# Patient Record
Sex: MALE | Race: WHITE | NOT HISPANIC OR LATINO | Employment: OTHER | URBAN - METROPOLITAN AREA
[De-identification: names, ages, dates, MRNs, and addresses within clinical notes are randomized per-mention and may not be internally consistent; named-entity substitution may affect disease eponyms.]

---

## 2017-02-06 ENCOUNTER — APPOINTMENT (OUTPATIENT)
Dept: LAB | Facility: HOSPITAL | Age: 82
End: 2017-02-06
Attending: INTERNAL MEDICINE
Payer: MEDICARE

## 2017-02-06 DIAGNOSIS — C90.00 MULTIPLE MYELOMA NOT HAVING ACHIEVED REMISSION (HCC): ICD-10-CM

## 2017-02-06 LAB
ALBUMIN SERPL BCP-MCNC: 2.9 G/DL (ref 3.5–5)
ALP SERPL-CCNC: 96 U/L (ref 46–116)
ALT SERPL W P-5'-P-CCNC: 19 U/L (ref 12–78)
ANION GAP SERPL CALCULATED.3IONS-SCNC: 6 MMOL/L (ref 4–13)
AST SERPL W P-5'-P-CCNC: 13 U/L (ref 5–45)
BASOPHILS # BLD AUTO: 0.06 THOUSANDS/ΜL (ref 0–0.1)
BASOPHILS NFR BLD AUTO: 2 % (ref 0–1)
BILIRUB SERPL-MCNC: 0.79 MG/DL (ref 0.2–1)
BUN SERPL-MCNC: 19 MG/DL (ref 5–25)
CALCIUM SERPL-MCNC: 9.1 MG/DL (ref 8.3–10.1)
CHLORIDE SERPL-SCNC: 107 MMOL/L (ref 100–108)
CO2 SERPL-SCNC: 27 MMOL/L (ref 21–32)
CREAT SERPL-MCNC: 1.31 MG/DL (ref 0.6–1.3)
EOSINOPHIL # BLD AUTO: 0.3 THOUSAND/ΜL (ref 0–0.61)
EOSINOPHIL NFR BLD AUTO: 7 % (ref 0–6)
ERYTHROCYTE [DISTWIDTH] IN BLOOD BY AUTOMATED COUNT: 14.8 % (ref 11.6–15.1)
GFR SERPL CREATININE-BSD FRML MDRD: 52.6 ML/MIN/1.73SQ M
GLUCOSE SERPL-MCNC: 104 MG/DL (ref 65–140)
HCT VFR BLD AUTO: 40.4 % (ref 36.5–49.3)
HGB BLD-MCNC: 13.5 G/DL (ref 12–17)
LYMPHOCYTES # BLD AUTO: 1.47 THOUSANDS/ΜL (ref 0.6–4.47)
LYMPHOCYTES NFR BLD AUTO: 36 % (ref 14–44)
MCH RBC QN AUTO: 30.1 PG (ref 26.8–34.3)
MCHC RBC AUTO-ENTMCNC: 33.4 G/DL (ref 31.4–37.4)
MCV RBC AUTO: 90 FL (ref 82–98)
MONOCYTES # BLD AUTO: 0.45 THOUSAND/ΜL (ref 0.17–1.22)
MONOCYTES NFR BLD AUTO: 11 % (ref 4–12)
NEUTROPHILS # BLD AUTO: 1.77 THOUSANDS/ΜL (ref 1.85–7.62)
NEUTS SEG NFR BLD AUTO: 44 % (ref 43–75)
NRBC BLD AUTO-RTO: 0 /100 WBCS
PLATELET # BLD AUTO: 157 THOUSANDS/UL (ref 149–390)
PMV BLD AUTO: 8.9 FL (ref 8.9–12.7)
POTASSIUM SERPL-SCNC: 4.1 MMOL/L (ref 3.5–5.3)
PROT SERPL-MCNC: 6.5 G/DL (ref 6.4–8.2)
RBC # BLD AUTO: 4.49 MILLION/UL (ref 3.88–5.62)
SODIUM SERPL-SCNC: 140 MMOL/L (ref 136–145)
WBC # BLD AUTO: 4.05 THOUSAND/UL (ref 4.31–10.16)

## 2017-02-06 PROCEDURE — 80053 COMPREHEN METABOLIC PANEL: CPT

## 2017-02-06 PROCEDURE — 83883 ASSAY NEPHELOMETRY NOT SPEC: CPT

## 2017-02-06 PROCEDURE — 36415 COLL VENOUS BLD VENIPUNCTURE: CPT

## 2017-02-06 PROCEDURE — 85025 COMPLETE CBC W/AUTO DIFF WBC: CPT

## 2017-02-07 LAB
KAPPA LC FREE SER-MCNC: 33.14 MG/L (ref 3.3–19.4)
KAPPA LC FREE/LAMBDA FREE SER: 0.61 {RATIO} (ref 0.26–1.65)
LAMBDA LC FREE SERPL-MCNC: 54.35 MG/L (ref 5.71–26.3)

## 2017-02-10 ENCOUNTER — ALLSCRIPTS OFFICE VISIT (OUTPATIENT)
Dept: OTHER | Facility: OTHER | Age: 82
End: 2017-02-10

## 2017-02-10 DIAGNOSIS — J43.9 PULMONARY EMPHYSEMA (HCC): ICD-10-CM

## 2017-02-10 DIAGNOSIS — C90.00 MULTIPLE MYELOMA NOT HAVING ACHIEVED REMISSION (HCC): ICD-10-CM

## 2017-03-07 ENCOUNTER — APPOINTMENT (OUTPATIENT)
Dept: LAB | Facility: HOSPITAL | Age: 82
End: 2017-03-07
Attending: INTERNAL MEDICINE
Payer: MEDICARE

## 2017-03-07 DIAGNOSIS — C90.00 MULTIPLE MYELOMA NOT HAVING ACHIEVED REMISSION (HCC): ICD-10-CM

## 2017-03-07 LAB
ALBUMIN SERPL BCP-MCNC: 3.1 G/DL (ref 3.5–5)
ALP SERPL-CCNC: 87 U/L (ref 46–116)
ALT SERPL W P-5'-P-CCNC: 19 U/L (ref 12–78)
ANION GAP SERPL CALCULATED.3IONS-SCNC: 5 MMOL/L (ref 4–13)
AST SERPL W P-5'-P-CCNC: 13 U/L (ref 5–45)
BASOPHILS # BLD AUTO: 0.07 THOUSANDS/ΜL (ref 0–0.1)
BASOPHILS NFR BLD AUTO: 1 % (ref 0–1)
BILIRUB SERPL-MCNC: 1.79 MG/DL (ref 0.2–1)
BUN SERPL-MCNC: 16 MG/DL (ref 5–25)
CALCIUM SERPL-MCNC: 8.7 MG/DL (ref 8.3–10.1)
CHLORIDE SERPL-SCNC: 107 MMOL/L (ref 100–108)
CO2 SERPL-SCNC: 30 MMOL/L (ref 21–32)
CREAT SERPL-MCNC: 1.21 MG/DL (ref 0.6–1.3)
EOSINOPHIL # BLD AUTO: 0.23 THOUSAND/ΜL (ref 0–0.61)
EOSINOPHIL NFR BLD AUTO: 4 % (ref 0–6)
ERYTHROCYTE [DISTWIDTH] IN BLOOD BY AUTOMATED COUNT: 15.1 % (ref 11.6–15.1)
GFR SERPL CREATININE-BSD FRML MDRD: 57.7 ML/MIN/1.73SQ M
GLUCOSE SERPL-MCNC: 89 MG/DL (ref 65–140)
HCT VFR BLD AUTO: 42.8 % (ref 36.5–49.3)
HGB BLD-MCNC: 14.3 G/DL (ref 12–17)
LYMPHOCYTES # BLD AUTO: 1.5 THOUSANDS/ΜL (ref 0.6–4.47)
LYMPHOCYTES NFR BLD AUTO: 27 % (ref 14–44)
MCH RBC QN AUTO: 30.2 PG (ref 26.8–34.3)
MCHC RBC AUTO-ENTMCNC: 33.4 G/DL (ref 31.4–37.4)
MCV RBC AUTO: 91 FL (ref 82–98)
MONOCYTES # BLD AUTO: 0.66 THOUSAND/ΜL (ref 0.17–1.22)
MONOCYTES NFR BLD AUTO: 12 % (ref 4–12)
NEUTROPHILS # BLD AUTO: 3.08 THOUSANDS/ΜL (ref 1.85–7.62)
NEUTS SEG NFR BLD AUTO: 56 % (ref 43–75)
NRBC BLD AUTO-RTO: 0 /100 WBCS
PLATELET # BLD AUTO: 167 THOUSANDS/UL (ref 149–390)
PMV BLD AUTO: 9.6 FL (ref 8.9–12.7)
POTASSIUM SERPL-SCNC: 3.9 MMOL/L (ref 3.5–5.3)
PROT SERPL-MCNC: 6.6 G/DL (ref 6.4–8.2)
RBC # BLD AUTO: 4.73 MILLION/UL (ref 3.88–5.62)
SODIUM SERPL-SCNC: 142 MMOL/L (ref 136–145)
WBC # BLD AUTO: 5.54 THOUSAND/UL (ref 4.31–10.16)

## 2017-03-07 PROCEDURE — 85025 COMPLETE CBC W/AUTO DIFF WBC: CPT

## 2017-03-07 PROCEDURE — 80053 COMPREHEN METABOLIC PANEL: CPT

## 2017-03-07 PROCEDURE — 83883 ASSAY NEPHELOMETRY NOT SPEC: CPT

## 2017-03-07 PROCEDURE — 36415 COLL VENOUS BLD VENIPUNCTURE: CPT

## 2017-03-08 LAB
KAPPA LC FREE SER-MCNC: 31.33 MG/L (ref 3.3–19.4)
KAPPA LC FREE/LAMBDA FREE SER: 0.5 {RATIO} (ref 0.26–1.65)
LAMBDA LC FREE SERPL-MCNC: 63.24 MG/L (ref 5.71–26.3)

## 2017-03-09 ENCOUNTER — ALLSCRIPTS OFFICE VISIT (OUTPATIENT)
Dept: OTHER | Facility: OTHER | Age: 82
End: 2017-03-09

## 2017-03-27 ENCOUNTER — HOSPITAL ENCOUNTER (OUTPATIENT)
Dept: RADIOLOGY | Facility: HOSPITAL | Age: 82
Discharge: HOME | DRG: 189 | End: 2017-03-27
Attending: INTERNAL MEDICINE
Payer: MEDICARE

## 2017-03-27 ENCOUNTER — APPOINTMENT (OUTPATIENT)
Dept: LAB | Facility: HOSPITAL | Age: 82
DRG: 189 | End: 2017-03-27
Attending: INTERNAL MEDICINE
Payer: MEDICARE

## 2017-03-27 DIAGNOSIS — J43.9 PULMONARY EMPHYSEMA (HCC): ICD-10-CM

## 2017-03-27 LAB
ALBUMIN SERPL BCP-MCNC: 3.2 G/DL (ref 3.5–5)
ALP SERPL-CCNC: 98 U/L (ref 46–116)
ALT SERPL W P-5'-P-CCNC: 29 U/L (ref 12–78)
ANION GAP SERPL CALCULATED.3IONS-SCNC: 6 MMOL/L (ref 4–13)
AST SERPL W P-5'-P-CCNC: 21 U/L (ref 5–45)
BASOPHILS # BLD AUTO: 0.08 THOUSANDS/ΜL (ref 0–0.1)
BASOPHILS NFR BLD AUTO: 2 % (ref 0–1)
BILIRUB SERPL-MCNC: 1.25 MG/DL (ref 0.2–1)
BUN SERPL-MCNC: 18 MG/DL (ref 5–25)
CALCIUM SERPL-MCNC: 8.6 MG/DL (ref 8.3–10.1)
CHLORIDE SERPL-SCNC: 104 MMOL/L (ref 100–108)
CO2 SERPL-SCNC: 29 MMOL/L (ref 21–32)
CREAT SERPL-MCNC: 1.21 MG/DL (ref 0.6–1.3)
EOSINOPHIL # BLD AUTO: 0.28 THOUSAND/ΜL (ref 0–0.61)
EOSINOPHIL NFR BLD AUTO: 5 % (ref 0–6)
ERYTHROCYTE [DISTWIDTH] IN BLOOD BY AUTOMATED COUNT: 14.9 % (ref 11.6–15.1)
GFR SERPL CREATININE-BSD FRML MDRD: 57.7 ML/MIN/1.73SQ M
GLUCOSE SERPL-MCNC: 88 MG/DL (ref 65–140)
HCT VFR BLD AUTO: 42.8 % (ref 36.5–49.3)
HGB BLD-MCNC: 14.4 G/DL (ref 12–17)
LYMPHOCYTES # BLD AUTO: 1.36 THOUSANDS/ΜL (ref 0.6–4.47)
LYMPHOCYTES NFR BLD AUTO: 26 % (ref 14–44)
MCH RBC QN AUTO: 30 PG (ref 26.8–34.3)
MCHC RBC AUTO-ENTMCNC: 33.6 G/DL (ref 31.4–37.4)
MCV RBC AUTO: 89 FL (ref 82–98)
MONOCYTES # BLD AUTO: 0.7 THOUSAND/ΜL (ref 0.17–1.22)
MONOCYTES NFR BLD AUTO: 13 % (ref 4–12)
NEUTROPHILS # BLD AUTO: 2.81 THOUSANDS/ΜL (ref 1.85–7.62)
NEUTS SEG NFR BLD AUTO: 54 % (ref 43–75)
NRBC BLD AUTO-RTO: 0 /100 WBCS
PLATELET # BLD AUTO: 161 THOUSANDS/UL (ref 149–390)
PMV BLD AUTO: 9.2 FL (ref 8.9–12.7)
POTASSIUM SERPL-SCNC: 3.8 MMOL/L (ref 3.5–5.3)
PROT SERPL-MCNC: 6.7 G/DL (ref 6.4–8.2)
RBC # BLD AUTO: 4.8 MILLION/UL (ref 3.88–5.62)
SODIUM SERPL-SCNC: 139 MMOL/L (ref 136–145)
WBC # BLD AUTO: 5.23 THOUSAND/UL (ref 4.31–10.16)

## 2017-03-27 PROCEDURE — 83883 ASSAY NEPHELOMETRY NOT SPEC: CPT

## 2017-03-27 PROCEDURE — 80053 COMPREHEN METABOLIC PANEL: CPT

## 2017-03-27 PROCEDURE — 36415 COLL VENOUS BLD VENIPUNCTURE: CPT

## 2017-03-27 PROCEDURE — 77075 RADEX OSSEOUS SURVEY COMPL: CPT

## 2017-03-27 PROCEDURE — 85025 COMPLETE CBC W/AUTO DIFF WBC: CPT

## 2017-03-28 LAB
KAPPA LC FREE SER-MCNC: 35.59 MG/L (ref 3.3–19.4)
KAPPA LC FREE/LAMBDA FREE SER: 0.52 {RATIO} (ref 0.26–1.65)
LAMBDA LC FREE SERPL-MCNC: 67.99 MG/L (ref 5.71–26.3)

## 2017-03-29 ENCOUNTER — HOSPITAL ENCOUNTER (INPATIENT)
Facility: HOSPITAL | Age: 82
LOS: 4 days | Discharge: HOME | DRG: 189 | End: 2017-04-02
Attending: EMERGENCY MEDICINE | Admitting: INTERNAL MEDICINE
Payer: MEDICARE

## 2017-03-29 ENCOUNTER — APPOINTMENT (EMERGENCY)
Dept: CT IMAGING | Facility: HOSPITAL | Age: 82
DRG: 189 | End: 2017-03-29
Payer: MEDICARE

## 2017-03-29 ENCOUNTER — APPOINTMENT (EMERGENCY)
Dept: RADIOLOGY | Facility: HOSPITAL | Age: 82
DRG: 189 | End: 2017-03-29
Payer: MEDICARE

## 2017-03-29 DIAGNOSIS — J44.1 COPD EXACERBATION (HCC): Primary | ICD-10-CM

## 2017-03-29 DIAGNOSIS — R06.02 SHORTNESS OF BREATH: ICD-10-CM

## 2017-03-29 DIAGNOSIS — R06.03 RESPIRATORY DISTRESS: ICD-10-CM

## 2017-03-29 LAB
ALBUMIN SERPL BCP-MCNC: 3.2 G/DL (ref 3.5–5)
ALP SERPL-CCNC: 109 U/L (ref 46–116)
ALT SERPL W P-5'-P-CCNC: 24 U/L (ref 12–78)
ANION GAP SERPL CALCULATED.3IONS-SCNC: 11 MMOL/L (ref 4–13)
AST SERPL W P-5'-P-CCNC: 27 U/L (ref 5–45)
BASOPHILS # BLD AUTO: 0.02 THOUSANDS/ΜL (ref 0–0.1)
BASOPHILS NFR BLD AUTO: 0 % (ref 0–1)
BILIRUB SERPL-MCNC: 1.52 MG/DL (ref 0.2–1)
BUN SERPL-MCNC: 17 MG/DL (ref 5–25)
CALCIUM SERPL-MCNC: 8.5 MG/DL (ref 8.3–10.1)
CHLORIDE SERPL-SCNC: 104 MMOL/L (ref 100–108)
CO2 SERPL-SCNC: 24 MMOL/L (ref 21–32)
CREAT SERPL-MCNC: 1.31 MG/DL (ref 0.6–1.3)
EOSINOPHIL # BLD AUTO: 0.16 THOUSAND/ΜL (ref 0–0.61)
EOSINOPHIL NFR BLD AUTO: 3 % (ref 0–6)
ERYTHROCYTE [DISTWIDTH] IN BLOOD BY AUTOMATED COUNT: 15 % (ref 11.6–15.1)
GFR SERPL CREATININE-BSD FRML MDRD: 52.6 ML/MIN/1.73SQ M
GLUCOSE SERPL-MCNC: 110 MG/DL (ref 65–140)
HCT VFR BLD AUTO: 43.4 % (ref 36.5–49.3)
HGB BLD-MCNC: 14.5 G/DL (ref 12–17)
LACTATE SERPL-SCNC: 2.1 MMOL/L (ref 0.5–2)
LYMPHOCYTES # BLD AUTO: 1.3 THOUSANDS/ΜL (ref 0.6–4.47)
LYMPHOCYTES NFR BLD AUTO: 27 % (ref 14–44)
MCH RBC QN AUTO: 29.8 PG (ref 26.8–34.3)
MCHC RBC AUTO-ENTMCNC: 33.4 G/DL (ref 31.4–37.4)
MCV RBC AUTO: 89 FL (ref 82–98)
MONOCYTES # BLD AUTO: 0.43 THOUSAND/ΜL (ref 0.17–1.22)
MONOCYTES NFR BLD AUTO: 9 % (ref 4–12)
NEUTROPHILS # BLD AUTO: 2.9 THOUSANDS/ΜL (ref 1.85–7.62)
NEUTS SEG NFR BLD AUTO: 61 % (ref 43–75)
NRBC BLD AUTO-RTO: 0 /100 WBCS
NT-PROBNP SERPL-MCNC: 197 PG/ML
PLATELET # BLD AUTO: 151 THOUSANDS/UL (ref 149–390)
PMV BLD AUTO: 9.4 FL (ref 8.9–12.7)
POTASSIUM SERPL-SCNC: 4 MMOL/L (ref 3.5–5.3)
PROT SERPL-MCNC: 6.8 G/DL (ref 6.4–8.2)
RBC # BLD AUTO: 4.86 MILLION/UL (ref 3.88–5.62)
SODIUM SERPL-SCNC: 139 MMOL/L (ref 136–145)
SPECIMEN SOURCE: NORMAL
TROPONIN I BLD-MCNC: 0.01 NG/ML (ref 0–0.08)
WBC # BLD AUTO: 4.81 THOUSAND/UL (ref 4.31–10.16)

## 2017-03-29 PROCEDURE — 71010 HB CHEST X-RAY 1 VIEW FRONTAL (PORTABLE): CPT

## 2017-03-29 PROCEDURE — 87798 DETECT AGENT NOS DNA AMP: CPT | Performed by: PHYSICIAN ASSISTANT

## 2017-03-29 PROCEDURE — 85025 COMPLETE CBC W/AUTO DIFF WBC: CPT | Performed by: PHYSICIAN ASSISTANT

## 2017-03-29 PROCEDURE — 71275 CT ANGIOGRAPHY CHEST: CPT

## 2017-03-29 PROCEDURE — 96361 HYDRATE IV INFUSION ADD-ON: CPT

## 2017-03-29 PROCEDURE — 94760 N-INVAS EAR/PLS OXIMETRY 1: CPT

## 2017-03-29 PROCEDURE — 94640 AIRWAY INHALATION TREATMENT: CPT

## 2017-03-29 PROCEDURE — 36415 COLL VENOUS BLD VENIPUNCTURE: CPT | Performed by: PHYSICIAN ASSISTANT

## 2017-03-29 PROCEDURE — 83605 ASSAY OF LACTIC ACID: CPT | Performed by: PHYSICIAN ASSISTANT

## 2017-03-29 PROCEDURE — 93005 ELECTROCARDIOGRAM TRACING: CPT | Performed by: EMERGENCY MEDICINE

## 2017-03-29 PROCEDURE — 84484 ASSAY OF TROPONIN QUANT: CPT

## 2017-03-29 PROCEDURE — 93005 ELECTROCARDIOGRAM TRACING: CPT | Performed by: PHYSICIAN ASSISTANT

## 2017-03-29 PROCEDURE — 87040 BLOOD CULTURE FOR BACTERIA: CPT | Performed by: PHYSICIAN ASSISTANT

## 2017-03-29 PROCEDURE — 99285 EMERGENCY DEPT VISIT HI MDM: CPT

## 2017-03-29 PROCEDURE — 83880 ASSAY OF NATRIURETIC PEPTIDE: CPT | Performed by: PHYSICIAN ASSISTANT

## 2017-03-29 PROCEDURE — 94660 CPAP INITIATION&MGMT: CPT

## 2017-03-29 PROCEDURE — 96374 THER/PROPH/DIAG INJ IV PUSH: CPT

## 2017-03-29 PROCEDURE — 94644 CONT INHLJ TX 1ST HOUR: CPT

## 2017-03-29 PROCEDURE — 80053 COMPREHEN METABOLIC PANEL: CPT | Performed by: PHYSICIAN ASSISTANT

## 2017-03-29 RX ORDER — FINASTERIDE 5 MG/1
5 TABLET, FILM COATED ORAL
Status: DISCONTINUED | OUTPATIENT
Start: 2017-03-29 | End: 2017-04-02 | Stop reason: HOSPADM

## 2017-03-29 RX ORDER — FINASTERIDE 5 MG/1
5 TABLET, FILM COATED ORAL DAILY
Status: DISCONTINUED | OUTPATIENT
Start: 2017-03-30 | End: 2017-03-30

## 2017-03-29 RX ORDER — ALBUTEROL SULFATE 2.5 MG/3ML
5 SOLUTION RESPIRATORY (INHALATION) ONCE
Status: DISCONTINUED | OUTPATIENT
Start: 2017-03-29 | End: 2017-03-29

## 2017-03-29 RX ORDER — ALBUTEROL SULFATE 2.5 MG/3ML
5 SOLUTION RESPIRATORY (INHALATION) ONCE
Status: COMPLETED | OUTPATIENT
Start: 2017-03-29 | End: 2017-03-29

## 2017-03-29 RX ORDER — METHYLPREDNISOLONE SODIUM SUCCINATE 40 MG/ML
40 INJECTION, POWDER, LYOPHILIZED, FOR SOLUTION INTRAMUSCULAR; INTRAVENOUS EVERY 8 HOURS SCHEDULED
Status: DISCONTINUED | OUTPATIENT
Start: 2017-03-29 | End: 2017-04-01

## 2017-03-29 RX ORDER — BENZONATATE 100 MG/1
100 CAPSULE ORAL 3 TIMES DAILY PRN
COMMUNITY
End: 2017-10-27 | Stop reason: ALTCHOICE

## 2017-03-29 RX ORDER — LEVALBUTEROL 1.25 MG/.5ML
1.25 SOLUTION, CONCENTRATE RESPIRATORY (INHALATION) EVERY 6 HOURS
Status: DISCONTINUED | OUTPATIENT
Start: 2017-03-29 | End: 2017-03-30

## 2017-03-29 RX ORDER — ALBUTEROL SULFATE 2.5 MG/3ML
2.5 SOLUTION RESPIRATORY (INHALATION) EVERY 4 HOURS PRN
Status: DISCONTINUED | OUTPATIENT
Start: 2017-03-29 | End: 2017-03-31

## 2017-03-29 RX ORDER — CHOLESTYRAMINE LIGHT 4 G/5.7G
4 POWDER, FOR SUSPENSION ORAL DAILY
Status: ON HOLD | COMMUNITY
End: 2017-07-10

## 2017-03-29 RX ORDER — BENZONATATE 100 MG/1
100 CAPSULE ORAL 3 TIMES DAILY PRN
Status: DISCONTINUED | OUTPATIENT
Start: 2017-03-29 | End: 2017-03-30

## 2017-03-29 RX ORDER — ASPIRIN 81 MG/1
81 TABLET, CHEWABLE ORAL EVERY OTHER DAY
Status: DISCONTINUED | OUTPATIENT
Start: 2017-03-30 | End: 2017-04-02 | Stop reason: HOSPADM

## 2017-03-29 RX ORDER — LANOLIN ALCOHOL/MO/W.PET/CERES
1000 CREAM (GRAM) TOPICAL DAILY
COMMUNITY
End: 2017-10-27 | Stop reason: ALTCHOICE

## 2017-03-29 RX ORDER — METHYLPREDNISOLONE SODIUM SUCCINATE 125 MG/2ML
125 INJECTION, POWDER, LYOPHILIZED, FOR SOLUTION INTRAMUSCULAR; INTRAVENOUS ONCE
Status: COMPLETED | OUTPATIENT
Start: 2017-03-29 | End: 2017-03-29

## 2017-03-29 RX ORDER — LEVALBUTEROL 1.25 MG/.5ML
1.25 SOLUTION, CONCENTRATE RESPIRATORY (INHALATION) EVERY 4 HOURS PRN
Status: DISCONTINUED | OUTPATIENT
Start: 2017-03-29 | End: 2017-03-31

## 2017-03-29 RX ORDER — LENALIDOMIDE 5 MG/1
5 CAPSULE ORAL DAILY
Status: DISCONTINUED | OUTPATIENT
Start: 2017-03-30 | End: 2017-03-30

## 2017-03-29 RX ADMIN — SODIUM CHLORIDE 1000 ML: 0.9 INJECTION, SOLUTION INTRAVENOUS at 19:09

## 2017-03-29 RX ADMIN — ALBUTEROL SULFATE 10 MG: 2.5 SOLUTION RESPIRATORY (INHALATION) at 20:59

## 2017-03-29 RX ADMIN — ALBUTEROL SULFATE 5 MG: 2.5 SOLUTION RESPIRATORY (INHALATION) at 19:11

## 2017-03-29 RX ADMIN — FINASTERIDE 5 MG: 5 TABLET, FILM COATED ORAL at 23:37

## 2017-03-29 RX ADMIN — METHYLPREDNISOLONE SODIUM SUCCINATE 125 MG: 125 INJECTION, POWDER, FOR SOLUTION INTRAMUSCULAR; INTRAVENOUS at 19:11

## 2017-03-29 RX ADMIN — IPRATROPIUM BROMIDE 1 MG: 0.5 SOLUTION RESPIRATORY (INHALATION) at 21:00

## 2017-03-29 RX ADMIN — IPRATROPIUM BROMIDE 0.5 MG: 0.5 SOLUTION RESPIRATORY (INHALATION) at 19:12

## 2017-03-29 RX ADMIN — ALBUTEROL SULFATE 5 MG: 2.5 SOLUTION RESPIRATORY (INHALATION) at 17:20

## 2017-03-29 RX ADMIN — IODIXANOL 85 ML: 320 INJECTION, SOLUTION INTRAVASCULAR at 20:47

## 2017-03-29 RX ADMIN — IPRATROPIUM BROMIDE 0.5 MG: 0.5 SOLUTION RESPIRATORY (INHALATION) at 17:20

## 2017-03-30 LAB
ANION GAP SERPL CALCULATED.3IONS-SCNC: 12 MMOL/L (ref 4–13)
ANISOCYTOSIS BLD QL SMEAR: PRESENT
ATRIAL RATE: 108 BPM
ATRIAL RATE: 129 BPM
BASOPHILS # BLD MANUAL: 0 THOUSAND/UL (ref 0–0.1)
BASOPHILS NFR MAR MANUAL: 0 % (ref 0–1)
BUN SERPL-MCNC: 16 MG/DL (ref 5–25)
CALCIUM SERPL-MCNC: 8.2 MG/DL (ref 8.3–10.1)
CHLORIDE SERPL-SCNC: 105 MMOL/L (ref 100–108)
CO2 SERPL-SCNC: 22 MMOL/L (ref 21–32)
CREAT SERPL-MCNC: 1.53 MG/DL (ref 0.6–1.3)
EOSINOPHIL # BLD MANUAL: 0 THOUSAND/UL (ref 0–0.4)
EOSINOPHIL NFR BLD MANUAL: 0 % (ref 0–6)
ERYTHROCYTE [DISTWIDTH] IN BLOOD BY AUTOMATED COUNT: 15 % (ref 11.6–15.1)
FLUAV AG SPEC QL: NORMAL
FLUBV AG SPEC QL: NORMAL
GFR SERPL CREATININE-BSD FRML MDRD: 44 ML/MIN/1.73SQ M
GLUCOSE SERPL-MCNC: 167 MG/DL (ref 65–140)
HCT VFR BLD AUTO: 38.5 % (ref 36.5–49.3)
HGB BLD-MCNC: 12.9 G/DL (ref 12–17)
LYMPHOCYTES # BLD AUTO: 0.32 THOUSAND/UL (ref 0.6–4.47)
LYMPHOCYTES # BLD AUTO: 10 % (ref 14–44)
MCH RBC QN AUTO: 29.7 PG (ref 26.8–34.3)
MCHC RBC AUTO-ENTMCNC: 33.5 G/DL (ref 31.4–37.4)
MCV RBC AUTO: 89 FL (ref 82–98)
MONOCYTES # BLD AUTO: 0 THOUSAND/UL (ref 0–1.22)
MONOCYTES NFR BLD: 0 % (ref 4–12)
NEUTROPHILS # BLD MANUAL: 2.85 THOUSAND/UL (ref 1.85–7.62)
NEUTS BAND NFR BLD MANUAL: 1 % (ref 0–8)
NEUTS SEG NFR BLD AUTO: 89 % (ref 43–75)
NRBC BLD AUTO-RTO: 0 /100 WBCS
P AXIS: 78 DEGREES
PLATELET # BLD AUTO: 133 THOUSANDS/UL (ref 149–390)
PLATELET BLD QL SMEAR: ABNORMAL
PMV BLD AUTO: 8.7 FL (ref 8.9–12.7)
POTASSIUM SERPL-SCNC: 3.9 MMOL/L (ref 3.5–5.3)
PR INTERVAL: 192 MS
QRS AXIS: 38 DEGREES
QRS AXIS: 86 DEGREES
QRSD INTERVAL: 120 MS
QRSD INTERVAL: 122 MS
QT INTERVAL: 322 MS
QT INTERVAL: 378 MS
QTC INTERVAL: 458 MS
QTC INTERVAL: 506 MS
RBC # BLD AUTO: 4.35 MILLION/UL (ref 3.88–5.62)
RSV B RNA SPEC QL NAA+PROBE: NORMAL
SODIUM SERPL-SCNC: 139 MMOL/L (ref 136–145)
T WAVE AXIS: 38 DEGREES
T WAVE AXIS: 64 DEGREES
TOTAL CELLS COUNTED SPEC: 100
VENTRICULAR RATE: 108 BPM
VENTRICULAR RATE: 122 BPM
WBC # BLD AUTO: 3.17 THOUSAND/UL (ref 4.31–10.16)

## 2017-03-30 PROCEDURE — 85007 BL SMEAR W/DIFF WBC COUNT: CPT | Performed by: NURSE PRACTITIONER

## 2017-03-30 PROCEDURE — 85027 COMPLETE CBC AUTOMATED: CPT | Performed by: NURSE PRACTITIONER

## 2017-03-30 PROCEDURE — 94640 AIRWAY INHALATION TREATMENT: CPT

## 2017-03-30 PROCEDURE — 80048 BASIC METABOLIC PNL TOTAL CA: CPT | Performed by: NURSE PRACTITIONER

## 2017-03-30 RX ORDER — HEPARIN SODIUM 5000 [USP'U]/ML
5000 INJECTION, SOLUTION INTRAVENOUS; SUBCUTANEOUS EVERY 8 HOURS SCHEDULED
Status: DISCONTINUED | OUTPATIENT
Start: 2017-03-30 | End: 2017-04-02 | Stop reason: HOSPADM

## 2017-03-30 RX ORDER — DIPHENHYDRAMINE HYDROCHLORIDE 50 MG/ML
12.5 INJECTION INTRAMUSCULAR; INTRAVENOUS ONCE
Status: COMPLETED | OUTPATIENT
Start: 2017-03-30 | End: 2017-03-30

## 2017-03-30 RX ORDER — HYDROCODONE POLISTIREX AND CHLORPHENIRAMINE POLISTIREX 10; 8 MG/5ML; MG/5ML
5 SUSPENSION, EXTENDED RELEASE ORAL
Status: DISCONTINUED | OUTPATIENT
Start: 2017-03-30 | End: 2017-04-02 | Stop reason: HOSPADM

## 2017-03-30 RX ORDER — LEVALBUTEROL 1.25 MG/.5ML
1.25 SOLUTION, CONCENTRATE RESPIRATORY (INHALATION) EVERY 6 HOURS
Status: DISCONTINUED | OUTPATIENT
Start: 2017-03-30 | End: 2017-04-02 | Stop reason: HOSPADM

## 2017-03-30 RX ORDER — DIPHENHYDRAMINE HYDROCHLORIDE 50 MG/ML
INJECTION INTRAMUSCULAR; INTRAVENOUS
Status: COMPLETED
Start: 2017-03-30 | End: 2017-03-30

## 2017-03-30 RX ADMIN — IPRATROPIUM BROMIDE 0.5 MG: 0.5 SOLUTION RESPIRATORY (INHALATION) at 20:09

## 2017-03-30 RX ADMIN — HYDROCODONE POLISTIREX AND CHLORPHENIRAMINE POLISTIREX 5 ML: 10; 8 SUSPENSION, EXTENDED RELEASE ORAL at 12:07

## 2017-03-30 RX ADMIN — LEVALBUTEROL 1.25 MG: 1.25 SOLUTION, CONCENTRATE RESPIRATORY (INHALATION) at 08:25

## 2017-03-30 RX ADMIN — HEPARIN SODIUM 5000 UNITS: 5000 INJECTION, SOLUTION INTRAVENOUS; SUBCUTANEOUS at 13:44

## 2017-03-30 RX ADMIN — DIPHENHYDRAMINE HYDROCHLORIDE 12.5 MG: 50 INJECTION, SOLUTION INTRAMUSCULAR; INTRAVENOUS at 12:45

## 2017-03-30 RX ADMIN — AZITHROMYCIN MONOHYDRATE 500 MG: 500 INJECTION, POWDER, LYOPHILIZED, FOR SOLUTION INTRAVENOUS at 12:07

## 2017-03-30 RX ADMIN — LEVALBUTEROL 1.25 MG: 1.25 SOLUTION, CONCENTRATE RESPIRATORY (INHALATION) at 00:41

## 2017-03-30 RX ADMIN — IPRATROPIUM BROMIDE 0.5 MG: 0.5 SOLUTION RESPIRATORY (INHALATION) at 00:41

## 2017-03-30 RX ADMIN — ASPIRIN 81 MG: 81 TABLET, CHEWABLE ORAL at 08:41

## 2017-03-30 RX ADMIN — IPRATROPIUM BROMIDE 0.5 MG: 0.5 SOLUTION RESPIRATORY (INHALATION) at 08:25

## 2017-03-30 RX ADMIN — IPRATROPIUM BROMIDE 0.5 MG: 0.5 SOLUTION RESPIRATORY (INHALATION) at 14:11

## 2017-03-30 RX ADMIN — METHYLPREDNISOLONE SODIUM SUCCINATE 40 MG: 40 INJECTION, POWDER, FOR SOLUTION INTRAMUSCULAR; INTRAVENOUS at 12:00

## 2017-03-30 RX ADMIN — DOXYCYCLINE 100 MG: 100 INJECTION, POWDER, LYOPHILIZED, FOR SOLUTION INTRAVENOUS at 15:18

## 2017-03-30 RX ADMIN — DIPHENHYDRAMINE HYDROCHLORIDE 12.5 MG: 50 INJECTION INTRAMUSCULAR; INTRAVENOUS at 12:45

## 2017-03-30 RX ADMIN — LEVALBUTEROL 1.25 MG: 1.25 SOLUTION, CONCENTRATE RESPIRATORY (INHALATION) at 20:09

## 2017-03-30 RX ADMIN — LEVALBUTEROL 1.25 MG: 1.25 SOLUTION, CONCENTRATE RESPIRATORY (INHALATION) at 14:11

## 2017-03-30 RX ADMIN — SODIUM CHLORIDE 1000 ML: 0.9 INJECTION, SOLUTION INTRAVENOUS at 06:39

## 2017-03-30 RX ADMIN — METHYLPREDNISOLONE SODIUM SUCCINATE 40 MG: 40 INJECTION, POWDER, FOR SOLUTION INTRAMUSCULAR; INTRAVENOUS at 17:58

## 2017-03-30 RX ADMIN — METHYLPREDNISOLONE SODIUM SUCCINATE 40 MG: 40 INJECTION, POWDER, FOR SOLUTION INTRAMUSCULAR; INTRAVENOUS at 02:48

## 2017-03-30 RX ADMIN — FINASTERIDE 5 MG: 5 TABLET, FILM COATED ORAL at 21:36

## 2017-03-30 RX ADMIN — MENTHOL 5.4 MG: 5.4 LOZENGE ORAL at 22:39

## 2017-03-31 LAB
ANION GAP SERPL CALCULATED.3IONS-SCNC: 6 MMOL/L (ref 4–13)
BUN SERPL-MCNC: 20 MG/DL (ref 5–25)
CALCIUM SERPL-MCNC: 8 MG/DL (ref 8.3–10.1)
CHLORIDE SERPL-SCNC: 106 MMOL/L (ref 100–108)
CO2 SERPL-SCNC: 25 MMOL/L (ref 21–32)
CREAT SERPL-MCNC: 1.08 MG/DL (ref 0.6–1.3)
GFR SERPL CREATININE-BSD FRML MDRD: >60 ML/MIN/1.73SQ M
GLUCOSE SERPL-MCNC: 142 MG/DL (ref 65–140)
POTASSIUM SERPL-SCNC: 5.1 MMOL/L (ref 3.5–5.3)
POTASSIUM SERPL-SCNC: 5.8 MMOL/L (ref 3.5–5.3)
S PYO AG THROAT QL: NEGATIVE
SODIUM SERPL-SCNC: 137 MMOL/L (ref 136–145)

## 2017-03-31 PROCEDURE — 87430 STREP A AG IA: CPT | Performed by: NURSE PRACTITIONER

## 2017-03-31 PROCEDURE — 87070 CULTURE OTHR SPECIMN AEROBIC: CPT | Performed by: NURSE PRACTITIONER

## 2017-03-31 PROCEDURE — 84132 ASSAY OF SERUM POTASSIUM: CPT | Performed by: NURSE PRACTITIONER

## 2017-03-31 PROCEDURE — 94640 AIRWAY INHALATION TREATMENT: CPT

## 2017-03-31 PROCEDURE — 94760 N-INVAS EAR/PLS OXIMETRY 1: CPT

## 2017-03-31 PROCEDURE — 80048 BASIC METABOLIC PNL TOTAL CA: CPT | Performed by: NURSE PRACTITIONER

## 2017-03-31 RX ADMIN — HYDROCODONE POLISTIREX AND CHLORPHENIRAMINE POLISTIREX 5 ML: 10; 8 SUSPENSION, EXTENDED RELEASE ORAL at 02:22

## 2017-03-31 RX ADMIN — METHYLPREDNISOLONE SODIUM SUCCINATE 40 MG: 40 INJECTION, POWDER, FOR SOLUTION INTRAMUSCULAR; INTRAVENOUS at 02:07

## 2017-03-31 RX ADMIN — METHYLPREDNISOLONE SODIUM SUCCINATE 40 MG: 40 INJECTION, POWDER, FOR SOLUTION INTRAMUSCULAR; INTRAVENOUS at 16:52

## 2017-03-31 RX ADMIN — IPRATROPIUM BROMIDE 0.5 MG: 0.5 SOLUTION RESPIRATORY (INHALATION) at 19:16

## 2017-03-31 RX ADMIN — MENTHOL 5.4 MG: 5.4 LOZENGE ORAL at 23:37

## 2017-03-31 RX ADMIN — HYDROCODONE POLISTIREX AND CHLORPHENIRAMINE POLISTIREX 5 ML: 10; 8 SUSPENSION, EXTENDED RELEASE ORAL at 23:37

## 2017-03-31 RX ADMIN — DOXYCYCLINE 100 MG: 100 INJECTION, POWDER, LYOPHILIZED, FOR SOLUTION INTRAVENOUS at 02:13

## 2017-03-31 RX ADMIN — METHYLPREDNISOLONE SODIUM SUCCINATE 40 MG: 40 INJECTION, POWDER, FOR SOLUTION INTRAMUSCULAR; INTRAVENOUS at 10:59

## 2017-03-31 RX ADMIN — MENTHOL 5.4 MG: 5.4 LOZENGE ORAL at 13:22

## 2017-03-31 RX ADMIN — IPRATROPIUM BROMIDE 0.5 MG: 0.5 SOLUTION RESPIRATORY (INHALATION) at 07:54

## 2017-03-31 RX ADMIN — MENTHOL 5.4 MG: 5.4 LOZENGE ORAL at 02:09

## 2017-03-31 RX ADMIN — LEVALBUTEROL 1.25 MG: 1.25 SOLUTION, CONCENTRATE RESPIRATORY (INHALATION) at 19:16

## 2017-03-31 RX ADMIN — MENTHOL 5.4 MG: 5.4 LOZENGE ORAL at 20:38

## 2017-03-31 RX ADMIN — LEVALBUTEROL 1.25 MG: 1.25 SOLUTION, CONCENTRATE RESPIRATORY (INHALATION) at 07:54

## 2017-03-31 RX ADMIN — HEPARIN SODIUM 5000 UNITS: 5000 INJECTION, SOLUTION INTRAVENOUS; SUBCUTANEOUS at 05:17

## 2017-03-31 RX ADMIN — DOXYCYCLINE 100 MG: 100 INJECTION, POWDER, LYOPHILIZED, FOR SOLUTION INTRAVENOUS at 13:32

## 2017-03-31 RX ADMIN — FINASTERIDE 5 MG: 5 TABLET, FILM COATED ORAL at 21:55

## 2017-03-31 RX ADMIN — HEPARIN SODIUM 5000 UNITS: 5000 INJECTION, SOLUTION INTRAVENOUS; SUBCUTANEOUS at 13:24

## 2017-03-31 RX ADMIN — HEPARIN SODIUM 5000 UNITS: 5000 INJECTION, SOLUTION INTRAVENOUS; SUBCUTANEOUS at 21:55

## 2017-04-01 PROBLEM — N17.9 AKI (ACUTE KIDNEY INJURY) (HCC): Status: ACTIVE | Noted: 2017-04-01

## 2017-04-01 LAB
ANION GAP SERPL CALCULATED.3IONS-SCNC: 3 MMOL/L (ref 4–13)
BUN SERPL-MCNC: 24 MG/DL (ref 5–25)
CALCIUM SERPL-MCNC: 7.9 MG/DL (ref 8.3–10.1)
CHLORIDE SERPL-SCNC: 107 MMOL/L (ref 100–108)
CO2 SERPL-SCNC: 29 MMOL/L (ref 21–32)
CREAT SERPL-MCNC: 1.03 MG/DL (ref 0.6–1.3)
GFR SERPL CREATININE-BSD FRML MDRD: >60 ML/MIN/1.73SQ M
GLUCOSE SERPL-MCNC: 130 MG/DL (ref 65–140)
POTASSIUM SERPL-SCNC: 4.5 MMOL/L (ref 3.5–5.3)
SODIUM SERPL-SCNC: 139 MMOL/L (ref 136–145)

## 2017-04-01 PROCEDURE — 94760 N-INVAS EAR/PLS OXIMETRY 1: CPT

## 2017-04-01 PROCEDURE — 94640 AIRWAY INHALATION TREATMENT: CPT

## 2017-04-01 PROCEDURE — 94668 MNPJ CHEST WALL SBSQ: CPT

## 2017-04-01 PROCEDURE — 80048 BASIC METABOLIC PNL TOTAL CA: CPT | Performed by: NURSE PRACTITIONER

## 2017-04-01 RX ORDER — GUAIFENESIN 600 MG
600 TABLET, EXTENDED RELEASE 12 HR ORAL EVERY 12 HOURS SCHEDULED
Status: DISCONTINUED | OUTPATIENT
Start: 2017-04-01 | End: 2017-04-02 | Stop reason: HOSPADM

## 2017-04-01 RX ORDER — METHYLPREDNISOLONE SODIUM SUCCINATE 40 MG/ML
40 INJECTION, POWDER, LYOPHILIZED, FOR SOLUTION INTRAMUSCULAR; INTRAVENOUS EVERY 12 HOURS SCHEDULED
Status: DISCONTINUED | OUTPATIENT
Start: 2017-04-01 | End: 2017-04-02 | Stop reason: HOSPADM

## 2017-04-01 RX ADMIN — IPRATROPIUM BROMIDE 0.5 MG: 0.5 SOLUTION RESPIRATORY (INHALATION) at 20:43

## 2017-04-01 RX ADMIN — LEVALBUTEROL 1.25 MG: 1.25 SOLUTION, CONCENTRATE RESPIRATORY (INHALATION) at 07:32

## 2017-04-01 RX ADMIN — FINASTERIDE 5 MG: 5 TABLET, FILM COATED ORAL at 21:48

## 2017-04-01 RX ADMIN — IPRATROPIUM BROMIDE 0.5 MG: 0.5 SOLUTION RESPIRATORY (INHALATION) at 13:15

## 2017-04-01 RX ADMIN — LEVALBUTEROL 1.25 MG: 1.25 SOLUTION, CONCENTRATE RESPIRATORY (INHALATION) at 13:15

## 2017-04-01 RX ADMIN — METHYLPREDNISOLONE SODIUM SUCCINATE 40 MG: 40 INJECTION, POWDER, FOR SOLUTION INTRAMUSCULAR; INTRAVENOUS at 21:48

## 2017-04-01 RX ADMIN — GUAIFENESIN 600 MG: 600 TABLET, EXTENDED RELEASE ORAL at 23:40

## 2017-04-01 RX ADMIN — LEVALBUTEROL 1.25 MG: 1.25 SOLUTION, CONCENTRATE RESPIRATORY (INHALATION) at 20:43

## 2017-04-01 RX ADMIN — DOXYCYCLINE 100 MG: 100 INJECTION, POWDER, LYOPHILIZED, FOR SOLUTION INTRAVENOUS at 02:05

## 2017-04-01 RX ADMIN — IPRATROPIUM BROMIDE 0.5 MG: 0.5 SOLUTION RESPIRATORY (INHALATION) at 07:32

## 2017-04-01 RX ADMIN — GUAIFENESIN 600 MG: 600 TABLET, EXTENDED RELEASE ORAL at 12:16

## 2017-04-01 RX ADMIN — METHYLPREDNISOLONE SODIUM SUCCINATE 40 MG: 40 INJECTION, POWDER, FOR SOLUTION INTRAMUSCULAR; INTRAVENOUS at 11:01

## 2017-04-01 RX ADMIN — METHYLPREDNISOLONE SODIUM SUCCINATE 40 MG: 40 INJECTION, POWDER, FOR SOLUTION INTRAMUSCULAR; INTRAVENOUS at 03:59

## 2017-04-01 RX ADMIN — ASPIRIN 81 MG: 81 TABLET, CHEWABLE ORAL at 08:50

## 2017-04-01 RX ADMIN — DOXYCYCLINE 100 MG: 100 INJECTION, POWDER, LYOPHILIZED, FOR SOLUTION INTRAVENOUS at 14:23

## 2017-04-02 VITALS
WEIGHT: 181.44 LBS | TEMPERATURE: 98.4 F | BODY MASS INDEX: 27.5 KG/M2 | HEART RATE: 79 BPM | OXYGEN SATURATION: 97 % | RESPIRATION RATE: 20 BRPM | SYSTOLIC BLOOD PRESSURE: 109 MMHG | DIASTOLIC BLOOD PRESSURE: 65 MMHG | HEIGHT: 68 IN

## 2017-04-02 LAB — BACTERIA THROAT CULT: NORMAL

## 2017-04-02 PROCEDURE — 94668 MNPJ CHEST WALL SBSQ: CPT

## 2017-04-02 PROCEDURE — 94640 AIRWAY INHALATION TREATMENT: CPT

## 2017-04-02 PROCEDURE — 94760 N-INVAS EAR/PLS OXIMETRY 1: CPT

## 2017-04-02 RX ORDER — PREDNISONE 10 MG/1
TABLET ORAL
Qty: 18 TABLET | Refills: 0 | Status: ON HOLD | OUTPATIENT
Start: 2017-04-02 | End: 2017-07-21 | Stop reason: ALTCHOICE

## 2017-04-02 RX ORDER — HYDROCODONE POLISTIREX AND CHLORPHENIRAMINE POLISTIREX 10; 8 MG/5ML; MG/5ML
5 SUSPENSION, EXTENDED RELEASE ORAL EVERY 12 HOURS PRN
Qty: 120 ML | Refills: 0 | Status: SHIPPED | OUTPATIENT
Start: 2017-04-02 | End: 2017-04-12

## 2017-04-02 RX ORDER — DOXYCYCLINE HYCLATE 100 MG/1
100 CAPSULE ORAL EVERY 12 HOURS SCHEDULED
Status: DISCONTINUED | OUTPATIENT
Start: 2017-04-02 | End: 2017-04-02 | Stop reason: HOSPADM

## 2017-04-02 RX ORDER — DOXYCYCLINE HYCLATE 100 MG/1
100 CAPSULE ORAL EVERY 12 HOURS SCHEDULED
Qty: 14 CAPSULE | Refills: 0 | Status: SHIPPED | OUTPATIENT
Start: 2017-04-02 | End: 2017-04-09

## 2017-04-02 RX ORDER — GUAIFENESIN 600 MG
1200 TABLET, EXTENDED RELEASE 12 HR ORAL EVERY 12 HOURS SCHEDULED
Qty: 120 TABLET | Refills: 0 | Status: SHIPPED | OUTPATIENT
Start: 2017-04-02 | End: 2017-05-02

## 2017-04-02 RX ORDER — HYDROCODONE POLISTIREX AND CHLORPHENIRAMINE POLISTIREX 10; 8 MG/5ML; MG/5ML
5 SUSPENSION, EXTENDED RELEASE ORAL ONCE
Status: DISCONTINUED | OUTPATIENT
Start: 2017-04-02 | End: 2017-04-02 | Stop reason: HOSPADM

## 2017-04-02 RX ORDER — ALBUTEROL SULFATE 90 UG/1
2 AEROSOL, METERED RESPIRATORY (INHALATION) EVERY 6 HOURS PRN
Qty: 1 INHALER | Refills: 0 | Status: SHIPPED | OUTPATIENT
Start: 2017-04-02 | End: 2017-05-02

## 2017-04-02 RX ADMIN — IPRATROPIUM BROMIDE 0.5 MG: 0.5 SOLUTION RESPIRATORY (INHALATION) at 02:37

## 2017-04-02 RX ADMIN — DOXYCYCLINE 100 MG: 100 INJECTION, POWDER, LYOPHILIZED, FOR SOLUTION INTRAVENOUS at 01:57

## 2017-04-02 RX ADMIN — DOXYCYCLINE 100 MG: 100 CAPSULE ORAL at 14:27

## 2017-04-02 RX ADMIN — LEVALBUTEROL 1.25 MG: 1.25 SOLUTION, CONCENTRATE RESPIRATORY (INHALATION) at 13:16

## 2017-04-02 RX ADMIN — IPRATROPIUM BROMIDE 0.5 MG: 0.5 SOLUTION RESPIRATORY (INHALATION) at 07:13

## 2017-04-02 RX ADMIN — GUAIFENESIN 600 MG: 600 TABLET, EXTENDED RELEASE ORAL at 08:04

## 2017-04-02 RX ADMIN — METHYLPREDNISOLONE SODIUM SUCCINATE 40 MG: 40 INJECTION, POWDER, FOR SOLUTION INTRAMUSCULAR; INTRAVENOUS at 08:05

## 2017-04-02 RX ADMIN — IPRATROPIUM BROMIDE 0.5 MG: 0.5 SOLUTION RESPIRATORY (INHALATION) at 13:16

## 2017-04-02 RX ADMIN — LEVALBUTEROL 1.25 MG: 1.25 SOLUTION, CONCENTRATE RESPIRATORY (INHALATION) at 02:37

## 2017-04-02 RX ADMIN — LEVALBUTEROL 1.25 MG: 1.25 SOLUTION, CONCENTRATE RESPIRATORY (INHALATION) at 07:13

## 2017-04-03 ENCOUNTER — GENERIC CONVERSION - ENCOUNTER (OUTPATIENT)
Dept: OTHER | Facility: OTHER | Age: 82
End: 2017-04-03

## 2017-04-03 LAB
BACTERIA BLD CULT: NORMAL
BACTERIA BLD CULT: NORMAL

## 2017-04-06 ENCOUNTER — ALLSCRIPTS OFFICE VISIT (OUTPATIENT)
Dept: OTHER | Facility: OTHER | Age: 82
End: 2017-04-06

## 2017-04-06 DIAGNOSIS — C90.00 MULTIPLE MYELOMA NOT HAVING ACHIEVED REMISSION (HCC): ICD-10-CM

## 2017-04-20 ENCOUNTER — ALLSCRIPTS OFFICE VISIT (OUTPATIENT)
Dept: OTHER | Facility: OTHER | Age: 82
End: 2017-04-20

## 2017-04-20 ENCOUNTER — TRANSCRIBE ORDERS (OUTPATIENT)
Dept: ADMINISTRATIVE | Facility: HOSPITAL | Age: 82
End: 2017-04-20

## 2017-04-20 DIAGNOSIS — R93.89 ABNORMAL RADIOLOGICAL FINDINGS IN SKIN AND SUBCUTANEOUS TISSUE: Primary | ICD-10-CM

## 2017-04-28 ENCOUNTER — APPOINTMENT (OUTPATIENT)
Dept: LAB | Facility: MEDICAL CENTER | Age: 82
End: 2017-04-28
Payer: MEDICARE

## 2017-04-28 DIAGNOSIS — C90.00 MULTIPLE MYELOMA NOT HAVING ACHIEVED REMISSION (HCC): ICD-10-CM

## 2017-04-28 LAB
ALBUMIN SERPL BCP-MCNC: 3 G/DL (ref 3.5–5)
ALP SERPL-CCNC: 111 U/L (ref 46–116)
ALT SERPL W P-5'-P-CCNC: 30 U/L (ref 12–78)
ANION GAP SERPL CALCULATED.3IONS-SCNC: 7 MMOL/L (ref 4–13)
AST SERPL W P-5'-P-CCNC: 10 U/L (ref 5–45)
BASOPHILS # BLD AUTO: 0.01 THOUSANDS/ΜL (ref 0–0.1)
BASOPHILS NFR BLD AUTO: 0 % (ref 0–1)
BILIRUB SERPL-MCNC: 0.9 MG/DL (ref 0.2–1)
BUN SERPL-MCNC: 12 MG/DL (ref 5–25)
CALCIUM SERPL-MCNC: 8.9 MG/DL (ref 8.3–10.1)
CHLORIDE SERPL-SCNC: 107 MMOL/L (ref 100–108)
CO2 SERPL-SCNC: 26 MMOL/L (ref 21–32)
CREAT SERPL-MCNC: 1.23 MG/DL (ref 0.6–1.3)
EOSINOPHIL # BLD AUTO: 0.32 THOUSAND/ΜL (ref 0–0.61)
EOSINOPHIL NFR BLD AUTO: 6 % (ref 0–6)
ERYTHROCYTE [DISTWIDTH] IN BLOOD BY AUTOMATED COUNT: 15.3 % (ref 11.6–15.1)
GFR SERPL CREATININE-BSD FRML MDRD: 56.6 ML/MIN/1.73SQ M
GLUCOSE P FAST SERPL-MCNC: 93 MG/DL (ref 65–99)
HCT VFR BLD AUTO: 40 % (ref 36.5–49.3)
HGB BLD-MCNC: 13.4 G/DL (ref 12–17)
LYMPHOCYTES # BLD AUTO: 1.3 THOUSANDS/ΜL (ref 0.6–4.47)
LYMPHOCYTES NFR BLD AUTO: 26 % (ref 14–44)
MCH RBC QN AUTO: 29.8 PG (ref 26.8–34.3)
MCHC RBC AUTO-ENTMCNC: 33.5 G/DL (ref 31.4–37.4)
MCV RBC AUTO: 89 FL (ref 82–98)
MONOCYTES # BLD AUTO: 0.68 THOUSAND/ΜL (ref 0.17–1.22)
MONOCYTES NFR BLD AUTO: 14 % (ref 4–12)
NEUTROPHILS # BLD AUTO: 2.67 THOUSANDS/ΜL (ref 1.85–7.62)
NEUTS SEG NFR BLD AUTO: 54 % (ref 43–75)
NRBC BLD AUTO-RTO: 0 /100 WBCS
PLATELET # BLD AUTO: 202 THOUSANDS/UL (ref 149–390)
PMV BLD AUTO: 9.2 FL (ref 8.9–12.7)
POTASSIUM SERPL-SCNC: 4.1 MMOL/L (ref 3.5–5.3)
PROT SERPL-MCNC: 6.5 G/DL (ref 6.4–8.2)
RBC # BLD AUTO: 4.49 MILLION/UL (ref 3.88–5.62)
SODIUM SERPL-SCNC: 140 MMOL/L (ref 136–145)
WBC # BLD AUTO: 5.02 THOUSAND/UL (ref 4.31–10.16)

## 2017-04-28 PROCEDURE — 83883 ASSAY NEPHELOMETRY NOT SPEC: CPT

## 2017-04-28 PROCEDURE — 36415 COLL VENOUS BLD VENIPUNCTURE: CPT

## 2017-04-28 PROCEDURE — 80053 COMPREHEN METABOLIC PANEL: CPT

## 2017-04-28 PROCEDURE — 85025 COMPLETE CBC W/AUTO DIFF WBC: CPT

## 2017-04-29 LAB
KAPPA LC FREE SER-MCNC: 24.39 MG/L (ref 3.3–19.4)
KAPPA LC FREE/LAMBDA FREE SER: 0.33 {RATIO} (ref 0.26–1.65)
LAMBDA LC FREE SERPL-MCNC: 74.88 MG/L (ref 5.71–26.3)

## 2017-05-04 ENCOUNTER — ALLSCRIPTS OFFICE VISIT (OUTPATIENT)
Dept: OTHER | Facility: OTHER | Age: 82
End: 2017-05-04

## 2017-05-10 ENCOUNTER — LAB REQUISITION (OUTPATIENT)
Dept: LAB | Facility: HOSPITAL | Age: 82
End: 2017-05-10
Payer: MEDICARE

## 2017-05-10 ENCOUNTER — ALLSCRIPTS OFFICE VISIT (OUTPATIENT)
Dept: OTHER | Facility: OTHER | Age: 82
End: 2017-05-10

## 2017-05-10 DIAGNOSIS — G47.33 OBSTRUCTIVE SLEEP APNEA: ICD-10-CM

## 2017-05-10 DIAGNOSIS — C90.00 MULTIPLE MYELOMA NOT HAVING ACHIEVED REMISSION (HCC): ICD-10-CM

## 2017-05-10 DIAGNOSIS — A08.0 ROTAVIRAL ENTERITIS: ICD-10-CM

## 2017-05-10 DIAGNOSIS — C90.02 MULTIPLE MYELOMA IN RELAPSE (HCC): ICD-10-CM

## 2017-05-10 PROCEDURE — 88305 TISSUE EXAM BY PATHOLOGIST: CPT | Performed by: INTERNAL MEDICINE

## 2017-05-10 PROCEDURE — 88373 M/PHMTRC ALYS ISHQUANT/SEMIQ: CPT | Performed by: INTERNAL MEDICINE

## 2017-05-10 PROCEDURE — 88367 INSITU HYBRIDIZATION AUTO: CPT | Performed by: INTERNAL MEDICINE

## 2017-05-10 PROCEDURE — 88185 FLOWCYTOMETRY/TC ADD-ON: CPT | Performed by: INTERNAL MEDICINE

## 2017-05-10 PROCEDURE — 88342 IMHCHEM/IMCYTCHM 1ST ANTB: CPT | Performed by: INTERNAL MEDICINE

## 2017-05-10 PROCEDURE — 88341 IMHCHEM/IMCYTCHM EA ADD ANTB: CPT | Performed by: INTERNAL MEDICINE

## 2017-05-10 PROCEDURE — 88311 DECALCIFY TISSUE: CPT | Performed by: INTERNAL MEDICINE

## 2017-05-10 PROCEDURE — 88184 FLOWCYTOMETRY/ TC 1 MARKER: CPT | Performed by: INTERNAL MEDICINE

## 2017-05-10 PROCEDURE — 88374 M/PHMTRC ALYS ISHQUANT/SEMIQ: CPT | Performed by: INTERNAL MEDICINE

## 2017-05-10 PROCEDURE — 88313 SPECIAL STAINS GROUP 2: CPT | Performed by: INTERNAL MEDICINE

## 2017-05-10 PROCEDURE — 85097 BONE MARROW INTERPRETATION: CPT | Performed by: INTERNAL MEDICINE

## 2017-05-10 PROCEDURE — 88365 INSITU HYBRIDIZATION (FISH): CPT | Performed by: INTERNAL MEDICINE

## 2017-05-12 LAB — SCAN RESULT: NORMAL

## 2017-05-16 LAB — MISCELLANEOUS LAB TEST RESULT: NORMAL

## 2017-05-19 ENCOUNTER — GENERIC CONVERSION - ENCOUNTER (OUTPATIENT)
Dept: OTHER | Facility: OTHER | Age: 82
End: 2017-05-19

## 2017-05-19 ENCOUNTER — HOSPITAL ENCOUNTER (OUTPATIENT)
Dept: INFUSION CENTER | Facility: CLINIC | Age: 82
Discharge: HOME | End: 2017-05-19
Payer: MEDICARE

## 2017-05-19 ENCOUNTER — APPOINTMENT (OUTPATIENT)
Dept: LAB | Facility: HOSPITAL | Age: 82
End: 2017-05-19
Attending: INTERNAL MEDICINE
Payer: MEDICARE

## 2017-05-19 VITALS
HEART RATE: 101 BPM | DIASTOLIC BLOOD PRESSURE: 66 MMHG | RESPIRATION RATE: 18 BRPM | SYSTOLIC BLOOD PRESSURE: 113 MMHG | TEMPERATURE: 97.5 F

## 2017-05-19 DIAGNOSIS — C90.00 MULTIPLE MYELOMA NOT HAVING ACHIEVED REMISSION (HCC): ICD-10-CM

## 2017-05-19 LAB
ANION GAP SERPL CALCULATED.3IONS-SCNC: 5 MMOL/L (ref 4–13)
BASOPHILS # BLD AUTO: 0.05 THOUSANDS/ΜL (ref 0–0.1)
BASOPHILS NFR BLD AUTO: 1 % (ref 0–1)
BUN SERPL-MCNC: 16 MG/DL (ref 5–25)
CALCIUM SERPL-MCNC: 9 MG/DL (ref 8.3–10.1)
CHLORIDE SERPL-SCNC: 108 MMOL/L (ref 100–108)
CO2 SERPL-SCNC: 26 MMOL/L (ref 21–32)
CREAT SERPL-MCNC: 1.26 MG/DL (ref 0.6–1.3)
EOSINOPHIL # BLD AUTO: 0.07 THOUSAND/ΜL (ref 0–0.61)
EOSINOPHIL NFR BLD AUTO: 1 % (ref 0–6)
ERYTHROCYTE [DISTWIDTH] IN BLOOD BY AUTOMATED COUNT: 15.1 % (ref 11.6–15.1)
GFR SERPL CREATININE-BSD FRML MDRD: 55.1 ML/MIN/1.73SQ M
GLUCOSE P FAST SERPL-MCNC: 112 MG/DL (ref 65–99)
HCT VFR BLD AUTO: 41.2 % (ref 36.5–49.3)
HGB BLD-MCNC: 14 G/DL (ref 12–17)
LYMPHOCYTES # BLD AUTO: 1.04 THOUSANDS/ΜL (ref 0.6–4.47)
LYMPHOCYTES NFR BLD AUTO: 18 % (ref 14–44)
MCH RBC QN AUTO: 30.4 PG (ref 26.8–34.3)
MCHC RBC AUTO-ENTMCNC: 34 G/DL (ref 31.4–37.4)
MCV RBC AUTO: 89 FL (ref 82–98)
MONOCYTES # BLD AUTO: 0.29 THOUSAND/ΜL (ref 0.17–1.22)
MONOCYTES NFR BLD AUTO: 5 % (ref 4–12)
NEUTROPHILS # BLD AUTO: 4.43 THOUSANDS/ΜL (ref 1.85–7.62)
NEUTS SEG NFR BLD AUTO: 75 % (ref 43–75)
NRBC BLD AUTO-RTO: 0 /100 WBCS
PLATELET # BLD AUTO: 209 THOUSANDS/UL (ref 149–390)
PMV BLD AUTO: 9.1 FL (ref 8.9–12.7)
POTASSIUM SERPL-SCNC: 4 MMOL/L (ref 3.5–5.3)
RBC # BLD AUTO: 4.61 MILLION/UL (ref 3.88–5.62)
SODIUM SERPL-SCNC: 139 MMOL/L (ref 136–145)
WBC # BLD AUTO: 5.88 THOUSAND/UL (ref 4.31–10.16)

## 2017-05-19 PROCEDURE — 85025 COMPLETE CBC W/AUTO DIFF WBC: CPT

## 2017-05-19 PROCEDURE — 36415 COLL VENOUS BLD VENIPUNCTURE: CPT

## 2017-05-19 PROCEDURE — 96360 HYDRATION IV INFUSION INIT: CPT

## 2017-05-19 PROCEDURE — 80048 BASIC METABOLIC PNL TOTAL CA: CPT

## 2017-05-19 RX ADMIN — SODIUM CHLORIDE 500 ML: 0.9 INJECTION, SOLUTION INTRAVENOUS at 15:07

## 2017-05-19 NOTE — PLAN OF CARE
Problem: Potential for Falls  Goal: Patient will remain free of falls  INTERVENTIONS:  - Assess patient frequently for physical needs  - Identify cognitive and physical deficits and behaviors that affect risk of falls    - [place] fall precautions as indicated by assessment   - Educate patient/family on patient safety including physical limitations  - Instruct patient to call for assistance with activity based on assessment  - Modify environment to reduce risk of injury  - Consider OT/PT consult to assist with strengthening/mobility   Outcome: Progressing

## 2017-05-26 ENCOUNTER — APPOINTMENT (OUTPATIENT)
Dept: LAB | Facility: HOSPITAL | Age: 82
End: 2017-05-26
Attending: INTERNAL MEDICINE
Payer: MEDICARE

## 2017-05-26 DIAGNOSIS — G47.33 OBSTRUCTIVE SLEEP APNEA: ICD-10-CM

## 2017-05-26 DIAGNOSIS — C90.00 MULTIPLE MYELOMA NOT HAVING ACHIEVED REMISSION (HCC): ICD-10-CM

## 2017-05-26 LAB
BASOPHILS # BLD AUTO: 0.01 THOUSANDS/ΜL (ref 0–0.1)
BASOPHILS NFR BLD AUTO: 0 % (ref 0–1)
EOSINOPHIL # BLD AUTO: 0.04 THOUSAND/ΜL (ref 0–0.61)
EOSINOPHIL NFR BLD AUTO: 1 % (ref 0–6)
ERYTHROCYTE [DISTWIDTH] IN BLOOD BY AUTOMATED COUNT: 14.9 % (ref 11.6–15.1)
HCT VFR BLD AUTO: 40.9 % (ref 36.5–49.3)
HGB BLD-MCNC: 14.1 G/DL (ref 12–17)
LYMPHOCYTES # BLD AUTO: 0.98 THOUSANDS/ΜL (ref 0.6–4.47)
LYMPHOCYTES NFR BLD AUTO: 13 % (ref 14–44)
MCH RBC QN AUTO: 30.2 PG (ref 26.8–34.3)
MCHC RBC AUTO-ENTMCNC: 34.5 G/DL (ref 31.4–37.4)
MCV RBC AUTO: 88 FL (ref 82–98)
MONOCYTES # BLD AUTO: 0.12 THOUSAND/ΜL (ref 0.17–1.22)
MONOCYTES NFR BLD AUTO: 2 % (ref 4–12)
NEUTROPHILS # BLD AUTO: 6.28 THOUSANDS/ΜL (ref 1.85–7.62)
NEUTS SEG NFR BLD AUTO: 84 % (ref 43–75)
NRBC BLD AUTO-RTO: 0 /100 WBCS
PLATELET # BLD AUTO: 185 THOUSANDS/UL (ref 149–390)
PMV BLD AUTO: 8.7 FL (ref 8.9–12.7)
RBC # BLD AUTO: 4.67 MILLION/UL (ref 3.88–5.62)
WBC # BLD AUTO: 7.43 THOUSAND/UL (ref 4.31–10.16)

## 2017-05-26 PROCEDURE — 85025 COMPLETE CBC W/AUTO DIFF WBC: CPT

## 2017-05-26 PROCEDURE — 83883 ASSAY NEPHELOMETRY NOT SPEC: CPT

## 2017-05-26 PROCEDURE — 36415 COLL VENOUS BLD VENIPUNCTURE: CPT

## 2017-05-31 ENCOUNTER — ALLSCRIPTS OFFICE VISIT (OUTPATIENT)
Dept: OTHER | Facility: OTHER | Age: 82
End: 2017-05-31

## 2017-06-02 ENCOUNTER — ALLSCRIPTS OFFICE VISIT (OUTPATIENT)
Dept: OTHER | Facility: OTHER | Age: 82
End: 2017-06-02

## 2017-06-13 ENCOUNTER — GENERIC CONVERSION - ENCOUNTER (OUTPATIENT)
Dept: OTHER | Facility: OTHER | Age: 82
End: 2017-06-13

## 2017-06-30 ENCOUNTER — HOSPITAL ENCOUNTER (OUTPATIENT)
Dept: CT IMAGING | Facility: HOSPITAL | Age: 82
Discharge: HOME | End: 2017-06-30
Attending: INTERNAL MEDICINE
Payer: MEDICARE

## 2017-06-30 ENCOUNTER — APPOINTMENT (OUTPATIENT)
Dept: LAB | Facility: HOSPITAL | Age: 82
End: 2017-06-30
Attending: INTERNAL MEDICINE
Payer: MEDICARE

## 2017-06-30 DIAGNOSIS — C90.00 MULTIPLE MYELOMA NOT HAVING ACHIEVED REMISSION (HCC): ICD-10-CM

## 2017-06-30 DIAGNOSIS — K62.9 DISEASE OF ANUS AND RECTUM, UNSPECIFIED: ICD-10-CM

## 2017-06-30 DIAGNOSIS — A08.0 ROTAVIRAL ENTERITIS: ICD-10-CM

## 2017-06-30 DIAGNOSIS — R93.89 ABNORMAL FINDINGS ON DIAGNOSTIC IMAGING OF OTHER SPECIFIED BODY STRUCTURES: ICD-10-CM

## 2017-06-30 LAB
ALBUMIN SERPL BCP-MCNC: 3 G/DL (ref 3.5–5)
ALP SERPL-CCNC: 96 U/L (ref 46–116)
ALT SERPL W P-5'-P-CCNC: 25 U/L (ref 12–78)
ANION GAP SERPL CALCULATED.3IONS-SCNC: 6 MMOL/L (ref 4–13)
AST SERPL W P-5'-P-CCNC: 11 U/L (ref 5–45)
BASOPHILS # BLD AUTO: 0.02 THOUSANDS/ΜL (ref 0–0.1)
BASOPHILS NFR BLD AUTO: 0 % (ref 0–1)
BILIRUB SERPL-MCNC: 0.88 MG/DL (ref 0.2–1)
BUN SERPL-MCNC: 16 MG/DL (ref 5–25)
CALCIUM SERPL-MCNC: 8.9 MG/DL (ref 8.3–10.1)
CHLORIDE SERPL-SCNC: 104 MMOL/L (ref 100–108)
CO2 SERPL-SCNC: 27 MMOL/L (ref 21–32)
CREAT SERPL-MCNC: 1.16 MG/DL (ref 0.6–1.3)
EOSINOPHIL # BLD AUTO: 0.07 THOUSAND/ΜL (ref 0–0.61)
EOSINOPHIL NFR BLD AUTO: 1 % (ref 0–6)
ERYTHROCYTE [DISTWIDTH] IN BLOOD BY AUTOMATED COUNT: 15 % (ref 11.6–15.1)
GFR SERPL CREATININE-BSD FRML MDRD: >60 ML/MIN/1.73SQ M
GLUCOSE P FAST SERPL-MCNC: 114 MG/DL (ref 65–99)
HCT VFR BLD AUTO: 39.4 % (ref 36.5–49.3)
HGB BLD-MCNC: 13.3 G/DL (ref 12–17)
LYMPHOCYTES # BLD AUTO: 1.08 THOUSANDS/ΜL (ref 0.6–4.47)
LYMPHOCYTES NFR BLD AUTO: 13 % (ref 14–44)
MCH RBC QN AUTO: 30 PG (ref 26.8–34.3)
MCHC RBC AUTO-ENTMCNC: 33.8 G/DL (ref 31.4–37.4)
MCV RBC AUTO: 89 FL (ref 82–98)
MONOCYTES # BLD AUTO: 0.14 THOUSAND/ΜL (ref 0.17–1.22)
MONOCYTES NFR BLD AUTO: 2 % (ref 4–12)
NEUTROPHILS # BLD AUTO: 7.22 THOUSANDS/ΜL (ref 1.85–7.62)
NEUTS SEG NFR BLD AUTO: 84 % (ref 43–75)
NRBC BLD AUTO-RTO: 0 /100 WBCS
PLATELET # BLD AUTO: 172 THOUSANDS/UL (ref 149–390)
PMV BLD AUTO: 8.7 FL (ref 8.9–12.7)
POTASSIUM SERPL-SCNC: 4.2 MMOL/L (ref 3.5–5.3)
PROT SERPL-MCNC: 6.4 G/DL (ref 6.4–8.2)
RBC # BLD AUTO: 4.43 MILLION/UL (ref 3.88–5.62)
SODIUM SERPL-SCNC: 137 MMOL/L (ref 136–145)
WBC # BLD AUTO: 8.53 THOUSAND/UL (ref 4.31–10.16)

## 2017-06-30 PROCEDURE — 36415 COLL VENOUS BLD VENIPUNCTURE: CPT

## 2017-06-30 PROCEDURE — 83883 ASSAY NEPHELOMETRY NOT SPEC: CPT

## 2017-06-30 PROCEDURE — 80053 COMPREHEN METABOLIC PANEL: CPT

## 2017-06-30 PROCEDURE — 85025 COMPLETE CBC W/AUTO DIFF WBC: CPT

## 2017-06-30 PROCEDURE — 71250 CT THORAX DX C-: CPT

## 2017-07-01 LAB
KAPPA LC FREE SER-MCNC: 10.5 MG/L (ref 3.3–19.4)
KAPPA LC FREE/LAMBDA FREE SER: 0.08 {RATIO} (ref 0.26–1.65)
LAMBDA LC FREE SERPL-MCNC: 128.7 MG/L (ref 5.7–26.3)

## 2017-07-03 LAB
KAPPA LC FREE SER-MCNC: 14.34 MG/L (ref 3.3–19.4)
KAPPA LC FREE/LAMBDA FREE SER: 0.15 {RATIO} (ref 0.26–1.65)
LAMBDA LC FREE SERPL-MCNC: 98.44 MG/L (ref 5.71–26.3)

## 2017-07-05 ENCOUNTER — ALLSCRIPTS OFFICE VISIT (OUTPATIENT)
Dept: OTHER | Facility: OTHER | Age: 82
End: 2017-07-05

## 2017-07-06 ENCOUNTER — ALLSCRIPTS OFFICE VISIT (OUTPATIENT)
Dept: OTHER | Facility: OTHER | Age: 82
End: 2017-07-06

## 2017-07-07 ENCOUNTER — GENERIC CONVERSION - ENCOUNTER (OUTPATIENT)
Dept: OTHER | Facility: OTHER | Age: 82
End: 2017-07-07

## 2017-07-09 ENCOUNTER — ANESTHESIA EVENT (OUTPATIENT)
Dept: GASTROENTEROLOGY | Facility: HOSPITAL | Age: 82
End: 2017-07-09
Payer: MEDICARE

## 2017-07-10 ENCOUNTER — GENERIC CONVERSION - ENCOUNTER (OUTPATIENT)
Dept: OTHER | Facility: OTHER | Age: 82
End: 2017-07-10

## 2017-07-10 ENCOUNTER — HOSPITAL ENCOUNTER (OUTPATIENT)
Facility: HOSPITAL | Age: 82
Setting detail: OUTPATIENT SURGERY
Discharge: HOME | End: 2017-07-10
Attending: INTERNAL MEDICINE | Admitting: INTERNAL MEDICINE
Payer: MEDICARE

## 2017-07-10 ENCOUNTER — ANESTHESIA (OUTPATIENT)
Dept: GASTROENTEROLOGY | Facility: HOSPITAL | Age: 82
End: 2017-07-10
Payer: MEDICARE

## 2017-07-10 VITALS
SYSTOLIC BLOOD PRESSURE: 118 MMHG | HEIGHT: 68 IN | DIASTOLIC BLOOD PRESSURE: 63 MMHG | WEIGHT: 175 LBS | TEMPERATURE: 97.7 F | HEART RATE: 81 BPM | RESPIRATION RATE: 16 BRPM | BODY MASS INDEX: 26.52 KG/M2 | OXYGEN SATURATION: 96 %

## 2017-07-10 DIAGNOSIS — K52.9 CHRONIC DIARRHEA: ICD-10-CM

## 2017-07-10 PROCEDURE — 88341 IMHCHEM/IMCYTCHM EA ADD ANTB: CPT | Performed by: INTERNAL MEDICINE

## 2017-07-10 PROCEDURE — 88342 IMHCHEM/IMCYTCHM 1ST ANTB: CPT | Performed by: INTERNAL MEDICINE

## 2017-07-10 PROCEDURE — 88305 TISSUE EXAM BY PATHOLOGIST: CPT | Performed by: INTERNAL MEDICINE

## 2017-07-10 RX ORDER — ALBUTEROL SULFATE 90 UG/1
2 AEROSOL, METERED RESPIRATORY (INHALATION) EVERY 4 HOURS PRN
COMMUNITY
End: 2018-04-24 | Stop reason: SDUPTHER

## 2017-07-10 RX ORDER — PROPOFOL 10 MG/ML
INJECTION, EMULSION INTRAVENOUS AS NEEDED
Status: DISCONTINUED | OUTPATIENT
Start: 2017-07-10 | End: 2017-07-10 | Stop reason: SURG

## 2017-07-10 RX ORDER — SODIUM CHLORIDE 9 MG/ML
125 INJECTION, SOLUTION INTRAVENOUS CONTINUOUS
Status: DISCONTINUED | OUTPATIENT
Start: 2017-07-10 | End: 2017-07-10 | Stop reason: HOSPADM

## 2017-07-10 RX ADMIN — PROPOFOL 50 MG: 10 INJECTION, EMULSION INTRAVENOUS at 15:04

## 2017-07-10 RX ADMIN — PROPOFOL 50 MG: 10 INJECTION, EMULSION INTRAVENOUS at 14:56

## 2017-07-10 RX ADMIN — PROPOFOL 50 MG: 10 INJECTION, EMULSION INTRAVENOUS at 15:18

## 2017-07-10 RX ADMIN — PROPOFOL 50 MG: 10 INJECTION, EMULSION INTRAVENOUS at 14:59

## 2017-07-10 RX ADMIN — PROPOFOL 50 MG: 10 INJECTION, EMULSION INTRAVENOUS at 15:25

## 2017-07-10 RX ADMIN — PROPOFOL 50 MG: 10 INJECTION, EMULSION INTRAVENOUS at 15:14

## 2017-07-10 RX ADMIN — PROPOFOL 50 MG: 10 INJECTION, EMULSION INTRAVENOUS at 15:09

## 2017-07-10 RX ADMIN — PROPOFOL 50 MG: 10 INJECTION, EMULSION INTRAVENOUS at 15:22

## 2017-07-10 RX ADMIN — SODIUM CHLORIDE 125 ML/HR: 0.9 INJECTION, SOLUTION INTRAVENOUS at 14:51

## 2017-07-12 ENCOUNTER — APPOINTMENT (OUTPATIENT)
Dept: LAB | Facility: HOSPITAL | Age: 82
End: 2017-07-12
Attending: INTERNAL MEDICINE
Payer: MEDICARE

## 2017-07-12 DIAGNOSIS — K62.9 DISEASE OF ANUS AND RECTUM, UNSPECIFIED: ICD-10-CM

## 2017-07-12 LAB — CEA SERPL-MCNC: 2 NG/ML (ref 0–3)

## 2017-07-12 PROCEDURE — 82378 CARCINOEMBRYONIC ANTIGEN: CPT

## 2017-07-12 PROCEDURE — 36415 COLL VENOUS BLD VENIPUNCTURE: CPT

## 2017-07-14 ENCOUNTER — GENERIC CONVERSION - ENCOUNTER (OUTPATIENT)
Dept: OTHER | Facility: OTHER | Age: 82
End: 2017-07-14

## 2017-07-19 ENCOUNTER — GENERIC CONVERSION - ENCOUNTER (OUTPATIENT)
Dept: OTHER | Facility: OTHER | Age: 82
End: 2017-07-19

## 2017-07-20 RX ORDER — SODIUM CHLORIDE 9 MG/ML
50 INJECTION, SOLUTION INTRAVENOUS CONTINUOUS
Status: CANCELLED | OUTPATIENT
Start: 2017-07-20

## 2017-07-21 ENCOUNTER — HOSPITAL ENCOUNTER (OUTPATIENT)
Facility: HOSPITAL | Age: 82
Setting detail: OUTPATIENT SURGERY
Discharge: HOME | End: 2017-07-21
Attending: COLON & RECTAL SURGERY | Admitting: COLON & RECTAL SURGERY
Payer: MEDICARE

## 2017-07-21 VITALS
WEIGHT: 177 LBS | DIASTOLIC BLOOD PRESSURE: 65 MMHG | HEIGHT: 68 IN | SYSTOLIC BLOOD PRESSURE: 110 MMHG | BODY MASS INDEX: 26.83 KG/M2 | HEART RATE: 86 BPM | TEMPERATURE: 97.5 F | RESPIRATION RATE: 16 BRPM | OXYGEN SATURATION: 95 %

## 2017-07-21 PROCEDURE — 76872 US TRANSRECTAL: CPT | Performed by: COLON & RECTAL SURGERY

## 2017-07-21 RX ORDER — DEXAMETHASONE 1 MG
1 TABLET ORAL WEEKLY
Status: ON HOLD | COMMUNITY
End: 2017-08-25

## 2017-07-27 ENCOUNTER — OFFICE VISIT (OUTPATIENT)
Dept: URGENT CARE | Facility: MEDICAL CENTER | Age: 82
End: 2017-07-27
Payer: MEDICARE

## 2017-07-27 PROCEDURE — G0463 HOSPITAL OUTPT CLINIC VISIT: HCPCS

## 2017-07-27 PROCEDURE — 99213 OFFICE O/P EST LOW 20 MIN: CPT

## 2017-08-11 ENCOUNTER — APPOINTMENT (OUTPATIENT)
Dept: LAB | Facility: HOSPITAL | Age: 82
End: 2017-08-11
Attending: INTERNAL MEDICINE
Payer: MEDICARE

## 2017-08-11 ENCOUNTER — GENERIC CONVERSION - ENCOUNTER (OUTPATIENT)
Dept: OTHER | Facility: OTHER | Age: 82
End: 2017-08-11

## 2017-08-11 DIAGNOSIS — C90.00 MULTIPLE MYELOMA NOT HAVING ACHIEVED REMISSION (HCC): ICD-10-CM

## 2017-08-11 LAB
ALBUMIN SERPL BCP-MCNC: 2.8 G/DL (ref 3.5–5)
ALP SERPL-CCNC: 67 U/L (ref 46–116)
ALT SERPL W P-5'-P-CCNC: 21 U/L (ref 12–78)
ANION GAP SERPL CALCULATED.3IONS-SCNC: 4 MMOL/L (ref 4–13)
AST SERPL W P-5'-P-CCNC: 12 U/L (ref 5–45)
BASOPHILS # BLD AUTO: 0.02 THOUSANDS/ΜL (ref 0–0.1)
BASOPHILS NFR BLD AUTO: 0 % (ref 0–1)
BILIRUB SERPL-MCNC: 0.96 MG/DL (ref 0.2–1)
BUN SERPL-MCNC: 13 MG/DL (ref 5–25)
CALCIUM SERPL-MCNC: 8.8 MG/DL (ref 8.3–10.1)
CHLORIDE SERPL-SCNC: 106 MMOL/L (ref 100–108)
CO2 SERPL-SCNC: 30 MMOL/L (ref 21–32)
CREAT SERPL-MCNC: 1.29 MG/DL (ref 0.6–1.3)
EOSINOPHIL # BLD AUTO: 0.11 THOUSAND/ΜL (ref 0–0.61)
EOSINOPHIL NFR BLD AUTO: 2 % (ref 0–6)
ERYTHROCYTE [DISTWIDTH] IN BLOOD BY AUTOMATED COUNT: 14.7 % (ref 11.6–15.1)
GFR SERPL CREATININE-BSD FRML MDRD: 52 ML/MIN/1.73SQ M
GLUCOSE P FAST SERPL-MCNC: 101 MG/DL (ref 65–99)
HCT VFR BLD AUTO: 39.7 % (ref 36.5–49.3)
HGB BLD-MCNC: 13.4 G/DL (ref 12–17)
LYMPHOCYTES # BLD AUTO: 1.18 THOUSANDS/ΜL (ref 0.6–4.47)
LYMPHOCYTES NFR BLD AUTO: 24 % (ref 14–44)
MCH RBC QN AUTO: 31.1 PG (ref 26.8–34.3)
MCHC RBC AUTO-ENTMCNC: 33.8 G/DL (ref 31.4–37.4)
MCV RBC AUTO: 92 FL (ref 82–98)
MONOCYTES # BLD AUTO: 0.57 THOUSAND/ΜL (ref 0.17–1.22)
MONOCYTES NFR BLD AUTO: 12 % (ref 4–12)
NEUTROPHILS # BLD AUTO: 3.06 THOUSANDS/ΜL (ref 1.85–7.62)
NEUTS SEG NFR BLD AUTO: 62 % (ref 43–75)
NRBC BLD AUTO-RTO: 0 /100 WBCS
PLATELET # BLD AUTO: 129 THOUSANDS/UL (ref 149–390)
PMV BLD AUTO: 8.7 FL (ref 8.9–12.7)
POTASSIUM SERPL-SCNC: 3.9 MMOL/L (ref 3.5–5.3)
PROT SERPL-MCNC: 6.2 G/DL (ref 6.4–8.2)
RBC # BLD AUTO: 4.31 MILLION/UL (ref 3.88–5.62)
SODIUM SERPL-SCNC: 140 MMOL/L (ref 136–145)
WBC # BLD AUTO: 4.94 THOUSAND/UL (ref 4.31–10.16)

## 2017-08-11 PROCEDURE — 36415 COLL VENOUS BLD VENIPUNCTURE: CPT

## 2017-08-11 PROCEDURE — 83883 ASSAY NEPHELOMETRY NOT SPEC: CPT

## 2017-08-11 PROCEDURE — 85025 COMPLETE CBC W/AUTO DIFF WBC: CPT

## 2017-08-11 PROCEDURE — 80053 COMPREHEN METABOLIC PANEL: CPT

## 2017-08-15 ENCOUNTER — ALLSCRIPTS OFFICE VISIT (OUTPATIENT)
Dept: OTHER | Facility: OTHER | Age: 82
End: 2017-08-15

## 2017-08-15 LAB
KAPPA LC FREE SER-MCNC: 9.9 MG/L (ref 3.3–19.4)
KAPPA LC FREE/LAMBDA FREE SER: 0.05 {RATIO} (ref 0.26–1.65)
LAMBDA LC FREE SERPL-MCNC: 214.4 MG/L (ref 5.7–26.3)

## 2017-08-16 ENCOUNTER — ALLSCRIPTS OFFICE VISIT (OUTPATIENT)
Dept: OTHER | Facility: OTHER | Age: 82
End: 2017-08-16

## 2017-08-16 DIAGNOSIS — J44.1 CHRONIC OBSTRUCTIVE PULMONARY DISEASE WITH ACUTE EXACERBATION (HCC): ICD-10-CM

## 2017-08-16 DIAGNOSIS — J13 PNEUMONIA DUE TO STREPTOCOCCUS PNEUMONIAE (HCC): ICD-10-CM

## 2017-08-16 DIAGNOSIS — C20 MALIGNANT NEOPLASM OF RECTUM (HCC): ICD-10-CM

## 2017-08-16 DIAGNOSIS — Z01.811 ENCOUNTER FOR PREPROCEDURAL RESPIRATORY EXAMINATION: ICD-10-CM

## 2017-08-21 ENCOUNTER — HOSPITAL ENCOUNTER (INPATIENT)
Facility: HOSPITAL | Age: 82
LOS: 3 days | Discharge: HOME HEALTH | DRG: 871 | End: 2017-08-25
Attending: EMERGENCY MEDICINE | Admitting: INTERNAL MEDICINE
Payer: MEDICARE

## 2017-08-21 ENCOUNTER — APPOINTMENT (EMERGENCY)
Dept: RADIOLOGY | Facility: HOSPITAL | Age: 82
DRG: 871 | End: 2017-08-21
Payer: MEDICARE

## 2017-08-21 DIAGNOSIS — I63.9 OCCIPITAL STROKE (HCC): ICD-10-CM

## 2017-08-21 DIAGNOSIS — C90.00 MULTIPLE MYELOMA, REMISSION STATUS UNSPECIFIED (HCC): ICD-10-CM

## 2017-08-21 DIAGNOSIS — J18.9 PNEUMONIA OF RIGHT LOWER LOBE DUE TO INFECTIOUS ORGANISM: ICD-10-CM

## 2017-08-21 DIAGNOSIS — C34.90 ADENOCARCINOMA, LUNG, UNSPECIFIED LATERALITY (HCC): Chronic | ICD-10-CM

## 2017-08-21 DIAGNOSIS — N17.9 AKI (ACUTE KIDNEY INJURY) (HCC): Primary | ICD-10-CM

## 2017-08-21 DIAGNOSIS — R06.00 DYSPNEA ON EXERTION: ICD-10-CM

## 2017-08-21 DIAGNOSIS — R50.9 FEVER: ICD-10-CM

## 2017-08-21 LAB
ALBUMIN SERPL BCP-MCNC: 3.3 G/DL (ref 3.5–5)
ALP SERPL-CCNC: 74 U/L (ref 46–116)
ALT SERPL W P-5'-P-CCNC: 25 U/L (ref 12–78)
ANION GAP SERPL CALCULATED.3IONS-SCNC: 8 MMOL/L (ref 4–13)
AST SERPL W P-5'-P-CCNC: 15 U/L (ref 5–45)
BASOPHILS # BLD AUTO: 0.02 THOUSANDS/ΜL (ref 0–0.1)
BASOPHILS NFR BLD AUTO: 0 % (ref 0–1)
BILIRUB SERPL-MCNC: 0.88 MG/DL (ref 0.2–1)
BUN SERPL-MCNC: 20 MG/DL (ref 5–25)
CALCIUM SERPL-MCNC: 9.1 MG/DL (ref 8.3–10.1)
CHLORIDE SERPL-SCNC: 105 MMOL/L (ref 100–108)
CO2 SERPL-SCNC: 28 MMOL/L (ref 21–32)
CREAT SERPL-MCNC: 1.42 MG/DL (ref 0.6–1.3)
EOSINOPHIL # BLD AUTO: 0.03 THOUSAND/ΜL (ref 0–0.61)
EOSINOPHIL NFR BLD AUTO: 0 % (ref 0–6)
ERYTHROCYTE [DISTWIDTH] IN BLOOD BY AUTOMATED COUNT: 14.7 % (ref 11.6–15.1)
GFR SERPL CREATININE-BSD FRML MDRD: 46 ML/MIN/1.73SQ M
GLUCOSE SERPL-MCNC: 101 MG/DL (ref 65–140)
HCT VFR BLD AUTO: 41.1 % (ref 36.5–49.3)
HGB BLD-MCNC: 14.3 G/DL (ref 12–17)
LACTATE SERPL-SCNC: 1.5 MMOL/L (ref 0.5–2)
LYMPHOCYTES # BLD AUTO: 0.67 THOUSANDS/ΜL (ref 0.6–4.47)
LYMPHOCYTES NFR BLD AUTO: 9 % (ref 14–44)
MCH RBC QN AUTO: 31.6 PG (ref 26.8–34.3)
MCHC RBC AUTO-ENTMCNC: 34.8 G/DL (ref 31.4–37.4)
MCV RBC AUTO: 91 FL (ref 82–98)
MONOCYTES # BLD AUTO: 0.66 THOUSAND/ΜL (ref 0.17–1.22)
MONOCYTES NFR BLD AUTO: 9 % (ref 4–12)
NEUTROPHILS # BLD AUTO: 5.84 THOUSANDS/ΜL (ref 1.85–7.62)
NEUTS SEG NFR BLD AUTO: 82 % (ref 43–75)
NRBC BLD AUTO-RTO: 0 /100 WBCS
PLATELET # BLD AUTO: 163 THOUSANDS/UL (ref 149–390)
PMV BLD AUTO: 8.9 FL (ref 8.9–12.7)
POTASSIUM SERPL-SCNC: 4 MMOL/L (ref 3.5–5.3)
PROT SERPL-MCNC: 6.8 G/DL (ref 6.4–8.2)
RBC # BLD AUTO: 4.53 MILLION/UL (ref 3.88–5.62)
SODIUM SERPL-SCNC: 141 MMOL/L (ref 136–145)
SPECIMEN SOURCE: NORMAL
TROPONIN I BLD-MCNC: 0 NG/ML (ref 0–0.08)
WBC # BLD AUTO: 7.22 THOUSAND/UL (ref 4.31–10.16)

## 2017-08-21 PROCEDURE — 80053 COMPREHEN METABOLIC PANEL: CPT | Performed by: EMERGENCY MEDICINE

## 2017-08-21 PROCEDURE — 85025 COMPLETE CBC W/AUTO DIFF WBC: CPT | Performed by: EMERGENCY MEDICINE

## 2017-08-21 PROCEDURE — 71010 HB CHEST X-RAY 1 VIEW FRONTAL (PORTABLE): CPT

## 2017-08-21 PROCEDURE — 93005 ELECTROCARDIOGRAM TRACING: CPT | Performed by: EMERGENCY MEDICINE

## 2017-08-21 PROCEDURE — 96360 HYDRATION IV INFUSION INIT: CPT

## 2017-08-21 PROCEDURE — 96361 HYDRATE IV INFUSION ADD-ON: CPT

## 2017-08-21 PROCEDURE — 87040 BLOOD CULTURE FOR BACTERIA: CPT | Performed by: EMERGENCY MEDICINE

## 2017-08-21 PROCEDURE — 36415 COLL VENOUS BLD VENIPUNCTURE: CPT

## 2017-08-21 PROCEDURE — 84484 ASSAY OF TROPONIN QUANT: CPT

## 2017-08-21 PROCEDURE — 83605 ASSAY OF LACTIC ACID: CPT | Performed by: EMERGENCY MEDICINE

## 2017-08-21 RX ORDER — ACETAMINOPHEN 325 MG/1
650 TABLET ORAL ONCE
Status: COMPLETED | OUTPATIENT
Start: 2017-08-21 | End: 2017-08-21

## 2017-08-21 RX ADMIN — SODIUM CHLORIDE 1000 ML: 0.9 INJECTION, SOLUTION INTRAVENOUS at 22:41

## 2017-08-21 RX ADMIN — ACETAMINOPHEN 650 MG: 325 TABLET, FILM COATED ORAL at 23:30

## 2017-08-22 ENCOUNTER — APPOINTMENT (EMERGENCY)
Dept: CT IMAGING | Facility: HOSPITAL | Age: 82
DRG: 871 | End: 2017-08-22
Payer: MEDICARE

## 2017-08-22 PROBLEM — J44.9 COPD (CHRONIC OBSTRUCTIVE PULMONARY DISEASE) (HCC): Chronic | Status: ACTIVE | Noted: 2017-08-22

## 2017-08-22 PROBLEM — R50.9 FEVER: Status: ACTIVE | Noted: 2017-08-22

## 2017-08-22 PROBLEM — J15.4 STREPTOCOCCAL PNEUMONIA (HCC): Status: ACTIVE | Noted: 2017-08-22

## 2017-08-22 LAB
ANION GAP SERPL CALCULATED.3IONS-SCNC: 7 MMOL/L (ref 4–13)
ATRIAL RATE: 131 BPM
BACTERIA UR QL AUTO: ABNORMAL /HPF
BASOPHILS # BLD AUTO: 0.01 THOUSANDS/ΜL (ref 0–0.1)
BASOPHILS NFR BLD AUTO: 0 % (ref 0–1)
BILIRUB UR QL STRIP: NEGATIVE
BUN SERPL-MCNC: 16 MG/DL (ref 5–25)
CALCIUM SERPL-MCNC: 8.3 MG/DL (ref 8.3–10.1)
CHLORIDE SERPL-SCNC: 106 MMOL/L (ref 100–108)
CLARITY UR: CLEAR
CO2 SERPL-SCNC: 26 MMOL/L (ref 21–32)
COLOR UR: YELLOW
CREAT SERPL-MCNC: 1.22 MG/DL (ref 0.6–1.3)
EOSINOPHIL # BLD AUTO: 0.01 THOUSAND/ΜL (ref 0–0.61)
EOSINOPHIL NFR BLD AUTO: 0 % (ref 0–6)
ERYTHROCYTE [DISTWIDTH] IN BLOOD BY AUTOMATED COUNT: 14.5 % (ref 11.6–15.1)
GFR SERPL CREATININE-BSD FRML MDRD: 55 ML/MIN/1.73SQ M
GLUCOSE SERPL-MCNC: 92 MG/DL (ref 65–140)
GLUCOSE UR STRIP-MCNC: NEGATIVE MG/DL
HCT VFR BLD AUTO: 34 % (ref 36.5–49.3)
HGB BLD-MCNC: 11.5 G/DL (ref 12–17)
HGB UR QL STRIP.AUTO: ABNORMAL
KETONES UR STRIP-MCNC: NEGATIVE MG/DL
L PNEUMO1 AG UR QL IA.RAPID: NEGATIVE
LEUKOCYTE ESTERASE UR QL STRIP: NEGATIVE
LYMPHOCYTES # BLD AUTO: 0.79 THOUSANDS/ΜL (ref 0.6–4.47)
LYMPHOCYTES NFR BLD AUTO: 12 % (ref 14–44)
MCH RBC QN AUTO: 30.9 PG (ref 26.8–34.3)
MCHC RBC AUTO-ENTMCNC: 33.8 G/DL (ref 31.4–37.4)
MCV RBC AUTO: 91 FL (ref 82–98)
MONOCYTES # BLD AUTO: 0.58 THOUSAND/ΜL (ref 0.17–1.22)
MONOCYTES NFR BLD AUTO: 9 % (ref 4–12)
NEUTROPHILS # BLD AUTO: 5.18 THOUSANDS/ΜL (ref 1.85–7.62)
NEUTS SEG NFR BLD AUTO: 79 % (ref 43–75)
NITRITE UR QL STRIP: NEGATIVE
NON-SQ EPI CELLS URNS QL MICRO: ABNORMAL /HPF
NRBC BLD AUTO-RTO: 0 /100 WBCS
P AXIS: 42 DEGREES
PH UR STRIP.AUTO: 6 [PH] (ref 4.5–8)
PLATELET # BLD AUTO: 138 THOUSANDS/UL (ref 149–390)
PMV BLD AUTO: 9.3 FL (ref 8.9–12.7)
POTASSIUM SERPL-SCNC: 3.9 MMOL/L (ref 3.5–5.3)
PR INTERVAL: 288 MS
PROT UR STRIP-MCNC: NEGATIVE MG/DL
QRS AXIS: 62 DEGREES
QRSD INTERVAL: 108 MS
QT INTERVAL: 300 MS
QTC INTERVAL: 443 MS
RBC # BLD AUTO: 3.72 MILLION/UL (ref 3.88–5.62)
RBC #/AREA URNS AUTO: ABNORMAL /HPF
S PNEUM AG UR QL: POSITIVE
SODIUM SERPL-SCNC: 139 MMOL/L (ref 136–145)
SP GR UR STRIP.AUTO: 1.01 (ref 1–1.03)
T WAVE AXIS: 42 DEGREES
UROBILINOGEN UR QL STRIP.AUTO: 0.2 E.U./DL
VENTRICULAR RATE: 131 BPM
WBC # BLD AUTO: 6.57 THOUSAND/UL (ref 4.31–10.16)
WBC #/AREA URNS AUTO: ABNORMAL /HPF

## 2017-08-22 PROCEDURE — 85025 COMPLETE CBC W/AUTO DIFF WBC: CPT | Performed by: FAMILY MEDICINE

## 2017-08-22 PROCEDURE — 81001 URINALYSIS AUTO W/SCOPE: CPT

## 2017-08-22 PROCEDURE — 71250 CT THORAX DX C-: CPT

## 2017-08-22 PROCEDURE — 87449 NOS EACH ORGANISM AG IA: CPT | Performed by: FAMILY MEDICINE

## 2017-08-22 PROCEDURE — 80048 BASIC METABOLIC PNL TOTAL CA: CPT | Performed by: FAMILY MEDICINE

## 2017-08-22 PROCEDURE — 94640 AIRWAY INHALATION TREATMENT: CPT

## 2017-08-22 PROCEDURE — 94760 N-INVAS EAR/PLS OXIMETRY 1: CPT

## 2017-08-22 PROCEDURE — 36415 COLL VENOUS BLD VENIPUNCTURE: CPT | Performed by: FAMILY MEDICINE

## 2017-08-22 PROCEDURE — 99285 EMERGENCY DEPT VISIT HI MDM: CPT

## 2017-08-22 RX ORDER — LEVOFLOXACIN 5 MG/ML
750 INJECTION, SOLUTION INTRAVENOUS ONCE
Status: COMPLETED | OUTPATIENT
Start: 2017-08-22 | End: 2017-08-22

## 2017-08-22 RX ORDER — CHOLECALCIFEROL (VITAMIN D3) 125 MCG
100 CAPSULE ORAL DAILY
Status: DISCONTINUED | OUTPATIENT
Start: 2017-08-22 | End: 2017-08-25 | Stop reason: HOSPADM

## 2017-08-22 RX ORDER — ONDANSETRON 2 MG/ML
4 INJECTION INTRAMUSCULAR; INTRAVENOUS EVERY 6 HOURS PRN
Status: DISCONTINUED | OUTPATIENT
Start: 2017-08-22 | End: 2017-08-25 | Stop reason: HOSPADM

## 2017-08-22 RX ORDER — BENZONATATE 100 MG/1
100 CAPSULE ORAL 3 TIMES DAILY PRN
Status: DISCONTINUED | OUTPATIENT
Start: 2017-08-22 | End: 2017-08-23

## 2017-08-22 RX ORDER — SODIUM CHLORIDE 9 MG/ML
100 INJECTION, SOLUTION INTRAVENOUS CONTINUOUS
Status: DISCONTINUED | OUTPATIENT
Start: 2017-08-22 | End: 2017-08-22

## 2017-08-22 RX ORDER — ALBUTEROL SULFATE 90 UG/1
2 AEROSOL, METERED RESPIRATORY (INHALATION) EVERY 4 HOURS PRN
Status: DISCONTINUED | OUTPATIENT
Start: 2017-08-22 | End: 2017-08-25 | Stop reason: HOSPADM

## 2017-08-22 RX ORDER — SODIUM CHLORIDE 9 MG/ML
100 INJECTION, SOLUTION INTRAVENOUS CONTINUOUS
Status: DISCONTINUED | OUTPATIENT
Start: 2017-08-22 | End: 2017-08-24

## 2017-08-22 RX ORDER — DEXAMETHASONE 1 MG
1 TABLET ORAL WEEKLY
Status: DISCONTINUED | OUTPATIENT
Start: 2017-08-22 | End: 2017-08-22

## 2017-08-22 RX ORDER — IPRATROPIUM BROMIDE AND ALBUTEROL SULFATE 2.5; .5 MG/3ML; MG/3ML
3 SOLUTION RESPIRATORY (INHALATION) EVERY 4 HOURS PRN
Status: DISCONTINUED | OUTPATIENT
Start: 2017-08-22 | End: 2017-08-25 | Stop reason: HOSPADM

## 2017-08-22 RX ORDER — CHLORAL HYDRATE 500 MG
1000 CAPSULE ORAL DAILY
Status: DISCONTINUED | OUTPATIENT
Start: 2017-08-22 | End: 2017-08-25 | Stop reason: HOSPADM

## 2017-08-22 RX ORDER — FINASTERIDE 5 MG/1
2.5 TABLET, FILM COATED ORAL DAILY
Status: DISCONTINUED | OUTPATIENT
Start: 2017-08-22 | End: 2017-08-25 | Stop reason: HOSPADM

## 2017-08-22 RX ORDER — ACETAMINOPHEN 325 MG/1
650 TABLET ORAL EVERY 6 HOURS PRN
Status: DISCONTINUED | OUTPATIENT
Start: 2017-08-22 | End: 2017-08-25 | Stop reason: HOSPADM

## 2017-08-22 RX ORDER — FINASTERIDE 5 MG/1
5 TABLET, FILM COATED ORAL DAILY
Status: DISCONTINUED | OUTPATIENT
Start: 2017-08-22 | End: 2017-08-22

## 2017-08-22 RX ORDER — LEVOFLOXACIN 5 MG/ML
500 INJECTION, SOLUTION INTRAVENOUS EVERY 24 HOURS
Status: DISCONTINUED | OUTPATIENT
Start: 2017-08-23 | End: 2017-08-22

## 2017-08-22 RX ORDER — MELATONIN
5000 DAILY
Status: DISCONTINUED | OUTPATIENT
Start: 2017-08-22 | End: 2017-08-25 | Stop reason: HOSPADM

## 2017-08-22 RX ORDER — DEXAMETHASONE SODIUM PHOSPHATE 4 MG/ML
2 INJECTION, SOLUTION INTRA-ARTICULAR; INTRALESIONAL; INTRAMUSCULAR; INTRAVENOUS; SOFT TISSUE EVERY 8 HOURS
Status: COMPLETED | OUTPATIENT
Start: 2017-08-22 | End: 2017-08-23

## 2017-08-22 RX ORDER — CHOLECALCIFEROL (VITAMIN D3) 125 MCG
1000 CAPSULE ORAL DAILY
Status: DISCONTINUED | OUTPATIENT
Start: 2017-08-22 | End: 2017-08-25 | Stop reason: HOSPADM

## 2017-08-22 RX ORDER — ALBUTEROL SULFATE 2.5 MG/3ML
2.5 SOLUTION RESPIRATORY (INHALATION) EVERY 6 HOURS PRN
COMMUNITY
End: 2017-10-27 | Stop reason: SDUPTHER

## 2017-08-22 RX ORDER — LEVOFLOXACIN 5 MG/ML
500 INJECTION, SOLUTION INTRAVENOUS EVERY 24 HOURS
Status: DISCONTINUED | OUTPATIENT
Start: 2017-08-22 | End: 2017-08-22

## 2017-08-22 RX ADMIN — CYANOCOBALAMIN TAB 500 MCG 1000 MCG: 500 TAB at 09:25

## 2017-08-22 RX ADMIN — IPRATROPIUM BROMIDE AND ALBUTEROL SULFATE 3 ML: .5; 3 SOLUTION RESPIRATORY (INHALATION) at 20:03

## 2017-08-22 RX ADMIN — TIOTROPIUM BROMIDE 18 MCG: 18 CAPSULE ORAL; RESPIRATORY (INHALATION) at 22:03

## 2017-08-22 RX ADMIN — Medication 100 MG: at 09:26

## 2017-08-22 RX ADMIN — ACETAMINOPHEN 650 MG: 325 TABLET, FILM COATED ORAL at 09:25

## 2017-08-22 RX ADMIN — LEVOFLOXACIN 750 MG: 5 INJECTION, SOLUTION INTRAVENOUS at 01:30

## 2017-08-22 RX ADMIN — ENOXAPARIN SODIUM 40 MG: 40 INJECTION SUBCUTANEOUS at 09:26

## 2017-08-22 RX ADMIN — CEFTRIAXONE 1000 MG: 1 INJECTION, POWDER, FOR SOLUTION INTRAMUSCULAR; INTRAVENOUS at 14:10

## 2017-08-22 RX ADMIN — SODIUM CHLORIDE 100 ML/HR: 0.9 INJECTION, SOLUTION INTRAVENOUS at 05:49

## 2017-08-22 RX ADMIN — DEXAMETHASONE SODIUM PHOSPHATE 2 MG: 4 INJECTION, SOLUTION INTRAMUSCULAR; INTRAVENOUS at 22:03

## 2017-08-22 RX ADMIN — SODIUM CHLORIDE 100 ML/HR: 0.9 INJECTION, SOLUTION INTRAVENOUS at 19:21

## 2017-08-22 RX ADMIN — SODIUM CHLORIDE 100 ML/HR: 0.9 INJECTION, SOLUTION INTRAVENOUS at 06:43

## 2017-08-22 RX ADMIN — FINASTERIDE 5 MG: 5 TABLET, FILM COATED ORAL at 09:25

## 2017-08-22 RX ADMIN — VITAMIN D, TAB 1000IU (100/BT) 5000 UNITS: 25 TAB at 09:25

## 2017-08-22 RX ADMIN — Medication 1000 MG: at 09:26

## 2017-08-22 RX ADMIN — DEXAMETHASONE SODIUM PHOSPHATE 2 MG: 4 INJECTION, SOLUTION INTRAMUSCULAR; INTRAVENOUS at 14:10

## 2017-08-23 ENCOUNTER — GENERIC CONVERSION - ENCOUNTER (OUTPATIENT)
Dept: OTHER | Facility: OTHER | Age: 82
End: 2017-08-23

## 2017-08-23 LAB
ANION GAP SERPL CALCULATED.3IONS-SCNC: 7 MMOL/L (ref 4–13)
BASOPHILS # BLD AUTO: 0 THOUSANDS/ΜL (ref 0–0.1)
BASOPHILS NFR BLD AUTO: 0 % (ref 0–1)
BUN SERPL-MCNC: 14 MG/DL (ref 5–25)
CALCIUM SERPL-MCNC: 8.3 MG/DL (ref 8.3–10.1)
CHLORIDE SERPL-SCNC: 108 MMOL/L (ref 100–108)
CO2 SERPL-SCNC: 24 MMOL/L (ref 21–32)
CREAT SERPL-MCNC: 0.97 MG/DL (ref 0.6–1.3)
EOSINOPHIL # BLD AUTO: 0 THOUSAND/ΜL (ref 0–0.61)
EOSINOPHIL NFR BLD AUTO: 0 % (ref 0–6)
ERYTHROCYTE [DISTWIDTH] IN BLOOD BY AUTOMATED COUNT: 14.3 % (ref 11.6–15.1)
GFR SERPL CREATININE-BSD FRML MDRD: 73 ML/MIN/1.73SQ M
GLUCOSE SERPL-MCNC: 131 MG/DL (ref 65–140)
HCT VFR BLD AUTO: 35.8 % (ref 36.5–49.3)
HGB BLD-MCNC: 12.2 G/DL (ref 12–17)
LYMPHOCYTES # BLD AUTO: 0.6 THOUSANDS/ΜL (ref 0.6–4.47)
LYMPHOCYTES NFR BLD AUTO: 14 % (ref 14–44)
MCH RBC QN AUTO: 30.7 PG (ref 26.8–34.3)
MCHC RBC AUTO-ENTMCNC: 34.1 G/DL (ref 31.4–37.4)
MCV RBC AUTO: 90 FL (ref 82–98)
MONOCYTES # BLD AUTO: 0.27 THOUSAND/ΜL (ref 0.17–1.22)
MONOCYTES NFR BLD AUTO: 6 % (ref 4–12)
NEUTROPHILS # BLD AUTO: 3.58 THOUSANDS/ΜL (ref 1.85–7.62)
NEUTS SEG NFR BLD AUTO: 80 % (ref 43–75)
NRBC BLD AUTO-RTO: 0 /100 WBCS
PLATELET # BLD AUTO: 156 THOUSANDS/UL (ref 149–390)
PMV BLD AUTO: 9 FL (ref 8.9–12.7)
POTASSIUM SERPL-SCNC: 4.3 MMOL/L (ref 3.5–5.3)
RBC # BLD AUTO: 3.97 MILLION/UL (ref 3.88–5.62)
SODIUM SERPL-SCNC: 139 MMOL/L (ref 136–145)
WBC # BLD AUTO: 4.45 THOUSAND/UL (ref 4.31–10.16)

## 2017-08-23 PROCEDURE — 80048 BASIC METABOLIC PNL TOTAL CA: CPT | Performed by: HOSPITALIST

## 2017-08-23 PROCEDURE — 94640 AIRWAY INHALATION TREATMENT: CPT

## 2017-08-23 PROCEDURE — 94760 N-INVAS EAR/PLS OXIMETRY 1: CPT

## 2017-08-23 PROCEDURE — 85025 COMPLETE CBC W/AUTO DIFF WBC: CPT | Performed by: HOSPITALIST

## 2017-08-23 RX ORDER — GUAIFENESIN 600 MG
600 TABLET, EXTENDED RELEASE 12 HR ORAL EVERY 12 HOURS SCHEDULED
Status: DISCONTINUED | OUTPATIENT
Start: 2017-08-23 | End: 2017-08-25 | Stop reason: HOSPADM

## 2017-08-23 RX ORDER — BENZONATATE 100 MG/1
100 CAPSULE ORAL 3 TIMES DAILY
Status: DISCONTINUED | OUTPATIENT
Start: 2017-08-23 | End: 2017-08-25 | Stop reason: HOSPADM

## 2017-08-23 RX ADMIN — GUAIFENESIN 600 MG: 600 TABLET, EXTENDED RELEASE ORAL at 21:03

## 2017-08-23 RX ADMIN — VITAMIN D, TAB 1000IU (100/BT) 5000 UNITS: 25 TAB at 08:51

## 2017-08-23 RX ADMIN — DEXAMETHASONE SODIUM PHOSPHATE 2 MG: 4 INJECTION, SOLUTION INTRAMUSCULAR; INTRAVENOUS at 04:39

## 2017-08-23 RX ADMIN — CYANOCOBALAMIN TAB 500 MCG 1000 MCG: 500 TAB at 08:51

## 2017-08-23 RX ADMIN — SODIUM CHLORIDE 100 ML/HR: 0.9 INJECTION, SOLUTION INTRAVENOUS at 07:36

## 2017-08-23 RX ADMIN — BENZONATATE 100 MG: 100 CAPSULE ORAL at 21:03

## 2017-08-23 RX ADMIN — TIOTROPIUM BROMIDE 18 MCG: 18 CAPSULE ORAL; RESPIRATORY (INHALATION) at 21:04

## 2017-08-23 RX ADMIN — CEFTRIAXONE 1000 MG: 1 INJECTION, POWDER, FOR SOLUTION INTRAMUSCULAR; INTRAVENOUS at 12:04

## 2017-08-23 RX ADMIN — FINASTERIDE 2.5 MG: 5 TABLET, FILM COATED ORAL at 21:03

## 2017-08-23 RX ADMIN — ENOXAPARIN SODIUM 40 MG: 40 INJECTION SUBCUTANEOUS at 08:52

## 2017-08-23 RX ADMIN — DEXAMETHASONE SODIUM PHOSPHATE 2 MG: 4 INJECTION, SOLUTION INTRAMUSCULAR; INTRAVENOUS at 12:04

## 2017-08-23 RX ADMIN — IPRATROPIUM BROMIDE AND ALBUTEROL SULFATE 3 ML: .5; 3 SOLUTION RESPIRATORY (INHALATION) at 18:52

## 2017-08-23 RX ADMIN — SODIUM CHLORIDE 100 ML/HR: 0.9 INJECTION, SOLUTION INTRAVENOUS at 16:53

## 2017-08-23 RX ADMIN — BENZONATATE 100 MG: 100 CAPSULE ORAL at 16:50

## 2017-08-23 RX ADMIN — GUAIFENESIN 600 MG: 600 TABLET, EXTENDED RELEASE ORAL at 12:35

## 2017-08-23 RX ADMIN — Medication 100 MG: at 08:52

## 2017-08-23 RX ADMIN — Medication 1000 MG: at 08:51

## 2017-08-24 LAB
ANION GAP SERPL CALCULATED.3IONS-SCNC: 7 MMOL/L (ref 4–13)
BASOPHILS # BLD AUTO: 0 THOUSANDS/ΜL (ref 0–0.1)
BASOPHILS NFR BLD AUTO: 0 % (ref 0–1)
BUN SERPL-MCNC: 16 MG/DL (ref 5–25)
CALCIUM SERPL-MCNC: 8.6 MG/DL (ref 8.3–10.1)
CHLORIDE SERPL-SCNC: 107 MMOL/L (ref 100–108)
CO2 SERPL-SCNC: 26 MMOL/L (ref 21–32)
CREAT SERPL-MCNC: 1.08 MG/DL (ref 0.6–1.3)
EOSINOPHIL # BLD AUTO: 0 THOUSAND/ΜL (ref 0–0.61)
EOSINOPHIL NFR BLD AUTO: 0 % (ref 0–6)
ERYTHROCYTE [DISTWIDTH] IN BLOOD BY AUTOMATED COUNT: 14.3 % (ref 11.6–15.1)
GFR SERPL CREATININE-BSD FRML MDRD: 64 ML/MIN/1.73SQ M
GLUCOSE SERPL-MCNC: 103 MG/DL (ref 65–140)
HCT VFR BLD AUTO: 34.4 % (ref 36.5–49.3)
HGB BLD-MCNC: 11.8 G/DL (ref 12–17)
LYMPHOCYTES # BLD AUTO: 1.12 THOUSANDS/ΜL (ref 0.6–4.47)
LYMPHOCYTES NFR BLD AUTO: 15 % (ref 14–44)
MCH RBC QN AUTO: 30.7 PG (ref 26.8–34.3)
MCHC RBC AUTO-ENTMCNC: 34.3 G/DL (ref 31.4–37.4)
MCV RBC AUTO: 90 FL (ref 82–98)
MONOCYTES # BLD AUTO: 0.43 THOUSAND/ΜL (ref 0.17–1.22)
MONOCYTES NFR BLD AUTO: 6 % (ref 4–12)
NEUTROPHILS # BLD AUTO: 5.76 THOUSANDS/ΜL (ref 1.85–7.62)
NEUTS SEG NFR BLD AUTO: 79 % (ref 43–75)
NRBC BLD AUTO-RTO: 0 /100 WBCS
PLATELET # BLD AUTO: 144 THOUSANDS/UL (ref 149–390)
PMV BLD AUTO: 8.9 FL (ref 8.9–12.7)
POTASSIUM SERPL-SCNC: 3.8 MMOL/L (ref 3.5–5.3)
RBC # BLD AUTO: 3.84 MILLION/UL (ref 3.88–5.62)
SODIUM SERPL-SCNC: 140 MMOL/L (ref 136–145)
WBC # BLD AUTO: 7.31 THOUSAND/UL (ref 4.31–10.16)

## 2017-08-24 PROCEDURE — 85025 COMPLETE CBC W/AUTO DIFF WBC: CPT | Performed by: HOSPITALIST

## 2017-08-24 PROCEDURE — 94760 N-INVAS EAR/PLS OXIMETRY 1: CPT

## 2017-08-24 PROCEDURE — 80048 BASIC METABOLIC PNL TOTAL CA: CPT | Performed by: HOSPITALIST

## 2017-08-24 RX ORDER — DEXAMETHASONE SODIUM PHOSPHATE 4 MG/ML
4 INJECTION, SOLUTION INTRA-ARTICULAR; INTRALESIONAL; INTRAMUSCULAR; INTRAVENOUS; SOFT TISSUE EVERY 8 HOURS
Status: DISCONTINUED | OUTPATIENT
Start: 2017-08-24 | End: 2017-08-25 | Stop reason: HOSPADM

## 2017-08-24 RX ADMIN — BENZONATATE 100 MG: 100 CAPSULE ORAL at 21:18

## 2017-08-24 RX ADMIN — SODIUM CHLORIDE 250 ML: 0.9 INJECTION, SOLUTION INTRAVENOUS at 21:23

## 2017-08-24 RX ADMIN — GUAIFENESIN 600 MG: 600 TABLET, EXTENDED RELEASE ORAL at 09:01

## 2017-08-24 RX ADMIN — GUAIFENESIN 600 MG: 600 TABLET, EXTENDED RELEASE ORAL at 21:18

## 2017-08-24 RX ADMIN — BENZONATATE 100 MG: 100 CAPSULE ORAL at 16:52

## 2017-08-24 RX ADMIN — SODIUM CHLORIDE 100 ML/HR: 0.9 INJECTION, SOLUTION INTRAVENOUS at 06:18

## 2017-08-24 RX ADMIN — VITAMIN D, TAB 1000IU (100/BT) 5000 UNITS: 25 TAB at 09:01

## 2017-08-24 RX ADMIN — TIOTROPIUM BROMIDE 18 MCG: 18 CAPSULE ORAL; RESPIRATORY (INHALATION) at 21:23

## 2017-08-24 RX ADMIN — Medication 100 MG: at 09:06

## 2017-08-24 RX ADMIN — Medication 1000 MG: at 09:00

## 2017-08-24 RX ADMIN — FINASTERIDE 2.5 MG: 5 TABLET, FILM COATED ORAL at 21:18

## 2017-08-24 RX ADMIN — CEFTRIAXONE 1000 MG: 1 INJECTION, POWDER, FOR SOLUTION INTRAMUSCULAR; INTRAVENOUS at 12:31

## 2017-08-24 RX ADMIN — CYANOCOBALAMIN TAB 500 MCG 1000 MCG: 500 TAB at 09:00

## 2017-08-24 RX ADMIN — BENZONATATE 100 MG: 100 CAPSULE ORAL at 09:00

## 2017-08-25 VITALS
TEMPERATURE: 96.6 F | SYSTOLIC BLOOD PRESSURE: 111 MMHG | OXYGEN SATURATION: 98 % | HEART RATE: 73 BPM | WEIGHT: 185.1 LBS | HEIGHT: 68 IN | DIASTOLIC BLOOD PRESSURE: 55 MMHG | RESPIRATION RATE: 20 BRPM | BODY MASS INDEX: 28.05 KG/M2

## 2017-08-25 PROBLEM — R50.9 FEVER: Status: RESOLVED | Noted: 2017-08-22 | Resolved: 2017-08-25

## 2017-08-25 PROBLEM — J15.4 STREPTOCOCCAL PNEUMONIA (HCC): Status: RESOLVED | Noted: 2017-08-22 | Resolved: 2017-08-25

## 2017-08-25 PROBLEM — N17.9 AKI (ACUTE KIDNEY INJURY) (HCC): Status: RESOLVED | Noted: 2017-04-01 | Resolved: 2017-08-25

## 2017-08-25 RX ORDER — GUAIFENESIN 600 MG
600 TABLET, EXTENDED RELEASE 12 HR ORAL EVERY 12 HOURS SCHEDULED
Qty: 10 TABLET | Refills: 0 | Status: SHIPPED | OUTPATIENT
Start: 2017-08-25 | End: 2017-10-02

## 2017-08-25 RX ORDER — DEXAMETHASONE 4 MG/1
20 TABLET ORAL WEEKLY
Start: 2017-08-25 | End: 2017-10-02

## 2017-08-25 RX ORDER — CEFDINIR 300 MG/1
300 CAPSULE ORAL EVERY 12 HOURS SCHEDULED
Qty: 4 CAPSULE | Refills: 0 | Status: SHIPPED | OUTPATIENT
Start: 2017-08-25 | End: 2017-08-27

## 2017-08-25 RX ADMIN — VITAMIN D, TAB 1000IU (100/BT) 5000 UNITS: 25 TAB at 09:06

## 2017-08-25 RX ADMIN — GUAIFENESIN 600 MG: 600 TABLET, EXTENDED RELEASE ORAL at 09:05

## 2017-08-25 RX ADMIN — Medication 100 MG: at 09:05

## 2017-08-25 RX ADMIN — BENZONATATE 100 MG: 100 CAPSULE ORAL at 09:06

## 2017-08-25 RX ADMIN — DEXAMETHASONE SODIUM PHOSPHATE 4 MG: 4 INJECTION, SOLUTION INTRAMUSCULAR; INTRAVENOUS at 09:06

## 2017-08-25 RX ADMIN — CYANOCOBALAMIN TAB 500 MCG 1000 MCG: 500 TAB at 09:05

## 2017-08-25 RX ADMIN — Medication 1000 MG: at 09:05

## 2017-08-27 LAB
BACTERIA BLD CULT: NORMAL
BACTERIA BLD CULT: NORMAL

## 2017-09-06 ENCOUNTER — GENERIC CONVERSION - ENCOUNTER (OUTPATIENT)
Dept: OTHER | Facility: OTHER | Age: 82
End: 2017-09-06

## 2017-09-14 ENCOUNTER — TRANSCRIBE ORDERS (OUTPATIENT)
Dept: ADMINISTRATIVE | Facility: HOSPITAL | Age: 82
End: 2017-09-14

## 2017-09-14 DIAGNOSIS — R50.9 HYPERTHERMIA-INDUCED DEFECT: Primary | ICD-10-CM

## 2017-09-22 ENCOUNTER — APPOINTMENT (OUTPATIENT)
Dept: LAB | Facility: MEDICAL CENTER | Age: 82
End: 2017-09-22
Payer: MEDICARE

## 2017-09-22 ENCOUNTER — GENERIC CONVERSION - ENCOUNTER (OUTPATIENT)
Dept: OTHER | Facility: OTHER | Age: 82
End: 2017-09-22

## 2017-09-22 ENCOUNTER — APPOINTMENT (OUTPATIENT)
Dept: RADIOLOGY | Facility: MEDICAL CENTER | Age: 82
End: 2017-09-22
Payer: MEDICARE

## 2017-09-22 DIAGNOSIS — C20 MALIGNANT NEOPLASM OF RECTUM (HCC): ICD-10-CM

## 2017-09-22 DIAGNOSIS — J44.1 CHRONIC OBSTRUCTIVE PULMONARY DISEASE WITH ACUTE EXACERBATION (HCC): ICD-10-CM

## 2017-09-22 DIAGNOSIS — Z01.811 ENCOUNTER FOR PREPROCEDURAL RESPIRATORY EXAMINATION: ICD-10-CM

## 2017-09-22 DIAGNOSIS — J13 PNEUMONIA DUE TO STREPTOCOCCUS PNEUMONIAE (HCC): ICD-10-CM

## 2017-09-22 LAB
ALBUMIN SERPL BCP-MCNC: 2.8 G/DL (ref 3.5–5)
ALP SERPL-CCNC: 76 U/L (ref 46–116)
ALT SERPL W P-5'-P-CCNC: 13 U/L (ref 12–78)
ANION GAP SERPL CALCULATED.3IONS-SCNC: 6 MMOL/L (ref 4–13)
AST SERPL W P-5'-P-CCNC: 10 U/L (ref 5–45)
BASOPHILS # BLD AUTO: 0.02 THOUSANDS/ΜL (ref 0–0.1)
BASOPHILS NFR BLD AUTO: 0 % (ref 0–1)
BILIRUB SERPL-MCNC: 1.23 MG/DL (ref 0.2–1)
BUN SERPL-MCNC: 14 MG/DL (ref 5–25)
CALCIUM SERPL-MCNC: 8.8 MG/DL (ref 8.3–10.1)
CHLORIDE SERPL-SCNC: 106 MMOL/L (ref 100–108)
CO2 SERPL-SCNC: 26 MMOL/L (ref 21–32)
CREAT SERPL-MCNC: 1.16 MG/DL (ref 0.6–1.3)
EOSINOPHIL # BLD AUTO: 0.09 THOUSAND/ΜL (ref 0–0.61)
EOSINOPHIL NFR BLD AUTO: 1 % (ref 0–6)
ERYTHROCYTE [DISTWIDTH] IN BLOOD BY AUTOMATED COUNT: 14.3 % (ref 11.6–15.1)
GFR SERPL CREATININE-BSD FRML MDRD: 59 ML/MIN/1.73SQ M
GLUCOSE SERPL-MCNC: 98 MG/DL (ref 65–140)
HCT VFR BLD AUTO: 37.7 % (ref 36.5–49.3)
HGB BLD-MCNC: 12.9 G/DL (ref 12–17)
LYMPHOCYTES # BLD AUTO: 0.93 THOUSANDS/ΜL (ref 0.6–4.47)
LYMPHOCYTES NFR BLD AUTO: 14 % (ref 14–44)
MCH RBC QN AUTO: 30.6 PG (ref 26.8–34.3)
MCHC RBC AUTO-ENTMCNC: 34.2 G/DL (ref 31.4–37.4)
MCV RBC AUTO: 89 FL (ref 82–98)
MONOCYTES # BLD AUTO: 0.27 THOUSAND/ΜL (ref 0.17–1.22)
MONOCYTES NFR BLD AUTO: 4 % (ref 4–12)
NEUTROPHILS # BLD AUTO: 5.45 THOUSANDS/ΜL (ref 1.85–7.62)
NEUTS SEG NFR BLD AUTO: 81 % (ref 43–75)
NRBC BLD AUTO-RTO: 0 /100 WBCS
PLATELET # BLD AUTO: 190 THOUSANDS/UL (ref 149–390)
PMV BLD AUTO: 8.6 FL (ref 8.9–12.7)
POTASSIUM SERPL-SCNC: 4.2 MMOL/L (ref 3.5–5.3)
PROT SERPL-MCNC: 6 G/DL (ref 6.4–8.2)
RBC # BLD AUTO: 4.22 MILLION/UL (ref 3.88–5.62)
SODIUM SERPL-SCNC: 138 MMOL/L (ref 136–145)
WBC # BLD AUTO: 6.84 THOUSAND/UL (ref 4.31–10.16)

## 2017-09-22 PROCEDURE — 80053 COMPREHEN METABOLIC PANEL: CPT

## 2017-09-22 PROCEDURE — 85025 COMPLETE CBC W/AUTO DIFF WBC: CPT

## 2017-09-22 PROCEDURE — 71020 HB CHEST X-RAY 2VW FRONTAL&LATL: CPT

## 2017-09-22 PROCEDURE — 83883 ASSAY NEPHELOMETRY NOT SPEC: CPT

## 2017-09-22 PROCEDURE — 36415 COLL VENOUS BLD VENIPUNCTURE: CPT

## 2017-09-23 LAB
KAPPA LC FREE SER-MCNC: 8.8 MG/L (ref 3.3–19.4)
KAPPA LC FREE/LAMBDA FREE SER: 0.03 {RATIO} (ref 0.26–1.65)
LAMBDA LC FREE SERPL-MCNC: 310.6 MG/L (ref 5.7–26.3)

## 2017-09-25 ENCOUNTER — APPOINTMENT (EMERGENCY)
Dept: NON INVASIVE DIAGNOSTICS | Facility: HOSPITAL | Age: 82
End: 2017-09-25
Payer: MEDICARE

## 2017-09-25 ENCOUNTER — HOSPITAL ENCOUNTER (EMERGENCY)
Facility: HOSPITAL | Age: 82
Discharge: HOME | End: 2017-09-25
Attending: EMERGENCY MEDICINE | Admitting: EMERGENCY MEDICINE
Payer: MEDICARE

## 2017-09-25 ENCOUNTER — GENERIC CONVERSION - ENCOUNTER (OUTPATIENT)
Dept: OTHER | Facility: OTHER | Age: 82
End: 2017-09-25

## 2017-09-25 VITALS
SYSTOLIC BLOOD PRESSURE: 112 MMHG | TEMPERATURE: 98.4 F | OXYGEN SATURATION: 96 % | HEART RATE: 79 BPM | DIASTOLIC BLOOD PRESSURE: 67 MMHG | RESPIRATION RATE: 14 BRPM | BODY MASS INDEX: 27.37 KG/M2 | WEIGHT: 180 LBS

## 2017-09-25 DIAGNOSIS — M79.661 RIGHT CALF PAIN: Primary | ICD-10-CM

## 2017-09-25 LAB
ANION GAP SERPL CALCULATED.3IONS-SCNC: 6 MMOL/L (ref 4–13)
BACTERIA UR QL AUTO: ABNORMAL /HPF
BASOPHILS # BLD AUTO: 0.01 THOUSANDS/ΜL (ref 0–0.1)
BASOPHILS NFR BLD AUTO: 0 % (ref 0–1)
BILIRUB UR QL STRIP: NEGATIVE
BUN SERPL-MCNC: 21 MG/DL (ref 5–25)
CALCIUM SERPL-MCNC: 8.5 MG/DL (ref 8.3–10.1)
CHLORIDE SERPL-SCNC: 107 MMOL/L (ref 100–108)
CLARITY UR: CLEAR
CO2 SERPL-SCNC: 28 MMOL/L (ref 21–32)
COLOR UR: YELLOW
CREAT SERPL-MCNC: 1.15 MG/DL (ref 0.6–1.3)
EOSINOPHIL # BLD AUTO: 0.04 THOUSAND/ΜL (ref 0–0.61)
EOSINOPHIL NFR BLD AUTO: 1 % (ref 0–6)
ERYTHROCYTE [DISTWIDTH] IN BLOOD BY AUTOMATED COUNT: 13.9 % (ref 11.6–15.1)
GFR SERPL CREATININE-BSD FRML MDRD: 59 ML/MIN/1.73SQ M
GLUCOSE SERPL-MCNC: 78 MG/DL (ref 65–140)
GLUCOSE UR STRIP-MCNC: NEGATIVE MG/DL
HCT VFR BLD AUTO: 35.4 % (ref 36.5–49.3)
HGB BLD-MCNC: 12.1 G/DL (ref 12–17)
HGB UR QL STRIP.AUTO: ABNORMAL
KETONES UR STRIP-MCNC: NEGATIVE MG/DL
LACTATE SERPL-SCNC: 0.8 MMOL/L (ref 0.5–2)
LEUKOCYTE ESTERASE UR QL STRIP: NEGATIVE
LYMPHOCYTES # BLD AUTO: 1.1 THOUSANDS/ΜL (ref 0.6–4.47)
LYMPHOCYTES NFR BLD AUTO: 19 % (ref 14–44)
MCH RBC QN AUTO: 30.6 PG (ref 26.8–34.3)
MCHC RBC AUTO-ENTMCNC: 34.2 G/DL (ref 31.4–37.4)
MCV RBC AUTO: 89 FL (ref 82–98)
MONOCYTES # BLD AUTO: 0.66 THOUSAND/ΜL (ref 0.17–1.22)
MONOCYTES NFR BLD AUTO: 12 % (ref 4–12)
NEUTROPHILS # BLD AUTO: 3.93 THOUSANDS/ΜL (ref 1.85–7.62)
NEUTS SEG NFR BLD AUTO: 68 % (ref 43–75)
NITRITE UR QL STRIP: NEGATIVE
NON-SQ EPI CELLS URNS QL MICRO: ABNORMAL /HPF
NRBC BLD AUTO-RTO: 0 /100 WBCS
PH UR STRIP.AUTO: 6.5 [PH] (ref 4.5–8)
PLATELET # BLD AUTO: 129 THOUSANDS/UL (ref 149–390)
PMV BLD AUTO: 8.9 FL (ref 8.9–12.7)
POTASSIUM SERPL-SCNC: 3.9 MMOL/L (ref 3.5–5.3)
PROT UR STRIP-MCNC: ABNORMAL MG/DL
RBC # BLD AUTO: 3.96 MILLION/UL (ref 3.88–5.62)
RBC #/AREA URNS AUTO: ABNORMAL /HPF
SODIUM SERPL-SCNC: 141 MMOL/L (ref 136–145)
SP GR UR STRIP.AUTO: 1.02 (ref 1–1.03)
UROBILINOGEN UR QL STRIP.AUTO: 0.2 E.U./DL
WBC # BLD AUTO: 5.74 THOUSAND/UL (ref 4.31–10.16)
WBC #/AREA URNS AUTO: ABNORMAL /HPF

## 2017-09-25 PROCEDURE — 87040 BLOOD CULTURE FOR BACTERIA: CPT | Performed by: EMERGENCY MEDICINE

## 2017-09-25 PROCEDURE — 81002 URINALYSIS NONAUTO W/O SCOPE: CPT | Performed by: EMERGENCY MEDICINE

## 2017-09-25 PROCEDURE — 93971 EXTREMITY STUDY: CPT

## 2017-09-25 PROCEDURE — 81001 URINALYSIS AUTO W/SCOPE: CPT

## 2017-09-25 PROCEDURE — 36415 COLL VENOUS BLD VENIPUNCTURE: CPT | Performed by: EMERGENCY MEDICINE

## 2017-09-25 PROCEDURE — 93005 ELECTROCARDIOGRAM TRACING: CPT

## 2017-09-25 PROCEDURE — 85025 COMPLETE CBC W/AUTO DIFF WBC: CPT | Performed by: EMERGENCY MEDICINE

## 2017-09-25 PROCEDURE — 80048 BASIC METABOLIC PNL TOTAL CA: CPT | Performed by: EMERGENCY MEDICINE

## 2017-09-25 PROCEDURE — 83605 ASSAY OF LACTIC ACID: CPT | Performed by: EMERGENCY MEDICINE

## 2017-09-25 PROCEDURE — 87798 DETECT AGENT NOS DNA AMP: CPT | Performed by: EMERGENCY MEDICINE

## 2017-09-25 PROCEDURE — 99285 EMERGENCY DEPT VISIT HI MDM: CPT

## 2017-09-26 LAB
ATRIAL RATE: 150 BPM
FLUAV AG SPEC QL: NORMAL
FLUBV AG SPEC QL: NORMAL
P AXIS: 71 DEGREES
PR INTERVAL: 208 MS
QRS AXIS: 56 DEGREES
QRSD INTERVAL: 112 MS
QT INTERVAL: 364 MS
QTC INTERVAL: 427 MS
RSV B RNA SPEC QL NAA+PROBE: NORMAL
T WAVE AXIS: 49 DEGREES
VENTRICULAR RATE: 83 BPM

## 2017-09-28 ENCOUNTER — ALLSCRIPTS OFFICE VISIT (OUTPATIENT)
Dept: OTHER | Facility: OTHER | Age: 82
End: 2017-09-28

## 2017-09-28 DIAGNOSIS — C90.00 MULTIPLE MYELOMA NOT HAVING ACHIEVED REMISSION (HCC): ICD-10-CM

## 2017-09-28 DIAGNOSIS — J13 PNEUMONIA DUE TO STREPTOCOCCUS PNEUMONIAE (HCC): ICD-10-CM

## 2017-09-28 DIAGNOSIS — C20 MALIGNANT NEOPLASM OF RECTUM (HCC): ICD-10-CM

## 2017-09-28 DIAGNOSIS — Z01.811 ENCOUNTER FOR PREPROCEDURAL RESPIRATORY EXAMINATION: ICD-10-CM

## 2017-09-28 DIAGNOSIS — J44.1 CHRONIC OBSTRUCTIVE PULMONARY DISEASE WITH ACUTE EXACERBATION (HCC): ICD-10-CM

## 2017-09-29 RX ORDER — SODIUM CHLORIDE 9 MG/ML
20 INJECTION, SOLUTION INTRAVENOUS CONTINUOUS
Status: DISCONTINUED | OUTPATIENT
Start: 2017-10-02 | End: 2017-10-05 | Stop reason: HOSPADM

## 2017-09-30 LAB
BACTERIA BLD CULT: NORMAL
BACTERIA BLD CULT: NORMAL

## 2017-10-02 ENCOUNTER — HOSPITAL ENCOUNTER (OUTPATIENT)
Dept: INFUSION CENTER | Facility: CLINIC | Age: 82
Discharge: HOME | End: 2017-10-02
Payer: MEDICARE

## 2017-10-02 VITALS
DIASTOLIC BLOOD PRESSURE: 65 MMHG | TEMPERATURE: 97.9 F | SYSTOLIC BLOOD PRESSURE: 104 MMHG | HEART RATE: 80 BPM | RESPIRATION RATE: 20 BRPM

## 2017-10-02 PROCEDURE — 96365 THER/PROPH/DIAG IV INF INIT: CPT

## 2017-10-02 RX ADMIN — SODIUM CHLORIDE 20 ML/HR: 0.9 INJECTION, SOLUTION INTRAVENOUS at 13:20

## 2017-10-02 RX ADMIN — MAGNESIUM SULFATE HEPTAHYDRATE: 500 INJECTION, SOLUTION INTRAMUSCULAR; INTRAVENOUS at 13:20

## 2017-10-02 NOTE — PROGRESS NOTES
Patient tolerated infusion well  Denies any discomfort  Pt and wife are aware of all future appointments

## 2017-10-02 NOTE — PLAN OF CARE
Problem: Potential for Falls  Goal: Patient will remain free of falls  INTERVENTIONS:  - Assess patient frequently for physical needs  -  Identify cognitive and physical deficits and behaviors that affect risk of falls    -  [place] fall precautions as indicated by assessment   - Educate patient/family on patient safety including physical limitations  - Instruct patient to call for assistance with activity based on assessment  - Modify environment to reduce risk of injury  - Consider OT/PT consult to assist with strengthening/mobility   Outcome: Progressing

## 2017-10-04 ENCOUNTER — HOSPITAL ENCOUNTER (OUTPATIENT)
Dept: NON INVASIVE DIAGNOSTICS | Facility: HOSPITAL | Age: 82
Discharge: HOME | End: 2017-10-04
Payer: MEDICARE

## 2017-10-04 DIAGNOSIS — R50.9 HYPERTHERMIA-INDUCED DEFECT: ICD-10-CM

## 2017-10-04 PROCEDURE — 93306 TTE W/DOPPLER COMPLETE: CPT

## 2017-10-04 RX ORDER — SODIUM CHLORIDE 9 MG/ML
20 INJECTION, SOLUTION INTRAVENOUS CONTINUOUS
Status: DISCONTINUED | OUTPATIENT
Start: 2017-10-05 | End: 2017-10-08 | Stop reason: HOSPADM

## 2017-10-05 ENCOUNTER — HOSPITAL ENCOUNTER (OUTPATIENT)
Dept: INFUSION CENTER | Facility: CLINIC | Age: 82
Discharge: HOME | End: 2017-10-05
Payer: MEDICARE

## 2017-10-05 VITALS
DIASTOLIC BLOOD PRESSURE: 64 MMHG | OXYGEN SATURATION: 97 % | HEART RATE: 97 BPM | TEMPERATURE: 98.7 F | RESPIRATION RATE: 16 BRPM | SYSTOLIC BLOOD PRESSURE: 99 MMHG

## 2017-10-05 PROCEDURE — 96365 THER/PROPH/DIAG IV INF INIT: CPT

## 2017-10-05 RX ADMIN — SODIUM CHLORIDE 20 ML/HR: 0.9 INJECTION, SOLUTION INTRAVENOUS at 14:15

## 2017-10-05 RX ADMIN — MAGNESIUM SULFATE HEPTAHYDRATE: 500 INJECTION, SOLUTION INTRAMUSCULAR; INTRAVENOUS at 14:28

## 2017-10-05 NOTE — PROGRESS NOTES
Pt  Denies new symptoms or concerns at this time  Magnesium, MV and Hydration ordered for infusion today

## 2017-10-05 NOTE — PLAN OF CARE
Problem: PAIN - ADULT  Goal: Verbalizes/displays adequate comfort level or baseline comfort level  Interventions:  - Encourage patient to monitor pain and request assistance  - Assess pain using appropriate pain scale  - Administer analgesics based on type and severity of pain and evaluate response  - Implement non-pharmacological measures as appropriate and evaluate response  - Consider cultural and social influences on pain and pain management  - Notify physician/advanced practitioner if interventions unsuccessful or patient reports new pain  Outcome: Progressing      Problem: INFECTION - ADULT  Goal: Absence or prevention of progression during hospitalization  INTERVENTIONS:  - Assess and monitor for signs and symptoms of infection  - Monitor lab/diagnostic results  - Monitor all insertion sites, i e  indwelling lines, tubes, and drains  - Monitor endotracheal (as able) and nasal secretions for changes in amount and color  - [place] appropriate cooling/warming therapies per order  - Administer medications as ordered  - Instruct and encourage patient and family to use good hand hygiene technique  - Identify and instruct in appropriate isolation precautions for identified infection/condition  Outcome: Progressing    Goal: Absence of fever/infection during neutropenic period  INTERVENTIONS:  - Monitor WBC  - Implement neutropenic guidelines  Outcome: Progressing      Problem: Knowledge Deficit  Goal: Patient/family/caregiver demonstrates understanding of disease process, treatment plan, medications, and discharge instructions  Complete learning assessment and assess knowledge base    Interventions:  - Provide teaching at level of understanding  - Provide teaching via preferred learning methods  Outcome: Progressing

## 2017-10-06 RX ORDER — SODIUM CHLORIDE 9 MG/ML
20 INJECTION, SOLUTION INTRAVENOUS CONTINUOUS
Status: DISCONTINUED | OUTPATIENT
Start: 2017-10-09 | End: 2017-10-12 | Stop reason: HOSPADM

## 2017-10-09 ENCOUNTER — HOSPITAL ENCOUNTER (OUTPATIENT)
Dept: INFUSION CENTER | Facility: CLINIC | Age: 82
Discharge: HOME | End: 2017-10-09
Payer: MEDICARE

## 2017-10-09 VITALS
SYSTOLIC BLOOD PRESSURE: 108 MMHG | RESPIRATION RATE: 18 BRPM | DIASTOLIC BLOOD PRESSURE: 66 MMHG | TEMPERATURE: 98.8 F | HEART RATE: 93 BPM

## 2017-10-09 PROCEDURE — 96365 THER/PROPH/DIAG IV INF INIT: CPT

## 2017-10-09 RX ADMIN — MAGNESIUM SULFATE HEPTAHYDRATE: 500 INJECTION, SOLUTION INTRAMUSCULAR; INTRAVENOUS at 13:55

## 2017-10-09 NOTE — PLAN OF CARE
Problem: Potential for Falls  Goal: Patient will remain free of falls  INTERVENTIONS:  - Assess patient frequently for physical needs  -  Identify cognitive and physical deficits and behaviors that affect risk of falls    -  [place] fall precautions as indicated by assessment   - Educate patient/family on patient safety including physical limitations  - Instruct patient to call for assistance with activity based on assessment  - Modify environment to reduce risk of injury  - Consider OT/PT consult to assist with strengthening/mobility   Outcome: Progressing

## 2017-10-12 ENCOUNTER — HOSPITAL ENCOUNTER (OUTPATIENT)
Dept: INFUSION CENTER | Facility: CLINIC | Age: 82
Discharge: HOME | End: 2017-10-12
Payer: MEDICARE

## 2017-10-12 VITALS
TEMPERATURE: 96 F | HEART RATE: 97 BPM | DIASTOLIC BLOOD PRESSURE: 64 MMHG | OXYGEN SATURATION: 97 % | RESPIRATION RATE: 20 BRPM | SYSTOLIC BLOOD PRESSURE: 115 MMHG

## 2017-10-12 PROCEDURE — 96365 THER/PROPH/DIAG IV INF INIT: CPT

## 2017-10-12 RX ADMIN — MAGNESIUM SULFATE HEPTAHYDRATE: 500 INJECTION, SOLUTION INTRAMUSCULAR; INTRAVENOUS at 14:33

## 2017-10-12 NOTE — PLAN OF CARE
Problem: Potential for Falls  Goal: Patient will remain free of falls  INTERVENTIONS:  - Assess patient frequently for physical needs  -  Identify cognitive and physical deficits and behaviors that affect risk of falls    -  [place] fall precautions as indicated by assessment   - Educate patient/family on patient safety including physical limitations  - Instruct patient to call for assistance with activity based on assessment  - Modify environment to reduce risk of injury  - Consider OT/PT consult to assist with strengthening/mobility   Outcome: Progressing

## 2017-10-12 NOTE — PROGRESS NOTES
Tolerated hydration  No further infusion appointments scheduled at this time  F/U with [facility] scheduled   AVS given to patient

## 2017-10-16 ENCOUNTER — ALLSCRIPTS OFFICE VISIT (OUTPATIENT)
Dept: OTHER | Facility: OTHER | Age: 82
End: 2017-10-16

## 2017-10-17 NOTE — PROGRESS NOTES
Assessment  Assessed    1  Preop cardiovascular exam (V72 81) (Z01 810)    Plan  Preop cardiovascular exam    · EKG/ECG- POC; Status:Complete;   Done: 13VTW3600 10:27AM   Perform: In Office; Last Updated By:Sharif Alba; 10/16/2017 10:27:33 AM;Ordered; For:Preop cardiovascular exam; Ordered By:Car Lynn;   · Follow-up PRN Evaluation and Treatment  Follow-up  Status: Complete  Done:  33WKS1507 11:14AM   Ordered; For: Preop cardiovascular exam; Ordered By: Mary Lyons Performed:  Due: 46VAJ6427    Discussion/Summary  Cardiology Discussion Summary Free Text Note Form [facility]:   Cardiovascular status is stable, echocardiogram from early October 2017 reveals normal left ventricular systolic function with mild-to-moderate aortic valve stenosis, consistent with physical exam  ECG reveals sinus rhythm with right bundle branch block, and physical exam is otherwise unremarkable  He has no symptoms of angina, and no signs or symptoms of congestive heart failure  low cardiovascular risk for rectal surgery, without need for further cardiac testing at this time  As always, suggest avoiding intraoperative cardiac stressors including but not limited to hypoxia, hypertension, hypotension, electrolyte imbalance, over sedation, excessive blood loss  Hydrate adequately perioperatively as able  Chief Complaint  Chief Complaint Free Text Note Form: Preoperative cleara      History of Present Illness  Cardiology HPI Free Text Note Form [facility]: This is a 16-year-old male who I saw in 2015 secondary to symptoms of lower extremity edema, likely secondary to venous insufficiency  He underwent an echocardiogram in February 2014 which was unremarkable with the exception of moderate aortic root dilatation at 4 5 cm and aortic valve sclerosis  history of multiple myeloma, squamous cell skin carcinoma the nose,and lung adenocarcinoma, and is status post right upper lobe lung resection, status post adjuvant chemotherapy and radiation    he was diagnosed with rectal cancer, and is scheduled for surgery in the near future  a cardiac standpoint, he feels well without exertional chest pain  He has chronic but stable exertional dyspnea, mild chronic/ stable lower extremity edema which resolves with leg elevation  He underwent an echocardiogram in October 2017 which revealed normal left ventricular systolic function, mild-to-moderate aortic stenosis , mild mitral regurgitation, and mild aortic root dilatation up to 4 0 centimeters  Review of Systems  Cardiology Male ROS:     Cardiac: no chest pain,-- no rhythm problems,-- no fainting/blackouts-- and-- no syncope/fainting  Skin: No complaints of nonhealing sores or skin rash  Genitourinary: prostate problems   Psychological: no depression-- and-- no anxiety  General: lack of energy/fatigue, but-- no trouble sleeping-- and-- no frequent infections  Respiratory: shortness of breath,-- cough/sputum-- and-- wheezing--   copd  Gastrointestinal: diarrhea-- and-- constipation, but-- no nausea-- and-- no heartburn   Neurological: no numbness,-- no seizures,-- no headaches-- and-- no dizziness   Musculoskeletal: arthritis,-- back pain-- and-- swelling/pain       ROS Reviewed:   ROS reviewed  Active Problems  Problems    1  Abnormal chest CT (793 2) (R93 8)   2  Adenocarcinoma of lung (162 9) (C34 90)   3  Bacterial pneumonia (482 9) (J15 9)   4  Chronic diarrhea (787 91) (K52 9)   5  Chronic rhinitis (472 0) (J31 0)   6  COPD exacerbation (491 21) (J44 1)   7  Cough (786 2) (R05)   8  Enlarged prostate without lower urinary tract symptoms (luts) (600 00) (N40 0)   9  Multiple myeloma (203 00) (C90 00)   10  Obstructive sleep apnea (327 23) (G47 33)   11  Palpitations (785 1) (R00 2)   12  Preop cardiovascular exam (V72 81) (Z01 810)   13  Preop pulmonary/respiratory exam (V72 82) (Z01 811)   14  Primary cancer of rectum (154 1) (C20)   15   Pulmonary emphysema (492 8) (J43 9)   16  Rectal mass (787 99) (K62 9)   17  Streptococcus pneumoniae pneumonia (12) (J13)   18  Weakness (780 79) (R53 1)    Past Medical History  Problems    1  History of Benign prostatic hypertrophy with lower urinary tract symptoms (LUTS)   (600 01) (N40 1)   2  History of Fall, accidental (E888 9) (W19 XXXA)   3  History of diarrhea (V12 79) (Z87 898)   4  History of edema (V13 89) (Z87 898)   5  History of nausea (V12 79) (Z87 898)   6  History of sleep apnea (V13 89) (Z86 69)   7  History of Radiation pneumonitis (508 0) (J70 0)   8  History of Special screening examination for neoplasm of prostate (V76 44) (Z12 5)  Active Problems And Past Medical History Reviewed: The active problems and past medical history were reviewed and updated today  Surgical History  Problems    1  History of Back Surgery   2  History of Cholecystectomy   3  History of Cholecystotomy   4  History of Inguinal Hernia Repair   5  History of Repeat Lithotomy  Surgical History Reviewed: The surgical history was reviewed and updated today  Family History  Mother    1  Denied: Family history of colonic polyps   2  Denied: Family history of Crohn's disease   3  Denied: Family history of liver disease   4  Family history of Osteoarthritis (V17 7)  Father    5  Family history of Colon cancer   6  Denied: Family history of colonic polyps   7  Denied: Family history of Crohn's disease   8  Denied: Family history of liver disease   9  Family history of Heart Disease (V17 49)   10  Family history of Lung Cancer (V16 1)  Sister    6  Family history of Type 1 Diabetes Mellitus  Family History Reviewed: The family history was reviewed and updated today  Social History  Problems    · Being A Social Drinker   · Caffeine Use   · Consumes alcohol occasionally (V49 89) (Z78 9)   · Denied: Drug use (305 90) (F19 90)   · Former smoker (L54 24) (L22 816)   ·   Social History Reviewed: The social history was reviewed and updated today  Current Meds   1  Co Q-10 Vitamin E Fish Oil 03-75- Oral Capsule; take 1 capsule daily; Therapy: (Recorded:19Mar2014) to Recorded   2  Dexamethasone 4 MG Oral Tablet; 5 tabs po weekly with food; Therapy: 19CIO9241 to (Evaluate:24Exu4799)  Requested for: 62DUC7894; Last   Rx:95Hue8126 Ordered   3  Finasteride 5 MG Oral Tablet; Take 1 tablet by mouth at bedtime; Therapy: 89AVR3560 to (Leandro March)  Requested for: 19CIB6449; Last   Rx:[id number] Ordered   4  Imodium CAPS; as directed; Therapy: (Recorded:21Vvt2321) to Recorded   5  Ipratropium Bromide 0 03 % Nasal Solution; USE 2 SPRAYS IN EACH NOSTRIL TWICE   DAILY; Therapy: 98VVK6559 to (Evaluate:69Irz0235)  Requested for: 46Amw1111; Last   Rx:49Bpi9944 Ordered   6  Ipratropium-Albuterol 0 5-2 5 (3) MG/3ML Inhalation Solution; INHALE 1 UNIT DOSE   Twice daily And every 6 hours if needed for shortness of breath; Therapy: 18PZR0703 to (Paula Cuadra)  Requested for: 62Rnv3876; Last   Rx:89Nxl9213 Ordered   7  Mucinex 600 MG Oral Tablet Extended Release 12 Hour; Therapy: 45PBH0354 to Recorded   8  Omega 3 CAPS; take 1 capsule daily; Therapy: (Recorded:19Mar2014) to Recorded   9  Proventil  (90 Base) MCG/ACT Inhalation Aerosol Solution; INHALE 2 PUFFS   EVERY 4-6 HOURS AS NEEDED  Requested for: 01YMK7214; Last Rx:04Xaj4080 Ordered   10  Rapaflo CAPS; TAKE 1 CAPSULE DAILY WITH FOOD; Therapy: (Recorded:04Dif4171) to Recorded   11  Spiriva Respimat 2 5 MCG/ACT Inhalation Aerosol Solution; INHALE 2 PUFFS ONCE    DAILY; Therapy: 08WAS9224 to (Evaluate:17Jan2018); Last Rx:35Rwd8827 Ordered   12  Vitamin D 1000 UNIT Oral Tablet; TAKE 1 TABLET DAILY; Therapy: (Recorded:19Mar2014) to Recorded    Allergies  Medication    1  Lipitor TABS   2  Sulfa Drugs   3   Penicillins    Vitals  Vital Signs    Recorded: 79SKB8325 10:35AM   Heart Rate 100   Respiration 20   Systolic 128   Diastolic 60   Height 5 ft 7 5 in   Weight 184 lb    BMI Calculated 28 39   BSA Calculated 1 96     Physical Exam    Constitutional   General appearance: No acute distress, well appearing and well nourished  Eyes   Conjunctiva and Sclera examination: Conjunctiva pink, sclera anicteric  Neck   Neck and thyroid: Normal, supple, trachea midline, no thyromegaly  Pulmonary   Respiratory effort: No increased work of breathing or signs of respiratory distress  Cardiovascular   Auscultation of heart: Abnormal  -- 1/6 systolic ejection murmur heard best at the left upper sternal border  Carotid pulses: Normal, 2+ bilaterally  Abdominal aorta: Normal     Examination of extremities for edema and/or varicosities: Abnormal  -- 1+ left lower extremity ankle pitting edema  Abdomen   Abdomen: Abnormal     Liver and spleen: No hepatomegaly or splenomegaly  Musculoskeletal Gait and station: Normal gait  -- Digits and nails: Normal without clubbing or cyanosis  -- Inspection/palpation of joints, bones, and muscles: Normal, ROM normal     Skin - Skin and subcutaneous tissue: Abnormal -- Changes of the elderly  Neurologic - Cranial nerves: II - XII intact  Psychiatric - Orientation to person, place, and time: Normal -- Mood and affect: Normal       Future Appointments    Date/Time Provider Specialty Site   10/26/2017 11:00 AM DAVION Morillo   Hematology Oncology [facility]     Signatures   Electronically signed by : Regan Powers DO; Oct 16 2017 11:16AM EST                       (Author)

## 2017-10-20 ENCOUNTER — APPOINTMENT (OUTPATIENT)
Dept: RADIOLOGY | Facility: MEDICAL CENTER | Age: 82
End: 2017-10-20
Payer: MEDICARE

## 2017-10-20 ENCOUNTER — APPOINTMENT (OUTPATIENT)
Dept: LAB | Facility: MEDICAL CENTER | Age: 82
End: 2017-10-20
Payer: MEDICARE

## 2017-10-20 ENCOUNTER — TRANSCRIBE ORDERS (OUTPATIENT)
Dept: ADMINISTRATIVE | Facility: HOSPITAL | Age: 82
End: 2017-10-20

## 2017-10-20 DIAGNOSIS — J13 PNEUMONIA DUE TO STREPTOCOCCUS PNEUMONIAE (HCC): ICD-10-CM

## 2017-10-20 DIAGNOSIS — J44.1 CHRONIC OBSTRUCTIVE PULMONARY DISEASE WITH ACUTE EXACERBATION (HCC): ICD-10-CM

## 2017-10-20 DIAGNOSIS — C90.00 MULTIPLE MYELOMA NOT HAVING ACHIEVED REMISSION (HCC): ICD-10-CM

## 2017-10-20 DIAGNOSIS — Z01.811 ENCOUNTER FOR PREPROCEDURAL RESPIRATORY EXAMINATION: ICD-10-CM

## 2017-10-20 LAB
ALBUMIN SERPL BCP-MCNC: 3.2 G/DL (ref 3.5–5)
ALP SERPL-CCNC: 75 U/L (ref 46–116)
ALT SERPL W P-5'-P-CCNC: 19 U/L (ref 12–78)
ANION GAP SERPL CALCULATED.3IONS-SCNC: 6 MMOL/L (ref 4–13)
AST SERPL W P-5'-P-CCNC: 12 U/L (ref 5–45)
BASOPHILS # BLD AUTO: 0.02 THOUSANDS/ΜL (ref 0–0.1)
BASOPHILS NFR BLD AUTO: 0 % (ref 0–1)
BILIRUB SERPL-MCNC: 0.98 MG/DL (ref 0.2–1)
BUN SERPL-MCNC: 15 MG/DL (ref 5–25)
CALCIUM SERPL-MCNC: 9.5 MG/DL (ref 8.3–10.1)
CHLORIDE SERPL-SCNC: 108 MMOL/L (ref 100–108)
CO2 SERPL-SCNC: 27 MMOL/L (ref 21–32)
CREAT SERPL-MCNC: 1.21 MG/DL (ref 0.6–1.3)
EOSINOPHIL # BLD AUTO: 0.16 THOUSAND/ΜL (ref 0–0.61)
EOSINOPHIL NFR BLD AUTO: 3 % (ref 0–6)
ERYTHROCYTE [DISTWIDTH] IN BLOOD BY AUTOMATED COUNT: 13.9 % (ref 11.6–15.1)
GFR SERPL CREATININE-BSD FRML MDRD: 56 ML/MIN/1.73SQ M
GLUCOSE SERPL-MCNC: 89 MG/DL (ref 65–140)
HCT VFR BLD AUTO: 40 % (ref 36.5–49.3)
HGB BLD-MCNC: 12.9 G/DL (ref 12–17)
LYMPHOCYTES # BLD AUTO: 1.88 THOUSANDS/ΜL (ref 0.6–4.47)
LYMPHOCYTES NFR BLD AUTO: 31 % (ref 14–44)
MAGNESIUM SERPL-MCNC: 2.1 MG/DL (ref 1.6–2.6)
MCH RBC QN AUTO: 29.5 PG (ref 26.8–34.3)
MCHC RBC AUTO-ENTMCNC: 32.3 G/DL (ref 31.4–37.4)
MCV RBC AUTO: 91 FL (ref 82–98)
MONOCYTES # BLD AUTO: 0.58 THOUSAND/ΜL (ref 0.17–1.22)
MONOCYTES NFR BLD AUTO: 10 % (ref 4–12)
NEUTROPHILS # BLD AUTO: 3.43 THOUSANDS/ΜL (ref 1.85–7.62)
NEUTS SEG NFR BLD AUTO: 56 % (ref 43–75)
NRBC BLD AUTO-RTO: 0 /100 WBCS
PLATELET # BLD AUTO: 215 THOUSANDS/UL (ref 149–390)
PMV BLD AUTO: 8.8 FL (ref 8.9–12.7)
POTASSIUM SERPL-SCNC: 4.5 MMOL/L (ref 3.5–5.3)
PROT SERPL-MCNC: 6.8 G/DL (ref 6.4–8.2)
RBC # BLD AUTO: 4.38 MILLION/UL (ref 3.88–5.62)
SODIUM SERPL-SCNC: 141 MMOL/L (ref 136–145)
WBC # BLD AUTO: 6.1 THOUSAND/UL (ref 4.31–10.16)

## 2017-10-20 PROCEDURE — 71020 HB CHEST X-RAY 2VW FRONTAL&LATL: CPT

## 2017-10-20 PROCEDURE — 83883 ASSAY NEPHELOMETRY NOT SPEC: CPT

## 2017-10-20 PROCEDURE — 83735 ASSAY OF MAGNESIUM: CPT

## 2017-10-20 PROCEDURE — 80053 COMPREHEN METABOLIC PANEL: CPT

## 2017-10-20 PROCEDURE — 85025 COMPLETE CBC W/AUTO DIFF WBC: CPT

## 2017-10-20 PROCEDURE — 36415 COLL VENOUS BLD VENIPUNCTURE: CPT

## 2017-10-21 LAB
KAPPA LC FREE SER-MCNC: 10.1 MG/L (ref 3.3–19.4)
KAPPA LC FREE/LAMBDA FREE SER: 0.02 {RATIO} (ref 0.26–1.65)
LAMBDA LC FREE SERPL-MCNC: 532.4 MG/L (ref 5.7–26.3)

## 2017-10-26 ENCOUNTER — GENERIC CONVERSION - ENCOUNTER (OUTPATIENT)
Dept: OTHER | Facility: OTHER | Age: 82
End: 2017-10-26

## 2017-10-27 RX ORDER — IPRATROPIUM BROMIDE 21 UG/1
2 SPRAY, METERED NASAL 2 TIMES DAILY PRN
Status: ON HOLD | COMMUNITY
End: 2017-11-02 | Stop reason: ALTCHOICE

## 2017-10-27 RX ORDER — LOPERAMIDE HYDROCHLORIDE 2 MG/1
2 CAPSULE ORAL 4 TIMES DAILY PRN
COMMUNITY
End: 2017-11-03 | Stop reason: HOSPADM

## 2017-10-27 RX ORDER — GUAIFENESIN 600 MG
1200 TABLET, EXTENDED RELEASE 12 HR ORAL
COMMUNITY
End: 2018-03-12

## 2017-10-27 RX ORDER — MELATONIN
5000 DAILY
COMMUNITY
End: 2021-01-01 | Stop reason: HOSPADM

## 2017-10-27 RX ORDER — DEXAMETHASONE 4 MG/1
4 TABLET ORAL WEEKLY
Status: ON HOLD | COMMUNITY
End: 2017-11-02 | Stop reason: CLARIF

## 2017-10-27 RX ORDER — IPRATROPIUM BROMIDE AND ALBUTEROL SULFATE 2.5; .5 MG/3ML; MG/3ML
3 SOLUTION RESPIRATORY (INHALATION) DAILY
COMMUNITY
End: 2019-03-05 | Stop reason: ALTCHOICE

## 2017-10-27 RX ORDER — SPIRONOLACT/HYDROCHLOROTHIAZID 25 MG-25MG
1 TABLET ORAL DAILY
COMMUNITY
End: 2018-12-05 | Stop reason: ALTCHOICE

## 2017-10-27 RX ORDER — ACETAMINOPHEN 500 MG
1000 TABLET ORAL EVERY 6 HOURS PRN
COMMUNITY
End: 2018-01-03

## 2017-10-27 NOTE — PROGRESS NOTES
Assessment  1  COPD exacerbation (491 21) (J44 1)   2  Multiple myeloma (203 00) (C90 00)   3  Palpitations (785 1) (R00 2)   4  Primary cancer of rectum (154 1) (C20)   5  Adenocarcinoma of lung (162 9) (C34 90)    Plan  Chronic diarrhea    · Suprep Bowel Prep Kit 17 5-3 13-1 6 GM/180ML Oral Solution   Rx By: Yarely Edgar; Dispense: 1 Days ; #:1 X 177 ML Bottle (2 Bottles); Refill: 0;For: Chronic diarrhea; SARI = N; Sent To: [address] [place] [address] [address]; Last Updated By: Brad Thorpe); 2017 2:15:36 PM  Multiple myeloma    · (1) CBC/PLT/DIFF; Status:Active; Requested LFI:[id number];    Perform:[facility]; AQK:26NEM2391; Ordered; For:Multiple myeloma; Ordered By:Naveed Duff);   · (1) COMPREHENSIVE METABOLIC PANEL; Status:Active; Requested KJS:66VZN9486;    Perform:[facility]; TWC:62BRB0913; Ordered; For:Multiple myeloma; Ordered By:Naveed Duff);   · (1) FREE LIGHT CHAINS, SERUM; Status:Active; Requested [id number];    Perform:[facility]; XWA:84JJR4449; Ordered; For:Multiple myeloma; Ordered By:Naveed Duff);   · (1) MAGNESIUM; Status:Active; Requested JIU:[id number];    Perform:[facility]; :72YLK5625; Ordered; For:Multiple myeloma; Ordered By:Naveed Duff); · Follow-up visit in 1 month Evaluation and Treatment  Follow-up  Status: Complete  Done:  23HRP3953 11:00AM   Ordered; For: Multiple myeloma;  Ordered By: Jacobo Grady) Performed:  Due: 52VGN3678; Last Updated By: Day Lopez; 2017 2:24:11 PM    Discussion/Summary  Discussion Summary:   Lambda light chain multiple myeloma, heavily pretreated for the past 9 years, currently on watchful observation he was found to have rectal adenocarcinoma what he was only daily due might, the patient responded previously to pertuzumab inhibitor, will try to approve excessive may 4 mg p o  by mouth weekly 3 weeks on 1 week off with dexamethasone 12-60 mg p o  weekly however with recent diagnosis of adenocarcinoma of the rectum would wait for the surgical resection and hoping to start treatment that after, no evidence of end-organ damage he has normal calcium, creatinine, no evidence of anemiaof 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 status post resection in 2013 treated with chemotherapy and radiation therapy no evidence of disease he followed by pulmonary service, he has bronchiectasis, he received Levaquin, he might benefit from Azithromycin 250 mg p o  every other daydehydration with diarrhea, hypomagnesemia, patient to receive 2 g of magnesium sulfate with 500 cc normal saline twice weekly x2  Chief Complaint  Chief Complaint: Chief Complaint:   The patient presents to the office today with Dyspnea, multiple myeloma, Lung cancer, Now with rectal cancer  History of Present Illness  HPI: #1 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, multiple compression fractures, status post kyphoplasty to L1, L2, L5, treated with radiation therapy to the left intra trochanteric area, received melphalan/Velcade/prednisone in October 2010 for 4 cycles with excellent response and normalization of lambda light chain, had been on Velcade maintenance till January 2013 when lambda light chain went up to 111,treated with Velcade 1 3 mg meter square weekly 3 weeks on one week off, Decadron 20 mg by mouth weekly, lambda light chain down to 30 then creeping up to 110, on Revlimid 15 mg by mouth every other day  Lambda light chain normal at 28 however he reported skin rash on the upper chest area we stopped the Revlimid for 2 month  Then reinitiated Revlimid 5 mg by mouth dailyupper lobe adenocarcinoma of the lung status post VATS stage I, with progression by scan, status post resection of the right upper lobe with few foci in the same lobe consistent with stage IIIA done in January 2015 adenocarcinoma, well differentiated   Status post Alimta/carboplatin x4 cycles finished in May 2015 and radiation therapycell carcinoma of the nose status post radiation therapyAdmitted to [facility] 3/3/16 - 3/8/16  CT 3/3/16 did not identify any evidence of PE  Infectious or inflammatory bronchitis suspected  Dx with RSV   Previous Therapy:   Revlimid 5 mg by mouth 3 weeks on 1 week off, On hold since May 2017 because of diarrhea   Current Therapy: Watchful observation      Interval History: Colonoscopy showed a lesion at 4 cm from the anal verge posteriorly, extending 3 cm, ultrasound showed T2 lesion, biopsy showed adenocarcinoma of the colon, no evidence of Carter syndromehe finished Levaquin      Review of Systems  Complete-Male:   Constitutional: no fever,-- not feeling poorly,-- no recent weight gain,-- no chills,-- not feeling tired-- and-- no recent weight loss  Eyes: No complaints of eye pain, no red eyes, no discharge from eyes, no itchy eyes  ENT: no complaints of earache, no hearing loss, no nosebleeds, no nasal discharge, no sore throat, no hoarseness-- and-- no sore throat  Cardiovascular: lower extremity edema, but-- no chest pain-- and-- no palpitations  Respiratory: shortness of breath-- and-- wheezing, but-- no cough,-- no shortness of breath during exertion-- and-- no PND  Gastrointestinal: diarrhea, but-- no abdominal pain,-- no nausea-- and-- no constipation  Genitourinary: No complaints of dysuria, no incontinence, no hesitancy, no nocturia, no genital lesion, no testicular pain  Musculoskeletal: lower back pain, but-- No complaints of arthralgia, no myalgias, no joint swelling or stiffness, no limb pain or swelling,-- no arthralgias-- and-- no myalgias  Integumentary: No complaints of skin rash or skin lesions, no itching, no skin wound, no dry skin,-- no itching-- and-- no skin wound  Neurological: no headache,-- no tingling,-- no confusion,-- no dizziness,-- no limb weakness-- and-- no fainting     Psychiatric: Is not suicidal, no sleep disturbances, no anxiety or depression, no change in personality, no emotional problems  Endocrine: No complaints of proptosis, no hot flashes, no muscle weakness, no erectile dysfunction, no deepening of the voice, no feelings of weakness  Hematologic/Lymphatic: No complaints of swollen glands, no swollen glands in the neck, does not bleed easily, no easy bruising  Active Problems  1  Abnormal chest CT (793 2) (R93 8)   2  Adenocarcinoma of lung (162 9) (C34 90)   3  Bacterial pneumonia (482 9) (J15 9)   4  Chronic diarrhea (787 91) (K52 9)   5  Chronic rhinitis (472 0) (J31 0)   6  COPD exacerbation (491 21) (J44 1)   7  Cough (786 2) (R05)   8  Enlarged prostate without lower urinary tract symptoms (luts) (600 00) (N40 0)   9  Multiple myeloma (203 00) (C90 00)   10  Obstructive sleep apnea (327 23) (G47 33)   11  Palpitations (785 1) (R00 2)   12  Preop pulmonary/respiratory exam (V72 82) (Z01 811)   13  Primary cancer of rectum (154 1) (C20)   14  Pulmonary emphysema (492 8) (J43 9)   15  Rectal mass (787 99) (K62 9)   16  Streptococcus pneumoniae pneumonia (12) (J13)   17  Weakness (780 79) (R53 1)    Past Medical History  1  History of Benign prostatic hypertrophy with lower urinary tract symptoms (LUTS)   (600 01) (N40 1)   2  History of Fall, accidental (E888 9) (W19 XXXA)   3  History of diarrhea (V12 79) (Z87 898)   4  History of edema (V13 89) (Z87 898)   5  History of nausea (V12 79) (Z87 898)   6  History of sleep apnea (V13 89) (Z86 69)   7  History of Radiation pneumonitis (508 0) (J70 0)   8  History of Special screening examination for neoplasm of prostate (V76 44) (Z12 5)    Surgical History  1  History of Back Surgery   2  History of Cholecystectomy   3  History of Cholecystotomy   4  History of Inguinal Hernia Repair   5  History of Repeat Lithotomy    Family History  Mother    1  Denied: Family history of colonic polyps   2  Denied: Family history of Crohn's disease   3   Denied: Family history of liver disease   4  Family history of Osteoarthritis (V17 7)  Father    5  Family history of Colon cancer   6  Denied: Family history of colonic polyps   7  Denied: Family history of Crohn's disease   8  Denied: Family history of liver disease   9  Family history of Heart Disease (V17 49)   10  Family history of Lung Cancer (V16 1)  Sister    6  Family history of Type 1 Diabetes Mellitus    Social History   · Being A Social Drinker   · Caffeine Use   · Consumes alcohol occasionally (V49 89) (Z78 9)   · Denied: Drug use (305 90) (F19 90)   · Former smoker (D95 34) (T62 639)   ·     Current Meds   1  Co Q-10 Vitamin E Fish Oil 14-44- Oral Capsule; take 1 capsule daily; Therapy: (Recorded:19Mar2014) to Recorded   2  Dexamethasone 4 MG Oral Tablet; 5 tabs po weekly with food; Therapy: 51MST7239 to (Evaluate:62Mxv1132)  Requested for: 40OWH7157; Last   Rx:07Sss0403 Ordered   3  Finasteride 5 MG Oral Tablet; Take 1 tablet by mouth at bedtime; Therapy: 38MOI8640 to (Eduar Boothe)  Requested for: 10PKF9642; Last   Rx:99Wfy7312 Ordered   4  Imodium CAPS; as directed; Therapy: (Recorded:15Hrt4776) to Recorded   5  Ipratropium Bromide 0 03 % Nasal Solution; USE 2 SPRAYS IN EACH NOSTRIL TWICE   DAILY; Therapy: 56SYG0528 to (Evaluate:35Hgk7201)  Requested for: 66Owp9419; Last   Rx:98Waz9681 Ordered   6  Ipratropium-Albuterol 0 5-2 5 (3) MG/3ML Inhalation Solution; INHALE 1 UNIT DOSE   Twice daily And every 6 hours if needed for shortness of breath; Therapy: 03FCJ3038 to (Salvador Strange)  Requested for: 99Foe6245; Last   Rx:79Acc1303 Ordered   7  LevoFLOXacin 500 MG Oral Tablet; Take 1 tablet daily; Therapy: 71STA7960 to (ONXBR:20RQH6690)  Requested for: 45BTG2610; Last   Rx:89Alc7674 Ordered   8  Omega 3 CAPS; take 1 capsule daily; Therapy: (Recorded:19Mar2014) to Recorded   9   Proventil  (90 Base) MCG/ACT Inhalation Aerosol Solution; INHALE 2 PUFFS   EVERY 4-6 HOURS AS NEEDED Requested for: 84YOL6234; Last Rx:47Nwr0783 Ordered   10  Rapaflo CAPS; TAKE 1 CAPSULE DAILY WITH FOOD; Therapy: (Recorded:02Sep2016) to Recorded   11  Spiriva Respimat 2 5 MCG/ACT Inhalation Aerosol Solution; INHALE 2 PUFFS ONCE    DAILY; Therapy: 57BQI1492 to (Evaluate:17Jan2018); Last Rx:96Ofh7335 Ordered   12  Suprep Bowel Prep Kit 17 5-3 13-1 6 GM/180ML Oral Solution; DILUTE CONTENTS AND    USE AS DIRECTED FOR BOWEL PREP; Therapy: 69JZI7890 to (Evaluate:03Jun2017)  Requested for: 44KQV3077; Last    Rx:02Jun2017 Ordered   13  Vitamin D 1000 UNIT Oral Tablet; TAKE 1 TABLET DAILY; Therapy: (Recorded:19Mar2014) to Recorded    Allergies  1  Lipitor TABS   2  Sulfa Drugs   3  Penicillins    Vitals  Vital Signs    Recorded: 23ZEA1893 01:53PM   Temperature 98 F   Heart Rate 102   Respiration 24   Systolic 845   Diastolic 70   Weight 603 lb    BMI Calculated 27 62   BSA Calculated 1 94     Physical Exam    Constitutional   General appearance: No acute distress, well appearing and well nourished  Eyes   Conjunctiva and lids: No swelling, erythema, or discharge  Pupils and irises: Equal, round and reactive to light  Ears, Nose, Mouth, and Throat   External inspection of ears and nose: Normal     Nasal mucosa, septum, and turbinates: Normal without edema or erythema  Oropharynx: Normal with no erythema, edema, exudate or lesions  Pulmonary   Auscultation of lungs: Abnormal   Auscultation of the lungs revealed diffuse wheezing-- and-- expiratory wheezing  rales/crackles over both bases  Cardiovascular   Auscultation of heart: Normal rate and rhythm, normal S1 and S2, without murmurs  Examination of extremities for edema and/or varicosities: Abnormal   bilateral ankle 1+ pitting edema  Carotid pulses: Normal     Abdomen   Abdomen: Non-tender, no masses  Liver and spleen: No hepatomegaly or splenomegaly  Lymphatic   Palpation of lymph nodes in neck: No lymphadenopathy  Musculoskeletal single point cane  Inspection/palpation of joints, bones, and muscles: Normal     Skin   Skin and subcutaneous tissue: Normal without rashes or lesions  Neurologic   Cranial nerves: Cranial nerves 2-12 intact  Sensation: No sensory loss  Psychiatric   Orientation to person, place and time: Normal     Mood and affect: Normal         ECOG 1       Results/Data  (1) INFLUENZA A/B AND RSV, PCR 25Sep2017 04:34PM EPIC, Provider   Test ordered by: Patricio Tafoya     Test Name Result Flag Reference   INFLUENZA A/MATRIX None Detected  None Detected   INFLUENZA B None Detected  None Detected   RESP SYNCYTIAL VIRUS None Detected  None Detected     * XR CHEST PA & LATERAL 22Sep2017 12:44PM [facility] Order Number: [id number]     Test Name Result Flag Reference   XR CHEST PA & LATERAL (Report)     CHEST - DUAL ENERGY     INDICATION: Low-grade fever  COMPARISON: Chest radiograph 8/21/2017     VIEWS: PA (including soft tissue/bone algorithms) and lateral projections     IMAGES: 4     FINDINGS:        Cardiomediastinal silhouette appears unremarkable  The left lung is clear  Stable opacification of the right upper lobe  There is persistent elevation the right hemidiaphragm and right basilar opacity, similar to prior exams  No pneumothorax or large pleural effusion  Visualized osseous structures appear within normal limits for the patient's age  IMPRESSION:       1  Persistent elevation of the right hemidiaphragm and right basilar opacity  This may represent atelectasis or pneumonia  2  Persistent opacification of the right lung apex unchanged from prior exams compatible with postsurgical/posttreatment changes         Workstation performed: [id number]     Signed by:   Conner Mcneil MD   9/22/17     (1) CBC/PLT/DIFF 82CTQ9164 12:37PM Deanna Beauchamp    Order Number: [id number]     Test Name Result Flag Reference   WBC COUNT 6 84 Thousand/uL  4 31-10 16 RBC COUNT 4 22 Million/uL  3 88-5 62   HEMOGLOBIN 12 9 g/dL  12 0-17 0   HEMATOCRIT 37 7 %  36 5-49 3   MCV 89 fL  82-98   MCH 30 6 pg  26 8-34 3   MCHC 34 2 g/dL  31 4-37 4   RDW 14 3 %  11 6-15 1   MPV 8 6 fL L 8 9-12 7   PLATELET COUNT 303 Thousands/uL  149-390   nRBC AUTOMATED 0 /100 WBCs     NEUTROPHILS RELATIVE PERCENT 81 % H 43-75   LYMPHOCYTES RELATIVE PERCENT 14 %  14-44   MONOCYTES RELATIVE PERCENT 4 %  4-12   EOSINOPHILS RELATIVE PERCENT 1 %  0-6   BASOPHILS RELATIVE PERCENT 0 %  0-1   NEUTROPHILS ABSOLUTE COUNT 5 45 Thousands/? ??L  1 85-7 62   LYMPHOCYTES ABSOLUTE COUNT 0 93 Thousands/? ??L  0 60-4 47   MONOCYTES ABSOLUTE COUNT 0 27 Thousand/? ??L  0 17-1 22   EOSINOPHILS ABSOLUTE COUNT 0 09 Thousand/? ??L  0 00-0 61   BASOPHILS ABSOLUTE COUNT 0 02 Thousands/? ??L  0 00-0 10     (1) COMPREHENSIVE METABOLIC PANEL 60IWG1138 73:90KP Sid Model Eric Roche)    Order Number: [id number]     Test Name Result Flag Reference   GLUCOSE,RANDM 98 mg/dL     If the patient is fasting, the ADA then defines impaired fasting glucose as > 100 mg/dL and diabetes as > or equal to 123 mg/dL  Specimen collection should occur prior to Sulfasalazine administration due to the potential for falsely depressed results  Specimen collection should occur prior to Sulfapyridine administration due to the potential for falsely elevated results  SODIUM 138 mmol/L  136-145   POTASSIUM 4 2 mmol/L  3 5-5 3   CHLORIDE 106 mmol/L  100-108   CARBON DIOXIDE 26 mmol/L  21-32   ANION GAP (CALC) 6 mmol/L  4-13   BLOOD UREA NITROGEN 14 mg/dL  5-25   CREATININE 1 16 mg/dL  0 60-1 30   Standardized to IDMS reference method   CALCIUM 8 8 mg/dL  8 3-10 1   BILI, TOTAL 1 23 mg/dL H 0 20-1 00   ALK PHOSPHATAS 76 U/L     ALT (SGPT) 13 U/L  12-78   Specimen collection should occur prior to Sulfasalazine and/or Sulfapyridine administration due to the potential for falsely depressed results     AST(SGOT) 10 U/L  5-45   Specimen collection should occur prior to Sulfasalazine administration due to the potential for falsely depressed results  ALBUMIN 2 8 g/dL L 3 5-5 0   TOTAL PROTEIN 6 0 g/dL L 6 4-8 2   eGFR 59 ml/min/1 73sq m     National Kidney Disease Education Program recommendations are as follows:  GFR calculation is accurate only with a steady state creatinine  Chronic Kidney disease less than 60 ml/min/1 73 sq  meters  Kidney failure less than 15 ml/min/1 73 sq  meters  (1) FREE LIGHT CHAINS, SERUM 46Ltc6574 12:37PM Nadine Armando    Order Number: [id number]     Test Name Result Flag Reference   FREE KAPPA LIGHT CHAINS, SERUM 8 8 mg/L  3 3 - 19 4   FREE LAMBDA LIGHT CHAINS, SERUM 310 6 mg/L H 5 7 - 26 3   KAPPA/LAMBDA RATIO, SERUM 0 03 L 0 26 - 1 65   Performed at:  [facility] [place] [address]  [id number]  : Jackson Baxter MD, Phone:  [phone]     Future Appointments    Date/Time Provider Specialty Site   10/26/2017 11:00 AM DAVION Meraz  Hematology Oncology [facility]   10/16/2017 10:40 AM Daylin Salmon DO [facility]     Signatures   Electronically signed by :  DAVION Dumont ; Sep 28 2017  4:33PM EST                       (Author)

## 2017-11-01 ENCOUNTER — ANESTHESIA EVENT (OUTPATIENT)
Dept: PERIOP | Facility: HOSPITAL | Age: 82
End: 2017-11-01
Payer: MEDICARE

## 2017-11-01 NOTE — ANESTHESIA PREPROCEDURE EVALUATION
Review of Systems/Medical History  Patient summary reviewed  Chart reviewed  No history of anesthetic complications     Cardiovascular  EKG reviewed, Valvular heart disease (mild-moderate AS) , aortic valve stenosis,   Comment: Echo (10/4/17):    LEFT VENTRICLE:  Systolic function was normal  Ejection fraction was estimated to be 60 %  There were no regional wall motion abnormalities      MITRAL VALVE:  There was mild to moderate annular calcification  There was mild regurgitation      AORTIC VALVE:  The valve was trileaflet  Leaflets exhibited moderately increased thickness  There was mild to moderate stenosis  There was mild regurgitation      TRICUSPID VALVE:  There was mild regurgitation      AORTA:  The root exhibited mild dilatation  ,  Pulmonary  COPD (Last used prn inhalers ~2 months ago) mild- PRN medicaiton , Sleep apnea (Noncompliant with CPAP) Sleep Study completed, ,   Comment: Lung adenocarcinoma s/p R  Upper lobectomy       GI/Hepatic    GI malignancy (Rectal cancer),        Negative  ROS        Endo/Other  Negative endo/other ROS      GYN       Hematology      Comment: Multiple myeloma Musculoskeletal    Comment: Multiple compression fractures s/p lumbar kyphoplasty and radiation      Neurology  Negative neurology ROS      Psychology   Negative psychology ROS            Physical Exam    Airway    Mallampati score: II  TM Distance: >3 FB       Dental   No notable dental hx     Cardiovascular  Rhythm: regular, Rate: normal, Cardiovascular exam normal    Pulmonary  Pulmonary exam normal Breath sounds clear to auscultation,     Other Findings        Anesthesia Plan  ASA Score- 3       Anesthesia Type- general with ASA Monitors  Additional Monitors:   Airway Plan: ETT  Induction- intravenous  Informed Consent- Anesthetic plan and risks discussed with patient, spouse and daughter  NPO and allergies verified    Pt did not take any medications this AM     Plan: GETA    Risks and benefits discussed with pt  Questions answered  Pt consented

## 2017-11-02 ENCOUNTER — ANESTHESIA (OUTPATIENT)
Dept: PERIOP | Facility: HOSPITAL | Age: 82
End: 2017-11-02
Payer: MEDICARE

## 2017-11-02 ENCOUNTER — HOSPITAL ENCOUNTER (OUTPATIENT)
Facility: HOSPITAL | Age: 82
Discharge: HOME | End: 2017-11-03
Attending: COLON & RECTAL SURGERY | Admitting: COLON & RECTAL SURGERY
Payer: MEDICARE

## 2017-11-02 DIAGNOSIS — C20 MALIGNANT NEOPLASM OF RECTUM (HCC): ICD-10-CM

## 2017-11-02 PROCEDURE — 88309 TISSUE EXAM BY PATHOLOGIST: CPT | Performed by: COLON & RECTAL SURGERY

## 2017-11-02 PROCEDURE — 94760 N-INVAS EAR/PLS OXIMETRY 1: CPT

## 2017-11-02 RX ORDER — ONDANSETRON 2 MG/ML
4 INJECTION INTRAMUSCULAR; INTRAVENOUS ONCE AS NEEDED
Status: DISCONTINUED | OUTPATIENT
Start: 2017-11-02 | End: 2017-11-02 | Stop reason: HOSPADM

## 2017-11-02 RX ORDER — ACETAMINOPHEN 325 MG/1
1000 TABLET ORAL EVERY 6 HOURS PRN
Status: DISCONTINUED | OUTPATIENT
Start: 2017-11-02 | End: 2017-11-03 | Stop reason: HOSPADM

## 2017-11-02 RX ORDER — ONDANSETRON 2 MG/ML
INJECTION INTRAMUSCULAR; INTRAVENOUS AS NEEDED
Status: DISCONTINUED | OUTPATIENT
Start: 2017-11-02 | End: 2017-11-02 | Stop reason: SURG

## 2017-11-02 RX ORDER — ALBUTEROL SULFATE 90 UG/1
2 AEROSOL, METERED RESPIRATORY (INHALATION) EVERY 4 HOURS PRN
Status: DISCONTINUED | OUTPATIENT
Start: 2017-11-02 | End: 2017-11-03 | Stop reason: HOSPADM

## 2017-11-02 RX ORDER — LANOLIN ALCOHOL/MO/W.PET/CERES
CREAM (GRAM) TOPICAL DAILY
COMMUNITY
End: 2021-01-01 | Stop reason: HOSPADM

## 2017-11-02 RX ORDER — FINASTERIDE 5 MG/1
5 TABLET, FILM COATED ORAL
Status: DISCONTINUED | OUTPATIENT
Start: 2017-11-02 | End: 2017-11-03 | Stop reason: HOSPADM

## 2017-11-02 RX ORDER — DEXTROSE, SODIUM CHLORIDE, AND POTASSIUM CHLORIDE 5; .45; .15 G/100ML; G/100ML; G/100ML
75 INJECTION INTRAVENOUS CONTINUOUS
Status: DISCONTINUED | OUTPATIENT
Start: 2017-11-02 | End: 2017-11-03

## 2017-11-02 RX ORDER — SODIUM CHLORIDE, SODIUM LACTATE, POTASSIUM CHLORIDE, CALCIUM CHLORIDE 600; 310; 30; 20 MG/100ML; MG/100ML; MG/100ML; MG/100ML
100 INJECTION, SOLUTION INTRAVENOUS CONTINUOUS
Status: DISCONTINUED | OUTPATIENT
Start: 2017-11-02 | End: 2017-11-02

## 2017-11-02 RX ORDER — FENTANYL CITRATE/PF 50 MCG/ML
25 SYRINGE (ML) INJECTION
Status: DISCONTINUED | OUTPATIENT
Start: 2017-11-02 | End: 2017-11-02 | Stop reason: HOSPADM

## 2017-11-02 RX ORDER — PROPOFOL 10 MG/ML
INJECTION, EMULSION INTRAVENOUS AS NEEDED
Status: DISCONTINUED | OUTPATIENT
Start: 2017-11-02 | End: 2017-11-02 | Stop reason: SURG

## 2017-11-02 RX ORDER — DEXAMETHASONE 4 MG/1
4 TABLET ORAL WEEKLY
Status: DISCONTINUED | OUTPATIENT
Start: 2017-11-02 | End: 2017-11-02

## 2017-11-02 RX ORDER — MINERAL OIL
OIL (ML) MISCELLANEOUS AS NEEDED
Status: DISCONTINUED | OUTPATIENT
Start: 2017-11-02 | End: 2017-11-02 | Stop reason: HOSPADM

## 2017-11-02 RX ORDER — HEPARIN SODIUM 5000 [USP'U]/ML
5000 INJECTION, SOLUTION INTRAVENOUS; SUBCUTANEOUS EVERY 8 HOURS SCHEDULED
Status: DISCONTINUED | OUTPATIENT
Start: 2017-11-02 | End: 2017-11-03 | Stop reason: HOSPADM

## 2017-11-02 RX ORDER — HEPARIN SODIUM 5000 [USP'U]/ML
5000 INJECTION, SOLUTION INTRAVENOUS; SUBCUTANEOUS ONCE
Status: COMPLETED | OUTPATIENT
Start: 2017-11-02 | End: 2017-11-02

## 2017-11-02 RX ORDER — IPRATROPIUM BROMIDE AND ALBUTEROL SULFATE 2.5; .5 MG/3ML; MG/3ML
3 SOLUTION RESPIRATORY (INHALATION) DAILY
Status: DISCONTINUED | OUTPATIENT
Start: 2017-11-02 | End: 2017-11-02

## 2017-11-02 RX ORDER — ONDANSETRON 2 MG/ML
4 INJECTION INTRAMUSCULAR; INTRAVENOUS EVERY 6 HOURS PRN
Status: DISCONTINUED | OUTPATIENT
Start: 2017-11-02 | End: 2017-11-03 | Stop reason: HOSPADM

## 2017-11-02 RX ORDER — FENTANYL CITRATE 50 UG/ML
INJECTION, SOLUTION INTRAMUSCULAR; INTRAVENOUS AS NEEDED
Status: DISCONTINUED | OUTPATIENT
Start: 2017-11-02 | End: 2017-11-02 | Stop reason: SURG

## 2017-11-02 RX ORDER — CEFAZOLIN SODIUM 1 G/3ML
INJECTION, POWDER, FOR SOLUTION INTRAMUSCULAR; INTRAVENOUS AS NEEDED
Status: DISCONTINUED | OUTPATIENT
Start: 2017-11-02 | End: 2017-11-02 | Stop reason: SURG

## 2017-11-02 RX ORDER — GUAIFENESIN 600 MG
1200 TABLET, EXTENDED RELEASE 12 HR ORAL
Status: DISCONTINUED | OUTPATIENT
Start: 2017-11-02 | End: 2017-11-03 | Stop reason: HOSPADM

## 2017-11-02 RX ORDER — LIDOCAINE HYDROCHLORIDE 10 MG/ML
INJECTION, SOLUTION INFILTRATION; PERINEURAL AS NEEDED
Status: DISCONTINUED | OUTPATIENT
Start: 2017-11-02 | End: 2017-11-02 | Stop reason: SURG

## 2017-11-02 RX ORDER — ROCURONIUM BROMIDE 10 MG/ML
INJECTION, SOLUTION INTRAVENOUS AS NEEDED
Status: DISCONTINUED | OUTPATIENT
Start: 2017-11-02 | End: 2017-11-02 | Stop reason: SURG

## 2017-11-02 RX ORDER — ALBUTEROL SULFATE 90 UG/1
AEROSOL, METERED RESPIRATORY (INHALATION) AS NEEDED
Status: DISCONTINUED | OUTPATIENT
Start: 2017-11-02 | End: 2017-11-02 | Stop reason: SURG

## 2017-11-02 RX ORDER — MAGNESIUM HYDROXIDE 1200 MG/15ML
LIQUID ORAL AS NEEDED
Status: DISCONTINUED | OUTPATIENT
Start: 2017-11-02 | End: 2017-11-02 | Stop reason: HOSPADM

## 2017-11-02 RX ORDER — GLYCOPYRROLATE 0.2 MG/ML
INJECTION INTRAMUSCULAR; INTRAVENOUS AS NEEDED
Status: DISCONTINUED | OUTPATIENT
Start: 2017-11-02 | End: 2017-11-02 | Stop reason: SURG

## 2017-11-02 RX ADMIN — METRONIDAZOLE 500 MG: 500 INJECTION, SOLUTION INTRAVENOUS at 08:54

## 2017-11-02 RX ADMIN — SODIUM CHLORIDE, SODIUM LACTATE, POTASSIUM CHLORIDE, AND CALCIUM CHLORIDE: .6; .31; .03; .02 INJECTION, SOLUTION INTRAVENOUS at 11:20

## 2017-11-02 RX ADMIN — FENTANYL CITRATE 50 MCG: 50 INJECTION, SOLUTION INTRAMUSCULAR; INTRAVENOUS at 09:13

## 2017-11-02 RX ADMIN — SODIUM CHLORIDE, SODIUM LACTATE, POTASSIUM CHLORIDE, AND CALCIUM CHLORIDE: .6; .31; .03; .02 INJECTION, SOLUTION INTRAVENOUS at 08:17

## 2017-11-02 RX ADMIN — DEXTROSE, SODIUM CHLORIDE, AND POTASSIUM CHLORIDE 75 ML/HR: 5; .45; .15 INJECTION INTRAVENOUS at 12:55

## 2017-11-02 RX ADMIN — CEFAZOLIN 1000 MG: 1 INJECTION, POWDER, FOR SOLUTION INTRAVENOUS at 08:46

## 2017-11-02 RX ADMIN — HYDROMORPHONE HYDROCHLORIDE 0.5 MG: 1 INJECTION, SOLUTION INTRAMUSCULAR; INTRAVENOUS; SUBCUTANEOUS at 09:34

## 2017-11-02 RX ADMIN — FENTANYL CITRATE 25 MCG: 50 INJECTION INTRAMUSCULAR; INTRAVENOUS at 13:01

## 2017-11-02 RX ADMIN — FENTANYL CITRATE 25 MCG: 50 INJECTION INTRAMUSCULAR; INTRAVENOUS at 12:52

## 2017-11-02 RX ADMIN — ROCURONIUM BROMIDE 10 MG: 10 INJECTION, SOLUTION INTRAVENOUS at 09:25

## 2017-11-02 RX ADMIN — NEOSTIGMINE METHYLSULFATE 4 MG: 1 INJECTION, SOLUTION INTRAMUSCULAR; INTRAVENOUS; SUBCUTANEOUS at 11:48

## 2017-11-02 RX ADMIN — FENTANYL CITRATE 25 MCG: 50 INJECTION INTRAMUSCULAR; INTRAVENOUS at 12:46

## 2017-11-02 RX ADMIN — CEFAZOLIN SODIUM 1000 MG: 1 SOLUTION INTRAVENOUS at 08:46

## 2017-11-02 RX ADMIN — GLYCOPYRROLATE 0.8 MG: 0.2 INJECTION, SOLUTION INTRAMUSCULAR; INTRAVENOUS at 11:48

## 2017-11-02 RX ADMIN — PROPOFOL 150 MG: 10 INJECTION, EMULSION INTRAVENOUS at 08:32

## 2017-11-02 RX ADMIN — FINASTERIDE 5 MG: 5 TABLET, FILM COATED ORAL at 21:06

## 2017-11-02 RX ADMIN — SODIUM CHLORIDE, SODIUM LACTATE, POTASSIUM CHLORIDE, AND CALCIUM CHLORIDE: .6; .31; .03; .02 INJECTION, SOLUTION INTRAVENOUS at 09:58

## 2017-11-02 RX ADMIN — ROCURONIUM BROMIDE 10 MG: 10 INJECTION, SOLUTION INTRAVENOUS at 11:10

## 2017-11-02 RX ADMIN — ONDANSETRON 4 MG: 2 INJECTION INTRAMUSCULAR; INTRAVENOUS at 10:33

## 2017-11-02 RX ADMIN — FENTANYL CITRATE 25 MCG: 50 INJECTION INTRAMUSCULAR; INTRAVENOUS at 12:30

## 2017-11-02 RX ADMIN — ALBUTEROL SULFATE 10 PUFF: 90 AEROSOL, METERED RESPIRATORY (INHALATION) at 09:38

## 2017-11-02 RX ADMIN — ACETAMINOPHEN 975 MG: 325 TABLET, FILM COATED ORAL at 18:52

## 2017-11-02 RX ADMIN — ROCURONIUM BROMIDE 50 MG: 10 INJECTION, SOLUTION INTRAVENOUS at 08:33

## 2017-11-02 RX ADMIN — ROCURONIUM BROMIDE 10 MG: 10 INJECTION, SOLUTION INTRAVENOUS at 09:56

## 2017-11-02 RX ADMIN — FENTANYL CITRATE 50 MCG: 50 INJECTION, SOLUTION INTRAMUSCULAR; INTRAVENOUS at 08:31

## 2017-11-02 RX ADMIN — LIDOCAINE HYDROCHLORIDE 80 MG: 10 INJECTION, SOLUTION INFILTRATION; PERINEURAL at 08:32

## 2017-11-02 RX ADMIN — HEPARIN SODIUM 5000 UNITS: 5000 INJECTION, SOLUTION INTRAVENOUS; SUBCUTANEOUS at 21:06

## 2017-11-02 RX ADMIN — DEXAMETHASONE SODIUM PHOSPHATE 5 MG: 10 INJECTION INTRAMUSCULAR; INTRAVENOUS at 09:09

## 2017-11-02 RX ADMIN — HEPARIN SODIUM 5000 UNITS: 5000 INJECTION, SOLUTION INTRAVENOUS; SUBCUTANEOUS at 07:53

## 2017-11-02 NOTE — RESPIRATORY THERAPY NOTE
RT Protocol Note  Haim Stout 81 y o  male MRN: [id number]  Unit/Bed#: [facility] 806-01 Encounter: [id number]    Assessment    Active Problems:    * No active hospital problems  *      Home Pulmonary Medications:  Albuterol mdi prn, spiriva, duonebs prn    Past Medical History:   Diagnosis Date    Cancer ([facility] [address] )     lung and multiple myloma    Chemoprevention     Chronic pain disorder     3 fractured vertebrae    COPD (chronic obstructive pulmonary disease) (HCC)     Diarrhea     Emphysema lung (HCC)     Enlarged prostate     [place] (hard of hearing)     Kidney stone     Low blood pressure     Lung cancer (HCC)     Multiple myeloma (HCC)     Multiple myeloma (HCC)     Pneumonia     Pulmonary emphysema (HCC)     Rectal adenocarcinoma (HCC)     Shortness of breath     Sleep apnea     no cpap     Social History     Social History    Marital status: /Civil Union     Spouse name: N/A    Number of children: N/A    Years of education: N/A     Social History Main Topics    Smoking status: Former Smoker     Packs/day: 1 00     Years: 50 00    Smokeless tobacco: Never Used      Comment: quit in 1999    Alcohol use Yes      Comment: rarely    Drug use: No    Sexual activity: Not Asked     Other Topics Concern    None     Social History Narrative    None       Subjective    Subjective Data: n/a    Objective    Physical Exam:   Assessment Type: Assess only  General Appearance: Alert, Awake  Respiratory Pattern: Normal  Chest Assessment: Chest expansion symmetrical  Bilateral Breath Sounds: Clear, Diminished  Cough: Strong, Moist, Non-productive  Subjective Data: n/a    Vitals:  Blood pressure 110/59, pulse (!) 109, temperature 98 4 °F (36 9 °C), temperature source Oral, resp  rate 20, height 5' 8" (1 727 m), weight 82 8 kg (182 lb 8 oz), SpO2 97 %  Imaging and other studies: I have personally reviewed pertinent reports              Plan    Respiratory Plan: Home Bronchodilator Patient pathway Resp Comments: per pt, he quit smoking 5 yrs ago, but did smoke x 60 yrs, pmhx of RUL lobectomy, JACK, lung CA, emphysema, copd, pt states he takes albuterol mdi prn at home, duonebs prn, and spiriva, cxr shows clear lungs, pt does not require any oxygen at home, pt ordered on albuterol mdi q 4 prn for sob/wheezing, d/c'd duonebs, since pt is on spiriva and albuterol mdi's

## 2017-11-02 NOTE — ANESTHESIA POSTPROCEDURE EVALUATION
Post-Op Assessment Note      CV Status:  Stable    Mental Status:  Alert and awake    Hydration Status:  Euvolemic and stable    PONV Controlled:  None    Airway Patency:  Patent    Post Op Vitals Reviewed: Yes          Staff: Anesthesiologist           /68 (11/02/17 1208)    Temp (!) 97 °F (36 1 °C) (11/02/17 1208)    Pulse 103 (11/02/17 1208)   Resp 18 (11/02/17 1208)    SpO2 98 % (11/02/17 1208)      Pt transported to PACU extubated and on supplemental O2  Pt was awake and alert, with stable vital signs  Signout was given to PACU RN

## 2017-11-02 NOTE — SOCIAL WORK
CM met with patient and spouse at bedside to address d/c needs  Patient resides in split level home and was independent with ADL's prior to admission; Patient's wife provides assistance as needed  Patient uses a cane  Pt was open to [facility] in the past and would like then to come to the house for ashraf care, referral in ecin as such   Pt denies any snf   Patient and spouse drive; no transportation concerns reported  Rx coverage available, scripts filled at [facility]  PCP is Dr Olivia Pinedo  No discharge needs/concerns expressed at this time  CM reviewed d/c planning process including the following: identifying help at home, patient preference for d/c planning needs, Discharge Lounge, [facility] Meds to Bed program, availability of treatment team to discuss questions or concerns patient and/or family may have regarding understanding medications and recognizing signs and symptoms once discharged  CM also encouraged patient to follow up with all recommended appointments after discharge  Patient advised of importance for patient and family to participate in managing patients medical well being

## 2017-11-02 NOTE — OP NOTE
OPERATIVE REPORT  PATIENT NAME: Obdulia Dudley    :  1935  MRN: [id number]  Pt Location: [facility] OR ROOM 15    SURGERY DATE: 2017    Surgeon(s) and Role:     * Josep Shaw MD - Primary    Preop Diagnosis:  Malignant neoplasm of rectum [C20]    Post-Op Diagnosis Codes:     * Malignant neoplasm of rectum ([address] ) [C20]    Procedure(s) (LRB):  TRANSANAL ENDOSCOPIC MICROSURGERY TEM (N/A)    Specimen(s):  ID Type Source Tests Collected by Time Destination   1 : DISTAL RECTAL CANCER Tissue Rectum TISSUE EXAM Josep Shaw MD 2017 0945        Estimated Blood Loss:   25 mL    Drains:  Urethral Catheter Latex 16 Fr  (Active)   Site Assessment Clean;Skin intact 2017 12:08 PM   Collection Container Standard drainage bag 2017 12:08 PM   Securement Method Securing device (Describe) 2017 12:08 PM   Number of days: 0       [REMOVED] NG/OG/Enteral Tube Orogastric Right mouth (Removed)   Removed 17 1151   Status Suction-low intermittent 2017  8:48 AM   Number of days: 0       Anesthesia Type:   General    Operative Indications:  Malignant neoplasm of rectum [C20]      Operative Findings:  Full thickness exicision to perirectal fat, defect ~75% circumferential sutured completely  Specimen 6x6cm x 2cm depth, 1cm margin circumferential     Complications:   None    Procedure and Technique:    Vickie Schulte is an 67-year-old male with emphysema/COPD, history of lung cancer stage IIIA found to have distal rectal cancer on recent colonoscopy  CT scan without obvious metastatic disease and normal CEA  We discussed given his high surgical risk for radical resection, discussed with him and wife/daughter present that given his multiple medical comorbidities especially pulmonary disease that it was not reasonable to recommend radical resection/pelvic operation however could entertain transanal endoscopic microsurgery for excision and total staging    We discussed transanal endoscopic microsurgery for this   We discussed risks of surgery including not limited to bleeding, infection, risks of anesthesia, damage to sphincter/incontinence, possibility of staged surgery, peritoneal entry, possible abdominal surgery, DVT/PE, heart attack, stroke, death, temporary/permanent stoma  He understood these risks signed informed consent wished to proceed  He was brought to the operating room where general endotracheal anesthesia was induced without event  Brown was placed under sterile precautions, had received 5000 units of subcutaneous heparin  preop as well as Venodynes running throughout the procedure  He was placed in modified lithotomy position with attention to joints/bony extremities  Buttocks Betadine prepped  Sterile draped and the TEM socket was attached to the 90° bend in the leg table with a Mustapha socket  Digital rectal examination and vegas bivalve anoscopy were performed to gently dilate the sphincter complex  Multiple aliquots of Betadine irrigation followed by Ancef irrigation were used to prepare rectum  [facility] operating proctoscope was then introduced per rectum easily without any undue pressure  This was oriented with the faceplate to the posterior midline position tumor  The lesion was marked out with 1 cm margins with the electrocautery needle instrument  Once all these markings were completed to satisfaction of good margins around the site I connected these and made a full-thickness incision into the rectal wall full thickness to perirectal fat  This was then undermined and connected deep all the way circumferentially until the entire marked out area was removed in full-thickness leaving a defect with only perirectal fat showing  There was no peritoneal entry obvious  The defect was then closed after the specimen was removed with multiple 2-0 interrupted Vicryl sutures with a TI KNOT device   The remaining line was then oversewn from beginning to end with 3-0 PDS suture and tungsten clips  The defect was closed entirely with an excellent seal  Wound was irrigated TEM proctoscope was removed and patient was extubated and returned to recovery room in stable condition  All sponge needle and instrument counts were correct       I was present for the entire procedure    Patient Disposition:  PACU     SIGNATURE: Taisha Petty MD  DATE: November 2, 2017  TIME: 12:32 PM

## 2017-11-02 NOTE — PROGRESS NOTES
PGY1 Post-Op Check Note    S: Doing well, some minimal pain that worsens with coughing  Otherwise no complaints, tolerated clears with no n/v  Denies flatus or BM  O:   Vitals:    11/02/17 1900   BP: 106/56   Pulse: (!) 107   Resp: 20   Temp: 97 5 °F (36 4 °C)   SpO2: 95%       I/O last 3 completed shifts: In: 3441 3 [P O :520; I V :2921 3]  Out: 480 [Urine:405; Emesis/NG output:50; Blood:25]  No intake/output data recorded      PE:  NAD  Norm resp effort  RRR  Abd soft, ND, tender suprapubic  Ashraf in place draining clear yellow urine    Lab Results   Component Value Date    WBC 6 10 10/20/2017    HGB 12 9 10/20/2017    HCT 40 0 10/20/2017    MCV 91 10/20/2017     10/20/2017     Lab Results   Component Value Date    GLUCOSE 89 10/20/2017    CALCIUM 9 5 10/20/2017     10/20/2017    K 4 5 10/20/2017    CO2 27 10/20/2017     10/20/2017    BUN 15 10/20/2017    CREATININE 1 21 10/20/2017         A/P: 81 y o  male w/rectal cancer s/p TEM  - clears  - IVF @ 77  - continue ashraf, d/c tomorrow  - prn pain control  - home meds  - pulm toilet/IS  - PT/OT  - SQH/SCDs  - anticipate d/c home tomorrow

## 2017-11-02 NOTE — PLAN OF CARE
Problem: DISCHARGE PLANNING - CARE MANAGEMENT  Goal: Discharge to post-acute care or home with appropriate resources  INTERVENTIONS:  - Conduct assessment to determine patient/family and health care team treatment goals, and need for post-acute services based on payer coverage, community resources, and patient preferences, and barriers to discharge  - Address psychosocial, clinical, and financial barriers to discharge as identified in assessment in conjunction with the patient/family and health care team  - Arrange appropriate level of post-acute services according to patient's   needs and preference and payer coverage in collaboration with the physician and health care team  - Communicate with and update the patient/family, physician, and health care team regarding progress on the discharge plan  - Arrange appropriate transportation to post-acute venues  D/c home [facility]    Outcome: Progressing

## 2017-11-03 VITALS
HEART RATE: 82 BPM | HEIGHT: 68 IN | WEIGHT: 182.5 LBS | TEMPERATURE: 97.8 F | OXYGEN SATURATION: 99 % | DIASTOLIC BLOOD PRESSURE: 57 MMHG | BODY MASS INDEX: 27.66 KG/M2 | RESPIRATION RATE: 20 BRPM | SYSTOLIC BLOOD PRESSURE: 111 MMHG

## 2017-11-03 LAB
ANION GAP SERPL CALCULATED.3IONS-SCNC: 7 MMOL/L (ref 4–13)
BUN SERPL-MCNC: 9 MG/DL (ref 5–25)
CALCIUM SERPL-MCNC: 8.4 MG/DL (ref 8.3–10.1)
CHLORIDE SERPL-SCNC: 109 MMOL/L (ref 100–108)
CO2 SERPL-SCNC: 24 MMOL/L (ref 21–32)
CREAT SERPL-MCNC: 1.02 MG/DL (ref 0.6–1.3)
ERYTHROCYTE [DISTWIDTH] IN BLOOD BY AUTOMATED COUNT: 13.9 % (ref 11.6–15.1)
GFR SERPL CREATININE-BSD FRML MDRD: 69 ML/MIN/1.73SQ M
GLUCOSE SERPL-MCNC: 120 MG/DL (ref 65–140)
HCT VFR BLD AUTO: 32.4 % (ref 36.5–49.3)
HGB BLD-MCNC: 10.9 G/DL (ref 12–17)
MCH RBC QN AUTO: 29.6 PG (ref 26.8–34.3)
MCHC RBC AUTO-ENTMCNC: 33.6 G/DL (ref 31.4–37.4)
MCV RBC AUTO: 88 FL (ref 82–98)
PLATELET # BLD AUTO: 208 THOUSANDS/UL (ref 149–390)
PMV BLD AUTO: 8.5 FL (ref 8.9–12.7)
POTASSIUM SERPL-SCNC: 4 MMOL/L (ref 3.5–5.3)
RBC # BLD AUTO: 3.68 MILLION/UL (ref 3.88–5.62)
SODIUM SERPL-SCNC: 140 MMOL/L (ref 136–145)
WBC # BLD AUTO: 10.29 THOUSAND/UL (ref 4.31–10.16)

## 2017-11-03 PROCEDURE — 80048 BASIC METABOLIC PNL TOTAL CA: CPT | Performed by: COLON & RECTAL SURGERY

## 2017-11-03 PROCEDURE — G8980 MOBILITY D/C STATUS: HCPCS

## 2017-11-03 PROCEDURE — G8979 MOBILITY GOAL STATUS: HCPCS

## 2017-11-03 PROCEDURE — G8978 MOBILITY CURRENT STATUS: HCPCS

## 2017-11-03 PROCEDURE — 97162 PT EVAL MOD COMPLEX 30 MIN: CPT

## 2017-11-03 PROCEDURE — 85027 COMPLETE CBC AUTOMATED: CPT | Performed by: COLON & RECTAL SURGERY

## 2017-11-03 RX ADMIN — HEPARIN SODIUM 5000 UNITS: 5000 INJECTION, SOLUTION INTRAVENOUS; SUBCUTANEOUS at 05:30

## 2017-11-03 RX ADMIN — DEXTROSE, SODIUM CHLORIDE, AND POTASSIUM CHLORIDE 75 ML/HR: 5; .45; .15 INJECTION INTRAVENOUS at 01:27

## 2017-11-03 RX ADMIN — TIOTROPIUM BROMIDE 18 MCG: 18 CAPSULE ORAL; RESPIRATORY (INHALATION) at 08:44

## 2017-11-03 NOTE — PROGRESS NOTES
Pt ordered for d/c, pt urinated 100ml and ok given by ADRIEN Palm to d/c pt  Pts wife concerned with pt being d/c'd right now since "previous times he's urinated and then had to come back to ED to have catheter inserted " Will give pt more time to see if he's able to pass anymore urine  DTV time wasn't til 1400  Pt and wife aware and agreeable with plan  Charge RNArti aware of this also

## 2017-11-03 NOTE — PROGRESS NOTES
Progress Note - General Surgery   Misael Ordoñez 81 y o  male MRN: [id number]  Unit/Bed#: [facility] 806-01 Encounter: [id number]    Assessment:  81 y o  male w/rectal cancer s/p TEM 11/2    Plan:  - regular diet  - d/c ivf  - d/c ashraf  - pain control  - SQH/SCDs  - d/c today    Subjective/Objective   Subjective: Doing well, denies pain  Tolerating clears with no nausea/vomiting  Otherwise is hopeful can void on own with ashraf out later today    Objective:    Blood pressure 100/53, pulse 60, temperature 98 2 °F (36 8 °C), temperature source Oral, resp  rate 18, height 5' 8" (1 727 m), weight 82 8 kg (182 lb 8 oz), SpO2 94 %  ,Body mass index is 27 75 kg/m²  I/O last 24 hours: In: 9008 [P O :520;  I V :3440]  Out: 7212 [Urine:1380; Emesis/NG output:50; Blood:25]    Invasive Devices     Peripheral Intravenous Line            Peripheral IV 11/02/17 Left Arm less than 1 day    Peripheral IV 11/02/17 Right Arm less than 1 day                Physical Exam:   NAD  Norm resp effort  RRR  Abd soft NT ND  Ashraf in place draining clear yellow urine  Dressing taken down from rectum, no appreciable bleeding    Lab, Imaging and other studies:  Lab Results   Component Value Date    WBC 10 29 (H) 11/03/2017    HGB 10 9 (L) 11/03/2017    HCT 32 4 (L) 11/03/2017    MCV 88 11/03/2017     11/03/2017      Lab Results   Component Value Date    GLUCOSE 120 11/03/2017    CALCIUM 8 4 11/03/2017     11/03/2017    K 4 0 11/03/2017    CO2 24 11/03/2017     (H) 11/03/2017    BUN 9 11/03/2017    CREATININE 1 02 11/03/2017       VTE Pharmacologic Prophylaxis: Heparin  VTE Mechanical Prophylaxis: sequential compression device

## 2017-11-03 NOTE — PLAN OF CARE
DISCHARGE PLANNING     Discharge to home or other facility with appropriate resources Progressing        DISCHARGE PLANNING - CARE MANAGEMENT     Discharge to post-acute care or home with appropriate resources Progressing        INFECTION - ADULT     Absence or prevention of progression during hospitalization Progressing     Absence of fever/infection during neutropenic period Progressing        Knowledge Deficit     Patient/family/caregiver demonstrates understanding of disease process, treatment plan, medications, and discharge instructions Progressing        PAIN - ADULT     Verbalizes/displays adequate comfort level or baseline comfort level Progressing        Potential for Falls     Patient will remain free of falls Progressing        SAFETY ADULT     Maintain or return to baseline ADL function Progressing     Maintain or return mobility status to optimal level Progressing

## 2017-11-03 NOTE — PHYSICAL THERAPY NOTE
Physical Therapy Evaluation    Patient's Name: Rickie Mcfadden    Admitting Diagnosis  Malignant neoplasm of rectum ([facility] ) [C20]    Problem List  Patient Active Problem List   Diagnosis    COPD with acute exacerbation ([facility] )    Chronic rhinitis    Adenocarcinoma, lung ([facility] )    BPH (benign prostatic hyperplasia)    Multiple myeloma (HCC)    Chronic diarrhea    COPD (chronic obstructive pulmonary disease) ([facility] )       Past Medical History  Past Medical History:   Diagnosis Date    Cancer ([facility] )     lung and multiple myloma    Chemoprevention     Chronic pain disorder     3 fractured vertebrae    COPD (chronic obstructive pulmonary disease) (HCC)     Diarrhea     Emphysema lung (HCC)     Enlarged prostate     [place] (hard of hearing)     Kidney stone     Low blood pressure     Lung cancer ([facility] )     Multiple myeloma (HCC)     Multiple myeloma ([facility] )     Pneumonia     Pulmonary emphysema ([facility] )     Rectal adenocarcinoma ([facility] )     Shortness of breath     Sleep apnea     no cpap       Past Surgical History  Past Surgical History:   Procedure Laterality Date    CHOLECYSTECTOMY      COLONOSCOPY W/ ENDOSCOPIC US N/A 7/21/2017    Procedure: ANAL ENDOSCOPIC U/S;  Surgeon: Kyaw Arrieta MD;  Location: [facility]; Service: Colorectal    HERNIA REPAIR      KIDNEY STONE SURGERY      LUNG LOBECTOMY Right     IN COLONOSCOPY FLX DX W/COLLJ SPEC WHEN PFRMD N/A 7/10/2017    Procedure: COLONOSCOPY;  Surgeon: Kim Wheeler MD;  Location: [facility];   Service: Gastroenterology    IN RECTAL TUMOR EXCISION, TRANSANAL ENDOSCOPIC MICROSURGICAL, FULL THICK N/A 11/2/2017    Procedure: TRANSANAL ENDOSCOPIC MICROSURGERY TEM;  Surgeon: Kyaw Arrieta MD;  Location: [facility] MA[facility] OR;  Service: Colorectal        11/03/17 0840   Note Type   Note type Eval only   Pain Assessment   Pain Assessment No/denies pain   Pain Score No Pain   Home Living   Type of Home House   Home Layout Two level   [address]   Prior Function Level of [place] Independent with ADLs and functional mobility   Receives Help From Family   ADL Assistance Independent   IADLs Independent   Falls in the last 6 months 0   Vocational Retired   Restrictions/Precautions   Wells Margot Bearing Precautions Per Order No   Braces or Orthoses (DENIES)   Other Precautions Telemetry   General   Family/Caregiver Present No   Cognition   Overall Cognitive Status WFL   RUE Assessment   RUE Assessment WFL   LUE Assessment   LUE Assessment WFL   RLE Assessment   RLE Assessment WFL   LLE Assessment   LLE Assessment WFL   Bed Mobility   Supine to Sit 6  Modified independent   Transfers   Sit to Stand 6  Modified independent   Additional items Increased time required   Stand to Sit 6  Modified independent   Ambulation/Elevation   Gait pattern WNL   Gait Assistance 6  Modified independent   Assistive Device Straight cane   Distance 80 FEET X2   Stair Management Assistance 6  Modified independent   Additional items Verbal cues   Stair Management Technique One rail R;Foreward;Reciprocal   Number of Stairs 7   Balance   Static Sitting Good   Static Standing Good   Ambulatory Good   Endurance Deficit   Endurance Deficit Yes   Endurance Deficit Description FATIGUE   Activity Tolerance   Activity Tolerance Patient tolerated treatment well   Medical Staff Made Aware ROCAEL TO SEE PER RN SHAHID   Assessment   Prognosis Good   Problem List Decreased endurance   Assessment PT COMPLETED EVALUATION OF 81YEAR OLD MALE ADMITTED FOR THE FOLLOWING PLANNED PROCEDURE "TRANSANAL ENDOSCOPIC MICROSURGERY TEM" 11/2/17 CURRENT EMERGING STATUS INCLUDES TELEMETRY MONITORING POST-OP  PMH IS SIGNIFICANT FOR COPD, AORTIC VALVE STENOSIS, AND RECTAL CANCER  PRIOR TO THIS ADMISSION PATIENT RESIDED WITH SUPPORTIVE SPOUSE IN SPLIT LEVEL HOME WHERE HE REPORTS PRIOR I WITH MOBILITY ([facility]), ADLS, AND IADLS  CURRENT IMPAIRMENTS INCLUDE GROSS FATIGUE, REDUCED ACTIVITY TOLERANCE, AND SOB ([address])  DURING PT EVALUATION PATIENT WAS MOD-I WITH INCREASED TIME FOR BED MOBILITY, SIT<-->STAND TRANSFERS, AMBULATION AND STAIR NAVIGATION  HE AMBULATED 160 FEET W/ SPC WNL AND NAVIGATED 7 STEPS RECIPROCALLY W/ SPC AND USE OF HAND RAIL  AT THIS POINT IN TIME PATIENT DEMONSTRATES SAFE AND EFFECTIVE MOBILITY AND PRESENTS WITHOUT QUESTIONS/CONCERNS ABOUT D/C HOME  RECOMMEND CONTINUED SELF AMBULATION THIS ADMISSION  D/C INPT PT SERVICES      Goals   Patient Goals TO GO HOME TODAY    Treatment Day 0   Recommendation   Recommendation Home with family support   Equipment Recommended Cane  (PATIENT HAS SPC)   PT - OK to Discharge Yes  (HOME W/ USE OF SPC, FAMILY ASSIST PRN )   Barthel Index   Feeding 10   Bathing 5   Grooming Score 5   Dressing Score 10   Bladder Score 10   Bowels Score 10   Toilet Use Score 10   Transfers (Bed/Chair) Score 15   Mobility (Level Surface) Score 10   Stairs Score 5   Barthel Index Score 90           Vane Perez, PT

## 2017-11-03 NOTE — DISCHARGE SUMMARY
Discharge Summary - Colorectal Surgery   Maida Carolina 81 y o  male MRN: [id number]  Unit/Bed#: [facility] 806-01 Encounter: [id number]        Admitting Diagnosis: Low rectal lesion    Admit Date: 11/2/17    Discharge Diagnosis: Same    Discharge Date:    HPI: This is a pleasant 82 yo gentleman who was found to have a low rectal cancer and is now being admitted for a transanal endoscopic microsurgery  Procedures Performed: 11/2/17 Transanal endoscopic microsurgery - Dr Kasey Oliveros  [facility] Course: Patient has done well post operatively  His rectal dressing was removed post op day #1 and there was scant drainage  His ashraf was removed the morning post op  Patient is on Proscar for urinary symptoms  He sees [facility]  Patient has no rectal pain  Patient will follow with Dr Kasey Oliveros in 4-6 weeks  Patient was able to void on his own prior to discharge  Pathology pending    Complications: None      Condition at Discharge: good     Discharge instructions/Information to patient and family:   See after visit summary for information provided to patient and family  Provisions for Follow-Up Care:  See after visit summary for information related to follow-up care and any pertinent home health orders  Disposition: Home    Planned Readmission: No    Discharge Statement   I spent 30 minutes discharging the patient  This time was spent on the day of discharge  I had direct contact with the patient on the day of discharge  Additional documentation is required if more than 30 minutes were spent on discharge  Discharge Medications:  See after visit summary for reconciled discharge medications provided to patient and family

## 2017-11-07 ENCOUNTER — HOSPITAL ENCOUNTER (OUTPATIENT)
Dept: INFUSION CENTER | Facility: CLINIC | Age: 82
Discharge: HOME | End: 2017-11-07
Payer: MEDICARE

## 2017-11-16 ENCOUNTER — HOSPITAL ENCOUNTER (EMERGENCY)
Facility: HOSPITAL | Age: 82
Discharge: HOME | End: 2017-11-16
Admitting: COLON & RECTAL SURGERY
Payer: MEDICARE

## 2017-11-16 ENCOUNTER — APPOINTMENT (EMERGENCY)
Dept: RADIOLOGY | Facility: HOSPITAL | Age: 82
End: 2017-11-16
Payer: MEDICARE

## 2017-11-16 VITALS
DIASTOLIC BLOOD PRESSURE: 59 MMHG | OXYGEN SATURATION: 96 % | WEIGHT: 180 LBS | SYSTOLIC BLOOD PRESSURE: 102 MMHG | RESPIRATION RATE: 18 BRPM | HEIGHT: 68 IN | BODY MASS INDEX: 27.28 KG/M2 | TEMPERATURE: 97.8 F | HEART RATE: 81 BPM

## 2017-11-16 LAB
ANION GAP SERPL CALCULATED.3IONS-SCNC: 5 MMOL/L (ref 4–13)
BASOPHILS # BLD AUTO: 0.02 THOUSANDS/ΜL (ref 0–0.1)
BASOPHILS NFR BLD AUTO: 0 % (ref 0–1)
BUN SERPL-MCNC: 15 MG/DL (ref 5–25)
CALCIUM SERPL-MCNC: 9.4 MG/DL (ref 8.3–10.1)
CHLORIDE SERPL-SCNC: 108 MMOL/L (ref 100–108)
CO2 SERPL-SCNC: 27 MMOL/L (ref 21–32)
CREAT SERPL-MCNC: 1.04 MG/DL (ref 0.6–1.3)
EOSINOPHIL # BLD AUTO: 0.05 THOUSAND/ΜL (ref 0–0.61)
EOSINOPHIL NFR BLD AUTO: 1 % (ref 0–6)
ERYTHROCYTE [DISTWIDTH] IN BLOOD BY AUTOMATED COUNT: 13.7 % (ref 11.6–15.1)
GFR SERPL CREATININE-BSD FRML MDRD: 67 ML/MIN/1.73SQ M
GLUCOSE SERPL-MCNC: 87 MG/DL (ref 65–140)
HCT VFR BLD AUTO: 35.1 % (ref 36.5–49.3)
HGB BLD-MCNC: 11.6 G/DL (ref 12–17)
LYMPHOCYTES # BLD AUTO: 1.44 THOUSANDS/ΜL (ref 0.6–4.47)
LYMPHOCYTES NFR BLD AUTO: 22 % (ref 14–44)
MCH RBC QN AUTO: 29.1 PG (ref 26.8–34.3)
MCHC RBC AUTO-ENTMCNC: 33 G/DL (ref 31.4–37.4)
MCV RBC AUTO: 88 FL (ref 82–98)
MONOCYTES # BLD AUTO: 0.54 THOUSAND/ΜL (ref 0.17–1.22)
MONOCYTES NFR BLD AUTO: 8 % (ref 4–12)
NEUTROPHILS # BLD AUTO: 4.44 THOUSANDS/ΜL (ref 1.85–7.62)
NEUTS SEG NFR BLD AUTO: 69 % (ref 43–75)
NRBC BLD AUTO-RTO: 0 /100 WBCS
PLATELET # BLD AUTO: 311 THOUSANDS/UL (ref 149–390)
PMV BLD AUTO: 8.2 FL (ref 8.9–12.7)
POTASSIUM SERPL-SCNC: 5.1 MMOL/L (ref 3.5–5.3)
RBC # BLD AUTO: 3.98 MILLION/UL (ref 3.88–5.62)
SODIUM SERPL-SCNC: 140 MMOL/L (ref 136–145)
WBC # BLD AUTO: 6.52 THOUSAND/UL (ref 4.31–10.16)

## 2017-11-16 PROCEDURE — 36415 COLL VENOUS BLD VENIPUNCTURE: CPT | Performed by: SURGERY

## 2017-11-16 PROCEDURE — 80048 BASIC METABOLIC PNL TOTAL CA: CPT | Performed by: SURGERY

## 2017-11-16 PROCEDURE — 99284 EMERGENCY DEPT VISIT MOD MDM: CPT

## 2017-11-16 PROCEDURE — 74176 CT ABD & PELVIS W/O CONTRAST: CPT

## 2017-11-16 PROCEDURE — 85025 COMPLETE CBC W/AUTO DIFF WBC: CPT | Performed by: SURGERY

## 2017-11-16 NOTE — H&P
H&P Exam - General Surgery   Nila Mclaughlin 81 y o  male MRN: [id number]  Unit/Bed#: ED 05 Encounter: [id number]    Assessment/Plan     Assessment:  81 y o  M w/ anorectal pain s/p TEM    CT scan negative  No leukocytosis or fever    Plan:  Ok to d/c home  F/u outpatient  Local pain control    History of Present Illness     HPI:  Nila Mclaughlin is a 81 y o  male who presents with rectal pain for 2 weeks  Patient recently underwent TEM for rectal cancer on 11/2/17  Patient states that since his procedure, he has had persistent rectal pain  He denies n/v  He has had normal BMs w/ frequent diarrhea  He has been afebrile  He denies abdominal pain  Review of Systems   Constitutional: Negative  HENT: Negative  Respiratory: Negative  Cardiovascular: Negative  Gastrointestinal: Positive for rectal pain  Negative for abdominal distention, abdominal pain, anal bleeding, nausea and vomiting  Endocrine: Negative  Genitourinary: Negative  Musculoskeletal: Negative  Skin: Negative  Allergic/Immunologic: Negative  Neurological: Negative  Psychiatric/Behavioral: Negative  Historical Information   Past Medical History:   Diagnosis Date    Cancer ([address] )     lung and multiple myloma    Chemoprevention     Chronic pain disorder     3 fractured vertebrae    COPD (chronic obstructive pulmonary disease) (HCC)     Diarrhea     Emphysema lung (HCC)     Enlarged prostate     [place] (hard of hearing)     Kidney stone     Low blood pressure     Lung cancer (HCC)     Multiple myeloma (HCC)     Multiple myeloma (HCC)     Pneumonia     Pulmonary emphysema (HCC)     Rectal adenocarcinoma (HCC)     Shortness of breath     Sleep apnea     no cpap     Past Surgical History:   Procedure Laterality Date    CHOLECYSTECTOMY      COLONOSCOPY W/ ENDOSCOPIC US N/A 7/21/2017    Procedure: ANAL ENDOSCOPIC U/S;  Surgeon: Jameson Garland MD;  Location: [facility];   Service: Colorectal    HERNIA REPAIR  KIDNEY STONE SURGERY      LUNG LOBECTOMY Right     HI COLONOSCOPY FLX DX W/COLLJ SPEC WHEN PFRMD N/A 7/10/2017    Procedure: COLONOSCOPY;  Surgeon: Sonal Lipscomb MD;  Location: [facility]; Service: Gastroenterology    HI RECTAL TUMOR EXCISION, TRANSANAL ENDOSCOPIC MICROSURGICAL, FULL THICK N/A 11/2/2017    Procedure: TRANSANAL ENDOSCOPIC MICROSURGERY TEM;  Surgeon: Arcelia Wells MD;  Location: [facility];  Service: Colorectal     Social History   History   Alcohol Use No     Comment: rarely     History   Drug Use No     History   Smoking Status    Former Smoker    Packs/day: 1 00    Years: 50 00   Smokeless Tobacco    Never Used     Comment: quit in 1999     Family History: non-contributory    Meds/Allergies   all medications and allergies reviewed  Allergies   Allergen Reactions    Atorvastatin Other (See Comments)     UNKNOWN    Sulfa Antibiotics Itching    Penicillin V Rash     AS CHILD       Objective   First Vitals:   Blood Pressure: 106/59 (Pt reports usual BP) (11/16/17 1205)  Pulse: 88 (11/16/17 1205)  Temperature: 97 8 °F (36 6 °C) (11/16/17 1205)  Temp Source: Oral (11/16/17 1205)  Respirations: 18 (11/16/17 1205)  Height: 5' 8" (172 7 cm) (11/16/17 1205)  Weight - Scale: 81 6 kg (180 lb) (11/16/17 1205)  SpO2: 98 % (11/16/17 1205)    Current Vitals:   Blood Pressure: 102/59 (11/16/17 1730)  Pulse: 81 (11/16/17 1730)  Temperature: 97 8 °F (36 6 °C) (11/16/17 1205)  Temp Source: Oral (11/16/17 1205)  Respirations: 18 (11/16/17 1730)  Height: 5' 8" (172 7 cm) (11/16/17 1205)  Weight - Scale: 81 6 kg (180 lb) (11/16/17 1205)  SpO2: 96 % (11/16/17 1730)    No intake or output data in the 24 hours ending 11/16/17 1840    Invasive Devices     Peripheral Intravenous Line            Peripheral IV 11/16/17 Left Antecubital less than 1 day                Physical Exam   Constitutional: He is oriented to person, place, and time  He appears well-developed  No distress     HENT:   Head: Normocephalic and atraumatic  Eyes: Pupils are equal, round, and reactive to light  Neck: Normal range of motion  Cardiovascular: Normal rate, regular rhythm and normal heart sounds  Pulmonary/Chest: Effort normal and breath sounds normal  No respiratory distress  Abdominal: Soft  He exhibits no distension  There is no tenderness  There is no rebound  Genitourinary: Rectum normal    Genitourinary Comments: Tenderness at right anal verge, no blood, fissure or thrombosed hemorrhoid   Neurological: He is alert and oriented to person, place, and time  Skin: Skin is warm and dry  Lab Results:   CBC:   Lab Results   Component Value Date    WBC 6 52 11/16/2017    HGB 11 6 (L) 11/16/2017    HCT 35 1 (L) 11/16/2017    MCV 88 11/16/2017     11/16/2017    MCH 29 1 11/16/2017    MCHC 33 0 11/16/2017    RDW 13 7 11/16/2017    MPV 8 2 (L) 11/16/2017    NRBC 0 11/16/2017   , Coagulation: No results found for: PT, INR, APTT  Imaging: I have personally reviewed pertinent reports  and I have personally reviewed pertinent films in PACS  EKG, Pathology, and Other Studies: I have personally reviewed pertinent reports        Code Status: Prior  Advance Directive and Living Will:      Power of :    POLST:

## 2017-11-16 NOTE — ED NOTES
Spoke to [facility] and stated pt is ready for discharge and to instruct pt to follow up in two weeks with scheduled appointment     Cameron Quick RN  17/23/80 3024

## 2017-11-21 RX ORDER — METHYLPREDNISOLONE SODIUM SUCCINATE 125 MG/2ML
100 INJECTION, POWDER, LYOPHILIZED, FOR SOLUTION INTRAMUSCULAR; INTRAVENOUS ONCE
Status: COMPLETED | OUTPATIENT
Start: 2017-11-22 | End: 2017-11-22

## 2017-11-21 RX ORDER — ACETAMINOPHEN 325 MG/1
650 TABLET ORAL ONCE
Status: COMPLETED | OUTPATIENT
Start: 2017-11-22 | End: 2017-11-22

## 2017-11-21 RX ORDER — SODIUM CHLORIDE 9 MG/ML
20 INJECTION, SOLUTION INTRAVENOUS CONTINUOUS
Status: DISCONTINUED | OUTPATIENT
Start: 2017-11-22 | End: 2017-11-25 | Stop reason: HOSPADM

## 2017-11-22 ENCOUNTER — HOSPITAL ENCOUNTER (OUTPATIENT)
Dept: INFUSION CENTER | Facility: CLINIC | Age: 82
Discharge: HOME | End: 2017-11-22
Payer: MEDICARE

## 2017-11-22 VITALS
BODY MASS INDEX: 26.76 KG/M2 | HEART RATE: 110 BPM | WEIGHT: 176.59 LBS | HEIGHT: 68 IN | DIASTOLIC BLOOD PRESSURE: 56 MMHG | RESPIRATION RATE: 16 BRPM | SYSTOLIC BLOOD PRESSURE: 104 MMHG | TEMPERATURE: 97.3 F

## 2017-11-22 LAB
ABO GROUP BLD: NORMAL
BLD GP AB SCN SERPL QL: NEGATIVE
RH BLD: POSITIVE
SPECIMEN EXPIRATION DATE: NORMAL

## 2017-11-22 PROCEDURE — 86900 BLOOD TYPING SEROLOGIC ABO: CPT | Performed by: INTERNAL MEDICINE

## 2017-11-22 PROCEDURE — 96376 TX/PRO/DX INJ SAME DRUG ADON: CPT

## 2017-11-22 PROCEDURE — 86901 BLOOD TYPING SEROLOGIC RH(D): CPT | Performed by: INTERNAL MEDICINE

## 2017-11-22 PROCEDURE — 96415 CHEMO IV INFUSION ADDL HR: CPT

## 2017-11-22 PROCEDURE — 96375 TX/PRO/DX INJ NEW DRUG ADDON: CPT

## 2017-11-22 PROCEDURE — 94640 AIRWAY INHALATION TREATMENT: CPT

## 2017-11-22 PROCEDURE — 96413 CHEMO IV INFUSION 1 HR: CPT

## 2017-11-22 PROCEDURE — 86850 RBC ANTIBODY SCREEN: CPT | Performed by: INTERNAL MEDICINE

## 2017-11-22 RX ORDER — DIPHENHYDRAMINE HYDROCHLORIDE 50 MG/ML
50 INJECTION INTRAMUSCULAR; INTRAVENOUS ONCE
Status: COMPLETED | OUTPATIENT
Start: 2017-11-22 | End: 2017-11-22

## 2017-11-22 RX ORDER — DEXAMETHASONE 4 MG/1
4 TABLET ORAL WEEKLY
COMMUNITY
End: 2018-05-07 | Stop reason: CLARIF

## 2017-11-22 RX ORDER — ALBUTEROL SULFATE 2.5 MG/3ML
2.5 SOLUTION RESPIRATORY (INHALATION) ONCE
Status: COMPLETED | OUTPATIENT
Start: 2017-11-22 | End: 2017-11-22

## 2017-11-22 RX ORDER — MEPERIDINE HYDROCHLORIDE 50 MG/ML
25 INJECTION INTRAMUSCULAR; INTRAVENOUS; SUBCUTANEOUS ONCE
Status: DISCONTINUED | OUTPATIENT
Start: 2017-11-22 | End: 2017-11-25 | Stop reason: HOSPADM

## 2017-11-22 RX ADMIN — DARATUMUMAB 1300 MG: 100 INJECTION, SOLUTION, CONCENTRATE INTRAVENOUS at 11:10

## 2017-11-22 RX ADMIN — ACETAMINOPHEN 650 MG: 325 TABLET, FILM COATED ORAL at 07:46

## 2017-11-22 RX ADMIN — HYDROCORTISONE SODIUM SUCCINATE 100 MG: 100 INJECTION, POWDER, FOR SOLUTION INTRAMUSCULAR; INTRAVENOUS at 10:12

## 2017-11-22 RX ADMIN — METHYLPREDNISOLONE SODIUM SUCCINATE 100 MG: 125 INJECTION, POWDER, FOR SOLUTION INTRAMUSCULAR; INTRAVENOUS at 07:43

## 2017-11-22 RX ADMIN — DIPHENHYDRAMINE HYDROCHLORIDE 25 MG: 50 INJECTION, SOLUTION INTRAMUSCULAR; INTRAVENOUS at 07:45

## 2017-11-22 RX ADMIN — ALBUTEROL SULFATE 2.5 MG: 2.5 SOLUTION RESPIRATORY (INHALATION) at 10:20

## 2017-11-22 RX ADMIN — SODIUM CHLORIDE 20 ML/HR: 0.9 INJECTION, SOLUTION INTRAVENOUS at 07:35

## 2017-11-22 RX ADMIN — FAMOTIDINE 20 MG: 10 INJECTION, SOLUTION INTRAVENOUS at 10:14

## 2017-11-22 RX ADMIN — DIPHENHYDRAMINE HYDROCHLORIDE 50 MG: 50 INJECTION INTRAMUSCULAR; INTRAVENOUS at 10:10

## 2017-11-22 RX ADMIN — DARATUMUMAB 1300 MG: 100 INJECTION, SOLUTION, CONCENTRATE INTRAVENOUS at 08:46

## 2017-11-22 NOTE — PLAN OF CARE
Problem: Potential for Falls  Goal: Patient will remain free of falls  INTERVENTIONS:  - Assess patient frequently for physical needs  -  Identify cognitive and physical deficits and behaviors that affect risk of falls    -  [place] fall precautions as indicated by assessment   - Educate patient/family on patient safety including physical limitations  - Instruct patient to call for assistance with activity based on assessment  - Modify environment to reduce risk of injury  - Consider OT/PT consult to assist with strengthening/mobility   Outcome: Progressing

## 2017-11-22 NOTE — PROGRESS NOTES
1 hour and 15 minutes after the start of the Darzalex infusion pt presented with cough and SOB  Vitals WNL  Hypersensitivity protocol medications given  Once patient's symptoms resolved, I spoke to Italia Cruz RN with [facility] and order received to restart Darzalex infusion  Darzalex infusion restarted at this time  Pt is asleep resting comfortably

## 2017-11-22 NOTE — PROGRESS NOTES
Patient tolerated remainder of infusion well with no complications  Pt and wife are aware of all future appointments

## 2017-11-27 ENCOUNTER — TRANSCRIBE ORDERS (OUTPATIENT)
Dept: ADMINISTRATIVE | Facility: HOSPITAL | Age: 82
End: 2017-11-27

## 2017-11-27 ENCOUNTER — GENERIC CONVERSION - ENCOUNTER (OUTPATIENT)
Dept: OTHER | Facility: OTHER | Age: 82
End: 2017-11-27

## 2017-11-27 ENCOUNTER — APPOINTMENT (OUTPATIENT)
Dept: LAB | Facility: MEDICAL CENTER | Age: 82
End: 2017-11-27
Payer: MEDICARE

## 2017-11-27 DIAGNOSIS — C20 MALIGNANT NEOPLASM OF RECTUM (HCC): ICD-10-CM

## 2017-11-27 LAB
ALBUMIN SERPL BCP-MCNC: 3.1 G/DL (ref 3.5–5)
ALP SERPL-CCNC: 94 U/L (ref 46–116)
ALT SERPL W P-5'-P-CCNC: 45 U/L (ref 12–78)
ANION GAP SERPL CALCULATED.3IONS-SCNC: 6 MMOL/L (ref 4–13)
AST SERPL W P-5'-P-CCNC: 21 U/L (ref 5–45)
BASOPHILS # BLD AUTO: 0.01 THOUSANDS/ΜL (ref 0–0.1)
BASOPHILS NFR BLD AUTO: 0 % (ref 0–1)
BILIRUB SERPL-MCNC: 0.65 MG/DL (ref 0.2–1)
BUN SERPL-MCNC: 21 MG/DL (ref 5–25)
CALCIUM SERPL-MCNC: 8.9 MG/DL (ref 8.3–10.1)
CHLORIDE SERPL-SCNC: 105 MMOL/L (ref 100–108)
CO2 SERPL-SCNC: 29 MMOL/L (ref 21–32)
CREAT SERPL-MCNC: 1.03 MG/DL (ref 0.6–1.3)
EOSINOPHIL # BLD AUTO: 0.12 THOUSAND/ΜL (ref 0–0.61)
EOSINOPHIL NFR BLD AUTO: 2 % (ref 0–6)
ERYTHROCYTE [DISTWIDTH] IN BLOOD BY AUTOMATED COUNT: 14.5 % (ref 11.6–15.1)
GFR SERPL CREATININE-BSD FRML MDRD: 68 ML/MIN/1.73SQ M
GLUCOSE P FAST SERPL-MCNC: 95 MG/DL (ref 65–99)
HCT VFR BLD AUTO: 37.6 % (ref 36.5–49.3)
HGB BLD-MCNC: 12.7 G/DL (ref 12–17)
LYMPHOCYTES # BLD AUTO: 0.86 THOUSANDS/ΜL (ref 0.6–4.47)
LYMPHOCYTES NFR BLD AUTO: 17 % (ref 14–44)
MCH RBC QN AUTO: 29.7 PG (ref 26.8–34.3)
MCHC RBC AUTO-ENTMCNC: 33.8 G/DL (ref 31.4–37.4)
MCV RBC AUTO: 88 FL (ref 82–98)
MONOCYTES # BLD AUTO: 0.47 THOUSAND/ΜL (ref 0.17–1.22)
MONOCYTES NFR BLD AUTO: 9 % (ref 4–12)
NEUTROPHILS # BLD AUTO: 3.75 THOUSANDS/ΜL (ref 1.85–7.62)
NEUTS SEG NFR BLD AUTO: 72 % (ref 43–75)
NRBC BLD AUTO-RTO: 0 /100 WBCS
PLATELET # BLD AUTO: 208 THOUSANDS/UL (ref 149–390)
PMV BLD AUTO: 9.1 FL (ref 8.9–12.7)
POTASSIUM SERPL-SCNC: 4.1 MMOL/L (ref 3.5–5.3)
PROT SERPL-MCNC: 6.5 G/DL (ref 6.4–8.2)
RBC # BLD AUTO: 4.27 MILLION/UL (ref 3.88–5.62)
SODIUM SERPL-SCNC: 140 MMOL/L (ref 136–145)
WBC # BLD AUTO: 5.22 THOUSAND/UL (ref 4.31–10.16)

## 2017-11-27 PROCEDURE — 36415 COLL VENOUS BLD VENIPUNCTURE: CPT

## 2017-11-27 PROCEDURE — 80053 COMPREHEN METABOLIC PANEL: CPT

## 2017-11-27 PROCEDURE — 83883 ASSAY NEPHELOMETRY NOT SPEC: CPT

## 2017-11-27 PROCEDURE — 85025 COMPLETE CBC W/AUTO DIFF WBC: CPT

## 2017-11-27 RX ORDER — SODIUM CHLORIDE 9 MG/ML
20 INJECTION, SOLUTION INTRAVENOUS CONTINUOUS
Status: DISCONTINUED | OUTPATIENT
Start: 2017-11-28 | End: 2017-12-01 | Stop reason: HOSPADM

## 2017-11-27 RX ORDER — ACETAMINOPHEN 325 MG/1
650 TABLET ORAL ONCE
Status: COMPLETED | OUTPATIENT
Start: 2017-11-28 | End: 2017-11-28

## 2017-11-28 ENCOUNTER — HOSPITAL ENCOUNTER (OUTPATIENT)
Dept: INFUSION CENTER | Facility: CLINIC | Age: 82
Discharge: HOME | End: 2017-11-28
Payer: MEDICARE

## 2017-11-28 VITALS
TEMPERATURE: 97.2 F | SYSTOLIC BLOOD PRESSURE: 136 MMHG | DIASTOLIC BLOOD PRESSURE: 79 MMHG | BODY MASS INDEX: 27.33 KG/M2 | RESPIRATION RATE: 72 BRPM | WEIGHT: 180.34 LBS

## 2017-11-28 LAB
KAPPA LC FREE SER-MCNC: 3.2 MG/L (ref 3.3–19.4)
KAPPA LC FREE/LAMBDA FREE SER: 0.01 {RATIO} (ref 0.26–1.65)
LAMBDA LC FREE SERPL-MCNC: 546.2 MG/L (ref 5.7–26.3)

## 2017-11-28 PROCEDURE — 96413 CHEMO IV INFUSION 1 HR: CPT

## 2017-11-28 PROCEDURE — 96367 TX/PROPH/DG ADDL SEQ IV INF: CPT

## 2017-11-28 PROCEDURE — 96415 CHEMO IV INFUSION ADDL HR: CPT

## 2017-11-28 PROCEDURE — 96375 TX/PRO/DX INJ NEW DRUG ADDON: CPT

## 2017-11-28 RX ADMIN — DIPHENHYDRAMINE HYDROCHLORIDE 25 MG: 50 INJECTION, SOLUTION INTRAMUSCULAR; INTRAVENOUS at 07:35

## 2017-11-28 RX ADMIN — SODIUM CHLORIDE 20 ML/HR: 0.9 INJECTION, SOLUTION INTRAVENOUS at 07:30

## 2017-11-28 RX ADMIN — DARATUMUMAB 1300 MG: 100 INJECTION, SOLUTION, CONCENTRATE INTRAVENOUS at 09:00

## 2017-11-28 RX ADMIN — SODIUM CHLORIDE: 0.9 INJECTION, SOLUTION INTRAVENOUS at 07:50

## 2017-11-28 RX ADMIN — ACETAMINOPHEN 650 MG: 325 TABLET, FILM COATED ORAL at 07:30

## 2017-11-30 ENCOUNTER — ALLSCRIPTS OFFICE VISIT (OUTPATIENT)
Dept: OTHER | Facility: OTHER | Age: 82
End: 2017-11-30

## 2017-11-30 DIAGNOSIS — C90.00 MULTIPLE MYELOMA NOT HAVING ACHIEVED REMISSION (HCC): ICD-10-CM

## 2017-12-04 ENCOUNTER — TELEPHONE (OUTPATIENT)
Dept: RADIOLOGY | Facility: HOSPITAL | Age: 82
End: 2017-12-04

## 2017-12-04 RX ORDER — SODIUM CHLORIDE 9 MG/ML
75 INJECTION, SOLUTION INTRAVENOUS CONTINUOUS
Status: CANCELLED | OUTPATIENT
Start: 2017-12-07

## 2017-12-05 RX ORDER — SODIUM CHLORIDE 9 MG/ML
20 INJECTION, SOLUTION INTRAVENOUS CONTINUOUS
Status: DISCONTINUED | OUTPATIENT
Start: 2017-12-06 | End: 2017-12-09 | Stop reason: HOSPADM

## 2017-12-05 RX ORDER — SODIUM CHLORIDE 9 MG/ML
20 INJECTION, SOLUTION INTRAVENOUS CONTINUOUS
Status: DISCONTINUED | OUTPATIENT
Start: 2017-12-06 | End: 2017-12-05 | Stop reason: CLARIF

## 2017-12-05 RX ORDER — ACETAMINOPHEN 325 MG/1
650 TABLET ORAL ONCE
Status: DISCONTINUED | OUTPATIENT
Start: 2017-12-06 | End: 2017-12-05 | Stop reason: CLARIF

## 2017-12-05 RX ORDER — ACETAMINOPHEN 325 MG/1
650 TABLET ORAL ONCE
Status: COMPLETED | OUTPATIENT
Start: 2017-12-06 | End: 2017-12-06

## 2017-12-05 NOTE — PROGRESS NOTES
Assessment    1  Multiple myeloma (203 00) (C90 00)    Plan  Multiple myeloma    · (1) CBC/PLT/DIFF; Status:Active; Requested for:30Nov2017;    Perform:[facility]; Due:30Nov2018; Ordered; For:Multiple myeloma; Ordered By:Paola Duff);   · (1) COMPREHENSIVE METABOLIC PANEL; Status:Active; Requested for:30Nov2017;    Perform:[facility]; Due:30Nov2018; Ordered; For:Multiple myeloma; Ordered By:Paola Duff);   · (1) FREE LIGHT CHAINS, SERUM; Status:Active; Requested for:30Nov2017;    Perform:[facility]; Due:30Nov2018; Ordered; For:Multiple myeloma; Ordered By:Paola Duff); · Follow-up visit in 1 month Evaluation and Treatment  Follow-up  Status: Complete  Done:  [id number] 11:40AM   Ordered; For: Multiple myeloma; Ordered By: Angelita Nixon Performed:  Due: [id number]; Last Updated By: Jean Marie Spears; 11/30/2017 1:22:46 PM    Discussion/Summary  Discussion Summary:   1  theLambda light chain multiple myeloma, heavily pretreated for the past 9 years, currently on watchful observation with increasing lambda light chain to 700 milligram range, no evidence of renal insufficiency, the patient is scheduled to have rectal adenocarcinoma surgery in the next week, with this fast progression of lambda light chain after the surgery I believe the patient could be treated with Daratumumab 16 milligram/kilogram every week for the 2 month with Decadron 20 milligram PO weekly and then will try to add Velcade 1 3 milligram/meter squared every week 3 weeks on 1 week off    stage I ( pT2, pNX, grade 2) rectal adenocarcinoma proven by biopsy for, awaiting colorectal conference report to determine if the patient needs any adjuvant or neoadjuvant chemo / radiation therapy however the priority for multiple myeloma treatment  Port-A-Cath placement        Chief Complaint  Chief Complaint: Chief Complaint:   The patient presents to the office today with Dyspnea, multiple myeloma, Lung cancer, Now with rectal cancer  History of Present Illness  HPI: #1 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, multiple compression fractures, status post kyphoplasty to L1, L2, L5, treated with radiation therapy to the left intra trochanteric area, received melphalan/Velcade/prednisone in October 2010 for 4 cycles with excellent response and normalization of lambda light chain, had been on Velcade maintenance till January 2013 when lambda light chain went up to 111,treated with Velcade 1 3 mg meter square weekly 3 weeks on one week off, Decadron 20 mg by mouth weekly, lambda light chain down to 30 then creeping up to 110, on Revlimid 15 mg by mouth every other day  Lambda light chain normal at 28 however he reported skin rash on the upper chest area we stopped the Revlimid for 2 month  Then reinitiated Revlimid 5 mg by mouth daily  #2  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 stage I, with progression by scan, status post resection of the right upper lobe with few foci in the same lobe consistent with stage IIIA done in January 2015 adenocarcinoma, well differentiated  Status post Alimta/carboplatin x4 cycles finished in May 2015 and radiation therapy  #3  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  #4  Admitted to [facility] 3/3/16 - 3/8/16  Dx with RSV  5  admission on November 2017 with rectal adenocarcinoma moderately differentiated status post resection stage I ( pT2, pNX, grade 2) tumor measuring 4 x 3 6 centimeters below the peritoneal reflection low probability of MS H   Previous Therapy:   Revlimid 5 mg by mouth 3 weeks on 1 week off, On hold since May 2017 because of diarrhea   Current Therapy: Daratumumab 60 milligram/kilogram weekly dose 1   By the end of November 2017, dexamethasone 20 milligram PO weekly         Review of Systems  Complete-Male:   Constitutional: no fever, not feeling poorly, no recent weight gain, no chills, not feeling tired and no recent weight loss  Eyes: No complaints of eye pain, no red eyes, no discharge from eyes, no itchy eyes  ENT: no complaints of earache, no hearing loss, no nosebleeds, no nasal discharge, no sore throat, no hoarseness and no sore throat  Cardiovascular: lower extremity edema, but no chest pain and no palpitations  Respiratory: shortness of breath and wheezing, but no cough, no shortness of breath during exertion and no PND  Gastrointestinal: diarrhea, but no abdominal pain, no nausea and no constipation  Genitourinary: No complaints of dysuria, no incontinence, no hesitancy, no nocturia, no genital lesion, no testicular pain  Musculoskeletal: lower back pain, but No complaints of arthralgia, no myalgias, no joint swelling or stiffness, no limb pain or swelling, no arthralgias and no myalgias  Integumentary: No complaints of skin rash or skin lesions, no itching, no skin wound, no dry skin, no itching and no skin wound  Neurological: no headache, no tingling, no confusion, no dizziness, no limb weakness and no fainting  Psychiatric: Is not suicidal, no sleep disturbances, no anxiety or depression, no change in personality, no emotional problems  Endocrine: No complaints of proptosis, no hot flashes, no muscle weakness, no erectile dysfunction, no deepening of the voice, no feelings of weakness  Hematologic/Lymphatic: No complaints of swollen glands, no swollen glands in the neck, does not bleed easily, no easy bruising  Active Problems    1  Abnormal chest CT (793 2) (R93 8)   2  Adenocarcinoma of lung (162 9) (C34 90)   3  Bacterial pneumonia (482 9) (J15 9)   4  Chronic diarrhea (787 91) (K52 9)   5  Chronic rhinitis (472 0) (J31 0)   6  COPD exacerbation (491 21) (J44 1)   7  Cough (786 2) (R05)   8  Enlarged prostate without lower urinary tract symptoms (luts) (600 00) (N40 0)   9  Multiple myeloma (203 00) (C90 00)   10   Obstructive sleep apnea (327 23) (G47 33)   11  Palpitations (785 1) (R00 2)   12  Preop cardiovascular exam (V72 81) (Z01 810)   13  Preop pulmonary/respiratory exam (V72 82) (Z01 811)   14  Primary cancer of rectum (154 1) (C20)   15  Pulmonary emphysema (492 8) (J43 9)   16  Rectal mass (787 99) (K62 9)   17  Streptococcus pneumoniae pneumonia (12) (J13)   18  Weakness (780 79) (R53 1)    Past Medical History    1  History of Benign prostatic hypertrophy with lower urinary tract symptoms (LUTS)   (600 01) (N40 1)   2  History of Fall, accidental (E888 9) (W19 XXXA)   3  History of diarrhea (V12 79) (Z87 898)   4  History of edema (V13 89) (Z87 898)   5  History of nausea (V12 79) (Z87 898)   6  History of sleep apnea (V13 89) (Z86 69)   7  History of Radiation pneumonitis (508 0) (J70 0)   8  History of Special screening examination for neoplasm of prostate (V76 44) (Z12 5)    Surgical History    1  History of Back Surgery   2  History of Cholecystectomy   3  History of Cholecystotomy   4  History of Inguinal Hernia Repair   5  History of Repeat Lithotomy    Family History  Mother    1  Denied: Family history of colonic polyps   2  Denied: Family history of Crohn's disease   3  Denied: Family history of liver disease   4  Family history of Osteoarthritis (V17 7)  Father    5  Family history of Colon cancer   6  Denied: Family history of colonic polyps   7  Denied: Family history of Crohn's disease   8  Denied: Family history of liver disease   9  Family history of Heart Disease (V17 49)   10  Family history of Lung Cancer (V16 1)  Sister    6  Family history of Type 1 Diabetes Mellitus    Social History    · Being A Social Drinker   · Caffeine Use   · Consumes alcohol occasionally (V49 89) (Z78 9)   · Denied: Drug use (305 90) (F19 90)   · Former smoker (L10 72) (E91 748)   ·     Current Meds   1  Co Q-10 Vitamin E Fish Oil 97-03- Oral Capsule; take 1 capsule daily; Therapy: (Recorded:19Mar2014) to Recorded   2   Dexamethasone 4 MG Oral Tablet; 5 tabs po weekly with food; Therapy: 07VOV5794 to (Evaluate:57Aui5780)  Requested for: 26Oct2017; Last   Rx:26Oct2017 Ordered   3  Finasteride 5 MG Oral Tablet; Take 1 tablet by mouth at bedtime; Therapy: 87KRO1615 to (Bridget Cooper)  Requested for: 46QVB2862; Last   Rx:28Nov2016 Ordered   4  Imodium CAPS; as directed; Therapy: (Recorded:64Ygi0816) to Recorded   5  Ipratropium Bromide 0 03 % Nasal Solution; USE 2 SPRAYS IN EACH NOSTRIL TWICE   DAILY; Therapy: 29IMY6183 to (Evaluate:13Flh9722)  Requested for: 07Kop2785; Last   Rx:96Wxy5047 Ordered   6  Ipratropium-Albuterol 0 5-2 5 (3) MG/3ML Inhalation Solution; INHALE 1 UNIT DOSE   Twice daily And every 6 hours if needed for shortness of breath; Therapy: 85XXY6177 to (Mara Palacios)  Requested for: 91Bic8782; Last   Rx:54Yeb2353 Ordered   7  Mucinex 600 MG Oral Tablet Extended Release 12 Hour; Therapy: 84LJL2997 to Recorded   8  Omega 3 CAPS; take 1 capsule daily; Therapy: (Recorded:19Mar2014) to Recorded   9  Proventil  (90 Base) MCG/ACT Inhalation Aerosol Solution; INHALE 2 PUFFS   EVERY 4-6 HOURS AS NEEDED  Requested for: 97NNU2660; Last Rx:36Lfe5382 Ordered   10  Rapaflo CAPS; TAKE 1 CAPSULE DAILY WITH FOOD; Therapy: (Recorded:85Rur2387) to Recorded   11  Spiriva Respimat 1 25 MCG/ACT Inhalation Aerosol Solution; INHALE 2 PUFFS BY    MOUTH EVERY DAY; Therapy: 60AIV5118 to (Evaluate:27Mar2018)  Requested for: 13KIQ3251; Last    Rx:27Nov2017 Ordered   12  Spiriva Respimat 2 5 MCG/ACT Inhalation Aerosol Solution; INHALE 2 PUFFS ONCE    DAILY; Therapy: 53TJH6894 to (Evaluate:17Jan2018); Last Rx:83Ozl2300 Ordered   13  Vitamin D 1000 UNIT Oral Tablet; TAKE 1 TABLET DAILY; Therapy: (Recorded:19Mar2014) to Recorded    Allergies    1  Lipitor TABS   2  Sulfa Drugs   3   Penicillins    Vitals  Vital Signs    Recorded: 12AWX6296 12:59PM   Temperature 97 7 F   Heart Rate 95   Respiration 16   Systolic 850   Diastolic 68   Height 5 ft 7 5 in   Weight 182 lb 8 0 oz   BMI Calculated 28 16   BSA Calculated 1 96   O2 Saturation 97     Physical Exam    Constitutional   General appearance: No acute distress, well appearing and well nourished  Eyes   Conjunctiva and lids: No swelling, erythema, or discharge  Pupils and irises: Equal, round and reactive to light  Ears, Nose, Mouth, and Throat   External inspection of ears and nose: Normal     Nasal mucosa, septum, and turbinates: Normal without edema or erythema  Oropharynx: Normal with no erythema, edema, exudate or lesions  Pulmonary   Auscultation of lungs: Abnormal   Auscultation of the lungs revealed diffuse wheezing and expiratory wheezing  rales/crackles over both bases  Cardiovascular   Auscultation of heart: Normal rate and rhythm, normal S1 and S2, without murmurs  Examination of extremities for edema and/or varicosities: Abnormal   bilateral ankle 1+ pitting edema  Carotid pulses: Normal     Abdomen   Abdomen: Non-tender, no masses  Liver and spleen: No hepatomegaly or splenomegaly  Lymphatic   Palpation of lymph nodes in neck: No lymphadenopathy  Musculoskeletal single point cane  Inspection/palpation of joints, bones, and muscles: Normal     Skin   Skin and subcutaneous tissue: Normal without rashes or lesions  Neurologic   Cranial nerves: Cranial nerves 2-12 intact  Sensation: No sensory loss      Psychiatric   Orientation to person, place and time: Normal     Mood and affect: Normal         ECOG 1       Results/Data  (1) CBC/PLT/DIFF 63RIL7674 11:06AM Benton Darby [facility])   TW Order Number: [id number]     Test Name Result Flag Reference   WBC COUNT 5 22 Thousand/uL  4 31-10 16   RBC COUNT 4 27 Million/uL  3 88-5 62   HEMOGLOBIN 12 7 g/dL  12 0-17 0   HEMATOCRIT 37 6 %  36 5-49 3   MCV 88 fL  82-98   MCH 29 7 pg  26 8-34 3   MCHC 33 8 g/dL  31 4-37 4   RDW 14 5 %  11 6-15 1   MPV 9 1 fL  8 9-12 7   PLATELET COUNT 799 Thousands/uL  149-390   nRBC AUTOMATED 0 /100 WBCs     NEUTROPHILS RELATIVE PERCENT 72 %  43-75   LYMPHOCYTES RELATIVE PERCENT 17 %  14-44   MONOCYTES RELATIVE PERCENT 9 %  4-12   EOSINOPHILS RELATIVE PERCENT 2 %  0-6   BASOPHILS RELATIVE PERCENT 0 %  0-1   NEUTROPHILS ABSOLUTE COUNT 3 75 Thousands/? ??L  1 85-7 62   LYMPHOCYTES ABSOLUTE COUNT 0 86 Thousands/? ??L  0 60-4 47   MONOCYTES ABSOLUTE COUNT 0 47 Thousand/? ??L  0 17-1 22   EOSINOPHILS ABSOLUTE COUNT 0 12 Thousand/? ??L  0 00-0 61   BASOPHILS ABSOLUTE COUNT 0 01 Thousands/? ??L  0 00-0 10     (1) COMPREHENSIVE METABOLIC PANEL 51MFM1960 76:70ZE Veronique Bumpers [place])   TW Order Number: [id number]     Test Name Result Flag Reference   SODIUM 140 mmol/L  136-145   POTASSIUM 4 1 mmol/L  3 5-5 3   CHLORIDE 105 mmol/L  100-108   CARBON DIOXIDE 29 mmol/L  21-32   ANION GAP (CALC) 6 mmol/L  4-13   BLOOD UREA NITROGEN 21 mg/dL  5-25   CREATININE 1 03 mg/dL  0 60-1 30   Standardized to IDMS reference method   CALCIUM 8 9 mg/dL  8 3-10 1   BILI, TOTAL 0 65 mg/dL  0 20-1 00   ALK PHOSPHATAS 94 U/L     ALT (SGPT) 45 U/L  12-78   Specimen collection should occur prior to Sulfasalazine and/or Sulfapyridine administration due to the potential for falsely depressed results  AST(SGOT) 21 U/L  5-45   Specimen collection should occur prior to Sulfasalazine administration due to the potential for falsely depressed results  ALBUMIN 3 1 g/dL L 3 5-5 0   TOTAL PROTEIN 6 5 g/dL  6 4-8 2   eGFR 68 ml/min/1 73sq m     National Kidney Disease Education Program recommendations are as follows:  GFR calculation is accurate only with a steady state creatinine  Chronic Kidney disease less than 60 ml/min/1 73 sq  meters  Kidney failure less than 15 ml/min/1 73 sq  meters  GLUCOSE FASTING 95 mg/dL  65-99   Specimen collection should occur prior to Sulfasalazine administration due to the potential for falsely depressed results   Specimen collection should occur prior to Sulfapyridine administration due to the potential for falsely elevated results  (1) FREE LIGHT CHAINS, SERUM 40XIO4020 11:06AM Chantell Kendall [facility])   TW Order Number: [id number]     Test Name Result Flag Reference   FREE KAPPA LIGHT CHAINS, SERUM 3 2 mg/L L 3 3 - 19 4   FREE LAMBDA LIGHT CHAINS, SERUM 546 2 mg/L H 5 7 - 26 3   KAPPA/LAMBDA RATIO, SERUM 0 01 L 0 26 - 1 65   Performed at:  [facility] [employer]  [id number]  : Maxim Mckeon MD, Phone:  [phone]     Future Appointments    Date/Time Provider Specialty Site   12/28/2017 11:40 AM DAVION Branham  Hematology Oncology [facility]     Signatures   Electronically signed by :  DAVION Lamb ; Nov 30 2017  5:30PM EST                       (Author)

## 2017-12-06 ENCOUNTER — TELEPHONE (OUTPATIENT)
Dept: INPATIENT UNIT | Facility: HOSPITAL | Age: 82
End: 2017-12-06

## 2017-12-06 ENCOUNTER — HOSPITAL ENCOUNTER (OUTPATIENT)
Dept: INFUSION CENTER | Facility: CLINIC | Age: 82
Discharge: HOME | End: 2017-12-06
Payer: MEDICARE

## 2017-12-06 VITALS
RESPIRATION RATE: 20 BRPM | DIASTOLIC BLOOD PRESSURE: 59 MMHG | WEIGHT: 178.35 LBS | SYSTOLIC BLOOD PRESSURE: 111 MMHG | TEMPERATURE: 97.6 F | BODY MASS INDEX: 27.03 KG/M2 | HEART RATE: 99 BPM

## 2017-12-06 LAB
ANISOCYTOSIS BLD QL SMEAR: PRESENT
BASOPHILS # BLD AUTO: 0.09 THOUSAND/UL (ref 0–0.1)
BASOPHILS NFR MAR MANUAL: 2 % (ref 0–1)
EOSINOPHIL # BLD AUTO: 0 THOUSAND/UL (ref 0–0.61)
EOSINOPHIL NFR BLD MANUAL: 0 % (ref 0–6)
ERYTHROCYTE [DISTWIDTH] IN BLOOD BY AUTOMATED COUNT: 15.8 % (ref 11.6–15.1)
HCT VFR BLD AUTO: 34.4 % (ref 36.5–49.3)
HGB BLD-MCNC: 11.8 G/DL (ref 12–17)
LYMPHOCYTES # BLD AUTO: 0.69 THOUSAND/UL (ref 0.6–4.47)
LYMPHOCYTES # BLD AUTO: 16 % (ref 14–44)
MCH RBC QN AUTO: 29.7 PG (ref 26.8–34.3)
MCHC RBC AUTO-ENTMCNC: 34.2 G/DL (ref 31.4–37.4)
MCV RBC AUTO: 87 FL (ref 82–98)
MONOCYTES # BLD AUTO: 0.26 THOUSAND/UL (ref 0–1.22)
MONOCYTES NFR BLD AUTO: 6 % (ref 4–12)
NEUTS BAND NFR BLD MANUAL: 1 % (ref 0–8)
NEUTS SEG # BLD: 3.27 THOUSAND/UL (ref 1.81–6.82)
NEUTS SEG NFR BLD AUTO: 75 % (ref 43–75)
PLATELET # BLD AUTO: 207 THOUSANDS/UL (ref 149–390)
PLATELET BLD QL SMEAR: ADEQUATE
PMV BLD AUTO: 6.1 FL (ref 8.9–12.7)
RBC # BLD AUTO: 3.97 MILLION/UL (ref 3.88–5.62)
TOTAL CELLS COUNTED SPEC: 100
WBC # BLD AUTO: 4.3 THOUSAND/UL (ref 4.31–10.16)
WBC NRBC COR # BLD: 4.3 THOUSAND/UL (ref 4.31–10.16)

## 2017-12-06 PROCEDURE — 96413 CHEMO IV INFUSION 1 HR: CPT

## 2017-12-06 PROCEDURE — 96415 CHEMO IV INFUSION ADDL HR: CPT

## 2017-12-06 PROCEDURE — 96375 TX/PRO/DX INJ NEW DRUG ADDON: CPT

## 2017-12-06 PROCEDURE — 85007 BL SMEAR W/DIFF WBC COUNT: CPT | Performed by: INTERNAL MEDICINE

## 2017-12-06 PROCEDURE — 85027 COMPLETE CBC AUTOMATED: CPT | Performed by: INTERNAL MEDICINE

## 2017-12-06 PROCEDURE — 96367 TX/PROPH/DG ADDL SEQ IV INF: CPT

## 2017-12-06 RX ADMIN — ACETAMINOPHEN 650 MG: 325 TABLET, FILM COATED ORAL at 08:09

## 2017-12-06 RX ADMIN — SODIUM CHLORIDE: 0.9 INJECTION, SOLUTION INTRAVENOUS at 08:30

## 2017-12-06 RX ADMIN — DARATUMUMAB 1300 MG: 100 INJECTION, SOLUTION, CONCENTRATE INTRAVENOUS at 09:30

## 2017-12-06 RX ADMIN — DIPHENHYDRAMINE HYDROCHLORIDE 25 MG: 50 INJECTION, SOLUTION INTRAMUSCULAR; INTRAVENOUS at 08:15

## 2017-12-06 RX ADMIN — SODIUM CHLORIDE 20 ML/HR: 0.9 INJECTION, SOLUTION INTRAVENOUS at 08:15

## 2017-12-06 NOTE — PLAN OF CARE
Problem: Potential for Falls  Goal: Patient will remain free of falls  INTERVENTIONS:  - Assess patient frequently for physical needs  -  Identify cognitive and physical deficits and behaviors that affect risk of falls    -  [place] fall precautions as indicated by assessment   - Educate patient/family on patient safety including physical limitations  - Instruct patient to call for assistance with activity based on assessment  - Modify environment to reduce risk of injury  - Consider OT/PT consult to assist with strengthening/mobility   Outcome: Progressing

## 2017-12-06 NOTE — PROGRESS NOTES
Patient tolerated infusion well with no complications or adverse events  Pt denies any discomfort  Pt and wife are aware of all future appointments   Refused AVS

## 2017-12-07 ENCOUNTER — HOSPITAL ENCOUNTER (OUTPATIENT)
Dept: RADIOLOGY | Facility: HOSPITAL | Age: 82
Discharge: HOME | End: 2017-12-07
Attending: INTERNAL MEDICINE | Admitting: INTERNAL MEDICINE
Payer: MEDICARE

## 2017-12-07 VITALS
WEIGHT: 180 LBS | TEMPERATURE: 97.5 F | HEART RATE: 99 BPM | HEIGHT: 67 IN | OXYGEN SATURATION: 96 % | SYSTOLIC BLOOD PRESSURE: 115 MMHG | DIASTOLIC BLOOD PRESSURE: 82 MMHG | RESPIRATION RATE: 18 BRPM | BODY MASS INDEX: 28.25 KG/M2

## 2017-12-07 DIAGNOSIS — C20 MALIGNANT NEOPLASM OF RECTUM (HCC): ICD-10-CM

## 2017-12-07 DIAGNOSIS — C34.90 MALIGNANT NEOPLASM OF UNSPECIFIED PART OF UNSPECIFIED BRONCHUS OR LUNG (HCC): ICD-10-CM

## 2017-12-07 DIAGNOSIS — C90.00 MYELOMA ASSOCIATED AMYLOIDOSIS (HCC): ICD-10-CM

## 2017-12-07 DIAGNOSIS — E85.9 MYELOMA ASSOCIATED AMYLOIDOSIS (HCC): ICD-10-CM

## 2017-12-07 DIAGNOSIS — C90.00 MULTIPLE MYELOMA NOT HAVING ACHIEVED REMISSION (HCC): ICD-10-CM

## 2017-12-07 PROCEDURE — 99152 MOD SED SAME PHYS/QHP 5/>YRS: CPT

## 2017-12-07 PROCEDURE — 77001 FLUOROGUIDE FOR VEIN DEVICE: CPT

## 2017-12-07 PROCEDURE — 99153 MOD SED SAME PHYS/QHP EA: CPT

## 2017-12-07 PROCEDURE — 76937 US GUIDE VASCULAR ACCESS: CPT

## 2017-12-07 PROCEDURE — C1788 PORT, INDWELLING, IMP: HCPCS

## 2017-12-07 PROCEDURE — 36561 INSERT TUNNELED CV CATH: CPT

## 2017-12-07 RX ORDER — MIDAZOLAM HYDROCHLORIDE 1 MG/ML
INJECTION INTRAMUSCULAR; INTRAVENOUS CODE/TRAUMA/SEDATION MEDICATION
Status: COMPLETED | OUTPATIENT
Start: 2017-12-07 | End: 2017-12-07

## 2017-12-07 RX ORDER — SODIUM CHLORIDE 9 MG/ML
75 INJECTION, SOLUTION INTRAVENOUS CONTINUOUS
Status: DISCONTINUED | OUTPATIENT
Start: 2017-12-07 | End: 2017-12-07 | Stop reason: HOSPADM

## 2017-12-07 RX ORDER — FENTANYL CITRATE 50 UG/ML
INJECTION, SOLUTION INTRAMUSCULAR; INTRAVENOUS CODE/TRAUMA/SEDATION MEDICATION
Status: COMPLETED | OUTPATIENT
Start: 2017-12-07 | End: 2017-12-07

## 2017-12-07 RX ADMIN — MIDAZOLAM 0.5 MG: 1 INJECTION INTRAMUSCULAR; INTRAVENOUS at 13:26

## 2017-12-07 RX ADMIN — FENTANYL CITRATE 50 MCG: 50 INJECTION, SOLUTION INTRAMUSCULAR; INTRAVENOUS at 13:34

## 2017-12-07 RX ADMIN — FENTANYL CITRATE 50 MCG: 50 INJECTION, SOLUTION INTRAMUSCULAR; INTRAVENOUS at 13:12

## 2017-12-07 RX ADMIN — FENTANYL CITRATE 50 MCG: 50 INJECTION, SOLUTION INTRAMUSCULAR; INTRAVENOUS at 13:26

## 2017-12-07 RX ADMIN — SODIUM CHLORIDE 75 ML/HR: 0.9 INJECTION, SOLUTION INTRAVENOUS at 11:30

## 2017-12-07 RX ADMIN — MIDAZOLAM 0.5 MG: 1 INJECTION INTRAMUSCULAR; INTRAVENOUS at 13:34

## 2017-12-07 RX ADMIN — MIDAZOLAM 1 MG: 1 INJECTION INTRAMUSCULAR; INTRAVENOUS at 13:12

## 2017-12-07 NOTE — PROGRESS NOTES
Vascular and Interventional Radiology Pre Procedure Note    Diagnosis:  Multiple myeloma, rectal cancer    Procedure:  Port placement    HPI:  67-year-old male with a history of recently diagnosed rectal cancer and multiple myeloma, as well as prior history of lung cancer presenting for placement of port for chemotherapy purposes  Patient denies chest pain, shortness of breath, fever, chills, nausea and vomiting  Patient will be receiving radiation to his pelvis for the rectal cancer, per he and family  Family does not know of any plans for radiation to his neck or chest at this time  Exam:  General:  Awake, alert, oriented x3, no acute distress  Neck:  Soft, supple, nontender  Chest:  Nontender, no deformity  Abdomen:  Soft, nontender, nondistended, no guarding, no rebound  Cardiac:  S1-S2, regular rate and rhythm  Lungs:  Bilateral air entry, clear to auscultation bilaterally      Labs:  Hematology:  WBC 4 3, hemoglobin 11 8, hematocrit 34 4, platelets 408  Chemistry:  11/27/2017 sodium 140, potassium 4 1, chloride 105, carbon dioxide 29, BUN 21, creatinine 1 03, glucose 95  Coagulation:  Not required by department policy    Imaging:  CT scan November 16, 2017 reviewed  The right internal jugular vein appears patent and of good size as does the superior vena cava  No significant mediastinal lymphadenopathy  Bedside ultrasound was also performed in the preoperative area demonstrating normal sized right internal jugular vein  Plan:  Proceed with right internal jugular venous port placement    I discussed the procedure, its details, risks, benefits and alternatives with the patient  All questions were answered  Patient elects to proceed with the procedure  H&P was reviewed

## 2017-12-12 RX ORDER — SODIUM CHLORIDE 9 MG/ML
20 INJECTION, SOLUTION INTRAVENOUS CONTINUOUS
Status: DISCONTINUED | OUTPATIENT
Start: 2017-12-13 | End: 2017-12-16 | Stop reason: HOSPADM

## 2017-12-12 RX ORDER — ACETAMINOPHEN 325 MG/1
650 TABLET ORAL ONCE
Status: COMPLETED | OUTPATIENT
Start: 2017-12-13 | End: 2017-12-13

## 2017-12-13 ENCOUNTER — HOSPITAL ENCOUNTER (OUTPATIENT)
Dept: INFUSION CENTER | Facility: CLINIC | Age: 82
Discharge: HOME | End: 2017-12-13
Payer: MEDICARE

## 2017-12-13 VITALS
RESPIRATION RATE: 18 BRPM | TEMPERATURE: 97 F | WEIGHT: 177.91 LBS | SYSTOLIC BLOOD PRESSURE: 119 MMHG | HEART RATE: 93 BPM | DIASTOLIC BLOOD PRESSURE: 61 MMHG | BODY MASS INDEX: 27.86 KG/M2

## 2017-12-13 LAB
ANISOCYTOSIS BLD QL SMEAR: PRESENT
BASOPHILS # BLD AUTO: 0 THOUSAND/UL (ref 0–0.1)
BASOPHILS NFR MAR MANUAL: 0 % (ref 0–1)
EOSINOPHIL # BLD AUTO: 0 THOUSAND/UL (ref 0–0.61)
EOSINOPHIL NFR BLD MANUAL: 0 % (ref 0–6)
ERYTHROCYTE [DISTWIDTH] IN BLOOD BY AUTOMATED COUNT: 15.4 % (ref 11.6–15.1)
HCT VFR BLD AUTO: 35.9 % (ref 36.5–49.3)
HGB BLD-MCNC: 11.8 G/DL (ref 12–17)
LYMPHOCYTES # BLD AUTO: 0.57 THOUSAND/UL (ref 0.6–4.47)
LYMPHOCYTES # BLD AUTO: 14 % (ref 14–44)
MCH RBC QN AUTO: 28.4 PG (ref 26.8–34.3)
MCHC RBC AUTO-ENTMCNC: 32.9 G/DL (ref 31.4–37.4)
MCV RBC AUTO: 86 FL (ref 82–98)
MONOCYTES # BLD AUTO: 0.08 THOUSAND/UL (ref 0–1.22)
MONOCYTES NFR BLD AUTO: 2 % (ref 4–12)
NEUTS BAND NFR BLD MANUAL: 4 % (ref 0–8)
NEUTS SEG # BLD: 3.44 THOUSAND/UL (ref 1.81–6.82)
NEUTS SEG NFR BLD AUTO: 80 % (ref 43–75)
PLATELET # BLD AUTO: 197 THOUSANDS/UL (ref 149–390)
PLATELET BLD QL SMEAR: ADEQUATE
PMV BLD AUTO: 5.9 FL (ref 8.9–12.7)
RBC # BLD AUTO: 4.17 MILLION/UL (ref 3.88–5.62)
TOTAL CELLS COUNTED SPEC: 100
WBC # BLD AUTO: 4.1 THOUSAND/UL (ref 4.31–10.16)
WBC NRBC COR # BLD: 4.1 THOUSAND/UL (ref 4.31–10.16)

## 2017-12-13 PROCEDURE — 96413 CHEMO IV INFUSION 1 HR: CPT

## 2017-12-13 PROCEDURE — 96375 TX/PRO/DX INJ NEW DRUG ADDON: CPT

## 2017-12-13 PROCEDURE — 85007 BL SMEAR W/DIFF WBC COUNT: CPT | Performed by: INTERNAL MEDICINE

## 2017-12-13 PROCEDURE — 96367 TX/PROPH/DG ADDL SEQ IV INF: CPT

## 2017-12-13 PROCEDURE — 85027 COMPLETE CBC AUTOMATED: CPT | Performed by: INTERNAL MEDICINE

## 2017-12-13 PROCEDURE — 96415 CHEMO IV INFUSION ADDL HR: CPT

## 2017-12-13 RX ADMIN — SODIUM CHLORIDE: 0.9 INJECTION, SOLUTION INTRAVENOUS at 09:40

## 2017-12-13 RX ADMIN — Medication 300 UNITS: at 14:04

## 2017-12-13 RX ADMIN — DARATUMUMAB 1300 MG: 100 INJECTION, SOLUTION, CONCENTRATE INTRAVENOUS at 10:34

## 2017-12-13 RX ADMIN — DIPHENHYDRAMINE HYDROCHLORIDE 25 MG: 50 INJECTION, SOLUTION INTRAMUSCULAR; INTRAVENOUS at 09:20

## 2017-12-13 RX ADMIN — SODIUM CHLORIDE 20 ML/HR: 0.9 INJECTION, SOLUTION INTRAVENOUS at 09:20

## 2017-12-13 RX ADMIN — ACETAMINOPHEN 650 MG: 325 TABLET, FILM COATED ORAL at 09:46

## 2017-12-13 NOTE — PROGRESS NOTES
Patient tolerated infusion well  Pt denies any discomfort  Pt and wife are aware of all future appointments

## 2017-12-19 RX ORDER — SODIUM CHLORIDE 9 MG/ML
20 INJECTION, SOLUTION INTRAVENOUS CONTINUOUS
Status: DISCONTINUED | OUTPATIENT
Start: 2017-12-20 | End: 2017-12-23 | Stop reason: HOSPADM

## 2017-12-19 RX ORDER — METHYLPREDNISOLONE SODIUM SUCCINATE 125 MG/2ML
100 INJECTION, POWDER, LYOPHILIZED, FOR SOLUTION INTRAMUSCULAR; INTRAVENOUS ONCE
Status: DISCONTINUED | OUTPATIENT
Start: 2017-12-20 | End: 2017-12-20

## 2017-12-19 RX ORDER — ACETAMINOPHEN 325 MG/1
650 TABLET ORAL ONCE
Status: COMPLETED | OUTPATIENT
Start: 2017-12-20 | End: 2017-12-20

## 2017-12-20 ENCOUNTER — HOSPITAL ENCOUNTER (OUTPATIENT)
Dept: INFUSION CENTER | Facility: CLINIC | Age: 82
Discharge: HOME | End: 2017-12-22
Payer: MEDICARE

## 2017-12-20 VITALS
DIASTOLIC BLOOD PRESSURE: 63 MMHG | RESPIRATION RATE: 18 BRPM | BODY MASS INDEX: 27.03 KG/M2 | TEMPERATURE: 97.1 F | HEART RATE: 90 BPM | SYSTOLIC BLOOD PRESSURE: 104 MMHG | WEIGHT: 178.35 LBS | HEIGHT: 68 IN

## 2017-12-20 PROCEDURE — 96367 TX/PROPH/DG ADDL SEQ IV INF: CPT

## 2017-12-20 PROCEDURE — 96375 TX/PRO/DX INJ NEW DRUG ADDON: CPT

## 2017-12-20 PROCEDURE — 96413 CHEMO IV INFUSION 1 HR: CPT

## 2017-12-20 PROCEDURE — 96415 CHEMO IV INFUSION ADDL HR: CPT

## 2017-12-20 RX ADMIN — SODIUM CHLORIDE: 9 INJECTION, SOLUTION INTRAVENOUS at 09:30

## 2017-12-20 RX ADMIN — Medication 300 UNITS: at 13:55

## 2017-12-20 RX ADMIN — DIPHENHYDRAMINE HYDROCHLORIDE 25 MG: 50 INJECTION, SOLUTION INTRAMUSCULAR; INTRAVENOUS at 09:15

## 2017-12-20 RX ADMIN — SODIUM CHLORIDE 20 ML/HR: 0.9 INJECTION, SOLUTION INTRAVENOUS at 09:15

## 2017-12-20 RX ADMIN — DARATUMUMAB 1300 MG: 100 INJECTION, SOLUTION, CONCENTRATE INTRAVENOUS at 10:22

## 2017-12-20 RX ADMIN — ACETAMINOPHEN 650 MG: 325 TABLET, FILM COATED ORAL at 09:10

## 2017-12-20 NOTE — PLAN OF CARE
Problem: Potential for Falls  Goal: Patient will remain free of falls  INTERVENTIONS:  - Assess patient frequently for physical needs  -  Identify cognitive and physical deficits and behaviors that affect risk of falls    -  [place] fall precautions as indicated by assessment   - Educate patient/family on patient safety including physical limitations  - Instruct patient to call for assistance with activity based on assessment  - Modify environment to reduce risk of injury  - Consider OT/PT consult to assist with strengthening/mobility   Outcome: Progressing

## 2017-12-21 RX ORDER — SODIUM CHLORIDE 9 MG/ML
20 INJECTION, SOLUTION INTRAVENOUS CONTINUOUS
Status: DISCONTINUED | OUTPATIENT
Start: 2017-12-26 | End: 2017-12-29 | Stop reason: HOSPADM

## 2017-12-21 RX ORDER — ACETAMINOPHEN 325 MG/1
650 TABLET ORAL ONCE
Status: COMPLETED | OUTPATIENT
Start: 2017-12-26 | End: 2017-12-26

## 2017-12-23 ENCOUNTER — APPOINTMENT (OUTPATIENT)
Dept: LAB | Facility: HOSPITAL | Age: 82
End: 2017-12-23
Attending: INTERNAL MEDICINE
Payer: MEDICARE

## 2017-12-23 DIAGNOSIS — C90.00 MULTIPLE MYELOMA NOT HAVING ACHIEVED REMISSION (HCC): ICD-10-CM

## 2017-12-23 LAB
ALBUMIN SERPL BCP-MCNC: 2.6 G/DL (ref 3.5–5)
ALP SERPL-CCNC: 80 U/L (ref 46–116)
ALT SERPL W P-5'-P-CCNC: 25 U/L (ref 12–78)
ANION GAP SERPL CALCULATED.3IONS-SCNC: 6 MMOL/L (ref 4–13)
AST SERPL W P-5'-P-CCNC: 15 U/L (ref 5–45)
BASOPHILS # BLD AUTO: 0.01 THOUSANDS/ΜL (ref 0–0.1)
BASOPHILS NFR BLD AUTO: 0 % (ref 0–1)
BILIRUB SERPL-MCNC: 0.76 MG/DL (ref 0.2–1)
BUN SERPL-MCNC: 18 MG/DL (ref 5–25)
CALCIUM SERPL-MCNC: 8.8 MG/DL (ref 8.3–10.1)
CHLORIDE SERPL-SCNC: 107 MMOL/L (ref 100–108)
CO2 SERPL-SCNC: 28 MMOL/L (ref 21–32)
CREAT SERPL-MCNC: 1.01 MG/DL (ref 0.6–1.3)
EOSINOPHIL # BLD AUTO: 0.02 THOUSAND/ΜL (ref 0–0.61)
EOSINOPHIL NFR BLD AUTO: 1 % (ref 0–6)
ERYTHROCYTE [DISTWIDTH] IN BLOOD BY AUTOMATED COUNT: 15 % (ref 11.6–15.1)
GFR SERPL CREATININE-BSD FRML MDRD: 69 ML/MIN/1.73SQ M
GLUCOSE SERPL-MCNC: 101 MG/DL (ref 65–140)
HCT VFR BLD AUTO: 37.1 % (ref 36.5–49.3)
HGB BLD-MCNC: 12.2 G/DL (ref 12–17)
LYMPHOCYTES # BLD AUTO: 0.65 THOUSANDS/ΜL (ref 0.6–4.47)
LYMPHOCYTES NFR BLD AUTO: 17 % (ref 14–44)
MCH RBC QN AUTO: 29.3 PG (ref 26.8–34.3)
MCHC RBC AUTO-ENTMCNC: 32.9 G/DL (ref 31.4–37.4)
MCV RBC AUTO: 89 FL (ref 82–98)
MONOCYTES # BLD AUTO: 0.29 THOUSAND/ΜL (ref 0.17–1.22)
MONOCYTES NFR BLD AUTO: 7 % (ref 4–12)
NEUTROPHILS # BLD AUTO: 2.96 THOUSANDS/ΜL (ref 1.85–7.62)
NEUTS SEG NFR BLD AUTO: 75 % (ref 43–75)
NRBC BLD AUTO-RTO: 0 /100 WBCS
PLATELET # BLD AUTO: 138 THOUSANDS/UL (ref 149–390)
PMV BLD AUTO: 9.2 FL (ref 8.9–12.7)
POTASSIUM SERPL-SCNC: 3.5 MMOL/L (ref 3.5–5.3)
PROT SERPL-MCNC: 5.5 G/DL (ref 6.4–8.2)
RBC # BLD AUTO: 4.16 MILLION/UL (ref 3.88–5.62)
SODIUM SERPL-SCNC: 141 MMOL/L (ref 136–145)
WBC # BLD AUTO: 3.93 THOUSAND/UL (ref 4.31–10.16)

## 2017-12-23 PROCEDURE — 83883 ASSAY NEPHELOMETRY NOT SPEC: CPT

## 2017-12-23 PROCEDURE — 36415 COLL VENOUS BLD VENIPUNCTURE: CPT

## 2017-12-23 PROCEDURE — 80053 COMPREHEN METABOLIC PANEL: CPT

## 2017-12-23 PROCEDURE — 85025 COMPLETE CBC W/AUTO DIFF WBC: CPT

## 2017-12-26 ENCOUNTER — HOSPITAL ENCOUNTER (OUTPATIENT)
Dept: INFUSION CENTER | Facility: CLINIC | Age: 82
Discharge: HOME | End: 2017-12-28
Payer: MEDICARE

## 2017-12-26 VITALS
SYSTOLIC BLOOD PRESSURE: 116 MMHG | BODY MASS INDEX: 27.58 KG/M2 | TEMPERATURE: 97 F | HEART RATE: 81 BPM | RESPIRATION RATE: 16 BRPM | HEIGHT: 68 IN | DIASTOLIC BLOOD PRESSURE: 66 MMHG | WEIGHT: 182 LBS

## 2017-12-26 PROCEDURE — 96417 CHEMO IV INFUS EACH ADDL SEQ: CPT

## 2017-12-26 PROCEDURE — 96367 TX/PROPH/DG ADDL SEQ IV INF: CPT

## 2017-12-26 PROCEDURE — 96415 CHEMO IV INFUSION ADDL HR: CPT

## 2017-12-26 PROCEDURE — 96413 CHEMO IV INFUSION 1 HR: CPT

## 2017-12-26 RX ADMIN — SODIUM CHLORIDE 20 ML/HR: 0.9 INJECTION, SOLUTION INTRAVENOUS at 08:27

## 2017-12-26 RX ADMIN — DIPHENHYDRAMINE HYDROCHLORIDE 25 MG: 50 INJECTION, SOLUTION INTRAMUSCULAR; INTRAVENOUS at 08:35

## 2017-12-26 RX ADMIN — DARATUMUMAB 1300 MG: 100 INJECTION, SOLUTION, CONCENTRATE INTRAVENOUS at 10:18

## 2017-12-26 RX ADMIN — ACETAMINOPHEN 650 MG: 325 TABLET, FILM COATED ORAL at 08:33

## 2017-12-26 RX ADMIN — SODIUM CHLORIDE 100 MG: 9 INJECTION, SOLUTION INTRAVENOUS at 09:04

## 2017-12-26 RX ADMIN — Medication 300 UNITS: at 13:45

## 2017-12-26 NOTE — PLAN OF CARE
Problem: Potential for Falls  Goal: Patient will remain free of falls  INTERVENTIONS:  - Assess patient frequently for physical needs  -  Identify cognitive and physical deficits and behaviors that affect risk of falls    -  [place] fall precautions as indicated by assessment   - Educate patient/family on patient safety including physical limitations  - Instruct patient to call for assistance with activity based on assessment  - Modify environment to reduce risk of injury  - Consider OT/PT consult to assist with strengthening/mobility   Outcome: Progressing

## 2017-12-26 NOTE — PROGRESS NOTES
Patient arrived on unit  Denies any discomforts  Last labs drawn on 12/23/2017  Cleared for treatment as per orders

## 2017-12-27 LAB
KAPPA LC FREE SER-MCNC: 3.4 MG/L (ref 3.3–19.4)
KAPPA LC FREE/LAMBDA FREE SER: 0.01 {RATIO} (ref 0.26–1.65)
LAMBDA LC FREE SERPL-MCNC: 267.7 MG/L (ref 5.7–26.3)

## 2017-12-28 ENCOUNTER — GENERIC CONVERSION - ENCOUNTER (OUTPATIENT)
Dept: OTHER | Facility: OTHER | Age: 82
End: 2017-12-28

## 2018-01-02 RX ORDER — ACETAMINOPHEN 325 MG/1
650 TABLET ORAL ONCE
Status: DISCONTINUED | OUTPATIENT
Start: 2018-01-03 | End: 2018-01-06 | Stop reason: HOSPADM

## 2018-01-02 RX ORDER — SODIUM CHLORIDE 9 MG/ML
20 INJECTION, SOLUTION INTRAVENOUS CONTINUOUS
Status: DISCONTINUED | OUTPATIENT
Start: 2018-01-03 | End: 2018-01-06 | Stop reason: HOSPADM

## 2018-01-03 ENCOUNTER — HOSPITAL ENCOUNTER (INPATIENT)
Facility: HOSPITAL | Age: 83
LOS: 3 days | Discharge: HOME HEALTH | DRG: 871 | End: 2018-01-06
Attending: EMERGENCY MEDICINE | Admitting: INTERNAL MEDICINE
Payer: MEDICARE

## 2018-01-03 ENCOUNTER — HOSPITAL ENCOUNTER (OUTPATIENT)
Dept: INFUSION CENTER | Facility: CLINIC | Age: 83
Discharge: HOME | End: 2018-01-05

## 2018-01-03 ENCOUNTER — APPOINTMENT (EMERGENCY)
Dept: RADIOLOGY | Facility: HOSPITAL | Age: 83
DRG: 871 | End: 2018-01-03
Payer: MEDICARE

## 2018-01-03 ENCOUNTER — APPOINTMENT (EMERGENCY)
Dept: CT IMAGING | Facility: HOSPITAL | Age: 83
DRG: 871 | End: 2018-01-03
Payer: MEDICARE

## 2018-01-03 DIAGNOSIS — J18.9 HCAP (HEALTHCARE-ASSOCIATED PNEUMONIA): Primary | ICD-10-CM

## 2018-01-03 DIAGNOSIS — C90.00 MULTIPLE MYELOMA (HCC): ICD-10-CM

## 2018-01-03 DIAGNOSIS — C34.90 ADENOCARCINOMA, LUNG (HCC): Chronic | ICD-10-CM

## 2018-01-03 PROBLEM — I95.9 ARTERIAL HYPOTENSION: Status: ACTIVE | Noted: 2018-01-03

## 2018-01-03 PROBLEM — A41.9 SEPSIS (HCC): Status: ACTIVE | Noted: 2018-01-03

## 2018-01-03 PROBLEM — I45.10 RIGHT BUNDLE BRANCH BLOCK: Status: ACTIVE | Noted: 2018-01-03

## 2018-01-03 PROBLEM — R00.0 TACHYCARDIA: Status: ACTIVE | Noted: 2018-01-03

## 2018-01-03 LAB
ALBUMIN SERPL BCP-MCNC: 2.8 G/DL (ref 3.5–5)
ALP SERPL-CCNC: 92 U/L (ref 46–116)
ALT SERPL W P-5'-P-CCNC: 19 U/L (ref 12–78)
ANION GAP SERPL CALCULATED.3IONS-SCNC: 7 MMOL/L (ref 4–13)
APTT PPP: 33 SECONDS (ref 23–35)
AST SERPL W P-5'-P-CCNC: 17 U/L (ref 5–45)
ATRIAL RATE: 120 BPM
BACTERIA UR QL AUTO: ABNORMAL /HPF
BASOPHILS # BLD AUTO: 0.01 THOUSANDS/ΜL (ref 0–0.1)
BASOPHILS NFR BLD AUTO: 0 % (ref 0–1)
BILIRUB SERPL-MCNC: 1.5 MG/DL (ref 0.2–1)
BILIRUB UR QL STRIP: NEGATIVE
BUN SERPL-MCNC: 13 MG/DL (ref 5–25)
CALCIUM SERPL-MCNC: 8.6 MG/DL (ref 8.3–10.1)
CHLORIDE SERPL-SCNC: 103 MMOL/L (ref 100–108)
CLARITY UR: CLEAR
CO2 SERPL-SCNC: 26 MMOL/L (ref 21–32)
COLOR UR: YELLOW
COLOR, POC: YELLOW
CREAT SERPL-MCNC: 1.17 MG/DL (ref 0.6–1.3)
EOSINOPHIL # BLD AUTO: 0.02 THOUSAND/ΜL (ref 0–0.61)
EOSINOPHIL NFR BLD AUTO: 0 % (ref 0–6)
ERYTHROCYTE [DISTWIDTH] IN BLOOD BY AUTOMATED COUNT: 14.8 % (ref 11.6–15.1)
FLUAV AG SPEC QL IA: NEGATIVE
FLUBV AG SPEC QL IA: NEGATIVE
GFR SERPL CREATININE-BSD FRML MDRD: 58 ML/MIN/1.73SQ M
GLUCOSE SERPL-MCNC: 131 MG/DL (ref 65–140)
GLUCOSE UR STRIP-MCNC: NEGATIVE MG/DL
HCT VFR BLD AUTO: 33.7 % (ref 36.5–49.3)
HGB BLD-MCNC: 11.2 G/DL (ref 12–17)
HGB UR QL STRIP.AUTO: ABNORMAL
INR PPP: 0.98 (ref 0.86–1.16)
KETONES UR STRIP-MCNC: NEGATIVE MG/DL
L PNEUMO1 AG UR QL IA.RAPID: NEGATIVE
LACTATE SERPL-SCNC: 1.3 MMOL/L (ref 0.5–2)
LEUKOCYTE ESTERASE UR QL STRIP: NEGATIVE
LYMPHOCYTES # BLD AUTO: 1.25 THOUSANDS/ΜL (ref 0.6–4.47)
LYMPHOCYTES NFR BLD AUTO: 15 % (ref 14–44)
MCH RBC QN AUTO: 28.9 PG (ref 26.8–34.3)
MCHC RBC AUTO-ENTMCNC: 33.2 G/DL (ref 31.4–37.4)
MCV RBC AUTO: 87 FL (ref 82–98)
MONOCYTES # BLD AUTO: 0.59 THOUSAND/ΜL (ref 0.17–1.22)
MONOCYTES NFR BLD AUTO: 7 % (ref 4–12)
NEUTROPHILS # BLD AUTO: 6.69 THOUSANDS/ΜL (ref 1.85–7.62)
NEUTS SEG NFR BLD AUTO: 78 % (ref 43–75)
NITRITE UR QL STRIP: NEGATIVE
NON-SQ EPI CELLS URNS QL MICRO: ABNORMAL /HPF
NRBC BLD AUTO-RTO: 0 /100 WBCS
P AXIS: 63 DEGREES
PH UR STRIP.AUTO: 5.5 [PH] (ref 4.5–8)
PLATELET # BLD AUTO: 115 THOUSANDS/UL (ref 149–390)
PMV BLD AUTO: 9 FL (ref 8.9–12.7)
POTASSIUM SERPL-SCNC: 4 MMOL/L (ref 3.5–5.3)
PROT SERPL-MCNC: 6.1 G/DL (ref 6.4–8.2)
PROT UR STRIP-MCNC: ABNORMAL MG/DL
PROTHROMBIN TIME: 13 SECONDS (ref 12.1–14.4)
QRS AXIS: 109 DEGREES
QRSD INTERVAL: 116 MS
QT INTERVAL: 336 MS
QTC INTERVAL: 474 MS
RBC # BLD AUTO: 3.88 MILLION/UL (ref 3.88–5.62)
RBC #/AREA URNS AUTO: ABNORMAL /HPF
S PNEUM AG UR QL: NEGATIVE
SODIUM SERPL-SCNC: 136 MMOL/L (ref 136–145)
SP GR UR STRIP.AUTO: <=1.005 (ref 1–1.03)
T WAVE AXIS: 49 DEGREES
TROPONIN I SERPL-MCNC: 0.02 NG/ML
TROPONIN I SERPL-MCNC: <0.02 NG/ML
TROPONIN I SERPL-MCNC: <0.02 NG/ML
UROBILINOGEN UR QL STRIP.AUTO: 0.2 E.U./DL
VENTRICULAR RATE: 120 BPM
WBC # BLD AUTO: 8.56 THOUSAND/UL (ref 4.31–10.16)
WBC #/AREA URNS AUTO: ABNORMAL /HPF

## 2018-01-03 PROCEDURE — 96365 THER/PROPH/DIAG IV INF INIT: CPT

## 2018-01-03 PROCEDURE — 87070 CULTURE OTHR SPECIMN AEROBIC: CPT | Performed by: INTERNAL MEDICINE

## 2018-01-03 PROCEDURE — 71275 CT ANGIOGRAPHY CHEST: CPT

## 2018-01-03 PROCEDURE — 85730 THROMBOPLASTIN TIME PARTIAL: CPT | Performed by: EMERGENCY MEDICINE

## 2018-01-03 PROCEDURE — 81001 URINALYSIS AUTO W/SCOPE: CPT

## 2018-01-03 PROCEDURE — 36415 COLL VENOUS BLD VENIPUNCTURE: CPT | Performed by: EMERGENCY MEDICINE

## 2018-01-03 PROCEDURE — 85610 PROTHROMBIN TIME: CPT | Performed by: EMERGENCY MEDICINE

## 2018-01-03 PROCEDURE — 96360 HYDRATION IV INFUSION INIT: CPT

## 2018-01-03 PROCEDURE — 87040 BLOOD CULTURE FOR BACTERIA: CPT | Performed by: EMERGENCY MEDICINE

## 2018-01-03 PROCEDURE — 96367 TX/PROPH/DG ADDL SEQ IV INF: CPT

## 2018-01-03 PROCEDURE — 84484 ASSAY OF TROPONIN QUANT: CPT | Performed by: EMERGENCY MEDICINE

## 2018-01-03 PROCEDURE — 93005 ELECTROCARDIOGRAM TRACING: CPT | Performed by: EMERGENCY MEDICINE

## 2018-01-03 PROCEDURE — 71046 X-RAY EXAM CHEST 2 VIEWS: CPT

## 2018-01-03 PROCEDURE — 83605 ASSAY OF LACTIC ACID: CPT | Performed by: EMERGENCY MEDICINE

## 2018-01-03 PROCEDURE — 96361 HYDRATE IV INFUSION ADD-ON: CPT

## 2018-01-03 PROCEDURE — 87449 NOS EACH ORGANISM AG IA: CPT | Performed by: PHYSICIAN ASSISTANT

## 2018-01-03 PROCEDURE — 87798 DETECT AGENT NOS DNA AMP: CPT | Performed by: EMERGENCY MEDICINE

## 2018-01-03 PROCEDURE — 81002 URINALYSIS NONAUTO W/O SCOPE: CPT | Performed by: EMERGENCY MEDICINE

## 2018-01-03 PROCEDURE — 87400 INFLUENZA A/B EACH AG IA: CPT | Performed by: EMERGENCY MEDICINE

## 2018-01-03 PROCEDURE — 84484 ASSAY OF TROPONIN QUANT: CPT | Performed by: PHYSICIAN ASSISTANT

## 2018-01-03 PROCEDURE — 87205 SMEAR GRAM STAIN: CPT | Performed by: INTERNAL MEDICINE

## 2018-01-03 PROCEDURE — 85025 COMPLETE CBC W/AUTO DIFF WBC: CPT | Performed by: EMERGENCY MEDICINE

## 2018-01-03 PROCEDURE — 94640 AIRWAY INHALATION TREATMENT: CPT

## 2018-01-03 PROCEDURE — 99285 EMERGENCY DEPT VISIT HI MDM: CPT

## 2018-01-03 PROCEDURE — 87081 CULTURE SCREEN ONLY: CPT | Performed by: PHYSICIAN ASSISTANT

## 2018-01-03 PROCEDURE — 80053 COMPREHEN METABOLIC PANEL: CPT | Performed by: EMERGENCY MEDICINE

## 2018-01-03 RX ORDER — GUAIFENESIN 600 MG
1200 TABLET, EXTENDED RELEASE 12 HR ORAL
Status: DISCONTINUED | OUTPATIENT
Start: 2018-01-03 | End: 2018-01-06 | Stop reason: HOSPADM

## 2018-01-03 RX ORDER — ONDANSETRON 2 MG/ML
4 INJECTION INTRAMUSCULAR; INTRAVENOUS EVERY 6 HOURS PRN
Status: DISCONTINUED | OUTPATIENT
Start: 2018-01-03 | End: 2018-01-06 | Stop reason: HOSPADM

## 2018-01-03 RX ORDER — ACETAMINOPHEN 325 MG/1
650 TABLET ORAL EVERY 6 HOURS PRN
Status: DISCONTINUED | OUTPATIENT
Start: 2018-01-03 | End: 2018-01-06 | Stop reason: HOSPADM

## 2018-01-03 RX ORDER — AZITHROMYCIN 250 MG/1
500 TABLET, FILM COATED ORAL EVERY 24 HOURS
Status: DISCONTINUED | OUTPATIENT
Start: 2018-01-03 | End: 2018-01-05

## 2018-01-03 RX ORDER — SODIUM CHLORIDE FOR INHALATION 0.9 %
3 VIAL, NEBULIZER (ML) INHALATION
Status: DISCONTINUED | OUTPATIENT
Start: 2018-01-03 | End: 2018-01-05

## 2018-01-03 RX ORDER — FINASTERIDE 5 MG/1
5 TABLET, FILM COATED ORAL
Status: DISCONTINUED | OUTPATIENT
Start: 2018-01-03 | End: 2018-01-06 | Stop reason: HOSPADM

## 2018-01-03 RX ORDER — LEVALBUTEROL 1.25 MG/.5ML
1.25 SOLUTION, CONCENTRATE RESPIRATORY (INHALATION) EVERY 8 HOURS PRN
Status: DISCONTINUED | OUTPATIENT
Start: 2018-01-03 | End: 2018-01-04

## 2018-01-03 RX ORDER — LEVALBUTEROL 1.25 MG/.5ML
1.25 SOLUTION, CONCENTRATE RESPIRATORY (INHALATION)
Status: DISCONTINUED | OUTPATIENT
Start: 2018-01-03 | End: 2018-01-05

## 2018-01-03 RX ORDER — SODIUM CHLORIDE 9 MG/ML
75 INJECTION, SOLUTION INTRAVENOUS CONTINUOUS
Status: DISCONTINUED | OUTPATIENT
Start: 2018-01-03 | End: 2018-01-05

## 2018-01-03 RX ADMIN — FINASTERIDE 5 MG: 5 TABLET, FILM COATED ORAL at 21:30

## 2018-01-03 RX ADMIN — TIOTROPIUM BROMIDE 18 MCG: 18 CAPSULE ORAL; RESPIRATORY (INHALATION) at 23:45

## 2018-01-03 RX ADMIN — IODIXANOL 85 ML: 320 INJECTION, SOLUTION INTRAVASCULAR at 09:31

## 2018-01-03 RX ADMIN — SODIUM CHLORIDE 1000 ML: 0.9 INJECTION, SOLUTION INTRAVENOUS at 08:01

## 2018-01-03 RX ADMIN — SODIUM CHLORIDE 100 ML/HR: 0.9 INJECTION, SOLUTION INTRAVENOUS at 23:35

## 2018-01-03 RX ADMIN — SODIUM CHLORIDE 1000 ML: 0.9 INJECTION, SOLUTION INTRAVENOUS at 10:53

## 2018-01-03 RX ADMIN — VANCOMYCIN HYDROCHLORIDE 1250 MG: 1 INJECTION, POWDER, LYOPHILIZED, FOR SOLUTION INTRAVENOUS at 09:30

## 2018-01-03 RX ADMIN — ISODIUM CHLORIDE 3 ML: 0.03 SOLUTION RESPIRATORY (INHALATION) at 19:31

## 2018-01-03 RX ADMIN — CEFEPIME HYDROCHLORIDE 2000 MG: 2 INJECTION, POWDER, FOR SOLUTION INTRAVENOUS at 08:52

## 2018-01-03 RX ADMIN — SODIUM CHLORIDE 100 ML/HR: 0.9 INJECTION, SOLUTION INTRAVENOUS at 13:08

## 2018-01-03 RX ADMIN — ENOXAPARIN SODIUM 40 MG: 40 INJECTION SUBCUTANEOUS at 13:59

## 2018-01-03 RX ADMIN — LEVALBUTEROL 1.25 MG: 1.25 SOLUTION, CONCENTRATE RESPIRATORY (INHALATION) at 15:05

## 2018-01-03 RX ADMIN — SODIUM CHLORIDE 1000 ML: 0.9 INJECTION, SOLUTION INTRAVENOUS at 08:49

## 2018-01-03 RX ADMIN — LEVALBUTEROL 1.25 MG: 1.25 SOLUTION, CONCENTRATE RESPIRATORY (INHALATION) at 19:29

## 2018-01-03 RX ADMIN — SODIUM CHLORIDE 1000 ML: 0.9 INJECTION, SOLUTION INTRAVENOUS at 06:58

## 2018-01-03 RX ADMIN — AZITHROMYCIN 500 MG: 250 TABLET, FILM COATED ORAL at 15:03

## 2018-01-03 NOTE — ED NOTES
Patient aware urine specimen is needed, unable to urinate for specimen at this time as he just went prior to arrival   Pt  Given ice water per request, ok by physician        Mode Coto RN  01/03/18 1067

## 2018-01-03 NOTE — ED NOTES
Patient transported to [address], [facility]  01/03/18 9225

## 2018-01-03 NOTE — ED PROVIDER NOTES
History  Chief Complaint   Patient presents with    Fever - 76 years or older     Pt brought in via ems from home with fever of 104 at home and cough since yesterday      81-year-old male presents for evaluation of fever and cough  The patient states that he has had a productive cough for about four days or so  The cough seems to be getting worse and more productive over the past 1 to 2 days  Also over the past 1 to 2 days he has had fever at home  He also relates generalized weakness and fatigue  He has had decreased appetite  He has not had any nausea or vomiting  At baseline he ambulates without a cane while inside and uses a cane when he leaves his house  He denies any difficulty with ambulation over the past few days  Denies any falls  He has not had headaches or neck pain  He does not have chest pain  He does have some shortness of breath and dyspnea on exertion that has worsened over the course of this current illness  No abdominal pain  Patient does have a past medical history significant for multiple myeloma, adenocarcinoma of the lung, and rectal cancer  He is currently receiving weekly chemotherapy infusions with a Daratumumab  He is on weak eight of an eight week course and follows with Dr Ferny Gomez of oncology  He is status post resection of rectal tumor in November of 2017 and status post partial lobectomy for adenocarcinoma of the lung  History provided by:  Patient   used: No    Cough   Cough characteristics:  Productive  Sputum characteristics:  Andreia Session  Severity:  Moderate  Onset quality:  Gradual  Duration:  4 days  Timing:  Constant  Progression:  Worsening  Chronicity:  New  Smoker: former smoker  Relieved by:  Nothing  Worsened by:   Activity  Ineffective treatments:  None tried  Associated symptoms: chills and fever    Associated symptoms: no chest pain, no diaphoresis, no headaches, no rash, no rhinorrhea, no shortness of breath and no sore throat Prior to Admission Medications   Prescriptions Last Dose Informant Patient Reported? Taking? DHA-EPA-Coenzyme Q10-Vitamin E (CO Q-10 VITAMIN E FISH OIL) 40-80- CAPS   Yes No   Sig: Take 1 capsule by mouth daily   Lactobacillus-Inulin (C[employer] DIGESTIVE HEALTH PO)   Yes No   Sig: Take by mouth daily   Silodosin (RAPAFLO PO)  Self Yes No   Sig: Take 8 mg by mouth daily at bedtime     Tiotropium Bromide Monohydrate (SPIRIVA HANDIHALER IN)   Yes No   Sig: Inhale 2 puffs daily at bedtime as needed 2  5MCG/ACT    albuterol (PROVENTIL HFA,VENTOLIN HFA) 90 mcg/act inhaler   Yes No   Sig: Inhale 2 puffs every 4 (four) hours as needed for wheezing   cholecalciferol (VITAMIN D3) 1,000 units tablet   Yes No   Sig: Take 5,000 Units by mouth daily     cyanocobalamin (VITAMIN B-12) 1,000 mcg tablet   Yes No   Sig: Take by mouth daily   daratumumab (DARZALEX) IVPB   Yes Yes   Sig: Infuse into a venous catheter once a week   dexamethasone (DECADRON) 4 mg tablet   Yes No   Sig: Take 4 mg by mouth once a week Take 5 pill weekly   finasteride (PROSCAR) 5 mg tablet   Yes No   Sig: Take 5 mg by mouth daily at bedtime     guaiFENesin (MUCINEX) 600 mg 12 hr tablet   Yes No   Sig: Take 1,200 mg by mouth daily at bedtime as needed for cough   ipratropium-albuterol (DUO-NEB) 0 5-2 5 mg/mL   Yes No   Sig: Take 3 mL by nebulization daily     omega-3-acid ethyl esters (LOVAZA) 1 g capsule   Yes No   Sig: Take 1 g by mouth daily        Facility-Administered Medications: None       Past Medical History:   Diagnosis Date    Cancer ([facility] )     lung and multiple myloma    Chemoprevention     Chronic pain disorder     3 fractured vertebrae    COPD (chronic obstructive pulmonary disease) (HCC)     Diarrhea     Emphysema lung (HCC)     Enlarged prostate     [place] (hard of hearing)     Kidney stone     Low blood pressure     Lung cancer (HCC)     [facility]     Multiple myeloma ([facility] )     Pneumonia     [facility]     Rectal adenocarcinoma ([facility]     Shortness of breath     Sleep apnea     no cpap       Past Surgical History:   Procedure Laterality Date    CHOLECYSTECTOMY      COLONOSCOPY W/ ENDOSCOPIC US N/A 7/21/2017    Procedure: ANAL ENDOSCOPIC U/S;  Surgeon: Marcel Osuna MD;  [facility]: [facility]; Service: Colorectal    HERNIA REPAIR      KIDNEY STONE SURGERY      LUNG LOBECTOMY Right     AK COLONOSCOPY FLX DX W/COLLJ SPEC WHEN PFRMD N/A 7/10/2017    Procedure: COLONOSCOPY;  Surgeon: Dalton Amado MD;  [facility]; Service: Gastroenterology    AK RECTAL TUMOR EXCISION, TRANSANAL ENDOSCOPIC MICROSURGICAL, FULL THICK N/A 11/2/2017    Procedure: TRANSANAL ENDOSCOPIC MICROSURGERY TEM;  Surgeon: Marcel Osuna MD;  [facility];  Service: Colorectal       Family History   Problem Relation Age of Onset    Arthritis Mother     Colon cancer Father     Heart attack Father     Diabetes type I Sister      I have reviewed and agree with the history as documented  Social History   Substance Use Topics    Smoking status: Former Smoker     Packs/day: 1 00     Years: 50 00     Types: Cigarettes     Quit date: 12/7/2010    Smokeless tobacco: Never Used      Comment: quit in 1999    Alcohol use No      Comment: rarely        Review of Systems   Constitutional: Positive for chills and fever  Negative for activity change, appetite change, diaphoresis and fatigue  HENT: Negative for congestion, rhinorrhea, sore throat and trouble swallowing  Eyes: Negative for pain and visual disturbance  Respiratory: Positive for cough  Negative for chest tightness and shortness of breath  Cardiovascular: Negative for chest pain  Gastrointestinal: Negative for abdominal pain, diarrhea, nausea and vomiting  Endocrine: Negative for polyphagia and polyuria  Genitourinary: Negative for dysuria, flank pain, frequency, hematuria and urgency     Musculoskeletal: Negative for back pain, gait problem, neck pain and neck stiffness  Skin: Negative for color change and rash  Allergic/Immunologic: Negative for immunocompromised state  Neurological: Negative for dizziness, speech difficulty, numbness and headaches  Hematological: Does not bruise/bleed easily  Psychiatric/Behavioral: Negative for confusion and decreased concentration  Physical Exam  ED Triage Vitals   Temperature Pulse Respirations Blood Pressure SpO2   01/03/18 0644 01/03/18 0644 01/03/18 0644 01/03/18 0644 01/03/18 0644   (!) 100 7 °F (38 2 °C) (!) 120 20 109/57 94 %      Temp Source Heart Rate Source Patient Position - Orthostatic VS BP Location FiO2 (%)   01/03/18 0644 01/03/18 0644 01/03/18 0802 01/03/18 0644 --   Oral Monitor Sitting Right arm       Pain Score       01/03/18 0753       No Pain           Orthostatic Vital Signs  Vitals:    01/03/18 1307 01/03/18 1353 01/03/18 1453 01/03/18 1630   BP: 101/59 90/55 94/62 109/61   Pulse: 83 80 86 96   Patient Position - Orthostatic VS: Lying  Lying        Physical Exam   Constitutional: He is oriented to person, place, and time  He appears well-developed and well-nourished  HENT:   Head: Normocephalic  Mucous membranes appear dry   Eyes: Conjunctivae and EOM are normal  Pupils are equal, round, and reactive to light  No scleral icterus  Neck: Normal range of motion  Neck supple  Cardiovascular: Normal rate and regular rhythm  Pulmonary/Chest:   Somewhat decreased air movement throughout with scattered wheezes and rhonchorous breath sounds at the left base  Mild tachypnea without acute respiratory distress at rest    Abdominal: Soft  Bowel sounds are normal  He exhibits no distension  There is no tenderness  There is no rebound and no guarding  Musculoskeletal: Normal range of motion  He exhibits no tenderness or deformity  Lymphadenopathy:     He has no cervical adenopathy  Neurological: He is alert and oriented to person, place, and time   Coordination normal    Skin: Skin is warm and dry  Psychiatric: He has a normal mood and affect  Vitals reviewed  ED Medications  Medications   finasteride (PROSCAR) tablet 5 mg (not administered)   guaiFENesin (MUCINEX) 12 hr tablet 1,200 mg (not administered)   tiotropium (SPIRIVA) capsule for inhaler 18 mcg (not administered)   sodium chloride 0 9 % infusion (100 mL/hr Intravenous New Bag 1/3/18 1308)   acetaminophen (TYLENOL) tablet 650 mg (not administered)   ondansetron (ZOFRAN) injection 4 mg (not administered)   enoxaparin (LOVENOX) subcutaneous injection 40 mg (40 mg Subcutaneous Given 1/3/18 1359)   levalbuterol (XOPENEX) inhalation solution 1 25 mg (not administered)   azithromycin (ZITHROMAX) tablet 500 mg (500 mg Oral Given 1/3/18 1503)   levalbuterol (XOPENEX) inhalation solution 1 25 mg (1 25 mg Nebulization Given 1/3/18 1505)   cefepime (MAXIPIME) 2 g/50 mL dextrose IVPB (0 mg Intravenous Hold 1/3/18 1513)   sodium chloride 0 9 % bolus 1,000 mL (0 mL Intravenous Stopped 1/3/18 0801)   sodium chloride 0 9 % bolus 1,000 mL (0 mL Intravenous Stopped 1/3/18 0848)   vancomycin (VANCOCIN) 1,250 mg in sodium chloride 0 9 % 250 mL IVPB (0 mg/kg × 84 7 kg Intravenous Stopped 1/3/18 1128)   cefepime (MAXIPIME) 2 g/50 mL dextrose IVPB (0 mg Intravenous Stopped 1/3/18 0930)   sodium chloride 0 9 % bolus 1,000 mL (0 mL Intravenous Stopped 1/3/18 1018)   iodixanol (VISIPAQUE) 320 MG/ML injection 85 mL (85 mL Intravenous Given 1/3/18 0931)   sodium chloride 0 9 % bolus 1,000 mL (0 mL Intravenous Stopped 1/3/18 1132)       Diagnostic Studies  Results Reviewed     Procedure Component Value Units Date/Time    Sputum culture and Gram stain [[id number]] Updated:  01/03/18 1638    Lab Status:   In process Specimen:  Sputum from Expectorated Sputum     Troponin I [[id number]]  (Normal) Collected:  01/03/18 1606    Lab Status:  Final result Specimen:  Blood from Line, Venous Updated:  01/03/18 1632     Troponin I <0 02 ng/mL Narrative:         Siemens Chemistry analyzer 99% cutoff is > 0 04 ng/mL in network labs    o cTnI 99% cutoff is useful only when applied to patients in the clinical setting of myocardial ischemia  o cTnI 99% cutoff should be interpreted in the context of clinical history, ECG findings and possibly cardiac imaging to establish correct diagnosis  o cTnI 99% cutoff may be suggestive but clearly not indicative of a coronary event without the clinical setting of myocardial ischemia  Troponin I [[id number]]     Lab Status:  No result Specimen:  Blood     Troponin I [[id number]]  (Normal) Collected:  01/03/18 1256    Lab Status:  Final result Specimen:  Blood from Line, Venous Updated:  01/03/18 1319     Troponin I <0 02 ng/mL     Narrative:         [employer]iemens Chemistry analyzer 99% cutoff is > 0 04 ng/mL in network labs    o cTnI 99% cutoff is useful only when applied to patients in the clinical setting of myocardial ischemia  o cTnI 99% cutoff should be interpreted in the context of clinical history, ECG findings and possibly cardiac imaging to establish correct diagnosis  o cTnI 99% cutoff may be suggestive but clearly not indicative of a coronary event without the clinical setting of myocardial ischemia  Strep Pneumoniae, Urine [[id number]] Collected:  01/03/18 1256    Lab Status: In process Specimen:  Urine from Urine, Clean Catch Updated:  01/03/18 1302    Legionella antigen, urine [[id number]] Collected:  01/03/18 1256    Lab Status:   In process Specimen:  Urine from Urine, Clean Catch Updated:  01/03/18 1302    Clostridium difficile toxin by PCR [[id number]]     Lab Status:  No result Specimen:  Stool     Stool Enteric Bacterial Panel by PCR [[id number]]     Lab Status:  No result Specimen:  Stool     Urine Microscopic [[id number]]  (Abnormal) Collected:  01/03/18 1121    Lab Status:  Final result Specimen:  Urine from Urine, Clean Catch Updated:  01/03/18 1200     RBC, UA 0-1 (A) /hpf      WBC, UA 2-4 (A) /hpf Epithelial Cells Occasional /hpf      Bacteria, UA None Seen /hpf     POCT urinalysis dipstick [[id number]]  (Normal) Resulted:  01/03/18 1124    Lab Status:  Final result Specimen:  Urine from Urine, Other Updated:  01/03/18 1124     Color, UA yellow    ED Urine Macroscopic [[id number]]  (Abnormal) Collected:  01/03/18 1121    Lab Status:  Final result Specimen:  Urine Updated:  01/03/18 1123     Color, UA Yellow     Clarity, UA Clear     pH, UA 5 5     Leukocytes, UA Negative     Nitrite, UA Negative     Protein, UA Trace (A) mg/dl      Glucose, UA Negative mg/dl      Ketones, UA Negative mg/dl      Urobilinogen, UA 0 2 E U /dl      Bilirubin, UA Negative     Blood, UA Trace (A)     Specific [place], UA <=1 005    Narrative:       CLINITEK RESULT    Rapid Influenza Screen with Reflex PCR (indicated for patients <2 mo of age) [[id number]]  (Normal) Collected:  01/03/18 0853    Lab Status:  Final result Specimen:  Nasopharyngeal from Nasopharyngeal Swab Updated:  01/03/18 0919     Rapid Influenza A Ag Negative     Rapid Influenza B Ag Negative    Influenza A/B and RSV by PCR (Indicated for patients > 2 mo of age) [[id number]] Collected:  01/03/18 0853    Lab Status: In process Specimen:  Nasopharyngeal from Nasopharyngeal Swab Updated:  01/03/18 0919    Troponin I [[id number]]  (Normal) Collected:  01/03/18 0657    Lab Status:  Final result Specimen:  Blood from Arm, Left Updated:  01/03/18 0723     Troponin I 0 02 ng/mL     Narrative:         Siemens Chemistry analyzer 99% cutoff is > 0 04 ng/mL in network labs    o cTnI 99% cutoff is useful only when applied to patients in the clinical setting of myocardial ischemia  o cTnI 99% cutoff should be interpreted in the context of clinical history, ECG findings and possibly cardiac imaging to establish correct diagnosis  o cTnI 99% cutoff may be suggestive but clearly not indicative of a coronary event without the clinical setting of myocardial ischemia      Lactic Acid x2 [[id number]]  (Normal) Collected:  01/03/18 0649    Lab Status:  Final result Specimen:  Blood from Arm, Left Updated:  01/03/18 9390     LACTIC ACID 1 3 mmol/L     Narrative:         Result may be elevated if tourniquet was used during collection  Comprehensive metabolic panel [[id number]]  (Abnormal) Collected:  01/03/18 0648    Lab Status:  Final result Specimen:  Blood from Arm, Left Updated:  01/03/18 0710     Sodium 136 mmol/L      Potassium 4 0 mmol/L      Chloride 103 mmol/L      CO2 26 mmol/L      Anion Gap 7 mmol/L      BUN 13 mg/dL      Creatinine 1 17 mg/dL      Glucose 131 mg/dL      Calcium 8 6 mg/dL      AST 17 U/L      ALT 19 U/L      Alkaline Phosphatase 92 U/L      Total Protein 6 1 (L) g/dL      Albumin 2 8 (L) g/dL      Total Bilirubin 1 50 (H) mg/dL      eGFR 58 ml/min/1 73sq m     Narrative:         National Kidney Disease Education Program recommendations are as follows:  GFR calculation is accurate only with a steady state creatinine  Chronic Kidney disease less than 60 ml/min/1 73 sq  meters  Kidney failure less than 15 ml/min/1 73 sq  meters  APTT [[id number]]  (Normal) Collected:  01/03/18 0648    Lab Status:  Final result Specimen:  Blood from Arm, Left Updated:  01/03/18 0704     PTT 33 seconds     Narrative:          Therapeutic Heparin Range = 60-90 seconds    Protime-INR [[id number]]  (Normal) Collected:  01/03/18 0648    Lab Status:  Final result Specimen:  Blood from Arm, Left Updated:  01/03/18 0704     Protime 13 0 seconds      INR 0 98    CBC and differential [[id number]]  (Abnormal) Collected:  01/03/18 0648    Lab Status:  Final result Specimen:  Blood from Arm, Left Updated:  01/03/18 0656     WBC 8 56 Thousand/uL      RBC 3 88 Million/uL      Hemoglobin 11 2 (L) g/dL      Hematocrit 33 7 (L) %      MCV 87 fL      MCH 28 9 pg      MCHC 33 2 g/dL      RDW 14 8 %      MPV 9 0 fL      Platelets 636 (L) Thousands/uL      nRBC 0 /100 WBCs      Neutrophils Relative 78 (H) % Lymphocytes Relative 15 %      Monocytes Relative 7 %      Eosinophils Relative 0 %      Basophils Relative 0 %      Neutrophils Absolute 6 69 Thousands/µL      Lymphocytes Absolute 1 25 Thousands/µL      Monocytes Absolute 0 59 Thousand/µL      Eosinophils Absolute 0 02 Thousand/µL      Basophils Absolute 0 01 Thousands/µL     Blood culture #2 [[id number]] Collected:  01/03/18 0648    Lab Status: In process Specimen:  Blood from Hand, Left Updated:  01/03/18 0652    Blood culture #1 [[id number]] Collected:  01/03/18 0648    Lab Status: In process Specimen:  Blood from Arm, Left Updated:  01/03/18 9012                 CTA ED chest PE study   Final Result by Jluis Hopper DO (01/03 3956)   Stable postoperative and postradiation changes  Infiltrative change/partial consolidation of the right lower lobe with scattered local nodularity most compatible with pneumonia given the patient's history  There is a rounded area of parenchymal density in the posterior medial right lower lobe    measuring up to 3 cm image 2/100 which is somewhat more suspicious for potential recurrent or metastatic neoplasm  Follow-up CT of chest recommended   1-2 months following treatment to document resolution/or least improvement of these findings  No central pulmonary emboli  Workstation performed: [id number]         XR chest 2 views   Final Result by Jluis Hopper DO (01/03 1784)   Postoperative changes right hemithorax  Generalized increased interstitial markings in both lungs  Suspect infectious etiology given the history and physical findings  No lobar consolidation  No significant pulmonary edema  Follow-up    recommended until clearing is achieved           Workstation performed: [id number]                    Procedures  ECG 12 Lead Documentation  Date/Time: 1/3/2018 7:04 AM  Performed by: Eddie Campos  Authorized by: Eddie Campos     ECG reviewed by me, the ED Provider: yes    Patient location: ED  Previous ECG:     Previous ECG:  Compared to current  Interpretation:     Interpretation: abnormal    Rate:     ECG rate assessment: tachycardic    Rhythm:     Rhythm: sinus tachycardia    Ectopy:     Ectopy: none    QRS:     QRS axis:  Normal    QRS intervals:  Normal  ST segments:     ST segments:  Non-specific  T waves:     T waves: normal        CriticalCare Time  Performed by: Yancy Bauman  Authorized by: Yancy Bauman     Critical care provider statement:     Critical care time (minutes):  38    Critical care time was exclusive of:  Separately billable procedures and treating other patients and teaching time    Critical care was necessary to treat or prevent imminent or life-threatening deterioration of the following conditions:  Sepsis    Critical care was time spent personally by me on the following activities:  Blood draw for specimens, obtaining history from patient or surrogate, development of treatment plan with patient or surrogate, discussions with consultants, evaluation of patient's response to treatment, examination of patient, ordering and performing treatments and interventions, ordering and review of laboratory studies, ordering and review of radiographic studies, re-evaluation of patient's condition and review of old charts    I assumed direction of critical care for this patient from another provider in my specialty: no             Phone Contacts  ED Phone Contact    ED Course  ED Course                          Initial Sepsis Screening     Row Name 01/03/18 04 79 78 26 72 01/03/18 0707             Is the patient's history suggestive of a new or worsening infection?  (!)  Yes (Proceed)  -DS       Suspected source of infection   pneumonia  -DS       Are two or more of the following signs & symptoms of infection both present and new to the patient?    (!)  Yes (Proceed)  -DS       Indicate SIRS criteria   Tachycardia > 90 bpm;Tachypnea > 20 resp per min  -DS       If the answer is yes to both questions, suspicion of sepsis is present  [de-identified]         If severe sepsis is present AND tissue hypoperfusion perists in the hour after fluid resuscitation or lactate > 4, the patient meets criteria for SEPTIC SHOCK           Are any of the following organ dysfunction criteria present within 6 hours of suspected infection and SIRS criteria that are NOT considered to be chronic conditions? No  -DS         Organ dysfunction           Date of presentation of severe sepsis           Time of presentation of severe sepsis           Tissue hypoperfusion persists in the hour after crystalloid fluid administration, evidenced, by either:           Was hypotension present within one hour of the conclusion of crystalloid fluid administration? Kaiden Hernandez       Date of presentation of septic shock           Time of presentation of septic shock             User Key  (r) = Recorded By, (t) = Taken By, (c) = Cosigned By    [address] Name Provider Type    DS Lyubov Lay MD Physician                  MDM  Number of Diagnoses or Management Options  HCAP (healthcare-associated pneumonia): new and requires workup  Diagnosis management comments: 60-year-old male with history of multiple myeloma, adenocarcinoma the long, rectal cancer presented for evaluation of productive cough and fevers for several days  CT imaging showed pneumonia  Given patient's recent hospitalizations and weekly infusions he should be treated for healthcare associated pneumonia  Other laboratory studies were largely unremarkable  Patient had no leukocytosis, lactic acidosis  Fever and tachycardia improved with IV fluids  Patient was noted to have several episodes of mild hypotension although he never had a change in his mental status or presyncopal symptoms  Discussed with Niyah Mcfarland [facility]  Given healthcare associated pneumonia and his multiple comorbidities, he will require admission for IV antibiotics and further observation  Amount and/or Complexity of Data Reviewed  Clinical lab tests: reviewed and ordered  Tests in the radiology section of CPT®: reviewed and ordered  Review and summarize past medical records: yes  Discuss the patient with other providers: yes  Independent visualization of images, tracings, or specimens: yes      CritCare Time    Disposition  Final diagnoses:   HCAP (healthcare-associated pneumonia)     Time reflects when diagnosis was documented in both MDM as applicable and the Disposition within this note     Time User Action Codes Description Comment    1/3/2018 10:15 AM Stevie SONG Add [J18 9] HCAP (healthcare-associated pneumonia)     1/3/2018 12:24 PM Melissa Brooke M Modify [J18 9] HCAP (healthcare-associated pneumonia)     1/3/2018 12:24 PM Sarah Agustin Add [C34 90] Adenocarcinoma, lung ([facility] )     1/3/2018 12:24 PM Melissa Brooke M Modify [J18 9] HCAP (healthcare-associated pneumonia)     1/3/2018 12:24 PM Melissa Brooke M Modify [C34 90] Adenocarcinoma, lung ([facility] )     1/3/2018 12:24 PM Melissa Brooke M Modify [J18 9] HCAP (healthcare-associated pneumonia)     1/3/2018 12:24 PM Melissa Brooke M Modify [C34 90] Adenocarcinoma, lung ([facility] )     1/3/2018 12:24 PM Melissa Brooke M Modify [J18 9] HCAP (healthcare-associated pneumonia)     1/3/2018 12:25 PM Melissa Brooke M Modify [J18 9] HCAP (healthcare-associated pneumonia)     1/3/2018 12:25 PM Melissa Brooke M Modify [J18 9] HCAP (healthcare-associated pneumonia)     1/3/2018 12:25 PM Melissa Brooke M Add [K52 9] Chronic diarrhea     1/3/2018 12:25 PM Melissa Brooke M Modify [J18 9] HCAP (healthcare-associated pneumonia)     1/3/2018 12:25 PM Melissa Brooke M Modify [K52 9] Chronic diarrhea     1/3/2018 12:25 PM Melissa Brooke M Modify [J18 9] HCAP (healthcare-associated pneumonia)     1/3/2018 12:25 PM Melissa Madison [J18 9] HCAP (healthcare-associated pneumonia)     1/3/2018 12:25 PM Juan Agustin Remove [K52 9] Chronic diarrhea     1/3/2018 12:25 PM Reena RICARDO Modify [J18 9] HCAP (healthcare-associated pneumonia)     1/3/2018 12:25 PM Yeimi Agustin Add [C90 00] [facility]     1/3/2018 12:25 PM Reena Dunne M Modify [J18 9] HCAP (healthcare-associated pneumonia)     1/3/2018 12:25 PM Yeimi Agustin Modify [C90 00] Multiple myeloma ([facility] )     1/3/2018 12:25 PM Sandy Agustin Modify [J18 9] HCAP (healthcare-associated pneumonia)     1/3/2018 12:25 PM Sandy Agustin Modify [J18 9] HCAP (healthcare-associated pneumonia)       ED Disposition     ED Disposition Condition Comment    Admit  Case was discussed with REJI and the patient's admission status was agreed to be Admission Status: inpatient status to the service of Dr Rona Nguyen  Follow-up Information    None       Current Discharge Medication List      CONTINUE these medications which have NOT CHANGED    Details   daratumumab (DARZALEX) IVPB Infuse into a venous catheter once a week      albuterol (PROVENTIL HFA,VENTOLIN HFA) 90 mcg/act inhaler Inhale 2 puffs every 4 (four) hours as needed for wheezing      cholecalciferol (VITAMIN D3) 1,000 units tablet Take 5,000 Units by mouth daily        cyanocobalamin (VITAMIN B-12) 1,000 mcg tablet Take by mouth daily      dexamethasone (DECADRON) 4 mg tablet Take 4 mg by mouth once a week Take 5 pill weekly      DHA-EPA-Coenzyme Q10-Vitamin E (CO Q-10 VITAMIN E FISH OIL) 66-25- CAPS Take 1 capsule by mouth daily      finasteride (PROSCAR) 5 mg tablet Take 5 mg by mouth daily at bedtime        guaiFENesin (MUCINEX) 600 mg 12 hr tablet Take 1,200 mg by mouth daily at bedtime as needed for cough      ipratropium-albuterol (DUO-NEB) 0 5-2 5 mg/mL Take 3 mL by nebulization daily        Lactobacillus-Inulin ([facility] ) Take by mouth daily      omega-3-acid ethyl esters (LOVAZA) 1 g capsule Take 1 g by mouth daily        Silodosin (RAPAFLO PO) Take 8 mg by mouth daily at bedtime        Tiotropium Bromide Monohydrate (SPIRIVA HANDIHALER IN) Inhale 2 puffs daily at bedtime as needed 2  5MCG/ACT            No discharge procedures on file      ED Provider  Electronically Signed by           Pedro Perdomo MD  01/03/18 800 Jerry Castro MD  01/03/18 [id number]

## 2018-01-03 NOTE — H&P
H&P- Iliana Delaney 1935, 81 y o  male MRN: [id number]    Unit/Bed#: ED 23 Encounter: [id number]    Primary Care Provider: Mahi Joiner DO   Date and time admitted to hospital: 1/3/2018  6:40 AM          History and Physical - [facility] Internal Medicine    Patient Information: Iliana Delaney 81 y o  male MRN: [id number]  Unit/Bed#: ED 23 Encounter: [id number]  Admitting Physician: Yung Beltran PA-C  PCP: Mahi Joiner DO  Date of Admission:  01/03/18    Assessment/Plan:    Hospital Problem List:     Principal Problem:    HCAP (healthcare-associated pneumonia)  Active Problems:    Arterial hypotension    Sepsis ([facility] )    Adenocarcinoma, lung ([facility] )    Multiple myeloma ([facility] )    Chronic diarrhea    COPD (chronic obstructive pulmonary disease) ([facility] )    Right bundle branch block      * HCAP (healthcare-associated pneumonia)   Assessment & Plan    Patient has been receiving weekly infusions of monoclonal antibody for multiple myeloma and also has had a hospitalization in 11/2018 for 2 days as well as immunocompromised status  CTA-new patchy infiltratie right lung base compatible with pneumonia  Scattered nodularity seen throughout the right lung also possibly infectious or atelectatic   Of somewhat greater concern is a rounded area of consolidation in the posterior aspect of the right lower lobe measuring up to 3 cm image 2/100, although this may also be infectious, neoplasm is not excluded  rec'd cefepime and vanc in ED on 2L NC for comfort, no wob, lungs are CTA b/l  Will admit to step-down given associated hypotension and start on HCAP pathway w/cefepime and vanc, consult ID  Check sputum cx, legionella/strep pneumo and mrsa screen, f/u flu by pcr, f/u blood cx          Sepsis (HCC)   Assessment & Plan    Likely 2* PNA  By fever of 104 by EMS and tachycardia on admission, although tachycardia now resolved  No leukocytosis, no LA elevation  Continue to monitor w/IVF as noted above        Arterial hypotension   Assessment & Plan    Per pt report somewhat chronic, pt is asymptomatic thankfully  MAP as low as 60 in ED somewhat improved now to 90/50, albumin is 2 8  rec'd 3L NS IVF in ED  Will continue w/100cc/hr NS for now, monitor closely for s/sx of overload  Pt is a confirmed FULL CODE  Right bundle branch block   Assessment & Plan    On EKG  Pt also w/ mild ST changes in 2 and AVF (slightly elevated w/o reciprocal change or chest pain)  Troponin negative  Will monitor troponins for completeness        COPD (chronic obstructive pulmonary disease) (HCC)   Assessment & Plan    No acute exacerbation, continue inhalers        Multiple myeloma (HCC)   Assessment & Plan    Undergoing active chemotherapy by faroun  Hold darzalex given infection        Adenocarcinoma, lung ([facility] [address] )   Assessment & Plan    Stage 3A adenocarcinoma of RUL Status post Alimta/carboplatin x4 cycles finished in May 2015 and radiation therapy  CT demonstrating  rounded area of consolidation in the posterior aspect of the right lower lobe measuring up to 3 cm image 2/100, although this may also be infectious, neoplasm is not excluded  Left lung again demonstrates mild emphysematous changes and scarring, otherwise appearing very similar  Stable groundglass opacities in the left upper lobe anteromedially  Will need repeat CT chest in OP setting by Selvin Grant              · Chronic diarrhea  · Check C diff by PCR and stool pathogens    VTE Prophylaxis: Enoxaparin (Lovenox)  / sequential compression device   Code Status: FC  POLST: There is no POLST form on file for this patient (pre-hospital)    Anticipated Length of Stay:  Patient will be admitted on an Inpatient basis with an anticipated length of stay of  greater 2 midnights  Justification for Hospital Stay: HCAP    Total Time for Visit, including Counseling / Coordination of Care: 45 minutes    Greater than 50% of this total time spent on direct patient counseling and coordination of care     Chief Complaint:   Fever and asthenia    History of Present Illness:    Dorene Parks is a 81 y o  male who presents with PMH adenocarcinoma of lung, COPD, multiple myeloma coming in the ED for fever and weakness x 2 days  Patient is accompanied by his wife was at bedside  Per the patient he started developed a low-grade temperature yesterday of 100 0  He was also starting to feel ill on the weather  Today his chronic cough which is normal productive of clear to white sputum was productive of yellow sputum, his fever in the EMS was 104  In his wife called 911 as she was unable to get the patient up and out of bed due to weakness  The patient reports that his degree of cough is unchanged, he has no chest pain or palpitations lightheadedness or dizziness or SOB  He has no nausea or vomiting  He does have chronic diarrhea which she describes as 2-3 loose but somewhat formed bowel movements which have been chronic and unchanged  No sick contacts, he does have his flu vaccine and both pneumonia vaccines  Review of Systems:    Review of Systems   Constitutional: Positive for appetite change, chills, fatigue and fever  HENT: Negative for congestion, sinus pressure and sore throat  Respiratory: Positive for cough  Negative for shortness of breath  Cardiovascular: Negative for chest pain and palpitations  Gastrointestinal: Positive for diarrhea  Negative for abdominal pain, nausea and vomiting  Allergic/Immunologic: Positive for immunocompromised state  Neurological: Positive for weakness  Negative for light-headedness and headaches  All other systems reviewed and are negative        Past Medical and Surgical History:     Past Medical History:   Diagnosis Date    Cancer ([facility] )     lung and multiple myloma    Chemoprevention     Chronic pain disorder     3 fractured vertebrae    COPD (chronic obstructive pulmonary disease) (HCC)     Diarrhea     Emphysema lung ([facility] )     Enlarged prostate     [place]     Kidney stone     Low blood pressure     Lung cancer (HCC)     Multiple myeloma (HCC)     Multiple myeloma (HCC)     Pneumonia     Pulmonary emphysema (HCC)     Rectal adenocarcinoma (HCC)     Shortness of breath     Sleep apnea     no cpap       Past Surgical History:   Procedure Laterality Date    CHOLECYSTECTOMY      COLONOSCOPY W/ ENDOSCOPIC US N/A 7/21/2017    Procedure: ANAL ENDOSCOPIC U/S;  Surgeon: Nelli Reaves MD;  Location: [facility]; Service: Colorectal    HERNIA REPAIR      KIDNEY STONE SURGERY      LUNG LOBECTOMY Right     SC COLONOSCOPY FLX DX W/COLLJ SPEC WHEN PFRMD N/A 7/10/2017    Procedure: COLONOSCOPY;  Surgeon: Joan Chicas MD;  Location: [facility]; Service: Gastroenterology    SC RECTAL TUMOR EXCISION, TRANSANAL ENDOSCOPIC MICROSURGICAL, FULL THICK N/A 11/2/2017    Procedure: TRANSANAL ENDOSCOPIC MICROSURGERY TEM;  Surgeon: Nelli Reaves MD;  Location: [facility];  Service: Colorectal       Meds/Allergies:    Prior to Admission medications    Medication Sig Start Date End Date Taking? Authorizing Provider   daratumumab [facility]) IVPB Infuse into a venous catheter once a week   Yes Historical Provider, MD   albuterol (PROVENTIL HFA,VENTOLIN HFA) 90 mcg/act inhaler Inhale 2 puffs every 4 (four) hours as needed for wheezing    Historical Provider, MD   cholecalciferol (VITAMIN D3) 1,000 units tablet Take 5,000 Units by mouth daily      Historical Provider, MD   cyanocobalamin (VITAMIN B-12) 1,000 mcg tablet Take by mouth daily    Historical Provider, MD   dexamethasone (DECADRON) 4 mg tablet Take 4 mg by mouth once a week Take 5 pill weekly    Historical Provider, MD   DHA-EPA-Coenzyme Q10-Vitamin E (CO Q-10 VITAMIN E FISH OIL) 26-03- CAPS Take 1 capsule by mouth daily    Historical Provider, MD   finasteride (PROSCAR) 5 mg tablet Take 5 mg by mouth daily at bedtime      Historical Provider, MD Tucker (MUCINEX) 600 mg 12 hr tablet Take 1,200 mg by mouth daily at bedtime as needed for cough    Historical Provider, MD   ipratropium-albuterol (DUO-NEB) 0 5-2 5 mg/mL Take 3 mL by nebulization daily      Historical Provider, MD   Lactobacillus-Inulin ([address]) Take by mouth daily    Historical Provider, MD   omega-3-acid ethyl esters (LOVAZA) 1 g capsule Take 1 g by mouth daily      Historical Provider, MD   Silodosin (RAPAFLO PO) Take 8 mg by mouth daily at bedtime      Historical Provider, MD   Tiotropium Bromide Monohydrate (SPIRIVA HANDIHALER IN) Inhale 2 puffs daily at bedtime as needed 2  5MCG/ACT     Historical Provider, MD   acetaminophen (TYLENOL) 500 mg tablet Take 1,000 mg by mouth every 6 (six) hours as needed for mild pain  1/3/18  Historical Provider, MD SORIA have reviewed home medications with patient personally  Allergies: Allergies   Allergen Reactions    Atorvastatin Other (See Comments)     UNKNOWN    Sulfa Antibiotics Itching    Penicillin V Rash     AS CHILD       Social History:     Marital Status: /Civil Union   Occupation:   Patient Pre-hospital Living Situation:   Patient Pre-hospital Level of Mobility:   Patient Pre-hospital Diet Restrictions:   Substance Use History:   History   Alcohol Use No     Comment: rarely     History   Smoking Status    Former Smoker    Packs/day: 1 00    Years: 50 00    Quit date: 12/7/2010   Smokeless Tobacco    Never Used     Comment: quit in 1999     History   Drug Use No       Family History:    History reviewed  No pertinent family history  Physical Exam:     Vitals:   Blood Pressure: 93/52 (01/03/18 1130)  Pulse: 82 (01/03/18 1130)  Temperature: 98 7 °F (37 1 °C) (01/03/18 0753)  Temp Source: Oral (01/03/18 0753)  Respirations: 16 (01/03/18 1130)  Weight - Scale: 84 7 kg (186 lb 11 7 oz) (01/03/18 0644)  SpO2: 97 % (01/03/18 1130)    Physical Exam   Constitutional: He appears well-developed  No distress     Appears stated age, appears ill, no distress   HENT:   Head: Normocephalic and atraumatic  Right Ear: External ear normal    Left Ear: External ear normal    Mouth/Throat: No oropharyngeal exudate  Mucus membranes are somewhat dry   Eyes: Conjunctivae are normal  Right eye exhibits no discharge  Left eye exhibits no discharge  No scleral icterus  Neck: Normal range of motion  Cardiovascular: Normal rate, regular rhythm and intact distal pulses  Murmur (holsystolic murmur at LLSB and 4th ICS/left MCL) heard  Pulmonary/Chest: Effort normal and breath sounds normal  No respiratory distress  He has no wheezes  He has no rales  He exhibits no tenderness  Abdominal: Soft  He exhibits no distension  There is no tenderness  There is no rebound and no guarding  Musculoskeletal: He exhibits no edema  Neurological: He is alert  Skin: Skin is warm and dry  He is not diaphoretic  Psychiatric: He has a normal mood and affect  Vitals reviewed  Additional Data:     Lab Results: I have personally reviewed pertinent reports  Results from last 7 days  Lab Units 01/03/18  0648   WBC Thousand/uL 8 56   HEMOGLOBIN g/dL 11 2*   HEMATOCRIT % 33 7*   PLATELETS Thousands/uL 115*   NEUTROS PCT % 78*   LYMPHS PCT % 15   MONOS PCT % 7   EOS PCT % 0       Results from last 7 days  Lab Units 01/03/18  0648   SODIUM mmol/L 136   POTASSIUM mmol/L 4 0   CHLORIDE mmol/L 103   CO2 mmol/L 26   BUN mg/dL 13   CREATININE mg/dL 1 17   CALCIUM mg/dL 8 6   TOTAL PROTEIN g/dL 6 1*   BILIRUBIN TOTAL mg/dL 1 50*   ALK PHOS U/L 92   ALT U/L 19   AST U/L 17   GLUCOSE RANDOM mg/dL 131       Results from last 7 days  Lab Units 01/03/18  0648   INR  0 98       Imaging: I have personally reviewed pertinent reports  Xr Chest 2 Views    Result Date: 1/3/2018  Narrative: CHEST INDICATION:  Cough  Fever  Shortness of breath  Weakness   COMPARISON:  October 20, 2017 VIEWS:  Frontal and lateral projections IMAGES:  2 FINDINGS:  Left lung is adequately aerated and relatively clear  Right lung again demonstrates postoperative changes  Probable fluid at the lung apex and atelectatic changes at the right lung base  Generalized prominence of the interstitial markings in both lungs, new from prior exam  Cardiac size within normal limits  No vascular congestion or significant peribronchial thickening  No pneumothorax or free air  Visualized osseous structures appear within normal limits for the patient's age  Right-sided port with catheter tip in the mid SVC  Impression: Postoperative changes right hemithorax  Generalized increased interstitial markings in both lungs  Suspect infectious etiology given the history and physical findings  No lobar consolidation  No significant pulmonary edema  Follow-up recommended until clearing is achieved  Workstation performed: [id number]     Cta Ed Chest Pe Study    Result Date: 1/3/2018  Narrative: CTA - CHEST WITH IV CONTRAST - PULMONARY ANGIOGRAM INDICATION: Shortness of breath  Cough  Fever  Right upper lobe removed for lung cancer  History of multiple myeloma and rectal cancer  COMPARISON: August 22, 2017 TECHNIQUE: CTA examination of the chest was performed using angiographic technique according to a protocol specifically tailored to evaluate for pulmonary embolism  Reformatted images were created in axial, sagittal, and coronal planes  In addition, coronal 3D MIP postprocessing was performed on the acquisition scanner  Radiation dose length product (DLP) for this visit:  416 mGy-cm   This examination, like all CT scans performed in the [facility], was performed utilizing techniques to minimize radiation dose exposure, including the use of iterative reconstruction and automated exposure control  IV Contrast:  85 mL of iodixanol (VISIPAQUE)  320     FINDINGS: PULMONARY ARTERIAL TREE:  No central pulmonary bleed are seen   There is a tiny branch artery supplying the superior segment right lower lobe image 2/67 which does not enhance well, this is unchanged from March 2017 and may be due to radiation sclerosis  LUNGS:  There are new patchy infiltrative changes in the right lung base compatible with pneumonia  Scattered nodularity seen throughout the right lung also possibly infectious or atelectatic  Of somewhat greater concern is a rounded area of consolidation in the posterior aspect of the right lower lobe measuring up to 3 cm image 2/100, although this may also be infectious, neoplasm is not excluded  Stable postoperative scarring and postradiation changes in the right lung apex  Left lung again demonstrates mild emphysematous changes and scarring, otherwise appearing very similar  Stable groundglass opacities in the left upper lobe anteromedially  PLEURA:  Trace right pleural fluid  HEART/AORTA:  Cardiac size within normal limits  Aortic atherosclerosis  MEDIASTINUM AND GLEN:  Postop changes in the right hilum  CHEST WALL AND LOWER NECK:   No discrete thyroid nodules are seen  Port-A-Cath the right chest wall with catheter tip in the atrium  VISUALIZED STRUCTURES IN THE UPPER ABDOMEN:  Unremarkable  OSSEOUS STRUCTURES:  Stable mottled appearance of all visualized vertebral bodies with multiple partial compression deformities  Impression: Stable postoperative and postradiation changes  Infiltrative change/partial consolidation of the right lower lobe with scattered local nodularity most compatible with pneumonia given the patient's history  There is a rounded area of parenchymal density in the posterior medial right lower lobe measuring up to 3 cm image 2/100 which is somewhat more suspicious for potential recurrent or metastatic neoplasm  Follow-up CT of chest recommended 1-2 months following treatment to document resolution/or least improvement of these findings  No central pulmonary emboli       Workstation performed: [id number]     Ir Port Placement    Result Date: 12/12/2017  Narrative: Chest port placement  Clinical History: 30-year-old male with multiple myeloma and rectal cancer, requiring access for long-term chemotherapy  Patient will also have radiation to his pelvis for the rectal cancer but no plan for radiation to the chest or neck at this time  Fluoro time: 1 3 minutes Number of Images: 8 Conscious sedation time: 44 minutes Technique: The patient was brought to the interventional radiology suite and identified verbally and by wristband  The patient was placed supine on the table  The right internal jugular vein was evaluated as a potential access site with ultrasound  The vessel was found to be patent and compressible  The right neck and upper chest was prepped and draped in the usual sterile fashion  All elements of maximal sterile barrier technique were followed (cap, mask, sterile gown, sterile gloves, large sterile sheet, hand hygiene, and 2% chlorhexidine for cutaneous antisepsis)  Under ultrasound, lidocaine was administered to the skin and a small skin incision was made  Under ultrasound guidance, the right internal jugular vein was accessed using single wall Seldinger technique  Static images of real time  needle entry into the vessel were obtained  A 0 018 wire was then advanced through the needle into the central venous system  The needle was removed, and a 5 Barbadian coaxial dilator was inserted  Next, attention was turned to the right chest and a site for the port pocket was selected  This area was then anesthetized with lidocaine, as well as the projected tunnel  A subcutaneous pocket was created in the skin of the upper chest for the reservoir utilizing a surgical incision  The pocket was then irrigated with 100 mL of sterile saline  The port catheter was then tunneled under the skin of the upper chest  Attention was then turned to the 5-Barbadian coaxial dilator  The microwire and inner dilator were removed    A heavy wire was inserted through the outer dilator and a 6 Portuguese peel-away sheath was inserted over the wire  The catheter was advanced through the peel-away and the peel-away was removed  The catheter tip was then positioned in the right atrium under fluoroscopic control  The catheter was connected to the port, and the port inserted into the subcutaneous pocket  The port was then flushed and aspirated successfully and 100 unit heparin/cc solution was administered into the lumen  The pocket was closed with Vicryl suture and Histoacryl  A sterile dressing was applied  Findings: Ultrasound images demonstrate a patent and compressible right internal jugular vein  Additional ultrasound image demonstrates needle placement within the vein  Initial fluoroscopic image demonstrates successful placement of microwire within the inferior vena cava  Subsequent images demonstrate successful placement of a heavy-duty wire in the inferior vena cava  Final image demonstrates successful placement of a right internal jugular/chest port with tip in the right atrium, near or at the cavoatrial junction without kink  Impression: Impression: 1  Successful ultrasound and fluoroscopically guided placement of a chest port via the right internal jugular vein  2  The tip of the catheter is in the right atrium and may be used immediately  Plan: Patient will be taken to recovery and ultimately discharged to home  Patient may begin chemotherapy immediately  Workstation performed: [id number]       EKG, Pathology, and Other Studies Reviewed on Admission:   · EKG: sinus tachycardia w/RBBB  · Minimal ST elevation in 2/AVF, w/o reciprocal T wave inversions  ·     Allscripts / Epic Records Reviewed: Yes     ** Please Note: This note has been constructed using a voice recognition system   **

## 2018-01-03 NOTE — CONSULTS
Consultation - Infectious Disease   Meka Bones 81 y o  male MRN: [id number]  Unit/Bed#: ED 23 Encounter: [id number]      IMPRESSION & RECOMMENDATIONS:   1  Sepsis-POA  Fever, tachycardia, hypotension  Appears to be secondary to respiratory infection with probable pneumonia  No other clear source appreciated  The patient has stabilized with IV fluid resuscitation and is clinically improved  In this patient with multiple myeloma, he has had significantly increased risk encapsulated organisms such as pneumococcus, and Haemophilus influenzae  Consideration for the possibility of a viral infection such as influenza respiratory infection   -discontinue vancomycin cefepime  -ceftriaxone 2 g IV Q 24 hours  -azithromycin 500 mg p o  Q 24 hours  -follow-up blood cultures, urine Legionella antigen, streptococcal pneumonia antigen  -monitor CBC with diff and creatinine  -supportive care    2  Pneumonia-most likely in an capsulated organism  Unlikely to be a resistant organism as the patient has relatively limited high risk healthcare exposure and has not been on antibiotics recently  A viral process such as influenza is also possible   -antibiotics as above  -workup as above  -also will follow up on influenza PCR  -monitor respiratory status  -no additional ID workup for now    3  Acute hypoxic respiratory failure-likely related to 2  In the face of COPD  No other clear source appreciated  CT of the chest is negative for pulmonary embolism  Patient's respiratory status seems relatively stable   -antibiotics as above  -O2 support  -monitor respiratory status  -Pulmonary follow-up    4  COPD-without any obvious evidence of an acute exacerbation with the exception of oxygen needs  5   Multiple myeloma-on outpatient monoclonal antibiotic therapy  6   Chronic diarrhea-felt to be related to the patient's previous chemotherapy in the setting the patient has had previous bowel surgery    Doubt any acute infectious process  -monitor stool output  -follow up stool for C diff  -symptomatic treatment if the C diff is negative      HISTORY OF PRESENT ILLNESS:  Reason for Consult:  Sepsis  HPI: Alisah Campos is a 81y o  year old male with history multiple myeloma and COPD admitted to [facility] in [facility] with 2 days of fever and weakness who I am asked to assist with management  Patient has had a history of multiple myeloma and has been receiving weekly infusions of a monoclonal antibody for chemotherapy  He has had stable COPD that does not require oxygen at home  He has chronic dyspnea on exertion and a chronic cough that is usually productive of white sputum  He had been stable until in the recent 2 days he began developing more of a productive cough, fever, shaking chills, and malaise  This morning the patient was unable to be aroused out of bed due to the profound weakness and therefore EMS was called  The patient was brought to the emergency department for further evaluation  On the way the emergency department the patient had a temperature of 104°  In the emergency department the patient was found to be hypoxemic, tachycardic, and hypotensive  He was resuscitated with IV fluids, had blood cultures obtained, and was given a dose of vancomycin and cefepime and admitted for further management  The patient is now resuscitated and on oxygen by nasal cannula  He is feeling a bit better but is still quite weak  He denies having any headache or stiff neck, denies any sore throat but has chronic rhinorrhea but no nasal congestion  He admits to chronic dyspnea that now seems to be worse, and a cough that is now productive of yellow sputum  He denies any chest pain or abdominal pain, denies any nausea vomiting or diarrhea; other than baseline chronic diarrhea which is unchanged    He denies any dysuria or hematuria, denies any new rash or skin lesions, denies any new joint or muscle pains     REVIEW OF SYSTEMS:  A complete 12 point system-based review of systems is otherwise negative  PAST MEDICAL HISTORY:  Past Medical History:   Diagnosis Date    Cancer ([address] )     lung and multiple myloma    Chemoprevention     Chronic pain disorder     3 fractured vertebrae    COPD (chronic obstructive pulmonary disease) (HCC)     Diarrhea     Emphysema lung (HCC)     Enlarged prostate     [place] (hard of hearing)     Kidney stone     Low blood pressure     Lung cancer (HCC)     Multiple myeloma (HCC)     Multiple myeloma (HCC)     Pneumonia     Pulmonary emphysema (HCC)     Rectal adenocarcinoma (HCC)     Shortness of breath     Sleep apnea     no cpap     Past Surgical History:   Procedure Laterality Date    CHOLECYSTECTOMY      COLONOSCOPY W/ ENDOSCOPIC US N/A 7/21/2017    Procedure: ANAL ENDOSCOPIC U/S;  Surgeon: Junior Brittany MD;  [facility]: [facility]; Service: Colorectal    HERNIA REPAIR      KIDNEY STONE SURGERY      LUNG LOBECTOMY Right     MO COLONOSCOPY FLX DX W/COLLJ SPEC WHEN PFRMD N/A 7/10/2017    Procedure: COLONOSCOPY;  Surgeon: Maggie Pink MD;  [facility]: [facility]; Service: Gastroenterology    MO RECTAL TUMOR EXCISION, TRANSANAL ENDOSCOPIC MICROSURGICAL, FULL THICK N/A 11/2/2017    Procedure: TRANSANAL ENDOSCOPIC MICROSURGERY TEM;  Surgeon: Junior Brittany MD;  Location: [facility];  Service: Colorectal       FAMILY HISTORY:  Non-contributory    SOCIAL HISTORY:  Social History   History   Alcohol Use No     Comment: rarely     History   Drug Use No     History   Smoking Status    Former Smoker    Packs/day: 1 00    Years: 50 00    Quit date: 12/7/2010   Smokeless Tobacco    Never Used     Comment: quit in 1999       ALLERGIES:  Allergies   Allergen Reactions    Atorvastatin Other (See Comments)     UNKNOWN    Sulfa Antibiotics Itching    Penicillin V Rash     AS CHILD       MEDICATIONS:  All current active medications have been reviewed     antibiotics: Vancomycin cefepime x1 dose    PHYSICAL EXAM:  HR:  [] 83  Resp:  [12-22] 18  BP: ()/(47-59) 101/59  SpO2:  [93 %-97 %] 97 %  Temp (24hrs), Av 7 °F (37 6 °C), Min:98 7 °F (37 1 °C), Max:100 7 °F (38 2 °C)  Current: Temperature: 98 7 °F (37 1 °C)    Intake/Output Summary (Last 24 hours) at 18 1411  Last data filed at 18 1358   Gross per 24 hour   Intake             4300 ml   Output              300 ml   Net             4000 ml       General Appearance:  Appearing nontoxic, and in no distress   Head:  Normocephalic, without obvious abnormality, atraumatic   Eyes:  Conjunctiva pink and sclera anicteric, both eyes   Nose: Nares normal, mucosa normal, no drainage   Throat: Oropharynx moist without lesions   Neck: Supple, symmetrical, no adenopathy, no tenderness/mass/nodules   Back:   Symmetric, no curvature, ROM normal, no CVA tenderness   Lungs:   Decreased breath sounds at the right base, respirations unlabored   Chest Wall:  No tenderness or deformity   Heart:  RRR; no murmur, rub or gallop   Abdomen:   Soft, non-tender, non-distended, positive bowel sounds,    Extremities: No cyanosis, clubbing or edema   Skin: No rashes or lesions  No draining wounds noted  Lymph nodes: Cervical, supraclavicular nodes normal   Neurologic: Alert,  Answers all questions appropriately able to move all 4 extremities  LABS, IMAGING, & OTHER STUDIES:  Lab Results:  I have personally reviewed pertinent labs      Results from last 7 days  Lab Units 18  0648   WBC Thousand/uL 8 56   HEMOGLOBIN g/dL 11 2*   PLATELETS Thousands/uL 115*       Results from last 7 days  Lab Units 18  0648   SODIUM mmol/L 136   POTASSIUM mmol/L 4 0   CHLORIDE mmol/L 103   CO2 mmol/L 26   ANION GAP mmol/L 7   BUN mg/dL 13   CREATININE mg/dL 1 17   EGFR ml/min/1 73sq m 58   GLUCOSE RANDOM mg/dL 131   CALCIUM mg/dL 8 6   AST U/L 17   ALT U/L 19   ALK PHOS U/L 92   TOTAL PROTEIN g/dL 6 1*   ALBUMIN g/dL 2 8*   BILIRUBIN TOTAL mg/dL 1 50*           Imaging Studies:   I have personally reviewed pertinent imaging study reports and images in PACS  CT chest-Stable postoperative and postradiation changes  Infiltrative change/partial consolidation of the right lower lobe with scattered local nodularity most compatible with pneumonia given the patient's history  There is a rounded area of parenchymal density in the posterior medial right lower lobe   measuring up to 3 cm image 2/100 which is somewhat more suspicious for potential recurrent or metastatic neoplasm

## 2018-01-03 NOTE — ASSESSMENT & PLAN NOTE
Per pt report somewhat chronic, pt is asymptomatic thankfully  MAP as low as 60 in ED somewhat improved now to 90/50, albumin is 2 8  rec'd 3L NS IVF in ED  Will continue w/100cc/hr NS for now, monitor closely for s/sx of overload  Pt is a confirmed FULL CODE

## 2018-01-03 NOTE — ASSESSMENT & PLAN NOTE
On EKG  Pt also w/ mild ST changes in 2 and AVF (slightly elevated w/o reciprocal change or chest pain)  Troponin negative  Will monitor troponins for completeness

## 2018-01-03 NOTE — ED NOTES
Pt resting comfortably on ED stretcher with easy nonlabored respirations  Pt awake and alert, pt and wife updated on continued plan of care while in the ED   Both agree and verbalizes understanding      Osmel Shi RN  01/03/18 7813

## 2018-01-03 NOTE — ASSESSMENT & PLAN NOTE
Patient has been receiving weekly infusions of monoclonal antibody for multiple myeloma and also has had a hospitalization in 11/2018 for 2 days as well as immunocompromised status  CTA-new patchy infiltratie right lung base compatible with pneumonia  Scattered nodularity seen throughout the right lung also possibly infectious or atelectatic   Of somewhat greater concern is a rounded area of consolidation in the posterior aspect of the right lower lobe measuring up to 3 cm image 2/100, although this may also be infectious, neoplasm is not excluded  rec'd cefepime and vanc in ED on 2L NC for comfort, no wob, lungs are CTA b/l  Will admit to step-down given associated hypotension and start on HCAP pathway w/cefepime and vanc, consult ID  Check sputum cx, legionella/strep pneumo and mrsa screen, f/u flu by pcr, f/u blood cx

## 2018-01-03 NOTE — CASE MANAGEMENT
Initial Clinical Review    Admission: Date/Time/Statement: 1/3/18 @ 1017 Inpatient Written     Orders Placed This Encounter   Procedures    Inpatient Admission (expected length of stay for this patient is greater than two midnights)     Standing Status:   Standing     Number of Occurrences:   1     Order Specific Question:   Admitting Physician     Answer:   Cathyann Soulier     Order Specific Question:   Level of Care     Answer:   Med Surg [16]     Order Specific Question:   Estimated length of stay     Answer:   More than 2 Midnights     Order Specific Question:   Certification     Answer:   I certify that inpatient services are medically necessary for this patient for a duration of greater than two midnights  See H&P and MD Progress Notes for additional information about the patient's course of treatment  ED: Date/Time/Mode of Arrival:   ED Arrival Information     Expected Arrival Acuity Means of Arrival Escorted By Service Admission Type    - 1/3/2018 06:39 Emergent Ambulance [facility] [facility] General Medicine Urgent    Arrival Complaint    fever          Chief Complaint:   Chief Complaint   Patient presents with    Fever - 76 years or older     Pt brought in via ems from home with fever of 104 at home and cough since yesterday        History of Illness: [place] Know[place] is a 81 y o  male who presents with PMH adenocarcinoma of lung, COPD, multiple myeloma coming in the ED for fever and weakness x 2 days  Patient is accompanied by his wife was at bedside  Per the patient he started developed a low-grade temperature yesterday of 100 0  He was also starting to feel ill on the weather  Today his chronic cough which is normal productive of clear to white sputum was productive of yellow sputum, his fever in the EMS was 104  In his wife called 911 as she was unable to get the patient up and out of bed due to weakness    The patient reports that his degree of cough is unchanged, he has no chest pain or palpitations lightheadedness or dizziness or SOB  He has no nausea or vomiting  He does have chronic diarrhea which she describes as 2-3 loose but somewhat formed bowel movements which have been chronic and unchanged  ED Vital Signs:   ED Triage Vitals   Temperature Pulse Respirations Blood Pressure SpO2   01/03/18 0644 01/03/18 0644 01/03/18 0644 01/03/18 0644 01/03/18 0644   (!) 100 7 °F (38 2 °C) (!) 120 20 109/57 94 %      Temp Source Heart Rate Source Patient Position - Orthostatic VS BP Location FiO2 (%)   01/03/18 0644 01/03/18 0644 01/03/18 0802 01/03/18 0644 --   Oral Monitor Sitting Right arm       Pain Score       01/03/18 0753       No Pain        Wt Readings from Last 1 Encounters:   01/03/18 84 7 kg (186 lb 11 7 oz)       Vital Signs (abnormal):    79/51    85/53    82/52    89/54    82/51    80/47     Abnormal Labs/Diagnostic Test Results:   HEMOGLOBIN 11 2*   HEMATOCRIT 33 7*   PLATELETS 149*   NEUTROS PCT 78*     TOTAL PROTEIN 6 1*   BILIRUBIN TOTAL 1 50*     Protein, UA Trace     Blood, UA Trace       RBC, UA 0-1     WBC, UA 2-4      CXR:  Postoperative changes right hemithorax  Generalized increased interstitial markings in both lungs  Suspect infectious etiology given the history and physical findings  No lobar consolidation  No significant pulmonary edema  CTA Chest PE study:  Stable postoperative and postradiation changes  Infiltrative change/partial consolidation of the right lower lobe with scattered local nodularity most compatible with pneumonia given the patient's history  There is a rounded area of parenchymal density in the posterior medial right lower lobe measuring up to 3 cm image 2/100 which is somewhat more suspicious for potential recurrent or metastatic neoplasm     EKG:  ST with RBBB    ED Treatment:   Medication Administration from 01/03/2018 0639 to 01/03/2018 1341       Date/Time Order Dose Route Action     01/03/2018 0658 sodium chloride 0 9 % bolus 1,000 mL 1,000 mL Intravenous New Bag     01/03/2018 0801 sodium chloride 0 9 % bolus 1,000 mL 1,000 mL Intravenous New Bag     01/03/2018 0930 vancomycin (VANCOCIN) 1,250 mg in sodium chloride 0 9 % 250 mL IVPB 1,250 mg Intravenous New Bag     01/03/2018 0852 cefepime (MAXIPIME) 2 g/50 mL dextrose IVPB 2,000 mg Intravenous New Bag     01/03/2018 0849 sodium chloride 0 9 % bolus 1,000 mL 1,000 mL Intravenous New Bag     01/03/2018 0931 iodixanol (VISIPAQUE) 320 MG/ML injection 85 mL 85 mL Intravenous Given     01/03/2018 1053 sodium chloride 0 9 % bolus 1,000 mL 1,000 mL Intravenous New Bag     01/03/2018 1308 sodium chloride 0 9 % infusion 100 mL/hr Intravenous New Bag          Past Medical/Surgical History:    Active Ambulatory Problems     Diagnosis Date Noted    COPD with acute exacerbation ([place] [address] ) 03/03/2016    Chronic rhinitis 03/03/2016    Adenocarcinoma, lung ([place] [address] ) 03/03/2016    BPH (benign prostatic hyperplasia) 03/03/2016    Multiple myeloma ([facility] ) 03/03/2016    Chronic diarrhea 03/03/2016    COPD (chronic obstructive pulmonary disease) ([place] [address] ) 08/22/2017     Resolved Ambulatory Problems     Diagnosis Date Noted    Dyspnea 03/03/2016    ALLYSSA (acute kidney injury) 04/01/2017    Fever 08/22/2017    Streptococcal pneumonia ([facility] ) 08/22/2017     Past Medical History:   Diagnosis Date    Cancer ([place] [address] )     Chemoprevention     Chronic pain disorder     COPD (chronic obstructive pulmonary disease) (HCC)     Diarrhea     Emphysema lung (HCC)     Enlarged prostate     [place] (hard of hearing)     Kidney stone     Low blood pressure     Lung cancer (HCC)     Multiple myeloma (HCC)     Multiple myeloma (HCC)     Pneumonia     Pulmonary emphysema (HCC)     Rectal adenocarcinoma (HCC)     Shortness of breath     Sleep apnea        Admitting Diagnosis: Fever [R50 9]    Age/Sex: 81 y o  male    Assessment/Plan:   Principal Problem:    HCAP (healthcare-associated pneumonia)  Active Problems:    Arterial hypotension Sepsis ([facility] )    Adenocarcinoma, lung ([place] [address] )    Multiple myeloma (HCC)    Chronic diarrhea    COPD (chronic obstructive pulmonary disease) ([facility])    Right bundle branch block            * HCAP (healthcare-associated pneumonia)   Assessment & Plan     Patient has been receiving weekly infusions of monoclonal antibody for multiple myeloma and also has had a hospitalization in 11/2018 for 2 days as well as immunocompromised status  CTA-new patchy infiltratie right lung base compatible with pneumonia  Scattered nodularity seen throughout the right lung also possibly infectious or atelectatic   Of somewhat greater concern is a rounded area of consolidation in the posterior aspect of the right lower lobe measuring up to 3 cm image 2/100, although this may also be infectious, neoplasm is not excluded  rec'd cefepime and vanc in ED on 2L NC for comfort, no wob, lungs are CTA b/l  Will admit to step-down given associated hypotension and start on HCAP pathway w/cefepime and vanc, consult ID  Check sputum cx, legionella/strep pneumo and mrsa screen, f/u flu by pcr, f/u blood cx          Sepsis ([place] [address] )   Assessment & Plan     Likely 2* PNA  By fever of 104 by EMS and tachycardia on admission, although tachycardia now resolved  No leukocytosis, no LA elevation  Continue to monitor w/IVF as noted above       Arterial hypotension   Assessment & Plan     Per pt report somewhat chronic, pt is asymptomatic thankfully  MAP as low as 60 in ED somewhat improved now to 90/50, albumin is 2 8  rec'd 3L NS IVF in ED  Will continue w/100cc/hr NS for now, monitor closely for s/sx of overload  Pt is a confirmed FULL CODE           Right bundle branch block   Assessment & Plan     On EKG  Pt also w/ mild ST changes in 2 and AVF (slightly elevated w/o reciprocal change or chest pain)  Troponin negative  Will monitor troponins for completeness       COPD (chronic obstructive pulmonary disease) ([facility])   Assessment & Plan     No acute exacerbation, continue inhalers       Multiple myeloma (HCC)   Assessment & Plan     Undergoing active chemotherapy by faroun  Hold darzalex given infection       Aden[facility], lung ([facility] )   Assessment & Plan     Stage 3A adenocarcinoma of RUL Status post Alimta/carboplatin x4 cycles finished in May 2015 and radiation therapy  CT demonstrating  rounded area of consolidation in the posterior aspect of the right lower lobe measuring up to 3 cm image 2/100, although this may also be infectious, neoplasm is not excluded  Left lung again demonstrates mild emphysematous changes and scarring, otherwise appearing very similar  Stable groundglass opacities in the left upper lobe anteromedially  Will need repeat CT chest in OP setting by Malena Nava                · Chronic diarrhea  ? Check C diff by PCR and stool pathogens     VTE Prophylaxis: Enoxaparin (Lovenox)  / sequential compression device   Code Status: FC  POLST: There is no POLST form on file for this patient (pre-hospital)     Anticipated Length of Stay:  Patient will be admitted on an Inpatient basis with an anticipated length of stay of  greater 2 midnights  Justification for Hospital Stay: HCAP    Admission Orders:  Telemetry  Blood, sputum and stool cxs pending  Resp protocol  Trop q3h  Contact isolation status  Sequential compression device  Consult ID, pulmonology    Scheduled Meds:   cefepime 2,000 mg Intravenous Q12H   enoxaparin 40 mg Subcutaneous Daily   finasteride 5 mg Oral HS   tiotropium 18 mcg Inhalation HS   vancomycin 15 mg/kg Intravenous Q12H     Continuous Infusions:   sodium chloride 100 mL/hr Last Rate: 100 mL/hr (01/03/18 1308)     PRN Meds:   acetaminophen    guaiFENesin    levalbuterol    ondansetron    Thank you,  [facility] in the [facility] by [address] for 2017  Network Utilization Review Department  Phone: [phone];  Fax [phone]  ATTENTION: The Network Utilization Review Department is now centralized for our 7 Facilities  Make a note that we have a new phone and fax numbers for our Department  Please call with any questions or concerns to [phone] and carefully follow the prompts so that you are directed to the right person  All voicemails are confidential  Fax any determinations, approvals, denials, and requests for initial or continue stay review clinical to [phone]  Due to HIGH CALL volume, it would be easier if you could please send faxed requests to expedite your requests and in part, help us provide discharge notifications faster

## 2018-01-03 NOTE — CONSULTS
Consultation - Pulmonary Medicine   Muriel Gillis 81 y o  male MRN: [id number]  Unit/Bed#: ED 23 Encounter: [id number]      Physician Requesting Consult:  Dr Juan Pablo Shukla  Reason for Consult:  Pneumonia    Assessment/Plan:    1  Acute bacterial pneumonia, likely Gram-negative  · Cover with broad-spectrum antibiotics to include healthcare associated pathogens given recent hospitalization for colon cancer surgery and immunocompromise  · Would at minimum cover for gram-negative pathogens given the recent hospitalizations and therefore I have change ceftriaxone to cefepime  · Atypical infection is less likely, currently on azithromycin, will defer to primary service  · MRSA risk is likely low, can hold off on further vancomycin for now  2  SIRS and sepsis without septic shock secondary to pneumonia  3  COPD with mild exacerbation  · Observe off steroids for now, if worsening wheezing or COPD symptoms, can add prednisone  · Nebulizer treatments  · Continue Spiriva  4  Lung cancer with abnormal CT scan  · The posterior area of abnormality in the right mid lung zone may represent inflammatory or infectious infiltrate versus lung cancer  · No need for biopsy presently, would follow up with CT scan and short interval of 2 months once pneumonia and infection symptoms have resolved  5  Hypotension which is multifactorial  · He has baseline borderline blood pressures usually running in the 95U systolic  · Now at baseline after fluid resuscitation, likely dehydrated at presentation due to loss is related to diarrhea and poor oral intake  6  Immunocompromise  · This is secondary to myeloma and chemotherapy  ______________________________________________________________________    HPI:    Niurka Knight is a 81 y o  male who is very well known to me  He has chronic cough and history of lung cancer with COPD  He has had prior radiation therapy as well  He has a history of myeloma and immunocompromise    He is followed by Dr Baudilio Looney as well  Recently he underwent colon surgery for colon cancer and did relatively well with this surgery  Recently had his myeloma medication changed  He has since developed productive cough, fever and was feeling very weak and tired  He was brought to the emergency room by ambulance after his wife called 911  He was noted to be hypotensive in the 60s and 70s but is baseline blood pressure normally is in the high 80s to low 90s  He is feeling overall run down and somewhat tired  He was given 4 L of IV fluids with improvement in his blood pressure to the low 011H systolic  He is coughing  He is having some wheezing  He denies chest tightness  He has not had any chest pain  He does have chronic diarrhea  Volume is somewhat variable  He does not have abdominal pain or cramping  He denies any dysuria  He has not had any headache, visual change, or syncopal symptoms  Review of Systems:    Full 14 point review of systems was performed  Aside from what was mentioned in the HPI, it is otherwise negative  Historical Information   Past Medical History:   Diagnosis Date    Cancer ([facility] [address] )     lung and multiple myloma    Chemoprevention     Chronic pain disorder     3 fractured vertebrae    COPD (chronic obstructive pulmonary disease) (HCC)     Diarrhea     Emphysema lung (HCC)     Enlarged prostate     [place] hearing[place]     Kidney stone     Low blood pressure     Lung cancer (HCC)     Multiple myeloma (HCC)     Multiple myeloma (HCC)     Pneumonia     Pulmonary emphysema (HCC)     Rectal adenocarcinoma (HCC)     Shortness of breath     Sleep apnea     no cpap     Past Surgical History:   Procedure Laterality Date    CHOLECYSTECTOMY      COLONOSCOPY W/ ENDOSCOPIC US N/A 7/21/2017    Procedure: ANAL ENDOSCOPIC U/S;  Surgeon: Agatha Brown MD;  [facility]: [facility];   Service: Colorectal    HERNIA REPAIR      KIDNEY STONE SURGERY      LUNG LOBECTOMY Right     AL COLONOSCOPY FLX DX W/COLLJ SPEC WHEN PFRMD N/A 7/10/2017    Procedure: COLONOSCOPY;  Surgeon: Inocente Dickerson MD;  Location: [facility]; Service: Gastroenterology    HI RECTAL TUMOR EXCISION, TRANSANAL ENDOSCOPIC MICROSURGICAL, FULL THICK N/A 11/2/2017    Procedure: TRANSANAL ENDOSCOPIC MICROSURGERY TEM;  Surgeon: Haider Nick MD;  [facility];  Service: Colorectal     Social History   History   Alcohol Use No     Comment: rarely     History   Smoking Status    Former Smoker    Packs/day: 1 00    Years: 50 00    Types: Cigarettes    Quit date: 12/7/2010   Smokeless Tobacco    Never Used     Comment: quit in 1999       Family History:   Family History   Problem Relation Age of Onset    Arthritis Mother     Colon cancer Father     Heart attack Father     Diabetes type I Sister        Medications: The patient's active and prehospital medications were reviewed  azithromycin 500 mg Oral Q24H   cefTRIAXone 2,000 mg Intravenous Q24H   enoxaparin 40 mg Subcutaneous Daily   finasteride 5 mg Oral HS   tiotropium 18 mcg Inhalation HS         PhysicalExamination:  Vitals:   Vitals:    01/03/18 1056 01/03/18 1130 01/03/18 1223 01/03/18 1307   BP: 91/53 93/52 95/50 101/59   Pulse: 84 82 91 83   Resp: 18 16 18 18   Temp:       TempSrc:       SpO2: 97% 97% 97% 97%   Weight:         Body mass index is 28 3 kg/m²  Temp  Min: 97 °F (36 1 °C)  Max: 100 7 °F (38 2 °C)  IBW: -88 kg    SpO2: 97 %,   SpO2 Activity: At Rest,   O2 Device: Nasal cannula    Gen:  Coughing with a wet cough  Appears fatigued  HEENT:  Mild pallor  Pupils reactive  Oropharynx moist  Neck:  No JVD  No adenopathy  Trachea midline  Chest:  Coarse breath sounds with a few scattered wheezes  Cardiac:   All regular, no murmur  Abd:  Soft, benign  Ext:  1+ edema  Neuro:  Alert and nonfocal  Skin:  Warm, dry, several ecchymoses but no rash    Diagnostic Data:  CBC:     Results from last 7 days  Lab Units 01/03/18  0648   WBC Thousand/uL 8 56 HEMOGLOBIN g/dL 11 2*   HEMATOCRIT % 33 7*   PLATELETS Thousands/uL 115*       CMP:     Results from last 7 days  Lab Units 01/03/18  0648   SODIUM mmol/L 136   POTASSIUM mmol/L 4 0   CHLORIDE mmol/L 103   CO2 mmol/L 26   BUN mg/dL 13   CREATININE mg/dL 1 17   CALCIUM mg/dL 8 6   TOTAL PROTEIN g/dL 6 1*   BILIRUBIN TOTAL mg/dL 1 50*   ALK PHOS U/L 92   ALT U/L 19   AST U/L 17   GLUCOSE RANDOM mg/dL 131       Imaging:  CT scan of the chest was viewed personally on the [facility] system  There is right basilar pneumonia  There is a posterior right-sided area of increased density in an area of prior scar which may represent focus pneumonia verses lung cancer recurrence  Currently favor infectious process given presentation        Kortney Martin MD

## 2018-01-03 NOTE — ED NOTES
Pt's urinal at bedside, pt unable to void at this time  Will let staff know when he does  Pt was bladder scanned for 163ml  No urge to void yet  Will continue to monitor        Huong Reyna RN  01/03/18 1760

## 2018-01-03 NOTE — ASSESSMENT & PLAN NOTE
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

## 2018-01-03 NOTE — ASSESSMENT & PLAN NOTE
Likely 2* PNA  By fever of 104 by EMS and tachycardia on admission, although tachycardia now resolved  No leukocytosis, no LA elevation  Continue to monitor w/IVF as noted above

## 2018-01-04 LAB
ANION GAP SERPL CALCULATED.3IONS-SCNC: 10 MMOL/L (ref 4–13)
BASOPHILS # BLD AUTO: 0.01 THOUSANDS/ΜL (ref 0–0.1)
BASOPHILS NFR BLD AUTO: 0 % (ref 0–1)
BUN SERPL-MCNC: 9 MG/DL (ref 5–25)
C DIFF TOX GENS STL QL NAA+PROBE: NORMAL
CALCIUM SERPL-MCNC: 7.4 MG/DL (ref 8.3–10.1)
CAMPYLOBACTER DNA SPEC NAA+PROBE: NORMAL
CHLORIDE SERPL-SCNC: 107 MMOL/L (ref 100–108)
CO2 SERPL-SCNC: 22 MMOL/L (ref 21–32)
CREAT SERPL-MCNC: 0.93 MG/DL (ref 0.6–1.3)
EOSINOPHIL # BLD AUTO: 0.06 THOUSAND/ΜL (ref 0–0.61)
EOSINOPHIL NFR BLD AUTO: 1 % (ref 0–6)
ERYTHROCYTE [DISTWIDTH] IN BLOOD BY AUTOMATED COUNT: 14.7 % (ref 11.6–15.1)
FLUAV AG SPEC QL: NORMAL
FLUBV AG SPEC QL: NORMAL
GFR SERPL CREATININE-BSD FRML MDRD: 77 ML/MIN/1.73SQ M
GLUCOSE SERPL-MCNC: 100 MG/DL (ref 65–140)
HCT VFR BLD AUTO: 29 % (ref 36.5–49.3)
HGB BLD-MCNC: 9.3 G/DL (ref 12–17)
LYMPHOCYTES # BLD AUTO: 1.28 THOUSANDS/ΜL (ref 0.6–4.47)
LYMPHOCYTES NFR BLD AUTO: 25 % (ref 14–44)
MCH RBC QN AUTO: 28.2 PG (ref 26.8–34.3)
MCHC RBC AUTO-ENTMCNC: 32.1 G/DL (ref 31.4–37.4)
MCV RBC AUTO: 88 FL (ref 82–98)
MONOCYTES # BLD AUTO: 0.38 THOUSAND/ΜL (ref 0.17–1.22)
MONOCYTES NFR BLD AUTO: 8 % (ref 4–12)
NEUTROPHILS # BLD AUTO: 3.32 THOUSANDS/ΜL (ref 1.85–7.62)
NEUTS SEG NFR BLD AUTO: 66 % (ref 43–75)
PLATELET # BLD AUTO: 137 THOUSANDS/UL (ref 149–390)
PMV BLD AUTO: 7.9 FL (ref 8.9–12.7)
POTASSIUM SERPL-SCNC: 3.5 MMOL/L (ref 3.5–5.3)
RBC # BLD AUTO: 3.3 MILLION/UL (ref 3.88–5.62)
RSV B RNA SPEC QL NAA+PROBE: NORMAL
SALMONELLA DNA SPEC QL NAA+PROBE: NORMAL
SHIGA TOXIN STX GENE SPEC NAA+PROBE: NORMAL
SHIGELLA DNA SPEC QL NAA+PROBE: NORMAL
SODIUM SERPL-SCNC: 139 MMOL/L (ref 136–145)
WBC # BLD AUTO: 5.05 THOUSAND/UL (ref 4.31–10.16)

## 2018-01-04 PROCEDURE — 80048 BASIC METABOLIC PNL TOTAL CA: CPT | Performed by: PHYSICIAN ASSISTANT

## 2018-01-04 PROCEDURE — 87505 NFCT AGENT DETECTION GI: CPT | Performed by: PHYSICIAN ASSISTANT

## 2018-01-04 PROCEDURE — 85025 COMPLETE CBC W/AUTO DIFF WBC: CPT | Performed by: PHYSICIAN ASSISTANT

## 2018-01-04 PROCEDURE — 87493 C DIFF AMPLIFIED PROBE: CPT | Performed by: PHYSICIAN ASSISTANT

## 2018-01-04 PROCEDURE — 94640 AIRWAY INHALATION TREATMENT: CPT

## 2018-01-04 RX ORDER — SILODOSIN 8 MG/1
8 CAPSULE ORAL
Status: DISCONTINUED | OUTPATIENT
Start: 2018-01-04 | End: 2018-01-06 | Stop reason: HOSPADM

## 2018-01-04 RX ADMIN — SODIUM CHLORIDE 100 ML/HR: 0.9 INJECTION, SOLUTION INTRAVENOUS at 09:02

## 2018-01-04 RX ADMIN — ISODIUM CHLORIDE 3 ML: 0.03 SOLUTION RESPIRATORY (INHALATION) at 07:19

## 2018-01-04 RX ADMIN — SILODOSIN 1 CAPSULE: 8 CAPSULE ORAL at 18:31

## 2018-01-04 RX ADMIN — SODIUM CHLORIDE 75 ML/HR: 0.9 INJECTION, SOLUTION INTRAVENOUS at 21:12

## 2018-01-04 RX ADMIN — ISODIUM CHLORIDE 3 ML: 0.03 SOLUTION RESPIRATORY (INHALATION) at 19:07

## 2018-01-04 RX ADMIN — Medication 2000 MG: at 02:07

## 2018-01-04 RX ADMIN — CEFTRIAXONE 1000 MG: 1 INJECTION, POWDER, FOR SOLUTION INTRAMUSCULAR; INTRAVENOUS at 12:13

## 2018-01-04 RX ADMIN — AZITHROMYCIN 500 MG: 250 TABLET, FILM COATED ORAL at 17:09

## 2018-01-04 RX ADMIN — LEVALBUTEROL 1.25 MG: 1.25 SOLUTION, CONCENTRATE RESPIRATORY (INHALATION) at 19:07

## 2018-01-04 RX ADMIN — FINASTERIDE 5 MG: 5 TABLET, FILM COATED ORAL at 18:31

## 2018-01-04 RX ADMIN — ENOXAPARIN SODIUM 40 MG: 40 INJECTION SUBCUTANEOUS at 08:50

## 2018-01-04 RX ADMIN — GUAIFENESIN 1200 MG: 600 TABLET, EXTENDED RELEASE ORAL at 02:16

## 2018-01-04 RX ADMIN — LEVALBUTEROL 1.25 MG: 1.25 SOLUTION, CONCENTRATE RESPIRATORY (INHALATION) at 07:19

## 2018-01-04 RX ADMIN — TIOTROPIUM BROMIDE 18 MCG: 18 CAPSULE ORAL; RESPIRATORY (INHALATION) at 21:10

## 2018-01-04 NOTE — PLAN OF CARE

## 2018-01-04 NOTE — SOCIAL WORK
CM met with the patient to do a general open  The patient lives with his wife in a two story home  He is independent with ADLs and Ambulation  The patient uses a cane out in the community  He drives  Hx of [facility] approx 2 years ago  No rehab stays in the past  He uses [facility] in [facility] for rx needs  BLANCA lyn

## 2018-01-04 NOTE — PROGRESS NOTES
[facility] Internal Medicine Progress Note  Patient: Chau Urena 81 y o  male   MRN: [id number]  PCP: Zakiya Martínez DO  Unit/Bed#: ICU 07 Encounter: [id number]  Date Of Visit: 01/04/18      Assessment/plan  Principal problem  1  Sepsis due to pneumonia- appreciate ID recommendations  Continue azithro and ceftriaxone  Flu negative  Legionella/strep negative  Sputum pending  Blood cultures pending    2  Acute hypoxic respiratory failure due to pneumonia, copd and lung cancer- continue to wean oxygen as tolerated  No acute exacerbation of copd but continue bronchodilators  3  Hypotension- pt usually has sbp in the 90s to low 100s  His blood pressure was lower then normal int he 70 and 80 systolic  He was treated with IVF and sbp has improved  Active problems  1  Multiple myeloma- follow up with oncology outpt  Continue monoclonal antibody therapy    2  Dehydration- resolved with IVF    3  Anemia due to multiple myeloma and dilution- monitor h/h  Transfuse as needed    4  Chronic diarrhea- awaiting c diff but likely related to monoclonal antibody therapy  5  Adenocarcinoma of the lung with abnormal CT- could be inflammation vrs infection vrs lung cancer  Appreciate pulmonary recommendations  No biopsy needed at this time  Pt will need repeat ct in 2 months  dispo- wife at bedside  Will consult physical therapy    Subjective:   Pt seen and examined  Pt states he still feels weak but his breathing is better  He denies f/c no cp no n/v/d no abd pain  He always has swelling of his left lower ext  Objective:     Vitals: Blood pressure (!) 86/47, pulse 80, temperature 99 9 °F (37 7 °C), temperature source Temporal, resp  rate (!) 23, height 5' 8" (1 727 m), weight 81 5 kg (179 lb 10 8 oz), SpO2 94 %  ,Body mass index is 27 32 kg/m²  repeat bp is 97/65    Lab, Imaging and other studies:    Results from last 7 days  Lab Units 01/04/18  0452 01/03/18  0648   WBC Thousand/uL 5 05 8 56   HEMOGLOBIN g/dL 9 3* 11 2*   HEMATOCRIT % 29 0* 33 7*   PLATELETS Thousands/uL 137* 115*   INR   --  0 98       Results from last 7 days  Lab Units 01/04/18  0452 01/03/18  0648   SODIUM mmol/L 139 136   POTASSIUM mmol/L 3 5 4 0   CHLORIDE mmol/L 107 103   CO2 mmol/L 22 26   BUN mg/dL 9 13   CREATININE mg/dL 0 93 1 17   CALCIUM mg/dL 7 4* 8 6   TOTAL PROTEIN g/dL  --  6 1*   BILIRUBIN TOTAL mg/dL  --  1 50*   ALK PHOS U/L  --  92   ALT U/L  --  19   AST U/L  --  17   GLUCOSE RANDOM mg/dL 100 131       Results from last 7 days  Lab Units 01/03/18  1606 01/03/18  1256 01/03/18  0657   TROPONIN I ng/mL <0 02 <0 02 0 02     Lab Results   Component Value Date    BLOODCX No Growth After 5 Days  09/25/2017    BLOODCX No Growth After 5 Days  09/25/2017    BLOODCX No Growth After 5 Days  08/21/2017    SPUTUMCULTUR 2+ Growth of Mixed Respiratory Wilma 03/03/2016         Xr Chest 2 Views    Result Date: 1/3/2018  Narrative: CHEST INDICATION:  Cough  Fever  Shortness of breath  Weakness  COMPARISON:  October 20, 2017 VIEWS:  Frontal and lateral projections IMAGES:  2 FINDINGS:  Left lung is adequately aerated and relatively clear  Right lung again demonstrates postoperative changes  Probable fluid at the lung apex and atelectatic changes at the right lung base  Generalized prominence of the interstitial markings in both lungs, new from prior exam  Cardiac size within normal limits  No vascular congestion or significant peribronchial thickening  No pneumothorax or free air  Visualized osseous structures appear within normal limits for the patient's age  Right-sided port with catheter tip in the mid SVC  Impression: Postoperative changes right hemithorax  Generalized increased interstitial markings in both lungs  Suspect infectious etiology given the history and physical findings  No lobar consolidation  No significant pulmonary edema  Follow-up recommended until clearing is achieved   Workstation performed: [id number]     Cta Ed Chest Pe Study    Result Date: 1/3/2018  Narrative: CTA - CHEST WITH IV CONTRAST - PULMONARY ANGIOGRAM INDICATION: Shortness of breath  Cough  Fever  Right upper lobe removed for lung cancer  History of multiple myeloma and rectal cancer  COMPARISON: August 22, 2017 TECHNIQUE: CTA examination of the chest was performed using angiographic technique according to a protocol specifically tailored to evaluate for pulmonary embolism  Reformatted images were created in axial, sagittal, and coronal planes  In addition, coronal 3D MIP postprocessing was performed on the acquisition scanner  Radiation dose length product (DLP) for this visit:  416 mGy-cm   This examination, like all CT scans performed in the [facility], was performed utilizing techniques to minimize radiation dose exposure, including the use of iterative reconstruction and automated exposure control  IV Contrast:  85 mL of iodixanol (VISIPAQUE)  320     FINDINGS: PULMONARY ARTERIAL TREE:  No central pulmonary bleed are seen  There is a tiny branch artery supplying the superior segment right lower lobe image 2/67 which does not enhance well, this is unchanged from March 2017 and may be due to radiation sclerosis  LUNGS:  There are new patchy infiltrative changes in the right lung base compatible with pneumonia  Scattered nodularity seen throughout the right lung also possibly infectious or atelectatic  Of somewhat greater concern is a rounded area of consolidation in the posterior aspect of the right lower lobe measuring up to 3 cm image 2/100, although this may also be infectious, neoplasm is not excluded  Stable postoperative scarring and postradiation changes in the right lung apex  Left lung again demonstrates mild emphysematous changes and scarring, otherwise appearing very similar  Stable groundglass opacities in the left upper lobe anteromedially  PLEURA:  Trace right pleural fluid  HEART/AORTA:  Cardiac size within normal limits   Aortic atherosclerosis  MEDIASTINUM AND GLEN:  Postop changes in the right hilum  CHEST WALL AND LOWER NECK:   No discrete thyroid nodules are seen  Port-A-Cath the right chest wall with catheter tip in the atrium  VISUALIZED STRUCTURES IN THE UPPER ABDOMEN:  Unremarkable  OSSEOUS STRUCTURES:  Stable mottled appearance of all visualized vertebral bodies with multiple partial compression deformities  Impression: Stable postoperative and postradiation changes  Infiltrative change/partial consolidation of the right lower lobe with scattered local nodularity most compatible with pneumonia given the patient's history  There is a rounded area of parenchymal density in the posterior medial right lower lobe measuring up to 3 cm image 2/100 which is somewhat more suspicious for potential recurrent or metastatic neoplasm  Follow-up CT of chest recommended 1-2 months following treatment to document resolution/or least improvement of these findings  No central pulmonary emboli  Workstation performed: [id number]       Scheduled Meds:   azithromycin 500 mg Oral Q24H   cefTRIAXone 1,000 mg Intravenous Q24H   enoxaparin 40 mg Subcutaneous Daily   finasteride 5 mg Oral HS   levalbuterol 1 25 mg Nebulization TID   sodium chloride 3 mL Nebulization TID   tiotropium 18 mcg Inhalation HS     Continuous Infusions:   sodium chloride 100 mL/hr Last Rate: 100 mL/hr (01/04/18 0902)     PRN Meds:   acetaminophen    guaiFENesin    ondansetron      Physical exam:  Physical Exam  General appearance: alert, appears stated age and cooperative  Head: Normocephalic, without obvious abnormality, atraumatic  Eyes: conjunctivae/corneas clear  PERRL, EOM's intact  Fundi benign    Neck: no adenopathy, no carotid bruit, no JVD, supple, symmetrical, trachea midline and thyroid not enlarged, symmetric, no tenderness/mass/nodules  Lungs: slight coarse breath sounds bilateral  Heart: regular rate and rhythm, S1, S2 normal, no murmur, click, rub or gallop  Abdomen: soft, non-tender; bowel sounds normal; no masses,  no organomegaly  Extremities: edema of left lower ext +1, no edema right lower ext  Pulses: 2+ and symmetric  Skin: Skin color, texture, turgor normal  No rashes or lesions  Neurologic: Grossly normal      VTE Pharmacologic Prophylaxis: Enoxaparin (Lovenox)  VTE Mechanical Prophylaxis: sequential compression device    Counseling / Coordination of Care  Total floor / unit time spent today 20 minutes      Current Length of Stay: 1 day(s)    Current Patient Status: Inpatient       Code Status: Level 1 - Full Code

## 2018-01-04 NOTE — PROGRESS NOTES
Spoke with JACK Ballesteros regarding patient's blood pressure (84/49)  Since patient's normal SBP is 90, will just continue to monitor  Patient asymptomatic

## 2018-01-04 NOTE — PROGRESS NOTES
Progress Note - Pulmonary   Jooj Gannon 81 y o  male MRN: [id number]  Unit/Bed#: ICU 07 Encounter: [id number]    Assessment/Plan:  1  Sepsis without shock secondary to acute bacterial pneumonia, suspected Gram-negative in the setting of immunocompromise secondary to myeloma and chemotherapy  · Continue Cefepime, d#2, for 7 days  for broad spectrum coverage  · Can likely D/C azithromycin given low suspicion for atypical pathogens  Urine antigens negative  · Blood and MRSA cultures pending  2  COPD with mild acute exacerbation  · Wheezing has not progressed and respiratory status seems relatively stable  Would continue to monitor off steroids  · Continue xopenex nebs and spiriva  3  Lung Ca with abnormal CT scan  · Currently no indication for biopsy  Follow-up CT scan in 2 months as an outpatient    ----------------------------------------------------------------------------------------------------------------------    HPI/Interval History:   Afebrile  No acute events overnight  Vitals:   Blood pressure (!) 79/45, pulse 76, temperature 99 9 °F (37 7 °C), temperature source Temporal, resp  rate 16, height 5' 8" (1 727 m), weight 81 5 kg (179 lb 10 8 oz), SpO2 97 %  ,Body mass index is 27 32 kg/m²    SpO2: 97 %  SpO2 Activity: At Rest  O2 Device: Nasal cannula    Physical Exam:   Gen: sleeping but arousable, weak appearing, no acute distress  HEENT:  Atraumatic, normocephalic, extraocular movements intact, pupils 4 mm equal and reactive, oropharynx clear  Neck:  Supple, trachea midline, no JVD, no lymphadenopathy  Chest:  Lungs coarse with occasional mild expiratory wheezes  Cardiac:  Single S1/S2, no murmurs, rubs, gallops, regular rate and rhythm  Abdomen:  Soft, nontender, nondistended, bowel sounds normoactive  Extremities:  1+ bilateral lower extremity edema, no clubbing or cyanosis      Labs:    CBC:    Results from last 7 days  Lab Units 01/04/18  0452   WBC Thousand/uL 5 05   HEMOGLOBIN g/dL 9 3* HEMATOCRIT % 29 0*   PLATELETS Thousands/uL 137*         BMP:     Results from last 7 days  Lab Units 01/04/18  0452   SODIUM mmol/L 139   POTASSIUM mmol/L 3 5   CHLORIDE mmol/L 107   CO2 mmol/L 22   BUN mg/dL 9   CREATININE mg/dL 0 93   GLUCOSE RANDOM mg/dL 100   CALCIUM mg/dL 7 4*           Imaging studies:  None JACK Delaney

## 2018-01-04 NOTE — PROGRESS NOTES
Progress Note - Infectious Disease   Jojo Gannon 81 y o  male MRN: [id number]  Unit/Bed#: ICU 07 Encounter: [id number]      Impression/Plan:  1  Sepsis-POA  Fever, tachycardia, hypotension  Appears to be secondary to respiratory infection with probable pneumonia  No other clear source appreciated  The patient has stabilized with IV fluid resuscitation and is clinically improved  In this patient with multiple myeloma, he has had significantly increased risk encapsulated organisms such as pneumococcus, and Haemophilus influenzae  Consideration for the possibility of a viral infection such as influenza respiratory infection  Consideration for the possibility of a more resistant gram-negative mikey   -continue cefepime and azithromycin for now  -follow-up blood cultures, and sputum culture and adjust antibiotics as needed  -monitor CBC with diff and creatinine  -supportive care     2  Pneumonia-most likely encapsulated organism  Consideration for the possibility of a resistant gram-negative mikey  He seems clinically improved with a temperature coming down and the respiratory status remaining stable   -antibiotics as above  -follow-up sputum culture  -monitor respiratory status  -no additional ID workup for now  -likely simplify antibiotics tomorrow if the patient would continue to improve and cultures allow     3  Acute hypoxic respiratory failure-likely related to 2  In the face of COPD  No other clear source appreciated  CT of the chest is negative for pulmonary embolism  Patient's respiratory status seems relatively stable   -antibiotics as above  -O2 support  -monitor respiratory status  -Pulmonary follow-up     4  COPD-without any obvious evidence of an acute exacerbation with the exception of oxygen needs      5   Multiple myeloma-on outpatient monoclonal antibiotic therapy      6    Chronic diarrhea-felt to be related to the patient's previous chemotherapy in the setting the patient has had previous bowel surgery  Doubt any acute infectious process  No diarrhea since yesterday  -monitor stool output  -follow up stool for C diff  -symptomatic treatment if the C diff is negative  -if no recurrence of the diarrhea will likely cancel the stool for C diff    Antibiotics:  Cefepime 2  Azithromycin 2    Subjective:  Patient has no fever, chills, sweats; no nausea, vomiting, diarrhea; still with cough, but no shortness of breath; no pain  No new symptoms  He is continuing to require O2 by nasal cannula  His antibiotics were broadened in the face of hypotension yesterday    Objective:  Vitals:  HR:  [76-96] 80  Resp:  [12-32] 23  BP: ()/(45-62) 86/47  SpO2:  [90 %-99 %] 94 %  Temp (24hrs), Av 7 °F (37 6 °C), Min:99 6 °F (37 6 °C), Max:99 9 °F (37 7 °C)  Current: Temperature: 99 9 °F (37 7 °C)    Physical Exam:   General Appearance:  Alert, nontoxic, no acute distress  Throat: Oropharynx dry without lesions  Lungs:   Decreased breath sounds right greater than left; respirations unlabored   Heart:  RRR; no murmur, rub or gallop   Abdomen:   Soft, non-tender, non-distended, positive bowel sounds  Extremities: No clubbing, cyanosis or edema   Skin: No new rashes or lesions  No draining wounds noted         Labs, Imaging, & Other studies:   All pertinent labs and imaging studies were personally reviewed    Results from last 7 days  Lab Units 18  0452 18  0648   WBC Thousand/uL 5 05 8 56   HEMOGLOBIN g/dL 9 3* 11 2*   PLATELETS Thousands/uL 137* 115*       Results from last 7 days  Lab Units 18  0452 18  0648   SODIUM mmol/L 139 136   POTASSIUM mmol/L 3 5 4 0   CHLORIDE mmol/L 107 103   CO2 mmol/L 22 26   ANION GAP mmol/L 10 7   BUN mg/dL 9 13   CREATININE mg/dL 0 93 1 17   EGFR ml/min/1 73sq m 77 58   GLUCOSE RANDOM mg/dL 100 131   CALCIUM mg/dL 7 4* 8 6   AST U/L  --  17   ALT U/L  --  19   ALK PHOS U/L  --  92   TOTAL PROTEIN g/dL  --  6 1*   ALBUMIN g/dL  --  2 8*   BILIRUBIN TOTAL mg/dL  --  1 50*       Results from last 7 days  Lab Units 01/03/18  1256 01/03/18  0853   INFLUENZA A PCR   --  None Detected   INFLUENZA B PCR   --  None Detected   RSV PCR   --  None Detected   LEGIONELLA URINARY ANTIGEN  Negative  --

## 2018-01-05 ENCOUNTER — TRANSCRIBE ORDERS (OUTPATIENT)
Dept: ADMINISTRATIVE | Facility: HOSPITAL | Age: 83
End: 2018-01-05

## 2018-01-05 DIAGNOSIS — J18.9 PNEUMONIA DUE TO INFECTIOUS ORGANISM, UNSPECIFIED LATERALITY, UNSPECIFIED PART OF LUNG: Primary | ICD-10-CM

## 2018-01-05 LAB
ANION GAP SERPL CALCULATED.3IONS-SCNC: 6 MMOL/L (ref 4–13)
BACTERIA SPT RESP CULT: NORMAL
BUN SERPL-MCNC: 7 MG/DL (ref 5–25)
CALCIUM SERPL-MCNC: 8.3 MG/DL (ref 8.3–10.1)
CHLORIDE SERPL-SCNC: 109 MMOL/L (ref 100–108)
CO2 SERPL-SCNC: 24 MMOL/L (ref 21–32)
CREAT SERPL-MCNC: 0.82 MG/DL (ref 0.6–1.3)
ERYTHROCYTE [DISTWIDTH] IN BLOOD BY AUTOMATED COUNT: 14.6 % (ref 11.6–15.1)
GFR SERPL CREATININE-BSD FRML MDRD: 83 ML/MIN/1.73SQ M
GLUCOSE SERPL-MCNC: 97 MG/DL (ref 65–140)
GRAM STN SPEC: NORMAL
HCT VFR BLD AUTO: 28.7 % (ref 36.5–49.3)
HGB BLD-MCNC: 9.5 G/DL (ref 12–17)
MCH RBC QN AUTO: 28.6 PG (ref 26.8–34.3)
MCHC RBC AUTO-ENTMCNC: 33.1 G/DL (ref 31.4–37.4)
MCV RBC AUTO: 86 FL (ref 82–98)
MRSA NOSE QL CULT: NORMAL
PLATELET # BLD AUTO: 105 THOUSANDS/UL (ref 149–390)
PMV BLD AUTO: 8.9 FL (ref 8.9–12.7)
POTASSIUM SERPL-SCNC: 3.4 MMOL/L (ref 3.5–5.3)
RBC # BLD AUTO: 3.32 MILLION/UL (ref 3.88–5.62)
SODIUM SERPL-SCNC: 139 MMOL/L (ref 136–145)
WBC # BLD AUTO: 3.76 THOUSAND/UL (ref 4.31–10.16)

## 2018-01-05 PROCEDURE — 94640 AIRWAY INHALATION TREATMENT: CPT

## 2018-01-05 PROCEDURE — 94760 N-INVAS EAR/PLS OXIMETRY 1: CPT

## 2018-01-05 PROCEDURE — 80048 BASIC METABOLIC PNL TOTAL CA: CPT | Performed by: INTERNAL MEDICINE

## 2018-01-05 PROCEDURE — 85027 COMPLETE CBC AUTOMATED: CPT | Performed by: INTERNAL MEDICINE

## 2018-01-05 RX ORDER — SODIUM CHLORIDE FOR INHALATION 0.9 %
3 VIAL, NEBULIZER (ML) INHALATION EVERY 8 HOURS PRN
Status: DISCONTINUED | OUTPATIENT
Start: 2018-01-05 | End: 2018-01-06 | Stop reason: HOSPADM

## 2018-01-05 RX ORDER — LEVALBUTEROL 1.25 MG/.5ML
1.25 SOLUTION, CONCENTRATE RESPIRATORY (INHALATION) EVERY 8 HOURS PRN
Status: DISCONTINUED | OUTPATIENT
Start: 2018-01-05 | End: 2018-01-06 | Stop reason: HOSPADM

## 2018-01-05 RX ORDER — POTASSIUM CHLORIDE 20 MEQ/1
40 TABLET, EXTENDED RELEASE ORAL ONCE
Status: COMPLETED | OUTPATIENT
Start: 2018-01-05 | End: 2018-01-05

## 2018-01-05 RX ADMIN — LEVALBUTEROL 1.25 MG: 1.25 SOLUTION, CONCENTRATE RESPIRATORY (INHALATION) at 18:22

## 2018-01-05 RX ADMIN — ISODIUM CHLORIDE 3 ML: 0.03 SOLUTION RESPIRATORY (INHALATION) at 07:20

## 2018-01-05 RX ADMIN — POTASSIUM CHLORIDE 40 MEQ: 1500 TABLET, EXTENDED RELEASE ORAL at 09:47

## 2018-01-05 RX ADMIN — FINASTERIDE 5 MG: 5 TABLET, FILM COATED ORAL at 22:02

## 2018-01-05 RX ADMIN — TIOTROPIUM BROMIDE 18 MCG: 18 CAPSULE ORAL; RESPIRATORY (INHALATION) at 22:02

## 2018-01-05 RX ADMIN — CEFTRIAXONE 1000 MG: 1 INJECTION, POWDER, FOR SOLUTION INTRAMUSCULAR; INTRAVENOUS at 09:47

## 2018-01-05 RX ADMIN — ENOXAPARIN SODIUM 40 MG: 40 INJECTION SUBCUTANEOUS at 08:17

## 2018-01-05 RX ADMIN — LEVALBUTEROL 1.25 MG: 1.25 SOLUTION, CONCENTRATE RESPIRATORY (INHALATION) at 07:20

## 2018-01-05 RX ADMIN — ACETAMINOPHEN 650 MG: 325 TABLET, FILM COATED ORAL at 18:34

## 2018-01-05 RX ADMIN — SILODOSIN 1 CAPSULE: 8 CAPSULE ORAL at 22:39

## 2018-01-05 RX ADMIN — ISODIUM CHLORIDE 3 ML: 0.03 SOLUTION RESPIRATORY (INHALATION) at 18:22

## 2018-01-05 NOTE — PROGRESS NOTES
Progress Note - Infectious Disease   Chau Urena 81 y o  male MRN: [id number]  Unit/Bed#: ICU 07 Encounter: [id number]      Impression/Plan:  1  Gary Silva   Fever, tachycardia, hypotension   Appears to be secondary to respiratory infection with probable pneumonia   No other clear source appreciated   The patient has stabilized with IV fluid resuscitation and is clinically improved   In this patient with multiple myeloma, he has had significantly increased risk encapsulated organisms such as pneumococcus, and Haemophilus influenzae   Consideration for the possibility of a viral infection such as influenza respiratory infection  No resistant organisms isolated  He has been transition back to ceftriaxone and azithromycin  -discontinue azithromycin  -continue ceftriaxone  -follow-up blood cultures  -monitor CBC with diff and creatinine  -supportive care     2   Pneumonia-most likely encapsulated organism  Consideration for the possibility of a resistant gram-negative mikey    He seems clinically improved with a temperature coming down and the respiratory status remaining stable   -antibiotics as above  -monitor respiratory status  -no additional ID workup for now  -if continues to improve and remains afebrile tomorrow, the blood cultures remain negative can transition to cefdinir 300 mg p o  q 12 hours to complete 7 days total of antibiotic     3   Acute hypoxic respiratory failure-likely related to 2   In the face of COPD   No other clear source appreciated   CT of the chest is negative for pulmonary embolism   Patient's respiratory status seems relatively stable and he is no longer requiring oxygen support   -antibiotics as above  -O2 support as needed  -monitor respiratory status  -Pulmonary follow-up     4   COPD-without any obvious evidence of an acute exacerbation with the exception of oxygen needs      5   Multiple myeloma-on outpatient monoclonal antibiotic therapy      6   Chronic diarrhea-felt to be related to the patient's previous chemotherapy in the setting the patient has had previous bowel surgery   Doubt any acute infectious process  No diarrhea since yesterday stool for C diff and enteric PCR is negative   -monitor stool output  -symptomatic treatment as needed    Antibiotics:  Ceftriaxone 2  Azithromycin 3  Antibiotics 3      Subjective:  Patient has no fever, chills, sweats; no nausea, vomiting, diarrhea; no cough, shortness of breath; no pain  No new symptoms  He is feeling much better overall  Objective:  Vitals:  HR:  [] 86  Resp:  [0-30] 19  BP: ()/(43-71) 112/67  SpO2:  [93 %-98 %] 93 %  Temp (24hrs), Av 3 °F (37 4 °C), Min:98 6 °F (37 °C), Max:100 1 °F (37 8 °C)  Current: Temperature: 99 6 °F (37 6 °C)    Physical Exam:   General Appearance:  Alert, nontoxic, no acute distress  Throat: Oropharynx moist without lesions  Lungs:   Decreased breath sounds bilaterally; respirations unlabored   Heart:  RRR; no murmur, rub or gallop   Abdomen:   Soft, non-tender, non-distended, positive bowel sounds  Extremities: No clubbing, cyanosis or edema   Skin: No new rashes or lesions  No draining wounds noted         Labs, Imaging, & Other studies:   All pertinent labs and imaging studies were personally reviewed    Results from last 7 days  Lab Units 18  04318  04518  0648   WBC Thousand/uL 3 76* 5 05 8 56   HEMOGLOBIN g/dL 9 5* 9 3* 11 2*   PLATELETS Thousands/uL 105* 137* 115*       Results from last 7 days  Lab Units 18  04318  0452 18  0648   SODIUM mmol/L 139 139 136   POTASSIUM mmol/L 3 4* 3 5 4 0   CHLORIDE mmol/L 109* 107 103   CO2 mmol/L 24 22 26   ANION GAP mmol/L 6 10 7   BUN mg/dL 7 9 13   CREATININE mg/dL 0 82 0 93 1 17   EGFR ml/min/1 73sq m 83 77 58   GLUCOSE RANDOM mg/dL 97 100 131   CALCIUM mg/dL 8 3 7 4* 8 6   AST U/L  --   --  17   ALT U/L  --   --  19   ALK PHOS U/L  --   --  92   TOTAL PROTEIN g/dL  --   --  6 1*   ALBUMIN g/dL --   --  2 8*   BILIRUBIN TOTAL mg/dL  --   --  1 50*       Results from last 7 days  Lab Units 01/05/18  0912 01/05/18  0415 01/04/18  1104 01/03/18  1256 01/03/18  0853 01/03/18  6307   BLOOD CULTURE   --   --   --   --   --  No Growth at 48 hrs  No Growth at 48 hrs     SPUTUM CULTURE  2+ Growth of   --   --   --   --   --    GRAM STAIN RESULT  No polys seen  1+ Gram positive rods  Rare Gram negative rods  Rare Gram positive cocci in pairs  --   --   --   --   --    MRSA CULTURE ONLY   --  No Methicillin Resistant Staphlyococcus aureus (MRSA) isolated  --   --   --   --    INFLUENZA A PCR   --   --   --   --  None Detected  --    INFLUENZA B PCR   --   --   --   --  None Detected  --    RSV PCR   --   --   --   --  None Detected  --    LEGIONELLA URINARY ANTIGEN   --   --   --  Negative  --   --    C DIFF TOXIN B   --   --  NEGATIVE for C difficle toxin by PCR    --   --   --

## 2018-01-05 NOTE — PROGRESS NOTES
[facility] Progress Note  Patient: Wyatt Lynch 81 y o  male   MRN: [id number]  PCP: Darrelyn Canavan, DO  Unit/Bed#: ICU 07 Encounter: [id number]  Date Of Visit: 01/05/18      Assessment/plan  Principal problem  1  Sepsis due to pneumonia- appreciate ID recommendations  Continue azithro and ceftriaxone  Flu negative  Legionella/strep negative  Sputum pending  Blood cultures negative  Pt is currently afebrile and sating well on room air       2  Acute hypoxic respiratory failure due to pneumonia, copd and lung cancer- pt currently on room air  Appreciate pulmonary recommendations  Continue bronchodilators        3  Hypotension- resolved  pts sbp is usually 90s to 110s  Can d/c IVF       Active problems  1  Multiple myeloma- follow up with oncology outpt  Continue monoclonal antibody therapy     2  Dehydration- resolved with IVF     3  Pancytopenia due to multiple myeloma and dilution- monitor cbc  Check in am  H/h is stable       4  Chronic diarrhea- cdiff and stool cultures negative  Likely due to treatment for multiple myeloma      5  Adenocarcinoma of the lung with abnormal CT- could be inflammation vrs infection vrs lung cancer  Appreciate pulmonary recommendations  No biopsy needed at this time  Pt will need repeat ct in 2 months       6  Hypokalemia- replace    dispo- wife at bedside  Await physical therapy eval    Subjective:   Pt seen and examined  Pt improved this am  He is breathing better  He is not lightheaded or dizzy  Low grade fever of 99 this am and tmax of 100 1  He has been coughing up white/gray sputum  No cp no n/v/d no abd pain  Objective:     Vitals: Blood pressure 112/67, pulse 86, temperature 99 6 °F (37 6 °C), temperature source Temporal, resp  rate 19, height 5' 8" (1 727 m), weight 82 1 kg (181 lb), SpO2 93 %  ,Body mass index is 27 52 kg/m²      Lab, Imaging and other studies:    Results from last 7 days  Lab Units 01/05/18  0439  01/03/18  0648   WBC Thousand/uL 3 76* < > 8 56   HEMOGLOBIN g/dL 9 5*  < > 11 2*   HEMATOCRIT % 28 7*  < > 33 7*   PLATELETS Thousands/uL 105*  < > 115*   INR   --   --  0 98   < > = values in this interval not displayed  Results from last 7 days  Lab Units 01/05/18  0439  01/03/18  0648   SODIUM mmol/L 139  < > 136   POTASSIUM mmol/L 3 4*  < > 4 0   CHLORIDE mmol/L 109*  < > 103   CO2 mmol/L 24  < > 26   BUN mg/dL 7  < > 13   CREATININE mg/dL 0 82  < > 1 17   CALCIUM mg/dL 8 3  < > 8 6   TOTAL PROTEIN g/dL  --   --  6 1*   BILIRUBIN TOTAL mg/dL  --   --  1 50*   ALK PHOS U/L  --   --  92   ALT U/L  --   --  19   AST U/L  --   --  17   GLUCOSE RANDOM mg/dL 97  < > 131   < > = values in this interval not displayed  Results from last 7 days  Lab Units 01/03/18  1606 01/03/18  1256 01/03/18  0657   TROPONIN I ng/mL <0 02 <0 02 0 02     Lab Results   Component Value Date    BLOODCX No Growth at 24 hrs  01/03/2018    BLOODCX No Growth at 24 hrs  01/03/2018    BLOODCX No Growth After 5 Days  09/25/2017    SPUTUMCULTUR Culture too young- will reincubate 01/04/2018    SPUTUMCULTUR 2+ Growth of Mixed Respiratory Wilma 03/03/2016       Scheduled Meds:   azithromycin 500 mg Oral Q24H   cefTRIAXone 1,000 mg Intravenous Q24H   enoxaparin 40 mg Subcutaneous Daily   finasteride 5 mg Oral HS   Silodosin 8 mg Oral HS   sodium chloride 3 mL Nebulization TID   tiotropium 18 mcg Inhalation HS     Continuous Infusions:   sodium chloride 75 mL/hr Last Rate: 75 mL/hr (01/04/18 2112)     PRN Meds:   acetaminophen    guaiFENesin    levalbuterol    ondansetron      Physical exam:  Physical Exam   Constitutional: He is oriented to person, place, and time  He appears well-developed and well-nourished  HENT:   Head: Normocephalic and atraumatic  Eyes: EOM are normal  Pupils are equal, round, and reactive to light  Neck: Normal range of motion  Neck supple  Cardiovascular: Normal rate and regular rhythm  Murmur heard    Pulmonary/Chest: Effort normal  He has no wheezes  Slight coarse breath sounds     Abdominal: Soft  Bowel sounds are normal  He exhibits no distension  There is no tenderness  Musculoskeletal: Normal range of motion  Left lower ext edema +1which is chronic for pt trace edema right lower ext     Neurological: He is alert and oriented to person, place, and time  Skin: Skin is warm and dry  Psychiatric: He has a normal mood and affect             VTE Pharmacologic Prophylaxis: Enoxaparin (Lovenox)  VTE Mechanical Prophylaxis: sequential compression device    Counseling / Coordination of Care  Total floor / unit time spent today 20 minutes      Current Length of Stay: 2 day(s)    Current Patient Status: Inpatient     Code Status: Level 1 - Full Code

## 2018-01-05 NOTE — PROGRESS NOTES
Progress Note - Pulmonary   Cinthya Lance 81 y o  male MRN: [id number]  Unit/Bed#: ICU 07 Encounter: [id number]    Assessment/Plan:  1  Sepsis, secondary to community-acquired pneumonia with concern for gram-negative organisms  · Respiratory status stable off supplemental oxygen  · Cultures remain negative  Continue antibiotics per ID  2  COPD with mild acute exacerbation  · Improved  Patient states that respiratory status subjectively back to baseline  No indication for steroids at this point  · Continue Spiriva  Change scheduled Xopenex nebs to q 8 hours p r n  3  History of lung cancer status post right upper lobe lobectomy with abnormal CT scan  · Follow-up noncontrast CT in 2 months to evaluate for resolution  4  Multiple myeloma with chronic immunocompromise    ----------------------------------------------------------------------------------------------------------------------    HPI/Interval History:   Afebrile  Respiratory status subjectively at baseline  No acute events overnight  Vitals:   Blood pressure 113/58, pulse 90, temperature 98 6 °F (37 °C), temperature source Temporal, resp  rate (!) 29, height 5' 8" (1 727 m), weight 82 1 kg (181 lb), SpO2 93 %  ,Body mass index is 27 52 kg/m²    SpO2: 93 %  SpO2 Activity: At Rest  O2 Device: None (Room air)    Physical Exam:   Gen: Awake, alert, appropriate, no acute distress  HEENT: AT/NC, EOMI, pupils 4mm equal and reactive, oropharynx clear  Neck:  Supple, trachea midline, no JVD, no lymphadenopathy  Chest: diminished with mild end expiratory wheeze on the right posteriorly  Cardiac: Single S1/S2, no m/r/g  Abdomen: soft, nontender, nondistended, BS normoactive  Extremities: 2+LLE edema, 1+RLE edema, no clubbing or cyanosis      Labs:    CBC:    Results from last 7 days  Lab Units 01/05/18  0439   WBC Thousand/uL 3 76*   HEMOGLOBIN g/dL 9 5*   HEMATOCRIT % 28 7*   PLATELETS Thousands/uL 105*         BMP:     Results from last 7 days  Lab Units 01/05/18  0439   SODIUM mmol/L 139   POTASSIUM mmol/L 3 4*   CHLORIDE mmol/L 109*   CO2 mmol/L 24   BUN mg/dL 7   CREATININE mg/dL 0 82   GLUCOSE RANDOM mg/dL 97   CALCIUM mg/dL 8 3           Imaging studies:  None JACK Chin

## 2018-01-05 NOTE — PLAN OF CARE
Problem: Potential for Falls  Goal: Patient will remain free of falls  INTERVENTIONS:  - Assess patient frequently for physical needs  -  Identify cognitive and physical deficits and behaviors that affect risk of falls    -  [place] fall precautions as indicated by assessment   - Educate patient/family on patient safety including physical limitations  - Instruct patient to call for assistance with activity based on assessment  - Modify environment to reduce risk of injury  - Consider OT/PT consult to assist with strengthening/mobility   Outcome: Progressing      Problem: Prexisting or High Potential for Compromised Skin Integrity  Goal: Skin integrity is maintained or improved  INTERVENTIONS:  - Identify patients at risk for skin breakdown  - Assess and monitor skin integrity  - Assess and monitor nutrition and hydration status  - Monitor labs (i e  albumin)  - Assess for incontinence   - Turn and reposition patient  - Assist with mobility/ambulation  - Relieve pressure over bony prominences  - Avoid friction and shearing  - Provide appropriate hygiene as needed including keeping skin clean and dry  - Evaluate need for skin moisturizer/barrier cream  - Collaborate with interdisciplinary team (i e  Nutrition, Rehabilitation, etc )   - Patient/family teaching   Outcome: Progressing      Problem: PAIN - ADULT  Goal: Verbalizes/displays adequate comfort level or baseline comfort level  Interventions:  - Encourage patient to monitor pain and request assistance  - Assess pain using appropriate pain scale  - Administer analgesics based on type and severity of pain and evaluate response  - Implement non-pharmacological measures as appropriate and evaluate response  - Consider cultural and social influences on pain and pain management  - Notify physician/advanced practitioner if interventions unsuccessful or patient reports new pain   Outcome: Progressing      Problem: INFECTION - ADULT  Goal: Absence or prevention of progression during hospitalization  INTERVENTIONS:  - Assess and monitor for signs and symptoms of infection  - Monitor lab/diagnostic results  - Monitor all insertion sites, i e  indwelling lines, tubes, and drains  - Monitor endotracheal (as able) and nasal secretions for changes in amount and color  - [place] appropriate cooling/warming therapies per order  - Administer medications as ordered  - Instruct and encourage patient and family to use good hand hygiene technique  - Identify and instruct in appropriate isolation precautions for identified infection/condition   Outcome: Progressing      Problem: CARDIOVASCULAR - ADULT  Goal: Maintains optimal cardiac output and hemodynamic stability  INTERVENTIONS:  - Monitor I/O, vital signs and rhythm  - Monitor for S/S and trends of decreased cardiac output i e  bleeding, hypotension  - Administer and titrate ordered vasoactive medications to optimize hemodynamic stability  - Assess quality of pulses, skin color and temperature  - Assess for signs of decreased coronary artery perfusion - ex   Angina  - Instruct patient to report change in severity of symptoms   Outcome: Progressing      Problem: RESPIRATORY - ADULT  Goal: Achieves optimal ventilation and oxygenation  INTERVENTIONS:  - Assess for changes in respiratory status  - Assess for changes in mentation and behavior  - Position to facilitate oxygenation and minimize respiratory effort  - Oxygen administration by appropriate delivery method based on oxygen saturation (per order) or ABGs  - Initiate smoking cessation education as indicated  - Encourage broncho-pulmonary hygiene including cough, deep breathe, Incentive Spirometry  - Assess the need for suctioning and aspirate as needed  - Assess and instruct to report SOB or any respiratory difficulty  - Respiratory Therapy support as indicated   Outcome: Progressing      Problem: GASTROINTESTINAL - ADULT  Goal: Maintains or returns to baseline bowel function  INTERVENTIONS:  - Assess bowel function  - Encourage oral fluids to ensure adequate hydration  - Administer IV fluids as ordered to ensure adequate hydration  - Administer ordered medications as needed  - Encourage mobilization and activity  - Nutrition services referral to assist patient with appropriate food choices   Outcome: Progressing    Goal: Maintains adequate nutritional intake  INTERVENTIONS:  - Monitor percentage of each meal consumed  - Identify factors contributing to decreased intake, treat as appropriate  - Assist with meals as needed  - Monitor I&O, WT and lab values  - Obtain nutrition services referral as needed   Outcome: Progressing      Problem: SKIN/TISSUE INTEGRITY - ADULT  Goal: Skin integrity remains intact  INTERVENTIONS  - Identify patients at risk for skin breakdown  - Assess and monitor skin integrity  - Assess and monitor nutrition and hydration status  - Monitor labs (i e  albumin)  - Assess for incontinence   - Turn and reposition patient  - Assist with mobility/ambulation  - Relieve pressure over bony prominences  - Avoid friction and shearing  - Provide appropriate hygiene as needed including keeping skin clean and dry  - Evaluate need for skin moisturizer/barrier cream  - Collaborate with interdisciplinary team (i e  Nutrition, Rehabilitation, etc )   - Patient/family teaching   Outcome: Progressing

## 2018-01-06 VITALS
DIASTOLIC BLOOD PRESSURE: 66 MMHG | HEART RATE: 95 BPM | OXYGEN SATURATION: 95 % | RESPIRATION RATE: 18 BRPM | WEIGHT: 181 LBS | HEIGHT: 68 IN | TEMPERATURE: 99.8 F | BODY MASS INDEX: 27.43 KG/M2 | SYSTOLIC BLOOD PRESSURE: 109 MMHG

## 2018-01-06 PROBLEM — A41.9 SEPSIS (HCC): Status: RESOLVED | Noted: 2018-01-03 | Resolved: 2018-01-06

## 2018-01-06 PROBLEM — J18.9 HCAP (HEALTHCARE-ASSOCIATED PNEUMONIA): Status: RESOLVED | Noted: 2018-01-03 | Resolved: 2018-01-06

## 2018-01-06 LAB
BASOPHILS # BLD AUTO: 0.02 THOUSANDS/ΜL (ref 0–0.1)
BASOPHILS NFR BLD AUTO: 1 % (ref 0–1)
EOSINOPHIL # BLD AUTO: 0.1 THOUSAND/ΜL (ref 0–0.61)
EOSINOPHIL NFR BLD AUTO: 2 % (ref 0–6)
ERYTHROCYTE [DISTWIDTH] IN BLOOD BY AUTOMATED COUNT: 14.7 % (ref 11.6–15.1)
HCT VFR BLD AUTO: 31.9 % (ref 36.5–49.3)
HGB BLD-MCNC: 10.5 G/DL (ref 12–17)
LYMPHOCYTES # BLD AUTO: 1.04 THOUSANDS/ΜL (ref 0.6–4.47)
LYMPHOCYTES NFR BLD AUTO: 24 % (ref 14–44)
MCH RBC QN AUTO: 28.5 PG (ref 26.8–34.3)
MCHC RBC AUTO-ENTMCNC: 32.9 G/DL (ref 31.4–37.4)
MCV RBC AUTO: 86 FL (ref 82–98)
MONOCYTES # BLD AUTO: 0.39 THOUSAND/ΜL (ref 0.17–1.22)
MONOCYTES NFR BLD AUTO: 9 % (ref 4–12)
NEUTROPHILS # BLD AUTO: 2.81 THOUSANDS/ΜL (ref 1.85–7.62)
NEUTS SEG NFR BLD AUTO: 64 % (ref 43–75)
NRBC BLD AUTO-RTO: 0 /100 WBCS
PLATELET # BLD AUTO: 132 THOUSANDS/UL (ref 149–390)
PMV BLD AUTO: 9.1 FL (ref 8.9–12.7)
RBC # BLD AUTO: 3.69 MILLION/UL (ref 3.88–5.62)
WBC # BLD AUTO: 4.36 THOUSAND/UL (ref 4.31–10.16)

## 2018-01-06 PROCEDURE — 94640 AIRWAY INHALATION TREATMENT: CPT

## 2018-01-06 PROCEDURE — 94760 N-INVAS EAR/PLS OXIMETRY 1: CPT

## 2018-01-06 PROCEDURE — 85025 COMPLETE CBC W/AUTO DIFF WBC: CPT | Performed by: INTERNAL MEDICINE

## 2018-01-06 RX ORDER — CEFDINIR 300 MG/1
300 CAPSULE ORAL EVERY 12 HOURS SCHEDULED
Qty: 6 CAPSULE | Refills: 0
Start: 2018-01-06 | End: 2018-01-09

## 2018-01-06 RX ORDER — CEFDINIR 300 MG/1
300 CAPSULE ORAL EVERY 12 HOURS SCHEDULED
Status: DISCONTINUED | OUTPATIENT
Start: 2018-01-07 | End: 2018-01-06 | Stop reason: HOSPADM

## 2018-01-06 RX ADMIN — ISODIUM CHLORIDE 3 ML: 0.03 SOLUTION RESPIRATORY (INHALATION) at 09:00

## 2018-01-06 RX ADMIN — CEFTRIAXONE 1000 MG: 1 INJECTION, POWDER, FOR SOLUTION INTRAMUSCULAR; INTRAVENOUS at 09:36

## 2018-01-06 RX ADMIN — LEVALBUTEROL 1.25 MG: 1.25 SOLUTION, CONCENTRATE RESPIRATORY (INHALATION) at 09:00

## 2018-01-06 NOTE — PROGRESS NOTES
Progress Note - Infectious Disease   Perri Quick 81 y o  male MRN: [id number]  Unit/Bed#: E4 -01 Encounter: [id number]      Impression/Plan:  1  Anya Haro   Fever, tachycardia, hypotension   Appears to be secondary to respiratory infection with probable pneumonia   No other clear source appreciated   The patient has stabilized with IV fluid resuscitation and is clinically improved   In this patient with multiple myeloma, he has had significantly increased risk encapsulated organisms such as pneumococcus, and Haemophilus influenzae   Consideration for the possibility of a viral infection such as influenza respiratory infection  No resistant organisms isolated  He has been transition back to ceftriaxone and azithromycin  -discontinue ceftriaxone; patient already CV dose today  -cefdinir 300 mg p o  q 12 hours to start tomorrow a m  and continue through 1/9/2018  -follow-up blood cultures  -monitor CBC with diff and creatinine  -supportive care     2   Pneumonia-most likely encapsulated organism   Consideration for the possibility of a resistant gram-negative mikey   He seems clinically improved with a temperature coming down and the respiratory status remaining stable   -antibiotics as above  -monitor respiratory status  -no additional ID workup for now     3   Acute hypoxic respiratory failure-likely related to 2   In the face of COPD   No other clear source appreciated   CT of the chest is negative for pulmonary embolism   Patient's respiratory status seems relatively stable and he is no longer requiring oxygen support   -antibiotics as above  -O2 support as needed  -monitor respiratory status  -Pulmonary follow-up     4   COPD-without any obvious evidence of an acute exacerbation with the exception of oxygen needs      5   Multiple myeloma-on outpatient monoclonal antibiotic therapy      6   Chronic diarrhea-felt to be related to the patient's previous chemotherapy in the setting the patient has had previous bowel surgery   Doubt any acute infectious process   No recurrence of the diarrhea  Stool for C diff and enteric PCR is negative   -monitor stool output  -symptomatic treatment as needed    7  Disposition-okay to discharge from infectious disease standpoint once okay with the primary service  We will see the patient again 2018 if not discharged  Please call if questions  Discussed in detail with the family  Antibiotics:  Ceftriaxone 3  Antibiotics 4    Subjective:  Patient has no fever, chills, sweats; no nausea, vomiting, diarrhea; no cough, shortness of breath; no pain  No new symptoms  He is feeling much better and anxious to go home  Objective:  Vitals:  HR:  [92-95] 95  Resp:  [18] 18  BP: ()/(64-70) 109/66  SpO2:  [94 %-97 %] 95 %  Temp (24hrs), Av 4 °F (37 4 °C), Min:98 9 °F (37 2 °C), Max:100 1 °F (37 8 °C)  Current: Temperature: 99 8 °F (37 7 °C)    Physical Exam:   General Appearance:  Alert, nontoxic, no acute distress  Throat: Oropharynx moist without lesions  Lungs:   Clear to auscultation bilaterally; respirations unlabored   Heart:  RRR; no murmur, rub or gallop   Abdomen:   Soft, non-tender, non-distended, positive bowel sounds  Extremities: No clubbing, cyanosis or edema   Skin: No new rashes or lesions  No draining wounds noted         Labs, Imaging, & Other studies:   All pertinent labs and imaging studies were personally reviewed    Results from last 7 days  Lab Units 18  0552 18  04318  0452   WBC Thousand/uL 4 36 3 76* 5 05   HEMOGLOBIN g/dL 10 5* 9 5* 9 3*   PLATELETS Thousands/uL 132* 105* 137*       Results from last 7 days  Lab Units 18  0439 18  0452 18  0648   SODIUM mmol/L 139 139 136   POTASSIUM mmol/L 3 4* 3 5 4 0   CHLORIDE mmol/L 109* 107 103   CO2 mmol/L 24 22 26   ANION GAP mmol/L 6 10 7   BUN mg/dL 7 9 13   CREATININE mg/dL 0 82 0 93 1 17   EGFR ml/min/1 73sq m 83 77 58   GLUCOSE RANDOM mg/dL 97 100 131 CALCIUM mg/dL 8 3 7 4* 8 6   AST U/L  --   --  17   ALT U/L  --   --  19   ALK PHOS U/L  --   --  92   TOTAL PROTEIN g/dL  --   --  6 1*   ALBUMIN g/dL  --   --  2 8*   BILIRUBIN TOTAL mg/dL  --   --  1 50*       Results from last 7 days  Lab Units 01/05/18  0912 01/05/18  0415 01/04/18  1104 01/03/18  1256 01/03/18  0853 01/03/18  8535   BLOOD CULTURE   --   --   --   --   --  No Growth at 72 hrs  No Growth at 72 hrs     SPUTUM CULTURE  2+ Growth of   --   --   --   --   --    GRAM STAIN RESULT  No polys seen  1+ Gram positive rods  Rare Gram negative rods  Rare Gram positive cocci in pairs  --   --   --   --   --    MRSA CULTURE ONLY   --  No Methicillin Resistant Staphlyococcus aureus (MRSA) isolated  --   --   --   --    INFLUENZA A PCR   --   --   --   --  None Detected  --    INFLUENZA B PCR   --   --   --   --  None Detected  --    RSV PCR   --   --   --   --  None Detected  --    LEGIONELLA URINARY ANTIGEN   --   --   --  Negative  --   --    C DIFF TOXIN B   --   --  NEGATIVE for C difficle toxin by PCR    --   --   --

## 2018-01-06 NOTE — DISCHARGE SUMMARY
Discharge Summary - [facility] Internal Medicine    Patient Information: Heriberto Aquino 81 y o  male MRN: [id number]  Unit/Bed#: E4 -01 Encounter: [id number]    Discharging Physician / Practitioner: Haydee Gowers, DO  PCP: Cahnning Mayorga DO  Admission Date: 1/3/2018  Discharge Date: 01/06/18    Reason for Admission: pneumonia    Discharge Diagnoses:     Principal Problem:    HCAP (healthcare-associated pneumonia)  Active Problems:    Adenocarcinoma, lung ([facility] )    Multiple myeloma ([facility] )    Chronic diarrhea    COPD (chronic obstructive pulmonary disease) ([facility] )    Arterial hypotension    Right bundle branch block    Sepsis ([facility] )  Resolved Problems:    * No resolved hospital problems  *      Consultations During Hospital Stay:  · Pulmonary   · Infectious disease    Procedures Performed:     · none    Significant Findings / Test Results:   Xr Chest 2 Views  Result Date: 1/3/2018  Impression: Postoperative changes right hemithorax  Generalized increased interstitial markings in both lungs  Suspect infectious etiology given the history and physical findings  No lobar consolidation  No significant pulmonary edema  Follow-up recommended until clearing is achieved  Workstation performed: [id number]     Cta Ed Chest Pe Study  Result Date: 1/3/2018  Impression: Stable postoperative and postradiation changes  Infiltrative change/partial consolidation of the right lower lobe with scattered local nodularity most compatible with pneumonia given the patient's history  There is a rounded area of parenchymal density in the posterior medial right lower lobe measuring up to 3 cm image 2/100 which is somewhat more suspicious for potential recurrent or metastatic neoplasm  Follow-up CT of chest recommended 1-2 months following treatment to document resolution/or least improvement of these findings  No central pulmonary emboli  Incidental Findings:    abnormal CT   See above-could be inflammation vrs infection vrs lung cancer  Appreciate pulmonary recommendations    Test Results Pending at Discharge (will require follow up):   · none     Outpatient Tests Requested:  Ct chest to be set up by pulmonary     Hospital Course:     Marino Leach is a 81 y o  male patient who originally presented to the hospital on 1/3/2018 due to Sepsis due to pneumonia  He was started on cefepime and azithromycin  He was evaluated to ID  He was changed to azithromycin and ceftriaxone  His cultures were negative  He was sating well on room air and afebrile  Pt was cleared for discharge by ID  He will be on cefdinir to finish course  He also had Acute hypoxic respiratory failure due to pneumonia, copd and lung cancer  His oxygen was titrated and he was on room air at discharge  Pt always has lower blood pressures around 90 to 656 systolic  He was in the 70-80s on admission  He was treated with IVF  His bp improved and was stable at discharge  Pt does have pancytopenia due to multiple myeloma and treatment with monoclonal antibody  He can follow up with oncology outpt  His cbc was stable      He had chronic diarrhea and stool cultures along with c diff are negative  The diarrhea was due to treatment for multiple myeloma  Pt has history of Adenocarcinoma of the lung with abnormal CT-could be inflammation vrs infection vrs lung cancer  Appreciate pulmonary recommendations  No biopsy needed at this time  Pt will need repeat ct in 2 months  He will follow up with Dr Mark Mix  Pt was discharged home with hhpt  He was ambulating the halls with assistance but has trouble standing up from a chair at times  Discharge Day Visit / Exam:     * Please refer to separate progress note for these details *    Discharge instructions/Information to patient and family:   See after visit summary for information provided to patient and family        Provisions for Follow-Up Care:  See after visit summary for information related to follow-up care and any pertinent home health orders  Discharge Statement:  I spent 34 minutes discharging the patient  This time was spent on the day of discharge  I had direct contact with the patient on the day of discharge  Greater than 50% of the total time was spent examining patient, answering all patient questions, arranging and discussing plan of care with patient as well as directly providing post-discharge instructions  Additional time then spent on discharge activities  Discharge Medications:  See after visit summary for reconciled discharge medications provided to patient and family

## 2018-01-08 ENCOUNTER — GENERIC CONVERSION - ENCOUNTER (OUTPATIENT)
Dept: OTHER | Facility: OTHER | Age: 83
End: 2018-01-08

## 2018-01-08 LAB
BACTERIA BLD CULT: NORMAL
BACTERIA BLD CULT: NORMAL

## 2018-01-09 NOTE — MISCELLANEOUS
Message   Recorded as Task   Date: 07/07/2017 09:12 AM, Created By: Emmanuelle Elliott   Task Name: Follow Up   Assigned To: Ana Maria Kramer   Regarding Patient: Frandy Bloodgood, Status: Active   CommentBelton Plan - 07 Jul 2017 9:12 AM     TASK CREATED  Caller: Ulises Jeffries; General Medical Question; [phone]  stated that he is getting a colonoscopy on 7 10 17  She wanted to know if it is ok for the pt to take the 2 pills of Dexamethasone today  She wanted to make sure that it didnt interfere with the procedure  Pt needs to know this morning so that he can sleep well tonight  Req a call back [phone]   Anais Torres - 07 Jul 2017 9:59 AM     TASK REASSIGNED: Previously Assigned To [employer] with patients wife - ok for him to take Dexamethasone today      Active Problems    1  Abnormal chest CT (793 2) (R93 8)   2  Adenocarcinoma of lung (162 9) (C34 90)   3  Chronic diarrhea (787 91) (K52 9)   4  Chronic rhinitis (472 0) (J31 0)   5  Cough (786 2) (R05)   6  Enlarged prostate without lower urinary tract symptoms (luts) (600 00) (N40 0)   7  Multiple myeloma (203 00) (C90 00)   8  Obstructive sleep apnea (327 23) (G47 33)   9  Palpitations (785 1) (R00 2)   10  Pulmonary emphysema (492 8) (J43 9)   11  Rotaviral gastroenteritis (008 61) (A08 0)   12  Weakness (780 79) (R53 1)    Current Meds   1  Aspirin 81 MG CAPS; take 1 capsule every other day; Therapy: (Recorded:14Cru1300) to Recorded   2  Co Q-10 Vitamin E Fish Oil 70-02- Oral Capsule; take 1 capsule daily; Therapy: (Recorded:19Mar2014) to Recorded   3  Dexamethasone 4 MG Oral Tablet; 2 TABS WEEKLY; Therapy: 69VOQ4412 to (Last Rx:07Nqs9894)  Requested for: 15AQT3926 Ordered   4  Finasteride 5 MG Oral Tablet (Proscar); Take 1 tablet by mouth at bedtime; Therapy: 21MQW9185 to (Homer العلي)  Requested for: 31VEG1923; Last   Rx:28Nov2016 Ordered   5  Imodium CAPS (Loperamide HCl); as directed;    Therapy: (Recorded:67Dsp4189) to Recorded   6  Ipratropium Bromide 0 03 % Nasal Solution; USE 2 SPRAYS IN EACH NOSTRIL TWICE   DAILY; Therapy: 18JNO4968 to (Evaluate:67Onx8487)  Requested for: 93Xqz0256; Last   Rx:97Jsf8752 Ordered   7  Omega 3 CAPS; take 1 capsule daily; Therapy: (Recorded:19Mar2014) to Recorded   8  Proventil  (90 Base) MCG/ACT Inhalation Aerosol Solution; INHALE 2 PUFFS   EVERY 4-6 HOURS AS NEEDED  Requested for: 38SRT6829; Last Rx:54Nhd0287   Ordered   9  Rapaflo CAPS; TAKE 1 CAPSULE DAILY WITH FOOD; Therapy: (Recorded:75Odf0698) to Recorded   10  Spiriva Respimat 2 5 MCG/ACT Inhalation Aerosol Solution; INHALE 2 PUFFS ONCE    DAILY; Therapy: 54QYJ7906 to (Evaluate:17Jan2018); Last Rx:03Lah1866 Ordered   11  Suprep Bowel Prep Kit 17 5-3 13-1 6 GM/180ML Oral Solution; DILUTE CONTENTS AND    USE AS DIRECTED FOR BOWEL PREP; Therapy: 97WIL3505 to (Evaluate:03Jun2017)  Requested for: 92GSX9326; Last    Rx:02Jun2017 Ordered   12  Vitamin D 1000 UNIT Oral Tablet; TAKE 1 TABLET DAILY; Therapy: (Recorded:19Mar2014) to Recorded    Allergies    1  Lipitor TABS   2   Sulfa Drugs    Signatures   Electronically signed by : Regan Mcneal, ; Jul 7 2017 10:29AM EST                       (Author)

## 2018-01-09 NOTE — MISCELLANEOUS
Message  i received a page to call pt back due to calf pain  I called twice today 9/25/17 between 1:30 pm and 2:15 pm but both times i got voice mail  i left a message once  Signatures   Electronically signed by :  DAVION Ngo ; Sep 25 2017  2:14PM EST                       (Author)

## 2018-01-09 NOTE — MISCELLANEOUS
Message  Pt 's wife called stating that Dr Isaiah Maldonado started Yola Wyman on [address] on 9/23/17  Today he is complaining of pain and swelling in his right calf  Mssg  has been sent to Dr Lon Nixon and Dr Jluis Yo  Active Problems    1  Abnormal chest CT (793 2) (R93 8)   2  Adenocarcinoma of lung (162 9) (C34 90)   3  Bacterial pneumonia (482 9) (J15 9)   4  Chronic diarrhea (787 91) (K52 9)   5  Chronic rhinitis (472 0) (J31 0)   6  COPD exacerbation (491 21) (J44 1)   7  Cough (786 2) (R05)   8  Enlarged prostate without lower urinary tract symptoms (luts) (600 00) (N40 0)   9  Multiple myeloma (203 00) (C90 00)   10  Obstructive sleep apnea (327 23) (G47 33)   11  Palpitations (785 1) (R00 2)   12  Preop pulmonary/respiratory exam (V72 82) (Z01 811)   13  Primary cancer of rectum (154 1) (C20)   14  Pulmonary emphysema (492 8) (J43 9)   15  Rectal mass (787 99) (K62 9)   16  Streptococcus pneumoniae pneumonia (12) (J13)   17  Weakness (780 79) (R53 1)    Current Meds   1  Co Q-10 Vitamin E Fish Oil 00-14- Oral Capsule; take 1 capsule daily; Therapy: (Recorded:19Mar2014) to Recorded   2  Dexamethasone 4 MG Oral Tablet; 2 TABS WEEKLY; Therapy: 20LEY7492 to (Last Rx:82Cww4719)  Requested for: 09RRY0842 Ordered   3  Dexamethasone 4 MG Oral Tablet; 5 tabs po weekly with food; Therapy: 78HBC6660 to (Evaluate:27Hon7191)  Requested for: 01BLY3482; Last   Rx:21Pcp8326 Ordered   4  Finasteride 5 MG Oral Tablet (Proscar); Take 1 tablet by mouth at bedtime; Therapy: 03KGV0324 to (Lisandro Helms)  Requested for: 03PAQ6046; Last   Rx:28Nov2016 Ordered   5  Imodium CAPS (Loperamide HCl); as directed; Therapy: (Recorded:20Wyt3977) to Recorded   6  Ipratropium Bromide 0 03 % Nasal Solution; USE 2 SPRAYS IN EACH NOSTRIL TWICE   DAILY; Therapy: 19XNB6396 to (Evaluate:21Mva8487)  Requested for: 30Qyz2391; Last   Rx:67Knk9983 Ordered   7   Ipratropium-Albuterol 0 5-2 5 (3) MG/3ML Inhalation Solution; INHALE 1 UNIT DOSE   Twice daily And every 6 hours if needed for shortness of breath; Therapy: 10WHW4060 to ([facility])  Requested for: 03Xjl0075; Last   Rx:66Piy8679 Ordered   8  LevoFLOXacin 500 MG Oral Tablet (Levaquin); Take 1 tablet daily; Therapy: 91SAC1506 to (RDZJF:96HPR7199)  Requested for: 61EOX3482; Last   Rx:96Csc7380 Ordered   9  Omega 3 CAPS; take 1 capsule daily; Therapy: (Recorded:19Mar2014) to Recorded   10  Proventil  (90 Base) MCG/ACT Inhalation Aerosol Solution; INHALE 2 PUFFS    EVERY 4-6 HOURS AS NEEDED  Requested for: 64IGO0369; Last Rx:42Txq3682    Ordered   11  Rapaflo CAPS; TAKE 1 CAPSULE DAILY WITH FOOD; Therapy: (Recorded:02Sep2016) to Recorded   12  Spiriva Respimat 2 5 MCG/ACT Inhalation Aerosol Solution; INHALE 2 PUFFS ONCE    DAILY; Therapy: 34YPV0249 to (Evaluate:17Jan2018); Last Rx:82Fsp0988 Ordered   13  Suprep Bowel Prep Kit 17 5-3 13-1 6 GM/180ML Oral Solution; DILUTE CONTENTS AND    USE AS DIRECTED FOR BOWEL PREP; Therapy: 12RSG4186 to (Evaluate:03Jun2017)  Requested for: 06AZP6734; Last    Rx:02Jun2017 Ordered   14  Vitamin D 1000 UNIT Oral Tablet; TAKE 1 TABLET DAILY; Therapy: (Recorded:19Mar2014) to Recorded    Allergies    1  Lipitor TABS   2  Sulfa Drugs   3   Penicillins    Signatures   Electronically signed by : Muna Baez, ; Sep 25 2017  2:51PM EST                       (Author)

## 2018-01-09 NOTE — MISCELLANEOUS
Message     Recorded as Task   Date: 08/11/2017 08:18 AM, Created By: Magaly Kapoor   Task Name: Medical Complaint Callback   Assigned To: Ivon Arguello   Regarding Patient: Giuliano Espinosa, Status: Active   CommentTanyamarii Warner - 11 Aug 2017 8:18 AM     TASK CREATED  Caller: Fartun Earl; Medical Complaint; [phone]  stated the pt was feeling really lousy  Pt is coughing, tired and weak  Req that you call her back at [phone]  Linh Ramirez - 11 Aug 2017 8:26 AM     TASK EDITED  spoke with Pryor Robbie   she is requesting patient see Dr Tamela Mathews next week , she feels patient is not ready for surgery next week  Linh Ramirez - 11 Aug 2017 8:30 AM     TASK EDITED  made appt for patient to see Dr Tamela Mathews on Wednesday        Active Problems    1  Abnormal chest CT (793 2) (R93 8)   2  Adenocarcinoma of lung (162 9) (C34 90)   3  Chronic diarrhea (787 91) (K52 9)   4  Chronic rhinitis (472 0) (J31 0)   5  COPD exacerbation (491 21) (J44 1)   6  Cough (786 2) (R05)   7  Enlarged prostate without lower urinary tract symptoms (luts) (600 00) (N40 0)   8  Multiple myeloma (203 00) (C90 00)   9  Obstructive sleep apnea (327 23) (G47 33)   10  Palpitations (785 1) (R00 2)   11  Pulmonary emphysema (492 8) (J43 9)   12  Rectal mass (787 99) (K62 9)   13  Rotaviral gastroenteritis (008 61) (A08 0)   14  Weakness (780 79) (R53 1)    Current Meds   1  Aspirin 81 MG CAPS; take 1 capsule every other day; Therapy: (Recorded:55Wsc0954) to Recorded   2  Azithromycin 250 MG Oral Tablet; TAKE 2 TABLETS ON DAY 1 THEN TAKE 1 TABLET A   DAY FOR 4 DAYS; Therapy: 98GWZ7810 to (Last Rx:82Knf1878)  Requested for: 47Rib2242 Ordered   3  Co Q-10 Vitamin E Fish Oil 54-20- Oral Capsule; take 1 capsule daily; Therapy: (Recorded:19Mar2014) to Recorded   4  Dexamethasone 4 MG Oral Tablet; 2 TABS WEEKLY; Therapy: 11QHM2474 to (Last Rx:82Rkp0412)  Requested for: 85CTF1780 Ordered   5   Finasteride 5 MG Oral Tablet (Proscar); Take 1 tablet by mouth at bedtime; Therapy: 04UXH5430 to (Aline Coronel)  Requested for: 47SEG8189; Last   Rx:28Nov2016 Ordered   6  Imodium CAPS (Loperamide HCl); as directed; Therapy: (Recorded:27Bjd4032) to Recorded   7  Ipratropium Bromide 0 03 % Nasal Solution; USE 2 SPRAYS IN EACH NOSTRIL TWICE   DAILY; Therapy: 32GAS6172 to (Evaluate:53App3547)  Requested for: 65Sgr9453; Last   Rx:81Nim0389 Ordered   8  Omega 3 CAPS; take 1 capsule daily; Therapy: (Recorded:19Mar2014) to Recorded   9  Proventil  (90 Base) MCG/ACT Inhalation Aerosol Solution; INHALE 2 PUFFS   EVERY 4-6 HOURS AS NEEDED  Requested for: 12TSY1426; Last Rx:12Ale3487   Ordered   10  Rapaflo CAPS; TAKE 1 CAPSULE DAILY WITH FOOD; Therapy: (Recorded:31Jhm7530) to Recorded   11  Spiriva Respimat 2 5 MCG/ACT Inhalation Aerosol Solution; INHALE 2 PUFFS ONCE    DAILY; Therapy: 07RCF0397 to (Evaluate:17Jan2018); Last Rx:60Gac5050 Ordered   12  Suprep Bowel Prep Kit 17 5-3 13-1 6 GM/180ML Oral Solution; DILUTE CONTENTS AND    USE AS DIRECTED FOR BOWEL PREP; Therapy: 37AQE5960 to (Evaluate:03Jun2017)  Requested for: 43FGP4196; Last    Rx:02Jun2017 Ordered   13  Vitamin D 1000 UNIT Oral Tablet; TAKE 1 TABLET DAILY; Therapy: (Recorded:19Mar2014) to Recorded    Allergies    1  Lipitor TABS   2   Sulfa Drugs    Signatures   Electronically signed by : Estuardo Amador, ; Aug 11 2017  8:31AM EST                       (Author)

## 2018-01-10 NOTE — MISCELLANEOUS
History of Present Illness  COPD Hospital Discharge Initial Follow-Up Call: The patient is being contacted for follow-up after hospitalization  The date of discharge is 4/2/17  I spoke with wife Nikki Lim  Patient was identified as medium risk for readmission per risk stratification  The patient was discharged to home  The patient is a former smoker with a pack year history  The patient quit smoking in 2009  The patient is experiencing the following symptoms: cough, but no wheezing, no dyspnea, no fever and not coughing up sputum  Pulse oximetry 93%  Zone tool status is yellow  The following topics were reviewed:  rescue vs  maintenance inhalers, COPD zone tool: when to take action, energy conservation, physician follow-ups and medication compliance  Narrative Summary:  Follow-up call after hospital discharge  Patient is feeling better but still coughing and expectorating  He has a flutter valve that he is using and he is taking medication as prescribed  Encouraged deep breathing and coughing  Patient has a sore side from coughing  Reviewed all meds  No nebulizer ordered  Discussed VNA  Patient was discharged on a Sunday and no plans were made for [facility]  Wife made appt with Dr Gabi Baez for 4/20 and PCP appt this Friday 4/7  Discussed activity limitation, infection control and when to take action to call MD if needed  Wife, Nikki Lim will discuss VNA with PCP  Also to discuss nebulizer therapy with Dr Gabi Baez during the upcoming appt  Patient goal is to go fishing once he feels better  His wife is very supportive with his care  Current Meds    1  DrRx Zithromax Z-Amador 250 MG #6 pill pack; take as directed; Therapy: 35OWE4669 to (Evaluate:39Klo9160); Last Rx:90Hud9851 Ordered    2  Ipratropium Bromide 0 03 % Nasal Solution; USE 2 SPRAYS IN EACH NOSTRIL TWICE   DAILY; Therapy: 02HEP4870 to (Evaluate:89Zng1051)  Requested for: 89Frh1458; Last   Rx:13Qxu0673 Ordered    3   Finasteride 5 MG Oral Tablet (Proscar); Take 1 tablet by mouth at bedtime; Therapy: 36XQA3294 to (Wayne Thomas)  Requested for: 69CGK6331; Last   Rx:28Nov2016 Ordered    4  Co Q-10 Vitamin E Fish Oil 62-38- Oral Capsule; take 1 capsule daily; Therapy: (Recorded:19Mar2014) to Recorded   5  Vitamin D 1000 UNIT Oral Tablet; TAKE 1 TABLET DAILY; Therapy: (Recorded:19Mar2014) to Recorded    6  Revlimid 5 MG Oral Capsule; take 1 capsule every day for 7 days   adult male   [place] #[id number]  Requested for: 75BNJ4276; Last Rx:15Mar2017 Ordered    7  Spiriva Respimat 2 5 MCG/ACT Inhalation Aerosol Solution; INHALE 2 PUFFS ONCE   DAILY; Therapy: 67PGR2074 to (Evaluate:27Jun2017); Last Rx:33Fyz0792 Ordered    8  Aspirin 81 MG CAPS; take 1 capsule every other day; Therapy: (Recorded:78Prz1900) to Recorded   9  Imodium CAPS (Loperamide HCl); as directed; Therapy: (Recorded:50Uyu9008) to Recorded   10  Omega 3 CAPS; take 1 capsule daily; Therapy: (Recorded:19Mar2014) to Recorded   11  Rapaflo CAPS; TAKE 1 CAPSULE DAILY WITH FOOD; Therapy: (Recorded:12Zlc6856) to Recorded    Allergies    1  Lipitor TABS   2  Sulfa Drugs    Future Appointments    Date/Time Provider Specialty Site   04/20/2017 12:40 PM DAVION Foster  [facility]ulmonary Medicine [facility]   05/05/2017 01:20 PM DAVION Foster  Pulmonary Medicine [address] [facility] [place]   04/06/2017 01:45 PM DAVION Lugo  Hematology [facility]     Signatures   Electronically signed by : Sherita Tse RT;  Apr  3 2017  3:30PM EST                       (Author)

## 2018-01-10 NOTE — RESULT NOTES
Verified Results  (1) CEA [id number] 12:52PM Carissa Bañuelos Order Number: [id number]     Test Name Result Flag Reference   CEA 2 0 ng/mL  0 0-3 0   Normal/Non-Smoker: 0 0-3 0 ng/mL   Normal/Smoker:     3 1-5 0 ng/mL     (1) TISSUE EXAM [id number] 03:14PM Ryan Washington     Test Name Result Flag Reference   LAB AP CASE REPORT (Report)     Surgical Pathology Report             Case: [id number]                   Authorizing Provider: Indra Almao MD       Collected:      07/10/2017 1514        Ordering Location:   [address] [place] [address]   Received:      07/11/2017 [facility] End[facility]                               Pathologist:      Raymon Betancur MD                             Specimens:  A) - Colon, random colon bx-cold                                    B) - Polyp, Colorectal, transverse colon polyp-cold snare                       C) - Rectum, rectal mass-cold bx   LAB AP FINAL DIAGNOSIS (Report)     A  Random colon, biopsy:    - Colonic mucosa without significant histologic abnormality     - Negative for active inflammation, features of microscopic colitis and   chronic inflammatory bowel disease     - Negative for dysplasia and malignancy  B  Transverse colon polyp:    - Tubular adenoma  - Negative for high grade dysplasia and malignancy  C  Rectal mass, biopsy:    - Invasive adenocarcinoma  Electronically signed by Raymon Betancur MD on 7/13/2017 at 12:28 PM   LAB AP NOTE (Report)     MMR testing has been ordered on the rectal adenocarcinoma (part C)  An   addendum report will follow  Intradepartmental consultation is in agreement      A copy of the final report is faxed by Dr Nely Mcadams to Dr Ty Maria's   office on 07/13/17 at 12:30 pm     Interpretation performed at [facility], [address] (Report)     All controls performed with the immunohistochemical stains reported above   reacted appropriately  These tests were developed and their performance   characteristics determined by [facility] or   [facility]  They may not be cleared or approved by the [place]  Food and Drug Administration  The FDA has determined that such clearance   or approval is not necessary  These tests are used for clinical purposes  They should not be regarded as investigational or for research  This   laboratory has been approved by [place] [id number], designated as a high-complexity   laboratory and is qualified to perform these tests  LAB AP GROSS DESCRIPTION (Report)     A  The specimen is received in formalin, labeled with the patient's name   and hospital number, and is designated random colon biopsy  The   specimen consists of multiple tan soft tissue fragments measuring in   aggregate 0 6 x 0 6 x 0 1 cm  Entirely submitted  One cassette  B  The specimen is received in formalin, labeled with the patient's name   and hospital number, and is designated transverse colon polyp  The   specimen consists of multiple tan soft tissue fragments measuring in   aggregate 0 7 x 0 7 x 0 1 m  Entirely submitted  One cassette  Note: The estimated total formalin fixation time based upon information   provided by the submitting clinician and the standard processing schedule   is 22 75 hours  C  The specimen is received in formalin, labeled with the patient's name   and hospital number, and is designated rectal mass biopsy  The specimen   consists of multiple tan soft tissue fragments measuring in aggregate 0 5   x 0 5 x 0 1 cm  Entirely submitted  One cassette  Note: The estimated total formalin fixation time based upon information   provided by the submitting clinician and the standard processing schedule   is 22 5 hours      MAC   LAB AP CLINICAL INFORMATION diarrhea     diarrhea   LAB AP ADDENDUM 1 (Report)     SOLID TUMOR IMMUNOHISTOCHEMICAL ANALYSIS  Part C - rectal mass    INTERPRETATION:  No loss of nuclear expression of MMR proteins: Low probability of MSI???H  Note: Background non-neoplastic tissue and/or internal control with intact   nuclear expression  RESULTS:  Antibody Clone Description  Results  MLH1 [id number] Mismatch repair protein  Intact nuclear expression  MSH2 [id number] Mismatch repair protein  Intact nuclear expression  MSH6 40 Mismatch repair protein  Intact nuclear expression  PMS2 MRQ-28 Mismatch repair protein  Intact nuclear expression      Comment:   A negative control and a positive control for each antibody have been   reviewed and accepted  [facility] Specimen ID: [id number]  Evaluator: DAVION Morris, [address] [place] [address]    These tests were developed and their performance characteristics   determined by [employer]  They may not   be cleared or approved by the [place]  Food and Drug Administration  The FDA   determined that such clearance or approval is   not necessary  These tests are used for clinical purposes  They should   not be regarded as investigational or for research  This laboratory has been approved by [place] [id number], designated as a   high-complexity laboratory and is qualified to perform these   tests  Comments:  Patients whose tumors demonstrate lack of expression of one or more DNA   mismatch repair proteins   might be at risk for Cartre Syndrome  This cancer susceptibility syndrome   greatly increases the risk of   synchronous and/or metachronous cancers in the affected patients and their   family members  Normal   expression of all proteins does not completely rule out familial cancer   predisposition      The [employer]ian[employer] 90 Program Task Forces recommends   that all patients with   lack of expression of one or more DNA mismatch repair proteins and those   with concerning personal or   family history should undergo thorough evaluation, counseling and possibly   genetic testing  Please contact [facility] ([address]'s Certified Genetic Counselor)   at [phone] if you have   questions or wish to schedule an appointment for this patient     Addendum electronically signed by Cesia Franz MD on 7/17/2017 at 3:36 PM

## 2018-01-11 NOTE — MISCELLANEOUS
History of Present Illness  HPI: HFU call #1       COPD Hospital Discharge Initial Follow-Up Call: The patient is being contacted for follow-up after hospitalization  The date of discharge is 3/8/2016  I spoke with wife  Patient was identified as medium risk for readmission per risk stratification  The patient was discharged to home with [facility]  The FEV1 is 55 %  The Gold Stage of COPD is moderate  The patient is a former smoker with a 50 pack year history  The patient quit smoking in 2009  The patient is experiencing the following symptoms: cough, able to expectorate sputum and that is clear/white, but no wheezing, no dyspnea and no fever  with room air  Zone tool status is yellow  The following topics were reviewed:  rescue vs  maintenance inhalers, COPD zone tool: when to take action, pulmonary rehab, physician follow-ups and medication compliance  The patient felt ready to be discharged from the hospital  The patient was not given written &/or veral educational materials specific to his COPD diagnosis  Narrative Summary:  Mrs Jamir Pimentel reports Lorenzo Mendez is feeling improved  Awaiting [facility] RN visit today  Taking RX as prescribed, including prednisone tapering dosages  We reviewed effective ways for infection control and talked about benefits of pulmonary rehab (pt did participate after lung surgery, would like to do this again) Goal would be to transition into a continuous fitness plan at a local gym to maintain endurance built during [place]  They have Silver [employer]neakers and will investigate centers that accept (wife reports that [facility] gyms do not)  SHe will be calling today to arrange HFU appts with pulmonary and PCP offices  Current Meds    1  Finasteride 5 MG Oral Tablet (Proscar); Take 1 tablet by mouth at bedtime; Therapy: 56ZDM1300 to ([place])  Requested for: 04DZS9523; Last   Rx:30Nov2015 Ordered   2   Tamsulosin HCl - 0 4 MG Oral Capsule; TAKE 1 CAPSULE BY MOUTH ONE TIME DAILY; Therapy: 01MBX2913 to (Muriel Max)  Requested for: 32UZR8624; Last   Rx:02Jun2015 Ordered    3  Ipratropium Bromide 0 03 % Nasal Solution; USE 2 SPRAYS IN EACH NOSTRIL TWICE   DAILY; Therapy: 68BUH4188 to (Evaluate:10Jlo8673)  Requested for: 30TXW5219; Last   Rx:01Mar2016 Ordered    4  Spiriva Respimat 2 5 MCG/ACT Inhalation Aerosol Solution; INHALE 2 PUFFS Daily; Therapy: 73TLB2249 to (Evaluate:11Qiz8679); Last Rx:01Mar2016 Ordered    5  Co Q-10 Vitamin E Fish Oil 92-74- Oral Capsule; take 1 capsule daily; Therapy: (Recorded:19Mar2014) to Recorded   6  Vitamin D 1000 UNIT Oral Tablet; TAKE 1 TABLET DAILY; Therapy: (Recorded:19Mar2014) to Recorded    7  Revlimid 5 MG Oral Capsule; TAKE 1 CAPSULE DAILY  adult male   Odin Lawlert #[id number]; Therapy: 70CWX3328 to (Evaluate:11Mar2016)  Requested for: 50ZOA0794; Last   Rx:77Rah3681 Ordered    8  Aspirin 81 MG CAPS; take 1 capsule every other day; Therapy: (Recorded:02Sep2015) to Recorded   9  Omega 3 CAPS; take 1 capsule daily; Therapy: (Recorded:19Mar2014) to Recorded    Allergies    1  Lipitor TABS   2   Sulfa Drugs    Future Appointments    Date/Time Provider Specialty Site   04/08/2016 11:00 AM Mally Dinh [facility] Hematology [facility]   06/03/2016 02:30 PM Miriam Richmond MD Urology [address]   Electronically signed by : Faby Cruz, ; Mar  9 2016 12:31PM EST                       (Author)

## 2018-01-12 VITALS
HEIGHT: 68 IN | DIASTOLIC BLOOD PRESSURE: 60 MMHG | HEART RATE: 100 BPM | BODY MASS INDEX: 27.89 KG/M2 | SYSTOLIC BLOOD PRESSURE: 107 MMHG | WEIGHT: 184 LBS | RESPIRATION RATE: 20 BRPM

## 2018-01-12 VITALS
BODY MASS INDEX: 27.22 KG/M2 | RESPIRATION RATE: 24 BRPM | DIASTOLIC BLOOD PRESSURE: 70 MMHG | WEIGHT: 179 LBS | TEMPERATURE: 98 F | SYSTOLIC BLOOD PRESSURE: 110 MMHG | HEART RATE: 102 BPM

## 2018-01-12 VITALS
SYSTOLIC BLOOD PRESSURE: 98 MMHG | BODY MASS INDEX: 27.28 KG/M2 | HEIGHT: 68 IN | OXYGEN SATURATION: 96 % | RESPIRATION RATE: 16 BRPM | DIASTOLIC BLOOD PRESSURE: 58 MMHG | WEIGHT: 180 LBS | TEMPERATURE: 97.5 F | HEART RATE: 66 BPM

## 2018-01-12 VITALS
TEMPERATURE: 97.2 F | WEIGHT: 175.38 LBS | SYSTOLIC BLOOD PRESSURE: 96 MMHG | HEART RATE: 94 BPM | OXYGEN SATURATION: 97 % | RESPIRATION RATE: 18 BRPM | DIASTOLIC BLOOD PRESSURE: 58 MMHG | BODY MASS INDEX: 26.67 KG/M2

## 2018-01-12 NOTE — MISCELLANEOUS
Maria Elena Canada from Dr Kelley Thornton office called,Dr MATUTE just wanted to let Dr Adia Rome know that Mr Gillis was in the hospital in [place] with bacterial pneumonia, he is scheduled for surgery with Dr Mary Corbin on 9/14 and Dr Mary Corbin  was wondering if he is still cleared or does Dr Goran Browning need to see him  I gave Dr Goran Browning the message and he said Mr Gillis needs to be seen about 2 weeks after discharge and he will need a chest xray prior to the appointment  I called Margo back and told her that so she said at this point they will cancel the surgery for the 14th  I then called Mr Gillis's home phone and left a message for him to call our office once he gets out of the hospital that he needs to see Dr Goran Browning in about 2 weeks and also he will need a chest xray before the appointment  Active Problems    1  Abnormal chest CT (793 2) (R93 8)   2  Adenocarcinoma of lung (162 9) (C34 90)   3  Chronic diarrhea (787 91) (K52 9)   4  Chronic rhinitis (472 0) (J31 0)   5  COPD exacerbation (491 21) (J44 1)   6  Cough (786 2) (R05)   7  Enlarged prostate without lower urinary tract symptoms (luts) (600 00) (N40 0)   8  Multiple myeloma (203 00) (C90 00)   9  Obstructive sleep apnea (327 23) (G47 33)   10  Palpitations (785 1) (R00 2)   11  Preop pulmonary/respiratory exam (V72 82) (Z01 811)   12  Primary cancer of rectum (154 1) (C20)   13  Pulmonary emphysema (492 8) (J43 9)   14  Rectal mass (787 99) (K62 9)   15  Weakness (780 79) (R53 1)    Current Meds   1  Co Q-10 Vitamin E Fish Oil 91-27- Oral Capsule; take 1 capsule daily; Therapy: (Recorded:19Mar2014) to Recorded   2  Dexamethasone 4 MG Oral Tablet; 2 TABS WEEKLY; Therapy: 05ETN7345 to (Last Rx:97Vbt8547)  Requested for: 59FLC0390 Ordered   3  Dexamethasone 4 MG Oral Tablet; 5 tabs po weekly with food; Therapy: 27EBZ2422 to (Evaluate:38Gnv5721)  Requested for: 56BYF4820; Last   Rx:31Pgh4710 Ordered   4  Finasteride 5 MG Oral Tablet (Proscar);  Take 1 tablet by mouth at bedtime; Therapy: 22VQQ9145 to (Kimberly Ballard)  Requested for: 14WUJ7239; Last   Rx:28Nov2016 Ordered   5  Imodium CAPS (Loperamide HCl); as directed; Therapy: (Recorded:08Aqm6777) to Recorded   6  Ipratropium Bromide 0 03 % Nasal Solution; USE 2 SPRAYS IN EACH NOSTRIL TWICE   DAILY; Therapy: 98JMR9758 to (Evaluate:60Ihl3122)  Requested for: 70Fmt9190; Last   Rx:14Ftf2154 Ordered   7  Ipratropium-Albuterol 0 5-2 5 (3) MG/3ML Inhalation Solution; INHALE 1 UNIT DOSE   Twice daily And every 6 hours if needed for shortness of breath; Therapy: 71WXR4580 to ([facility])  Requested for: 47Dud7041; Last   Rx:55Qko6407 Ordered   8  Omega 3 CAPS; take 1 capsule daily; Therapy: (Recorded:19Mar2014) to Recorded   9  Proventil  (90 Base) MCG/ACT Inhalation Aerosol Solution; INHALE 2 PUFFS   EVERY 4-6 HOURS AS NEEDED  Requested for: 18JVH2982; Last Rx:50Lew6018   Ordered   10  Rapaflo CAPS; TAKE 1 CAPSULE DAILY WITH FOOD; Therapy: (Recorded:22Ktr5204) to Recorded   11  Spiriva Respimat 2 5 MCG/ACT Inhalation Aerosol Solution; INHALE 2 PUFFS ONCE    DAILY; Therapy: 28FRK9599 to (Evaluate:17Jan2018); Last Rx:76Xdd5978 Ordered   12  Suprep Bowel Prep Kit 17 5-3 13-1 6 GM/180ML Oral Solution; DILUTE CONTENTS AND    USE AS DIRECTED FOR BOWEL PREP; Therapy: 63RSX3171 to (Evaluate:03Jun2017)  Requested for: 77KME1820; Last    Rx:02Jun2017 Ordered   13  Vitamin D 1000 UNIT Oral Tablet; TAKE 1 TABLET DAILY; Therapy: (Recorded:19Mar2014) to Recorded    Allergies    1  Lipitor TABS   2   Sulfa Drugs    Signatures   Electronically signed by : Anthony Joyner, ; Aug 23 2017 10:48AM EST                       (Author)

## 2018-01-13 VITALS
OXYGEN SATURATION: 97 % | HEIGHT: 68 IN | TEMPERATURE: 97.7 F | SYSTOLIC BLOOD PRESSURE: 110 MMHG | BODY MASS INDEX: 27.66 KG/M2 | WEIGHT: 182.5 LBS | HEART RATE: 95 BPM | DIASTOLIC BLOOD PRESSURE: 68 MMHG | RESPIRATION RATE: 16 BRPM

## 2018-01-13 VITALS
OXYGEN SATURATION: 98 % | TEMPERATURE: 98.2 F | SYSTOLIC BLOOD PRESSURE: 100 MMHG | HEART RATE: 92 BPM | DIASTOLIC BLOOD PRESSURE: 60 MMHG | WEIGHT: 177 LBS | RESPIRATION RATE: 18 BRPM | HEIGHT: 68 IN | BODY MASS INDEX: 26.83 KG/M2

## 2018-01-13 VITALS
SYSTOLIC BLOOD PRESSURE: 120 MMHG | WEIGHT: 175.38 LBS | HEART RATE: 95 BPM | RESPIRATION RATE: 16 BRPM | HEIGHT: 68 IN | BODY MASS INDEX: 26.58 KG/M2 | DIASTOLIC BLOOD PRESSURE: 80 MMHG | TEMPERATURE: 97.2 F | OXYGEN SATURATION: 96 %

## 2018-01-13 VITALS
HEART RATE: 98 BPM | WEIGHT: 176 LBS | BODY MASS INDEX: 26.67 KG/M2 | OXYGEN SATURATION: 96 % | HEIGHT: 68 IN | DIASTOLIC BLOOD PRESSURE: 70 MMHG | TEMPERATURE: 96.2 F | RESPIRATION RATE: 16 BRPM | SYSTOLIC BLOOD PRESSURE: 104 MMHG

## 2018-01-13 VITALS
BODY MASS INDEX: 26.67 KG/M2 | HEART RATE: 96 BPM | RESPIRATION RATE: 16 BRPM | DIASTOLIC BLOOD PRESSURE: 66 MMHG | HEIGHT: 68 IN | OXYGEN SATURATION: 97 % | WEIGHT: 176 LBS | SYSTOLIC BLOOD PRESSURE: 100 MMHG | TEMPERATURE: 97.8 F

## 2018-01-13 NOTE — PROGRESS NOTES
Assessment    1  Multiple myeloma (203 00) (C90 00)   2  Adenocarcinoma of lung (162 9) (C34 90)   3  Pulmonary emphysema (492 8) (J43 9)    Plan  Pulmonary emphysema    · (1) CBC/PLT/DIFF; Status:Active; Requested IBF:04QPA6946;    Perform:[facility]; AGB:94BAL3530; Ordered; For:Pulmonary emphysema; Ordered By:Delmy Duff);   · (1) COMPREHENSIVE METABOLIC PANEL; Status:Active; Requested VBD:45WXZ7498;    Perform:[facility]; OJH:69YXN2993; Ordered; For:Pulmonary emphysema; Ordered By:Delmy Duff);   · (1) FREE LIGHT CHAINS, SERUM; Status:Active; Requested OOO:13YLG1947;    Perform:[facility]; FFV:77ZCV5296; Ordered; For:Pulmonary emphysema; Ordered By:Delmy Duff);   · * XR BONE SURVEY COMPLETE; Status:Active; Requested VBY:16ZHO9225;    Perform:[facility]; BYO:70MVF5527; Ordered; For:Pulmonary emphysema; Ordered By:Delmy Duff); · Follow-up visit in 1 month Evaluation and Treatment  Follow-up  Status: Hold For -  Scheduling  Requested for: 26TJX8041 11:45AM   Ordered; For: Pulmonary emphysema; Ordered By: Cesario Santos) Performed:  Due: 74DCW3142; Last Updated By: Teodoro Najjar; 3/9/2017 11:43:27 AM    Discussion/Summary  Discussion Summary:   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 multiple compression fractures in the back especially in the lumbar area treated for the past 7 years, currently on lenalidomide 5 mg every other day, lambda light chain is coming up, which in therapy to lenalidomide 5 mg every day 21 days on 7 days off  Diarrhea induced by lenalidomide continue cholestyramine 1 packet daily next #3  Bony skeleton survey  #4  History of 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 status post resection treated with Alimta/carboplatin followed by radiation        Chief Complaint  Chief Complaint: Chief Complaint:   The patient presents to the office today with Dyspnea, multiple myeloma, Lung cancer, My blood pressure is low  History of Present Illness  HPI: #1  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, multiple compression fractures, status post kyphoplasty to L1, L2, L5, treated with radiation therapy to the left intra trochanteric area, received melphalan/Velcade/prednisone in October 2010 for 4 cycles with excellent response and normalization of lambda light chain, had been on Velcade maintenance till January 2013 when lambda light chain went up to 111,treated with Velcade 1 3 mg meter square weekly 3 weeks on one week off, Decadron 20 mg by mouth weekly, lambda light chain down to 30 then creeping up to 110, on Revlimid 15 mg by mouth every other day  Lambda light chain normal at 28 however he reported skin rash on the upper chest area we stopped the Revlimid for 2 month  Then reinitiated Revlimid 5 mg by mouth daily  #2  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 stage I, with progression by scan, status post resection of the right upper lobe with few foci in the same lobe consistent with stage IIIA done in January 2015 adenocarcinoma, well differentiated  Status post Alimta/carboplatin x4 cycles finished in May 2015 and radiation therapy  #3  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  #4  Admitted to [address] 3/3/16 - 3/8/16  CT 3/3/16 did not identify any evidence of PE  Infectious or inflammatory bronchitis suspected  Dx with RSV   Current Therapy: Revlimid 5 mg by mouth Daily  And then change to 5 mg every other day because of persistent diarrhea       Review of Systems  Complete-Male:   Constitutional: feeling tired, but no fever, not feeling poorly, no recent weight gain, no chills and no recent weight loss  Eyes: No complaints of eye pain, no red eyes, no discharge from eyes, no itchy eyes  ENT: no complaints of earache, no hearing loss, no nosebleeds, no nasal discharge, no sore throat, no hoarseness and no sore throat  Cardiovascular: lower extremity edema, but no chest pain and no palpitations  Respiratory: shortness of breath, cough and wheezing, but no shortness of breath during exertion and no PND  Gastrointestinal: diarrhea, but no abdominal pain, no nausea and no constipation  Genitourinary: No complaints of dysuria, no incontinence, no hesitancy, no nocturia, no genital lesion, no testicular pain  Musculoskeletal: lower back pain, but No complaints of arthralgia, no myalgias, no joint swelling or stiffness, no limb pain or swelling, no arthralgias and no myalgias  Integumentary: No complaints of skin rash or skin lesions, no itching, no skin wound, no dry skin, no itching and no skin wound  Neurological: no headache, no tingling, no confusion, no dizziness, no limb weakness and no fainting  Psychiatric: Is not suicidal, no sleep disturbances, no anxiety or depression, no change in personality, no emotional problems  Endocrine: No complaints of proptosis, no hot flashes, no muscle weakness, no erectile dysfunction, no deepening of the voice, no feelings of weakness  Hematologic/Lymphatic: No complaints of swollen glands, no swollen glands in the neck, does not bleed easily, no easy bruising  Active Problems    1  Abnormal chest CT (793 2) (R93 8)   2  Adenocarcinoma of lung (162 9) (C34 90)   3  Chronic rhinitis (472 0) (J31 0)   4  Cough (786 2) (R05)   5  Edema (782 3) (R60 9)   6  Enlarged prostate without lower urinary tract symptoms (luts) (600 00) (N40 0)   7  Multiple myeloma (203 00) (C90 00)   8  Obstructive sleep apnea (327 23) (G47 33)   9  Palpitations (785 1) (R00 2)   10  Pulmonary emphysema (492 8) (J43 9)   11  Special screening examination for neoplasm of prostate (V76 44) (Z12 5)   12  Weakness (780 79) (R53 1)    Past Medical History    1  History of Benign prostatic hypertrophy with lower urinary tract symptoms (LUTS)   (600 01) (N40 1)   2   History of Fall, accidental (E888 9) (W19 XXXA)   3  History of bacterial pneumonia (V12 61) (Z87 01)   4  History of diarrhea (V12 79) (Z87 898)   5  History of nausea (V12 79) (Z87 898)   6  History of sleep apnea (V13 89) (Z86 69)   7  History of Radiation pneumonitis (508 0) (J70 0)    Surgical History    1  History of Back Surgery   2  History of Cholecystectomy   3  History of Cholecystotomy   4  History of Inguinal Hernia Repair   5  History of Repeat Lithotomy    Family History  Mother    1  Family history of Osteoarthritis (V17 7)  Father    2  Family history of Heart Disease (V17 49)   3  Family history of Lung Cancer (V16 1)  Sister    4  Family history of Type 1 Diabetes Mellitus    Social History    · Being A Social Drinker   · Caffeine Use   · Denied: Drug use (305 90) (F19 90)   · Former smoker (F92 92) (R46 269)   ·     Current Meds   1  Aspirin 81 MG CAPS; take 1 capsule every other day; Therapy: (Recorded:30Ddr5417) to Recorded   2  Co Q-10 Vitamin E Fish Oil 17-87- Oral Capsule; take 1 capsule daily; Therapy: (Recorded:19Mar2014) to Recorded   3  DrRx Zithromax Z-Amador 250 MG #6 pill pack; take as directed; Therapy: 00GVK9766 to (Evaluate:92Pgg2486); Last Rx:50Cja6439 Ordered   4  Finasteride 5 MG Oral Tablet; Take 1 tablet by mouth at bedtime; Therapy: 66LKY9470 to (Renan Machado)  Requested for: 69KAH2462; Last   Rx:28Nov2016 Ordered   5  Imodium CAPS; as directed; Therapy: (Recorded:43Wtn2122) to Recorded   6  Ipratropium Bromide 0 03 % Nasal Solution; USE 2 SPRAYS IN EACH NOSTRIL TWICE   DAILY; Therapy: 58EHI8639 to (Evaluate:51Xds5450)  Requested for: 21Mhf4494; Last   Rx:68Fbk6807 Ordered   7  Omega 3 CAPS; take 1 capsule daily; Therapy: (Recorded:19Mar2014) to Recorded   8  Rapaflo CAPS; TAKE 1 CAPSULE DAILY WITH FOOD; Therapy: (Recorded:25Wyh4197) to Recorded   9   Revlimid 5 MG Oral Capsule; take 1 capsule every other day   adult male   [facility] #[id number]  Requested for: 96XKQ8400; Last YS:45UKC0027 Ordered   10  Spiriva Respimat 2 5 MCG/ACT Inhalation Aerosol Solution; INHALE 2 PUFFS ONCE    DAILY; Therapy: 53FWX3488 to (Evaluate:27Jun2017); Last Rx:34Bas7239 Ordered   11  Vitamin D 1000 UNIT Oral Tablet; TAKE 1 TABLET DAILY; Therapy: (Recorded:19Mar2014) to Recorded    Allergies    1  Lipitor TABS   2  Sulfa Drugs    Vitals  Vital Signs    Recorded: 35MNY6813 11:04AM   Temperature 97 5 F   Heart Rate 66   Respiration 16   Systolic 98   Diastolic 58   Height 5 ft 7 5 in   Weight 180 lb    BMI Calculated 27 78   BSA Calculated 1 94   O2 Saturation 96     Physical Exam    Constitutional   General appearance: No acute distress, well appearing and well nourished  Eyes   Conjunctiva and lids: No swelling, erythema, or discharge  Pupils and irises: Equal, round and reactive to light  Ears, Nose, Mouth, and Throat   External inspection of ears and nose: Normal     Nasal mucosa, septum, and turbinates: Normal without edema or erythema  Oropharynx: Normal with no erythema, edema, exudate or lesions  Pulmonary   Auscultation of lungs: Abnormal   Auscultation of the lungs revealed diffuse wheezing and expiratory wheezing  rales/crackles over both bases  Cardiovascular   Auscultation of heart: Normal rate and rhythm, normal S1 and S2, without murmurs  Examination of extremities for edema and/or varicosities: Abnormal   bilateral ankle 2+ pitting edema  Carotid pulses: Normal     Abdomen   Abdomen: Non-tender, no masses  Liver and spleen: No hepatomegaly or splenomegaly  Lymphatic   Palpation of lymph nodes in neck: No lymphadenopathy  Musculoskeletal single point cane  Inspection/palpation of joints, bones, and muscles: Normal     Skin   Skin and subcutaneous tissue: Normal without rashes or lesions  Neurologic   Cranial nerves: Cranial nerves 2-12 intact  Sensation: No sensory loss      Psychiatric   Mood and affect: Normal         ECOG 1       Results/Data  (1) CBC/PLT/DIFF 89RNK8857 12:58PM Korin Myles   TW Order Number: [id number]     Test Name Result Flag Reference   WBC COUNT 5 54 Thousand/uL  4 31-10 16   RBC COUNT 4 73 Million/uL  3 88-5 62   HEMOGLOBIN 14 3 g/dL  12 0-17 0   HEMATOCRIT 42 8 %  36 5-49 3   MCV 91 fL  82-98   MCH 30 2 pg  26 8-34 3   MCHC 33 4 g/dL  31 4-37 4   RDW 15 1 %  11 6-15 1   MPV 9 6 fL  8 9-12 7   PLATELET COUNT 309 Thousands/uL  149-390   nRBC AUTOMATED 0 /100 WBCs     NEUTROPHILS RELATIVE PERCENT 56 %  43-75   LYMPHOCYTES RELATIVE PERCENT 27 %  14-44   MONOCYTES RELATIVE PERCENT 12 %  4-12   EOSINOPHILS RELATIVE PERCENT 4 %  0-6   BASOPHILS RELATIVE PERCENT 1 %  0-1   NEUTROPHILS ABSOLUTE COUNT 3 08 Thousands/? ??L  1 85-7 62   LYMPHOCYTES ABSOLUTE COUNT 1 50 Thousands/? ??L  0 60-4 47   MONOCYTES ABSOLUTE COUNT 0 66 Thousand/? ??L  0 17-1 22   EOSINOPHILS ABSOLUTE COUNT 0 23 Thousand/? ??L  0 00-0 61   BASOPHILS ABSOLUTE COUNT 0 07 Thousands/? ??L  0 00-0 10   - Patient Instructions: This bloodwork is non-fasting  Please drink two glasses of water morning of bloodwork  - Patient Instructions: This bloodwork is non-fasting  Please drink two glasses of water morning of bloodwork  (1) COMPREHENSIVE METABOLIC PANEL 04IXW2713 93:75UN Korin Myles    Order Number: [id number]     Test Name Result Flag Reference   GLUCOSE,RANDM 89 mg/dL     If the patient is fasting, the ADA then defines impaired fasting glucose as > 100 mg/dL and diabetes as > or equal to 123 mg/dL     SODIUM 142 mmol/L  136-145   POTASSIUM 3 9 mmol/L  3 5-5 3   CHLORIDE 107 mmol/L  100-108   CARBON DIOXIDE 30 mmol/L  21-32   ANION GAP (CALC) 5 mmol/L  4-13   BLOOD UREA NITROGEN 16 mg/dL  5-25   CREATININE 1 21 mg/dL  0 60-1 30   Standardized to IDMS reference method   CALCIUM 8 7 mg/dL  8 3-10 1   BILI, TOTAL 1 79 mg/dL H 0 20-1 00   ALK PHOSPHATAS 87 U/L     ALT (SGPT) 19 U/L  12-78   AST(SGOT) 13 U/L  5-45   ALBUMIN 3 1 g/dL L 3 5-5 0 TOTAL PROTEIN 6 6 g/dL  6 4-8 2   eGFR Non-African American 57 7 ml/min/1 73sq m     [facility] Disease Education Program recommendations are as follows:  GFR calculation is accurate only with a steady state creatinine  Chronic Kidney disease less than 60 ml/min/1 73 sq  meters  Kidney failure less than 15 ml/min/1 73 sq  meters  (1) FREE LIGHT CHAINS, SERUM 65YBE6586 12:58PM Jitendra Patel    Order Number: [id number]     Test Name Result Flag Reference   FREE KAPPA LIGHT CHAINS, SERUM 31 33 mg/L H 3 30 - 19 40   FREE LAMBDA LIGHT CHAINS, SERUM 63 24 mg/L H 5 71 - 26 30   KAPPA/LAMBDA RATIO, SERUM 0 50  0 26 - 1 65   Performed at:  [address] [place], [place] [id number]  : Queta Dickson MD, Phone:  [phone]     Future Appointments    Date/Time Provider Specialty Site   05/05/2017 01:20 PM DAVION Wolfe  Pulmonary Medicine [facility] [place]   03/30/2017 11:45 AM DAVION Junior  Hematology [facility]     Signatures   Electronically signed by :  Pansy Peabody, M D ; Mar  9 2017  2:29PM EST                       (Author)

## 2018-01-13 NOTE — MISCELLANEOUS
Message  pateint received supply for revlimid 5 mg every other day  script has been changed to 3 weeks on/ 1 week off  revlimid script sent for 7 days to copete this 21 day cycle then the next fill will be for 21 days  patient verbalizes understanding      Active Problems    1  Abnormal chest CT (793 2) (R93 8)   2  Adenocarcinoma of lung (162 9) (C34 90)   3  Chronic rhinitis (472 0) (J31 0)   4  Cough (786 2) (R05)   5  Edema (782 3) (R60 9)   6  Enlarged prostate without lower urinary tract symptoms (luts) (600 00) (N40 0)   7  Multiple myeloma (203 00) (C90 00)   8  Obstructive sleep apnea (327 23) (G47 33)   9  Palpitations (785 1) (R00 2)   10  Pulmonary emphysema (492 8) (J43 9)   11  Special screening examination for neoplasm of prostate (V76 44) (Z12 5)   12  Weakness (780 79) (R53 1)    Current Meds   1  Aspirin 81 MG CAPS; take 1 capsule every other day; Therapy: (Recorded:48Mvj8011) to Recorded   2  Co Q-10 Vitamin E Fish Oil 14-32- Oral Capsule; take 1 capsule daily; Therapy: (Recorded:19Mar2014) to Recorded   3  DrRx Zithromax Z-Amador 250 MG #6 pill pack; take as directed; Therapy: 19LFT7966 to (Evaluate:92Ugz2207); Last Rx:89Srs0621 Ordered   4  Finasteride 5 MG Oral Tablet (Proscar); Take 1 tablet by mouth at bedtime; Therapy: 79GGP4182 to (Basilia Hodge)  Requested for: 41DTC5668; Last   Rx:28Nov2016 Ordered   5  Imodium CAPS (Loperamide HCl); as directed; Therapy: (Recorded:99Sdz2226) to Recorded   6  Ipratropium Bromide 0 03 % Nasal Solution; USE 2 SPRAYS IN EACH NOSTRIL TWICE   DAILY; Therapy: 08NFE8549 to (Evaluate:34Iqz2149)  Requested for: 87Vvs3768; Last   Rx:46Xiq0009 Ordered   7  Omega 3 CAPS; take 1 capsule daily; Therapy: (Recorded:19Mar2014) to Recorded   8  Rapaflo CAPS; TAKE 1 CAPSULE DAILY WITH FOOD; Therapy: (Recorded:02Sep2016) to Recorded   9   Revlimid 5 MG Oral Capsule; take 1 capsule every other day   adult male   Erasmo Ram[facility]rez #[id number]  Requested for: NOTIFICATION OF RETURN TO WORK / SCHOOL 
 
12/29/2017 7:14 PM 
 
Ms. Jennifer Beckford [address] [address] [facility] Deaner To Whom It May Concern: 
 
Jennifer Beckofrd was under the care of [facility]. She will be able to return to work on Sunday, 12/31/17. If there are questions or concerns please have the patient contact our office. Sincerely, Kandi Myers PA-C 
 20WJJ3277; Last Rx:08Mar2017 Ordered   10  Spiriva Respimat 2 5 MCG/ACT Inhalation Aerosol Solution; INHALE 2 PUFFS ONCE    DAILY; Therapy: 00GCQ7791 to (Evaluate:27Jun2017); Last Rx:66Bxs9553 Ordered   11  Vitamin D 1000 UNIT Oral Tablet; TAKE 1 TABLET DAILY; Therapy: (Recorded:19Mar2014) to Recorded    Allergies    1  Lipitor TABS   2  Sulfa Drugs    Plan  Multiple myeloma    · Revlimid 5 MG Oral Capsule; take 1 capsule every day for 7 days  adult male  auth #[id number]  Pulmonary emphysema    · (1) CBC/PLT/DIFF; Status:Active; Requested for:09Mar2017;    · (1) COMPREHENSIVE METABOLIC PANEL; Status:Active; Requested for:09Mar2017;    · (1) FREE LIGHT CHAINS, SERUM; Status:Active; Requested for:09Mar2017;    · * XR BONE SURVEY COMPLETE; Status:Active;  Requested TSQ:48HLK5106;     Signatures   Electronically signed by : Alberto Stacy, ; Mar 15 2017  1:55PM EST                       (Author)

## 2018-01-14 VITALS
OXYGEN SATURATION: 97 % | SYSTOLIC BLOOD PRESSURE: 120 MMHG | HEART RATE: 86 BPM | HEIGHT: 68 IN | RESPIRATION RATE: 16 BRPM | TEMPERATURE: 96.9 F | DIASTOLIC BLOOD PRESSURE: 70 MMHG | WEIGHT: 178.25 LBS | BODY MASS INDEX: 27.01 KG/M2

## 2018-01-14 VITALS
RESPIRATION RATE: 16 BRPM | OXYGEN SATURATION: 98 % | SYSTOLIC BLOOD PRESSURE: 118 MMHG | WEIGHT: 178 LBS | HEIGHT: 68 IN | HEART RATE: 109 BPM | DIASTOLIC BLOOD PRESSURE: 70 MMHG | BODY MASS INDEX: 26.98 KG/M2 | TEMPERATURE: 98.6 F

## 2018-01-14 VITALS
TEMPERATURE: 98.2 F | HEIGHT: 68 IN | SYSTOLIC BLOOD PRESSURE: 130 MMHG | OXYGEN SATURATION: 96 % | WEIGHT: 178.5 LBS | BODY MASS INDEX: 27.05 KG/M2 | HEART RATE: 94 BPM | DIASTOLIC BLOOD PRESSURE: 82 MMHG | RESPIRATION RATE: 16 BRPM

## 2018-01-14 VITALS
TEMPERATURE: 97.3 F | OXYGEN SATURATION: 97 % | HEIGHT: 68 IN | DIASTOLIC BLOOD PRESSURE: 58 MMHG | SYSTOLIC BLOOD PRESSURE: 98 MMHG | WEIGHT: 173 LBS | HEART RATE: 90 BPM | BODY MASS INDEX: 26.22 KG/M2

## 2018-01-14 VITALS
SYSTOLIC BLOOD PRESSURE: 110 MMHG | BODY MASS INDEX: 26.37 KG/M2 | DIASTOLIC BLOOD PRESSURE: 58 MMHG | OXYGEN SATURATION: 96 % | WEIGHT: 174 LBS | TEMPERATURE: 98.5 F | HEART RATE: 92 BPM | HEIGHT: 68 IN | RESPIRATION RATE: 18 BRPM

## 2018-01-14 NOTE — MISCELLANEOUS
Message  I spoke with Bg Brown and his wife today about the results of his chest x-ray  This x-ray looks no different than the prior x-ray from August at the time of his hospitalization and the CT scan which shows some elevation of his hemidiaphragm and some minimal anterior lower lobe changes  They tell me has been running low-grade temperature but has no other significant symptoms  The temperatures have been episodic  He is not currently on antibiotics but he is receiving chemotherapy  Since he is having upcoming surgery which is scheduled for early November, I suggested that we treat him for the fevers with antibiotics and I have given him a prescription for Levaquin 500 milligrams for 10 days  He only received steroids once a week  He has tolerated this medication before without difficulty  I did discuss the risk of tendon rupture with them but I think at this point the benefit outweighs the risk  Plan  Bacterial pneumonia    · LevoFLOXacin 500 MG Oral Tablet (Levaquin); Take 1 tablet daily    Verified Results  * XR CHEST PA & LATERAL 28TNV4091 12:44PM Francesca Senior Order Number: [id number]     Test Name Result Flag Reference   XR CHEST PA & LATERAL (Report)     CHEST - DUAL ENERGY     INDICATION: Low-grade fever  COMPARISON: Chest radiograph 8/21/2017     VIEWS: PA (including soft tissue/bone algorithms) and lateral projections     IMAGES: 4     FINDINGS:        Cardiomediastinal silhouette appears unremarkable  The left lung is clear  Stable opacification of the right upper lobe  There is persistent elevation the right hemidiaphragm and right basilar opacity, similar to prior exams  No pneumothorax or large pleural effusion  Visualized osseous structures appear within normal limits for the patient's age  IMPRESSION:       1  Persistent elevation of the right hemidiaphragm and right basilar opacity  This may represent atelectasis or pneumonia     2  Persistent opacification of the right lung apex unchanged from prior exams compatible with postsurgical/posttreatment changes         Workstation performed: [id number]     Signed by:   Andreina Bentley MD   9/22/17       Signatures   Electronically signed by : DAVION Rodarte ; Sep 22 2017  3:13PM EST                       (Author)

## 2018-01-14 NOTE — MISCELLANEOUS
Dear Enrico Flores,      We have attempted to reach you regarding your upcoming procedure on 7/11/17  Unfortunately, due to a change in provider's schedule we need to change your appointment  Please contact our  office at [phone]               Thank You,    [facility]      Electronically signed by:Zahra Dillon   Jun 13 2017  2:26PM EST

## 2018-01-15 VITALS
WEIGHT: 172.5 LBS | TEMPERATURE: 98.6 F | OXYGEN SATURATION: 96 % | BODY MASS INDEX: 26.14 KG/M2 | SYSTOLIC BLOOD PRESSURE: 118 MMHG | DIASTOLIC BLOOD PRESSURE: 64 MMHG | HEART RATE: 95 BPM | HEIGHT: 68 IN | RESPIRATION RATE: 16 BRPM

## 2018-01-16 RX ORDER — SODIUM CHLORIDE 9 MG/ML
20 INJECTION, SOLUTION INTRAVENOUS CONTINUOUS
Status: DISCONTINUED | OUTPATIENT
Start: 2018-01-17 | End: 2018-01-20 | Stop reason: HOSPADM

## 2018-01-16 RX ORDER — ACETAMINOPHEN 325 MG/1
650 TABLET ORAL ONCE
Status: COMPLETED | OUTPATIENT
Start: 2018-01-17 | End: 2018-01-17

## 2018-01-17 ENCOUNTER — HOSPITAL ENCOUNTER (OUTPATIENT)
Dept: INFUSION CENTER | Facility: CLINIC | Age: 83
Discharge: HOME | End: 2018-01-17
Payer: MEDICARE

## 2018-01-17 VITALS
WEIGHT: 176.59 LBS | RESPIRATION RATE: 18 BRPM | TEMPERATURE: 98 F | SYSTOLIC BLOOD PRESSURE: 117 MMHG | BODY MASS INDEX: 26.85 KG/M2 | HEART RATE: 100 BPM | DIASTOLIC BLOOD PRESSURE: 61 MMHG

## 2018-01-17 PROCEDURE — 96413 CHEMO IV INFUSION 1 HR: CPT

## 2018-01-17 PROCEDURE — 96367 TX/PROPH/DG ADDL SEQ IV INF: CPT

## 2018-01-17 PROCEDURE — 96375 TX/PRO/DX INJ NEW DRUG ADDON: CPT

## 2018-01-17 PROCEDURE — 96415 CHEMO IV INFUSION ADDL HR: CPT

## 2018-01-17 RX ADMIN — SODIUM CHLORIDE 100 MG: 0.9 INJECTION, SOLUTION INTRAVENOUS at 09:20

## 2018-01-17 RX ADMIN — DARATUMUMAB 1300 MG: 100 INJECTION, SOLUTION, CONCENTRATE INTRAVENOUS at 10:15

## 2018-01-17 RX ADMIN — SODIUM CHLORIDE 20 ML/HR: 0.9 INJECTION, SOLUTION INTRAVENOUS at 09:05

## 2018-01-17 RX ADMIN — ACETAMINOPHEN 650 MG: 325 TABLET, FILM COATED ORAL at 09:06

## 2018-01-17 RX ADMIN — Medication 300 UNITS: at 13:45

## 2018-01-17 RX ADMIN — DIPHENHYDRAMINE HYDROCHLORIDE 25 MG: 50 INJECTION, SOLUTION INTRAMUSCULAR; INTRAVENOUS at 09:05

## 2018-01-17 NOTE — MISCELLANEOUS
Message   Recorded as Task   Date: 05/19/2017 09:33 AM, Created By: Ayanna Joya   Task Name: Medical Complaint Callback   Assigned To: Adolfo Gonzales   Regarding Patient: Bill Pinzon, Status: In Progress   Comment:    Dee Gee - 19 May 2017 9:33 AM     TASK CREATED  Caller: Galina-wife; Medical Complaint; [phone]  Pt has diarrhea and wife wants to know if Immodium is OK to take  Please call  Linh Ramirez - 19 May 2017 9:42 AM     TASK IN PROGRESS   Linh Ramirez - 19 May 2017 9:48 AM     TASK EDITED  spoke with patient's wife  patient is feeling weak , tired and listless  with loose stools  patient took one immodium yesterday  he will take after each loose stool   Linh Ramirez - 19 May 2017 10:16 AM     TASK EDITED  d/w dr Rivera Safer  patient to go for CBC and BMP today   Linh Ramirez - 19 May 2017 12:58 PM     TASK EDITED  discussed results of labs with dr Mike Santamaria  IVF ordered for today  patient aware        Active Problems    1  Abnormal chest CT (793 2) (R93 8)   2  Adenocarcinoma of lung (162 9) (C34 90)   3  Chronic rhinitis (472 0) (J31 0)   4  Cough (786 2) (R05)   5  Edema (782 3) (R60 9)   6  Enlarged prostate without lower urinary tract symptoms (luts) (600 00) (N40 0)   7  Multiple myeloma (203 00) (C90 00)   8  Obstructive sleep apnea (327 23) (G47 33)   9  Palpitations (785 1) (R00 2)   10  Pulmonary emphysema (492 8) (J43 9)   11  Special screening examination for neoplasm of prostate (V76 44) (Z12 5)   12  Weakness (780 79) (R53 1)    Current Meds   1  Aspirin 81 MG CAPS; take 1 capsule every other day; Therapy: (Recorded:24Zqy3884) to Recorded   2  Co Q-10 Vitamin E Fish Oil 88-67- Oral Capsule; take 1 capsule daily; Therapy: (Recorded:19Mar2014) to Recorded   3  Dexamethasone 4 MG Oral Tablet; 2 TABS WEEKLY; Therapy: 94HSL1267 to (Last Rx:89Ibz5627)  Requested for: 34VMR5436 Ordered   4  Finasteride 5 MG Oral Tablet (Proscar); Take 1 tablet by mouth at bedtime;    Therapy: 13CRO9920 to ([facility])  Requested for: 35FKO4849; Last   Rx:28Nov2016 Ordered   5  Imodium CAPS (Loperamide HCl); as directed; Therapy: (Recorded:13Dec2016) to Recorded   6  Ipratropium Bromide 0 03 % Nasal Solution; USE 2 SPRAYS IN EACH NOSTRIL TWICE   DAILY; Therapy: 15ILO3785 to (Evaluate:15Lqw8830)  Requested for: 34Eka4284; Last   Rx:13Dec2016 Ordered   7  Omega 3 CAPS; take 1 capsule daily; Therapy: (Recorded:19Mar2014) to Recorded   8  Rapaflo CAPS; TAKE 1 CAPSULE DAILY WITH FOOD; Therapy: (Recorded:02Sep2016) to Recorded   9  Spiriva Respimat 2 5 MCG/ACT Inhalation Aerosol Solution; INHALE 2 PUFFS ONCE   DAILY; Therapy: 84CQF1965 to (Evaluate:27Jun2017); Last Rx:06Ngy2998 Ordered   10  Vitamin D 1000 UNIT Oral Tablet; TAKE 1 TABLET DAILY; Therapy: (Recorded:19Mar2014) to Recorded    Allergies    1  Lipitor TABS   2   Sulfa Drugs    Signatures   Electronically signed by : Hodan Stephens, ; May 19 2017 12:59PM EST                       (Author)

## 2018-01-17 NOTE — PLAN OF CARE
Problem: Potential for Falls  Goal: Patient will remain free of falls  INTERVENTIONS:  - Assess patient frequently for physical needs  -  Identify cognitive and physical deficits and behaviors that affect risk of falls    -  [place] fall precautions as indicated by assessment   - Educate patient/family on patient safety including physical limitations  - Instruct patient to call for assistance with activity based on assessment  - Modify environment to reduce risk of injury  - Consider OT/PT consult to assist with strengthening/mobility   Outcome: Progressing

## 2018-01-22 VITALS
HEART RATE: 92 BPM | SYSTOLIC BLOOD PRESSURE: 98 MMHG | HEIGHT: 68 IN | BODY MASS INDEX: 27.37 KG/M2 | TEMPERATURE: 97.7 F | DIASTOLIC BLOOD PRESSURE: 52 MMHG | RESPIRATION RATE: 18 BRPM | WEIGHT: 180.6 LBS | OXYGEN SATURATION: 98 %

## 2018-01-22 VITALS
HEIGHT: 68 IN | BODY MASS INDEX: 27.66 KG/M2 | SYSTOLIC BLOOD PRESSURE: 110 MMHG | RESPIRATION RATE: 16 BRPM | HEART RATE: 99 BPM | TEMPERATURE: 97.5 F | WEIGHT: 182.5 LBS | OXYGEN SATURATION: 96 % | DIASTOLIC BLOOD PRESSURE: 70 MMHG

## 2018-01-23 NOTE — MISCELLANEOUS
Message   Recorded as Task   Date: 01/08/2018 11:42 AM, Created By: Reba Rosas   Task Name: Call Back   Assigned To: Linh Ramirez   Regarding Patient: Elyse Taylor, Status: In Progress   Comment:    Shannan Espinoza - 08 Jan 2018 11:42 AM     TASK CREATED  Kena Toro wife, called stating that he released from the hospital on Saturday  She stating that he is feeling better and she wants to know if he should go to his scheduled treatment on Wednesday  Ludivina Mullen can be reached at [phone]  Linh Ramirez - 08 Jan 2018 12:01 PM     TASK IN PROGRESS   Linh Ramirez - 08 Jan 2018 1:42 PM     TASK EDITED  D/W DR Martin Omalley FOR ONE WEEK  PATIENT AWARE        Active Problems    1  Abnormal chest CT (793 2) (R93 8)   2  Adenocarcinoma of lung (162 9) (C34 90)   3  Bacterial pneumonia (482 9) (J15 9)   4  Chronic diarrhea (787 91) (K52 9)   5  Chronic rhinitis (472 0) (J31 0)   6  COPD exacerbation (491 21) (J44 1)   7  Cough (786 2) (R05)   8  Enlarged prostate without lower urinary tract symptoms (luts) (600 00) (N40 0)   9  Multiple myeloma (203 00) (C90 00)   10  Obstructive sleep apnea (327 23) (G47 33)   11  Palpitations (785 1) (R00 2)   12  Preop cardiovascular exam (V72 81) (Z01 810)   13  Preop pulmonary/respiratory exam (V72 82) (Z01 811)   14  Primary cancer of rectum (154 1) (C20)   15  Pulmonary emphysema (492 8) (J43 9)   16  Rectal mass (787 99) (K62 9)   17  Streptococcus pneumoniae pneumonia (12) (J13)   18  Weakness (780 79) (R53 1)    Current Meds   1  Dexamethasone 4 MG Oral Tablet; 5 tabs po weekly with food; Therapy: 59HDV5723 to (Evaluate:00Bxv9405)  Requested for: 26Oct2017; Last   Rx:26Oct2017 Ordered   2  Finasteride 5 MG Oral Tablet (Proscar); Take 1 tablet by mouth at bedtime; Therapy: 16WIZ8960 to (Divya Nguyễn)  Requested for: 91GPU7737; Last   Rx:28Nov2016 Ordered   3  Imodium CAPS (Loperamide HCl); as directed; Therapy: (Recorded:13Dec2016) to Recorded   4  Ipratropium-Albuterol 0 5-2 5 (3) MG/3ML Inhalation Solution; INHALE 1 UNIT DOSE   Twice daily And every 6 hours if needed for shortness of breath; Therapy: 92XRM0545 to (Sj Ceron)  Requested for: 06Fqn3898; Last   Rx:04Hoo5643 Ordered   5  Lidocaine-Prilocaine 2 5-2 5 % External Cream; APPLY TO AREA AND COVER WITH   TAPE 1 HOUR BEFORE APPOINTMENT; Therapy: 45AZW3552 to (Last Rx:12Euz7523)  Requested for: 72NEF2843 Ordered   6  Mucinex 600 MG Oral Tablet Extended Release 12 Hour; Therapy: 79GHE5602 to Recorded   7  Omega 3 CAPS; take 1 capsule daily; Therapy: (Recorded:19Mar2014) to Recorded   8  Proventil  (90 Base) MCG/ACT Inhalation Aerosol Solution; INHALE 2 PUFFS   EVERY 4-6 HOURS AS NEEDED  Requested for: 32QYR3279; Last Rx:14Rlg6937   Ordered   9  Rapaflo CAPS; TAKE 1 CAPSULE DAILY WITH FOOD; Therapy: (Recorded:50Lal2726) to Recorded   10  Spiriva Respimat 1 25 MCG/ACT Inhalation Aerosol Solution; INHALE 2 PUFFS BY    MOUTH EVERY DAY; Therapy: 99ZBW4114 to (Evaluate:27Mar2018)  Requested for: 47KCU1553; Last    Rx:27Nov2017 Ordered   11  Spiriva Respimat 2 5 MCG/ACT Inhalation Aerosol Solution; INHALE 2 PUFFS ONCE    DAILY; Therapy: 33QEX0682 to (Evaluate:17Jan2018); Last Rx:51Uyr4451 Ordered   12  Vitamin D 1000 UNIT Oral Tablet; TAKE 1 TABLET DAILY; Therapy: (Recorded:19Mar2014) to Recorded    Allergies    1  Lipitor TABS   2  Sulfa Drugs   3   Penicillins    Signatures   Electronically signed by : Lazarus Som, ; Jan 8 2018  1:42PM EST                       (Author)

## 2018-01-24 VITALS
RESPIRATION RATE: 16 BRPM | SYSTOLIC BLOOD PRESSURE: 100 MMHG | DIASTOLIC BLOOD PRESSURE: 68 MMHG | BODY MASS INDEX: 27.58 KG/M2 | WEIGHT: 182 LBS | OXYGEN SATURATION: 97 % | HEART RATE: 88 BPM | HEIGHT: 68 IN | TEMPERATURE: 97.2 F

## 2018-01-29 RX ORDER — SODIUM CHLORIDE 9 MG/ML
20 INJECTION, SOLUTION INTRAVENOUS CONTINUOUS
Status: DISCONTINUED | OUTPATIENT
Start: 2018-01-30 | End: 2018-02-02 | Stop reason: HOSPADM

## 2018-01-29 RX ORDER — ACETAMINOPHEN 325 MG/1
650 TABLET ORAL ONCE
Status: COMPLETED | OUTPATIENT
Start: 2018-01-30 | End: 2018-01-30

## 2018-01-30 ENCOUNTER — HOSPITAL ENCOUNTER (OUTPATIENT)
Dept: INFUSION CENTER | Facility: CLINIC | Age: 83
Discharge: HOME | End: 2018-01-30
Payer: MEDICARE

## 2018-01-30 VITALS
RESPIRATION RATE: 18 BRPM | BODY MASS INDEX: 27.25 KG/M2 | TEMPERATURE: 96.6 F | WEIGHT: 179.23 LBS | HEART RATE: 93 BPM | DIASTOLIC BLOOD PRESSURE: 65 MMHG | SYSTOLIC BLOOD PRESSURE: 107 MMHG

## 2018-01-30 PROCEDURE — 96413 CHEMO IV INFUSION 1 HR: CPT

## 2018-01-30 PROCEDURE — 96415 CHEMO IV INFUSION ADDL HR: CPT

## 2018-01-30 PROCEDURE — 96367 TX/PROPH/DG ADDL SEQ IV INF: CPT

## 2018-01-30 PROCEDURE — 96375 TX/PRO/DX INJ NEW DRUG ADDON: CPT

## 2018-01-30 RX ADMIN — SODIUM CHLORIDE 100 MG: 0.9 INJECTION, SOLUTION INTRAVENOUS at 09:25

## 2018-01-30 RX ADMIN — DIPHENHYDRAMINE HYDROCHLORIDE 25 MG: 50 INJECTION, SOLUTION INTRAMUSCULAR; INTRAVENOUS at 09:10

## 2018-01-30 RX ADMIN — DARATUMUMAB 1300 MG: 100 INJECTION, SOLUTION, CONCENTRATE INTRAVENOUS at 10:00

## 2018-01-30 RX ADMIN — SODIUM CHLORIDE 20 ML/HR: 0.9 INJECTION, SOLUTION INTRAVENOUS at 09:10

## 2018-01-30 RX ADMIN — ACETAMINOPHEN 650 MG: 325 TABLET, FILM COATED ORAL at 09:01

## 2018-01-30 RX ADMIN — Medication 300 UNITS: at 13:30

## 2018-01-30 NOTE — PLAN OF CARE
Problem: Potential for Falls  Goal: Patient will remain free of falls  INTERVENTIONS:  - Assess patient frequently for physical needs  -  Identify cognitive and physical deficits and behaviors that affect risk of falls    -  [place] fall precautions as indicated by assessment   - Educate patient/family on patient safety including physical limitations  - Instruct patient to call for assistance with activity based on assessment  - Modify environment to reduce risk of injury  - Consider OT/PT consult to assist with strengthening/mobility   Outcome: Progressing

## 2018-01-30 NOTE — PROGRESS NOTES
Darzalex given as ordered  Patient denies any discomfort  Pt and wife are aware of all future appointments

## 2018-02-05 ENCOUNTER — TRANSCRIBE ORDERS (OUTPATIENT)
Dept: ADMINISTRATIVE | Facility: HOSPITAL | Age: 83
End: 2018-02-05

## 2018-02-05 ENCOUNTER — APPOINTMENT (OUTPATIENT)
Dept: LAB | Facility: MEDICAL CENTER | Age: 83
End: 2018-02-05
Payer: MEDICARE

## 2018-02-05 DIAGNOSIS — C90.00 MULTIPLE MYELOMA NOT HAVING ACHIEVED REMISSION (HCC): ICD-10-CM

## 2018-02-05 LAB
ALBUMIN SERPL BCP-MCNC: 3.3 G/DL (ref 3.5–5)
ALP SERPL-CCNC: 82 U/L (ref 46–116)
ALT SERPL W P-5'-P-CCNC: 23 U/L (ref 12–78)
ANION GAP SERPL CALCULATED.3IONS-SCNC: 4 MMOL/L (ref 4–13)
AST SERPL W P-5'-P-CCNC: 14 U/L (ref 5–45)
BASOPHILS # BLD AUTO: 0.02 THOUSANDS/ΜL (ref 0–0.1)
BASOPHILS NFR BLD AUTO: 0 % (ref 0–1)
BILIRUB SERPL-MCNC: 0.97 MG/DL (ref 0.2–1)
BUN SERPL-MCNC: 19 MG/DL (ref 5–25)
CALCIUM SERPL-MCNC: 8.9 MG/DL (ref 8.3–10.1)
CHLORIDE SERPL-SCNC: 105 MMOL/L (ref 100–108)
CO2 SERPL-SCNC: 29 MMOL/L (ref 21–32)
CREAT SERPL-MCNC: 1.3 MG/DL (ref 0.6–1.3)
EOSINOPHIL # BLD AUTO: 0.16 THOUSAND/ΜL (ref 0–0.61)
EOSINOPHIL NFR BLD AUTO: 3 % (ref 0–6)
ERYTHROCYTE [DISTWIDTH] IN BLOOD BY AUTOMATED COUNT: 16 % (ref 11.6–15.1)
GFR SERPL CREATININE-BSD FRML MDRD: 51 ML/MIN/1.73SQ M
GLUCOSE P FAST SERPL-MCNC: 93 MG/DL (ref 65–99)
HCT VFR BLD AUTO: 39.7 % (ref 36.5–49.3)
HGB BLD-MCNC: 13.1 G/DL (ref 12–17)
LYMPHOCYTES # BLD AUTO: 2.24 THOUSANDS/ΜL (ref 0.6–4.47)
LYMPHOCYTES NFR BLD AUTO: 36 % (ref 14–44)
MCH RBC QN AUTO: 28.7 PG (ref 26.8–34.3)
MCHC RBC AUTO-ENTMCNC: 33 G/DL (ref 31.4–37.4)
MCV RBC AUTO: 87 FL (ref 82–98)
MONOCYTES # BLD AUTO: 0.69 THOUSAND/ΜL (ref 0.17–1.22)
MONOCYTES NFR BLD AUTO: 11 % (ref 4–12)
NEUTROPHILS # BLD AUTO: 3.11 THOUSANDS/ΜL (ref 1.85–7.62)
NEUTS SEG NFR BLD AUTO: 50 % (ref 43–75)
NRBC BLD AUTO-RTO: 0 /100 WBCS
PLATELET # BLD AUTO: 185 THOUSANDS/UL (ref 149–390)
PMV BLD AUTO: 9 FL (ref 8.9–12.7)
POTASSIUM SERPL-SCNC: 4.3 MMOL/L (ref 3.5–5.3)
PROT SERPL-MCNC: 6.3 G/DL (ref 6.4–8.2)
RBC # BLD AUTO: 4.57 MILLION/UL (ref 3.88–5.62)
SODIUM SERPL-SCNC: 138 MMOL/L (ref 136–145)
WBC # BLD AUTO: 6.23 THOUSAND/UL (ref 4.31–10.16)

## 2018-02-05 PROCEDURE — 80053 COMPREHEN METABOLIC PANEL: CPT

## 2018-02-05 PROCEDURE — 83883 ASSAY NEPHELOMETRY NOT SPEC: CPT

## 2018-02-05 PROCEDURE — 85025 COMPLETE CBC W/AUTO DIFF WBC: CPT

## 2018-02-05 PROCEDURE — 36415 COLL VENOUS BLD VENIPUNCTURE: CPT

## 2018-02-06 LAB
KAPPA LC FREE SER-MCNC: 3.3 MG/L (ref 3.3–19.4)
KAPPA LC FREE/LAMBDA FREE SER: 0.01 {RATIO} (ref 0.26–1.65)
LAMBDA LC FREE SERPL-MCNC: 395.1 MG/L (ref 5.7–26.3)

## 2018-02-09 ENCOUNTER — OFFICE VISIT (OUTPATIENT)
Dept: HEMATOLOGY ONCOLOGY | Facility: CLINIC | Age: 83
End: 2018-02-09
Payer: MEDICARE

## 2018-02-09 VITALS
DIASTOLIC BLOOD PRESSURE: 62 MMHG | BODY MASS INDEX: 27.28 KG/M2 | HEIGHT: 68 IN | WEIGHT: 180 LBS | OXYGEN SATURATION: 98 % | SYSTOLIC BLOOD PRESSURE: 104 MMHG | HEART RATE: 97 BPM | TEMPERATURE: 97.1 F | RESPIRATION RATE: 18 BRPM

## 2018-02-09 DIAGNOSIS — C90.00 MULTIPLE MYELOMA NOT HAVING ACHIEVED REMISSION (HCC): Primary | ICD-10-CM

## 2018-02-09 PROCEDURE — 99214 OFFICE O/P EST MOD 30 MIN: CPT | Performed by: INTERNAL MEDICINE

## 2018-02-09 NOTE — PROGRESS NOTES
Hematology Outpatient Follow - Up Note  Marino Leach 81 y o  male MRN: @ Encounter: [id number]        Date:  2/9/2018        Assessment/ Plan:   1  Heavily pretreated lambda light chain multiple myeloma stage III with multiple compression fractures, osteoporosis status post kyphoplasty to L1, L2, L5 status post radiation therapy he had 4 cycles of Velcade, melphalan, prednisone in October 2010 with excellent response, Velcade maintenance until January 2013, relapse, treated with induction Velcade and Decadron relapse and lenalidomide was added, complicated with severe diarrhea on maintenance therapy  Most recent treatment Daratumumab 16 milligram/kilogram every week induction therapy initiated in November 2017, currently on Daratumumab 60 mg/kg every other week, lambda light chain is creeping up, consistent with progression of disease  We will add Velcade 1 3 milligram/meter squared subcu every other week  Re-evaluate in 1 month with CBC, CMP, lambda light chain          HPI:  #1 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, multiple compression fractures, status post kyphoplasty to L1, L2, L5, treated with radiation therapy to the left intra trochanteric area, received melphalan/Velcade/prednisone in October 2010 for 4 cycles with excellent response and normalization of lambda light chain, had been on Velcade maintenance till January 2013 when lambda light chain went up to 111,treated with Velcade 1 3 mg meter square weekly 3 weeks on one week off, Decadron 20 mg by mouth weekly, lambda light chain down to 30 then creeping up to 110, on Revlimid 15 mg by mouth every other day  Lambda light chain normal at 28 however he reported skin rash on the upper chest area we stopped the Revlimid for 2 month  Then reinitiated Revlimid 5 mg by mouth daily  #2  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 stage I, with progression by scan, status post resection of the right upper lobe with few foci in the same lobe consistent with stage IIIA done in January 2015 adenocarcinoma, well differentiated  Status post Alimta/carboplatin x4 cycles finished in May 2015 and radiation therapy  #3  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  #4  Admitted to [facility] 3/3/16 - 3/8/16  Dx with RSV  5  admission on November 2017 with rectal adenocarcinoma moderately differentiated status post resection stage I ( pT2, pNX, grade 2) tumor measuring 4 x 3 6 centimeters below the peritoneal reflection low probability of MS H   Previous Therapy:   Revlimid 5 mg by mouth 3 weeks on 1 week off, On hold since May 2017 because of diarrhea       Current Therapy: Daratumumab 60 milligram/kilogram weekly dose 1  By the end of     November 2017, dexamethasone 20 mg PO weekly      Maintenance Daratumumab 60 mg per kg every other week initiated in January 2018    Interval History:  Treated for pneumonia in January 2018      Previous Treatment:         Test Results:    Imaging: No results found  Labs:   Lab Results   Component Value Date    WBC 6 23 02/05/2018    HGB 13 1 02/05/2018    HCT 39 7 02/05/2018    MCV 87 02/05/2018     02/05/2018     Lab Results   Component Value Date     02/05/2018    K 4 3 02/05/2018     02/05/2018    CO2 29 02/05/2018    ANIONGAP 4 02/05/2018    BUN 19 02/05/2018    CREATININE 1 30 02/05/2018    GLUCOSE 97 01/05/2018    GLUF 93 02/05/2018    CALCIUM 8 9 02/05/2018    AST 14 02/05/2018    ALT 23 02/05/2018    ALKPHOS 82 02/05/2018    PROT 6 3 (L) 02/05/2018    BILITOT 0 97 02/05/2018    EGFR 51 02/05/2018       No results found for: IRON, TIBC, FERRITIN    No results found for: SEQQOMIV66      ROS:   Review of Systems   Constitutional: Positive for fatigue  Negative for activity change, fever and unexpected weight change  Eyes: Negative  Respiratory: Positive for cough and shortness of breath  Negative for apnea, wheezing and stridor      Gastrointestinal: Positive for diarrhea (Resolved with Imodium 2 tablets in the morning)  Negative for nausea, rectal pain and vomiting  Endocrine: Negative  Genitourinary: Negative  Musculoskeletal: Negative  Skin: Negative  Allergic/Immunologic: Negative  Neurological: Negative  Negative for dizziness, seizures, light-headedness, numbness and headaches  Hematological: Negative  Psychiatric/Behavioral: Negative  All other systems reviewed and are negative  Current Medications: Reviewed  Allergies: Reviewed  PMH/FH/SH:  Reviewed      Physical Exam:    Body surface area is 1 95 meters squared  Wt Readings from Last 3 Encounters:   02/09/18 81 6 kg (180 lb)   01/30/18 81 3 kg (179 lb 3 7 oz)   01/17/18 80 1 kg (176 lb 9 4 oz)        Temp Readings from Last 3 Encounters:   02/09/18 (!) 97 1 °F (36 2 °C) (Tympanic)   01/30/18 (!) 96 6 °F (35 9 °C) (Tympanic)   01/17/18 98 °F (36 7 °C) (Tympanic)        BP Readings from Last 3 Encounters:   02/09/18 104/62   01/30/18 107/65   01/17/18 117/61         Pulse Readings from Last 3 Encounters:   02/09/18 97   01/30/18 93   01/17/18 100        Physical Exam   Constitutional: He is oriented to person, place, and time  He appears well-developed  HENT:   Head: Normocephalic  Eyes: Conjunctivae are normal  Pupils are equal, round, and reactive to light  Neck: Normal range of motion  Neck supple  No JVD present  No thyromegaly present  Cardiovascular: Normal rate and regular rhythm  Murmur heard  Pulmonary/Chest: Effort normal  No stridor  No respiratory distress  He has no wheezes  He has no rales  He exhibits no tenderness  Rhonchi bilaterally   Abdominal: Soft  Bowel sounds are normal  He exhibits no distension  There is no tenderness  Musculoskeletal: Normal range of motion  He exhibits edema (Plus one edema bilaterally)  He exhibits no deformity  Lymphadenopathy:     He has no cervical adenopathy     Neurological: He is alert and oriented to person, place, and time  No cranial nerve deficit  Skin: Skin is warm  No rash noted  No erythema  Psychiatric: He has a normal mood and affect  His behavior is normal            Goals and Barriers:  Current Goal: Minimize effects of disease  Barriers: None  Patient's Capacity to Self Care:  Patient is able to self care      Code Status: @[facility]@

## 2018-02-09 NOTE — LETTER
February 9, 2018     Radha Lay DO  [address]  [address]  [facility] [address]    Patient: Lv Colon   YOB: 1935   Date of Visit: 2/9/2018       Dear Dr Rose Marie Wang: Thank you for referring Jones Warner to me for evaluation  Below are my notes for this consultation  If you have questions, please do not hesitate to call me  I look forward to following your patient along with you  Sincerely,        Gurvinder Block MD        CC: No Recipients  Gurvinder Block MD  2/9/2018 12:27 PM  Sign at close encounter  Hematology Outpatient Follow - Up Note  Lv Colon 81 y o  male MRN: @ Encounter: [id number]        Date:  2/9/2018        Assessment/ Plan:   1    Heavily pretreated lambda light chain multiple myeloma stage III with multiple compression fractures, osteoporosis status post kyphoplasty to L1, L2, L5 status post radiation therapy he had 4 cycles of Velcade, melphalan, prednisone in October 2010 with excellent response, Velcade maintenance until January 2013, relapse, treated with induction Velcade and Decadron relapse and lenalidomide was added, complicated with severe diarrhea on maintenance therapy  Most recent treatment Daratumumab 16 milligram/kilogram every week induction therapy initiated in November 2017, currently on Daratumumab 60 mg/kg every other week, lambda light chain is creeping up, consistent with progression of disease  We will add Velcade 1 3 milligram/meter squared subcu every other week  Re-evaluate in 1 month with CBC, CMP, lambda light chain          HPI:  #1 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, multiple compression fractures, status post kyphoplasty to L1, L2, L5, treated with radiation therapy to the left intra trochanteric area, received melphalan/Velcade/prednisone in October 2010 for 4 cycles with excellent response and normalization of lambda light chain, had been on Velcade maintenance till January 2013 when lambda light chain went up to 111,treated with Velcade 1 3 mg meter square weekly 3 weeks on one week off, Decadron 20 mg by mouth weekly, lambda light chain down to 30 then creeping up to 110, on Revlimid 15 mg by mouth every other day  Lambda light chain normal at 28 however he reported skin rash on the upper chest area we stopped the Revlimid for 2 month  Then reinitiated Revlimid 5 mg by mouth daily  #2  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 stage I, with progression by scan, status post resection of the right upper lobe with few foci in the same lobe consistent with stage IIIA done in January 2015 adenocarcinoma, well differentiated  Status post Alimta/carboplatin x4 cycles finished in May 2015 and radiation therapy  #3  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  #4  Admitted to [facility] 3/3/16 - 3/8/16  Dx with RSV  5  admission on November 2017 with rectal adenocarcinoma moderately differentiated status post resection stage I ( pT2, pNX, grade 2) tumor measuring 4 x 3 6 centimeters below the peritoneal reflection low probability of MS H   Previous Therapy:   Revlimid 5 mg by mouth 3 weeks on 1 week off, On hold since May 2017 because of diarrhea       Current Therapy: Daratumumab 60 milligram/kilogram weekly dose 1  By the end of     November 2017, dexamethasone 20 mg PO weekly      Maintenance Daratumumab 60 mg per kg every other week initiated in January 2018    Interval History:  Treated for pneumonia in January 2018      Previous Treatment:         Test Results:    Imaging: No results found      Labs:   Lab Results   Component Value Date    WBC 6 23 02/05/2018    HGB 13 1 02/05/2018    HCT 39 7 02/05/2018    MCV 87 02/05/2018     02/05/2018     Lab Results   Component Value Date     02/05/2018    K 4 3 02/05/2018     02/05/2018    CO2 29 02/05/2018    ANIONGAP 4 02/05/2018    BUN 19 02/05/2018    CREATININE 1 30 02/05/2018    GLUCOSE 97 01/05/2018    GLUF 93 02/05/2018    CALCIUM 8 9 02/05/2018    AST 14 02/05/2018    ALT 23 02/05/2018    ALKPHOS 82 02/05/2018    PROT 6 3 (L) 02/05/2018    BILITOT 0 97 02/05/2018    EGFR 51 02/05/2018       No results found for: IRON, TIBC, FERRITIN    No results found for: [id number]      ROS:   Review of Systems   Constitutional: Positive for fatigue  Negative for activity change, fever and unexpected weight change  Eyes: Negative  Respiratory: Positive for cough and shortness of breath  Negative for apnea, wheezing and stridor  Gastrointestinal: Positive for diarrhea (Resolved with Imodium 2 tablets in the morning)  Negative for nausea, rectal pain and vomiting  Endocrine: Negative  Genitourinary: Negative  Musculoskeletal: Negative  Skin: Negative  Allergic/Immunologic: Negative  Neurological: Negative  Negative for dizziness, seizures, light-headedness, numbness and headaches  Hematological: Negative  Psychiatric/Behavioral: Negative  All other systems reviewed and are negative  Current Medications: Reviewed  Allergies: Reviewed  PMH/FH/SH:  Reviewed      Physical Exam:    Body surface area is 1 95 meters squared  Wt Readings from Last 3 Encounters:   02/09/18 81 6 kg (180 lb)   01/30/18 81 3 kg (179 lb 3 7 oz)   01/17/18 80 1 kg (176 lb 9 4 oz)        Temp Readings from Last 3 Encounters:   02/09/18 (!) 97 1 °F (36 2 °C) (Tympanic)   01/30/18 (!) 96 6 °F (35 9 °C) (Tympanic)   01/17/18 98 °F (36 7 °C) (Tympanic)        BP Readings from Last 3 Encounters:   02/09/18 104/62   01/30/18 107/65   01/17/18 117/61         Pulse Readings from Last 3 Encounters:   02/09/18 97   01/30/18 93   01/17/18 100        Physical Exam   Constitutional: He is oriented to person, place, and time  He appears well-developed  HENT:   Head: Normocephalic  Eyes: Conjunctivae are normal  Pupils are equal, round, and reactive to light  Neck: Normal range of motion  Neck supple   No JVD present  No thyromegaly present  Cardiovascular: Normal rate and regular rhythm  Murmur heard  Pulmonary/Chest: Effort normal  No stridor  No respiratory distress  He has no wheezes  He has no rales  He exhibits no tenderness  Rhonchi bilaterally   Abdominal: Soft  Bowel sounds are normal  He exhibits no distension  There is no tenderness  Musculoskeletal: Normal range of motion  He exhibits edema (Plus one edema bilaterally)  He exhibits no deformity  Lymphadenopathy:     He has no cervical adenopathy  Neurological: He is alert and oriented to person, place, and time  No cranial nerve deficit  Skin: Skin is warm  No rash noted  No erythema  Psychiatric: He has a normal mood and affect  His behavior is normal            Goals and Barriers:  Current Goal: Minimize effects of disease  Barriers: None  Patient's Capacity to Self Care:  Patient is able to self care      Code Status: @[facility]@

## 2018-02-13 RX ORDER — ACETAMINOPHEN 325 MG/1
650 TABLET ORAL ONCE
Status: COMPLETED | OUTPATIENT
Start: 2018-02-14 | End: 2018-02-14

## 2018-02-13 RX ORDER — SODIUM CHLORIDE 9 MG/ML
20 INJECTION, SOLUTION INTRAVENOUS CONTINUOUS
Status: DISCONTINUED | OUTPATIENT
Start: 2018-02-14 | End: 2018-02-17 | Stop reason: HOSPADM

## 2018-02-14 ENCOUNTER — HOSPITAL ENCOUNTER (OUTPATIENT)
Dept: INFUSION CENTER | Facility: CLINIC | Age: 83
Discharge: HOME | End: 2018-02-14
Payer: MEDICARE

## 2018-02-14 VITALS
HEART RATE: 91 BPM | WEIGHT: 180.78 LBS | SYSTOLIC BLOOD PRESSURE: 108 MMHG | TEMPERATURE: 97.1 F | BODY MASS INDEX: 27.4 KG/M2 | RESPIRATION RATE: 18 BRPM | HEIGHT: 68 IN | DIASTOLIC BLOOD PRESSURE: 68 MMHG

## 2018-02-14 PROCEDURE — 96375 TX/PRO/DX INJ NEW DRUG ADDON: CPT

## 2018-02-14 PROCEDURE — 96401 CHEMO ANTI-NEOPL SQ/IM: CPT

## 2018-02-14 PROCEDURE — 96415 CHEMO IV INFUSION ADDL HR: CPT

## 2018-02-14 PROCEDURE — 96367 TX/PROPH/DG ADDL SEQ IV INF: CPT

## 2018-02-14 PROCEDURE — 96413 CHEMO IV INFUSION 1 HR: CPT

## 2018-02-14 RX ADMIN — BORTEZOMIB 2.5 MG: 3.5 INJECTION, POWDER, LYOPHILIZED, FOR SOLUTION INTRAVENOUS; SUBCUTANEOUS at 13:24

## 2018-02-14 RX ADMIN — Medication 300 UNITS: at 13:20

## 2018-02-14 RX ADMIN — DARATUMUMAB 1300 MG: 100 INJECTION, SOLUTION, CONCENTRATE INTRAVENOUS at 09:51

## 2018-02-14 RX ADMIN — SODIUM CHLORIDE 20 ML/HR: 0.9 INJECTION, SOLUTION INTRAVENOUS at 09:05

## 2018-02-14 RX ADMIN — DIPHENHYDRAMINE HYDROCHLORIDE 25 MG: 50 INJECTION, SOLUTION INTRAMUSCULAR; INTRAVENOUS at 09:05

## 2018-02-14 RX ADMIN — ACETAMINOPHEN 650 MG: 325 TABLET, FILM COATED ORAL at 09:00

## 2018-02-14 RX ADMIN — SODIUM CHLORIDE 100 MG: 0.9 INJECTION, SOLUTION INTRAVENOUS at 09:20

## 2018-02-14 NOTE — PROGRESS NOTES
Patient tolerated Darzalex infusion and Velcade injections well  Offers no complaints  Pt and wife are aware of all future appointments

## 2018-02-14 NOTE — PLAN OF CARE
Problem: Potential for Falls  Goal: Patient will remain free of falls  INTERVENTIONS:  - Assess patient frequently for physical needs  -  Identify cognitive and physical deficits and behaviors that affect risk of falls    -  [place] fall precautions as indicated by assessment   - Educate patient/family on patient safety including physical limitations  - Instruct patient to call for assistance with activity based on assessment  - Modify environment to reduce risk of injury  - Consider OT/PT consult to assist with strengthening/mobility   Outcome: Progressing

## 2018-02-27 RX ORDER — SODIUM CHLORIDE 9 MG/ML
20 INJECTION, SOLUTION INTRAVENOUS CONTINUOUS
Status: DISCONTINUED | OUTPATIENT
Start: 2018-02-28 | End: 2018-03-03 | Stop reason: HOSPADM

## 2018-02-27 RX ORDER — ACETAMINOPHEN 325 MG/1
650 TABLET ORAL ONCE
Status: COMPLETED | OUTPATIENT
Start: 2018-02-28 | End: 2018-02-28

## 2018-02-28 ENCOUNTER — HOSPITAL ENCOUNTER (OUTPATIENT)
Dept: INFUSION CENTER | Facility: CLINIC | Age: 83
Discharge: HOME | End: 2018-02-28
Payer: MEDICARE

## 2018-02-28 VITALS
HEART RATE: 90 BPM | TEMPERATURE: 96.5 F | RESPIRATION RATE: 18 BRPM | BODY MASS INDEX: 27.5 KG/M2 | WEIGHT: 181.44 LBS | DIASTOLIC BLOOD PRESSURE: 60 MMHG | HEIGHT: 68 IN | SYSTOLIC BLOOD PRESSURE: 113 MMHG

## 2018-02-28 PROCEDURE — 96413 CHEMO IV INFUSION 1 HR: CPT

## 2018-02-28 PROCEDURE — 96367 TX/PROPH/DG ADDL SEQ IV INF: CPT

## 2018-02-28 PROCEDURE — 96415 CHEMO IV INFUSION ADDL HR: CPT

## 2018-02-28 PROCEDURE — 96375 TX/PRO/DX INJ NEW DRUG ADDON: CPT

## 2018-02-28 PROCEDURE — 96401 CHEMO ANTI-NEOPL SQ/IM: CPT

## 2018-02-28 RX ORDER — DIPHENOXYLATE HYDROCHLORIDE AND ATROPINE SULFATE 2.5; .025 MG/1; MG/1
1 TABLET ORAL DAILY
COMMUNITY
End: 2018-07-25

## 2018-02-28 RX ADMIN — DIPHENHYDRAMINE HYDROCHLORIDE 25 MG: 50 INJECTION, SOLUTION INTRAMUSCULAR; INTRAVENOUS at 09:10

## 2018-02-28 RX ADMIN — BORTEZOMIB 2.5 MG: 3.5 INJECTION, POWDER, LYOPHILIZED, FOR SOLUTION INTRAVENOUS; SUBCUTANEOUS at 12:58

## 2018-02-28 RX ADMIN — SODIUM CHLORIDE 100 MG: 0.9 INJECTION, SOLUTION INTRAVENOUS at 09:25

## 2018-02-28 RX ADMIN — SODIUM CHLORIDE 20 ML/HR: 0.9 INJECTION, SOLUTION INTRAVENOUS at 09:10

## 2018-02-28 RX ADMIN — ACETAMINOPHEN 650 MG: 325 TABLET, FILM COATED ORAL at 08:57

## 2018-02-28 RX ADMIN — Medication 300 UNITS: at 13:35

## 2018-02-28 RX ADMIN — DARATUMUMAB 1300 MG: 100 INJECTION, SOLUTION, CONCENTRATE INTRAVENOUS at 10:00

## 2018-02-28 NOTE — PROGRESS NOTES
Patient tolerated Darzalex infusion well with no complications  Velcade given as ordered  Patient and wife are aware of all future appointments  Pt discharged in stable condition accompanied by his wife

## 2018-03-05 ENCOUNTER — APPOINTMENT (OUTPATIENT)
Dept: LAB | Facility: MEDICAL CENTER | Age: 83
End: 2018-03-05
Payer: MEDICARE

## 2018-03-05 ENCOUNTER — TRANSCRIBE ORDERS (OUTPATIENT)
Dept: ADMINISTRATIVE | Facility: HOSPITAL | Age: 83
End: 2018-03-05

## 2018-03-05 DIAGNOSIS — C90.00 MULTIPLE MYELOMA NOT HAVING ACHIEVED REMISSION (HCC): ICD-10-CM

## 2018-03-05 DIAGNOSIS — C20 MALIGNANT NEOPLASM OF RECTUM (HCC): ICD-10-CM

## 2018-03-05 DIAGNOSIS — C20 MALIGNANT NEOPLASM OF RECTUM (HCC): Primary | ICD-10-CM

## 2018-03-05 LAB
ALBUMIN SERPL BCP-MCNC: 3.3 G/DL (ref 3.5–5)
ALP SERPL-CCNC: 83 U/L (ref 46–116)
ALT SERPL W P-5'-P-CCNC: 24 U/L (ref 12–78)
ANION GAP SERPL CALCULATED.3IONS-SCNC: 6 MMOL/L (ref 4–13)
AST SERPL W P-5'-P-CCNC: 18 U/L (ref 5–45)
BASOPHILS # BLD AUTO: 0.02 THOUSANDS/ΜL (ref 0–0.1)
BASOPHILS NFR BLD AUTO: 0 % (ref 0–1)
BILIRUB SERPL-MCNC: 0.66 MG/DL (ref 0.2–1)
BUN SERPL-MCNC: 18 MG/DL (ref 5–25)
CALCIUM SERPL-MCNC: 8.9 MG/DL (ref 8.3–10.1)
CEA SERPL-MCNC: 3.2 NG/ML (ref 0–3)
CHLORIDE SERPL-SCNC: 105 MMOL/L (ref 100–108)
CO2 SERPL-SCNC: 28 MMOL/L (ref 21–32)
CREAT SERPL-MCNC: 1.08 MG/DL (ref 0.6–1.3)
EOSINOPHIL # BLD AUTO: 0.06 THOUSAND/ΜL (ref 0–0.61)
EOSINOPHIL NFR BLD AUTO: 1 % (ref 0–6)
ERYTHROCYTE [DISTWIDTH] IN BLOOD BY AUTOMATED COUNT: 16 % (ref 11.6–15.1)
GFR SERPL CREATININE-BSD FRML MDRD: 64 ML/MIN/1.73SQ M
GLUCOSE P FAST SERPL-MCNC: 92 MG/DL (ref 65–99)
HCT VFR BLD AUTO: 39.7 % (ref 36.5–49.3)
HGB BLD-MCNC: 13.1 G/DL (ref 12–17)
LYMPHOCYTES # BLD AUTO: 2.15 THOUSANDS/ΜL (ref 0.6–4.47)
LYMPHOCYTES NFR BLD AUTO: 35 % (ref 14–44)
MCH RBC QN AUTO: 28.7 PG (ref 26.8–34.3)
MCHC RBC AUTO-ENTMCNC: 33 G/DL (ref 31.4–37.4)
MCV RBC AUTO: 87 FL (ref 82–98)
MONOCYTES # BLD AUTO: 0.63 THOUSAND/ΜL (ref 0.17–1.22)
MONOCYTES NFR BLD AUTO: 10 % (ref 4–12)
NEUTROPHILS # BLD AUTO: 3.22 THOUSANDS/ΜL (ref 1.85–7.62)
NEUTS SEG NFR BLD AUTO: 54 % (ref 43–75)
NRBC BLD AUTO-RTO: 0 /100 WBCS
PLATELET # BLD AUTO: 182 THOUSANDS/UL (ref 149–390)
PMV BLD AUTO: 9.6 FL (ref 8.9–12.7)
POTASSIUM SERPL-SCNC: 4 MMOL/L (ref 3.5–5.3)
PROT SERPL-MCNC: 6.1 G/DL (ref 6.4–8.2)
RBC # BLD AUTO: 4.56 MILLION/UL (ref 3.88–5.62)
SODIUM SERPL-SCNC: 139 MMOL/L (ref 136–145)
WBC # BLD AUTO: 6.1 THOUSAND/UL (ref 4.31–10.16)

## 2018-03-05 PROCEDURE — 80053 COMPREHEN METABOLIC PANEL: CPT

## 2018-03-05 PROCEDURE — 82378 CARCINOEMBRYONIC ANTIGEN: CPT

## 2018-03-05 PROCEDURE — 36415 COLL VENOUS BLD VENIPUNCTURE: CPT

## 2018-03-05 PROCEDURE — 85025 COMPLETE CBC W/AUTO DIFF WBC: CPT

## 2018-03-05 PROCEDURE — 83883 ASSAY NEPHELOMETRY NOT SPEC: CPT

## 2018-03-06 LAB
KAPPA LC FREE SER-MCNC: 3.6 MG/L (ref 3.3–19.4)
KAPPA LC FREE/LAMBDA FREE SER: 0.01 {RATIO} (ref 0.26–1.65)
LAMBDA LC FREE SERPL-MCNC: 431.6 MG/L (ref 5.7–26.3)

## 2018-03-08 ENCOUNTER — OFFICE VISIT (OUTPATIENT)
Dept: HEMATOLOGY ONCOLOGY | Facility: CLINIC | Age: 83
End: 2018-03-08
Payer: MEDICARE

## 2018-03-08 VITALS
WEIGHT: 184 LBS | RESPIRATION RATE: 20 BRPM | DIASTOLIC BLOOD PRESSURE: 80 MMHG | BODY MASS INDEX: 27.89 KG/M2 | SYSTOLIC BLOOD PRESSURE: 120 MMHG | HEART RATE: 76 BPM | TEMPERATURE: 98.2 F

## 2018-03-08 DIAGNOSIS — C90.00 MULTIPLE MYELOMA NOT HAVING ACHIEVED REMISSION (HCC): Primary | ICD-10-CM

## 2018-03-08 PROCEDURE — 99214 OFFICE O/P EST MOD 30 MIN: CPT | Performed by: INTERNAL MEDICINE

## 2018-03-08 NOTE — PROGRESS NOTES
Hematology Outpatient Follow - Up Note  Perri Jones 81 y o  male MRN: @ Encounter: [id number]        Date:  3/8/2018        Assessment/ Plan:    1  Lambda light chain multiple myeloma, stage III heavily pretreated as mentioned in the history of present illness, currently on Daratumumab 16 milligram/kilogram every other week, Velcade 1 3 milligram/meter squared subcu every other week, proceed with cycle 2  And follow up in 1 month with CBC, CMP, lambda light chain, if he has progression of disease which I expected will change therapy to Daratumumab and Cytoxan at 400 mg flat dose every week 3 weeks on 1 week off  2  History of rectal adenocarcinoma, lung cancer, skin cancer, he is not candidate for lenalidomide or pomalidomide or Elotuzumab          HPI:   #1  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, multiple compression fractures, status post kyphoplasty to L1, L2, L5, treated with radiation therapy to the left intra trochanteric area, received melphalan/Velcade/prednisone in October 2010 for 4 cycles with excellent response and normalization of lambda light chain, had been on Velcade maintenance till January 2013 when lambda light chain went up to 111,treated with Velcade 1 3 mg meter square weekly 3 weeks on one week off, Decadron 20 mg by mouth weekly, lambda light chain down to 30 then creeping up to 110, on Revlimid 15 mg by mouth every other day  Lambda light chain normal at 28 however he reported skin rash on the upper chest area we stopped the Revlimid for 2 month  Then reinitiated Revlimid 5 mg by mouth daily  #2  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 stage I, with progression by scan, status post resection of the right upper lobe with few foci in the same lobe consistent with stage IIIA done in January 2015 adenocarcinoma, well differentiated  Status post Alimta/carboplatin x4 cycles finished in May 2015 and radiation therapy  #3  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  #4  Admitted to [facility] 3/3/16 - 3/8/16  Dx with RSV  5  admission on November 2017 with rectal adenocarcinoma moderately differentiated status post resection stage I ( pT2, pNX, grade 2) tumor measuring 4 x 3 6 centimeters below the peritoneal reflection low probability of MS H   Previous Therapy:   Revlimid 5 mg by mouth 3 weeks on 1 week off, On hold since May 2017 because of diarrhea       Current Therapy: Daratumumab 60 milligram/kilogram weekly dose 1  By the end of     November 2017, dexamethasone 20 mg PO weekly      Maintenance Daratumumab 16 mg per kg every other week initiated in January 2018  With progression, we added Velcade 1 3 milligram/meter squared every other week 2 Daratumumab in February 2018     Interval History:        Previous Treatment:         Test Results:    Imaging: No results found  Labs:   Lab Results   Component Value Date    WBC 6 10 03/05/2018    HGB 13 1 03/05/2018    HCT 39 7 03/05/2018    MCV 87 03/05/2018     03/05/2018     Lab Results   Component Value Date     03/05/2018    K 4 0 03/05/2018     03/05/2018    CO2 28 03/05/2018    ANIONGAP 6 03/05/2018    BUN 18 03/05/2018    CREATININE 1 08 03/05/2018    GLUCOSE 97 01/05/2018    GLUF 92 03/05/2018    CALCIUM 8 9 03/05/2018    AST 18 03/05/2018    ALT 24 03/05/2018    ALKPHOS 83 03/05/2018    PROT 6 1 (L) 03/05/2018    BILITOT 0 66 03/05/2018    EGFR 64 03/05/2018       No results found for: IRON, TIBC, FERRITIN    No results found for: [id number]      ROS:   Review of Systems   Respiratory: Positive for shortness of breath and wheezing  Gastrointestinal: Negative  Musculoskeletal: Negative  Skin:        itching of the anterior chest area   All other systems reviewed and are negative  Current Medications: Reviewed  Allergies: Reviewed  PMH/FH/SH:  Reviewed      Physical Exam:    Body surface area is 1 98 meters squared      Wt Readings from Last 3 Encounters: 03/08/18 83 5 kg (184 lb)   02/28/18 82 3 kg (181 lb 7 oz)   02/14/18 82 kg (180 lb 12 4 oz)        Temp Readings from Last 3 Encounters:   03/08/18 98 2 °F (36 8 °C)   02/28/18 (!) 96 5 °F (35 8 °C) (Tympanic)   02/14/18 (!) 97 1 °F (36 2 °C) (Tympanic)        BP Readings from Last 3 Encounters:   03/08/18 120/80   02/28/18 113/60   02/14/18 108/68         Pulse Readings from Last 3 Encounters:   03/08/18 76   02/28/18 90   02/14/18 91        Physical Exam   Constitutional: He appears well-developed  HENT:   Head: Normocephalic  Eyes: Pupils are equal, round, and reactive to light  Neck: Normal range of motion  No JVD present  Cardiovascular:   Murmur ( +2Systolic murmur) heard  Pulmonary/Chest: He has no wheezes  He has rales ( dry crackles bilaterally)  Abdominal: He exhibits no distension  There is no tenderness  Skin: No rash noted  No erythema  No pallor  Goals and Barriers:  Current Goal: Minimize effects of disease  Barriers: None  Patient's Capacity to Self Care:  Patient is able to self care      Code Status: [unfilled]

## 2018-03-12 ENCOUNTER — HOSPITAL ENCOUNTER (INPATIENT)
Facility: HOSPITAL | Age: 83
LOS: 4 days | Discharge: HOME | DRG: 871 | End: 2018-03-16
Attending: EMERGENCY MEDICINE | Admitting: INTERNAL MEDICINE
Payer: MEDICARE

## 2018-03-12 ENCOUNTER — APPOINTMENT (EMERGENCY)
Dept: CT IMAGING | Facility: HOSPITAL | Age: 83
DRG: 871 | End: 2018-03-12
Payer: MEDICARE

## 2018-03-12 DIAGNOSIS — R53.83 FATIGUE: Primary | ICD-10-CM

## 2018-03-12 DIAGNOSIS — A41.9 SEPSIS, DUE TO UNSPECIFIED ORGANISM: ICD-10-CM

## 2018-03-12 DIAGNOSIS — J44.9 CHRONIC OBSTRUCTIVE PULMONARY DISEASE, UNSPECIFIED COPD TYPE (HCC): Chronic | ICD-10-CM

## 2018-03-12 DIAGNOSIS — A41.9 SEPSIS (HCC): ICD-10-CM

## 2018-03-12 DIAGNOSIS — J18.9 HOSPITAL ACQUIRED PNA: ICD-10-CM

## 2018-03-12 DIAGNOSIS — R94.31 QT PROLONGATION: ICD-10-CM

## 2018-03-12 DIAGNOSIS — J18.9 HCAP (HEALTHCARE-ASSOCIATED PNEUMONIA): ICD-10-CM
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
## 2018-03-12 LAB
ALBUMIN SERPL BCP-MCNC: 3.3 G/DL (ref 3.5–5)
ALP SERPL-CCNC: 91 U/L (ref 46–116)
ALT SERPL W P-5'-P-CCNC: 32 U/L (ref 12–78)
ANION GAP SERPL CALCULATED.3IONS-SCNC: 6 MMOL/L (ref 4–13)
ANION GAP SERPL CALCULATED.3IONS-SCNC: 9 MMOL/L (ref 4–13)
APTT PPP: 35 SECONDS (ref 23–35)
AST SERPL W P-5'-P-CCNC: 36 U/L (ref 5–45)
ATRIAL RATE: 110 BPM
BACTERIA UR QL AUTO: ABNORMAL /HPF
BASOPHILS # BLD AUTO: 0.01 THOUSANDS/ΜL (ref 0–0.1)
BASOPHILS # BLD AUTO: 0.02 THOUSANDS/ΜL (ref 0–0.1)
BASOPHILS NFR BLD AUTO: 0 % (ref 0–1)
BASOPHILS NFR BLD AUTO: 0 % (ref 0–1)
BILIRUB DIRECT SERPL-MCNC: 0.27 MG/DL (ref 0–0.2)
BILIRUB SERPL-MCNC: 1.49 MG/DL (ref 0.2–1)
BILIRUB UR QL STRIP: NEGATIVE
BUN SERPL-MCNC: 14 MG/DL (ref 5–25)
BUN SERPL-MCNC: 18 MG/DL (ref 5–25)
CALCIUM SERPL-MCNC: 8.4 MG/DL (ref 8.3–10.1)
CALCIUM SERPL-MCNC: 9.8 MG/DL (ref 8.3–10.1)
CHLORIDE SERPL-SCNC: 102 MMOL/L (ref 100–108)
CHLORIDE SERPL-SCNC: 105 MMOL/L (ref 100–108)
CLARITY UR: CLEAR
CO2 SERPL-SCNC: 25 MMOL/L (ref 21–32)
CO2 SERPL-SCNC: 28 MMOL/L (ref 21–32)
COLOR UR: YELLOW
CREAT SERPL-MCNC: 1.06 MG/DL (ref 0.6–1.3)
CREAT SERPL-MCNC: 1.19 MG/DL (ref 0.6–1.3)
EOSINOPHIL # BLD AUTO: 0 THOUSAND/ΜL (ref 0–0.61)
EOSINOPHIL # BLD AUTO: 0.01 THOUSAND/ΜL (ref 0–0.61)
EOSINOPHIL NFR BLD AUTO: 0 % (ref 0–6)
EOSINOPHIL NFR BLD AUTO: 0 % (ref 0–6)
ERYTHROCYTE [DISTWIDTH] IN BLOOD BY AUTOMATED COUNT: 16.1 % (ref 11.6–15.1)
ERYTHROCYTE [DISTWIDTH] IN BLOOD BY AUTOMATED COUNT: 16.1 % (ref 11.6–15.1)
FLUAV AG SPEC QL: NORMAL
FLUBV AG SPEC QL: NORMAL
GFR SERPL CREATININE-BSD FRML MDRD: 57 ML/MIN/1.73SQ M
GFR SERPL CREATININE-BSD FRML MDRD: 65 ML/MIN/1.73SQ M
GLUCOSE SERPL-MCNC: 112 MG/DL (ref 65–140)
GLUCOSE SERPL-MCNC: 129 MG/DL (ref 65–140)
GLUCOSE UR STRIP-MCNC: NEGATIVE MG/DL
HCT VFR BLD AUTO: 37.1 % (ref 36.5–49.3)
HCT VFR BLD AUTO: 39.9 % (ref 36.5–49.3)
HGB BLD-MCNC: 12.4 G/DL (ref 12–17)
HGB BLD-MCNC: 13.8 G/DL (ref 12–17)
HGB UR QL STRIP.AUTO: ABNORMAL
INR PPP: 0.93 (ref 0.86–1.16)
KETONES UR STRIP-MCNC: NEGATIVE MG/DL
LACTATE SERPL-SCNC: 1.2 MMOL/L (ref 0.5–2)
LEUKOCYTE ESTERASE UR QL STRIP: NEGATIVE
LYMPHOCYTES # BLD AUTO: 1.25 THOUSANDS/ΜL (ref 0.6–4.47)
LYMPHOCYTES # BLD AUTO: 1.49 THOUSANDS/ΜL (ref 0.6–4.47)
LYMPHOCYTES NFR BLD AUTO: 11 % (ref 14–44)
LYMPHOCYTES NFR BLD AUTO: 12 % (ref 14–44)
MCH RBC QN AUTO: 29.6 PG (ref 26.8–34.3)
MCH RBC QN AUTO: 30.3 PG (ref 26.8–34.3)
MCHC RBC AUTO-ENTMCNC: 33.4 G/DL (ref 31.4–37.4)
MCHC RBC AUTO-ENTMCNC: 34.6 G/DL (ref 31.4–37.4)
MCV RBC AUTO: 88 FL (ref 82–98)
MCV RBC AUTO: 89 FL (ref 82–98)
MONOCYTES # BLD AUTO: 0.7 THOUSAND/ΜL (ref 0.17–1.22)
MONOCYTES # BLD AUTO: 0.8 THOUSAND/ΜL (ref 0.17–1.22)
MONOCYTES NFR BLD AUTO: 6 % (ref 4–12)
MONOCYTES NFR BLD AUTO: 6 % (ref 4–12)
NEUTROPHILS # BLD AUTO: 10.11 THOUSANDS/ΜL (ref 1.85–7.62)
NEUTROPHILS # BLD AUTO: 9 THOUSANDS/ΜL (ref 1.85–7.62)
NEUTS SEG NFR BLD AUTO: 82 % (ref 43–75)
NEUTS SEG NFR BLD AUTO: 83 % (ref 43–75)
NITRITE UR QL STRIP: NEGATIVE
NON-SQ EPI CELLS URNS QL MICRO: ABNORMAL /HPF
NRBC BLD AUTO-RTO: 0 /100 WBCS
NT-PROBNP SERPL-MCNC: 225 PG/ML
P AXIS: 48 DEGREES
PH UR STRIP.AUTO: 6 [PH] (ref 4.5–8)
PLATELET # BLD AUTO: 162 THOUSANDS/UL (ref 149–390)
PLATELET # BLD AUTO: 238 THOUSANDS/UL (ref 149–390)
PMV BLD AUTO: 9.4 FL (ref 8.9–12.7)
PMV BLD AUTO: 9.5 FL (ref 8.9–12.7)
POTASSIUM SERPL-SCNC: 4 MMOL/L (ref 3.5–5.3)
POTASSIUM SERPL-SCNC: 4.3 MMOL/L (ref 3.5–5.3)
PROT SERPL-MCNC: 6.9 G/DL (ref 6.4–8.2)
PROT UR STRIP-MCNC: ABNORMAL MG/DL
PROTHROMBIN TIME: 12.5 SECONDS (ref 12.1–14.4)
QRS AXIS: 61 DEGREES
QRSD INTERVAL: 116 MS
QT INTERVAL: 412 MS
QTC INTERVAL: 552 MS
RBC # BLD AUTO: 4.19 MILLION/UL (ref 3.88–5.62)
RBC # BLD AUTO: 4.55 MILLION/UL (ref 3.88–5.62)
RBC #/AREA URNS AUTO: ABNORMAL /HPF
RSV B RNA SPEC QL NAA+PROBE: NORMAL
SODIUM SERPL-SCNC: 136 MMOL/L (ref 136–145)
SODIUM SERPL-SCNC: 139 MMOL/L (ref 136–145)
SP GR UR STRIP.AUTO: 1.01 (ref 1–1.03)
T WAVE AXIS: 45 DEGREES
TROPONIN I SERPL-MCNC: <0.02 NG/ML
UROBILINOGEN UR QL STRIP.AUTO: 0.2 E.U./DL
VENTRICULAR RATE: 108 BPM
WBC # BLD AUTO: 10.96 THOUSAND/UL (ref 4.31–10.16)
WBC # BLD AUTO: 12.43 THOUSAND/UL (ref 4.31–10.16)
WBC #/AREA URNS AUTO: ABNORMAL /HPF

## 2018-03-12 PROCEDURE — 94760 N-INVAS EAR/PLS OXIMETRY 1: CPT

## 2018-03-12 PROCEDURE — 85025 COMPLETE CBC W/AUTO DIFF WBC: CPT | Performed by: INTERNAL MEDICINE

## 2018-03-12 PROCEDURE — 96375 TX/PRO/DX INJ NEW DRUG ADDON: CPT

## 2018-03-12 PROCEDURE — 85025 COMPLETE CBC W/AUTO DIFF WBC: CPT | Performed by: EMERGENCY MEDICINE

## 2018-03-12 PROCEDURE — 83880 ASSAY OF NATRIURETIC PEPTIDE: CPT | Performed by: EMERGENCY MEDICINE

## 2018-03-12 PROCEDURE — 80076 HEPATIC FUNCTION PANEL: CPT | Performed by: EMERGENCY MEDICINE

## 2018-03-12 PROCEDURE — 87798 DETECT AGENT NOS DNA AMP: CPT | Performed by: EMERGENCY MEDICINE

## 2018-03-12 PROCEDURE — 94668 MNPJ CHEST WALL SBSQ: CPT

## 2018-03-12 PROCEDURE — 83605 ASSAY OF LACTIC ACID: CPT | Performed by: EMERGENCY MEDICINE

## 2018-03-12 PROCEDURE — 93010 ELECTROCARDIOGRAM REPORT: CPT | Performed by: INTERNAL MEDICINE

## 2018-03-12 PROCEDURE — 85610 PROTHROMBIN TIME: CPT | Performed by: EMERGENCY MEDICINE

## 2018-03-12 PROCEDURE — 93005 ELECTROCARDIOGRAM TRACING: CPT

## 2018-03-12 PROCEDURE — 87449 NOS EACH ORGANISM AG IA: CPT | Performed by: INTERNAL MEDICINE

## 2018-03-12 PROCEDURE — 99222 1ST HOSP IP/OBS MODERATE 55: CPT | Performed by: INTERNAL MEDICINE

## 2018-03-12 PROCEDURE — 80048 BASIC METABOLIC PNL TOTAL CA: CPT | Performed by: EMERGENCY MEDICINE

## 2018-03-12 PROCEDURE — 96361 HYDRATE IV INFUSION ADD-ON: CPT

## 2018-03-12 PROCEDURE — 81001 URINALYSIS AUTO W/SCOPE: CPT

## 2018-03-12 PROCEDURE — 85730 THROMBOPLASTIN TIME PARTIAL: CPT | Performed by: EMERGENCY MEDICINE

## 2018-03-12 PROCEDURE — 94640 AIRWAY INHALATION TREATMENT: CPT

## 2018-03-12 PROCEDURE — 84484 ASSAY OF TROPONIN QUANT: CPT | Performed by: EMERGENCY MEDICINE

## 2018-03-12 PROCEDURE — 80048 BASIC METABOLIC PNL TOTAL CA: CPT | Performed by: INTERNAL MEDICINE

## 2018-03-12 PROCEDURE — 99223 1ST HOSP IP/OBS HIGH 75: CPT | Performed by: INTERNAL MEDICINE

## 2018-03-12 PROCEDURE — 87081 CULTURE SCREEN ONLY: CPT | Performed by: NURSE PRACTITIONER

## 2018-03-12 PROCEDURE — 81002 URINALYSIS NONAUTO W/O SCOPE: CPT | Performed by: EMERGENCY MEDICINE

## 2018-03-12 PROCEDURE — 96365 THER/PROPH/DIAG IV INF INIT: CPT

## 2018-03-12 PROCEDURE — 36415 COLL VENOUS BLD VENIPUNCTURE: CPT | Performed by: EMERGENCY MEDICINE

## 2018-03-12 PROCEDURE — 99285 EMERGENCY DEPT VISIT HI MDM: CPT

## 2018-03-12 PROCEDURE — 87040 BLOOD CULTURE FOR BACTERIA: CPT | Performed by: EMERGENCY MEDICINE

## 2018-03-12 PROCEDURE — 71275 CT ANGIOGRAPHY CHEST: CPT

## 2018-03-12 RX ORDER — IPRATROPIUM BROMIDE AND ALBUTEROL SULFATE 2.5; .5 MG/3ML; MG/3ML
3 SOLUTION RESPIRATORY (INHALATION) EVERY 6 HOURS PRN
Status: DISCONTINUED | OUTPATIENT
Start: 2018-03-12 | End: 2018-03-12

## 2018-03-12 RX ORDER — LEVALBUTEROL 1.25 MG/.5ML
1.25 SOLUTION, CONCENTRATE RESPIRATORY (INHALATION)
Status: DISCONTINUED | OUTPATIENT
Start: 2018-03-12 | End: 2018-03-15

## 2018-03-12 RX ORDER — IPRATROPIUM BROMIDE AND ALBUTEROL SULFATE 2.5; .5 MG/3ML; MG/3ML
3 SOLUTION RESPIRATORY (INHALATION) 3 TIMES DAILY
Status: DISCONTINUED | OUTPATIENT
Start: 2018-03-12 | End: 2018-03-12

## 2018-03-12 RX ORDER — VANCOMYCIN HYDROCHLORIDE 1 G/200ML
12.5 INJECTION, SOLUTION INTRAVENOUS EVERY 12 HOURS
Status: DISCONTINUED | OUTPATIENT
Start: 2018-03-12 | End: 2018-03-12

## 2018-03-12 RX ORDER — AZITHROMYCIN 250 MG/1
500 TABLET, FILM COATED ORAL DAILY
Status: DISCONTINUED | OUTPATIENT
Start: 2018-03-13 | End: 2018-03-13

## 2018-03-12 RX ORDER — ACETAMINOPHEN 325 MG/1
650 TABLET ORAL EVERY 6 HOURS PRN
Status: DISCONTINUED | OUTPATIENT
Start: 2018-03-12 | End: 2018-03-13

## 2018-03-12 RX ORDER — SODIUM CHLORIDE FOR INHALATION 0.9 %
3 VIAL, NEBULIZER (ML) INHALATION
Status: DISCONTINUED | OUTPATIENT
Start: 2018-03-12 | End: 2018-03-15

## 2018-03-12 RX ORDER — VANCOMYCIN HYDROCHLORIDE 1 G/200ML
12.5 INJECTION, SOLUTION INTRAVENOUS EVERY 12 HOURS
Status: DISCONTINUED | OUTPATIENT
Start: 2018-03-12 | End: 2018-03-14

## 2018-03-12 RX ORDER — ONDANSETRON 2 MG/ML
4 INJECTION INTRAMUSCULAR; INTRAVENOUS EVERY 8 HOURS PRN
Status: DISCONTINUED | OUTPATIENT
Start: 2018-03-12 | End: 2018-03-16

## 2018-03-12 RX ORDER — MELATONIN
5000 DAILY
Status: CANCELLED | OUTPATIENT
Start: 2018-03-12

## 2018-03-12 RX ORDER — FINASTERIDE 5 MG/1
5 TABLET, FILM COATED ORAL
Status: CANCELLED | OUTPATIENT
Start: 2018-03-12

## 2018-03-12 RX ORDER — SODIUM CHLORIDE 9 MG/ML
80 INJECTION, SOLUTION INTRAVENOUS CONTINUOUS
Status: DISCONTINUED | OUTPATIENT
Start: 2018-03-12 | End: 2018-03-14

## 2018-03-12 RX ORDER — GUAIFENESIN 600 MG
600 TABLET, EXTENDED RELEASE 12 HR ORAL EVERY 12 HOURS SCHEDULED
Status: DISCONTINUED | OUTPATIENT
Start: 2018-03-12 | End: 2018-03-16 | Stop reason: HOSPADM

## 2018-03-12 RX ADMIN — SODIUM CHLORIDE 125 ML/HR: 0.9 INJECTION, SOLUTION INTRAVENOUS at 12:00

## 2018-03-12 RX ADMIN — SODIUM CHLORIDE 1000 ML: 0.9 INJECTION, SOLUTION INTRAVENOUS at 09:37

## 2018-03-12 RX ADMIN — IODIXANOL 75 ML: 320 INJECTION, SOLUTION INTRAVASCULAR at 08:19

## 2018-03-12 RX ADMIN — SODIUM CHLORIDE 125 ML/HR: 0.9 INJECTION, SOLUTION INTRAVENOUS at 20:36

## 2018-03-12 RX ADMIN — ACETAMINOPHEN 650 MG: 325 TABLET, FILM COATED ORAL at 16:32

## 2018-03-12 RX ADMIN — SODIUM CHLORIDE 1000 ML: 0.9 INJECTION, SOLUTION INTRAVENOUS at 07:16

## 2018-03-12 RX ADMIN — AZITHROMYCIN 500 MG: 500 INJECTION, POWDER, LYOPHILIZED, FOR SOLUTION INTRAVENOUS at 10:30

## 2018-03-12 RX ADMIN — GUAIFENESIN 600 MG: 600 TABLET, EXTENDED RELEASE ORAL at 20:50

## 2018-03-12 RX ADMIN — ISODIUM CHLORIDE 3 ML: 0.03 SOLUTION RESPIRATORY (INHALATION) at 19:35

## 2018-03-12 RX ADMIN — VANCOMYCIN HYDROCHLORIDE 1250 MG: 1 INJECTION, POWDER, LYOPHILIZED, FOR SOLUTION INTRAVENOUS at 10:27

## 2018-03-12 RX ADMIN — CEFEPIME HYDROCHLORIDE 1000 MG: 1 INJECTION, SOLUTION INTRAVENOUS at 18:52

## 2018-03-12 RX ADMIN — TIOTROPIUM BROMIDE 18 MCG: 18 CAPSULE ORAL; RESPIRATORY (INHALATION) at 16:33

## 2018-03-12 RX ADMIN — CEFEPIME HYDROCHLORIDE 2000 MG: 2 INJECTION, POWDER, FOR SOLUTION INTRAVENOUS at 09:39

## 2018-03-12 RX ADMIN — LEVALBUTEROL HYDROCHLORIDE 1.25 MG: 1.25 SOLUTION, CONCENTRATE RESPIRATORY (INHALATION) at 19:35

## 2018-03-12 RX ADMIN — VANCOMYCIN HYDROCHLORIDE 1000 MG: 1 INJECTION, SOLUTION INTRAVENOUS at 22:44

## 2018-03-12 RX ADMIN — ENOXAPARIN SODIUM 40 MG: 40 INJECTION SUBCUTANEOUS at 16:32

## 2018-03-12 NOTE — ED NOTES
Returned from [facility]        Michael Amezquita RN  03/12/18 [id number]

## 2018-03-12 NOTE — PROGRESS NOTES
The IV azithromycin has / have been converted to Oral per [facility] IV-to-PO Auto-Conversion Protocol for Adults as approved by the Pharmacy and Therapeutics Committee (accessible here on MyNET)       The patient met ALL eligible criteria:  1) Age >= 25years old   2) Received at least one dose of the IV form   3) Receiving at least one other scheduled oral/enteral medication   4) Tolerating an oral/enteral diet     And did NOT have any exclusions:   1) Critical care patient   2) Active GI bleed (IF assessing H2RAs or PPIs)   3) Continuous tube feeding (IF assessing cipro, doxycycline, levofloxacin, minocycline, rifampin, or voriconazole)   4) Receiving PO vancomycin (IF assessing metronidazole)   5) Persistent nausea and/or vomiting   6) Ileus or gastrointestinal obstruction   7) Torey/nasogastric tube set for continuous suction   8) Specific order not to automatically convert to PO

## 2018-03-12 NOTE — H&P
History and Physical - [facility]    Patient Information: Lazarus Espino 81 y o  male MRN: [id number]  Unit/Bed#: ED 25 Encounter: [id number]  Admitting Physician: Faye Gao MD  PCP: Sunshine Ma DO  Date of Admission:  03/12/18    Assessment/Plan:    SEPSIS SECONDARY TO HCAP  Sepsis present on admission:  Elevated white count, heart rate 108/minute, respiratory 24/minute, source:  Left lower lobe pneumonia  Patient will be admitted to step-down unit in view of his presentation  Aggressive IV fluids, broad-spectrum IV antibiotics IV vancomycin, IV cefepime and IV azithromycin to cover possible MRSA, GNRs and atypicals  Blood culture x2, flu swab, MRSA screen, urine for urine Legionella antigen and strep pneumoniae antigen will be sent  Guarded prognosis  HCAP  Please refer to above  HISTORY OF MULTIPLE MYELOMA ON CHEMOTHERAPY  Patient receives chemotherapy every 2 weeks  HISTORY OF ADENO CARCINOMA OF LUNG  Currently in remission since 2015 no acute issues for now  COPD NOT IN EXACERBATION  Continue scheduled nebulizations and also support  BPH  Stable no active issues  Continue home medications  VTE Prophylaxis: Enoxaparin (Lovenox)      Code Status:  Full code      Anticipated Length of Stay:  Patient will be admitted on an Inpatient basis with an anticipated length of stay of  Greater than 2 midnights  Justification for Hospital Stay: SEPSIS SECONDARY TO HCAP FOR NEED FOR IV ANTIBIOTICS  Total Time for Visit, including Counseling / Coordination of Care: 45 minutes  Greater than 50% of this total time spent on direct patient counseling and coordination of care  Chief Complaint:   COUGH, GENERAL MALAISE, LETHARGY, FEVER WITH CHILLS SINCE 2 DAYS  History of Present Illness:    Lazarus Espino is a 81 y o  male who presents with WITH ABOVE COMPLAINTS  Patient is accompanied by his wife at bedside    Imani Mckinnon mentions that since Saturday he has been feeling very weak fatigued short of breath with cough with minimal to no sputum production  He also stated that he was having fevers, low-grade with subjective chills  He receives chemotherapy for multiple myeloma every Wednesday twice a month  His weakness progressed to a point that he was bed-bound yesterday  Patient states that he has been having dry cough with little sputum production  Evaluation in the ED revealed patient had elevated heart rate, elevated respiratory rate and left lower lobe pneumonia  Patient denies chest pain, PND, orthopnea, nausea, vomiting, diarrhea, constipation, blood in urine  No sputum, dysuria, new onset weakness, slurred speech, seizure-like activity, recent travel, dizziness, syncope, fall, trauma to the head  Review of Systems:  Comprehensive 10 point review of system conducted and negative except mentioned in HPI  Past Medical and Surgical History:     Past Medical History:   Diagnosis Date    Cancer ([facility] [address] )     lung and multiple myloma    Chemoprevention     Chronic pain disorder     3 fractured vertebrae    COPD (chronic obstructive pulmonary disease) (HCC)     Diarrhea     Emphysema lung (HCC)     Enlarged prostate     [place] (hard of hearing)     Kidney stone     Low blood pressure     Lung cancer (HCC)     Multiple myeloma (HCC)     Multiple myeloma (HCC)     Pneumonia     Pulmonary emphysema (HCC)     Rectal adenocarcinoma (HCC)     Shortness of breath     Sleep apnea     no cpap       Past Surgical History:   Procedure Laterality Date    CHOLECYSTECTOMY      COLONOSCOPY W/ ENDOSCOPIC US N/A 7/21/2017    Procedure: ANAL ENDOSCOPIC U/S;  Surgeon: Toro Celis MD;  Location: [facility]; Service: Colorectal    HERNIA REPAIR      KIDNEY STONE SURGERY      LUNG LOBECTOMY Right     FL COLONOSCOPY FLX DX W/COLLJ SPEC WHEN PFRMD N/A 7/10/2017    Procedure: COLONOSCOPY;  Surgeon: Amilcar Avila MD;  Location: [facility];   Service: Gastroenterology    FL RECTAL TUMOR EXCISION, TRANSANAL ENDOSCOPIC MICROSURGICAL, FULL THICK N/A 11/2/2017    Procedure: TRANSANAL ENDOSCOPIC MICROSURGERY TEM;  Surgeon: Mary Lou Warren MD;  Location: [facility];  Service: Colorectal       Meds/Allergies:    Prior to Admission medications    Medication Sig Start Date End Date Taking? Authorizing Provider   albuterol (PROVENTIL HFA,VENTOLIN HFA) 90 mcg/act inhaler Inhale 2 puffs every 4 (four) hours as needed for wheezing    Historical Provider, MD   cholecalciferol (VITAMIN D3) 1,000 units tablet Take 5,000 Units by mouth daily      Historical Provider, MD   cyanocobalamin (VITAMIN B-12) 1,000 mcg tablet Take by mouth daily    Historical Provider, MD   daratumumab (DARZALEX) IVPB Infuse into a venous catheter once a week    Historical Provider, MD   dexamethasone (DECADRON) 4 mg tablet Take 4 mg by mouth once a week Take 5 pill weekly    Historical Provider, MD   DHA-EPA-Coenzyme Q10-Vitamin E (CO Q-10 VITAMIN E FISH OIL) 39-82- CAPS Take 1 capsule by mouth daily    Historical Provider, MD   finasteride (PROSCAR) 5 mg tablet Take 5 mg by mouth daily at bedtime  Historical Provider, MD   ipratropium-albuterol (DUO-NEB) 0 5-2 5 mg/mL Take 3 mL by nebulization daily      Historical Provider, MD   Lactobacillus-Inulin ([address] [place] [address]) Take by mouth daily    Historical Provider, MD   multivitamin (THERAGRAN) TABS Take 1 tablet by mouth daily    Historical Provider, MD   omega-3-acid ethyl esters (LOVAZA) 1 g capsule Take 1 g by mouth daily      Historical Provider, MD   Silodosin (RAPAFLO PO) Take 8 mg by mouth daily at bedtime      Historical Provider, MD   Tiotropium Bromide Monohydrate (SPIRIVA HANDIHALER IN) Inhale 2 puffs daily at bedtime as needed 2  5MCG/ACT     Historical Provider, MD   guaiFENesin (MUCINEX) 600 mg 12 hr tablet Take 1,200 mg by mouth daily at bedtime as needed for cough  3/12/18  Historical Provider, MD     I have reviewed home medications with patient personally  Allergies: Allergies   Allergen Reactions    Atorvastatin Other (See Comments)     UNKNOWN    Sulfa Antibiotics Itching    Penicillin V Rash     AS CHILD       Social History:     Marital Status: /Civil Union   Occupation:retired  Substance Use History:   History   Alcohol Use No     Comment: rarely     History   Smoking Status    Former Smoker    Packs/day: 1 00    Years: 50 00    Types: Cigarettes    Quit date: 12/7/2010   Smokeless Tobacco    Never Used     Comment: quit in 1999     History   Drug Use No       Family History:    non-contributory    Physical Exam:     Vitals:   Blood Pressure: 96/52 (03/12/18 0951)  Pulse: 102 (03/12/18 0951)  Temperature: 99 7 °F (37 6 °C) (03/12/18 0951)  Temp Source: Oral (03/12/18 0951)  Respirations: 20 (03/12/18 0951)  Weight - Scale: 77 6 kg (171 lb 1 2 oz) (03/12/18 0644)  SpO2: 97 % (03/12/18 0951)    GENERAL: AAO x 3  HEENT: atraumatic, normocephalic  Oral mucosa moist, no icterus, pallor  PERRLA +  Neck supple, no JVD, no lymphadenopathy, no thryomegaly  CHEST: B/L breath sounds heard, occasional wheezing  CVS: S1, S2  No cyanosis/clubbing or edema  ABDOMEN: Soft/obese/NT/BS heard  NEUROLOGICAL: CN II -XI grossly intact  No focal motor or sensory deficits  No signs of meningeal irritation or cerebellar dysfunction  EXTREMITIES: No cyanosis/clubbing or edema  Additional Data:     Lab Results: I have personally reviewed pertinent reports          Results from last 7 days  Lab Units 03/12/18  0711   WBC Thousand/uL 12 43*   HEMOGLOBIN g/dL 13 8   HEMATOCRIT % 39 9   PLATELETS Thousands/uL 238   NEUTROS PCT % 82*   LYMPHS PCT % 12*   MONOS PCT % 6   EOS PCT % 0       Results from last 7 days  Lab Units 03/12/18  0711   SODIUM mmol/L 136   POTASSIUM mmol/L 4 0   CHLORIDE mmol/L 102   CO2 mmol/L 28   BUN mg/dL 18   CREATININE mg/dL 1 19   CALCIUM mg/dL 9 8   TOTAL PROTEIN g/dL 6 9   BILIRUBIN TOTAL mg/dL 1 49*   ALK PHOS U/L 91   ALT U/L 32   AST U/L 36   GLUCOSE RANDOM mg/dL 129       Results from last 7 days  Lab Units 03/12/18  0711   INR  0 93       Imaging: I have personally reviewed pertinent reports  Cta Ed Chest Pe Study    Result Date: 3/12/2018  Narrative: CTA - CHEST WITH IV CONTRAST - PULMONARY ANGIOGRAM INDICATION:   sob, tachycardia  Shortness of breath, tachycardia and weakness  History of lung carcinoma, colon carcinoma, skin carcinoma and multiple myeloma  COMPARISON: Prior CT pulmonary angiogram January 3, 2018 TECHNIQUE: CTA examination of the chest was performed using angiographic technique according to a protocol specifically tailored to evaluate for pulmonary embolism  Axial, sagittal, and coronal 2D reformatted images were created from the source data and  submitted for interpretation  In addition, coronal 3D MIP postprocessing was performed on the acquisition scanner  Radiation dose length product (DLP) for this visit:  429 mGy-cm   This examination, like all CT scans performed in the [facility], was performed utilizing techniques to minimize radiation dose exposure, including the use of iterative reconstruction and automated exposure control  IV Contrast:  75 mL of iodixanol (VISIPAQUE)  Because of borderline renal function, standard department hydration protocol was recommended to minimize risk of contrast induced nephropathy  FINDINGS: PULMONARY ARTERIAL TREE:  No pulmonary embolus is seen  LUNGS:  The lungs are well aerated  There are postoperative changes in the right upper lobe  Dense consolidation in the right upper lobe with intervening air bronchograms, consistent with radiation pneumonitis  Elevation of right hemidiaphragm  Groundglass infiltrates are present throughout the left upper lobe  More focal consolidation in the lingula with intervening air bronchograms  Groundglass infiltrates in the left lower lobe  PLEURA:  Unremarkable  HEART/AORTA:  The heart is enlarged  Coronary artery calcifications  No evidence of a pericardial effusion  Left internal jugular Waqltl-k-Lkad catheter with tip in superior vena cava  MEDIASTINUM AND GLEN:  Slight enlargement of mediastinal and left hilar lymph nodes  Largest lymph node in the left hilum now measures 2 0 x 1 0 cm (series 2, image 107)  Previously, this measured 1 3 x 0 8 cm  CHEST WALL AND LOWER NECK:   Unremarkable  VISUALIZED STRUCTURES IN THE UPPER ABDOMEN:  Unremarkable  OSSEOUS STRUCTURES:  Osseous structures diffusely demineralized  Impression: 1  No CT evidence of pulmonary embolism  2   Left-sided infiltrates with more focal consolidation in the lingula, most compatible with pneumonia  3   Slight enlargement of mediastinal and left hilar lymph nodes  The findings are likely reactive  Recommend follow-up CT scan of the chest in 3 months for further evaluation  A verbal report will be called by the reading room liaison following this dictation  Workstation performed: [id number]         Jeanie Avina MD  HOSPITALIST SERVICES  3/12/2018    Please note: This note was generated with help of fluency direct/[employer] modal voice recognition software  Any grammatical errors/spelling  Mistakes, please contact me for clarifications

## 2018-03-12 NOTE — ED PROVIDER NOTES
History  Chief Complaint   Patient presents with    Fever - 75 years or older     Fever, weak       History provided by:  Patient   used: No    Fever - 75 years or older   Max temp prior to arrival:  100 1  Temp source:  Oral  Severity:  Moderate  Onset quality:  Gradual  Duration:  2 days  Timing:  Constant  Progression:  Worsening  Chronicity:  New  Relieved by:  Nothing  Worsened by:  Nothing  Ineffective treatments:  Acetaminophen  Associated symptoms: no chest pain, no confusion, no congestion, no cough, no diarrhea, no dysuria, no headaches, no nausea, no rash, no sore throat and no vomiting        Prior to Admission Medications   Prescriptions Last Dose Informant Patient Reported? Taking? DHA-EPA-Coenzyme Q10-Vitamin E (CO Q-10 VITAMIN E FISH OIL) 66-29- CAPS   Yes No   Sig: Take 1 capsule by mouth daily   Lactobacillus-Inulin (CULTUREZelgorE DIGESTIVE HEALTH PO)   Yes No   Sig: Take by mouth daily   Silodosin (RAPAFLO PO)  Self Yes No   Sig: Take 8 mg by mouth daily at bedtime     Tiotropium Bromide Monohydrate (SPIRIVA HANDIHALER IN)   Yes No   Sig: Inhale 2 puffs daily at bedtime as needed 2  5MCG/ACT    albuterol (PROVENTIL HFA,VENTOLIN HFA) 90 mcg/act inhaler   Yes No   Sig: Inhale 2 puffs every 4 (four) hours as needed for wheezing   cholecalciferol (VITAMIN D3) 1,000 units tablet   Yes No   Sig: Take 5,000 Units by mouth daily     cyanocobalamin (VITAMIN B-12) 1,000 mcg tablet   Yes No   Sig: Take by mouth daily   daratumumab (DARZALEX) IVPB   Yes No   Sig: Infuse into a venous catheter once a week   dexamethasone (DECADRON) 4 mg tablet   Yes No   Sig: Take 4 mg by mouth once a week Take 5 pill weekly   finasteride (PROSCAR) 5 mg tablet   Yes No   Sig: Take 5 mg by mouth daily at bedtime     ipratropium-albuterol (DUO-NEB) 0 5-2 5 mg/mL   Yes No   Sig: Take 3 mL by nebulization daily     multivitamin (THERAGRAN) TABS   Yes No   Sig: Take 1 tablet by mouth daily   omega-3-acid ethyl esters (LOVAZA) 1 g capsule   Yes No   Sig: Take 1 g by mouth daily        Facility-Administered Medications: None       Past Medical History:   Diagnosis Date    Cancer ([address] )     lung and multiple myloma    Chemoprevention     Chronic pain disorder     3 fractured vertebrae    COPD (chronic obstructive pulmonary disease) (HCC)     Diarrhea     Emphysema lung (HCC)     Enlarged prostate     [place] (hard of hearing)     Kidney stone     Low blood pressure     Lung cancer (HCC)     Multiple myeloma (HCC)     Multiple myeloma (HCC)     Pneumonia     Pulmonary emphysema (HCC)     Rectal adenocarcinoma (HCC)     Shortness of breath     Sleep apnea     no cpap       Past Surgical History:   Procedure Laterality Date    CHOLECYSTECTOMY      COLONOSCOPY W/ ENDOSCOPIC US N/A 7/21/2017    Procedure: ANAL ENDOSCOPIC U/S;  Surgeon: Lilly Amador MD;  Location: [facility]; Service: Colorectal    HERNIA REPAIR      KIDNEY STONE SURGERY      LUNG LOBECTOMY Right     CO COLONOSCOPY FLX DX W/COLLJ SPEC WHEN PFRMD N/A 7/10/2017    Procedure: COLONOSCOPY;  Surgeon: Shyla Saleh MD;  Location: [facility]; Service: Gastroenterology    CO RECTAL TUMOR EXCISION, TRANSANAL ENDOSCOPIC MICROSURGICAL, FULL THICK N/A 11/2/2017    Procedure: TRANSANAL ENDOSCOPIC MICROSURGERY TEM;  Surgeon: Lilly Amador MD;  Location: [facility];  Service: Colorectal       Family History   Problem Relation Age of Onset    Arthritis Mother     Colon cancer Father     Heart attack Father     Diabetes type I Sister      I have reviewed and agree with the history as documented  Social History   Substance Use Topics    Smoking status: Former Smoker     Packs/day: 1 00     Years: 50 00     Types: Cigarettes     Quit date: 12/7/2010    Smokeless tobacco: Never Used      Comment: quit in 1999    Alcohol use No      Comment: rarely        Review of Systems   Constitutional: Positive for fatigue and fever   Negative for appetite change and diaphoresis  HENT: Negative for congestion, sore throat and trouble swallowing  Eyes: Negative for pain and visual disturbance  Respiratory: Positive for shortness of breath  Negative for cough and chest tightness  Cardiovascular: Negative for chest pain  Gastrointestinal: Negative for abdominal pain, diarrhea, nausea and vomiting  Endocrine: Negative for polyphagia and polyuria  Genitourinary: Negative for dysuria, flank pain, frequency, hematuria and urgency  Musculoskeletal: Positive for arthralgias  Negative for back pain, gait problem, neck pain and neck stiffness  Left shoulder pain   Skin: Negative for color change and rash  Allergic/Immunologic: Negative for immunocompromised state  Neurological: Negative for dizziness, speech difficulty, numbness and headaches  Hematological: Does not bruise/bleed easily  Psychiatric/Behavioral: Negative for confusion and decreased concentration  Physical Exam  ED Triage Vitals   Temperature Pulse Respirations Blood Pressure SpO2   03/12/18 0644 03/12/18 0644 03/12/18 0644 03/12/18 0644 03/12/18 0644   99 8 °F (37 7 °C) 97 22 110/74 95 %      Temp Source Heart Rate Source Patient Position - Orthostatic VS BP Location FiO2 (%)   03/12/18 0644 03/12/18 0644 -- 03/12/18 0644 --   Oral Monitor  Right arm       Pain Score       03/12/18 0715       No Pain           Orthostatic Vital Signs  Vitals:    03/12/18 0730 03/12/18 0904 03/12/18 0951 03/12/18 1059   BP: 110/59 92/53 96/52 105/55   Pulse: (!) 108 104 102 105       Physical Exam   Constitutional: He is oriented to person, place, and time  He appears well-developed and well-nourished  HENT:   Head: Normocephalic  Eyes: Conjunctivae and EOM are normal  Pupils are equal, round, and reactive to light  No scleral icterus  Neck: Normal range of motion  Neck supple  Cardiovascular: Normal rate and regular rhythm      Pulmonary/Chest:   Mildly decreased air movement bilaterally, mild tachypnea without conversational dyspnea on 2 L of supplemental oxygen  Crackles and rhonchi noted at the left base  No acute respiratory distress at rest on supplemental oxygen  Abdominal: Soft  Bowel sounds are normal  He exhibits no distension  There is no tenderness  There is no rebound and no guarding  Musculoskeletal: Normal range of motion  He exhibits no tenderness or deformity  Lymphadenopathy:     He has no cervical adenopathy  Neurological: He is alert and oriented to person, place, and time  Coordination normal    Skin: Skin is warm and dry  He is not diaphoretic  Psychiatric: He has a normal mood and affect  Vitals reviewed  ED Medications  Medications   vancomycin (VANCOCIN) 1,250 mg in sodium chloride 0 9 % 250 mL IVPB (1,250 mg Intravenous New Bag 3/12/18 1027)   azithromycin (ZITHROMAX) 500 mg in sodium chloride 0 9% 250mL IVPB 500 mg (not administered)   sodium chloride 0 9 % bolus 1,000 mL (0 mL Intravenous Stopped 3/12/18 0816)   iodixanol (VISIPAQUE) 320 MG/ML injection 100 mL (75 mL Intravenous Given 3/12/18 0819)   cefepime (MAXIPIME) 2 g/50 mL dextrose IVPB (0 mg Intravenous Stopped 3/12/18 1019)   sodium chloride 0 9 % bolus 1,000 mL (1,000 mL Intravenous New Bag 3/12/18 0978)       Diagnostic Studies  Results Reviewed     Procedure Component Value Units Date/Time    Influenza A/B and RSV by PCR (indicated for patients >2 mo of age) [[id number]] Collected:  03/12/18 1105    Lab Status:   In process Specimen:  Nasopharyngeal from Nasopharyngeal Swab Updated:  03/12/18 1105    Urine Microscopic [[id number]]  (Abnormal) Collected:  03/12/18 0855    Lab Status:  Final result Specimen:  Urine from Urine, Clean Catch Updated:  03/12/18 1040     RBC, UA 1-2 (A) /hpf      WBC, UA 2-4 (A) /hpf      Epithelial Cells Occasional /hpf      Bacteria, UA Occasional /hpf     POCT urinalysis dipstick [[id number]]  (Abnormal) Resulted:  03/12/18 0958    Lab Status:  Final result Specimen:  Urine Updated:  03/12/18 0958    ED Urine Macroscopic [[id number]]  (Abnormal) Collected:  03/12/18 0855    Lab Status:  Final result Specimen:  Urine Updated:  03/12/18 0954     Color, UA Yellow     Clarity, UA Clear     pH, UA 6 0     Leukocytes, UA Negative     Nitrite, UA Negative     Protein, UA 30 (1+) (A) mg/dl      Glucose, UA Negative mg/dl      Ketones, UA Negative mg/dl      Urobilinogen, UA 0 2 E U /dl      Bilirubin, UA Negative     Blood, UA Small (A)     Specific [place], UA 1 010    Narrative:       CLINITEK RESULT    B-type natriuretic peptide [[id number]]  (Normal) Collected:  03/12/18 0711    Lab Status:  Final result Specimen:  Blood from Arm, Left Updated:  03/12/18 0802     NT-proBNP 225 pg/mL     Blood culture #1 [[id number]] Collected:  03/12/18 0740    Lab Status: In process Specimen:  Blood from Arm, Right Updated:  03/12/18 0743    Blood culture #2 [[id number]] Collected:  03/12/18 0700    Lab Status: In process Specimen:  Blood from Arm, Left Updated:  03/12/18 7690    Basic metabolic panel [[id number]] Collected:  03/12/18 0711    Lab Status:  Final result Specimen:  Blood from Arm, Left Updated:  03/12/18 0740     Sodium 136 mmol/L      Potassium 4 0 mmol/L      Chloride 102 mmol/L      CO2 28 mmol/L      Anion Gap 6 mmol/L      BUN 18 mg/dL      Creatinine 1 19 mg/dL      Glucose 129 mg/dL      Calcium 9 8 mg/dL      eGFR 57 ml/min/1 73sq m     Narrative:         National Kidney Disease Education Program recommendations are as follows:  GFR calculation is accurate only with a steady state creatinine  Chronic Kidney disease less than 60 ml/min/1 73 sq  meters  Kidney failure less than 15 ml/min/1 73 sq  meters      Hepatic function panel [[id number]]  (Abnormal) Collected:  03/12/18 0711    Lab Status:  Final result Specimen:  Blood from Arm, Left Updated:  03/12/18 0740     Total Bilirubin 1 49 (H) mg/dL      Bilirubin, Direct 0 27 (H) mg/dL      Alkaline Phosphatase 91 U/L      AST 36 U/L      ALT 32 U/L      Total Protein 6 9 g/dL      Albumin 3 3 (L) g/dL     Troponin I [[id number]]  (Normal) Collected:  03/12/18 0711    Lab Status:  Final result Specimen:  Blood from Arm, Left Updated:  03/12/18 0739     Troponin I <0 02 ng/mL     Narrative:         [employer] Chemistry analyzer 99% cutoff is > 0 04 ng/mL in network labs    o cTnI 99% cutoff is useful only when applied to patients in the clinical setting of myocardial ischemia  o cTnI 99% cutoff should be interpreted in the context of clinical history, ECG findings and possibly cardiac imaging to establish correct diagnosis  o cTnI 99% cutoff may be suggestive but clearly not indicative of a coronary event without the clinical setting of myocardial ischemia  Lactic acid, plasma [[id number]]  (Normal) Collected:  03/12/18 0713    Lab Status:  Final result Specimen:  Blood from Arm, Left Updated:  03/12/18 0739     LACTIC ACID 1 2 mmol/L     Narrative:         Result may be elevated if tourniquet was used during collection  Jennifer Domingo [[id number]]  (Normal) Collected:  03/12/18 0711    Lab Status:  Final result Specimen:  Blood from Arm, Left Updated:  03/12/18 0732     Protime 12 5 seconds      INR 0 93    APTT [[id number]]  (Normal) Collected:  03/12/18 0711    Lab Status:  Final result Specimen:  Blood from Arm, Left Updated:  03/12/18 0732     PTT 35 seconds     Narrative:          Therapeutic Heparin Range = 60-90 seconds    CBC and differential [[id number]]  (Abnormal) Collected:  03/12/18 0711    Lab Status:  Final result Specimen:  Blood from Arm, Left Updated:  03/12/18 0722     WBC 12 43 (H) Thousand/uL      RBC 4 55 Million/uL      Hemoglobin 13 8 g/dL      Hematocrit 39 9 %      MCV 88 fL      MCH 30 3 pg      MCHC 34 6 g/dL      RDW 16 1 (H) %      MPV 9 4 fL      Platelets 111 Thousands/uL      Neutrophils Relative 82 (H) %      Lymphocytes Relative 12 (L) %      Monocytes Relative 6 %      Eosinophils Relative 0 %      Basophils Relative 0 %      Neutrophils Absolute 10 11 (H) Thousands/µL      Lymphocytes Absolute 1 49 Thousands/µL      Monocytes Absolute 0 80 Thousand/µL      Eosinophils Absolute 0 01 Thousand/µL      Basophils Absolute 0 02 Thousands/µL                  CTA ED chest PE study   Final Result by José Antonio Mayorga DO (03/12 9386)   1  No CT evidence of pulmonary embolism  2   Left-sided infiltrates with more focal consolidation in the lingula, most compatible with pneumonia  3   Slight enlargement of mediastinal and left hilar lymph nodes  The findings are likely reactive  Recommend follow-up CT scan of the chest in 3 months for further evaluation  A verbal report will be called by the reading room liaison following this dictation           Workstation performed: [id number]                    Procedures  ECG 12 Lead Documentation  Date/Time: 3/12/2018 6:55 AM  Performed by: Cody Carey  Authorized by: Cody Carey     ECG reviewed by me, the ED Provider: yes    Patient location:  ED  Previous ECG:     Previous ECG:  Compared to current    Comparison ECG info:  1/3/18    Comparison to cardiac monitor: Yes    Interpretation:     Interpretation: abnormal    Rate:     ECG rate assessment: tachycardic    Rhythm:     Rhythm comment:  Sinus tach w/ 1st degree AV block  Ectopy:     Ectopy: none    QRS:     QRS axis:  Normal    QRS intervals:  Normal  Conduction:     Conduction: abnormal      Abnormal conduction: complete RBBB    ST segments:     ST segments:  Normal  T waves:     T waves: normal      CriticalCare Time  Performed by: Cody Carey  Authorized by: Cody Carey     Critical care provider statement:     Critical care time (minutes):  52    Critical care time was exclusive of:  Separately billable procedures and treating other patients and teaching time    Critical care was necessary to treat or prevent imminent or life-threatening deterioration of the following conditions:  Sepsis    Critical care was time spent personally by me on the following activities:  Obtaining history from patient or surrogate, development of treatment plan with patient or surrogate, discussions with consultants, evaluation of patient's response to treatment, review of old charts, re-evaluation of patient's condition, ordering and review of radiographic studies, ordering and review of laboratory studies, ordering and performing treatments and interventions and blood draw for specimens    I assumed direction of critical care for this patient from another provider in my specialty: no             Phone Contacts  ED Phone Contact    ED Course  ED Course                          Initial Sepsis Screening     [address] Name 03/12/18 0729                Is the patient's history suggestive of a new or worsening infection? (!)  Yes (Proceed)  -DS        Suspected source of infection pneumonia  -DS        Are two or more of the following signs & symptoms of infection both present and new to the patient? (!)  Yes (Proceed)  -DS        Indicate SIRS criteria Tachycardia > 90 bpm;Tachypnea > 20 resp per min;Leukocytosis (WBC > 04847 IJL)  -DS        If the answer is yes to both questions, suspicion of sepsis is present          If severe sepsis is present AND tissue hypoperfusion perists in the hour after fluid resuscitation or lactate > 4, the patient meets criteria for SEPTIC SHOCK          Are any of the following organ dysfunction criteria present within 6 hours of suspected infection and SIRS criteria that are NOT considered to be chronic conditions?         Organ dysfunction          Date of presentation of severe sepsis          Time of presentation of severe sepsis          Tissue hypoperfusion persists in the hour after crystalloid fluid administration, evidenced, by either:          Was hypotension present within one hour of the conclusion of crystalloid fluid administration?           Date of presentation of septic shock   Time of presentation of septic shock            User Key  (r) = Recorded By, (t) = Taken By, (c) = Cosigned By    [address] Name Provider Type    DS John Barr MD Physician                  MDM  Number of Diagnoses or Management Options  Fatigue: new and requires workup  Hospital acquired PNA: new and requires workup  Sepsis [facility]): new and requires workup  Diagnosis management comments: 73-year-old male with distant history of lung cancer with right upper lobe resection status post chemo, rectal cancer status post resection, COPD, multiple myeloma currently on chemotherapy presents for evaluation of two day history of worsening fatigue, subjective fevers at home  Patient is known to me from similar presentation in January of this year at which time he was diagnosed with pneumonia and had a multi day hospital stay  Since his discharge in January the patient has been doing relatively well  He did see his oncologist, Dr Megan Plasencia on 3/8/18 and seemed to be doing well  It was noted that his lambda light chain had increased  He is currently on Velcade and Daramtumumab  Patient's wife notes that he has been sleeping much more and has had low-grade fevers to around 100° F  He has had a wet cough  He denies chest pain but notes that he has had some pain in his left shoulder  Does note shortness of breath but states that it is relatively baseline for him  73-year-old male presented for weakness and fevers  Noted to have tachycardia, tachypnea, and elevated white blood cell count  Fortunately lactic acid was within normal limits  Given broad-spectrum antibiotics and IV fluids for pneumonia noted on CT of the chest   CTA negative for pulmonary embolism, flu swab was sent  Patient did have some mildly soft blood pressures and so will be placed in the step-down unit  Discussed with [facility]  Sepsis was present on admission and sepsis protocol was completed  No severe sepsis    Patient appropriate for inpatient admission  Amount and/or Complexity of Data Reviewed  Clinical lab tests: reviewed and ordered  Tests in the radiology section of CPT®: reviewed and ordered  Review and summarize past medical records: yes  Independent visualization of images, tracings, or specimens: yes      CritCare Time    Disposition  Final diagnoses:   Fatigue   Hospital acquired PNA   Sepsis ([facility] [address] )     Time reflects when diagnosis was documented in both MDM as applicable and the Disposition within this note     Time User Action Codes Description Comment    3/12/2018  7:25 AM Lindalee Neri Add [R53 83] Fatigue     3/12/2018  9:43 AM Lindalee Neri Add [J18 9] Hospital acquired PNA     3/12/2018  9:43 AM Lindalee Neri Remove [J18 9] Hospital acquired PNA     3/12/2018  9:46 AM Lindalee Neri Add [J18 9] Hospital acquired PNA     3/12/2018  9:46 AM José Burgos Add [A41 9] Sepsis [facility])       ED Disposition     ED Disposition Condition Comment    Admit  Case was discussed with REJI and the patient's admission status was agreed to be Admission Status: inpatient status to the service of Dr Skip Rascon  Follow-up Information    None       Patient's Medications   Discharge Prescriptions    No medications on file     No discharge procedures on file      ED Provider  Electronically Signed by           Marrie Habermann, MD  03/12/18 9261

## 2018-03-12 NOTE — ED NOTES
Pt placed on 2L/min oxygen NC due low oxygen saturation (91%-92%)        Alaina Bermeo RN  03/12/18 9754

## 2018-03-12 NOTE — CONSULTS
Consultation - Pulmonary Medicine   Dimitrios Gillis 81 y o  male MRN: [id number]  Unit/Bed#: E2 -01 Encounter: [id number]      Assessment/Plan:    Acute respiratory insufficiency   Continue to monitor oxygen saturation and titrate oxygen therapy to maintain a pulse ox of greater than or equal to 90%   Continue pulmonary toilet:  Incentive spirometry, flutter valve, out of bed as tolerated with cough and deep breathe      Sepsis secondary to HCAP-left lower lobe    Continue treatment with Zithromax, cefepime and vancomycin, de-escalation pending culture results   FLU/RSV pending   Legionella/strep culture pending   MRSA Swab   Add Mucinex b i d  Continue to monitor temperatures and WBC   IV hydration      COPD without acute exacerbation   Restart Spiriva   Xopenex q 6 hours   Continue to observe off steroids at this time    History of lung cancer with abnormal chest Ct-remission 2015   Persistent opacity right lobe with air bronchograms, likely inflammatory pneumonitis   Repeat chest Ct 3 months    Multiple myeloma-last chemotherapy treatment 2/28/2018   Follows outpatient with Dr Adonay Lara      History of Present Illness   Physician Requesting Consult: Yennifer Caceres MD  Reason for Consult / Principal Problem: Sepsis  Hx and PE limited by: none  Chief Complaint: "I was short of breath and very fatigued"   HPI: Dilip Suh is a 81 y o  male who presented to [facility] [address] with complaints of increasing shortness of breath, fatigue, cough productive of clear sputum chills  He has a past medical history significant for:  Sleep apnea, chronic diarrhea , chronic rhinitis, BPH, hypotension, Chronic obstructive pulmonary disease, lung adenocarcinoma-remission 2015,  rectal adenocarcinoma with resection 11/2017, and multiple myeloma-with current treatment last treatment on 02/28/2018, follows with Dr Cristina Ferraro  Per his wife at the bedside Rico King started to develop increasing fatigue on Friday prior to admission  Yesterday his symptoms then began to progress in severity with fevers, chills, increasing shortness breath, arthralgias, and cough with small amounts of clear sputum  Lili Enriquez also reports intermittent nausea  He denies:  Hemoptysis, dizziness, headache, vomiting, or bronchospasm  He denies any difficulty with swallowing liquids or solid food or symptoms of GERD  He denies home O2 at baseline  At the time of admission he was febrile with temp of 100 7, tachycardic and mildly hypotensive  Chest CT in the ED identified left lower lobe pneumonia  Treatment with Vancomycin, cefepime and Zithromax started-pending cultures  His wife reports a decrease in oxygen saturation at home, unable to find documented O2 saturation below 91% since admission  From pulmonary standpoint he follows with Dr Kailyn Sinha in the office  He has a 50 pack year smoking history with quit date approximately 15 years prior  He maintains on [place] daily and DUO-NEB PRN  Consults    Review of Systems   Constitutional: Positive for activity change, appetite change, chills, fatigue and fever  Negative for diaphoresis  HENT: Positive for postnasal drip and rhinorrhea  Negative for sinus pain, sinus pressure, sore throat and trouble swallowing  Eyes: Negative  Respiratory: Positive for cough and shortness of breath  Negative for chest tightness and wheezing  Cardiovascular: Positive for leg swelling (left ankle)  Negative for chest pain  Gastrointestinal: Positive for diarrhea and nausea  Negative for constipation and vomiting  Genitourinary: Negative  Musculoskeletal: Positive for arthralgias  Skin: Negative  Allergic/Immunologic: Negative for environmental allergies  Psychiatric/Behavioral: Negative for agitation and hallucinations  All other systems reviewed and are negative        Historical Information   Past Medical History:   Diagnosis Date    Cancer ([facility] [address] )     lung and multiple myloma    Chemoprevention     Chronic pain disorder     3 fractured vertebrae    COPD (chronic obstructive pulmonary disease) (HCC)     Diarrhea     Emphysema lung (HCC)     Enlarged prostate     [place] (hard of hearing)     Kidney stone     Low blood pressure     Lung cancer (HCC)     Multiple myeloma (HCC)     Multiple myeloma (HCC)     Pneumonia     Pulmonary emphysema (HCC)     Rectal adenocarcinoma (HCC)     Shortness of breath     Sleep apnea     no cpap     Past Surgical History:   Procedure Laterality Date    CHOLECYSTECTOMY      COLONOSCOPY W/ ENDOSCOPIC US N/A 7/21/2017    Procedure: ANAL ENDOSCOPIC U/S;  Surgeon: Viridiana Tamez MD;  [facility]; Service: Colorectal    HERNIA REPAIR      KIDNEY STONE SURGERY      LUNG LOBECTOMY Right     WA COLONOSCOPY FLX DX W/COLLJ SPEC WHEN PFRMD N/A 7/10/2017    Procedure: COLONOSCOPY;  Surgeon: Clara Cade MD;  [facility];   Service: Gastroenterology    WA RECTAL TUMOR EXCISION, TRANSANAL ENDOSCOPIC MICROSURGICAL, FULL THICK N/A 11/2/2017    Procedure: TRANSANAL ENDOSCOPIC MICROSURGERY TEM;  Surgeon: Viridiana Tamez MD;  [facility];  Service: Colorectal     Social History   History   Alcohol Use No     Comment: rarely     History   Drug Use No     History   Smoking Status    Former Smoker    Packs/day: 1 00    Years: 50 00    Types: Cigarettes    Quit date: 12/7/2010   Smokeless Tobacco    Never Used     Comment: quit in 1999     Occupational History:  Retired employee at [employer] was a     Family History: non-contributory    Meds/Allergies   all current active meds have been reviewed, pertinent pulmonary meds have been reviewed, current meds:   Current Facility-Administered Medications   Medication Dose Route Frequency    acetaminophen (TYLENOL) tablet 650 mg  650 mg Oral Q6H PRN    azithromycin (ZITHROMAX) 500 mg in sodium chloride 0 9% 250mL IVPB 500 mg  500 mg Intravenous Once    [START ON 3/13/2018] azithromycin (ZITHROMAX) 500 mg in sodium chloride 0 9% 250mL IVPB 500 mg  500 mg Intravenous Q24H    cefepime (MAXIPIME) IVPB (premix) 1,000 mg  1,000 mg Intravenous Q8H    enoxaparin (LOVENOX) subcutaneous injection 40 mg  40 mg Subcutaneous Daily    ipratropium-albuterol (DUO-NEB) 0 5-2 5 mg/3 mL inhalation solution 3 mL  3 mL Nebulization Q6H PRN    ondansetron (ZOFRAN) injection 4 mg  4 mg Intravenous Q8H PRN    sodium chloride 0 9 % infusion  125 mL/hr Intravenous Continuous    [START ON 3/13/2018] vancomycin (VANCOCIN) IVPB (premix) 1,000 mg  12 5 mg/kg Intravenous Q12H    and PTA meds:   Prior to Admission Medications   Prescriptions Last Dose Informant Patient Reported? Taking? DHA-EPA-Coenzyme Q10-Vitamin E (CO Q-10 VITAMIN E FISH OIL) 64-58- CAPS   Yes No   Sig: Take 1 capsule by mouth daily   Lactobacillus-Inulin ([facility] PO)   Yes No   Sig: Take by mouth daily   Silodosin (RAPAFLO PO)  Self Yes No   Sig: Take 8 mg by mouth daily at bedtime     Tiotropium Bromide Monohydrate (SPIRIVA HANDIHALER IN)   Yes No   Sig: Inhale 2 puffs daily at bedtime as needed 2  5MCG/ACT    albuterol (PROVENTIL HFA,VENTOLIN HFA) 90 mcg/act inhaler   Yes No   Sig: Inhale 2 puffs every 4 (four) hours as needed for wheezing   cholecalciferol (VITAMIN D3) 1,000 units tablet   Yes No   Sig: Take 5,000 Units by mouth daily     cyanocobalamin (VITAMIN B-12) 1,000 mcg tablet   Yes No   Sig: Take by mouth daily   daratumumab (DARZALEX) IVPB   Yes No   Sig: Infuse into a venous catheter once a week   dexamethasone (DECADRON) 4 mg tablet   Yes No   Sig: Take 4 mg by mouth once a week Take 5 pill weekly   finasteride (PROSCAR) 5 mg tablet   Yes No   Sig: Take 5 mg by mouth daily at bedtime     ipratropium-albuterol (DUO-NEB) 0 5-2 5 mg/mL   Yes No   Sig: Take 3 mL by nebulization daily     multivitamin (THERAGRAN) TABS   Yes No   Sig: Take 1 tablet by mouth daily   omega-3-acid ethyl esters (LOVAZA) 1 g capsule   Yes No   Sig: Take 1 g by mouth daily        Facility-Administered Medications: None       Allergies   Allergen Reactions    Atorvastatin Other (See Comments)     UNKNOWN    Sulfa Antibiotics Itching    Penicillin V Rash     AS CHILD       Objective   Vitals: Blood pressure 120/68, pulse 102, temperature (!) 100 7 °F (38 2 °C), temperature source Tympanic, resp  rate 18, weight 88 4 kg (194 lb 14 2 oz), SpO2 98 %  2LNC,Body mass index is 29 54 kg/m²  Intake/Output Summary (Last 24 hours) at 03/12/18 1441  Last data filed at 03/12/18 1020   Gross per 24 hour   Intake             1050 ml   Output              100 ml   Net              950 ml     Invasive Devices     Peripheral Intravenous Line            Peripheral IV 03/12/18 Left Antecubital less than 1 day                Physical Exam   Constitutional: He is oriented to person, place, and time  He appears well-developed and well-nourished  Ill-appearance     HENT:   Head: Normocephalic and atraumatic  Eyes: Conjunctivae and EOM are normal  Pupils are equal, round, and reactive to light  Left eye exhibits no discharge  Neck: Normal range of motion  Neck supple  No JVD present  Cardiovascular: Regular rhythm and normal heart sounds  Exam reveals no gallop and no friction rub  No murmur heard  Pulmonary/Chest: Effort normal  No respiratory distress  He has wheezes  He has rhonchi  He has no rales  He exhibits no tenderness  Abdominal: Soft  Bowel sounds are normal    Musculoskeletal: He exhibits edema (left ankle)  Lymphadenopathy:     He has no cervical adenopathy  Neurological: He is alert and oriented to person, place, and time  Skin: Skin is warm and dry  Psychiatric: He has a normal mood and affect  His behavior is normal  Judgment and thought content normal    Vitals reviewed  Lab Results:   I have personally reviewed pertinent lab results  ,CBC:   Lab Results   Component Value Date    WBC 10 96 (H) 03/12/2018    HGB 12 4 03/12/2018 HCT 37 1 03/12/2018    MCV 89 03/12/2018     03/12/2018    MCH 29 6 03/12/2018    MCHC 33 4 03/12/2018    RDW 16 1 (H) 03/12/2018    MPV 9 5 03/12/2018    NRBC 0 03/12/2018   , CMP:   Lab Results   Component Value Date     03/12/2018    K 4 3 03/12/2018     03/12/2018    CO2 25 03/12/2018    ANIONGAP 9 03/12/2018    BUN 14 03/12/2018    CREATININE 1 06 03/12/2018    GLUCOSE 112 03/12/2018    CALCIUM 8 4 03/12/2018    AST 36 03/12/2018    ALT 32 03/12/2018    ALKPHOS 91 03/12/2018    PROT 6 9 03/12/2018    BILITOT 1 49 (H) 03/12/2018    EGFR 65 03/12/2018   , PT/INR:   Lab Results   Component Value Date    INR 0 93 03/12/2018   , Troponin:   Lab Results   Component Value Date    TROPONINI <0 02 03/12/2018       Imaging Studies: I have personally reviewed pertinent reports  and I have personally reviewed pertinent films in PACS  03/12/2018 CT 8 chest PE study-chronic postoperative changes in the right upper lobe  With a dense consolidation in the right upper lobe secondary to radiation pneumonitis  Infiltrates in the left upper lobe with focal consolidation in the lingula, with ground-glass infiltrates in the left lower lobe    EKG, Pathology, and Other Studies: I have personally reviewed pertinent reports  and I have personally reviewed pertinent films in PACS  EKG 03/12/2018 that-tachycardia with first-degree AV block, right bundle-branch block  Pulmonary Results (PFTs, PSG): I have personally reviewed pertinent reports  and I have personally reviewed pertinent films in PACS  none  VTE Prophylaxis: Sequential compression device (Venodyne)  and Enoxaparin (Lovenox)    Code Status: Level 1 - Full Code        Portions of the record may have been created with voice recognition software  Occasional wrong word or "sound a like" substitutions may have occurred due to the inherent limitations of voice recognition software    Read the chart carefully and recognize, using context, where substitutions have occurred

## 2018-03-12 NOTE — PLAN OF CARE
INFECTION - ADULT     Absence or prevention of progression during hospitalization Progressing        Potential for Falls     Patient will remain free of falls Progressing        Prexisting or High Potential for Compromised Skin Integrity     Skin integrity is maintained or improved Progressing

## 2018-03-13 ENCOUNTER — TELEPHONE (OUTPATIENT)
Dept: HEMATOLOGY ONCOLOGY | Facility: CLINIC | Age: 83
End: 2018-03-13

## 2018-03-13 LAB
ANION GAP SERPL CALCULATED.3IONS-SCNC: 7 MMOL/L (ref 4–13)
BASOPHILS # BLD AUTO: 0.02 THOUSANDS/ΜL (ref 0–0.1)
BASOPHILS NFR BLD AUTO: 0 % (ref 0–1)
BUN SERPL-MCNC: 13 MG/DL (ref 5–25)
CALCIUM SERPL-MCNC: 8.6 MG/DL (ref 8.3–10.1)
CHLORIDE SERPL-SCNC: 106 MMOL/L (ref 100–108)
CO2 SERPL-SCNC: 25 MMOL/L (ref 21–32)
CREAT SERPL-MCNC: 1.21 MG/DL (ref 0.6–1.3)
EOSINOPHIL # BLD AUTO: 0.02 THOUSAND/ΜL (ref 0–0.61)
EOSINOPHIL NFR BLD AUTO: 0 % (ref 0–6)
ERYTHROCYTE [DISTWIDTH] IN BLOOD BY AUTOMATED COUNT: 16.1 % (ref 11.6–15.1)
GFR SERPL CREATININE-BSD FRML MDRD: 56 ML/MIN/1.73SQ M
GLUCOSE SERPL-MCNC: 95 MG/DL (ref 65–140)
HCT VFR BLD AUTO: 35.4 % (ref 36.5–49.3)
HGB BLD-MCNC: 11.7 G/DL (ref 12–17)
L PNEUMO1 AG UR QL IA.RAPID: NEGATIVE
LYMPHOCYTES # BLD AUTO: 1.04 THOUSANDS/ΜL (ref 0.6–4.47)
LYMPHOCYTES NFR BLD AUTO: 13 % (ref 14–44)
MCH RBC QN AUTO: 29.5 PG (ref 26.8–34.3)
MCHC RBC AUTO-ENTMCNC: 33.1 G/DL (ref 31.4–37.4)
MCV RBC AUTO: 89 FL (ref 82–98)
MONOCYTES # BLD AUTO: 0.49 THOUSAND/ΜL (ref 0.17–1.22)
MONOCYTES NFR BLD AUTO: 6 % (ref 4–12)
NEUTROPHILS # BLD AUTO: 6.29 THOUSANDS/ΜL (ref 1.85–7.62)
NEUTS SEG NFR BLD AUTO: 81 % (ref 43–75)
NRBC BLD AUTO-RTO: 0 /100 WBCS
PLATELET # BLD AUTO: 150 THOUSANDS/UL (ref 149–390)
PMV BLD AUTO: 9.2 FL (ref 8.9–12.7)
POTASSIUM SERPL-SCNC: 4.4 MMOL/L (ref 3.5–5.3)
RBC # BLD AUTO: 3.96 MILLION/UL (ref 3.88–5.62)
S PNEUM AG UR QL: NEGATIVE
SODIUM SERPL-SCNC: 138 MMOL/L (ref 136–145)
WBC # BLD AUTO: 7.86 THOUSAND/UL (ref 4.31–10.16)

## 2018-03-13 PROCEDURE — 99232 SBSQ HOSP IP/OBS MODERATE 35: CPT | Performed by: INTERNAL MEDICINE

## 2018-03-13 PROCEDURE — G8988 SELF CARE GOAL STATUS: HCPCS

## 2018-03-13 PROCEDURE — 80048 BASIC METABOLIC PNL TOTAL CA: CPT | Performed by: INTERNAL MEDICINE

## 2018-03-13 PROCEDURE — 85025 COMPLETE CBC W/AUTO DIFF WBC: CPT | Performed by: INTERNAL MEDICINE

## 2018-03-13 PROCEDURE — 97116 GAIT TRAINING THERAPY: CPT

## 2018-03-13 PROCEDURE — 97166 OT EVAL MOD COMPLEX 45 MIN: CPT

## 2018-03-13 PROCEDURE — G8989 SELF CARE D/C STATUS: HCPCS

## 2018-03-13 PROCEDURE — 97163 PT EVAL HIGH COMPLEX 45 MIN: CPT

## 2018-03-13 PROCEDURE — G8978 MOBILITY CURRENT STATUS: HCPCS

## 2018-03-13 PROCEDURE — G8987 SELF CARE CURRENT STATUS: HCPCS

## 2018-03-13 PROCEDURE — 94762 N-INVAS EAR/PLS OXIMTRY CONT: CPT

## 2018-03-13 PROCEDURE — 94760 N-INVAS EAR/PLS OXIMETRY 1: CPT

## 2018-03-13 PROCEDURE — G8979 MOBILITY GOAL STATUS: HCPCS

## 2018-03-13 PROCEDURE — 94640 AIRWAY INHALATION TREATMENT: CPT

## 2018-03-13 RX ORDER — ACETAMINOPHEN 325 MG/1
650 TABLET ORAL EVERY 6 HOURS PRN
Status: DISCONTINUED | OUTPATIENT
Start: 2018-03-13 | End: 2018-03-16 | Stop reason: HOSPADM

## 2018-03-13 RX ADMIN — ISODIUM CHLORIDE 3 ML: 0.03 SOLUTION RESPIRATORY (INHALATION) at 01:10

## 2018-03-13 RX ADMIN — VANCOMYCIN HYDROCHLORIDE 1000 MG: 1 INJECTION, SOLUTION INTRAVENOUS at 22:25

## 2018-03-13 RX ADMIN — SODIUM CHLORIDE 125 ML/HR: 0.9 INJECTION, SOLUTION INTRAVENOUS at 06:22

## 2018-03-13 RX ADMIN — LEVALBUTEROL HYDROCHLORIDE 1.25 MG: 1.25 SOLUTION, CONCENTRATE RESPIRATORY (INHALATION) at 01:10

## 2018-03-13 RX ADMIN — CEFEPIME HYDROCHLORIDE 1000 MG: 1 INJECTION, SOLUTION INTRAVENOUS at 20:47

## 2018-03-13 RX ADMIN — LEVALBUTEROL HYDROCHLORIDE 1.25 MG: 1.25 SOLUTION, CONCENTRATE RESPIRATORY (INHALATION) at 13:34

## 2018-03-13 RX ADMIN — VANCOMYCIN HYDROCHLORIDE 1000 MG: 1 INJECTION, SOLUTION INTRAVENOUS at 10:57

## 2018-03-13 RX ADMIN — ISODIUM CHLORIDE 3 ML: 0.03 SOLUTION RESPIRATORY (INHALATION) at 07:23

## 2018-03-13 RX ADMIN — GUAIFENESIN 600 MG: 600 TABLET, EXTENDED RELEASE ORAL at 22:28

## 2018-03-13 RX ADMIN — CEFEPIME HYDROCHLORIDE 1000 MG: 1 INJECTION, SOLUTION INTRAVENOUS at 10:05

## 2018-03-13 RX ADMIN — ISODIUM CHLORIDE 3 ML: 0.03 SOLUTION RESPIRATORY (INHALATION) at 13:34

## 2018-03-13 RX ADMIN — GUAIFENESIN 600 MG: 600 TABLET, EXTENDED RELEASE ORAL at 08:35

## 2018-03-13 RX ADMIN — LEVALBUTEROL HYDROCHLORIDE 1.25 MG: 1.25 SOLUTION, CONCENTRATE RESPIRATORY (INHALATION) at 07:23

## 2018-03-13 RX ADMIN — LEVALBUTEROL HYDROCHLORIDE 1.25 MG: 1.25 SOLUTION, CONCENTRATE RESPIRATORY (INHALATION) at 19:34

## 2018-03-13 RX ADMIN — AZITHROMYCIN 500 MG: 250 TABLET, FILM COATED ORAL at 08:35

## 2018-03-13 RX ADMIN — CEFEPIME HYDROCHLORIDE 1000 MG: 1 INJECTION, SOLUTION INTRAVENOUS at 02:30

## 2018-03-13 RX ADMIN — ACETAMINOPHEN 650 MG: 325 TABLET, FILM COATED ORAL at 20:50

## 2018-03-13 RX ADMIN — ENOXAPARIN SODIUM 40 MG: 40 INJECTION SUBCUTANEOUS at 08:35

## 2018-03-13 RX ADMIN — TIOTROPIUM BROMIDE 18 MCG: 18 CAPSULE ORAL; RESPIRATORY (INHALATION) at 08:35

## 2018-03-13 RX ADMIN — ISODIUM CHLORIDE 3 ML: 0.03 SOLUTION RESPIRATORY (INHALATION) at 19:34

## 2018-03-13 RX ADMIN — SODIUM CHLORIDE 80 ML/HR: 0.9 INJECTION, SOLUTION INTRAVENOUS at 20:44

## 2018-03-13 NOTE — OCCUPATIONAL THERAPY NOTE
OccupationalTherapy Evaluation(ncpg=[id number])     Patient Name: Dilip SIU'S Date: 3/13/2018  Problem List  Patient Active Problem List   Diagnosis    COPD with acute exacerbation (HCC)    Chronic rhinitis    [facility]en[facility] lung [facility] [address] [facility]    BPH (benign prostatic hyperplasia)    Multiple myeloma not having achieved remission (HCC)    Chronic diarrhea    COPD (chronic obstructive pulmonary disease) ([facility] [address] )    Arterial hypotension    HCAP (healthcare-associated pneumonia)    Right bundle branch block    [facility]     Past Medical History  Past Medical History:   Diagnosis Date    Cancer ([facility] )     lung and multiple myloma    Chemoprevention     Chronic pain disorder     3 fractured vertebrae    COPD (chronic obstructive pulmonary disease) (HCC)     Diarrhea     Emphysema lung (HCC)     Enlarged prostate     [place] (hard of hearing)     Kidney stone     Low blood pressure     Lung cancer (HCC)     Multiple myeloma (HCC)     Multiple myeloma (HCC)     Pneumonia     Pulmonary emphysema (HCC)     [facility]     Shortness of breath     Sleep apnea     no cpap     Past Surgical History  Past Surgical History:   Procedure Laterality Date    CHOLECYSTECTOMY      COLONOSCOPY W/ ENDOSCOPIC US N/A 7/21/2017    Procedure: ANAL ENDOSCOPIC U/S;  Surgeon: Rashi Tavares MD;  [facility]; Service: Colorectal    HERNIA REPAIR      KIDNEY STONE SURGERY      LUNG LOBECTOMY Right     SD COLONOSCOPY FLX DX W/COLLJ SPEC WHEN PFRMD N/A 7/10/2017    Procedure: COLONOSCOPY;  Surgeon: Angely Dillon MD;  [facility];   Service: Gastroenterology    SD RECTAL TUMOR EXCISION, TRANSANAL ENDOSCOPIC MICROSURGICAL, FULL THICK N/A 11/2/2017    Procedure: TRANSANAL ENDOSCOPIC MICROSURGERY TEM;  Surgeon: Rashi Tavares MD;  [facility];  Service: Colorectal           03/13/18 0945   Note Type   Note type Eval only   Restrictions/Precautions   Other Precautions Multiple lines;O2   Pain Assessment   Pain Assessment No/denies pain   Home Living   Type of [address]   Lifestyle   Autonomy PTA pt states ocassional assistance with his LE ADLs; states independence with his transfers/ambulation--ocassional use of SPC(outside); neg , ocassionally home alone, neg falls   Reciprocal Relationships supportive wife   Service to Others worked for [address] [employer] [address] "I don't really feel weak "   ADL   Where Assessed Chair   Eating Assistance 6  Modified independent   Grooming Assistance 6  Modified Independent   UB Bathing Assistance 5  Supervision/Setup   LB Bathing Assistance 5  Supervision/Setup   UB Dressing Assistance 5  Supervision/Setup   LB Dressing Assistance 5  Supervision/Setup   Transfers   Sit to Stand 5  Supervision   Stand to Sit 5  Supervision   Functional Mobility   Functional Mobility 5  Supervision   Additional items Hand hold assistance   Balance   Static Sitting Good   Dynamic Sitting Good   Static Standing Fair +   Dynamic Standing Fair   Activity Tolerance   Activity Tolerance Patient limited by fatigue   Medical Staff Made Aware nsg   RUE Assessment   RUE Assessment WFL   RUE Strength   RUE Overall Strength Within Functional Limits - able to perform ADL tasks with strength   LUE Assessment   LUE Assessment WFL   LUE Strength   LUE Overall Strength Within Functional Limits - able to perform ADL tasks with strength   Hand Function   Gross Motor Coordination Functional   Fine Motor Coordination Functional   Sensation   Light Touch No apparent deficits   Proprioception   Proprioception No apparent deficits   Vision-Basic Assessment   Current Vision (glasses)   Vision - Complex Assessment   Acuity Able to read clock/calendar on wall without difficulty   Perception   Inattention/Neglect Appears intact   Cognition   Overall Cognitive Status WFL   Arousal/Participation Alert   Attention Within functional limits   Orientation Level Oriented X4   Memory Within functional limits   Following Commands Follows all commands and directions without difficulty   Assessment   Limitation Decreased endurance   Prognosis Good   Assessment Pt is a 82y/o male admitted to the hospital 2* symptoms of fatigue, SOB, persistent cough, fevers, and chills  Pt noted with sepsis 2* LLL PNA  Pt with hx multiple myeloma and spinal compression fx's  PTA pt states ocassional assistance with his LE ADLs; states independence with his transfers/ambulation--ocassional use of SPC(outside); neg , ocassionally home alone, neg falls  During initial eval, pt demonstrated slight deficits with his functional balance, functional mobility, and activity tolerance  Pt was able to demonstrate good ADL status and states no concerns about going directly home  Pt would benefit from continued P T  to improve his overall endurance, balance, and mobility  Therapist provided /back safety packet and energy conservation technique handout  Acute OT tx not indicated at this 2* limited ADL deficits      Goals   Patient Goals "to go home "   Plan   OT Frequency Eval only   Recommendation   OT Discharge Recommendation (continue P T , pt prefers home w/o services)   Barthel Index   Feeding 10   Bathing 5   Grooming Score 0   Dressing Score 5   Bladder Score 10   Bowels Score 10   Toilet Use Score 10   Transfers (Bed/Chair) Score 10   Mobility (Level Surface) Score 0   Stairs Score 0   Barthel Index Score 60   Modified [place] Scale   Modified [place] Scale 4   Monty Garcia, OT

## 2018-03-13 NOTE — CASE MANAGEMENT
Initial Clinical Review    Admission: Date/Time/Statement: 3/12/18 @ 1029     Orders Placed This Encounter   Procedures    Inpatient Admission (expected length of stay for this patient is greater than two midnights)     Standing Status:   Standing     Number of Occurrences:   1     Order Specific Question:   Admitting Physician     Answer:   Elias Eaton [[id number]]     Order Specific Question:   Level of Care     Answer:   Med Surg [[id number]]     Order Specific Question:   Estimated length of stay     Answer:   More than 2 Midnights     Order Specific Question:   Certification     Answer:   I certify that inpatient services are medically necessary for this patient for a duration of greater than two midnights  See H&P and MD Progress Notes for additional information about the patient's course of treatment  ED: Date/Time/Mode of Arrival:   ED Arrival Information     Expected Arrival Acuity Means of Arrival Escorted By Service Admission Type    - 3/12/2018 06:33 Urgent Walk-In Self General Medicine Urgent    Arrival Complaint    fever;sob          Chief Complaint:   Chief Complaint   Patient presents with    Fever - 75 years or older     Fever, weak       History of Illness:    Nela Lua is a 81 y o  male who presents with WITH ABOVE COMPLAINTS  Patient is accompanied by his wife at bedside  Mariza Mederos mentions that since Saturday he has been feeling very weak fatigued short of breath with cough with minimal to no sputum production  He also stated that he was having fevers, low-grade with subjective chills  He receives chemotherapy for multiple myeloma every Wednesday twice a month  His weakness progressed to a point that he was bed-bound yesterday  Patient states that he has been having dry cough with little sputum production  Evaluation in the ED revealed patient had elevated heart rate, elevated respiratory rate and left lower lobe pneumonia    Patient denies chest pain, PND, orthopnea, nausea, vomiting, diarrhea, constipation, blood in urine  No sputum, dysuria, new onset weakness, slurred speech, seizure-like activity, recent travel, dizziness, syncope, fall, trauma to the head  ED Vital Signs:   ED Triage Vitals   Temperature Pulse Respirations Blood Pressure SpO2   03/12/18 0644 03/12/18 0644 03/12/18 0644 03/12/18 0644 03/12/18 0644   99 8 °F (37 7 °C) 97 22 110/74 95 %      Temp Source Heart Rate Source Patient Position - Orthostatic VS BP Location FiO2 (%)   03/12/18 0644 03/12/18 0644 03/12/18 1134 03/12/18 0644 --   Oral Monitor Lying Right arm       Pain Score       03/12/18 0715       No Pain        Wt Readings from Last 1 Encounters:   03/13/18 87 5 kg (192 lb 14 4 oz)       Vital Signs (abnormal):    above    Abnormal Labs/Diagnostic Test Results: Total  Bili   1 49  Bili,  Direct    0 27  Albumin    3 3  WBC   12 43  Abs  neutro   10 11  Ct   Chest:    No CT evidence of pulmonary embolism  2   Left-sided infiltrates with more focal consolidation in the lingula, most compatible with pneumonia  3   Slight enlargement of mediastinal and left hilar lymph nodes   The findings are likely reactive      ED Treatment:   Medication Administration from 03/12/2018 0633 to 03/12/2018 1121       Date/Time Order Dose Route Action Action by Comments     03/12/2018 0816 sodium chloride 0 9 % bolus 1,000 mL 0 mL Intravenous Stopped Alaina Bermeo RN      03/12/2018 0716 sodium chloride 0 9 % bolus 1,000 mL 1,000 mL Intravenous Chan  Alaina Bermeo [facility] [address]      03/12/2018 0819 iodixanol (VISIPAQUE) 320 MG/ML injection 100 mL 75 mL Intravenous Given Katrin Post      03/12/2018 1019 cefepime (MAXIPIME) 2 g/50 mL dextrose IVPB 0 mg Intravenous Stopped Alaina Bermeo RN      03/12/2018 0939 cefepime (MAXIPIME) 2 g/50 mL dextrose IVPB 2,000 mg Intravenous New Bag Alaina Bermeo RN      03/12/2018 1027 vancomycin (VANCOCIN) 1,250 mg in sodium chloride 0 9 % 250 mL IVPB 1,250 mg Intravenous New Bag Jerry Art Rukhsana Strong RN      03/12/2018 1037 sodium chloride 0 9 % bolus 1,000 mL 0 mL Intravenous Stopped Dustin Reid RN      03/12/2018 3776 sodium chloride 0 9 % bolus 1,000 mL 1,000 mL Intravenous [address] [facility], [address] [place] [address]      03/12/2018 1030 azithromycin (ZITHROMAX) 500 mg in sodium chloride 0 9% 250mL IVPB 500 mg 500 mg Intravenous New Bag Emily Salomon bag was hanging when pt was transported to ER  Past Medical/Surgical History:    Active Ambulatory Problems     Diagnosis Date Noted    [facility] 03/03/2016    Chronic rhinitis 03/03/2016    Adenocarcinoma, lung ([place] [address] ) 03/03/2016    BPH (benign prostatic hyperplasia) 03/03/2016    Multiple myeloma not having achieved remission ([place] [address] ) 03/03/2016    Chronic diarrhea 03/03/2016    COPD [facility]chronic obstructive [facility] disease) [facility]  ) 08/22/2017    Arterial hypotension 01/03/2018    HCAP (healthcare-associated pneumonia) 01/03/2018    Right bundle branch block 01/03/2018    Sepsis [facility] ) 01/03/2018     Resolved Ambulatory Problems     Diagnosis Date Noted    Dyspnea 03/03/2016    ALLYSSA (acute kidney injury) 04/01/2017    Fever 08/22/2017    Streptococcal pneumonia ([facility] ) 08/22/2017     Past Medical History:   Diagnosis Date    Cancer ([place] [address] )     Chemoprevention     Chronic pain disorder     COPD (chronic obstructive pulmonary disease) (HCC)     Diarrhea     Emphysema lung (HCC)     Enlarged prostate     [place] (hard of hearing)     Kidney stone     Low blood pressure     Lung cancer (HCC)     Multiple myeloma (HCC)     Multiple myeloma (HCC)     Pneumonia     Pulmonary emphysema (HCC)     Rectal adenocarcinoma (HCC)     Shortness of breath     Sleep apnea        Admitting Diagnosis: Fatigue [R53 83]  Fever [R50 9]  Hospital acquired PNA [J18 9]  Sepsis ([facility] ) [A41 9]    Age/Sex: 81 y o  male    Assessment/Plan:    SEPSIS SECONDARY TO HCAP  Sepsis present on admission:  Elevated white count, heart rate 108/minute, respiratory 24/minute, source:  Left lower lobe pneumonia  Patient will be admitted to step-down unit in view of his presentation  Aggressive IV fluids, broad-spectrum IV antibiotics IV vancomycin, IV cefepime and IV azithromycin to cover possible MRSA, GNRs and atypicals  Blood culture x2, flu swab, MRSA screen, urine for urine Legionella antigen and strep pneumoniae antigen will be sent  Guarded prognosis  HCAP  Please refer to above  HISTORY OF MULTIPLE MYELOMA ON CHEMOTHERAPY  Patient receives chemotherapy every 2 weeks  HISTORY OF ADENO CARCINOMA OF LUNG  Currently in remission since 2015 no acute issues for now  COPD NOT IN EXACERBATION  Continue scheduled nebulizations and also support  BPH  Stable no active issues  Continue home medications  Anticipated Length of Stay:  Patient will be admitted on an Inpatient basis with an anticipated length of stay of  Greater than 2 midnights  Justification for Hospital Stay: SEPSIS SECONDARY TO HCAP FOR NEED FOR IV ANTIBIOTICS            Admission Orders:  IP     3/12   @  1029  Scheduled Meds:   Current Facility-Administered Medications:  acetaminophen 650 mg Oral Q6H PRN Joselyn Connors MD    cefepime 1,000 mg Intravenous Q8H Joselyn Connors MD Last Rate: 1,000 mg (03/13/18 1005)   enoxaparin 40 mg Subcutaneous Daily Gustavo Moses MD    guaiFENesin 600 mg Oral Q12H [facility] JACK Del Toro    levalbuterol 1 25 mg Nebulization Q6H JACK Del Toro    ondansetron 4 mg Intravenous Q8H PRN Joselyn Connors MD    sodium chloride 80 mL/hr Intravenous Continuous Joselyn Connors MD Last Rate: 80 mL/hr (03/13/18 0840)   sodium chloride 3 mL Nebulization Q6H Gustavo Moses MD    tiotropium 18 mcg Inhalation Daily JACK Del Toro    vancomycin 12 5 mg/kg Intravenous Q12H Joselyn Connors MD Last Rate: 1,000 mg (03/13/18 1057)     Continuous Infusions:   sodium chloride 80 mL/hr Last Rate: 80 mL/hr (03/13/18 0840)     PRN Meds:   acetaminophen    ondansetron     Tele  Droplet  Precautions  Reg  Diet   PT/OT  Cons pulmonary

## 2018-03-13 NOTE — TELEPHONE ENCOUNTER
Stated that the pt was just admitted to the hospital for pneumonia  Pt has chemo tomorrow 3 14 18 & needs to be cancelled  Req that you call her back in regards to this

## 2018-03-13 NOTE — PROGRESS NOTES
Progress Note -  Internal Medicine / Hospitalists  Alisha Campos 81 y o  male MRN: [id number]  Unit/Bed#: E2 -01 Encounter: [id number]      ASSESSMENT AND PLAN:    SEPSIS SECONDARY TO HCAP  Sepsis present on admission:  Elevated white count, heart rate 108/minute, respiratory 24/minute, source:  Left lower lobe pneumonia  Patient looks lot improved compared to yesterday  Continue with IV fluids, broad-spectrum IV antibiotics PO vancomycin, IV cefepime and IV azithromycin to cover possible MRSA, GNRs and atypicals-- Day 2  Blood culture x2 still in process, flu swab --negative, MRSA screen -- positive, urine for urine Legionella antigen and strep pneumoniae antigen -- negative  Guarded prognosis  I appreciate Pulmonary Medicine input  HCAP  Please refer to above  HISTORY OF MULTIPLE MYELOMA ON CHEMOTHERAPY  Patient receives chemotherapy every 2 weeks  For obvious reasons, no chemotherapy during hospitalization  HISTORY OF ADENO CARCINOMA OF LUNG  Currently in remission since  no acute issues for now  Pulmonary Medicine on board  COPD NOT IN EXACERBATION  Continue scheduled nebulizations and also support  BPH  Stable no active issues  Continue home medications  ____________________________________________________________________    SUBJECTIVE:   Patient seen and examined  Sitting up and eating breakfast   Continues to have cough  He does state he feels a lot better compared to yesterday  Wife at bedside and she has been updated  PT OT evaluation today      OBJECTIVE:     Vitals:   HR:  [] 93  Resp:  [18-20] 18  BP: ()/(51-69) 101/59  SpO2:  [93 %-98 %] 93 %  Temp (24hrs), Av 9 °F (37 7 °C), Min:98 3 °F (36 8 °C), Max:101 °F (38 3 °C)  Current: Temperature: 98 3 °F (36 8 °C)    Intake/Output Summary (Last 24 hours) at 18 0958  Last data filed at 18 0300   Gross per 24 hour   Intake          1491 67 ml   Output              900 ml   Net           591 67 ml Physical Exam:   GENERAL: AAO x 3  HEENT: atraumatic, normocephalic  Oral mucosa moist, no icterus, pallor  PERRLA +  Neck supple, no JVD, no lymphadenopathy, no thryomegaly  CHEST: B/L breath sounds heard, occasional wheezing  CVS: S1, S2  No cyanosis/clubbing or edema  ABDOMEN: Soft/obese/NT/BS heard  NEUROLOGICAL: CN II -XI grossly intact  No focal motor or sensory deficits  No signs of meningeal irritation or cerebellar dysfunction  EXTREMITIES: No cyanosis/clubbing or edema  LABS:  CBC, BMP ordered for today  Scheduled Meds:    Current Facility-Administered Medications:  acetaminophen 650 mg Oral Q6H PRN Brayan Norwood MD    azithromycin 500 mg Oral Daily Gustavo Ballard MD    cefepime 1,000 mg Intravenous Q8H Brayan Norwood MD Last Rate: Stopped (03/13/18 0300)   enoxaparin 40 mg Subcutaneous Daily Gustavo Ballard MD    guaiFENesin 600 mg Oral Q12H [address] Darleen JACK Quijano    levalbuterol 1 25 mg Nebulization Q6H JACK Chan    ondansetron 4 mg Intravenous Q8H PRN Brayan Norwood MD    sodium chloride 80 mL/hr Intravenous Continuous Brayan Norwood MD Last Rate: 80 mL/hr (03/13/18 0840)   sodium chloride 3 mL Nebulization Q6H Gustavo Ballard MD    tiotropium 18 mcg Inhalation Daily JACK Chan    vancomycin 12 5 mg/kg Intravenous Q12H Brayan Norwood MD Last Rate: Stopped (03/12/18 2344)       Continuous Infusions:    sodium chloride 80 mL/hr Last Rate: 80 mL/hr (03/13/18 0840)       PRN Meds:    acetaminophen    ondansetron      VTE Prophylaxis  Subcu Anne-Marie Hurst MD  HOSPITALIST SERVICES  3/13/2018

## 2018-03-13 NOTE — PLAN OF CARE
Problem: PHYSICAL THERAPY ADULT  Goal: Performs mobility at highest level of function for planned discharge setting  See evaluation for individualized goals  Treatment/Interventions: Functional transfer training, LE strengthening/ROM, Elevations, Therapeutic exercise, Endurance training, Patient/family training, Equipment eval/education, Bed mobility, Gait training, Compensatory technique education, Continued evaluation, Spoke to nursing, OT          See flowsheet documentation for full assessment, interventions and recommendations  Outcome: Progressing  Prognosis: Good  Problem List: Decreased strength, Decreased endurance, Impaired balance, Decreased mobility  Assessment: Pt is 82 y/o male admitted with sepsis and PNA  PT consulted as weakness PTA  Baseline independent without use of AD  Presents currently with mild limitations in activity tolerance, balance and overall mobility in comparison to baseline  Will benefit from continued PT in order to optimize strength, balance, activity tolerance and independence wtih all functional mobility  PT will follow and progress  Barriers to Discharge: Inaccessible home environment  Barriers to Discharge Comments: stairs  Recommendation: Home with family support          See flowsheet documentation for full assessment

## 2018-03-13 NOTE — PROGRESS NOTES
Progress Note - Pulmonary   Carmen Pair 81 y o  male MRN: [id number]  Unit/Bed#: E2 -01 Encounter: [id number]    Assessment/Plan:  1  Sepsis secondary to healthcare associated pneumonia  · Continue cefepime  · Continue vancomycin pending MRSA swab  · Atypicals are unlikely, consider discontinuation of azithromycin  2  Acute respiratory insufficiency  · Improved  3  COPD, no evidence of exacerbation  · Continue off steroids  · Continue nebulizers 3 times daily  · On Spiriva  4  History of lung cancer    ----------------------------------------------------------------------------------------------------------------------    HPI/Interval History:   Feels better today  He is up and moving  No further fever  No chest pain or pleurisy  Cough with some phlegm mobilization  Vitals:   Blood pressure 101/59, pulse 93, temperature 98 3 °F (36 8 °C), temperature source Temporal, resp  rate 18, weight 87 5 kg (192 lb 14 4 oz), SpO2 93 %  ,Body mass index is 29 24 kg/m²  SpO2: 93 %  SpO2 Activity: At Rest  O2 Device: Nasal cannula 2 L    Physical Exam:   Gen:  Looks better today  Non dyspneic  HEENT:  Pupils reactive    Oropharynx moist  Neck:  No JVD or adenopathy  Chest:  Diminished at the left base but otherwise fairly clear to auscultation  Cardiac:  Regular  Abdomen:  Soft  Extremities:  Mild chronic edema      Labs:    CBC:    Results from last 7 days  Lab Units 03/13/18  1026   WBC Thousand/uL 7 86   HEMOGLOBIN g/dL 11 7*   HEMATOCRIT % 35 4*   PLATELETS Thousands/uL 150         BMP:     Results from last 7 days  Lab Units 03/13/18  1026   SODIUM mmol/L 138   POTASSIUM mmol/L 4 4   CHLORIDE mmol/L 106   CO2 mmol/L 25   BUN mg/dL 13   CREATININE mg/dL 1 21   GLUCOSE RANDOM mg/dL 95   CALCIUM mg/dL 8 6           Imaging studies:  No new studies    Raji Washington MD

## 2018-03-13 NOTE — PHYSICAL THERAPY NOTE
PT EVALUATION    81 y o     [id number]    Fatigue [R53 83]  Fever [R50 9]  Hospital acquired PNA [J18 9]  Sepsis ([facility] ) [A41 9]    Past Medical History:   Diagnosis Date    Cancer ([facility] )     lung and multiple myloma    Chemoprevention     Chronic pain disorder     3 fractured vertebrae    COPD (chronic obstructive pulmonary disease) (HCC)     Diarrhea     Emphysema lung (HCC)     Enlarged prostate     [place] (hard of hearing)     Kidney stone     Low blood pressure     Lung cancer (HCC)     Multiple myeloma (HCC)     Multiple myeloma (HCC)     Pneumonia     Pulmonary emphysema (HCC)     Rectal adenocarcinoma (HCC)     Shortness of breath     Sleep apnea     no cpap         Past Surgical History:   Procedure Laterality Date    CHOLECYSTECTOMY      COLONOSCOPY W/ ENDOSCOPIC US N/A 7/21/2017    Procedure: ANAL ENDOSCOPIC U/S;  Surgeon: Arnaldo Sena MD;  [facility]: [facility]; Service: Colorectal    HERNIA REPAIR      KIDNEY STONE SURGERY      LUNG LOBECTOMY Right     VA COLONOSCOPY FLX DX W/COLLJ SPEC WHEN PFRMD N/A 7/10/2017    Procedure: COLONOSCOPY;  Surgeon: Celia Whitfield MD;  [facility]: [facility]; Service: Gastroenterology    VA RECTAL TUMOR EXCISION, TRANSANAL ENDOSCOPIC MICROSURGICAL, FULL THICK N/A 11/2/2017    Procedure: TRANSANAL ENDOSCOPIC MICROSURGERY TEM;  Surgeon: Arnaldo Sena MD;  [facility]: [facility];  Service: Colorectal      03/13/18 0910   Note Type   Note type Eval/Treat   Pain Assessment   Pain Assessment No/denies pain   Home Living   Type of [address] [place] [address] Multi-level;Stairs to enter with rails  (0 or 3 SELMA depending on entrance  bilevel home)   Bathroom Shower/Tub Tub/shower unit   Bathroom Toilet Standard   Bathroom Equipment Shower chair   [address]   Additional Comments Lives with spouse  +   Bilevel home  3 SELMA front, 0 SELMA thru garage  1/2 bath garage level entry  Main bath 2nd floor    3+ 4 steps from main level to B/B   Prior Function   Level of [place] Independent with ADLs and functional mobility  (some assist for socks)   Lives With Spouse   Receives Help From Family   ADL Assistance Independent  (except socks)   Falls in the last 6 months 0   Vocational Retired   Comments I with use of cane outside of home  In home no AD  Hx of HHPT p admission in 1/2018  Restrictions/Precautions   Weight Bearing Precautions Per Order No   Other Precautions Multiple lines; Fall Risk;O2   General   Additional Pertinent History Pt is 82 y/o male admitted with sepsis 2* LLL PNA  flu ruled out  PT consulted  getting chemo PTA   Family/Caregiver Present Yes   Cognition   Overall Cognitive Status WFL   RUE Assessment   RUE Assessment WFL   LUE Assessment   LUE Assessment WFL   RLE Assessment   RLE Assessment WFL   LLE Assessment   LLE Assessment WFL   Coordination   Movements are Fluid and Coordinated 1   Light Touch   RLE Light Touch Grossly intact   LLE Light Touch Grossly intact   Bed Mobility   Supine to Sit 5  Supervision   Additional items HOB elevated; Increased time required   Transfers   Sit to Stand 5  Supervision   Additional items Assist x 1; Increased time required;Verbal cues   Stand to Sit 5  Supervision   Additional items Assist x 1; Increased time required;Verbal cues   Stand pivot 5  Supervision   Additional items Assist x 1; Increased time required;Armrests   Additional Comments Cues for hand placement     Ambulation/Elevation   Gait pattern Decreased foot clearance   Gait Assistance 5  Supervision   Additional items Assist x 1  (A for lines)   Assistive Device (none)   Distance 5'x1   Balance   Static Sitting Good   Dynamic Sitting Good   Static Standing Fair +   Dynamic Standing Fair   Ambulatory Fair   Endurance Deficit   Endurance Deficit Yes   Endurance Deficit Description fatigue, HESTER   Activity Tolerance   Activity Tolerance Patient limited by fatigue   Medical Staff Made Aware Nurse, Dorothy   Assessment   Prognosis Good   Problem List Decreased strength;Decreased endurance; Impaired balance;Decreased mobility   Assessment Pt is 82 y/o male admitted with sepsis and PNA  PT consulted as weakness PTA  Baseline independent without use of AD  Presents currently with mild limitations in activity tolerance, balance and overall mobility in comparison to baseline  Will benefit from continued PT in order to optimize strength, balance, activity tolerance and independence wtih all functional mobility  PT will follow and progress  Barriers to Discharge Inaccessible home environment   Barriers to Discharge Comments stairs   Goals   Patient Goals go home   STG Expiration Date 03/23/18   Short Term Goal #1 10 days:  Bed mobility with I  Transfers with I  Amb with LRAD prn > 300'x1 with S/I  Negotiate steps with S  Improve activity tolerance to 45 minutes  Increase strength and balance 1/2 grade  Treatment Day 0   Plan   Treatment/Interventions Functional transfer training;LE strengthening/ROM; Elevations; Therapeutic exercise; Endurance training;Patient/family training;Equipment eval/education; Bed mobility;Gait training; Compensatory technique education;Continued evaluation;Spoke to nursing;OT   PT Frequency 5x/wk   Recommendation   Recommendation Home with family support   Additional Comments anticipate safe d/c to home with family support once medically stable     Barthel Index   Feeding 10   Bathing 0   Grooming Score 5   Dressing Score 10   Bladder Score 10   Bowels Score 10   Toilet Use Score 10   Transfers (Bed/Chair) Score 10   Mobility (Level Surface) Score 0   Stairs Score 0   Barthel Index Score 65   History: co - morbidities, fall risk, use of assistive device, assist for adl's,  multiple lines,steps in home  Exam: impairments in locomotion, musculoskeletal, balance, pulmonary, barthel 65  Clinical: unstable/unpredictable  Complexity:high Elijah Collins, PT     Time In: 0855  Time [id number]  Total Time: 15      S: Agreeable to try to ambulate in room  O: S transfers sit<>stand  Performed x 2  Amb 15'x2 with S and no AD  Desaturates on 2L to 87%, recovers within 2 minutes to 91% or greater sitting  Education to pt and spouse regarding energy conservation as well as proper breathing technique  A:  Able to progress with ambulation  No overt LOB with ambulation noted for short distances without AD, however noted to have HESTER and desaturates to 87% on 2L requiring seated rest to improve  Anticipate ability to return home with family support  P:  PT per POC      Rachid Villareal, PT

## 2018-03-14 ENCOUNTER — HOSPITAL ENCOUNTER (OUTPATIENT)
Dept: INFUSION CENTER | Facility: CLINIC | Age: 83
Discharge: HOME | End: 2018-03-14

## 2018-03-14 LAB
ANION GAP SERPL CALCULATED.3IONS-SCNC: 7 MMOL/L (ref 4–13)
BUN SERPL-MCNC: 9 MG/DL (ref 5–25)
CALCIUM SERPL-MCNC: 8.4 MG/DL (ref 8.3–10.1)
CHLORIDE SERPL-SCNC: 107 MMOL/L (ref 100–108)
CO2 SERPL-SCNC: 25 MMOL/L (ref 21–32)
CREAT SERPL-MCNC: 0.88 MG/DL (ref 0.6–1.3)
ERYTHROCYTE [DISTWIDTH] IN BLOOD BY AUTOMATED COUNT: 16 % (ref 11.6–15.1)
GFR SERPL CREATININE-BSD FRML MDRD: 81 ML/MIN/1.73SQ M
GLUCOSE SERPL-MCNC: 97 MG/DL (ref 65–140)
HCT VFR BLD AUTO: 30.6 % (ref 36.5–49.3)
HGB BLD-MCNC: 10.2 G/DL (ref 12–17)
MAGNESIUM SERPL-MCNC: 1.8 MG/DL (ref 1.6–2.6)
MCH RBC QN AUTO: 29.4 PG (ref 26.8–34.3)
MCHC RBC AUTO-ENTMCNC: 33.3 G/DL (ref 31.4–37.4)
MCV RBC AUTO: 88 FL (ref 82–98)
MRSA NOSE QL CULT: NORMAL
PLATELET # BLD AUTO: 139 THOUSANDS/UL (ref 149–390)
PMV BLD AUTO: 9.3 FL (ref 8.9–12.7)
POTASSIUM SERPL-SCNC: 3.4 MMOL/L (ref 3.5–5.3)
RBC # BLD AUTO: 3.47 MILLION/UL (ref 3.88–5.62)
SODIUM SERPL-SCNC: 139 MMOL/L (ref 136–145)
VANCOMYCIN TROUGH SERPL-MCNC: 13.8 UG/ML (ref 10–20)
WBC # BLD AUTO: 4.1 THOUSAND/UL (ref 4.31–10.16)

## 2018-03-14 PROCEDURE — 94760 N-INVAS EAR/PLS OXIMETRY 1: CPT

## 2018-03-14 PROCEDURE — 99232 SBSQ HOSP IP/OBS MODERATE 35: CPT | Performed by: INTERNAL MEDICINE

## 2018-03-14 PROCEDURE — 85027 COMPLETE CBC AUTOMATED: CPT | Performed by: INTERNAL MEDICINE

## 2018-03-14 PROCEDURE — 80202 ASSAY OF VANCOMYCIN: CPT | Performed by: INTERNAL MEDICINE

## 2018-03-14 PROCEDURE — 94640 AIRWAY INHALATION TREATMENT: CPT

## 2018-03-14 PROCEDURE — 99233 SBSQ HOSP IP/OBS HIGH 50: CPT | Performed by: INTERNAL MEDICINE

## 2018-03-14 PROCEDURE — 83735 ASSAY OF MAGNESIUM: CPT | Performed by: INTERNAL MEDICINE

## 2018-03-14 PROCEDURE — 80048 BASIC METABOLIC PNL TOTAL CA: CPT | Performed by: INTERNAL MEDICINE

## 2018-03-14 RX ORDER — GUAIFENESIN/DEXTROMETHORPHAN 100-10MG/5
10 SYRUP ORAL EVERY 4 HOURS PRN
Status: DISCONTINUED | OUTPATIENT
Start: 2018-03-14 | End: 2018-03-16 | Stop reason: HOSPADM

## 2018-03-14 RX ORDER — POTASSIUM CHLORIDE AND SODIUM CHLORIDE 900; 300 MG/100ML; MG/100ML
80 INJECTION, SOLUTION INTRAVENOUS CONTINUOUS
Status: DISCONTINUED | OUTPATIENT
Start: 2018-03-14 | End: 2018-03-14

## 2018-03-14 RX ORDER — SACCHAROMYCES BOULARDII 250 MG
250 CAPSULE ORAL 2 TIMES DAILY
Status: DISCONTINUED | OUTPATIENT
Start: 2018-03-14 | End: 2018-03-16 | Stop reason: HOSPADM

## 2018-03-14 RX ADMIN — LEVALBUTEROL HYDROCHLORIDE 1.25 MG: 1.25 SOLUTION, CONCENTRATE RESPIRATORY (INHALATION) at 01:26

## 2018-03-14 RX ADMIN — LEVALBUTEROL HYDROCHLORIDE 1.25 MG: 1.25 SOLUTION, CONCENTRATE RESPIRATORY (INHALATION) at 13:04

## 2018-03-14 RX ADMIN — Medication 250 MG: at 17:27

## 2018-03-14 RX ADMIN — ISODIUM CHLORIDE 3 ML: 0.03 SOLUTION RESPIRATORY (INHALATION) at 13:04

## 2018-03-14 RX ADMIN — LEVALBUTEROL HYDROCHLORIDE 1.25 MG: 1.25 SOLUTION, CONCENTRATE RESPIRATORY (INHALATION) at 07:27

## 2018-03-14 RX ADMIN — TIOTROPIUM BROMIDE 18 MCG: 18 CAPSULE ORAL; RESPIRATORY (INHALATION) at 08:45

## 2018-03-14 RX ADMIN — VANCOMYCIN HYDROCHLORIDE 1000 MG: 1 INJECTION, SOLUTION INTRAVENOUS at 11:56

## 2018-03-14 RX ADMIN — ISODIUM CHLORIDE 3 ML: 0.03 SOLUTION RESPIRATORY (INHALATION) at 01:26

## 2018-03-14 RX ADMIN — ENOXAPARIN SODIUM 40 MG: 40 INJECTION SUBCUTANEOUS at 08:45

## 2018-03-14 RX ADMIN — SODIUM CHLORIDE AND POTASSIUM CHLORIDE 80 ML/HR: 9; 2.98 INJECTION, SOLUTION INTRAVENOUS at 10:08

## 2018-03-14 RX ADMIN — ISODIUM CHLORIDE 3 ML: 0.03 SOLUTION RESPIRATORY (INHALATION) at 19:50

## 2018-03-14 RX ADMIN — CEFEPIME HYDROCHLORIDE 1000 MG: 1 INJECTION, SOLUTION INTRAVENOUS at 22:18

## 2018-03-14 RX ADMIN — GUAIFENESIN 600 MG: 600 TABLET, EXTENDED RELEASE ORAL at 22:19

## 2018-03-14 RX ADMIN — CEFEPIME HYDROCHLORIDE 1000 MG: 1 INJECTION, SOLUTION INTRAVENOUS at 13:45

## 2018-03-14 RX ADMIN — ISODIUM CHLORIDE 3 ML: 0.03 SOLUTION RESPIRATORY (INHALATION) at 07:27

## 2018-03-14 RX ADMIN — CEFEPIME HYDROCHLORIDE 1000 MG: 1 INJECTION, SOLUTION INTRAVENOUS at 05:37

## 2018-03-14 RX ADMIN — GUAIFENESIN 600 MG: 600 TABLET, EXTENDED RELEASE ORAL at 08:45

## 2018-03-14 RX ADMIN — Medication 250 MG: at 11:05

## 2018-03-14 RX ADMIN — LEVALBUTEROL HYDROCHLORIDE 1.25 MG: 1.25 SOLUTION, CONCENTRATE RESPIRATORY (INHALATION) at 19:50

## 2018-03-14 NOTE — PROGRESS NOTES
[address] Hospitalist Service - Internal Medicine Progress Note      PATIENT INFORMATION      Patient: Dorene Parks 81 y o  male   MRN: [id number]  PCP: Aggie Park DO  Unit/Bed#: E2 -01 Encounter: [id number]  Date Of Visit: 18       ASSESSMENTS       1  Sepsis on admission  2  Healthcare associated pneumonia - Hypoxia  3  Hypokalemia  4  COPD  5  Adenocarcinoma of the lung  6  Multiple myeloma      PLAN       · Tachypnea/tachycardia normalized with gradual improvement in fevers (last high-grade fever was 101 1° at 11 AM yesterday) - continue broad-spectrum IV Vancomycin/Cefepime for pneumonia - wean off oxygen into baseline of room air as tolerated - Legionella/strep urine Ag negative - influenza/RSV screen negative - preliminary blood cultures from 3/12 negative - pulmonology following - continue supportive care   · Replete hypokalemia with potassium chloride additive in IV fluids - serum magnesium within normal limits  · History of COPD noted - continue Xopenex/Spiriva regimen - maintain oxygenation and wean down to room air as tolerated  · Outpatient follow-up for lung cancer monitoring  · Outpatient chemotherapy for multiple myeloma per oncology      DVT Prophylaxis:  Lovenox      SUBJECTIVE     Seen and examined earlier this morning  Wife is at bedside during my encounter  Patient is currently on just 1 L of oxygen via nasal cannula  His wife states he was previously taken off oxygen but had to be put on again this morning with exertion  He denies any fever/chills or nausea/vomiting at this time  He states his coughing has improved  OBJECTIVE     Vitals:   Temp (24hrs), Av 6 °F (37 6 °C), Min:98 °F (36 7 °C), Max:101 1 °F (38 4 °C)    HR:  [87-94] 87  Resp:  [18] 18  BP: ()/(50-65) 93/52  SpO2:  [94 %-97 %] 95 %  Body mass index is 28 07 kg/m²  Input and Output Summary (last 24 hours):        Intake/Output Summary (Last 24 hours) at 18 1031  Last data filed at 03/14/18 0607   Gross per 24 hour   Intake           1647 5 ml   Output             1000 ml   Net            647 5 ml       Physical Exam:     GENERAL:  Well-developed/nourished - no acute distress  HEAD:  Normocephalic - atraumatic  EYES: PERRL - EOMI   MOUTH:  Mucosa moist  NECK:  Supple - full range of motion  CARDIAC:  Regular rate/rhythm - S1/S2 positive  PULMONARY:  Diminished bibasilar breath sounds with trace expiratory wheezes   ABDOMEN:  Soft - nontender/nondistended - active bowel sounds  MUSCULOSKELETAL:  Motor strength/range of motion intact  NEUROLOGIC:  Alert/oriented x 3  SKIN:  Age-appropriate wrinkles/blemishes   PSYCHIATRIC:  Mood/affect pleasant      ADDITIONAL DATA     Labs & Recent Cultures:       Results from last 7 days  Lab Units 03/14/18  0514 03/13/18  1026   WBC Thousand/uL 4 10* 7 86   HEMOGLOBIN g/dL 10 2* 11 7*   HEMATOCRIT % 30 6* 35 4*   PLATELETS Thousands/uL 139* 150   NEUTROS PCT %  --  81*   LYMPHS PCT %  --  13*   MONOS PCT %  --  6   EOS PCT %  --  0       Results from last 7 days  Lab Units 03/14/18  0514  03/12/18  0711   SODIUM mmol/L 139  < > 136   POTASSIUM mmol/L 3 4*  < > 4 0   CHLORIDE mmol/L 107  < > 102   CO2 mmol/L 25  < > 28   BUN mg/dL 9  < > 18   CREATININE mg/dL 0 88  < > 1 19   CALCIUM mg/dL 8 4  < > 9 8   TOTAL PROTEIN g/dL  --   --  6 9   BILIRUBIN TOTAL mg/dL  --   --  1 49*   ALK PHOS U/L  --   --  91   ALT U/L  --   --  32   AST U/L  --   --  36   GLUCOSE RANDOM mg/dL 97  < > 129   < > = values in this interval not displayed  Results from last 7 days  Lab Units 03/12/18  0711   INR  0 93           Results from last 7 days  Lab Units 03/12/18  1339 03/12/18  1105 03/12/18  0740 03/12/18  0700   BLOOD CULTURE   --   --  No Growth at 24 hrs  No Growth at 24 hrs     INFLUENZA A PCR   --  None Detected  --   --    INFLUENZA B PCR   --  None Detected  --   --    RSV PCR   --  None Detected  --   --    LEGIONELLA URINARY ANTIGEN  Negative  --   --   -- Last 24 Hours Medication List:     Current Facility-Administered Medications:  acetaminophen 650 mg Oral Q6H PRN Toribio Stoll MD    cefepime 1,000 mg Intravenous Q8H Toribio Stoll MD Last Rate: Stopped (03/14/18 0463)   dextromethorphan-guaiFENesin 10 mL Oral Q4H PRN Alan Ramirez MD    enoxaparin 40 mg Subcutaneous Daily Toribio Stoll MD    guaiFENesin 600 mg Oral Q12H [address] JACK Madrigal    levalbuterol 1 25 mg Nebulization Q6H JACK Madrigal    ondansetron 4 mg Intravenous Q8H PRN Toribio Stoll MD    saccharomyces boulardii 250 mg Oral BID JACK Madrigal    sodium chloride 3 mL Nebulization Q6H Rijesh R Darletta Schlatter, MD    sodium chloride 0 9 % with KCl 40 mEq/L 80 mL/hr Intravenous Continuous Alan Ramirez MD Last Rate: 80 mL/hr (03/14/18 1008)   tiotropium 18 mcg Inhalation Daily JACK Madrigal    vancomycin 12 5 mg/kg Intravenous Q12H Toribio Stoll MD Last Rate: Stopped (03/13/18 7088)          Time Spent for Care: 38 minutes  More than 50% of total time spent on counseling and coordination of care as described above  Current Length of Stay: 2 day(s)      Code Status: Level 1 - Full Code          ** Please Note: This note is constructed using a voice recognition dictation system   **

## 2018-03-14 NOTE — PROGRESS NOTES
Progress Note - Pulmonary   Domnick Bench 81 y o  male MRN: [id number]  Unit/Bed#: E2 -01 Encounter: [id number]      Assessment/Plan:  1  Sepsis secondary to a healthcare associated pneumonia  1  Continue treatment with cefepime and vancomycin will deescalate pending MRSA swab  2  Fluids as RSV negative  3  Legionella/strep culture negative  4  MRSA swab pending  5  Continue Mucinex b i d   6  Continue monitor temperatures and WBC  2  Acute respiratory insufficiency  1  Improving currently oxygenating well on room air  3  Chronic obstructive pulmonary disease without exacerbation  1  Continue to monitor off steroids  2  Continue Xopenex t i d   3  Continue daily Spiriva  4  History of diarrhea  1  Add Florastor  5  History of lung cancer  6  Multiple myeloma-last chemotherapy treatment 02/28/2018 in follows outpatient with Dr Selvin Grant        Subjective:   Kendall Otto was seen sitting upright in the bed upon entering the room  He reports a fever overnight with chills  He also reports a cough that is nonproductive today  Kendall Otto has also been experiencing intermittent nausea  He currently denies:  Chest pain, pain with inspiration, vomiting, bronchospasm, hemoptysis, night sweats  His wife was at the bedside reported concern of diarrhea which he experiences right commonly at baseline, and requested addition of Florastor  Objective:         Vitals: Blood pressure 93/52, pulse 87, temperature 99 3 °F (37 4 °C), temperature source Temporal, resp  rate 18, weight 84 kg (185 lb 3 oz), SpO2 95 % , RA, Body mass index is 28 07 kg/m²        Intake/Output Summary (Last 24 hours) at 03/14/18 1002  Last data filed at 03/14/18 0607   Gross per 24 hour   Intake           1647 5 ml   Output             1175 ml   Net            472 5 ml         Physical Exam  Gen: Awake, alert, oriented x 3, no acute distress  HEENT: Mucous membranes moist, no oral lesions, no thrush  NECK: No accessory muscle use, JVP not elevated  Cardiac: Regular, single S1, single S2, no murmurs, no rubs, no gallops  Lungs: crackles noted in the left base, no wheezes or rhonchi noted  Abdomen: normoactive bowel sounds, soft nontender, nondistended, no rebound or rigidity, no guarding  Extremities: no cyanosis, no clubbing, left ankle edema    Labs: I have personally reviewed pertinent lab results  , CBC:   Lab Results   Component Value Date    WBC 4 10 (L) 03/14/2018    HGB 10 2 (L) 03/14/2018    HCT 30 6 (L) 03/14/2018    MCV 88 03/14/2018     (L) 03/14/2018    MCH 29 4 03/14/2018    MCHC 33 3 03/14/2018    RDW 16 0 (H) 03/14/2018    MPV 9 3 03/14/2018    NRBC 0 03/13/2018   , CMP:   Lab Results   Component Value Date     03/14/2018    K 3 4 (L) 03/14/2018     03/14/2018    CO2 25 03/14/2018    ANIONGAP 7 03/14/2018    BUN 9 03/14/2018    CREATININE 0 88 03/14/2018    GLUCOSE 97 03/14/2018    CALCIUM 8 4 03/14/2018    EGFR 81 03/14/2018     Imaging and other studies: no new pulmonary imaging to review      JACK Montes

## 2018-03-14 NOTE — PLAN OF CARE
Problem: DISCHARGE PLANNING - CARE MANAGEMENT  Goal: Discharge to post-acute care or home with appropriate resources  INTERVENTIONS:  - Conduct assessment to determine patient/family and health care team treatment goals, and need for post-acute services based on payer coverage, community resources, and patient preferences, and barriers to discharge  - Address psychosocial, clinical, and financial barriers to discharge as identified in assessment in conjunction with the patient/family and health care team  - Arrange appropriate level of post-acute services according to patients   needs and preference and payer coverage in collaboration with the physician and health care team  - Communicate with and update the patient/family, physician, and health care team regarding progress on the discharge plan  - Arrange appropriate transportation to post-acute venues  Outcome: Completed Date Met: 03/14/18  Patient to discharge home when medically cleared  No needs at present  CM to follow as needed

## 2018-03-15 ENCOUNTER — TELEPHONE (OUTPATIENT)
Dept: HEMATOLOGY ONCOLOGY | Facility: CLINIC | Age: 83
End: 2018-03-15
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
## 2018-03-15 PROBLEM — R50.9 FEVER: Status: RESOLVED | Noted: 2018-03-15 | Resolved: 2018-03-15

## 2018-03-15 PROBLEM — A41.9 SEPSIS (HCC): Status: RESOLVED | Noted: 2018-01-03 | Resolved: 2018-03-15

## 2018-03-15 PROBLEM — J18.9 HOSPITAL ACQUIRED PNA: Status: ACTIVE | Noted: 2018-03-15

## 2018-03-15 PROBLEM — A41.9 SEPSIS (HCC): Status: ACTIVE | Noted: 2018-03-15

## 2018-03-15 PROBLEM — K62.89 RECTAL MASS: Status: ACTIVE | Noted: 2017-07-10

## 2018-03-15 PROBLEM — R53.83 FATIGUE: Status: RESOLVED | Noted: 2018-03-15 | Resolved: 2018-03-15

## 2018-03-15 PROBLEM — I95.9 ARTERIAL HYPOTENSION: Status: RESOLVED | Noted: 2018-01-03 | Resolved: 2018-03-15

## 2018-03-15 PROBLEM — R53.83 FATIGUE: Status: ACTIVE | Noted: 2018-03-15

## 2018-03-15 PROBLEM — R50.9 FEVER: Status: ACTIVE | Noted: 2018-03-15

## 2018-03-15 PROBLEM — J18.9 HCAP (HEALTHCARE-ASSOCIATED PNEUMONIA): Status: RESOLVED | Noted: 2018-01-03 | Resolved: 2018-03-15

## 2018-03-15 LAB
ANION GAP SERPL CALCULATED.3IONS-SCNC: 8 MMOL/L (ref 4–13)
BASOPHILS # BLD AUTO: 0.02 THOUSANDS/ΜL (ref 0–0.1)
BASOPHILS NFR BLD AUTO: 0 % (ref 0–1)
BUN SERPL-MCNC: 6 MG/DL (ref 5–25)
CALCIUM SERPL-MCNC: 8.7 MG/DL (ref 8.3–10.1)
CHLORIDE SERPL-SCNC: 107 MMOL/L (ref 100–108)
CO2 SERPL-SCNC: 23 MMOL/L (ref 21–32)
CREAT SERPL-MCNC: 0.94 MG/DL (ref 0.6–1.3)
EOSINOPHIL # BLD AUTO: 0.12 THOUSAND/ΜL (ref 0–0.61)
EOSINOPHIL NFR BLD AUTO: 2 % (ref 0–6)
ERYTHROCYTE [DISTWIDTH] IN BLOOD BY AUTOMATED COUNT: 15.8 % (ref 11.6–15.1)
GFR SERPL CREATININE-BSD FRML MDRD: 76 ML/MIN/1.73SQ M
GLUCOSE SERPL-MCNC: 90 MG/DL (ref 65–140)
HCT VFR BLD AUTO: 30.7 % (ref 36.5–49.3)
HGB BLD-MCNC: 10.3 G/DL (ref 12–17)
LYMPHOCYTES # BLD AUTO: 1.05 THOUSANDS/ΜL (ref 0.6–4.47)
LYMPHOCYTES NFR BLD AUTO: 20 % (ref 14–44)
MCH RBC QN AUTO: 29.3 PG (ref 26.8–34.3)
MCHC RBC AUTO-ENTMCNC: 33.6 G/DL (ref 31.4–37.4)
MCV RBC AUTO: 88 FL (ref 82–98)
MONOCYTES # BLD AUTO: 0.53 THOUSAND/ΜL (ref 0.17–1.22)
MONOCYTES NFR BLD AUTO: 10 % (ref 4–12)
NEUTROPHILS # BLD AUTO: 3.6 THOUSANDS/ΜL (ref 1.85–7.62)
NEUTS SEG NFR BLD AUTO: 68 % (ref 43–75)
NRBC BLD AUTO-RTO: 0 /100 WBCS
PLATELET # BLD AUTO: 155 THOUSANDS/UL (ref 149–390)
PMV BLD AUTO: 9.2 FL (ref 8.9–12.7)
POTASSIUM SERPL-SCNC: 3.7 MMOL/L (ref 3.5–5.3)
RBC # BLD AUTO: 3.51 MILLION/UL (ref 3.88–5.62)
SODIUM SERPL-SCNC: 138 MMOL/L (ref 136–145)
WBC # BLD AUTO: 5.32 THOUSAND/UL (ref 4.31–10.16)

## 2018-03-15 PROCEDURE — G8980 MOBILITY D/C STATUS: HCPCS

## 2018-03-15 PROCEDURE — 99232 SBSQ HOSP IP/OBS MODERATE 35: CPT | Performed by: INTERNAL MEDICINE

## 2018-03-15 PROCEDURE — 94760 N-INVAS EAR/PLS OXIMETRY 1: CPT

## 2018-03-15 PROCEDURE — 80048 BASIC METABOLIC PNL TOTAL CA: CPT | Performed by: INTERNAL MEDICINE

## 2018-03-15 PROCEDURE — G8979 MOBILITY GOAL STATUS: HCPCS

## 2018-03-15 PROCEDURE — G8978 MOBILITY CURRENT STATUS: HCPCS

## 2018-03-15 PROCEDURE — 97116 GAIT TRAINING THERAPY: CPT

## 2018-03-15 PROCEDURE — 85025 COMPLETE CBC W/AUTO DIFF WBC: CPT | Performed by: INTERNAL MEDICINE

## 2018-03-15 PROCEDURE — 94640 AIRWAY INHALATION TREATMENT: CPT

## 2018-03-15 RX ORDER — LEVALBUTEROL 1.25 MG/.5ML
1.25 SOLUTION, CONCENTRATE RESPIRATORY (INHALATION)
Status: DISCONTINUED | OUTPATIENT
Start: 2018-03-15 | End: 2018-03-16 | Stop reason: HOSPADM

## 2018-03-15 RX ORDER — SODIUM CHLORIDE FOR INHALATION 0.9 %
3 VIAL, NEBULIZER (ML) INHALATION
Status: DISCONTINUED | OUTPATIENT
Start: 2018-03-15 | End: 2018-03-16 | Stop reason: HOSPADM

## 2018-03-15 RX ADMIN — LEVALBUTEROL HYDROCHLORIDE 1.25 MG: 1.25 SOLUTION, CONCENTRATE RESPIRATORY (INHALATION) at 07:30

## 2018-03-15 RX ADMIN — CEFEPIME HYDROCHLORIDE 1000 MG: 1 INJECTION, SOLUTION INTRAVENOUS at 13:27

## 2018-03-15 RX ADMIN — LEVALBUTEROL HYDROCHLORIDE 1.25 MG: 1.25 SOLUTION, CONCENTRATE RESPIRATORY (INHALATION) at 13:26

## 2018-03-15 RX ADMIN — ISODIUM CHLORIDE 3 ML: 0.03 SOLUTION RESPIRATORY (INHALATION) at 07:30

## 2018-03-15 RX ADMIN — Medication 250 MG: at 18:08

## 2018-03-15 RX ADMIN — ISODIUM CHLORIDE 3 ML: 0.03 SOLUTION RESPIRATORY (INHALATION) at 13:26

## 2018-03-15 RX ADMIN — GUAIFENESIN 600 MG: 600 TABLET, EXTENDED RELEASE ORAL at 08:46

## 2018-03-15 RX ADMIN — CEFEPIME HYDROCHLORIDE 1000 MG: 1 INJECTION, SOLUTION INTRAVENOUS at 04:37

## 2018-03-15 RX ADMIN — Medication 250 MG: at 08:48

## 2018-03-15 RX ADMIN — GUAIFENESIN 600 MG: 600 TABLET, EXTENDED RELEASE ORAL at 20:55

## 2018-03-15 RX ADMIN — ENOXAPARIN SODIUM 40 MG: 40 INJECTION SUBCUTANEOUS at 08:45

## 2018-03-15 RX ADMIN — LEVALBUTEROL HYDROCHLORIDE 1.25 MG: 1.25 SOLUTION, CONCENTRATE RESPIRATORY (INHALATION) at 19:27

## 2018-03-15 RX ADMIN — TIOTROPIUM BROMIDE 18 MCG: 18 CAPSULE ORAL; RESPIRATORY (INHALATION) at 08:46

## 2018-03-15 RX ADMIN — ISODIUM CHLORIDE 3 ML: 0.03 SOLUTION RESPIRATORY (INHALATION) at 19:27

## 2018-03-15 RX ADMIN — CEFEPIME HYDROCHLORIDE 1000 MG: 1 INJECTION, SOLUTION INTRAVENOUS at 20:55

## 2018-03-15 NOTE — TELEPHONE ENCOUNTER
SPOKE WITH PATIENT'S WIFE  SHE WANTS TO HAVE PATIENT TO CHEMO ON REGULARLY SCHEDULED APPT  OK PER DR Sofia Orf

## 2018-03-15 NOTE — SOCIAL WORK
CM met with patient at bedside to address discharge needs  Patient reports living in a two story house with spouse  Patient was independent with ADLs and functional mobility  Patient reports having a cane at home  Patient denies use of VNA PTA  POA identified as patient's spouse Sadia Anderson  PCP identified as Dr Kimberley Beltran  Spouse available at discharge to transport patient home  Help/support reported  Patient made aware of CM's name and role  No other needs at present  CM to follow as needed

## 2018-03-15 NOTE — ASSESSMENT & PLAN NOTE
· No acute exacerbation  · Continue with Spiriva, Xopenex, Robitussin, Mucinex  · Monitor clinically

## 2018-03-15 NOTE — PROGRESS NOTES
Progress Note - Pulmonary   Rajan Delgado 81 y o  male MRN: [id number]  Unit/Bed#: E2 -01 Encounter: [id number]      Assessment/Plan:  1  Sepsis at the time of admission secondary to healthcare associated pneumonia  1  Continue treatment with cefepime today is day 4/7-vancomycin discontinued after MRSA swab returned negative  2  Flu/RSV negative  3  Lesion also strep culture negative  4  MRSA  Negative  5  Continue Mucinex b i d  continue to monitor temperatures and WBC  2  Acute respiratory insufficiency-currently oxygenating well on room air  3  Chronic obstructive pulmonary disease without exacerbation  1  No indication for steroid at this time  2  Continue Xopenex t i d   3  Continue daily Spiriva  4  History of diarrhea  1  Denies any diarrhea at this time Florastor added yesterday per patient request  5  History of lung cancer  6  Multiple myeloma-last chemotherapy 02/28/2018 follows outpatient with Dr Treasure Traore    * patient will follow up in office with Dr [place] Mcqueen at the time of discharge, wife at bedside reports they have an appointment already scheduled  Anticipate discharge within the next 24 hours as long clinical presentation remains stable  Subjective:   Giovanni Miller was seen lying in bed upon entering the room  He reports significant improvement in his shortness of breath and cough, reports night sweats that are at his baseline  He continues to experience a loose cough with clear sputum production but denies any hemoptysis today  He feels his fatigue is better and reports walking in the grant yesterday without much difficulty  He currently denies:  Chest pain, pain with inspiration, fevers, chills, nausea, vomiting, headache, dizziness, hemoptysis  White expresses concern with patient being discharged today prior to an increase in his activity  Spoke with patient and encourage increase ambulation throughout the day    He is in agreement in hopes to be discharged in the morning of 3/16/2018      Objective: Vitals: Blood pressure 110/61, pulse 84, temperature 97 8 °F (36 6 °C), temperature source Tympanic, resp  rate 18, weight 84 1 kg (185 lb 8 oz), SpO2 93 % , RA, Body mass index is 28 11 kg/m²  Intake/Output Summary (Last 24 hours) at 03/15/18 0953  Last data filed at 03/15/18 0839   Gross per 24 hour   Intake           732 67 ml   Output              600 ml   Net           132 67 ml         Physical Exam  Gen: Awake, alert, oriented x 3, no acute distress  HEENT: Mucous membranes moist, no oral lesions, no thrush  NECK: No accessory muscle use, JVP not elevated  Cardiac: Regular, single S1, single S2, no murmurs, no rubs, no gallops  Lungs:  Lungs clear to auscultation bilaterally no wheezes rhonchi or rales appreciated  Abdomen: normoactive bowel sounds, soft nontender, nondistended, no rebound or rigidity, no guarding  Extremities: no cyanosis, no clubbing, left ankle swelling    Labs: I have personally reviewed pertinent lab results  , CBC:   Lab Results   Component Value Date    WBC 5 32 03/15/2018    HGB 10 3 (L) 03/15/2018    HCT 30 7 (L) 03/15/2018    MCV 88 03/15/2018     03/15/2018    MCH 29 3 03/15/2018    MCHC 33 6 03/15/2018    RDW 15 8 (H) 03/15/2018    MPV 9 2 03/15/2018    NRBC 0 03/15/2018   , CMP:   Lab Results   Component Value Date     03/15/2018    K 3 7 03/15/2018     03/15/2018    CO2 23 03/15/2018    ANIONGAP 8 03/15/2018    BUN 6 03/15/2018    CREATININE 0 94 03/15/2018    GLUCOSE 90 03/15/2018    CALCIUM 8 7 03/15/2018    EGFR 76 03/15/2018     Imaging and other studies: No new pulmonary imaging to review      JACK Rivas

## 2018-03-15 NOTE — ASSESSMENT & PLAN NOTE
· Patient will resume follow-up with Oncology as an outpatient, he will keep the same follow up also for history of multiple myeloma

## 2018-03-15 NOTE — PLAN OF CARE
Problem: PHYSICAL THERAPY ADULT  Goal: Performs mobility at highest level of function for planned discharge setting  See evaluation for individualized goals  Treatment/Interventions: Functional transfer training, LE strengthening/ROM, Elevations, Therapeutic exercise, Endurance training, Patient/family training, Equipment eval/education, Bed mobility, Gait training, Compensatory technique education, Continued evaluation, Spoke to nursing, OT          See flowsheet documentation for full assessment, interventions and recommendations  Outcome: Completed Date Met: 03/15/18  Prognosis: Good  Problem List: Decreased endurance, Impaired balance, Decreased mobility  Assessment: PT showing good progress toward goals  Pt demonstrates ability to perform bed mobility I'ly, transfer with mod I and ambulation with mod I for > household distances  Pt demonstrates forward posture reciprocal fluent gait pattern and no lob or unsteadiness  Pt progressed to stair climbing with use of R rail and spc with supervision  Pt perform with non- reciprocal to reciprocal gait pattern  Noted HESTER requiring one standing rest break upon reaching top landing of steps  Pt ahs achieved inpt Pt goals not further Pt warranted at this time  Will d/c PT  Recommend d/c to home with family support  Barriers to Discharge: Inaccessible home environment  Barriers to Discharge Comments: stairs  Recommendation: Home with family support     PT - OK to Discharge: Yes    See flowsheet documentation for full assessment

## 2018-03-15 NOTE — PROGRESS NOTES
Progress Note - Misty Bustamanter 1935, 81 y o  male MRN: [id number]    Unit/Bed#: E2 -01 Encounter: [id number]    Primary Care Provider: Charli Muniz DO   Date and time admitted to hospital: 3/12/2018  6:36 AM        Hospital acquired PNA   Assessment & Plan    · Please refer to below        COPD (chronic obstructive pulmonary disease) ([facility] )   Assessment & Plan    · No acute exacerbation  · Continue with Spiriva, Xopenex, Robitussin, Mucinex  · Monitor clinically        Adenocarcinoma, lung ([facility] )   Assessment & Plan    · Patient will resume follow-up with Oncology as an outpatient, he will keep the same follow up also for history of multiple myeloma        * Sepsis ([place] [address] )   Assessment & Plan    · Present on admission, secondary to healthcare acquired pneumonia, presumed from Gram-negative as MRSA nose negative  · Resolved  · Vancomycin was discontinued, continue with cefepime today and tomorrow, plan to discharge the patient tomorrow if night uneventful  · Pulmonary following, patient will follow up as an outpatient  · Culture remained negative  · Flu and RSV swab negative  · Follow-up CT scan of the chest in 3 months as outpatient with Pulmonary          VTE Pharmacologic Prophylaxis: yes  Pharmacologic: Enoxaparin (Lovenox)  Mechanical VTE Prophylaxis in Place: Yes    Patient Centered Rounds: I have performed bedside rounds with nursing staff today  Discussions with Specialists or Other Care Team Provider:  None    Education and Discussions with Family / Patient:  Patient and his wife    Time Spent for Care: 45 minutes  More than 50% of total time spent on counseling and coordination of care as described above      Current Length of Stay: 3 day(s)    Current Patient Status: Inpatient   Certification Statement: The patient will continue to require additional inpatient hospital stay due to Please refer to above    Discharge Plan:  Home likely tomorrow morning    Code Status: Level 1 - Full Code      Subjective:   Patient states that he is feeling good today  He reports that he has walked the hallway of the unit at least twice without shortness of breath  Objective:     Vitals:   Temp (24hrs), Av 8 °F (37 1 °C), Min:97 8 °F (36 6 °C), Max:99 5 °F (37 5 °C)    HR:  [84-96] 93  Resp:  [18] 18  BP: ()/(54-80) 99/61  SpO2:  [93 %-96 %] 94 %  Body mass index is 28 11 kg/m²  Input and Output Summary (last 24 hours): Intake/Output Summary (Last 24 hours) at 03/15/18 1334  Last data filed at 03/15/18 0839   Gross per 24 hour   Intake           732 67 ml   Output              600 ml   Net           132 67 ml       Physical Exam:     Physical Exam   Constitutional: He is oriented to person, place, and time  He appears well-developed  Cardiovascular: Normal rate and regular rhythm  Murmur heard  Pulmonary/Chest: Effort normal  No respiratory distress  He has no wheezes  He has no rales  Abdominal: Soft  He exhibits no distension  There is no tenderness  There is no rebound  Musculoskeletal: He exhibits no edema  Neurological: He is alert and oriented to person, place, and time  He exhibits normal muscle tone  Skin: Skin is warm  Psychiatric: He has a normal mood and affect         Additional Data:     Labs:      Results from last 7 days  Lab Units 03/15/18  0443   WBC Thousand/uL 5 32   HEMOGLOBIN g/dL 10 3*   HEMATOCRIT % 30 7*   PLATELETS Thousands/uL 155   NEUTROS PCT % 68   LYMPHS PCT % 20   MONOS PCT % 10   EOS PCT % 2       Results from last 7 days  Lab Units 03/15/18  0443  18  0711   SODIUM mmol/L 138  < > 136   POTASSIUM mmol/L 3 7  < > 4 0   CHLORIDE mmol/L 107  < > 102   CO2 mmol/L 23  < > 28   BUN mg/dL 6  < > 18   CREATININE mg/dL 0 94  < > 1 19   CALCIUM mg/dL 8 7  < > 9 8   TOTAL PROTEIN g/dL  --   --  6 9   BILIRUBIN TOTAL mg/dL  --   --  1 49*   ALK PHOS U/L  --   --  91   ALT U/L  --   --  32   AST U/L  --   --  36   GLUCOSE RANDOM mg/dL 90  < > 129 < > = values in this interval not displayed  Results from last 7 days  Lab Units 03/12/18  0711   INR  0 93       * I Have Reviewed All Lab Data Listed Above  * Additional Pertinent Lab Tests Reviewed: All Labs Within Last 24 Hours Reviewed    Imaging:    Imaging Reports Reviewed Today Include:  Images done during this admission  Imaging Personally Reviewed by Myself Includes:  None    Recent Cultures (last 7 days):       Results from last 7 days  Lab Units 03/12/18  1339 03/12/18  1105 03/12/18  0740 03/12/18  0700   BLOOD CULTURE   --   --  No Growth at 72 hrs  No Growth at 72 hrs  INFLUENZA A PCR   --  None Detected  --   --    INFLUENZA B PCR   --  None Detected  --   --    RSV PCR   --  None Detected  --   --    LEGIONELLA URINARY ANTIGEN  Negative  --   --   --        Last 24 Hours Medication List:     Current Facility-Administered Medications:  acetaminophen 650 mg Oral Q6H PRN Gustavo Quinones MD    cefepime 1,000 mg Intravenous Q8H Toribio Stoll MD Last Rate: 1,000 mg (03/15/18 1327)   dextromethorphan-guaiFENesin 10 mL Oral Q4H PRN Alan Ramirez MD    enoxaparin 40 mg Subcutaneous Daily Toribio Stoll MD    guaiFENesin 600 mg Oral Q12H [facility] JACK Madrigal    levalbuterol 1 25 mg Nebulization TID Demetrice Wilde MD    ondansetron 4 mg Intravenous Q8H PRN Toribio Stoll MD    saccharomyces boulardii 250 mg Oral BID JACK Madrigal    sodium chloride 3 mL Nebulization TID Demetrice Wilde MD    tiotropium 18 mcg Inhalation Daily JACK Madrigal         Today, Patient Was Seen By: Demetrice Wilde MD    ** Please Note: Dragon 360 Dictation voice to text software may have been used in the creation of this document   **

## 2018-03-15 NOTE — ASSESSMENT & PLAN NOTE
· Present on admission, secondary to healthcare acquired pneumonia, presumed from Gram-negative as MRSA nose negative  · Resolved  · Vancomycin was discontinued, continue with cefepime today and tomorrow, plan to discharge the patient tomorrow if night uneventful  · Pulmonary following, patient will follow up as an outpatient  · Culture remained negative  · Flu and RSV swab negative  · Follow-up CT scan of the chest in 3 months as outpatient with Pulmonary

## 2018-03-15 NOTE — PHYSICAL THERAPY NOTE
Physical Therapy Treatment Note     03/15/18 1628   Pain Assessment   Pain Assessment No/denies pain   Pain Score No Pain   Restrictions/Precautions   Other Precautions Fall Risk   General   Chart Reviewed Yes   Response to Previous Treatment Patient with no complaints from previous session  Family/Caregiver Present Yes   Cognition   Overall Cognitive Status WFL   Subjective   Subjective " I can walk  I walked twice already today "     Bed Mobility   Supine to Sit 7  Independent   Additional items HOB elevated   Sit to Supine 7  Independent   Transfers   Sit to Stand 7  Independent   Stand to Sit 7  Independent   Ambulation/Elevation   Gait pattern Forward Flexion  (reciprocal gait apttern)   Gait Assistance 6  Modified independent   Assistive Device Straight cane   Distance 100'x1, 200'x1   Stair Management Assistance 5  Supervision   Additional items Assist x 1;Verbal cues   Stair Management Technique One rail R;With cane; Foreward;Reciprocal;Nonreciprocal   Number of Stairs 10   Balance   Static Sitting Normal   Dynamic Sitting Good   Static Standing Good   Dynamic Standing Fair +   Ambulatory Fair +   Endurance Deficit   Endurance Deficit Description HESTER   Activity Tolerance   Activity Tolerance Patient tolerated treatment well  (HESTER)   Assessment   Prognosis Good   Problem List Decreased endurance; Impaired balance;Decreased mobility   Assessment PT showing good progress toward goals  Pt demonstrates ability to perform bed mobility I'ly, transfer with mod I and ambulation with mod I for > household distances  Pt demonstrates forward posture reciprocal fluent gait pattern and no lob or unsteadiness  Pt progressed to stair climbing with use of R rail and spc with supervision  Pt perform with non- reciprocal to reciprocal gait pattern  Noted HESTER requiring one standing rest break upon reaching top landing of steps  Pt ahs achieved inpt Pt goals not further Pt warranted at this time  Will d/c PT   Recommend d/c to home with family support  Goals   Patient Goals to go home  STG Expiration Date 03/23/18   Treatment Day 1   Plan   Treatment/Interventions Functional transfer training;Elevations; Endurance training;Bed mobility;Gait training;Family   Progress Discontinue PT  (pt has achieved inpt Pt goals)   Recommendation   Recommendation Home with family support   PT - OK to Discharge Yes         Nelsy Freeman, PTA

## 2018-03-16 VITALS
TEMPERATURE: 97.9 F | SYSTOLIC BLOOD PRESSURE: 114 MMHG | OXYGEN SATURATION: 95 % | HEART RATE: 89 BPM | DIASTOLIC BLOOD PRESSURE: 56 MMHG | BODY MASS INDEX: 28.11 KG/M2 | RESPIRATION RATE: 18 BRPM | WEIGHT: 185.5 LBS

## 2018-03-16 DIAGNOSIS — I77.89 AORTIC ROOT ENLARGEMENT (HCC): ICD-10-CM

## 2018-03-16 DIAGNOSIS — I35.0 NONRHEUMATIC AORTIC VALVE STENOSIS: Primary | ICD-10-CM

## 2018-03-16 PROBLEM — R94.31 QT PROLONGATION: Status: ACTIVE | Noted: 2018-03-16

## 2018-03-16 LAB
ANION GAP SERPL CALCULATED.3IONS-SCNC: 8 MMOL/L (ref 4–13)
ATRIAL RATE: 95 BPM
BASOPHILS # BLD AUTO: 0.02 THOUSANDS/ΜL (ref 0–0.1)
BASOPHILS NFR BLD AUTO: 1 % (ref 0–1)
BUN SERPL-MCNC: 7 MG/DL (ref 5–25)
CALCIUM SERPL-MCNC: 8.9 MG/DL (ref 8.3–10.1)
CHLORIDE SERPL-SCNC: 106 MMOL/L (ref 100–108)
CO2 SERPL-SCNC: 24 MMOL/L (ref 21–32)
CREAT SERPL-MCNC: 0.93 MG/DL (ref 0.6–1.3)
EOSINOPHIL # BLD AUTO: 0.13 THOUSAND/ΜL (ref 0–0.61)
EOSINOPHIL NFR BLD AUTO: 3 % (ref 0–6)
ERYTHROCYTE [DISTWIDTH] IN BLOOD BY AUTOMATED COUNT: 15.5 % (ref 11.6–15.1)
GFR SERPL CREATININE-BSD FRML MDRD: 77 ML/MIN/1.73SQ M
GLUCOSE SERPL-MCNC: 89 MG/DL (ref 65–140)
HCT VFR BLD AUTO: 32 % (ref 36.5–49.3)
HGB BLD-MCNC: 10.7 G/DL (ref 12–17)
LYMPHOCYTES # BLD AUTO: 1.01 THOUSANDS/ΜL (ref 0.6–4.47)
LYMPHOCYTES NFR BLD AUTO: 24 % (ref 14–44)
MAGNESIUM SERPL-MCNC: 1.7 MG/DL (ref 1.6–2.6)
MCH RBC QN AUTO: 29.2 PG (ref 26.8–34.3)
MCHC RBC AUTO-ENTMCNC: 33.4 G/DL (ref 31.4–37.4)
MCV RBC AUTO: 87 FL (ref 82–98)
MONOCYTES # BLD AUTO: 0.44 THOUSAND/ΜL (ref 0.17–1.22)
MONOCYTES NFR BLD AUTO: 11 % (ref 4–12)
NEUTROPHILS # BLD AUTO: 2.58 THOUSANDS/ΜL (ref 1.85–7.62)
NEUTS SEG NFR BLD AUTO: 61 % (ref 43–75)
NRBC BLD AUTO-RTO: 0 /100 WBCS
P AXIS: 60 DEGREES
PHOSPHATE SERPL-MCNC: 3.2 MG/DL (ref 2.3–4.1)
PLATELET # BLD AUTO: 158 THOUSANDS/UL (ref 149–390)
PMV BLD AUTO: 9.1 FL (ref 8.9–12.7)
POTASSIUM SERPL-SCNC: 3.4 MMOL/L (ref 3.5–5.3)
PR INTERVAL: 226 MS
QRS AXIS: 49 DEGREES
QRSD INTERVAL: 122 MS
QT INTERVAL: 362 MS
QTC INTERVAL: 454 MS
RBC # BLD AUTO: 3.67 MILLION/UL (ref 3.88–5.62)
SODIUM SERPL-SCNC: 138 MMOL/L (ref 136–145)
T WAVE AXIS: 31 DEGREES
VENTRICULAR RATE: 95 BPM
WBC # BLD AUTO: 4.18 THOUSAND/UL (ref 4.31–10.16)

## 2018-03-16 PROCEDURE — 94640 AIRWAY INHALATION TREATMENT: CPT

## 2018-03-16 PROCEDURE — 83735 ASSAY OF MAGNESIUM: CPT | Performed by: INTERNAL MEDICINE

## 2018-03-16 PROCEDURE — 84100 ASSAY OF PHOSPHORUS: CPT | Performed by: INTERNAL MEDICINE

## 2018-03-16 PROCEDURE — 93005 ELECTROCARDIOGRAM TRACING: CPT | Performed by: INTERNAL MEDICINE

## 2018-03-16 PROCEDURE — 85025 COMPLETE CBC W/AUTO DIFF WBC: CPT | Performed by: INTERNAL MEDICINE

## 2018-03-16 PROCEDURE — 93010 ELECTROCARDIOGRAM REPORT: CPT | Performed by: INTERNAL MEDICINE

## 2018-03-16 PROCEDURE — 94760 N-INVAS EAR/PLS OXIMETRY 1: CPT

## 2018-03-16 PROCEDURE — 99239 HOSP IP/OBS DSCHRG MGMT >30: CPT | Performed by: INTERNAL MEDICINE

## 2018-03-16 PROCEDURE — 99232 SBSQ HOSP IP/OBS MODERATE 35: CPT | Performed by: INTERNAL MEDICINE

## 2018-03-16 PROCEDURE — 80048 BASIC METABOLIC PNL TOTAL CA: CPT | Performed by: INTERNAL MEDICINE

## 2018-03-16 PROCEDURE — 99221 1ST HOSP IP/OBS SF/LOW 40: CPT | Performed by: INTERNAL MEDICINE

## 2018-03-16 RX ORDER — POTASSIUM CHLORIDE 20MEQ/15ML
20 LIQUID (ML) ORAL DAILY
Status: DISCONTINUED | OUTPATIENT
Start: 2018-03-17 | End: 2018-03-16 | Stop reason: HOSPADM

## 2018-03-16 RX ORDER — POTASSIUM CHLORIDE 20MEQ/15ML
40 LIQUID (ML) ORAL ONCE
Status: COMPLETED | OUTPATIENT
Start: 2018-03-16 | End: 2018-03-16

## 2018-03-16 RX ORDER — GUAIFENESIN 600 MG
600 TABLET, EXTENDED RELEASE 12 HR ORAL EVERY 12 HOURS SCHEDULED
Qty: 60 TABLET | Refills: 0 | Status: SHIPPED | OUTPATIENT
Start: 2018-03-16 | End: 2020-01-01 | Stop reason: ALTCHOICE

## 2018-03-16 RX ORDER — POTASSIUM CHLORIDE 20MEQ/15ML
20 LIQUID (ML) ORAL DAILY
Qty: 450 ML | Refills: 0 | Status: SHIPPED | OUTPATIENT
Start: 2018-03-17 | End: 2018-03-28

## 2018-03-16 RX ORDER — ACETAMINOPHEN 325 MG/1
650 TABLET ORAL EVERY 6 HOURS PRN
Qty: 30 TABLET | Refills: 0 | Status: SHIPPED | OUTPATIENT
Start: 2018-03-16 | End: 2018-05-31

## 2018-03-16 RX ORDER — CEFDINIR 300 MG/1
300 CAPSULE ORAL EVERY 12 HOURS SCHEDULED
Status: DISCONTINUED | OUTPATIENT
Start: 2018-03-16 | End: 2018-03-16 | Stop reason: HOSPADM

## 2018-03-16 RX ORDER — MAGNESIUM SULFATE HEPTAHYDRATE 40 MG/ML
2 INJECTION, SOLUTION INTRAVENOUS ONCE
Status: COMPLETED | OUTPATIENT
Start: 2018-03-16 | End: 2018-03-16

## 2018-03-16 RX ORDER — GUAIFENESIN/DEXTROMETHORPHAN 100-10MG/5
10 SYRUP ORAL EVERY 4 HOURS PRN
Qty: 118 ML | Refills: 0 | Status: SHIPPED | OUTPATIENT
Start: 2018-03-16 | End: 2018-07-25

## 2018-03-16 RX ORDER — CEFDINIR 300 MG/1
300 CAPSULE ORAL EVERY 12 HOURS SCHEDULED
Qty: 6 CAPSULE | Refills: 0 | Status: SHIPPED | OUTPATIENT
Start: 2018-03-16 | End: 2018-03-19

## 2018-03-16 RX ADMIN — ISODIUM CHLORIDE 3 ML: 0.03 SOLUTION RESPIRATORY (INHALATION) at 07:59

## 2018-03-16 RX ADMIN — CEFEPIME HYDROCHLORIDE 1000 MG: 1 INJECTION, SOLUTION INTRAVENOUS at 05:00

## 2018-03-16 RX ADMIN — CEFDINIR 300 MG: 300 CAPSULE ORAL at 11:20

## 2018-03-16 RX ADMIN — Medication 250 MG: at 09:00

## 2018-03-16 RX ADMIN — MAGNESIUM SULFATE HEPTAHYDRATE 2 G: 40 INJECTION, SOLUTION INTRAVENOUS at 11:20

## 2018-03-16 RX ADMIN — LEVALBUTEROL HYDROCHLORIDE 1.25 MG: 1.25 SOLUTION, CONCENTRATE RESPIRATORY (INHALATION) at 07:59

## 2018-03-16 RX ADMIN — TIOTROPIUM BROMIDE 18 MCG: 18 CAPSULE ORAL; RESPIRATORY (INHALATION) at 09:00

## 2018-03-16 RX ADMIN — POTASSIUM CHLORIDE 40 MEQ: 20 SOLUTION ORAL at 09:01

## 2018-03-16 RX ADMIN — GUAIFENESIN 600 MG: 600 TABLET, EXTENDED RELEASE ORAL at 09:00

## 2018-03-16 NOTE — PLAN OF CARE
INFECTION - ADULT     Absence or prevention of progression during hospitalization Adequate for Discharge        Potential for Falls     Patient will remain free of falls Adequate for Discharge        Prexisting or High Potential for Compromised Skin Integrity     Skin integrity is maintained or improved Adequate for Discharge        RESPIRATORY - ADULT     Achieves optimal ventilation and oxygenation Adequate for Discharge

## 2018-03-16 NOTE — CONSULTS
Consult - Cardiology   Cinthya Lance 81 y o  male MRN: [id number]  Unit/Bed#: E2 -01 Encounter: [id number]          Reason For Consult:  Prolonged QT interval    History Of Present Illness: The patient is an 80-year-old male who had seen us for cardiac clearance last year  This was prior to colon cancer surgery  He was found to have mild to moderate aortic valve stenosis and very mild aortic root involvement at 4 0 cm  He was admitted to the hospital 5 days ago with community-acquired pneumonia  An EKG early in the hospitalization did show a prominent U wave with a modestly prolonged QT interval   Concern was raised over this  The patient is feeling much better stating his cough is better and shortness of breath is back to baseline  He had no chest pain at any time  He does have chronic lower extremity edema being greater on the left than the right leg  He denies palpitations or lightheadedness  Past Medical History:   Lung cancer status post partial right lung resection  Mild-to-moderate aortic valve stenosis on echocardiogram October of 2017  Mild aortic root enlargement at 4 0 cm on echo in 2017  Note not commented on on CT scan here  Asthmatic COPD  Rectal cancer status post resection last year  Multiple myeloma  BPH  Osteoporosis with compression fracture  Inguinal hernia repair  Cholecystectomy  Back surgery  Obstructive sleep apnea  History of radiation pneumonitis              Allergy:  Allergies   Allergen Reactions    Atorvastatin Other (See Comments)     UNKNOWN    Sulfa Antibiotics Itching    Penicillin V Rash     AS CHILD       Medications:   At home  Albuterol  Vitamin D3  Vitamin B12  Decadron  Proscar  Mucinex  DuoNeb  Multivitamins  Fish oil  Potassium  Rapaflo  Spiriva        Family History:  Diabetes and lung cancer    Social History:  Quit smoking about 8 years ago had smoked 1 pack of cigarettes per day up until then does not consume alcohol        ROS:  Negative for TIAs or claudication    Exam:  114/56  Pulse 89  General:  Pleasant elderly male no acute distress   Head: Normocephalic, atraumatic  Eyes:  No Icterus  Pale Conjunctiva  Oropharynx: normal-appearing mucosa and no pharyngitis, no exudate  Neck: supple, symmetrical, trachea midline, thyroid: not enlarged, symmetric, no tenderness/mass/nodules and no JVD carotids +2 with transmitted murmur  Heart:  Regular with grade 3 systolic murmur at the base  No diastolic murmur rub or gallop  Lungs:  Decreased breath sounds throughout specially the right upper lung field  No wheezing or rhonchi  Abdomen: flat, normal findings: no masses palpable, no organomegaly and soft, non-tender  Lower Limbs: 1-2+ edema of the LEs LLE>RLE    K 3 4, BUN 7, creatinine 0 93, glucose 89  Hemoglobin 10 7, white blood count 4 18, platelets a [id number]  EKG today shows sinus rhythm with 1st degree AV block  Right bundle branch block with normal QT interval      ASSESSMENT AND PLAN:   1   Prolonged QT interval  Mildly prolonged on admission due to presence of U waves which are not present now  QT interval is well within normal limits now  This patient would be very low risk for any arrhythmias anyway  Okay for discharge from our standpoint  Potassium is low normal and would advise a few days of potassium supplementation  It appears he may be on potassium supplementation despite not being on any diuretic therapy  2   Aortic valve stenosis mild to moderate on echo in October of last year  Will repeat echo in 1 year and have him follow with Dr Saniya Caal in our office  3   Mild aortic root enlargement seen on echocardiogram and not commented on CT scan  Can check this at the time of echocardiogram in 1 year      Patient okay for discharge    Robson Azevedo MD

## 2018-03-16 NOTE — DISCHARGE SUMMARY
Discharge- Alisha Beth 1935, 81 y o  male MRN: [id number]    Unit/Bed#: E2 -01 Encounter: [id number]    Primary Care Provider: Tea Kennedy DO   Date and time admitted to hospital: 3/12/2018  6:36 AM        QT prolongation   Assessment & Plan    · On EKG on admission, discussed with cardiologist likely related to underlying conduction defect with U-wave  · New QTC within normal limits  · Patient will follow up with his cardiologist as an outpatient  · Of note, on echocardiogram patient found to have dilation of the aortic root for which cardiologist suggested repeated ultrasound with primary cardiologist within a year        Hospital acquired PNA   Assessment & Plan    · Please refer to below        COPD (chronic obstructive pulmonary disease) ([facility] )   Assessment & Plan    · No acute exacerbation during this hospital stay  · Continue with chronic therapy at home  · Follow up with pulmonologist as an outpatient        Adenocarcinoma, lung ([facility] )   Assessment & Plan    · Patient will resume follow-up with Oncology as an outpatient, he will keep the same follow up also for history of multiple myeloma        * Sepsis ([facility] )   Assessment & Plan    · Patient was admitted for sepsis, found to have pneumonia on chest x-ray, likely healthcare acquired, presumed Gram-negative as MRSA nose negative as part of workup    · Originally was treated with vancomycin cefepime, vancomycin was discontinue on day 4 of antibiotics when MRSA nose available negative, patient was then continued on cefepime  · Influenza and RSV negative  · Blood cultures negative  · Patient was evaluated and followed by Pulmonary, okay to discharge today with Omnicef for another 3 days to complete 7 day dose all of antibiotic  · Patient will follow up with Pulmonary as an outpatient, and if indicated he will repeat CT scan of the chest in 3 months                Resolved Problems  Date Reviewed: 3/16/2018          Resolved    Sepsis ([facility] ) 3/15/2018     Resolved by  Claudia Andre MD    Fatigue 3/15/2018     Resolved by  Claudia Andre MD    Fever 3/15/2018     Resolved by  Claudia Andre MD          Consultations During Hospital Stay:  · Pulmonary, Cardiology    Procedures Performed:     · CT of the chest    Significant Findings / Test Results:     · Dilation aortic root, pneumonia    Incidental Findings:   · None     Test Results Pending at Discharge (will require follow up): · None     Outpatient Tests Requested:  · BMP, patient found to have hypokalemia, supplement was prescribed with instruction to repeat blood work in a week and follow up with primary care physician to evaluate if further need for potassium supplement    Complications:  None  Reason for Admission:  Refer to H&P    Hospital Course:     Kelley Palacios is a 81 y o  male patient who originally presented to the hospital on 3/12/2018 due to sepsis  Please see above list of diagnoses and related plan for additional information  Condition at Discharge: good     Discharge Day Visit / Exam:     Subjective:  Patient feels very well today, he has no complaints, he wishes to go home as soon as possible  Vitals: Blood Pressure: 114/56 (03/16/18 0729)  Pulse: 89 (03/16/18 0729)  Temperature: 97 9 °F (36 6 °C) (03/16/18 0729)  Temp Source: Tympanic (03/16/18 0729)  Respirations: 18 (03/16/18 0729)  Weight - Scale: 84 1 kg (185 lb 8 oz) (03/15/18 0600)  SpO2: 95 % (03/16/18 0759)  Exam:   Physical Exam   Constitutional: He is oriented to person, place, and time  He appears well-developed  Cardiovascular: Normal rate and regular rhythm  Murmur heard  Pulmonary/Chest: Effort normal  No respiratory distress  He has no wheezes  He has no rales  Abdominal: Soft  He exhibits no distension  There is no tenderness  There is no rebound  Musculoskeletal: He exhibits no edema  Neurological: He is alert and oriented to person, place, and time  He exhibits normal muscle tone  Skin: Skin is warm  Psychiatric: He has a normal mood and affect  Discussion with Family:  Wife at bedside  Both patient and wife in understanding and agreement with above formulated plan  Discharge instructions/Information to patient and family:   See after visit summary for information provided to patient and family  Provisions for Follow-Up Care:  See after visit summary for information related to follow-up care and any pertinent home health orders  Disposition:     Home with PT and OT    For Discharges to [facility] SNF:   · Not Applicable to this Patient - Not Applicable to this Patient    Planned Readmission:  No     Discharge Statement:  I spent > 30 minutes discharging the patient  This time was spent on the day of discharge  I had direct contact with the patient on the day of discharge  Greater than 50% of the total time was spent examining patient, answering all patient questions, arranging and discussing plan of care with patient as well as directly providing post-discharge instructions  Additional time then spent on discharge activities  Discharge Medications:  See after visit summary for reconciled discharge medications provided to patient and family        ** Please Note: This note has been constructed using a voice recognition system **

## 2018-03-16 NOTE — ASSESSMENT & PLAN NOTE
· On EKG on admission, discussed with cardiologist likely related to underlying conduction defect with U-wave  · New QTC within normal limits  · Patient will follow up with his cardiologist as an outpatient  · Of note, on echocardiogram patient found to have dilation of the aortic root for which cardiologist suggested repeated ultrasound with primary cardiologist within a year

## 2018-03-16 NOTE — PROGRESS NOTES
Progress Note - Pulmonary   Dilip Suh 81 y o  male MRN: [id number]  Unit/Bed#: E2 -01 Encounter: [id number]      Assessment/Plan:  1  Sepsis at the time of admission secondary to healthcare associated pneumonia  1  Continue treatment with cefepime today is day 5 of 7, can be transitioned to p o  Omnicef to complete 7 days of antibiotic therapy at home  2  Flu/RSV/Legionella/strep/MRSA negative  3  Can continue Mucinex b i d  at discharge  4  Continue monitor temperatures and WBC while hospitalized  2  Acute respiratory insufficiency-resolved  1  Continue monitor oxygen saturation, maintain a pulse ox of equal to or greater than 90%  3  Chronic obstructive pulmonary disease without exacerbation  1  Did not require any steroid administration during this admission  2  Continue Xopenex t i d  while hospitalized Will transition back to albuterol nebulizer p r n   spoke with patient and wife, instructed to start albuterol 2 to 3 times a day PRN   3  Continue Spiriva at discharge  4  History of chronic diarrhea  1  Continue Florastor while hospitalized will return to taking Culturelle at time of discharge  5  History of lung cancer  6  Multiple myeloma-last chemotherapy 02/28/2018, follows outpatient with Dr Cristina Ferraro    Appropriate for discharge home from a pulmonary standpoint Rico King will follow up in the office outpatient with Dr Myra Wallace appointment date times and discharge instructions    Subjective:   Rico King was seen sitting on the side of the bed upon entering the room  He reports significant improvement in his coughing and shortness of Breath  He feels that his respiratory status is at his baseline  His major complaint is lack of sleep due to noise at night  He continues to report a cough that is productive of clear sputum  He also reports night sweats which are at his baseline, as well as chronic diarrhea    Currently he denies:  Chest pain, pain with inspiration, fevers, chills, headache, dizziness, nausea, vomiting, bronchospasm or hemoptysis    Objective:         Vitals: Blood pressure 114/56, pulse 89, temperature 97 9 °F (36 6 °C), temperature source Tympanic, resp  rate 18, weight 84 1 kg (185 lb 8 oz), SpO2 95 % , RA, Body mass index is 28 11 kg/m²  Intake/Output Summary (Last 24 hours) at 03/16/18 0852  Last data filed at 03/16/18 0500   Gross per 24 hour   Intake              100 ml   Output              400 ml   Net             -300 ml         Physical Exam  Gen: Awake, alert, oriented x 3, no acute distress  HEENT: Mucous membranes moist, no oral lesions, no thrush  NECK: No accessory muscle use, JVP not elevated  Cardiac: Regular, single S1, single S2, no murmurs, no rubs, no gallops  Lungs:  Lungs clear to auscultation bilaterally, no wheezes, rhonchi, rales appreciated  Abdomen: normoactive bowel sounds, soft nontender, nondistended, no rebound or rigidity, no guarding  Extremities: no cyanosis, no clubbing, left ankle mildly swollen    Labs: I have personally reviewed pertinent lab results  , CBC:   Lab Results   Component Value Date    WBC 4 18 (L) 03/16/2018    HGB 10 7 (L) 03/16/2018    HCT 32 0 (L) 03/16/2018    MCV 87 03/16/2018     03/16/2018    MCH 29 2 03/16/2018    MCHC 33 4 03/16/2018    RDW 15 5 (H) 03/16/2018    MPV 9 1 03/16/2018    NRBC 0 03/16/2018   , CMP:   Lab Results   Component Value Date     03/16/2018    K 3 4 (L) 03/16/2018     03/16/2018    CO2 24 03/16/2018    ANIONGAP 8 03/16/2018    BUN 7 03/16/2018    CREATININE 0 93 03/16/2018    GLUCOSE 89 03/16/2018    CALCIUM 8 9 03/16/2018    EGFR 77 03/16/2018     Imaging and other studies: No new pulmonary imaging to review      JACK Rutherford

## 2018-03-16 NOTE — DISCHARGE INSTRUCTIONS
Please follow up with primary care physician  Complete antibiotics as instructed  Repeat blood work as instructed  You have been prescribed a potassium supplement, your physician will receive your repeat blood work and instruct you about next step  Repeat echocardiogram in 1 year following up with your primary cardiologist         Community Acquired Pneumonia   WHAT YOU NEED TO KNOW:   Community-acquired pneumonia (CAP) is a lung infection that you get outside of a hospital or nursing home setting  Your lungs become inflamed and cannot work well  CAP may be caused by bacteria, viruses, or fungi  DISCHARGE INSTRUCTIONS:   Seek care immediately if:   · You are confused and cannot think clearly  · You have increased trouble breathing  · Your lips or fingernails turn gray or blue  Contact your healthcare provider if:   · Your symptoms do not get better, or they get worse  · You are urinating less, or not at all  · You have questions or concerns about your condition or care  Medicines:   · Medicines  may be given to treat a bacterial, viral, or fungal infection  You may also be given medicines to dilate your bronchial tubes to help you breathe more easily  · Take your medicine as directed  Contact your healthcare provider if you think your medicine is not helping or if you have side effects  Tell him or her if you are allergic to any medicine  Keep a list of the medicines, vitamins, and herbs you take  Include the amounts, and when and why you take them  Bring the list or the pill bottles to follow-up visits  Carry your medicine list with you in case of an emergency  Follow up with your healthcare provider within 3 days or as directed: You may need another x-ray  Write down your questions so you remember to ask them during your visits  Deep breathing and coughing:  Deep breathing helps open the air passages in your lungs  Coughing helps bring up mucus from your lungs   Take a deep breath and hold the breath as long as you can  Then push the air out of your lungs with a deep, strong cough  Spit out any mucus you have coughed up  Take 10 deep breaths in a row every hour that you are awake  Remember to follow each deep breath with a cough  Do not smoke or allow others to smoke around you:  Nicotine and other chemicals in cigarettes and cigars can cause lung damage  Ask your healthcare provider for information if you currently smoke and need help to quit  E-cigarettes or smokeless tobacco still contain nicotine  Talk to your healthcare provider before you use these products  Manage CAP at home:   · Breathe warm, moist air  This helps loosen mucus  Loosely place a warm, wet washcloth over your nose and mouth  A room humidifier may also help make the air moist     · Drink liquids as directed  Ask your healthcare provider how much liquid to drink each day and which liquids to drink  Liquids help make mucus thin and easier to get out of your body  · Gently tap your chest   This helps loosen mucus so it is easier to cough  Lie with your head lower than your chest several times a day and tap your chest      · Get plenty of rest   Rest helps your body heal   Prevent CAP:   · Wash your hands often with soap and water  Carry germ-killing hand gel with you  You can use the gel to clean your hands when soap and water are not available  Do not touch your eyes, nose, or mouth unless you have washed your hands first      · Clean surfaces often  Clean doorknobs, countertops, cell phones, and other surfaces that are touched often  · Always cover your mouth when you cough  Cough into a tissue or your shirtsleeve so you do not spread germs from your hands  · Try to avoid people who have a cold or the flu  If you are sick, stay away from others as much as possible  · Ask about vaccines  You may need a vaccine to help prevent pneumonia   Get an influenza (flu) vaccine every year as soon as it becomes available  © 2017 [address] [facility] Information is for End User's use only and may not be sold, redistributed or otherwise used for commercial purposes  All illustrations and images included in CareNotes® are the copyrighted property of A D A M [employer] Inc  or Michael Hopper  The above information is an  only  It is not intended as medical advice for individual conditions or treatments  Talk to your doctor, nurse or pharmacist before following any medical regimen to see if it is safe and effective for you

## 2018-03-16 NOTE — ASSESSMENT & PLAN NOTE
· No acute exacerbation during this hospital stay  · Continue with chronic therapy at home  · Follow up with pulmonologist as an outpatient

## 2018-03-16 NOTE — ASSESSMENT & PLAN NOTE
· Patient was admitted for sepsis, found to have pneumonia on chest x-ray, likely healthcare acquired, presumed Gram-negative as MRSA nose negative as part of workup    · Originally was treated with vancomycin cefepime, vancomycin was discontinue on day 4 of antibiotics when MRSA nose available negative, patient was then continued on cefepime  · Influenza and RSV negative  · Blood cultures negative  · Patient was evaluated and followed by Pulmonary, okay to discharge today with Omnicef for another 3 days to complete 7 day dose all of antibiotic  · Patient will follow up with Pulmonary as an outpatient, and if indicated he will repeat CT scan of the chest in 3 months

## 2018-03-17 LAB
BACTERIA BLD CULT: NORMAL
BACTERIA BLD CULT: NORMAL

## 2018-03-28 ENCOUNTER — HOSPITAL ENCOUNTER (OUTPATIENT)
Dept: INFUSION CENTER | Facility: CLINIC | Age: 83
Discharge: HOME | End: 2018-03-28
Payer: MEDICARE

## 2018-03-28 VITALS
HEART RATE: 87 BPM | SYSTOLIC BLOOD PRESSURE: 107 MMHG | HEIGHT: 68 IN | TEMPERATURE: 96.7 F | WEIGHT: 181.11 LBS | RESPIRATION RATE: 18 BRPM | DIASTOLIC BLOOD PRESSURE: 64 MMHG | BODY MASS INDEX: 27.45 KG/M2

## 2018-03-28 PROCEDURE — 96413 CHEMO IV INFUSION 1 HR: CPT

## 2018-03-28 PROCEDURE — 96401 CHEMO ANTI-NEOPL SQ/IM: CPT

## 2018-03-28 PROCEDURE — 96367 TX/PROPH/DG ADDL SEQ IV INF: CPT

## 2018-03-28 PROCEDURE — 96375 TX/PRO/DX INJ NEW DRUG ADDON: CPT

## 2018-03-28 PROCEDURE — 96415 CHEMO IV INFUSION ADDL HR: CPT

## 2018-03-28 RX ORDER — ACETAMINOPHEN 325 MG/1
650 TABLET ORAL ONCE
Status: COMPLETED | OUTPATIENT
Start: 2018-03-28 | End: 2018-03-28

## 2018-03-28 RX ORDER — SODIUM CHLORIDE 9 MG/ML
20 INJECTION, SOLUTION INTRAVENOUS CONTINUOUS
Status: DISCONTINUED | OUTPATIENT
Start: 2018-03-28 | End: 2018-03-31 | Stop reason: HOSPADM

## 2018-03-28 RX ADMIN — Medication 300 UNITS: at 13:10

## 2018-03-28 RX ADMIN — ACETAMINOPHEN 650 MG: 325 TABLET, FILM COATED ORAL at 08:47

## 2018-03-28 RX ADMIN — DARATUMUMAB 1300 MG: 100 INJECTION, SOLUTION, CONCENTRATE INTRAVENOUS at 09:46

## 2018-03-28 RX ADMIN — SODIUM CHLORIDE 20 ML/HR: 9 INJECTION, SOLUTION INTRAVENOUS at 09:00

## 2018-03-28 RX ADMIN — BORTEZOMIB 2.5 MG: 3.5 INJECTION, POWDER, LYOPHILIZED, FOR SOLUTION INTRAVENOUS; SUBCUTANEOUS at 13:09

## 2018-03-28 RX ADMIN — DIPHENHYDRAMINE HYDROCHLORIDE 25 MG: 50 INJECTION, SOLUTION INTRAMUSCULAR; INTRAVENOUS at 09:00

## 2018-03-28 RX ADMIN — SODIUM CHLORIDE 100 MG: 0.9 INJECTION, SOLUTION INTRAVENOUS at 09:15

## 2018-03-28 NOTE — PROGRESS NOTES
Patient tolerated chemotherapy infusion and injection well  Offers no complaints  Pt discharged in stable condition accompanied by his wife

## 2018-03-28 NOTE — PLAN OF CARE
Problem: Potential for Falls  Goal: Patient will remain free of falls  INTERVENTIONS:  - Assess patient frequently for physical needs  -  Identify cognitive and physical deficits and behaviors that affect risk of falls    -  [place] fall precautions as indicated by assessment   - Educate patient/family on patient safety including physical limitations  - Instruct patient to call for assistance with activity based on assessment  - Modify environment to reduce risk of injury  - Consider OT/PT consult to assist with strengthening/mobility   Outcome: Progressing

## 2018-04-02 ENCOUNTER — APPOINTMENT (OUTPATIENT)
Dept: LAB | Facility: MEDICAL CENTER | Age: 83
End: 2018-04-02
Payer: MEDICARE

## 2018-04-02 ENCOUNTER — TRANSCRIBE ORDERS (OUTPATIENT)
Dept: ADMINISTRATIVE | Facility: HOSPITAL | Age: 83
End: 2018-04-02

## 2018-04-02 DIAGNOSIS — C34.90 ADENOCARCINOMA OF LUNG, UNSPECIFIED LATERALITY (HCC): ICD-10-CM

## 2018-04-02 DIAGNOSIS — C90.00 MULTIPLE MYELOMA NOT HAVING ACHIEVED REMISSION (HCC): ICD-10-CM

## 2018-04-02 LAB
ALBUMIN SERPL BCP-MCNC: 3.1 G/DL (ref 3.5–5)
ALP SERPL-CCNC: 81 U/L (ref 46–116)
ALT SERPL W P-5'-P-CCNC: 32 U/L (ref 12–78)
ANION GAP SERPL CALCULATED.3IONS-SCNC: 6 MMOL/L (ref 4–13)
AST SERPL W P-5'-P-CCNC: 18 U/L (ref 5–45)
BASOPHILS # BLD AUTO: 0.02 THOUSANDS/ΜL (ref 0–0.1)
BASOPHILS NFR BLD AUTO: 0 % (ref 0–1)
BILIRUB SERPL-MCNC: 0.82 MG/DL (ref 0.2–1)
BUN SERPL-MCNC: 21 MG/DL (ref 5–25)
CALCIUM SERPL-MCNC: 9.1 MG/DL (ref 8.3–10.1)
CHLORIDE SERPL-SCNC: 106 MMOL/L (ref 100–108)
CO2 SERPL-SCNC: 29 MMOL/L (ref 21–32)
CREAT SERPL-MCNC: 1.37 MG/DL (ref 0.6–1.3)
EOSINOPHIL # BLD AUTO: 0.09 THOUSAND/ΜL (ref 0–0.61)
EOSINOPHIL NFR BLD AUTO: 2 % (ref 0–6)
ERYTHROCYTE [DISTWIDTH] IN BLOOD BY AUTOMATED COUNT: 15.7 % (ref 11.6–15.1)
GFR SERPL CREATININE-BSD FRML MDRD: 48 ML/MIN/1.73SQ M
GLUCOSE SERPL-MCNC: 93 MG/DL (ref 65–140)
HCT VFR BLD AUTO: 39.5 % (ref 36.5–49.3)
HGB BLD-MCNC: 12.5 G/DL (ref 12–17)
LYMPHOCYTES # BLD AUTO: 1.69 THOUSANDS/ΜL (ref 0.6–4.47)
LYMPHOCYTES NFR BLD AUTO: 31 % (ref 14–44)
MCH RBC QN AUTO: 28.5 PG (ref 26.8–34.3)
MCHC RBC AUTO-ENTMCNC: 31.6 G/DL (ref 31.4–37.4)
MCV RBC AUTO: 90 FL (ref 82–98)
MONOCYTES # BLD AUTO: 0.65 THOUSAND/ΜL (ref 0.17–1.22)
MONOCYTES NFR BLD AUTO: 12 % (ref 4–12)
NEUTROPHILS # BLD AUTO: 2.97 THOUSANDS/ΜL (ref 1.85–7.62)
NEUTS SEG NFR BLD AUTO: 55 % (ref 43–75)
NRBC BLD AUTO-RTO: 0 /100 WBCS
PLATELET # BLD AUTO: 152 THOUSANDS/UL (ref 149–390)
PMV BLD AUTO: 9.7 FL (ref 8.9–12.7)
POTASSIUM SERPL-SCNC: 4.5 MMOL/L (ref 3.5–5.3)
PROT SERPL-MCNC: 6.1 G/DL (ref 6.4–8.2)
RBC # BLD AUTO: 4.38 MILLION/UL (ref 3.88–5.62)
SODIUM SERPL-SCNC: 141 MMOL/L (ref 136–145)
WBC # BLD AUTO: 5.44 THOUSAND/UL (ref 4.31–10.16)

## 2018-04-02 PROCEDURE — 36415 COLL VENOUS BLD VENIPUNCTURE: CPT

## 2018-04-02 PROCEDURE — 80053 COMPREHEN METABOLIC PANEL: CPT

## 2018-04-02 PROCEDURE — 83883 ASSAY NEPHELOMETRY NOT SPEC: CPT

## 2018-04-02 PROCEDURE — 85025 COMPLETE CBC W/AUTO DIFF WBC: CPT

## 2018-04-03 LAB
KAPPA LC FREE SER-MCNC: 3.3 MG/L (ref 3.3–19.4)
KAPPA LC FREE/LAMBDA FREE SER: 0 {RATIO} (ref 0.26–1.65)
LAMBDA LC FREE SERPL-MCNC: 772.2 MG/L (ref 5.7–26.3)

## 2018-04-05 ENCOUNTER — OFFICE VISIT (OUTPATIENT)
Dept: HEMATOLOGY ONCOLOGY | Facility: CLINIC | Age: 83
End: 2018-04-05
Payer: MEDICARE

## 2018-04-05 VITALS
DIASTOLIC BLOOD PRESSURE: 60 MMHG | WEIGHT: 184 LBS | TEMPERATURE: 97.6 F | HEART RATE: 90 BPM | SYSTOLIC BLOOD PRESSURE: 112 MMHG | BODY MASS INDEX: 27.89 KG/M2 | HEIGHT: 68 IN | OXYGEN SATURATION: 93 % | RESPIRATION RATE: 18 BRPM

## 2018-04-05 DIAGNOSIS — C34.90 ADENOCARCINOMA OF LUNG, UNSPECIFIED LATERALITY (HCC): Primary | ICD-10-CM

## 2018-04-05 DIAGNOSIS — C90.00 MULTIPLE MYELOMA NOT HAVING ACHIEVED REMISSION (HCC): ICD-10-CM

## 2018-04-05 DIAGNOSIS — C90.00 MULTIPLE MYELOMA NOT HAVING ACHIEVED REMISSION (HCC): Primary | ICD-10-CM

## 2018-04-05 DIAGNOSIS — C34.91 ADENOCARCINOMA OF RIGHT LUNG (HCC): Primary | Chronic | ICD-10-CM

## 2018-04-05 PROCEDURE — 99215 OFFICE O/P EST HI 40 MIN: CPT | Performed by: INTERNAL MEDICINE

## 2018-04-05 NOTE — PROGRESS NOTES
Hematology Outpatient Follow - Up Note  Nila Mclaughlin 81 y o  male MRN: @ Encounter: [id number]        Date:  4/5/2018        Assessment/ Plan:   Progression of lambda light chain multiple myeloma, heavily pretreated as mentioned in the history of present illness, currently on Daratumumab 60 milligram/kilogram every other week, Velcade 1 3 milligram/meter squared subcu every other week and Decadron 20 mg p o  Weekly   Lambda light chain up to 772, creatinine up 1 39   1  Discontinue Velcade  2  Add Cytoxan 400 mg IV flat dose every other week to Daratumumab and continue Daratumumab 60 milligram/kilogram every other week  3  Continue dexamethasone 20 mg PO weekly  4  Side effects of Cytoxan that are but not limited to myelodysplasia, leukemia, pancytopenia, cystitis, anaphylactic reaction with told he agree to proceed  5  Will obtain CBC, BMP prior to each Cytoxan and office visit in 1 month with CBC, CMP and light chain  6  Rule out hypogammaglobulinemia patient to have IgG, IgM, IgA level if IgG level is below 350-400 will start IgG 30 g every month           HPI:  #1 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, multiple compression fractures, status post kyphoplasty to L1, L2, L5, treated with radiation therapy to the left intra trochanteric area, received melphalan/Velcade/prednisone in October 2010 for 4 cycles with excellent response and normalization of lambda light chain, had been on Velcade maintenance till January 2013 when lambda light chain went up to 111,treated with Velcade 1 3 mg meter square weekly 3 weeks on one week off, Decadron 20 mg by mouth weekly, lambda light chain down to 30 then creeping up to 110, on Revlimid 15 mg by mouth every other day  Lambda light chain normal at 28 however he reported skin rash on the upper chest area we stopped the Revlimid for 2 month  Then reinitiated Revlimid 5 mg by mouth daily  #2  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 stage I, with progression by scan, status post resection of the right upper lobe with few foci in the same lobe consistent with stage IIIA done in January 2015 adenocarcinoma, well differentiated  Status post Alimta/carboplatin x4 cycles finished in May 2015 and radiation therapy  #3  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  #4  Admitted to [facility] 3/3/16 - 3/8/16  Dx with RSV  5  admission on November 2017 with rectal adenocarcinoma moderately differentiated status post resection stage I ( pT2, pNX, grade 2) tumor measuring 4 x 3 6 centimeters below the peritoneal reflection low probability of MS H   Previous Therapy:   Revlimid 5 mg by mouth 3 weeks on 1 week off, On hold since May 2017 because of diarrhea       Current Therapy: Daratumumab 60 milligram/kilogram weekly dose 1  By the end of     November 2017, dexamethasone 20 mg PO weekly      Maintenance Daratumumab 16 mg per kg every other week initiated in January 2018  With progression, we added Velcade 1 3 milligram/meter squared every other week 2 Daratumumab in February 2018     Interval History:   Was admitted to the hospital again with pneumonia in March 2018              Test Results:    Imaging: Cta Ed Chest Pe Study    Result Date: 3/12/2018  Narrative: CTA - CHEST WITH IV CONTRAST - PULMONARY ANGIOGRAM INDICATION:   sob, tachycardia  Shortness of breath, tachycardia and weakness  History of lung carcinoma, colon carcinoma, skin carcinoma and multiple myeloma  COMPARISON: Prior CT pulmonary angiogram January 3, 2018 TECHNIQUE: CTA examination of the chest was performed using angiographic technique according to a protocol specifically tailored to evaluate for pulmonary embolism  Axial, sagittal, and coronal 2D reformatted images were created from the source data and  submitted for interpretation  In addition, coronal 3D MIP postprocessing was performed on the acquisition scanner    Radiation dose length product (DLP) for this visit:  429 mGy-cm   This examination, like all CT scans performed in the [facility], was performed utilizing techniques to minimize radiation dose exposure, including the use of iterative reconstruction and automated exposure control  IV Contrast:  75 mL of iodixanol (VISIPAQUE)  Because of borderline renal function, standard department hydration protocol was recommended to minimize risk of contrast induced nephropathy  FINDINGS: PULMONARY ARTERIAL TREE:  No pulmonary embolus is seen  LUNGS:  The lungs are well aerated  There are postoperative changes in the right upper lobe  Dense consolidation in the right upper lobe with intervening air bronchograms, consistent with radiation pneumonitis  Elevation of right hemidiaphragm  Groundglass infiltrates are present throughout the left upper lobe  More focal consolidation in the lingula with intervening air bronchograms  Groundglass infiltrates in the left lower lobe  PLEURA:  Unremarkable  HEART/AORTA:  The heart is enlarged  Coronary artery calcifications  No evidence of a pericardial effusion  Left internal jugular Dkgowe-w-Mtem catheter with tip in superior vena cava  MEDIASTINUM AND GLEN:  Slight enlargement of mediastinal and left hilar lymph nodes  Largest lymph node in the left hilum now measures 2 0 x 1 0 cm (series 2, image 107)  Previously, this measured 1 3 x 0 8 cm  CHEST WALL AND LOWER NECK:   Unremarkable  VISUALIZED STRUCTURES IN THE UPPER ABDOMEN:  Unremarkable  OSSEOUS STRUCTURES:  Osseous structures diffusely demineralized  Impression: 1  No CT evidence of pulmonary embolism  2   Left-sided infiltrates with more focal consolidation in the lingula, most compatible with pneumonia  3   Slight enlargement of mediastinal and left hilar lymph nodes  The findings are likely reactive  Recommend follow-up CT scan of the chest in 3 months for further evaluation   A verbal report will be called by the reading room liaison following this dictation  Workstation performed: [id number]       Labs:   Lab Results   Component Value Date    WBC 5 44 04/02/2018    HGB 12 5 04/02/2018    HCT 39 5 04/02/2018    MCV 90 04/02/2018     04/02/2018     Lab Results   Component Value Date     04/02/2018    K 4 5 04/02/2018     04/02/2018    CO2 29 04/02/2018    ANIONGAP 6 04/02/2018    BUN 21 04/02/2018    CREATININE 1 37 (H) 04/02/2018    GLUCOSE 93 04/02/2018    GLUF 92 03/05/2018    CALCIUM 9 1 04/02/2018    AST 18 04/02/2018    ALT 32 04/02/2018    ALKPHOS 81 04/02/2018    PROT 6 1 (L) 04/02/2018    BILITOT 0 82 04/02/2018    EGFR 48 04/02/2018                 ROS:   Review of Systems   Constitutional: Positive for fatigue  Negative for activity change, appetite change, chills, diaphoresis, fever and unexpected weight change  HENT: Negative for congestion, dental problem, facial swelling, hearing loss, mouth sores, nosebleeds, postnasal drip, rhinorrhea, sore throat, trouble swallowing and voice change  Eyes: Negative for photophobia, pain, discharge, redness, itching and visual disturbance  Respiratory: Positive for cough  Negative for choking, chest tightness, shortness of breath and wheezing  Cardiovascular: Negative for chest pain, palpitations and leg swelling  Gastrointestinal: Negative for abdominal distention, abdominal pain, anal bleeding, blood in stool, constipation, diarrhea, nausea, rectal pain and vomiting  Endocrine: Negative for cold intolerance and heat intolerance  Genitourinary: Negative for decreased urine volume, difficulty urinating, dysuria, flank pain, frequency, hematuria and urgency  Musculoskeletal: Negative for arthralgias, back pain, gait problem, joint swelling, myalgias, neck pain and neck stiffness  Skin: Negative for color change, pallor, rash and wound  Allergic/Immunologic: Negative for immunocompromised state     Neurological: Negative for dizziness, tremors, seizures, syncope, facial asymmetry, speech difficulty, weakness, light-headedness, numbness and headaches  Hematological: Negative for adenopathy  Does not bruise/bleed easily  Psychiatric/Behavioral: Negative for agitation, confusion, decreased concentration, dysphoric mood and sleep disturbance  The patient is not nervous/anxious  All other systems reviewed and are negative  Current Medications: Reviewed  Allergies: Reviewed  PMH/FH/SH:  Reviewed      Physical Exam:    Body surface area is 1 97 meters squared  Wt Readings from Last 3 Encounters:   04/05/18 83 5 kg (184 lb)   03/28/18 82 2 kg (181 lb 1 7 oz)   03/15/18 84 1 kg (185 lb 8 oz)        Temp Readings from Last 3 Encounters:   04/05/18 97 6 °F (36 4 °C) (Tympanic)   03/28/18 (!) 96 7 °F (35 9 °C) (Tympanic)   03/16/18 97 9 °F (36 6 °C) (Tympanic)        BP Readings from Last 3 Encounters:   04/05/18 112/60   03/28/18 107/64   03/16/18 114/56         Pulse Readings from Last 3 Encounters:   04/05/18 90   03/28/18 87   03/16/18 89        Physical Exam   Constitutional: He is oriented to person, place, and time  He appears well-developed and well-nourished  No distress  HENT:   Head: Normocephalic and atraumatic  Mouth/Throat: Oropharynx is clear and moist  No oropharyngeal exudate  Eyes: Conjunctivae and EOM are normal  Pupils are equal, round, and reactive to light  Neck: Normal range of motion  Neck supple  No tracheal deviation present  No thyromegaly present  Cardiovascular: Normal rate and regular rhythm  Exam reveals no gallop and no friction rub  No murmur heard  Pulmonary/Chest: Effort normal  No respiratory distress  He has no wheezes  He has rales  He exhibits no tenderness  Abdominal: Soft  Bowel sounds are normal  He exhibits no distension and no mass  There is no tenderness  There is no rebound and no guarding  Musculoskeletal: Normal range of motion  Lymphadenopathy:     He has no cervical adenopathy  Neurological: He is alert and oriented to person, place, and time  Skin: Skin is warm and dry  No rash noted  He is not diaphoretic  No erythema  No pallor  Psychiatric: He has a normal mood and affect  His behavior is normal  Judgment and thought content normal    Vitals reviewed  Goals and Barriers:  Current Goal: Minimize effects of disease  Barriers: None  Patient's Capacity to Self Care:  Patient is able to self care      Code Status: @[facility]@

## 2018-04-05 NOTE — LETTER
April 5, 2018     Sukhwinderleonarda MattDO juma  [address] [facility]  [address]  [address] [facility]    Patient: Mark Stiles   YOB: 1935   Date of Visit: 4/5/2018       Dear Dr Mckenzie Ruiz: Thank you for referring Jaci Cross to me for evaluation  Below are my notes for this consultation  If you have questions, please do not hesitate to call me  I look forward to following your patient along with you  Sincerely,        Favio Lopez MD        CC: MD Favio Oconnor MD  4/5/2018 12:21 PM  Sign at close encounter  Hematology Outpatient Follow - Up Note  Mark Stiles 81 y o  male MRN: @ Encounter: [id number]        Date:  4/5/2018        Assessment/ Plan:   Progression of lambda light chain multiple myeloma, heavily pretreated as mentioned in the history of present illness, currently on Daratumumab 60 milligram/kilogram every other week, Velcade 1 3 milligram/meter squared subcu every other week and Decadron 20 mg p o  Weekly   Lambda light chain up to 772, creatinine up 1 39   1  Discontinue Velcade  2  Add Cytoxan 400 mg IV flat dose every other week to Daratumumab and continue Daratumumab 60 milligram/kilogram every other week  3  Continue dexamethasone 20 mg PO weekly  4  Side effects of Cytoxan that are but not limited to myelodysplasia, leukemia, pancytopenia, cystitis, anaphylactic reaction with told he agree to proceed  5  Will obtain CBC, BMP prior to each Cytoxan and office visit in 1 month with CBC, CMP and light chain  6   Rule out hypogammaglobulinemia patient to have IgG, IgM, IgA level if IgG level is below 350-400 will start IgG 30 g every month           HPI:  #1 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, multiple compression fractures, status post kyphoplasty to L1, L2, L5, treated with radiation therapy to the left intra trochanteric area, received melphalan/Velcade/prednisone in October 2010 for 4 cycles with excellent response and normalization of lambda light chain, had been on Velcade maintenance till January 2013 when lambda light chain went up to 111,treated with Velcade 1 3 mg meter square weekly 3 weeks on one week off, Decadron 20 mg by mouth weekly, lambda light chain down to 30 then creeping up to 110, on Revlimid 15 mg by mouth every other day  Lambda light chain normal at 28 however he reported skin rash on the upper chest area we stopped the Revlimid for 2 month  Then reinitiated Revlimid 5 mg by mouth daily  #2  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 stage I, with progression by scan, status post resection of the right upper lobe with few foci in the same lobe consistent with stage IIIA done in January 2015 adenocarcinoma, well differentiated  Status post Alimta/carboplatin x4 cycles finished in May 2015 and radiation therapy  #3  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  #4  Admitted to [address] [facility] 3/3/16 - 3/8/16  Dx with RSV  5  admission on November 2017 with rectal adenocarcinoma moderately differentiated status post resection stage I ( pT2, pNX, grade 2) tumor measuring 4 x 3 6 centimeters below the peritoneal reflection low probability of MS H   Previous Therapy:   Revlimid 5 mg by mouth 3 weeks on 1 week off, On hold since May 2017 because of diarrhea       Current Therapy: Daratumumab 60 milligram/kilogram weekly dose 1  By the end of     November 2017, dexamethasone 20 mg PO weekly      Maintenance Daratumumab 16 mg per kg every other week initiated in January 2018  With progression, we added Velcade 1 3 milligram/meter squared every other week 2 Daratumumab in February 2018     Interval History:   Was admitted to the hospital again with pneumonia in March 2018              Test Results:    Imaging: Cta Ed Chest Pe Study    Result Date: 3/12/2018  Narrative: CTA - CHEST WITH IV CONTRAST - PULMONARY ANGIOGRAM INDICATION:   sob, tachycardia    Shortness of breath, tachycardia and weakness  History of lung carcinoma, colon carcinoma, skin carcinoma and multiple myeloma  COMPARISON: Prior CT pulmonary angiogram January 3, 2018 TECHNIQUE: CTA examination of the chest was performed using angiographic technique according to a protocol specifically tailored to evaluate for pulmonary embolism  Axial, sagittal, and coronal 2D reformatted images were created from the source data and  submitted for interpretation  In addition, coronal 3D MIP postprocessing was performed on the acquisition scanner  Radiation dose length product (DLP) for this visit:  429 mGy-cm   This examination, like all CT scans performed in the [facility], was performed utilizing techniques to minimize radiation dose exposure, including the use of iterative reconstruction and automated exposure control  IV Contrast:  75 mL of iodixanol (VISIPAQUE)  Because of borderline renal function, standard department hydration protocol was recommended to minimize risk of contrast induced nephropathy  FINDINGS: PULMONARY ARTERIAL TREE:  No pulmonary embolus is seen  LUNGS:  The lungs are well aerated  There are postoperative changes in the right upper lobe  Dense consolidation in the right upper lobe with intervening air bronchograms, consistent with radiation pneumonitis  Elevation of right hemidiaphragm  Groundglass infiltrates are present throughout the left upper lobe  More focal consolidation in the lingula with intervening air bronchograms  Groundglass infiltrates in the left lower lobe  PLEURA:  Unremarkable  HEART/AORTA:  The heart is enlarged  Coronary artery calcifications  No evidence of a pericardial effusion  Left internal jugular Yvckfk-p-Kcwc catheter with tip in superior vena cava  MEDIASTINUM AND GLEN:  Slight enlargement of mediastinal and left hilar lymph nodes  Largest lymph node in the left hilum now measures 2 0 x 1 0 cm (series 2, image 107)  Previously, this measured 1 3 x 0 8 cm   CHEST WALL AND LOWER NECK:   Unremarkable  VISUALIZED STRUCTURES IN THE UPPER ABDOMEN:  Unremarkable  OSSEOUS STRUCTURES:  Osseous structures diffusely demineralized  Impression: 1  No CT evidence of pulmonary embolism  2   Left-sided infiltrates with more focal consolidation in the lingula, most compatible with pneumonia  3   Slight enlargement of mediastinal and left hilar lymph nodes  The findings are likely reactive  Recommend follow-up CT scan of the chest in 3 months for further evaluation  A verbal report will be called by the reading room liaison following this dictation  Workstation performed: [id number]       Labs:   Lab Results   Component Value Date    WBC 5 44 04/02/2018    HGB 12 5 04/02/2018    HCT 39 5 04/02/2018    MCV 90 04/02/2018     04/02/2018     Lab Results   Component Value Date     04/02/2018    K 4 5 04/02/2018     04/02/2018    CO2 29 04/02/2018    ANIONGAP 6 04/02/2018    BUN 21 04/02/2018    CREATININE 1 37 (H) 04/02/2018    GLUCOSE 93 04/02/2018    GLUF 92 03/05/2018    CALCIUM 9 1 04/02/2018    AST 18 04/02/2018    ALT 32 04/02/2018    ALKPHOS 81 04/02/2018    PROT 6 1 (L) 04/02/2018    BILITOT 0 82 04/02/2018    EGFR 48 04/02/2018                 ROS:   Review of Systems   Constitutional: Positive for fatigue  Negative for activity change, appetite change, chills, diaphoresis, fever and unexpected weight change  HENT: Negative for congestion, dental problem, facial swelling, hearing loss, mouth sores, nosebleeds, postnasal drip, rhinorrhea, sore throat, trouble swallowing and voice change  Eyes: Negative for photophobia, pain, discharge, redness, itching and visual disturbance  Respiratory: Positive for cough  Negative for choking, chest tightness, shortness of breath and wheezing  Cardiovascular: Negative for chest pain, palpitations and leg swelling     Gastrointestinal: Negative for abdominal distention, abdominal pain, anal bleeding, blood in stool, constipation, diarrhea, nausea, rectal pain and vomiting  Endocrine: Negative for cold intolerance and heat intolerance  Genitourinary: Negative for decreased urine volume, difficulty urinating, dysuria, flank pain, frequency, hematuria and urgency  Musculoskeletal: Negative for arthralgias, back pain, gait problem, joint swelling, myalgias, neck pain and neck stiffness  Skin: Negative for color change, pallor, rash and wound  Allergic/Immunologic: Negative for immunocompromised state  Neurological: Negative for dizziness, tremors, seizures, syncope, facial asymmetry, speech difficulty, weakness, light-headedness, numbness and headaches  Hematological: Negative for adenopathy  Does not bruise/bleed easily  Psychiatric/Behavioral: Negative for agitation, confusion, decreased concentration, dysphoric mood and sleep disturbance  The patient is not nervous/anxious  All other systems reviewed and are negative  Current Medications: Reviewed  Allergies: Reviewed  PMH/FH/SH:  Reviewed      Physical Exam:    Body surface area is 1 97 meters squared  Wt Readings from Last 3 Encounters:   04/05/18 83 5 kg (184 lb)   03/28/18 82 2 kg (181 lb 1 7 oz)   03/15/18 84 1 kg (185 lb 8 oz)        Temp Readings from Last 3 Encounters:   04/05/18 97 6 °F (36 4 °C) (Tympanic)   03/28/18 (!) 96 7 °F (35 9 °C) (Tympanic)   03/16/18 97 9 °F (36 6 °C) (Tympanic)        BP Readings from Last 3 Encounters:   04/05/18 112/60   03/28/18 107/64   03/16/18 114/56         Pulse Readings from Last 3 Encounters:   04/05/18 90   03/28/18 87   03/16/18 89        Physical Exam   Constitutional: He is oriented to person, place, and time  He appears well-developed and well-nourished  No distress  HENT:   Head: Normocephalic and atraumatic  Mouth/Throat: Oropharynx is clear and moist  No oropharyngeal exudate  Eyes: Conjunctivae and EOM are normal  Pupils are equal, round, and reactive to light     Neck: Normal range of motion  Neck supple  No tracheal deviation present  No thyromegaly present  Cardiovascular: Normal rate and regular rhythm  Exam reveals no gallop and no friction rub  No murmur heard  Pulmonary/Chest: Effort normal  No respiratory distress  He has no wheezes  He has rales  He exhibits no tenderness  Abdominal: Soft  Bowel sounds are normal  He exhibits no distension and no mass  There is no tenderness  There is no rebound and no guarding  Musculoskeletal: Normal range of motion  Lymphadenopathy:     He has no cervical adenopathy  Neurological: He is alert and oriented to person, place, and time  Skin: Skin is warm and dry  No rash noted  He is not diaphoretic  No erythema  No pallor  Psychiatric: He has a normal mood and affect  His behavior is normal  Judgment and thought content normal    Vitals reviewed  Goals and Barriers:  Current Goal: Minimize effects of disease  Barriers: None  Patient's Capacity to Self Care:  Patient is able to self care      Code Status: [unfilled]

## 2018-04-08 ENCOUNTER — HOSPITAL ENCOUNTER (INPATIENT)
Facility: HOSPITAL | Age: 83
LOS: 2 days | Discharge: HOME | DRG: 190 | End: 2018-04-10
Attending: EMERGENCY MEDICINE | Admitting: INTERNAL MEDICINE
Payer: MEDICARE

## 2018-04-08 ENCOUNTER — APPOINTMENT (EMERGENCY)
Dept: RADIOLOGY | Facility: HOSPITAL | Age: 83
DRG: 190 | End: 2018-04-08
Payer: MEDICARE

## 2018-04-08 DIAGNOSIS — J44.9 COPD (CHRONIC OBSTRUCTIVE PULMONARY DISEASE) (HCC): ICD-10-CM

## 2018-04-08 DIAGNOSIS — N40.0 BENIGN PROSTATIC HYPERPLASIA WITHOUT LOWER URINARY TRACT SYMPTOMS: Primary | ICD-10-CM

## 2018-04-08 DIAGNOSIS — J18.9 PNEUMONIA: ICD-10-CM

## 2018-04-08 DIAGNOSIS — C90.00 MULTIPLE MYELOMA NOT HAVING ACHIEVED REMISSION (HCC): ICD-10-CM
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
## 2018-04-08 LAB
ALBUMIN SERPL BCP-MCNC: 3.2 G/DL (ref 3.5–5)
ALP SERPL-CCNC: 78 U/L (ref 46–116)
ALT SERPL W P-5'-P-CCNC: 28 U/L (ref 12–78)
ANION GAP SERPL CALCULATED.3IONS-SCNC: 11 MMOL/L (ref 4–13)
APTT PPP: 33 SECONDS (ref 23–35)
AST SERPL W P-5'-P-CCNC: 19 U/L (ref 5–45)
ATRIAL RATE: 96 BPM
BASOPHILS # BLD AUTO: 0.01 THOUSANDS/ΜL (ref 0–0.1)
BASOPHILS NFR BLD AUTO: 0 % (ref 0–1)
BILIRUB SERPL-MCNC: 1.46 MG/DL (ref 0.2–1)
BUN SERPL-MCNC: 15 MG/DL (ref 5–25)
CALCIUM SERPL-MCNC: 9.1 MG/DL (ref 8.3–10.1)
CHLORIDE SERPL-SCNC: 102 MMOL/L (ref 100–108)
CO2 SERPL-SCNC: 23 MMOL/L (ref 21–32)
CREAT SERPL-MCNC: 1.21 MG/DL (ref 0.6–1.3)
EOSINOPHIL # BLD AUTO: 0.02 THOUSAND/ΜL (ref 0–0.61)
EOSINOPHIL NFR BLD AUTO: 0 % (ref 0–6)
ERYTHROCYTE [DISTWIDTH] IN BLOOD BY AUTOMATED COUNT: 16.1 % (ref 11.6–15.1)
GFR SERPL CREATININE-BSD FRML MDRD: 56 ML/MIN/1.73SQ M
GLUCOSE SERPL-MCNC: 119 MG/DL (ref 65–140)
HCT VFR BLD AUTO: 38.4 % (ref 36.5–49.3)
HGB BLD-MCNC: 13.1 G/DL (ref 12–17)
INR PPP: 1.03 (ref 0.86–1.16)
LYMPHOCYTES # BLD AUTO: 1.17 THOUSANDS/ΜL (ref 0.6–4.47)
LYMPHOCYTES NFR BLD AUTO: 10 % (ref 14–44)
MAGNESIUM SERPL-MCNC: 1.6 MG/DL (ref 1.6–2.6)
MCH RBC QN AUTO: 29.9 PG (ref 26.8–34.3)
MCHC RBC AUTO-ENTMCNC: 34.1 G/DL (ref 31.4–37.4)
MCV RBC AUTO: 88 FL (ref 82–98)
MONOCYTES # BLD AUTO: 0.84 THOUSAND/ΜL (ref 0.17–1.22)
MONOCYTES NFR BLD AUTO: 8 % (ref 4–12)
NEUTROPHILS # BLD AUTO: 9.18 THOUSANDS/ΜL (ref 1.85–7.62)
NEUTS SEG NFR BLD AUTO: 82 % (ref 43–75)
NRBC BLD AUTO-RTO: 0 /100 WBCS
NT-PROBNP SERPL-MCNC: 210 PG/ML
P AXIS: 62 DEGREES
PLATELET # BLD AUTO: 125 THOUSANDS/UL (ref 149–390)
PMV BLD AUTO: 9.6 FL (ref 8.9–12.7)
POTASSIUM SERPL-SCNC: 4.1 MMOL/L (ref 3.5–5.3)
PR INTERVAL: 226 MS
PROT SERPL-MCNC: 6.4 G/DL (ref 6.4–8.2)
PROTHROMBIN TIME: 13.5 SECONDS (ref 12.1–14.4)
QRS AXIS: 62 DEGREES
QRSD INTERVAL: 114 MS
QT INTERVAL: 352 MS
QTC INTERVAL: 444 MS
RBC # BLD AUTO: 4.38 MILLION/UL (ref 3.88–5.62)
SODIUM SERPL-SCNC: 136 MMOL/L (ref 136–145)
T WAVE AXIS: 48 DEGREES
TROPONIN I SERPL-MCNC: <0.02 NG/ML
VENTRICULAR RATE: 96 BPM
WBC # BLD AUTO: 11.22 THOUSAND/UL (ref 4.31–10.16)

## 2018-04-08 PROCEDURE — 99222 1ST HOSP IP/OBS MODERATE 55: CPT | Performed by: INTERNAL MEDICINE

## 2018-04-08 PROCEDURE — 83735 ASSAY OF MAGNESIUM: CPT | Performed by: EMERGENCY MEDICINE

## 2018-04-08 PROCEDURE — 85025 COMPLETE CBC W/AUTO DIFF WBC: CPT | Performed by: EMERGENCY MEDICINE

## 2018-04-08 PROCEDURE — 93010 ELECTROCARDIOGRAM REPORT: CPT | Performed by: INTERNAL MEDICINE

## 2018-04-08 PROCEDURE — 84484 ASSAY OF TROPONIN QUANT: CPT | Performed by: EMERGENCY MEDICINE

## 2018-04-08 PROCEDURE — 71046 X-RAY EXAM CHEST 2 VIEWS: CPT

## 2018-04-08 PROCEDURE — 99285 EMERGENCY DEPT VISIT HI MDM: CPT

## 2018-04-08 PROCEDURE — 94640 AIRWAY INHALATION TREATMENT: CPT

## 2018-04-08 PROCEDURE — 85730 THROMBOPLASTIN TIME PARTIAL: CPT | Performed by: EMERGENCY MEDICINE

## 2018-04-08 PROCEDURE — 36415 COLL VENOUS BLD VENIPUNCTURE: CPT | Performed by: EMERGENCY MEDICINE

## 2018-04-08 PROCEDURE — 96365 THER/PROPH/DIAG IV INF INIT: CPT

## 2018-04-08 PROCEDURE — 83880 ASSAY OF NATRIURETIC PEPTIDE: CPT | Performed by: EMERGENCY MEDICINE

## 2018-04-08 PROCEDURE — 96375 TX/PRO/DX INJ NEW DRUG ADDON: CPT

## 2018-04-08 PROCEDURE — 80053 COMPREHEN METABOLIC PANEL: CPT | Performed by: EMERGENCY MEDICINE

## 2018-04-08 PROCEDURE — 99223 1ST HOSP IP/OBS HIGH 75: CPT | Performed by: INTERNAL MEDICINE

## 2018-04-08 PROCEDURE — 96367 TX/PROPH/DG ADDL SEQ IV INF: CPT

## 2018-04-08 PROCEDURE — 85610 PROTHROMBIN TIME: CPT | Performed by: EMERGENCY MEDICINE

## 2018-04-08 PROCEDURE — 87040 BLOOD CULTURE FOR BACTERIA: CPT | Performed by: EMERGENCY MEDICINE

## 2018-04-08 PROCEDURE — 93005 ELECTROCARDIOGRAM TRACING: CPT | Performed by: EMERGENCY MEDICINE

## 2018-04-08 RX ORDER — MELATONIN
5000 DAILY
Status: DISCONTINUED | OUTPATIENT
Start: 2018-04-08 | End: 2018-04-10 | Stop reason: HOSPADM

## 2018-04-08 RX ORDER — IPRATROPIUM BROMIDE AND ALBUTEROL SULFATE 2.5; .5 MG/3ML; MG/3ML
3 SOLUTION RESPIRATORY (INHALATION) DAILY
Status: DISCONTINUED | OUTPATIENT
Start: 2018-04-08 | End: 2018-04-08

## 2018-04-08 RX ORDER — GUAIFENESIN 600 MG
600 TABLET, EXTENDED RELEASE 12 HR ORAL EVERY 12 HOURS SCHEDULED
Status: DISCONTINUED | OUTPATIENT
Start: 2018-04-08 | End: 2018-04-10 | Stop reason: HOSPADM

## 2018-04-08 RX ORDER — ALBUTEROL SULFATE 90 UG/1
2 AEROSOL, METERED RESPIRATORY (INHALATION) DAILY
Status: DISCONTINUED | OUTPATIENT
Start: 2018-04-08 | End: 2018-04-10 | Stop reason: HOSPADM

## 2018-04-08 RX ORDER — CHLORAL HYDRATE 500 MG
1000 CAPSULE ORAL DAILY
Status: DISCONTINUED | OUTPATIENT
Start: 2018-04-08 | End: 2018-04-10 | Stop reason: HOSPADM

## 2018-04-08 RX ORDER — METHYLPREDNISOLONE SODIUM SUCCINATE 125 MG/2ML
125 INJECTION, POWDER, LYOPHILIZED, FOR SOLUTION INTRAMUSCULAR; INTRAVENOUS ONCE
Status: COMPLETED | OUTPATIENT
Start: 2018-04-08 | End: 2018-04-08

## 2018-04-08 RX ORDER — MAGNESIUM SULFATE HEPTAHYDRATE 40 MG/ML
2 INJECTION, SOLUTION INTRAVENOUS ONCE
Status: COMPLETED | OUTPATIENT
Start: 2018-04-08 | End: 2018-04-08

## 2018-04-08 RX ORDER — ALBUTEROL SULFATE 90 UG/1
2 AEROSOL, METERED RESPIRATORY (INHALATION) EVERY 4 HOURS PRN
Status: DISCONTINUED | OUTPATIENT
Start: 2018-04-08 | End: 2018-04-10 | Stop reason: HOSPADM

## 2018-04-08 RX ORDER — SODIUM CHLORIDE FOR INHALATION 0.9 %
3 VIAL, NEBULIZER (ML) INHALATION ONCE
Status: COMPLETED | OUTPATIENT
Start: 2018-04-08 | End: 2018-04-08

## 2018-04-08 RX ORDER — HEPARIN SODIUM 5000 [USP'U]/ML
5000 INJECTION, SOLUTION INTRAVENOUS; SUBCUTANEOUS EVERY 8 HOURS SCHEDULED
Status: DISCONTINUED | OUTPATIENT
Start: 2018-04-08 | End: 2018-04-10 | Stop reason: HOSPADM

## 2018-04-08 RX ORDER — ACETAMINOPHEN 325 MG/1
650 TABLET ORAL EVERY 6 HOURS PRN
Status: DISCONTINUED | OUTPATIENT
Start: 2018-04-08 | End: 2018-04-10 | Stop reason: HOSPADM

## 2018-04-08 RX ORDER — GUAIFENESIN/DEXTROMETHORPHAN 100-10MG/5
10 SYRUP ORAL EVERY 4 HOURS PRN
Status: DISCONTINUED | OUTPATIENT
Start: 2018-04-08 | End: 2018-04-10 | Stop reason: HOSPADM

## 2018-04-08 RX ORDER — FINASTERIDE 5 MG/1
5 TABLET, FILM COATED ORAL
Status: DISCONTINUED | OUTPATIENT
Start: 2018-04-08 | End: 2018-04-10 | Stop reason: HOSPADM

## 2018-04-08 RX ORDER — METHYLPREDNISOLONE SODIUM SUCCINATE 40 MG/ML
40 INJECTION, POWDER, LYOPHILIZED, FOR SOLUTION INTRAMUSCULAR; INTRAVENOUS EVERY 12 HOURS SCHEDULED
Status: DISCONTINUED | OUTPATIENT
Start: 2018-04-08 | End: 2018-04-10

## 2018-04-08 RX ORDER — CHOLECALCIFEROL (VITAMIN D3) 125 MCG
1000 CAPSULE ORAL DAILY
Status: DISCONTINUED | OUTPATIENT
Start: 2018-04-08 | End: 2018-04-10 | Stop reason: HOSPADM

## 2018-04-08 RX ORDER — TAMSULOSIN HYDROCHLORIDE 0.4 MG/1
0.4 CAPSULE ORAL
Status: DISCONTINUED | OUTPATIENT
Start: 2018-04-08 | End: 2018-04-09

## 2018-04-08 RX ORDER — LACTOBACILLUS ACIDOPHILUS / LACTOBACILLUS BULGARICUS 100 MILLION CFU STRENGTH
1 GRANULES ORAL DAILY
Status: DISCONTINUED | OUTPATIENT
Start: 2018-04-08 | End: 2018-04-10 | Stop reason: HOSPADM

## 2018-04-08 RX ADMIN — LACTOBACILLUS ACIDOPHILUS / LACTOBACILLUS BULGARICUS 1 PACKET: 100 MILLION CFU STRENGTH GRANULES at 08:18

## 2018-04-08 RX ADMIN — ISODIUM CHLORIDE 3 ML: 0.03 SOLUTION RESPIRATORY (INHALATION) at 02:30

## 2018-04-08 RX ADMIN — IPRATROPIUM BROMIDE 1 MG: 0.5 SOLUTION RESPIRATORY (INHALATION) at 02:30

## 2018-04-08 RX ADMIN — METHYLPREDNISOLONE SODIUM SUCCINATE 40 MG: 40 INJECTION, POWDER, FOR SOLUTION INTRAMUSCULAR; INTRAVENOUS at 13:16

## 2018-04-08 RX ADMIN — GUAIFENESIN AND DEXTROMETHORPHAN 10 ML: 100; 10 SYRUP ORAL at 19:33

## 2018-04-08 RX ADMIN — ALBUTEROL SULFATE 2 PUFF: 90 AEROSOL, METERED RESPIRATORY (INHALATION) at 13:16

## 2018-04-08 RX ADMIN — Medication 1000 MG: at 08:15

## 2018-04-08 RX ADMIN — METHYLPREDNISOLONE SODIUM SUCCINATE 125 MG: 125 INJECTION, POWDER, FOR SOLUTION INTRAMUSCULAR; INTRAVENOUS at 02:31

## 2018-04-08 RX ADMIN — ALBUTEROL SULFATE 2 PUFF: 90 AEROSOL, METERED RESPIRATORY (INHALATION) at 19:31

## 2018-04-08 RX ADMIN — GUAIFENESIN 600 MG: 600 TABLET, EXTENDED RELEASE ORAL at 08:15

## 2018-04-08 RX ADMIN — CEFEPIME HYDROCHLORIDE 2000 MG: 2 INJECTION, SOLUTION INTRAVENOUS at 04:00

## 2018-04-08 RX ADMIN — GUAIFENESIN 600 MG: 600 TABLET, EXTENDED RELEASE ORAL at 21:06

## 2018-04-08 RX ADMIN — MAGNESIUM SULFATE HEPTAHYDRATE 2 G: 40 INJECTION, SOLUTION INTRAVENOUS at 02:31

## 2018-04-08 RX ADMIN — TIOTROPIUM BROMIDE 18 MCG: 18 CAPSULE ORAL; RESPIRATORY (INHALATION) at 08:18

## 2018-04-08 RX ADMIN — CYANOCOBALAMIN TAB 500 MCG 1000 MCG: 500 TAB at 08:15

## 2018-04-08 RX ADMIN — ALBUTEROL SULFATE 10 MG: 2.5 SOLUTION RESPIRATORY (INHALATION) at 02:30

## 2018-04-08 RX ADMIN — VANCOMYCIN HYDROCHLORIDE 1250 MG: 1 INJECTION, POWDER, LYOPHILIZED, FOR SOLUTION INTRAVENOUS at 04:38

## 2018-04-08 RX ADMIN — FINASTERIDE 5 MG: 5 TABLET, FILM COATED ORAL at 21:06

## 2018-04-08 RX ADMIN — METHYLPREDNISOLONE SODIUM SUCCINATE 40 MG: 40 INJECTION, POWDER, FOR SOLUTION INTRAMUSCULAR; INTRAVENOUS at 21:06

## 2018-04-08 RX ADMIN — VITAMIN D, TAB 1000IU (100/BT) 5000 UNITS: 25 TAB at 08:17

## 2018-04-08 NOTE — ED NOTES
Significant other reports Tylenol and nebulizer treatmentwas administered approximately 1 hour prior to arrival        Susan Vásquez RN  04/08/18 [id number]

## 2018-04-08 NOTE — CASE MANAGEMENT
Initial Clinical Review    Admission: Date/Time/Statement: 4/8/18 @ 0445     Orders Placed This Encounter   Procedures    Inpatient Admission (expected length of stay for this patient is greater than two midnights)     Standing Status:   Standing     Number of Occurrences:   1     Order Specific Question:   Admitting Physician     Answer:   Bartolo Thakur     Order Specific Question:   Level of Care     Answer:   Med Surg [16]     Order Specific Question:   Estimated length of stay     Answer:   More than 2 Midnights     Order Specific Question:   Certification     Answer:   I certify that inpatient services are medically necessary for this patient for a duration of greater than two midnights  See H&P and MD Progress Notes for additional information about the patient's course of treatment  ED: Date/Time/Mode of Arrival:   ED Arrival Information     Expected Arrival Acuity Means of Arrival Escorted By Service Admission Type    - 4/8/2018 01:45 Emergent Walk-In Spouse General Medicine Emergency    Arrival Complaint    Abdominal Pain           Chief Complaint:   Chief Complaint   Patient presents with    Shortness of Breath     Patient presents complaining of worsening SOB and cough, as well as generalized weakness  Was admitted to hospital on March 12th with pneumonia  Hx of multiple myeloma  History of Illness: Jori Whitney is a 81 y o  male who presented to the hospital for evaluation due to weakness over the last 1-2 days  He was admitted to the hospital on 3/12/18 and discharged on 3/16/18 with hospital-acquired pneumonia and sepsis  He was initially treated with broad-spectrum antibiotic therapy however continued on cefepime at discharge and then oral antibiotics for 2 days after hospitalization  He was doing well up until 1-2 days ago when he began to feel weak  He had a return of a dry cough  He denies fevers    He has continued using his nebulizers occasionally as he has not felt short of breath  He has continue with Mucinex  When he is able to clear phlegm, he is able to breathe better  Denies: Chest pain, Shortness of Breath, Nausea, Vomiting, Diarrhea, Dysuria  Pulmonary/Chest: Effort normal  He has decreased breath sounds in the right lower field  He has wheezes in the right upper field and the left upper field  He has rhonchi in the right middle field and the left middle field     ED Vital Signs:   ED Triage Vitals   Temperature Pulse Respirations Blood Pressure SpO2   04/08/18 0158 04/08/18 0158 04/08/18 0201 04/08/18 0158 04/08/18 0158   98 3 °F (36 8 °C) 96 21 107/60 93 %      Temp Source Heart Rate Source Patient Position - Orthostatic VS BP Location FiO2 (%)   04/08/18 0158 04/08/18 0158 04/08/18 0158 04/08/18 0158 --   Oral Monitor Sitting Left arm       Pain Score       04/08/18 0158       No Pain        Wt Readings from Last 1 Encounters:   04/08/18 81 kg (178 lb 9 2 oz)       Vital Signs (abnormal):      Abnormal Labs/Diagnostic Test Results:   WBC Thousand/uL 11 22*     PLATELETS Thousands/uL 125*     BILIRUBIN TOTAL mg/dL 1 46*     Imaging:  CXR:  Possible infiltrate right lung; right hemidiaphragm elevated secondary to prior right lung resection for lung cancer  EKG:  Normal sinus rhythm, first-degree AV block, incomplete RBB         ED Treatment:   Medication Administration from 04/08/2018 0144 to 04/08/2018 0541       Date/Time Order Dose Route Action Action by Comments     04/08/2018 0230 albuterol inhalation solution 10 mg 10 mg Nebulization Given Saulo Suarez RN      04/08/2018 0230 ipratropium (ATROVENT) 0 02 % inhalation solution 1 mg 1 mg Nebulization Given Saulo Suarez RN      04/08/2018 0230 sodium chloride 0 9 % inhalation solution 3 mL 3 mL Nebulization Given Saulo Suarez RN      04/08/2018 0231 methylPREDNISolone sodium succinate (Solu-MEDROL) injection 125 mg 125 mg Intravenous Given Saulo Suarez RN      04/08/2018 0323 magnesium sulfate 2 g/50 mL IVPB (premix) 2 g 0 g Intravenous Stopped Isreal Longoria RN      04/08/2018 0231 magnesium sulfate 2 g/50 mL IVPB (premix) 2 g 2 g Intravenous [address] Isreal Longoria RN      04/08/2018 0438 vancomycin (VANCOCIN) 1,250 mg in sodium chloride 0 9 % 250 mL IVPB 1,250 mg Intravenous [address] Isreal Longoria RN      04/08/2018 0432 cefepime (MAXIPIME) IVPB (premix) 2,000 mg 0 mg Intravenous Stopped Isreal Longoria RN      04/08/2018 0400 cefepime (MAXIPIME) IVPB (premix) 2,000 mg 2,000 mg Intravenous New Bag Isreal Longoria RN           Past Medical/Surgical History:    Active Ambulatory Problems     Diagnosis Date Noted    Chronic rhinitis 03/03/2016    Adenocarcinoma, lung ([place] [address] ) 03/03/2016    BPH (benign prostatic hyperplasia) 03/03/2016    Multiple myeloma not having achieved remission ([place] [address] ) 03/03/2016    Chronic diarrhea 03/03/2016    COPD (chronic obstructive pulmonary disease) ([place] [address] ) 08/22/2017    Right bundle branch block 01/03/2018    Enlarged prostate without lower urinary tract symptoms (luts) 06/21/2012    Osteoporosis 06/17/2013    Rectal mass 07/10/2017    Hospital-acquired pneumonia 03/15/2018    Sepsis ([facility] ) 03/15/2018    QT prolongation 03/16/2018     Resolved Ambulatory Problems     Diagnosis Date Noted    COPD with acute exacerbation ([place] [address] ) 03/03/2016    Dyspnea 03/03/2016    ALLYSSA (acute kidney injury) 04/01/2017    Fever 08/22/2017    Streptococcal pneumonia ([place] [address] ) 08/22/2017    Arterial hypotension 01/03/2018    HCAP (healthcare-associated pneumonia) 01/03/2018    Sepsis ([facility] ) 01/03/2018    Fatigue 03/15/2018    Fever 03/15/2018     Past Medical History:   Diagnosis Date    Cancer ([place] [address] )     Chemoprevention     Chronic pain disorder     COPD (chronic obstructive pulmonary disease) (HCC)     Diarrhea     Emphysema lung (HCC)     Enlarged prostate     [place] (hard of hearing)     Kidney stone     Low blood pressure     Lung cancer ([place] [address] )     Multiple myeloma (HCC)     Multiple myeloma (HCC)     Pneumonia     Pulmonary emphysema (HCC)     Rectal adenocarcinoma (HCC)     Shortness of breath     Sleep apnea        Admitting Diagnosis: COPD (chronic obstructive pulmonary disease) (HCC) [J44 9]  Pneumonia [J18 9]  Abdominal pain [R10 9]  Multiple myeloma not having achieved remission ([facility] ) [C90 00]    Age/Sex: 81 y o  male      Assessment:  Hospital-acquired pneumonia  Plan:  Hospital-acquired pneumonia  · Possible persistent pneumonia with questionable infiltrate on chest x-ray  · Will continue cefepime as MRSA swab was negative during previous hospitalization  · Will consult pulmonology  · Cont   mucinex  · Mild leukocytosis - WBC 11 22 - but afebrile (WBC on 4/2/18 was 5 44)  COPD  · No appreciable exacerbation  · Continue with nebulizers, Spiriva  Multiple myeloma  · Continues with chemotherapy every 2 weeks  BPH  · Continue home regimen  · Stable without complaints  Bilateral lower extremity edema  · Right worse than left, stable   Chronic diarrhea  · Stable, no changes  History of adenocarcinoma of the lung  · Status post partial resection of right lung with hemidiaphragm  · In remission since 2015  Anticipated Length of Stay will be: MORE THAN 2 (TWO) Midnights due to:     Admission Orders:  HOB ELEVATE 30 DEGREE  CBC PLT BMP   PEAK FLOW   AIRWAY CLEARANCE   RESPIRATORY PROTOCOL  IS  Scheduled Meds:   Current Facility-Administered Medications:  acetaminophen 650 mg Oral Q6H PRN [place], PA-C   albuterol 2 puff Inhalation Q4H PRN [place], PA-C   albuterol 2 puff Inhalation Daily [facility]   [START ON 4/9/2018] cefepime 2,000 mg Intravenous Q24H [place], PA-C   cholecalciferol 5,000 Units Oral Daily [place], PA-C   cyanocobalamin 1,000 mcg Oral Daily [place], PA-C   dextromethorphan-guaiFENesin 10 mL Oral Q4H PRN [place], PA-C   finasteride 5 mg Oral HS [place], PA-C   fish oil 1,000 mg Oral Daily Camila Wade PA-C   guaiFENesin 600 mg Oral Q12H [facility] Camila Wade PA-C   heparin (porcine) 5,000 Units Subcutaneous [facility] Camila Wade PA-C   lactobacillus acidophilus-bulgaricus 1 packet Oral Daily Camila Wade PA-C   methylPREDNISolone sodium succinate 40 mg Intravenous Q12H [facility] Thuy Snow DO   tamsulosin 0 4 mg Oral Daily With [employer] GUADALUPE   tiotropium 18 mcg Inhalation Daily Camila Wade PA-C     Continuous Infusions:    PRN Meds:   acetaminophen    albuterol    dextromethorphan-guaiFENesin

## 2018-04-08 NOTE — ED PROVIDER NOTES
History  Chief Complaint   Patient presents with    Shortness of Breath     Patient presents complaining of worsening SOB and cough, as well as generalized weakness  Was admitted to hospital on March 12th with pneumonia  Hx of multiple myeloma  Patient is a 30-year-old male that presents for worsening shortness of breath and cough that started today  He has a history of multiple myeloma and lung cancer  He was recently admitted for pneumonia and was on vancomycin  Patient states since his admission he has been doing well up until today  He denies any fevers  He has a cough but it is nonproductive of sputum  History provided by:  Patient and spouse   used: No    Shortness of Breath   Severity:  Moderate  Onset quality:  Gradual  Duration:  1 day  Timing:  Constant  Progression:  Worsening  Chronicity:  Recurrent  Relieved by:  Nothing  Worsened by: Activity, coughing and deep breathing  Ineffective treatments:  Inhaler  Associated symptoms: cough, diaphoresis and wheezing    Associated symptoms: no abdominal pain, no fever, no rash, no sputum production, no syncope and no vomiting    Risk factors: hx of cancer        Prior to Admission Medications   Prescriptions Last Dose Informant Patient Reported? Taking? DHA-EPA-Coenzyme Q10-Vitamin E (CO Q-10 VITAMIN E FISH OIL) 05-11- CAPS   Yes No   Sig: Take 1 capsule by mouth daily   Lactobacillus-Inulin ([employer] PO)   Yes No   Sig: Take by mouth daily   Silodosin (RAPAFLO PO)  Self Yes No   Sig: Take 8 mg by mouth daily at bedtime     Tiotropium Bromide Monohydrate (SPIRIVA HANDIHALER IN)   Yes No   Sig: Inhale 2 puffs daily at bedtime as needed 2  5MCG/ACT    acetaminophen (TYLENOL) 325 mg tablet   No No   Sig: Take 2 tablets (650 mg total) by mouth every 6 (six) hours as needed for mild pain, headaches or fever   albuterol (PROVENTIL HFA,VENTOLIN HFA) 90 mcg/act inhaler   Yes No   Sig: Inhale 2 puffs every 4 (four) hours as needed for wheezing   cholecalciferol (VITAMIN D3) 1,000 units tablet   Yes No   Sig: Take 5,000 Units by mouth daily     cyanocobalamin (VITAMIN B-12) 1,000 mcg tablet   Yes No   Sig: Take by mouth daily   daratumumab (DARZALEX) IVPB   Yes No   Sig: Infuse into a venous catheter once a week   dexamethasone (DECADRON) 4 mg tablet   Yes No   Sig: Take 4 mg by mouth once a week Take 5 pill weekly   dextromethorphan-guaiFENesin (ROBITUSSIN DM)  mg/5 mL syrup   No No   Sig: Take 10 mL by mouth every 4 (four) hours as needed for cough or congestion   finasteride (PROSCAR) 5 mg tablet   Yes No   Sig: Take 5 mg by mouth daily at bedtime  guaiFENesin (MUCINEX) 600 mg 12 hr tablet   No No   Sig: Take 1 tablet (600 mg total) by mouth every 12 (twelve) hours   ipratropium-albuterol (DUO-NEB) 0 5-2 5 mg/mL   Yes No   Sig: Take 3 mL by nebulization daily     multivitamin (THERAGRAN) TABS   Yes No   Sig: Take 1 tablet by mouth daily   omega-3-acid ethyl esters (LOVAZA) 1 g capsule   Yes No   Sig: Take 1 g by mouth daily        Facility-Administered Medications: None       Past Medical History:   Diagnosis Date    Cancer ([address] )     lung and multiple myloma    Chemoprevention     Chronic pain disorder     3 fractured vertebrae    COPD (chronic obstructive pulmonary disease) (HCC)     Diarrhea     Emphysema lung (HCC)     Enlarged prostate     [place] (hard of hearing[place]     Kidney stone     Low blood pressure     Lung cancer (HCC)     Multiple myeloma (HCC)     Multiple myeloma (HCC)     Pneumonia     Pulmonary emphysema (HCC)     Rectal adenocarcinoma (HCC)     Shortness of breath     Sleep apnea     no cpap       Past Surgical History:   Procedure Laterality Date    CHOLECYSTECTOMY      COLONOSCOPY W/ ENDOSCOPIC US N/A 7/21/2017    Procedure: ANAL ENDOSCOPIC U/S;  Surgeon: Tyrese Gonzalez MD;  Location: [facility];   Service: Colorectal    HERNIA REPAIR      KIDNEY STONE SURGERY  LUNG LOBECTOMY Right     OR COLONOSCOPY FLX DX W/COLLJ SPEC WHEN PFRMD N/A 7/10/2017    Procedure: COLONOSCOPY;  Surgeon: Brittany Panchal MD;  Location: [facility]; Service: Gastroenterology    OR RECTAL TUMOR EXCISION, TRANSANAL ENDOSCOPIC MICROSURGICAL, FULL THICK N/A 11/2/2017    Procedure: TRANSANAL ENDOSCOPIC MICROSURGERY TEM;  Surgeon: Mireya Sadler MD;  Location: [facility];  Service: Colorectal       Family History   Problem Relation Age of Onset    Arthritis Mother     Colon cancer Father     Heart attack Father     Diabetes type I Sister      I have reviewed and agree with the history as documented  Social History   Substance Use Topics    Smoking status: Former Smoker     Packs/day: 1 00     Years: 50 00     Types: Cigarettes     Quit date: 12/7/2010    Smokeless tobacco: Never Used      Comment: quit in 1999    Alcohol use Yes      Comment: rarely        Review of Systems   Constitutional: Positive for diaphoresis  Negative for fever  Respiratory: Positive for cough, chest tightness, shortness of breath and wheezing  Negative for sputum production  Cardiovascular: Negative for syncope  Gastrointestinal: Negative for abdominal pain, nausea and vomiting  Skin: Negative for color change, pallor, rash and wound  Allergic/Immunologic: Positive for immunocompromised state  All other systems reviewed and are negative        Physical Exam  ED Triage Vitals   Temperature Pulse Respirations Blood Pressure SpO2   04/08/18 0158 04/08/18 0158 04/08/18 0201 04/08/18 0158 04/08/18 0158   98 3 °F (36 8 °C) 96 21 107/60 93 %      Temp Source Heart Rate Source Patient Position - Orthostatic VS BP Location FiO2 (%)   04/08/18 0158 04/08/18 0158 04/08/18 0158 04/08/18 0158 --   Oral Monitor Sitting Left arm       Pain Score       04/08/18 0158       No Pain           Orthostatic Vital Signs  Vitals:    04/08/18 0550 04/08/18 0739 04/08/18 1626 04/08/18 2313   BP: 108/60 125/58 122/61 124/69 Pulse: 96 97 97 95   Patient Position - Orthostatic VS: Sitting Lying Lying        Physical Exam   Constitutional: He is oriented to person, place, and time  He appears well-developed and well-nourished  He appears distressed  HENT:   Head: Normocephalic and atraumatic  Eyes: Conjunctivae are normal    Neck: Normal range of motion  Neck supple  Cardiovascular: Normal rate, regular rhythm, normal heart sounds and intact distal pulses  Exam reveals no friction rub  No murmur heard  Pulmonary/Chest: Effort normal  No stridor  No respiratory distress  He has wheezes  He has rhonchi  He has rales  Abdominal: Soft  He exhibits no distension  There is no tenderness  There is no rebound and no guarding  Musculoskeletal: Normal range of motion  He exhibits edema (L>>>R)  He exhibits no tenderness or deformity  Neurological: He is alert and oriented to person, place, and time  Skin: Skin is warm  He is diaphoretic  Psychiatric: He has a normal mood and affect  Nursing note and vitals reviewed        ED Medications  Medications   acetaminophen (TYLENOL) tablet 650 mg (not administered)   dextromethorphan-guaiFENesin (ROBITUSSIN DM)  mg/5 mL oral syrup 10 mL (10 mL Oral Given 4/8/18 1933)   guaiFENesin (MUCINEX) 12 hr tablet 600 mg (600 mg Oral Given 4/8/18 2106)   albuterol (PROVENTIL HFA,VENTOLIN HFA) inhaler 2 puff (2 puffs Inhalation Given 4/8/18 1931)   cholecalciferol (VITAMIN D3) tablet 5,000 Units (5,000 Units Oral Given 4/8/18 0817)   cyanocobalamin (VITAMIN B-12) tablet 1,000 mcg (1,000 mcg Oral Given 4/8/18 0815)   finasteride (PROSCAR) tablet 5 mg (5 mg Oral Given 4/8/18 2106)   lactobacillus acidophilus-bulgaricus [facility]) packet 1 packet (1 packet Oral Given 4/8/18 0818)   fish oil capsule 1,000 mg (1,000 mg Oral Given 4/8/18 0815)   tiotropium (SPIRIVA) capsule for inhaler 18 mcg (18 mcg Inhalation Given 4/8/18 0818)   tamsulosin (FLOMAX) capsule 0 4 mg (0 4 mg Oral Not Given 4/8/18 1627)   cefepime (MAXIPIME) IVPB (premix) 2,000 mg (not administered)   heparin (porcine) subcutaneous injection 5,000 Units (5,000 Units Subcutaneous Not Given 4/8/18 2102)   albuterol (PROVENTIL HFA,VENTOLIN HFA) inhaler 2 puff (2 puffs Inhalation Given 4/8/18 1316)   methylPREDNISolone sodium succinate (Solu-MEDROL) injection 40 mg (40 mg Intravenous Given 4/8/18 2106)   albuterol inhalation solution 10 mg (10 mg Nebulization Given 4/8/18 0230)   ipratropium (ATROVENT) 0 02 % inhalation solution 1 mg (1 mg Nebulization Given 4/8/18 0230)   sodium chloride 0 9 % inhalation solution 3 mL (3 mL Nebulization Given 4/8/18 0230)   methylPREDNISolone sodium succinate (Solu-MEDROL) injection 125 mg (125 mg Intravenous Given 4/8/18 0231)   magnesium sulfate 2 g/50 mL IVPB (premix) 2 g (0 g Intravenous Stopped 4/8/18 0323)   vancomycin (VANCOCIN) 1,250 mg in sodium chloride 0 9 % 250 mL IVPB (1,250 mg Intravenous New Bag 4/8/18 0438)   cefepime (MAXIPIME) IVPB (premix) 2,000 mg (0 mg Intravenous Stopped 4/8/18 0432)       Diagnostic Studies  Results Reviewed     Procedure Component Value Units Date/Time    Troponin I [[id number]]  (Normal) Collected:  04/08/18 0212    Lab Status:  Final result Specimen:  Blood from Arm, Left Updated:  04/08/18 0441     Troponin I <0 02 ng/mL     Narrative:         [employer] Chemistry analyzer 99% cutoff is > 0 04 ng/mL in network labs    o cTnI 99% cutoff is useful only when applied to patients in the clinical setting of myocardial ischemia  o cTnI 99% cutoff should be interpreted in the context of clinical history, ECG findings and possibly cardiac imaging to establish correct diagnosis  o cTnI 99% cutoff may be suggestive but clearly not indicative of a coronary event without the clinical setting of myocardial ischemia      B-type natriuretic peptide [[id number]]  (Normal) Collected:  04/08/18 0212    Lab Status:  Final result Specimen:  Blood from Arm, Left Updated:  04/08/18 0419 NT-proBNP 210 pg/mL     Magnesium [[id number]]  (Normal) Collected:  04/08/18 9486    Lab Status:  Final result Specimen:  Blood from Arm, Left Updated:  04/08/18 0410     Magnesium 1 6 mg/dL     Comprehensive metabolic panel [[id number]]  (Abnormal) Collected:  04/08/18 0212    Lab Status:  Final result Specimen:  Blood from Arm, Left Updated:  04/08/18 0406     Sodium 136 mmol/L      Potassium 4 1 mmol/L      Chloride 102 mmol/L      CO2 23 mmol/L      Anion Gap 11 mmol/L      BUN 15 mg/dL      Creatinine 1 21 mg/dL      Glucose 119 mg/dL      Calcium 9 1 mg/dL      AST 19 U/L      ALT 28 U/L      Alkaline Phosphatase 78 U/L      Total Protein 6 4 g/dL      Albumin 3 2 (L) g/dL      Total Bilirubin 1 46 (H) mg/dL      eGFR 56 ml/min/1 73sq m     Narrative:         National Kidney Disease Education Program recommendations are as follows:  GFR calculation is accurate only with a steady state creatinine  Chronic Kidney disease less than 60 ml/min/1 73 sq  meters  Kidney failure less than 15 ml/min/1 73 sq  meters  CBC and differential [[id number]]  (Abnormal) Collected:  04/08/18 0212    Lab Status:  Final result Specimen:  Blood from Arm, Left Updated:  04/08/18 0352     WBC 11 22 (H) Thousand/uL      RBC 4 38 Million/uL      Hemoglobin 13 1 g/dL      Hematocrit 38 4 %      MCV 88 fL      MCH 29 9 pg      MCHC 34 1 g/dL      RDW 16 1 (H) %      MPV 9 6 fL      Platelets 693 (L) Thousands/uL      nRBC 0 /100 WBCs      Neutrophils Relative 82 (H) %      Lymphocytes Relative 10 (L) %      Monocytes Relative 8 %      Eosinophils Relative 0 %      Basophils Relative 0 %      Neutrophils Absolute 9 18 (H) Thousands/µL      Lymphocytes Absolute 1 17 Thousands/µL      Monocytes Absolute 0 84 Thousand/µL      Eosinophils Absolute 0 02 Thousand/µL      Basophils Absolute 0 01 Thousands/µL     Narrative: This is an appended report  These results have been appended to a previously verified report      Blood culture #2 [[id number]] Collected:  04/08/18 0238    Lab Status: In process Specimen:  Blood from Arm, Left Updated:  04/08/18 0244    Blood culture #1 [[id number]] Collected:  04/08/18 0238    Lab Status: In process Specimen:  Blood from Arm, Right Updated:  04/08/18 Rahat [[id number]]  (Normal) Collected:  04/08/18 0212    Lab Status:  Final result Specimen:  Blood from Arm, Left Updated:  04/08/18 0226     Protime 13 5 seconds      INR 1 03    APTT [[id number]]  (Normal) Collected:  04/08/18 6611    Lab Status:  Final result Specimen:  Blood from Arm, Left Updated:  04/08/18 0226     PTT 33 seconds     Narrative:          Therapeutic Heparin Range = 60-90 seconds                 X-ray chest 2 views   ED Interpretation by Tommy Nobles DO (04/08 0345)   Right upper lobe consolidation      Final Result by Yolanda Leon MD (04/08 0292)   Relatively stable appearance of the chest with probable postoperative right apical scarring vs  consolidation       Correlation with repeat chest CT may be warranted      Findings are consistent with emergency room physician's preliminary reading                           Workstation performed: [id number]                    Procedures  CriticalCare Time  Performed by: Yasmine Green  Authorized by: Yasmine Green     Critical care provider statement:     Critical care time (minutes):  45    Critical care time was exclusive of:  Separately billable procedures and treating other patients and teaching time    Critical care was necessary to treat or prevent imminent or life-threatening deterioration of the following conditions:  Respiratory failure    Critical care was time spent personally by me on the following activities:  Blood draw for specimens, obtaining history from patient or surrogate, development of treatment plan with patient or surrogate, discussions with consultants, evaluation of patient's response to treatment, examination of patient, interpretation of cardiac output measurements, ordering and performing treatments and interventions, ordering and review of laboratory studies, ordering and review of radiographic studies, review of old charts and re-evaluation of patient's condition    I assumed direction of critical care for this patient from another provider in my specialty: no             Phone Contacts  ED Phone Contact    ED Course  ED Course                                MDM  Number of Diagnoses or Management Options  COPD (chronic obstructive pulmonary disease) ([facility] [address] ): new and requires workup  Multiple myeloma not having achieved remission [facility]): new and requires workup  Pneumonia: new and requires workup  Diagnosis management comments: Patient appears distressed but no retractions  He has diffuse wheezes, rales and rhonchi  He is slightly diaphoretic  Will start with an hour long nebulizer, full workup and probable admission  Patient with slight improvement with his hour long nebulizer  Lung sounds have unchanged  Patient is intermittently hypoxic and worse when he falls asleep  Will admit  X-ray is suspicious for recurrent pneumonia  Vancomycin and cefepime ordered  Patient does have a history of MRSA         Amount and/or Complexity of Data Reviewed  Clinical lab tests: ordered and reviewed  Tests in the radiology section of CPT®: ordered and reviewed  Tests in the medicine section of CPT®: reviewed and ordered  Review and summarize past medical records: yes  Independent visualization of images, tracings, or specimens: yes    Patient Progress  Patient progress: stable    CritCare Time    Disposition  Final diagnoses:   Pneumonia   COPD (chronic obstructive pulmonary disease) ([facility] [address] )   Multiple myeloma not having achieved remission ([facility] )     Time reflects when diagnosis was documented in both MDM as applicable and the Disposition within this note     Time User Action Codes Description Comment    4/8/2018  4:44 AM Watson Wallace Add [J18 9] Pneumonia     4/8/2018  4:44 AM YolanderichardCameron schroeder Add [J44 9] COPD (chronic obstructive pulmonary disease) ([address] )     4/8/2018  4:45 AM Aaron Cameron Zimmerman Add [C90 00] Multiple myeloma not having achieved remission [facility])       ED Disposition     ED Disposition Condition Comment    Admit  Case was discussed with REJI and the patient's admission status was agreed to be Admission Status: inpatient status to the service of Dr Sudheer goetz   Follow-up Information    None       Current Discharge Medication List      CONTINUE these medications which have NOT CHANGED    Details   acetaminophen (TYLENOL) 325 mg tablet Take 2 tablets (650 mg total) by mouth every 6 (six) hours as needed for mild pain, headaches or fever  Qty: 30 tablet, Refills: 0    Associated Diagnoses: Chronic obstructive pulmonary disease, unspecified COPD type (HCC)      albuterol (PROVENTIL HFA,VENTOLIN HFA) 90 mcg/act inhaler Inhale 2 puffs every 4 (four) hours as needed for wheezing      cholecalciferol (VITAMIN D3) 1,000 units tablet Take 5,000 Units by mouth daily        cyanocobalamin (VITAMIN B-12) 1,000 mcg tablet Take by mouth daily      daratumumab (DARZALEX) IVPB Infuse into a venous catheter once a week      dexamethasone (DECADRON) 4 mg tablet Take 4 mg by mouth once a week Take 5 pill weekly      dextromethorphan-guaiFENesin (ROBITUSSIN DM)  mg/5 mL syrup Take 10 mL by mouth every 4 (four) hours as needed for cough or congestion  Qty: 118 mL, Refills: 0    Associated Diagnoses: Chronic obstructive pulmonary disease, unspecified COPD type (HCC)      DHA-EPA-Coenzyme Q10-Vitamin E (CO Q-10 VITAMIN E FISH OIL) 60-57- CAPS Take 1 capsule by mouth daily      finasteride (PROSCAR) 5 mg tablet Take 5 mg by mouth daily at bedtime        guaiFENesin (MUCINEX) 600 mg 12 hr tablet Take 1 tablet (600 mg total) by mouth every 12 (twelve) hours  Qty: 60 tablet, Refills: 0    Associated Diagnoses: Chronic obstructive pulmonary disease, unspecified COPD type ([facility] [address] )      ipratropium-albuterol (DUO-NEB) 0 5-2 5 mg/mL Take 3 mL by nebulization daily        Lactobacillus-Inulin ([facility]) Take by mouth daily      multivitamin (THERAGRAN) TABS Take 1 tablet by mouth daily      omega-3-acid ethyl esters (LOVAZA) 1 g capsule Take 1 g by mouth daily        Silodosin (RAPAFLO PO) Take 8 mg by mouth daily at bedtime        Tiotropium Bromide Monohydrate (SPIRIVA HANDIHALER IN) Inhale 2 puffs daily at bedtime as needed 2  5MCG/ACT            No discharge procedures on file      ED Provider  Electronically Signed by           Harpreet Escalona DO  04/09/18 0002

## 2018-04-08 NOTE — PHYSICIAN ADVISOR
This patient is a 81 y o  y/o male who is admitted to the hospital for Shortness of Breath (Patient presents complaining of worsening SOB and cough, as well as generalized weakness  Was admitted to hospital on March 12th with pneumonia  Hx of multiple myeloma  )   The patient presented to the ED on 4/8/18 at [address] and was admitted to the hospital on 4/8/2018 0155  History of Present Illness includes: the patient had a prior admission from 3/12-3/16 due to sepsis and pneumonia and was discharged in stable condition  The patient had increased weakness over the past 1-2 days  He has a dry cough  Vital signs in the ER are as follows   ED Triage Vitals   Temperature Pulse Respirations Blood Pressure SpO2   04/08/18 0158 04/08/18 0158 04/08/18 0201 04/08/18 0158 04/08/18 0158   98 3 °F (36 8 °C) 96 21 107/60 93 %      Temp Source Heart Rate Source Patient Position - Orthostatic VS BP Location FiO2 (%)   04/08/18 0158 04/08/18 0158 04/08/18 0158 04/08/18 0158 --   Oral Monitor Sitting Left arm       Pain Score       04/08/18 0158       No Pain           On exam the breath sounds are decreased and there is rhonchi  There is leg edema  WBC is 11 2 and Cr is 1 46  This patient is being admitted with hospital acquired pneumonia  The plan of care includes IV abx, pulmonary consultations, mucinex, nebs, supplemental O2 as needed  This patient is appropriate for INPATIENT admission as his length of stay is expected to be at least 2 midnights  This admission is UNRELATED to the patient's prior admission as he was treated and discharged in stable condition

## 2018-04-08 NOTE — CONSULTS
Pulmonary Consultation   Amy Gillis 81 y o  male MRN: [id number]  Unit/Bed#: E2 -01 Encounter: [id number]      Reason for consultation:  Pneumonia, COPD    Requesting physician: Fany Shen    Impressions/Recommendations:     · Moderate COPD with acute exacerbation - start Solu-Medrol 40 mg twice daily  Continue Spiriva  Schedule Xopenex 3 times daily  Consider adding Advair prior to discharge  Would also consider updating pulmonary function testing and enrollment in pulmonary rehab  He will need follow-up with Dr Rosa Law upon discharge  · Abnormal chest x-ray with chronic right-sided changes from previous right upper lobectomy and associated volume loss/scarring from radiation therapy  I am not convinced that he has a recurrent pneumonia based on this current study  Continue current antibiotic regimen for now and follow up on culture data  · History of non-small cell lung cancer, status post right upper lobectomy, chemotherapy and radiation therapy in 2015      History of Present Illness   HPI:  Anna Kirby is a 81 y o  male who has history of COPD and lung cancer, follows with Dr Rosa Law in the office  He was hospitalized in March a lingular pneumonia and treated course of antibiotics  Patient was doing well after that hospitalization, but developed extreme fatigue, cough and shortness of breath yesterday  He is having difficulty bringing up any sputum  When he does, it is clear in color  He denies hemoptysis  He denies fever, chills or night sweats  He has increased wheezing despite using Spiriva and his nebulizer  He follows with Dr Rosa Law in the office  He has been on Spiriva for quite some time  His wife believes that he may have tried Symbicort in the past, but does not recall if it was beneficial   He has a history of lung cancer, s/p left upper lobectomy in 2015  He is currently getting chemotherapy every other week for multiple myeloma    He follows with Dr Isaiah Hughes from an oncologic perspective  Review of systems:  Patient denies headache or vision changes  Weight is stable  Denies sore throat or runny nose  Denies fever, chills or sweats  Denies chest pain or palpitations  Denies nausea, vomiting, (+) diarrhea  Chronic left lower extremity edema  Denies skin rashes  Denies dysuria or hematuria  All other 12-point review of systems are negative  Historical Information   Past Medical History:   Diagnosis Date    Cancer ([address] )     lung and multiple myloma    Chemoprevention     Chronic pain disorder     3 fractured vertebrae    COPD (chronic obstructive pulmonary disease) (HCC)     Diarrhea     Emphysema lung (HCC)     Enlarged prostate     [place]hard [place]     Kidney stone     Low blood pressure     Lung cancer (HCC)     Multiple myeloma (HCC)     Multiple myeloma (HCC)     Pneumonia     Pulmonary emphysema (HCC)     Rectal adenocarcinoma (HCC)     Shortness of breath     Sleep apnea     no cpap     Past Surgical History:   Procedure Laterality Date    CHOLECYSTECTOMY      COLONOSCOPY W/ ENDOSCOPIC US N/A 7/21/2017    Procedure: ANAL ENDOSCOPIC U/S;  Surgeon: Shonda Zavaleta MD;  [facility]; Service: Colorectal    HERNIA REPAIR      KIDNEY STONE SURGERY      LUNG LOBECTOMY Right     [place] COLONOSCOPY FLX DX W/COLLJ SPEC WHEN PFRMD N/A 7/10/2017    Procedure: COLONOSCOPY;  Surgeon: Rohith Pelaez MD;  [facility];   Service: Gastroenterology    W[place] RECTAL TUMOR EXCISION, TRANSANAL ENDOSCOPIC MICROSURGICAL, FULL THICK N/A 11/2/2017    Procedure: TRANSANAL ENDOSCOPIC MICROSURGERY TEM;  Surgeon: Shonda Zavaleta MD;  [facility];  Service: Colorectal     Family History   Problem Relation Age of Onset    Arthritis Mother     Colon cancer Father     Heart attack Father     Diabetes type I Sister      Tobacco history: 48 pk-yr history, quit ~ 13 yr    Meds/Allergies   Current Facility-Administered Medications   Medication Dose Route Frequency    acetaminophen (TYLENOL) tablet 650 mg  650 mg Oral Q6H PRN    albuterol (PROVENTIL HFA,VENTOLIN HFA) inhaler 2 puff  2 puff Inhalation Q4H PRN    albuterol (PROVENTIL HFA,VENTOLIN HFA) inhaler 2 puff  2 puff Inhalation Daily    [START ON 4/9/2018] cefepime (MAXIPIME) IVPB (premix) 2,000 mg  2,000 mg Intravenous Q24H    cholecalciferol (VITAMIN D3) tablet 5,000 Units  5,000 Units Oral Daily    cyanocobalamin (VITAMIN B-12) tablet 1,000 mcg  1,000 mcg Oral Daily    dextromethorphan-guaiFENesin (ROBITUSSIN DM)  mg/5 mL oral syrup 10 mL  10 mL Oral Q4H PRN    finasteride (PROSCAR) tablet 5 mg  5 mg Oral HS    fish oil capsule 1,000 mg  1,000 mg Oral Daily    guaiFENesin (MUCINEX) 12 hr tablet 600 mg  600 mg Oral Q12H BREN    heparin (porcine) subcutaneous injection 5,000 Units  5,000 Units Subcutaneous Q8H [facility]    lactobacillus acidophilus-bulgaricus (FLORANEX) packet 1 packet  1 packet Oral Daily    tamsulosin (FLOMAX) capsule 0 4 mg  0 4 mg Oral Daily With Dinner    tiotropium (SPIRIVA) capsule for inhaler 18 mcg  18 mcg Inhalation Daily     Allergies   Allergen Reactions    Sulfa Antibiotics Itching    Penicillin V Rash     AS CHILD       Vitals: Blood pressure 125/58, pulse 97, temperature 97 8 °F (36 6 °C), temperature source Temporal, resp  rate 18, height 5' 6" (1 676 m), weight 81 kg (178 lb 9 2 oz), SpO2 98 % , room air, Body mass index is 28 82 kg/m²      Intake/Output Summary (Last 24 hours) at 04/08/18 1142  Last data filed at 04/08/18 0432   Gross per 24 hour   Intake              100 ml   Output                0 ml   Net              100 ml     Physical exam:    General Appearance:    Alert, cooperative, no conversational dyspnea or   accessory muscle use       Head/eyes:    Normocephalic, without obvious abnormality, atraumatic,         PERRL, extraocular muscles intact, no scleral icterus    Nose:   Nares normal, septum midline, mucosa normal, no drainage    or sinus tenderness   Throat:   Moist mucous membranes, no thrush   Neck:   Supple, trachea midline, no adenopathy; no carotid    bruit or JVD   Lungs:     Bilateral wheezes, right greater than left   Chest Wall:    No tenderness or deformity    Heart:    Regular rate and rhythm, S1 and S2 normal, no murmur, rub   or gallop   Abdomen:     Soft, non-tender, bowel sounds active all four quadrants,     no masses, no organomegaly   Extremities:   Extremities normal, atraumatic, no cyanosis or edema   Skin:   Warm, dry, turgor normal, no rashes or lesions   Lymph nodes:   Cervical and supraclavicular nodes normal   Neurologic:   CNII-XII intact, normal strength, non-focal     Labs: I have personally reviewed pertinent lab results  ,         Results from last 7 days  Lab Units 18  0212 18  1309   SODIUM mmol/L 136 141   POTASSIUM mmol/L 4 1 4 5   CHLORIDE mmol/L 102 106   CO2 mmol/L 23 29   BUN mg/dL 15 21   CREATININE mg/dL 1 21 1 37*   CALCIUM mg/dL 9 1 9 1   TOTAL PROTEIN g/dL 6 4 6 1*   BILIRUBIN TOTAL mg/dL 1 46* 0 82   ALK PHOS U/L 78 81   ALT U/L 28 32   AST U/L 19 18   GLUCOSE RANDOM mg/dL 119 93       Results from last 7 days  Lab Units 18  0212   INR  1 03   PTT seconds 33       Results from last 7 days  Lab Units 18  0212   MAGNESIUM mg/dL 1 6     NT-    Imaging and other studies: I have personally reviewed pertinent films in PACS chest x-ray from yesterday is reviewed with several x-rays going back to 2018  The current study shows chronic right-sided changes with right apical scarring, eventration of the right hemidiaphragm with right basilar atelectasis  The left lung is clear  This is overall stable when compared with 2018  CT of the chest performed on 2018 shows chronic changes in the right hemithorax and lingular infiltrate      Pulmonary function testin  FEV1/FVC ratio 61%    FEV1 58% predicted  FVC 68% predicted    Code Status: Level 1 - Full Code    Thank you for allowing us to participate in the care of your patient      Marisela Billingsley, DO

## 2018-04-08 NOTE — H&P
History and Physical - [facility] Internal Medicine    Patient Information: Roni Krueger 81 y o  male MRN: [id number]  Unit/Bed#: E2 -01 Encounter: [id number]  Admitting Physician: Yoanna Lowe PA-C  PCP: Janell Khoury DO  Date of Admission:  04/08/18      Assessment:    Hospital-acquired pneumonia    Plan:    Hospital-acquired pneumonia  · Possible persistent pneumonia with questionable infiltrate on chest x-ray  · Will continue cefepime as MRSA swab was negative during previous hospitalization  · Will consult pulmonology  · Cont  mucinex  · Mild leukocytosis - WBC 11 22 - but afebrile (WBC on 4/2/18 was 5 44)    COPD  · No appreciable exacerbation  · Continue with nebulizers, Spiriva    Multiple myeloma  · Continues with chemotherapy every 2 weeks    BPH  · Continue home regimen  · Stable without complaints    Bilateral lower extremity edema  · Right worse than left, stable    Chronic diarrhea  · Stable, no changes    History of adenocarcinoma of the lung  · Status post partial resection of right lung with hemidiaphragm  · In remission since 2015      HPI:   Nataliya Pitts is a 81 y o  male who presented to the hospital for evaluation due to weakness over the last 1-2 days  He was admitted to the hospital on 3/12/18 and discharged on 3/16/18 with hospital-acquired pneumonia and sepsis  He was initially treated with broad-spectrum antibiotic therapy however continued on cefepime at discharge and then oral antibiotics for 2 days after hospitalization  He was doing well up until 1-2 days ago when he began to feel weak  He had a return of a dry cough  He denies fevers  He has continued using his nebulizers occasionally as he has not felt short of breath  He has continue with Mucinex  When he is able to clear phlegm, he is able to breathe better  Denies: Chest pain, Shortness of Breath, Nausea, Vomiting, Diarrhea, Dysuria    ROS:  A 12-point review of systems was done   Please see the HPI for the full details  All other systems negative  PMH:  Principal Problem:    Hospital-acquired pneumonia  Active Problems:    Adenocarcinoma, lung (HCC)    BPH (benign prostatic hyperplasia)    Multiple myeloma not having achieved remission (HCC)    Chronic diarrhea    COPD (chronic obstructive pulmonary disease) (HCC)    Right bundle branch block    Enlarged prostate without lower urinary tract symptoms (luts)      Past Medical History:   Diagnosis Date    [address] [address]     lung and multiple myloma    Chemoprevention     Chronic pain disorder     3 fractured vertebrae    COPD (chronic obstructive pulmonary disease) (HCC)     Diarrhea     Emphysema lung (HCC)     Enlarged prostate     [place]hard of hearing[place]     Kidney stone     Low blood pressure     Lung cancer (HCC)     Multiple myeloma (HCC)     Multiple myeloma (HCC)     Pneumonia     Pulmonary emphysema (HCC)     Rectal adenocarcinoma [address] [address]     Shortness of breath     Sleep apnea     no cpap     Past Surgical History:   Procedure Laterality Date    CHOLECYSTECTOMY      COLONOSCOPY W/ ENDOSCOPIC US N/A 7/21/2017    Procedure: ANAL ENDOSCOPIC U/S;  Surgeon: Yahir Collado MD;  [facility]; Service: Colorectal    HERNIA REPAIR      KIDNEY STONE SURGERY      LUNG LOBECTOMY Right     [place] COLONOSCOPY FLX DX W/COLLJ SPEC WHEN PFRMD N/A 7/10/2017    Procedure: COLONOSCOPY;  Surgeon: Vanessa Simpson MD;  [facility];   Service: Gastroenterology    SD RECTAL TUMOR EXCISION, TRANSANAL ENDOSCOPIC MICROSURGICAL, FULL THICK N/A 11/2/2017    Procedure: TRANSANAL ENDOSCOPIC MICROSURGERY TEM;  Surgeon: Yahir Collado MD;  [facility];  Service: Colorectal     Social History     Social History    Marital status: /Civil Union     Spouse name: N/A    Number of children: N/A    Years of education: N/A     Social History Main Topics    Smoking status: Former Smoker     Packs/day: 1 00     Years: 50 00     Types: Cigarettes     Quit date: 12/7/2010    Smokeless tobacco: Never Used      Comment: quit in 1999    Alcohol use Yes      Comment: rarely    Drug use: No    Sexual activity: Not Asked     Other Topics Concern    None     Social History Narrative    None     Family History   Problem Relation Age of Onset    Arthritis Mother     Colon cancer Father     Heart attack Father     Diabetes type I Sister        MED/ALLERGIES:  Current Facility-Administered Medications   Medication Dose Route Frequency Provider Last Rate Last Dose    acetaminophen (TYLENOL) tablet 650 mg  650 mg Oral Q6H PRN Allyn Mcdowell PA-C        albuterol (PROVENTIL HFA,VENTOLIN HFA) inhaler 2 puff  2 puff Inhalation Q4H PRN Allyn Mcdowell PA-C        cefepime (MAXIPIME) IVPB (premix) 2,000 mg  2,000 mg Intravenous Q24H Allyn Mcdowell PA-C        cholecalciferol (VITAMIN D3) tablet 5,000 Units  5,000 Units Oral Daily Allyn Mcdowell PA-C        [facility] DIGESTIVE HEALTH CAPS 1 capsule  1 capsule Oral Daily Allyn Mcdowell PA-C        cyanocobalamin (VITAMIN B-12) tablet 1,000 mcg  1,000 mcg Oral Daily Allyn Mcdowell PA-C        dextromethorphan-guaiFENesin (ROBITUSSIN DM)  mg/5 mL oral syrup 10 mL  10 mL Oral Q4H PRN Allyn Mcdowell PA-C        finasteride (PROSCAR) tablet 5 mg  5 mg Oral HS Allyn Mcdowell PA-C        fish oil capsule 1,000 mg  1,000 mg Oral Daily Liam Matson PA-C        guaiFENesin (MUCINEX) 12 hr tablet 600 mg  600 mg Oral Q12H [facility] Allyn Mcdowell PA-C        heparin (porcine) subcutaneous injection 5,000 Units  5,000 Units Subcutaneous [facility] Allyn Mcdowell PA-C        ipratropium-albuterol (DUO-NEB) 0 5-2 5 mg/3 mL inhalation solution 3 mL  3 mL Nebulization Daily Allyn Mcdowell PA-C        tamsulosin (FLOMAX) capsule 0 4 mg  0 4 mg Oral Daily With [employer]        tiotropium [facility]) capsule for inhaler 18 mcg  18 mcg Inhalation Daily Allyn Mcdowell PA-C        vancomycin Evangelina Oleary) 1,250 mg in sodium chloride 0 9 % 250 mL IVPB  15 mg/kg Intravenous Once Eliana Eduardo  7 mL/hr at 04/08/18 0438 1,250 mg at 04/08/18 0438     Allergies   Allergen Reactions    Sulfa Antibiotics Itching    Penicillin V Rash     AS CHILD       OBJECTIVE:    Current Vitals:   Blood Pressure: 105/57 (04/08/18 0445)  Pulse: 102 (04/08/18 0445)  Temperature: 98 3 °F (36 8 °C) (04/08/18 0158)  Temp Source: Oral (04/08/18 0158)  Respirations: 20 (04/08/18 0445)  Height: 5' 6" (167 6 cm) (04/08/18 0550)  Weight - Scale: 81 kg (178 lb 9 2 oz) (04/08/18 0550)  SpO2: 92 % (04/08/18 0445)      Intake/Output Summary (Last 24 hours) at 04/08/18 0558  Last data filed at 04/08/18 0432   Gross per 24 hour   Intake              100 ml   Output                0 ml   Net              100 ml       Invasive Devices     Peripheral Intravenous Line            Peripheral IV 04/08/18 Left Antecubital less than 1 day                  Physical Exam   Constitutional: He is oriented to person, place, and time  He appears well-developed and well-nourished  HENT:   Head: Normocephalic and atraumatic  Right Ear: External ear normal    Left Ear: External ear normal    Eyes: EOM are normal  Pupils are equal, round, and reactive to light  Neck: Neck supple  No thyromegaly present  Cardiovascular: Normal rate, regular rhythm and normal heart sounds  Pulmonary/Chest: Effort normal  He has decreased breath sounds in the right lower field  He has wheezes in the right upper field and the left upper field  He has rhonchi in the right middle field and the left middle field  He has no rales  Abdominal: Soft  Bowel sounds are normal  He exhibits no distension  There is no tenderness  There is no rebound  Musculoskeletal: He exhibits edema  Lymphadenopathy:     He has no cervical adenopathy  Neurological: He is alert and oriented to person, place, and time  No cranial nerve deficit  Skin: Skin is warm and dry  No erythema  Psychiatric: He has a normal mood and affect  His behavior is normal  Judgment and thought content normal                                                      Lab Results:   Results from last 7 days  Lab Units 04/08/18  0212   WBC Thousand/uL 11 22*   HEMOGLOBIN g/dL 13 1   HEMATOCRIT % 38 4   PLATELETS Thousands/uL 125*      Results from last 7 days  Lab Units 04/08/18  0212   SODIUM mmol/L 136   POTASSIUM mmol/L 4 1   CHLORIDE mmol/L 102   CO2 mmol/L 23   BUN mg/dL 15   CREATININE mg/dL 1 21   CALCIUM mg/dL 9 1   TOTAL PROTEIN g/dL 6 4   BILIRUBIN TOTAL mg/dL 1 46*   ALK PHOS U/L 78   ALT U/L 28   AST U/L 19   GLUCOSE RANDOM mg/dL 119     Lab Results   Component Value Date    CKTOTAL 63 02/28/2014    TROPONINI <0 02 04/08/2018         Imaging:  CXR:  Possible infiltrate right lung; right hemidiaphragm elevated secondary to prior right lung resection for lung cancer  EKG:  Normal sinus rhythm, first-degree AV block, incomplete RBB    VTE Prophylaxis: Heparin    Code Status: FULL    Counseling / Coordination of Care: Total floor / unit time spent today 60 minutes  Anticipated Length of Stay will be: MORE THAN 2 (TWO) Midnights due to:     Shayan Mcpherson PA-C    This note has been constructed using a voice recognition system

## 2018-04-09 PROCEDURE — 94640 AIRWAY INHALATION TREATMENT: CPT

## 2018-04-09 PROCEDURE — 99232 SBSQ HOSP IP/OBS MODERATE 35: CPT | Performed by: INTERNAL MEDICINE

## 2018-04-09 PROCEDURE — 94760 N-INVAS EAR/PLS OXIMETRY 1: CPT

## 2018-04-09 RX ORDER — SILODOSIN 8 MG/1
8 CAPSULE ORAL
Qty: 1 EACH | Refills: 0 | Status: SHIPPED | OUTPATIENT
Start: 2018-04-09 | End: 2018-04-09 | Stop reason: CLARIF

## 2018-04-09 RX ORDER — SILODOSIN 8 MG/1
8 CAPSULE ORAL
Status: DISCONTINUED | OUTPATIENT
Start: 2018-04-09 | End: 2018-04-10 | Stop reason: HOSPADM

## 2018-04-09 RX ORDER — LEVALBUTEROL 1.25 MG/.5ML
1.25 SOLUTION, CONCENTRATE RESPIRATORY (INHALATION)
Status: DISCONTINUED | OUTPATIENT
Start: 2018-04-09 | End: 2018-04-10 | Stop reason: HOSPADM

## 2018-04-09 RX ORDER — BENZONATATE 100 MG/1
200 CAPSULE ORAL 3 TIMES DAILY
Status: DISCONTINUED | OUTPATIENT
Start: 2018-04-09 | End: 2018-04-10 | Stop reason: HOSPADM

## 2018-04-09 RX ORDER — SODIUM CHLORIDE FOR INHALATION 0.9 %
3 VIAL, NEBULIZER (ML) INHALATION
Status: DISCONTINUED | OUTPATIENT
Start: 2018-04-09 | End: 2018-04-10 | Stop reason: HOSPADM

## 2018-04-09 RX ORDER — LEVALBUTEROL 1.25 MG/.5ML
SOLUTION, CONCENTRATE RESPIRATORY (INHALATION)
Status: DISPENSED
Start: 2018-04-09 | End: 2018-04-10

## 2018-04-09 RX ORDER — SILODOSIN 8 MG/1
8 CAPSULE ORAL
Qty: 1 CAPSULE | Refills: 0 | Status: SHIPPED | OUTPATIENT
Start: 2018-04-09 | End: 2018-04-09 | Stop reason: CLARIF

## 2018-04-09 RX ADMIN — ISODIUM CHLORIDE 3 ML: 0.03 SOLUTION RESPIRATORY (INHALATION) at 19:17

## 2018-04-09 RX ADMIN — SILODOSIN 1 CAPSULE: 8 CAPSULE ORAL at 22:08

## 2018-04-09 RX ADMIN — GUAIFENESIN AND DEXTROMETHORPHAN 10 ML: 100; 10 SYRUP ORAL at 08:48

## 2018-04-09 RX ADMIN — GUAIFENESIN 600 MG: 600 TABLET, EXTENDED RELEASE ORAL at 08:48

## 2018-04-09 RX ADMIN — LEVALBUTEROL HYDROCHLORIDE 1.25 MG: 1.25 SOLUTION, CONCENTRATE RESPIRATORY (INHALATION) at 13:00

## 2018-04-09 RX ADMIN — VITAMIN D, TAB 1000IU (100/BT) 5000 UNITS: 25 TAB at 08:48

## 2018-04-09 RX ADMIN — BENZONATATE 200 MG: 100 CAPSULE ORAL at 17:38

## 2018-04-09 RX ADMIN — CYANOCOBALAMIN TAB 500 MCG 1000 MCG: 500 TAB at 08:48

## 2018-04-09 RX ADMIN — LEVALBUTEROL HYDROCHLORIDE 1.25 MG: 1.25 SOLUTION, CONCENTRATE RESPIRATORY (INHALATION) at 19:16

## 2018-04-09 RX ADMIN — GUAIFENESIN 600 MG: 600 TABLET, EXTENDED RELEASE ORAL at 22:00

## 2018-04-09 RX ADMIN — FINASTERIDE 5 MG: 5 TABLET, FILM COATED ORAL at 22:00

## 2018-04-09 RX ADMIN — BENZONATATE 200 MG: 100 CAPSULE ORAL at 13:00

## 2018-04-09 RX ADMIN — BENZONATATE 200 MG: 100 CAPSULE ORAL at 22:00

## 2018-04-09 RX ADMIN — METHYLPREDNISOLONE SODIUM SUCCINATE 40 MG: 40 INJECTION, POWDER, FOR SOLUTION INTRAMUSCULAR; INTRAVENOUS at 08:48

## 2018-04-09 RX ADMIN — METHYLPREDNISOLONE SODIUM SUCCINATE 40 MG: 40 INJECTION, POWDER, FOR SOLUTION INTRAMUSCULAR; INTRAVENOUS at 22:00

## 2018-04-09 RX ADMIN — Medication 1000 MG: at 08:48

## 2018-04-09 RX ADMIN — ALBUTEROL SULFATE 2 PUFF: 90 AEROSOL, METERED RESPIRATORY (INHALATION) at 17:43

## 2018-04-09 RX ADMIN — TIOTROPIUM BROMIDE 18 MCG: 18 CAPSULE ORAL; RESPIRATORY (INHALATION) at 08:49

## 2018-04-09 RX ADMIN — LACTOBACILLUS ACIDOPHILUS / LACTOBACILLUS BULGARICUS 1 PACKET: 100 MILLION CFU STRENGTH GRANULES at 08:50

## 2018-04-09 RX ADMIN — CEFEPIME HYDROCHLORIDE 2000 MG: 2 INJECTION, SOLUTION INTRAVENOUS at 05:03

## 2018-04-09 RX ADMIN — ALBUTEROL SULFATE 2 PUFF: 90 AEROSOL, METERED RESPIRATORY (INHALATION) at 22:00

## 2018-04-09 RX ADMIN — ALBUTEROL SULFATE 2 PUFF: 90 AEROSOL, METERED RESPIRATORY (INHALATION) at 08:51

## 2018-04-09 NOTE — PROGRESS NOTES
Progress Note - Pulmonary   Burgos Spore 81 y o  male MRN: [id number]  Unit/Bed#: E2 -01 Encounter: [id number]      Assessment/Plan:  1  Moderate COPD with acute exacerbation        *  Continue solumedrol Q12hrs today and re-evaluate in AM to determine if can transition to prednisone        *  Continue Spiriva as he was taking prior to admission        *  Add Xopenex TID        *  Add tessalon for cough        *  Discussed pulmonary rehab at length with pt & wife  He will think about and wants to discuss with Dr Roselia Costa at             upcoming appointment  2  Abnormal CXR with chronic right sided changes secondary to previous RUL lobectomy & volume loss/scarring        *  No change in most recent CXR        *  D/C Cefepime --> discussed with attending  3  History NSCLC        *  S/P RUL lobectomy, chemo and radiation in 2015        *  Outpatient follow up with Dr Macario Yates    ~Wife updated at length at bedside  ~Outpatient pulmonary follow up with Dr Roselia Costa as per discharge instructions      Subjective:   Mr Rafi Page is seen ambulating in room from bathroom to bed  He tells me he is feeling much better than on admission  He is stronger and is able to ambulate in the halls  He tells me prior to coming in he was so weak that he was unable to get out of a chair  His breathing is also improved, but not yet back to his baseline  He has cough, which is keeping him up at night  Sputum is clear to white in color  He denies fever, chest pain or resting shortness of breath  He continues with intermittent wheezing  Per his wife, he is very deconditioned and doesn't do much at home except sit and watch TV or do crossword puzzles  She believes pulmonary rehab would be great for him  Objective:     Vitals: Blood pressure 106/60, pulse 88, temperature 98 2 °F (36 8 °C), temperature source Temporal, resp   rate 18, height 5' 6" (1 676 m), weight 81 kg (178 lb 9 2 oz), SpO2 95 % , RA, Body mass index is 28 82 kg/m²  No intake or output data in the 24 hours ending 04/09/18 1132      Physical Exam  Gen: Awake, alert, oriented x 3, no acute distress  HEENT: Mucous membranes moist, no oral lesions, no thrush  NECK: No accessory muscle use, JVP not elevated  Cardiac: Regular, single S1, single S2, no murmurs, no rubs, no gallops  Lungs: Decreased breath sounds throughout bilaterally with few scattered coarse breath sounds and expiratory wheezes noted  No rales or rhonchi heard at present  Abdomen: normoactive bowel sounds, soft nontender, nondistended, no rebound or rigidity, no guarding  Extremities: no cyanosis, no clubbing  Trace -+1 LE edema noted bilaterally    Labs: I have personally reviewed pertinent lab results  , ABG: No results found for: PHART, ILX8PQQ, PO2ART, UBG1TGD, C2FXOWZS, BEART, SOURCE, BNP: No results found for: BNP, CBC: No results found for: WBC, HGB, HCT, MCV, PLT, ADJUSTEDWBC, MCH, MCHC, RDW, MPV, NRBC, CMP: No results found for: NA, K, CL, CO2, ANIONGAP, BUN, CREATININE, GLUCOSE, CALCIUM, AST, ALT, ALKPHOS, PROT, ALBUMIN, BILITOT, EGFR, PT/INR: No results found for: PT, INR, Troponin: No results found for: TROPONINI     Blood Cultures - no growth after 24hrs    Imaging and other studies: I have personally reviewed pertinent films in PACS    PA & Lat CXR 4/8/18  FINDINGS:  Right apical consolidation or scarring, unchanged     Strand-like opacity right lung base, likely scarring, unchanged     Volume loss right hemithorax, tracheal deviation to the right, unchanged  Right perihilar surgical clips     Cardiomediastinal silhouette appears unremarkable      Typical right-sided [id number]     The lungs are otherwise clear    No pneumothorax or pleural effusion      Osseous structures appear within normal limits for patient age      IMPRESSION:  Relatively stable appearance of the chest with probable postoperative right apical scarring vs  consolidation      Correlation with repeat chest CT may be warranted     Findings are consistent with emergency room physician's preliminary reading       Vicente Brown PA-C

## 2018-04-09 NOTE — PROGRESS NOTES
[facility] Progress Note  Patient: Rickie Mcfadden 81 y o  male   MRN: [id number]  PCP: Amelia Nielsen DO  Unit/Bed#: E2 -01 Encounter: [id number]  Date Of Visit: 18  Assessment/Plan:  1  Acute COPD exacerbation   - Seems improved today  Appreciate pulm input  C/w tx as outlined per them  Agree with discontinuing abx  BCX NTD  2  MM  -OP follow up    3  BPH  -c/w flomax     4  Hx NSCLC  -OP follow up       Subjective:   Feeling better  Has bad cough  Objective:     Vitals:   Temp (24hrs), Av 3 °F (36 8 °C), Min:98 2 °F (36 8 °C), Max:98 4 °F (36 9 °C)    HR:  [88-97] 88  Resp:  [18-20] 18  BP: (106-124)/(60-69) 106/60  SpO2:  [94 %-96 %] 95 %  Body mass index is 28 82 kg/m²  Input and Output Summary (last 24 hours):     No intake or output data in the 24 hours ending 18 1338    Physical Exam:     Physical Exam    GEN: NAD  HEENT: PERRL  CARDIO: s1 s2 RRR  LUNGS: good air movement, mild exp wheezes   ABD: Soft, NT/ND  EXT: No edema       Additional Data:     Labs:      Results from last 7 days  Lab Units 18  0212   WBC Thousand/uL 11 22*   HEMOGLOBIN g/dL 13 1   HEMATOCRIT % 38 4   PLATELETS Thousands/uL 125*   NEUTROS PCT % 82*   LYMPHS PCT % 10*   MONOS PCT % 8   EOS PCT % 0       Results from last 7 days  Lab Units 18  0212   SODIUM mmol/L 136   POTASSIUM mmol/L 4 1   CHLORIDE mmol/L 102   CO2 mmol/L 23   BUN mg/dL 15   CREATININE mg/dL 1 21   CALCIUM mg/dL 9 1   TOTAL PROTEIN g/dL 6 4   BILIRUBIN TOTAL mg/dL 1 46*   ALK PHOS U/L 78   ALT U/L 28   AST U/L 19   GLUCOSE RANDOM mg/dL 119       Results from last 7 days  Lab Units 18  0212   INR  1 03       * I Have Reviewed All Lab Data Listed Above  * Additional Pertinent Lab Tests Reviewed: [facility] Admission Reviewed        Recent Cultures (last 7 days):       Results from last 7 days  Lab Units 18  0238   BLOOD CULTURE  No Growth at 24 hrs  No Growth at 24 hrs  Last 24 Hours Medication List:     Current Facility-Administered Medications:  acetaminophen 650 mg Oral Q6H PRN Cinthya Matson PA-C   albuterol 2 puff Inhalation Q4H PRN Cinthya Matson PA-C   albuterol 2 puff Inhalation Daily Sukhwinder Salazar DO   benzonatate 200 mg Oral TID Ashli Rosado PA-C   cholecalciferol 5,000 Units Oral Daily Cinthya Matson PA-C   cyanocobalamin 1,000 mcg Oral Daily Mary Ellen Jauregui Balkarime   dextromethorphan-guaiFENesin 10 mL Oral Q4H PRN Cinthya Matson PA-C   finasteride 5 mg Oral HS Cinthya Matson PA-C   fish oil 1,000 mg Oral Daily Cinthya Matson PA-C   guaiFENesin 600 mg Oral Q12H [facility] Cinthya Matson PA-C   heparin (porcine) 5,000 Units Subcutaneous [facility] Cinthya Matson PA-C   lactobacillus acidophilus-bulgaricus 1 packet Oral Daily Cinthya Matson PA-C   levalbuterol       levalbuterol 1 25 mg Nebulization TID Ashli Rosado PA-C   methylPREDNISolone sodium succinate 40 mg Intravenous Q12H [facility] Thuy Snow DO   tamsulosin 0 4 mg Oral Daily With [employer] Col[employer] GUADALUPE   tiotropium 18 mcg Inhalation Daily Cinthya Matson PA-C        Today, Patient Was Seen By: Wan Contreras MD    ** Please Note: This note has been constructed using a voice recognition system   **

## 2018-04-10 VITALS
BODY MASS INDEX: 28.7 KG/M2 | RESPIRATION RATE: 18 BRPM | WEIGHT: 178.57 LBS | HEART RATE: 85 BPM | TEMPERATURE: 97.8 F | OXYGEN SATURATION: 95 % | HEIGHT: 66 IN | DIASTOLIC BLOOD PRESSURE: 65 MMHG | SYSTOLIC BLOOD PRESSURE: 118 MMHG

## 2018-04-10 LAB
ANION GAP SERPL CALCULATED.3IONS-SCNC: 10 MMOL/L (ref 4–13)
BUN SERPL-MCNC: 28 MG/DL (ref 5–25)
CALCIUM SERPL-MCNC: 9 MG/DL
CHLORIDE SERPL-SCNC: 106 MMOL/L (ref 100–108)
CO2 SERPL-SCNC: 23 MMOL/L (ref 21–32)
CREAT SERPL-MCNC: 1.04 MG/DL (ref 0.6–1.3)
ERYTHROCYTE [DISTWIDTH] IN BLOOD BY AUTOMATED COUNT: 16.8 % (ref 11.6–15.1)
GFR SERPL CREATININE-BSD FRML MDRD: 67 ML/MIN/1.73SQ M
GLUCOSE SERPL-MCNC: 145 MG/DL (ref 65–140)
HCT VFR BLD AUTO: 33.8 % (ref 36.5–49.3)
HGB BLD-MCNC: 11.4 G/DL (ref 12–17)
MCH RBC QN AUTO: 29.7 PG (ref 26.8–34.3)
MCHC RBC AUTO-ENTMCNC: 33.7 G/DL (ref 31.4–37.4)
MCV RBC AUTO: 88 FL (ref 82–98)
PLATELET # BLD AUTO: 148 THOUSANDS/UL (ref 149–390)
PMV BLD AUTO: 9.2 FL (ref 8.9–12.7)
POTASSIUM SERPL-SCNC: 4.6 MMOL/L (ref 3.5–5.3)
RBC # BLD AUTO: 3.84 MILLION/UL (ref 3.88–5.62)
SODIUM SERPL-SCNC: 139 MMOL/L (ref 136–145)
WBC # BLD AUTO: 17.49 THOUSAND/UL (ref 4.31–10.16)

## 2018-04-10 PROCEDURE — 94760 N-INVAS EAR/PLS OXIMETRY 1: CPT

## 2018-04-10 PROCEDURE — 94640 AIRWAY INHALATION TREATMENT: CPT

## 2018-04-10 PROCEDURE — 85027 COMPLETE CBC AUTOMATED: CPT | Performed by: PHYSICIAN ASSISTANT

## 2018-04-10 PROCEDURE — 99232 SBSQ HOSP IP/OBS MODERATE 35: CPT | Performed by: INTERNAL MEDICINE

## 2018-04-10 PROCEDURE — 99239 HOSP IP/OBS DSCHRG MGMT >30: CPT | Performed by: INTERNAL MEDICINE

## 2018-04-10 PROCEDURE — 80048 BASIC METABOLIC PNL TOTAL CA: CPT | Performed by: PHYSICIAN ASSISTANT

## 2018-04-10 RX ORDER — LEVALBUTEROL 1.25 MG/.5ML
1.25 SOLUTION, CONCENTRATE RESPIRATORY (INHALATION)
Qty: 1 EACH | Refills: 0 | Status: SHIPPED | OUTPATIENT
Start: 2018-04-10 | End: 2018-04-24 | Stop reason: ALTCHOICE

## 2018-04-10 RX ORDER — BENZONATATE 200 MG/1
200 CAPSULE ORAL 3 TIMES DAILY
Qty: 20 CAPSULE | Refills: 0 | Status: SHIPPED | OUTPATIENT
Start: 2018-04-10 | End: 2018-07-17 | Stop reason: CLARIF

## 2018-04-10 RX ORDER — PREDNISONE 20 MG/1
40 TABLET ORAL DAILY
Status: DISCONTINUED | OUTPATIENT
Start: 2018-04-11 | End: 2018-04-10 | Stop reason: HOSPADM

## 2018-04-10 RX ORDER — PREDNISONE 10 MG/1
TABLET ORAL
Qty: 30 TABLET | Refills: 0 | Status: SHIPPED | OUTPATIENT
Start: 2018-04-10 | End: 2018-04-24 | Stop reason: CLARIF

## 2018-04-10 RX ADMIN — Medication 1000 MG: at 08:41

## 2018-04-10 RX ADMIN — CYANOCOBALAMIN TAB 500 MCG 1000 MCG: 500 TAB at 08:41

## 2018-04-10 RX ADMIN — VITAMIN D, TAB 1000IU (100/BT) 5000 UNITS: 25 TAB at 08:41

## 2018-04-10 RX ADMIN — BENZONATATE 200 MG: 100 CAPSULE ORAL at 08:41

## 2018-04-10 RX ADMIN — ISODIUM CHLORIDE 3 ML: 0.03 SOLUTION RESPIRATORY (INHALATION) at 08:25

## 2018-04-10 RX ADMIN — LEVALBUTEROL HYDROCHLORIDE 1.25 MG: 1.25 SOLUTION, CONCENTRATE RESPIRATORY (INHALATION) at 08:24

## 2018-04-10 RX ADMIN — TIOTROPIUM BROMIDE 18 MCG: 18 CAPSULE ORAL; RESPIRATORY (INHALATION) at 08:42

## 2018-04-10 RX ADMIN — ALBUTEROL SULFATE 2 PUFF: 90 AEROSOL, METERED RESPIRATORY (INHALATION) at 08:42

## 2018-04-10 RX ADMIN — METHYLPREDNISOLONE SODIUM SUCCINATE 40 MG: 40 INJECTION, POWDER, FOR SOLUTION INTRAMUSCULAR; INTRAVENOUS at 08:41

## 2018-04-10 RX ADMIN — GUAIFENESIN 600 MG: 600 TABLET, EXTENDED RELEASE ORAL at 08:41

## 2018-04-10 NOTE — DISCHARGE SUMMARY
Discharge Summary - [facility] Internal Medicine    Patient Information: Cheo Levin 81 y o  male MRN: [id number]  Unit/Bed#: E2 -01 Encounter: [id number]    Discharging Physician / Practitioner: Anat Avendano MD  PCP: Maryclare Claude, DO  Admission Date: 4/8/2018  Discharge Date: 04/10/18    Disposition:     Home    Reason for Admission: SOB    Discharge Diagnoses:     Principal Problem:    Hospital-acquired pneumonia  Active Problems:    [facility] )    BPH (benign prostatic hyperplasia)    Multiple myeloma not having achieved remission (HCC)    Chronic diarrhea    COPD (chronic obstructive pulmonary disease) ([facility] )    Right bundle branch block    Enlarged prostate without lower urinary tract symptoms (luts)  Resolved Problems:    * No resolved hospital problems  *      Consultations During Hospital Stay:  · Pulm    Procedures Performed:     · None    Significant Findings / Test Results:     · none    Incidental Findings:   · None     Test Results Pending at Discharge (will require follow up): · None     Outpatient Tests Requested:  · None    Complications:  None    Hospital Course:     Cheo Levin is a 81 y o  male patient who originally presented to the hospital on 4/8/2018 due to SOB  Pt was admitted for COPD exacerbation  CXR showed no filtrate and was not clinically PNA so abx were not used  He was treated with steroids and duonebs with much improvement  He required no O2  He was [facility] by [facility] as well  He was ok for DC with follow up with pulm in 2 weeks       Condition at Discharge: good     Discharge Day Visit / Exam:     Subjective:  No complaints  Vitals: Blood Pressure: 118/65 (04/10/18 0734)  Pulse: 85 (04/10/18 0734)  Temperature: 97 8 °F (36 6 °C) (04/10/18 0734)  Temp Source: Temporal (04/10/18 0734)  Respirations: 18 (04/10/18 0734)  Height: 5' 6" (167 6 cm) (04/08/18 0550)  Weight - Scale: 81 kg (178 lb 9 2 oz) (04/08/18 0550)  SpO2: 95 % (04/10/18 0825)  Exam: Physical Exam  GEN: NAD  HEENT: PERRL  CARDIO: s1 s2 RRR  LUNGS: CTA  ABD: Soft, NT/ND  EXT: No edema         Discharge instructions/Information to patient and family:   See after visit summary for information provided to patient and family  Provisions for Follow-Up Care:  See after visit summary for information related to follow-up care and any pertinent home health orders  Discharge Statement:  I spent 35 minutes discharging the patient  This time was spent on the day of discharge  I had direct contact with the patient on the day of discharge  Greater than 50% of the total time was spent examining patient, answering all patient questions, arranging and discussing plan of care with patient as well as directly providing post-discharge instructions  Additional time then spent on discharge activities  Discharge Medications:  See after visit summary for reconciled discharge medications provided to patient and family        ** Please Note: This note has been constructed using a voice recognition system **

## 2018-04-10 NOTE — PROGRESS NOTES
Progress Note - Pulmonary   Hakan Wisdom 81 y o  male MRN: [id number]  Unit/Bed#: E2 -01 Encounter: [id number]      Assessment/Plan:  1  Moderate COPD with acute exacerbation  1  Transition to prednisone taper 40mg with reduction by 10 mg every 3 days  2  Continue Spiriva and at discharge  3  Continue Xopenex TID, will continue albuterol nebulizer at discharge  4  Continue tessalon for cough suppression at discharge  5  Will follow up with Dr Yoandy Taylor in 2 weeks, and wishes to start pulmonary rehab  2  Abnormal chest x-ray with noted chronic right-sided surgical changes and volume loss/scarring  1  unchanged from prior CXR  3  History of non-small cell lung carcinoma-status post right upper lobectomy with last chemo radiation in 2015  1  Outpatient follow up with Dr Meka Skaggs    *appropriate for discharge from a pulmonary standpoint, follow up in discharge    Subjective:   Daylin Armenta was seen in the bed upon entering the room  He reports mild heartburn overnight after eating grapes  He reports feeling better today  He continues to have a mild cough is productive of clear sputum  He also reports intermittent night sweats that has been present since prior to admission  He reports his breathing is at his baseline  He currently denies: chest Pain, pain with inspiration, fevers, chills, nausea, vomiting, diarrhea, dizziness, headache, bronchospasm or hemoptysis    Objective:         Vitals: Blood pressure 118/65, pulse 85, temperature 97 8 °F (36 6 °C), temperature source Temporal, resp  rate 18, height 5' 6" (1 676 m), weight 81 kg (178 lb 9 2 oz), SpO2 95 %  ,RA, Body mass index is 28 82 kg/m²        Intake/Output Summary (Last 24 hours) at 04/10/18 0918  Last data filed at 04/10/18 0600   Gross per 24 hour   Intake                0 ml   Output              377 ml   Net             -377 ml         Physical Exam  Gen: Awake, alert, oriented x 3, no acute distress  HEENT: Mucous membranes moist, no oral lesions, no thrush  NECK: No accessory muscle use, JVP not elevated  Cardiac: Regular, single S1, single S2, no murmurs, no rubs, no gallops  Lungs: clear, no wheezes, rhonchi or rales  Abdomen: normoactive bowel sounds, soft nontender, nondistended, no rebound or rigidity, no guarding  Extremities: no cyanosis, no clubbing, no edema    Labs:   CBC:   Lab Results   Component Value Date    WBC 17 49 (H) 04/10/2018    HGB 11 4 (L) 04/10/2018    HCT 33 8 (L) 04/10/2018    MCV 88 04/10/2018     (L) 04/10/2018    MCH 29 7 04/10/2018    MCHC 33 7 04/10/2018    RDW 16 8 (H) 04/10/2018    MPV 9 2 04/10/2018   , CMP:   Lab Results   Component Value Date     04/10/2018    K 4 6 04/10/2018     04/10/2018    CO2 23 04/10/2018    ANIONGAP 10 04/10/2018    BUN 28 (H) 04/10/2018    CREATININE 1 04 04/10/2018    GLUCOSE 145 (H) 04/10/2018    CALCIUM 9 0 04/10/2018    EGFR 67 04/10/2018     Imaging and other studies:none    Uriah Rasheed

## 2018-04-10 NOTE — PROGRESS NOTES
Pt requesting to take his home med Rapaflo instead of flomax  Contacted Dr Lesley Skelton  Pt ok to resume home medication  Medication was sent to pharmacy earlier this am  Pharmacy aware

## 2018-04-10 NOTE — PROGRESS NOTES
Pt with audible wheezing  Inspiratory wheezing b/l on exam  Prn albuterol given  O2 sats stable at 97% on RA  Will continue to monitor

## 2018-04-11 ENCOUNTER — TELEPHONE (OUTPATIENT)
Dept: PULMONOLOGY | Facility: CLINIC | Age: 83
End: 2018-04-11

## 2018-04-11 ENCOUNTER — HOSPITAL ENCOUNTER (OUTPATIENT)
Dept: INFUSION CENTER | Facility: CLINIC | Age: 83
Discharge: HOME | End: 2018-04-11

## 2018-04-11 NOTE — TELEPHONE ENCOUNTER
Patient's wife Tod Jacques called stating patient Adrian Kinney was discharged yesterday 04/10/18 from [facility], she would like a call back regarding medication that were prescribed   Please advise

## 2018-04-11 NOTE — TELEPHONE ENCOUNTER
Patient was recently discharged from the hospital this week after being admitted for a COPD exacerbation  His wife called the office today regarding medication questions  He had been using Spiriva and albuterol nebulizers prior to this admission  At the time of discharge, from what she is telling me, he was sent home with a prescription for Xopenex  She tried to have this prescription filled in the pharmacy is question this  She also states that it is quite expensive  She does have albuterol at home  I have instructed her to have him take the Spiriva daily along with albuterol every 6 hours until seen in the office in follow-up in 2 weeks  He is on prednisone taper of 40 milligrams daily  She does note that he seems a bit more short of breath although his pulse ox is good at 97 percent on his supplemental oxygen  She does not feel he needs to be seen at this time, however, I instructed her to call the office if his breathing worsens and if he becomes acutely short of breath to go back to the emergency room  All her questions were answered

## 2018-04-13 LAB
BACTERIA BLD CULT: NORMAL
BACTERIA BLD CULT: NORMAL

## 2018-04-16 ENCOUNTER — TELEPHONE (OUTPATIENT)
Dept: HEMATOLOGY ONCOLOGY | Facility: CLINIC | Age: 83
End: 2018-04-16

## 2018-04-16 NOTE — TELEPHONE ENCOUNTER
Question about his dexamethasone  Usually he takes 5 before chemo  Since he was int [facility], he is on a tapering dose from the hospital, so wondering how much she should really give

## 2018-04-17 ENCOUNTER — HOSPITAL ENCOUNTER (OUTPATIENT)
Dept: INFUSION CENTER | Facility: CLINIC | Age: 83
Discharge: HOME | End: 2018-04-17
Payer: MEDICARE

## 2018-04-17 VITALS
SYSTOLIC BLOOD PRESSURE: 130 MMHG | TEMPERATURE: 96.2 F | BODY MASS INDEX: 28.8 KG/M2 | HEIGHT: 68 IN | DIASTOLIC BLOOD PRESSURE: 76 MMHG | RESPIRATION RATE: 18 BRPM | WEIGHT: 190.04 LBS | HEART RATE: 79 BPM

## 2018-04-17 DIAGNOSIS — C34.91 ADENOCARCINOMA OF RIGHT LUNG (HCC): Chronic | ICD-10-CM

## 2018-04-17 DIAGNOSIS — C90.00 MULTIPLE MYELOMA NOT HAVING ACHIEVED REMISSION (HCC): ICD-10-CM

## 2018-04-17 LAB
IGA SERPL-MCNC: 14 MG/DL (ref 70–400)
IGG SERPL-MCNC: 350 MG/DL (ref 700–1600)
IGM SERPL-MCNC: 15 MG/DL (ref 40–230)

## 2018-04-17 PROCEDURE — 96415 CHEMO IV INFUSION ADDL HR: CPT

## 2018-04-17 PROCEDURE — 96367 TX/PROPH/DG ADDL SEQ IV INF: CPT

## 2018-04-17 PROCEDURE — 96375 TX/PRO/DX INJ NEW DRUG ADDON: CPT

## 2018-04-17 PROCEDURE — 96417 CHEMO IV INFUS EACH ADDL SEQ: CPT

## 2018-04-17 PROCEDURE — 82784 ASSAY IGA/IGD/IGG/IGM EACH: CPT

## 2018-04-17 PROCEDURE — 96413 CHEMO IV INFUSION 1 HR: CPT

## 2018-04-17 RX ORDER — SODIUM CHLORIDE 9 MG/ML
20 INJECTION, SOLUTION INTRAVENOUS CONTINUOUS
Status: DISCONTINUED | OUTPATIENT
Start: 2018-04-17 | End: 2018-04-20 | Stop reason: HOSPADM

## 2018-04-17 RX ORDER — ACETAMINOPHEN 325 MG/1
650 TABLET ORAL ONCE
Status: COMPLETED | OUTPATIENT
Start: 2018-04-17 | End: 2018-04-17

## 2018-04-17 RX ADMIN — Medication 300 UNITS: at 14:30

## 2018-04-17 RX ADMIN — ONDANSETRON 16 MG: 2 INJECTION INTRAMUSCULAR; INTRAVENOUS at 13:30

## 2018-04-17 RX ADMIN — SODIUM CHLORIDE 100 MG: 9 INJECTION, SOLUTION INTRAVENOUS at 09:30

## 2018-04-17 RX ADMIN — CYCLOPHOSPHAMIDE 400 MG: 500 INJECTION, POWDER, FOR SOLUTION INTRAVENOUS; ORAL at 13:50

## 2018-04-17 RX ADMIN — DIPHENHYDRAMINE HYDROCHLORIDE 25 MG: 50 INJECTION, SOLUTION INTRAMUSCULAR; INTRAVENOUS at 09:10

## 2018-04-17 RX ADMIN — SODIUM CHLORIDE 20 ML/HR: 0.9 INJECTION, SOLUTION INTRAVENOUS at 09:10

## 2018-04-17 RX ADMIN — ACETAMINOPHEN 650 MG: 325 TABLET, FILM COATED ORAL at 09:05

## 2018-04-17 RX ADMIN — DARATUMUMAB 1300 MG: 100 INJECTION, SOLUTION, CONCENTRATE INTRAVENOUS at 09:59

## 2018-04-17 NOTE — PROGRESS NOTES
Patient tolerated Darzalex and Cytoxan infusions well  Denies any discomfort  Pt and wife are aware of all future appointments   Refused AVS

## 2018-04-17 NOTE — PLAN OF CARE
Problem: Potential for Falls  Goal: Patient will remain free of falls  INTERVENTIONS:  - Assess patient frequently for physical needs  -  Identify cognitive and physical deficits and behaviors that affect risk of falls    -  [place] fall precautions as indicated by assessment   - Educate patient/family on patient safety including physical limitations  - Instruct patient to call for assistance with activity based on assessment  - Modify environment to reduce risk of injury  - Consider OT/PT consult to assist with strengthening/mobility   Outcome: Progressing

## 2018-04-24 ENCOUNTER — OFFICE VISIT (OUTPATIENT)
Dept: PULMONOLOGY | Facility: CLINIC | Age: 83
End: 2018-04-24
Payer: MEDICARE

## 2018-04-24 VITALS
HEART RATE: 106 BPM | OXYGEN SATURATION: 96 % | DIASTOLIC BLOOD PRESSURE: 58 MMHG | HEIGHT: 67 IN | SYSTOLIC BLOOD PRESSURE: 92 MMHG | BODY MASS INDEX: 29.05 KG/M2 | WEIGHT: 185.1 LBS | TEMPERATURE: 99.6 F

## 2018-04-24 DIAGNOSIS — D80.9 IMMUNOGLOBULIN DEFICIENCY (HCC): ICD-10-CM

## 2018-04-24 DIAGNOSIS — J44.9 CHRONIC OBSTRUCTIVE PULMONARY DISEASE, UNSPECIFIED COPD TYPE (HCC): Primary | Chronic | ICD-10-CM

## 2018-04-24 DIAGNOSIS — C34.91 ADENOCARCINOMA OF RIGHT LUNG (HCC): Chronic | ICD-10-CM
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
## 2018-04-24 PROBLEM — R94.31 QT PROLONGATION: Status: RESOLVED | Noted: 2018-03-16 | Resolved: 2018-04-24

## 2018-04-24 PROBLEM — J18.9 HOSPITAL-ACQUIRED PNEUMONIA: Status: RESOLVED | Noted: 2018-03-15 | Resolved: 2018-04-24

## 2018-04-24 PROBLEM — A41.9 SEPSIS (HCC): Status: RESOLVED | Noted: 2018-03-15 | Resolved: 2018-04-24

## 2018-04-24 PROBLEM — K62.89 RECTAL MASS: Status: RESOLVED | Noted: 2017-07-10 | Resolved: 2018-04-24

## 2018-04-24 PROCEDURE — 99214 OFFICE O/P EST MOD 30 MIN: CPT | Performed by: INTERNAL MEDICINE

## 2018-04-24 RX ORDER — ALBUTEROL SULFATE 90 UG/1
2 AEROSOL, METERED RESPIRATORY (INHALATION) EVERY 4 HOURS PRN
Qty: 1 INHALER | Refills: 6 | Status: SHIPPED | OUTPATIENT
Start: 2018-04-24 | End: 2018-12-05 | Stop reason: SDUPTHER

## 2018-04-24 RX ORDER — SILODOSIN 8 MG/1
CAPSULE ORAL
COMMUNITY
Start: 2018-04-22 | End: 2018-04-24 | Stop reason: CLARIF

## 2018-04-24 NOTE — LETTER
April 24, 2018     Annabel Gil, DO  [id number]   [address]  [place] [address]    Patient: Kacy Mcguire   YOB: 1935   Date of Visit: 4/24/2018       Dear Dr Sis Cheatham: Thank you for referring Vasile Montilla to me for evaluation  Below are my notes for this consultation  If you have questions, please do not hesitate to call me  I look forward to following your patient along with you  Sincerely,        Tom Soto MD        CC: MD Tom Moody MD  4/24/2018  8:37 PM  Sign at close encounter    Progress note - Pulmonary Medicine   Bee Lighter Cusati 81 y o  male MRN: [id number]       Impression & Plan:     COPD (chronic obstructive pulmonary disease) ([facility] )  · Two recent hospitalizations with respiratory infection line   · Noted to have significantly reduced immunoglobulin levels and will be following up with Dr Lamonte Hicks for possible IVIG  · Recommended to continue on nebulized DuoNeb treatments 2-3 times daily  · Use Ventolin rescue inhaler if needed but do not exceed 2-3 additional times daily  · Instructed not to take Spiriva while using DuoNeb regularly  · May be having periods of steroid withdrawal symptomatology  He feels quite fatigued and washed out once his steroid dosing completes but recommended against chronic, daily use of prednisone due to immunosuppression and side effects    Adenocarcinoma, lung (HCC)  · Currently has been stable radiographically with last CT imaging during the March hospitalization    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 )  · Recent blood work shows reduction in IgA, IgM, and IgG levels  · Dr Lamonte Hicks to follow up in regard to possible replacement therapy        ______________________________________________________________________    HPI:    Kacy Mcguire presents today for follow-up of hospitalization for COPD exacerbation and recent treatment of pneumonia    He did have a return to the hospital in April after the March hospitalization which was felt to be more bronchitis and COPD exacerbation related  He was discharged relatively quickly after the second hospitalization  He does still reports symptomatology  He has fair amount of coughing and phlegm intermittently  He is using the nebulizer treatments and has variable respiratory symptoms  He has made note that when he is not taking the steroids he does tend to feel quite fatigued, tired, and washed out  He may be having an element of withdrawal symptoms from the corticosteroids  Her    He is currently using DuoNeb twice daily and Ventolin rescue inhaler sometimes 4 to 5 times a day  He has noticed some jitteriness and tremor  He does not have any chest pain  He denies cardiac complaints  He does have a fair amount of mucus which she clears intermittently throughout the day but has difficulty expectorating  He has been followed for chemotherapy by Dr Mervin Flores  Due to the recent respiratory infections, immunoglobulin levels were checked and are significantly reduced  He has not had his regular chemotherapy in several weeks and his protein counts for myeloma have been rising        Review of Systems:  Review of Systems   Constitutional:        As noted in HPI   HENT: Negative  Eyes: Negative  Respiratory:        As per HPI   Cardiovascular: Negative for palpitations and leg swelling  Gastrointestinal: Positive for diarrhea (ThisHas been chronic)  Negative for abdominal pain and blood in stool  Endocrine:        As per HPI   Genitourinary: Negative for dysuria  Musculoskeletal: Negative for arthralgias and back pain  Skin: Negative  Allergic/Immunologic: Negative  Neurological: Negative for syncope and light-headedness  Hematological: Negative  Psychiatric/Behavioral: Negative            Past medical history, surgical history, and family history were reviewed and updated as appropriate    Social history updates:  History   Smoking Status    Former Smoker    Packs/day: 1 00    Years: 50 00    Types: Cigarettes    Quit date: 12/7/2010   Smokeless Tobacco    Never Used     Comment: quit in 1999       PhysicalExamination:  Vitals:   BP 92/58 (BP Location: Left arm, Patient Position: Sitting)   Pulse (!) 106   Temp 99 6 °F (37 6 °C) (Tympanic)   Ht 5' 7" (1 702 m)   Wt 84 kg (185 lb 1 6 oz)   SpO2 96%   BMI 28 99 kg/m²    Gen:  Mild pallor  Some coarse audible breath sounds at rest  HEENT: PERRL  Oropharynx is clear, moist  Neck: Supple  There is no JVD, lymphadenopathy or thyromegaly  Trachea is midline  Chest:  No wheeze  Few coarse breath sounds scattered throughout the lungs  Cardiac:  Regular  There are no murmurs  Abdomen: Soft and nontender  Benign  Extremities:  Bilateral ankle edema  Neurologic: No focal deficits  Normal affect  Skin: No appreciable rashes  Diagnostic Data:  Labs: I personally reviewed the most recent laboratory data pertinent to today's visit    Lab Results   Component Value Date    WBC 17 49 (H) 04/10/2018    HGB 11 4 (L) 04/10/2018    HCT 33 8 (L) 04/10/2018    MCV 88 04/10/2018     (L) 04/10/2018     Lab Results   Component Value Date    GLUCOSE 145 (H) 04/10/2018    CALCIUM 9 0 04/10/2018     04/10/2018    K 4 6 04/10/2018    CO2 23 04/10/2018     04/10/2018    BUN 28 (H) 04/10/2018    CREATININE 1 04 04/10/2018     IgA level:  14  IgG level: 350  IgM level: 15  These values are significantly reduced from normal values    Imaging:  I personally reviewed the images on the [facility] system pertinent to today's visit  Most recent CT scan done in March 2018 showed stability in the chronic changes related to right upper lobectomy and radiation change  There were new ground-glass infiltrates consistent with pneumonia      Subsequent chest x-ray in April did not show any evidence of pneumonia      Carlos Leonard MD

## 2018-04-25 NOTE — ASSESSMENT & PLAN NOTE
· Recent blood work shows reduction in IgA, IgM, and IgG levels  · Dr Claudia Eaton to follow up in regard to possible replacement therapy

## 2018-04-25 NOTE — ASSESSMENT & PLAN NOTE
· Two recent hospitalizations with respiratory infection line   · Noted to have significantly reduced immunoglobulin levels and will be following up with Dr Tiara Cotton for possible IVIG  · Recommended to continue on nebulized DuoNeb treatments 2-3 times daily  · Use Ventolin rescue inhaler if needed but do not exceed 2-3 additional times daily  · Instructed not to take Spiriva while using DuoNeb regularly  · May be having periods of steroid withdrawal symptomatology    He feels quite fatigued and washed out once his steroid dosing completes but recommended against chronic, daily use of prednisone due to immunosuppression and side effects

## 2018-04-25 NOTE — PROGRESS NOTES
Progress note - Pulmonary Medicine   Brianna Gillis 81 y o  male MRN: [id number]       Impression & Plan:     COPD (chronic obstructive pulmonary disease) ([facility] )  · Two recent hospitalizations with respiratory infection line   · Noted to have significantly reduced immunoglobulin levels and will be following up with Dr Jacob Han for possible IVIG  · Recommended to continue on nebulized DuoNeb treatments 2-3 times daily  · Use Ventolin rescue inhaler if needed but do not exceed 2-3 additional times daily  · Instructed not to take Spiriva while using DuoNeb regularly  · May be having periods of steroid withdrawal symptomatology  He feels quite fatigued and washed out once his steroid dosing completes but recommended against chronic, daily use of prednisone due to immunosuppression and side effects    Adenocarcinoma, lung (HCC)  · Currently has been stable radiographically with last CT imaging during the March hospitalization    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[facility] )  · Recent blood work shows reduction in IgA, IgM, and IgG levels  · Dr Jacob Han to follow up in regard to possible replacement therapy    I have asked Solomon Buchanan return to see me in 2 months  We will keep a close eye on his respiratory status due to multiple recent hospitalizations and some persistent symptoms  He and his wife will call if any acute changes in symptoms    ______________________________________________________________________    HPI:    Cheo Levin presents today for follow-up of hospitalization for COPD exacerbation and recent treatment of pneumonia  He did have a return to the hospital in April after the March hospitalization which was felt to be more bronchitis and COPD exacerbation related  He was discharged relatively quickly after the second hospitalization  He does still reports symptomatology  He has fair amount of coughing and phlegm intermittently  He is using the nebulizer treatments and has variable respiratory symptoms    He has made note that when he is not taking the steroids he does tend to feel quite fatigued, tired, and washed out  He may be having an element of withdrawal symptoms from the corticosteroids  Her    He is currently using DuoNeb twice daily and Ventolin rescue inhaler sometimes 4 to 5 times a day  He has noticed some jitteriness and tremor  He does not have any chest pain  He denies cardiac complaints  He does have a fair amount of mucus which she clears intermittently throughout the day but has difficulty expectorating  He has been followed for chemotherapy by Dr Alessia Ramon  Due to the recent respiratory infections, immunoglobulin levels were checked and are significantly reduced  He has not had his regular chemotherapy in several weeks and his protein counts for myeloma have been rising        Review of Systems:  Review of Systems   Constitutional:        As noted in HPI   HENT: Negative  Eyes: Negative  Respiratory:        As per HPI   Cardiovascular: Negative for palpitations and leg swelling  Gastrointestinal: Positive for diarrhea (ThisHas been chronic)  Negative for abdominal pain and blood in stool  Endocrine:        As per HPI   Genitourinary: Negative for dysuria  Musculoskeletal: Negative for arthralgias and back pain  Skin: Negative  Allergic/Immunologic: Negative  Neurological: Negative for syncope and light-headedness  Hematological: Negative  Psychiatric/Behavioral: Negative            Past medical history, surgical history, and family history were reviewed and updated as appropriate    Social history updates:  History   Smoking Status    Former Smoker    Packs/day: 1 00    Years: 50 00    Types: Cigarettes    Quit date: 12/7/2010   Smokeless Tobacco    Never Used     Comment: quit in 1999       PhysicalExamination:  Vitals:   BP 92/58 (BP Location: Left arm, Patient Position: Sitting)   Pulse (!) 106   Temp 99 6 °F (37 6 °C) (Tympanic)   Ht 5' 7" (1 702 m)   Wt 84 kg (185 lb 1 6 oz)   SpO2 96%   BMI 28 99 kg/m²   Gen:  Mild pallor  Some coarse audible breath sounds at rest  HEENT: PERRL  Oropharynx is clear, moist  Neck: Supple  There is no JVD, lymphadenopathy or thyromegaly  Trachea is midline  Chest:  No wheeze  Few coarse breath sounds scattered throughout the lungs  Cardiac:  Regular  There are no murmurs  Abdomen: Soft and nontender  Benign  Extremities:  Bilateral ankle edema  Neurologic: No focal deficits  Normal affect  Skin: No appreciable rashes  Diagnostic Data:  Labs: I personally reviewed the most recent laboratory data pertinent to today's visit    Lab Results   Component Value Date    WBC 17 49 (H) 04/10/2018    HGB 11 4 (L) 04/10/2018    HCT 33 8 (L) 04/10/2018    MCV 88 04/10/2018     (L) 04/10/2018     Lab Results   Component Value Date    GLUCOSE 145 (H) 04/10/2018    CALCIUM 9 0 04/10/2018     04/10/2018    K 4 6 04/10/2018    CO2 23 04/10/2018     04/10/2018    BUN 28 (H) 04/10/2018    CREATININE 1 04 04/10/2018     IgA level:  14  IgG level: 350  IgM level: 15  These values are significantly reduced from normal values    Imaging:  I personally reviewed the images on the [facility] system pertinent to today's visit  Most recent CT scan done in March 2018 showed stability in the chronic changes related to right upper lobectomy and radiation change  There were new ground-glass infiltrates consistent with pneumonia      Subsequent chest x-ray in April did not show any evidence of pneumonia      Ashley Johnson MD

## 2018-04-30 ENCOUNTER — APPOINTMENT (OUTPATIENT)
Dept: LAB | Facility: MEDICAL CENTER | Age: 83
End: 2018-04-30
Payer: MEDICARE

## 2018-04-30 ENCOUNTER — TRANSCRIBE ORDERS (OUTPATIENT)
Dept: ADMINISTRATIVE | Facility: HOSPITAL | Age: 83
End: 2018-04-30

## 2018-04-30 DIAGNOSIS — C34.91 ADENOCARCINOMA OF RIGHT LUNG (HCC): Chronic | ICD-10-CM

## 2018-04-30 DIAGNOSIS — C20 MALIGNANT NEOPLASM OF RECTUM (HCC): ICD-10-CM

## 2018-04-30 DIAGNOSIS — C90.00 MULTIPLE MYELOMA NOT HAVING ACHIEVED REMISSION (HCC): ICD-10-CM

## 2018-04-30 DIAGNOSIS — C20 MALIGNANT NEOPLASM OF RECTUM (HCC): Primary | ICD-10-CM

## 2018-04-30 LAB — CEA SERPL-MCNC: 4.4 NG/ML (ref 0–3)

## 2018-04-30 PROCEDURE — 82378 CARCINOEMBRYONIC ANTIGEN: CPT

## 2018-04-30 PROCEDURE — 83883 ASSAY NEPHELOMETRY NOT SPEC: CPT

## 2018-05-01 LAB
KAPPA LC FREE SER-MCNC: 3.1 MG/L (ref 3.3–19.4)
KAPPA LC FREE/LAMBDA FREE SER: 0 {RATIO} (ref 0.26–1.65)
LAMBDA LC FREE SERPL-MCNC: 1183.1 MG/L (ref 5.7–26.3)

## 2018-05-01 RX ORDER — SODIUM CHLORIDE 9 MG/ML
20 INJECTION, SOLUTION INTRAVENOUS CONTINUOUS
Status: DISCONTINUED | OUTPATIENT
Start: 2018-05-02 | End: 2018-05-05 | Stop reason: HOSPADM

## 2018-05-01 RX ORDER — ACETAMINOPHEN 325 MG/1
650 TABLET ORAL ONCE
Status: COMPLETED | OUTPATIENT
Start: 2018-05-02 | End: 2018-05-02

## 2018-05-02 ENCOUNTER — HOSPITAL ENCOUNTER (OUTPATIENT)
Dept: INFUSION CENTER | Facility: CLINIC | Age: 83
Discharge: HOME | End: 2018-05-02
Payer: MEDICARE

## 2018-05-02 VITALS
SYSTOLIC BLOOD PRESSURE: 125 MMHG | HEART RATE: 97 BPM | HEIGHT: 68 IN | RESPIRATION RATE: 18 BRPM | BODY MASS INDEX: 27.83 KG/M2 | TEMPERATURE: 97.5 F | DIASTOLIC BLOOD PRESSURE: 75 MMHG | WEIGHT: 183.64 LBS

## 2018-05-02 PROCEDURE — 96417 CHEMO IV INFUS EACH ADDL SEQ: CPT

## 2018-05-02 PROCEDURE — 96413 CHEMO IV INFUSION 1 HR: CPT

## 2018-05-02 PROCEDURE — 96375 TX/PRO/DX INJ NEW DRUG ADDON: CPT

## 2018-05-02 PROCEDURE — 96367 TX/PROPH/DG ADDL SEQ IV INF: CPT

## 2018-05-02 PROCEDURE — 96415 CHEMO IV INFUSION ADDL HR: CPT

## 2018-05-02 RX ADMIN — Medication 300 UNITS: at 14:45

## 2018-05-02 RX ADMIN — SODIUM CHLORIDE 100 MG: 9 INJECTION, SOLUTION INTRAVENOUS at 09:56

## 2018-05-02 RX ADMIN — DARATUMUMAB 1300 MG: 100 INJECTION, SOLUTION, CONCENTRATE INTRAVENOUS at 11:17

## 2018-05-02 RX ADMIN — SODIUM CHLORIDE 20 ML/HR: 0.9 INJECTION, SOLUTION INTRAVENOUS at 09:37

## 2018-05-02 RX ADMIN — DIPHENHYDRAMINE HYDROCHLORIDE 25 MG: 50 INJECTION, SOLUTION INTRAMUSCULAR; INTRAVENOUS at 09:37

## 2018-05-02 RX ADMIN — Medication 16 MG: at 10:23

## 2018-05-02 RX ADMIN — ACETAMINOPHEN 650 MG: 325 TABLET, FILM COATED ORAL at 09:23

## 2018-05-02 RX ADMIN — CYCLOPHOSPHAMIDE 400 MG: 500 INJECTION, POWDER, FOR SOLUTION INTRAVENOUS; ORAL at 10:41

## 2018-05-02 NOTE — PROGRESS NOTES
Patient arrived to the infusion center for Darzalex and Cytoxan  Pt reports gum discomfort related to mouth sores  Pt states he has a dentist appointment next week and is scheduled to see Dr Linda Nguyễn tomorrow  I spoke to Aron Curtis with Dr Linda Nguyễn and informed her of pt's complaints and creatinine 1 36  Pt cleared for treatment today

## 2018-05-03 ENCOUNTER — OFFICE VISIT (OUTPATIENT)
Dept: HEMATOLOGY ONCOLOGY | Facility: CLINIC | Age: 83
End: 2018-05-03
Payer: MEDICARE

## 2018-05-03 ENCOUNTER — DOCUMENTATION (OUTPATIENT)
Dept: HEMATOLOGY ONCOLOGY | Facility: CLINIC | Age: 83
End: 2018-05-03

## 2018-05-03 VITALS
WEIGHT: 186.2 LBS | HEIGHT: 68 IN | RESPIRATION RATE: 16 BRPM | HEART RATE: 104 BPM | OXYGEN SATURATION: 94 % | SYSTOLIC BLOOD PRESSURE: 128 MMHG | BODY MASS INDEX: 28.22 KG/M2 | DIASTOLIC BLOOD PRESSURE: 60 MMHG

## 2018-05-03 DIAGNOSIS — C90.00 MULTIPLE MYELOMA NOT HAVING ACHIEVED REMISSION (HCC): ICD-10-CM

## 2018-05-03 DIAGNOSIS — C90.00 MULTIPLE MYELOMA, REMISSION STATUS UNSPECIFIED (HCC): Primary | ICD-10-CM

## 2018-05-03 DIAGNOSIS — C34.91 ADENOCARCINOMA OF RIGHT LUNG (HCC): Primary | Chronic | ICD-10-CM

## 2018-05-03 DIAGNOSIS — D80.1 HYPOGAMMAGLOBULINEMIA, ACQUIRED (HCC): ICD-10-CM

## 2018-05-03 DIAGNOSIS — C90.00 MULTIPLE MYELOMA, REMISSION STATUS UNSPECIFIED (HCC): ICD-10-CM

## 2018-05-03 PROCEDURE — 99215 OFFICE O/P EST HI 40 MIN: CPT | Performed by: INTERNAL MEDICINE

## 2018-05-03 RX ORDER — MELPHALAN USP, 2 MG 2 MG/1
TABLET ORAL
Qty: 24 TABLET | Refills: 5 | Status: SHIPPED | OUTPATIENT
Start: 2018-05-03 | End: 2018-05-03 | Stop reason: SDUPTHER

## 2018-05-03 RX ORDER — MELPHALAN USP, 2 MG 2 MG/1
TABLET ORAL
Qty: 24 TABLET | Refills: 0 | Status: SHIPPED | OUTPATIENT
Start: 2018-05-03 | End: 2018-06-04 | Stop reason: SDUPTHER

## 2018-05-03 NOTE — PROGRESS NOTES
Received notification from clinical pt needs Melphalan asap  Called [facility] and was informed that the pt has rx coverage through [employer] ([phone])   Called [employer] @ 11:55 ([phone]) spoke with Rafat Vizcarra who stated that the pt's id # [id number]  She started the auth process  Unfortuately we could not complete it  She received a notice that this medication is covered under the patient's part B coverage  Call ref #[id number]    Notified clinical and [facility]

## 2018-05-03 NOTE — PROGRESS NOTES
Hematology Outpatient Follow - Up Note  Hakan Wisdom 81 y o  male MRN: @ Encounter: [id number]        Date:  5/3/2018    Assessment/ Plan:    Heavily pretreated lambda light chain multiple myeloma for the past 10 years stage III as in history of present illness, most recent treatment was Daratumumab 16 milligram/kilogram every other week along with Cytoxan 400 mg p o  Every other week, the patient unfortunately he did not have response after 2 doses of therapy lambda light chain actually is doubling to 1100 mg, creatinine 1 3  The patient was heavily pretreated  Patient to be treated with melphalan 12 mg p o  Daily day 1, 2, 3, 4, prednisone 50 mg p o  b I d  For 4 days, lenalidomide 10 mg p o   Daily every 21 days out of 28 cycles, side effects of chemotherapy that are but not limited to pancytopenia, nausea, vomiting, alopecia, secondary malignancies and he is aware that he had previous history of colon cancer and lung cancer of the risk of malignancy with lenalidomide, he agreed on the treatment, will start as soon as possible   Patient to have CBC, BMP every week, filgrastim 480 mcg if ANC below 1000   Hydration with 1 L of normal saline every week for the 1st month   Hypogammaglobinemia, IVIG 25 g every month to prevent exacerbation of COPD and pneumonia and readmission to the hospital           HPI:      #1 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, multiple compression fractures, status post kyphoplasty to L1, L2, L5, treated with radiation therapy to the left intra trochanteric area, received melphalan/Velcade/prednisone in October 2010 for 4 cycles with excellent response and normalization of lambda light chain, had been on Velcade maintenance till January 2013 when lambda light chain went up to 111,treated with Velcade 1 3 mg meter square weekly 3 weeks on one week off, Decadron 20 mg by mouth weekly, lambda light chain down to 30 then creeping up to 110, on Revlimid 15 mg by mouth every other day  Lambda light chain normal at 28 however he reported skin rash on the upper chest area we stopped the Revlimid for 2 month  Then reinitiated Revlimid 5 mg by mouth daily  #2  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 3/3/16 - 3/8/16  Dx with RSV  5  admission on November 2017 with rectal adenocarcinoma moderately differentiated status post resection stage I ( pT2, pNX, grade 2) tumor measuring 4 x 3 6 centimeters below the peritoneal reflection low probability of MS H   Previous Therapy:   Revlimid 5 mg by mouth 3 weeks on 1 week off, On hold since May 2017 because of diarrhea       Current Therapy: Daratumumab 16 milligram/kilogram weekly dose 1  By the end of November 2017,     dexamethasone 20 mg PO weekly     Maintenance Daratumumab 16 mg per kg every other week initiated in January 2018     With progression, we added Velcade 1 3 milligram/meter squared every other week 2 Daratumumab       in February 2018  With progression of disease, finally we added Cytoxan to Daratumumab he     received 2 cycles with progression of disease in May 2018     Interval History:   Was admitted to the hospital again with pneumonia in March 2018   ECOG score 1           Test Results:    Imaging: X-ray Chest 2 Views    Result Date: 4/8/2018  Narrative: CHEST INDICATION:   chest pain   COMPARISON:  1/3/2018 chest x-ray EXAM PERFORMED/VIEWS:  XR CHEST PA & LATERAL FINDINGS: Right apical consolidation or scarring, unchanged Strand-like opacity right lung base, likely scarring, unchanged Volume loss right hemithorax, tracheal deviation to the right, unchanged  Right perihilar surgical clips Cardiomediastinal silhouette appears unremarkable  Typical right-sided Qugeva-a-Lxji The lungs are otherwise clear  No pneumothorax or pleural effusion  Osseous structures appear within normal limits for patient age  Impression: Relatively stable appearance of the chest with probable postoperative right apical scarring vs  consolidation Correlation with repeat chest CT may be warranted Findings are consistent with emergency room physician's preliminary reading Workstation performed: [id number]       Labs:   Lab Results   Component Value Date    WBC 6 00 04/30/2018    HGB 11 9 (L) 04/30/2018    HCT 36 6 04/30/2018    MCV 90 04/30/2018     04/30/2018     Lab Results   Component Value Date     04/30/2018    K 4 3 04/30/2018     04/30/2018    CO2 28 04/30/2018    ANIONGAP 5 04/30/2018    BUN 19 04/30/2018    CREATININE 1 36 (H) 04/30/2018    GLUCOSE 90 04/30/2018    GLUF 92 03/05/2018    CALCIUM 9 5 04/30/2018    AST 13 04/30/2018    ALT 27 04/30/2018    ALKPHOS 90 04/30/2018    PROT 6 8 04/30/2018    BILITOT 0 83 04/30/2018    EGFR 48 04/30/2018       No results found for: IRON, TIBC, FERRITIN    No results found for: [id number]      ROS:   Review of Systems   Constitutional: Positive for fatigue  Negative for activity change, appetite change, chills, diaphoresis, fever and unexpected weight change  HENT: Negative for congestion, dental problem, facial swelling, hearing loss, mouth sores, nosebleeds, postnasal drip, rhinorrhea, sore throat, trouble swallowing and voice change  Eyes: Negative for photophobia, pain, discharge, redness, itching and visual disturbance  Respiratory: Positive for cough and shortness of breath  Negative for choking, chest tightness and wheezing  Cardiovascular: Negative for chest pain, palpitations and leg swelling     Gastrointestinal: Negative for abdominal distention, abdominal pain, anal bleeding, blood in stool, constipation, diarrhea, nausea, rectal pain and vomiting  Endocrine: Negative for cold intolerance and heat intolerance  Genitourinary: Negative for decreased urine volume, difficulty urinating, dysuria, flank pain, frequency, hematuria and urgency  Musculoskeletal: Negative for arthralgias, back pain, gait problem, joint swelling, myalgias, neck pain and neck stiffness  Skin: Negative for color change, pallor, rash and wound  Allergic/Immunologic: Negative for immunocompromised state  Neurological: Negative for dizziness, tremors, seizures, syncope, facial asymmetry, speech difficulty, weakness, light-headedness, numbness and headaches  Hematological: Negative for adenopathy  Does not bruise/bleed easily  Psychiatric/Behavioral: Negative for agitation, confusion, decreased concentration, dysphoric mood and sleep disturbance  The patient is not nervous/anxious  All other systems reviewed and are negative  Current Medications: Reviewed  Allergies: Reviewed  PMH/FH/SH:  Reviewed      Physical Exam:    Body surface area is 1 99 meters squared  Wt Readings from Last 3 Encounters:   05/03/18 84 5 kg (186 lb 3 2 oz)   05/02/18 83 3 kg (183 lb 10 3 oz)   04/24/18 84 kg (185 lb 1 6 oz)        Temp Readings from Last 3 Encounters:   05/02/18 97 5 °F (36 4 °C) (Tympanic)   04/24/18 99 6 °F (37 6 °C) (Tympanic)   04/17/18 (!) 96 2 °F (35 7 °C) (Tympanic)        BP Readings from Last 3 Encounters:   05/03/18 128/60   05/02/18 125/75   04/24/18 92/58         Pulse Readings from Last 3 Encounters:   05/03/18 104   05/02/18 97   04/24/18 (!) 106        Physical Exam   Constitutional: He is oriented to person, place, and time  He appears well-developed and well-nourished  No distress  HENT:   Head: Normocephalic and atraumatic  Mouth/Throat: Oropharynx is clear and moist  No oropharyngeal exudate  Eyes: Conjunctivae and EOM are normal  Pupils are equal, round, and reactive to light  Neck: Normal range of motion  Neck supple  No tracheal deviation present  No thyromegaly present  Cardiovascular: Normal rate and regular rhythm  Exam reveals no gallop and no friction rub  Murmur heard  Pulmonary/Chest: Effort normal  No respiratory distress  He has wheezes  He has no rales  He exhibits no tenderness  Abdominal: Soft  Bowel sounds are normal  He exhibits no distension and no mass  There is no tenderness  There is no rebound and no guarding  Musculoskeletal: Normal range of motion  Lymphadenopathy:     He has no cervical adenopathy  Neurological: He is alert and oriented to person, place, and time  Skin: Skin is warm and dry  No rash noted  He is not diaphoretic  No erythema  No pallor  Psychiatric: He has a normal mood and affect  His behavior is normal  Judgment and thought content normal    Vitals reviewed  Goals and Barriers:  Current Goal: Minimize effects of disease  Barriers: None  Patient's Capacity to Self Care:  Patient is able to self care      Code Status: [unfilled]

## 2018-05-03 NOTE — LETTER
May 3, 2018     Maria Guadalupe Grigsby DO  [address]  [address]  [address] [facility]    Patient: Jaimee Baldwin   YOB: 1935   Date of Visit: 5/3/2018       Dear Dr Isidoro Saab: Thank you for referring Pedro Aguayo to me for evaluation  Below are my notes for this consultation  If you have questions, please do not hesitate to call me  I look forward to following your patient along with you  Sincerely,        Rebel Angelo MD        CC: MD Keisha Sanchez MD Mateo Face, MD  5/3/2018 12:57 PM  Incomplete  Hematology Outpatient Follow - Up Note  Jaimee Baldwin 81 y o  male MRN: @ Encounter: [id number]        Date:  5/3/2018    Assessment/ Plan:    Heavily pretreated lambda light chain multiple myeloma for the past 10 years stage III as in history of present illness, most recent treatment was Daratumumab 16 milligram/kilogram every other week along with Cytoxan 400 mg p o  Every other week, the patient unfortunately he did not have response after 2 doses of therapy lambda light chain actually is doubling to 1100 mg, creatinine 1 3  The patient was heavily pretreated  Patient to be treated with melphalan 12 mg p o  Daily day 1, 2, 3, 4, prednisone 50 mg p o  b I d  For 4 days, lenalidomide 10 mg p o   Daily every 21 days out of 28 cycles, side effects of chemotherapy that are but not limited to pancytopenia, nausea, vomiting, alopecia, secondary malignancies and he is aware that he had previous history of colon cancer and lung cancer of the risk of malignancy with lenalidomide, he agreed on the treatment, will start as soon as possible   Patient to have CBC, BMP every week, filgrastim 480 mcg if ANC below 1000   Hydration with 1 L of normal saline every week for the 1st month   Hypogammaglobinemia, IVIG 25 g every month to prevent exacerbation of COPD and pneumonia and readmission to the hospital           HPI:      #1 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, multiple compression fractures, status post kyphoplasty to L1, L2, L5, treated with radiation therapy to the left intra trochanteric area, received melphalan/Velcade/prednisone in October 2010 for 4 cycles with excellent response and normalization of lambda light chain, had been on Velcade maintenance till January 2013 when lambda light chain went up to 111,treated with Velcade 1 3 mg meter square weekly 3 weeks on one week off, Decadron 20 mg by mouth weekly, lambda light chain down to 30 then creeping up to 110, on Revlimid 15 mg by mouth every other day  Lambda light chain normal at 28 however he reported skin rash on the upper chest area we stopped the Revlimid for 2 month  Then reinitiated Revlimid 5 mg by mouth daily  #2  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 stage I, with progression by scan, status post resection of the right upper lobe with few foci in the same lobe consistent with stage IIIA done in January 2015 adenocarcinoma, well differentiated  Status post Alimta/carboplatin x4 cycles finished in May 2015 and radiation therapy  #3  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  #4  Admitted to [facility] 3/3/16 - 3/8/16  Dx with RSV  5  admission on November 2017 with rectal adenocarcinoma moderately differentiated status post resection stage I ( pT2, pNX, grade 2) tumor measuring 4 x 3 6 centimeters below the peritoneal reflection low probability of MS H   Previous Therapy:   Revlimid 5 mg by mouth 3 weeks on 1 week off, On hold since May 2017 because of diarrhea       Current Therapy: Daratumumab 16 milligram/kilogram weekly dose 1   By the end of November 2017,     dexamethasone 20 mg PO weekly     Maintenance Daratumumab 16 mg per kg every other week initiated in January 2018     With progression, we added Velcade 1 3 milligram/meter squared every other week 2 Daratumumab       in February 2018  With progression of disease, finally we added Cytoxan to Daratumumab he     received 2 cycles with progression of disease in May 2018     Interval History:   Was admitted to the hospital again with pneumonia in March 2018   ECOG score 1           Test Results:    Imaging: X-ray Chest 2 Views    Result Date: 4/8/2018  Narrative: CHEST INDICATION:   chest pain  COMPARISON:  1/3/2018 chest x-ray EXAM PERFORMED/VIEWS:  XR CHEST PA & LATERAL FINDINGS: Right apical consolidation or scarring, unchanged Strand-like opacity right lung base, likely scarring, unchanged Volume loss right hemithorax, tracheal deviation to the right, unchanged  Right perihilar surgical clips Cardiomediastinal silhouette appears unremarkable  Typical right-sided Niikhe-o-Wfpp The lungs are otherwise clear  No pneumothorax or pleural effusion  Osseous structures appear within normal limits for patient age  Impression: Relatively stable appearance of the chest with probable postoperative right apical scarring vs  consolidation Correlation with repeat chest CT may be warranted Findings are consistent with emergency room physician's preliminary reading Workstation performed: [id number]       Labs:   Lab Results   Component Value Date    WBC 6 00 04/30/2018    HGB 11 9 (L) 04/30/2018    HCT 36 6 04/30/2018    MCV 90 04/30/2018     04/30/2018     Lab Results   Component Value Date     04/30/2018    K 4 3 04/30/2018     04/30/2018    CO2 28 04/30/2018    ANIONGAP 5 04/30/2018    BUN 19 04/30/2018    CREATININE 1 36 (H) 04/30/2018    GLUCOSE 90 04/30/2018    GLUF 92 03/05/2018    CALCIUM 9 5 04/30/2018    AST 13 04/30/2018    ALT 27 04/30/2018    ALKPHOS 90 04/30/2018    PROT 6 8 04/30/2018    BILITOT 0 83 04/30/2018    EGFR 48 04/30/2018       No results found for: IRON, TIBC, FERRITIN    No results found for: [id number]      ROS:   Review of Systems   Constitutional: Positive for fatigue   Negative for activity change, appetite change, chills, diaphoresis, fever and unexpected weight change  HENT: Negative for congestion, dental problem, facial swelling, hearing loss, mouth sores, nosebleeds, postnasal drip, rhinorrhea, sore throat, trouble swallowing and voice change  Eyes: Negative for photophobia, pain, discharge, redness, itching and visual disturbance  Respiratory: Positive for cough and shortness of breath  Negative for choking, chest tightness and wheezing  Cardiovascular: Negative for chest pain, palpitations and leg swelling  Gastrointestinal: Negative for abdominal distention, abdominal pain, anal bleeding, blood in stool, constipation, diarrhea, nausea, rectal pain and vomiting  Endocrine: Negative for cold intolerance and heat intolerance  Genitourinary: Negative for decreased urine volume, difficulty urinating, dysuria, flank pain, frequency, hematuria and urgency  Musculoskeletal: Negative for arthralgias, back pain, gait problem, joint swelling, myalgias, neck pain and neck stiffness  Skin: Negative for color change, pallor, rash and wound  Allergic/Immunologic: Negative for immunocompromised state  Neurological: Negative for dizziness, tremors, seizures, syncope, facial asymmetry, speech difficulty, weakness, light-headedness, numbness and headaches  Hematological: Negative for adenopathy  Does not bruise/bleed easily  Psychiatric/Behavioral: Negative for agitation, confusion, decreased concentration, dysphoric mood and sleep disturbance  The patient is not nervous/anxious  All other systems reviewed and are negative  Current Medications: Reviewed  Allergies: Reviewed  PMH/FH/SH:  Reviewed      Physical Exam:    Body surface area is 1 99 meters squared      Wt Readings from Last 3 Encounters:   05/03/18 84 5 kg (186 lb 3 2 oz)   05/02/18 83 3 kg (183 lb 10 3 oz)   04/24/18 84 kg (185 lb 1 6 oz)        Temp Readings from Last 3 Encounters:   05/02/18 97 5 °F (36 4 °C) (Tympanic)   04/24/18 99 6 °F (37 6 °C) (Tympanic)   04/17/18 (!) 96 2 °F (35 7 °C) (Tympanic)        BP Readings from Last 3 Encounters:   05/03/18 128/60   05/02/18 125/75   04/24/18 92/58         Pulse Readings from Last 3 Encounters:   05/03/18 104   05/02/18 97   04/24/18 (!) 106        Physical Exam   Constitutional: He is oriented to person, place, and time  He appears well-developed and well-nourished  No distress  HENT:   Head: Normocephalic and atraumatic  Mouth/Throat: Oropharynx is clear and moist  No oropharyngeal exudate  Eyes: Conjunctivae and EOM are normal  Pupils are equal, round, and reactive to light  Neck: Normal range of motion  Neck supple  No tracheal deviation present  No thyromegaly present  Cardiovascular: Normal rate and regular rhythm  Exam reveals no gallop and no friction rub  Murmur heard  Pulmonary/Chest: Effort normal  No respiratory distress  He has wheezes  He has no rales  He exhibits no tenderness  Abdominal: Soft  Bowel sounds are normal  He exhibits no distension and no mass  There is no tenderness  There is no rebound and no guarding  Musculoskeletal: Normal range of motion  Lymphadenopathy:     He has no cervical adenopathy  Neurological: He is alert and oriented to person, place, and time  Skin: Skin is warm and dry  No rash noted  He is not diaphoretic  No erythema  No pallor  Psychiatric: He has a normal mood and affect  His behavior is normal  Judgment and thought content normal    Vitals reviewed  Goals and Barriers:  Current Goal: Minimize effects of disease  Barriers: None  Patient's Capacity to Self Care:  Patient is able to self care      Code Status: [unfilled]

## 2018-05-04 ENCOUNTER — DOCUMENTATION (OUTPATIENT)
Dept: HEMATOLOGY ONCOLOGY | Facility: CLINIC | Age: 83
End: 2018-05-04

## 2018-05-04 ENCOUNTER — TELEPHONE (OUTPATIENT)
Dept: CASE MANAGEMENT | Facility: HOSPITAL | Age: 83
End: 2018-05-04

## 2018-05-04 DIAGNOSIS — C90.00 MULTIPLE MYELOMA, REMISSION STATUS UNSPECIFIED (HCC): Primary | ICD-10-CM

## 2018-05-04 RX ORDER — LENALIDOMIDE 10 MG/1
CAPSULE ORAL
Qty: 21 EACH | Refills: 0 | Status: SHIPPED | OUTPATIENT
Start: 2018-05-04 | End: 2018-06-06 | Stop reason: SDUPTHER

## 2018-05-04 NOTE — PROGRESS NOTES
Received notification 5/4/2018 that the pt will need revlimid  Called [facility] [facility] @ 4:10 ([phone]) per Lisa Paget the pt's ID # is [id number]  We started the authorization but [employer] already had it approved  Noah Ion #[id number] is valid from 5/4/2018 through 5/4/2019  Call ref #[id number]    Notified clinical, financial, and diplomat

## 2018-05-04 NOTE — TELEPHONE ENCOUNTER
Mrs Jamir Pimentel reports that Lv's breathing has been relatively stable-- continues using DuoNebs2-3x/day and prn Ventolin as instructed by Dr Cathleen Kennedy  She reports he had a coughing spell 1 or 2 times this week, treated with hot tea and then a Mucinex with good results  They have AC but have not yet turned it on (temp in the 90's today and yesterday) I encouraged them to utilize, as outside pollens coming in open windows may also be triggering  She agreed  Lorenzo Mendez is currently being treated by hem/onc and undergoing some challenges  Mrs Jamir Pimentel was told to call me if there are any pulmonary issues that she needs addressed in a timely manner  COPD pt ed booklet sent to the home with our phone number included

## 2018-05-07 ENCOUNTER — TELEPHONE (OUTPATIENT)
Dept: HEMATOLOGY ONCOLOGY | Facility: CLINIC | Age: 83
End: 2018-05-07

## 2018-05-07 DIAGNOSIS — C90.00 MULTIPLE MYELOMA NOT HAVING ACHIEVED REMISSION (HCC): Primary | ICD-10-CM

## 2018-05-07 RX ORDER — PREDNISONE 50 MG/1
TABLET ORAL
Qty: 8 TABLET | Refills: 4 | Status: SHIPPED | OUTPATIENT
Start: 2018-05-07 | End: 2018-07-20 | Stop reason: SDUPTHER

## 2018-05-07 NOTE — TELEPHONE ENCOUNTER
SPOKE WITH PATIENT'S WIFE  PREDNISONE COMING FROM [facility]  [facility] SHOULD BE DELIVERING REVLIMID AND ALKERAN BY THE WEEKEND  REVIEWED PATIENT'S TREATMENT PLAN   PATIENT'S WIFE VERBALIZES UNDERSTANDING

## 2018-05-07 NOTE — TELEPHONE ENCOUNTER
Pt's wife Flower Shelton called   States [facility] called about Alkeran and Revlimid but  [facility] did not recive a script for Prednisone and I do not see Prednisone on med list  Please Brenda Dux   or

## 2018-05-08 RX ORDER — SODIUM CHLORIDE 9 MG/ML
20 INJECTION, SOLUTION INTRAVENOUS CONTINUOUS
Status: DISCONTINUED | OUTPATIENT
Start: 2018-05-09 | End: 2018-05-12 | Stop reason: HOSPADM

## 2018-05-09 ENCOUNTER — HOSPITAL ENCOUNTER (OUTPATIENT)
Dept: INFUSION CENTER | Facility: CLINIC | Age: 83
Discharge: HOME | End: 2018-05-09
Payer: MEDICARE

## 2018-05-09 VITALS
TEMPERATURE: 96.7 F | WEIGHT: 184.08 LBS | DIASTOLIC BLOOD PRESSURE: 72 MMHG | RESPIRATION RATE: 18 BRPM | SYSTOLIC BLOOD PRESSURE: 111 MMHG | HEART RATE: 103 BPM | BODY MASS INDEX: 27.9 KG/M2

## 2018-05-09 LAB
ANION GAP SERPL CALCULATED.3IONS-SCNC: 9 MMOL/L (ref 4–13)
BASOPHILS # BLD AUTO: 0 THOUSAND/UL (ref 0–0.1)
BASOPHILS NFR MAR MANUAL: 0 % (ref 0–1)
BUN SERPL-MCNC: 15 MG/DL (ref 5–25)
CALCIUM SERPL-MCNC: 9.2 MG/DL (ref 8.3–10.1)
CHLORIDE SERPL-SCNC: 105 MMOL/L (ref 100–108)
CO2 SERPL-SCNC: 25 MMOL/L (ref 21–32)
CREAT SERPL-MCNC: 1.1 MG/DL (ref 0.6–1.3)
EOSINOPHIL # BLD AUTO: 0 THOUSAND/UL (ref 0–0.61)
EOSINOPHIL NFR BLD MANUAL: 0 % (ref 0–6)
ERYTHROCYTE [DISTWIDTH] IN BLOOD BY AUTOMATED COUNT: 16.6 % (ref 11.6–15.1)
GFR SERPL CREATININE-BSD FRML MDRD: 63 ML/MIN/1.73SQ M
GLUCOSE SERPL-MCNC: 104 MG/DL (ref 65–140)
HCT VFR BLD AUTO: 35.5 % (ref 36.5–49.3)
HGB BLD-MCNC: 12.2 G/DL (ref 12–17)
LYMPHOCYTES # BLD AUTO: 1.98 THOUSAND/UL (ref 0.6–4.47)
LYMPHOCYTES # BLD AUTO: 32 % (ref 14–44)
MCH RBC QN AUTO: 30.7 PG (ref 26.8–34.3)
MCHC RBC AUTO-ENTMCNC: 34.2 G/DL (ref 31.4–37.4)
MCV RBC AUTO: 90 FL (ref 82–98)
MONOCYTES # BLD AUTO: 0.43 THOUSAND/UL (ref 0–1.22)
MONOCYTES NFR BLD AUTO: 7 % (ref 4–12)
NEUTS BAND NFR BLD MANUAL: 0 % (ref 0–8)
NEUTS SEG # BLD: 3.78 THOUSAND/UL (ref 1.81–6.82)
NEUTS SEG NFR BLD AUTO: 61 % (ref 43–75)
OVALOCYTES BLD QL SMEAR: PRESENT
PLATELET # BLD AUTO: 298 THOUSANDS/UL (ref 149–390)
PLATELET BLD QL SMEAR: ADEQUATE
PMV BLD AUTO: 5.4 FL (ref 8.9–12.7)
POTASSIUM SERPL-SCNC: 3.7 MMOL/L (ref 3.5–5.3)
RBC # BLD AUTO: 3.96 MILLION/UL (ref 3.88–5.62)
SODIUM SERPL-SCNC: 139 MMOL/L (ref 136–145)
TOTAL CELLS COUNTED SPEC: 100
WBC # BLD AUTO: 6.2 THOUSAND/UL (ref 4.31–10.16)
WBC NRBC COR # BLD: 6.2 THOUSAND/UL (ref 4.31–10.16)

## 2018-05-09 PROCEDURE — 96365 THER/PROPH/DIAG IV INF INIT: CPT

## 2018-05-09 PROCEDURE — 96361 HYDRATE IV INFUSION ADD-ON: CPT

## 2018-05-09 PROCEDURE — 85007 BL SMEAR W/DIFF WBC COUNT: CPT | Performed by: INTERNAL MEDICINE

## 2018-05-09 PROCEDURE — 80048 BASIC METABOLIC PNL TOTAL CA: CPT | Performed by: INTERNAL MEDICINE

## 2018-05-09 PROCEDURE — 96366 THER/PROPH/DIAG IV INF ADDON: CPT

## 2018-05-09 PROCEDURE — 85027 COMPLETE CBC AUTOMATED: CPT | Performed by: INTERNAL MEDICINE

## 2018-05-09 RX ADMIN — Medication 300 UNITS: at 12:35

## 2018-05-09 RX ADMIN — IMMUNE GLOBULIN (HUMAN) 25 G: 10 INJECTION INTRAVENOUS; SUBCUTANEOUS at 10:22

## 2018-05-09 RX ADMIN — SODIUM CHLORIDE 1000 ML: 0.9 INJECTION, SOLUTION INTRAVENOUS at 09:50

## 2018-05-09 RX ADMIN — SODIUM CHLORIDE 20 ML/HR: 0.9 INJECTION, SOLUTION INTRAVENOUS at 09:50

## 2018-05-09 NOTE — PLAN OF CARE
Problem: Potential for Falls  Goal: Patient will remain free of falls  INTERVENTIONS:  - Assess patient frequently for physical needs  -  Identify cognitive and physical deficits and behaviors that affect risk of falls    -  [place] fall precautions as indicated by assessment   - Educate patient/family on patient safety including physical limitations  - Instruct patient to call for assistance with activity based on assessment  - Modify environment to reduce risk of injury  - Consider OT/PT consult to assist with strengthening/mobility   Outcome: Progressing

## 2018-05-09 NOTE — PROGRESS NOTES
Patient tolerated hydration and IVIG infusion well  Denies any discomfort  Pt and wife are aware of all future appointments   Refused AVS

## 2018-05-16 ENCOUNTER — HOSPITAL ENCOUNTER (OUTPATIENT)
Dept: INFUSION CENTER | Facility: CLINIC | Age: 83
Discharge: HOME | End: 2018-05-16
Payer: MEDICARE

## 2018-05-16 VITALS
TEMPERATURE: 96.4 F | DIASTOLIC BLOOD PRESSURE: 72 MMHG | SYSTOLIC BLOOD PRESSURE: 111 MMHG | RESPIRATION RATE: 18 BRPM | HEART RATE: 101 BPM | OXYGEN SATURATION: 96 %

## 2018-05-16 LAB
ANION GAP SERPL CALCULATED.3IONS-SCNC: 9 MMOL/L (ref 4–13)
BUN SERPL-MCNC: 24 MG/DL (ref 5–25)
CALCIUM SERPL-MCNC: 9.3 MG/DL (ref 8.3–10.1)
CHLORIDE SERPL-SCNC: 103 MMOL/L (ref 100–108)
CO2 SERPL-SCNC: 26 MMOL/L (ref 21–32)
CREAT SERPL-MCNC: 1.12 MG/DL (ref 0.6–1.3)
ERYTHROCYTE [DISTWIDTH] IN BLOOD BY AUTOMATED COUNT: 17.2 % (ref 11.6–15.1)
GFR SERPL CREATININE-BSD FRML MDRD: 61 ML/MIN/1.73SQ M
GLUCOSE SERPL-MCNC: 149 MG/DL (ref 65–140)
GRANULOCYTES NFR BLD AUTO: 84.3 % (ref 47–80)
GRANULOCYTES NFR BLD: 10.1 THOUSAND/ΜL (ref 1.85–7.82)
HCT VFR BLD AUTO: 36.3 % (ref 36.5–49.3)
HGB BLD-MCNC: 11.7 G/DL (ref 12–17)
LYMPHOCYTES # BLD AUTO: 1.4 THOUSANDS/ΜL (ref 0.6–4.47)
LYMPHOCYTES NFR BLD AUTO: 12 % (ref 14–44)
MCH RBC QN AUTO: 28.7 PG (ref 26.8–34.3)
MCHC RBC AUTO-ENTMCNC: 32.2 G/DL (ref 31.4–37.4)
MCV RBC AUTO: 89 FL (ref 82–98)
MONOCYTES # BLD AUTO: 0.5 THOUSAND/ΜL (ref 0.17–1.22)
MONOCYTES NFR BLD AUTO: 4 % (ref 4–12)
PLATELET # BLD AUTO: 251 THOUSANDS/UL (ref 149–390)
PMV BLD AUTO: 6 FL (ref 8.9–12.7)
POTASSIUM SERPL-SCNC: 3.4 MMOL/L (ref 3.5–5.3)
RBC # BLD AUTO: 4.07 MILLION/UL (ref 3.88–5.62)
SODIUM SERPL-SCNC: 138 MMOL/L (ref 136–145)
WBC # BLD AUTO: 12 THOUSAND/UL (ref 4.31–10.16)
WBC NRBC COR # BLD: 12 THOUSAND/UL (ref 4.31–10.16)

## 2018-05-16 PROCEDURE — 80048 BASIC METABOLIC PNL TOTAL CA: CPT | Performed by: INTERNAL MEDICINE

## 2018-05-16 PROCEDURE — 96360 HYDRATION IV INFUSION INIT: CPT

## 2018-05-16 PROCEDURE — 85025 COMPLETE CBC W/AUTO DIFF WBC: CPT | Performed by: INTERNAL MEDICINE

## 2018-05-16 PROCEDURE — 96361 HYDRATE IV INFUSION ADD-ON: CPT

## 2018-05-16 RX ADMIN — SODIUM CHLORIDE 1000 ML: 0.9 INJECTION, SOLUTION INTRAVENOUS at 10:05

## 2018-05-16 RX ADMIN — Medication 300 UNITS: at 12:07

## 2018-05-16 NOTE — PROGRESS NOTES
Patient arrived to the infusion center for weekly blood work and hydration  Offered no complaints  Blood work collected via port cath  Tolerated hydration  Pt and wife are aware of next appointment   Refused AVS

## 2018-05-16 NOTE — PLAN OF CARE
Problem: Potential for Falls  Goal: Patient will remain free of falls  INTERVENTIONS:  - Assess patient frequently for physical needs  -  Identify cognitive and physical deficits and behaviors that affect risk of falls    -  [place] fall precautions as indicated by assessment   - Educate patient/family on patient safety including physical limitations  - Instruct patient to call for assistance with activity based on assessment  - Modify environment to reduce risk of injury  - Consider OT/PT consult to assist with strengthening/mobility   Outcome: Progressing

## 2018-05-23 ENCOUNTER — HOSPITAL ENCOUNTER (OUTPATIENT)
Dept: INFUSION CENTER | Facility: CLINIC | Age: 83
Discharge: HOME | End: 2018-05-23
Payer: MEDICARE

## 2018-05-23 VITALS
DIASTOLIC BLOOD PRESSURE: 72 MMHG | TEMPERATURE: 98 F | HEART RATE: 83 BPM | RESPIRATION RATE: 18 BRPM | SYSTOLIC BLOOD PRESSURE: 110 MMHG

## 2018-05-23 LAB
ANION GAP SERPL CALCULATED.3IONS-SCNC: 5 MMOL/L (ref 4–13)
ANISOCYTOSIS BLD QL SMEAR: PRESENT
BASOPHILS # BLD AUTO: 0 THOUSAND/UL (ref 0–0.1)
BASOPHILS NFR MAR MANUAL: 0 % (ref 0–1)
BUN SERPL-MCNC: 13 MG/DL (ref 5–25)
CALCIUM SERPL-MCNC: 8.7 MG/DL (ref 8.3–10.1)
CHLORIDE SERPL-SCNC: 104 MMOL/L (ref 100–108)
CO2 SERPL-SCNC: 28 MMOL/L (ref 21–32)
CREAT SERPL-MCNC: 1.17 MG/DL (ref 0.6–1.3)
EOSINOPHIL # BLD AUTO: 0.1 THOUSAND/UL (ref 0–0.61)
EOSINOPHIL NFR BLD MANUAL: 2 % (ref 0–6)
ERYTHROCYTE [DISTWIDTH] IN BLOOD BY AUTOMATED COUNT: 16.8 % (ref 11.6–15.1)
GFR SERPL CREATININE-BSD FRML MDRD: 58 ML/MIN/1.73SQ M
GLUCOSE SERPL-MCNC: 85 MG/DL (ref 65–140)
HCT VFR BLD AUTO: 33 % (ref 36.5–49.3)
HGB BLD-MCNC: 11 G/DL (ref 12–17)
LG PLATELETS BLD QL SMEAR: PRESENT
LYMPHOCYTES # BLD AUTO: 0.67 THOUSAND/UL (ref 0.6–4.47)
LYMPHOCYTES # BLD AUTO: 14 % (ref 14–44)
MCH RBC QN AUTO: 30.1 PG (ref 26.8–34.3)
MCHC RBC AUTO-ENTMCNC: 33.4 G/DL (ref 31.4–37.4)
MCV RBC AUTO: 90 FL (ref 82–98)
MONOCYTES # BLD AUTO: 0.19 THOUSAND/UL (ref 0–1.22)
MONOCYTES NFR BLD AUTO: 4 % (ref 4–12)
NEUTS BAND NFR BLD MANUAL: 0 % (ref 0–8)
NEUTS SEG # BLD: 3.84 THOUSAND/UL (ref 1.81–6.82)
NEUTS SEG NFR BLD AUTO: 80 % (ref 43–75)
OVALOCYTES BLD QL SMEAR: PRESENT
PLATELET # BLD AUTO: 105 THOUSANDS/UL (ref 149–390)
PLATELET BLD QL SMEAR: ABNORMAL
PMV BLD AUTO: 6.3 FL (ref 8.9–12.7)
POTASSIUM SERPL-SCNC: 3.9 MMOL/L (ref 3.5–5.3)
RBC # BLD AUTO: 3.66 MILLION/UL (ref 3.88–5.62)
SODIUM SERPL-SCNC: 137 MMOL/L (ref 136–145)
TOTAL CELLS COUNTED SPEC: 100
WBC # BLD AUTO: 4.8 THOUSAND/UL (ref 4.31–10.16)
WBC NRBC COR # BLD: 4.8 THOUSAND/UL (ref 4.31–10.16)

## 2018-05-23 PROCEDURE — 96360 HYDRATION IV INFUSION INIT: CPT

## 2018-05-23 PROCEDURE — 85007 BL SMEAR W/DIFF WBC COUNT: CPT | Performed by: INTERNAL MEDICINE

## 2018-05-23 PROCEDURE — 85027 COMPLETE CBC AUTOMATED: CPT | Performed by: INTERNAL MEDICINE

## 2018-05-23 PROCEDURE — 80048 BASIC METABOLIC PNL TOTAL CA: CPT | Performed by: INTERNAL MEDICINE

## 2018-05-23 PROCEDURE — 96361 HYDRATE IV INFUSION ADD-ON: CPT

## 2018-05-23 RX ADMIN — SODIUM CHLORIDE 1000 ML: 0.9 INJECTION, SOLUTION INTRAVENOUS at 10:40

## 2018-05-23 RX ADMIN — Medication 300 UNITS: at 12:40

## 2018-05-23 NOTE — PROGRESS NOTES
Pt  Denies new symptoms or concerns  CBC w/ diff and BMP obtained as ordered  1000 ml of NSS ordered over 2 hours today  Granix to be given if ANC < 1000

## 2018-05-27 ENCOUNTER — APPOINTMENT (OUTPATIENT)
Dept: LAB | Facility: MEDICAL CENTER | Age: 83
End: 2018-05-27
Payer: MEDICARE

## 2018-05-27 DIAGNOSIS — C90.00 MULTIPLE MYELOMA NOT HAVING ACHIEVED REMISSION (HCC): ICD-10-CM

## 2018-05-27 DIAGNOSIS — D80.1 HYPOGAMMAGLOBULINEMIA, ACQUIRED (HCC): ICD-10-CM

## 2018-05-27 DIAGNOSIS — C20 MALIGNANT NEOPLASM OF RECTUM (HCC): ICD-10-CM

## 2018-05-27 DIAGNOSIS — C34.91 ADENOCARCINOMA OF RIGHT LUNG (HCC): Chronic | ICD-10-CM

## 2018-05-27 LAB
CEA SERPL-MCNC: 4.1 NG/ML (ref 0–3)
IGA SERPL-MCNC: 12 MG/DL (ref 70–400)
IGG SERPL-MCNC: 471 MG/DL (ref 700–1600)
IGM SERPL-MCNC: 11 MG/DL (ref 40–230)

## 2018-05-27 PROCEDURE — 82378 CARCINOEMBRYONIC ANTIGEN: CPT

## 2018-05-27 PROCEDURE — 82784 ASSAY IGA/IGD/IGG/IGM EACH: CPT

## 2018-05-27 PROCEDURE — 83883 ASSAY NEPHELOMETRY NOT SPEC: CPT

## 2018-05-30 ENCOUNTER — HOSPITAL ENCOUNTER (OUTPATIENT)
Dept: INFUSION CENTER | Facility: CLINIC | Age: 83
Discharge: HOME | End: 2018-05-30
Payer: MEDICARE

## 2018-05-30 VITALS
OXYGEN SATURATION: 98 % | TEMPERATURE: 98.6 F | HEART RATE: 89 BPM | DIASTOLIC BLOOD PRESSURE: 69 MMHG | SYSTOLIC BLOOD PRESSURE: 108 MMHG | RESPIRATION RATE: 24 BRPM

## 2018-05-30 LAB
ANION GAP SERPL CALCULATED.3IONS-SCNC: 11 MMOL/L (ref 4–13)
ANISOCYTOSIS BLD QL SMEAR: PRESENT
BASOPHILS # BLD AUTO: 0 THOUSAND/UL (ref 0–0.1)
BASOPHILS NFR MAR MANUAL: 0 % (ref 0–1)
BUN SERPL-MCNC: 13 MG/DL (ref 5–25)
CALCIUM SERPL-MCNC: 8.7 MG/DL (ref 8.3–10.1)
CHLORIDE SERPL-SCNC: 105 MMOL/L (ref 100–108)
CO2 SERPL-SCNC: 25 MMOL/L (ref 21–32)
CREAT SERPL-MCNC: 1.12 MG/DL (ref 0.6–1.3)
EOSINOPHIL # BLD AUTO: 0.09 THOUSAND/UL (ref 0–0.61)
EOSINOPHIL NFR BLD MANUAL: 3 % (ref 0–6)
ERYTHROCYTE [DISTWIDTH] IN BLOOD BY AUTOMATED COUNT: 18.4 % (ref 11.6–15.1)
GFR SERPL CREATININE-BSD FRML MDRD: 61 ML/MIN/1.73SQ M
GLUCOSE SERPL-MCNC: 105 MG/DL (ref 65–140)
HCT VFR BLD AUTO: 33 % (ref 36.5–49.3)
HGB BLD-MCNC: 10.8 G/DL (ref 12–17)
KAPPA LC FREE SER-MCNC: 3.8 MG/L (ref 3.3–19.4)
KAPPA LC FREE/LAMBDA FREE SER: 0 {RATIO} (ref 0.26–1.65)
LAMBDA LC FREE SERPL-MCNC: 814.9 MG/L (ref 5.7–26.3)
LG PLATELETS BLD QL SMEAR: PRESENT
LYMPHOCYTES # BLD AUTO: 1.04 THOUSAND/UL (ref 0.6–4.47)
LYMPHOCYTES # BLD AUTO: 36 % (ref 14–44)
MCH RBC QN AUTO: 29.4 PG (ref 26.8–34.3)
MCHC RBC AUTO-ENTMCNC: 32.7 G/DL (ref 31.4–37.4)
MCV RBC AUTO: 90 FL (ref 82–98)
MONOCYTES # BLD AUTO: 0.15 THOUSAND/UL (ref 0–1.22)
MONOCYTES NFR BLD AUTO: 5 % (ref 4–12)
NEUTS BAND NFR BLD MANUAL: 1 % (ref 0–8)
NEUTS SEG # BLD: 1.62 THOUSAND/UL (ref 1.81–6.82)
NEUTS SEG NFR BLD AUTO: 55 % (ref 43–75)
OVALOCYTES BLD QL SMEAR: PRESENT
PLATELET # BLD AUTO: 112 THOUSANDS/UL (ref 149–390)
PLATELET BLD QL SMEAR: ABNORMAL
PMV BLD AUTO: 6.9 FL (ref 8.9–12.7)
POTASSIUM SERPL-SCNC: 3.9 MMOL/L (ref 3.5–5.3)
RBC # BLD AUTO: 3.68 MILLION/UL (ref 3.88–5.62)
SODIUM SERPL-SCNC: 141 MMOL/L (ref 136–145)
TOTAL CELLS COUNTED SPEC: 100
WBC # BLD AUTO: 2.9 THOUSAND/UL (ref 4.31–10.16)
WBC NRBC COR # BLD: 2.9 THOUSAND/UL (ref 4.31–10.16)

## 2018-05-30 PROCEDURE — 96360 HYDRATION IV INFUSION INIT: CPT

## 2018-05-30 PROCEDURE — 96361 HYDRATE IV INFUSION ADD-ON: CPT

## 2018-05-30 PROCEDURE — 80048 BASIC METABOLIC PNL TOTAL CA: CPT | Performed by: INTERNAL MEDICINE

## 2018-05-30 PROCEDURE — 85007 BL SMEAR W/DIFF WBC COUNT: CPT | Performed by: INTERNAL MEDICINE

## 2018-05-30 PROCEDURE — 85027 COMPLETE CBC AUTOMATED: CPT | Performed by: INTERNAL MEDICINE

## 2018-05-30 RX ORDER — WHEAT DEXTRIN 3 G/3.8 G
1 POWDER (GRAM) ORAL 2 TIMES DAILY PRN
COMMUNITY
End: 2018-07-17 | Stop reason: CLARIF

## 2018-05-30 RX ADMIN — Medication 300 UNITS: at 12:27

## 2018-05-30 RX ADMIN — SODIUM CHLORIDE 1000 ML: 0.9 INJECTION, SOLUTION INTRAVENOUS at 10:21

## 2018-05-30 NOTE — PROGRESS NOTES
Spoke with Luigi Gayle at Dr Wes Franz office - aware of symptoms, instructed to have patient take benadryl at home today, patient to see MD in office tomorrow  Wife of patient verbalized understanding and an intent to give medication as instructed  Patient tolerated hydration well, reported feeling better after fluids

## 2018-05-30 NOTE — PLAN OF CARE
Problem: Potential for Falls  Goal: Patient will remain free of falls  INTERVENTIONS:  - Assess patient frequently for physical needs  -  Identify cognitive and physical deficits and behaviors that affect risk of falls    -  [place] fall precautions as indicated by assessment   - Educate patient/family on patient safety including physical limitations  - Instruct patient to call for assistance with activity based on assessment  - Modify environment to reduce risk of injury  - Consider OT/PT consult to assist with strengthening/mobility   Outcome: Progressing

## 2018-05-30 NOTE — PROGRESS NOTES
Patient presents for hydration, labs and possible granix - depending on lab results  Patient presented with dyspnea on exertion, O2 sat 98%, patient also complaining of feeling "weak, and washed out", also has rash noted to face from sides of nose down to chin  Will contact Dr Sandi Frank office to inform about rash and weakness and increased shortness of breath    Shortness of breath resolves with rest

## 2018-05-31 ENCOUNTER — OFFICE VISIT (OUTPATIENT)
Dept: HEMATOLOGY ONCOLOGY | Facility: CLINIC | Age: 83
End: 2018-05-31
Payer: MEDICARE

## 2018-05-31 VITALS
RESPIRATION RATE: 24 BRPM | TEMPERATURE: 97 F | SYSTOLIC BLOOD PRESSURE: 110 MMHG | DIASTOLIC BLOOD PRESSURE: 60 MMHG | HEART RATE: 112 BPM | BODY MASS INDEX: 27.89 KG/M2 | WEIGHT: 184 LBS

## 2018-05-31 DIAGNOSIS — C34.91 ADENOCARCINOMA OF RIGHT LUNG (HCC): Chronic | ICD-10-CM

## 2018-05-31 DIAGNOSIS — C90.00 MULTIPLE MYELOMA NOT HAVING ACHIEVED REMISSION (HCC): Primary | ICD-10-CM

## 2018-05-31 PROCEDURE — 99214 OFFICE O/P EST MOD 30 MIN: CPT | Performed by: INTERNAL MEDICINE

## 2018-05-31 RX ORDER — SPIRONOLACTONE 50 MG/1
50 TABLET, FILM COATED ORAL DAILY
Qty: 30 TABLET | Refills: 3 | Status: SHIPPED | OUTPATIENT
Start: 2018-05-31 | End: 2018-12-05 | Stop reason: ALTCHOICE

## 2018-05-31 NOTE — LETTER
May 31, 2018     Bruno Blackwell DO  [address] [facility] [address]  [address] [place] [address]    Patient: Eric Stock   YOB: 1935   Date of Visit: 5/31/2018       Dear Dr Mabel Ervin: Thank you for referring Miranda Ferrari to me for evaluation  Below are my notes for this consultation  If you have questions, please do not hesitate to call me  I look forward to following your patient along with you  Sincerely,        Catie Garcia MD        CC: MD Catie Juan MD  5/31/2018 12:27 PM  Sign at close encounter  Hematology Outpatient Follow - Up Note  Eric Stock 81 y o  male MRN: @ Encounter: [id number]        Date:  5/31/2018        Assessment/ Plan:  1  Heavily pretreated lambda light chain multiple myeloma as mentioned in the history of present illness, the most recent treatment is melphalan 12 mg flat dose daily for 4 days along with prednisone 50 mg p o  Daily for 4 days, lenalidomide 10 mg p o  Daily 3 weeks on 1 week off cycle 1  In May 2018, lambda light chain came down from 1100 range to 800 range  2  Swelling of both lower extremities might be exacerbation of congestive heart failure, patient was given Aldactone 50 mg p o  Daily, he will follow up with Cardiology  3  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 Aranesp 200 mcg q month if hemoglobin below 9 9 our goal to keep hemoglobin between 10 g to 11 g only  4  Hypogammaglobulinemia with multiple pneumonia, continue IVIG 25 g every month  5  Follow-up in 2 weeks to assess for response and to start cycle 2  I will order CBC, CMP, free light chain            HPI:  #1 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, multiple compression fractures, status post kyphoplasty to L1, L2, L5, treated with radiation therapy to the left intra trochanteric area, received melphalan/Velcade/prednisone in October 2010 for 4 cycles with excellent response and normalization of lambda light chain, had been on Velcade maintenance till January 2013 when lambda light chain went up to 111,treated with Velcade 1 3 mg meter square weekly 3 weeks on one week off, Decadron 20 mg by mouth weekly, lambda light chain down to 30 then creeping up to 110, on Revlimid 15 mg by mouth every other day  Lambda light chain normal at 28 however he reported skin rash on the upper chest area we stopped the Revlimid for 2 month  Then reinitiated Revlimid 5 mg by mouth daily  #2  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 stage I, with progression by scan, status post resection of the right upper lobe with few foci in the same lobe consistent with stage IIIA done in January 2015 adenocarcinoma, well differentiated  Status post Alimta/carboplatin x4 cycles finished in May 2015 and radiation therapy  #3  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  #4  Admitted to [facility] 3/3/16 - 3/8/16  Dx with RSV  5  admission on November 2017 with rectal adenocarcinoma moderately differentiated status post resection stage I ( pT2, pNX, grade 2) tumor measuring 4 x 3 6 centimeters below the peritoneal reflection low probability of MS H   Previous Therapy:   Revlimid 5 mg by mouth 3 weeks on 1 week off, On hold since May 2017 because of diarrhea       Current Therapy: Daratumumab 16 milligram/kilogram weekly dose 1  By the end of November 2017,     dexamethasone 20 mg PO weekly     Maintenance Daratumumab 16 mg per kg every other week initiated in January 2018     With progression, we added Velcade 1 3 milligram/meter squared every other week 2 Daratumumab       in February 2018  With progression of disease, finally we added Cytoxan to Daratumumab he     received 2 cycles with progression of disease in May 2018  Therapy was changed to melphalan 12 mg on day 1, 2, 3, 4, lenalidomide 10 mg p o  Daily 3 weeks on 1 week off, prednisone 40 mg p o  Daily on day 1, 2, 3, 4 cycle 1   In May 2018     ECOG score 1       Test Results:    Imaging: No results found  Labs:   Lab Results   Component Value Date    WBC 2 90 (L) 05/30/2018    HGB 10 8 (L) 05/30/2018    HCT 33 0 (L) 05/30/2018    MCV 90 05/30/2018     (L) 05/30/2018     Lab Results   Component Value Date     05/30/2018    K 3 9 05/30/2018     05/30/2018    CO2 25 05/30/2018    ANIONGAP 11 05/30/2018    BUN 13 05/30/2018    CREATININE 1 12 05/30/2018    GLUCOSE 105 05/30/2018    GLUF 92 03/05/2018    CALCIUM 8 7 05/30/2018    AST 17 05/27/2018    ALT 26 05/27/2018    ALKPHOS 83 05/27/2018    PROT 6 1 (L) 05/27/2018    BILITOT 0 93 05/27/2018    EGFR 61 05/30/2018       No results found for: IRON, TIBC, FERRITIN    No results found for: [id number]      ROS:   Review of Systems   Constitutional: Negative for activity change, appetite change, chills, diaphoresis, fatigue, fever and unexpected weight change  HENT: Negative for congestion, dental problem, facial swelling, hearing loss, mouth sores, nosebleeds, postnasal drip, rhinorrhea, sore throat, trouble swallowing and voice change  Eyes: Negative for photophobia, pain, discharge, redness, itching and visual disturbance  Respiratory: Positive for cough and shortness of breath  Negative for choking, chest tightness and wheezing  Cardiovascular: Positive for leg swelling  Negative for chest pain and palpitations  Gastrointestinal: Negative for abdominal distention, abdominal pain, anal bleeding, blood in stool, constipation, diarrhea, nausea, rectal pain and vomiting  Endocrine: Negative for cold intolerance and heat intolerance  Genitourinary: Negative for decreased urine volume, difficulty urinating, dysuria, flank pain, frequency, hematuria and urgency  Musculoskeletal: Negative for arthralgias, back pain, gait problem, joint swelling, myalgias, neck pain and neck stiffness  Skin: Negative for color change, pallor, rash and wound  Allergic/Immunologic: Negative for immunocompromised state     Neurological: Negative for dizziness, tremors, seizures, syncope, facial asymmetry, speech difficulty, weakness, light-headedness, numbness and headaches  Hematological: Negative for adenopathy  Does not bruise/bleed easily  Psychiatric/Behavioral: Negative for agitation, confusion, decreased concentration, dysphoric mood and sleep disturbance  The patient is not nervous/anxious  All other systems reviewed and are negative  Current Medications: Reviewed  Allergies: Reviewed  PMH/FH/SH:  Reviewed      Physical Exam:    Body surface area is 1 98 meters squared  Wt Readings from Last 3 Encounters:   05/31/18 83 5 kg (184 lb)   05/09/18 83 5 kg (184 lb 1 4 oz)   05/03/18 84 5 kg (186 lb 3 2 oz)        Temp Readings from Last 3 Encounters:   05/31/18 (!) 97 °F (36 1 °C)   05/30/18 98 6 °F (37 °C) (Temporal)   05/23/18 98 °F (36 7 °C) (Tympanic)        BP Readings from Last 3 Encounters:   05/31/18 110/60   05/30/18 108/69   05/23/18 110/72         Pulse Readings from Last 3 Encounters:   05/31/18 (!) 112   05/30/18 89   05/23/18 83        Physical Exam   Constitutional: He is oriented to person, place, and time  He appears well-developed and well-nourished  No distress  Chronically ill   HENT:   Head: Normocephalic and atraumatic  Mouth/Throat: Oropharynx is clear and moist  No oropharyngeal exudate  Eyes: Conjunctivae and EOM are normal  Pupils are equal, round, and reactive to light  Neck: Normal range of motion  Neck supple  No tracheal deviation present  No thyromegaly present  Cardiovascular: Normal rate and regular rhythm  Exam reveals gallop  Exam reveals no friction rub  Murmur heard  Pulmonary/Chest: Effort normal and breath sounds normal  No respiratory distress  He has no wheezes  He has no rales  He exhibits no tenderness  Abdominal: Soft  Bowel sounds are normal  He exhibits no distension and no mass  There is no tenderness  There is no rebound and no guarding     Musculoskeletal: Normal range of motion  He exhibits edema ( +2 edema bilaterally)  Lymphadenopathy:     He has no cervical adenopathy  Neurological: He is alert and oriented to person, place, and time  Skin: Skin is warm and dry  No rash noted  He is not diaphoretic  No erythema  No pallor  Psychiatric: He has a normal mood and affect  His behavior is normal  Judgment and thought content normal    Vitals reviewed  Goals and Barriers:  Current Goal: Minimize effects of disease  Barriers: None  Patient's Capacity to Self Care:  Patient is able to self care      Code Status: @[facility]@

## 2018-05-31 NOTE — PROGRESS NOTES
Hematology Outpatient Follow - Up Note  Kacy Mcguire 81 y o  male MRN: @ Encounter: [id number]        Date:  5/31/2018        Assessment/ Plan:  1  Heavily pretreated lambda light chain multiple myeloma as mentioned in the history of present illness, the most recent treatment is melphalan 12 mg flat dose daily for 4 days along with prednisone 50 mg p o  Daily for 4 days, lenalidomide 10 mg p o  Daily 3 weeks on 1 week off cycle 1  In May 2018, lambda light chain came down from 1100 range to 800 range  2  Swelling of both lower extremities might be exacerbation of congestive heart failure, patient was given Aldactone 50 mg p o  Daily, he will follow up with Cardiology  3  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 Aranesp 200 mcg q month if hemoglobin below 9 9 our goal to keep hemoglobin between 10 g to 11 g only  4  Hypogammaglobulinemia with multiple pneumonia, continue IVIG 25 g every month  5  Follow-up in 2 weeks to assess for response and to start cycle 2  I will order CBC, CMP, free light chain            HPI: #1 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, multiple compression fractures, status post kyphoplasty to L1, L2, L5, treated with radiation therapy to the left intra trochanteric area, received melphalan/Velcade/prednisone in October 2010 for 4 cycles with excellent response and normalization of lambda light chain, had been on Velcade maintenance till January 2013 when lambda light chain went up to 111,treated with Velcade 1 3 mg meter square weekly 3 weeks on one week off, Decadron 20 mg by mouth weekly, lambda light chain down to 30 then creeping up to 110, on Revlimid 15 mg by mouth every other day  Lambda light chain normal at 28 however he reported skin rash on the upper chest area we stopped the Revlimid for 2 month  Then reinitiated Revlimid 5 mg by mouth daily  #2  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 stage I, with progression by scan, status post resection of the right upper lobe with few foci in the same lobe consistent with stage IIIA done in January 2015 adenocarcinoma, well differentiated  Status post Alimta/carboplatin x4 cycles finished in May 2015 and radiation therapy  #3  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  #4  Admitted to [address] 3/3/16 - 3/8/16  Dx with RSV  5  admission on November 2017 with rectal adenocarcinoma moderately differentiated status post resection stage I ( pT2, pNX, grade 2) tumor measuring 4 x 3 6 centimeters below the peritoneal reflection low probability of MS H   Previous Therapy:   Revlimid 5 mg by mouth 3 weeks on 1 week off, On hold since May 2017 because of diarrhea       Current Therapy: Daratumumab 16 milligram/kilogram weekly dose 1  By the end of November 2017,     dexamethasone 20 mg PO weekly     Maintenance Daratumumab 16 mg per kg every other week initiated in January 2018     With progression, we added Velcade 1 3 milligram/meter squared every other week 2 Daratumumab       in February 2018  With progression of disease, finally we added Cytoxan to Daratumumab he     received 2 cycles with progression of disease in May 2018  Therapy was changed to melphalan 12 mg on day 1, 2, 3, 4, lenalidomide 10 mg p o  Daily 3 weeks on 1 week off, prednisone 40 mg p o  Daily on day 1, 2, 3, 4 cycle 1  In May 2018     ECOG score 1       Test Results:    Imaging: No results found      Labs:   Lab Results   Component Value Date    WBC 2 90 (L) 05/30/2018    HGB 10 8 (L) 05/30/2018    HCT 33 0 (L) 05/30/2018    MCV 90 05/30/2018     (L) 05/30/2018     Lab Results   Component Value Date     05/30/2018    K 3 9 05/30/2018     05/30/2018    CO2 25 05/30/2018    ANIONGAP 11 05/30/2018    BUN 13 05/30/2018    CREATININE 1 12 05/30/2018    GLUCOSE 105 05/30/2018    GLUF 92 03/05/2018    CALCIUM 8 7 05/30/2018    AST 17 05/27/2018    ALT 26 05/27/2018    ALKPHOS 83 05/27/2018    PROT 6 1 (L) 05/27/2018 BILITOT 0 93 05/27/2018    EGFR 61 05/30/2018       No results found for: IRON, TIBC, FERRITIN    No results found for: [id number]      ROS:   Review of Systems   Constitutional: Negative for activity change, appetite change, chills, diaphoresis, fatigue, fever and unexpected weight change  HENT: Negative for congestion, dental problem, facial swelling, hearing loss, mouth sores, nosebleeds, postnasal drip, rhinorrhea, sore throat, trouble swallowing and voice change  Eyes: Negative for photophobia, pain, discharge, redness, itching and visual disturbance  Respiratory: Positive for cough and shortness of breath  Negative for choking, chest tightness and wheezing  Cardiovascular: Positive for leg swelling  Negative for chest pain and palpitations  Gastrointestinal: Negative for abdominal distention, abdominal pain, anal bleeding, blood in stool, constipation, diarrhea, nausea, rectal pain and vomiting  Endocrine: Negative for cold intolerance and heat intolerance  Genitourinary: Negative for decreased urine volume, difficulty urinating, dysuria, flank pain, frequency, hematuria and urgency  Musculoskeletal: Negative for arthralgias, back pain, gait problem, joint swelling, myalgias, neck pain and neck stiffness  Skin: Negative for color change, pallor, rash and wound  Allergic/Immunologic: Negative for immunocompromised state  Neurological: Negative for dizziness, tremors, seizures, syncope, facial asymmetry, speech difficulty, weakness, light-headedness, numbness and headaches  Hematological: Negative for adenopathy  Does not bruise/bleed easily  Psychiatric/Behavioral: Negative for agitation, confusion, decreased concentration, dysphoric mood and sleep disturbance  The patient is not nervous/anxious  All other systems reviewed and are negative          Current Medications: Reviewed  Allergies: Reviewed  PMH/FH/SH:  Reviewed      Physical Exam:    Body surface area is 1 98 meters squared  Wt Readings from Last 3 Encounters:   05/31/18 83 5 kg (184 lb)   05/09/18 83 5 kg (184 lb 1 4 oz)   05/03/18 84 5 kg (186 lb 3 2 oz)        Temp Readings from Last 3 Encounters:   05/31/18 (!) 97 °F (36 1 °C)   05/30/18 98 6 °F (37 °C) (Temporal)   05/23/18 98 °F (36 7 °C) (Tympanic)        BP Readings from Last 3 Encounters:   05/31/18 110/60   05/30/18 108/69   05/23/18 110/72         Pulse Readings from Last 3 Encounters:   05/31/18 (!) 112   05/30/18 89   05/23/18 83        Physical Exam   Constitutional: He is oriented to person, place, and time  He appears well-developed and well-nourished  No distress  Chronically ill   HENT:   Head: Normocephalic and atraumatic  Mouth/Throat: Oropharynx is clear and moist  No oropharyngeal exudate  Eyes: Conjunctivae and EOM are normal  Pupils are equal, round, and reactive to light  Neck: Normal range of motion  Neck supple  No tracheal deviation present  No thyromegaly present  Cardiovascular: Normal rate and regular rhythm  Exam reveals gallop  Exam reveals no friction rub  Murmur heard  Pulmonary/Chest: Effort normal and breath sounds normal  No respiratory distress  He has no wheezes  He has no rales  He exhibits no tenderness  Abdominal: Soft  Bowel sounds are normal  He exhibits no distension and no mass  There is no tenderness  There is no rebound and no guarding  Musculoskeletal: Normal range of motion  He exhibits edema ( +2 edema bilaterally)  Lymphadenopathy:     He has no cervical adenopathy  Neurological: He is alert and oriented to person, place, and time  Skin: Skin is warm and dry  No rash noted  He is not diaphoretic  No erythema  No pallor  Psychiatric: He has a normal mood and affect  His behavior is normal  Judgment and thought content normal    Vitals reviewed  Goals and Barriers:  Current Goal: Minimize effects of disease  Barriers: None        Patient's Capacity to Self Care:  Patient is able to self care     Code Status: @[facility]@

## 2018-06-01 ENCOUNTER — APPOINTMENT (OUTPATIENT)
Dept: LAB | Facility: MEDICAL CENTER | Age: 83
End: 2018-06-01
Payer: MEDICARE

## 2018-06-01 DIAGNOSIS — C90.00 MULTIPLE MYELOMA NOT HAVING ACHIEVED REMISSION (HCC): ICD-10-CM

## 2018-06-01 DIAGNOSIS — C34.91 ADENOCARCINOMA OF RIGHT LUNG (HCC): Chronic | ICD-10-CM

## 2018-06-01 LAB
ALBUMIN SERPL BCP-MCNC: 2.8 G/DL (ref 3.5–5)
ALP SERPL-CCNC: 85 U/L (ref 46–116)
ALT SERPL W P-5'-P-CCNC: 27 U/L (ref 12–78)
ANION GAP SERPL CALCULATED.3IONS-SCNC: 6 MMOL/L (ref 4–13)
AST SERPL W P-5'-P-CCNC: 21 U/L (ref 5–45)
BASOPHILS # BLD AUTO: 0.01 THOUSANDS/ΜL (ref 0–0.1)
BASOPHILS NFR BLD AUTO: 0 % (ref 0–1)
BILIRUB SERPL-MCNC: 1 MG/DL (ref 0.2–1)
BUN SERPL-MCNC: 9 MG/DL (ref 5–25)
CALCIUM SERPL-MCNC: 9 MG/DL (ref 8.3–10.1)
CHLORIDE SERPL-SCNC: 107 MMOL/L (ref 100–108)
CO2 SERPL-SCNC: 25 MMOL/L (ref 21–32)
CREAT SERPL-MCNC: 1.04 MG/DL (ref 0.6–1.3)
EOSINOPHIL # BLD AUTO: 0.18 THOUSAND/ΜL (ref 0–0.61)
EOSINOPHIL NFR BLD AUTO: 7 % (ref 0–6)
ERYTHROCYTE [DISTWIDTH] IN BLOOD BY AUTOMATED COUNT: 16.6 % (ref 11.6–15.1)
GFR SERPL CREATININE-BSD FRML MDRD: 67 ML/MIN/1.73SQ M
GLUCOSE SERPL-MCNC: 93 MG/DL (ref 65–140)
HCT VFR BLD AUTO: 35 % (ref 36.5–49.3)
HGB BLD-MCNC: 11.3 G/DL (ref 12–17)
IMM GRANULOCYTES # BLD AUTO: 0.02 THOUSAND/UL (ref 0–0.2)
IMM GRANULOCYTES NFR BLD AUTO: 1 % (ref 0–2)
LYMPHOCYTES # BLD AUTO: 1.37 THOUSANDS/ΜL (ref 0.6–4.47)
LYMPHOCYTES NFR BLD AUTO: 50 % (ref 14–44)
MCH RBC QN AUTO: 29.4 PG (ref 26.8–34.3)
MCHC RBC AUTO-ENTMCNC: 32.3 G/DL (ref 31.4–37.4)
MCV RBC AUTO: 91 FL (ref 82–98)
MONOCYTES # BLD AUTO: 0.28 THOUSAND/ΜL (ref 0.17–1.22)
MONOCYTES NFR BLD AUTO: 10 % (ref 4–12)
NEUTROPHILS # BLD AUTO: 0.89 THOUSANDS/ΜL (ref 1.85–7.62)
NEUTS SEG NFR BLD AUTO: 32 % (ref 43–75)
NRBC BLD AUTO-RTO: 0 /100 WBCS
PLATELET # BLD AUTO: 135 THOUSANDS/UL (ref 149–390)
PMV BLD AUTO: 10.5 FL (ref 8.9–12.7)
POTASSIUM SERPL-SCNC: 4 MMOL/L (ref 3.5–5.3)
PROT SERPL-MCNC: 6.1 G/DL (ref 6.4–8.2)
RBC # BLD AUTO: 3.84 MILLION/UL (ref 3.88–5.62)
SODIUM SERPL-SCNC: 138 MMOL/L (ref 136–145)
WBC # BLD AUTO: 2.75 THOUSAND/UL (ref 4.31–10.16)

## 2018-06-01 PROCEDURE — 36415 COLL VENOUS BLD VENIPUNCTURE: CPT

## 2018-06-01 PROCEDURE — 83883 ASSAY NEPHELOMETRY NOT SPEC: CPT

## 2018-06-01 PROCEDURE — 85025 COMPLETE CBC W/AUTO DIFF WBC: CPT

## 2018-06-01 PROCEDURE — 80053 COMPREHEN METABOLIC PANEL: CPT

## 2018-06-02 LAB
KAPPA LC FREE SER-MCNC: 5 MG/L (ref 3.3–19.4)
KAPPA LC FREE/LAMBDA FREE SER: 0.01 {RATIO} (ref 0.26–1.65)
LAMBDA LC FREE SERPL-MCNC: 811.5 MG/L (ref 5.7–26.3)

## 2018-06-04 DIAGNOSIS — C90.00 MULTIPLE MYELOMA, REMISSION STATUS UNSPECIFIED (HCC): ICD-10-CM

## 2018-06-04 RX ORDER — MELPHALAN USP, 2 MG 2 MG/1
TABLET ORAL
Qty: 24 TABLET | Refills: 0 | Status: SHIPPED | OUTPATIENT
Start: 2018-06-04 | End: 2018-06-05 | Stop reason: SDUPTHER

## 2018-06-05 ENCOUNTER — OFFICE VISIT (OUTPATIENT)
Dept: HEMATOLOGY ONCOLOGY | Facility: CLINIC | Age: 83
End: 2018-06-05
Payer: MEDICARE

## 2018-06-05 VITALS
SYSTOLIC BLOOD PRESSURE: 108 MMHG | OXYGEN SATURATION: 98 % | TEMPERATURE: 96.5 F | BODY MASS INDEX: 28.72 KG/M2 | RESPIRATION RATE: 16 BRPM | WEIGHT: 183 LBS | HEART RATE: 94 BPM | DIASTOLIC BLOOD PRESSURE: 68 MMHG | HEIGHT: 67 IN

## 2018-06-05 DIAGNOSIS — C90.00 MULTIPLE MYELOMA NOT HAVING ACHIEVED REMISSION (HCC): Primary | ICD-10-CM

## 2018-06-05 DIAGNOSIS — C90.00: ICD-10-CM

## 2018-06-05 DIAGNOSIS — T45.1X5A: ICD-10-CM

## 2018-06-05 DIAGNOSIS — C90.00 MULTIPLE MYELOMA, REMISSION STATUS UNSPECIFIED (HCC): ICD-10-CM

## 2018-06-05 DIAGNOSIS — D70.1 CHEMOTHERAPY INDUCED NEUTROPENIA (HCC): ICD-10-CM

## 2018-06-05 DIAGNOSIS — D80.9 IMMUNOGLOBULIN DEFICIENCY (HCC): ICD-10-CM

## 2018-06-05 DIAGNOSIS — T45.1X5A CHEMOTHERAPY INDUCED NEUTROPENIA (HCC): ICD-10-CM

## 2018-06-05 DIAGNOSIS — D64.81: ICD-10-CM

## 2018-06-05 PROCEDURE — 99214 OFFICE O/P EST MOD 30 MIN: CPT | Performed by: INTERNAL MEDICINE

## 2018-06-05 RX ORDER — MELPHALAN USP, 2 MG 2 MG/1
TABLET ORAL
Qty: 24 TABLET | Refills: 0 | Status: SHIPPED | OUTPATIENT
Start: 2018-06-05 | End: 2018-11-15 | Stop reason: SDUPTHER

## 2018-06-05 RX ORDER — SODIUM CHLORIDE 9 MG/ML
20 INJECTION, SOLUTION INTRAVENOUS CONTINUOUS
Status: DISCONTINUED | OUTPATIENT
Start: 2018-06-06 | End: 2018-06-09 | Stop reason: HOSPADM

## 2018-06-05 NOTE — LETTER
June 5, 2018     Annabel HelgaDO geronimo  [address] [facility]  [address]  [address]    Patient: Kacy Mcguire   YOB: 1935   Date of Visit: 6/5/2018       Dear Dr Sis Cheatham: Thank you for referring Vasile Montilla to me for evaluation  Below are my notes for this consultation  If you have questions, please do not hesitate to call me  I look forward to following your patient along with you  Sincerely,        Megan Vazquez MD        CC: DO Tom Rosales MD Victory Brock, MD  6/5/2018 12:23 PM  Sign at close encounter  Hematology Outpatient Follow - Up Note  Kacy Mcguire 81 y o  male MRN: @ Encounter: [id number]        Date:  6/5/2018        Assessment/ Plan:  1  Swelling of the lower extremities, I believe the patient has component of congestive heart failure I believe evaluation by Cardiology is good idea, he received Aldactone 50 mg p  o  Daily with slight improvement over 1 week  2  Heavily pretreated lambda light chain multiple myeloma, proceed with cycle 2  Of melphalan 12 mg p o  Flat dose for 4 days, prednisone 50 mg p  o  Daily for 4 days, lenalidomide 10 mg p o  Daily 3 weeks on 1 week off to initiate in June 08/2018  3  To prevent chemotherapy induced neutropenia patient to have CBC weekly and filgrastim 480 mcg ANC below 1000   4  To prevent anemia induced by chemotherapy patient received Aranesp 200 mcg every 2 weeks if hemoglobin below 9 9  5  Hypogammaglobinemia with recurrent pneumonia, IVIG 25 g every month  6  Follow-up in 1 month with CBC, CMP, free light chain, IgG            HPI:    #1 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, multiple compression fractures, status post kyphoplasty to L1, L2, L5, treated with radiation therapy to the left intra trochanteric area, received melphalan/Velcade/prednisone in October 2010 for 4 cycles with excellent response and normalization of lambda light chain, had been on Velcade maintenance till January 2013 when lambda light chain went up to 111,treated with Velcade 1 3 mg meter square weekly 3 weeks on one week off, Decadron 20 mg by mouth weekly, lambda light chain down to 30 then creeping up to 110, on Revlimid 15 mg by mouth every other day  Lambda light chain normal at 28 however he reported skin rash on the upper chest area we stopped the Revlimid for 2 month  Then reinitiated Revlimid 5 mg by mouth daily  #2  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 stage I, with progression by scan, status post resection of the right upper lobe with few foci in the same lobe consistent with stage IIIA done in January 2015 adenocarcinoma, well differentiated  Status post Alimta/carboplatin x4 cycles finished in May 2015 and radiation therapy  #3  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  #4  Admitted to [facility] 3/3/16 - 3/8/16  Dx with RSV  #5  admission on November 2017 with rectal adenocarcinoma moderately differentiated status post resection stage I ( pT2, pNX, grade 2) tumor measuring 4 x 3 6 centimeters below the peritoneal reflection low probability of MS H   Revlimid 5 mg by mouth 3 weeks on 1 week off, On hold since May 2017 because of diarrhea      Interval History: swelling of the lower extremity, initiated on Aldactone 50 mg p o  Daily   He is currently on cycle 1  Of melphalan 12 mg p o  Daily for 4 days, prednisone 50 mg p  o  Daily for 4 days, lenalidomide 10 mg p o  Daily 3 weeks on 1 week of cycle 1  In beginning of May 1018         Test Results:    Imaging: No results found      Labs:   Lab Results   Component Value Date    WBC 2 75 (L) 06/01/2018    HGB 11 3 (L) 06/01/2018    HCT 35 0 (L) 06/01/2018    MCV 91 06/01/2018     (L) 06/01/2018     Lab Results   Component Value Date     06/01/2018    K 4 0 06/01/2018     06/01/2018    CO2 25 06/01/2018    ANIONGAP 6 06/01/2018    BUN 9 06/01/2018    CREATININE 1 04 06/01/2018    GLUCOSE 93 06/01/2018    GLUF 92 03/05/2018    CALCIUM 9 0 06/01/2018    AST 21 06/01/2018    ALT 27 06/01/2018    ALKPHOS 85 06/01/2018    PROT 6 1 (L) 06/01/2018    BILITOT 1 00 06/01/2018    EGFR 67 06/01/2018                 ROS:   Review of Systems   Constitutional: Positive for fatigue  Negative for activity change, appetite change, chills, diaphoresis, fever and unexpected weight change  HENT: Negative for congestion, dental problem, facial swelling, hearing loss, mouth sores, nosebleeds, postnasal drip, rhinorrhea, sore throat, trouble swallowing and voice change  Eyes: Negative for photophobia, pain, discharge, redness, itching and visual disturbance  Respiratory: Positive for cough and shortness of breath  Negative for choking, chest tightness and wheezing  Cardiovascular: Positive for leg swelling ( more in the left than the right)  Negative for chest pain and palpitations  Gastrointestinal: Positive for diarrhea  Negative for abdominal distention, abdominal pain, anal bleeding, blood in stool, constipation, nausea, rectal pain and vomiting  Endocrine: Negative for cold intolerance and heat intolerance  Genitourinary: Negative for decreased urine volume, difficulty urinating, dysuria, flank pain, frequency, hematuria and urgency  Musculoskeletal: Negative for arthralgias, back pain, gait problem, joint swelling, myalgias, neck pain and neck stiffness  Skin: Negative for color change, pallor, rash and wound  Allergic/Immunologic: Negative for immunocompromised state  Neurological: Negative for dizziness, tremors, seizures, syncope, facial asymmetry, speech difficulty, weakness, light-headedness, numbness and headaches  Hematological: Negative for adenopathy  Does not bruise/bleed easily  Psychiatric/Behavioral: Negative for agitation, confusion, decreased concentration, dysphoric mood and sleep disturbance  The patient is not nervous/anxious      All other systems reviewed and are negative  Current Medications: Reviewed  Allergies: Reviewed  PMH/FH/SH:  Reviewed      Physical Exam:    Body surface area is 1 95 meters squared  Wt Readings from Last 3 Encounters:   06/05/18 83 kg (183 lb)   05/31/18 83 5 kg (184 lb)   05/09/18 83 5 kg (184 lb 1 4 oz)        Temp Readings from Last 3 Encounters:   06/05/18 (!) 96 5 °F (35 8 °C)   05/31/18 (!) 97 °F (36 1 °C)   05/30/18 98 6 °F (37 °C) (Temporal)        BP Readings from Last 3 Encounters:   06/05/18 108/68   05/31/18 110/60   05/30/18 108/69         Pulse Readings from Last 3 Encounters:   06/05/18 94   05/31/18 (!) 112   05/30/18 89        Physical Exam   Constitutional: He is oriented to person, place, and time  He appears well-developed and well-nourished  No distress  HENT:   Head: Normocephalic and atraumatic  Mouth/Throat: Oropharynx is clear and moist  No oropharyngeal exudate  Eyes: Conjunctivae and EOM are normal  Pupils are equal, round, and reactive to light  Neck: Normal range of motion  Neck supple  No tracheal deviation present  No thyromegaly present  Cardiovascular: Normal rate and regular rhythm  Exam reveals no gallop and no friction rub  Murmur heard  Pulmonary/Chest: Effort normal  No respiratory distress  He has wheezes  He has rales  He exhibits no tenderness  Abdominal: Soft  Bowel sounds are normal  He exhibits no distension and no mass  There is no tenderness  There is no rebound and no guarding  Musculoskeletal: Normal range of motion  He exhibits edema ( +3 in the left +2 on the right)  Lymphadenopathy:     He has no cervical adenopathy  Neurological: He is alert and oriented to person, place, and time  Skin: Skin is warm and dry  No rash noted  He is not diaphoretic  No erythema  No pallor  Psychiatric: He has a normal mood and affect  His behavior is normal  Judgment and thought content normal    Vitals reviewed            Goals and Barriers:  Current Goal: Minimize effects of disease  Barriers: None  Patient's Capacity to Self Care:  Patient is able to self care      [facility]: [facility]

## 2018-06-05 NOTE — PROGRESS NOTES
Hematology Outpatient Follow - Up Note  Kacy Mcguire 81 y o  male MRN: @ Encounter: [id number]        Date:  6/5/2018        Assessment/ Plan:  1  Swelling of the lower extremities, I believe the patient has component of congestive heart failure I believe evaluation by Cardiology is good idea, he received Aldactone 50 mg p  o  Daily with slight improvement over 1 week  2  Heavily pretreated lambda light chain multiple myeloma, proceed with cycle 2  Of melphalan 12 mg p o  Flat dose for 4 days, prednisone 50 mg p  o  Daily for 4 days, lenalidomide 10 mg p o  Daily 3 weeks on 1 week off to initiate in June 08/2018  3  To prevent chemotherapy induced neutropenia patient to have CBC weekly and filgrastim 480 mcg ANC below 1000   4  To prevent anemia induced by chemotherapy patient received Aranesp 200 mcg every 2 weeks if hemoglobin below 9 9  5  Hypogammaglobinemia with recurrent pneumonia, IVIG 25 g every month  6  Follow-up in 1 month with CBC, CMP, free light chain, IgG            HPI: #1 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, multiple compression fractures, status post kyphoplasty to L1, L2, L5, treated with radiation therapy to the left intra trochanteric area, received melphalan/Velcade/prednisone in October 2010 for 4 cycles with excellent response and normalization of lambda light chain, had been on Velcade maintenance till January 2013 when lambda light chain went up to 111,treated with Velcade 1 3 mg meter square weekly 3 weeks on one week off, Decadron 20 mg by mouth weekly, lambda light chain down to 30 then creeping up to 110, on Revlimid 15 mg by mouth every other day  Lambda light chain normal at 28 however he reported skin rash on the upper chest area we stopped the Revlimid for 2 month  Then reinitiated Revlimid 5 mg by mouth daily  #2  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 stage I, with progression by scan, status post resection of the right upper lobe with few foci in the same lobe consistent with stage IIIA done in January 2015 adenocarcinoma, well differentiated  Status post Alimta/carboplatin x4 cycles finished in May 2015 and radiation therapy  #3  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  #4  [address] 3/3/16 - 3/8/16  Dx with RSV  #5  admission on November 2017 with rectal adenocarcinoma moderately differentiated status post resection stage I ( pT2, pNX, grade 2) tumor measuring 4 x 3 6 centimeters below the peritoneal reflection low probability of MS H   Revlimid 5 mg by mouth 3 weeks on 1 week off, On hold since May 2017 because of diarrhea      Interval History: swelling of the lower extremity, initiated on Aldactone 50 mg p o  Daily   He is currently on cycle 1  Of melphalan 12 mg p o  Daily for 4 days, prednisone 50 mg p  o  Daily for 4 days, lenalidomide 10 mg p o  Daily 3 weeks on 1 week of cycle 1  In beginning of May 1018         Test Results:    Imaging: No results found  Labs:   Lab Results   Component Value Date    WBC 2 75 (L) 06/01/2018    HGB 11 3 (L) 06/01/2018    HCT 35 0 (L) 06/01/2018    MCV 91 06/01/2018     (L) 06/01/2018     Lab Results   Component Value Date     06/01/2018    K 4 0 06/01/2018     06/01/2018    CO2 25 06/01/2018    ANIONGAP 6 06/01/2018    BUN 9 06/01/2018    CREATININE 1 04 06/01/2018    GLUCOSE 93 06/01/2018    GLUF 92 03/05/2018    CALCIUM 9 0 06/01/2018    AST 21 06/01/2018    ALT 27 06/01/2018    ALKPHOS 85 06/01/2018    PROT 6 1 (L) 06/01/2018    BILITOT 1 00 06/01/2018    EGFR 67 06/01/2018                 ROS:   Review of Systems   Constitutional: Positive for fatigue  Negative for activity change, appetite change, chills, diaphoresis, fever and unexpected weight change  HENT: Negative for congestion, dental problem, facial swelling, hearing loss, mouth sores, nosebleeds, postnasal drip, rhinorrhea, sore throat, trouble swallowing and voice change  Eyes: Negative for photophobia, pain, discharge, redness, itching and visual disturbance  Respiratory: Positive for cough and shortness of breath  Negative for choking, chest tightness and wheezing  Cardiovascular: Positive for leg swelling ( more in the left than the right)  Negative for chest pain and palpitations  Gastrointestinal: Positive for diarrhea  Negative for abdominal distention, abdominal pain, anal bleeding, blood in stool, constipation, nausea, rectal pain and vomiting  Endocrine: Negative for cold intolerance and heat intolerance  Genitourinary: Negative for decreased urine volume, difficulty urinating, dysuria, flank pain, frequency, hematuria and urgency  Musculoskeletal: Negative for arthralgias, back pain, gait problem, joint swelling, myalgias, neck pain and neck stiffness  Skin: Negative for color change, pallor, rash and wound  Allergic/Immunologic: Negative for immunocompromised state  Neurological: Negative for dizziness, tremors, seizures, syncope, facial asymmetry, speech difficulty, weakness, light-headedness, numbness and headaches  Hematological: Negative for adenopathy  Does not bruise/bleed easily  Psychiatric/Behavioral: Negative for agitation, confusion, decreased concentration, dysphoric mood and sleep disturbance  The patient is not nervous/anxious  All other systems reviewed and are negative  Current Medications: Reviewed  Allergies: Reviewed  PMH/FH/SH:  Reviewed      Physical Exam:    Body surface area is 1 95 meters squared      Wt Readings from Last 3 Encounters:   06/05/18 83 kg (183 lb)   05/31/18 83 5 kg (184 lb)   05/09/18 83 5 kg (184 lb 1 4 oz)        Temp Readings from Last 3 Encounters:   06/05/18 (!) 96 5 °F (35 8 °C)   05/31/18 (!) 97 °F (36 1 °C)   05/30/18 98 6 °F (37 °C) (Temporal)        BP Readings from Last 3 Encounters:   06/05/18 108/68   05/31/18 110/60   05/30/18 108/69         Pulse Readings from Last 3 Encounters: 06/05/18 94   05/31/18 (!) 112   05/30/18 89        Physical Exam   Constitutional: He is oriented to person, place, and time  He appears well-developed and well-nourished  No distress  HENT:   Head: Normocephalic and atraumatic  Mouth/Throat: Oropharynx is clear and moist  No oropharyngeal exudate  Eyes: Conjunctivae and EOM are normal  Pupils are equal, round, and reactive to light  Neck: Normal range of motion  Neck supple  No tracheal deviation present  No thyromegaly present  Cardiovascular: Normal rate and regular rhythm  Exam reveals no gallop and no friction rub  Murmur heard  Pulmonary/Chest: Effort normal  No respiratory distress  He has wheezes  He has rales  He exhibits no tenderness  Abdominal: Soft  Bowel sounds are normal  He exhibits no distension and no mass  There is no tenderness  There is no rebound and no guarding  Musculoskeletal: Normal range of motion  He exhibits edema ( +3 in the left +2 on the right)  Lymphadenopathy:     He has no cervical adenopathy  Neurological: He is alert and oriented to person, place, and time  Skin: Skin is warm and dry  No rash noted  He is not diaphoretic  No erythema  No pallor  Psychiatric: He has a normal mood and affect  His behavior is normal  Judgment and thought content normal    Vitals reviewed  Goals and Barriers:  Current Goal: Minimize effects of disease  Barriers: None  Patient's Capacity to Self Care:  Patient is able to self care      Code Status: [unfilled]

## 2018-06-06 ENCOUNTER — HOSPITAL ENCOUNTER (OUTPATIENT)
Dept: INFUSION CENTER | Facility: CLINIC | Age: 83
Discharge: HOME | End: 2018-06-06
Payer: MEDICARE

## 2018-06-06 DIAGNOSIS — C90.00 MULTIPLE MYELOMA, REMISSION STATUS UNSPECIFIED (HCC): ICD-10-CM

## 2018-06-06 LAB
ANION GAP SERPL CALCULATED.3IONS-SCNC: 8 MMOL/L (ref 4–13)
ANISOCYTOSIS BLD QL SMEAR: PRESENT
BASOPHILS # BLD AUTO: 0.14 THOUSAND/UL (ref 0–0.1)
BASOPHILS NFR MAR MANUAL: 3 % (ref 0–1)
BUN SERPL-MCNC: 11 MG/DL (ref 5–25)
CALCIUM SERPL-MCNC: 8.9 MG/DL (ref 8.3–10.1)
CHLORIDE SERPL-SCNC: 104 MMOL/L (ref 100–108)
CO2 SERPL-SCNC: 26 MMOL/L (ref 21–32)
CREAT SERPL-MCNC: 1.13 MG/DL (ref 0.6–1.3)
EOSINOPHIL # BLD AUTO: 0.38 THOUSAND/UL (ref 0–0.61)
EOSINOPHIL NFR BLD MANUAL: 8 % (ref 0–6)
ERYTHROCYTE [DISTWIDTH] IN BLOOD BY AUTOMATED COUNT: 18.2 % (ref 11.6–15.1)
GFR SERPL CREATININE-BSD FRML MDRD: 61 ML/MIN/1.73SQ M
GLUCOSE SERPL-MCNC: 96 MG/DL (ref 65–140)
HCT VFR BLD AUTO: 35.5 % (ref 36.5–49.3)
HGB BLD-MCNC: 11.5 G/DL (ref 12–17)
LYMPHOCYTES # BLD AUTO: 2.82 THOUSAND/UL (ref 0.6–4.47)
LYMPHOCYTES # BLD AUTO: 60 % (ref 14–44)
MCH RBC QN AUTO: 29.2 PG (ref 26.8–34.3)
MCHC RBC AUTO-ENTMCNC: 32.5 G/DL (ref 31.4–37.4)
MCV RBC AUTO: 90 FL (ref 82–98)
MONOCYTES # BLD AUTO: 0.24 THOUSAND/UL (ref 0–1.22)
MONOCYTES NFR BLD AUTO: 5 % (ref 4–12)
NEUTS BAND NFR BLD MANUAL: 2 % (ref 0–8)
NEUTS SEG # BLD: 1.13 THOUSAND/UL (ref 1.81–6.82)
NEUTS SEG NFR BLD AUTO: 22 % (ref 43–75)
PLATELET # BLD AUTO: 252 THOUSANDS/UL (ref 149–390)
PLATELET BLD QL SMEAR: ADEQUATE
PMV BLD AUTO: 6.2 FL (ref 8.9–12.7)
POTASSIUM SERPL-SCNC: 4.1 MMOL/L (ref 3.5–5.3)
RBC # BLD AUTO: 3.95 MILLION/UL (ref 3.88–5.62)
SODIUM SERPL-SCNC: 138 MMOL/L (ref 136–145)
TOTAL CELLS COUNTED SPEC: 100
WBC # BLD AUTO: 4.7 THOUSAND/UL (ref 4.31–10.16)
WBC NRBC COR # BLD: 4.7 THOUSAND/UL (ref 4.31–10.16)

## 2018-06-06 PROCEDURE — 85007 BL SMEAR W/DIFF WBC COUNT: CPT | Performed by: INTERNAL MEDICINE

## 2018-06-06 PROCEDURE — 96366 THER/PROPH/DIAG IV INF ADDON: CPT

## 2018-06-06 PROCEDURE — 80048 BASIC METABOLIC PNL TOTAL CA: CPT | Performed by: INTERNAL MEDICINE

## 2018-06-06 PROCEDURE — 96365 THER/PROPH/DIAG IV INF INIT: CPT

## 2018-06-06 PROCEDURE — 85027 COMPLETE CBC AUTOMATED: CPT | Performed by: INTERNAL MEDICINE

## 2018-06-06 RX ORDER — LENALIDOMIDE 10 MG/1
CAPSULE ORAL
Qty: 21 EACH | Refills: 0 | Status: SHIPPED | OUTPATIENT
Start: 2018-06-06 | End: 2018-07-20 | Stop reason: CLARIF

## 2018-06-06 RX ADMIN — SODIUM CHLORIDE 20 ML/HR: 0.9 INJECTION, SOLUTION INTRAVENOUS at 10:35

## 2018-06-06 RX ADMIN — SODIUM CHLORIDE 1000 ML: 0.9 INJECTION, SOLUTION INTRAVENOUS at 10:35

## 2018-06-06 RX ADMIN — Medication 300 UNITS: at 13:18

## 2018-06-06 RX ADMIN — Medication 25 G: at 10:49

## 2018-06-06 NOTE — PLAN OF CARE
Problem: Potential for Falls  Goal: Patient will remain free of falls  INTERVENTIONS:  - Assess patient frequently for physical needs  -  Identify cognitive and physical deficits and behaviors that affect risk of falls    -  [place] fall precautions as indicated by assessment   - Educate patient/family on patient safety including physical limitations  - Instruct patient to call for assistance with activity based on assessment  - Modify environment to reduce risk of injury  - Consider OT/PT consult to assist with strengthening/mobility   Outcome: Progressing

## 2018-06-06 NOTE — PROGRESS NOTES
Patient tolerated hydration and IVIG  Infusions well  Denies any discomfort  Pt and wife are aware of all future appointments

## 2018-06-07 RX ORDER — SILODOSIN 8 MG/1
1 CAPSULE ORAL
Refills: 3 | COMMUNITY
Start: 2018-05-24 | End: 2020-01-01 | Stop reason: CLARIF

## 2018-06-07 RX ORDER — LIDOCAINE AND PRILOCAINE 25; 25 MG/G; MG/G
CREAM TOPICAL
Refills: 2 | COMMUNITY
Start: 2018-05-24 | End: 2020-01-22 | Stop reason: SDUPTHER

## 2018-06-13 ENCOUNTER — HOSPITAL ENCOUNTER (OUTPATIENT)
Dept: INFUSION CENTER | Facility: CLINIC | Age: 83
Discharge: HOME | End: 2018-06-13
Payer: MEDICARE

## 2018-06-13 VITALS
TEMPERATURE: 96.9 F | DIASTOLIC BLOOD PRESSURE: 78 MMHG | RESPIRATION RATE: 18 BRPM | HEART RATE: 80 BPM | SYSTOLIC BLOOD PRESSURE: 124 MMHG

## 2018-06-13 LAB
ANION GAP SERPL CALCULATED.3IONS-SCNC: 8 MMOL/L (ref 4–13)
ANISOCYTOSIS BLD QL SMEAR: PRESENT
BASOPHILS # BLD AUTO: 0 THOUSAND/UL (ref 0–0.1)
BASOPHILS NFR MAR MANUAL: 0 % (ref 0–1)
BUN SERPL-MCNC: 29 MG/DL (ref 5–25)
CALCIUM SERPL-MCNC: 9.5 MG/DL (ref 8.3–10.1)
CHLORIDE SERPL-SCNC: 104 MMOL/L (ref 100–108)
CO2 SERPL-SCNC: 26 MMOL/L (ref 21–32)
CREAT SERPL-MCNC: 1.08 MG/DL (ref 0.6–1.3)
EOSINOPHIL # BLD AUTO: 0 THOUSAND/UL (ref 0–0.61)
EOSINOPHIL NFR BLD MANUAL: 0 % (ref 0–6)
ERYTHROCYTE [DISTWIDTH] IN BLOOD BY AUTOMATED COUNT: 19.4 % (ref 11.6–15.1)
GFR SERPL CREATININE-BSD FRML MDRD: 64 ML/MIN/1.73SQ M
GLUCOSE SERPL-MCNC: 107 MG/DL (ref 65–140)
HCT VFR BLD AUTO: 34.4 % (ref 36.5–49.3)
HGB BLD-MCNC: 11.1 G/DL (ref 12–17)
LG PLATELETS BLD QL SMEAR: PRESENT
LYMPHOCYTES # BLD AUTO: 1.23 THOUSAND/UL (ref 0.6–4.47)
LYMPHOCYTES # BLD AUTO: 22 % (ref 14–44)
MCH RBC QN AUTO: 29.4 PG (ref 26.8–34.3)
MCHC RBC AUTO-ENTMCNC: 32.4 G/DL (ref 31.4–37.4)
MCV RBC AUTO: 91 FL (ref 82–98)
MONOCYTES # BLD AUTO: 0.11 THOUSAND/UL (ref 0–1.22)
MONOCYTES NFR BLD AUTO: 2 % (ref 4–12)
NEUTS BAND NFR BLD MANUAL: 0 % (ref 0–8)
NEUTS SEG # BLD: 4.26 THOUSAND/UL (ref 1.81–6.82)
NEUTS SEG NFR BLD AUTO: 76 % (ref 43–75)
OVALOCYTES BLD QL SMEAR: PRESENT
PLATELET # BLD AUTO: 264 THOUSANDS/UL (ref 149–390)
PLATELET BLD QL SMEAR: ADEQUATE
PMV BLD AUTO: 6.3 FL (ref 8.9–12.7)
POTASSIUM SERPL-SCNC: 3.7 MMOL/L (ref 3.5–5.3)
RBC # BLD AUTO: 3.79 MILLION/UL (ref 3.88–5.62)
SODIUM SERPL-SCNC: 138 MMOL/L (ref 136–145)
TOTAL CELLS COUNTED SPEC: 100
WBC # BLD AUTO: 5.6 THOUSAND/UL (ref 4.31–10.16)
WBC NRBC COR # BLD: 5.6 THOUSAND/UL (ref 4.31–10.16)

## 2018-06-13 PROCEDURE — 80048 BASIC METABOLIC PNL TOTAL CA: CPT | Performed by: INTERNAL MEDICINE

## 2018-06-13 PROCEDURE — 85007 BL SMEAR W/DIFF WBC COUNT: CPT | Performed by: INTERNAL MEDICINE

## 2018-06-13 PROCEDURE — 96361 HYDRATE IV INFUSION ADD-ON: CPT

## 2018-06-13 PROCEDURE — 85027 COMPLETE CBC AUTOMATED: CPT | Performed by: INTERNAL MEDICINE

## 2018-06-13 PROCEDURE — 96360 HYDRATION IV INFUSION INIT: CPT

## 2018-06-13 RX ADMIN — SODIUM CHLORIDE 1000 ML: 0.9 INJECTION, SOLUTION INTRAVENOUS at 11:05

## 2018-06-13 RX ADMIN — SODIUM CHLORIDE, PRESERVATIVE FREE 300 UNITS: 5 INJECTION INTRAVENOUS at 13:05

## 2018-06-13 NOTE — PLAN OF CARE
Problem: Potential for Falls  Goal: Patient will remain free of falls  INTERVENTIONS:  - Assess patient frequently for physical needs  -  Identify cognitive and physical deficits and behaviors that affect risk of falls    -  [place] fall precautions as indicated by assessment   - Educate patient/family on patient safety including physical limitations  - Instruct patient to call for assistance with activity based on assessment  - Modify environment to reduce risk of injury  - Consider OT/PT consult to assist with strengthening/mobility   Outcome: Progressing

## 2018-06-13 NOTE — PROGRESS NOTES
Patient tolerated hydration well  Denies any discomfort  Pt and wife are aware of all future appointments

## 2018-06-18 ENCOUNTER — OFFICE VISIT (OUTPATIENT)
Dept: CARDIOLOGY CLINIC | Facility: CLINIC | Age: 83
End: 2018-06-18
Payer: MEDICARE

## 2018-06-18 VITALS
BODY MASS INDEX: 27.84 KG/M2 | DIASTOLIC BLOOD PRESSURE: 62 MMHG | SYSTOLIC BLOOD PRESSURE: 110 MMHG | HEIGHT: 67 IN | WEIGHT: 177.4 LBS | HEART RATE: 88 BPM | OXYGEN SATURATION: 97 %

## 2018-06-18 DIAGNOSIS — R60.9 EDEMA, UNSPECIFIED TYPE: Primary | ICD-10-CM

## 2018-06-18 PROCEDURE — 99214 OFFICE O/P EST MOD 30 MIN: CPT | Performed by: INTERNAL MEDICINE

## 2018-06-18 NOTE — PATIENT INSTRUCTIONS
Check NTproBNP and BMP with other blood work  F/U in 3 weeks with echocardiogram  F/U with Dr Irma Bautista

## 2018-06-18 NOTE — PROGRESS NOTES
Cardiology Follow Up        [address] [place] [address]      1935      [id number]      Discussion/Summary:    1  Lower extremity edema:  Suspect multifactorial secondary to hypoalbuminemia, prednisone treatment, possible venous insufficiency, possible right-sided heart failure from underlying COPD  I would like to avoid empiric treatment with furosemide at this time, as I have a lower suspicion that this is predominantly congestive heart failure  Prior NT proBNP 4/8/18 was unremarkable at 210 pg/mL  He has no evidence of cardiomyopathy from prior echocardiogram 10/2017  He does not have progressively worsening lower extremity edema requiring furosemide, nor orthopnea  I will recheck his NT proBNP and echocardiogram, and if either exhibit significant abnormality, empiric furosemide may be considered  I have recommended conservative treatment with leg elevation and compression stockings as tolerated  His low albumin may need to be addressed by Hematology  He does have significant bilateral wheezing today secondary to his COPD  He will follow-up with Dr Mehran Lopes next month as scheduled  He is scheduled to start nebulizer treatments soon  Prior venous Dopplers 09/2017 revealed no evidence of DVT  2   Mild-to-moderate aortic valve stenosis:  Will recheck echocardiogram with next visit in 1 month  Follow-up in 1 month with echocardiogram, after BMP/NT proBNP        Interval History: This is a very pleasant 81-year-old male with a history of chronic lower extremity edema, multifactorial and moderate aortic root dilatation up to 4 5 cm as well as mild-to-moderate aortic valve stenosis  He has a history of multiple myeloma, squamous cell skin carcinoma, and lung adenocarcinoma status post right upper lobe resection with adjuvant chemotherapy/radiation, and rectal carcinoma with once a month prednisone treatment    He presents today for evaluation of his lower extremity edema  He has been initiated on spironolactone 50 mg daily by his hematologist, and congestive heart failure has been considered as an etiology of his edema  He admits to increased exertional dyspnea as of late, and his wife states recently he has been initiated on nebulizer treatments  He follows Dr Sherman Martínez of pulmonary medicine  He denies any worsening of lower extremity edema, and states that has been stable/chronic  His wife does admit that he has certain days when his edema is worse  He denies orthopnea  He has chronic diarrhea  Vitals:  Vitals:    06/18/18 1451   BP: 110/62   BP Location: Right arm   Patient Position: Sitting   Cuff Size: Standard   Pulse: 88   SpO2: 97%   Weight: 80 5 kg (177 lb 6 4 oz)   Height: 5' 7" (1 702 m)         Past Medical History:   Diagnosis Date    Cancer ([facility] [address] )     lung and multiple myloma    Chemoprevention     Chronic pain disorder     3 fractured vertebrae    COPD (chronic obstructive pulmonary disease) (HCC)     Diarrhea     Emphysema lung (HCC)     Enlarged prostate     [place] (hard of hearing)     Kidney stone     Low blood pressure     Lung cancer (HCC)     Multiple myeloma (HCC)     Multiple myeloma (HCC)     Pneumonia     Pulmonary emphysema (HCC)     Rectal adenocarcinoma (HCC)     Shortness of breath     Sleep apnea     no cpap     Social History     Social History    Marital status: /Civil Union     Spouse name: N/A    Number of children: N/A    Years of education: N/A     Occupational History    Not on file       Social History Main Topics    Smoking status: Former Smoker     Packs/day: 1 00     Years: 50 00     Types: Cigarettes     Quit date: 12/7/2010    Smokeless tobacco: Never Used      Comment: quit in 1999    Alcohol use Yes      Comment: rarely    Drug use: No    Sexual activity: Not on file     Other Topics Concern    Not on file     Social History Narrative    No narrative on file      Family History   Problem Relation Age of Onset    Arthritis Mother     Colon cancer Father     Heart attack Father     Diabetes type I Sister      Past Surgical History:   Procedure Laterality Date    CHOLECYSTECTOMY      COLONOSCOPY W/ ENDOSCOPIC US N/A 7/21/2017    Procedure: ANAL ENDOSCOPIC U/S;  Surgeon: Keisha Friend MD;  [facility]: [facility]; Service: Colorectal    HERNIA REPAIR      KIDNEY STONE SURGERY      LUNG LOBECTOMY Right     RI COLONOSCOPY FLX DX W/COLLJ SPEC WHEN PFRMD N/A 7/10/2017    Procedure: COLONOSCOPY;  Surgeon: Shelton Mendez MD;  [facility]; Service: Gastroenterology    RI RECTAL TUMOR EXCISION, TRANSANAL ENDOSCOPIC MICROSURGICAL, FULL THICK N/A 11/2/2017    Procedure: TRANSANAL ENDOSCOPIC MICROSURGERY TEM;  Surgeon: Keisha Friend MD;  [facility]: [facility];  Service: Colorectal       Current Outpatient Prescriptions:     albuterol (PROVENTIL HFA,VENTOLIN HFA) 90 mcg/act inhaler, Inhale 2 puffs every 4 (four) hours as needed for wheezing, Disp: 1 Inhaler, Rfl: 6    cholecalciferol (VITAMIN D3) 1,000 units tablet, Take 5,000 Units by mouth daily  , Disp: , Rfl:     cyanocobalamin (VITAMIN B-12) 1,000 mcg tablet, Take by mouth daily, Disp: , Rfl:     dextromethorphan-guaiFENesin (ROBITUSSIN DM)  mg/5 mL syrup, Take 10 mL by mouth every 4 (four) hours as needed for cough or congestion, Disp: 118 mL, Rfl: 0    DHA-EPA-Coenzyme Q10-Vitamin E (CO Q-10 VITAMIN E FISH OIL) 87-87- CAPS, Take 1 capsule by mouth daily, Disp: , Rfl:     finasteride (PROSCAR) 5 mg tablet, Take 5 mg by mouth daily at bedtime  , Disp: , Rfl:     guaiFENesin (MUCINEX) 600 mg 12 hr tablet, Take 1 tablet (600 mg total) by mouth every 12 (twelve) hours (Patient taking differently: Take 600 mg by mouth every 12 (twelve) hours as needed  ), Disp: 60 tablet, Rfl: 0    ipratropium-albuterol (DUO-NEB) 0 5-2 5 mg/mL, Take 3 mL by nebulization daily  , Disp: , Rfl:     Lactobacillus-Inulin (CULTURELLE DIGESTIVE HEALTH PO), Take by mouth daily, Disp: , Rfl:     lenalidomide (REVLIMID) 10 MG CAPS, Daily 3 weeks on/ 1 week off  Auth # [id number] adult male, Disp: 21 each, Rfl: 0    lidocaine-prilocaine (EMLA) cream, APPLY TO AREA AND COVER WITH TAPE 1 HOUR BEFORE APPOINTMENT , Disp: , Rfl: 2    Loperamide HCl (IMODIUM PO), Take 1 tablet by mouth as needed, Disp: , Rfl:     melphalan (ALKERAN) 2 MG tablet, 6 tablets daily D1-4 monthly   12mg daily D1-4, Disp: 24 tablet, Rfl: 0    multivitamin (THERAGRAN) TABS, Take 1 tablet by mouth daily, Disp: , Rfl:     omega-3-acid ethyl esters (LOVAZA) 1 g capsule, Take 1 g by mouth daily  , Disp: , Rfl:     predniSONE 50 mg tablet, BID WITH MEALS D1-4, Disp: 8 tablet, Rfl: 4    RAPAFLO 8 MG CAPS, Take 1 capsule by mouth daily, Disp: , Rfl: 3    spironolactone (ALDACTONE) 50 mg tablet, Take 1 tablet (50 mg total) by mouth daily, Disp: 30 tablet, Rfl: 3    Tiotropium Bromide Monohydrate (SPIRIVA HANDIHALER IN), Inhale 2 puffs daily at bedtime as needed 2  5MCG/ACT , Disp: , Rfl:     Wheat Dextrin (BENEFIBER) POWD, Take 1 packet by mouth 2 (two) times a day as needed, Disp: , Rfl:     benzonatate (TESSALON) 200 MG capsule, Take 1 capsule (200 mg total) by mouth 3 (three) times a day, Disp: 20 capsule, Rfl: 0        Review of Systems:  Review of Systems   Constitutional: Positive for fatigue  Respiratory: Positive for shortness of breath and wheezing  Cardiovascular: Positive for leg swelling  All other systems reviewed and are negative  Physical Exam:  Physical Exam   Constitutional: He is oriented to person, place, and time  He appears well-developed and well-nourished  No distress  HENT:   Head: Normocephalic and atraumatic  Eyes: EOM are normal  Pupils are equal, round, and reactive to light  Right eye exhibits no discharge  No scleral icterus  Neck: Normal range of motion  Neck supple  No thyromegaly present  Cardiovascular: Normal rate and regular rhythm  Exam reveals no gallop and no friction rub  Murmur heard  Pulmonary/Chest: Effort normal  He has wheezes  Abdominal: He exhibits no distension  There is no tenderness  There is no rebound and no guarding  Musculoskeletal: Normal range of motion  He exhibits edema  Neurological: He is alert and oriented to person, place, and time  Coordination normal    Skin: Skin is warm and dry  No rash noted  He is not diaphoretic  No erythema  No pallor  Psychiatric: He has a normal mood and affect   His behavior is normal  Judgment and thought content normal

## 2018-06-20 ENCOUNTER — HOSPITAL ENCOUNTER (OUTPATIENT)
Dept: INFUSION CENTER | Facility: CLINIC | Age: 83
Discharge: HOME | End: 2018-06-20
Payer: MEDICARE

## 2018-06-20 VITALS
SYSTOLIC BLOOD PRESSURE: 104 MMHG | RESPIRATION RATE: 16 BRPM | DIASTOLIC BLOOD PRESSURE: 72 MMHG | OXYGEN SATURATION: 98 % | HEART RATE: 89 BPM | WEIGHT: 176 LBS | TEMPERATURE: 97.1 F | BODY MASS INDEX: 27.57 KG/M2

## 2018-06-20 LAB
ANION GAP SERPL CALCULATED.3IONS-SCNC: 8 MMOL/L (ref 4–13)
ANISOCYTOSIS BLD QL SMEAR: PRESENT
BASOPHILS # BLD AUTO: 0 THOUSAND/UL (ref 0–0.1)
BASOPHILS NFR MAR MANUAL: 0 % (ref 0–1)
BUN SERPL-MCNC: 17 MG/DL (ref 5–25)
CALCIUM SERPL-MCNC: 8.9 MG/DL (ref 8.3–10.1)
CHLORIDE SERPL-SCNC: 105 MMOL/L (ref 100–108)
CO2 SERPL-SCNC: 23 MMOL/L (ref 21–32)
CREAT SERPL-MCNC: 1.09 MG/DL (ref 0.6–1.3)
EOSINOPHIL # BLD AUTO: 0.2 THOUSAND/UL (ref 0–0.61)
EOSINOPHIL NFR BLD MANUAL: 4 % (ref 0–6)
ERYTHROCYTE [DISTWIDTH] IN BLOOD BY AUTOMATED COUNT: 20.9 % (ref 11.6–15.1)
GFR SERPL CREATININE-BSD FRML MDRD: 63 ML/MIN/1.73SQ M
GLUCOSE SERPL-MCNC: 91 MG/DL (ref 65–140)
HCT VFR BLD AUTO: 36.7 % (ref 36.5–49.3)
HGB BLD-MCNC: 11.7 G/DL (ref 12–17)
LYMPHOCYTES # BLD AUTO: 0.78 THOUSAND/UL (ref 0.6–4.47)
LYMPHOCYTES # BLD AUTO: 16 % (ref 14–44)
MCH RBC QN AUTO: 29.7 PG (ref 26.8–34.3)
MCHC RBC AUTO-ENTMCNC: 31.9 G/DL (ref 31.4–37.4)
MCV RBC AUTO: 93 FL (ref 82–98)
MONOCYTES # BLD AUTO: 0.25 THOUSAND/UL (ref 0–1.22)
MONOCYTES NFR BLD AUTO: 5 % (ref 4–12)
NEUTS BAND NFR BLD MANUAL: 0 % (ref 0–8)
NEUTS SEG # BLD: 3.68 THOUSAND/UL (ref 1.81–6.82)
NEUTS SEG NFR BLD AUTO: 75 % (ref 43–75)
OVALOCYTES BLD QL SMEAR: PRESENT
PLATELET # BLD AUTO: 150 THOUSANDS/UL (ref 149–390)
PLATELET BLD QL SMEAR: ADEQUATE
PMV BLD AUTO: 7 FL (ref 8.9–12.7)
POTASSIUM SERPL-SCNC: 4.2 MMOL/L (ref 3.5–5.3)
RBC # BLD AUTO: 3.94 MILLION/UL (ref 3.88–5.62)
SODIUM SERPL-SCNC: 136 MMOL/L (ref 136–145)
TOTAL CELLS COUNTED SPEC: 100
WBC # BLD AUTO: 4.9 THOUSAND/UL (ref 4.31–10.16)
WBC NRBC COR # BLD: 4.9 THOUSAND/UL (ref 4.31–10.16)

## 2018-06-20 PROCEDURE — 85027 COMPLETE CBC AUTOMATED: CPT | Performed by: INTERNAL MEDICINE

## 2018-06-20 PROCEDURE — 80048 BASIC METABOLIC PNL TOTAL CA: CPT | Performed by: INTERNAL MEDICINE

## 2018-06-20 PROCEDURE — 96360 HYDRATION IV INFUSION INIT: CPT

## 2018-06-20 PROCEDURE — 85007 BL SMEAR W/DIFF WBC COUNT: CPT | Performed by: INTERNAL MEDICINE

## 2018-06-20 PROCEDURE — 96361 HYDRATE IV INFUSION ADD-ON: CPT

## 2018-06-20 RX ADMIN — Medication 300 UNITS: at 12:52

## 2018-06-20 RX ADMIN — SODIUM CHLORIDE 1000 ML: 0.9 INJECTION, SOLUTION INTRAVENOUS at 10:55

## 2018-06-20 NOTE — PROGRESS NOTES
Tolerated hydration  No Granix required today  ANC-3 [id number]  Aware of next appointment   AVS given to patient

## 2018-06-20 NOTE — PROGRESS NOTES
Patient arrived on unit  Exertional dyspnea noted  Pulse ox at rest on RA- 98%  No other discomforts  CBC/BMP drawn   NSS initiated as ordered

## 2018-06-20 NOTE — PLAN OF CARE
Problem: Potential for Falls  Goal: Patient will remain free of falls  INTERVENTIONS:  - Assess patient frequently for physical needs  -  Identify cognitive and physical deficits and behaviors that affect risk of falls    -  [place] fall precautions as indicated by assessment   - Educate patient/family on patient safety including physical limitations  - Instruct patient to call for assistance with activity based on assessment  - Modify environment to reduce risk of injury  - Consider OT/PT consult to assist with strengthening/mobility   Outcome: Progressing

## 2018-06-25 ENCOUNTER — APPOINTMENT (OUTPATIENT)
Dept: LAB | Facility: MEDICAL CENTER | Age: 83
End: 2018-06-25
Payer: MEDICARE

## 2018-06-25 DIAGNOSIS — D70.1 CHEMOTHERAPY INDUCED NEUTROPENIA (HCC): ICD-10-CM

## 2018-06-25 DIAGNOSIS — T45.1X5A CHEMOTHERAPY INDUCED NEUTROPENIA (HCC): ICD-10-CM

## 2018-06-25 DIAGNOSIS — D80.9 IMMUNOGLOBULIN DEFICIENCY (HCC): ICD-10-CM

## 2018-06-25 DIAGNOSIS — T45.1X5A: ICD-10-CM

## 2018-06-25 DIAGNOSIS — R60.9 EDEMA, UNSPECIFIED TYPE: ICD-10-CM

## 2018-06-25 DIAGNOSIS — C90.00 MULTIPLE MYELOMA NOT HAVING ACHIEVED REMISSION (HCC): ICD-10-CM

## 2018-06-25 DIAGNOSIS — D64.81: ICD-10-CM

## 2018-06-25 DIAGNOSIS — C90.00: ICD-10-CM

## 2018-06-25 LAB
ALBUMIN SERPL BCP-MCNC: 2.9 G/DL (ref 3.5–5)
ALP SERPL-CCNC: 87 U/L (ref 46–116)
ALT SERPL W P-5'-P-CCNC: 37 U/L (ref 12–78)
ANION GAP SERPL CALCULATED.3IONS-SCNC: 7 MMOL/L (ref 4–13)
AST SERPL W P-5'-P-CCNC: 18 U/L (ref 5–45)
BASOPHILS # BLD AUTO: 0.01 THOUSANDS/ΜL (ref 0–0.1)
BASOPHILS NFR BLD AUTO: 0 % (ref 0–1)
BILIRUB SERPL-MCNC: 1.15 MG/DL (ref 0.2–1)
BUN SERPL-MCNC: 13 MG/DL (ref 5–25)
CALCIUM SERPL-MCNC: 9 MG/DL (ref 8.3–10.1)
CHLORIDE SERPL-SCNC: 106 MMOL/L (ref 100–108)
CO2 SERPL-SCNC: 26 MMOL/L (ref 21–32)
CREAT SERPL-MCNC: 1.26 MG/DL (ref 0.6–1.3)
EOSINOPHIL # BLD AUTO: 0.16 THOUSAND/ΜL (ref 0–0.61)
EOSINOPHIL NFR BLD AUTO: 6 % (ref 0–6)
ERYTHROCYTE [DISTWIDTH] IN BLOOD BY AUTOMATED COUNT: 17.7 % (ref 11.6–15.1)
GFR SERPL CREATININE-BSD FRML MDRD: 53 ML/MIN/1.73SQ M
GLUCOSE SERPL-MCNC: 98 MG/DL (ref 65–140)
HCT VFR BLD AUTO: 34.8 % (ref 36.5–49.3)
HGB BLD-MCNC: 11.3 G/DL (ref 12–17)
IGA SERPL-MCNC: 15 MG/DL (ref 70–400)
IGG SERPL-MCNC: 524 MG/DL (ref 700–1600)
IGM SERPL-MCNC: 12 MG/DL (ref 40–230)
IMM GRANULOCYTES # BLD AUTO: 0.01 THOUSAND/UL (ref 0–0.2)
IMM GRANULOCYTES NFR BLD AUTO: 0 % (ref 0–2)
LYMPHOCYTES # BLD AUTO: 0.76 THOUSANDS/ΜL (ref 0.6–4.47)
LYMPHOCYTES NFR BLD AUTO: 30 % (ref 14–44)
MCH RBC QN AUTO: 30 PG (ref 26.8–34.3)
MCHC RBC AUTO-ENTMCNC: 32.5 G/DL (ref 31.4–37.4)
MCV RBC AUTO: 92 FL (ref 82–98)
MONOCYTES # BLD AUTO: 0.26 THOUSAND/ΜL (ref 0.17–1.22)
MONOCYTES NFR BLD AUTO: 10 % (ref 4–12)
NEUTROPHILS # BLD AUTO: 1.3 THOUSANDS/ΜL (ref 1.85–7.62)
NEUTS SEG NFR BLD AUTO: 54 % (ref 43–75)
NRBC BLD AUTO-RTO: 0 /100 WBCS
NT-PROBNP SERPL-MCNC: 248 PG/ML
PLATELET # BLD AUTO: 99 THOUSANDS/UL (ref 149–390)
PMV BLD AUTO: 9.8 FL (ref 8.9–12.7)
POTASSIUM SERPL-SCNC: 3.9 MMOL/L (ref 3.5–5.3)
PROT SERPL-MCNC: 6.1 G/DL (ref 6.4–8.2)
RBC # BLD AUTO: 3.77 MILLION/UL (ref 3.88–5.62)
SODIUM SERPL-SCNC: 139 MMOL/L (ref 136–145)
WBC # BLD AUTO: 2.5 THOUSAND/UL (ref 4.31–10.16)

## 2018-06-25 PROCEDURE — 83883 ASSAY NEPHELOMETRY NOT SPEC: CPT

## 2018-06-25 PROCEDURE — 36415 COLL VENOUS BLD VENIPUNCTURE: CPT

## 2018-06-25 PROCEDURE — 80053 COMPREHEN METABOLIC PANEL: CPT

## 2018-06-25 PROCEDURE — 83880 ASSAY OF NATRIURETIC PEPTIDE: CPT

## 2018-06-25 PROCEDURE — 82784 ASSAY IGA/IGD/IGG/IGM EACH: CPT

## 2018-06-25 PROCEDURE — 85025 COMPLETE CBC W/AUTO DIFF WBC: CPT

## 2018-06-27 ENCOUNTER — TELEPHONE (OUTPATIENT)
Dept: HEMATOLOGY ONCOLOGY | Facility: CLINIC | Age: 83
End: 2018-06-27

## 2018-06-27 ENCOUNTER — HOSPITAL ENCOUNTER (OUTPATIENT)
Dept: INFUSION CENTER | Facility: CLINIC | Age: 83
Discharge: HOME | End: 2018-06-27
Payer: MEDICARE

## 2018-06-27 VITALS
RESPIRATION RATE: 16 BRPM | SYSTOLIC BLOOD PRESSURE: 98 MMHG | DIASTOLIC BLOOD PRESSURE: 60 MMHG | HEART RATE: 93 BPM | TEMPERATURE: 98.8 F

## 2018-06-27 DIAGNOSIS — C90.00 MULTIPLE MYELOMA NOT HAVING ACHIEVED REMISSION (HCC): Primary | ICD-10-CM

## 2018-06-27 LAB
ALBUMIN SERPL BCP-MCNC: 2.8 G/DL (ref 3.5–5.7)
ALP SERPL-CCNC: 76 U/L (ref 46–116)
ALT SERPL W P-5'-P-CCNC: 29 U/L (ref 12–78)
ANION GAP SERPL CALCULATED.3IONS-SCNC: 10 MMOL/L (ref 4–13)
ANISOCYTOSIS BLD QL SMEAR: PRESENT
AST SERPL W P-5'-P-CCNC: 20 U/L (ref 5–45)
BASOPHILS # BLD AUTO: 0 THOUSAND/UL (ref 0–0.1)
BASOPHILS NFR MAR MANUAL: 0 % (ref 0–1)
BILIRUB SERPL-MCNC: 1.5 MG/DL (ref 0.2–1)
BUN SERPL-MCNC: 11 MG/DL (ref 5–25)
CALCIUM SERPL-MCNC: 8.8 MG/DL (ref 8.3–10.1)
CHLORIDE SERPL-SCNC: 102 MMOL/L (ref 98–108)
CO2 SERPL-SCNC: 26 MMOL/L (ref 21–32)
CREAT SERPL-MCNC: 1.1 MG/DL (ref 0.6–1.3)
EOSINOPHIL # BLD AUTO: 0.16 THOUSAND/UL (ref 0–0.61)
EOSINOPHIL NFR BLD MANUAL: 8 % (ref 0–6)
ERYTHROCYTE [DISTWIDTH] IN BLOOD BY AUTOMATED COUNT: 20.8 % (ref 11.6–15.1)
GFR SERPL CREATININE-BSD FRML MDRD: 63 ML/MIN/1.73SQ M
GLUCOSE SERPL-MCNC: 96 MG/DL (ref 65–140)
HCT VFR BLD AUTO: 34.7 % (ref 36.5–49.3)
HGB BLD-MCNC: 11 G/DL (ref 12–17)
KAPPA LC FREE SER-MCNC: 3.5 MG/L (ref 3.3–19.4)
KAPPA LC FREE/LAMBDA FREE SER: 0.01 {RATIO} (ref 0.26–1.65)
LAMBDA LC FREE SERPL-MCNC: 645.1 MG/L (ref 5.7–26.3)
LYMPHOCYTES # BLD AUTO: 0.98 THOUSAND/UL (ref 0.6–4.47)
LYMPHOCYTES # BLD AUTO: 49 % (ref 14–44)
MCH RBC QN AUTO: 29.9 PG (ref 26.8–34.3)
MCHC RBC AUTO-ENTMCNC: 31.8 G/DL (ref 31.4–37.4)
MCV RBC AUTO: 94 FL (ref 82–98)
MONOCYTES # BLD AUTO: 0.04 THOUSAND/UL (ref 0–1.22)
MONOCYTES NFR BLD AUTO: 2 % (ref 4–12)
NEUTS BAND NFR BLD MANUAL: 7 % (ref 0–8)
NEUTS SEG # BLD: 0.82 THOUSAND/UL (ref 1.81–6.82)
NEUTS SEG NFR BLD AUTO: 34 % (ref 43–75)
OVALOCYTES BLD QL SMEAR: PRESENT
PLATELET # BLD AUTO: 78 THOUSANDS/UL (ref 149–390)
PLATELET BLD QL SMEAR: ABNORMAL
PMV BLD AUTO: 7.1 FL (ref 8.9–12.7)
POTASSIUM SERPL-SCNC: 3.6 MMOL/L (ref 3.5–5.3)
PROT SERPL-MCNC: 5.8 G/DL (ref 6.4–8.2)
RBC # BLD AUTO: 3.69 MILLION/UL (ref 3.88–5.62)
SODIUM SERPL-SCNC: 138 MMOL/L (ref 136–145)
TOTAL CELLS COUNTED SPEC: 100
WBC # BLD AUTO: 2 THOUSAND/UL (ref 4.31–10.16)
WBC NRBC COR # BLD: 2 THOUSAND/UL (ref 4.31–10.16)

## 2018-06-27 PROCEDURE — 96361 HYDRATE IV INFUSION ADD-ON: CPT

## 2018-06-27 PROCEDURE — 85027 COMPLETE CBC AUTOMATED: CPT | Performed by: INTERNAL MEDICINE

## 2018-06-27 PROCEDURE — 96372 THER/PROPH/DIAG INJ SC/IM: CPT

## 2018-06-27 PROCEDURE — 80053 COMPREHEN METABOLIC PANEL: CPT

## 2018-06-27 PROCEDURE — 85007 BL SMEAR W/DIFF WBC COUNT: CPT | Performed by: INTERNAL MEDICINE

## 2018-06-27 PROCEDURE — 96360 HYDRATION IV INFUSION INIT: CPT

## 2018-06-27 RX ADMIN — SODIUM CHLORIDE 1000 ML: 0.9 INJECTION, SOLUTION INTRAVENOUS at 10:15

## 2018-06-27 RX ADMIN — TBO-FILGRASTIM 480 MCG: 480 INJECTION, SOLUTION SUBCUTANEOUS at 12:23

## 2018-06-27 RX ADMIN — Medication 300 UNITS: at 12:22

## 2018-06-27 NOTE — PLAN OF CARE
Problem: Potential for Falls  Goal: Patient will remain free of falls  INTERVENTIONS:  - Assess patient frequently for physical needs  -  Identify cognitive and physical deficits and behaviors that affect risk of falls    -  [place] fall precautions as indicated by assessment   - Educate patient/family on patient safety including physical limitations  - Instruct patient to call for assistance with activity based on assessment  - Modify environment to reduce risk of injury  - Consider OT/PT consult to assist with strengthening/mobility   Outcome: Progressing

## 2018-06-27 NOTE — PROGRESS NOTES
Patient arrived to the infusion center for weekly hydration and blood work  Pt c/o of diarrhea and fatigue  Offers no other complaints  [facility] RN with Dr Nestor Whyte made aware of pt complaints and lab results  No new orders at this time  Pt is scheduled to see Dr Nestor Whyte tomorrow  Pt tolerated hydration well  Granix given for

## 2018-06-27 NOTE — PLAN OF CARE
Problem: Potential for Falls  Goal: Patient will remain free of falls  INTERVENTIONS:  - Assess patient frequently for physical needs  -  Identify cognitive and physical deficits and behaviors that affect risk of falls    -  [place] fall precautions as indicated by assessment   - Educate patient/family on patient safety including physical limitations  - Instruct patient to call for assistance with activity based on assessment  - Modify environment to reduce risk of injury  - Consider OT/PT consult to assist with strengthening/mobility   Outcome: Progressing

## 2018-06-28 ENCOUNTER — TELEPHONE (OUTPATIENT)
Dept: HEMATOLOGY ONCOLOGY | Facility: CLINIC | Age: 83
End: 2018-06-28

## 2018-06-28 ENCOUNTER — OFFICE VISIT (OUTPATIENT)
Dept: HEMATOLOGY ONCOLOGY | Facility: CLINIC | Age: 83
End: 2018-06-28
Payer: MEDICARE

## 2018-06-28 VITALS
WEIGHT: 179 LBS | BODY MASS INDEX: 28.09 KG/M2 | HEIGHT: 67 IN | HEART RATE: 99 BPM | RESPIRATION RATE: 16 BRPM | SYSTOLIC BLOOD PRESSURE: 92 MMHG | DIASTOLIC BLOOD PRESSURE: 58 MMHG | OXYGEN SATURATION: 97 % | TEMPERATURE: 98.3 F

## 2018-06-28 DIAGNOSIS — C90.00 MULTIPLE MYELOMA NOT HAVING ACHIEVED REMISSION (HCC): Primary | ICD-10-CM

## 2018-06-28 DIAGNOSIS — K52.1 DIARRHEA DUE TO DRUG: ICD-10-CM

## 2018-06-28 PROCEDURE — 99214 OFFICE O/P EST MOD 30 MIN: CPT | Performed by: INTERNAL MEDICINE

## 2018-06-28 RX ORDER — DEXAMETHASONE 4 MG/1
TABLET ORAL
Qty: 60 TABLET | Refills: 0 | Status: SHIPPED | OUTPATIENT
Start: 2018-06-28 | End: 2018-10-01 | Stop reason: SDUPTHER

## 2018-06-28 NOTE — PROGRESS NOTES
Hematology Outpatient Follow - Up Note  Danny Garcia 81 y o  male MRN: @ Encounter: [id number]        Date:  6/28/2018        Assessment/ Plan:   1  Heavily pretreated lambda light chain multiple myeloma stage III as mentioned in the history of present illness, currently on melphalan 12 mg flat dose 4 days every month, lenalidomide 10 mg p o  Daily 3 weeks on 1 week off, prednisone 50 mg p o  Daily 4 days a month initiated in June 8, 2018, he has decrease in the lambda light chain to 645 from 1100 range however he has fatigue, neutropenia, anemia  Will proceed with cycle 2  But every 6 weeks instead of every 4 weeks lenalidomide 10 mg p o  Daily 3 weeks on 3 weeks off, prednisone 50 mg daily for 4 days concurrent with melphalan 12 mg p o  Daily for 4 days every 6 weeks  2  Diarrhea will check C diff, if positive initiate metronidazole 500 mg p o  T i d  For 7 days  3  Nausea might be related to C diff colitis or melphalan  4  Decadron 20 mg p o  Weekly except when he is on prednisone with melphalan  5  Hypogammaglobulinemia continue IVIG 25 g every month  6  Aldactone 50 mg p o  Daily  7  COPD  8  Aranesp 200 mcg every 2 weeks if hemoglobin below 9 9  10  Filgrastim 480 mcg weekly if ANC below 1000          HPI:  #1 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, multiple compression fractures, status post kyphoplasty to L1, L2, L5, treated with radiation therapy to the left intra trochanteric area, received melphalan/Velcade/prednisone in October 2010 for 4 cycles with excellent response and normalization of lambda light chain, had been on Velcade maintenance till January 2013 when lambda light chain went up to 111,treated with Velcade 1 3 mg meter square weekly 3 weeks on one week off, Decadron 20 mg by mouth weekly, lambda light chain down to 30 then creeping up to 110, on Revlimid 15 mg by mouth every other day   Lambda light chain normal at 28 however he reported skin rash on the upper chest area we stopped the Revlimid for 2 month  Then reinitiated Revlimid 5 mg by mouth daily  #2  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 stage I, with progression by scan, status post resection of the right upper lobe with few foci in the same lobe consistent with stage IIIA done in January 2015 adenocarcinoma, well differentiated  Status post Alimta/carboplatin x4 cycles finished in May 2015 and radiation therapy  #3  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)     Interval History:   He reported 6 bowel movements every day, fatigue, loss of appetite        current treatment  Melphalan 12 mg p o  Daily for 4 days, prednisone 50 mg p o  Daily for 4 days, lenalidomide 10 mg p o  Daily 3 weeks on 1 week off initiated in June 8, 2018  IVIG 25 g every month for hypogammaglobulinemia       Test Results:    Imaging: No results found  Labs:   Lab Results   Component Value Date    WBC 2 00 (L) 06/27/2018    HGB 11 0 (L) 06/27/2018    HCT 34 7 (L) 06/27/2018    MCV 94 06/27/2018    PLT 78 (L) 06/27/2018     Lab Results   Component Value Date     06/27/2018    K 3 6 06/27/2018     06/27/2018    CO2 26 06/27/2018    ANIONGAP 10 06/27/2018    BUN 11 06/27/2018    CREATININE 1 10 06/27/2018    GLUCOSE 96 06/27/2018    GLUF 92 03/05/2018    CALCIUM 8 8 06/27/2018    AST 20 06/27/2018    ALT 29 06/27/2018    ALKPHOS 76 06/27/2018    PROT 5 8 (L) 06/27/2018    BILITOT 1 50 (H) 06/27/2018    EGFR 63 06/27/2018       No results found for: IRON, TIBC, FERRITIN    No results found for: [id number]      ROS:   Review of Systems   Constitutional: Positive for fatigue and unexpected weight change   Negative for activity change, appetite change, chills, diaphoresis and fever  HENT: Negative for congestion, dental problem, facial swelling, hearing loss, mouth sores, nosebleeds, postnasal drip, rhinorrhea, sore throat, trouble swallowing and voice change  Eyes: Negative for photophobia, pain, discharge, redness, itching and visual disturbance  Respiratory: Positive for cough and shortness of breath  Negative for choking, chest tightness and wheezing  Cardiovascular: Positive for leg swelling  Negative for chest pain and palpitations  Gastrointestinal: Positive for diarrhea  Negative for abdominal distention, abdominal pain, anal bleeding, blood in stool, constipation, nausea, rectal pain and vomiting  Endocrine: Negative for cold intolerance and heat intolerance  Genitourinary: Negative for decreased urine volume, difficulty urinating, dysuria, flank pain, frequency, hematuria and urgency  Musculoskeletal: Negative for arthralgias, back pain, gait problem, joint swelling, myalgias, neck pain and neck stiffness  Skin: Negative for color change, pallor, rash and wound  Allergic/Immunologic: Negative for immunocompromised state  Neurological: Negative for dizziness, tremors, seizures, syncope, facial asymmetry, speech difficulty, weakness, light-headedness, numbness and headaches  Hematological: Negative for adenopathy  Does not bruise/bleed easily  Psychiatric/Behavioral: Negative for agitation, confusion, decreased concentration, dysphoric mood and sleep disturbance  The patient is not nervous/anxious  All other systems reviewed and are negative  Current Medications: Reviewed  Allergies: Reviewed  PMH/FH/SH:  Reviewed      Physical Exam:    Body surface area is 1 93 meters squared      Wt Readings from Last 3 Encounters:   06/28/18 81 2 kg (179 lb)   06/20/18 79 8 kg (176 lb)   06/18/18 80 5 kg (177 lb 6 4 oz)        Temp Readings from Last 3 Encounters:   06/28/18 98 3 °F (36 8 °C) 06/27/18 98 8 °F (37 1 °C) (Tympanic)   06/20/18 (!) 97 1 °F (36 2 °C) (Tympanic)        BP Readings from Last 3 Encounters:   06/28/18 92/58   06/27/18 98/60   06/20/18 104/72         Pulse Readings from Last 3 Encounters:   06/28/18 99   06/27/18 93   06/20/18 89        Physical Exam   Constitutional: He is oriented to person, place, and time  He appears well-developed and well-nourished  No distress  HENT:   Head: Normocephalic and atraumatic  Mouth/Throat: Oropharynx is clear and moist  No oropharyngeal exudate  Eyes: Conjunctivae and EOM are normal  Pupils are equal, round, and reactive to light  Neck: Normal range of motion  Neck supple  No tracheal deviation present  No thyromegaly present  Cardiovascular: Normal rate and regular rhythm  Exam reveals no gallop and no friction rub  Murmur heard  Pulmonary/Chest: Effort normal  No respiratory distress  He has wheezes  He has no rales  He exhibits no tenderness  Abdominal: Soft  Bowel sounds are normal  He exhibits no distension and no mass  There is no tenderness  There is no rebound and no guarding  Musculoskeletal: Normal range of motion  He exhibits edema ( more in the left lower extremity +2 and +1 in the right lower extremity)  Lymphadenopathy:     He has no cervical adenopathy  Neurological: He is alert and oriented to person, place, and time  Skin: Skin is warm and dry  No rash noted  He is not diaphoretic  No erythema  No pallor  Psychiatric: He has a normal mood and affect  His behavior is normal  Judgment and thought content normal    Vitals reviewed  Goals and Barriers:  Current Goal: Minimize effects of disease  Barriers: None  Patient's Capacity to Self Care:  Patient is able to self care      Code Status: [unfilled]

## 2018-07-02 ENCOUNTER — APPOINTMENT (OUTPATIENT)
Dept: LAB | Facility: HOSPITAL | Age: 83
End: 2018-07-02
Attending: INTERNAL MEDICINE
Payer: MEDICARE

## 2018-07-02 DIAGNOSIS — C90.00 MULTIPLE MYELOMA NOT HAVING ACHIEVED REMISSION (HCC): ICD-10-CM

## 2018-07-02 DIAGNOSIS — K52.1 DIARRHEA DUE TO DRUG: ICD-10-CM

## 2018-07-02 PROCEDURE — 87493 C DIFF AMPLIFIED PROBE: CPT

## 2018-07-03 ENCOUNTER — HOSPITAL ENCOUNTER (OUTPATIENT)
Dept: INFUSION CENTER | Facility: CLINIC | Age: 83
Discharge: HOME | End: 2018-07-03
Payer: MEDICARE

## 2018-07-03 VITALS
TEMPERATURE: 97.8 F | DIASTOLIC BLOOD PRESSURE: 60 MMHG | HEART RATE: 80 BPM | SYSTOLIC BLOOD PRESSURE: 94 MMHG | RESPIRATION RATE: 18 BRPM

## 2018-07-03 DIAGNOSIS — A09 DIARRHEA OF INFECTIOUS ORIGIN: Primary | ICD-10-CM

## 2018-07-03 LAB
ANISOCYTOSIS BLD QL SMEAR: PRESENT
C DIFF TOX GENS STL QL NAA+PROBE: ABNORMAL
EOSINOPHIL # BLD AUTO: 0 THOUSAND/UL (ref 0–0.61)
EOSINOPHIL NFR BLD MANUAL: 0 % (ref 0–6)
ERYTHROCYTE [DISTWIDTH] IN BLOOD BY AUTOMATED COUNT: 20.4 % (ref 11.6–15.1)
HCT VFR BLD AUTO: 32.9 % (ref 36.5–49.3)
HGB BLD-MCNC: 10.8 G/DL (ref 12–17)
LYMPHOCYTES # BLD AUTO: 1.17 THOUSAND/UL (ref 0.6–4.47)
LYMPHOCYTES # BLD AUTO: 45 % (ref 14–44)
MCH RBC QN AUTO: 30.6 PG (ref 26.8–34.3)
MCHC RBC AUTO-ENTMCNC: 32.7 G/DL (ref 31.4–37.4)
MCV RBC AUTO: 94 FL (ref 82–98)
MONOCYTES # BLD AUTO: 0.03 THOUSAND/UL (ref 0–1.22)
MONOCYTES NFR BLD AUTO: 1 % (ref 4–12)
NEUTS BAND NFR BLD MANUAL: 0 % (ref 0–8)
NEUTS SEG # BLD: 1.4 THOUSAND/UL (ref 1.81–6.82)
NEUTS SEG NFR BLD AUTO: 54 % (ref 43–75)
OVALOCYTES BLD QL SMEAR: PRESENT
PLATELET # BLD AUTO: 86 THOUSANDS/UL (ref 149–390)
PLATELET BLD QL SMEAR: ABNORMAL
PMV BLD AUTO: 6.7 FL (ref 8.9–12.7)
RBC # BLD AUTO: 3.52 MILLION/UL (ref 3.88–5.62)
TOTAL CELLS COUNTED SPEC: 100
WBC # BLD AUTO: 2.6 THOUSAND/UL (ref 4.31–10.16)
WBC NRBC COR # BLD: 2.6 THOUSAND/UL (ref 4.31–10.16)

## 2018-07-03 PROCEDURE — 85027 COMPLETE CBC AUTOMATED: CPT | Performed by: INTERNAL MEDICINE

## 2018-07-03 PROCEDURE — 96361 HYDRATE IV INFUSION ADD-ON: CPT

## 2018-07-03 PROCEDURE — 96360 HYDRATION IV INFUSION INIT: CPT

## 2018-07-03 PROCEDURE — 85007 BL SMEAR W/DIFF WBC COUNT: CPT | Performed by: INTERNAL MEDICINE

## 2018-07-03 RX ORDER — METRONIDAZOLE 500 MG/1
500 TABLET ORAL EVERY 8 HOURS SCHEDULED
Qty: 30 TABLET | Refills: 0 | Status: SHIPPED | OUTPATIENT
Start: 2018-07-03 | End: 2018-07-13

## 2018-07-03 RX ADMIN — Medication 300 UNITS: at 12:30

## 2018-07-03 RX ADMIN — SODIUM CHLORIDE 1000 ML: 0.9 INJECTION, SOLUTION INTRAVENOUS at 10:25

## 2018-07-03 NOTE — PLAN OF CARE
Problem: Potential for Falls  Goal: Patient will remain free of falls  INTERVENTIONS:  - Assess patient frequently for physical needs  -  Identify cognitive and physical deficits and behaviors that affect risk of falls    -  [place] fall precautions as indicated by assessment   - Educate patient/family on patient safety including physical limitations  - Instruct patient to call for assistance with activity based on assessment  - Modify environment to reduce risk of injury  - Consider OT/PT consult to assist with strengthening/mobility   Outcome: Progressing

## 2018-07-05 ENCOUNTER — TELEPHONE (OUTPATIENT)
Dept: HEMATOLOGY ONCOLOGY | Facility: CLINIC | Age: 83
End: 2018-07-05

## 2018-07-05 ENCOUNTER — TELEPHONE (OUTPATIENT)
Dept: HEMATOLOGY ONCOLOGY | Facility: MEDICAL CENTER | Age: 83
End: 2018-07-05

## 2018-07-05 DIAGNOSIS — C90.00 MULTIPLE MYELOMA, REMISSION STATUS UNSPECIFIED (HCC): ICD-10-CM

## 2018-07-05 RX ORDER — LENALIDOMIDE 10 MG/1
CAPSULE ORAL
Qty: 21 EACH | Refills: 0 | OUTPATIENT
Start: 2018-07-05

## 2018-07-05 NOTE — TELEPHONE ENCOUNTER
Returned call to spouse, she states pt is feeling better, diarrhea episodes are less  Pt is on abx  Afebrile  Reminded to continue good hand washing, and they will call us with any additional concerns

## 2018-07-11 ENCOUNTER — HOSPITAL ENCOUNTER (OUTPATIENT)
Dept: INFUSION CENTER | Facility: CLINIC | Age: 83
Discharge: HOME | End: 2018-07-11
Payer: MEDICARE

## 2018-07-11 ENCOUNTER — TELEPHONE (OUTPATIENT)
Dept: HEMATOLOGY ONCOLOGY | Facility: CLINIC | Age: 83
End: 2018-07-11

## 2018-07-11 VITALS
TEMPERATURE: 97.5 F | RESPIRATION RATE: 20 BRPM | OXYGEN SATURATION: 97 % | HEART RATE: 78 BPM | DIASTOLIC BLOOD PRESSURE: 52 MMHG | SYSTOLIC BLOOD PRESSURE: 104 MMHG

## 2018-07-11 DIAGNOSIS — K52.1 DIARRHEA DUE TO DRUG: ICD-10-CM

## 2018-07-11 DIAGNOSIS — C90.00 MULTIPLE MYELOMA NOT HAVING ACHIEVED REMISSION (HCC): ICD-10-CM

## 2018-07-11 LAB
ANISOCYTOSIS BLD QL SMEAR: PRESENT
BASOPHILS # BLD AUTO: 0.03 THOUSAND/UL (ref 0–0.1)
BASOPHILS NFR MAR MANUAL: 1 % (ref 0–1)
EOSINOPHIL # BLD AUTO: 0 THOUSAND/UL (ref 0–0.61)
EOSINOPHIL NFR BLD MANUAL: 0 % (ref 0–6)
ERYTHROCYTE [DISTWIDTH] IN BLOOD BY AUTOMATED COUNT: 20.5 % (ref 11.6–15.1)
HCT VFR BLD AUTO: 35.2 % (ref 36.5–49.3)
HGB BLD-MCNC: 11.3 G/DL (ref 12–17)
LYMPHOCYTES # BLD AUTO: 1.82 THOUSAND/UL (ref 0.6–4.47)
LYMPHOCYTES # BLD AUTO: 57 % (ref 14–44)
MCH RBC QN AUTO: 30 PG (ref 26.8–34.3)
MCHC RBC AUTO-ENTMCNC: 32.1 G/DL (ref 31.4–37.4)
MCV RBC AUTO: 93 FL (ref 82–98)
MONOCYTES # BLD AUTO: 0.19 THOUSAND/UL (ref 0–1.22)
MONOCYTES NFR BLD AUTO: 6 % (ref 4–12)
NEUTS BAND NFR BLD MANUAL: 2 % (ref 0–8)
NEUTS SEG # BLD: 1.15 THOUSAND/UL (ref 1.81–6.82)
NEUTS SEG NFR BLD AUTO: 34 % (ref 43–75)
PLATELET # BLD AUTO: 183 THOUSANDS/UL (ref 149–390)
PLATELET BLD QL SMEAR: ADEQUATE
PMV BLD AUTO: 6.6 FL (ref 8.9–12.7)
RBC # BLD AUTO: 3.76 MILLION/UL (ref 3.88–5.62)
TOTAL CELLS COUNTED SPEC: 100
WBC # BLD AUTO: 3.2 THOUSAND/UL (ref 4.31–10.16)
WBC NRBC COR # BLD: 3.2 THOUSAND/UL (ref 4.31–10.16)

## 2018-07-11 PROCEDURE — 96360 HYDRATION IV INFUSION INIT: CPT

## 2018-07-11 PROCEDURE — 85027 COMPLETE CBC AUTOMATED: CPT

## 2018-07-11 PROCEDURE — 85007 BL SMEAR W/DIFF WBC COUNT: CPT

## 2018-07-11 PROCEDURE — 96361 HYDRATE IV INFUSION ADD-ON: CPT

## 2018-07-11 RX ADMIN — Medication 300 UNITS: at 12:38

## 2018-07-11 RX ADMIN — SODIUM CHLORIDE 1000 ML: 0.9 INJECTION, SOLUTION INTRAVENOUS at 10:41

## 2018-07-11 NOTE — PLAN OF CARE
Problem: Potential for Falls  Goal: Patient will remain free of falls  INTERVENTIONS:  - Assess patient frequently for physical needs  -  Identify cognitive and physical deficits and behaviors that affect risk of falls    -  [place] fall precautions as indicated by assessment   - Educate patient/family on patient safety including physical limitations  - Instruct patient to call for assistance with activity based on assessment  - Modify environment to reduce risk of injury  - Consider OT/PT consult to assist with strengthening/mobility   Outcome: Progressing

## 2018-07-11 NOTE — TELEPHONE ENCOUNTER
Requesting Revilimid refill  They called last week on 7/5/2018 and spoke with Nhi Ready  Looks like Dr Harish Pandya refused the refill  Please call them to let them know if it is on hold or what the status is

## 2018-07-11 NOTE — TELEPHONE ENCOUNTER
[facility] updated  revlimid is now 3 weeks on / 1 week off and is due to start on 7/23/18  OV with Dr Emiliano Yin in 7/20/18

## 2018-07-17 ENCOUNTER — OFFICE VISIT (OUTPATIENT)
Dept: PULMONOLOGY | Facility: CLINIC | Age: 83
End: 2018-07-17
Payer: MEDICARE

## 2018-07-17 ENCOUNTER — APPOINTMENT (OUTPATIENT)
Dept: LAB | Facility: MEDICAL CENTER | Age: 83
End: 2018-07-17
Payer: MEDICARE

## 2018-07-17 VITALS
HEART RATE: 97 BPM | RESPIRATION RATE: 14 BRPM | OXYGEN SATURATION: 96 % | WEIGHT: 174 LBS | SYSTOLIC BLOOD PRESSURE: 112 MMHG | TEMPERATURE: 99.1 F | DIASTOLIC BLOOD PRESSURE: 60 MMHG | HEIGHT: 67 IN | BODY MASS INDEX: 27.31 KG/M2

## 2018-07-17 DIAGNOSIS — C90.00 MULTIPLE MYELOMA NOT HAVING ACHIEVED REMISSION (HCC): ICD-10-CM

## 2018-07-17 DIAGNOSIS — K52.1 DIARRHEA DUE TO DRUG: ICD-10-CM

## 2018-07-17 DIAGNOSIS — J44.9 CHRONIC OBSTRUCTIVE PULMONARY DISEASE, UNSPECIFIED COPD TYPE (HCC): Primary | Chronic | ICD-10-CM

## 2018-07-17 DIAGNOSIS — C34.91 ADENOCARCINOMA OF RIGHT LUNG (HCC): Chronic | ICD-10-CM

## 2018-07-17 PROBLEM — I35.0 AORTIC STENOSIS, MODERATE: Status: ACTIVE | Noted: 2018-07-17

## 2018-07-17 LAB
ALBUMIN SERPL BCP-MCNC: 2.9 G/DL (ref 3.5–5)
ALP SERPL-CCNC: 63 U/L (ref 46–116)
ALT SERPL W P-5'-P-CCNC: 26 U/L (ref 12–78)
ANION GAP SERPL CALCULATED.3IONS-SCNC: 5 MMOL/L (ref 4–13)
AST SERPL W P-5'-P-CCNC: 14 U/L (ref 5–45)
BASOPHILS # BLD AUTO: 0.03 THOUSANDS/ΜL (ref 0–0.1)
BASOPHILS NFR BLD AUTO: 1 % (ref 0–1)
BILIRUB SERPL-MCNC: 0.64 MG/DL (ref 0.2–1)
BUN SERPL-MCNC: 12 MG/DL (ref 5–25)
CALCIUM SERPL-MCNC: 8.7 MG/DL (ref 8.3–10.1)
CHLORIDE SERPL-SCNC: 107 MMOL/L (ref 100–108)
CO2 SERPL-SCNC: 25 MMOL/L (ref 21–32)
CREAT SERPL-MCNC: 1.03 MG/DL (ref 0.6–1.3)
EOSINOPHIL # BLD AUTO: 0.06 THOUSAND/ΜL (ref 0–0.61)
EOSINOPHIL NFR BLD AUTO: 2 % (ref 0–6)
ERYTHROCYTE [DISTWIDTH] IN BLOOD BY AUTOMATED COUNT: 18.3 % (ref 11.6–15.1)
GFR SERPL CREATININE-BSD FRML MDRD: 68 ML/MIN/1.73SQ M
GLUCOSE P FAST SERPL-MCNC: 88 MG/DL (ref 65–99)
HCT VFR BLD AUTO: 36.4 % (ref 36.5–49.3)
HGB BLD-MCNC: 11.9 G/DL (ref 12–17)
IGA SERPL-MCNC: 13 MG/DL (ref 70–400)
IGG SERPL-MCNC: 462 MG/DL (ref 700–1600)
IGM SERPL-MCNC: 11 MG/DL (ref 40–230)
IMM GRANULOCYTES # BLD AUTO: 0.02 THOUSAND/UL (ref 0–0.2)
IMM GRANULOCYTES NFR BLD AUTO: 1 % (ref 0–2)
LDH SERPL-CCNC: 157 U/L (ref 81–234)
LYMPHOCYTES # BLD AUTO: 1.19 THOUSANDS/ΜL (ref 0.6–4.47)
LYMPHOCYTES NFR BLD AUTO: 33 % (ref 14–44)
MCH RBC QN AUTO: 31.2 PG (ref 26.8–34.3)
MCHC RBC AUTO-ENTMCNC: 32.7 G/DL (ref 31.4–37.4)
MCV RBC AUTO: 95 FL (ref 82–98)
MONOCYTES # BLD AUTO: 0.48 THOUSAND/ΜL (ref 0.17–1.22)
MONOCYTES NFR BLD AUTO: 14 % (ref 4–12)
NEUTROPHILS # BLD AUTO: 1.78 THOUSANDS/ΜL (ref 1.85–7.62)
NEUTS SEG NFR BLD AUTO: 49 % (ref 43–75)
NRBC BLD AUTO-RTO: 0 /100 WBCS
PLATELET # BLD AUTO: 182 THOUSANDS/UL (ref 149–390)
PMV BLD AUTO: 9.8 FL (ref 8.9–12.7)
POTASSIUM SERPL-SCNC: 4 MMOL/L (ref 3.5–5.3)
PROT SERPL-MCNC: 5.8 G/DL (ref 6.4–8.2)
RBC # BLD AUTO: 3.82 MILLION/UL (ref 3.88–5.62)
SODIUM SERPL-SCNC: 137 MMOL/L (ref 136–145)
WBC # BLD AUTO: 3.56 THOUSAND/UL (ref 4.31–10.16)

## 2018-07-17 PROCEDURE — 83883 ASSAY NEPHELOMETRY NOT SPEC: CPT

## 2018-07-17 PROCEDURE — 94618 PULMONARY STRESS TESTING: CPT | Performed by: INTERNAL MEDICINE

## 2018-07-17 PROCEDURE — 83615 LACTATE (LD) (LDH) ENZYME: CPT

## 2018-07-17 PROCEDURE — 85025 COMPLETE CBC W/AUTO DIFF WBC: CPT

## 2018-07-17 PROCEDURE — 99214 OFFICE O/P EST MOD 30 MIN: CPT | Performed by: INTERNAL MEDICINE

## 2018-07-17 PROCEDURE — 36415 COLL VENOUS BLD VENIPUNCTURE: CPT

## 2018-07-17 PROCEDURE — 80053 COMPREHEN METABOLIC PANEL: CPT

## 2018-07-17 PROCEDURE — 82784 ASSAY IGA/IGD/IGG/IGM EACH: CPT

## 2018-07-17 RX ORDER — ALBUTEROL SULFATE 2.5 MG/3ML
2.5 SOLUTION RESPIRATORY (INHALATION) EVERY 6 HOURS PRN
COMMUNITY
End: 2018-07-17 | Stop reason: SDUPTHER

## 2018-07-17 NOTE — ASSESSMENT & PLAN NOTE
· Currently stable on nebulized DuoNeb and albuterol rescue  · He has never really responded well to any other bronchodilator in agents and has tried Advair, Spiriva, Stiolto Respimat, and several other inhalers

## 2018-07-17 NOTE — LETTER
July 17, 2018     Vivi Dey DO  [address] [facility]  [address]  [address]    Patient: Madina Bermudez   YOB: 1935   Date of Visit: 7/17/2018       Dear Dr Hunter Mouse: Thank you for referring Jami Harrington to me for evaluation  Below are my notes for this consultation  If you have questions, please do not hesitate to call me  I look forward to following your patient along with you  Sincerely,        Virgil Leon MD        CC: MD Virgil Isidro MD  7/17/2018  4:19 PM  Sign at close encounter    Progress note - Pulmonary Medicine   Gina Gillis 81 y o  male MRN: [id number]       Impression & Plan:     COPD (chronic obstructive pulmonary disease) ([address] )  · Currently stable on nebulized DuoNeb and albuterol rescue  · He has never really responded well to any other bronchodilator in agents and has tried Advair, Spiriva, Stiolto Respimat, and several other inhalers    Adenocarcinoma, lung ([address] )  · Last CT scan shows chronic stable right apical consolidative change  · Recommend follow-up CT scan in January for routine lung cancer follow-up  · I will see him back in the office after his CT scan    Aggie Wise will call me with any new or worsening problems  Seem to be stable although he is still quite symptomatic  His shortness of breath is multifactorial     ______________________________________________________________________    HPI:    Madina Bermudez presents today for follow-up of COPD and adenocarcinoma of the lung  He has had respiratory infection in the past and remains symptomatic with cough and phlegm production  He has tried multiple bronchodilators in the past with minimal impact on his cough symptoms  This is also in part caused by postnasal drip which is somewhat chronic and has also not responded favorably to treatment  He has had multiple adjustments in his medications by his oncologist for his multiple myeloma    Dr Tamela Mathews has him back on Revlimid and melphalan  He has been getting cycles of steroids  This does seem to help him feel more energetic when he takes the steroids but he does crash and lose all of his energy a few days later  He has not noticed that it has much impact on his breathing however    He has seen Cardiology and has mild to moderate aortic stenosis  He is intended to undergo an echocardiogram in a few weeks to re-evaluate the valve  He has not had any evidence of congestive heart failure however  He has had some lower extremity swelling in the left leg which comes and goes and completely resolved at nighttime when he elevates his legs  In general he has felt weaker  He does not get as much exercise as he previously used to be able to tolerate  He recently had to use a wheelchair for a longer visit to this do with his family  He was enquiring about possibly obtaining a scooter to help with his mobility  Review of Systems:  Review of Systems   Constitutional: Negative for appetite change and unexpected weight change  HENT: Positive for postnasal drip  Negative for sinus pain, sinus pressure and voice change  Eyes: Negative  Respiratory: Positive for cough and shortness of breath  Cardiovascular: Positive for leg swelling  Negative for chest pain and palpitations  Gastrointestinal: Negative  Endocrine: Negative  Genitourinary: Negative  Musculoskeletal: Positive for arthralgias  Skin: Negative  Allergic/Immunologic: Negative  Neurological: Positive for weakness  Negative for syncope and light-headedness  Hematological: Negative  Psychiatric/Behavioral: Negative  All other systems reviewed and are negative          Past medical history, surgical history, and family history were reviewed and updated as appropriate    Social history updates:  History   Smoking Status    Former Smoker    Packs/day: 1 00    Years: 50 00    Types: Cigarettes    Quit date: 12/7/2010   Smokeless Tobacco    Never Used     Comment: quit in 1999       PhysicalExamination:  Vitals:   /60   Pulse 97   Temp 99 1 °F (37 3 °C)   Resp 14   Ht 5' 7" (1 702 m)   Wt 78 9 kg (174 lb)   SpO2 96%   BMI 27 25 kg/m²      Gen:   Appears somewhat fatigued  HEENT: PERRL  Oropharynx is clear, moist   Neck: Supple  There is no JVD, lymphadenopathy or thyromegaly  Trachea is midline  Chest:  Chest excursion symmetric  Lungs are hyperinflated  Breath sounds are distant bilaterally but otherwise clear  Hyper-resonant to percussion  Cardiac: Regular  Murmur of aortic stenosis  Abdomen: Soft and nontender  Benign  Extremities: No clubbing, cyanosis or edema  Neurologic: No focal deficits  Normal affect  Skin: No appreciable rashes  Diagnostic Data:  Labs: I personally reviewed the most recent laboratory data pertinent to today's visit    Lab Results   Component Value Date    WBC 3 20 (L) 07/11/2018    HGB 11 3 (L) 07/11/2018    HCT 35 2 (L) 07/11/2018    MCV 93 07/11/2018     07/11/2018     Lab Results   Component Value Date    GLUCOSE 96 06/27/2018    CALCIUM 8 8 06/27/2018     06/27/2018    K 3 6 06/27/2018    CO2 26 06/27/2018     06/27/2018    BUN 11 06/27/2018    CREATININE 1 10 06/27/2018     No results found for: IGE  Lab Results   Component Value Date    ALT 29 06/27/2018    AST 20 06/27/2018    ALKPHOS 76 06/27/2018    BILITOT 1 50 (H) 06/27/2018     Imaging:  I personally reviewed the images on the [facility] system pertinent to today's visit  Last CT scan of the chest was March 2018  He has chronic consolidation in the right upper lobe    This does not appear appreciably different over multiple CT scans of the chest       Angela Roach MD

## 2018-07-17 NOTE — PROGRESS NOTES
Progress note - Pulmonary Medicine   Bayron Rainey 81 y o  male MRN: [id number]       Impression & Plan:     COPD (chronic obstructive pulmonary disease) ([facility] )  · Currently stable on nebulized DuoNeb and albuterol rescue  · He has never really responded well to any other bronchodilator in agents and has tried Advair, Spiriva, Stiolto Respimat, and several other inhalers    Adenocarcinoma, lung ([facility] )  · Last CT scan shows chronic stable right apical consolidative change  · Recommend follow-up CT scan in January for routine lung cancer follow-up  · I will see him back in the office after his CT scan    Bg Brown will call me with any new or worsening problems  Seem to be stable although he is still quite symptomatic  His shortness of breath is multifactorial     ______________________________________________________________________    HPI:    Bayron Rainey presents today for follow-up of COPD and adenocarcinoma of the lung  He has had respiratory infection in the past and remains symptomatic with cough and phlegm production  He has tried multiple bronchodilators in the past with minimal impact on his cough symptoms  This is also in part caused by postnasal drip which is somewhat chronic and has also not responded favorably to treatment  He has had multiple adjustments in his medications by his oncologist for his multiple myeloma  Dr Perez Gist has him back on Revlimid and melphalan  He has been getting cycles of steroids  This does seem to help him feel more energetic when he takes the steroids but he does crash and lose all of his energy a few days later  He has not noticed that it has much impact on his breathing however    He has seen Cardiology and has mild to moderate aortic stenosis  He is intended to undergo an echocardiogram in a few weeks to re-evaluate the valve  He has not had any evidence of congestive heart failure however    He has had some lower extremity swelling in the left leg which comes and goes and completely resolved at nighttime when he elevates his legs  In general he has felt weaker  He does not get as much exercise as he previously used to be able to tolerate  He recently had to use a wheelchair for a longer visit to this do with his family  He was enquiring about possibly obtaining a scooter to help with his mobility  Review of Systems:  Review of Systems   Constitutional: Negative for appetite change and unexpected weight change  HENT: Positive for postnasal drip  Negative for sinus pain, sinus pressure and voice change  Eyes: Negative  Respiratory: Positive for cough and shortness of breath  Cardiovascular: Positive for leg swelling  Negative for chest pain and palpitations  Gastrointestinal: Negative  Endocrine: Negative  Genitourinary: Negative  Musculoskeletal: Positive for arthralgias  Skin: Negative  Allergic/Immunologic: Negative  Neurological: Positive for weakness  Negative for syncope and light-headedness  Hematological: Negative  Psychiatric/Behavioral: Negative  All other systems reviewed and are negative  Past medical history, surgical history, and family history were reviewed and updated as appropriate    Social history updates:  History   Smoking Status    Former Smoker    Packs/day: 1 00    Years: 50 00    Types: Cigarettes    Quit date: 12/7/2010   Smokeless Tobacco    Never Used     Comment: quit in 1999       PhysicalExamination:  Vitals:   /60   Pulse 97   Temp 99 1 °F (37 3 °C)   Resp 14   Ht 5' 7" (1 702 m)   Wt 78 9 kg (174 lb)   SpO2 96%   BMI 27 25 kg/m²     Gen:   Appears somewhat fatigued  HEENT: PERRL  Oropharynx is clear, moist   Neck: Supple  There is no JVD, lymphadenopathy or thyromegaly  Trachea is midline  Chest:  Chest excursion symmetric  Lungs are hyperinflated  Breath sounds are distant bilaterally but otherwise clear  Hyper-resonant to percussion  Cardiac: Regular    Murmur of aortic stenosis  Abdomen: Soft and nontender  Benign  Extremities: No clubbing, cyanosis or edema  Neurologic: No focal deficits  Normal affect  Skin: No appreciable rashes  Diagnostic Data:  Labs: I personally reviewed the most recent laboratory data pertinent to today's visit    Lab Results   Component Value Date    WBC 3 20 (L) 07/11/2018    HGB 11 3 (L) 07/11/2018    HCT 35 2 (L) 07/11/2018    MCV 93 07/11/2018     07/11/2018     Lab Results   Component Value Date    GLUCOSE 96 06/27/2018    CALCIUM 8 8 06/27/2018     06/27/2018    K 3 6 06/27/2018    CO2 26 06/27/2018     06/27/2018    BUN 11 06/27/2018    CREATININE 1 10 06/27/2018     No results found for: IGE  Lab Results   Component Value Date    ALT 29 06/27/2018    AST 20 06/27/2018    ALKPHOS 76 06/27/2018    BILITOT 1 50 (H) 06/27/2018     Imaging:  I personally reviewed the images on the [facility] system pertinent to today's visit  Last CT scan of the chest was March 2018  He has chronic consolidation in the right upper lobe    This does not appear appreciably different over multiple CT scans of the chest       Aniya Segundo MD

## 2018-07-17 NOTE — ASSESSMENT & PLAN NOTE
· Last CT scan shows chronic stable right apical consolidative change  · Recommend follow-up CT scan in January for routine lung cancer follow-up  · I will see him back in the office after his CT scan

## 2018-07-17 NOTE — LETTER
July 17, 2018     Roger Weiss DO  [address] [facility]  [address]  [address]    Patient: Sol Awan   YOB: 1935   Date of Visit: 7/17/2018       Dear Dr Drew Marcelo: Thank you for referring Meena Allen to me for evaluation  Below are my notes for this consultation  If you have questions, please do not hesitate to call me  I look forward to following your patient along with you           Sincerely,        Nely Forte MD        CC: No Recipients

## 2018-07-18 ENCOUNTER — HOSPITAL ENCOUNTER (OUTPATIENT)
Dept: INFUSION CENTER | Facility: CLINIC | Age: 83
Discharge: HOME | End: 2018-07-18
Payer: MEDICARE

## 2018-07-18 VITALS
RESPIRATION RATE: 16 BRPM | HEART RATE: 83 BPM | DIASTOLIC BLOOD PRESSURE: 65 MMHG | TEMPERATURE: 97.6 F | SYSTOLIC BLOOD PRESSURE: 110 MMHG

## 2018-07-18 LAB
ANISOCYTOSIS BLD QL SMEAR: PRESENT
BASOPHILS # BLD AUTO: 0 THOUSAND/UL (ref 0–0.1)
BASOPHILS NFR MAR MANUAL: 0 % (ref 0–1)
EOSINOPHIL # BLD AUTO: 0.04 THOUSAND/UL (ref 0–0.61)
EOSINOPHIL NFR BLD MANUAL: 1 % (ref 0–6)
ERYTHROCYTE [DISTWIDTH] IN BLOOD BY AUTOMATED COUNT: 20.4 % (ref 11.6–15.1)
HCT VFR BLD AUTO: 36.2 % (ref 36.5–49.3)
HGB BLD-MCNC: 11.9 G/DL (ref 12–17)
KAPPA LC FREE SER-MCNC: 1.3 MG/L (ref 3.3–19.4)
KAPPA LC FREE/LAMBDA FREE SER: 0 {RATIO} (ref 0.26–1.65)
LAMBDA LC FREE SERPL-MCNC: 546.9 MG/L (ref 5.7–26.3)
LYMPHOCYTES # BLD AUTO: 1.54 THOUSAND/UL (ref 0.6–4.47)
LYMPHOCYTES # BLD AUTO: 35 % (ref 14–44)
MCH RBC QN AUTO: 31.2 PG (ref 26.8–34.3)
MCHC RBC AUTO-ENTMCNC: 32.7 G/DL (ref 31.4–37.4)
MCV RBC AUTO: 95 FL (ref 82–98)
MONOCYTES # BLD AUTO: 0.31 THOUSAND/UL (ref 0–1.22)
MONOCYTES NFR BLD AUTO: 7 % (ref 4–12)
MYELOCYTES NFR BLD MANUAL: 2 % (ref 0–1)
NEUTS BAND NFR BLD MANUAL: 0 % (ref 0–8)
NEUTS SEG # BLD: 2.42 THOUSAND/UL (ref 1.81–6.82)
NEUTS SEG NFR BLD AUTO: 55 % (ref 43–75)
OVALOCYTES BLD QL SMEAR: PRESENT
PLATELET # BLD AUTO: 200 THOUSANDS/UL (ref 149–390)
PLATELET BLD QL SMEAR: ADEQUATE
PMV BLD AUTO: 6.4 FL (ref 8.9–12.7)
RBC # BLD AUTO: 3.8 MILLION/UL (ref 3.88–5.62)
TOTAL CELLS COUNTED SPEC: 100
WBC # BLD AUTO: 4.4 THOUSAND/UL (ref 4.31–10.16)
WBC NRBC COR # BLD: 4.4 THOUSAND/UL (ref 4.31–10.16)

## 2018-07-18 PROCEDURE — 85027 COMPLETE CBC AUTOMATED: CPT | Performed by: INTERNAL MEDICINE

## 2018-07-18 PROCEDURE — 96365 THER/PROPH/DIAG IV INF INIT: CPT

## 2018-07-18 PROCEDURE — 85007 BL SMEAR W/DIFF WBC COUNT: CPT | Performed by: INTERNAL MEDICINE

## 2018-07-18 PROCEDURE — 96366 THER/PROPH/DIAG IV INF ADDON: CPT

## 2018-07-18 RX ADMIN — SODIUM CHLORIDE 1000 ML: 0.9 INJECTION, SOLUTION INTRAVENOUS at 10:20

## 2018-07-18 RX ADMIN — Medication 25 G: at 10:25

## 2018-07-18 RX ADMIN — Medication 300 UNITS: at 12:50

## 2018-07-18 NOTE — PROGRESS NOTES
Patient tolerated IV fluids and IVIG infusion well  Denies any discomfort  Pt and wife are aware of all future appointments

## 2018-07-18 NOTE — PLAN OF CARE
Problem: Potential for Falls  Goal: Patient will remain free of falls  INTERVENTIONS:  - Assess patient frequently for physical needs  -  Identify cognitive and physical deficits and behaviors that affect risk of falls    -  [place] fall precautions as indicated by assessment   - Educate patient/family on patient safety including physical limitations  - Instruct patient to call for assistance with activity based on assessment  - Modify environment to reduce risk of injury  - Consider OT/PT consult to assist with strengthening/mobility   Outcome: Progressing

## 2018-07-19 ENCOUNTER — TELEPHONE (OUTPATIENT)
Dept: HEMATOLOGY ONCOLOGY | Facility: CLINIC | Age: 83
End: 2018-07-19

## 2018-07-19 NOTE — TELEPHONE ENCOUNTER
Cisco Galarza calls because they spoke with Diplomat  Diplomat asked her to call us to be sure we send out Lv's Revlimid

## 2018-07-20 ENCOUNTER — TELEPHONE (OUTPATIENT)
Dept: HEMATOLOGY ONCOLOGY | Facility: CLINIC | Age: 83
End: 2018-07-20

## 2018-07-20 ENCOUNTER — HOSPITAL ENCOUNTER (OUTPATIENT)
Dept: NON INVASIVE DIAGNOSTICS | Facility: CLINIC | Age: 83
Discharge: HOME | End: 2018-07-20
Payer: MEDICARE

## 2018-07-20 ENCOUNTER — OFFICE VISIT (OUTPATIENT)
Dept: HEMATOLOGY ONCOLOGY | Facility: CLINIC | Age: 83
End: 2018-07-20
Payer: MEDICARE

## 2018-07-20 VITALS
SYSTOLIC BLOOD PRESSURE: 98 MMHG | WEIGHT: 174.6 LBS | RESPIRATION RATE: 16 BRPM | HEIGHT: 67 IN | HEART RATE: 68 BPM | TEMPERATURE: 97.8 F | BODY MASS INDEX: 27.4 KG/M2 | OXYGEN SATURATION: 94 % | DIASTOLIC BLOOD PRESSURE: 60 MMHG

## 2018-07-20 DIAGNOSIS — C90.00 MULTIPLE MYELOMA NOT HAVING ACHIEVED REMISSION (HCC): ICD-10-CM

## 2018-07-20 DIAGNOSIS — C90.00 MULTIPLE MYELOMA, REMISSION STATUS UNSPECIFIED (HCC): ICD-10-CM

## 2018-07-20 DIAGNOSIS — D80.9 IMMUNOGLOBULIN DEFICIENCY (HCC): ICD-10-CM

## 2018-07-20 DIAGNOSIS — A04.72 C. DIFFICILE DIARRHEA: ICD-10-CM

## 2018-07-20 DIAGNOSIS — C90.00 MULTIPLE MYELOMA NOT HAVING ACHIEVED REMISSION (HCC): Primary | ICD-10-CM

## 2018-07-20 DIAGNOSIS — R60.9 EDEMA, UNSPECIFIED TYPE: ICD-10-CM

## 2018-07-20 PROCEDURE — 93306 TTE W/DOPPLER COMPLETE: CPT

## 2018-07-20 PROCEDURE — 93306 TTE W/DOPPLER COMPLETE: CPT | Performed by: INTERNAL MEDICINE

## 2018-07-20 PROCEDURE — 99214 OFFICE O/P EST MOD 30 MIN: CPT | Performed by: INTERNAL MEDICINE

## 2018-07-20 RX ORDER — LENALIDOMIDE 5 MG/1
5 CAPSULE ORAL DAILY
Qty: 21 EACH | Refills: 0 | Status: SHIPPED | OUTPATIENT
Start: 2018-07-20 | End: 2018-11-15 | Stop reason: SDUPTHER

## 2018-07-20 RX ORDER — PREDNISONE 50 MG/1
TABLET ORAL
Qty: 8 TABLET | Refills: 4 | Status: SHIPPED | OUTPATIENT
Start: 2018-07-20 | End: 2018-12-24 | Stop reason: HOSPADM

## 2018-07-20 NOTE — TELEPHONE ENCOUNTER
Spoke with pt and wife Guido Mackey who had questions about Melphalan dose and reviewed directions  Also was confused that Decadron and dexamethasone being the same medication  Verbalized understanding

## 2018-07-20 NOTE — LETTER
July 20, 2018     Vivicaio DeyDO  [address] [facility]  [address]  [address]    Patient: Madina Bermudez   YOB: 1935   Date of Visit: 7/20/2018       Dear Dr Hunter Mouse: Thank you for referring Jami Harrington to me for evaluation  Below are my notes for this consultation  If you have questions, please do not hesitate to call me  I look forward to following your patient along with you  Sincerely,        Dee Prado MD        CC: DO Virgil Cooley MD Zona Baltimore, MD  7/20/2018 12:51 PM  Sign at close encounter  Hematology Outpatient Follow - Up Note  Madina Bermudez 81 y o  male MRN: @ Encounter: [id number]        Date:  7/20/2018        Assessment/ Plan:   Heavily pretreated lambda light chain multiple myeloma stage III, currently on melphalan 12 mg flat dose 4 days every month, lenalidomide 10 mg p o  Daily 3 weeks on 1 week off, prednisone 50 mg p o  Daily 4 days a month initiated in June 2018 he has decrease in lambda light chain to 729 however complicated with neutropenia, C diff colitis  We postpone cycle 2  To every 6 6 weeks he has good recovery of CBC  1  We will continue cycle 3  Every 6 weeks however lenalidomide dose will be reduced to 5 mg 3 weeks on 3 weeks of, melphalan 8 mg flat dose for 4 days along with prednisone 50 mg p o  Daily for 4 days the cycle therapy will be every 6 weeks   2  Recheck C diff colitis by PCR, if he still have persistent C diff colitis, I will treat with longer period of metronidazole or p o  Vancomycin and hold on chemotherapy  3  IVIG for hypogammaglobulinemia 25 g every 6 weeks   4  Aranesp 200 mcg every 2 weeks if hemoglobin below 9 9  5  Decadron 20 mg PO weekly except when he is on prednisone and melphalan   6   COPD, congestive heart failure, pulmonary hypertension followed by Pulmonary and Cardiology          HPI:  #1 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, multiple compression fractures, status post kyphoplasty to L1, L2, L5, treated with radiation therapy to the left intra trochanteric area, received melphalan/Velcade/prednisone in October 2010 for 4 cycles with excellent response and normalization of lambda light chain, had been on Velcade maintenance till January 2013 when lambda light chain went up to 111,treated with Velcade 1 3 mg meter square weekly 3 weeks on one week off, Decadron 20 mg by mouth weekly, lambda light chain down to 30 then creeping up to 110, on Revlimid 15 mg by mouth every other day  Lambda light chain normal at 28 however he reported skin rash on the upper chest area we stopped the Revlimid for 2 month  Then reinitiated Revlimid 5 mg by mouth daily  #2  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 stage I, with progression by scan, status post resection of the right upper lobe with few foci in the same lobe consistent with stage IIIA done in January 2015 adenocarcinoma, well differentiated  Status post Alimta/carboplatin x4 cycles finished in May 2015 and radiation therapy  #3  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  #4  Admitted to [facility] 3/3/16 - 3/8/16  Dx with RSV  #5  admission on November 2017 with rectal adenocarcinoma moderately differentiated status post resection stage I ( pT2, pNX, grade 2) tumor measuring 4 x 3 6 centimeters below the peritoneal reflection low probability of [facility]  He was initiated on melphalan, prednisone, lenalidomide in June 2018 with IVIG 25 g every month for hypogammaglobulinemia, lambda light chain came down however the therapy was complicated with C diff colitis requiring metronidazole and holding on the therapy to temperature             Test Results:    Imaging: No results found      Labs:   Lab Results   Component Value Date    WBC 4 40 07/18/2018    HGB 11 9 (L) 07/18/2018    HCT 36 2 (L) 07/18/2018    MCV 95 07/18/2018     07/18/2018     Lab Results   Component Value Date     07/17/2018    K 4 0 07/17/2018     07/17/2018    CO2 25 07/17/2018    ANIONGAP 5 07/17/2018    BUN 12 07/17/2018    CREATININE 1 03 07/17/2018    GLUCOSE 96 06/27/2018    GLUF 88 07/17/2018    CALCIUM 8 7 07/17/2018    AST 14 07/17/2018    ALT 26 07/17/2018    ALKPHOS 63 07/17/2018    PROT 5 8 (L) 07/17/2018    BILITOT 0 64 07/17/2018    EGFR 68 07/17/2018       No results found for: IRON, TIBC, FERRITIN    No results found for: [id number]      ROS:   Review of Systems   Constitutional: Negative for activity change, appetite change, chills, diaphoresis, fatigue, fever and unexpected weight change  HENT: Negative for congestion, dental problem, facial swelling, hearing loss, mouth sores, nosebleeds, postnasal drip, rhinorrhea, sore throat, trouble swallowing and voice change  Eyes: Negative for photophobia, pain, discharge, redness, itching and visual disturbance  Respiratory: Positive for cough and shortness of breath  Negative for choking, chest tightness and wheezing  Cardiovascular: Positive for leg swelling  Negative for chest pain and palpitations  Gastrointestinal: Negative for abdominal distention, abdominal pain, anal bleeding, blood in stool, constipation, diarrhea, nausea, rectal pain and vomiting  Endocrine: Negative for cold intolerance and heat intolerance  Genitourinary: Negative for decreased urine volume, difficulty urinating, dysuria, flank pain, frequency, hematuria and urgency  Musculoskeletal: Negative for arthralgias, back pain, gait problem, joint swelling, myalgias, neck pain and neck stiffness  Skin: Negative for color change, pallor, rash and wound  Allergic/Immunologic: Negative for immunocompromised state  Neurological: Positive for weakness  Negative for dizziness, tremors, seizures, syncope, facial asymmetry, speech difficulty, light-headedness, numbness and headaches  Hematological: Negative for adenopathy  Does not bruise/bleed easily  Psychiatric/Behavioral: Negative for agitation, confusion, decreased concentration, dysphoric mood and sleep disturbance  The patient is not nervous/anxious  All other systems reviewed and are negative  Current Medications: Reviewed  Allergies: Reviewed  PMH/FH/SH:  Reviewed      Physical Exam:    Body surface area is 1 91 meters squared  Wt Readings from Last 3 Encounters:   07/20/18 79 2 kg (174 lb 9 6 oz)   07/17/18 78 9 kg (174 lb)   06/28/18 81 2 kg (179 lb)        Temp Readings from Last 3 Encounters:   07/20/18 97 8 °F (36 6 °C) (Tympanic)   07/18/18 97 6 °F (36 4 °C) (Tympanic)   07/17/18 99 1 °F (37 3 °C)        BP Readings from Last 3 Encounters:   07/20/18 98/60   07/18/18 110/65   07/17/18 112/60         Pulse Readings from Last 3 Encounters:   07/20/18 68   07/18/18 83   07/17/18 97        Physical Exam   Constitutional: He is oriented to person, place, and time  He appears well-developed and well-nourished  No distress  HENT:   Head: Normocephalic and atraumatic  Mouth/Throat: Oropharynx is clear and moist  No oropharyngeal exudate  Eyes: Conjunctivae and EOM are normal  Pupils are equal, round, and reactive to light  Neck: Normal range of motion  Neck supple  No JVD present  No tracheal deviation present  No thyromegaly present  Cardiovascular: Normal rate and regular rhythm  Exam reveals no gallop and no friction rub  No murmur heard  Pulmonary/Chest: Effort normal  No respiratory distress  He has wheezes  He has no rales  He exhibits no tenderness  Abdominal: Soft  Bowel sounds are normal  He exhibits no distension and no mass  There is no tenderness  There is no rebound and no guarding  Musculoskeletal: Normal range of motion  He exhibits edema  Lymphadenopathy:     He has no cervical adenopathy  Neurological: He is alert and oriented to person, place, and time  Skin: Skin is warm and dry  No rash noted  He is not diaphoretic  No erythema  No pallor  Psychiatric: He has a normal mood and affect  His behavior is normal  Judgment and thought content normal    Vitals reviewed  Goals and Barriers:  Current Goal: Minimize effects of disease  Barriers: None  Patient's Capacity to Self Care:  Patient is able to self care      Code Status: [unfilled]

## 2018-07-20 NOTE — PROGRESS NOTES
Hematology Outpatient Follow - Up Note  Rickie Mcfadden 81 y o  male MRN: @ Encounter: [id number]        Date:  7/20/2018        Assessment/ Plan:   Heavily pretreated lambda light chain multiple myeloma stage III, currently on melphalan 12 mg flat dose 4 days every month, lenalidomide 10 mg p o  Daily 3 weeks on 1 week off, prednisone 50 mg p o  Daily 4 days a month initiated in June 2018 he has decrease in lambda light chain to 653 however complicated with neutropenia, C diff colitis  We postpone cycle 2  To every 6 6 weeks he has good recovery of CBC  1  We will continue cycle 3  Every 6 weeks however lenalidomide dose will be reduced to 5 mg 3 weeks on 3 weeks of, melphalan 8 mg flat dose for 4 days along with prednisone 50 mg p o  Daily for 4 days the cycle therapy will be every 6 weeks   2  Recheck C diff colitis by PCR, if he still have persistent C diff colitis, I will treat with longer period of metronidazole or p o  Vancomycin and hold on chemotherapy  3  IVIG for hypogammaglobulinemia 25 g every 6 weeks   4  Aranesp 200 mcg every 2 weeks if hemoglobin below 9 9  5  Decadron 20 mg PO weekly except when he is on prednisone and melphalan   6  COPD, congestive heart failure, pulmonary hypertension followed by Pulmonary and Cardiology          HPI:  #1 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, multiple compression fractures, status post kyphoplasty to L1, L2, L5, treated with radiation therapy to the left intra trochanteric area, received melphalan/Velcade/prednisone in October 2010 for 4 cycles with excellent response and normalization of lambda light chain, had been on Velcade maintenance till January 2013 when lambda light chain went up to 111,treated with Velcade 1 3 mg meter square weekly 3 weeks on one week off, Decadron 20 mg by mouth weekly, lambda light chain down to 30 then creeping up to 110, on Revlimid 15 mg by mouth every other day   Lambda light chain normal at 28 however he reported skin rash on the upper chest area we stopped the Revlimid for 2 month  Then reinitiated Revlimid 5 mg by mouth daily  #2  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 stage I, with progression by scan, status post resection of the right upper lobe with few foci in the same lobe consistent with stage IIIA done in January 2015 adenocarcinoma, well differentiated  Status post Alimta/carboplatin x4 cycles finished in May 2015 and radiation therapy  #3  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  #4  Admitted to [address] 3/3/16 - 3/8/16  Dx with RSV  #5  admission on November 2017 with rectal adenocarcinoma moderately differentiated status post resection stage I ( pT2, pNX, grade 2) tumor measuring 4 x 3 6 centimeters below the peritoneal reflection low probability of [facility]  He was initiated on melphalan, prednisone, lenalidomide in June 2018 with IVIG 25 g every month for hypogammaglobulinemia, lambda light chain came down however the therapy was complicated with C diff colitis requiring metronidazole and holding on the therapy to temperature             Test Results:    Imaging: No results found      Labs:   Lab Results   Component Value Date    WBC 4 40 07/18/2018    HGB 11 9 (L) 07/18/2018    HCT 36 2 (L) 07/18/2018    MCV 95 07/18/2018     07/18/2018     Lab Results   Component Value Date     07/17/2018    K 4 0 07/17/2018     07/17/2018    CO2 25 07/17/2018    ANIONGAP 5 07/17/2018    BUN 12 07/17/2018    CREATININE 1 03 07/17/2018    GLUCOSE 96 06/27/2018    GLUF 88 07/17/2018    CALCIUM 8 7 07/17/2018    AST 14 07/17/2018    ALT 26 07/17/2018    ALKPHOS 63 07/17/2018    PROT 5 8 (L) 07/17/2018    BILITOT 0 64 07/17/2018    EGFR 68 07/17/2018       No results found for: IRON, TIBC, FERRITIN    No results found for: [id number]      ROS:   Review of Systems   Constitutional: Negative for activity change, appetite change, chills, diaphoresis, fatigue, fever and unexpected weight change  HENT: Negative for congestion, dental problem, facial swelling, hearing loss, mouth sores, nosebleeds, postnasal drip, rhinorrhea, sore throat, trouble swallowing and voice change  Eyes: Negative for photophobia, pain, discharge, redness, itching and visual disturbance  Respiratory: Positive for cough and shortness of breath  Negative for choking, chest tightness and wheezing  Cardiovascular: Positive for leg swelling  Negative for chest pain and palpitations  Gastrointestinal: Negative for abdominal distention, abdominal pain, anal bleeding, blood in stool, constipation, diarrhea, nausea, rectal pain and vomiting  Endocrine: Negative for cold intolerance and heat intolerance  Genitourinary: Negative for decreased urine volume, difficulty urinating, dysuria, flank pain, frequency, hematuria and urgency  Musculoskeletal: Negative for arthralgias, back pain, gait problem, joint swelling, myalgias, neck pain and neck stiffness  Skin: Negative for color change, pallor, rash and wound  Allergic/Immunologic: Negative for immunocompromised state  Neurological: Positive for weakness  Negative for dizziness, tremors, seizures, syncope, facial asymmetry, speech difficulty, light-headedness, numbness and headaches  Hematological: Negative for adenopathy  Does not bruise/bleed easily  Psychiatric/Behavioral: Negative for agitation, confusion, decreased concentration, dysphoric mood and sleep disturbance  The patient is not nervous/anxious  All other systems reviewed and are negative  Current Medications: Reviewed  Allergies: Reviewed  PMH/FH/SH:  Reviewed      Physical Exam:    Body surface area is 1 91 meters squared      Wt Readings from Last 3 Encounters:   07/20/18 79 2 kg (174 lb 9 6 oz)   07/17/18 78 9 kg (174 lb)   06/28/18 81 2 kg (179 lb)        Temp Readings from Last 3 Encounters:   07/20/18 97 8 °F (36 6 °C) (Tympanic)   07/18/18 97 6 °F (36 4 °C) (Tympanic)   07/17/18 99 1 °F (37 3 °C)        BP Readings from Last 3 Encounters:   07/20/18 98/60   07/18/18 110/65   07/17/18 112/60         Pulse Readings from Last 3 Encounters:   07/20/18 68   07/18/18 83   07/17/18 97        Physical Exam   Constitutional: He is oriented to person, place, and time  He appears well-developed and well-nourished  No distress  HENT:   Head: Normocephalic and atraumatic  Mouth/Throat: Oropharynx is clear and moist  No oropharyngeal exudate  Eyes: Conjunctivae and EOM are normal  Pupils are equal, round, and reactive to light  Neck: Normal range of motion  Neck supple  No JVD present  No tracheal deviation present  No thyromegaly present  Cardiovascular: Normal rate and regular rhythm  Exam reveals no gallop and no friction rub  No murmur heard  Pulmonary/Chest: Effort normal  No respiratory distress  He has wheezes  He has no rales  He exhibits no tenderness  Abdominal: Soft  Bowel sounds are normal  He exhibits no distension and no mass  There is no tenderness  There is no rebound and no guarding  Musculoskeletal: Normal range of motion  He exhibits edema  Lymphadenopathy:     He has no cervical adenopathy  Neurological: He is alert and oriented to person, place, and time  Skin: Skin is warm and dry  No rash noted  He is not diaphoretic  No erythema  No pallor  Psychiatric: He has a normal mood and affect  His behavior is normal  Judgment and thought content normal    Vitals reviewed  Goals and Barriers:  Current Goal: Minimize effects of disease  Barriers: None  Patient's Capacity to Self Care:  Patient is able to self care      [facility]: [facility]

## 2018-07-23 ENCOUNTER — TELEPHONE (OUTPATIENT)
Dept: HEMATOLOGY ONCOLOGY | Facility: CLINIC | Age: 83
End: 2018-07-23

## 2018-07-23 NOTE — TELEPHONE ENCOUNTER
Melphalan 2 mg is not arriving for a few days and wondering if she should hold off on starting the Revlimid  Can you please call pt's wife

## 2018-07-24 ENCOUNTER — HOSPITAL ENCOUNTER (OUTPATIENT)
Dept: INFUSION CENTER | Facility: CLINIC | Age: 83
Discharge: HOME | End: 2018-07-24
Payer: MEDICARE

## 2018-07-24 VITALS
DIASTOLIC BLOOD PRESSURE: 71 MMHG | SYSTOLIC BLOOD PRESSURE: 105 MMHG | HEART RATE: 96 BPM | TEMPERATURE: 97.9 F | RESPIRATION RATE: 18 BRPM

## 2018-07-24 PROCEDURE — 96360 HYDRATION IV INFUSION INIT: CPT

## 2018-07-24 PROCEDURE — 96361 HYDRATE IV INFUSION ADD-ON: CPT

## 2018-07-24 RX ADMIN — Medication 300 UNITS: at 13:15

## 2018-07-24 RX ADMIN — SODIUM CHLORIDE 1000 ML: 0.9 INJECTION, SOLUTION INTRAVENOUS at 11:15

## 2018-07-24 NOTE — PLAN OF CARE
Problem: Potential for Falls  Goal: Patient will remain free of falls  INTERVENTIONS:  - Assess patient frequently for physical needs  -  Identify cognitive and physical deficits and behaviors that affect risk of falls    -  [place] fall precautions as indicated by assessment   - Educate patient/family on patient safety including physical limitations  - Instruct patient to call for assistance with activity based on assessment  - Modify environment to reduce risk of injury  - Consider OT/PT consult to assist with strengthening/mobility   Outcome: Progressing

## 2018-07-25 ENCOUNTER — OFFICE VISIT (OUTPATIENT)
Dept: CARDIOLOGY CLINIC | Facility: CLINIC | Age: 83
End: 2018-07-25
Payer: MEDICARE

## 2018-07-25 VITALS
BODY MASS INDEX: 27.4 KG/M2 | RESPIRATION RATE: 16 BRPM | HEIGHT: 67 IN | DIASTOLIC BLOOD PRESSURE: 60 MMHG | HEART RATE: 92 BPM | WEIGHT: 174.6 LBS | SYSTOLIC BLOOD PRESSURE: 92 MMHG

## 2018-07-25 DIAGNOSIS — R60.9 EDEMA, UNSPECIFIED TYPE: Primary | ICD-10-CM

## 2018-07-25 DIAGNOSIS — I35.0 CALCIFIC AORTIC STENOSIS: ICD-10-CM

## 2018-07-25 PROCEDURE — 99214 OFFICE O/P EST MOD 30 MIN: CPT | Performed by: INTERNAL MEDICINE

## 2018-07-27 ENCOUNTER — APPOINTMENT (OUTPATIENT)
Dept: LAB | Facility: HOSPITAL | Age: 83
End: 2018-07-27
Attending: INTERNAL MEDICINE

## 2018-07-27 DIAGNOSIS — A04.72 C. DIFFICILE DIARRHEA: ICD-10-CM

## 2018-07-27 DIAGNOSIS — D80.9 IMMUNOGLOBULIN DEFICIENCY (HCC): ICD-10-CM

## 2018-07-27 DIAGNOSIS — C90.00 MULTIPLE MYELOMA NOT HAVING ACHIEVED REMISSION (HCC): ICD-10-CM

## 2018-08-01 ENCOUNTER — HOSPITAL ENCOUNTER (OUTPATIENT)
Dept: INFUSION CENTER | Facility: CLINIC | Age: 83
Discharge: HOME | End: 2018-08-01
Payer: MEDICARE

## 2018-08-01 VITALS
HEART RATE: 96 BPM | SYSTOLIC BLOOD PRESSURE: 88 MMHG | RESPIRATION RATE: 16 BRPM | DIASTOLIC BLOOD PRESSURE: 64 MMHG | TEMPERATURE: 98.1 F

## 2018-08-01 PROCEDURE — 96361 HYDRATE IV INFUSION ADD-ON: CPT

## 2018-08-01 PROCEDURE — 96360 HYDRATION IV INFUSION INIT: CPT

## 2018-08-01 RX ADMIN — SODIUM CHLORIDE 1000 ML: 0.9 INJECTION, SOLUTION INTRAVENOUS at 11:00

## 2018-08-01 RX ADMIN — Medication 300 UNITS: at 13:00

## 2018-08-01 NOTE — PLAN OF CARE
Problem: Potential for Falls  Goal: Patient will remain free of falls  INTERVENTIONS:  - Assess patient frequently for physical needs  -  Identify cognitive and physical deficits and behaviors that affect risk of falls    -  [place] fall precautions as indicated by assessment   - Educate patient/family on patient safety including physical limitations  - Instruct patient to call for assistance with activity based on assessment  - Modify environment to reduce risk of injury  - Consider OT/PT consult to assist with strengthening/mobility   Outcome: Progressing

## 2018-08-15 ENCOUNTER — HOSPITAL ENCOUNTER (OUTPATIENT)
Dept: INFUSION CENTER | Facility: CLINIC | Age: 83
Discharge: HOME | End: 2018-08-15
Payer: MEDICARE

## 2018-08-15 VITALS
RESPIRATION RATE: 16 BRPM | HEART RATE: 92 BPM | TEMPERATURE: 98.6 F | SYSTOLIC BLOOD PRESSURE: 97 MMHG | DIASTOLIC BLOOD PRESSURE: 67 MMHG

## 2018-08-15 PROCEDURE — 96360 HYDRATION IV INFUSION INIT: CPT

## 2018-08-15 PROCEDURE — 96361 HYDRATE IV INFUSION ADD-ON: CPT

## 2018-08-15 RX ADMIN — Medication 300 UNITS: at 13:05

## 2018-08-15 RX ADMIN — SODIUM CHLORIDE 1000 ML: 0.9 INJECTION, SOLUTION INTRAVENOUS at 11:05

## 2018-08-15 NOTE — PLAN OF CARE
Problem: Potential for Falls  Goal: Patient will remain free of falls  INTERVENTIONS:  - Assess patient frequently for physical needs  -  Identify cognitive and physical deficits and behaviors that affect risk of falls    -  [place] fall precautions as indicated by assessment   - Educate patient/family on patient safety including physical limitations  - Instruct patient to call for assistance with activity based on assessment  - Modify environment to reduce risk of injury  - Consider OT/PT consult to assist with strengthening/mobility   Outcome: Progressing

## 2018-08-17 ENCOUNTER — TELEPHONE (OUTPATIENT)
Dept: HEMATOLOGY ONCOLOGY | Facility: CLINIC | Age: 83
End: 2018-08-17

## 2018-08-17 NOTE — TELEPHONE ENCOUNTER
Pharmacy calling for Revlimid script to be faxed [phone] per [facility]

## 2018-08-27 ENCOUNTER — TRANSCRIBE ORDERS (OUTPATIENT)
Dept: ADMINISTRATIVE | Facility: HOSPITAL | Age: 83
End: 2018-08-27

## 2018-08-27 ENCOUNTER — APPOINTMENT (OUTPATIENT)
Dept: LAB | Facility: MEDICAL CENTER | Age: 83
End: 2018-08-27
Payer: MEDICARE

## 2018-08-27 DIAGNOSIS — C20 MALIGNANT NEOPLASM OF RECTUM (HCC): Primary | ICD-10-CM

## 2018-08-27 DIAGNOSIS — C90.00 MULTIPLE MYELOMA NOT HAVING ACHIEVED REMISSION (HCC): ICD-10-CM

## 2018-08-27 DIAGNOSIS — C20 MALIGNANT NEOPLASM OF RECTUM (HCC): ICD-10-CM

## 2018-08-27 DIAGNOSIS — A04.72 C. DIFFICILE DIARRHEA: ICD-10-CM

## 2018-08-27 DIAGNOSIS — D80.9 IMMUNOGLOBULIN DEFICIENCY (HCC): ICD-10-CM

## 2018-08-27 LAB
ALBUMIN SERPL BCP-MCNC: 3.2 G/DL (ref 3.5–5)
ALP SERPL-CCNC: 75 U/L (ref 46–116)
ALT SERPL W P-5'-P-CCNC: 32 U/L (ref 12–78)
ANION GAP SERPL CALCULATED.3IONS-SCNC: 8 MMOL/L (ref 4–13)
AST SERPL W P-5'-P-CCNC: 20 U/L (ref 5–45)
BASOPHILS # BLD AUTO: 0.04 THOUSANDS/ΜL (ref 0–0.1)
BASOPHILS NFR BLD AUTO: 1 % (ref 0–1)
BILIRUB SERPL-MCNC: 0.76 MG/DL (ref 0.2–1)
BUN SERPL-MCNC: 19 MG/DL (ref 5–25)
CALCIUM SERPL-MCNC: 9.3 MG/DL (ref 8.3–10.1)
CEA SERPL-MCNC: 3.9 NG/ML (ref 0–3)
CHLORIDE SERPL-SCNC: 107 MMOL/L (ref 100–108)
CO2 SERPL-SCNC: 27 MMOL/L (ref 21–32)
CREAT SERPL-MCNC: 1.03 MG/DL (ref 0.6–1.3)
EOSINOPHIL # BLD AUTO: 0.04 THOUSAND/ΜL (ref 0–0.61)
EOSINOPHIL NFR BLD AUTO: 1 % (ref 0–6)
ERYTHROCYTE [DISTWIDTH] IN BLOOD BY AUTOMATED COUNT: 16.3 % (ref 11.6–15.1)
GFR SERPL CREATININE-BSD FRML MDRD: 67 ML/MIN/1.73SQ M
GLUCOSE SERPL-MCNC: 84 MG/DL (ref 65–140)
HCT VFR BLD AUTO: 38.8 % (ref 36.5–49.3)
HGB BLD-MCNC: 12.4 G/DL (ref 12–17)
IGA SERPL-MCNC: 13 MG/DL (ref 70–400)
IGG SERPL-MCNC: 466 MG/DL (ref 700–1600)
IGM SERPL-MCNC: 10 MG/DL (ref 40–230)
IMM GRANULOCYTES # BLD AUTO: 0.07 THOUSAND/UL (ref 0–0.2)
IMM GRANULOCYTES NFR BLD AUTO: 2 % (ref 0–2)
LYMPHOCYTES # BLD AUTO: 1.42 THOUSANDS/ΜL (ref 0.6–4.47)
LYMPHOCYTES NFR BLD AUTO: 37 % (ref 14–44)
MCH RBC QN AUTO: 31.2 PG (ref 26.8–34.3)
MCHC RBC AUTO-ENTMCNC: 32 G/DL (ref 31.4–37.4)
MCV RBC AUTO: 98 FL (ref 82–98)
MONOCYTES # BLD AUTO: 0.55 THOUSAND/ΜL (ref 0.17–1.22)
MONOCYTES NFR BLD AUTO: 14 % (ref 4–12)
NEUTROPHILS # BLD AUTO: 1.77 THOUSANDS/ΜL (ref 1.85–7.62)
NEUTS SEG NFR BLD AUTO: 45 % (ref 43–75)
NRBC BLD AUTO-RTO: 0 /100 WBCS
PLATELET # BLD AUTO: 144 THOUSANDS/UL (ref 149–390)
PMV BLD AUTO: 10 FL (ref 8.9–12.7)
POTASSIUM SERPL-SCNC: 3.9 MMOL/L (ref 3.5–5.3)
PROT SERPL-MCNC: 6.3 G/DL (ref 6.4–8.2)
RBC # BLD AUTO: 3.98 MILLION/UL (ref 3.88–5.62)
SODIUM SERPL-SCNC: 142 MMOL/L (ref 136–145)
WBC # BLD AUTO: 3.89 THOUSAND/UL (ref 4.31–10.16)

## 2018-08-27 PROCEDURE — 85025 COMPLETE CBC W/AUTO DIFF WBC: CPT

## 2018-08-27 PROCEDURE — 80053 COMPREHEN METABOLIC PANEL: CPT

## 2018-08-27 PROCEDURE — 36415 COLL VENOUS BLD VENIPUNCTURE: CPT

## 2018-08-27 PROCEDURE — 82784 ASSAY IGA/IGD/IGG/IGM EACH: CPT

## 2018-08-27 PROCEDURE — 82378 CARCINOEMBRYONIC ANTIGEN: CPT

## 2018-08-27 PROCEDURE — 83883 ASSAY NEPHELOMETRY NOT SPEC: CPT

## 2018-08-28 ENCOUNTER — HOSPITAL ENCOUNTER (OUTPATIENT)
Dept: INFUSION CENTER | Facility: CLINIC | Age: 83
Discharge: HOME | End: 2018-08-28
Payer: MEDICARE

## 2018-08-28 PROCEDURE — 96365 THER/PROPH/DIAG IV INF INIT: CPT

## 2018-08-28 PROCEDURE — 96366 THER/PROPH/DIAG IV INF ADDON: CPT

## 2018-08-28 RX ADMIN — SODIUM CHLORIDE 1000 ML: 0.9 INJECTION, SOLUTION INTRAVENOUS at 11:25

## 2018-08-28 RX ADMIN — HEPARIN 300 UNITS: 100 SYRINGE at 14:00

## 2018-08-28 RX ADMIN — Medication 25 G: at 11:30

## 2018-08-28 NOTE — PROGRESS NOTES
Patient tolerated hydration and IVIG infusions well  Denies any discomfort  Pt and wife are aware of upcoming follow up appointment with Dr Chavez Nuñez on 8/30/18  Pt discharged in stable condition accompanied by his wife

## 2018-08-28 NOTE — PLAN OF CARE
Problem: Potential for Falls  Goal: Patient will remain free of falls  INTERVENTIONS:  - Assess patient frequently for physical needs  -  Identify cognitive and physical deficits and behaviors that affect risk of falls    -  [place] fall precautions as indicated by assessment   - Educate patient/family on patient safety including physical limitations  - Instruct patient to call for assistance with activity based on assessment  - Modify environment to reduce risk of injury  - Consider OT/PT consult to assist with strengthening/mobility   Outcome: Progressing

## 2018-08-29 LAB
KAPPA LC FREE SER-MCNC: 2.9 MG/L (ref 3.3–19.4)
KAPPA LC FREE/LAMBDA FREE SER: 0.01 {RATIO} (ref 0.26–1.65)
LAMBDA LC FREE SERPL-MCNC: 442.3 MG/L (ref 5.7–26.3)

## 2018-08-30 ENCOUNTER — OFFICE VISIT (OUTPATIENT)
Dept: HEMATOLOGY ONCOLOGY | Facility: CLINIC | Age: 83
End: 2018-08-30
Payer: MEDICARE

## 2018-08-30 VITALS
WEIGHT: 175 LBS | BODY MASS INDEX: 27.47 KG/M2 | OXYGEN SATURATION: 94 % | RESPIRATION RATE: 18 BRPM | HEIGHT: 67 IN | SYSTOLIC BLOOD PRESSURE: 99 MMHG | TEMPERATURE: 98.1 F | HEART RATE: 99 BPM | DIASTOLIC BLOOD PRESSURE: 68 MMHG

## 2018-08-30 DIAGNOSIS — C90.00 MULTIPLE MYELOMA NOT HAVING ACHIEVED REMISSION (HCC): ICD-10-CM

## 2018-08-30 DIAGNOSIS — C34.91 ADENOCARCINOMA OF RIGHT LUNG (HCC): Primary | Chronic | ICD-10-CM

## 2018-08-30 DIAGNOSIS — D80.1 HYPOGAMMAGLOBULINEMIA, ACQUIRED (HCC): ICD-10-CM

## 2018-08-30 PROCEDURE — 99214 OFFICE O/P EST MOD 30 MIN: CPT | Performed by: INTERNAL MEDICINE

## 2018-08-30 NOTE — LETTER
August 30, 2018     Stu Fields DO  [address]   [address]  [place] [address]    Patient: Helen William   YOB: 1935   Date of Visit: 8/30/2018       Dear Dr Maddy Ferraro: Thank you for referring Korin Ennistaty to me for evaluation  Below are my notes for this consultation  If you have questions, please do not hesitate to call me  I look forward to following your patient along with you  Sincerely,        Lali Martinez MD        CC: MD Lali Mccray MD  8/30/2018  2:01 PM  Sign at close encounter  Hematology Outpatient Follow - Up Note  Helen William 81 y o  male MRN: @ Encounter: [id number]        Date:  8/30/2018        Assessment/ Plan:   1  Lambda light chain multiple myeloma, heavily pretreated for the past 6 years, now on melphalan 12 mg p o  4 days every 6 weeks, prednisone 40 mg p o  For 4 days every 6 weeks, Revlimid 5 mg p o  3 weeks on 3 weeks of   Dexamethasone 20 mg PO weekly with significant reduction in the lambda light chain from the range of 1300-442 since May 2018   Continue treatment   2  Hypogammaglobinemia with multiple pneumonia, RSV, C diff colitis, he was found to have low IgG level, continue IVIG 25 g every 6 weeks since IVIG has no more admission to the hospital  3  Hydration with 1 L of normal saline every 2 weeks  4  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, I agree on cutting down to 25 mg p o  Daily he has less significant edema in the lower extremities  5  Discontinue Aranesp at this time his current hemoglobin of 12 4  6   History of rectal cancer and lung cancer followed by rectal surgery and Pulmonary service           HPI:   #1 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, multiple compression fractures, status post kyphoplasty to L1, L2, L5, treated with radiation therapy to the left intra trochanteric area, received melphalan/Velcade/prednisone in October 2010 for 4 cycles with excellent response and normalization of lambda light chain, had been on Velcade maintenance till January 2013 when lambda light chain went up to 111,treated with Velcade 1 3 mg meter square weekly 3 weeks on one week off, Decadron 20 mg by mouth weekly, lambda light chain down to 30 then creeping up to 110, on Revlimid 15 mg by mouth every other day  Lambda light chain normal at 28 however he reported skin rash on the upper chest area we stopped the Revlimid for 2 month  Then reinitiated Revlimid 5 mg by mouth daily  #2  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 stage I, with progression by scan, status post resection of the right upper lobe with few foci in the same lobe consistent with stage IIIA done in January 2015 adenocarcinoma, well differentiated  Status post Alimta/carboplatin x4 cycles finished in May 2015 and radiation therapy  #3  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  #4  Admitted to [address] [facility] 3/3/16 - 3/8/16  Dx with RSV  5  Admission in November 2017 with rectal adenocarcinoma, moderately differentiated status post resection stage I (pT2  6  Recent admission to the hospital with pneumonia, C diff colitis, he was found to have hypogammaglobinemia     current treatment melphalan 12 mg p o  Daily for 4 days, lenalidomide 5 mg p o  Weekly 3 weeks on 3 weeks of   IVIG 25 g every 6 weeks, prednisone 40 mg every day with melphalan for 4 days   Dexamethasone 20 mg PO weekly   Lambda light chain is coming down nicely to the range of 442      Previous Treatment:         Test Results:    Imaging: No results found      Labs:   Lab Results   Component Value Date    WBC 3 89 (L) 08/27/2018    HGB 12 4 08/27/2018    HCT 38 8 08/27/2018    MCV 98 08/27/2018     (L) 08/27/2018     Lab Results   Component Value Date     08/27/2018    K 3 9 08/27/2018     08/27/2018    CO2 27 08/27/2018    ANIONGAP 7 01/04/2016    BUN 19 08/27/2018    CREATININE 1 03 08/27/2018    GLUCOSE 88 01/04/2016    GLUF 88 07/17/2018    CALCIUM 9 3 08/27/2018    AST 20 08/27/2018    ALT 32 08/27/2018    ALKPHOS 75 08/27/2018    PROT 6 8 01/04/2016    BILITOT 0 63 01/04/2016    EGFR 67 08/27/2018       No results found for: IRON, TIBC, FERRITIN    No results found for: [id number]      ROS:   Review of Systems   Constitutional: Negative for activity change, appetite change, chills, diaphoresis, fatigue, fever and unexpected weight change  HENT: Negative for congestion, dental problem, facial swelling, hearing loss, mouth sores, nosebleeds, postnasal drip, rhinorrhea, sore throat, trouble swallowing and voice change  Eyes: Negative for photophobia, pain, discharge, redness, itching and visual disturbance  Respiratory: Negative for cough, choking, chest tightness, shortness of breath and wheezing  Cardiovascular: Negative for chest pain, palpitations and leg swelling  Breast tenderness bilaterally   Gastrointestinal: Negative for abdominal distention, abdominal pain, anal bleeding, blood in stool, constipation, diarrhea, nausea, rectal pain and vomiting  Endocrine: Negative for cold intolerance and heat intolerance  Genitourinary: Negative for decreased urine volume, difficulty urinating, dysuria, flank pain, frequency, hematuria and urgency  Musculoskeletal: Negative for arthralgias, back pain, gait problem, joint swelling, myalgias, neck pain and neck stiffness  Skin: Negative for color change, pallor, rash and wound  Allergic/Immunologic: Negative for immunocompromised state  Neurological: Negative for dizziness, tremors, seizures, syncope, facial asymmetry, speech difficulty, weakness, light-headedness, numbness and headaches  Hematological: Negative for adenopathy  Does not bruise/bleed easily  Psychiatric/Behavioral: Negative for agitation, confusion, decreased concentration, dysphoric mood and sleep disturbance  The patient is not nervous/anxious      All other systems reviewed and are negative  Current Medications: Reviewed  Allergies: Reviewed  PMH/FH/SH:  Reviewed      Physical Exam:    Body surface area is 1 91 meters squared  Wt Readings from Last 3 Encounters:   08/30/18 79 4 kg (175 lb)   07/25/18 79 2 kg (174 lb 9 6 oz)   07/20/18 79 2 kg (174 lb 9 6 oz)        Temp Readings from Last 3 Encounters:   08/30/18 98 1 °F (36 7 °C) (Tympanic)   08/15/18 98 6 °F (37 °C) (Tympanic)   08/01/18 98 1 °F (36 7 °C) (Tympanic)        BP Readings from Last 3 Encounters:   08/30/18 99/68   08/15/18 97/67   08/01/18 (!) 88/64         Pulse Readings from Last 3 Encounters:   08/30/18 99   08/15/18 92   08/01/18 96        Physical Exam   Constitutional: He is oriented to person, place, and time  He appears well-developed and well-nourished  No distress  HENT:   Head: Normocephalic and atraumatic  Mouth/Throat: Oropharynx is clear and moist  No oropharyngeal exudate  Eyes: Conjunctivae and EOM are normal  Pupils are equal, round, and reactive to light  Neck: Normal range of motion  Neck supple  No tracheal deviation present  No thyromegaly present  Cardiovascular: Normal rate  Exam reveals no gallop and no friction rub  No murmur heard  Rhonchi bilaterally   Pulmonary/Chest: Effort normal and breath sounds normal  No respiratory distress  He has no wheezes  He has no rales  He exhibits no tenderness  Abdominal: Soft  Bowel sounds are normal  He exhibits no distension and no mass  There is no tenderness  There is no rebound and no guarding  Musculoskeletal: Normal range of motion  Lymphadenopathy:     He has no cervical adenopathy  Neurological: He is alert and oriented to person, place, and time  Skin: Skin is warm and dry  No rash noted  He is not diaphoretic  No erythema  No pallor  Psychiatric: He has a normal mood and affect  His behavior is normal  Judgment and thought content normal    Vitals reviewed            Goals and Barriers:  Current Goal: Minimize effects of disease  Barriers: None  Patient's Capacity to Self Care:  Patient is able to self care      Code Status: [unfilled]

## 2018-08-30 NOTE — PROGRESS NOTES
Hematology Outpatient Follow - Up Note  Bayron Rainey 81 y o  male MRN: @ Encounter: [id number]        Date:  8/30/2018        Assessment/ Plan:   1  Lambda light chain multiple myeloma, heavily pretreated for the past 6 years, now on melphalan 12 mg p o  4 days every 6 weeks, prednisone 40 mg p o  For 4 days every 6 weeks, Revlimid 5 mg p o  3 weeks on 3 weeks of   Dexamethasone 20 mg PO weekly with significant reduction in the lambda light chain from the range of 1300-442 since May 2018   Continue treatment   2  Hypogammaglobinemia with multiple pneumonia, RSV, C diff colitis, he was found to have low IgG level, continue IVIG 25 g every 6 weeks since IVIG has no more admission to the hospital  3  Hydration with 1 L of normal saline every 2 weeks  4  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, I agree on cutting down to 25 mg p o  Daily he has less significant edema in the lower extremities  5  Discontinue Aranesp at this time his current hemoglobin of 12 4  6  History of rectal cancer and lung cancer followed by rectal surgery and Pulmonary service           HPI:   #1 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, multiple compression fractures, status post kyphoplasty to L1, L2, L5, treated with radiation therapy to the left intra trochanteric area, received melphalan/Velcade/prednisone in October 2010 for 4 cycles with excellent response and normalization of lambda light chain, had been on Velcade maintenance till January 2013 when lambda light chain went up to 111,treated with Velcade 1 3 mg meter square weekly 3 weeks on one week off, Decadron 20 mg by mouth weekly, lambda light chain down to 30 then creeping up to 110, on Revlimid 15 mg by mouth every other day  Lambda light chain normal at 28 however he reported skin rash on the upper chest area we stopped the Revlimid for 2 month  Then reinitiated Revlimid 5 mg by mouth daily  #2  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 stage I, with progression by scan, status post resection of the right upper lobe with few foci in the same lobe consistent with stage IIIA done in January 2015 adenocarcinoma, well differentiated  Status post Alimta/carboplatin x4 cycles finished in May 2015 and radiation therapy  #3  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  #4  Admitted to [facility] 3/3/16 - 3/8/16  Dx with RSV  5  Admission in November 2017 with rectal adenocarcinoma, moderately differentiated status post resection stage I (pT2  6  Recent admission to the hospital with pneumonia, C diff colitis, he was found to have hypogammaglobinemia     current treatment melphalan 12 mg p o  Daily for 4 days, lenalidomide 5 mg p o  Weekly 3 weeks on 3 weeks of   IVIG 25 g every 6 weeks, prednisone 40 mg every day with melphalan for 4 days   Dexamethasone 20 mg PO weekly   Lambda light chain is coming down nicely to the range of 442      Previous Treatment:         Test Results:    Imaging: No results found  Labs:   Lab Results   Component Value Date    WBC 3 89 (L) 08/27/2018    HGB 12 4 08/27/2018    HCT 38 8 08/27/2018    MCV 98 08/27/2018     (L) 08/27/2018     Lab Results   Component Value Date     08/27/2018    K 3 9 08/27/2018     08/27/2018    CO2 27 08/27/2018    ANIONGAP 7 01/04/2016    BUN 19 08/27/2018    CREATININE 1 03 08/27/2018    GLUCOSE 88 01/04/2016    GLUF 88 07/17/2018    CALCIUM 9 3 08/27/2018    AST 20 08/27/2018    ALT 32 08/27/2018    ALKPHOS 75 08/27/2018    PROT 6 8 01/04/2016    BILITOT 0 63 01/04/2016    EGFR 67 08/27/2018       No results found for: IRON, TIBC, FERRITIN    No results found for: [id number]      ROS:   Review of Systems   Constitutional: Negative for activity change, appetite change, chills, diaphoresis, fatigue, fever and unexpected weight change     HENT: Negative for congestion, dental problem, facial swelling, hearing loss, mouth sores, nosebleeds, postnasal drip, rhinorrhea, sore throat, trouble swallowing and voice change  Eyes: Negative for photophobia, pain, discharge, redness, itching and visual disturbance  Respiratory: Negative for cough, choking, chest tightness, shortness of breath and wheezing  Cardiovascular: Negative for chest pain, palpitations and leg swelling  Breast tenderness bilaterally   Gastrointestinal: Negative for abdominal distention, abdominal pain, anal bleeding, blood in stool, constipation, diarrhea, nausea, rectal pain and vomiting  Endocrine: Negative for cold intolerance and heat intolerance  Genitourinary: Negative for decreased urine volume, difficulty urinating, dysuria, flank pain, frequency, hematuria and urgency  Musculoskeletal: Negative for arthralgias, back pain, gait problem, joint swelling, myalgias, neck pain and neck stiffness  Skin: Negative for color change, pallor, rash and wound  Allergic/Immunologic: Negative for immunocompromised state  Neurological: Negative for dizziness, tremors, seizures, syncope, facial asymmetry, speech difficulty, weakness, light-headedness, numbness and headaches  Hematological: Negative for adenopathy  Does not bruise/bleed easily  Psychiatric/Behavioral: Negative for agitation, confusion, decreased concentration, dysphoric mood and sleep disturbance  The patient is not nervous/anxious  All other systems reviewed and are negative  Current Medications: Reviewed  Allergies: Reviewed  PMH/FH/SH:  Reviewed      Physical Exam:    Body surface area is 1 91 meters squared      Wt Readings from Last 3 Encounters:   08/30/18 79 4 kg (175 lb)   07/25/18 79 2 kg (174 lb 9 6 oz)   07/20/18 79 2 kg (174 lb 9 6 oz)        Temp Readings from Last 3 Encounters:   08/30/18 98 1 °F (36 7 °C) (Tympanic)   08/15/18 98 6 °F (37 °C) (Tympanic)   08/01/18 98 1 °F (36 7 °C) (Tympanic)        BP Readings from Last 3 Encounters:   08/30/18 99/68   08/15/18 97/67   08/01/18 (!) 88/64 Pulse Readings from Last 3 Encounters:   08/30/18 99   08/15/18 92   08/01/18 96        Physical Exam   Constitutional: He is oriented to person, place, and time  He appears well-developed and well-nourished  No distress  HENT:   Head: Normocephalic and atraumatic  Mouth/Throat: Oropharynx is clear and moist  No oropharyngeal exudate  Eyes: Conjunctivae and EOM are normal  Pupils are equal, round, and reactive to light  Neck: Normal range of motion  Neck supple  No tracheal deviation present  No thyromegaly present  Cardiovascular: Normal rate  Exam reveals no gallop and no friction rub  No murmur heard  Rhonchi bilaterally   Pulmonary/Chest: Effort normal and breath sounds normal  No respiratory distress  He has no wheezes  He has no rales  He exhibits no tenderness  Abdominal: Soft  Bowel sounds are normal  He exhibits no distension and no mass  There is no tenderness  There is no rebound and no guarding  Musculoskeletal: Normal range of motion  Lymphadenopathy:     He has no cervical adenopathy  Neurological: He is alert and oriented to person, place, and time  Skin: Skin is warm and dry  No rash noted  He is not diaphoretic  No erythema  No pallor  Psychiatric: He has a normal mood and affect  His behavior is normal  Judgment and thought content normal    Vitals reviewed  Goals and Barriers:  Current Goal: Minimize effects of disease  Barriers: None  Patient's Capacity to Self Care:  Patient is able to self care      Code Status: [unfilled]

## 2018-08-31 ENCOUNTER — TELEPHONE (OUTPATIENT)
Dept: HEMATOLOGY ONCOLOGY | Facility: CLINIC | Age: 83
End: 2018-08-31

## 2018-08-31 NOTE — TELEPHONE ENCOUNTER
Pt's pharmacy called to verify the directions on taking Revlimid 5mg  Per Dr Yue Jimenez last note 8/30/18, Revlimid 5 mg is 3 weeks on then 3 weeks off

## 2018-09-11 ENCOUNTER — TELEPHONE (OUTPATIENT)
Dept: HEMATOLOGY ONCOLOGY | Facility: CLINIC | Age: 83
End: 2018-09-11

## 2018-09-11 NOTE — TELEPHONE ENCOUNTER
Spoke with patient's wife  She does not know if patient took his weekly dose of decadron  Patient will take next scheduled dose on Friday

## 2018-09-11 NOTE — TELEPHONE ENCOUNTER
JOEL CALLED REGARDING  REGARDING MEDICATION  SHE IS NOT SURE IF SHE GAVE [address] (?) ON Sunday  SHE IS ASKING IF SHE CAN GIVE THE MEDICATION TODAY OR IF SHE SHOULD WAIT TILL Thursday  PLEASE RETURN CALL AT SOON AS POSSIBLE  [facility] PHONE FIRST  IF NOT WORKING, PLEASE CALL CELL PHONE

## 2018-09-12 ENCOUNTER — HOSPITAL ENCOUNTER (OUTPATIENT)
Dept: INFUSION CENTER | Facility: CLINIC | Age: 83
Discharge: HOME | End: 2018-09-12
Payer: MEDICARE

## 2018-09-12 VITALS
HEART RATE: 88 BPM | TEMPERATURE: 98.2 F | RESPIRATION RATE: 20 BRPM | SYSTOLIC BLOOD PRESSURE: 101 MMHG | DIASTOLIC BLOOD PRESSURE: 56 MMHG

## 2018-09-12 PROCEDURE — 96360 HYDRATION IV INFUSION INIT: CPT

## 2018-09-12 PROCEDURE — 96361 HYDRATE IV INFUSION ADD-ON: CPT

## 2018-09-12 RX ADMIN — Medication 300 UNITS: at 13:30

## 2018-09-12 RX ADMIN — SODIUM CHLORIDE 1000 ML: 0.9 INJECTION, SOLUTION INTRAVENOUS at 11:30

## 2018-09-12 NOTE — PROGRESS NOTES
Patient tolerated hydration well  Offers no complaints  Pt and wife are aware of all future appointments

## 2018-09-12 NOTE — PLAN OF CARE
Problem: Potential for Falls  Goal: Patient will remain free of falls  INTERVENTIONS:  - Assess patient frequently for physical needs  -  Identify cognitive and physical deficits and behaviors that affect risk of falls    -  [place] fall precautions as indicated by assessment   - Educate patient/family on patient safety including physical limitations  - Instruct patient to call for assistance with activity based on assessment  - Modify environment to reduce risk of injury  - Consider OT/PT consult to assist with strengthening/mobility   Outcome: Progressing

## 2018-09-25 ENCOUNTER — TELEPHONE (OUTPATIENT)
Dept: HEMATOLOGY ONCOLOGY | Facility: CLINIC | Age: 83
End: 2018-09-25

## 2018-09-25 ENCOUNTER — HOSPITAL ENCOUNTER (OUTPATIENT)
Dept: INFUSION CENTER | Facility: CLINIC | Age: 83
Discharge: HOME | End: 2018-09-25
Payer: MEDICARE

## 2018-09-25 VITALS
SYSTOLIC BLOOD PRESSURE: 110 MMHG | RESPIRATION RATE: 22 BRPM | HEART RATE: 88 BPM | DIASTOLIC BLOOD PRESSURE: 68 MMHG | TEMPERATURE: 97 F | OXYGEN SATURATION: 96 %

## 2018-09-25 DIAGNOSIS — R19.7 DIARRHEA, UNSPECIFIED TYPE: Primary | ICD-10-CM

## 2018-09-25 PROCEDURE — 96360 HYDRATION IV INFUSION INIT: CPT

## 2018-09-25 PROCEDURE — 96361 HYDRATE IV INFUSION ADD-ON: CPT

## 2018-09-25 RX ORDER — DIPHENOXYLATE HYDROCHLORIDE AND ATROPINE SULFATE 2.5; .025 MG/1; MG/1
1 TABLET ORAL 4 TIMES DAILY PRN
Qty: 30 TABLET | Refills: 2 | Status: SHIPPED | OUTPATIENT
Start: 2018-09-25 | End: 2019-01-09 | Stop reason: ALTCHOICE

## 2018-09-25 RX ADMIN — SODIUM CHLORIDE 1000 ML: 0.9 INJECTION, SOLUTION INTRAVENOUS at 11:27

## 2018-09-25 RX ADMIN — SODIUM CHLORIDE, PRESERVATIVE FREE 300 UNITS: 5 INJECTION INTRAVENOUS at 13:33

## 2018-09-25 NOTE — TELEPHONE ENCOUNTER
Pt's wife [place] calling about pt's diarrhea  States you asked her to call when pt home from infusion   Please call

## 2018-09-25 NOTE — TELEPHONE ENCOUNTER
Wanted to let us know that the pt started the last cycle of Revlimid & Melphalan late because of the delay in the delivery  She stated that the pt will start their new cycle of Revlimid & Melphalan on 10 15 18  She wanted to let us know    She doesn't need a call back

## 2018-09-25 NOTE — TELEPHONE ENCOUNTER
Spoke with patient's wife  Patient continues with diarrhea up to 5 per day despite immodium  Lomotil order sent to Dr Jacqueline Pablo to sign

## 2018-09-25 NOTE — PLAN OF CARE
Problem: Potential for Falls  Goal: Patient will remain free of falls  INTERVENTIONS:  - Assess patient frequently for physical needs  -  Identify cognitive and physical deficits and behaviors that affect risk of falls    -  [place] fall precautions as indicated by assessment   - Educate patient/family on patient safety including physical limitations  - Instruct patient to call for assistance with activity based on assessment  - Modify environment to reduce risk of injury  - Consider OT/PT consult to assist with strengthening/mobility   Outcome: Progressing

## 2018-10-01 ENCOUNTER — APPOINTMENT (OUTPATIENT)
Dept: LAB | Facility: MEDICAL CENTER | Age: 83
End: 2018-10-01
Payer: MEDICARE

## 2018-10-01 DIAGNOSIS — C90.00 MULTIPLE MYELOMA NOT HAVING ACHIEVED REMISSION (HCC): ICD-10-CM

## 2018-10-01 DIAGNOSIS — C34.91 ADENOCARCINOMA OF RIGHT LUNG (HCC): Chronic | ICD-10-CM

## 2018-10-01 DIAGNOSIS — D80.1 HYPOGAMMAGLOBULINEMIA, ACQUIRED (HCC): ICD-10-CM

## 2018-10-01 DIAGNOSIS — K52.1 DIARRHEA DUE TO DRUG: ICD-10-CM

## 2018-10-01 LAB
ALBUMIN SERPL BCP-MCNC: 2.8 G/DL (ref 3.5–5)
ALP SERPL-CCNC: 76 U/L (ref 46–116)
ALT SERPL W P-5'-P-CCNC: 33 U/L (ref 12–78)
ANION GAP SERPL CALCULATED.3IONS-SCNC: 4 MMOL/L (ref 4–13)
AST SERPL W P-5'-P-CCNC: 18 U/L (ref 5–45)
BASOPHILS # BLD AUTO: 0.02 THOUSANDS/ΜL (ref 0–0.1)
BASOPHILS NFR BLD AUTO: 1 % (ref 0–1)
BILIRUB SERPL-MCNC: 0.74 MG/DL (ref 0.2–1)
BUN SERPL-MCNC: 17 MG/DL (ref 5–25)
CALCIUM SERPL-MCNC: 8.5 MG/DL (ref 8.3–10.1)
CHLORIDE SERPL-SCNC: 108 MMOL/L (ref 100–108)
CO2 SERPL-SCNC: 27 MMOL/L (ref 21–32)
CREAT SERPL-MCNC: 1.02 MG/DL (ref 0.6–1.3)
EOSINOPHIL # BLD AUTO: 0.05 THOUSAND/ΜL (ref 0–0.61)
EOSINOPHIL NFR BLD AUTO: 1 % (ref 0–6)
ERYTHROCYTE [DISTWIDTH] IN BLOOD BY AUTOMATED COUNT: 15.3 % (ref 11.6–15.1)
GFR SERPL CREATININE-BSD FRML MDRD: 68 ML/MIN/1.73SQ M
GLUCOSE SERPL-MCNC: 65 MG/DL (ref 65–140)
HCT VFR BLD AUTO: 35.9 % (ref 36.5–49.3)
HGB BLD-MCNC: 11.6 G/DL (ref 12–17)
IGA SERPL-MCNC: 10 MG/DL (ref 70–400)
IGG SERPL-MCNC: 451 MG/DL (ref 700–1600)
IGM SERPL-MCNC: 8 MG/DL (ref 40–230)
IMM GRANULOCYTES # BLD AUTO: 0.05 THOUSAND/UL (ref 0–0.2)
IMM GRANULOCYTES NFR BLD AUTO: 1 % (ref 0–2)
LYMPHOCYTES # BLD AUTO: 0.87 THOUSANDS/ΜL (ref 0.6–4.47)
LYMPHOCYTES NFR BLD AUTO: 24 % (ref 14–44)
MCH RBC QN AUTO: 32.2 PG (ref 26.8–34.3)
MCHC RBC AUTO-ENTMCNC: 32.3 G/DL (ref 31.4–37.4)
MCV RBC AUTO: 100 FL (ref 82–98)
MONOCYTES # BLD AUTO: 0.66 THOUSAND/ΜL (ref 0.17–1.22)
MONOCYTES NFR BLD AUTO: 18 % (ref 4–12)
NEUTROPHILS # BLD AUTO: 1.98 THOUSANDS/ΜL (ref 1.85–7.62)
NEUTS SEG NFR BLD AUTO: 55 % (ref 43–75)
NRBC BLD AUTO-RTO: 0 /100 WBCS
PLATELET # BLD AUTO: 147 THOUSANDS/UL (ref 149–390)
PMV BLD AUTO: 10.1 FL (ref 8.9–12.7)
POTASSIUM SERPL-SCNC: 3.7 MMOL/L (ref 3.5–5.3)
PROT SERPL-MCNC: 5.9 G/DL (ref 6.4–8.2)
RBC # BLD AUTO: 3.6 MILLION/UL (ref 3.88–5.62)
SODIUM SERPL-SCNC: 139 MMOL/L (ref 136–145)
WBC # BLD AUTO: 3.63 THOUSAND/UL (ref 4.31–10.16)

## 2018-10-01 PROCEDURE — 80053 COMPREHEN METABOLIC PANEL: CPT

## 2018-10-01 PROCEDURE — 82784 ASSAY IGA/IGD/IGG/IGM EACH: CPT

## 2018-10-01 PROCEDURE — 36415 COLL VENOUS BLD VENIPUNCTURE: CPT

## 2018-10-01 PROCEDURE — 85025 COMPLETE CBC W/AUTO DIFF WBC: CPT

## 2018-10-01 PROCEDURE — 83883 ASSAY NEPHELOMETRY NOT SPEC: CPT

## 2018-10-01 RX ORDER — DEXAMETHASONE 4 MG/1
TABLET ORAL
Qty: 60 TABLET | Refills: 0 | Status: SHIPPED | OUTPATIENT
Start: 2018-10-01 | End: 2019-03-05 | Stop reason: ALTCHOICE

## 2018-10-02 LAB
KAPPA LC FREE SER-MCNC: 1.3 MG/L (ref 3.3–19.4)
KAPPA LC FREE/LAMBDA FREE SER: 0 {RATIO} (ref 0.26–1.65)
LAMBDA LC FREE SERPL-MCNC: 326.9 MG/L (ref 5.7–26.3)

## 2018-10-04 ENCOUNTER — OFFICE VISIT (OUTPATIENT)
Dept: HEMATOLOGY ONCOLOGY | Facility: CLINIC | Age: 83
End: 2018-10-04
Payer: MEDICARE

## 2018-10-04 VITALS
WEIGHT: 178 LBS | SYSTOLIC BLOOD PRESSURE: 120 MMHG | DIASTOLIC BLOOD PRESSURE: 60 MMHG | HEIGHT: 67 IN | BODY MASS INDEX: 27.94 KG/M2 | RESPIRATION RATE: 18 BRPM | TEMPERATURE: 97.6 F | HEART RATE: 88 BPM | OXYGEN SATURATION: 97 %

## 2018-10-04 DIAGNOSIS — C90.00 MULTIPLE MYELOMA NOT HAVING ACHIEVED REMISSION (HCC): Primary | ICD-10-CM

## 2018-10-04 PROCEDURE — 99214 OFFICE O/P EST MOD 30 MIN: CPT | Performed by: INTERNAL MEDICINE

## 2018-10-04 NOTE — PROGRESS NOTES
Hematology/Oncology Outpatient Follow- up Note  Misty Rangel 81 y o  male MRN: @ Encounter: [id number]        Date:  10/4/2018      Assessment / Plan:    1  Lambda light chain multiple myeloma, heavily pretreated for the past 6 years  Now on melphalan 12 mg p o  4 days every 6 weeks, prednisone 40 mg p o  For 4 days every 6 weeks, Revlimid 5 mg p o  3 weeks on 3 weeks off  Dexamethasone 20 mg PO weekly with significant reduction in the lambda light chain from the range of 1200-327 since May 2018   Continue treatment  2  Hypogammaglobinemia with multiple pneumonia, RSV, C diff colitis, he was found to have low IgG level, continue IVIG 25 g every 6 weeks since IVIG, has no more admission to the hospital    3  Hydration with 1 L of normal saline every 2 weeks    4  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, Aldactone was cut down to 25 mg p o  daily he has less significant edema in the lower extremities    5  History of rectal cancer and lung cancer followed by rectal surgery and Pulmonary service  CEA   CT chest scheduled for 1/14/2019  Continue with current treatment at this time  F/U in 6 weeks  Patient was seen and treatment plan made with Dr Nika Tomas  HPI:   #1  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, multiple compression fractures, status post kyphoplasty to L1, L2, L5, treated with radiation therapy to the left intra trochanteric area, received melphalan/Velcade/prednisone in October 2010 for 4 cycles with excellent response and normalization of lambda light chain, had been on Velcade maintenance till January 2013 when lambda light chain went up to 111,treated with Velcade 1 3 mg meter square weekly 3 weeks on one week off, Decadron 20 mg by mouth weekly, lambda light chain down to 30 then creeping up to 110, on Revlimid 15 mg by mouth every other day   Lambda light chain normal at 28 however he reported skin rash on the upper chest area we stopped the Revlimid for 2 month  Then reinitiated Revlimid 5 mg by mouth daily    #2  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 stage I, with progression by scan, status post resection of the right upper lobe with few foci in the same lobe consistent with stage IIIA done in January 2015 adenocarcinoma, well differentiated  Status post Alimta/carboplatin x4 cycles finished in May 2015 and radiation therapy    #3  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    #4  Admitted to [facility] 3/3/16 - 3/8/16  Dx with RSV    5  Admission in November 2017 with rectal adenocarcinoma, moderately differentiated status post resection stage I (pT2    6  Recent admission to the hospital with pneumonia, C diff colitis, he was found to have hypogammaglobinemia     Current Treatment:  melphalan 12 mg p o  Daily for 4 days, lenalidomide 5 mg p o  Weekly 3 weeks on 3 weeks of   IVIG 25 g every 6 weeks, prednisone 40 mg every day with melphalan for 4 days   Dexamethasone 20 mg PO weekly   Lambda light chain is coming down nicely to the range of 442      Interval History:    Met with Dr Ailyn Blackmon 8/20/2018  Endoscopic and CEA surveillance ongoing  Test Results:        Labs:     10/1/2018:  Lambda free light chains continue to decrease  327  Hemoglobin, platelets, white blood cell count remains stable  IgG 451     8/27/2018:  CEA 3 9;  4 4 4/30/2018;  2 in 7/2017        Lab Results   Component Value Date    HGB 11 6 (L) 10/01/2018    HCT 35 9 (L) 10/01/2018     (H) 10/01/2018     (L) 10/01/2018    WBC 3 63 (L) 10/01/2018    NRBC 0 10/01/2018    BANDSPCT 0 07/18/2018    ATYLMPCT 6 (H) 06/06/2016     Lab Results   Component Value Date     10/01/2018    K 3 7 10/01/2018     10/01/2018    CO2 27 10/01/2018    ANIONGAP 7 01/04/2016    BUN 17 10/01/2018    CREATININE 1 02 10/01/2018    GLUCOSE 88 01/04/2016    GLUF 88 07/17/2018    CALCIUM 8 5 10/01/2018    AST 18 10/01/2018    ALT 33 10/01/2018    ALKPHOS 76 10/01/2018    PROT 6 8 01/04/2016    BILITOT 0 63 01/04/2016    EGFR 68 10/01/2018       Imaging: No results found  ROS:  As mentioned in HPI & Interval History otherwise 14 point ROS negative  Allergies: Allergies   Allergen Reactions    Sulfa Antibiotics Itching    Penicillin V Rash     AS CHILD     Current Medications: Reviewed  PMH/FH/SH:  Reviewed      Physical Exam:    There is no height or weight on file to calculate BSA  Ht Readings from Last 3 Encounters:   08/30/18 5' 7" (1 702 m)   07/25/18 5' 7" (1 702 m)   07/20/18 5' 7" (1 702 m)        Wt Readings from Last 3 Encounters:   08/30/18 79 4 kg (175 lb)   07/25/18 79 2 kg (174 lb 9 6 oz)   07/20/18 79 2 kg (174 lb 9 6 oz)        Temp Readings from Last 3 Encounters:   09/25/18 (!) 97 °F (36 1 °C) (Tympanic)   09/12/18 98 2 °F (36 8 °C) (Tympanic)   08/30/18 98 1 °F (36 7 °C) (Tympanic)        BP Readings from Last 3 Encounters:   09/25/18 110/68   09/12/18 101/56   08/30/18 99/68           Physical Exam   Constitutional: He is oriented to person, place, and time  He appears well-developed and well-nourished  No distress  HENT:   Head: Normocephalic and atraumatic  Mouth/Throat: Oropharynx is clear and moist  No oropharyngeal exudate  Eyes: Pupils are equal, round, and reactive to light  Conjunctivae and EOM are normal    Neck: Normal range of motion  Neck supple  No tracheal deviation present  No thyromegaly present  Cardiovascular: Normal rate and regular rhythm  Exam reveals no gallop and no friction rub  Murmur heard  Pulmonary/Chest: No respiratory distress  He has wheezes  He has no rales  He exhibits no tenderness  Abdominal: Soft  Bowel sounds are normal  He exhibits no distension and no mass  There is no tenderness  There is no rebound and no guarding  Musculoskeletal: He exhibits edema (B at ankles) and deformity (arthritic changes,  cane)     Lymphadenopathy:     He has no cervical adenopathy  Neurological: He is alert and oriented to person, place, and time  Skin: Skin is warm and dry  No rash noted  He is not diaphoretic  No erythema  No pallor  Psychiatric: He has a normal mood and affect  His behavior is normal  Judgment and thought content normal    Vitals reviewed  ECO      Goals and Barriers:  Current Goal:  Prolong Survival from Cancer/ Have good QOL  Barriers: None  Patient's Capacity to Self Care:  Patient is able to self care      Emergency Contacts:    [address] [facility], [phone],

## 2018-10-09 RX ORDER — SODIUM CHLORIDE 9 MG/ML
20 INJECTION, SOLUTION INTRAVENOUS CONTINUOUS
Status: DISCONTINUED | OUTPATIENT
Start: 2018-10-10 | End: 2018-10-13 | Stop reason: HOSPADM

## 2018-10-10 ENCOUNTER — HOSPITAL ENCOUNTER (OUTPATIENT)
Dept: INFUSION CENTER | Facility: CLINIC | Age: 83
Discharge: HOME | End: 2018-10-10
Payer: MEDICARE

## 2018-10-10 VITALS
OXYGEN SATURATION: 97 % | SYSTOLIC BLOOD PRESSURE: 111 MMHG | HEART RATE: 74 BPM | TEMPERATURE: 97.5 F | RESPIRATION RATE: 20 BRPM | DIASTOLIC BLOOD PRESSURE: 74 MMHG

## 2018-10-10 PROCEDURE — 96366 THER/PROPH/DIAG IV INF ADDON: CPT

## 2018-10-10 PROCEDURE — 96365 THER/PROPH/DIAG IV INF INIT: CPT

## 2018-10-10 PROCEDURE — 96361 HYDRATE IV INFUSION ADD-ON: CPT

## 2018-10-10 RX ADMIN — Medication 25 G: at 11:01

## 2018-10-10 RX ADMIN — Medication 300 UNITS: at 13:04

## 2018-10-10 RX ADMIN — SODIUM CHLORIDE 1000 ML: 0.9 INJECTION, SOLUTION INTRAVENOUS at 10:25

## 2018-10-10 NOTE — PLAN OF CARE
Problem: Potential for Falls  Goal: Patient will remain free of falls  INTERVENTIONS:  - Assess patient frequently for physical needs  -  Identify cognitive and physical deficits and behaviors that affect risk of falls    -  [place] fall precautions as indicated by assessment   - Educate patient/family on patient safety including physical limitations  - Instruct patient to call for assistance with activity based on assessment  - Modify environment to reduce risk of injury  - Consider OT/PT consult to assist with strengthening/mobility   Outcome: Progressing

## 2018-10-10 NOTE — PROGRESS NOTES
Patient tolerated hydration and IVIG infusions well  Denies any discomfort  Pt and wife are aware of upcoming follow up appointments  Pt discharged in stable condition accompanied by his wife

## 2018-10-15 ENCOUNTER — TELEPHONE (OUTPATIENT)
Dept: HEMATOLOGY ONCOLOGY | Facility: CLINIC | Age: 83
End: 2018-10-15

## 2018-10-15 NOTE — TELEPHONE ENCOUNTER
Call from patient's wife, Heena Michelle  Patient has pulse rate of 120 , Temp of 100 and is fatigued  Instructed patient to go to ED for evaluation  Patient does not want to go to ED at this time  Instructed wife to continue to monitor patient and if symptoms worsen ie increased SOB, confusion, increased temp, take patient to ED  Otherwise appt made to see Dr Angle Vilchis tomorrow at 0257  Patient verbalizes understanding

## 2018-10-16 ENCOUNTER — OFFICE VISIT (OUTPATIENT)
Dept: HEMATOLOGY ONCOLOGY | Facility: CLINIC | Age: 83
End: 2018-10-16
Payer: MEDICARE

## 2018-10-16 VITALS
TEMPERATURE: 97.6 F | HEART RATE: 91 BPM | HEIGHT: 67 IN | SYSTOLIC BLOOD PRESSURE: 120 MMHG | DIASTOLIC BLOOD PRESSURE: 80 MMHG | RESPIRATION RATE: 20 BRPM | OXYGEN SATURATION: 97 % | BODY MASS INDEX: 27.94 KG/M2 | WEIGHT: 178 LBS

## 2018-10-16 DIAGNOSIS — C34.91 ADENOCARCINOMA OF RIGHT LUNG (HCC): Primary | Chronic | ICD-10-CM

## 2018-10-16 DIAGNOSIS — D80.9 IMMUNOGLOBULIN DEFICIENCY (HCC): ICD-10-CM

## 2018-10-16 DIAGNOSIS — C90.00 MULTIPLE MYELOMA NOT HAVING ACHIEVED REMISSION (HCC): ICD-10-CM

## 2018-10-16 PROCEDURE — 99214 OFFICE O/P EST MOD 30 MIN: CPT | Performed by: INTERNAL MEDICINE

## 2018-10-16 NOTE — LETTER
October 16, 2018     Radha Lay DO  [address] [facility]  [address]  [address] [facility]    Patient: Lv Colon   YOB: 1935   Date of Visit: 10/16/2018       Dear Dr Rose Marie Wang: Thank you for referring Jones Warner to me for evaluation  Below are my notes for this consultation  If you have questions, please do not hesitate to call me  I look forward to following your patient along with you  Sincerely,        Gurvinder Block MD        CC: DO Hilario Chan MD Alec Ahr, MD  10/16/2018  2:40 PM  Sign at close encounter  Hematology Outpatient Follow - Up Note  Lv Colon 81 y o  male MRN: @ Encounter: [id number]        Date:  10/16/2018        Assessment/ Plan:   1  Lambda light chain multiple myeloma, heavily pretreated for the past 6 years, currently on melphalan 12 mg p o  For 4 days every 6 weeks, prednisone 40 mg p  O  Daily for 4 days every 6 weeks concurrent with melphalan, lenalidomide 5 mg p  O  Daily 3 weeks on 3 week off, dexamethasone 20 mg PO weekly, he is responding with significant reduction in the lambda light chain however the patient has corticosteroid crash symptoms the 3rd day after taking dexamethasone   We will do dexamethasone 4 mg 4 tablets every Friday and then 1 tablet on Monday hoping to avoid dexamethasone crash syndrome  2  Hypogammaglobinemia continue IVIG 25 g every 6 weeks  3  History of COPD, adenocarcinoma of the lung he will follow up with the Pulmonary service  4   Increased lower extremity edema most likely secondary to valvular heart disease, he will follow up with Cardiology, he was prescribed Aldactone however with breast tenderness bilaterally I told the patient to take Aldactone intermittently until he sees cardiology           HPI:  #1 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, multiple compression fractures, status post kyphoplasty to L1, L2, L5, treated with radiation therapy to the left intra trochanteric area, received melphalan/Velcade/prednisone in October 2010 for 4 cycles with excellent response and normalization of lambda light chain, had been on Velcade maintenance till January 2013 when lambda light chain went up to 111,treated with Velcade 1 3 mg meter square weekly 3 weeks on one week off, Decadron 20 mg by mouth weekly, lambda light chain down to 30 then creeping up to 110, on Revlimid 15 mg by mouth every other day  Lambda light chain normal at 28 however he reported skin rash on the upper chest area we stopped the Revlimid for 2 month  Then reinitiated Revlimid 5 mg by mouth daily     #2  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 stage I, with progression by scan, status post resection of the right upper lobe with few foci in the same lobe consistent with stage IIIA done in January 2015 adenocarcinoma, well differentiated  Status post Alimta/carboplatin x4 cycles finished in May 2015 and radiation therapy     #3  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     #4  Admitted to [facility] 3/3/16 - 3/8/16  Dx with RSV     5  Admission in November 2017 with rectal adenocarcinoma, moderately differentiated status post resection stage I (pT2     6  Recent admission to the hospital with pneumonia, C diff colitis, he was found to have hypogammaglobinemia    Interval History:  The patient reported fatigue 3 days after dexamethasone, yesterday he stayed in the bed throughout the day, has no energy denies any fever, chills, constipation, he reported diarrhea about 4 times a day         current treatment melphalan 12 mg p  O  Daily for 4 days, lenalidomide 5 mg p o  Daily 3 weeks on 1 week off, dexamethasone 20 mg PO weekly  For hypogammaglobulinemia IVIG 25 g every 6 weeks, prednisone 4 mg every day with melphalan for 4 days       Test Results:    Imaging: No results found      Labs:   Lab Results   Component Value Date    WBC 3 63 (L) 10/01/2018    HGB 11 6 (L) 10/01/2018    HCT 35 9 (L) 10/01/2018     (H) 10/01/2018     (L) 10/01/2018     Lab Results   Component Value Date     10/01/2018    K 3 7 10/01/2018     10/01/2018    CO2 27 10/01/2018    ANIONGAP 7 01/04/2016    BUN 17 10/01/2018    CREATININE 1 02 10/01/2018    GLUCOSE 88 01/04/2016    GLUF 88 07/17/2018    CALCIUM 8 5 10/01/2018    AST 18 10/01/2018    ALT 33 10/01/2018    ALKPHOS 76 10/01/2018    PROT 6 8 01/04/2016    BILITOT 0 63 01/04/2016    EGFR 68 10/01/2018       No results found for: IRON, TIBC, FERRITIN    No results found for: [id number]      ROS:   Review of Systems   Constitutional: Positive for fatigue  Negative for activity change, appetite change, chills, diaphoresis, fever and unexpected weight change  HENT: Negative for congestion, dental problem, facial swelling, hearing loss, mouth sores, nosebleeds, postnasal drip, rhinorrhea, sore throat, trouble swallowing and voice change  Eyes: Negative for photophobia, pain, discharge, redness, itching and visual disturbance  Respiratory: Positive for cough and shortness of breath  Negative for choking, chest tightness and wheezing  Cardiovascular: Positive for leg swelling  Negative for chest pain and palpitations  Gastrointestinal: Negative for abdominal distention, abdominal pain, anal bleeding, blood in stool, constipation, diarrhea, nausea, rectal pain and vomiting  Endocrine: Negative for cold intolerance and heat intolerance  Genitourinary: Negative for decreased urine volume, difficulty urinating, dysuria, flank pain, frequency, hematuria and urgency  Musculoskeletal: Negative for arthralgias, back pain, gait problem, joint swelling, myalgias, neck pain and neck stiffness  Skin: Negative for color change, pallor, rash and wound  Allergic/Immunologic: Negative for immunocompromised state     Neurological: Negative for dizziness, tremors, seizures, syncope, facial asymmetry, speech difficulty, weakness, light-headedness, numbness and headaches  Hematological: Negative for adenopathy  Does not bruise/bleed easily  Psychiatric/Behavioral: Negative for agitation, confusion, decreased concentration, dysphoric mood and sleep disturbance  The patient is not nervous/anxious  All other systems reviewed and are negative  Current Medications: Reviewed  Allergies: Reviewed  PMH/FH/SH:  Reviewed      Physical Exam:    Body surface area is 1 92 meters squared  Wt Readings from Last 3 Encounters:   10/16/18 80 7 kg (178 lb)   10/04/18 80 7 kg (178 lb)   08/30/18 79 4 kg (175 lb)        Temp Readings from Last 3 Encounters:   10/16/18 97 6 °F (36 4 °C) (Tympanic)   10/10/18 97 5 °F (36 4 °C)   10/04/18 97 6 °F (36 4 °C) (Tympanic)        BP Readings from Last 3 Encounters:   10/16/18 120/80   10/10/18 111/74   10/04/18 120/60         Pulse Readings from Last 3 Encounters:   10/16/18 91   10/10/18 74   10/04/18 88        Physical Exam   Constitutional: He is oriented to person, place, and time  He appears well-developed and well-nourished  No distress  HENT:   Head: Normocephalic and atraumatic  Mouth/Throat: Oropharynx is clear and moist  No oropharyngeal exudate  Eyes: Pupils are equal, round, and reactive to light  Conjunctivae and EOM are normal    Neck: Normal range of motion  Neck supple  No tracheal deviation present  No thyromegaly present  Cardiovascular: Normal rate and regular rhythm  Exam reveals no gallop and no friction rub  Murmur heard  Pulmonary/Chest: Effort normal  No respiratory distress  He has wheezes  He has rales  He exhibits no tenderness  Abdominal: Soft  Bowel sounds are normal  He exhibits no distension and no mass  There is no tenderness  There is no rebound and no guarding  Musculoskeletal: Normal range of motion  He exhibits edema ( +2 edema bilaterally)  Lymphadenopathy:     He has no cervical adenopathy     Neurological: He is alert and oriented to person, place, and time  Skin: Skin is warm and dry  No rash noted  He is not diaphoretic  No erythema  No pallor  Psychiatric: He has a normal mood and affect  His behavior is normal  Judgment and thought content normal    Vitals reviewed  Goals and Barriers:  Current Goal: Minimize effects of disease  Barriers: None  Patient's Capacity to Self Care:  Patient is able to self care      Code Status: @[facility]@

## 2018-10-16 NOTE — PROGRESS NOTES
Hematology Outpatient Follow - Up Note  Marino Leach 81 y o  male MRN: @ Encounter: [id number]        Date:  10/16/2018        Assessment/ Plan:   1  Lambda light chain multiple myeloma, heavily pretreated for the past 6 years, currently on melphalan 12 mg p o  For 4 days every 6 weeks, prednisone 40 mg p  O  Daily for 4 days every 6 weeks concurrent with melphalan, lenalidomide 5 mg p  O  Daily 3 weeks on 3 week off, dexamethasone 20 mg PO weekly, he is responding with significant reduction in the lambda light chain however the patient has corticosteroid crash symptoms the 3rd day after taking dexamethasone   We will do dexamethasone 4 mg 4 tablets every Friday and then 1 tablet on Monday hoping to avoid dexamethasone crash syndrome  2  Hypogammaglobinemia continue IVIG 25 g every 6 weeks  3  History of COPD, adenocarcinoma of the lung he will follow up with the Pulmonary service  4  Increased lower extremity edema most likely secondary to valvular heart disease, he will follow up with Cardiology, he was prescribed Aldactone however with breast tenderness bilaterally I told the patient to take Aldactone intermittently until he sees cardiology           HPI:  #1 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, multiple compression fractures, status post kyphoplasty to L1, L2, L5, treated with radiation therapy to the left intra trochanteric area, received melphalan/Velcade/prednisone in October 2010 for 4 cycles with excellent response and normalization of lambda light chain, had been on Velcade maintenance till January 2013 when lambda light chain went up to 111,treated with Velcade 1 3 mg meter square weekly 3 weeks on one week off, Decadron 20 mg by mouth weekly, lambda light chain down to 30 then creeping up to 110, on Revlimid 15 mg by mouth every other day  Lambda light chain normal at 28 however he reported skin rash on the upper chest area we stopped the Revlimid for 2 month   Then reinitiated Revlimid 5 mg by mouth daily     #2  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 stage I, with progression by scan, status post resection of the right upper lobe with few foci in the same lobe consistent with stage IIIA done in January 2015 adenocarcinoma, well differentiated  Status post Alimta/carboplatin x4 cycles finished in May 2015 and radiation therapy     #3  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     #4  Admitted to [facility] 3/3/16 - 3/8/16  Dx with RSV     5  Admission in November 2017 with rectal adenocarcinoma, moderately differentiated status post resection stage I (pT2     6  Recent admission to the hospital with pneumonia, C diff colitis, he was found to have hypogammaglobinemia    Interval History:  The patient reported fatigue 3 days after dexamethasone, yesterday he stayed in the bed throughout the day, has no energy denies any fever, chills, constipation, he reported diarrhea about 4 times a day         current treatment melphalan 12 mg p  O  Daily for 4 days, lenalidomide 5 mg p o  Daily 3 weeks on 1 week off, dexamethasone 20 mg PO weekly  For hypogammaglobulinemia IVIG 25 g every 6 weeks, prednisone 4 mg every day with melphalan for 4 days       Test Results:    Imaging: No results found      Labs:   Lab Results   Component Value Date    WBC 3 63 (L) 10/01/2018    HGB 11 6 (L) 10/01/2018    HCT 35 9 (L) 10/01/2018     (H) 10/01/2018     (L) 10/01/2018     Lab Results   Component Value Date     10/01/2018    K 3 7 10/01/2018     10/01/2018    CO2 27 10/01/2018    ANIONGAP 7 01/04/2016    BUN 17 10/01/2018    CREATININE 1 02 10/01/2018    GLUCOSE 88 01/04/2016    GLUF 88 07/17/2018    CALCIUM 8 5 10/01/2018    AST 18 10/01/2018    ALT 33 10/01/2018    ALKPHOS 76 10/01/2018    PROT 6 8 01/04/2016    BILITOT 0 63 01/04/2016    EGFR 68 10/01/2018       No results found for: IRON, TIBC, FERRITIN    No results found for: [id number]      ROS:   Review of Systems   Constitutional: Positive for fatigue  Negative for activity change, appetite change, chills, diaphoresis, fever and unexpected weight change  HENT: Negative for congestion, dental problem, facial swelling, hearing loss, mouth sores, nosebleeds, postnasal drip, rhinorrhea, sore throat, trouble swallowing and voice change  Eyes: Negative for photophobia, pain, discharge, redness, itching and visual disturbance  Respiratory: Positive for cough and shortness of breath  Negative for choking, chest tightness and wheezing  Cardiovascular: Positive for leg swelling  Negative for chest pain and palpitations  Gastrointestinal: Negative for abdominal distention, abdominal pain, anal bleeding, blood in stool, constipation, diarrhea, nausea, rectal pain and vomiting  Endocrine: Negative for cold intolerance and heat intolerance  Genitourinary: Negative for decreased urine volume, difficulty urinating, dysuria, flank pain, frequency, hematuria and urgency  Musculoskeletal: Negative for arthralgias, back pain, gait problem, joint swelling, myalgias, neck pain and neck stiffness  Skin: Negative for color change, pallor, rash and wound  Allergic/Immunologic: Negative for immunocompromised state  Neurological: Negative for dizziness, tremors, seizures, syncope, facial asymmetry, speech difficulty, weakness, light-headedness, numbness and headaches  Hematological: Negative for adenopathy  Does not bruise/bleed easily  Psychiatric/Behavioral: Negative for agitation, confusion, decreased concentration, dysphoric mood and sleep disturbance  The patient is not nervous/anxious  All other systems reviewed and are negative  Current Medications: Reviewed  Allergies: Reviewed  PMH/FH/SH:  Reviewed      Physical Exam:    Body surface area is 1 92 meters squared      Wt Readings from Last 3 Encounters:   10/16/18 80 7 kg (178 lb)   10/04/18 80 7 kg (178 lb) 08/30/18 79 4 kg (175 lb)        Temp Readings from Last 3 Encounters:   10/16/18 97 6 °F (36 4 °C) (Tympanic)   10/10/18 97 5 °F (36 4 °C)   10/04/18 97 6 °F (36 4 °C) (Tympanic)        BP Readings from Last 3 Encounters:   10/16/18 120/80   10/10/18 111/74   10/04/18 120/60         Pulse Readings from Last 3 Encounters:   10/16/18 91   10/10/18 74   10/04/18 88        Physical Exam   Constitutional: He is oriented to person, place, and time  He appears well-developed and well-nourished  No distress  HENT:   Head: Normocephalic and atraumatic  Mouth/Throat: Oropharynx is clear and moist  No oropharyngeal exudate  Eyes: Pupils are equal, round, and reactive to light  Conjunctivae and EOM are normal    Neck: Normal range of motion  Neck supple  No tracheal deviation present  No thyromegaly present  Cardiovascular: Normal rate and regular rhythm  Exam reveals no gallop and no friction rub  Murmur heard  Pulmonary/Chest: Effort normal  No respiratory distress  He has wheezes  He has rales  He exhibits no tenderness  Abdominal: Soft  Bowel sounds are normal  He exhibits no distension and no mass  There is no tenderness  There is no rebound and no guarding  Musculoskeletal: Normal range of motion  He exhibits edema ( +2 edema bilaterally)  Lymphadenopathy:     He has no cervical adenopathy  Neurological: He is alert and oriented to person, place, and time  Skin: Skin is warm and dry  No rash noted  He is not diaphoretic  No erythema  No pallor  Psychiatric: He has a normal mood and affect  His behavior is normal  Judgment and thought content normal    Vitals reviewed  Goals and Barriers:  Current Goal: Minimize effects of disease  Barriers: None  Patient's Capacity to Self Care:  Patient is able to self care      Code Status: [unfilled]

## 2018-10-17 ENCOUNTER — OFFICE VISIT (OUTPATIENT)
Dept: URGENT CARE | Facility: MEDICAL CENTER | Age: 83
End: 2018-10-17
Payer: MEDICARE

## 2018-10-17 VITALS
TEMPERATURE: 99.1 F | DIASTOLIC BLOOD PRESSURE: 62 MMHG | RESPIRATION RATE: 20 BRPM | SYSTOLIC BLOOD PRESSURE: 104 MMHG | HEART RATE: 92 BPM | WEIGHT: 178 LBS | BODY MASS INDEX: 27.88 KG/M2 | OXYGEN SATURATION: 96 %

## 2018-10-17 DIAGNOSIS — R05.9 COUGH: Primary | ICD-10-CM

## 2018-10-17 PROCEDURE — 99213 OFFICE O/P EST LOW 20 MIN: CPT | Performed by: PHYSICIAN ASSISTANT

## 2018-10-17 PROCEDURE — G0463 HOSPITAL OUTPT CLINIC VISIT: HCPCS | Performed by: PHYSICIAN ASSISTANT

## 2018-10-17 RX ORDER — AZITHROMYCIN 250 MG/1
TABLET, FILM COATED ORAL
Qty: 6 TABLET | Refills: 0 | Status: SHIPPED | OUTPATIENT
Start: 2018-10-17 | End: 2018-10-22

## 2018-10-17 NOTE — PATIENT INSTRUCTIONS
Acute Cough   WHAT YOU NEED TO KNOW:   An acute cough can last up to 3 weeks  Common causes of an acute cough include a cold, allergies, or a lung infection  DISCHARGE INSTRUCTIONS:   Return to the emergency department if:   · You have trouble breathing or feel short of breath  · You cough up blood, or you see blood in your mucus  · You faint or feel weak or dizzy  · You have chest pain when you cough or take a deep breath  · You have new wheezing  Contact your healthcare provider if:   · You have a fever  · Your cough lasts longer than 4 weeks  · Your symptoms do not improve with treatment  · You have questions or concerns about your condition or care  Medicines:   · Medicines  may be needed to stop the cough, decrease swelling in your airways, or help open your airways  Medicine may also be given to help you cough up mucus  Ask your healthcare provider what over-the-counter medicines you can take  If you have an infection caused by bacteria, you may need antibiotics  · Take your medicine as directed  Contact your healthcare provider if you think your medicine is not helping or if you have side effects  Tell him or her if you are allergic to any medicine  Keep a list of the medicines, vitamins, and herbs you take  Include the amounts, and when and why you take them  Bring the list or the pill bottles to follow-up visits  Carry your medicine list with you in case of an emergency  Manage your symptoms:   · Do not smoke and stay away from others who smoke  Nicotine and other chemicals in cigarettes and cigars can cause lung damage and make your cough worse  Ask your healthcare provider for information if you currently smoke and need help to quit  E-cigarettes or smokeless tobacco still contain nicotine  Talk to your healthcare provider before you use these products  · Drink extra liquids as directed  Liquids will help thin and loosen mucus so you can cough it up   Liquids will also help prevent dehydration  Examples of good liquids to drink include water, fruit juice, and broth  Do not drink liquids that contain caffeine  Caffeine can increase your risk for dehydration  Ask your healthcare provider how much liquid to drink each day  · Rest as directed  Do not do activities that make your cough worse, such as exercise  · Use a humidifier or vaporizer  Use a cool mist humidifier or a vaporizer to increase air moisture in your home  This may make it easier for you to breathe and help decrease your cough  · Eat 2 to 5 mL of honey 2 times each day  Honey can help thin mucus and decrease your cough  · Use cough drops or lozenges  These can help decrease throat irritation and your cough  Follow up with your healthcare provider as directed:  Write down your questions so you remember to ask them during your visits  © 2017 [employer] Information is for End User's use only and may not be sold, redistributed or otherwise used for commercial purposes  All illustrations and images included in CareNotes® are the copyrighted property of [employer]  or [employer]  The above information is an  only  It is not intended as medical advice for individual conditions or treatments  Talk to your doctor, nurse or pharmacist before following any medical regimen to see if it is safe and effective for you

## 2018-10-17 NOTE — PROGRESS NOTES
[facility] [employer] [facility]      NAME: Cinthya Lance is a 81 y o  male  : 1935    MRN: [id number]  DATE: 2018  TIME: 3:32 PM    Assessment and Plan   Cough [R05]  1  Cough  budesonide (PULMICORT) 90 MCG/ACT inhaler    azithromycin (ZITHROMAX) 250 mg tablet       Patient Instructions       Advised patient that he needs to follow up his PCP, as well as with the special appointments  I did prescribe Pulmicort inhaler, because the ProAir might be causing bronchospasms  Also prescribed the patient does 3 mycin  He did have a low-grade fever, during physical examination  Anticipatory guidelines, as well as strict go to the emergency room instructions were provided to the patient his wife  They both expressed understanding  Proceed to  ER if symptoms worsen  Chief Complaint     Chief Complaint   Patient presents with    Cough     ineffective airway clearance re: increased secretions ~ past 24 hours; no WOB @ present         History of Present Illness   Cinthya Lance presents to the clinic c/o      79-year-old male presents for evaluation of cough, congestion with history of Right upper lobe adenocarcinoma, status post lobectomy and 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, multiple compression fractures, status post kyphoplasty to L1, L2, L5  He has been using Mucinex, which she feels has been helping  He has also been using a ProAir inhaler, which he feels has been worsening his situation  He states the cough is worse at night, when he lays down  He denies any chest pain, or post-tussive emesis  He does present with a low-grade fever today  He does have a history of COPD as well, and has been using his medications as directed  He is concerned, because in March of this year, he did get admitted to the hospital due to pneumonia  Review of Systems   Review of Systems   Respiratory: Positive for cough and shortness of breath      Cardiovascular: Negative for chest pain and leg swelling  Gastrointestinal: Negative for vomiting  Current Medications     Long-Term Prescriptions   Medication Sig Dispense Refill    budesonide (PULMICORT) 90 MCG/ACT inhaler Inhale 1 puff 2 (two) times a day 1 Inhaler 0    lidocaine-prilocaine (EMLA) cream APPLY TO AREA AND COVER WITH TAPE 1 HOUR BEFORE APPOINTMENT   2    melphalan (ALKERAN) 2 MG tablet 6 tablets daily D1-4 monthly   12mg daily D1-4 24 tablet 0    RAPAFLO 8 MG CAPS Take 1 capsule by mouth daily  3    spironolactone (ALDACTONE) 50 mg tablet Take 1 tablet (50 mg total) by mouth daily 30 tablet 3       Current Allergies     Allergies as of 10/17/2018 - Reviewed 10/17/2018   Allergen Reaction Noted    Sulfa antibiotics Itching 03/03/2016    Penicillin v Rash 07/21/2017            The following portions of the patient's history were reviewed and updated as appropriate: allergies, current medications, past family history, past medical history, past social history, past surgical history and problem list     HISTORICAL INFO:  Past Medical History:   Diagnosis Date    [facility]     lung and multiple myloma    Chemoprevention     Chronic pain disorder     3 fractured vertebrae    [facility]     Diarrhea     Emphysema lung [facility]     Enlarged prostate     [place] (hard of hearing)     Kidney stone     Low blood pressure     [facility]     [facility] myeloma [facility]     Multiple myeloma [facility]     Pneumonia     Pulmonary emphysema [facility]     [facility]al [facility]     Shortness of breath     Sleep apnea     no cpap     Past Surgical History:   Procedure Laterality Date    CHOLECYSTECTOMY      COLONOSCOPY W/ ENDOSCOPIC US N/A 7/21/2017    Procedure: ANAL ENDOSCOPIC U/S;  Surgeon: Jesus Manuel Guerin MD;  [facility]: [facility];   Service: Colorectal    HERNIA REPAIR      KIDNEY STONE SURGERY      LUNG LOBECTOMY Right     CO COLONOSCOPY FLX DX W/COLLJ SPEC WHEN PFRMD N/A 7/10/2017    Procedure: COLONOSCOPY;  Surgeon: Margot Roberts MD;  Location: [facility]; Service: Gastroenterology    IN RECTAL TUMOR EXCISION, TRANSANAL ENDOSCOPIC MICROSURGICAL, FULL THICK N/A 11/2/2017    Procedure: TRANSANAL ENDOSCOPIC MICROSURGERY TEM;  Surgeon: Karo Kendall MD;  Location:  MAIN OR;  Service: Colorectal       Objective   /62   Pulse 92   Temp 99 1 °F (37 3 °C)   Resp 20   Wt 80 7 kg (178 lb)   SpO2 96%   BMI 27 88 kg/m²        Physical Exam     Physical Exam   Constitutional: He is oriented to person, place, and time  He appears well-developed and well-nourished  No distress  HENT:   Head: Normocephalic and atraumatic  Cardiovascular: Normal rate, regular rhythm and normal heart sounds  Pulmonary/Chest: Effort normal  No respiratory distress  He has wheezes ([place] diffusely and bilaterally upon expiration, as well as rhonchi)  He has no rales  Musculoskeletal: Normal range of motion  Neurological: He is alert and oriented to person, place, and time  No cranial nerve deficit  Skin: Skin is warm and dry  He is not diaphoretic  Nursing note and vitals reviewed  M*Modal software was used to dictate this note  It may contain errors with dictating incorrect words/spelling  Please contact provider directly for any questions

## 2018-10-22 ENCOUNTER — HOSPITAL ENCOUNTER (EMERGENCY)
Facility: HOSPITAL | Age: 83
Discharge: HOME | End: 2018-10-22
Attending: EMERGENCY MEDICINE
Payer: MEDICARE

## 2018-10-22 ENCOUNTER — APPOINTMENT (EMERGENCY)
Dept: RADIOLOGY | Facility: HOSPITAL | Age: 83
End: 2018-10-22
Payer: MEDICARE

## 2018-10-22 VITALS
DIASTOLIC BLOOD PRESSURE: 52 MMHG | RESPIRATION RATE: 16 BRPM | BODY MASS INDEX: 27.97 KG/M2 | HEART RATE: 105 BPM | OXYGEN SATURATION: 95 % | WEIGHT: 178.57 LBS | TEMPERATURE: 98.1 F | SYSTOLIC BLOOD PRESSURE: 95 MMHG

## 2018-10-22 DIAGNOSIS — J20.9 ACUTE BRONCHITIS: Primary | ICD-10-CM

## 2018-10-22 DIAGNOSIS — J44.9 CHRONIC OBSTRUCTIVE PULMONARY DISEASE, UNSPECIFIED COPD TYPE (HCC): Chronic | ICD-10-CM

## 2018-10-22 LAB
ALBUMIN SERPL BCP-MCNC: 2.7 G/DL (ref 3.5–5)
ALP SERPL-CCNC: 86 U/L (ref 46–116)
ALT SERPL W P-5'-P-CCNC: 73 U/L (ref 12–78)
ANION GAP SERPL CALCULATED.3IONS-SCNC: 7 MMOL/L (ref 4–13)
APTT PPP: 31 SECONDS (ref 24–36)
AST SERPL W P-5'-P-CCNC: 25 U/L (ref 5–45)
BACTERIA UR QL AUTO: ABNORMAL /HPF
BACTERIA UR QL AUTO: ABNORMAL /HPF
BASOPHILS # BLD AUTO: 0.01 THOUSANDS/ΜL (ref 0–0.1)
BASOPHILS NFR BLD AUTO: 0 % (ref 0–1)
BILIRUB SERPL-MCNC: 1.38 MG/DL (ref 0.2–1)
BILIRUB UR QL STRIP: NEGATIVE
BILIRUB UR QL STRIP: NEGATIVE
BUN SERPL-MCNC: 16 MG/DL (ref 5–25)
CALCIUM SERPL-MCNC: 8.7 MG/DL (ref 8.3–10.1)
CHLORIDE SERPL-SCNC: 102 MMOL/L (ref 100–108)
CLARITY UR: CLEAR
CLARITY UR: CLEAR
CO2 SERPL-SCNC: 29 MMOL/L (ref 21–32)
COLOR UR: YELLOW
COLOR UR: YELLOW
CREAT SERPL-MCNC: 1.1 MG/DL (ref 0.6–1.3)
EOSINOPHIL # BLD AUTO: 0.23 THOUSAND/ΜL (ref 0–0.61)
EOSINOPHIL NFR BLD AUTO: 5 % (ref 0–6)
ERYTHROCYTE [DISTWIDTH] IN BLOOD BY AUTOMATED COUNT: 14 % (ref 11.6–15.1)
GFR SERPL CREATININE-BSD FRML MDRD: 62 ML/MIN/1.73SQ M
GLUCOSE SERPL-MCNC: 89 MG/DL (ref 65–140)
GLUCOSE UR STRIP-MCNC: NEGATIVE MG/DL
GLUCOSE UR STRIP-MCNC: NEGATIVE MG/DL
HCT VFR BLD AUTO: 36 % (ref 36.5–49.3)
HGB BLD-MCNC: 11.7 G/DL (ref 12–17)
HGB UR QL STRIP.AUTO: ABNORMAL
HGB UR QL STRIP.AUTO: ABNORMAL
IMM GRANULOCYTES # BLD AUTO: 0.09 THOUSAND/UL (ref 0–0.2)
IMM GRANULOCYTES NFR BLD AUTO: 2 % (ref 0–2)
INR PPP: 0.91 (ref 0.86–1.17)
KETONES UR STRIP-MCNC: NEGATIVE MG/DL
KETONES UR STRIP-MCNC: NEGATIVE MG/DL
LACTATE SERPL-SCNC: 1.3 MMOL/L (ref 0.5–2)
LEUKOCYTE ESTERASE UR QL STRIP: NEGATIVE
LEUKOCYTE ESTERASE UR QL STRIP: NEGATIVE
LYMPHOCYTES # BLD AUTO: 0.56 THOUSANDS/ΜL (ref 0.6–4.47)
LYMPHOCYTES NFR BLD AUTO: 11 % (ref 14–44)
MCH RBC QN AUTO: 31.7 PG (ref 26.8–34.3)
MCHC RBC AUTO-ENTMCNC: 32.5 G/DL (ref 31.4–37.4)
MCV RBC AUTO: 98 FL (ref 82–98)
MONOCYTES # BLD AUTO: 0.38 THOUSAND/ΜL (ref 0.17–1.22)
MONOCYTES NFR BLD AUTO: 8 % (ref 4–12)
NEUTROPHILS # BLD AUTO: 3.8 THOUSANDS/ΜL (ref 1.85–7.62)
NEUTS SEG NFR BLD AUTO: 74 % (ref 43–75)
NITRITE UR QL STRIP: NEGATIVE
NITRITE UR QL STRIP: NEGATIVE
NON-SQ EPI CELLS URNS QL MICRO: ABNORMAL /HPF
NON-SQ EPI CELLS URNS QL MICRO: ABNORMAL /HPF
NRBC BLD AUTO-RTO: 0 /100 WBCS
PH UR STRIP.AUTO: 6 [PH] (ref 4.5–8)
PH UR STRIP.AUTO: 6 [PH] (ref 4.5–8)
PLATELET # BLD AUTO: 123 THOUSANDS/UL (ref 149–390)
PMV BLD AUTO: 9.2 FL (ref 8.9–12.7)
POTASSIUM SERPL-SCNC: 3.9 MMOL/L (ref 3.5–5.3)
PROCALCITONIN SERPL-MCNC: <0.05 NG/ML
PROT SERPL-MCNC: 6.4 G/DL (ref 6.4–8.2)
PROT UR STRIP-MCNC: ABNORMAL MG/DL
PROT UR STRIP-MCNC: ABNORMAL MG/DL
PROTHROMBIN TIME: 12.4 SECONDS (ref 11.8–14.2)
RBC # BLD AUTO: 3.69 MILLION/UL (ref 3.88–5.62)
RBC #/AREA URNS AUTO: ABNORMAL /HPF
RBC #/AREA URNS AUTO: ABNORMAL /HPF
SODIUM SERPL-SCNC: 138 MMOL/L (ref 136–145)
SP GR UR STRIP.AUTO: 1.02 (ref 1–1.03)
SP GR UR STRIP.AUTO: 1.02 (ref 1–1.03)
UROBILINOGEN UR QL STRIP.AUTO: 0.2 E.U./DL
UROBILINOGEN UR QL STRIP.AUTO: 0.2 E.U./DL
WBC # BLD AUTO: 5.07 THOUSAND/UL (ref 4.31–10.16)
WBC #/AREA URNS AUTO: ABNORMAL /HPF
WBC #/AREA URNS AUTO: ABNORMAL /HPF

## 2018-10-22 PROCEDURE — 85025 COMPLETE CBC W/AUTO DIFF WBC: CPT | Performed by: EMERGENCY MEDICINE

## 2018-10-22 PROCEDURE — 87631 RESP VIRUS 3-5 TARGETS: CPT | Performed by: EMERGENCY MEDICINE

## 2018-10-22 PROCEDURE — 81001 URINALYSIS AUTO W/SCOPE: CPT

## 2018-10-22 PROCEDURE — 94640 AIRWAY INHALATION TREATMENT: CPT

## 2018-10-22 PROCEDURE — 87040 BLOOD CULTURE FOR BACTERIA: CPT | Performed by: EMERGENCY MEDICINE

## 2018-10-22 PROCEDURE — 83605 ASSAY OF LACTIC ACID: CPT | Performed by: EMERGENCY MEDICINE

## 2018-10-22 PROCEDURE — 85730 THROMBOPLASTIN TIME PARTIAL: CPT | Performed by: EMERGENCY MEDICINE

## 2018-10-22 PROCEDURE — 80053 COMPREHEN METABOLIC PANEL: CPT | Performed by: EMERGENCY MEDICINE

## 2018-10-22 PROCEDURE — 84145 PROCALCITONIN (PCT): CPT | Performed by: EMERGENCY MEDICINE

## 2018-10-22 PROCEDURE — 96365 THER/PROPH/DIAG IV INF INIT: CPT

## 2018-10-22 PROCEDURE — 71046 X-RAY EXAM CHEST 2 VIEWS: CPT

## 2018-10-22 PROCEDURE — 36415 COLL VENOUS BLD VENIPUNCTURE: CPT | Performed by: EMERGENCY MEDICINE

## 2018-10-22 PROCEDURE — 85610 PROTHROMBIN TIME: CPT | Performed by: EMERGENCY MEDICINE

## 2018-10-22 PROCEDURE — 99284 EMERGENCY DEPT VISIT MOD MDM: CPT

## 2018-10-22 RX ORDER — LEVOFLOXACIN 5 MG/ML
750 INJECTION, SOLUTION INTRAVENOUS ONCE
Status: COMPLETED | OUTPATIENT
Start: 2018-10-22 | End: 2018-10-22

## 2018-10-22 RX ORDER — BENZONATATE 100 MG/1
100 CAPSULE ORAL 3 TIMES DAILY PRN
Qty: 21 CAPSULE | Refills: 0 | Status: SHIPPED | OUTPATIENT
Start: 2018-10-22 | End: 2018-10-29

## 2018-10-22 RX ORDER — AZITHROMYCIN 250 MG/1
TABLET, FILM COATED ORAL
Qty: 6 TABLET | Refills: 0 | Status: SHIPPED | OUTPATIENT
Start: 2018-10-22 | End: 2018-10-26

## 2018-10-22 RX ORDER — 0.9 % SODIUM CHLORIDE 0.9 %
3 VIAL (ML) INJECTION AS NEEDED
Status: DISCONTINUED | OUTPATIENT
Start: 2018-10-22 | End: 2018-10-22 | Stop reason: HOSPADM

## 2018-10-22 RX ORDER — IPRATROPIUM BROMIDE AND ALBUTEROL SULFATE 2.5; .5 MG/3ML; MG/3ML
3 SOLUTION RESPIRATORY (INHALATION) ONCE
Status: COMPLETED | OUTPATIENT
Start: 2018-10-22 | End: 2018-10-22

## 2018-10-22 RX ORDER — ALBUTEROL SULFATE 90 UG/1
2 AEROSOL, METERED RESPIRATORY (INHALATION) EVERY 4 HOURS PRN
Qty: 1 INHALER | Refills: 0 | Status: SHIPPED | OUTPATIENT
Start: 2018-10-22 | End: 2019-01-09 | Stop reason: SDUPTHER

## 2018-10-22 RX ADMIN — IPRATROPIUM BROMIDE AND ALBUTEROL SULFATE 3 ML: .5; 3 SOLUTION RESPIRATORY (INHALATION) at 17:02

## 2018-10-22 RX ADMIN — LEVOFLOXACIN 750 MG: 5 INJECTION, SOLUTION INTRAVENOUS at 17:29

## 2018-10-22 NOTE — DISCHARGE INSTRUCTIONS
Acute Bronchitis   WHAT YOU SHOULD KNOW:   Acute bronchitis is swelling and irritation in the air passages of your lungs  This irritation may cause you to cough or have other breathing problems  Acute bronchitis often starts because of another viral illness, such as a cold or the flu  The illness spreads from your nose and throat to your windpipe and airways  Bronchitis is often called a chest cold  Acute bronchitis lasts about 2 weeks and is usually not a serious illness  AFTER YOU LEAVE:   Medicines:   · Ibuprofen or acetaminophen:  These medicines help lower a fever  They are available without a doctor's order  Ask your healthcare provider which medicine is right for you  Ask how much to take and how often to take it  Follow directions  These medicines can cause stomach bleeding if not taken correctly  Ibuprofen can cause kidney damage  Do not take ibuprofen if you have kidney disease, an ulcer, or allergies to aspirin  Acetaminophen can cause liver damage  Do not drink alcohol if you take acetaminophen  · Cough medicine: This medicine helps loosen mucus in your lungs and make it easier to cough up  This can help you breathe easier  · Inhalers: You may need one or more inhalers to help you breathe easier and cough less  An inhaler gives your medicine in a mist form so that you can breathe it into your lungs  Ask your healthcare provider to show you how to use your inhaler correctly  · Steroid medicine:  Steroid medicine helps open your air passages so you can breathe easier  · Take your medicine as directed  Call your healthcare provider if you think your medicine is not helping or if you have side effects  Tell him if you are allergic to any medicine  Keep a list of the medicines, vitamins, and herbs you take  Include the amounts, and when and why you take them  Bring the list or the pill bottles to follow-up visits  Carry your medicine list with you in case of an emergency    How to use an inhaler:   · Shake the inhaler well to make sure you get the correct amount of medicine per puff  Remove the cover from your inhaler's mouthpiece  If you are using a spacer, connect your inhaler to the flat end of the spacer  · Exhale as much air from your lungs as you can  Put the mouthpiece in your mouth past your front teeth and rest it on the top of your tongue  Do not block the mouthpiece opening with your tongue  · Breathe in through your mouth at a slow and steady rate  As you do this, press the inhaler to release the puff of medicine  Finish breathing in slowly and deeply as you inhale the medicine  When your lungs are full, hold your breath for 10 seconds  Then breathe out slowly through puckered lips or through your nose  · If you need to take more puffs, wait at least 1 minute between each puff  · Rinse your mouth with water after you use the inhaler  This may keep you from getting a mouth infection or irritation  · Follow the instructions that come with your inhaler to clean it  You should clean your inhaler at least once a week  Ways to care for yourself:   · Avoid alcohol:  Alcohol dulls your urge to cough and sneeze  When you have bronchitis, you need to be able to cough and sneeze to clear your air passages  Alcohol also causes your body to lose fluid  This can make the mucus in your lungs thicker and harder to cough up  · Avoid irritants in the air:  Do not smoke or allow others to smoke around you  Avoid chemicals, fumes, and dust  Wear a face mask if you must work around dust or fumes  Stay inside on days when air pollution levels are high  If you have allergies, stay inside when pollen counts are high  Avoid aerosol products  This includes spray-on deodorant, bug spray, and hair spray  · Drink more liquids:  Most people should drink at least 8 eight-ounce cups of water a day  You may need to drink more liquids when you have acute bronchitis   Liquids help keep your air passages moist and help you cough up mucus  · Get more rest:  You may feel like resting more  Slowly start to do more each day  Rest when you feel it is needed  · Eat healthy foods:  Eat a variety healthy foods every day  Your diet should include fruits, vegetables, breads, and protein (such as chicken, fish, and beans)  Dairy products (such as milk, cheese, and ice cream) can sometimes increase the amount of mucus your body makes  Ask if you should decrease your intake of dairy products  · Use a humidifier:  Use a cool mist humidifier to increase air moisture in your home  This may make it easier for you to breathe and help decrease your cough  Decrease your risk of acute bronchitis:   · Get the vaccinations you need:  Ask your healthcare provider if you should get vaccinated against the flu or pneumonia  · Avoid things that may irritate your lungs:  Stay inside or cover your mouth and nose with a scarf when you are outside during cold weather  You should also stay inside on days when air pollution levels are high  If you have allergies, stay inside when pollen counts are high  Avoid using aerosol products in your home  This includes spray-on deodorant, bug spray, and hair spray  · Avoid the spread of germs:        [facility] your hands often with soap and water  Carry germ-killing gel with you  You can use the gel to clean your hands when there is no soap and water available  ¨ Do not touch your eyes, nose, or mouth unless you have washed your hands first     ¨ Always cover your mouth when you cough  Cough into a tissue or your shirtsleeve so you do not spread germs from your hands  ¨ Try to avoid people who have a cold or the flu  If you are sick, stay away from others as much as possible  Follow up with your healthcare provider as directed:  Write down questions you have so you will remember to ask them during your follow-up visits    Contact your healthcare provider if:   · You have a fever     · Your skin becomes itchy or you have a rash after you take your medicine  · Your breathing problems do not go away or get worse  · Your cough does not get better with treatment  · You cough up blood  · You have questions or concerns about your condition or care  Seek care immediately or call 911 if:   · You faint  · Your lips or fingernails turn blue  · You feel like you are not getting enough air when you breathe  · You have swelling of your lips, tongue, or throat that makes it hard to breathe or swallow  © 2014 [address] [employer] is for End User's use only and may not be sold, redistributed or otherwise used for commercial purposes  All illustrations and images included in CareNotes® are the copyrighted property of [employer]  or [employer]  The above information is an  only  It is not intended as medical advice for individual conditions or treatments  Talk to your doctor, nurse or pharmacist before following any medical regimen to see if it is safe and effective for you

## 2018-10-22 NOTE — ED PROVIDER NOTES
History  Chief Complaint   Patient presents with    Cough     reports cough x2 weeks  States was seen at urgent care and diagnosed with bronchitis  Patient tachypnic and coughing in triage  History provided by:  Patient and significant other  Cough   Cough characteristics:  Productive  Sputum characteristics:  Clear  Severity:  Severe  Onset quality:  Gradual  Duration:  3 weeks  Timing:  Constant  Progression:  Worsening  Chronicity:  New  Relieved by:  Nothing  Worsened by:  Nothing  Ineffective treatments:  Beta-agonist inhaler  Associated symptoms: no chest pain, no chills, no diaphoresis, no ear pain, no fever, no myalgias, no rash, no rhinorrhea, no shortness of breath, no sore throat and no wheezing        Prior to Admission Medications   Prescriptions Last Dose Informant Patient Reported? Taking? DHA-EPA-Coenzyme Q10-Vitamin E (CO Q-10 VITAMIN E FISH OIL) 61-25- CAPS  Spouse/Significant Other Yes No   Sig: Take 1 capsule by mouth daily   Lactobacillus-Inulin ([address] PO)  Spouse/Significant Other Yes No   Sig: Take by mouth daily   Loperamide HCl (IMODIUM PO)  Spouse/Significant Other Yes No   Sig: Take 1 tablet by mouth as needed   RAPAFLO 8 MG CAPS  Spouse/Significant Other Yes No   Sig: Take 1 capsule by mouth daily   albuterol (PROVENTIL HFA,VENTOLIN HFA) 90 mcg/act inhaler  Spouse/Significant Other No No   Sig: Inhale 2 puffs every 4 (four) hours as needed for wheezing   azithromycin (ZITHROMAX) 250 mg tablet   No No   Sig: Take 2 tablets on day 1   Take 1 tablet on day 2-5    budesonide (PULMICORT) 90 MCG/ACT inhaler   No No   Sig: Inhale 1 puff 2 (two) times a day   cholecalciferol (VITAMIN D3) 1,000 units tablet  Spouse/Significant Other Yes No   Sig: Take 5,000 Units by mouth daily     cyanocobalamin (VITAMIN B-12) 1,000 mcg tablet  Spouse/Significant Other Yes No   Sig: Take by mouth daily   dexamethasone (DECADRON) 4 mg tablet  Spouse/Significant Other No No Si tablets every week with food   diphenoxylate-atropine (LOMOTIL) 2 5-0 025 mg per tablet  Spouse/Significant Other No No   Sig: Take 1 tablet by mouth 4 (four) times a day as needed for diarrhea   finasteride (PROSCAR) 5 mg tablet  Spouse/Significant Other Yes No   Sig: Take 5 mg by mouth daily at bedtime  guaiFENesin (MUCINEX) 600 mg 12 hr tablet  Spouse/Significant Other No No   Sig: Take 1 tablet (600 mg total) by mouth every 12 (twelve) hours   Patient taking differently: Take 600 mg by mouth every 12 (twelve) hours as needed     ipratropium-albuterol (DUO-NEB) 0 5-2 5 mg/mL  Spouse/Significant Other Yes No   Sig: Take 3 mL by nebulization daily     lenalidomide (REVLIMID) 5 MG CAPS  Spouse/Significant Other No No   Sig: Take 1 capsule (5 mg total) by mouth daily 3 weeks on/3 weeks off adult male/auth# [id number]   lidocaine-prilocaine (EMLA) cream  Spouse/Significant Other Yes No   Sig: APPLY TO AREA AND COVER WITH TAPE 1 HOUR BEFORE APPOINTMENT    melphalan (ALKERAN) 2 MG tablet  Spouse/Significant Other No No   Si tablets daily D1-4 monthly   12mg daily D1-4   predniSONE 50 mg tablet  Spouse/Significant Other No No   Sig: BID WITH MEALS D1-4   spironolactone (ALDACTONE) 50 mg tablet  Spouse/Significant Other No No   Sig: Take 1 tablet (50 mg total) by mouth daily      Facility-Administered Medications: None       Past Medical History:   Diagnosis Date    Cancer ([facility] [address] )     lung and multiple myloma    Chemoprevention     Chronic pain disorder     3 fractured vertebrae    COPD (chronic obstructive pulmonary disease) (HCC)     Diarrhea     Emphysema lung (HCC)     Enlarged prostate     [place] (hard of hearing[place]     Kidney stone     Low blood pressure     Lung cancer (HCC)     Multiple myeloma (HCC)     Multiple myeloma (HCC)     Pneumonia     Pulmonary emphysema (HCC)     Rectal adenocarcinoma (HCC)     Shortness of breath     Sleep apnea     no cpap       Past Surgical History: Procedure Laterality Date    CHOLECYSTECTOMY      COLONOSCOPY W/ ENDOSCOPIC US N/A 7/21/2017    Procedure: ANAL ENDOSCOPIC U/S;  Surgeon: Agustin Mcintosh MD;  [facility]: [facility]; Service: Colorectal    HERNIA REPAIR      KIDNEY STONE SURGERY      LUNG LOBECTOMY Right     CO COLONOSCOPY FLX DX W/COLLJ SPEC WHEN PFRMD N/A 7/10/2017    Procedure: COLONOSCOPY;  Surgeon: Caroline Peoples MD;  [facility]; Service: Gastroenterology    CO RECTAL TUMOR EXCISION, TRANSANAL ENDOSCOPIC MICROSURGICAL, FULL THICK N/A 11/2/2017    Procedure: TRANSANAL ENDOSCOPIC MICROSURGERY TEM;  Surgeon: Agustin Mcintosh MD;  [facility];  Service: Colorectal       Family History   Problem Relation Age of Onset    Arthritis Mother     Colon cancer Father     Heart attack Father     Diabetes type I Sister      I have reviewed and agree with the history as documented  Social History   Substance Use Topics    Smoking status: Former Smoker     Packs/day: 1 00     Years: 50 00     Types: Cigarettes     Quit date: 12/7/2010    Smokeless tobacco: Never Used      Comment: quit in 1999    Alcohol use Yes      Comment: rarely        Review of Systems   Constitutional: Negative for activity change, appetite change, chills, diaphoresis, fatigue and fever  HENT: Positive for congestion  Negative for dental problem, ear pain, rhinorrhea and sore throat  Eyes: Negative for pain and redness  Respiratory: Positive for cough  Negative for chest tightness, shortness of breath and wheezing  Cardiovascular: Negative for chest pain and palpitations  Gastrointestinal: Negative for abdominal pain, blood in stool, constipation, diarrhea, nausea and vomiting  Endocrine: Negative for cold intolerance and heat intolerance  Genitourinary: Negative for dysuria, frequency and hematuria  Musculoskeletal: Negative for arthralgias and myalgias  Skin: Negative for color change, pallor and rash     Neurological: Negative for weakness and numbness  Hematological: Does not bruise/bleed easily  Psychiatric/Behavioral: Negative for agitation, hallucinations and suicidal ideas  Physical Exam  Physical Exam   Constitutional: He is oriented to person, place, and time  He appears well-developed and well-nourished  HENT:   Mouth/Throat: No oropharyngeal exudate  TMs normal bilaterally no pharyngeal erythema no rhinorrhea nontender palpation of sinuses, normal looking turbinates   Eyes: Conjunctivae and EOM are normal    Neck: Normal range of motion  Neck supple  No meningeal signs   Cardiovascular: Normal rate, regular rhythm, normal heart sounds and intact distal pulses  Pulmonary/Chest: Effort normal  No respiratory distress  He exhibits no tenderness  boarse breath sounds LLL faint wheezing symmetric bilateral   Abdominal: Soft  Bowel sounds are normal  He exhibits no distension and no mass  There is no tenderness  No hernia  No cvat   Musculoskeletal: Normal range of motion  He exhibits no edema  Lymphadenopathy:     He has no cervical adenopathy  Neurological: He is alert and oriented to person, place, and time  No cranial nerve deficit  Skin: No rash noted  No erythema  No edema   Psychiatric: He has a normal mood and affect  His behavior is normal    Nursing note and vitals reviewed        Vital Signs  ED Triage Vitals   Temperature Pulse Respirations Blood Pressure SpO2   10/22/18 1606 10/22/18 1606 10/22/18 1606 10/22/18 1606 10/22/18 1606   98 1 °F (36 7 °C) (!) 110 (!) 28 106/65 95 %      Temp Source Heart Rate Source Patient Position - Orthostatic VS BP Location FiO2 (%)   10/22/18 1606 10/22/18 1606 10/22/18 1744 10/22/18 1744 --   Oral Monitor Lying Right arm       Pain Score       10/22/18 1606       No Pain           Vitals:    10/22/18 1606 10/22/18 1744   BP: 106/65 95/52   Pulse: (!) 110 105   Patient Position - Orthostatic VS:  Lying       Visual Acuity      ED Medications  Medications sodium chloride (PF) 0 9 % injection 3 mL (not administered)   levofloxacin (LEVAQUIN) IVPB (premix) 750 mg (750 mg Intravenous New Bag 10/22/18 1729)   ipratropium-albuterol (DUO-NEB) 0 5-2 5 mg/3 mL inhalation solution 3 mL (3 mL Nebulization Given 10/22/18 1702)       Diagnostic Studies  Results Reviewed     Procedure Component Value Units Date/Time    Urine Microscopic [[id number]] Collected:  10/22/18 1734    Lab Status: In process Specimen:  Urine from Urine, Clean Catch Updated:  10/22/18 1757    UA w Reflex to Microscopic w Reflex to Culture [[id number]] Collected:  10/22/18 1723    Lab Status: In process Specimen:  Urine from Urine, Clean Catch Updated:  10/22/18 1757    Comprehensive metabolic panel [[id number]]  (Abnormal) Collected:  10/22/18 1633    Lab Status:  Final result Specimen:  Blood from Arm, Left Updated:  10/22/18 1725     Sodium 138 mmol/L      Potassium 3 9 mmol/L      Chloride 102 mmol/L      CO2 29 mmol/L      ANION GAP 7 mmol/L      BUN 16 mg/dL      Creatinine 1 10 mg/dL      Glucose 89 mg/dL      Calcium 8 7 mg/dL      AST 25 U/L      ALT 73 U/L      Alkaline Phosphatase 86 U/L      Total Protein 6 4 g/dL      Albumin 2 7 (L) g/dL      Total Bilirubin 1 38 (H) mg/dL      eGFR 62 ml/min/1 73sq m     Narrative:         National Kidney Disease Education Program recommendations are as follows:  GFR calculation is accurate only with a steady state creatinine  Chronic Kidney disease less than 60 ml/min/1 73 sq  meters  Kidney failure less than 15 ml/min/1 73 sq  meters      ED Urine Macroscopic [[id number]]  (Abnormal) Collected:  10/22/18 1734    Lab Status:  Final result Specimen:  Urine Updated:  10/22/18 1722     Color, UA Yellow     Clarity, UA Clear     pH, UA 6 0     Leukocytes, UA Negative     Nitrite, UA Negative     Protein, UA Trace (A) mg/dl      Glucose, UA Negative mg/dl      Ketones, UA Negative mg/dl      Urobilinogen, UA 0 2 E U /dl      Bilirubin, UA Negative     Blood, UA Trace (A)     Specific [place], UA 1 020    Narrative:       CLINITEK RESULT    Lactic acid x2 [[id number]]  (Normal) Collected:  10/22/18 1634    Lab Status:  Final result Specimen:  Blood from Arm, Left Updated:  10/22/18 1714     LACTIC ACID 1 3 mmol/L     Narrative:         Result may be elevated if tourniquet was used during collection  Protime-INR [[id number]]  (Normal) Collected:  10/22/18 1633    Lab Status:  Final result Specimen:  Blood from Arm, Left Updated:  10/22/18 1707     Protime 12 4 seconds      INR 0 91    APTT [[id number]]  (Normal) Collected:  10/22/18 1633    Lab Status:  Final result Specimen:  Blood from Arm, Left Updated:  10/22/18 1707     PTT 31 seconds     Influenza A/B and RSV by PCR (Indicated for patients > 2 mo of age) [[id number]] Collected:  10/22/18 1701    Lab Status: In process Specimen:  Nasopharyngeal from Nasopharyngeal Swab Updated:  10/22/18 1706    CBC and differential [[id number]]  (Abnormal) Collected:  10/22/18 1633    Lab Status:  Final result Specimen:  Blood from Arm, Left Updated:  10/22/18 1646     WBC 5 07 Thousand/uL      RBC 3 69 (L) Million/uL      Hemoglobin 11 7 (L) g/dL      Hematocrit 36 0 (L) %      MCV 98 fL      MCH 31 7 pg      MCHC 32 5 g/dL      RDW 14 0 %      MPV 9 2 fL      Platelets 504 (L) Thousands/uL      nRBC 0 /100 WBCs      Neutrophils Relative 74 %      Immat GRANS % 2 %      Lymphocytes Relative 11 (L) %      Monocytes Relative 8 %      Eosinophils Relative 5 %      Basophils Relative 0 %      Neutrophils Absolute 3 80 Thousands/µL      Immature Grans Absolute 0 09 Thousand/uL      Lymphocytes Absolute 0 56 (L) Thousands/µL      Monocytes Absolute 0 38 Thousand/µL      Eosinophils Absolute 0 23 Thousand/µL      Basophils Absolute 0 01 Thousands/µL     Blood culture #1 [[id number]] Collected:  10/22/18 1634    Lab Status:   In process Specimen:  Blood from Arm, Right Updated:  10/22/18 1643    Blood culture #2 [[id number]] Collected:  10/22/18 1634    Lab Status: In process Specimen:  Blood from Arm, Left Updated:  10/22/18 1642    Procalcitonin [[id number]] Collected:  10/22/18 1633    Lab Status: In process Specimen:  Blood from Arm, Left Updated:  10/22/18 1642    Lactic acid x2 [[id number]]     Lab Status:  No result Specimen:  Blood                  XR chest pa & lateral   ED Interpretation by Alexandra Sosa MD (10/22 1706)   Primary reviewed  No acute abnormality  Procedures  Procedures       Phone Contacts  ED Phone Contact    ED Course  ED Course as of Oct 22 1844   Mon Oct 22, 2018   1842 Workup reviewed and benign  Patient's symptoms have improved after DuoNeb  Patient be treated for acute bronchitis and discharged home  Initial Sepsis Screening     Row Name 10/22/18 1637                Is the patient's history suggestive of a new or worsening infection? (!)  Yes (Proceed)  -MH        Suspected source of infection pneumonia  -MH        Are two or more of the following signs & symptoms of infection both present and new to the patient? (!)  Yes (Proceed)  -        Indicate SIRS criteria Tachypnea > 20 resp per min; Tachycardia > 90 bpm  -MH        If the answer is yes to both questions, suspicion of sepsis is present  --        If severe sepsis is present AND tissue hypoperfusion perists in the hour after fluid resuscitation or lactate > 4, the patient meets criteria for SEPTIC SHOCK  --        Are any of the following organ dysfunction criteria present within 6 hours of suspected infection and SIRS criteria that are NOT considered to be chronic conditions?   --        Organ dysfunction  --        Date of presentation of severe sepsis  --        Time of presentation of severe sepsis  --        Tissue hypoperfusion persists in the hour after crystalloid fluid administration, evidenced, by either:  --        Was hypotension present within one hour of the conclusion of crystalloid fluid administration?  --        Date of presentation of septic shock  --        Time of presentation of septic shock  --          User Key  (r) = Recorded By, (t) = Taken By, (c) = Cosigned By    [address] Name Provider Type    David Guerra MD Physician                  MDM  Number of Diagnoses or Management Options  Diagnosis management comments: Cough, wheezing-will do septic work up, abx, duoneb, reassess for dispo    CritCare Time    Disposition  Final diagnoses:   Acute bronchitis   Chronic obstructive pulmonary disease, unspecified COPD type ([facility] [address] )     Time reflects when diagnosis was documented in both MDM as applicable and the Disposition within this note     Time User Action Codes Description Comment    10/22/2018  6:43 PM Leonard ANSARI Add [J20 9] Acute bronchitis     10/22/2018  6:43 PM Damian Reddy Add [J44 9] Chronic obstructive pulmonary disease, unspecified COPD type [facility])       ED Disposition     ED Disposition Condition Comment    Discharge  [address] discharge to home/self care  Condition at discharge: Good        Follow-up Information     Follow up With Specialties Details Why Contact Info    Yuki Mercado DO Family Medicine Schedule an appointment as soon as possible for a visit  [facility] [address]  [address]  [phone]  [phone]            Patient's Medications   Discharge Prescriptions    ALBUTEROL (PROVENTIL HFA,VENTOLIN HFA) 90 MCG/ACT INHALER    Inhale 2 puffs every 4 (four) hours as needed for wheezing       Start Date: 10/22/2018End Date: 10/22/2019       Order Dose: 2 puffs       Quantity: 1 Inhaler    Refills: 0    AZITHROMYCIN (ZITHROMAX Z-OLEG) 250 MG TABLET    Take 2 tabs x 1 day, then 1 tab x 4 days       Start Date: 10/22/2018End Date: 10/26/2018       Order Dose: --       Quantity: 6 tablet    Refills: 0    BENZONATATE (TESSALON PERLES) 100 MG CAPSULE    Take 1 capsule (100 mg total) by mouth 3 (three) times a day as needed for cough for up to 7 days       Start Date: 10/22/2018End Date: 10/29/2018       Order Dose: 100 mg       Quantity: 21 capsule    Refills: 0     No discharge procedures on file      ED Provider  Electronically Signed by           Filipe Maddox MD  10/22/18 8848

## 2018-10-22 NOTE — ED NOTES
Pt moved from [facility] to room 15, attempting to provide urine sample       Orville Lesches, RN  10/22/18 0982

## 2018-10-23 LAB
FLUAV AG SPEC QL: NORMAL
FLUBV AG SPEC QL: NORMAL
RSV B RNA SPEC QL NAA+PROBE: NORMAL

## 2018-10-24 ENCOUNTER — HOSPITAL ENCOUNTER (OUTPATIENT)
Dept: INFUSION CENTER | Facility: CLINIC | Age: 83
Discharge: HOME | End: 2018-10-24
Payer: MEDICARE

## 2018-10-24 VITALS
RESPIRATION RATE: 18 BRPM | DIASTOLIC BLOOD PRESSURE: 70 MMHG | TEMPERATURE: 97.5 F | SYSTOLIC BLOOD PRESSURE: 110 MMHG | HEART RATE: 88 BPM

## 2018-10-24 PROCEDURE — 96361 HYDRATE IV INFUSION ADD-ON: CPT

## 2018-10-24 PROCEDURE — 96360 HYDRATION IV INFUSION INIT: CPT

## 2018-10-24 RX ADMIN — SODIUM CHLORIDE, PRESERVATIVE FREE 300 UNITS: 5 INJECTION INTRAVENOUS at 13:15

## 2018-10-24 RX ADMIN — SODIUM CHLORIDE 1000 ML: 0.9 INJECTION, SOLUTION INTRAVENOUS at 11:15

## 2018-10-24 NOTE — PLAN OF CARE
Problem: Potential for Falls  Goal: Patient will remain free of falls  INTERVENTIONS:  - Assess patient frequently for physical needs  -  Identify cognitive and physical deficits and behaviors that affect risk of falls    -  [place] fall precautions as indicated by assessment   - Educate patient/family on patient safety including physical limitations  - Instruct patient to call for assistance with activity based on assessment  - Modify environment to reduce risk of injury  - Consider OT/PT consult to assist with strengthening/mobility   Outcome: Progressing

## 2018-10-27 LAB
BACTERIA BLD CULT: NORMAL
BACTERIA BLD CULT: NORMAL

## 2018-11-02 ENCOUNTER — TELEPHONE (OUTPATIENT)
Dept: HEMATOLOGY ONCOLOGY | Facility: CLINIC | Age: 83
End: 2018-11-02

## 2018-11-02 NOTE — TELEPHONE ENCOUNTER
Calling for a refill of Revlimid  Can be electronically submitted to [facility]      Any questions their phone number is [phone]

## 2018-11-02 NOTE — TELEPHONE ENCOUNTER
Patient is on a 6 week schedule with revlimid  He is due to restart on 11/25/18  Will send communication to

## 2018-11-07 ENCOUNTER — HOSPITAL ENCOUNTER (OUTPATIENT)
Dept: INFUSION CENTER | Facility: CLINIC | Age: 83
Discharge: HOME | End: 2018-11-07
Payer: MEDICARE

## 2018-11-07 VITALS
TEMPERATURE: 98.3 F | DIASTOLIC BLOOD PRESSURE: 65 MMHG | RESPIRATION RATE: 18 BRPM | HEART RATE: 83 BPM | SYSTOLIC BLOOD PRESSURE: 114 MMHG

## 2018-11-07 PROCEDURE — 96360 HYDRATION IV INFUSION INIT: CPT

## 2018-11-07 PROCEDURE — 96361 HYDRATE IV INFUSION ADD-ON: CPT

## 2018-11-07 RX ADMIN — SODIUM CHLORIDE 1000 ML: 0.9 INJECTION, SOLUTION INTRAVENOUS at 11:08

## 2018-11-07 RX ADMIN — Medication 300 UNITS: at 13:15

## 2018-11-07 NOTE — PROGRESS NOTES
Patient tolerated hydration today without complication  AVS provided and patient left unit in stable condition

## 2018-11-07 NOTE — PLAN OF CARE
Problem: Potential for Falls  Goal: Patient will remain free of falls  INTERVENTIONS:  - Assess patient frequently for physical needs  -  Identify cognitive and physical deficits and behaviors that affect risk of falls    -  [place] fall precautions as indicated by assessment   - Educate patient/family on patient safety including physical limitations  - Instruct patient to call for assistance with activity based on assessment  - Modify environment to reduce risk of injury  - Consider OT/PT consult to assist with strengthening/mobility   Outcome: Progressing

## 2018-11-09 ENCOUNTER — APPOINTMENT (OUTPATIENT)
Dept: LAB | Facility: MEDICAL CENTER | Age: 83
End: 2018-11-09
Payer: MEDICARE

## 2018-11-09 ENCOUNTER — TRANSCRIBE ORDERS (OUTPATIENT)
Dept: ADMINISTRATIVE | Facility: HOSPITAL | Age: 83
End: 2018-11-09

## 2018-11-09 DIAGNOSIS — C20 MALIGNANT NEOPLASM OF RECTUM (HCC): Primary | ICD-10-CM

## 2018-11-09 DIAGNOSIS — C20 MALIGNANT NEOPLASM OF RECTUM (HCC): ICD-10-CM

## 2018-11-09 DIAGNOSIS — C90.00 MULTIPLE MYELOMA NOT HAVING ACHIEVED REMISSION (HCC): ICD-10-CM

## 2018-11-09 LAB
ALBUMIN SERPL BCP-MCNC: 2.9 G/DL (ref 3.5–5)
ALP SERPL-CCNC: 84 U/L (ref 46–116)
ALT SERPL W P-5'-P-CCNC: 35 U/L (ref 12–78)
ANION GAP SERPL CALCULATED.3IONS-SCNC: 5 MMOL/L (ref 4–13)
ANISOCYTOSIS BLD QL SMEAR: PRESENT
AST SERPL W P-5'-P-CCNC: 16 U/L (ref 5–45)
BASOPHILS # BLD MANUAL: 0 THOUSAND/UL (ref 0–0.1)
BASOPHILS NFR MAR MANUAL: 0 % (ref 0–1)
BILIRUB SERPL-MCNC: 0.9 MG/DL (ref 0.2–1)
BUN SERPL-MCNC: 14 MG/DL (ref 5–25)
CALCIUM SERPL-MCNC: 9 MG/DL (ref 8.3–10.1)
CEA SERPL-MCNC: 3.7 NG/ML (ref 0–3)
CHLORIDE SERPL-SCNC: 106 MMOL/L (ref 100–108)
CO2 SERPL-SCNC: 26 MMOL/L (ref 21–32)
CREAT SERPL-MCNC: 1.01 MG/DL (ref 0.6–1.3)
EOSINOPHIL # BLD MANUAL: 0 THOUSAND/UL (ref 0–0.4)
EOSINOPHIL NFR BLD MANUAL: 0 % (ref 0–6)
ERYTHROCYTE [DISTWIDTH] IN BLOOD BY AUTOMATED COUNT: 15.3 % (ref 11.6–15.1)
GFR SERPL CREATININE-BSD FRML MDRD: 69 ML/MIN/1.73SQ M
GLUCOSE SERPL-MCNC: 86 MG/DL (ref 65–140)
HCT VFR BLD AUTO: 36.6 % (ref 36.5–49.3)
HGB BLD-MCNC: 11.8 G/DL (ref 12–17)
IGA SERPL-MCNC: 16 MG/DL (ref 70–400)
IGG SERPL-MCNC: 592 MG/DL (ref 700–1600)
IGM SERPL-MCNC: 17 MG/DL (ref 40–230)
LYMPHOCYTES # BLD AUTO: 1 THOUSAND/UL (ref 0.6–4.47)
LYMPHOCYTES # BLD AUTO: 18 % (ref 14–44)
MCH RBC QN AUTO: 32.2 PG (ref 26.8–34.3)
MCHC RBC AUTO-ENTMCNC: 32.2 G/DL (ref 31.4–37.4)
MCV RBC AUTO: 100 FL (ref 82–98)
MONOCYTES # BLD AUTO: 0.33 THOUSAND/UL (ref 0–1.22)
MONOCYTES NFR BLD: 6 % (ref 4–12)
NEUTROPHILS # BLD MANUAL: 4.16 THOUSAND/UL (ref 1.85–7.62)
NEUTS SEG NFR BLD AUTO: 75 % (ref 43–75)
NRBC BLD AUTO-RTO: 0 /100 WBCS
PLATELET # BLD AUTO: 115 THOUSANDS/UL (ref 149–390)
PLATELET BLD QL SMEAR: ADEQUATE
PMV BLD AUTO: 9.8 FL (ref 8.9–12.7)
POIKILOCYTOSIS BLD QL SMEAR: PRESENT
POLYCHROMASIA BLD QL SMEAR: PRESENT
POTASSIUM SERPL-SCNC: 4.1 MMOL/L (ref 3.5–5.3)
PROT SERPL-MCNC: 5.8 G/DL (ref 6.4–8.2)
RBC # BLD AUTO: 3.67 MILLION/UL (ref 3.88–5.62)
RBC MORPH BLD: PRESENT
SMUDGE CELLS BLD QL SMEAR: PRESENT
SODIUM SERPL-SCNC: 137 MMOL/L (ref 136–145)
VARIANT LYMPHS # BLD AUTO: 1 %
WBC # BLD AUTO: 5.54 THOUSAND/UL (ref 4.31–10.16)

## 2018-11-09 PROCEDURE — 83883 ASSAY NEPHELOMETRY NOT SPEC: CPT

## 2018-11-09 PROCEDURE — 85027 COMPLETE CBC AUTOMATED: CPT

## 2018-11-09 PROCEDURE — 82378 CARCINOEMBRYONIC ANTIGEN: CPT

## 2018-11-09 PROCEDURE — 36415 COLL VENOUS BLD VENIPUNCTURE: CPT

## 2018-11-09 PROCEDURE — 85007 BL SMEAR W/DIFF WBC COUNT: CPT

## 2018-11-09 PROCEDURE — 80053 COMPREHEN METABOLIC PANEL: CPT

## 2018-11-09 PROCEDURE — 82784 ASSAY IGA/IGD/IGG/IGM EACH: CPT

## 2018-11-10 LAB
KAPPA LC FREE SER-MCNC: 5.1 MG/L (ref 3.3–19.4)
KAPPA LC FREE/LAMBDA FREE SER: 0.02 {RATIO} (ref 0.26–1.65)
LAMBDA LC FREE SERPL-MCNC: 213.6 MG/L (ref 5.7–26.3)

## 2018-11-15 ENCOUNTER — OFFICE VISIT (OUTPATIENT)
Dept: HEMATOLOGY ONCOLOGY | Facility: CLINIC | Age: 83
End: 2018-11-15
Payer: MEDICARE

## 2018-11-15 VITALS
SYSTOLIC BLOOD PRESSURE: 110 MMHG | DIASTOLIC BLOOD PRESSURE: 56 MMHG | HEART RATE: 81 BPM | OXYGEN SATURATION: 96 % | TEMPERATURE: 97.5 F | RESPIRATION RATE: 18 BRPM

## 2018-11-15 DIAGNOSIS — C90.00 MULTIPLE MYELOMA, REMISSION STATUS UNSPECIFIED (HCC): ICD-10-CM

## 2018-11-15 DIAGNOSIS — C90.00 MULTIPLE MYELOMA NOT HAVING ACHIEVED REMISSION (HCC): Primary | ICD-10-CM

## 2018-11-15 PROCEDURE — 99214 OFFICE O/P EST MOD 30 MIN: CPT | Performed by: INTERNAL MEDICINE

## 2018-11-15 RX ORDER — MELPHALAN USP, 2 MG 2 MG/1
TABLET ORAL
Qty: 24 TABLET | Refills: 0 | Status: SHIPPED | OUTPATIENT
Start: 2018-11-15 | End: 2019-01-02 | Stop reason: SDUPTHER

## 2018-11-15 RX ORDER — LENALIDOMIDE 5 MG/1
5 CAPSULE ORAL DAILY
Qty: 21 CAPSULE | Refills: 0 | Status: SHIPPED | OUTPATIENT
Start: 2018-11-15 | End: 2018-12-20 | Stop reason: SDUPTHER

## 2018-11-15 NOTE — PROGRESS NOTES
Hematology Outpatient Follow - Up Note  Heriberto Aquino 81 y o  male MRN: @ Encounter: [id number]        Date:  11/15/2018        Assessment/ Plan:         1  Heavily pretreated lambda light chain multiple myeloma as in the history of present illness, he had been on melphalan 12 mg p o  Daily for 4 days every 6 weeks, prednisone 40 mg p o  Daily for 4 days every 6 weeks, lenalidomide 5 mg p o  Daily 3 weeks on 3 weeks off, dexamethasone 20 mg PO weekly his responding with significant reduction in the lambda light chain because of a crash syndrome with corticosteroid, currently on dexamethasone 4 mg 4 tablet every Friday and then 1 tablet on Monday  2  Hypogammaglobinemia continue IVIG 25 g every 6 weeks  3  History of stage I rectal adenocarcinoma followed by a colorectal service   4  History of COPD, 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 will follow up with Pulmonary service  5  Increased lower extremity edema most likely secondary to valvular heart disease, he should follow up with Cardiology, he is not taking Aldactone because of breast tenderness  6  Follow-up in 6 weeks with CBC, CMP, free light chain, IgG level          HPI:  #1 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, multiple compression fractures, status post kyphoplasty to L1, L2, L5, treated with radiation therapy to the left intra trochanteric area, received melphalan/Velcade/prednisone in October 2010 for 4 cycles with excellent response and normalization of lambda light chain, had been on Velcade maintenance till January 2013 when lambda light chain went up to 111,treated with Velcade 1 3 mg meter square weekly 3 weeks on one week off, Decadron 20 mg by mouth weekly, lambda light chain down to 30 then creeping up to 110, on Revlimid 15 mg by mouth every other day  Lambda light chain normal at 28 however he reported skin rash on the upper chest area we stopped the Revlimid for 2 month   Then reinitiated Revlimid 5 mg by mouth daily     #2  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 stage I, with progression by scan, status post resection of the right upper lobe with few foci in the same lobe consistent with stage IIIA done in January 2015 adenocarcinoma, well differentiated  Status post Alimta/carboplatin x4 cycles finished in May 2015 and radiation therapy     #3  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     #4  Admitted to [email or web address] 3/3/16 - 3/8/16  Dx with RSV    5  Admission in November 2017 with rectal adenocarcinoma status post resection stage I    6  Admission to the hospital with pneumonia, C diff colitis was found to have hypogammaglobinemia    Interval History:   Increased swelling of the lower extremity bilaterally      Previous Treatment:         Test Results:    Imaging: Xr Chest Pa & Lateral    Result Date: 10/23/2018  Narrative: CHEST INDICATION:   r/o PNA  Cough for 2 weeks  History of right upper lobectomy for lung carcinoma  COMPARISON:  Chest radiographs April 8, 2018 and CT pulmonary angiogram March 12, 2018  EXAM PERFORMED/VIEWS:  XR CHEST PA & LATERAL  The frontal view was performed utilizing dual energy radiographic technique  Images: 5 FINDINGS: The heart is mildly enlarged  Atherosclerotic changes in the aorta  Lungs are fairly well aerated  Volume loss in the right hemithorax, consistent with history of right upper lobectomy and radiation therapy  Shift of the heart and mediastinum to the right  Shift of the trachea to the right  Elevation of the right hemidiaphragm  Persistent pleural thickening and scarring in the right lung apex  This has not significant changed from the prior studies  Lungs are otherwise clear  Right internal jugular [id number] with tip at cavoatrial junction  Osseous structures appear within normal limits for patient age  Impression: Stable postoperative and postradiation changes  No acute cardiopulmonary disease   If indicated, consider follow-up CT scan of the chest for further evaluation  Workstation performed: [id number]       Labs:   Lab Results   Component Value Date    WBC 5 54 11/09/2018    HGB 11 8 (L) 11/09/2018    HCT 36 6 11/09/2018     (H) 11/09/2018     (L) 11/09/2018     Lab Results   Component Value Date     01/04/2016    K 4 1 11/09/2018     11/09/2018    CO2 26 11/09/2018    ANIONGAP 7 01/04/2016    BUN 14 11/09/2018    CREATININE 1 01 11/09/2018    GLUCOSE 88 01/04/2016    GLUF 88 07/17/2018    CALCIUM 9 0 11/09/2018    AST 16 11/09/2018    ALT 35 11/09/2018    ALKPHOS 84 11/09/2018    PROT 6 8 01/04/2016    BILITOT 0 63 01/04/2016    EGFR 69 11/09/2018      lambda light chain 213 ( 326 on 10/01/2018)     IgA 592, IgA 16, IgM 17  No results found for: IRON, TIBC, FERRITIN    No results found for: [id number]      ROS:   Review of Systems   Constitutional: Negative for activity change, appetite change, chills, diaphoresis, fatigue, fever and unexpected weight change  HENT: Negative for congestion, dental problem, facial swelling, hearing loss, mouth sores, nosebleeds, postnasal drip, rhinorrhea, sore throat, trouble swallowing and voice change  Eyes: Negative for photophobia, pain, discharge, redness, itching and visual disturbance  Respiratory: Positive for cough and shortness of breath ( exertional dyspnea)  Negative for choking, chest tightness and wheezing  Cardiovascular: Positive for leg swelling  Negative for chest pain and palpitations  Gastrointestinal: Negative for abdominal distention, abdominal pain, anal bleeding, blood in stool, constipation, diarrhea, nausea, rectal pain and vomiting  Endocrine: Negative for cold intolerance and heat intolerance  Genitourinary: Negative for decreased urine volume, difficulty urinating, dysuria, flank pain, frequency, hematuria and urgency     Musculoskeletal: Negative for arthralgias, back pain, gait problem, joint swelling, myalgias, neck pain and neck stiffness  Skin: Negative for color change, pallor, rash and wound  Allergic/Immunologic: Negative for immunocompromised state  Neurological: Negative for dizziness, tremors, seizures, syncope, facial asymmetry, speech difficulty, weakness, light-headedness, numbness and headaches  Hematological: Negative for adenopathy  Does not bruise/bleed easily  Psychiatric/Behavioral: Negative for agitation, confusion, decreased concentration, dysphoric mood and sleep disturbance  The patient is not nervous/anxious  All other systems reviewed and are negative  Current Medications: Reviewed  Allergies: Reviewed  PMH/FH/SH:  Reviewed      Physical Exam:    There is no height or weight on file to calculate BSA  Wt Readings from Last 3 Encounters:   10/22/18 81 kg (178 lb 9 2 oz)   10/17/18 80 7 kg (178 lb)   10/16/18 80 7 kg (178 lb)        Temp Readings from Last 3 Encounters:   11/15/18 97 5 °F (36 4 °C) (Tympanic)   11/07/18 98 3 °F (36 8 °C) (Tympanic)   10/24/18 97 5 °F (36 4 °C) (Tympanic)        BP Readings from Last 3 Encounters:   11/15/18 110/56   11/07/18 114/65   10/24/18 110/70         Pulse Readings from Last 3 Encounters:   11/15/18 81   11/07/18 83   10/24/18 88        Physical Exam   Constitutional: He is oriented to person, place, and time  He appears well-developed and well-nourished  No distress  HENT:   Head: Normocephalic and atraumatic  Mouth/Throat: Oropharynx is clear and moist  No oropharyngeal exudate  Eyes: Pupils are equal, round, and reactive to light  Conjunctivae and EOM are normal    Neck: Normal range of motion  Neck supple  No JVD present  No tracheal deviation present  No thyromegaly present  Cardiovascular: Normal rate and regular rhythm  Exam reveals no gallop and no friction rub  Murmur heard  Pulmonary/Chest: Effort normal  No respiratory distress  He has no wheezes  He has no rales  He exhibits no tenderness      Rhonchi bilaterally, no evidence of crackles   Abdominal: Soft  Bowel sounds are normal  He exhibits no distension and no mass  There is no tenderness  There is no rebound and no guarding  Musculoskeletal: Normal range of motion  He exhibits edema ( +3 edema bilaterally of the lower extremities)  Lymphadenopathy:     He has no cervical adenopathy  Neurological: He is alert and oriented to person, place, and time  Skin: Skin is warm and dry  No rash noted  He is not diaphoretic  No erythema  No pallor  Psychiatric: He has a normal mood and affect  His behavior is normal  Judgment and thought content normal    Vitals reviewed  Goals and Barriers:  Current Goal: Minimize effects of disease  Barriers: None  Patient's Capacity to Self Care:  Patient is able to self care      Code Status: [unfilled]

## 2018-11-16 ENCOUNTER — TELEPHONE (OUTPATIENT)
Dept: HEMATOLOGY ONCOLOGY | Facility: CLINIC | Age: 83
End: 2018-11-16

## 2018-11-16 NOTE — TELEPHONE ENCOUNTER
Spoke to Sharyn Tolbert and they stated that the medication was run through part B instead of D  The pharmacist ran the medication through properly and stated there would not be an issue  Called the patient's wife and confirmed this with her

## 2018-11-16 NOTE — TELEPHONE ENCOUNTER
PT SAW DR Taran Landry   WAS PUT BACK ON REVLIMID AND MELPHALAN  [facility] CALLED AND SAID THAT MEDICARE OK'D THE REVLIMID; HOWEVER, THEY REFUSED THE MELPHALAN  NOT SURE WHY?     PLEASE CALL PT TO ADVISE, TXS

## 2018-11-19 ENCOUNTER — DOCUMENTATION (OUTPATIENT)
Dept: HEMATOLOGY ONCOLOGY | Facility: CLINIC | Age: 83
End: 2018-11-19

## 2018-11-19 NOTE — PROGRESS NOTES
11/16/2018 received notification from clinical pt was put back on melphalan  Pt called clinical stating the medication needs an auth per Diplomat  Submitted for auth     Pt has [employer] [facility]  ID #[id number]  BIN #[id number]  PCN #[id number]  GRP # [id number]    11/19/2018 received denial from [employer] [facility]  This medication was denied since it is covered under part B  Notified clinical, financial, and diplomat

## 2018-11-20 RX ORDER — SODIUM CHLORIDE 9 MG/ML
20 INJECTION, SOLUTION INTRAVENOUS CONTINUOUS
Status: DISCONTINUED | OUTPATIENT
Start: 2018-11-21 | End: 2018-11-24 | Stop reason: HOSPADM

## 2018-11-21 ENCOUNTER — HOSPITAL ENCOUNTER (OUTPATIENT)
Dept: INFUSION CENTER | Facility: CLINIC | Age: 83
Discharge: HOME | End: 2018-11-21
Payer: MEDICARE

## 2018-11-21 VITALS
HEART RATE: 80 BPM | BODY MASS INDEX: 28.97 KG/M2 | DIASTOLIC BLOOD PRESSURE: 74 MMHG | SYSTOLIC BLOOD PRESSURE: 108 MMHG | TEMPERATURE: 98 F | RESPIRATION RATE: 16 BRPM | WEIGHT: 184.97 LBS

## 2018-11-21 PROCEDURE — 96366 THER/PROPH/DIAG IV INF ADDON: CPT

## 2018-11-21 PROCEDURE — 96365 THER/PROPH/DIAG IV INF INIT: CPT

## 2018-11-21 RX ADMIN — Medication 300 UNITS: at 13:57

## 2018-11-21 RX ADMIN — Medication 25 G: at 11:44

## 2018-11-21 RX ADMIN — SODIUM CHLORIDE 20 ML/HR: 0.9 INJECTION, SOLUTION INTRAVENOUS at 11:35

## 2018-11-21 RX ADMIN — SODIUM CHLORIDE 1000 ML: 0.9 INJECTION, SOLUTION INTRAVENOUS at 11:44

## 2018-11-21 NOTE — PROGRESS NOTES
Pt  Denies new symptoms or concerns at this time  No labs ordered at this time  IVIG ordered for infusion today

## 2018-11-21 NOTE — PLAN OF CARE
Problem: PAIN - ADULT  Goal: Verbalizes/displays adequate comfort level or baseline comfort level  Interventions:  - Encourage patient to monitor pain and request assistance  - Assess pain using appropriate pain scale  - Administer analgesics based on type and severity of pain and evaluate response  - Implement non-pharmacological measures as appropriate and evaluate response  - Consider cultural and social influences on pain and pain management  - Notify physician/advanced practitioner if interventions unsuccessful or patient reports new pain  Outcome: Progressing      Problem: INFECTION - ADULT  Goal: Absence or prevention of progression during hospitalization  INTERVENTIONS:  - Assess and monitor for signs and symptoms of infection  - Monitor lab/diagnostic results  - Monitor all insertion sites, i e  indwelling lines, tubes, and drains  - Monitor endotracheal (as able) and nasal secretions for changes in amount and color  - [place] appropriate cooling/warming therapies per order  - Administer medications as ordered  - Instruct and encourage patient and family to use good hand hygiene technique  - Identify and instruct in appropriate isolation precautions for identified infection/condition  Outcome: Progressing    Goal: Absence of fever/infection during neutropenic period  INTERVENTIONS:  - Monitor WBC  - Implement neutropenic guidelines  Outcome: Progressing      Problem: Knowledge Deficit  Goal: Patient/family/caregiver demonstrates understanding of disease process, treatment plan, medications, and discharge instructions  Complete learning assessment and assess knowledge base    Interventions:  - Provide teaching at level of understanding  - Provide teaching via preferred learning methods  Outcome: Progressing

## 2018-11-23 ENCOUNTER — TELEPHONE (OUTPATIENT)
Dept: HEMATOLOGY ONCOLOGY | Facility: CLINIC | Age: 83
End: 2018-11-23

## 2018-11-23 NOTE — TELEPHONE ENCOUNTER
Beltrán wife was returning a call from Gabriele  Had called earlier about the flu shot  Please advice

## 2018-11-23 NOTE — TELEPHONE ENCOUNTER
Spoke with Charly Meehan who was asking if ok for pt to get flu shot  Pt resumes Melphalan next week  Informed then it is the right time to get the flu shot  She will take pt tomorrow

## 2018-11-23 NOTE — TELEPHONE ENCOUNTER
Janis Menezes called asking to have the nurse calling her ASAP  It is regarding the patient flu shot she needs to be sure if it is okay to the patient has it

## 2018-11-29 ENCOUNTER — HOSPITAL ENCOUNTER (OUTPATIENT)
Dept: CT IMAGING | Facility: HOSPITAL | Age: 83
Discharge: HOME | End: 2018-11-29
Attending: INTERNAL MEDICINE
Payer: MEDICARE

## 2018-11-29 DIAGNOSIS — C34.91 ADENOCARCINOMA OF RIGHT LUNG (HCC): Chronic | ICD-10-CM

## 2018-11-29 PROCEDURE — 71250 CT THORAX DX C-: CPT

## 2018-12-05 ENCOUNTER — OFFICE VISIT (OUTPATIENT)
Dept: PULMONOLOGY | Facility: CLINIC | Age: 83
End: 2018-12-05
Payer: MEDICARE

## 2018-12-05 ENCOUNTER — HOSPITAL ENCOUNTER (OUTPATIENT)
Dept: INFUSION CENTER | Facility: CLINIC | Age: 83
Discharge: HOME | End: 2018-12-05

## 2018-12-05 VITALS
RESPIRATION RATE: 18 BRPM | TEMPERATURE: 98 F | HEART RATE: 96 BPM | WEIGHT: 187 LBS | SYSTOLIC BLOOD PRESSURE: 120 MMHG | OXYGEN SATURATION: 98 % | BODY MASS INDEX: 29.35 KG/M2 | DIASTOLIC BLOOD PRESSURE: 64 MMHG | HEIGHT: 67 IN

## 2018-12-05 DIAGNOSIS — C34.91 ADENOCARCINOMA OF RIGHT LUNG (HCC): Chronic | ICD-10-CM

## 2018-12-05 DIAGNOSIS — J44.9 CHRONIC OBSTRUCTIVE PULMONARY DISEASE, UNSPECIFIED COPD TYPE (HCC): Chronic | ICD-10-CM

## 2018-12-05 DIAGNOSIS — R60.9 EDEMA, UNSPECIFIED TYPE: Primary | ICD-10-CM

## 2018-12-05 PROCEDURE — 99215 OFFICE O/P EST HI 40 MIN: CPT | Performed by: INTERNAL MEDICINE

## 2018-12-05 RX ORDER — METHOCARBAMOL 500 MG/1
500 TABLET, FILM COATED ORAL 4 TIMES DAILY PRN
COMMUNITY
Start: 2018-12-04 | End: 2019-01-09 | Stop reason: ALTCHOICE

## 2018-12-05 RX ORDER — POTASSIUM CHLORIDE 750 MG/1
10 TABLET, EXTENDED RELEASE ORAL DAILY
Qty: 30 TABLET | Refills: 6 | Status: SHIPPED | OUTPATIENT
Start: 2018-12-05 | End: 2018-12-24 | Stop reason: HOSPADM

## 2018-12-05 RX ORDER — BUMETANIDE 0.5 MG/1
0.5 TABLET ORAL DAILY
Qty: 30 TABLET | Refills: 3 | Status: SHIPPED | OUTPATIENT
Start: 2018-12-05 | End: 2018-12-24 | Stop reason: HOSPADM

## 2018-12-05 NOTE — ASSESSMENT & PLAN NOTE
· Currently this is stable  · He does have some cough and mucus production  He has never responded well to multiple inhalers in the past     · Albuterol has worked the best for him on an as-needed basis  He has a rescue inhaler as well as a nebulizer  He should continue with use when needed  · Would not add inhaled corticosteroids at this point as they have not been effective for him

## 2018-12-05 NOTE — ASSESSMENT & PLAN NOTE
· CT imaging is stable  There is no evidence of new lung abnormality  Chronic scarring and fibrosis in the right upper lobe  Resolution of the prior left-sided mild infiltrate  · Continue surveillance for any new or worsening symptoms

## 2018-12-05 NOTE — PROGRESS NOTES
Progress note - Pulmonary Medicine   Misty Rangel 81 y o  male MRN: [id number]       Impression & Plan:     COPD (chronic obstructive pulmonary disease) ([facility] )  · Currently this is stable  · He does have some cough and mucus production  He has never responded well to multiple inhalers in the past     · Albuterol has worked the best for him on an as-needed basis  He has a rescue inhaler as well as a nebulizer  He should continue with use when needed  · Would not add inhaled corticosteroids at this point as they have not been effective for him  Adenocarcinoma, lung ([facility] )  · CT imaging is stable  There is no evidence of new lung abnormality  Chronic scarring and fibrosis in the right upper lobe  Resolution of the prior left-sided mild infiltrate  · Continue surveillance for any new or worsening symptoms  Edema  · Discussed with Dr Nika Tomas today  This is multifactorial   He has low albumin state, is receiving saline infusions due to prior dehydration, and has aortic stenosis with an element of valvular insufficiency  · Intolerant to Aldactone due to breast swelling and pain  · The furosemide in the past has not been particularly effective  · Started on Bumex 0 5 mg daily  I did give him a potassium supplement tablet and he will have a basic metabolic profile checked next week  · He was encouraged to monitor for dehydration  Signs of hypokalemia were discussed  · Dr Nika Tomas is to discontinue additional IV fluid administration through the infusion center    I will see Rafael Perez back in 3 months  He will certainly contact me if he has new or worsening symptoms however     ______________________________________________________________________    HPI:    Misty Rangel presents today for follow-up of COPD and adenocarcinoma of the lung  He does have a chronic cough with mucus production and occasional wheezing    He has tried multiple bronchodilator in agents in the past including Advair, Spiriva, Stiolto Respimat, Breo Ellipta, and albuterol nebulizers  He has also tried inhaled corticosteroids with minimal benefit  He is on systemic corticosteroids which she has not noticed to help this condition either  Currently he is concerned about leg swelling  Has been having increasing ankle edema extending into the pretibial area  He was placed on Aldactone by Dr Megan Plasencia but could not tolerate this due to breast pain and swelling  He is receiving IV fluid at the infusion center every few weeks  He has also been receiving IVIG for immunoglobulin deficiency  His albumin levels have been in the mid 2 range  He denies any chest pain  He has no lightheadedness or dizziness  No history of arrhythmia  He does have aortic stenosis  This is moderate by last echocardiogram     His abdominal symptoms have been under control and his diarrhea has improved with the combination of Imodium and he has been eating coconut macaroon cookies which he believes have been quite helpful  Review of Systems:  Review of Systems   Constitutional: Negative for chills, fever and unexpected weight change  HENT: Negative  Eyes: Negative  Respiratory: Positive for cough and wheezing  Cardiovascular: Positive for leg swelling  Endocrine: Negative  Genitourinary: Negative for difficulty urinating  Musculoskeletal: Negative for arthralgias and myalgias  Skin: Negative for rash  Allergic/Immunologic: Positive for immunocompromised state  Negative for environmental allergies  Neurological: Negative for headaches  Hematological: Negative  Psychiatric/Behavioral: Negative  All other systems reviewed and are negative          Past medical history, surgical history, and family history were reviewed and updated as appropriate    Social history updates:  History   Smoking Status    Former Smoker    Packs/day: 1 00    Years: 50 00    Types: Cigarettes    Quit date: 12/7/2010   Smokeless Tobacco    Never Used Comment: quit in 1999       PhysicalExamination:  Vitals:   /64   Pulse 96   Temp 98 °F (36 7 °C)   Resp 18   Ht 5' 7" (1 702 m)   Wt 84 8 kg (187 lb)   SpO2 98%   BMI 29 29 kg/m²     Gen:  Comfortable on room air and without respiratory distress  He does have turbulent upper airway noises at the level of the larynx  HEENT: PERRL  Oropharynx is clear without any erythema or exudate  Neck: Supple  There is no JVD, lymphadenopathy or thyromegaly appreciated  Trachea is midline  Chest: Symmetric chest wall excursion  Lung ivey with a mild wheeze at the right base  Cardiac: Regular rate and rhythm  Grade 2 murmur of aortic stenosis  Abdomen: Soft and nontender  Benign  Extremities: No clubbing, cyanosis  He does have 2 to 3+ edema of the ankles and pretibial area  Neurologic: No focal deficits  Skin: No appreciable rashes  Diagnostic Data:  Labs: I personally reviewed the most recent laboratory data pertinent to today's visit    Lab Results   Component Value Date    WBC 5 54 11/09/2018    HGB 11 8 (L) 11/09/2018    HCT 36 6 11/09/2018     (H) 11/09/2018     (L) 11/09/2018     Lab Results   Component Value Date    SODIUM 137 11/09/2018    K 4 1 11/09/2018    CO2 26 11/09/2018     11/09/2018    BUN 14 11/09/2018    CREATININE 1 01 11/09/2018    GLUCOSE 88 01/04/2016    CALCIUM 9 0 11/09/2018     No results found for: IGE  Lab Results   Component Value Date    ALT 35 11/09/2018    AST 16 11/09/2018    ALKPHOS 84 11/09/2018    BILITOT 0 63 01/04/2016       Imaging:  I personally reviewed his CT scan of the chest on the [facility] system and this demonstrated interval improvement in the left-sided infiltrate  There are chronic changes in the right upper lobe area    No other new findings or worrisome lesions    Cathy Jeffries MD

## 2018-12-05 NOTE — ASSESSMENT & PLAN NOTE
· Discussed with Dr Cj Shane today  This is multifactorial   He has low albumin state, is receiving saline infusions due to prior dehydration, and has aortic stenosis with an element of valvular insufficiency  · Intolerant to Aldactone due to breast swelling and pain  · The furosemide in the past has not been particularly effective  · Started on Bumex 0 5 mg daily  I did give him a potassium supplement tablet and he will have a basic metabolic profile checked next week  · He was encouraged to monitor for dehydration    Signs of hypokalemia were discussed  · Dr Cj Shane is to discontinue additional IV fluid administration through the infusion center

## 2018-12-05 NOTE — LETTER
December 5, 2018     Mariah Gonzalez DO  [address]  [address]  [facility] [address]    Patient: Cammie Paz   YOB: 1935   Date of Visit: 12/5/2018       Dear Dr Salinas Martin: Thank you for referring Bryant Yeboah to me for evaluation  Below are my notes for this consultation  If you have questions, please do not hesitate to call me  I look forward to following your patient along with you  Sincerely,        Jo Justice MD        CC: MD Jo Mckeon MD  12/5/2018  1:44 PM  Sign at close encounter    Progress note - Pulmonary Medicine   Cammie Paz 81 y o  male MRN: [id number]       Impression & Plan:     COPD (chronic obstructive pulmonary disease) ([facility] )  · Currently this is stable  · He does have some cough and mucus production  He has never responded well to multiple inhalers in the past     · Albuterol has worked the best for him on an as-needed basis  He has a rescue inhaler as well as a nebulizer  He should continue with use when needed  · Would not add inhaled corticosteroids at this point as they have not been effective for him  Adenocarcinoma, lung ([facility] )  · CT imaging is stable  There is no evidence of new lung abnormality  Chronic scarring and fibrosis in the right upper lobe  Resolution of the prior left-sided mild infiltrate  · Continue surveillance for any new or worsening symptoms  Edema  · Discussed with Dr Ivonne Walker today  This is multifactorial   He has low albumin state, is receiving saline infusions due to prior dehydration, and has aortic stenosis with an element of valvular insufficiency  · Intolerant to Aldactone due to breast swelling and pain  · The furosemide in the past has not been particularly effective  · Started on Bumex 0 5 mg daily  I did give him a potassium supplement tablet and he will have a basic metabolic profile checked next week  · He was encouraged to monitor for dehydration    Signs of hypokalemia were discussed  · Dr Leodan Paris is to discontinue additional IV fluid administration through the infusion center    I will see Andrew Barragan back in 3 months  He will certainly contact me if he has new or worsening symptoms however     ______________________________________________________________________    HPI:    Marino Leach presents today for follow-up of COPD and adenocarcinoma of the lung  He does have a chronic cough with mucus production and occasional wheezing  He has tried multiple bronchodilator in agents in the past including Advair, Spiriva, Stiolto Respimat, Breo Ellipta, and albuterol nebulizers  He has also tried inhaled corticosteroids with minimal benefit  He is on systemic corticosteroids which she has not noticed to help this condition either  Currently he is concerned about leg swelling  Has been having increasing ankle edema extending into the pretibial area  He was placed on Aldactone by Dr Leodan Paris but could not tolerate this due to breast pain and swelling  He is receiving IV fluid at the infusion center every few weeks  He has also been receiving IVIG for immunoglobulin deficiency  His albumin levels have been in the mid 2 range  He denies any chest pain  He has no lightheadedness or dizziness  No history of arrhythmia  He does have aortic stenosis  This is moderate by last echocardiogram     His abdominal symptoms have been under control and his diarrhea has improved with the combination of Imodium and he has been eating coconut macaroon cookies which he believes have been quite helpful  Review of Systems:  Review of Systems   Constitutional: Negative for chills, fever and unexpected weight change  HENT: Negative  Eyes: Negative  Respiratory: Positive for cough and wheezing  Cardiovascular: Positive for leg swelling  Endocrine: Negative  Genitourinary: Negative for difficulty urinating  Musculoskeletal: Negative for arthralgias and myalgias     Skin: Negative for rash  Allergic/Immunologic: Positive for immunocompromised state  Negative for environmental allergies  Neurological: Negative for headaches  Hematological: Negative  Psychiatric/Behavioral: Negative  All other systems reviewed and are negative  Past medical history, surgical history, and family history were reviewed and updated as appropriate    Social history updates:  History   Smoking Status    Former Smoker    Packs/day: 1 00    Years: 50 00    Types: Cigarettes    Quit date: 12/7/2010   Smokeless Tobacco    Never Used     Comment: quit in 1999       PhysicalExamination:  Vitals:   /64   Pulse 96   Temp 98 °F (36 7 °C)   Resp 18   Ht 5' 7" (1 702 m)   Wt 84 8 kg (187 lb)   SpO2 98%   BMI 29 29 kg/m²      Gen:  Comfortable on room air and without respiratory distress  He does have turbulent upper airway noises at the level of the larynx  HEENT: PERRL  Oropharynx is clear without any erythema or exudate  Neck: Supple  There is no JVD, lymphadenopathy or thyromegaly appreciated  Trachea is midline  Chest: Symmetric chest wall excursion  Lung ivey with a mild wheeze at the right base  Cardiac: Regular rate and rhythm  Grade 2 murmur of aortic stenosis  Abdomen: Soft and nontender  Benign  Extremities: No clubbing, cyanosis  He does have 2 to 3+ edema of the ankles and pretibial area  Neurologic: No focal deficits  Skin: No appreciable rashes  Diagnostic Data:  Labs:   I personally reviewed the most recent laboratory data pertinent to today's visit    Lab Results   Component Value Date    WBC 5 54 11/09/2018    HGB 11 8 (L) 11/09/2018    HCT 36 6 11/09/2018     (H) 11/09/2018     (L) 11/09/2018     Lab Results   Component Value Date    SODIUM 137 11/09/2018    K 4 1 11/09/2018    CO2 26 11/09/2018     11/09/2018    BUN 14 11/09/2018    CREATININE 1 01 11/09/2018    GLUCOSE 88 01/04/2016    CALCIUM 9 0 11/09/2018     No results found for: IGE  Lab Results   Component Value Date    ALT 35 11/09/2018    AST 16 11/09/2018    ALKPHOS 84 11/09/2018    BILITOT 0 63 01/04/2016       Imaging:  I personally reviewed his CT scan of the chest on the [facility] system and this demonstrated interval improvement in the left-sided infiltrate  There are chronic changes in the right upper lobe area    No other new findings or worrisome lesions    Sania Park MD

## 2018-12-09 RX ORDER — DEXAMETHASONE 4 MG/1
TABLET ORAL
Qty: 12 INHALER | Refills: 0 | OUTPATIENT
Start: 2018-12-09

## 2018-12-10 ENCOUNTER — APPOINTMENT (OUTPATIENT)
Dept: LAB | Facility: HOSPITAL | Age: 83
End: 2018-12-10
Payer: MEDICARE

## 2018-12-10 ENCOUNTER — TRANSCRIBE ORDERS (OUTPATIENT)
Dept: ADMINISTRATIVE | Facility: HOSPITAL | Age: 83
End: 2018-12-10

## 2018-12-10 ENCOUNTER — HOSPITAL ENCOUNTER (OUTPATIENT)
Dept: RADIOLOGY | Facility: HOSPITAL | Age: 83
Discharge: HOME | End: 2018-12-10
Payer: MEDICARE

## 2018-12-10 ENCOUNTER — TELEPHONE (OUTPATIENT)
Dept: NON INVASIVE DIAGNOSTICS | Facility: HOSPITAL | Age: 83
End: 2018-12-10

## 2018-12-10 DIAGNOSIS — I27.20 PROGRESSIVE PULMONARY HYPERTENSION (HCC): ICD-10-CM

## 2018-12-10 DIAGNOSIS — I35.0 NODULAR CALCIFIC AORTIC VALVE STENOSIS: ICD-10-CM

## 2018-12-10 DIAGNOSIS — J44.9 OBSTRUCTIVE CHRONIC BRONCHITIS WITHOUT EXACERBATION (HCC): ICD-10-CM

## 2018-12-10 DIAGNOSIS — M54.2 CERVICALGIA: ICD-10-CM

## 2018-12-10 DIAGNOSIS — M54.2 CERVICALGIA: Primary | ICD-10-CM

## 2018-12-10 LAB
ALBUMIN SERPL BCP-MCNC: 2.9 G/DL (ref 3.5–5)
ALP SERPL-CCNC: 88 U/L (ref 46–116)
ALT SERPL W P-5'-P-CCNC: 35 U/L (ref 12–78)
ANION GAP SERPL CALCULATED.3IONS-SCNC: 8 MMOL/L (ref 4–13)
AST SERPL W P-5'-P-CCNC: 15 U/L (ref 5–45)
BASOPHILS # BLD MANUAL: 0.06 THOUSAND/UL (ref 0–0.1)
BASOPHILS NFR MAR MANUAL: 1 % (ref 0–1)
BILIRUB SERPL-MCNC: 0.86 MG/DL (ref 0.2–1)
BUN SERPL-MCNC: 26 MG/DL (ref 5–25)
CALCIUM SERPL-MCNC: 8.7 MG/DL (ref 8.3–10.1)
CHLORIDE SERPL-SCNC: 102 MMOL/L (ref 100–108)
CO2 SERPL-SCNC: 27 MMOL/L (ref 21–32)
CREAT SERPL-MCNC: 1.4 MG/DL (ref 0.6–1.3)
EOSINOPHIL # BLD MANUAL: 0 THOUSAND/UL (ref 0–0.4)
EOSINOPHIL NFR BLD MANUAL: 0 % (ref 0–6)
ERYTHROCYTE [DISTWIDTH] IN BLOOD BY AUTOMATED COUNT: 15.1 % (ref 11.6–15.1)
GFR SERPL CREATININE-BSD FRML MDRD: 46 ML/MIN/1.73SQ M
GLUCOSE SERPL-MCNC: 119 MG/DL (ref 65–140)
HCT VFR BLD AUTO: 38.2 % (ref 36.5–49.3)
HGB BLD-MCNC: 12.4 G/DL (ref 12–17)
LYMPHOCYTES # BLD AUTO: 0.42 THOUSAND/UL (ref 0.6–4.47)
LYMPHOCYTES # BLD AUTO: 7 % (ref 14–44)
MCH RBC QN AUTO: 33.1 PG (ref 26.8–34.3)
MCHC RBC AUTO-ENTMCNC: 32.5 G/DL (ref 31.4–37.4)
MCV RBC AUTO: 102 FL (ref 82–98)
MONOCYTES # BLD AUTO: 0.12 THOUSAND/UL (ref 0–1.22)
MONOCYTES NFR BLD: 2 % (ref 4–12)
NEUTROPHILS # BLD MANUAL: 5.35 THOUSAND/UL (ref 1.85–7.62)
NEUTS BAND NFR BLD MANUAL: 4 % (ref 0–8)
NEUTS SEG NFR BLD AUTO: 86 % (ref 43–75)
NRBC BLD AUTO-RTO: 0 /100 WBCS
NT-PROBNP SERPL-MCNC: 225 PG/ML
PLATELET # BLD AUTO: 100 THOUSANDS/UL (ref 149–390)
PLATELET BLD QL SMEAR: ABNORMAL
PMV BLD AUTO: 9.4 FL (ref 8.9–12.7)
POTASSIUM SERPL-SCNC: 3.8 MMOL/L (ref 3.5–5.3)
PROT SERPL-MCNC: 6.3 G/DL (ref 6.4–8.2)
RBC # BLD AUTO: 3.75 MILLION/UL (ref 3.88–5.62)
SODIUM SERPL-SCNC: 137 MMOL/L (ref 136–145)
TOTAL CELLS COUNTED SPEC: 100
WBC # BLD AUTO: 5.94 THOUSAND/UL (ref 4.31–10.16)

## 2018-12-10 PROCEDURE — 72050 X-RAY EXAM NECK SPINE 4/5VWS: CPT

## 2018-12-10 PROCEDURE — 85027 COMPLETE CBC AUTOMATED: CPT

## 2018-12-10 PROCEDURE — 83880 ASSAY OF NATRIURETIC PEPTIDE: CPT

## 2018-12-10 PROCEDURE — 85007 BL SMEAR W/DIFF WBC COUNT: CPT

## 2018-12-10 PROCEDURE — 36415 COLL VENOUS BLD VENIPUNCTURE: CPT

## 2018-12-10 PROCEDURE — 80053 COMPREHEN METABOLIC PANEL: CPT

## 2018-12-10 NOTE — TELEPHONE ENCOUNTER
Blood work reviewed, Cr upto 1 4  Spoke w/ pt's wife--bumex 'not doing much '  Will d/c and continue behavioral modification (leg elevation, compression stockings etc)  DC potassium 10 meq as well  NTproBNP still unremarkable at 225 pg/mL

## 2018-12-13 DIAGNOSIS — J44.9 CHRONIC OBSTRUCTIVE PULMONARY DISEASE, UNSPECIFIED COPD TYPE (HCC): Primary | ICD-10-CM

## 2018-12-13 RX ORDER — ALBUTEROL SULFATE 90 UG/1
2 AEROSOL, METERED RESPIRATORY (INHALATION) EVERY 6 HOURS PRN
Qty: 8.5 G | Refills: 5 | Status: SHIPPED | OUTPATIENT
Start: 2018-12-13 | End: 2018-12-24 | Stop reason: HOSPADM

## 2018-12-17 ENCOUNTER — TELEPHONE (OUTPATIENT)
Dept: HEMATOLOGY ONCOLOGY | Facility: CLINIC | Age: 83
End: 2018-12-17

## 2018-12-17 NOTE — TELEPHONE ENCOUNTER
After reviewing with Dr Rhoda Adame he does not want the patient to have any more IV hydration  Called the [facility] infusion center and cancelled for 12/19  Called the patient's wife and reviewed this with her

## 2018-12-17 NOTE — TELEPHONE ENCOUNTER
Patient's wife states they want to proceed with chemo on 12/19/18--is it still on  FYI patient is no longer on water pills    [phone]

## 2018-12-20 ENCOUNTER — DOCUMENTATION (OUTPATIENT)
Dept: PULMONOLOGY | Facility: CLINIC | Age: 83
End: 2018-12-20

## 2018-12-20 DIAGNOSIS — C90.00 MULTIPLE MYELOMA, REMISSION STATUS UNSPECIFIED (HCC): ICD-10-CM

## 2018-12-20 RX ORDER — LENALIDOMIDE 5 MG/1
5 CAPSULE ORAL DAILY
Qty: 21 CAPSULE | Refills: 0 | Status: SHIPPED | OUTPATIENT
Start: 2018-12-20 | End: 2019-02-07 | Stop reason: SDUPTHER

## 2018-12-20 NOTE — PROGRESS NOTES
Office notes faxed to [address] [place] [address] at [facility] at [phone]  Phone number is [phone] x [phone]

## 2018-12-21 ENCOUNTER — APPOINTMENT (EMERGENCY)
Dept: RADIOLOGY | Facility: HOSPITAL | Age: 83
DRG: 190 | End: 2018-12-21
Payer: MEDICARE

## 2018-12-21 ENCOUNTER — APPOINTMENT (EMERGENCY)
Dept: CT IMAGING | Facility: HOSPITAL | Age: 83
DRG: 190 | End: 2018-12-21
Payer: MEDICARE

## 2018-12-21 ENCOUNTER — HOSPITAL ENCOUNTER (INPATIENT)
Facility: HOSPITAL | Age: 83
LOS: 3 days | Discharge: HOME | DRG: 190 | End: 2018-12-24
Attending: EMERGENCY MEDICINE | Admitting: INTERNAL MEDICINE
Payer: MEDICARE

## 2018-12-21 DIAGNOSIS — J44.1 CHRONIC OBSTRUCTIVE PULMONARY DISEASE WITH ACUTE EXACERBATION (HCC): Chronic | ICD-10-CM

## 2018-12-21 DIAGNOSIS — J44.1 COPD EXACERBATION (HCC): ICD-10-CM

## 2018-12-21 DIAGNOSIS — I26.99 OTHER ACUTE PULMONARY EMBOLISM WITHOUT ACUTE COR PULMONALE (HCC): ICD-10-CM

## 2018-12-21 DIAGNOSIS — I26.99 ACUTE PULMONARY EMBOLISM (HCC): Primary | ICD-10-CM

## 2018-12-21 LAB
ALBUMIN SERPL BCP-MCNC: 2.8 G/DL (ref 3.5–5)
ALP SERPL-CCNC: 85 U/L (ref 46–116)
ALT SERPL W P-5'-P-CCNC: 40 U/L (ref 12–78)
ANION GAP SERPL CALCULATED.3IONS-SCNC: 8 MMOL/L (ref 4–13)
APTT PPP: 27 SECONDS (ref 26–38)
APTT PPP: >210 SECONDS (ref 26–38)
AST SERPL W P-5'-P-CCNC: 15 U/L (ref 5–45)
BASOPHILS # BLD MANUAL: 0 THOUSAND/UL (ref 0–0.1)
BASOPHILS NFR MAR MANUAL: 0 % (ref 0–1)
BILIRUB SERPL-MCNC: 0.92 MG/DL (ref 0.2–1)
BUN SERPL-MCNC: 17 MG/DL (ref 5–25)
CALCIUM SERPL-MCNC: 8.8 MG/DL (ref 8.3–10.1)
CHLORIDE SERPL-SCNC: 105 MMOL/L (ref 100–108)
CO2 SERPL-SCNC: 28 MMOL/L (ref 21–32)
CREAT SERPL-MCNC: 1.25 MG/DL (ref 0.6–1.3)
EOSINOPHIL # BLD MANUAL: 0.05 THOUSAND/UL (ref 0–0.4)
EOSINOPHIL NFR BLD MANUAL: 1 % (ref 0–6)
ERYTHROCYTE [DISTWIDTH] IN BLOOD BY AUTOMATED COUNT: 15 % (ref 11.6–15.1)
GFR SERPL CREATININE-BSD FRML MDRD: 53 ML/MIN/1.73SQ M
GLUCOSE SERPL-MCNC: 126 MG/DL (ref 65–140)
HCT VFR BLD AUTO: 36.3 % (ref 36.5–49.3)
HGB BLD-MCNC: 11.9 G/DL (ref 12–17)
IGA SERPL-MCNC: 34 MG/DL (ref 70–400)
IGG SERPL-MCNC: 601 MG/DL (ref 700–1600)
IGM SERPL-MCNC: 32 MG/DL (ref 40–230)
INR PPP: 0.89 (ref 0.86–1.17)
LG PLATELETS BLD QL SMEAR: PRESENT
LYMPHOCYTES # BLD AUTO: 1.01 THOUSAND/UL (ref 0.6–4.47)
LYMPHOCYTES # BLD AUTO: 19 % (ref 14–44)
MACROCYTES BLD QL AUTO: PRESENT
MAGNESIUM SERPL-MCNC: 1.4 MG/DL (ref 1.6–2.6)
MCH RBC QN AUTO: 32.9 PG (ref 26.8–34.3)
MCHC RBC AUTO-ENTMCNC: 32.8 G/DL (ref 31.4–37.4)
MCV RBC AUTO: 100 FL (ref 82–98)
METAMYELOCYTES NFR BLD MANUAL: 1 % (ref 0–1)
MONOCYTES # BLD AUTO: 0.32 THOUSAND/UL (ref 0–1.22)
MONOCYTES NFR BLD: 6 % (ref 4–12)
MYELOCYTES NFR BLD MANUAL: 1 % (ref 0–1)
NEUTROPHILS # BLD MANUAL: 3.83 THOUSAND/UL (ref 1.85–7.62)
NEUTS BAND NFR BLD MANUAL: 1 % (ref 0–8)
NEUTS SEG NFR BLD AUTO: 71 % (ref 43–75)
NRBC BLD AUTO-RTO: 0 /100 WBCS
NT-PROBNP SERPL-MCNC: 161 PG/ML
OVALOCYTES BLD QL SMEAR: PRESENT
PLATELET # BLD AUTO: 104 THOUSANDS/UL (ref 149–390)
PLATELET BLD QL SMEAR: ABNORMAL
PMV BLD AUTO: 9.3 FL (ref 8.9–12.7)
POTASSIUM SERPL-SCNC: 4 MMOL/L (ref 3.5–5.3)
PROT SERPL-MCNC: 6 G/DL (ref 6.4–8.2)
PROTHROMBIN TIME: 12.2 SECONDS (ref 11.8–14.2)
RBC # BLD AUTO: 3.62 MILLION/UL (ref 3.88–5.62)
SODIUM SERPL-SCNC: 141 MMOL/L (ref 136–145)
TOTAL CELLS COUNTED SPEC: 100
TROPONIN I SERPL-MCNC: 0.02 NG/ML
WBC # BLD AUTO: 5.32 THOUSAND/UL (ref 4.31–10.16)

## 2018-12-21 PROCEDURE — 82784 ASSAY IGA/IGD/IGG/IGM EACH: CPT | Performed by: EMERGENCY MEDICINE

## 2018-12-21 PROCEDURE — 84484 ASSAY OF TROPONIN QUANT: CPT | Performed by: EMERGENCY MEDICINE

## 2018-12-21 PROCEDURE — 96365 THER/PROPH/DIAG IV INF INIT: CPT

## 2018-12-21 PROCEDURE — 85027 COMPLETE CBC AUTOMATED: CPT | Performed by: EMERGENCY MEDICINE

## 2018-12-21 PROCEDURE — 85730 THROMBOPLASTIN TIME PARTIAL: CPT | Performed by: INTERNAL MEDICINE

## 2018-12-21 PROCEDURE — 93005 ELECTROCARDIOGRAM TRACING: CPT

## 2018-12-21 PROCEDURE — 36415 COLL VENOUS BLD VENIPUNCTURE: CPT | Performed by: EMERGENCY MEDICINE

## 2018-12-21 PROCEDURE — 96375 TX/PRO/DX INJ NEW DRUG ADDON: CPT

## 2018-12-21 PROCEDURE — 96361 HYDRATE IV INFUSION ADD-ON: CPT

## 2018-12-21 PROCEDURE — 85007 BL SMEAR W/DIFF WBC COUNT: CPT | Performed by: EMERGENCY MEDICINE

## 2018-12-21 PROCEDURE — 85610 PROTHROMBIN TIME: CPT | Performed by: EMERGENCY MEDICINE

## 2018-12-21 PROCEDURE — 99223 1ST HOSP IP/OBS HIGH 75: CPT | Performed by: INTERNAL MEDICINE

## 2018-12-21 PROCEDURE — 80053 COMPREHEN METABOLIC PANEL: CPT | Performed by: EMERGENCY MEDICINE

## 2018-12-21 PROCEDURE — 83880 ASSAY OF NATRIURETIC PEPTIDE: CPT | Performed by: EMERGENCY MEDICINE

## 2018-12-21 PROCEDURE — 94644 CONT INHLJ TX 1ST HOUR: CPT

## 2018-12-21 PROCEDURE — 85730 THROMBOPLASTIN TIME PARTIAL: CPT | Performed by: EMERGENCY MEDICINE

## 2018-12-21 PROCEDURE — 83735 ASSAY OF MAGNESIUM: CPT | Performed by: EMERGENCY MEDICINE

## 2018-12-21 PROCEDURE — 94640 AIRWAY INHALATION TREATMENT: CPT

## 2018-12-21 PROCEDURE — 83883 ASSAY NEPHELOMETRY NOT SPEC: CPT | Performed by: EMERGENCY MEDICINE

## 2018-12-21 PROCEDURE — 71275 CT ANGIOGRAPHY CHEST: CPT

## 2018-12-21 PROCEDURE — 99285 EMERGENCY DEPT VISIT HI MDM: CPT

## 2018-12-21 PROCEDURE — 71046 X-RAY EXAM CHEST 2 VIEWS: CPT

## 2018-12-21 RX ORDER — MAGNESIUM SULFATE HEPTAHYDRATE 40 MG/ML
2 INJECTION, SOLUTION INTRAVENOUS ONCE
Status: COMPLETED | OUTPATIENT
Start: 2018-12-21 | End: 2018-12-21

## 2018-12-21 RX ORDER — TAMSULOSIN HYDROCHLORIDE 0.4 MG/1
0.4 CAPSULE ORAL
Status: DISCONTINUED | OUTPATIENT
Start: 2018-12-21 | End: 2018-12-22

## 2018-12-21 RX ORDER — HEPARIN SODIUM 1000 [USP'U]/ML
6400 INJECTION, SOLUTION INTRAVENOUS; SUBCUTANEOUS AS NEEDED
Status: DISCONTINUED | OUTPATIENT
Start: 2018-12-21 | End: 2018-12-23

## 2018-12-21 RX ORDER — BUMETANIDE 1 MG/1
0.5 TABLET ORAL DAILY
Status: DISCONTINUED | OUTPATIENT
Start: 2018-12-22 | End: 2018-12-22

## 2018-12-21 RX ORDER — SODIUM CHLORIDE FOR INHALATION 0.9 %
12 VIAL, NEBULIZER (ML) INHALATION ONCE
Status: COMPLETED | OUTPATIENT
Start: 2018-12-21 | End: 2018-12-21

## 2018-12-21 RX ORDER — DIPHENOXYLATE HYDROCHLORIDE AND ATROPINE SULFATE 2.5; .025 MG/1; MG/1
1 TABLET ORAL 4 TIMES DAILY PRN
Status: DISCONTINUED | OUTPATIENT
Start: 2018-12-21 | End: 2018-12-24 | Stop reason: HOSPADM

## 2018-12-21 RX ORDER — METHYLPREDNISOLONE SODIUM SUCCINATE 125 MG/2ML
80 INJECTION, POWDER, LYOPHILIZED, FOR SOLUTION INTRAMUSCULAR; INTRAVENOUS ONCE
Status: COMPLETED | OUTPATIENT
Start: 2018-12-21 | End: 2018-12-21

## 2018-12-21 RX ORDER — METHYLPREDNISOLONE SODIUM SUCCINATE 40 MG/ML
40 INJECTION, POWDER, LYOPHILIZED, FOR SOLUTION INTRAMUSCULAR; INTRAVENOUS EVERY 8 HOURS SCHEDULED
Status: DISCONTINUED | OUTPATIENT
Start: 2018-12-21 | End: 2018-12-23

## 2018-12-21 RX ORDER — FLUTICASONE PROPIONATE 110 UG/1
1 AEROSOL, METERED RESPIRATORY (INHALATION) 2 TIMES DAILY
Status: DISCONTINUED | OUTPATIENT
Start: 2018-12-21 | End: 2018-12-24 | Stop reason: HOSPADM

## 2018-12-21 RX ORDER — HEPARIN SODIUM 1000 [USP'U]/ML
6400 INJECTION, SOLUTION INTRAVENOUS; SUBCUTANEOUS ONCE
Status: COMPLETED | OUTPATIENT
Start: 2018-12-21 | End: 2018-12-21

## 2018-12-21 RX ORDER — MELATONIN
5000 DAILY
Status: DISCONTINUED | OUTPATIENT
Start: 2018-12-22 | End: 2018-12-24 | Stop reason: HOSPADM

## 2018-12-21 RX ORDER — GUAIFENESIN 600 MG
600 TABLET, EXTENDED RELEASE 12 HR ORAL EVERY 12 HOURS SCHEDULED
Status: DISCONTINUED | OUTPATIENT
Start: 2018-12-21 | End: 2018-12-24 | Stop reason: HOSPADM

## 2018-12-21 RX ORDER — CHOLECALCIFEROL (VITAMIN D3) 125 MCG
1000 CAPSULE ORAL DAILY
Status: DISCONTINUED | OUTPATIENT
Start: 2018-12-22 | End: 2018-12-24 | Stop reason: HOSPADM

## 2018-12-21 RX ORDER — ALBUTEROL SULFATE 90 UG/1
2 AEROSOL, METERED RESPIRATORY (INHALATION) EVERY 6 HOURS PRN
Status: DISCONTINUED | OUTPATIENT
Start: 2018-12-21 | End: 2018-12-24 | Stop reason: HOSPADM

## 2018-12-21 RX ORDER — LORAZEPAM 0.5 MG/1
0.5 TABLET ORAL ONCE AS NEEDED
Status: COMPLETED | OUTPATIENT
Start: 2018-12-21 | End: 2018-12-21

## 2018-12-21 RX ORDER — LORAZEPAM 0.5 MG/1
0.5 TABLET ORAL
COMMUNITY
End: 2018-12-24 | Stop reason: HOSPADM

## 2018-12-21 RX ORDER — ACETAMINOPHEN 325 MG/1
650 TABLET ORAL EVERY 6 HOURS PRN
Status: DISCONTINUED | OUTPATIENT
Start: 2018-12-21 | End: 2018-12-24 | Stop reason: HOSPADM

## 2018-12-21 RX ORDER — HEPARIN SODIUM 10000 [USP'U]/100ML
3-30 INJECTION, SOLUTION INTRAVENOUS
Status: DISCONTINUED | OUTPATIENT
Start: 2018-12-21 | End: 2018-12-23

## 2018-12-21 RX ORDER — HEPARIN SODIUM 1000 [USP'U]/ML
3200 INJECTION, SOLUTION INTRAVENOUS; SUBCUTANEOUS AS NEEDED
Status: DISCONTINUED | OUTPATIENT
Start: 2018-12-21 | End: 2018-12-23

## 2018-12-21 RX ORDER — FINASTERIDE 5 MG/1
5 TABLET, FILM COATED ORAL
Status: DISCONTINUED | OUTPATIENT
Start: 2018-12-21 | End: 2018-12-24 | Stop reason: HOSPADM

## 2018-12-21 RX ADMIN — IOHEXOL 85 ML: 350 INJECTION, SOLUTION INTRAVENOUS at 16:04

## 2018-12-21 RX ADMIN — MAGNESIUM SULFATE IN WATER 2 G: 40 INJECTION, SOLUTION INTRAVENOUS at 11:17

## 2018-12-21 RX ADMIN — LORAZEPAM 0.5 MG: 0.5 TABLET ORAL at 21:52

## 2018-12-21 RX ADMIN — SODIUM CHLORIDE 500 ML: 0.9 INJECTION, SOLUTION INTRAVENOUS at 15:30

## 2018-12-21 RX ADMIN — FINASTERIDE 5 MG: 5 TABLET, FILM COATED ORAL at 21:51

## 2018-12-21 RX ADMIN — METHYLPREDNISOLONE SODIUM SUCCINATE 40 MG: 40 INJECTION, POWDER, FOR SOLUTION INTRAMUSCULAR; INTRAVENOUS at 21:56

## 2018-12-21 RX ADMIN — HEPARIN SODIUM AND DEXTROSE 18 UNITS/KG/HR: 10000; 5 INJECTION INTRAVENOUS at 16:45

## 2018-12-21 RX ADMIN — METHYLPREDNISOLONE SODIUM SUCCINATE 80 MG: 125 INJECTION, POWDER, FOR SOLUTION INTRAMUSCULAR; INTRAVENOUS at 11:17

## 2018-12-21 RX ADMIN — IPRATROPIUM BROMIDE 1 MG: 0.5 SOLUTION RESPIRATORY (INHALATION) at 11:21

## 2018-12-21 RX ADMIN — HEPARIN SODIUM 6400 UNITS: 1000 INJECTION INTRAVENOUS; SUBCUTANEOUS at 16:43

## 2018-12-21 RX ADMIN — ISODIUM CHLORIDE 12 ML: 0.03 SOLUTION RESPIRATORY (INHALATION) at 11:22

## 2018-12-21 RX ADMIN — GUAIFENESIN 600 MG: 600 TABLET, EXTENDED RELEASE ORAL at 21:51

## 2018-12-21 RX ADMIN — ALBUTEROL SULFATE 10 MG: 2.5 SOLUTION RESPIRATORY (INHALATION) at 11:22

## 2018-12-21 NOTE — ED NOTES
Pt ambulated to restroom with steady gait pt appeared SOB when he returned back to his room      Suzy Gonzalez RN  12/21/18 [id number]

## 2018-12-21 NOTE — ED NOTES
RN spoke to [facility] [place] about blocking off room for single occupancy at this time        Axel Mustafa RN  12/21/18 [id number]

## 2018-12-21 NOTE — ED PROVIDER NOTES
History  Chief Complaint   Patient presents with    Shortness of Breath     Reports shortness of breath, wheezing, cough x 1 week worsening  hx of COPD       History provided by:  Patient and spouse   used: No    Shortness of Breath   Severity:  Moderate  Onset quality:  Gradual  Duration:  2 weeks  Timing:  Constant  Progression:  Worsening  Chronicity:  Recurrent  Context: activity    Context comment:  H/O COPD, no O2, prior adenocarcinoma of the lung, multiple myeloma, moderate AS and worsening lower extremity edema  Relieved by:  Rest  Worsened by:  Exertion  Ineffective treatments: nebulizer  Associated symptoms: cough and wheezing    Associated symptoms: no abdominal pain, no chest pain, no diaphoresis, no fever, no headaches, no neck pain, no sore throat, no sputum production and no vomiting    Risk factors: hx of cancer     Recently saw his pulmonologist and was at his baseline  Recently had a CT which showed no acute changes  No chest pain  Gradual worsening dyspnea especially with exertion and can walk about 25-50 yd before he gets short of breath  No chest pain  He has chronic lower extremity edema which is multifactorial   No history of heart failure  He has discontinued his diuretics  No history of bleeding problems  He does have active cancer  No history of prior PE  Prior to Admission Medications   Prescriptions Last Dose Informant Patient Reported? Taking?    FLOVENT  MCG/ACT inhaler  Spouse/Significant Other Yes No   Si puff 2 (two) times a day   FLUZONE HIGH-DOSE 0 5 ML SUE  Spouse/Significant Other Yes No   Sig: TO BE GIVEN BY PHARMACIST PER STANDING ORDER   LORazepam (ATIVAN) 0 5 mg tablet  Spouse/Significant Other Yes Yes   Sig: Take 0 5 mg by mouth daily at bedtime   Lactobacillus-Inulin ([address] [place] [address]) 2018 at Unknown time Spouse/Significant Other Yes Yes   Sig: Take by mouth daily   Loperamide HCl (IMODIUM PO) 2018 at Unknown time Spouse/Significant Other Yes Yes   Sig: Take 1 tablet by mouth as needed   RAPAFLO 8 MG CAPS 2018 at Unknown time Spouse/Significant Other Yes Yes   Sig: Take 1 capsule by mouth daily   albuterol (5 mg/mL) 0 5 % nebulizer solution  Spouse/Significant Other No No   Sig: Take 0 5 mL (2 5 mg total) by nebulization every 6 (six) hours as needed for wheezing   albuterol (PROAIR HFA) 90 mcg/act inhaler 2018 at Unknown time  No Yes   Sig: Inhale 2 puffs every 6 (six) hours as needed for wheezing   albuterol (PROVENTIL HFA,VENTOLIN HFA) 90 mcg/act inhaler  Spouse/Significant Other No No   Sig: Inhale 2 puffs every 4 (four) hours as needed for wheezing   bumetanide (BUMEX) 0 5 MG tablet Not Taking at Unknown time  No No   Sig: Take 1 tablet (0 5 mg total) by mouth daily   Patient not taking: Reported on 2018    cholecalciferol (VITAMIN D3) 1,000 units tablet 2018 at Unknown time Spouse/Significant Other Yes Yes   Sig: Take 5,000 Units by mouth daily     cyanocobalamin (VITAMIN B-12) 1,000 mcg tablet 2018 at Unknown time Spouse/Significant Other Yes Yes   Sig: Take by mouth daily   dexamethasone (DECADRON) 4 mg tablet 2018 at Unknown time Spouse/Significant Other No Yes   Si tablets every week with food   Patient taking differently: 3 tablets on Friday, 1 Monday, 1 Tuesday    diphenoxylate-atropine (LOMOTIL) 2 5-0 025 mg per tablet  Spouse/Significant Other No No   Sig: Take 1 tablet by mouth 4 (four) times a day as needed for diarrhea   finasteride (PROSCAR) 5 mg tablet 2018 at Unknown time Spouse/Significant Other Yes Yes   Sig: Take 5 mg by mouth daily at bedtime     guaiFENesin (MUCINEX) 600 mg 12 hr tablet 2018 at Unknown time Spouse/Significant Other No Yes   Sig: Take 1 tablet (600 mg total) by mouth every 12 (twelve) hours   Patient taking differently: Take 600 mg by mouth every 12 (twelve) hours as needed     ipratropium-albuterol (DUO-NEB) 0 5-2 5 mg/mL Spouse/Significant Other Yes No   Sig: Take 3 mL by nebulization daily     lenalidomide (REVLIMID) 5 MG CAPS   No No   Sig: Take 1 capsule (5 mg total) by mouth daily 3 weeks on/3 weeks off adult male/auth# [id number]   lidocaine-prilocaine (EMLA) cream  Spouse/Significant Other Yes No   Sig: APPLY TO AREA AND COVER WITH TAPE 1 HOUR BEFORE APPOINTMENT    melphalan (ALKERAN) 2 MG tablet Past Month at Unknown time Spouse/Significant Other No Yes   Si tablets daily D1-4 monthly   12mg daily D1-4   methocarbamol (ROBAXIN) 500 mg tablet Not Taking at Unknown time Spouse/Significant Other Yes No   Sig: Take 500 mg by mouth 4 (four) times a day as needed   potassium chloride (K-DUR,KLOR-CON) 10 mEq tablet Not Taking at Unknown time  No No   Sig: Take 1 tablet (10 mEq total) by mouth daily   Patient not taking: Reported on 2018    predniSONE 50 mg tablet Past Month at Unknown time Spouse/Significant Other No Yes   Sig: BID WITH MEALS D1-4      Facility-Administered Medications: None       Past Medical History:   Diagnosis Date    Chronic pain disorder     3 fractured vertebrae    COPD (chronic obstructive pulmonary disease) (HCC)     Diarrhea     Enlarged prostate     Kidney stone     [facility]     Rectal adenocarcinoma ([facility] )     Sleep apnea     no cpap       Past Surgical History:   Procedure Laterality Date    CHOLECYSTECTOMY      COLONOSCOPY W/ ENDOSCOPIC US N/A 2017    Procedure: ANAL ENDOSCOPIC U/S;  Surgeon: Haris Barfield MD;  [facility]: [facility]; Service: Colorectal    HERNIA REPAIR      KIDNEY STONE SURGERY      LUNG LOBECTOMY Right     FL COLONOSCOPY FLX DX W/COLLJ SPEC WHEN PFRMD N/A 7/10/2017    Procedure: COLONOSCOPY;  Surgeon: Shelly Gray MD;  Location: [facility];   Service: Gastroenterology    FL RECTAL TUMOR EXCISION, TRANSANAL ENDOSCOPIC MICROSURGICAL, FULL THICK N/A 2017    Procedure: TRANSANAL ENDOSCOPIC MICROSURGERY TEM;  Surgeon: Haris Barfield MD; Location: [facility];  Service: Colorectal       Family History   Problem Relation Age of Onset    Arthritis Mother     Colon cancer Father     Heart attack Father     Diabetes type I Sister      I have reviewed and agree with the history as documented  Social History   Substance Use Topics    Smoking status: Former Smoker     Packs/day: 1 00     Years: 50 00     Types: Cigarettes     Quit date: 12/7/2010    Smokeless tobacco: Never Used      Comment: quit in 1999    Alcohol use Yes      Comment: rarely        Review of Systems   Constitutional: Negative for chills, diaphoresis and fever  HENT: Negative for congestion and sore throat  Respiratory: Positive for cough, shortness of breath and wheezing  Negative for sputum production and chest tightness  Cardiovascular: Positive for leg swelling  Negative for chest pain  Gastrointestinal: Negative for abdominal pain, diarrhea, nausea and vomiting  Genitourinary: Negative for dysuria  Musculoskeletal: Negative for neck pain  Neurological: Negative for weakness and headaches  All other systems reviewed and are negative  Physical Exam  Physical Exam   Constitutional: He appears well-developed and well-nourished  He is cooperative  Non-toxic appearance  He does not have a sickly appearance  He does not appear ill  No distress  HENT:   Head: Normocephalic and atraumatic  Right Ear: Hearing normal  No drainage or swelling  Left Ear: Hearing normal  No drainage or swelling  Mouth/Throat: Mucous membranes are normal    Eyes: Conjunctivae and lids are normal  Right eye exhibits no discharge  Left eye exhibits no discharge  Neck: Trachea normal and normal range of motion  No JVD present  Cardiovascular: Normal rate, regular rhythm, normal heart sounds, intact distal pulses and normal pulses  Exam reveals no gallop and no friction rub  No murmur heard  Pulmonary/Chest: Effort normal  No stridor  No respiratory distress   He has wheezes  He has rales  He exhibits no tenderness  Abdominal: Soft  Normal appearance  He exhibits no ascites and no mass  There is no hepatosplenomegaly  There is no tenderness  There is no rebound, no guarding and no CVA tenderness  Musculoskeletal: Normal range of motion  He exhibits edema  He exhibits no tenderness  Lymphadenopathy:        Right: No inguinal adenopathy present  Left: No inguinal adenopathy present  Neurological: He is alert  He has normal strength  He exhibits normal muscle tone  Gait normal  GCS eye subscore is 4  GCS verbal subscore is 5  GCS motor subscore is 6  Skin: Skin is warm, dry and intact  No rash noted  He is not diaphoretic  No pallor  Psychiatric: He has a normal mood and affect  His speech is normal  Cognition and memory are normal    Nursing note and vitals reviewed        Vital Signs  ED Triage Vitals   Temperature Pulse Respirations Blood Pressure SpO2   12/21/18 1051 12/21/18 1051 12/21/18 1051 12/21/18 1051 12/21/18 1051   98 3 °F (36 8 °C) (!) 107 (!) 24 115/65 98 %      Temp Source Heart Rate Source Patient Position - Orthostatic VS BP Location FiO2 (%)   12/21/18 1051 12/21/18 1215 12/21/18 1215 12/21/18 1215 --   Oral Monitor Sitting Right arm       Pain Score       12/21/18 1051       No Pain           Vitals:    12/21/18 2300 12/22/18 0709 12/22/18 1524 12/22/18 2351   BP: 99/68 105/69 101/66 109/64   Pulse: 96 83 87 77   Patient Position - Orthostatic VS: Lying Lying Lying Lying       Visual Acuity      ED Medications  Medications   heparin (porcine) 25,000 units in 250 mL infusion (premix) (12 Units/kg/hr × 80 kg (Order-Specific) Intravenous Rate/Dose Change 12/23/18 7233)   heparin (porcine) injection 6,400 Units (not administered)   heparin (porcine) injection 3,200 Units (3,200 Units Intravenous Given 12/23/18 0416)   acetaminophen (TYLENOL) tablet 650 mg (not administered)   methylPREDNISolone sodium succinate (Solu-MEDROL) injection 40 mg (40 mg Intravenous Given 12/22/18 2106)   albuterol inhalation solution 2 5 mg (not administered)   albuterol (PROVENTIL HFA,VENTOLIN HFA) inhaler 2 puff (2 puffs Inhalation Given 12/22/18 0556)   cholecalciferol (VITAMIN D3) tablet 5,000 Units (5,000 Units Oral Given 12/22/18 0824)   cyanocobalamin (VITAMIN B-12) tablet 1,000 mcg (1,000 mcg Oral Given 12/22/18 0824)   diphenoxylate-atropine (LOMOTIL) 2 5-0 025 mg per tablet 1 tablet (not administered)   finasteride (PROSCAR) tablet 5 mg (5 mg Oral Given 12/22/18 2105)   fluticasone (FLOVENT HFA) 110 MCG/ACT inhaler 1 puff (1 puff Inhalation Given 12/22/18 1821)   guaiFENesin (MUCINEX) 12 hr tablet 600 mg (600 mg Oral Given 12/22/18 2106)   Silodosin CAPS 1 capsule (1 capsule Oral Given 12/22/18 2105)   albuterol inhalation solution 10 mg (10 mg Nebulization Given 12/21/18 1122)   ipratropium (ATROVENT) 0 02 % inhalation solution 1 mg (1 mg Nebulization Given 12/21/18 1121)   sodium chloride 0 9 % inhalation solution 12 mL (12 mL Nebulization Given 12/21/18 1122)   methylPREDNISolone sodium succinate (Solu-MEDROL) injection 80 mg (80 mg Intravenous Given 12/21/18 1117)   magnesium sulfate 2 g/50 mL IVPB (premix) 2 g (0 g Intravenous Stopped 12/21/18 1218)   sodium chloride 0 9 % bolus 500 mL (0 mL Intravenous Stopped 12/21/18 1626)   iohexol (OMNIPAQUE) 350 MG/ML injection (MULTI-DOSE) 85 mL (85 mL Intravenous Given 12/21/18 1604)   heparin (porcine) injection 6,400 Units (6,400 Units Intravenous Given 12/21/18 1643)   LORazepam (ATIVAN) tablet 0 5 mg (0 5 mg Oral Given 12/21/18 2152)   LORazepam (ATIVAN) tablet 0 5 mg (0 5 mg Oral Given 12/22/18 2105)       Diagnostic Studies  Results Reviewed     Procedure Component Value Units Date/Time    Immunoglobulin free LT chains blood [[id number]]  (Abnormal) Collected:  12/21/18 1114    Lab Status:  Final result Specimen:  Blood from Arm, Left Updated:  12/22/18 2006     Ig Neche Free Light Chain 5 4 mg/L      Ig Lambda Free Light Chain 159 9 (H) mg/L      Kappa/Lambda FluidC Ratio 0 03 (L)    Narrative:       Performed at:  [address] [place] [address]  [id number]  : Gordy Pichardo MD, Phone:  [phone]    APTT [[id number]]  (Abnormal) Collected:  12/21/18 9114    Lab Status:  Final result Specimen:  Blood Updated:  12/21/18 2348     PTT >210 (HH) seconds     IgG, IgA, IgM [[id number]]  (Abnormal) Collected:  12/21/18 1114    Lab Status:  Final result Specimen:  Blood from Arm, Left Updated:  12/21/18 1604     IGA 34 0 (L) mg/dL       0 (L) mg/dL      IGM 32 0 (L) mg/dL     B-type natriuretic peptide [[id number]]  (Normal) Collected:  12/21/18 1114    Lab Status:  Final result Specimen:  Blood from Arm, Left Updated:  12/21/18 1210     NT-proBNP 161 pg/mL     Magnesium [[id number]]  (Abnormal) Collected:  12/21/18 1114    Lab Status:  Final result Specimen:  Blood from Arm, Left Updated:  12/21/18 1209     Magnesium 1 4 (L) mg/dL     CBC and differential [[id number]]  (Abnormal) Collected:  12/21/18 1114    Lab Status:  Final result Specimen:  Blood from Arm, Left Updated:  12/21/18 1150     WBC 5 32 Thousand/uL      RBC 3 62 (L) Million/uL      Hemoglobin 11 9 (L) g/dL      Hematocrit 36 3 (L) %       (H) fL      MCH 32 9 pg      MCHC 32 8 g/dL      RDW 15 0 %      MPV 9 3 fL      Platelets 986 (L) Thousands/uL      nRBC 0 /100 WBCs     Narrative: This is an appended report  These results have been appended to a previously verified report      Troponin I [[id number]]  (Normal) Collected:  12/21/18 1114    Lab Status:  Final result Specimen:  Blood from Arm, Left Updated:  12/21/18 1143     Troponin I 0 02 ng/mL     Comprehensive metabolic panel [[id number]]  (Abnormal) Collected:  12/21/18 1114    Lab Status:  Final result Specimen:  Blood from Arm, Left Updated:  12/21/18 1141     Sodium 141 mmol/L      Potassium 4 0 mmol/L      Chloride 105 mmol/L      CO2 28 mmol/L      ANION GAP 8 mmol/L      BUN 17 mg/dL      Creatinine 1 25 mg/dL      Glucose 126 mg/dL      Calcium 8 8 mg/dL      AST 15 U/L      ALT 40 U/L      Alkaline Phosphatase 85 U/L      Total Protein 6 0 (L) g/dL      Albumin 2 8 (L) g/dL      Total Bilirubin 0 92 mg/dL      eGFR 53 ml/min/1 73sq m     Narrative:         National Kidney Disease Education Program recommendations are as follows:  GFR calculation is accurate only with a steady state creatinine  Chronic Kidney disease less than 60 ml/min/1 73 sq  meters  Kidney failure less than 15 ml/min/1 73 sq  meters  Kristal Kim [[id number]]  (Normal) Collected:  12/21/18 1114    Lab Status:  Final result Specimen:  Blood from Arm, Left Updated:  12/21/18 1132     Protime 12 2 seconds      INR 0 89    APTT [[id number]]  (Normal) Collected:  12/21/18 1114    Lab Status:  Final result Specimen:  Blood from Arm, Left Updated:  12/21/18 1132     PTT 27 seconds                  CTA ED chest PE study   Final Result by Bro Ahn MD (12/21 5089)      Small amount of segmental and subsegmental left-sided pulmonary arterial embolism  I personally discussed this study with Neela Kim on 12/21/2018 at 4:14 PM via [address]  A relevant image was also submitted  Workstation performed: [id number]         XR chest 2 views   ED Interpretation by Tawny Oscar MD (12/21 3847)   I have personally reviewed the x-ray and my findings are: elevated right diaphragm, possible cavitary area  Final Result by Anibal Soto MD (12/21 1686)      No acute cardiopulmonary disease           Workstation performed: [id number]                    Procedures  ECG 12 Lead Documentation  Date/Time: 12/21/2018 11:18 AM  Performed by: Patty Becerril  Authorized by: Patty Becerril     Indications / Diagnosis:  Dyspnea  ECG reviewed by me, the ED Provider: yes    Patient location:  ED  Previous ECG:     Previous ECG:  Compared to current Comparison ECG info:  4/2018    Similarity:  No change  Interpretation:     Interpretation: non-specific    Rate:     ECG rate:  104    ECG rate assessment: tachycardic    Rhythm:     Rhythm: sinus tachycardia    Ectopy:     Ectopy: none    QRS:     QRS axis:  Normal    QRS intervals: Wide  Conduction:     Conduction: abnormal      Abnormal conduction: complete RBBB and 1st degree    ST segments:     ST segments:  Normal  T waves:     T waves: normal      CriticalCare Time  Performed by: Yasmine More by: Michelle Krause     Critical care provider statement:     Critical care time (minutes):  40    Critical care time was exclusive of:  Separately billable procedures and treating other patients and teaching time    Critical care was necessary to treat or prevent imminent or life-threatening deterioration of the following conditions: pulmonary embolism  Critical care was time spent personally by me on the following activities:  Obtaining history from patient or surrogate, development of treatment plan with patient or surrogate, discussions with consultants, evaluation of patient's response to treatment, examination of patient, review of old charts, re-evaluation of patient's condition, ordering and review of radiographic studies and ordering and review of laboratory studies    I assumed direction of critical care for this patient from another provider in my specialty: no             Phone Contacts  ED Phone Contact    ED Course      2:40 p m : case was discussed with Pulmonary who recommended CT to rule out the possibility of PE or any other acute findings  Their recommendation is if the CT is negative the patient can go home with steroids  If positive will need to treat the underlying pathology                           MDM  Number of Diagnoses or Management Options  Acute pulmonary embolism ([facility] ):   COPD exacerbation [facility]):   Diagnosis management comments: After discussion with the radiologist the patient has multiple pulmonary emboli  Heparin ordered  Patient was informed he will need to be admitted into the hospital   He has no active bleeding going on presently  He is not hypoxic  He does remain tachycardic  Case discussed with Internal Medicine regarding admission  Amount and/or Complexity of Data Reviewed  Clinical lab tests: reviewed and ordered  Tests in the radiology section of CPT®: ordered and reviewed  Tests in the medicine section of CPT®: ordered and reviewed  Review and summarize past medical records: yes  Discuss the patient with other providers: yes  Independent visualization of images, tracings, or specimens: yes    Patient Progress  Patient progress: (serious)    The patient presented with a condition in which there was a high probability of imminent or life-threatening deterioration, and critical care services (excluding separately billable procedures) totalled 30-74 minutes          Disposition  Final diagnoses:   Acute pulmonary embolism ([facility] )   COPD exacerbation ([facility] )     Time reflects when diagnosis was documented in both MDM as applicable and the Disposition within this note     Time User Action Codes Description Comment    12/21/2018  4:31 PM Reena Najera Add [I26 99] Acute pulmonary embolism ([facility] )     12/21/2018  4:31 PM Patrice Duvall Add [J44 1] COPD exacerbation [facility])       ED Disposition     ED Disposition Condition Comment    Admit        Follow-up Information    None         Current Discharge Medication List      CONTINUE these medications which have NOT CHANGED    Details   !! albuterol (PROAIR HFA) 90 mcg/act inhaler Inhale 2 puffs every 6 (six) hours as needed for wheezing  Qty: 8 5 g, Refills: 5    Associated Diagnoses: Chronic obstructive pulmonary disease, unspecified COPD type ([facility] )      cholecalciferol (VITAMIN D3) 1,000 units tablet Take 5,000 Units by mouth daily        cyanocobalamin (VITAMIN B-12) 1,000 mcg tablet Take by mouth daily      dexamethasone (DECADRON) 4 mg tablet 5 tablets every week with food  Qty: 60 tablet, Refills: 0    Associated Diagnoses: Multiple myeloma not having achieved remission ([place] [address] ); Diarrhea due to drug      finasteride (PROSCAR) 5 mg tablet Take 5 mg by mouth daily at bedtime  guaiFENesin (MUCINEX) 600 mg 12 hr tablet Take 1 tablet (600 mg total) by mouth every 12 (twelve) hours  Qty: 60 tablet, Refills: 0    Associated Diagnoses: Chronic obstructive pulmonary disease, unspecified COPD type ([place] [address] )      Lactobacillus-Inulin ([address]) Take by mouth daily      Loperamide HCl (IMODIUM PO) Take 1 tablet by mouth as needed      LORazepam (ATIVAN) 0 5 mg tablet Take 0 5 mg by mouth daily at bedtime      melphalan (ALKERAN) 2 MG tablet 6 tablets daily D1-4 monthly   12mg daily D1-4  Qty: 24 tablet, Refills: 0    Associated Diagnoses: Multiple myeloma, remission status unspecified ([facility])      predniSONE 50 mg tablet BID WITH MEALS D1-4  Qty: 8 tablet, Refills: 4    Associated Diagnoses: Multiple myeloma not having achieved remission ([facility])      RAPAFLO 8 MG CAPS Take 1 capsule by mouth daily  Refills: 3      albuterol (5 mg/mL) 0 5 % nebulizer solution Take 0 5 mL (2 5 mg total) by nebulization every 6 (six) hours as needed for wheezing  Qty: 20 mL, Refills: 0    Associated Diagnoses: Acute bronchitis      !! albuterol (PROVENTIL HFA,VENTOLIN HFA) 90 mcg/act inhaler Inhale 2 puffs every 4 (four) hours as needed for wheezing  Qty: 1 Inhaler, Refills: 0    Associated Diagnoses: Acute bronchitis      bumetanide (BUMEX) 0 5 MG tablet Take 1 tablet (0 5 mg total) by mouth daily  Qty: 30 tablet, Refills: 3    Associated Diagnoses: Edema, unspecified type      diphenoxylate-atropine (LOMOTIL) 2 5-0 025 mg per tablet Take 1 tablet by mouth 4 (four) times a day as needed for diarrhea  Qty: 30 tablet, Refills: 2    Associated Diagnoses: Diarrhea, unspecified type      FLOVENT  MCG/ACT inhaler 1 puff 2 (two) times a day  Refills: 0      FLUZONE HIGH-DOSE 0 5 ML SUE TO BE GIVEN BY PHARMACIST PER STANDING ORDER  Refills: 0      ipratropium-albuterol (DUO-NEB) 0 5-2 5 mg/mL Take 3 mL by nebulization daily        lenalidomide (REVLIMID) 5 MG CAPS Take 1 capsule (5 mg total) by mouth daily 3 weeks on/3 weeks off adult male/auth# [id number]  Qty: 21 capsule, Refills: 0    Associated Diagnoses: Multiple myeloma, remission status unspecified (HCC)      lidocaine-prilocaine (EMLA) cream APPLY TO AREA AND COVER WITH TAPE 1 HOUR BEFORE APPOINTMENT  Refills: 2      methocarbamol (ROBAXIN) 500 mg tablet Take 500 mg by mouth 4 (four) times a day as needed      potassium chloride (K-DUR,KLOR-CON) 10 mEq tablet Take 1 tablet (10 mEq total) by mouth daily  Qty: 30 tablet, Refills: 6    Associated Diagnoses: Edema, unspecified type       !! - Potential duplicate medications found  Please discuss with provider  No discharge procedures on file      ED Provider  Electronically Signed by           Kamille Mckeon MD  12/23/18 9127

## 2018-12-21 NOTE — PLAN OF CARE
DISCHARGE PLANNING     Discharge to home or other facility with appropriate resources Progressing        INFECTION - ADULT     Absence or prevention of progression during hospitalization Progressing        Knowledge Deficit     Patient/family/caregiver demonstrates understanding of disease process, treatment plan, medications, and discharge instructions Progressing        PAIN - ADULT     Verbalizes/displays adequate comfort level or baseline comfort level Progressing        Potential for Falls     Patient will remain free of falls Progressing        Prexisting or High Potential for Compromised Skin Integrity     Skin integrity is maintained or improved Progressing        RESPIRATORY - ADULT     Achieves optimal ventilation and oxygenation Progressing        SAFETY ADULT     Maintain or return to baseline ADL function Progressing     Maintain or return mobility status to optimal level Progressing

## 2018-12-21 NOTE — H&P
Unit/Bed#: [facility] 2 -01 Encounter: [id number]    Chief complaint:  Shortness of breath    History of Present Illness     HPI:  Sol Umana is a 81 y o  male who presents with shortness of breath which has been gradually worsening over the past 2 weeks  The patient has a history of COPD  He has been coughing and having some white sputum  He has had no chest pain  He has had no fever  He has chronic leg edema which has not changed  In the emergency room he was treated for exacerbation of COPD and actually improved somewhat  However, with even a short amount of walking, he became very tachycardic and dyspneic  The possibility of pulmonary embolism was raised  A CT scan of the chest was performed which revealed multiple pulmonary emboli in the left lung  The patient is admitted for further evaluation and care  The patient's past medical history is positive for COPD as mention  He has a history of hypertension  He has multiple myeloma and is under treatment for this  He also has a history of BPH and sleep apnea  He has undergone surgery for carcinoma of the lung and carcinoma of the rectum  He denies coronary artery disease, diabetes, congestive heart failure, peptic ulcer disease, or kidney disease  Past Surgical History:   Procedure Laterality Date    CHOLECYSTECTOMY      COLONOSCOPY W/ ENDOSCOPIC US N/A 7/21/2017    Procedure: ANAL ENDOSCOPIC U/S;  Surgeon: Agatha Brown MD;  [facility]; Service: Colorectal    HERNIA REPAIR      KIDNEY STONE SURGERY      LUNG LOBECTOMY Right     ND COLONOSCOPY FLX DX W/COLLJ SPEC WHEN PFRMD N/A 7/10/2017    Procedure: COLONOSCOPY;  Surgeon: Regino Delcid MD;  [facility];   Service: Gastroenterology    ND RECTAL TUMOR EXCISION, TRANSANAL ENDOSCOPIC MICROSURGICAL, FULL THICK N/A 11/2/2017    Procedure: TRANSANAL ENDOSCOPIC MICROSURGERY TEM;  Surgeon: Agatha Brown MD;  [facility];  Service: Colorectal     The patient's medications at the time of admission include: Albuterol by meter dosed inhaler or nebulization  Bumetanide 0 5 mg daily  Vitamin-D 5000 units daily  Vitamin B12 1000 mcg daily  Lomotil 1 tablet 4 times daily as needed  Finasteride 5 mg daily  Flovent 110 mcg 1 puff twice daily  Mucinex 600 mg twice daily  DuoNeb by nebulization daily  Lactobacillus 1 tablet daily  Revlimid 5 mg daily 3 weeks on and 3 weeks off  Melphalan 12 mg 4 days every 6 weeks  Methocarbamol 500 mg 4 times daily  Potassium chloride 10 mEq daily  Prednisone 50 mg twice daily 4 days out of 6 weeks  Rapaflo 8 mg daily    The patient is allergic to penicillin, sulfa, and spironolactone     Family History   Problem Relation Age of Onset    Arthritis Mother     Colon cancer Father     Heart attack Father     Diabetes type I Sister      Social history reveals the patient is  lives with his wife  He is a former smoker but quit many years ago  He does not drink or use illicit drugs  Review of Systems  Review of systems is taken in detail including 12 systems  In addition to those issues mentioned above, the patient has had issues with diarrhea which she believes is related to his chemotherapy  He has been utilizing Imodium or Lomotil for this  Otherwise, systemic review was unrevealing  Objective   Vitals: Blood pressure 112/76, pulse (!) 110, temperature 98 4 °F (36 9 °C), temperature source Temporal, resp  rate 19, weight 82 6 kg (182 lb), SpO2 97 %  Physical Exam   The patient is a well-developed man who appears somewhat short of breath  Head is atraumatic and normocephalic  ENT examination is within normal limits  Eye examination revealed pupils to be equal, round, and reactive to light  Extraocular movements are intact  Neck is supple  Carotids are full without   There is no lymphadenopathy or goiter  Lungs revealed decreased breath sounds bilaterally  Scattered wheezes and rhonchi are heard    The abdomen is soft with active bowel sounds  There is no mass, tenderness, or organomegaly  There is +3 edema of both legs  There is no clubbing or cyanosis  There is no calf tenderness  Neurologic examination reveals the patient to be alert and oriented  No focal sign is noted      Lab Results:   Results for orders placed or performed during the hospital encounter of 12/21/18   CBC and differential   Result Value Ref Range    WBC 5 32 4 31 - 10 16 Thousand/uL    RBC 3 62 (L) 3 88 - 5 62 Million/uL    Hemoglobin 11 9 (L) 12 0 - 17 0 g/dL    Hematocrit 36 3 (L) 36 5 - 49 3 %     (H) 82 - 98 fL    MCH 32 9 26 8 - 34 3 pg    MCHC 32 8 31 4 - 37 4 g/dL    RDW 15 0 11 6 - 15 1 %    MPV 9 3 8 9 - 12 7 fL    Platelets 615 (L) 291 - 390 Thousands/uL    nRBC 0 /100 WBCs   Protime-INR   Result Value Ref Range    Protime 12 2 11 8 - 14 2 seconds    INR 0 89 0 86 - 1 17   APTT   Result Value Ref Range    PTT 27 26 - 38 seconds   Comprehensive metabolic panel   Result Value Ref Range    Sodium 141 136 - 145 mmol/L    Potassium 4 0 3 5 - 5 3 mmol/L    Chloride 105 100 - 108 mmol/L    CO2 28 21 - 32 mmol/L    ANION GAP 8 4 - 13 mmol/L    BUN 17 5 - 25 mg/dL    Creatinine 1 25 0 60 - 1 30 mg/dL    Glucose 126 65 - 140 mg/dL    Calcium 8 8 8 3 - 10 1 mg/dL    AST 15 5 - 45 U/L    ALT 40 12 - 78 U/L    Alkaline Phosphatase 85 46 - 116 U/L    Total Protein 6 0 (L) 6 4 - 8 2 g/dL    Albumin 2 8 (L) 3 5 - 5 0 g/dL    Total Bilirubin 0 92 0 20 - 1 00 mg/dL    eGFR 53 ml/min/1 73sq m   Magnesium   Result Value Ref Range    Magnesium 1 4 (L) 1 6 - 2 6 mg/dL   Troponin I   Result Value Ref Range    Troponin I 0 02 <=0 04 ng/mL   B-type natriuretic peptide   Result Value Ref Range    NT-proBNP 161 <450 pg/mL   IgG, IgA, IgM   Result Value Ref Range    IGA 34 0 (L) 70 0 - 400 0 mg/dL     0 (L) 700 0-1,600 0 mg/dL    IGM 32 0 (L) 40 0 - 230 0 mg/dL   Manual Differential(PHLEBS Do Not Order)   Result Value Ref Range    Segmented % 71 43 - 75 %    Bands % 1 0 - 8 %    Lymphocytes % 19 14 - 44 %    Monocytes % 6 4 - 12 %    Eosinophils, % 1 0 - 6 %    Basophils % 0 0 - 1 %    Metamyelocytes% 1 0 - 1 %    Myelocytes % 1 0 - 1 %    Absolute Neutrophils 3 83 1 85 - 7 62 Thousand/uL    Lymphocytes Absolute 1 01 0 60 - 4 47 Thousand/uL    Monocytes Absolute 0 32 0 00 - 1 22 Thousand/uL    Eosinophils Absolute 0 05 0 00 - 0 40 Thousand/uL    Basophils Absolute 0 00 0 00 - 0 10 Thousand/uL    Total Counted 100     Macrocytes Present     Ovalocytes Present     Platelet Estimate Borderline (A) Adequate    Large Platelet Present          Assessment/Plan     Assessment:  1  Pulmonary embolism  2  COPD with possible exacerbation  3  Multiple myeloma  4  Mild anemia and thrombocytopenia secondary to 3   5  Hypertension  6  Diarrhea, likely drug induced  7  History of lung and rectal cancer  Plan:  The patient will be admitted to the hospital and started on heparin anticoagulation  It is anticipated that he will transition to an oral anticoagulants in the near future  He will also be treated with bronchodilators and steroids for possible COPD exacerbation  Additional intervention will depend on the patient's clinical course    Based on the above information, I believe that it is very likely that the patient will require 2 or more midnights of hospitalization  He is therefore placed on inpatient status  I discussed resuscitative measures with the patient and his family  They would like all available modalities employed on his behalf in the event of a cardiac arrest   He is therefore sign to code blue level 1           Code Status: Prior

## 2018-12-22 LAB
ANION GAP SERPL CALCULATED.3IONS-SCNC: 11 MMOL/L (ref 4–13)
APTT PPP: 63 SECONDS (ref 26–38)
APTT PPP: 94 SECONDS (ref 26–38)
APTT PPP: >210 SECONDS (ref 26–38)
BUN SERPL-MCNC: 19 MG/DL (ref 5–25)
CALCIUM SERPL-MCNC: 8.7 MG/DL (ref 8.3–10.1)
CHLORIDE SERPL-SCNC: 105 MMOL/L (ref 100–108)
CO2 SERPL-SCNC: 22 MMOL/L (ref 21–32)
CREAT SERPL-MCNC: 1.14 MG/DL (ref 0.6–1.3)
ERYTHROCYTE [DISTWIDTH] IN BLOOD BY AUTOMATED COUNT: 15.1 % (ref 11.6–15.1)
GFR SERPL CREATININE-BSD FRML MDRD: 60 ML/MIN/1.73SQ M
GLUCOSE SERPL-MCNC: 162 MG/DL (ref 65–140)
HCT VFR BLD AUTO: 31 % (ref 36.5–49.3)
HGB BLD-MCNC: 10.1 G/DL (ref 12–17)
KAPPA LC FREE SER-MCNC: 5.4 MG/L (ref 3.3–19.4)
KAPPA LC FREE/LAMBDA FREE SER: 0.03 {RATIO} (ref 0.26–1.65)
LAMBDA LC FREE SERPL-MCNC: 159.9 MG/L (ref 5.7–26.3)
MCH RBC QN AUTO: 32.2 PG (ref 26.8–34.3)
MCHC RBC AUTO-ENTMCNC: 32.6 G/DL (ref 31.4–37.4)
MCV RBC AUTO: 99 FL (ref 82–98)
PLATELET # BLD AUTO: 103 THOUSANDS/UL (ref 149–390)
PMV BLD AUTO: 9.3 FL (ref 8.9–12.7)
POTASSIUM SERPL-SCNC: 4.4 MMOL/L (ref 3.5–5.3)
RBC # BLD AUTO: 3.14 MILLION/UL (ref 3.88–5.62)
SODIUM SERPL-SCNC: 138 MMOL/L (ref 136–145)
WBC # BLD AUTO: 5.27 THOUSAND/UL (ref 4.31–10.16)

## 2018-12-22 PROCEDURE — 85027 COMPLETE CBC AUTOMATED: CPT | Performed by: INTERNAL MEDICINE

## 2018-12-22 PROCEDURE — 99232 SBSQ HOSP IP/OBS MODERATE 35: CPT | Performed by: INTERNAL MEDICINE

## 2018-12-22 PROCEDURE — 80048 BASIC METABOLIC PNL TOTAL CA: CPT | Performed by: INTERNAL MEDICINE

## 2018-12-22 PROCEDURE — 85730 THROMBOPLASTIN TIME PARTIAL: CPT | Performed by: INTERNAL MEDICINE

## 2018-12-22 RX ORDER — LORAZEPAM 0.5 MG/1
0.5 TABLET ORAL
Status: COMPLETED | OUTPATIENT
Start: 2018-12-22 | End: 2018-12-22

## 2018-12-22 RX ORDER — SILODOSIN 8 MG/1
8 CAPSULE ORAL
Status: DISCONTINUED | OUTPATIENT
Start: 2018-12-22 | End: 2018-12-24 | Stop reason: HOSPADM

## 2018-12-22 RX ADMIN — HEPARIN SODIUM AND DEXTROSE 10 UNITS/KG/HR: 10000; 5 INJECTION INTRAVENOUS at 16:38

## 2018-12-22 RX ADMIN — FLUTICASONE PROPIONATE 1 PUFF: 110 AEROSOL, METERED RESPIRATORY (INHALATION) at 18:21

## 2018-12-22 RX ADMIN — GUAIFENESIN 600 MG: 600 TABLET, EXTENDED RELEASE ORAL at 21:06

## 2018-12-22 RX ADMIN — CYANOCOBALAMIN TAB 500 MCG 1000 MCG: 500 TAB at 08:24

## 2018-12-22 RX ADMIN — GUAIFENESIN 600 MG: 600 TABLET, EXTENDED RELEASE ORAL at 08:24

## 2018-12-22 RX ADMIN — LORAZEPAM 0.5 MG: 0.5 TABLET ORAL at 21:05

## 2018-12-22 RX ADMIN — VITAMIN D, TAB 1000IU (100/BT) 5000 UNITS: 25 TAB at 08:24

## 2018-12-22 RX ADMIN — METHYLPREDNISOLONE SODIUM SUCCINATE 40 MG: 40 INJECTION, POWDER, FOR SOLUTION INTRAMUSCULAR; INTRAVENOUS at 05:53

## 2018-12-22 RX ADMIN — ALBUTEROL SULFATE 2 PUFF: 90 AEROSOL, METERED RESPIRATORY (INHALATION) at 05:56

## 2018-12-22 RX ADMIN — FINASTERIDE 5 MG: 5 TABLET, FILM COATED ORAL at 21:05

## 2018-12-22 RX ADMIN — METHYLPREDNISOLONE SODIUM SUCCINATE 40 MG: 40 INJECTION, POWDER, FOR SOLUTION INTRAMUSCULAR; INTRAVENOUS at 14:52

## 2018-12-22 RX ADMIN — METHYLPREDNISOLONE SODIUM SUCCINATE 40 MG: 40 INJECTION, POWDER, FOR SOLUTION INTRAMUSCULAR; INTRAVENOUS at 21:06

## 2018-12-22 RX ADMIN — SILODOSIN 1 CAPSULE: 8 CAPSULE ORAL at 21:05

## 2018-12-22 NOTE — PROGRESS NOTES
Progress Note - Víctor Man 81 y o  male MRN: [id number]    Unit/Bed#: [facility] 2 -01 Encounter: [id number]      Subjective: The patient feels somewhat better today  He has no chest pain  He is less short of breath  He still has some cough and wheezing  He has no nausea or vomiting  Physical Exam:   Temp:  [97 5 °F (36 4 °C)-98 4 °F (36 9 °C)] 98 4 °F (36 9 °C)  HR:  [] 87  Resp:  [18-20] 20  BP: ()/(53-76) 101/66    Gen:  Well-developed, well-nourished, in no acute distress  Neck:  Supple  No lymphadenopathy, goiter, or bruit  Heart:  Regular rhythm  I heard no murmur, gallop, or rub  Lungs:  Bilateral wheezing and rhonchi   Abd:  Soft with active bowel sounds  No mass, tenderness, organomegaly  Extremities:  +2 edema  No calf tenderness  Neuro:  Alert and oriented  No focal sign  Skin:  Warm and dry      LABS:   CBC:   Lab Results   Component Value Date    WBC 5 27 12/22/2018    HGB 10 1 (L) 12/22/2018    HCT 31 0 (L) 12/22/2018    MCV 99 (H) 12/22/2018     (L) 12/22/2018    MCH 32 2 12/22/2018    MCHC 32 6 12/22/2018    RDW 15 1 12/22/2018    MPV 9 3 12/22/2018   , CMP:   Lab Results   Component Value Date    SODIUM 138 12/22/2018    K 4 4 12/22/2018     12/22/2018    CO2 22 12/22/2018    BUN 19 12/22/2018    CREATININE 1 14 12/22/2018    CALCIUM 8 7 12/22/2018    EGFR 60 12/22/2018           Patient Active Problem List   Diagnosis    Chronic rhinitis    [facility]    BPH (benign prostatic hyperplasia)    Multiple myeloma (HCC)    Chronic diarrhea    COPD [facility]chronic obstructive pulmonary disease) [facility]    Right bundle branch block    Enlarged prostate without lower urinary tract symptoms (luts)    Osteoporosis    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    Aortic stenosis, moderate    Edema    Other pulmonary embolism without acute cor pulmonale (HCC)       Assessment/Plan:  1  Pulmonary embolism  2  COPD with mild exacerbation  3   History of adenocarcinoma of the lung  4  Multiple myeloma  5  Peripheral edema without evidence of heart failure  6  Moderate aortic stenosis  7  Diarrhea related to myeloma therapy    The patient has improved somewhat  He is currently on heparin  I spoke with case management about investigating the patient has access to 1 of the newer oral anticoagulants  He will remain on therapy for exacerbation of COPD  If he continues to improve and if we can arrange appropriate outpatient anticoagulation, may be possible to discharge the patient tomorrow        VTE Pharmacologic Prophylaxis: Heparin  VTE Mechanical Prophylaxis: reason for no mechanical VTE prophylaxis Therapeutically anticoagulated

## 2018-12-23 LAB — APTT PPP: 56 SECONDS (ref 26–38)

## 2018-12-23 PROCEDURE — 99222 1ST HOSP IP/OBS MODERATE 55: CPT | Performed by: INTERNAL MEDICINE

## 2018-12-23 PROCEDURE — 99232 SBSQ HOSP IP/OBS MODERATE 35: CPT | Performed by: INTERNAL MEDICINE

## 2018-12-23 PROCEDURE — 85730 THROMBOPLASTIN TIME PARTIAL: CPT | Performed by: INTERNAL MEDICINE

## 2018-12-23 RX ORDER — PREDNISONE 20 MG/1
40 TABLET ORAL DAILY
Status: DISCONTINUED | OUTPATIENT
Start: 2018-12-24 | End: 2018-12-24 | Stop reason: HOSPADM

## 2018-12-23 RX ORDER — METHYLPREDNISOLONE SODIUM SUCCINATE 40 MG/ML
20 INJECTION, POWDER, LYOPHILIZED, FOR SOLUTION INTRAMUSCULAR; INTRAVENOUS EVERY 8 HOURS SCHEDULED
Status: COMPLETED | OUTPATIENT
Start: 2018-12-23 | End: 2018-12-23

## 2018-12-23 RX ADMIN — METHYLPREDNISOLONE SODIUM SUCCINATE 20 MG: 40 INJECTION, POWDER, FOR SOLUTION INTRAMUSCULAR; INTRAVENOUS at 13:42

## 2018-12-23 RX ADMIN — FLUTICASONE PROPIONATE 1 PUFF: 110 AEROSOL, METERED RESPIRATORY (INHALATION) at 08:08

## 2018-12-23 RX ADMIN — APIXABAN 10 MG: 5 TABLET, FILM COATED ORAL at 10:26

## 2018-12-23 RX ADMIN — APIXABAN 10 MG: 5 TABLET, FILM COATED ORAL at 17:25

## 2018-12-23 RX ADMIN — GUAIFENESIN 600 MG: 600 TABLET, EXTENDED RELEASE ORAL at 21:43

## 2018-12-23 RX ADMIN — FINASTERIDE 5 MG: 5 TABLET, FILM COATED ORAL at 21:43

## 2018-12-23 RX ADMIN — HEPARIN SODIUM 3200 UNITS: 1000 INJECTION INTRAVENOUS; SUBCUTANEOUS at 04:18

## 2018-12-23 RX ADMIN — SILODOSIN 1 CAPSULE: 8 CAPSULE ORAL at 21:43

## 2018-12-23 RX ADMIN — VITAMIN D, TAB 1000IU (100/BT) 5000 UNITS: 25 TAB at 08:09

## 2018-12-23 RX ADMIN — METHYLPREDNISOLONE SODIUM SUCCINATE 20 MG: 40 INJECTION, POWDER, FOR SOLUTION INTRAMUSCULAR; INTRAVENOUS at 21:43

## 2018-12-23 RX ADMIN — FLUTICASONE PROPIONATE 1 PUFF: 110 AEROSOL, METERED RESPIRATORY (INHALATION) at 17:25

## 2018-12-23 RX ADMIN — GUAIFENESIN 600 MG: 600 TABLET, EXTENDED RELEASE ORAL at 08:09

## 2018-12-23 RX ADMIN — METHYLPREDNISOLONE SODIUM SUCCINATE 40 MG: 40 INJECTION, POWDER, FOR SOLUTION INTRAMUSCULAR; INTRAVENOUS at 05:34

## 2018-12-23 RX ADMIN — CYANOCOBALAMIN TAB 500 MCG 1000 MCG: 500 TAB at 08:09

## 2018-12-23 NOTE — PROGRESS NOTES
Progress Note - Niurka Knight 81 y o  male MRN: [id number]    Unit/Bed#: [facility] 2 -01 Encounter: [id number]      Subjective: The patient thinks that his breathing has improved somewhat though he still has dyspnea on exertion  He has less cough and sputum  He has less wheezing  He has had no chest pain  He slept reasonably well  His appetite is pretty good  Physical Exam:   Temp:  [97 3 °F (36 3 °C)-98 4 °F (36 9 °C)] 97 3 °F (36 3 °C)  HR:  [76-87] 76  Resp:  [18-20] 18  BP: (101-118)/(64-68) 118/68    Gen:  Well-developed, well-nourished, in no distress  Neck:  Supple  No lymphadenopathy, goiter, or bruit  Heart:  Regular rhythm  No murmur, gallop, or rub  Lungs:  Clear to auscultation and percussion  Decreased breath sounds bilaterally  Scattered rhonchi are noted  Abd:  Soft with active bowel sounds  There is no mass, tenderness, or organomegaly  Extremities:  No clubbing or cyanosis  Plus two edema  Neuro:  Alert and oriented  No focal sign  Skin:  Warm and dry      LABS:  No new labs        Patient Active Problem List   Diagnosis    Chronic rhinitis    Adenocarcinoma, lung (HCC)    BPH (benign prostatic hyperplasia)    Multiple myeloma (HCC)    Chronic diarrhea    COPD (chronic obstructive pulmonary disease) (HCC)    Right bundle branch block    Enlarged prostate without lower urinary tract symptoms (luts)    Osteoporosis    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HCC)    Aortic stenosis, moderate    Edema    Other pulmonary embolism without acute cor pulmonale (HCC)       Assessment/Plan:  1  Pulmonary embolism  2  COPD with exacerbation  3  History of adenocarcinoma of the lung  4  Multiple myeloma  5  Chronic peripheral edema without evidence of congestive heart failure  6  Moderate aortic stenosis  7  Diarrhea related to myeloma therapy    The patient is improving slowly but steadily  He will be converted to Eliquis from IV heparin    I have spoken with case management and I asked to check on the insurance coverage for this  I believe that the patient's recent dyspnea was related both to pulmonary embolism and to an exacerbation of COPD  His COPD is improving and his methylprednisolone has been reduced  I appreciate pulmonary's review of the situation  Hopefully, the patient will be suitable for oral steroids tomorrow  He can probably be discharged at that point      VTE Pharmacologic Prophylaxis:  Eliquis  VTE Mechanical Prophylaxis: reason for no mechanical VTE prophylaxis Therapeutically anticoagulated

## 2018-12-23 NOTE — CONSULTS
Pulmonary Consultation   West Knowles 81 y o  male MRN: [id number]  [facility] [address] [facility] Encounter: [id number]      Reason for consultation:  Pulmonary embolism, COPD exacerbation    Requesting physician:  Dr Eduardo Saez    Impressions/Recommendations:     · Acute on chronic respiratory insufficiency which is multifactorial and due to following:     · COPD with acute exacerbation -  he is clinically improving on IV steroids  Would transition to prednisone 40 mg daily starting tomorrow  Taper by 10 mg every 3 days  He should continue with albuterol via nebulizer at least 3 times daily until he is feeling better  According to Dr Lynn Helms is office notes, he has tried multiple inhalers, none of which have been beneficial   He has follow-up with Dr Lynn Helms in March  · Acute pulmonary embolism -  no recent travel history or surgery  Suspect that his underlying risk factor is myeloma  Agree with Araceli  He is following up with Dr Jonnie Coleman later this week  Would defer decision regarding duration of anticoagulation to Oncology  History of Present Illness   HPI:  West Knowles is a 81 y o  male who is well known to our service and follows with Dr Lynn Helms in the office  He has COPD, history of lung cancer status post right upper lobectomy followed by chemotherapy and radiation therapy and now with multiple myeloma undergoing treatment per Dr Jonnie Coleman  He was admitted on 12/21/18 with progressively worsening shortness of breath, cough, wheeze and sputum production  On admission, he underwent CT of the chest which showed acute pulmonary embolism  He was also started on IV steroids for COPD exacerbation  The patient is feeling much better since hospital admission  He denies any history of DVT/PE  He denies any recent travel history  He has longstanding and chronic lower extremity edema which is unchanged  He denies calf tenderness  He denies chest pain or palpitations      Review of systems:  Patient denies headache or vision changes  Weight is stable  Denies sore throat or runny nose  Denies fever, chills or sweats  Denies chest pain or palpitations  Denies nausea, vomiting, (+) chronic diarrhea  Denies skin rashes  Denies dysuria or hematuria  All other 12-point review of systems are negative  Historical Information   Past Medical History:   Diagnosis Date    Chronic pain disorder     3 fractured vertebrae    COPD (chronic obstructive pulmonary disease) (HCC)     Diarrhea     Enlarged prostate     Kidney stone     Multiple myeloma (HCC)     Rectal adenocarcinoma [facility] [facility]     Sleep apnea     no cpap     Past Surgical History:   Procedure Laterality Date    CHOLECYSTECTOMY      COLONOSCOPY W/ ENDOSCOPIC US N/A 7/21/2017    Procedure: ANAL ENDOSCOPIC U/S;  Surgeon: Latoya Ordonez MD;  [facility]; Service: Colorectal    HERNIA REPAIR      KIDNEY STONE SURGERY      LUNG LOBECTOMY Right     [place] COLONOSCOPY FLX DX W/COLLJ SPEC WHEN PFRMD N/A 7/10/2017    Procedure: COLONOSCOPY;  Surgeon: Janette Muhammad MD;  [facility];   Service: Gastroenterology    IL RECTAL TUMOR EXCISION, TRANSANAL ENDOSCOPIC MICROSURGICAL, FULL THICK N/A 11/2/2017    Procedure: TRANSANAL ENDOSCOPIC MICROSURGERY TEM;  Surgeon: Latoya Ordonez MD;  [facility];  Service: Colorectal     Family History   Problem Relation Age of Onset    Arthritis Mother     Colon cancer Father     Heart attack Father     Diabetes type I Sister        Tobacco history:  48 pack year smoking history, quit December 2010    Family history:  Negative from pulmonary standpoint    Meds/Allergies   Current Facility-Administered Medications   Medication Dose Route Frequency    acetaminophen (TYLENOL) tablet 650 mg  650 mg Oral Q6H PRN    albuterol (PROVENTIL HFA,VENTOLIN HFA) inhaler 2 puff  2 puff Inhalation Q6H PRN    albuterol inhalation solution 2 5 mg  2 5 mg Nebulization Q6H PRN    apixaban (ELIQUIS) tablet 10 mg  10 mg Oral BID    cholecalciferol (VITAMIN D3) tablet 5,000 Units  5,000 Units Oral Daily    cyanocobalamin (VITAMIN B-12) tablet 1,000 mcg  1,000 mcg Oral Daily    diphenoxylate-atropine (LOMOTIL) 2 5-0 025 mg per tablet 1 tablet  1 tablet Oral 4x Daily PRN    finasteride (PROSCAR) tablet 5 mg  5 mg Oral HS    fluticasone (FLOVENT HFA) 110 MCG/ACT inhaler 1 puff  1 puff Inhalation BID    guaiFENesin (MUCINEX) 12 hr tablet 600 mg  600 mg Oral Q12H BREN    methylPREDNISolone sodium succinate (Solu-MEDROL) injection 20 mg  20 mg Intravenous Q8H BREN    Silodosin CAPS 1 capsule  8 mg Oral HS     Allergies   Allergen Reactions    Sulfa Antibiotics Itching    Aldactone [Spironolactone] Other (See Comments)     Breast swelling and pain    Penicillin V Rash     AS CHILD       Vitals: Blood pressure 118/68, pulse 76, temperature (!) 97 3 °F (36 3 °C), temperature source Temporal, resp  rate 18, weight 83 6 kg (184 lb 4 9 oz), SpO2 97 % , room air, Body mass index is 28 87 kg/m²      Intake/Output Summary (Last 24 hours) at 12/23/18 1150  Last data filed at 12/23/18 6387   Gross per 24 hour   Intake                0 ml   Output             1075 ml   Net            -1075 ml       Physical exam:    General Appearance:    Alert, cooperative, no conversational dyspnea or   accessory muscle use       Head/eyes:    Normocephalic, without obvious abnormality, atraumatic,         PERRL, extraocular muscles intact, no scleral icterus    Nose:   Nares normal, septum midline, mucosa normal, no drainage    or sinus tenderness   Throat:   Moist mucous membranes, no thrush   Neck:   Supple, trachea midline, no adenopathy; no carotid    bruit or JVD   Lungs:     Scant bilateral wheeze, right greater than left   Chest Wall:    No tenderness or deformity    Heart:    Regular rate and rhythm, S1 and S2 normal, (+) murmur, rub   or gallop   Abdomen:     Soft, non-tender, bowel sounds active all four quadrants, no masses, no organomegaly   Extremities:   Extremities normal, atraumatic, no cyanosis or edema   Skin:   Warm, dry, turgor normal, no rashes or lesions   Lymph nodes:   Cervical and supraclavicular nodes normal   Neurologic:   CNII-XII intact, normal strength, non-focal     Labs: I have personally reviewed pertinent lab results  Results from last 7 days  Lab Units 12/22/18  0500 12/21/18  1114   WBC Thousand/uL 5 27 5 32   HEMOGLOBIN g/dL 10 1* 11 9*   HEMATOCRIT % 31 0* 36 3*   PLATELETS Thousands/uL 103* 104*           Results from last 7 days  Lab Units 12/22/18  0500 12/21/18  1114   POTASSIUM mmol/L 4 4 4 0   CHLORIDE mmol/L 105 105   CO2 mmol/L 22 28   BUN mg/dL 19 17   CREATININE mg/dL 1 14 1 25   CALCIUM mg/dL 8 7 8 8   ALK PHOS U/L  --  85   ALT U/L  --  40   AST U/L  --  15       Results from last 7 days  Lab Units 12/23/18  0330 12/22/18  2034 12/22/18  1325  12/21/18  1114   INR   --   --   --   --  0 89   PTT seconds 56* 63* 94*  < > 27   < > = values in this interval not displayed  Results from last 7 days  Lab Units 12/21/18  1114   MAGNESIUM mg/dL 1 4*       NT-    Imaging and other studies: I have personally reviewed pertinent reports  and I have personally reviewed pertinent films in PACS chest CT from 12/21/18 shows postsurgical changes consistent with right upper lobectomy and radiation changes along mediastinum  There are stable emphysematous changes  There is small burden of acute pulmonary embolism in segmental and subsegmental pulmonary artery tree on the left    Other Studies: I have personally reviewed pertinent reports  Echocardiogram from 7/20/18 shows an EF of 60%  Moderate aortic stenosis    Code Status: Level 1 - Full Code    Thank you for allowing us to participate in the care of your patient      Misha Epstein DO

## 2018-12-24 ENCOUNTER — TELEPHONE (OUTPATIENT)
Dept: HEMATOLOGY ONCOLOGY | Facility: CLINIC | Age: 83
End: 2018-12-24

## 2018-12-24 VITALS
TEMPERATURE: 97.5 F | SYSTOLIC BLOOD PRESSURE: 142 MMHG | OXYGEN SATURATION: 97 % | WEIGHT: 192.02 LBS | HEART RATE: 91 BPM | DIASTOLIC BLOOD PRESSURE: 79 MMHG | RESPIRATION RATE: 20 BRPM | BODY MASS INDEX: 30.07 KG/M2

## 2018-12-24 PROCEDURE — 99239 HOSP IP/OBS DSCHRG MGMT >30: CPT | Performed by: INTERNAL MEDICINE

## 2018-12-24 PROCEDURE — 99232 SBSQ HOSP IP/OBS MODERATE 35: CPT | Performed by: PHYSICIAN ASSISTANT

## 2018-12-24 RX ORDER — PREDNISONE 10 MG/1
TABLET ORAL
Qty: 26 TABLET | Refills: 0 | Status: SHIPPED | OUTPATIENT
Start: 2018-12-24 | End: 2019-03-05 | Stop reason: ALTCHOICE

## 2018-12-24 RX ADMIN — APIXABAN 10 MG: 5 TABLET, FILM COATED ORAL at 09:34

## 2018-12-24 RX ADMIN — GUAIFENESIN 600 MG: 600 TABLET, EXTENDED RELEASE ORAL at 09:35

## 2018-12-24 RX ADMIN — FLUTICASONE PROPIONATE 1 PUFF: 110 AEROSOL, METERED RESPIRATORY (INHALATION) at 09:37

## 2018-12-24 RX ADMIN — VITAMIN D, TAB 1000IU (100/BT) 5000 UNITS: 25 TAB at 09:33

## 2018-12-24 RX ADMIN — CYANOCOBALAMIN TAB 500 MCG 1000 MCG: 500 TAB at 09:34

## 2018-12-24 RX ADMIN — PREDNISONE 40 MG: 20 TABLET ORAL at 09:35

## 2018-12-24 NOTE — TELEPHONE ENCOUNTER
Called patient's wife and informed her there is no scheduled appt for 12/26/18  Patient is seeing Dr Irene Benjamin on 12/7/18 and we will discuss treatment

## 2018-12-24 NOTE — DISCHARGE INSTRUCTIONS
You were diagnosed with blood clots in the lung and COPD exacerbation  Take the steroids as prescribed for the COPD exacerbation  Take Eliquis as prescribed for the blood clot  If any uncontrolled bleeding occurs, proceed to the nearest ER    Do not take the dexamethasone for this week only while you are on prednisone     Follow up with Dr Natalia Mcclain this Thursday

## 2018-12-24 NOTE — UTILIZATION REVIEW
Initial Clinical Review    Admission: Date/Time/Statement: inpatient 12/21/18 @ 1657     Orders Placed This Encounter   Procedures    Inpatient Admission (expected length of stay for this patient is greater than two midnights)     Standing Status:   Standing     Number of Occurrences:   1     Order Specific Question:   Admitting Physician     Answer:   JOHANNA MEYER [[id number]]     Order Specific Question:   Level of Care     Answer:   Med Surg [[id number]]     Order Specific Question:   Estimated length of stay     Answer:   More than 2 Midnights     Order Specific Question:   Certification     Answer:   I certify that inpatient services are medically necessary for this patient for a duration of greater than two midnights  See H&P and MD Progress Notes for additional information about the patient's course of treatment  ED: Date/Time/Mode of Arrival:   ED Arrival Information     Expected Arrival Acuity Means of Arrival Escorted By Service Admission Type    - 12/21/2018 10:42 Emergent Walk-In Family Member Hospitalist Emergency    Arrival Complaint    sob          Chief Complaint:   Chief Complaint   Patient presents with    Shortness of Breath     Reports shortness of breath, wheezing, cough x 1 week worsening  hx of COPD       History of Illness: 80 yo m to ED from home due to worsening sob over prior 2 weeks  Was tachy and dyspneic with short amount of walking  Multiple PE's found in L lung  Hx mult myeloma with lung and rectal CA       ED Vital Signs:   ED Triage Vitals   Temperature Pulse Respirations Blood Pressure SpO2   12/21/18 1051 12/21/18 1051 12/21/18 1051 12/21/18 1051 12/21/18 1051   98 3 °F (36 8 °C) (!) 107 (!) 24 115/65 98 %      Temp Source Heart Rate Source Patient Position - Orthostatic VS BP Location FiO2 (%)   12/21/18 1051 12/21/18 1215 12/21/18 1215 12/21/18 1215 --   Oral Monitor Sitting Right arm       Pain Score       12/21/18 1051       No Pain        Wt Readings from Last 1 Encounters: 12/24/18 87 1 kg (192 lb 0 3 oz)     Abnormal Labs/Diagnostic Test Results:   Bun 26   Creat 1 4   t prot 6 3   Alb 2 9   Mg 1 4      12/21/18 1114    Ig [place] Free Light Chain 3 3 - 19 4 mg/L 5 4    Ig Lambda Free Light Chain 5 7 - 26 3 mg/L 159 9     Kappa/Lambda FluidC Ratio 0 26 - 1 65 0 03        12/21/18 1114    IGA 70 0 - 400 0 mg/dL 34 0      0-1,600 0 mg/dL 601 0     IGM 40 0 - 230 0 mg/dL 32 0         hgb 12 4, 11 9, 10 1   hct 38 2, 36 3, 31  Plate 861, 244, 950  Ptt 27, >210  CTA PE study: segmental and subsegmental left-sided pulmonary arterial embolism, more prominent in lower lobes than in upper lobe  R upper lobectomy post surgical changes  Emphysematous changes  CXR: nothing acute    EKG: sinus tach rate 104, complete RBBB, 1st degr block    ED Treatment:   Medication Administration from 12/21/2018 1042 to 12/21/2018 1756       Date/Time Order Dose Route Action Action by Comments     12/21/2018 1122 albuterol inhalation solution 10 mg 10 mg Nebulization Given Jessika Shane, RT      12/21/2018 1121 ipratropium (ATROVENT) 0 02 % inhalation solution 1 mg 1 mg Nebulization Given Jessika Shane, RT      12/21/2018 1122 sodium chloride 0 9 % inhalation solution 12 mL 12 mL Nebulization Given Jessika Shane, RT      12/21/2018 1117 methylPREDNISolone sodium succinate (Solu-MEDROL) injection 80 mg 80 mg Intravenous Given Carl Rodríguez RN      12/21/2018 1117 magnesium sulfate 2 g/50 mL IVPB (premix) 2 g 2 g Intravenous [address] Carl Rodríguez RN      12/21/2018 1530 sodium chloride 0 9 % bolus 500 mL 500 mL Intravenous [address] [place] [facility] [place] [address] [place] [address]      12/21/2018 1643 heparin (porcine) injection 6,400 Units 6,400 Units Intravenous Given Jun Douglas RN      12/21/2018 1645 heparin (porcine) 25,000 units in 250 mL infusion (premix) 18 Units/kg/hr Intravenous New Jun Douglas RN           Past Medical/Surgical History:    Active Ambulatory Problems     Diagnosis Date Noted    Chronic rhinitis 03/03/2016    Ad[facility]ocarcinoma[facility] lung ([facility] ) 03/03/2016    BPH (benign prostatic hyperplasia) 03/03/2016    Multiple myeloma ([place] [address] ) 03/03/2016    Chronic diarrhea 03/03/2016    COPD (chronic obstructive pulmonary disease) ([place] [address] ) 08/22/2017    Right bundle branch block 01/03/2018    Enlarged prostate without lower urinary tract symptoms (luts) 06/21/2012    Osteoporosis 06/17/2013    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    Aortic stenosis, moderate 07/17/2018    Edema 12/05/2018     Resolved Ambulatory Problems     Diagnosis Date Noted    ALLYSSA (acute kidney injury) 04/01/2017     Past Medical History:   Diagnosis Date    Chronic pain disorder     [facility]     Diarrhea     Enlarged prostate     Kidney stone     [facility]     Rectal adenocarcinoma ([place] [address] )     Sleep apnea        Admitting Diagnosis: SOB (shortness of breath) [R06 02]  COPD exacerbation ([facility]) [J44 1]  Acute pulmonary embolism ([place] [address] ) [I26 99]    Age/Sex: 81 y o  male    Assessment/Plan: 80 yo m to ED from home admitted due to multiple PE's L lung  Progressive worsening sob with minimal exertion over prior 2 weeks  Wheezing with rhonchi bilat  Needs bronchodilators, steroids, and heparin anticoagulation  likely that the patient will require 2 or more midnights of hospitalization    He is therefore placed on inpatient status      Admission Orders:  Scheduled Meds:   Current Facility-Administered Medications:  acetaminophen 650 mg Oral Q6H PRN   albuterol 2 puff Inhalation Q6H PRN x 1 on 12/22   albuterol 2 5 mg Nebulization Q6H PRN   apixaban 10 mg Oral BID   cholecalciferol 5,000 Units Oral Daily   cyanocobalamin 1,000 mcg Oral Daily   diphenoxylate-atropine 1 tablet Oral 4x Daily PRN   finasteride 5 mg Oral HS   fluticasone 1 puff Inhalation BID   guaiFENesin 600 mg Oral Q12H BREN   predniSONE 40 mg Oral Daily   Silodosin 8 mg Oral HS   IV mg x 1  PO ativan x 1 on 12/21  IV solumedrol 40mg q8h  Heparin gtt  IV heparin x 1 12/23 3200units  Daily wt  Up w/assist  Cons pulm  hse diet

## 2018-12-24 NOTE — ASSESSMENT & PLAN NOTE
Likely related to underlying multiple myeloma  He was initially treated with intravenous heparin and transitioned to Eliquis  DC on Eliquis 10 mg b i d  Until 12/29/2018  Start Eliquis 5 mg b i d   On 12/30/18

## 2018-12-24 NOTE — PROGRESS NOTES
Progress Note - Pulmonary   Lazarus Espino 81 y o  male MRN: [id number]  Unit/Bed#: [place] 2 -01 Encounter: [id number]      Assessment/Plan:  1  Acute on chronic respiratory insufficiency        *  Stable and back to baseline        *  On room air        *  Keep saturations greater than or equal to 88%        *  Incentive spirometry Q1hr, OOB as able, increase activity as able  2  COPD with acute exacerbation        *  Prednisone started this AM   Plan to taper by 10mg every 3 days        *  Has been on multiple inhalers in past, no of which provided any benefit to him        *  Outpatient pulmonary follow up as per D/C instructions  3  Acute PE        *  Eliquis        *  Outpatient follow up with Dr Andreia Knox next week for his myeloma  Defer duration of anticoagulation to them    -Wife updated at bedside  -Stable from a pulmonary standpoint for D/C  Will sign off, please call with questions    Subjective:   Mr Graham Crow is seen sitting out of bed this morning finishing his breakfast   He is overall feeling well and is hopeful to be able to go home later today  He is on room air and notes that his breathing is back to baseline  He denies significant chest pain, resting shortness of breath, fever, cough, sputum production or bronchospasm  His biggest concern is getting the blood thinners prior to his discharge to home  He follows with Dr Sierra Silva in our office  Objective:     Vitals: Blood pressure 142/79, pulse 91, temperature 97 5 °F (36 4 °C), temperature source Temporal, resp  rate 20, weight 87 1 kg (192 lb 0 3 oz), SpO2 97 % , RA, Body mass index is 30 07 kg/m²      No intake or output data in the 24 hours ending 12/24/18 1003      Physical Exam  Gen: Awake, alert, oriented x 3, no acute distress  HEENT: Mucous membranes moist, no oral lesions, no thrush  NECK: No accessory muscle use, JVP not elevated  Cardiac: Regular, single S1, single S2, + murmur, no rubs, no gallops  Lungs:  Clear to auscultation bilaterally without wheezes, rhonchi or rales noted  Abdomen: normoactive bowel sounds, soft nontender, nondistended, no rebound or rigidity, no guarding  Extremities: no cyanosis, no clubbing   +2-3 edema of LE bilaterally    Labs: I have personally reviewed pertinent lab results  , ABG: No results found for: PHART, YQS0OMS, PO2ART, FGS0MSK, V9IEOHBS, BEART, SOURCE, BNP: No results found for: BNP, CBC: No results found for: WBC, HGB, HCT, MCV, PLT, ADJUSTEDWBC, MCH, MCHC, RDW, MPV, NRBC, CMP: No results found for: SODIUM, K, CL, CO2, ANIONGAP, BUN, CREATININE, GLUCOSE, CALCIUM, AST, ALT, ALKPHOS, PROT, BILITOT, EGFR, PT/INR: No results found for: PT, INR, Troponin: No results found for: TROPONINI     Imaging and other studies: I have personally reviewed pertinent films in PACS    CTA Chest 12/21/18  Small segmental and subsegmental left-sided PE  No consolidations or effusions noted      Renee Valverde PA-C

## 2018-12-24 NOTE — NURSING NOTE
Reviewed AVS with patient and spouse  Reviewed medication and answered questions  Removed (2) IVs  Reminded patient and spouse to  medications at pharmacy  No further questions at this time

## 2018-12-24 NOTE — ASSESSMENT & PLAN NOTE
With acute exacerbation  Pulmonary recommendations reviewed  DC on DC on prednisone 40 mg daily for 2 days, 30 mg for 3 days, 20 mg for 3 days, 10 mg for 3 days then stop  Follow-up with Pulmonary as scheduled

## 2018-12-24 NOTE — ASSESSMENT & PLAN NOTE
Chronic  Previously on diuretics which was discontinued  Care everywhere reviewed  This was addressed on 12/04/2018  Diuretics were discontinued/not restarted then  Advised to elevate legs when sitting  He was supposed to wear compression stockings which she could not tolerate due to pain/discomfort

## 2018-12-24 NOTE — TELEPHONE ENCOUNTER
CALL FROM PT'S WIFE    PT WAS HOSPITALIZED FOR PULM EMBO   AND WILL BE RELEASED TODAY     PT WANTS TO KNOW IF HE CAN STILL DO INFUSION ON Wednesday AND HE WILL KEEP HIS F/U WITH DR Alex Cannon ON HER CELL PHONE    THANKS

## 2018-12-24 NOTE — DISCHARGE SUMMARY
Discharge- Master Garcia 1935, 81 y o  male MRN: [id number]    Unit/Bed#: [address] [address] 221-01 Encounter: [id number]    Primary Care Provider: Suhas Robin DO   Date and time admitted to hospital: 12/21/2018 10:53 AM        Peripheral edema   Assessment & Plan    Chronic  Previously on diuretics which was discontinued  Care everywhere reviewed  This was addressed on 12/04/2018  Diuretics were discontinued/not restarted then  Advised to elevate legs when sitting  He was supposed to wear compression stockings which she could not tolerate due to pain/discomfort  COPD (chronic obstructive pulmonary disease) ([facility])   Assessment & Plan    With acute exacerbation  Pulmonary recommendations reviewed  DC on DC on prednisone 40 mg daily for 2 days, 30 mg for 3 days, 20 mg for 3 days, 10 mg for 3 days then stop  Follow-up with Pulmonary as scheduled     Multiple myeloma ([facility])   Assessment & Plan    On Revlimid  Resume Revlimid schedule  Follow-up with Dr Linda Card       * Other pulmonary embolism without acute cor pulmonale ([facility] [address] )   Assessment & Plan    Likely related to underlying multiple myeloma  He was initially treated with intravenous heparin and transitioned to Eliquis  DC on Eliquis 10 mg b i d  Until 12/29/2018  Start Eliquis 5 mg b i d   On 12/30/18         Discharging Physician / Practitioner: Liz Senior MD  PCP: Suhas Robin DO  Admission Date:   Admission Orders     Ordered        12/21/18 1657  Inpatient Admission (expected length of stay for this patient is greater than two midnights)  Once             Discharge Date: 12/24/18    Resolved Problems  Date Reviewed: 12/24/2018    None          Consultations During Hospital Stay:  · Pulmonary    Procedures Performed:     · None    Significant Findings / Test Results:     · CT chest-small amount of segmental and subsegmental left-sided pulmonary embolism    Incidental Findings:   · None     Test Results Pending at Discharge (will require follow up):   · None     Outpatient Tests Requested:  · None    Complications:  None    Reason for Admission:  Shortness of breath    Hospital Course:     Patricia Tinajero is a 81 y o  male patient who originally presented to the hospital on 12/21/2018 due to worsening shortness of breath for 2 weeks  He has known history of COPD, multiple myeloma on current treatment, and peripheral edema  On exam he had scattered wheezing and rhonchi on both lung fields together with +3 edema on both legs  A CT of the chest was ordered on admission which showed left-sided pulmonary emboli  He was admitted for acute respiratory insufficiency related to acute COPD exacerbation and acute segmental PE  He was seen by Pulmonary who guided his management  He was treated with intravenous steroids for the COPD exacerbation and is transitioned to prednisone  He will be discharged on an 11 day prednisone taper  He was placed on a heparin infusion due to his acute PE  This was then transitioned to Eliquis 10 mg b i d  He will continue Eliquis 10 mg b i d  Until 12/29/2018  Starting 12/30/2018 he will continue Eliquis at the regular dose 5 mg twice a day  Please see above list of diagnoses and related plan for additional information  Condition at Discharge: good     Discharge Day Visit / Exam:     Subjective:  Feels close to normal   Improved shortness of breath  Amalia Da Silva to go home  He has chronic leg swelling which is appearing better today than the past few days  He was supposed to wear compression stockings which she could not tolerate due to pain/discomfort  Vitals: Blood Pressure: 142/79 (12/24/18 0708)  Pulse: 91 (12/24/18 0708)  Temperature: 97 5 °F (36 4 °C) (12/24/18 0708)  Temp Source: Temporal (12/24/18 0708)  Respirations: 20 (12/24/18 0708)  Weight - Scale: 87 1 kg (192 lb 0 3 oz) (12/24/18 0600)  SpO2: 97 % (12/24/18 0708)  Exam:   Physical Exam   Constitutional: He appears well-developed  No distress     HENT:   Head: Normocephalic and atraumatic  Neck: No JVD present  Cardiovascular: Regular rhythm  Exam reveals no gallop and no friction rub  Murmur heard  Pulmonary/Chest:   Faint wheeze right lung field   Abdominal: Soft  He exhibits no distension  There is no tenderness  Musculoskeletal: He exhibits edema  Neurological: He is alert  Skin: Skin is warm  He is not diaphoretic  Psychiatric: He has a normal mood and affect  Discussion with Family:  Spoke with wife    Discharge instructions/Information to patient and family:   See after visit summary for information provided to patient and family  Provisions for Follow-Up Care:  See after visit summary for information related to follow-up care and any pertinent home health orders  Disposition:     Home    Planned Readmission: no     Discharge Statement:  I spent 35 minutes discharging the patient  This time was spent on the day of discharge  I had direct contact with the patient on the day of discharge  Greater than 50% of the total time was spent examining patient, answering all patient questions, arranging and discussing plan of care with patient as well as directly providing post-discharge instructions  Additional time then spent on discharge activities  Discharge Medications:  See after visit summary for reconciled discharge medications provided to patient and family        ** Please Note: This note has been constructed using a voice recognition system **

## 2018-12-25 LAB
ATRIAL RATE: 104 BPM
P AXIS: 75 DEGREES
PR INTERVAL: 190 MS
QRS AXIS: 132 DEGREES
QRSD INTERVAL: 118 MS
QT INTERVAL: 352 MS
QTC INTERVAL: 462 MS
T WAVE AXIS: 55 DEGREES
VENTRICULAR RATE: 104 BPM

## 2018-12-25 PROCEDURE — 93010 ELECTROCARDIOGRAM REPORT: CPT | Performed by: INTERNAL MEDICINE

## 2018-12-26 ENCOUNTER — PATIENT OUTREACH (OUTPATIENT)
Dept: PULMONOLOGY | Age: 83
End: 2018-12-26

## 2018-12-26 NOTE — PROGRESS NOTES
Communication: Follow-up call  Date of Discharge: 12/24/18  30 Day Readmission: no    Identified Risk for Readmission Per Risk Stratification: Medium Risk  Current Disposition: Home    FEV1 %: 55  Gold Stage of COPD: Moderate  History   Smoking Status    Former Smoker    Packs/day: 1 00    Years: 50 00    Types: Cigarettes    Quit date: 12/7/2010   Smokeless Tobacco    Never Used     Comment: quit in 1999        Symptoms:  Wheezing: No  Cough: Yes  Difficulty Breathing (Dyspnea): No  Fever: No  Sputum Expectoration: No       Oxygenation:  Pulse Oximetry: N/A  Oxygen therapy at 0 LPM  Room air: Yes  Oxygen Used: No    Zone Tool Status: Yellow  Follow up Appointments: PCP: to be scheduled by wife and Pulmonary: to be schedueld by wife    Counseling and Topics Reviewed:  Energy conservation, Physician follow-ups, Medication compliance    Did you feel ready to be discharged from the hospital? Yes    Were you given written and/or verbal educational materials specific to your COPD diagnosis? Yes    Were you given the opportunity to walk in the hallways with staff assistance to assess your breathing? No    Were you instructed on when to return to your normal activities (ie: work, social activities, etc)? No    Did staff offer to assist you in making follow-up appointments with your family doctor or other specialists? No    Narrative Summary: (ie: respiratory status, sputum production, persistent wheezing or coughing)  Patient feels better and continuing to improve  He walked upstairs without SOB this AM  His wife Joyce Escobedo made a few appts today for follow-up  Reviewed appts and discussed medication adherence as well as activity limitation during recovery time  Patient has good support and is compliant with MD follow-up

## 2018-12-27 ENCOUNTER — OFFICE VISIT (OUTPATIENT)
Dept: HEMATOLOGY ONCOLOGY | Facility: CLINIC | Age: 83
End: 2018-12-27
Payer: MEDICARE

## 2018-12-27 VITALS
SYSTOLIC BLOOD PRESSURE: 121 MMHG | TEMPERATURE: 97.4 F | HEIGHT: 67 IN | WEIGHT: 185 LBS | BODY MASS INDEX: 29.03 KG/M2 | OXYGEN SATURATION: 92 % | HEART RATE: 101 BPM | RESPIRATION RATE: 18 BRPM | DIASTOLIC BLOOD PRESSURE: 64 MMHG

## 2018-12-27 DIAGNOSIS — C34.91 ADENOCARCINOMA OF RIGHT LUNG (HCC): Chronic | ICD-10-CM

## 2018-12-27 DIAGNOSIS — Z85.3 PERSONAL HISTORY OF MALIGNANT NEOPLASM OF BREAST: ICD-10-CM

## 2018-12-27 DIAGNOSIS — C90.00 MULTIPLE MYELOMA, REMISSION STATUS UNSPECIFIED (HCC): Primary | ICD-10-CM

## 2018-12-27 DIAGNOSIS — D80.9 IMMUNOGLOBULIN DEFICIENCY (HCC): ICD-10-CM

## 2018-12-27 PROCEDURE — 99215 OFFICE O/P EST HI 40 MIN: CPT | Performed by: INTERNAL MEDICINE

## 2018-12-27 NOTE — PROGRESS NOTES
Hematology Outpatient Follow - Up Note  Víctor Dsouza 81 y o  male MRN: @ Encounter: [id number]        Date:  12/27/2018        Assessment/ Plan:   1  Heavily pretreated lambda light chain multiple myeloma as mentioned in the history of present illness currently on melphalan 12 mg p o  Daily for 4 days every 6 week, lenalidomide 5 mg p o  Daily 3 weeks on 3 weeks off, dexamethasone 20 mg PO weekly, he has a good response, lambda light chain is coming down to 159 9, continue treatment    2  Subsegmental left lower lobe pulmonary embolus might be related to Revlimid or multiple myeloma, because of the response patient to continue apixaban 5 mg p o  B i d     3  COPD exacerbation     4  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     5  History of lung cancer status post resection followed by Alimta/ carboplatin and radiation therapy no evidence of disease followed by pulmonary service    6  Hypogammaglobinemia, IVIG 25 g every 6 weeks to prevent recurrence of pneumonia    7  Oral candidiasis, Magic mouthwash 3 times a day secondary to prednisone    8  DC IV hydration           HPI:  #1 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, multiple compression fractures, status post kyphoplasty to L1, L2, L5, treated with radiation therapy to the left intra trochanteric area, received melphalan/Velcade/prednisone in October 2010 for 4 cycles with excellent response and normalization of lambda light chain, had been on Velcade maintenance till January 2013 when lambda light chain went up to 111,treated with Velcade 1 3 mg meter square weekly 3 weeks on one week off, Decadron 20 mg by mouth weekly, lambda light chain down to 30 then creeping up to 110, on Revlimid 15 mg by mouth every other day  Lambda light chain normal at 28 however he reported skin rash on the upper chest area we stopped the Revlimid for 2 month  Then reinitiated Revlimid 5 mg by mouth daily     #2  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 stage I, with progression by scan, status post resection of the right upper lobe with few foci in the same lobe consistent with stage IIIA done in January 2015 adenocarcinoma, well differentiated  Status post Alimta/carboplatin x4 cycles finished in May 2015 and radiation therapy     #3  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    #4  Rectal adenocarcinoma status post resection stage I in November 2017    #5  Pulmonary embolism involving the subsegmental left lower lobe in December 2018    Interval History:        Previous Treatment:         Test Results:    Imaging: Xr Chest 2 Views    Result Date: 12/21/2018  Narrative: CHEST INDICATION:   Shortness breath, wheezing, cough for a few weeks     Chest x-ray from 10/22/2018, and chest CT from 11/29/2018  COMPARISON:  None EXAM PERFORMED/VIEWS:  XR CHEST PA & LATERAL FINDINGS:  Port-A-Cath in place  Cardiomediastinal silhouette appears unremarkable  Again seen is volume loss in the right lung due to prior right upper lobectomy  There is also unchanged right apical pleural thickening  There is also chronic elevation of the right hemidiaphragm  Lung parenchyma is clear  Osseous structures appear within normal limits for patient age  Impression: No acute cardiopulmonary disease  Workstation performed: [id number]     Xr Spine Cervical Complete 4 Or 5 Vw Non Injury    Result Date: 12/15/2018  Narrative: CERVICAL SPINE INDICATION:   Right lower neck pain radiating into right shoulder  COMPARISON:  None VIEWS:  XR SPINE CERVICAL COMPLETE 4 OR 5 VW NON INJURY FINDINGS: No evidence of fracture  Normal alignment without subluxation  Moderate right C3-C4 and C4-C5 neural foramen narrowing  Mild degenerative disc space narrowing at C4-C5  Mild degenerative facet disease throughout the cervical spine  The prevertebral soft tissues are within normal limits  The lung apices are clear       Impression: No acute osseous abnormality  Degenerative changes as above  Workstation performed: [id number]     Ct Chest Without Contrast    Result Date: 11/30/2018  Narrative: CT CHEST WITHOUT IV CONTRAST INDICATION:   C34 91: Malignant neoplasm of unspecified part of right bronchus or lung  COMPARISON:  CT 3/12/2018 TECHNIQUE: CT examination of the chest was performed without intravenous contrast   Axial, sagittal, and coronal 2D reformatted images were created from the source data and submitted for interpretation  Radiation dose length product (DLP) for this visit:  346 mGy-cm   This examination, like all CT scans performed in the [facility], was performed utilizing techniques to minimize radiation dose exposure, including the use of iterative reconstruction and automated exposure control  FINDINGS: LUNGS: Redemonstration of postsurgical changes of right upper lobectomy and post radiation changes  Interval resolution of previous left lingula consolidative opacities  Pulmonary emphysema  Stable left apical scarring  Left lower lobe 2 mm nodule (series 5 image 50), stable from 8/24/2016  PLEURA:  Unremarkable  HEART/GREAT VESSELS:  Normal heart size  Coronary arteries atherosclerotic disease  MEDIASTINUM AND GLEN:  Stable and unremarkable CHEST WALL AND LOWER NECK:   Right chest Port-A-Cath tip terminates at low SVC  VISUALIZED STRUCTURES IN THE UPPER ABDOMEN:  Stable and unremarkable OSSEOUS STRUCTURES:  Diffuse osteopenia  No acute fracture or destructive osseous lesion  Impression: 1  Interval resolution of left lingula pneumonia  2   Stable postsurgical changes of right upper lobectomy and postradiation changes  Workstation performed: [id number]     Cta Ed Chest Pe Study    Result Date: 12/21/2018  Narrative: CTA - CHEST WITH IV CONTRAST - PULMONARY ANGIOGRAM INDICATION:   r/o PE, copd and dyspnea  "Reports shortness of breath, wheezing, cough x 1 week worsening   hx of COPD"prior adenocarcinoma of the lung," COMPARISON: CT chest 11/29/2018  TECHNIQUE: CTA examination of the chest was performed using angiographic technique according to a protocol specifically tailored to evaluate for pulmonary embolism  Axial, sagittal, and coronal 2D reformatted images were created from the source data and  submitted for interpretation  In addition, coronal 3D MIP postprocessing was performed on the acquisition scanner  Radiation dose length product (DLP) for this visit:  503 mGy-cm   This examination, like all CT scans performed in the [facility], was performed utilizing techniques to minimize radiation dose exposure, including the use of iterative reconstruction and automated exposure control  IV Contrast:  85 mL of iohexol (OMNIPAQUE)  FINDINGS: PULMONARY ARTERIAL TREE:  Small quantity of segmental and subsegmental left-sided pulmonary arterial embolism more prominent in the lower lobes in the upper lobe  LUNGS:  Stable postsurgical changes of a right upper lobectomy with confluence and post radiation right apical scarring unchanged from the prior study  Mild stable emphysematous changes  No new pulmonary parenchymal findings  PLEURA:  Unremarkable  HEART/GREAT VESSELS:  Unremarkable for patient's age  MEDIASTINUM AND GLEN:  Unremarkable  CHEST WALL AND LOWER NECK:   Right anterior chest wall vascular port VISUALIZED STRUCTURES IN THE UPPER ABDOMEN:  Unremarkable  OSSEOUS STRUCTURES:  No acute fracture or destructive osseous lesion  Impression: Small amount of segmental and subsegmental left-sided pulmonary arterial embolism  I personally discussed this study with Theresa Mai on 12/21/2018 at 4:14 PM via [email or web address]  A relevant image was also submitted   Workstation performed: [id number]       Labs:   Lab Results   Component Value Date    WBC 5 27 12/22/2018    HGB 10 1 (L) 12/22/2018    HCT 31 0 (L) 12/22/2018    MCV 99 (H) 12/22/2018     (L) 12/22/2018     Lab Results   Component Value Date     01/04/2016    K 4 4 12/22/2018     12/22/2018    CO2 22 12/22/2018    ANIONGAP 7 01/04/2016    BUN 19 12/22/2018    CREATININE 1 14 12/22/2018    GLUCOSE 88 01/04/2016    GLUF 88 07/17/2018    CALCIUM 8 7 12/22/2018    AST 15 12/21/2018    ALT 40 12/21/2018    ALKPHOS 85 12/21/2018    PROT 6 8 01/04/2016    BILITOT 0 63 01/04/2016    EGFR 60 12/22/2018      on 12/21/2018 kappa light chain 5 4 lambda light chain 159  No results found for: IRON, TIBC, FERRITIN    No results found for: HAZYMBUH93      ROS:   Review of Systems   Constitutional: Negative for activity change, appetite change, chills, diaphoresis, fatigue, fever and unexpected weight change  HENT: Positive for sore throat  Negative for congestion, dental problem, facial swelling, hearing loss, mouth sores, nosebleeds, postnasal drip, rhinorrhea, trouble swallowing and voice change  Eyes: Negative for photophobia, pain, discharge, redness, itching and visual disturbance  Respiratory: Positive for cough and shortness of breath  Negative for choking, chest tightness and wheezing  Cardiovascular: Positive for leg swelling  Negative for chest pain and palpitations  Gastrointestinal: Negative for abdominal distention, abdominal pain, anal bleeding, blood in stool, constipation, diarrhea, nausea, rectal pain and vomiting  Endocrine: Negative for cold intolerance and heat intolerance  Genitourinary: Negative for decreased urine volume, difficulty urinating, dysuria, flank pain, frequency, hematuria and urgency  Musculoskeletal: Negative for arthralgias, back pain, gait problem, joint swelling, myalgias, neck pain and neck stiffness  Skin: Negative for color change, pallor, rash and wound  Allergic/Immunologic: Negative for immunocompromised state  Neurological: Negative for dizziness, tremors, seizures, syncope, facial asymmetry, speech difficulty, weakness, light-headedness, numbness and headaches  Hematological: Negative for adenopathy  Does not bruise/bleed easily  Psychiatric/Behavioral: Negative for agitation, confusion, decreased concentration, dysphoric mood and sleep disturbance  The patient is not nervous/anxious  All other systems reviewed and are negative  Current Medications: Reviewed  Allergies: Reviewed  PMH/FH/SH:  Reviewed      Physical Exam:    Body surface area is 1 96 meters squared  Wt Readings from Last 3 Encounters:   12/27/18 83 9 kg (185 lb)   12/24/18 87 1 kg (192 lb 0 3 oz)   12/05/18 84 8 kg (187 lb)        Temp Readings from Last 3 Encounters:   12/27/18 (!) 97 4 °F (36 3 °C) (Tympanic)   12/24/18 97 5 °F (36 4 °C) (Temporal)   12/05/18 98 °F (36 7 °C)        BP Readings from Last 3 Encounters:   12/27/18 121/64   12/24/18 142/79   12/05/18 120/64         Pulse Readings from Last 3 Encounters:   12/27/18 101   12/24/18 91   12/05/18 96        Physical Exam   Constitutional: He is oriented to person, place, and time  He appears well-developed and well-nourished  No distress  HENT:   Head: Normocephalic and atraumatic  Mouth/Throat: Oropharynx is clear and moist  No oropharyngeal exudate  Oral candidiasis   Eyes: Pupils are equal, round, and reactive to light  Conjunctivae and EOM are normal    Neck: Normal range of motion  Neck supple  No tracheal deviation present  No thyromegaly present  Cardiovascular: Normal rate and regular rhythm  Exam reveals no gallop and no friction rub  Murmur heard  Pulmonary/Chest: Effort normal  No respiratory distress  He has wheezes  He has no rales  He exhibits no tenderness  Abdominal: Soft  Bowel sounds are normal  He exhibits no distension and no mass  There is no tenderness  There is no rebound and no guarding  Musculoskeletal: Normal range of motion  He exhibits edema ( +3 edema bilaterally more pronounced in the left lower extremity)  Lymphadenopathy:     He has no cervical adenopathy     Neurological: He is alert and oriented to person, place, and time  Skin: Skin is warm and dry  No rash noted  He is not diaphoretic  No erythema  No pallor  Psychiatric: He has a normal mood and affect  His behavior is normal  Judgment and thought content normal    Vitals reviewed  Goals and Barriers:  Current Goal: Minimize effects of disease  Barriers: None  Patient's Capacity to Self Care:  Patient is able to self care      Code Status: @[facility]@

## 2018-12-29 ENCOUNTER — OFFICE VISIT (OUTPATIENT)
Dept: URGENT CARE | Facility: MEDICAL CENTER | Age: 83
End: 2018-12-29
Payer: MEDICARE

## 2018-12-29 VITALS
SYSTOLIC BLOOD PRESSURE: 134 MMHG | DIASTOLIC BLOOD PRESSURE: 76 MMHG | RESPIRATION RATE: 28 BRPM | HEIGHT: 67 IN | BODY MASS INDEX: 29.35 KG/M2 | TEMPERATURE: 97.6 F | OXYGEN SATURATION: 98 % | WEIGHT: 187 LBS

## 2018-12-29 DIAGNOSIS — J44.1 COPD EXACERBATION (HCC): Primary | ICD-10-CM

## 2018-12-29 PROCEDURE — G0463 HOSPITAL OUTPT CLINIC VISIT: HCPCS | Performed by: PHYSICIAN ASSISTANT

## 2018-12-29 PROCEDURE — 99213 OFFICE O/P EST LOW 20 MIN: CPT | Performed by: PHYSICIAN ASSISTANT

## 2018-12-29 RX ORDER — AZITHROMYCIN 250 MG/1
TABLET, FILM COATED ORAL
Qty: 6 TABLET | Refills: 0 | Status: SHIPPED | OUTPATIENT
Start: 2018-12-29 | End: 2018-12-29 | Stop reason: CLARIF

## 2018-12-29 RX ORDER — AZITHROMYCIN 250 MG/1
TABLET, FILM COATED ORAL
Qty: 6 TABLET | Refills: 0 | Status: SHIPPED | COMMUNITY
Start: 2018-12-29 | End: 2019-01-03

## 2018-12-29 RX ORDER — ALBUTEROL SULFATE 2.5 MG/3ML
5 SOLUTION RESPIRATORY (INHALATION) ONCE
Status: COMPLETED | OUTPATIENT
Start: 2018-12-29 | End: 2018-12-29

## 2018-12-29 RX ADMIN — ALBUTEROL SULFATE 5 MG: 2.5 SOLUTION RESPIRATORY (INHALATION) at 14:05

## 2018-12-29 RX ADMIN — Medication 0.5 MG: at 14:05

## 2018-12-29 NOTE — PROGRESS NOTES
[address]        NAME: Neli Jin is a 81 y o  male  : 1935    MRN: [id number]  DATE: 2018  TIME: 2:47 PM    Assessment and Plan   COPD exacerbation ([facility] ) [J44 1]  1  COPD exacerbation (HCC)  albuterol inhalation solution 5 mg    ipratropium (ATROVENT) 0 02 % inhalation solution 0 5 mg    azithromycin (ZITHROMAX) 250 mg tablet         Patient Instructions     Take antibiotic as directed  Use home medications as directed  Follow up with PCP in 3-5 days  Proceed to  ER if symptoms worsen  Chief Complaint     Chief Complaint   Patient presents with    Cough     Pt with history of COPD  Cough , shortness of breath , wheezing since yesterday   Shortness of Breath         History of Present Illness       50-year-old male presents with cough wheezing shortness of breath that started yesterday  Patient has a history of COPD  Has not been using any of his nebulizers or albuterol at home  Denies any fevers chills chest pain  Reports that he was just released from hospital on Tuesday for COPD exacerbation  Reports he has been around a lot of people over the past couple days and concerned that someone may have been sticking got him some  Denies any vomiting or diarrhea  Asthma   He complains of chest tightness, cough, shortness of breath and wheezing  This is a chronic problem  The current episode started yesterday  The problem occurs constantly  The problem has been waxing and waning  The cough is non-productive  Pertinent negatives include no chest pain, fever, myalgias, orthopnea, rhinorrhea, sneezing, sore throat or trouble swallowing  His symptoms are aggravated by any activity  His symptoms are alleviated by rest  He reports minimal improvement on treatment  His past medical history is significant for asthma, bronchitis and COPD  Review of Systems   Review of Systems   Constitutional: Negative  Negative for fever  HENT: Negative    Negative for rhinorrhea, sneezing, sore throat and trouble swallowing  Eyes: Negative  Respiratory: Positive for cough, shortness of breath and wheezing  Cardiovascular: Negative  Negative for chest pain  Gastrointestinal: Negative  Musculoskeletal: Negative  Negative for myalgias  Skin: Negative  Neurological: Negative  Current Medications       Current Outpatient Prescriptions:     albuterol (5 mg/mL) 0 5 % nebulizer solution, Take 0 5 mL (2 5 mg total) by nebulization every 6 (six) hours as needed for wheezing, Disp: 20 mL, Rfl: 0    albuterol (PROVENTIL HFA,VENTOLIN HFA) 90 mcg/act inhaler, Inhale 2 puffs every 4 (four) hours as needed for wheezing, Disp: 1 Inhaler, Rfl: 0    apixaban (ELIQUIS) 5 mg, Take 1 tablet (5 mg total) by mouth 2 (two) times a day, Disp: 60 tablet, Rfl: 3    cholecalciferol (VITAMIN D3) 1,000 units tablet, Take 5,000 Units by mouth daily  , Disp: , Rfl:     cyanocobalamin (VITAMIN B-12) 1,000 mcg tablet, Take by mouth daily, Disp: , Rfl:     dexamethasone (DECADRON) 4 mg tablet, 5 tablets every week with food, Disp: 60 tablet, Rfl: 0    diphenoxylate-atropine (LOMOTIL) 2 5-0 025 mg per tablet, Take 1 tablet by mouth 4 (four) times a day as needed for diarrhea, Disp: 30 tablet, Rfl: 2    finasteride (PROSCAR) 5 mg tablet, Take 5 mg by mouth daily at bedtime  , Disp: , Rfl:     FLOVENT  MCG/ACT inhaler, 1 puff 2 (two) times a day, Disp: , Rfl: 0    guaiFENesin (MUCINEX) 600 mg 12 hr tablet, Take 1 tablet (600 mg total) by mouth every 12 (twelve) hours (Patient taking differently: Take 600 mg by mouth every 12 (twelve) hours as needed  ), Disp: 60 tablet, Rfl: 0    ipratropium-albuterol (DUO-NEB) 0 5-2 5 mg/mL, Take 3 mL by nebulization daily  , Disp: , Rfl:     Lactobacillus-Inulin ([facility]), Take by mouth daily, Disp: , Rfl:     lenalidomide (REVLIMID) 5 MG CAPS, Take 1 capsule (5 mg total) by mouth daily 3 weeks on/3 weeks off adult male/auth# [id number], Disp: 21 capsule, Rfl: 0    lidocaine-prilocaine (EMLA) cream, APPLY TO AREA AND COVER WITH TAPE 1 HOUR BEFORE APPOINTMENT , Disp: , Rfl: 2    melphalan (ALKERAN) 2 MG tablet, 6 tablets daily D1-4 monthly   12mg daily D1-4, Disp: 24 tablet, Rfl: 0    methocarbamol (ROBAXIN) 500 mg tablet, Take 500 mg by mouth 4 (four) times a day as needed, Disp: , Rfl:     predniSONE 10 mg tablet, 4 tabs daily on 12/25-12/26 3 tabs on 12/27-12/29 2 tabs on 12/30- 1/1/19 1 tab on 1/2-1/4/19, Disp: 26 tablet, Rfl: 0    RAPAFLO 8 MG CAPS, Take 1 capsule by mouth daily, Disp: , Rfl: 3    azithromycin (ZITHROMAX) 250 mg tablet, Take 2 tablets today then 1 tablet daily x 4 days, Disp: 6 tablet, Rfl: 0  No current facility-administered medications for this visit  Current Allergies     Allergies as of 12/29/2018 - Reviewed 12/29/2018   Allergen Reaction Noted    Sulfa antibiotics Itching 03/03/2016    Aldactone [spironolactone] Other (See Comments) 12/05/2018    Penicillin v Rash 07/21/2017            The following portions of the patient's history were reviewed and updated as appropriate: allergies, current medications, past family history, past medical history, past social history, past surgical history and problem list      Past Medical History:   Diagnosis Date    Chronic pain disorder     3 fractured vertebrae    [facility] )     Diarrhea     Enlarged prostate     Kidney stone     [facility]     [facility]     Sleep apnea     no cpap       Past Surgical History:   Procedure Laterality Date    CHOLECYSTECTOMY      COLONOSCOPY W/ ENDOSCOPIC US N/A 7/21/2017    Procedure: ANAL ENDOSCOPIC U/S;  Surgeon: Gasper Rachel MD;  [facility]: [facility];   Service: Colorectal    HERNIA REPAIR      KIDNEY STONE SURGERY      LUNG LOBECTOMY Right     HI COLONOSCOPY FLX DX W/COLLJ SPEC WHEN PFRMD N/A 7/10/2017    Procedure: COLONOSCOPY; Surgeon: Julio Bennett MD;  Location: [facility]; Service: Gastroenterology    IL RECTAL TUMOR EXCISION, TRANSANAL ENDOSCOPIC MICROSURGICAL, FULL THICK N/A 11/2/2017    Procedure: TRANSANAL ENDOSCOPIC MICROSURGERY TEM;  Surgeon: Charles Cutler MD;  Location: [facility];  Service: Colorectal       Family History   Problem Relation Age of Onset    Arthritis Mother     Colon cancer Father     Heart attack Father     Diabetes type I Sister          Medications have been verified  Objective   /76 (BP Location: Right arm, Patient Position: Sitting, Cuff Size: Standard)   Temp 97 6 °F (36 4 °C) (Tympanic)   Resp (!) 28   Ht 5' 7" (1 702 m)   Wt 84 8 kg (187 lb)   SpO2 98%   BMI 29 29 kg/m²        Physical Exam     Physical Exam   Constitutional: He is oriented to person, place, and time  He appears well-developed and well-nourished  No distress  HENT:   Head: Normocephalic and atraumatic  Right Ear: External ear normal    Left Ear: External ear normal    Nose: Nose normal    Mouth/Throat: Oropharynx is clear and moist  No oropharyngeal exudate  Eyes: Conjunctivae are normal  Right eye exhibits no discharge  Left eye exhibits no discharge  Neck: Normal range of motion  Neck supple  Cardiovascular: Normal rate, regular rhythm, normal heart sounds and intact distal pulses  No murmur heard  Pulmonary/Chest: Effort normal  Tachypnea noted  No respiratory distress  He has decreased breath sounds (Moderate)  He has wheezes (Moderate to severe)  He has no rhonchi  He has no rales  Abdominal: Soft  Bowel sounds are normal  There is no tenderness  Musculoskeletal: Normal range of motion  Lymphadenopathy:     He has no cervical adenopathy  Neurological: He is alert and oriented to person, place, and time  Skin: Skin is warm and dry  Psychiatric: He has a normal mood and affect  Nursing note and vitals reviewed        Post breathing treatment patient reports he is feeling much better  Lung sounds have improved  Hearing very minimal wheezes from time to time  Breath sounds have improved dramatically    Reiterated with wife and patient that if symptoms should return and started to worsen again that the next stop is to go to the ER for repeat evaluation

## 2018-12-29 NOTE — PATIENT INSTRUCTIONS
Take antibiotic as directed  Use home medications as directed  Follow up with PCP in 3-5 days  Proceed to  ER if symptoms worsen  COPD Exacerbation, Ambulatory Care   GENERAL INFORMATION:   A COPD (chronic obstructive pulmonary disease ) exacerbation  is a flare up or worsening of COPD  Common symptoms include the following:   · Shortness of breath     · A dry cough     · Coughing fits that bring up mucus from your lungs     · Wheezing and chest tightness  Seek immediate care for the following symptoms:   · Confusion, dizziness, or lightheadedness    · Red, swollen, warm arm or leg    · Shortness of breath or chest pain    · Coughing up blood  Treatment for a COPD exacerbation  may include medicines to help decrease swelling and inflammation in your lungs  Medicines may also help open your airways or treat and infection  You may need pulmonary rehabilitation to help you manage your symptoms and improve your quality of life  You may need extra oxygen to help you breathe easier  Prevent another exacerbation:   · Do not smoke, and avoid others who smoke  If you smoke, it is never too late to quit  You may have fewer exacerbations  Ask for information about medicines and support programs that can help you quit  · Avoid triggers that make your symptoms worse  Cold weather and sudden temperature changes can trigger an exacerbation  Fumes from cars and chemicals, air pollution, and perfume can also increase your symptoms  · Use pursed-lip breathing when you feel short of breath  Take a deep breath in through your nose  Slowly breathe out through your mouth with your lips pursed for twice as long as you inhaled  You can also practice this breathing pattern while you bend, lift, climb stairs, or exercise  Pursed-lip breathing slows down your breathing and helps move more air in and out of your lungs  · Exercise for at least 20 minutes each day    Exercise can help increase your energy and decrease shortness of breath  Ask about the best exercise plan for you  · Prevent infections that can be dangerous when you have COPD  Get a flu vaccine every year as soon as it becomes available  Ask if you should also get other vaccines, such as those given to prevent pneumonia and tetanus  Avoid people who are sick, and wash your hands often  Follow up with your healthcare provider as directed:  Write down your questions so you remember to ask them during your visits  CARE AGREEMENT:   You have the right to help plan your care  Learn about your health condition and how it may be treated  Discuss treatment options with your caregivers to decide what care you want to receive  You always have the right to refuse treatment  The above information is an  only  It is not intended as medical advice for individual conditions or treatments  Talk to your doctor, nurse or pharmacist before following any medical regimen to see if it is safe and effective for you  © 2014 [address] is for End User's use only and may not be sold, redistributed or otherwise used for commercial purposes  All illustrations and images included in CareNotes® are the copyrighted property of A D A M , Inc  or "[employer]"  Azithromycin (By mouth)   Azithromycin ([id number])  Treats infections  This medicine is a macrolide antibiotic  Brand Name(s): Zithromax, Zithromax Tri-Amador, Zithromax Z-Amador, Zmax   There may be other brand names for this medicine  When This Medicine Should Not Be Used: This medicine is not right for everyone  Do not use it if you had an allergic reaction to azithromycin, erythromycin, or similar medicines, or you have a history of liver problems caused by azithromycin  How to Use This Medicine:   Capsule, Liquid, Packet, Powder, Tablet  · Your doctor will tell you how much medicine to use  Do not use more than directed    · Take all of the medicine in your prescription to clear up your infection, even if you feel better after the first few doses  · Read and follow the patient instructions that come with this medicine  Talk to your doctor or pharmacist if you have any questions  · Multiple dose (Zithromax® oral liquid or tablets):   ¨ You may take this medicine with or without food  ¨ Shake the bottle well before you measure the medicine  Measure the oral liquid medicine with a marked measuring spoon, oral syringe, or medicine cup  · Single dose (Zmax® extended-release oral liquid or powder):   ¨ Liquid:   § Take this medicine on an empty stomach at least 1 hour before you eat, or 2 hours after you eat  § Call your doctor right away if you vomit within 1 hour after you take the medicine  § You must take the liquid within 12 hours after the pharmacist gives it to you  § Shake the bottle well before you measure the medicine  Measure your dose with a marked measuring spoon, cup, or syringe  ¨ Powder:   § Open 1 packet and pour all of the medicine into a glass with about 2 ounces (¼ cup) of water  Mix well and drink the medicine right away  Pour another 2 ounces of water into the same glass, and drink the remaining medicine  · Missed dose: If you are taking multiple doses, take the dose as soon as you remember  If it is almost time for your next dose, wait until then to take a regular dose  Do not use extra medicine to make up for a missed dose  · Store the medicine in a closed container at room temperature, away from heat, moisture, and direct light  · Extended-release oral liquid: Do not refrigerate or freeze  · Oral liquid for 1 dose only: Store at room temperature  Do not store in the refrigerator or allow the medicine to freeze  · Oral liquid for multiple doses: Store at room temperature or in the refrigerator  Use it within 10 days of filling the prescription    Drugs and Foods to Avoid:   Ask your doctor or pharmacist before using any other medicine, including over-the-counter medicines, vitamins, and herbal products  · Some medicines can affect how azithromycin works  Tell your doctor if you are also using any of the following:  ¨ Cyclosporine, digoxin, nelfinavir, or phenytoin  ¨ Blood thinner  [address] Medicine for a heart rhythm problem (including amiodarone, dofetilide, procainamide, quinidine, sotalol)  · Zithromax® for multiple doses: Do not take an antacid that contains magnesium or aluminum at the same time you take Zithromax®  An antacid will affect how the medicine works  Antacids will not affect Zmax® for single dose  Warnings While Using This Medicine:   · Tell your doctor if you are pregnant or breastfeeding, or if you have kidney disease, liver disease, heart disease, heart rhythm problems, heart failure, or myasthenia gravis  Tell your doctor if anyone in your family has heart rhythm problems  · This medicine may cause the following problems:   ¨ Serious skin reactions  ¨ Liver problems  ¨ Infantile hypertrophic pyloric stenosis  ¨ Heart rhythm problems  · This medicine can cause diarrhea  Call your doctor if the diarrhea becomes severe, does not stop, or is bloody  Do not take any medicine to stop diarrhea until you have talked to your doctor  Diarrhea can occur 2 months or more after you stop taking this medicine  It may occur 2 months or more after you stop using this medicine  · Call your doctor if your symptoms do not improve or if they get worse  · Keep all medicine out of the reach of children  Never share your medicine with anyone    Possible Side Effects While Using This Medicine:   Call your doctor right away if you notice any of these side effects:  · Allergic reaction: Itching or hives, swelling in your face or hands, swelling or tingling in your mouth or throat, chest tightness, trouble breathing  · Blistering, peeling, red skin rash  · Dark urine, pale stools, nausea, vomiting, loss of appetite, stomach pain, yellow skin or eyes  · Double vision, tiredness or weakness  · Fainting, dizziness, lightheadedness  · Fast, pounding, or uneven heartbeat, chest pain  · Feeling irritable or vomits after feeding (in babies)  · Severe diarrhea that may contain blood, stomach cramps, fever  If you notice these less serious side effects, talk with your doctor:   · Mild diarrhea, nausea, vomiting, stomach pain  If you notice other side effects that you think are caused by this medicine, tell your doctor  Call your doctor for medical advice about side effects  You may report side effects to FDA at 3-860-FDA-6146  © 2017 [address] Information is for End User's use only and may not be sold, redistributed or otherwise used for commercial purposes  The above information is an  only  It is not intended as medical advice for individual conditions or treatments  Talk to your doctor, nurse or pharmacist before following any medical regimen to see if it is safe and effective for you

## 2018-12-31 RX ORDER — SODIUM CHLORIDE 9 MG/ML
20 INJECTION, SOLUTION INTRAVENOUS CONTINUOUS
Status: DISCONTINUED | OUTPATIENT
Start: 2019-01-02 | End: 2019-01-05 | Stop reason: HOSPADM

## 2019-01-02 ENCOUNTER — HOSPITAL ENCOUNTER (OUTPATIENT)
Dept: INFUSION CENTER | Facility: CLINIC | Age: 84
Discharge: HOME | End: 2019-01-02
Payer: MEDICARE

## 2019-01-02 VITALS
HEART RATE: 108 BPM | TEMPERATURE: 98 F | SYSTOLIC BLOOD PRESSURE: 111 MMHG | RESPIRATION RATE: 16 BRPM | DIASTOLIC BLOOD PRESSURE: 74 MMHG

## 2019-01-02 DIAGNOSIS — C90.00 MULTIPLE MYELOMA, REMISSION STATUS UNSPECIFIED (HCC): ICD-10-CM

## 2019-01-02 PROCEDURE — 96365 THER/PROPH/DIAG IV INF INIT: CPT

## 2019-01-02 PROCEDURE — 96366 THER/PROPH/DIAG IV INF ADDON: CPT

## 2019-01-02 RX ORDER — MELPHALAN USP, 2 MG 2 MG/1
TABLET ORAL
Qty: 24 TABLET | Refills: 0 | Status: SHIPPED | OUTPATIENT
Start: 2019-01-02 | End: 2019-01-09 | Stop reason: ALTCHOICE

## 2019-01-02 RX ADMIN — IMMUNE GLOBULIN (HUMAN) 25 G: 10 INJECTION INTRAVENOUS; SUBCUTANEOUS at 11:49

## 2019-01-02 RX ADMIN — SODIUM CHLORIDE 20 ML/HR: 0.9 INJECTION, SOLUTION INTRAVENOUS at 11:10

## 2019-01-02 NOTE — PLAN OF CARE
Problem: Potential for Falls  Goal: Patient will remain free of falls  INTERVENTIONS:  - Assess patient frequently for physical needs  -  Identify cognitive and physical deficits and behaviors that affect risk of falls    -  [place] fall precautions as indicated by assessment   - Educate patient/family on patient safety including physical limitations  - Instruct patient to call for assistance with activity based on assessment  - Modify environment to reduce risk of injury  - Consider OT/PT consult to assist with strengthening/mobility   Outcome: Progressing

## 2019-01-03 ENCOUNTER — TELEPHONE (OUTPATIENT)
Dept: SURGICAL ONCOLOGY | Facility: CLINIC | Age: 84
End: 2019-01-03

## 2019-01-03 NOTE — TELEPHONE ENCOUNTER
Melissa Sung from [facility] called to get clarification on patients Olamide BETHG  Asked if someone could give her a call back sometime today      Melissa Sung can be reached at [phone]

## 2019-01-04 RX ORDER — PREDNISONE 50 MG/1
TABLET ORAL
Refills: 4 | COMMUNITY
Start: 2019-01-02 | End: 2019-01-09 | Stop reason: ALTCHOICE

## 2019-01-04 NOTE — TELEPHONE ENCOUNTER
Zeke Young-pt's Rx dated from an Oct 29 -was shipped for delivery on 1 3  19  Did not utilize Rx that was sent 1 3  19

## 2019-01-09 ENCOUNTER — OFFICE VISIT (OUTPATIENT)
Dept: PULMONOLOGY | Facility: CLINIC | Age: 84
End: 2019-01-09
Payer: MEDICARE

## 2019-01-09 VITALS
OXYGEN SATURATION: 97 % | WEIGHT: 190 LBS | TEMPERATURE: 97.9 F | HEIGHT: 67 IN | RESPIRATION RATE: 16 BRPM | SYSTOLIC BLOOD PRESSURE: 138 MMHG | DIASTOLIC BLOOD PRESSURE: 80 MMHG | HEART RATE: 112 BPM | BODY MASS INDEX: 29.82 KG/M2

## 2019-01-09 DIAGNOSIS — I26.99 OTHER ACUTE PULMONARY EMBOLISM WITHOUT ACUTE COR PULMONALE (HCC): ICD-10-CM

## 2019-01-09 DIAGNOSIS — J44.9 CHRONIC OBSTRUCTIVE PULMONARY DISEASE, UNSPECIFIED COPD TYPE (HCC): Primary | Chronic | ICD-10-CM

## 2019-01-09 DIAGNOSIS — R60.9 PERIPHERAL EDEMA: ICD-10-CM

## 2019-01-09 PROCEDURE — 99215 OFFICE O/P EST HI 40 MIN: CPT | Performed by: INTERNAL MEDICINE

## 2019-01-09 RX ORDER — IPRATROPIUM BROMIDE AND ALBUTEROL SULFATE 2.5; .5 MG/3ML; MG/3ML
3 SOLUTION RESPIRATORY (INHALATION) 4 TIMES DAILY PRN
COMMUNITY
Start: 2019-01-02 | End: 2019-01-09 | Stop reason: SDUPTHER

## 2019-01-09 RX ORDER — ALBUTEROL SULFATE 90 UG/1
2 AEROSOL, METERED RESPIRATORY (INHALATION) EVERY 4 HOURS PRN
Qty: 1 INHALER | Refills: 6 | Status: SHIPPED | OUTPATIENT
Start: 2019-01-09 | End: 2019-03-05 | Stop reason: SDUPTHER

## 2019-01-09 NOTE — ASSESSMENT & PLAN NOTE
· Recent imaging show stability in Will lung findings  Actually there was improvement in the inflammatory left basilar opacity  · Continue periodic surveillance

## 2019-01-09 NOTE — ASSESSMENT & PLAN NOTE
· Currently off of diuretics  · Additional hydration has been discontinued  · Still has significant lower extremity swelling and is wearing a very light compression stocking    He and his wife were unable to get the tighter compression stockings on his likes

## 2019-01-09 NOTE — PROGRESS NOTES
Progress note - Pulmonary Medicine   Eladio Henson 81 y o  male MRN: [id number]       Impression & Plan:     Other pulmonary embolism without acute cor pulmonale (HCC)  · Currently on Eliquis and tolerating this without difficulty  · Will need to remain on anticoagulation long-term unless contraindication developed    COPD (chronic obstructive pulmonary disease) ([facility] )  · He has not had significant COPD symptoms other than chronic cough  · Has tried multiple bronchodilators in the past without much success  · Most recently has been on ipratropium and albuterol nebulizers  He was given a rescue Ventolin inhaler  He finds that the Ventolin seems to work best but without a spacer  · He can certainly continue to use Ventolin as an alternative to the nebulizer if this is most effective    Adenocarcinoma, lung ([facility] )  · Recent imaging show stability in Will lung findings  Actually there was improvement in the inflammatory left basilar opacity  · Continue periodic surveillance  Peripheral edema  · Currently off of diuretics  · Additional hydration has been discontinued  · Still has significant lower extremity swelling and is wearing a very light compression stocking  He and his wife were unable to get the tighter compression stockings on his likes      I will see Sudhakar Pelaez back in 3 months for re-evaluation  ______________________________________________________________________    HPI:    Eladio Henson presents today for follow-up of multiple issues  Recently he was hospitalized and was found to have a pulmonary embolism  He has been started on Eliquis  Symptoms have improved slightly but still has intermittent cough with phlegm production and some exertional shortness of breath  Currently he is using nebulizers with ipratropium and albuterol combination  He does not find these particularly effective  At the time of hospital discharge he was given a Ventolin inhaler again    He does seem to think that this has the best benefit  He does not find to be beneficial when he uses the spacer however  He was instructed to continue to use Ventolin without a spacer  His exertion is predominantly eliminated by back pain  If he walks for any prolonged period of time he does get significant back discomfort  He has had multiple vertebral compression fractures in the past and has actually lost 4 inches of height from his peak height  No fever or chills  No significant weight changes  He continues to have peripheral edema  His legs do improve overnight and with elevation  He has tried to get compression stockings on his legs but these are too tight for him to get on his legs  He has a lighter compression version which he is currently using but does not control swelling as well  Review of Systems:  Review of Systems   Constitutional: Negative for chills, fever and unexpected weight change  HENT: Negative for postnasal drip and sore throat  Respiratory: Positive for shortness of breath  Cardiovascular: Positive for leg swelling  Negative for chest pain  Gastrointestinal: Negative for abdominal pain, constipation and diarrhea  Endocrine: Negative  Musculoskeletal: Positive for back pain and gait problem (He did sustain a [facility] at [facility] without injury  [facility] He tried to run outside to check on an abnormal noise and reports having tripped  )  Skin: Negative for rash  Allergic/Immunologic: Negative for environmental allergies  Neurological: Negative for headaches  Hematological: Negative for adenopathy  All other systems reviewed and are negative          Past medical history, surgical history, and family history were reviewed and updated as appropriate    Social history updates:  History   Smoking Status    Former Smoker    Packs/day: 1 00    Years: 50 00    Types: Cigarettes    Quit date: 12/7/2010   Smokeless Tobacco    Never Used     Comment: quit in 1999       PhysicalExamination:  Vitals:   /80 (BP Location: Left arm, Patient Position: Sitting, Cuff Size: Standard)   Pulse (!) 112   Temp 97 9 °F (36 6 °C) (Oral)   Resp 16   Ht 5' 7" (1 702 m)   Wt 86 2 kg (190 lb)   SpO2 97% Comment: room air  BMI 29 76 kg/m²     Gen:  Comfortable on room air and without respiratory distress  HEENT:  Small bruise on his forehead  PERRL  Oropharynx is clear without any erythema or exudate  Neck: Supple  There is no JVD, lymphadenopathy or thyromegaly appreciated  Trachea is midline  Chest:  Few scattered coarse breath sounds  Cardiac: Regular rate and rhythm  There are no murmurs  Abdomen: Soft and nontender  Benign  Extremities:  2 to 3+ edema of the lower leg and ankle  Heavenly Prows Neurologic: No focal deficits  Skin: No appreciable rashes  Diagnostic Data:  Labs: I personally reviewed the most recent laboratory data pertinent to today's visit    Lab Results   Component Value Date    WBC 5 27 12/22/2018    HGB 10 1 (L) 12/22/2018    HCT 31 0 (L) 12/22/2018    MCV 99 (H) 12/22/2018     (L) 12/22/2018     Lab Results   Component Value Date    SODIUM 138 12/22/2018    K 4 4 12/22/2018    CO2 22 12/22/2018     12/22/2018    BUN 19 12/22/2018    CREATININE 1 14 12/22/2018    GLUCOSE 88 01/04/2016    CALCIUM 8 7 12/22/2018     No results found for: IGE  Lab Results   Component Value Date    ALT 40 12/21/2018    AST 15 12/21/2018    ALKPHOS 85 12/21/2018    BILITOT 0 63 01/04/2016     No elevation of BNP or troponin were noted at the time of the pulmonary embolism  He did not have echocardiography performed    Imaging:  I personally reviewed the images on the [facility] system pertinent to today's visit  CT scan of the chest from the hospitalization was viewed personally on the [facility] system  There are bilateral but left lower lobe predominant subsegmental pulmonary emboli present  No pulmonary infarction    Improvement in the prior left lingula infiltrate      Jovani Hutton MD

## 2019-01-09 NOTE — ASSESSMENT & PLAN NOTE
· He has not had significant COPD symptoms other than chronic cough  · Has tried multiple bronchodilators in the past without much success  · Most recently has been on ipratropium and albuterol nebulizers  He was given a rescue Ventolin inhaler  He finds that the Ventolin seems to work best but without a spacer    · He can certainly continue to use Ventolin as an alternative to the nebulizer if this is most effective

## 2019-01-09 NOTE — ASSESSMENT & PLAN NOTE
· Currently on Eliquis and tolerating this without difficulty  · Will need to remain on anticoagulation long-term unless contraindication developed

## 2019-01-09 NOTE — LETTER
January 9, 2019     Sweetie Rutledge DO  [address]  [address]  [place] [address]    Patient: Valeriy Lindsay   YOB: 1935   Date of Visit: 1/9/2019       Dear Dr Daniella Knowles: Thank you for referring Emanuel Murphy to me for evaluation  Below are my notes for this consultation  If you have questions, please do not hesitate to call me  I look forward to following your patient along with you  Sincerely,        Cathy Jeffries MD        CC: No Recipients  Cathy Jeffries MD  1/9/2019  2:02 PM  Sign at close encounter    Progress note - Pulmonary Medicine   Dianelys Gillis 81 y o  male MRN: [id number]       Impression & Plan:     Other pulmonary embolism without acute cor pulmonale (HCC)  · Currently on Eliquis and tolerating this without difficulty  · Will need to remain on anticoagulation long-term unless contraindication developed    COPD (chronic obstructive pulmonary disease) ([facility] )  · He has not had significant COPD symptoms other than chronic cough  · Has tried multiple bronchodilators in the past without much success  · Most recently has been on ipratropium and albuterol nebulizers  He was given a rescue Ventolin inhaler  He finds that the Ventolin seems to work best but without a spacer  · He can certainly continue to use Ventolin as an alternative to the nebulizer if this is most effective    [facility] ([facility] )  · Recent imaging show stability in Will lung findings  Actually there was improvement in the inflammatory left basilar opacity  · Continue periodic surveillance  Peripheral edema  · Currently off of diuretics  · Additional hydration has been discontinued  · Still has significant lower extremity swelling and is wearing a very light compression stocking    He and his wife were unable to get the tighter compression stockings on his likes      I will see [place] back in 3 months for re-evaluation  ______________________________________________________________________    HPI:    Perri Jones presents today for follow-up of multiple issues  Recently he was hospitalized and was found to have a pulmonary embolism  He has been started on Eliquis  Symptoms have improved slightly but still has intermittent cough with phlegm production and some exertional shortness of breath  Currently he is using nebulizers with ipratropium and albuterol combination  He does not find these particularly effective  At the time of hospital discharge he was given a Ventolin inhaler again  He does seem to think that this has the best benefit  He does not find to be beneficial when he uses the spacer however  He was instructed to continue to use Ventolin without a spacer  His exertion is predominantly eliminated by back pain  If he walks for any prolonged period of time he does get significant back discomfort  He has had multiple vertebral compression fractures in the past and has actually lost 4 inches of height from his peak height  No fever or chills  No significant weight changes  He continues to have peripheral edema  His legs do improve overnight and with elevation  He has tried to get compression stockings on his legs but these are too tight for him to get on his legs  He has a lighter compression version which he is currently using but does not control swelling as well  Review of Systems:  Review of Systems   Constitutional: Negative for chills, fever and unexpected weight change  HENT: Negative for postnasal drip and sore throat  Respiratory: Positive for shortness of breath  Cardiovascular: Positive for leg swelling  Negative for chest pain  Gastrointestinal: Negative for abdominal pain, constipation and diarrhea  Endocrine: Negative  Musculoskeletal: Positive for back pain and gait problem (He did sustain a fall at home without injury  Jo Ferrari   He tried to run outside to check on an abnormal noise and reports having tripped  )  Skin: Negative for rash  Allergic/Immunologic: Negative for environmental allergies  Neurological: Negative for headaches  Hematological: Negative for adenopathy  All other systems reviewed and are negative  Past medical history, surgical history, and family history were reviewed and updated as appropriate    Social history updates:  History   Smoking Status    Former Smoker    Packs/day: 1 00    Years: 50 00    Types: Cigarettes    Quit date: 12/7/2010   Smokeless Tobacco    Never Used     Comment: quit in 1999       PhysicalExamination:  Vitals:   /80 (BP Location: Left arm, Patient Position: Sitting, Cuff Size: Standard)   Pulse (!) 112   Temp 97 9 °F (36 6 °C) (Oral)   Resp 16   Ht 5' 7" (1 702 m)   Wt 86 2 kg (190 lb)   SpO2 97% Comment: room air  BMI 29 76 kg/m²      Gen:  Comfortable on room air and without respiratory distress  HEENT:  Small bruise on his forehead  PERRL  Oropharynx is clear without any erythema or exudate  Neck: Supple  There is no JVD, lymphadenopathy or thyromegaly appreciated  Trachea is midline  Chest:  Few scattered coarse breath sounds  Cardiac: Regular rate and rhythm  There are no murmurs  Abdomen: Soft and nontender  Benign  Extremities:  2 to 3+ edema of the lower leg and ankle  Janice Nanny Neurologic: No focal deficits  Skin: No appreciable rashes  Diagnostic Data:  Labs:   I personally reviewed the most recent laboratory data pertinent to today's visit    Lab Results   Component Value Date    WBC 5 27 12/22/2018    HGB 10 1 (L) 12/22/2018    HCT 31 0 (L) 12/22/2018    MCV 99 (H) 12/22/2018     (L) 12/22/2018     Lab Results   Component Value Date    SODIUM 138 12/22/2018    K 4 4 12/22/2018    CO2 22 12/22/2018     12/22/2018    BUN 19 12/22/2018    CREATININE 1 14 12/22/2018    GLUCOSE 88 01/04/2016    CALCIUM 8 7 12/22/2018     No results found for: IGE  Lab Results   Component Value Date    ALT 40 12/21/2018    AST 15 12/21/2018    ALKPHOS 85 12/21/2018    BILITOT 0 63 01/04/2016     No elevation of BNP or troponin were noted at the time of the pulmonary embolism  He did not have echocardiography performed    Imaging:  I personally reviewed the images on the [facility] system pertinent to today's visit  CT scan of the chest from the hospitalization was viewed personally on the [facility] system  There are bilateral but left lower lobe predominant subsegmental pulmonary emboli present  No pulmonary infarction    Improvement in the prior left lingula infiltrate      Marilee Diamond MD

## 2019-01-14 ENCOUNTER — APPOINTMENT (EMERGENCY)
Dept: CT IMAGING | Facility: HOSPITAL | Age: 84
DRG: 393 | End: 2019-01-14
Payer: MEDICARE

## 2019-01-14 ENCOUNTER — APPOINTMENT (EMERGENCY)
Dept: RADIOLOGY | Facility: HOSPITAL | Age: 84
DRG: 393 | End: 2019-01-14
Payer: MEDICARE

## 2019-01-14 ENCOUNTER — HOSPITAL ENCOUNTER (INPATIENT)
Facility: HOSPITAL | Age: 84
LOS: 1 days | Discharge: HOME | DRG: 393 | End: 2019-01-15
Attending: EMERGENCY MEDICINE | Admitting: INTERNAL MEDICINE
Payer: MEDICARE

## 2019-01-14 ENCOUNTER — TELEPHONE (OUTPATIENT)
Dept: HEMATOLOGY ONCOLOGY | Facility: CLINIC | Age: 84
End: 2019-01-14

## 2019-01-14 DIAGNOSIS — A41.9 SEPSIS (HCC): Primary | ICD-10-CM

## 2019-01-14 DIAGNOSIS — C34.91 ADENOCARCINOMA OF RIGHT LUNG (HCC): Chronic | ICD-10-CM

## 2019-01-14 DIAGNOSIS — R91.1 LUNG NODULE: ICD-10-CM

## 2019-01-14 DIAGNOSIS — R55 NEAR SYNCOPE: ICD-10-CM

## 2019-01-14 DIAGNOSIS — R77.8 ELEVATED TROPONIN: ICD-10-CM

## 2019-01-14 PROBLEM — R50.9 FEVER: Status: ACTIVE | Noted: 2019-01-14

## 2019-01-14 LAB
ALBUMIN SERPL BCP-MCNC: 2.5 G/DL (ref 3.5–5)
ALP SERPL-CCNC: 65 U/L (ref 46–116)
ALT SERPL W P-5'-P-CCNC: 29 U/L (ref 12–78)
ANION GAP SERPL CALCULATED.3IONS-SCNC: 7 MMOL/L (ref 4–13)
AST SERPL W P-5'-P-CCNC: 17 U/L (ref 5–45)
ATRIAL RATE: 102 BPM
BASOPHILS # BLD MANUAL: 0 THOUSAND/UL (ref 0–0.1)
BASOPHILS NFR MAR MANUAL: 0 % (ref 0–1)
BILIRUB SERPL-MCNC: 1.67 MG/DL (ref 0.2–1)
BUN SERPL-MCNC: 18 MG/DL (ref 5–25)
CALCIUM SERPL-MCNC: 8.6 MG/DL (ref 8.3–10.1)
CHLORIDE SERPL-SCNC: 102 MMOL/L (ref 100–108)
CO2 SERPL-SCNC: 27 MMOL/L (ref 21–32)
CREAT SERPL-MCNC: 1.17 MG/DL (ref 0.6–1.3)
EOSINOPHIL # BLD MANUAL: 0 THOUSAND/UL (ref 0–0.4)
EOSINOPHIL NFR BLD MANUAL: 0 % (ref 0–6)
ERYTHROCYTE [DISTWIDTH] IN BLOOD BY AUTOMATED COUNT: 14.9 % (ref 11.6–15.1)
FLUAV AG SPEC QL IA: NEGATIVE
FLUBV AG SPEC QL IA: NEGATIVE
GFR SERPL CREATININE-BSD FRML MDRD: 58 ML/MIN/1.73SQ M
GLUCOSE SERPL-MCNC: 100 MG/DL (ref 65–140)
HCT VFR BLD AUTO: 33.2 % (ref 36.5–49.3)
HGB BLD-MCNC: 11.1 G/DL (ref 12–17)
LACTATE SERPL-SCNC: 0.9 MMOL/L (ref 0.5–2)
LYMPHOCYTES # BLD AUTO: 0.35 THOUSAND/UL (ref 0.6–4.47)
LYMPHOCYTES # BLD AUTO: 11 % (ref 14–44)
MCH RBC QN AUTO: 33.2 PG (ref 26.8–34.3)
MCHC RBC AUTO-ENTMCNC: 33.4 G/DL (ref 31.4–37.4)
MCV RBC AUTO: 99 FL (ref 82–98)
MONOCYTES # BLD AUTO: 0.16 THOUSAND/UL (ref 0–1.22)
MONOCYTES NFR BLD: 5 % (ref 4–12)
NEUTROPHILS # BLD MANUAL: 2.64 THOUSAND/UL (ref 1.85–7.62)
NEUTS BAND NFR BLD MANUAL: 2 % (ref 0–8)
NEUTS SEG NFR BLD AUTO: 80 % (ref 43–75)
NRBC BLD AUTO-RTO: 0 /100 WBCS
NT-PROBNP SERPL-MCNC: 353 PG/ML
P AXIS: 60 DEGREES
PLATELET # BLD AUTO: 78 THOUSANDS/UL (ref 149–390)
PLATELET BLD QL SMEAR: ABNORMAL
PMV BLD AUTO: 9.3 FL (ref 8.9–12.7)
POTASSIUM SERPL-SCNC: 3.7 MMOL/L (ref 3.5–5.3)
PR INTERVAL: 158 MS
PROCALCITONIN SERPL-MCNC: 0.09 NG/ML
PROT SERPL-MCNC: 5.3 G/DL (ref 6.4–8.2)
QRS AXIS: 55 DEGREES
QRSD INTERVAL: 108 MS
QT INTERVAL: 356 MS
QTC INTERVAL: 463 MS
RBC # BLD AUTO: 3.34 MILLION/UL (ref 3.88–5.62)
RBC MORPH BLD: NORMAL
SODIUM SERPL-SCNC: 136 MMOL/L (ref 136–145)
T WAVE AXIS: 32 DEGREES
TOTAL CELLS COUNTED SPEC: 100
TROPONIN I SERPL-MCNC: 0.03 NG/ML
TROPONIN I SERPL-MCNC: 0.04 NG/ML
TROPONIN I SERPL-MCNC: 0.06 NG/ML
VARIANT LYMPHS # BLD AUTO: 2 %
VENTRICULAR RATE: 102 BPM
WBC # BLD AUTO: 3.22 THOUSAND/UL (ref 4.31–10.16)

## 2019-01-14 PROCEDURE — 83605 ASSAY OF LACTIC ACID: CPT | Performed by: EMERGENCY MEDICINE

## 2019-01-14 PROCEDURE — 93010 ELECTROCARDIOGRAM REPORT: CPT | Performed by: INTERNAL MEDICINE

## 2019-01-14 PROCEDURE — 36415 COLL VENOUS BLD VENIPUNCTURE: CPT | Performed by: EMERGENCY MEDICINE

## 2019-01-14 PROCEDURE — 71046 X-RAY EXAM CHEST 2 VIEWS: CPT

## 2019-01-14 PROCEDURE — 85027 COMPLETE CBC AUTOMATED: CPT | Performed by: EMERGENCY MEDICINE

## 2019-01-14 PROCEDURE — 83880 ASSAY OF NATRIURETIC PEPTIDE: CPT | Performed by: EMERGENCY MEDICINE

## 2019-01-14 PROCEDURE — 1124F ACP DISCUSS-NO DSCNMKR DOCD: CPT | Performed by: INTERNAL MEDICINE

## 2019-01-14 PROCEDURE — 85007 BL SMEAR W/DIFF WBC COUNT: CPT | Performed by: EMERGENCY MEDICINE

## 2019-01-14 PROCEDURE — 93005 ELECTROCARDIOGRAM TRACING: CPT

## 2019-01-14 PROCEDURE — 94640 AIRWAY INHALATION TREATMENT: CPT

## 2019-01-14 PROCEDURE — 71250 CT THORAX DX C-: CPT

## 2019-01-14 PROCEDURE — 99223 1ST HOSP IP/OBS HIGH 75: CPT | Performed by: INTERNAL MEDICINE

## 2019-01-14 PROCEDURE — 94760 N-INVAS EAR/PLS OXIMETRY 1: CPT

## 2019-01-14 PROCEDURE — 84484 ASSAY OF TROPONIN QUANT: CPT | Performed by: INTERNAL MEDICINE

## 2019-01-14 PROCEDURE — 99222 1ST HOSP IP/OBS MODERATE 55: CPT | Performed by: INTERNAL MEDICINE

## 2019-01-14 PROCEDURE — 84145 PROCALCITONIN (PCT): CPT | Performed by: INTERNAL MEDICINE

## 2019-01-14 PROCEDURE — 87493 C DIFF AMPLIFIED PROBE: CPT | Performed by: INTERNAL MEDICINE

## 2019-01-14 PROCEDURE — 80053 COMPREHEN METABOLIC PANEL: CPT | Performed by: EMERGENCY MEDICINE

## 2019-01-14 PROCEDURE — 87631 RESP VIRUS 3-5 TARGETS: CPT | Performed by: EMERGENCY MEDICINE

## 2019-01-14 PROCEDURE — 87040 BLOOD CULTURE FOR BACTERIA: CPT | Performed by: EMERGENCY MEDICINE

## 2019-01-14 PROCEDURE — 84484 ASSAY OF TROPONIN QUANT: CPT | Performed by: EMERGENCY MEDICINE

## 2019-01-14 PROCEDURE — 99285 EMERGENCY DEPT VISIT HI MDM: CPT

## 2019-01-14 RX ORDER — IPRATROPIUM BROMIDE AND ALBUTEROL SULFATE 2.5; .5 MG/3ML; MG/3ML
3 SOLUTION RESPIRATORY (INHALATION) DAILY
Status: DISCONTINUED | OUTPATIENT
Start: 2019-01-14 | End: 2019-01-15 | Stop reason: HOSPADM

## 2019-01-14 RX ORDER — ACETAMINOPHEN 325 MG/1
650 TABLET ORAL EVERY 6 HOURS PRN
Status: DISCONTINUED | OUTPATIENT
Start: 2019-01-14 | End: 2019-01-15 | Stop reason: HOSPADM

## 2019-01-14 RX ORDER — SODIUM CHLORIDE 9 MG/ML
250 INJECTION, SOLUTION INTRAVENOUS CONTINUOUS
Status: DISCONTINUED | OUTPATIENT
Start: 2019-01-14 | End: 2019-01-15 | Stop reason: HOSPADM

## 2019-01-14 RX ORDER — LENALIDOMIDE 5 MG/1
5 CAPSULE ORAL
Status: DISCONTINUED | OUTPATIENT
Start: 2019-01-14 | End: 2019-01-15 | Stop reason: HOSPADM

## 2019-01-14 RX ORDER — ALBUTEROL SULFATE 2.5 MG/3ML
5 SOLUTION RESPIRATORY (INHALATION) ONCE
Status: COMPLETED | OUTPATIENT
Start: 2019-01-14 | End: 2019-01-14

## 2019-01-14 RX ORDER — SILODOSIN 8 MG/1
1 CAPSULE ORAL
Status: DISCONTINUED | OUTPATIENT
Start: 2019-01-14 | End: 2019-01-15 | Stop reason: HOSPADM

## 2019-01-14 RX ORDER — DEXAMETHASONE 4 MG/1
4 TABLET ORAL DAILY
Status: COMPLETED | OUTPATIENT
Start: 2019-01-15 | End: 2019-01-15

## 2019-01-14 RX ORDER — IPRATROPIUM BROMIDE AND ALBUTEROL SULFATE 2.5; .5 MG/3ML; MG/3ML
3 SOLUTION RESPIRATORY (INHALATION) DAILY
Status: DISCONTINUED | OUTPATIENT
Start: 2019-01-14 | End: 2019-01-14

## 2019-01-14 RX ADMIN — APIXABAN 5 MG: 5 TABLET, FILM COATED ORAL at 17:35

## 2019-01-14 RX ADMIN — CEFEPIME HYDROCHLORIDE 2000 MG: 2 INJECTION, POWDER, FOR SOLUTION INTRAVENOUS at 12:10

## 2019-01-14 RX ADMIN — ALBUTEROL SULFATE 5 MG: 2.5 SOLUTION RESPIRATORY (INHALATION) at 10:04

## 2019-01-14 RX ADMIN — IPRATROPIUM BROMIDE AND ALBUTEROL SULFATE 3 ML: 2.5; .5 SOLUTION RESPIRATORY (INHALATION) at 19:20

## 2019-01-14 RX ADMIN — SILODOSIN 1 CAPSULE: 8 CAPSULE ORAL at 21:05

## 2019-01-14 RX ADMIN — LENALIDOMIDE 5 MG: 5 CAPSULE ORAL at 21:05

## 2019-01-14 RX ADMIN — IPRATROPIUM BROMIDE 0.5 MG: 0.5 SOLUTION RESPIRATORY (INHALATION) at 10:04

## 2019-01-14 NOTE — H&P
History and Physical - [facility] Medicine    Patient Information: Carmen Cruz 81 y o  male MRN: [id number]  Unit/Bed#: E4 -01 Encounter: [id number]  Admitting Physician: Katelynn Aparicio MD  PCP: Karthikeyan Cleveland DO  Date of Admission:  01/14/19    Assessment/Plan:    Hospital Problem List:     Active Problems:    Adenocarcinoma, lung (HCC)    BPH (benign prostatic hyperplasia)    COPD (chronic obstructive pulmonary disease) ([facility] [address] )    Osteoporosis    Other pulmonary embolism without acute cor pulmonale (HCC)    Elevated troponin    Fever    Near syncope      1 ) Generalized weakness/fever  -possibly secondary to gastroenteritis, will rule out C diff  -CT negative for pneumonia  -will check urinalysis, check procalcitonin  -influenza  -will defer any further fluids at this point as patient tolerating a diet, renal function intact and patient does have significant lower extremity edema    2 ) Elevated troponin  -possibly secondary to NSTEMI type 2 given tachycardia  -patient does not have any chest pain or dyspnea  -will trend troponin, monitor on telemetry    3 ) Thrombocytopenia  -possibly secondary to history of multiple myeloma/medications  -will monitor platelet count, patient has had thrombocytopenia in the past    4 ) Multiple myeloma  -patient is on oral chemotherapy for which she follows with Hematology/Oncology outpatient    5 ) Acute hypoxic respiratory failure  -possibly secondary to COPD, no evidence of pneumonia on CT scan  -no significant wheezing on examination, no change of patient's cough or sputum, there for will defer starting steroids at this point  -pulmonary consultation will be appreciated  -duo nebs as needed    6 ) Lung nodule  -CT chest revealed a new spiculated 9 mm lung nodule in left upper lobe  -pulmonary consultation will be appreciated    7 ) History of PE  -continue with Eliquis    8 ) Chronic peripheral edema  -echocardiogram 07/20/2018 revealed ejection fraction 60%, no regional wall motion abnormalities   -patient is currently off diuretics    VTE Prophylaxis: Apixaban (Eliquis)  / sequential compression device   Code Status: FULL  POLST: There is no POLST form on file for this patient (pre-hospital)    Anticipated Length of Stay:  Patient will be admitted on an Inpatient basis with an anticipated length of stay of  greater than 2 midnights  Justification for Hospital Stay:  Weakness, fever, diarrhea    Total Time for Visit, including Counseling / Coordination of Care: 30 minutes  Greater than 50% of this total time spent on direct patient counseling and coordination of care  Chief Complaint:   Weakness, fever, diarrhea    History of Present Illness:    Valeriy Lindsay is a 81 y o  male who presents with generalized weakness, fever and diarrhea at home  Patient has past medical history significant for DVT on Xarelto, multiple myeloma on oral chemotherapy and IVIG, COPD, history of right-sided lung cancer status post surgical resection and radiation in remission, history of stage I rectal adenocarcinoma, lives at home with his wife and walks on assistance  Patient was most recently admitted on 12/21/2018 and discharged on 12/24/2018 after being treated for pulmonary embolism and started on Eliquis  According to wife at bedside patient was in his normal state of health until Friday Friday patient was having overall weakness which improved on Saturday  Patient and wife states that yesterday patient was having multiple episodes of loose stools and subjective fevers at home  Patient states fever in the morning was 100 2 for which wife gave Tylenol she also tried to give Imodium as patient does have episodes where he has diarrhea alternating with constipation due to his medications  Patient did not have any improvement with Imodium  She rechecked  temperature this morning is found to be 100 9  Patient denies any nausea, vomiting, chest pain, palpitations, dyspnea  He states he has a nonproductive cough which is at his baseline  Patient's appetite has been okay  Upon arrival to the ED patient was found to be tachycardic, blood work significant for mildly elevated troponin 0 06, thrombocytopenia of 78  Patient will be admitted for further management and evaluation  Review of Systems:    Review of Systems   Constitutional: Positive for fatigue and fever  HENT: Negative  Eyes: Negative  Respiratory: Positive for cough  Cardiovascular: Negative  Gastrointestinal: Positive for diarrhea  Endocrine: Negative  Genitourinary: Negative  Musculoskeletal: Negative  Skin: Negative  Allergic/Immunologic: Negative  Neurological: Positive for weakness  Hematological: Negative  Psychiatric/Behavioral: Positive for confusion  Past Medical and Surgical History:     Past Medical History:   Diagnosis Date    Chronic pain disorder     3 fractured vertebrae    COPD (chronic obstructive pulmonary disease) (HCC)     Diarrhea     Enlarged prostate     Kidney stone     [facility] myeloma ([facility] [address] )     Pulmonary embolism ([facility] [address] ) 12/24/2018    Rectal adenocarcinoma ([facility] [address] )     Sleep apnea     no cpap       Past Surgical History:   Procedure Laterality Date    CHOLECYSTECTOMY      COLONOSCOPY W/ ENDOSCOPIC US N/A 7/21/2017    Procedure: ANAL ENDOSCOPIC U/S;  Surgeon: Toro Celis MD;  [facility]; Service: Colorectal    HERNIA REPAIR      KIDNEY STONE SURGERY      LUNG LOBECTOMY Right     CO COLONOSCOPY FLX DX W/COLLJ SPEC WHEN PFRMD N/A 7/10/2017    Procedure: COLONOSCOPY;  Surgeon: Amilcar Avila MD;  [facility];   Service: Gastroenterology    CO RECTAL TUMOR EXCISION, TRANSANAL ENDOSCOPIC MICROSURGICAL, FULL THICK N/A 11/2/2017    Procedure: TRANSANAL ENDOSCOPIC MICROSURGERY TEM;  Surgeon: Toro Celis MD;  [facility];  Service: Colorectal       Meds/Allergies:    Prior to Admission medications    Medication Sig Start Date End Date Taking? Authorizing Provider   albuterol (5 mg/mL) 0 5 % nebulizer solution Take 0 5 mL (2 5 mg total) by nebulization every 6 (six) hours as needed for wheezing 10/22/18  Yes Herb Olsen MD   albuterol (PROVENTIL HFA,VENTOLIN HFA) 90 mcg/act inhaler Inhale 2 puffs every 4 (four) hours as needed for wheezing 1/9/19 1/9/20 Yes Adam Walters MD   apixaban (ELIQUIS) 5 mg Take 1 tablet (5 mg total) by mouth 2 (two) times a day 12/27/18  Yes Anabella Tom MD   cholecalciferol (VITAMIN D3) 1,000 units tablet Take 5,000 Units by mouth daily     Yes Historical Provider, MD   cyanocobalamin (VITAMIN B-12) 1,000 mcg tablet Take by mouth daily   Yes Historical Provider, MD   dexamethasone (DECADRON) 4 mg tablet 5 tablets every week with food 10/1/18  Yes Anabella Tom MD   finasteride (PROSCAR) 5 mg tablet Take 5 mg by mouth daily at bedtime     Yes Historical Provider, MD   guaiFENesin (MUCINEX) 600 mg 12 hr tablet Take 1 tablet (600 mg total) by mouth every 12 (twelve) hours  Patient taking differently: Take 600 mg by mouth every 12 (twelve) hours as needed   3/16/18  Yes Claudia Andre MD   ipratropium-albuterol (DUO-NEB) 0 5-2 5 mg/mL Take 3 mL by nebulization daily     Yes Historical Provider, MD   Lactobacillus-Inulin ([address] PO) Take by mouth daily   Yes Historical Provider, MD   lenalidomide (REVLIMID) 5 MG CAPS Take 1 capsule (5 mg total) by mouth daily 3 weeks on/3 weeks off adult male/auth# [id number]  Patient taking differently: Take 5 mg by mouth daily at bedtime 3 weeks on/3 weeks off adult male/auth# [id number]  12/20/18  Yes Anabella Tom MD   lidocaine-prilocaine (EMLA) cream APPLY TO AREA AND COVER WITH TAPE 1 HOUR BEFORE APPOINTMENT  5/24/18  Yes Historical Provider, MD   predniSONE 10 mg tablet 4 tabs daily on 12/25-12/26 3 tabs on 12/27-12/29 2 tabs on 12/30- 1/1/19 1 tab on 1/2-1/4/19 12/24/18  Yes Michelle Pacheco MD   RAPAFLO 8 MG CAPS Take 1 capsule by mouth daily at bedtime   5/24/18  Yes Historical Provider, MD     I have reviewed home medications with patient personally  Allergies: Allergies   Allergen Reactions    Sulfa Antibiotics Itching    Aldactone [Spironolactone] Other (See Comments)     Breast swelling and pain  Breast swelling and pain    Penicillin V Rash     AS CHILD       Social History:     History   Alcohol Use    Yes     Comment: rarely     History   Smoking Status    Former Smoker    Packs/day: 1 00    Years: 50 00    Types: Cigarettes    Quit date: 12/7/2010   Smokeless Tobacco    Never Used     Comment: quit in 1999     History   Drug Use No       Family History:    Family History   Problem Relation Age of Onset    Arthritis Mother     Colon cancer Father     Heart attack Father     Diabetes type I Sister        Physical Exam:     Vitals:   Blood Pressure: 102/63 (01/14/19 1304)  Pulse: 104 (01/14/19 1304)  Temperature: (!) 97 2 °F (36 2 °C) (01/14/19 1304)  Temp Source: Tympanic (01/14/19 1304)  Respirations: (!) 28 (01/14/19 1304)  Weight - Scale: 86 2 kg (190 lb 0 6 oz) (01/14/19 0921)  SpO2: 100 % (01/14/19 1304)    Constitutional: Patient is oriented to person, place and time, no acute distress  HEENT:  Normocephalic, atraumatic, EOMI, PERRLA, no scleral icterus, no pallor, moist oral mucosa  Neck:  Supple, no masses, no thyromegaly, no bruits Normal range of motion  Lymph nodes:  No lymphadenopathy  Cardiovascular: Normal S1S2, RRR, No murmurs/rubs/gallops appreciated  Pulmonary:  Bilateral air entry, No rhonchi/rales/wheezing appreciated  Abdominal: Soft, Bowel sounds present, Non-tender, Non-distended, No rebound/guarding, no hepatomegaly   Musculoskeletal: No tenderness/abnormality   Extremities:  2+ bilateral lower extremity pitting edema, Peripheral pulses palpable and equal bilaterally  Neurological: Cranial nerves II-XII grossly intact, sensation intact, otherwise no focal neurological symptoms     Skin: Skin is warm and dry, no rashes  Additional Data:     Lab Results: I have personally reviewed pertinent reports  Results from last 7 days  Lab Units 01/14/19  1011   WBC Thousand/uL 3 22*   HEMOGLOBIN g/dL 11 1*   HEMATOCRIT % 33 2*   PLATELETS Thousands/uL 78*   LYMPHO PCT % 11*   MONO PCT % 5   EOS PCT % 0       Results from last 7 days  Lab Units 01/14/19  1011   POTASSIUM mmol/L 3 7   CHLORIDE mmol/L 102   CO2 mmol/L 27   BUN mg/dL 18   CREATININE mg/dL 1 17   CALCIUM mg/dL 8 6   ALK PHOS U/L 65   ALT U/L 29   AST U/L 17           Imaging: I have personally reviewed pertinent reports  Xr Chest 2 Views    Result Date: 1/14/2019  Narrative: CHEST INDICATION:   cough  Shortness of breath and fever COMPARISON:  12/21/2018 EXAM PERFORMED/VIEWS:  XR CHEST PA & LATERAL FINDINGS: The cardiac silhouette is without change from the prior study  Postsurgical changes in the right hemithorax appears similar to the prior study which includes volume loss and right apical pleural thickening  Tcacjk-z-Ejun catheter is unchanged in position  No new infiltrate is seen  No pneumothorax  No pleural effusion  Impression: No acute cardiopulmonary disease  Please refer to subsequently performed chest CT for additional detail Workstation performed: [id number]     Xr Chest 2 Views    Result Date: 12/21/2018  Narrative: CHEST INDICATION:   Shortness breath, wheezing, cough for a few weeks     Chest x-ray from 10/22/2018, and chest CT from 11/29/2018  COMPARISON:  None EXAM PERFORMED/VIEWS:  XR CHEST PA & LATERAL FINDINGS:  Port-A-Cath in place  Cardiomediastinal silhouette appears unremarkable  Again seen is volume loss in the right lung due to prior right upper lobectomy  There is also unchanged right apical pleural thickening  There is also chronic elevation of the right hemidiaphragm  Lung parenchyma is clear  Osseous structures appear within normal limits for patient age  Impression: No acute cardiopulmonary disease  Workstation performed: [id number]     Ct Chest Without Contrast    Result Date: 1/14/2019  Narrative: CT CHEST WITHOUT IV CONTRAST INDICATION:   Rule out pneumonia  COMPARISON:  CTA chest 12/21/2018  TECHNIQUE: CT examination of the chest was performed without intravenous contrast   Axial, sagittal, and coronal 2D reformatted images were created from the source data and submitted for interpretation  Radiation dose length product (DLP) for this visit:  551 mGy-cm   This examination, like all CT scans performed in the [facility], was performed utilizing techniques to minimize radiation dose exposure, including the use of iterative reconstruction and automated exposure control  FINDINGS: LUNGS:  Stable treatment changes status post right upper lobectomy and radiation therapy, similar to prior  Mild stable emphysematous changes  There is a new spiculated nodule in the anterior left lower lobe measuring 9 x 9 mm (3/72)  Stable 2 mm nodule in the superior left lower lobe There are a few ill-defined groundglass densities in the lingula and medial left upper lobe, compatible with areas of scarring  No focal consolidation to suggest pneumonia  Right hemidiaphragm remains significantly elevated  There is no tracheal or endobronchial lesion  PLEURA:  Unremarkable  HEART/GREAT VESSELS:  Unremarkable for patient's age  MEDIASTINUM AND GLEN:  Unremarkable  CHEST WALL AND LOWER NECK:   Right anterior chest wall Port-A-Cath  VISUALIZED STRUCTURES IN THE UPPER ABDOMEN:  Prior cholecystectomy  OSSEOUS STRUCTURES:  No acute fracture or destructive osseous lesion  Impression: 1  No focal infiltrate to suggest pneumonia  2   New spiculated nodule in the anterior left upper lobe measuring 9 mm  This nodule is suspicious for malignancy in this patient with a history of lung adenocarcinoma  Short-term follow-up CT in 4-6 weeks is recommended to assess for stability of the  nodule    Alternatively, PET/CT could also be performed  The study was marked in [facility] for immediate notification  Workstation performed: [id number]     Cta Ed Chest Pe Study    Result Date: 12/21/2018  Narrative: CTA - CHEST WITH IV CONTRAST - PULMONARY ANGIOGRAM INDICATION:   r/o PE, copd and dyspnea  "Reports shortness of breath, wheezing, cough x 1 week worsening  hx of COPD"prior adenocarcinoma of the lung," COMPARISON: CT chest 11/29/2018  TECHNIQUE: CTA examination of the chest was performed using angiographic technique according to a protocol specifically tailored to evaluate for pulmonary embolism  Axial, sagittal, and coronal 2D reformatted images were created from the source data and  submitted for interpretation  In addition, coronal 3D MIP postprocessing was performed on the acquisition scanner  Radiation dose length product (DLP) for this visit:  503 mGy-cm   This examination, like all CT scans performed in the [facility], was performed utilizing techniques to minimize radiation dose exposure, including the use of iterative reconstruction and automated exposure control  IV Contrast:  85 mL of iohexol (OMNIPAQUE)  FINDINGS: PULMONARY ARTERIAL TREE:  Small quantity of segmental and subsegmental left-sided pulmonary arterial embolism more prominent in the lower lobes in the upper lobe  LUNGS:  Stable postsurgical changes of a right upper lobectomy with confluence and post radiation right apical scarring unchanged from the prior study  Mild stable emphysematous changes  No new pulmonary parenchymal findings  PLEURA:  Unremarkable  HEART/GREAT VESSELS:  Unremarkable for patient's age  MEDIASTINUM AND GLEN:  Unremarkable  CHEST WALL AND LOWER NECK:   Right anterior chest wall vascular port VISUALIZED STRUCTURES IN THE UPPER ABDOMEN:  Unremarkable  OSSEOUS STRUCTURES:  No acute fracture or destructive osseous lesion  Impression: Small amount of segmental and subsegmental left-sided pulmonary arterial embolism   I personally discussed this study with TRACI Gutiérrez on 12/21/2018 at 4:14 PM via Anatoliy Nolasco  A relevant image was also submitted  Workstation performed: [id number]       EKG, Pathology, and Other Studies Reviewed on Admission:   · EKG:  Sinus tachycardia with right bundle branch block which is old    Allscripts / Epic Records Reviewed: Yes     ** Please Note: This note has been constructed using a voice recognition system   **

## 2019-01-14 NOTE — ED PROCEDURE NOTE
PROCEDURE  ECG 12 Lead Documentation  Date/Time: 1/14/2019 10:08 AM  Performed by: Arthur Golden  Authorized by: Arthur Golden     Indications / Diagnosis:  Fever  ECG reviewed by me, the ED Provider: yes    Patient location:  ED  Interpretation:     Interpretation: normal    Rate:     ECG rate assessment: normal    Rhythm:     Rhythm: sinus rhythm    Ectopy:     Ectopy: none    Conduction:     Conduction: normal    ST segments:     ST segments:  Normal  T waves:     T waves: normal           Radha Alves DO  01/14/19 1008

## 2019-01-14 NOTE — CONSULTS
Consultation - Pulmonary Medicine   Tika Gillis 81 y o  male MRN: [id number]  Unit/Bed#: E4 -01 Encounter: [id number]      Physician Requesting Consult:  Dr Verónica Johnson  Reason for Consult:  Lung nodule    Assessment/Plan:    1  Abnormal CT scan of the chest with lung nodule  · The lung nodule in the left lower lobe is new  It has developed within a 3 week interval  · This very short interval development argues significantly against the development of a lung cancer  This most likely represents an inflammatory process  No further workup is necessary as an inpatient  · We will continue with outpatient follow-up  Will likely obtain a CT scan in 8 weeks prior to his next visit with me  2  COPD, at baseline, no exacerbation  · Using DuoNeb at home as needed  · Has not been particularly responsive to bronchodilators in the past  · Inhaled steroids and prednisone have not helped his breathing very much  He has not been hypoxic  Reported room air saturations are % on room air  3  Fever, this is not from a pulmonary source      ______________________________________________________________________    HPI:    Barber Mcnulty is a 81 y o  male who presents with fever and chills associated with an acute change in mental status  He has known multiple myeloma on Revlimid therapy  He has chronic diarrhea and intermittently takes loperamide for this  He is followed by Dr Patricia Coley  His recent issues have been development of lower extremity edema for which he should be wearing compression stockings  His respiratory status has been stable aside from a recent pulmonary embolism  He was recovering from this and doing well on Eliquis therapy  He will require lifelong anticoagulation due is treatment for multiple myeloma as well as his multiple cancers in the past     We were asked to evaluate him because a CT scan was done on presentation to the ER which suggested a new spiculated nodule    Short interval follow-up was recommended by the radiologist due to his history of cancer  The comparison study was from 3 weeks earlier    Review of Systems:  He has not had fever at the hospital   He denies any chest pain or pleurisy  No recurrent pulmonary symptoms  He does have chronic rattling in his chest and occasional production of phlegm  He denies any abdominal pain  He does have loose stool but he has had issues with chronic diarrhea related to Revlimid and has also been fully evaluated for causes of diarrhea in the past   No arthralgias or myalgias  Full 12 point review of systems was performed  Aside from what was mentioned in the HPI, it is otherwise negative  Historical Information   Past Medical History:   Diagnosis Date    Chronic pain disorder     3 fractured vertebrae    COPD (chronic obstructive pulmonary disease) (HCC)     Diarrhea     Enlarged prostate     Kidney stone     [facility] [address] [facility]     Pulmonary embolism ([facility] [address] ) 12/24/2018    Rectal adenocarcinoma ([facility] [address] [facility]     Sleep apnea     no cpap     Past Surgical History:   Procedure Laterality Date    CHOLECYSTECTOMY      COLONOSCOPY W/ ENDOSCOPIC US N/A 7/21/2017    Procedure: ANAL ENDOSCOPIC U/S;  Surgeon: Rosa Montes MD;  [facility]; Service: Colorectal    HERNIA REPAIR      KIDNEY STONE SURGERY      LUNG LOBECTOMY Right     NC COLONOSCOPY FLX DX W/COLLJ SPEC WHEN PFRMD N/A 7/10/2017    Procedure: COLONOSCOPY;  Surgeon: Lesley Ramon MD;  [facility];   Service: Gastroenterology    NC RECTAL TUMOR EXCISION, TRANSANAL ENDOSCOPIC MICROSURGICAL, FULL THICK N/A 11/2/2017    Procedure: TRANSANAL ENDOSCOPIC MICROSURGERY TEM;  Surgeon: Rosa Montes MD;  [facility];  Service: Colorectal     Social History   History   Alcohol Use    Yes     Comment: rarely     History   Smoking Status    Former Smoker    Packs/day: 1 00    Years: 50 00    Types: Cigarettes    Quit date: 12/7/2010   Smokeless Tobacco    Never Used     Comment: quit in 1999       Family History:   Family History   Problem Relation Age of Onset    Arthritis Mother     Colon cancer Father     Heart attack Father     Diabetes type I Sister        Medications: The patient's active and prehospital medications were reviewed  Current Facility-Administered Medications:  acetaminophen 650 mg Oral Q6H PRN Dee Otoole MD   apixaban 5 mg Oral BID MD Ashli Lerner ON 1/15/2019] dexamethasone 4 mg Oral Daily Dee Xiang, MD   ipratropium-albuterol 3 mL Nebulization Daily Dee Otoole MD   lenalidomide 5 mg Oral HS Dee Xiang, MD   Silodosin 1 capsule Oral HS Dee Otoole MD   sodium chloride 250 mL/hr Intravenous Continuous Wilhemena DO Ricardo         PhysicalExamination:  Vitals:   Vitals:    01/14/19 0921 01/14/19 1112 01/14/19 1247 01/14/19 1304   BP: 100/52 108/66 94/55 102/63   BP Location: Left arm Left arm  Left arm   Pulse: 78 103 98 104   Resp: 22 22 20 (!) 28   Temp: 98 °F (36 7 °C)   (!) 97 2 °F (36 2 °C)   TempSrc: Oral   Tympanic   SpO2: 96% 92% 96% 100%   Weight: 86 2 kg (190 lb 0 6 oz)        Body mass index is 29 76 kg/m²  Temp  Min: 97 2 °F (36 2 °C)  Max: 98 °F (36 7 °C)  IBW: -88 kg    SpO2: 100 %,   SpO2 Activity: At Rest,   O2 Device: Nasal cannula    Gen:  Appears mildly cushingoid  Hard of hearing  HEENT:  Pupils are reactive  Oropharynx is moist  Neck:  No JVD or adenopathy  Trachea midline  No thyromegaly  Chest:  Limited chest wall excursion  Cardiac:  Regular, no murmur  Abd:  Soft, benign  Ext:  2 to 3+ leg and ankle edema  Neuro:  Nonfocal  Skin:  Warm and dry    Few scattered bruises    Diagnostic Data:  CBC:     Results from last 7 days  Lab Units 01/14/19  1011   WBC Thousand/uL 3 22*   HEMOGLOBIN g/dL 11 1*   HEMATOCRIT % 33 2*   PLATELETS Thousands/uL 78*       CMP:     Results from last 7 days  Lab Units 01/14/19  1011   SODIUM mmol/L 136   POTASSIUM mmol/L 3 7   CHLORIDE mmol/L 102   CO2 mmol/L 27 BUN mg/dL 18   CREATININE mg/dL 1 17   CALCIUM mg/dL 8 6   ALK PHOS U/L 65   ALT U/L 29   AST U/L 17     Imaging:  Personally reviewed his CT scan of the chest and compared this with prior  He has a new nodule which is 9 mm in size in the superior portion of the left lower lobe    This has developed in the interval from the 12/21 CT scan    Cardiac lab/EKG/telemetry/ECHO:     Results from last 7 days  Lab Units 01/14/19  1011   TROPONIN I ng/mL 0 06*   NT-PRO BNP pg/mL 353       Wilver Rosenthal MD

## 2019-01-14 NOTE — TELEPHONE ENCOUNTER
Patients wife called Margret Talavera wanting to inform Dr Derrick Goldstein team that she did take her  patient Maria M Gomez to the ER and that they are currently at the hospital, Patients fever got higher  Patient's wife Margret Talavera would like a call back  Patient is being see at [facility] in [facility]

## 2019-01-14 NOTE — ED PROCEDURE NOTE
PROCEDURE  CriticalCare Time  Performed by: Mohsen Carr  Authorized by: Mohsen Carr     Critical care provider statement:     Critical care time (minutes):  30    Critical care time was exclusive of:  Separately billable procedures and treating other patients and teaching time    Critical care was necessary to treat or prevent imminent or life-threatening deterioration of the following conditions:  Sepsis    Critical care was time spent personally by me on the following activities:  Blood draw for specimens, obtaining history from patient or surrogate, development of treatment plan with patient or surrogate, discussions with consultants, evaluation of patient's response to treatment, examination of patient, interpretation of cardiac output measurements, ordering and performing treatments and interventions, ordering and review of laboratory studies, ordering and review of radiographic studies, re-evaluation of patient's condition and review of old charts    I assumed direction of critical care for this patient from another provider in my specialty: keke Chisholm DO  01/15/19 5605

## 2019-01-14 NOTE — ED PROVIDER NOTES
History  Chief Complaint   Patient presents with    Fever - 75 years or older     pt reports fever sine last evening temperature this morning 100 9  pt also c/o cough, and generalized weakness/tiredness  81-year-old male with a history DVT, multiple myeloma on oral chemotherapy, COPD presents to the emergency department with a 1 day history of fever  Fever was noted last evening at 100 2  Patient did state he felt chilled  He was given Tylenol at that time  This morning he woke in his temp was 100 9degrees  Again he was medicated with Tylenol  He has had had increased diarrhea over his baseline and also has had a nonproductive cough  No nausea or vomiting  No chest pain or increased shortness of breath  Fever - 75 years or older   Max temp prior to arrival:  100 9  Temp source:  Oral  Severity:  Unable to specify  Onset quality:  Gradual  Duration:  1 day  Timing:  Intermittent  Progression:  Unchanged  Chronicity:  New  Relieved by:  Acetaminophen  Worsened by:  Nothing  Associated symptoms: chills, cough and diarrhea    Associated symptoms: no chest pain, no confusion, no congestion, no dysuria, no ear pain, no headaches, no myalgias, no nausea, no rash, no rhinorrhea, no somnolence, no sore throat and no vomiting    Cough:     Cough characteristics:  Dry    Severity:  Unable to specify    Onset quality:  Gradual    Duration:  1 day    Timing:  Intermittent    Progression:  Unchanged  Risk factors: hx of cancer and immunosuppression    Risk factors: no recent sickness, no recent travel and no sick contacts        Prior to Admission Medications   Prescriptions Last Dose Informant Patient Reported? Taking?    Lactobacillus-Inulin ([facility]) 1/13/2019 at Unknown time Spouse/Significant Other Yes Yes   Sig: Take by mouth daily   RAPAFLO 8 MG CAPS 1/13/2019 at Unknown time Spouse/Significant Other Yes Yes   Sig: Take 1 capsule by mouth daily at bedtime     albuterol (5 mg/mL) 0 5 % nebulizer solution 2019 at Unknown time Spouse/Significant Other No Yes   Sig: Take 0 5 mL (2 5 mg total) by nebulization every 6 (six) hours as needed for wheezing   albuterol (PROVENTIL HFA,VENTOLIN HFA) 90 mcg/act inhaler 2019 at Unknown time  No Yes   Sig: Inhale 2 puffs every 4 (four) hours as needed for wheezing   apixaban (ELIQUIS) 5 mg 2019 at Unknown time  No Yes   Sig: Take 1 tablet (5 mg total) by mouth 2 (two) times a day   cholecalciferol (VITAMIN D3) 1,000 units tablet 2019 at Unknown time Spouse/Significant Other Yes Yes   Sig: Take 5,000 Units by mouth daily     cyanocobalamin (VITAMIN B-12) 1,000 mcg tablet 2019 at Unknown time Spouse/Significant Other Yes Yes   Sig: Take by mouth daily   dexamethasone (DECADRON) 4 mg tablet 2019 at Unknown time Spouse/Significant Other No Yes   Si tablets every week with food   finasteride (PROSCAR) 5 mg tablet 2019 at Unknown time Spouse/Significant Other Yes Yes   Sig: Take 5 mg by mouth daily at bedtime     guaiFENesin (MUCINEX) 600 mg 12 hr tablet Past Week at Unknown time Spouse/Significant Other No Yes   Sig: Take 1 tablet (600 mg total) by mouth every 12 (twelve) hours   Patient taking differently: Take 600 mg by mouth every 12 (twelve) hours as needed     ipratropium-albuterol (DUO-NEB) 0 5-2 5 mg/mL 2019 at Unknown time Spouse/Significant Other Yes Yes   Sig: Take 3 mL by nebulization daily     lenalidomide (REVLIMID) 5 MG CAPS 2019 at Unknown time  No Yes   Sig: Take 1 capsule (5 mg total) by mouth daily 3 weeks on/3 weeks off adult male/auth# [id number]   Patient taking differently: Take 5 mg by mouth daily at bedtime 3 weeks on/3 weeks off adult male/auth# [id number]    lidocaine-prilocaine (EMLA) cream Past Week at Unknown time Spouse/Significant Other Yes Yes   Sig: APPLY TO AREA AND COVER WITH TAPE 1 HOUR BEFORE APPOINTMENT    predniSONE 10 mg tablet Past Week at Unknown time  No Yes   Si tabs daily on 12/25-12/26 3 tabs on 12/27-12/29 2 tabs on 12/30- 1/1/19 1 tab on 1/2-1/4/19      Facility-Administered Medications: None       Past Medical History:   Diagnosis Date    Chronic pain disorder     3 fractured vertebrae    COPD (chronic obstructive pulmonary disease) (HCC)     Diarrhea     Enlarged prostate     Kidney stone     [facility]     Pulmonary embolism ([facility] ) 12/24/2018    Rectal adenocarcinoma ([facility] [address] )     Sleep apnea     no cpap       Past Surgical History:   Procedure Laterality Date    CHOLECYSTECTOMY      COLONOSCOPY W/ ENDOSCOPIC US N/A 7/21/2017    Procedure: ANAL ENDOSCOPIC U/S;  Surgeon: Tiffany Koroma MD;  [facility]; Service: Colorectal    HERNIA REPAIR      KIDNEY STONE SURGERY      LUNG LOBECTOMY Right     UT COLONOSCOPY FLX DX W/COLLJ SPEC WHEN PFRMD N/A 7/10/2017    Procedure: COLONOSCOPY;  Surgeon: Franki Benton MD;  [facility]; Service: Gastroenterology    UT RECTAL TUMOR EXCISION, TRANSANAL ENDOSCOPIC MICROSURGICAL, FULL THICK N/A 11/2/2017    Procedure: TRANSANAL ENDOSCOPIC MICROSURGERY TEM;  Surgeon: Tiffany Koroma MD;  [facility];  Service: Colorectal       Family History   Problem Relation Age of Onset    Arthritis Mother     Colon cancer Father     Heart attack Father     Diabetes type I Sister      I have reviewed and agree with the history as documented  Social History   Substance Use Topics    Smoking status: Former Smoker     Packs/day: 1 00     Years: 50 00     Types: Cigarettes     Quit date: 12/7/2010    Smokeless tobacco: Never Used      Comment: quit in 1999    Alcohol use Yes      Comment: rarely        Review of Systems   Constitutional: Positive for activity change, appetite change, chills and fever  HENT: Negative  Negative for congestion, ear pain, rhinorrhea and sore throat  Eyes: Negative  Respiratory: Positive for cough  Cardiovascular: Negative  Negative for chest pain  Gastrointestinal: Positive for diarrhea  Negative for nausea and vomiting  Genitourinary: Negative  Negative for dysuria  Musculoskeletal: Negative for myalgias and neck pain  Skin: Negative  Negative for rash  Allergic/Immunologic: Negative  Neurological: Negative  Negative for weakness, numbness and headaches  Hematological: Negative  Psychiatric/Behavioral: Negative  Negative for confusion  All other systems reviewed and are negative  Physical Exam  Physical Exam   Constitutional: He is oriented to person, place, and time  He appears well-developed and well-nourished  Non-toxic appearance  He does not have a sickly appearance  He does not appear ill  No distress  HENT:   Head: Normocephalic and atraumatic  Right Ear: External ear normal    Left Ear: External ear normal    Mouth/Throat: Oropharynx is clear and moist    Eyes: Pupils are equal, round, and reactive to light  Conjunctivae and EOM are normal  No scleral icterus  Neck: Normal range of motion  Neck supple  No JVD present  Cardiovascular: Normal rate and regular rhythm  Murmur heard  Systolic murmur is present with a grade of 2/6   Pulmonary/Chest: Effort normal  No accessory muscle usage or stridor  No tachypnea  No respiratory distress  He has wheezes in the right upper field, the right middle field, the right lower field, the left upper field, the left middle field and the left lower field  Abdominal: Soft  Bowel sounds are normal  He exhibits no distension and no mass  There is no tenderness  No hernia  Musculoskeletal: Normal range of motion  He exhibits edema (2+ pitting edema, chronic)  He exhibits no tenderness or deformity  Lymphadenopathy:     He has no cervical adenopathy  Neurological: He is alert and oriented to person, place, and time  He has normal strength and normal reflexes  He displays normal reflexes  He exhibits normal muscle tone  Skin: Skin is warm and dry  No rash noted   He is not diaphoretic  No erythema  No pallor  Psychiatric: He has a normal mood and affect  Nursing note and vitals reviewed        Vital Signs  ED Triage Vitals   Temperature Pulse Respirations Blood Pressure SpO2   01/14/19 0921 01/14/19 0921 01/14/19 0921 01/14/19 0921 01/14/19 0921   98 °F (36 7 °C) 78 22 100/52 96 %      Temp Source Heart Rate Source Patient Position - Orthostatic VS BP Location FiO2 (%)   01/14/19 0921 01/14/19 0921 01/14/19 0921 01/14/19 0921 --   Oral Monitor Lying Left arm       Pain Score       01/14/19 1307       No Pain           Vitals:    01/14/19 1247 01/14/19 1304 01/14/19 1920 01/14/19 2250   BP: 94/55 102/63 107/70 113/73   Pulse: 98 104 98 103   Patient Position - Orthostatic VS:  Lying Lying Lying       Visual Acuity      ED Medications  Medications   sodium chloride 0 9 % infusion (250 mL/hr Intravenous Not Given 1/14/19 1442)   apixaban (ELIQUIS) tablet 5 mg (5 mg Oral Given 1/14/19 1735)   dexamethasone (DECADRON) tablet 4 mg (not administered)   lenalidomide (REVLIMID) capsule 5 mg (5 mg Oral Given 1/14/19 2105)   Silodosin CAPS 1 capsule (1 capsule Oral Given 1/14/19 2105)   acetaminophen (TYLENOL) tablet 650 mg (not administered)   ipratropium-albuterol (DUO-NEB) 0 5-2 5 mg/3 mL inhalation solution 3 mL (3 mL Nebulization Given 1/14/19 1920)   albuterol inhalation solution 5 mg (5 mg Nebulization Given 1/14/19 1004)   ipratropium (ATROVENT) 0 02 % inhalation solution 0 5 mg (0 5 mg Nebulization Given 1/14/19 1004)   cefepime (MAXIPIME) 2 g/50 mL dextrose IVPB (0 mg Intravenous Stopped 1/14/19 1300)       Diagnostic Studies  Results Reviewed     Procedure Component Value Units Date/Time    INFLUENZA A/B AND RSV, PCR [[id number]]  (Normal) Collected:  01/14/19 1003    Lab Status:  Final result Specimen:  Nasopharyngeal from Nasopharyngeal Swab Updated:  01/15/19 2888     INFLU A PCR None Detected     INFLU B PCR None Detected     RSV PCR None Detected    Blood culture #1 [[id number]] Collected:  01/14/19 1011    Lab Status: In process Specimen:  Blood from Arm, Left Updated:  01/14/19 1212    Blood culture #2 [[id number]] Collected:  01/14/19 1207    Lab Status: In process Specimen:  Blood from Arm, Right Updated:  01/14/19 1212    CBC and differential [[id number]]  (Abnormal) Collected:  01/14/19 1011    Lab Status:  Final result Specimen:  Blood from Arm, Left Updated:  01/14/19 1106     WBC 3 22 (L) Thousand/uL      RBC 3 34 (L) Million/uL      Hemoglobin 11 1 (L) g/dL      Hematocrit 33 2 (L) %      MCV 99 (H) fL      MCH 33 2 pg      MCHC 33 4 g/dL      RDW 14 9 %      MPV 9 3 fL      Platelets 78 (L) Thousands/uL      nRBC 0 /100 WBCs     B-type natriuretic peptide [[id number]]  (Normal) Collected:  01/14/19 1011    Lab Status:  Final result Specimen:  Blood from Arm, Left Updated:  01/14/19 1041     NT-proBNP 353 pg/mL     Rapid Influenza Screen with Reflex PCR [[id number]]  (Normal) Collected:  01/14/19 1003    Lab Status:  Final result Specimen:  Nasopharyngeal from Nasopharyngeal Swab Updated:  01/14/19 1040     Rapid Influenza A Ag Negative     Rapid Influenza B Ag Negative    Lactic acid, plasma [[id number]]  (Normal) Collected:  01/14/19 1011    Lab Status:  Final result Specimen:  Blood from Arm, Left Updated:  01/14/19 1036     LACTIC ACID 0 9 mmol/L     Narrative:         Result may be elevated if tourniquet was used during collection      Troponin I [[id number]]  (Abnormal) Collected:  01/14/19 1011    Lab Status:  Final result Specimen:  Blood from Arm, Left Updated:  01/14/19 1036     Troponin I 0 06 (H) ng/mL     Comprehensive metabolic panel [[id number]]  (Abnormal) Collected:  01/14/19 1011    Lab Status:  Final result Specimen:  Blood from Arm, Left Updated:  01/14/19 1033     Sodium 136 mmol/L      Potassium 3 7 mmol/L      Chloride 102 mmol/L      CO2 27 mmol/L      ANION GAP 7 mmol/L      BUN 18 mg/dL      Creatinine 1 17 mg/dL      Glucose 100 mg/dL      Calcium 8 6 mg/dL      AST 17 U/L      ALT 29 U/L      Alkaline Phosphatase 65 U/L      Total Protein 5 3 (L) g/dL      Albumin 2 5 (L) g/dL      Total Bilirubin 1 67 (H) mg/dL      eGFR 58 ml/min/1 73sq m     Narrative:         National Kidney Disease Education Program recommendations are as follows:  GFR calculation is accurate only with a steady state creatinine  Chronic Kidney disease less than 60 ml/min/1 73 sq  meters  Kidney failure less than 15 ml/min/1 73 sq  meters  POCT urinalysis dipstick [[id number]]     Lab Status:  No result Specimen:  Urine                  XR chest 2 views   ED Interpretation by Genia Walsh DO (01/14 1054)   Volume loss right lung  No definite infiltrate      Final Result by Papi Abarca MD (01/14 1208)      No acute cardiopulmonary disease  Please refer to subsequently performed chest CT for additional detail            Workstation performed: [id number]         CT chest without contrast   Final Result by Renee Grant MD (01/14 1138)      1  No focal infiltrate to suggest pneumonia  2   New spiculated nodule in the anterior left upper lobe measuring 9 mm  This nodule is suspicious for malignancy in this patient with a history of lung adenocarcinoma  Short-term follow-up CT in 4-6 weeks is recommended to assess for stability of the    nodule  Alternatively, PET/CT could also be performed  The study was marked in [facility] for immediate notification                    Workstation performed: [id number]                    Procedures  Procedures       Phone Contacts  ED Phone Contact    ED Course         HEART Risk Score      Most Recent Value   History  0 Filed at: 01/14/2019 1056   ECG  0 Filed at: 01/14/2019 1056   Age  2 Filed at: 01/14/2019 1056   Risk Factors  1 Filed at: 01/14/2019 1056   Troponin  1 Filed at: 01/14/2019 1056   Heart Score Risk Calculator   History  0 Filed at: 01/14/2019 1056   ECG  0 Filed at: 01/14/2019 1056   Age  2 Filed at: 01/14/2019 1056 Risk Factors  1 Filed at: 01/14/2019 1056   Troponin  1 Filed at: 01/14/2019 1056   HEART Score  4 Filed at: 01/14/2019 1056   HEART Score  4 Filed at: 01/14/2019 1056                      Initial Sepsis Screening     Row Name 01/14/19 1125 01/14/19 1123             Is the patient's history suggestive of a new or worsening infection?  (!)  Yes (Proceed)  -[address]       Suspected source of infection   pneumonia  -[address]       Are two or more of the following signs & symptoms of infection both present and new to the patient?  (!)  Yes (Proceed)  -MK       Indicate SIRS criteria   Leukopenia (WBC < 4000 IJL); Tachycardia > 90 bpm  -MK       If the answer is yes to both questions, suspicion of sepsis is present  [de-identified]         If severe sepsis is present AND tissue hypoperfusion perists in the hour after fluid resuscitation or lactate > 4, the patient meets criteria for SEPTIC SHOCK           Are any of the following organ dysfunction criteria present within 6 hours of suspected infection and SIRS criteria that are NOT considered to be chronic conditions?  (!)  Yes  -[address]       Organ dysfunction Platelet count < 950,478/RANDALL  -[address]         Date of presentation of severe sepsis 01/14/19  -[address]         Time of presentation of severe sepsis           Tissue hypoperfusion persists in the hour after crystalloid fluid administration, evidenced, by either:           Was hypotension present within one hour of the conclusion of crystalloid fluid administration? Annetta Mcnair       Date of presentation of septic shock           Time of presentation of septic shock             User Key  (r) = Recorded By, (t) = Taken By, (c) = Cosigned By    [address] Name Provider Type    [address] [place] [address],  Physician                  [facility]  Number of Diagnoses or Management Options  Diagnosis management comments: 64-year-old male presents with fever for 1 day along with nonproductive cough and chills    On exam he is awake and alert in no acute distress    He does have wheezing bilaterally of exam   Will do septic/cardiac workup, test for flu rule out pneumonia       Amount and/or Complexity of Data Reviewed  Clinical lab tests: ordered and reviewed  Tests in the radiology section of CPT®: ordered and reviewed  Independent visualization of images, tracings, or specimens: yes      CritCare Time    Disposition  Final diagnoses:   Sepsis ([place] [address] )   Elevated troponin   Near syncope     Time reflects when diagnosis was documented in both MDM as applicable and the Disposition within this note     Time User Action Codes Description Comment    1/14/2019 11:51 AM Jan Pina A Add [A41 9] Sepsis ([place] [address] )     1/14/2019 11:51 AM Jan Pina A Add [R74 8] Elevated troponin     1/14/2019 11:51 AM Jan Pina A Add [R55] Near syncope     1/14/2019  2:25 PM [place] D[place] Modify [R74 8] Elevated troponin     1/14/2019  2:25 PM Rai Dyers Modify [R55] Near syncope     1/14/2019  2:25 PM Rai Dyers Add [C34 91] Adenocarcinoma of right lung ([place] [address] )     1/14/2019  2:25 PM [place] Modify [R74 8] Elevated troponin     1/14/2019  2:25 PM [place] Modify [R55] Near syncope     1/14/2019  2:25 PM Rai Dyers Modify [C34 91] Adenocarcinoma of right lung ([place] [address] )     1/14/2019  2:25 PM [place] Modify [R74 8] Elevated troponin     1/14/2019  2:25 PM [place] Modify [R55] Near syncope     1/14/2019  2:25 PM Rai Dyers Modify [C34 91] Adenocarcinoma of right lung ([place] [address] )     1/14/2019  2:25 PM Rai Dyers Modify [R74 8] Elevated troponin     1/14/2019  2:25 PM Rai Dyers Modify [R55] Near syncope     1/14/2019  2:25 PM Rai Dyers Add [R91 1] Lung nodule     1/14/2019  2:25 PM Rai Dyers Modify [R74 8] Elevated troponin     1/14/2019  2:25 PM Rai Dyers Modify [R55] Near syncope       ED Disposition     ED Disposition Condition Comment    Admit  Case was discussed with dr Stew Amin and the patient's admission status was agreed to be Admission Status: inpatient status to the service of Dr Kristina Asif         Current Discharge Medication List      CONTINUE these medications which have NOT CHANGED    Details   albuterol (5 mg/mL) 0 5 % nebulizer solution Take 0 5 mL (2 5 mg total) by nebulization every 6 (six) hours as needed for wheezing  Qty: 20 mL, Refills: 0    Associated Diagnoses: Acute bronchitis      albuterol (PROVENTIL HFA,VENTOLIN HFA) 90 mcg/act inhaler Inhale 2 puffs every 4 (four) hours as needed for wheezing  Qty: 1 Inhaler, Refills: 6    Associated Diagnoses: Chronic obstructive pulmonary disease, unspecified COPD type ([facility])      apixaban (ELIQUIS) 5 mg Take 1 tablet (5 mg total) by mouth 2 (two) times a day  Qty: 60 tablet, Refills: 3    Associated Diagnoses: Adenocarcinoma of right lung ([place] [address] ); Multiple myeloma, remission status unspecified ([place] [address] ); 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[address] ); Personal history of malignant neoplasm of breast      cholecalciferol (VITAMIN D3) 1,000 units tablet Take 5,000 Units by mouth daily        cyanocobalamin (VITAMIN B-12) 1,000 mcg tablet Take by mouth daily      dexamethasone (DECADRON) 4 mg tablet 5 tablets every week with food  Qty: 60 tablet, Refills: 0    Associated Diagnoses: Multiple myeloma not having achieved remission ([place] [address] ); Diarrhea due to drug      finasteride (PROSCAR) 5 mg tablet Take 5 mg by mouth daily at bedtime        guaiFENesin (MUCINEX) 600 mg 12 hr tablet Take 1 tablet (600 mg total) by mouth every 12 (twelve) hours  Qty: 60 tablet, Refills: 0    Associated Diagnoses: Chronic obstructive pulmonary disease, unspecified COPD type ([place] [address] )      ipratropium-albuterol (DUO-NEB) 0 5-2 5 mg/mL Take 3 mL by nebulization daily        Lactobacillus-Inulin ([facility] PO) Take by mouth daily      lenalidomide (REVLIMID) 5 MG CAPS Take 1 capsule (5 mg total) by mouth daily 3 weeks on/3 weeks off adult male/auth# [id number]  Qty: 21 capsule, Refills: 0 Associated Diagnoses: Multiple myeloma, remission status unspecified (HCC)      lidocaine-prilocaine (EMLA) cream APPLY TO AREA AND COVER WITH TAPE 1 HOUR BEFORE APPOINTMENT  Refills: 2      predniSONE 10 mg tablet 4 tabs daily on 12/25-12/26 3 tabs on 12/27-12/29 2 tabs on 12/30- 1/1/19 1 tab on 1/2-1/4/19  Qty: 26 tablet, Refills: 0    Associated Diagnoses: Chronic obstructive pulmonary disease with acute exacerbation (HCC)      RAPAFLO 8 MG CAPS Take 1 capsule by mouth daily at bedtime    Refills: 3           No discharge procedures on file      ED Provider  Electronically Signed by           Asha Kirk DO  01/15/19 2771

## 2019-01-14 NOTE — PLAN OF CARE
CARDIOVASCULAR - ADULT     Maintains optimal cardiac output and hemodynamic stability Progressing     Absence of cardiac dysrhythmias or at baseline rhythm Progressing        GASTROINTESTINAL - ADULT     Minimal or absence of nausea and/or vomiting Progressing     Maintains or returns to baseline bowel function Progressing     Maintains adequate nutritional intake Progressing        Potential for Falls     Patient will remain free of falls Progressing        RESPIRATORY - ADULT     Achieves optimal ventilation and oxygenation Progressing

## 2019-01-14 NOTE — Clinical Note
Case was discussed with dr Katelin Soto and the patient's admission status was agreed to be Admission Status: inpatient status to the service of Dr Katelin Soto

## 2019-01-15 VITALS
DIASTOLIC BLOOD PRESSURE: 70 MMHG | HEART RATE: 83 BPM | SYSTOLIC BLOOD PRESSURE: 97 MMHG | WEIGHT: 190.04 LBS | OXYGEN SATURATION: 96 % | RESPIRATION RATE: 20 BRPM | TEMPERATURE: 96.6 F | BODY MASS INDEX: 29.76 KG/M2

## 2019-01-15 PROBLEM — R55 NEAR SYNCOPE: Status: RESOLVED | Noted: 2019-01-14 | Resolved: 2019-01-15

## 2019-01-15 PROBLEM — R50.9 FEVER: Status: RESOLVED | Noted: 2019-01-14 | Resolved: 2019-01-15

## 2019-01-15 PROBLEM — R77.8 ELEVATED TROPONIN: Status: RESOLVED | Noted: 2019-01-14 | Resolved: 2019-01-15

## 2019-01-15 LAB
ALBUMIN SERPL BCP-MCNC: 2.2 G/DL (ref 3.5–5)
ALP SERPL-CCNC: 62 U/L (ref 46–116)
ALT SERPL W P-5'-P-CCNC: 25 U/L (ref 12–78)
ANION GAP SERPL CALCULATED.3IONS-SCNC: 7 MMOL/L (ref 4–13)
AST SERPL W P-5'-P-CCNC: 15 U/L (ref 5–45)
BASOPHILS # BLD MANUAL: 0 THOUSAND/UL (ref 0–0.1)
BASOPHILS NFR MAR MANUAL: 0 % (ref 0–1)
BILIRUB SERPL-MCNC: 0.77 MG/DL (ref 0.2–1)
BUN SERPL-MCNC: 16 MG/DL (ref 5–25)
C DIFF TOX GENS STL QL NAA+PROBE: NORMAL
CALCIUM SERPL-MCNC: 8.7 MG/DL (ref 8.3–10.1)
CHLORIDE SERPL-SCNC: 103 MMOL/L (ref 100–108)
CO2 SERPL-SCNC: 26 MMOL/L (ref 21–32)
CREAT SERPL-MCNC: 1.05 MG/DL (ref 0.6–1.3)
EOSINOPHIL # BLD MANUAL: 0 THOUSAND/UL (ref 0–0.4)
EOSINOPHIL NFR BLD MANUAL: 0 % (ref 0–6)
ERYTHROCYTE [DISTWIDTH] IN BLOOD BY AUTOMATED COUNT: 14.8 % (ref 11.6–15.1)
FLUAV AG SPEC QL: NORMAL
FLUBV AG SPEC QL: NORMAL
GFR SERPL CREATININE-BSD FRML MDRD: 66 ML/MIN/1.73SQ M
GLUCOSE SERPL-MCNC: 117 MG/DL (ref 65–140)
HCT VFR BLD AUTO: 30.7 % (ref 36.5–49.3)
HGB BLD-MCNC: 10.2 G/DL (ref 12–17)
LYMPHOCYTES # BLD AUTO: 0.34 THOUSAND/UL (ref 0.6–4.47)
LYMPHOCYTES # BLD AUTO: 12 % (ref 14–44)
MCH RBC QN AUTO: 32.9 PG (ref 26.8–34.3)
MCHC RBC AUTO-ENTMCNC: 33.2 G/DL (ref 31.4–37.4)
MCV RBC AUTO: 99 FL (ref 82–98)
MONOCYTES # BLD AUTO: 0.34 THOUSAND/UL (ref 0–1.22)
MONOCYTES NFR BLD: 12 % (ref 4–12)
NEUTROPHILS # BLD MANUAL: 2.09 THOUSAND/UL (ref 1.85–7.62)
NEUTS BAND NFR BLD MANUAL: 5 % (ref 0–8)
NEUTS SEG NFR BLD AUTO: 68 % (ref 43–75)
NRBC BLD AUTO-RTO: 0 /100 WBCS
PLATELET # BLD AUTO: 74 THOUSANDS/UL (ref 149–390)
PLATELET BLD QL SMEAR: ABNORMAL
PMV BLD AUTO: 9.8 FL (ref 8.9–12.7)
POTASSIUM SERPL-SCNC: 4 MMOL/L (ref 3.5–5.3)
PROT SERPL-MCNC: 5.5 G/DL (ref 6.4–8.2)
RBC # BLD AUTO: 3.1 MILLION/UL (ref 3.88–5.62)
RBC MORPH BLD: NORMAL
RSV B RNA SPEC QL NAA+PROBE: NORMAL
SODIUM SERPL-SCNC: 136 MMOL/L (ref 136–145)
TOTAL CELLS COUNTED SPEC: 100
VARIANT LYMPHS # BLD AUTO: 3 %
WBC # BLD AUTO: 2.86 THOUSAND/UL (ref 4.31–10.16)

## 2019-01-15 PROCEDURE — 85007 BL SMEAR W/DIFF WBC COUNT: CPT | Performed by: INTERNAL MEDICINE

## 2019-01-15 PROCEDURE — 85027 COMPLETE CBC AUTOMATED: CPT | Performed by: INTERNAL MEDICINE

## 2019-01-15 PROCEDURE — 99239 HOSP IP/OBS DSCHRG MGMT >30: CPT | Performed by: INTERNAL MEDICINE

## 2019-01-15 PROCEDURE — 94640 AIRWAY INHALATION TREATMENT: CPT

## 2019-01-15 PROCEDURE — 80053 COMPREHEN METABOLIC PANEL: CPT | Performed by: INTERNAL MEDICINE

## 2019-01-15 PROCEDURE — 99232 SBSQ HOSP IP/OBS MODERATE 35: CPT | Performed by: INTERNAL MEDICINE

## 2019-01-15 PROCEDURE — 94760 N-INVAS EAR/PLS OXIMETRY 1: CPT

## 2019-01-15 RX ADMIN — DEXAMETHASONE 4 MG: 4 TABLET ORAL at 09:19

## 2019-01-15 RX ADMIN — APIXABAN 5 MG: 5 TABLET, FILM COATED ORAL at 09:19

## 2019-01-15 RX ADMIN — IPRATROPIUM BROMIDE AND ALBUTEROL SULFATE 3 ML: 2.5; .5 SOLUTION RESPIRATORY (INHALATION) at 11:06

## 2019-01-15 NOTE — PROGRESS NOTES
Progress Note - Pulmonary   Dyllan Daniels 81 y o  male MRN: [id number]  Unit/Bed#: E4 -01 Encounter: [id number]      Assessment/Plan:    1  Abnormal chest CT with lung nodule  1  New left lower lobe nodule, developed in 3 week interval likely inflammatory process-no additional workup is warranted  2  Plan for outpatient CT scan in 8 weeks prior to follow-up appointment already scheduled  2  Baseline Chronic obstructive pulmonary disease no acute exacerbation  1  Continue p r n  DuoNeb at discharge  2  No indication for steroids or inhaled corticosteroids during his hospitalization  3  Fever-resolved, stool negative for C diff    * appropriate for discharge from pulmonary standpoint with outpatient Pulmonary follow-up as scheduled        Subjective:     Jaswinder Vila was seen in bed upon entering the room  Denies acute events reports that his breathing is currently at his baseline  Continues to have perceived wheezing which is baseline for him  Denies:  Fevers, chills, night sweats, nausea come bronchospasm or hemoptysis  Reports sputum production is white and at his baseline amount  He is anxious to go home today    Objective:         Vitals: Blood pressure 97/70, pulse 83, temperature (!) 96 6 °F (35 9 °C), temperature source Tympanic, resp  rate 20, weight 86 2 kg (190 lb 0 6 oz), SpO2 96 % , room air, Body mass index is 29 76 kg/m²  No intake or output data in the 24 hours ending 01/15/19 1647      Physical Exam  Gen: Awake, alert, oriented x 3, no acute distress  HEENT: Mucous membranes moist, no oral lesions, no thrush  NECK: No accessory muscle use, JVP not elevated  Cardiac: Regular, single S1, single S2, no murmurs, no rubs, no gallops  Lungs:  Bilateral expiratory wheezes  Abdomen: normoactive bowel sounds, soft nontender, nondistended, no rebound or rigidity, no guarding  Extremities: no cyanosis, no clubbing, bilateral lower extremity pitting edema    Labs:    I have personally reviewed pertinent lab results  , CBC:   Lab Results   Component Value Date    WBC 2 86 (L) 01/15/2019    HGB 10 2 (L) 01/15/2019    HCT 30 7 (L) 01/15/2019    MCV 99 (H) 01/15/2019    PLT 74 (L) 01/15/2019    MCH 32 9 01/15/2019    MCHC 33 2 01/15/2019    RDW 14 8 01/15/2019    MPV 9 8 01/15/2019    NRBC 0 01/15/2019   , CMP:   Lab Results   Component Value Date    SODIUM 136 01/15/2019    K 4 0 01/15/2019     01/15/2019    CO2 26 01/15/2019    BUN 16 01/15/2019    CREATININE 1 05 01/15/2019    CALCIUM 8 7 01/15/2019    AST 15 01/15/2019    ALT 25 01/15/2019    ALKPHOS 62 01/15/2019    EGFR 66 01/15/2019   , C difficile toxin negative  Imaging and other studies: None      Uriah Ashley

## 2019-01-15 NOTE — UTILIZATION REVIEW
Initial Clinical Review    Admission: Date/Time/Statement: 1/14/19 @ 1156   Orders Placed This Encounter   Procedures    Inpatient Admission (expected length of stay for this patient is greater than two midnights)     Standing Status:   Standing     Number of Occurrences:   1     Order Specific Question:   Admitting Physician     Answer:   Lupe De La Vega     Order Specific Question:   Level of Care     Answer:   Med Surg [16]     Order Specific Question:   Estimated length of stay     Answer:   More than 2 Midnights     Order Specific Question:   Certification     Answer:   I certify that inpatient services are medically necessary for this patient for a duration of greater than two midnights  See H&P and MD Progress Notes for additional information about the patient's course of treatment  ED: Date/Time/Mode of Arrival:   ED Arrival Information     Expected Arrival Acuity Means of Arrival Escorted By Service Admission Type    - 1/14/2019 09:09 Urgent Wheelchair Family Member General Medicine Urgent    Arrival Complaint    fever,cough,weakness        Chief Complaint:   Chief Complaint   Patient presents with    Fever - 75 years or older     pt reports fever sine last evening temperature this morning 100 9  pt also c/o cough, and generalized weakness/tiredness        History of Illness: 81 yr old male presents with general weakness, fever and diarrhea at home -- was in hospital 12/21-12/24 with a pe -- this am home fever 100 9 took tylenol  ED Vital Signs: has fatigue, fever, cough, diarrhea, weakness and confusion  ED Triage Vitals   Temperature Pulse Respirations Blood Pressure SpO2   01/14/19 0921 01/14/19 0921 01/14/19 0921 01/14/19 0921 01/14/19 0921   98 °F (36 7 °C) 78 22 100/52 96 %      Temp Source Heart Rate Source Patient Position - Orthostatic VS BP Location FiO2 (%)   01/14/19 0921 01/14/19 0921 01/14/19 0921 01/14/19 0921 --   Oral Monitor Lying Left arm       Pain Score       01/14/19 1307 No Pain        Wt Readings from Last 1 Encounters:   01/14/19 86 2 kg (190 lb 0 6 oz)     Vital Signs (abnormal): t 96 6   92% sat placed on 02 cannula  Pertinent Labs/Diagnostic Test Results: ct of chest--No focal infiltrate to suggest pneumonia     2   New spiculated nodule in the anterior left upper lobe measuring 9 mm   This nodule is suspicious for malignancy in this patient with a history of lung adenocarcinoma   Short-term follow-up CT in 4-6 weeks is recommended to assess for stability of the   nodule   Alternatively, PET/CT could also be performed   hgb 11 1/33  2--wbc 3 22--platlets 78  Trop 0 06--t pro 5 3--alb 2 5--t  Bili 1 67    ED Treatment:   Medication Administration from 01/14/2019 0909 to 01/14/2019 1304       Date/Time Order Dose Route Action Action by Comments     01/14/2019 1004 albuterol inhalation solution 5 mg 5 mg Nebulization Given Shireen Abraham RN      01/14/2019 1004 ipratropium (ATROVENT) 0 02 % inhalation solution 0 5 mg 0 5 mg Nebulization Given Shireen Abraham RN      01/14/2019 1300 cefepime (MAXIPIME) 2 g/50 mL dextrose IVPB 0 mg Intravenous Mone Hugo RN      01/14/2019 1210 cefepime (MAXIPIME) 2 g/50 mL dextrose IVPB 2,000 mg Intravenous Eriberto Weiner RN         Past Medical/Surgical History:    Active Ambulatory Problems     Diagnosis Date Noted    Chronic rhinitis 03/03/2016    Adenocarcinoma, lung ([facility] ) 03/03/2016    BPH (benign prostatic hyperplasia) 03/03/2016    Multiple myeloma ([facility] ) 03/03/2016    Chronic diarrhea 03/03/2016    COPD (chronic obstructive pulmonary disease) ([facility] ) 08/22/2017    Right bundle branch block 01/03/2018    Enlarged prostate without lower urinary tract symptoms (luts) 06/21/2012    Osteoporosis 06/17/2013    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    Aortic stenosis, moderate 07/17/2018    Peripheral edema 12/05/2018    Other pulmonary embolism without acute cor pulmonale ([facility] ) 12/21/2018     Resolved Ambulatory Problems     Diagnosis Date Noted    ALLYSSA (acute kidney injury) 04/01/2017     Past Medical History:   Diagnosis Date    Chronic pain disorder     COPD (chronic obstructive pulmonary disease) (HCC)     Diarrhea     Enlarged prostate     Kidney stone     [facility] myeloma ([facility] )     Pulmonary embolism ([place] [address] ) 12/24/2018    Rectal adenocarcinoma (HCC)     Sleep apnea      Admitting Diagnosis: Elevated troponin [R74 8]  Fever [R50 9]  Near syncope [R55]  Sepsis ([place] [address] ) [A41 9]  Age/Sex: 81 y o  male       Assessment/Plan:- adenoca of lung-- bph, copd, pe, elevated trop, fever , near syncope  Generalized weakness- diarrhea -- r/o c-diff  Check ua, check procalcitonin, influenza  Defer iv fluids as patient vania diet  Elevated trop -- trend trop  telm  Thrombocytopenia -- monitor platelet count has had thrombocytopenia in the past  Ef 60% 7/20/2018  Patient will be admitted on an Inpatient basis with an anticipated length of stay of  greater than 2 midnights  Justification for Hospital Stay:  Weakness, fever, diarrhea  Admission Orders:  Scheduled Meds:   Current Facility-Administered Medications:  acetaminophen 650 mg Oral Q6H PRN Mulu Espinoza MD   apixaban 5 mg Oral BID Mulu Espinoza MD   ipratropium-albuterol 3 mL Nebulization Daily Mulu Espinoza MD   lenalidomide 5 mg Oral HS Mulu Espinoza MD   Silodosin 1 capsule Oral HS Mulu Espinoza MD   sodium chloride 250 mL/hr Intravenous Continuous Iwona Brewster DO     Continuous Infusions:   sodium chloride 250 mL/hr     PRN Meds:   acetaminophen     Consult pulmonary med  Abnormal ct of chest with lung nodule - developed within a 3 week interval--most likely inflammatory--op followup  Copd at baseline  Fever not pulmonary source    telm  scd  Cardiovascular diet      Wbc 2 86--hgb 10 2/30 7--platlets 74-- t   Pro 5 5--alb 2 2

## 2019-01-15 NOTE — DISCHARGE SUMMARY
Transition of Care Discharge Summary - [address] Internal Medicine    Patient Information: Marino Leach 81 y o  male MRN: [id number]  Unit/Bed#: E4 -01 Encounter: [id number]    Discharging Physician / Practitioner: Jason Preciado MD  PCP: Sadia Grace DO  Admission Date: 1/14/2019  Discharge Date: 01/15/19    Disposition:      Other: home    For Discharges to [facility] SNF:   · Not Applicable to this Patient - Not Applicable to this Patient    Reason for Admission: weakness, diarrhea    Discharge Diagnoses:     Principal Problem (Resolved):    Fever  Active Problems:    Adenocarcinoma, lung (HCC)    BPH (benign prostatic hyperplasia)    COPD (chronic obstructive pulmonary disease) ([facility] )    Osteoporosis    Other pulmonary embolism without acute cor pulmonale ([facility] )  Resolved Problems:    Elevated troponin    Near syncope      Consultations During Hospital Stay:  · Pulmonary    Procedures Performed:     · none    Medication Adjustments and Discharge Medications:  · Medication Dosing Tapers - Please refer to Discharge Medication List for details on any medication dosing tapers (if applicable to patient)  · Discharge Medication List: See after visit summary for reconciled discharge medications  Wound Care Recommendations:  When applicable, please see wound care section of After Visit Summary  Diet Recommendations at Discharge:  Diet -        Diet Orders            Start     Ordered    01/14/19 1622  Room Service  Once     Question:  Type of Service  Answer:  Room Service - Appropriate with Assistance    01/14/19 1622    01/14/19 1426  Diet Cardiovascular; Cardiac  Diet effective now     Question Answer Comment   Diet Type Cardiovascular    Cardiac Cardiac    RD to adjust diet per protocol? Yes        01/14/19 1426        Fluid Restriction - No Fluid Restriction at Discharge  Significant Findings / Test Results:     CT chest:IMPRESSION:     1    No focal infiltrate to suggest pneumonia  2   New spiculated nodule in the anterior left upper lobe measuring 9 mm  This nodule is suspicious for malignancy in this patient with a history of lung adenocarcinoma  Short-term follow-up CT in 4-6 weeks is recommended to assess for stability of the  ·  nodule  Alternatively, PET/CT could also be performed      Hospital Course:     Alisha Campos is a 81 y o  male who presents with generalized weakness, fever and diarrhea at home  Patient has past medical history significant for DVT on Xarelto, multiple myeloma on oral chemotherapy and IVIG, COPD, history of right-sided lung cancer status post surgical resection and radiation in remission, history of stage I rectal adenocarcinoma, lives at home with his wife and walks on assistance  Patient was most recently admitted on 12/21/2018 and discharged on 12/24/2018 after being treated for pulmonary embolism and started on Eliquis  According to wife at bedside patient was in his normal state of health until Friday Friday patient was having overall weakness which improved on Saturday  Patient and wife states that yesterday patient was having multiple episodes of loose stools and subjective fevers at home  Patient states fever in the morning was 100 2 for which wife gave Tylenol she also tried to give Imodium as patient does have episodes where he has diarrhea alternating with constipation due to his medications  Patient did not have any improvement with Imodium  She rechecked  temperature this morning is found to be 100 9  Pulmonary consulted for patient's lung nodule in the left lower lobe which is new and developed in the last 3 weeks, given short interval of development pulmonary believe this is likely an inflammatory process and no further workup is necessary inpatient, patient will need to follow outpatient had a repeat CT scan in 8 weeks  Patient's pulmonary embolism is chronic      Patient had elevated troponin secondary to NSTEMI type 2 secondary to tachycardia, no acute EKG changes  Patient also had some acute hypoxic respiratory failure secondary to COPD which resolved  Patient is hemodynamically stable, afebrile, C diff negative, no further episodes of diarrhea  Likely etiology of patient's symptoms secondary to diarrhea possibly secondary to gastroenteritis versus side effect of medications  Patient will need to talk to his hematologist/oncologist regarding adjustment of medications  Patient will need to follow with pulmonologist outpatient  Condition at Discharge: good     Discharge Day Visit / Exam:     Subjective:  Patient seen and examined at bedside, currently denies any complaints, denies any nausea, vomiting, diarrhea    Vitals: Blood Pressure: 97/70 (01/15/19 0731)  Pulse: 83 (01/15/19 0731)  Temperature: (!) 96 6 °F (35 9 °C) (01/15/19 0731)  Temp Source: Tympanic (01/15/19 0731)  Respirations: 20 (01/15/19 0731)  Weight - Scale: 86 2 kg (190 lb 0 6 oz) (01/14/19 0921)  SpO2: 96 % (01/15/19 1106)    Physical Exam:    Constitutional: Patient is oriented to person, place and time, no acute distress  HEENT:  Normocephalic, atraumatic, EOMI, PERRLA, no scleral icterus, no pallor, moist oral mucosa  Neck:  Supple, no masses, no thyromegaly, no bruits Normal range of motion  Lymph nodes:  No lymphadenopathy  Cardiovascular: Normal S1S2, RRR, No murmurs/rubs/gallops appreciated  Pulmonary:  Bilateral air entry, No rhonchi/rales/wheezing appreciated  Abdominal: Soft, Bowel sounds present, Non-tender, Non-distended, No rebound/guarding, no hepatomegaly   Musculoskeletal: No tenderness/abnormality   Extremities:  No cyanosis, clubbing or edema  Peripheral pulses palpable and equal bilaterally  Neurological: Cranial nerves II-XII grossly intact, sensation intact, otherwise no focal neurological symptoms  Skin: Skin is warm and dry, no rashes      Discharge instructions/Information to patient and family:   See after visit summary section titled Discharge Instructions for information provided to patient and family  Planned Readmission: no      Discharge Statement:  I spent 30 minutes discharging the patient  This time was spent on the day of discharge  I had direct contact with the patient on the day of discharge  Greater than 50% of the total time was spent examining patient, answering all patient questions, arranging and discussing plan of care with patient as well as directly providing post-discharge instructions  Additional time then spent on discharge activities      ** Please Note: This note has been constructed using a voice recognition system **

## 2019-01-15 NOTE — PHYSICIAN ADVISOR
Current patient class: Inpatient  [facility]        The patient was admitted to the hospital  on 1/14/19 at  for the following diagnosis:  Elevated troponin [R74 8]  Fever [R50 9]  Near syncope [R55]  Sepsis ([facility] ) [A41 9]       There is documentation in the medical record of an expected length of stay of at least 2 midnights  The patient is therefore expected to satisfy the 2 midnight benchmark and given the 2 midnight presumption is appropriate for INPATIENT ADMISSION  Given this expectation of a satisfying stay, CMS instructs us that the patient is most often appropriate for inpatient admission under part A provided medical necessity is documented in the chart  After review of the relevant documentation, labs, vital signs and test results, the patient is appropriate for INPATIENT ADMISSION  Admission to the hospital as an inpatient is a complex decision making process which requires the practitioner to consider the patients presenting complaint, history and physical examination and all relevant testing  With this in mind, in this case, the patient was deemed appropriate for INPATIENT ADMISSION  After review of the documentation and testing available at the time of the admission I concur with this clinical determination of medical necessity  The patient does have an inpatient admission within the previous 30 days  The patient was admitted on 12/21/18 and discharged on 12/24/18 as an inpatient  The patient therefore required readmission review  In this case the patient should be considered a SEPARATE and UNRELATED INPATIENT ADMISSION  The patient had been discharged in stable condition with a completed care plan  There were no unresolved acute medical issues at the time of discharged which would have reasonably been expected to prompt this readmission  Rationale is as follows:     The patient is a 81 yrs old  Male who presented to the ED at 1/14/2019  9:16 AM with a chief complaint of Fever - 75 years or older (pt reports fever sine last evening temperature this morning 100 9  pt also c/o cough, and generalized weakness/tiredness  ) patient had  Temp of 100 9 at home , multiple episides of diarrhea    In ED patient was tachycardic and labs showed elevated troponin and thrombocytopenia - patient was monitored on tele and had serial monitoring of troponin     Patient was admitted from 12/21/18-12/24/18 for PE and started on anticoagulation  Current admission is therefore separate and unrelated    The patients vitals on arrival were ED Triage Vitals   Temperature Pulse Respirations Blood Pressure SpO2   01/14/19 0921 01/14/19 0921 01/14/19 0921 01/14/19 0921 01/14/19 0921   98 °F (36 7 °C) 78 22 100/52 96 %      Temp Source Heart Rate Source Patient Position - Orthostatic VS BP Location FiO2 (%)   01/14/19 0921 01/14/19 0921 01/14/19 0921 01/14/19 0921 --   Oral Monitor Lying Left arm       Pain Score       01/14/19 1307       No Pain           Past Medical History:   Diagnosis Date    Chronic pain disorder     3 fractured vertebrae    COPD (chronic obstructive pulmonary disease) (HCC)     Diarrhea     Enlarged prostate     Kidney stone     [facility] myeloma [facility] )     Pulmonary embolism ([facility] ) 12/24/2018    Rectal adenocarcinoma ([facility] )     Sleep apnea     no cpap     Past Surgical History:   Procedure Laterality Date    CHOLECYSTECTOMY      COLONOSCOPY W/ ENDOSCOPIC US N/A 7/21/2017    Procedure: ANAL ENDOSCOPIC U/S;  Surgeon: Karo Kendall MD;  [facility]: [facility]; Service: Colorectal    HERNIA REPAIR      KIDNEY STONE SURGERY      LUNG LOBECTOMY Right     UT COLONOSCOPY FLX DX W/COLLJ SPEC WHEN PFRMD N/A 7/10/2017    Procedure: COLONOSCOPY;  Surgeon: Margot Roberts MD;  Location: [facility];   Service: Gastroenterology    UT RECTAL TUMOR EXCISION, TRANSANAL ENDOSCOPIC MICROSURGICAL, FULL THICK N/A 11/2/2017 Procedure: TRANSANAL ENDOSCOPIC MICROSURGERY TEM;  Surgeon: Latoya Ordonez MD;  Location: [facility];  Service: Colorectal           Consults have been placed to:   IP CONSULT TO PULMONOLOGY    Vitals:    01/14/19 1920 01/14/19 2250 01/15/19 0731 01/15/19 1106   BP: 107/70 113/73 97/70    BP Location: Left arm Left arm Left arm    Pulse: 98 103 83    Resp: 20 20 20    Temp: (!) 97 3 °F (36 3 °C) 97 5 °F (36 4 °C) (!) 96 6 °F (35 9 °C)    TempSrc: Tympanic Tympanic Tympanic    SpO2: 98% 96% 97% 96%   Weight:           Most recent labs:    Recent Labs      01/14/19   1935  01/15/19   0454   WBC   --   2 86*   HGB   --   10 2*   HCT   --   30 7*   PLT   --   74*   K   --   4 0   CALCIUM   --   8 7   BUN   --   16   CREATININE   --   1 05   TROPONINI  0 03   --    AST   --   15   ALT   --   25   ALKPHOS   --   62       Scheduled Meds:  Current Facility-Administered Medications:  acetaminophen 650 mg Oral Q6H PRN Jason Preciado MD   apixaban 5 mg Oral BID Jason Preciado MD   ipratropium-albuterol 3 mL Nebulization Daily Jason Preciado MD   lenalidomide 5 mg Oral HS Jason Preciado MD   Silodosin 1 capsule Oral HS Jason Preciado MD   sodium chloride 250 mL/hr Intravenous Continuous Cynthia Nageotte, DO     Continuous Infusions:  sodium chloride 250 mL/hr     PRN Meds:   acetaminophen    Surgical procedures (if appropriate):

## 2019-01-16 ENCOUNTER — PATIENT OUTREACH (OUTPATIENT)
Dept: CASE MANAGEMENT | Facility: HOSPITAL | Age: 84
End: 2019-01-16

## 2019-01-16 NOTE — PROGRESS NOTES
Kendall Otto was admitted to [facility] 1/14-1/15 with fever  This was a 30 day readmission  Today he reports he is feeling better "I hope it lasts"  I reviewed upcoming appt schedule with oncology (Feb) and pulmonology (March) and reminded him to secure a followup PCP appt within the next week with his [facility] physician, as this was not done during hospitalization  Kendall Otto declined home health services upon discharge

## 2019-01-19 LAB
BACTERIA BLD CULT: NORMAL
BACTERIA BLD CULT: NORMAL

## 2019-02-04 ENCOUNTER — APPOINTMENT (OUTPATIENT)
Dept: LAB | Facility: MEDICAL CENTER | Age: 84
End: 2019-02-04
Payer: MEDICARE

## 2019-02-04 ENCOUNTER — TELEPHONE (OUTPATIENT)
Dept: HEMATOLOGY ONCOLOGY | Facility: CLINIC | Age: 84
End: 2019-02-04

## 2019-02-04 DIAGNOSIS — C90.00 MULTIPLE MYELOMA NOT HAVING ACHIEVED REMISSION (HCC): ICD-10-CM

## 2019-02-04 DIAGNOSIS — D80.9 IMMUNOGLOBULIN DEFICIENCY (HCC): ICD-10-CM

## 2019-02-04 DIAGNOSIS — C34.91 ADENOCARCINOMA OF RIGHT LUNG (HCC): Chronic | ICD-10-CM

## 2019-02-04 DIAGNOSIS — Z85.3 PERSONAL HISTORY OF MALIGNANT NEOPLASM OF BREAST: ICD-10-CM

## 2019-02-04 DIAGNOSIS — C90.00 MULTIPLE MYELOMA, REMISSION STATUS UNSPECIFIED (HCC): ICD-10-CM

## 2019-02-04 LAB
ALBUMIN SERPL BCP-MCNC: 2.8 G/DL (ref 3.5–5)
ALP SERPL-CCNC: 82 U/L (ref 46–116)
ALT SERPL W P-5'-P-CCNC: 39 U/L (ref 12–78)
ANION GAP SERPL CALCULATED.3IONS-SCNC: 10 MMOL/L (ref 4–13)
ANISOCYTOSIS BLD QL SMEAR: PRESENT
AST SERPL W P-5'-P-CCNC: 16 U/L (ref 5–45)
BASOPHILS # BLD MANUAL: 0.09 THOUSAND/UL (ref 0–0.1)
BASOPHILS NFR MAR MANUAL: 2 % (ref 0–1)
BILIRUB SERPL-MCNC: 0.95 MG/DL (ref 0.2–1)
BUN SERPL-MCNC: 24 MG/DL (ref 5–25)
CALCIUM SERPL-MCNC: 8.9 MG/DL (ref 8.3–10.1)
CHLORIDE SERPL-SCNC: 105 MMOL/L (ref 100–108)
CO2 SERPL-SCNC: 24 MMOL/L (ref 21–32)
CREAT SERPL-MCNC: 1.18 MG/DL (ref 0.6–1.3)
EOSINOPHIL # BLD MANUAL: 0 THOUSAND/UL (ref 0–0.4)
EOSINOPHIL NFR BLD MANUAL: 0 % (ref 0–6)
ERYTHROCYTE [DISTWIDTH] IN BLOOD BY AUTOMATED COUNT: 15.1 % (ref 11.6–15.1)
GFR SERPL CREATININE-BSD FRML MDRD: 57 ML/MIN/1.73SQ M
GIANT PLATELETS BLD QL SMEAR: PRESENT
GLUCOSE SERPL-MCNC: 176 MG/DL (ref 65–140)
HCT VFR BLD AUTO: 35.2 % (ref 36.5–49.3)
HGB BLD-MCNC: 11.2 G/DL (ref 12–17)
IGA SERPL-MCNC: 47 MG/DL (ref 70–400)
IGG SERPL-MCNC: 609 MG/DL (ref 700–1600)
IGM SERPL-MCNC: 54 MG/DL (ref 40–230)
LYMPHOCYTES # BLD AUTO: 0.64 THOUSAND/UL (ref 0.6–4.47)
LYMPHOCYTES # BLD AUTO: 14 % (ref 14–44)
MCH RBC QN AUTO: 32.4 PG (ref 26.8–34.3)
MCHC RBC AUTO-ENTMCNC: 31.8 G/DL (ref 31.4–37.4)
MCV RBC AUTO: 102 FL (ref 82–98)
MICROCYTES BLD QL AUTO: PRESENT
MONOCYTES # BLD AUTO: 0.5 THOUSAND/UL (ref 0–1.22)
MONOCYTES NFR BLD: 11 % (ref 4–12)
NEUTROPHILS # BLD MANUAL: 3.34 THOUSAND/UL (ref 1.85–7.62)
NEUTS SEG NFR BLD AUTO: 73 % (ref 43–75)
NRBC BLD AUTO-RTO: 0 /100 WBCS
PLATELET # BLD AUTO: 123 THOUSANDS/UL (ref 149–390)
PLATELET BLD QL SMEAR: ABNORMAL
PLATELET CLUMP BLD QL SMEAR: PRESENT
PMV BLD AUTO: 9.8 FL (ref 8.9–12.7)
POIKILOCYTOSIS BLD QL SMEAR: PRESENT
POLYCHROMASIA BLD QL SMEAR: PRESENT
POTASSIUM SERPL-SCNC: 3.9 MMOL/L (ref 3.5–5.3)
PROT SERPL-MCNC: 6.2 G/DL (ref 6.4–8.2)
RBC # BLD AUTO: 3.46 MILLION/UL (ref 3.88–5.62)
RBC MORPH BLD: PRESENT
SODIUM SERPL-SCNC: 139 MMOL/L (ref 136–145)
WBC # BLD AUTO: 4.58 THOUSAND/UL (ref 4.31–10.16)

## 2019-02-04 PROCEDURE — 82784 ASSAY IGA/IGD/IGG/IGM EACH: CPT

## 2019-02-04 PROCEDURE — 85027 COMPLETE CBC AUTOMATED: CPT

## 2019-02-04 PROCEDURE — 80053 COMPREHEN METABOLIC PANEL: CPT

## 2019-02-04 PROCEDURE — 83883 ASSAY NEPHELOMETRY NOT SPEC: CPT

## 2019-02-04 PROCEDURE — 36415 COLL VENOUS BLD VENIPUNCTURE: CPT

## 2019-02-04 PROCEDURE — 85007 BL SMEAR W/DIFF WBC COUNT: CPT

## 2019-02-04 NOTE — TELEPHONE ENCOUNTER
Per Dr Andreia Knox, pt is to stop the Eliquis 5 mg BID x 3 days  Pt then to take decreased Eliquis 2 5  Pt's wife then called back and information was related per Ashly, and pt's wife asked that if we can supply samples of 2 5 mg of Eliquis and give to him at next appt on Thursday in [place]   2/7/19

## 2019-02-05 LAB
KAPPA LC FREE SER-MCNC: 5.3 MG/L (ref 3.3–19.4)
KAPPA LC FREE/LAMBDA FREE SER: 0.07 {RATIO} (ref 0.26–1.65)
LAMBDA LC FREE SERPL-MCNC: 74 MG/L (ref 5.7–26.3)

## 2019-02-07 ENCOUNTER — TELEPHONE (OUTPATIENT)
Dept: HEMATOLOGY ONCOLOGY | Facility: CLINIC | Age: 84
End: 2019-02-07

## 2019-02-07 ENCOUNTER — OFFICE VISIT (OUTPATIENT)
Dept: HEMATOLOGY ONCOLOGY | Facility: CLINIC | Age: 84
End: 2019-02-07
Payer: MEDICARE

## 2019-02-07 VITALS
WEIGHT: 190 LBS | DIASTOLIC BLOOD PRESSURE: 61 MMHG | TEMPERATURE: 98.1 F | HEART RATE: 102 BPM | RESPIRATION RATE: 18 BRPM | BODY MASS INDEX: 29.82 KG/M2 | SYSTOLIC BLOOD PRESSURE: 111 MMHG | OXYGEN SATURATION: 99 % | HEIGHT: 67 IN

## 2019-02-07 DIAGNOSIS — C90.00 MULTIPLE MYELOMA NOT HAVING ACHIEVED REMISSION (HCC): Primary | ICD-10-CM

## 2019-02-07 DIAGNOSIS — C90.00 MULTIPLE MYELOMA, REMISSION STATUS UNSPECIFIED (HCC): Primary | ICD-10-CM

## 2019-02-07 DIAGNOSIS — C90.00 MULTIPLE MYELOMA, REMISSION STATUS UNSPECIFIED (HCC): ICD-10-CM

## 2019-02-07 PROCEDURE — 99214 OFFICE O/P EST MOD 30 MIN: CPT | Performed by: INTERNAL MEDICINE

## 2019-02-07 RX ORDER — LENALIDOMIDE 5 MG/1
CAPSULE ORAL
Qty: 21 CAPSULE | Refills: 0 | Status: SHIPPED | OUTPATIENT
Start: 2019-02-07 | End: 2019-03-13 | Stop reason: SDUPTHER

## 2019-02-07 RX ORDER — MELPHALAN USP, 2 MG 2 MG/1
TABLET ORAL
Qty: 24 TABLET | Refills: 3 | Status: SHIPPED | OUTPATIENT
Start: 2019-02-07 | End: 2019-05-02 | Stop reason: SDUPTHER

## 2019-02-07 NOTE — PROGRESS NOTES
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    At this time dexamethasone 2 mg p o  Daily for 2 weeks and then reduced gradually our goal to discontinue corticosteroids if we can or switch to low-dose prednisone    Subsegmental left lower pulmonary embolism, with epistaxis, hemoptysis, reduce apixaban 2 5 mg p  O  Daily hoping to stop the treatment soon    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    COPD and history of lung cancer of the right upper lobe status post resection followed by Alimta/carboplatin and radiation therapy followed by Pulmonary service    45 minutes for the interview with the patient, his wife and his daughter           HPI:      #1 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, multiple compression fractures, status post kyphoplasty to L1, L2, L5, treated with radiation therapy to the left intra trochanteric area, received melphalan/Velcade/prednisone in October 2010 for 4 cycles with excellent response and normalization of lambda light chain, had been on Velcade maintenance till January 2013 when lambda light chain went up to 111,treated with Velcade 1 3 mg meter square weekly 3 weeks on one week off, Decadron 20 mg by mouth weekly, lambda light chain down to 30 then creeping up to 110, on Revlimid 15 mg by mouth every other day  Lambda light chain normal at 28 however he reported skin rash on the upper chest area we stopped the Revlimid for 2 month  Then reinitiated Revlimid 5 mg by mouth daily     #2  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 stage I, with progression by scan, status post resection of the right upper lobe with few foci in the same lobe consistent with stage IIIA done in January 2015 adenocarcinoma, well differentiated  Status post Alimta/carboplatin x4 cycles finished in May 2015 and radiation therapy     #3  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     #4  Rectal adenocarcinoma status post resection stage I in November 2017     #5  Pulmonary embolism involving the subsegmental left lower lobe in December 2018    ECOG score 1              Test Results:    Imaging: Xr Chest 2 Views    Result Date: 1/14/2019  Narrative: CHEST INDICATION:   cough  Shortness of breath and fever COMPARISON:  12/21/2018 EXAM PERFORMED/VIEWS:  XR CHEST PA & LATERAL FINDINGS: The cardiac silhouette is without change from the prior study  Postsurgical changes in the right hemithorax appears similar to the prior study which includes volume loss and right apical pleural thickening  Ofdpoa-y-Twye catheter is unchanged in position  No new infiltrate is seen  No pneumothorax  No pleural effusion  Impression: No acute cardiopulmonary disease  Please refer to subsequently performed chest CT for additional detail Workstation performed: [id number]     Ct Chest Without Contrast    Result Date: 1/14/2019  Narrative: CT CHEST WITHOUT IV CONTRAST INDICATION:   Rule out pneumonia  COMPARISON:  CTA chest 12/21/2018  TECHNIQUE: CT examination of the chest was performed without intravenous contrast   Axial, sagittal, and coronal 2D reformatted images were created from the source data and submitted for interpretation  Radiation dose length product (DLP) for this visit:  551 mGy-cm     This examination, like all CT scans performed in the [facility], was performed utilizing techniques to minimize radiation dose exposure, including the use of iterative reconstruction and automated exposure control  FINDINGS: LUNGS:  Stable treatment changes status post right upper lobectomy and radiation therapy, similar to prior  Mild stable emphysematous changes  There is a new spiculated nodule in the anterior left lower lobe measuring 9 x 9 mm (3/72)  Stable 2 mm nodule in the superior left lower lobe There are a few ill-defined groundglass densities in the lingula and medial left upper lobe, compatible with areas of scarring  No focal consolidation to suggest pneumonia  Right hemidiaphragm remains significantly elevated  There is no tracheal or endobronchial lesion  PLEURA:  Unremarkable  HEART/GREAT VESSELS:  Unremarkable for patient's age  MEDIASTINUM AND GLEN:  Unremarkable  CHEST WALL AND LOWER NECK:   Right anterior chest wall Port-A-Cath  VISUALIZED STRUCTURES IN THE UPPER ABDOMEN:  Prior cholecystectomy  OSSEOUS STRUCTURES:  No acute fracture or destructive osseous lesion  Impression: 1  No focal infiltrate to suggest pneumonia  2   New spiculated nodule in the anterior left upper lobe measuring 9 mm  This nodule is suspicious for malignancy in this patient with a history of lung adenocarcinoma  Short-term follow-up CT in 4-6 weeks is recommended to assess for stability of the  nodule  Alternatively, PET/CT could also be performed  The study was marked in [facility] for immediate notification   Workstation performed: [id number]       Labs:   Lab Results   Component Value Date    WBC 4 58 02/04/2019    HGB 11 2 (L) 02/04/2019    HCT 35 2 (L) 02/04/2019     (H) 02/04/2019     (L) 02/04/2019     Lab Results   Component Value Date     01/04/2016    K 3 9 02/04/2019     02/04/2019    CO2 24 02/04/2019    ANIONGAP 7 01/04/2016    BUN 24 02/04/2019    CREATININE 1 18 02/04/2019    GLUCOSE 88 01/04/2016    GLUF 88 07/17/2018    CALCIUM 8 9 02/04/2019    AST 16 02/04/2019    ALT 39 02/04/2019    ALKPHOS 82 02/04/2019    PROT 6 8 01/04/2016    BILITOT 0 63 01/04/2016 EGFR 57 02/04/2019    [place] light chain 5 3, lambda light chain 74, IgA 47, IgG 609    No results found for: IRON, TIBC, FERRITIN    No results found for: [id number]      ROS:   Review of Systems   Constitutional: Positive for fatigue  Negative for activity change, appetite change, chills, diaphoresis, fever and unexpected weight change  HENT: Negative for congestion, dental problem, facial swelling, hearing loss, mouth sores, nosebleeds, postnasal drip, rhinorrhea, sore throat, trouble swallowing and voice change  Eyes: Negative for photophobia, pain, discharge, redness, itching and visual disturbance  Respiratory: Positive for shortness of breath and wheezing  Negative for cough, choking and chest tightness  Cardiovascular: Positive for leg swelling  Negative for chest pain and palpitations  Gastrointestinal: Negative for abdominal distention, abdominal pain, anal bleeding, blood in stool, constipation, diarrhea, nausea, rectal pain and vomiting  Endocrine: Negative for cold intolerance and heat intolerance  Genitourinary: Negative for decreased urine volume, difficulty urinating, dysuria, flank pain, frequency, hematuria and urgency  Musculoskeletal: Negative for arthralgias, back pain, gait problem, joint swelling, myalgias, neck pain and neck stiffness  Skin: Negative for color change, pallor, rash and wound  Allergic/Immunologic: Negative for immunocompromised state  Neurological: Negative for dizziness, tremors, seizures, syncope, facial asymmetry, speech difficulty, weakness, light-headedness, numbness and headaches  Hematological: Negative for adenopathy  Does not bruise/bleed easily  Psychiatric/Behavioral: Negative for agitation, confusion, decreased concentration, dysphoric mood and sleep disturbance  The patient is not nervous/anxious  All other systems reviewed and are negative          Current Medications: Reviewed  Allergies: Reviewed  PMH/FH/SH:  Reviewed      Physical Exam:    Body surface area is 1 98 meters squared  Wt Readings from Last 3 Encounters:   02/07/19 86 2 kg (190 lb)   01/14/19 86 2 kg (190 lb 0 6 oz)   01/09/19 86 2 kg (190 lb)        Temp Readings from Last 3 Encounters:   02/07/19 98 1 °F (36 7 °C) (Oral)   01/15/19 (!) 96 6 °F (35 9 °C) (Tympanic)   01/09/19 97 9 °F (36 6 °C) (Oral)        BP Readings from Last 3 Encounters:   02/07/19 111/61   01/15/19 97/70   01/09/19 138/80         Pulse Readings from Last 3 Encounters:   02/07/19 102   01/15/19 83   01/09/19 (!) 112        Physical Exam   Constitutional: He is oriented to person, place, and time  He appears well-developed and well-nourished  No distress  HENT:   Head: Normocephalic and atraumatic  Mouth/Throat: Oropharynx is clear and moist  No oropharyngeal exudate  Eyes: Pupils are equal, round, and reactive to light  Conjunctivae and EOM are normal    Neck: Normal range of motion  Neck supple  No tracheal deviation present  No thyromegaly present  Cardiovascular: Normal rate and regular rhythm  Exam reveals no gallop and no friction rub  Murmur heard  Pulmonary/Chest: Effort normal  No respiratory distress  He has wheezes  He has no rales  He exhibits no tenderness  Abdominal: Soft  Bowel sounds are normal  He exhibits no distension and no mass  There is no tenderness  There is no rebound and no guarding  Musculoskeletal: Normal range of motion  He exhibits edema (Plus three edema bilaterally)  He exhibits no tenderness  Lymphadenopathy:     He has no cervical adenopathy  Neurological: He is alert and oriented to person, place, and time  Skin: Skin is warm and dry  No rash noted  He is not diaphoretic  No erythema  No pallor  Psychiatric: He has a normal mood and affect  His behavior is normal  Judgment and thought content normal    Vitals reviewed  Goals and Barriers:  Current Goal: Minimize effects of disease  Barriers: None        Patient's Capacity to Self Care: Patient is able to self care      Code Status: [unfilled]

## 2019-02-07 NOTE — TELEPHONE ENCOUNTER
Phoned patient with results of LDCT exam 3/9/2017  following review with CALEB Montoya.  Notified him that the results were normal and showed no lung nodules.  Recommend follow up CT in 12 months. Patient verbalized understanding. Patient sent result letter and health maintenance updated for annual screening. PCP MILI Ellis, and Tristen Trinh M.D., notified of results.    Sent both to Dr Cj Shane to sign

## 2019-02-12 RX ORDER — SODIUM CHLORIDE 9 MG/ML
20 INJECTION, SOLUTION INTRAVENOUS CONTINUOUS
Status: DISCONTINUED | OUTPATIENT
Start: 2019-02-13 | End: 2019-02-16 | Stop reason: HOSPADM

## 2019-02-13 ENCOUNTER — HOSPITAL ENCOUNTER (OUTPATIENT)
Dept: INFUSION CENTER | Facility: CLINIC | Age: 84
Discharge: HOME | End: 2019-02-13
Payer: MEDICARE

## 2019-02-13 VITALS
HEART RATE: 104 BPM | SYSTOLIC BLOOD PRESSURE: 99 MMHG | TEMPERATURE: 97.9 F | RESPIRATION RATE: 16 BRPM | DIASTOLIC BLOOD PRESSURE: 70 MMHG | OXYGEN SATURATION: 98 %

## 2019-02-13 PROCEDURE — 96365 THER/PROPH/DIAG IV INF INIT: CPT

## 2019-02-13 PROCEDURE — 96366 THER/PROPH/DIAG IV INF ADDON: CPT

## 2019-02-13 RX ADMIN — IMMUNE GLOBULIN (HUMAN) 25 G: 10 INJECTION INTRAVENOUS; SUBCUTANEOUS at 11:08

## 2019-02-13 RX ADMIN — SODIUM CHLORIDE 20 ML/HR: 0.9 INJECTION, SOLUTION INTRAVENOUS at 10:41

## 2019-02-13 NOTE — PLAN OF CARE
Problem: Potential for Falls  Goal: Patient will remain free of falls  Description  INTERVENTIONS:  - Assess patient frequently for physical needs  -  Identify cognitive and physical deficits and behaviors that affect risk of falls    -  [place] fall precautions as indicated by assessment   - Educate patient/family on patient safety including physical limitations  - Instruct patient to call for assistance with activity based on assessment  - Modify environment to reduce risk of injury  - Consider OT/PT consult to assist with strengthening/mobility  Outcome: Progressing

## 2019-02-21 ENCOUNTER — OFFICE VISIT (OUTPATIENT)
Dept: HEMATOLOGY ONCOLOGY | Facility: CLINIC | Age: 84
End: 2019-02-21
Payer: MEDICARE

## 2019-02-21 VITALS
OXYGEN SATURATION: 94 % | SYSTOLIC BLOOD PRESSURE: 92 MMHG | BODY MASS INDEX: 29.82 KG/M2 | RESPIRATION RATE: 18 BRPM | HEART RATE: 111 BPM | TEMPERATURE: 97.4 F | WEIGHT: 190 LBS | HEIGHT: 67 IN | DIASTOLIC BLOOD PRESSURE: 62 MMHG

## 2019-02-21 DIAGNOSIS — C90.00 MULTIPLE MYELOMA, REMISSION STATUS UNSPECIFIED (HCC): ICD-10-CM

## 2019-02-21 DIAGNOSIS — R60.9 PERIPHERAL EDEMA: ICD-10-CM

## 2019-02-21 DIAGNOSIS — C34.91 ADENOCARCINOMA OF RIGHT LUNG (HCC): Primary | Chronic | ICD-10-CM

## 2019-02-21 PROCEDURE — 99214 OFFICE O/P EST MOD 30 MIN: CPT | Performed by: INTERNAL MEDICINE

## 2019-02-21 NOTE — PROGRESS NOTES
Hematology Outpatient Follow - Up Note  Meka Bones 81 y o  male MRN: @ Encounter: [id number]        Date:  2/21/2019        Assessment/ Plan:  81-year-old  male with history of heavily pretreated lambda light chain multiple myeloma  Currently on melphalan 12 mg p o  Daily for 4 days every 6 weeks with lenalidomide 5 mg p  O  Daily 3 weeks on 1 week off, we trying to reduce dexamethasone currently on 2 mg p  O  Daily, patient to take dexamethasone 2 mg p o  Every other day for 2 weeks and then 2 times weekly for 2 weeks and then discontinue    We offer the patient palliative care he declined at this time    Subsegmental left lower pulmonary embolism with epistaxis, currently on apixaban 2 5 mg p o  Daily hoping to stop the treatment soon or continue at this schedule because of ensuing thrombocytopenia with chemotherapy    COPD advanced followed by Pulmonary service          HPI:  #1 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, multiple compression fractures, status post kyphoplasty to L1, L2, L5, treated with radiation therapy to the left intra trochanteric area, received melphalan/Velcade/prednisone in October 2010 for 4 cycles with excellent response and normalization of lambda light chain, had been on Velcade maintenance till January 2013 when lambda light chain went up to 111,treated with Velcade 1 3 mg meter square weekly 3 weeks on one week off, Decadron 20 mg by mouth weekly, lambda light chain down to 30 then creeping up to 110, on Revlimid 15 mg by mouth every other day  Lambda light chain normal at 28 however he reported skin rash on the upper chest area we stopped the Revlimid for 2 month  Then reinitiated Revlimid 5 mg by mouth daily     #2  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 stage I, with progression by scan, status post resection of the right upper lobe with few foci in the same lobe consistent with stage IIIA done in January 2015 adenocarcinoma, well differentiated  Status post Alimta/carboplatin x4 cycles finished in May 2015 and radiation therapy     #3  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     #4  Rectal adenocarcinoma status post resection stage I in November 2017     #5  Pulmonary embolism involving the subsegmental left lower lobe in December 2018        Interval History:  Reduced dexamethasone to 2 mg p o  Daily    He still have lower extremity edema, crash syndrome from steroids is much less than before on daily dexamethasone              Test Results:    Imaging: No results found  Labs:   Lab Results   Component Value Date    WBC 4 58 02/04/2019    HGB 11 2 (L) 02/04/2019    HCT 35 2 (L) 02/04/2019     (H) 02/04/2019     (L) 02/04/2019     Lab Results   Component Value Date     01/04/2016    K 3 9 02/04/2019     02/04/2019    CO2 24 02/04/2019    ANIONGAP 7 01/04/2016    BUN 24 02/04/2019    CREATININE 1 18 02/04/2019    GLUCOSE 88 01/04/2016    GLUF 88 07/17/2018    CALCIUM 8 9 02/04/2019    AST 16 02/04/2019    ALT 39 02/04/2019    ALKPHOS 82 02/04/2019    PROT 6 8 01/04/2016    BILITOT 0 63 01/04/2016    EGFR 57 02/04/2019       No results found for: IRON, TIBC, FERRITIN    No results found for: [id number]      ROS:   Review of Systems   Constitutional: Positive for fatigue  Negative for activity change, appetite change, chills, diaphoresis, fever and unexpected weight change  HENT: Negative for congestion, dental problem, facial swelling, hearing loss, mouth sores, nosebleeds, postnasal drip, rhinorrhea, sore throat, trouble swallowing and voice change  Eyes: Negative for photophobia, pain, discharge, redness, itching and visual disturbance  Respiratory: Positive for cough and wheezing  Negative for choking, chest tightness and shortness of breath  Cardiovascular: Positive for leg swelling  Negative for chest pain and palpitations     Gastrointestinal: Negative for abdominal distention, abdominal pain, anal bleeding, blood in stool, constipation, diarrhea, nausea, rectal pain and vomiting  Endocrine: Negative for cold intolerance and heat intolerance  Genitourinary: Negative for decreased urine volume, difficulty urinating, dysuria, flank pain, frequency, hematuria and urgency  Musculoskeletal: Negative for arthralgias, back pain, gait problem, joint swelling, myalgias, neck pain and neck stiffness  Skin: Negative for color change, pallor, rash and wound  Allergic/Immunologic: Negative for immunocompromised state  Neurological: Negative for dizziness, tremors, seizures, syncope, facial asymmetry, speech difficulty, weakness, light-headedness, numbness and headaches  Hematological: Negative for adenopathy  Does not bruise/bleed easily  Psychiatric/Behavioral: Negative for agitation, confusion, decreased concentration, dysphoric mood and sleep disturbance  The patient is not nervous/anxious  All other systems reviewed and are negative  Current Medications: Reviewed  Allergies: Reviewed  PMH/FH/SH:  Reviewed      Physical Exam:    Body surface area is 1 98 meters squared  Wt Readings from Last 3 Encounters:   02/21/19 86 2 kg (190 lb)   02/07/19 86 2 kg (190 lb)   01/14/19 86 2 kg (190 lb 0 6 oz)        Temp Readings from Last 3 Encounters:   02/21/19 (!) 97 4 °F (36 3 °C) (Tympanic)   02/13/19 97 9 °F (36 6 °C) (Tympanic)   02/07/19 98 1 °F (36 7 °C) (Oral)        BP Readings from Last 3 Encounters:   02/21/19 92/62   02/13/19 99/70   02/07/19 111/61         Pulse Readings from Last 3 Encounters:   02/21/19 (!) 111   02/13/19 104   02/07/19 102        Physical Exam   Constitutional: He is oriented to person, place, and time  He appears well-developed and well-nourished  No distress  HENT:   Head: Normocephalic and atraumatic  Mouth/Throat: Oropharynx is clear and moist  No oropharyngeal exudate  Eyes: Pupils are equal, round, and reactive to light  Conjunctivae and EOM are normal    Neck: Normal range of motion  Neck supple  No tracheal deviation present  No thyromegaly present  Cardiovascular: Normal rate and regular rhythm  Exam reveals no gallop and no friction rub  Murmur heard  Pulmonary/Chest: He is in respiratory distress  He has wheezes in the right middle field, the left upper field and the left middle field  He has no rales  He exhibits no tenderness  Abdominal: Soft  Bowel sounds are normal  He exhibits no distension and no mass  There is no tenderness  There is no rebound and no guarding  Musculoskeletal: Normal range of motion  Right shoulder: He exhibits decreased strength  Lymphadenopathy:     He has no cervical adenopathy  Neurological: He is alert and oriented to person, place, and time  Skin: Skin is warm and dry  No rash noted  He is not diaphoretic  No erythema  No pallor  Psychiatric: He has a normal mood and affect  His behavior is normal  Judgment and thought content normal    Vitals reviewed  Goals and Barriers:  Current Goal: Minimize effects of disease  Barriers: None  Patient's Capacity to Self Care:  Patient is able to self care      Code Status: @[facility]@

## 2019-03-03 ENCOUNTER — HOSPITAL ENCOUNTER (EMERGENCY)
Facility: HOSPITAL | Age: 84
Discharge: HOME | End: 2019-03-03
Attending: EMERGENCY MEDICINE
Payer: MEDICARE

## 2019-03-03 ENCOUNTER — APPOINTMENT (EMERGENCY)
Dept: CT IMAGING | Facility: HOSPITAL | Age: 84
End: 2019-03-03
Payer: MEDICARE

## 2019-03-03 VITALS
OXYGEN SATURATION: 95 % | RESPIRATION RATE: 25 BRPM | WEIGHT: 190.04 LBS | BODY MASS INDEX: 29.76 KG/M2 | HEART RATE: 120 BPM | DIASTOLIC BLOOD PRESSURE: 58 MMHG | TEMPERATURE: 98.2 F | SYSTOLIC BLOOD PRESSURE: 98 MMHG

## 2019-03-03 DIAGNOSIS — C34.91 ADENOCARCINOMA OF RIGHT LUNG (HCC): Chronic | ICD-10-CM

## 2019-03-03 DIAGNOSIS — J44.1 COPD WITH ACUTE EXACERBATION (HCC): Primary | ICD-10-CM

## 2019-03-03 DIAGNOSIS — R60.9 PERIPHERAL EDEMA: ICD-10-CM

## 2019-03-03 DIAGNOSIS — C90.00 MULTIPLE MYELOMA, REMISSION STATUS UNSPECIFIED (HCC): ICD-10-CM

## 2019-03-03 LAB
ALBUMIN SERPL BCP-MCNC: 2.6 G/DL (ref 3.5–5)
ALP SERPL-CCNC: 79 U/L (ref 46–116)
ALT SERPL W P-5'-P-CCNC: 52 U/L (ref 12–78)
ANION GAP SERPL CALCULATED.3IONS-SCNC: 9 MMOL/L (ref 4–13)
ANISOCYTOSIS BLD QL SMEAR: PRESENT
APTT PPP: 34 SECONDS (ref 26–38)
AST SERPL W P-5'-P-CCNC: 30 U/L (ref 5–45)
BASOPHILS # BLD MANUAL: 0 THOUSAND/UL (ref 0–0.1)
BASOPHILS NFR MAR MANUAL: 0 % (ref 0–1)
BILIRUB SERPL-MCNC: 1.41 MG/DL (ref 0.2–1)
BUN SERPL-MCNC: 14 MG/DL (ref 5–25)
CALCIUM SERPL-MCNC: 9 MG/DL (ref 8.3–10.1)
CHLORIDE SERPL-SCNC: 102 MMOL/L (ref 100–108)
CO2 SERPL-SCNC: 27 MMOL/L (ref 21–32)
CREAT SERPL-MCNC: 1.22 MG/DL (ref 0.6–1.3)
EOSINOPHIL # BLD MANUAL: 0.38 THOUSAND/UL (ref 0–0.4)
EOSINOPHIL NFR BLD MANUAL: 12 % (ref 0–6)
ERYTHROCYTE [DISTWIDTH] IN BLOOD BY AUTOMATED COUNT: 15.3 % (ref 11.6–15.1)
GFR SERPL CREATININE-BSD FRML MDRD: 55 ML/MIN/1.73SQ M
GLUCOSE SERPL-MCNC: 93 MG/DL (ref 65–140)
HCT VFR BLD AUTO: 32.5 % (ref 36.5–49.3)
HGB BLD-MCNC: 10.5 G/DL (ref 12–17)
INR PPP: 0.98 (ref 0.86–1.17)
LACTATE SERPL-SCNC: 1.3 MMOL/L (ref 0.5–2)
LG PLATELETS BLD QL SMEAR: PRESENT
LYMPHOCYTES # BLD AUTO: 0.54 THOUSAND/UL (ref 0.6–4.47)
LYMPHOCYTES # BLD AUTO: 17 % (ref 14–44)
MACROCYTES BLD QL AUTO: PRESENT
MCH RBC QN AUTO: 33.3 PG (ref 26.8–34.3)
MCHC RBC AUTO-ENTMCNC: 32.3 G/DL (ref 31.4–37.4)
MCV RBC AUTO: 103 FL (ref 82–98)
METAMYELOCYTES NFR BLD MANUAL: 1 % (ref 0–1)
MONOCYTES # BLD AUTO: 0.06 THOUSAND/UL (ref 0–1.22)
MONOCYTES NFR BLD: 2 % (ref 4–12)
MYELOCYTES NFR BLD MANUAL: 1 % (ref 0–1)
NEUTROPHILS # BLD MANUAL: 2.09 THOUSAND/UL (ref 1.85–7.62)
NEUTS BAND NFR BLD MANUAL: 1 % (ref 0–8)
NEUTS SEG NFR BLD AUTO: 65 % (ref 43–75)
NRBC BLD AUTO-RTO: 0 /100 WBCS
NRBC BLD AUTO-RTO: 1 /100 WBC (ref 0–2)
PLATELET # BLD AUTO: 78 THOUSANDS/UL (ref 149–390)
PLATELET BLD QL SMEAR: ABNORMAL
PMV BLD AUTO: 9.2 FL (ref 8.9–12.7)
POLYCHROMASIA BLD QL SMEAR: PRESENT
POTASSIUM SERPL-SCNC: 3.6 MMOL/L (ref 3.5–5.3)
PROCALCITONIN SERPL-MCNC: 0.09 NG/ML
PROT SERPL-MCNC: 6.5 G/DL (ref 6.4–8.2)
PROTHROMBIN TIME: 13.1 SECONDS (ref 11.8–14.2)
RBC # BLD AUTO: 3.15 MILLION/UL (ref 3.88–5.62)
SODIUM SERPL-SCNC: 138 MMOL/L (ref 136–145)
TOTAL CELLS COUNTED SPEC: 100
TROPONIN I SERPL-MCNC: <0.02 NG/ML
VARIANT LYMPHS # BLD AUTO: 1 %
WBC # BLD AUTO: 3.17 THOUSAND/UL (ref 4.31–10.16)

## 2019-03-03 PROCEDURE — 84484 ASSAY OF TROPONIN QUANT: CPT | Performed by: EMERGENCY MEDICINE

## 2019-03-03 PROCEDURE — 85730 THROMBOPLASTIN TIME PARTIAL: CPT | Performed by: EMERGENCY MEDICINE

## 2019-03-03 PROCEDURE — 71275 CT ANGIOGRAPHY CHEST: CPT

## 2019-03-03 PROCEDURE — 83605 ASSAY OF LACTIC ACID: CPT | Performed by: EMERGENCY MEDICINE

## 2019-03-03 PROCEDURE — 85007 BL SMEAR W/DIFF WBC COUNT: CPT | Performed by: EMERGENCY MEDICINE

## 2019-03-03 PROCEDURE — 96374 THER/PROPH/DIAG INJ IV PUSH: CPT

## 2019-03-03 PROCEDURE — 93005 ELECTROCARDIOGRAM TRACING: CPT

## 2019-03-03 PROCEDURE — 99285 EMERGENCY DEPT VISIT HI MDM: CPT

## 2019-03-03 PROCEDURE — 87040 BLOOD CULTURE FOR BACTERIA: CPT | Performed by: EMERGENCY MEDICINE

## 2019-03-03 PROCEDURE — 80053 COMPREHEN METABOLIC PANEL: CPT | Performed by: EMERGENCY MEDICINE

## 2019-03-03 PROCEDURE — 84145 PROCALCITONIN (PCT): CPT | Performed by: EMERGENCY MEDICINE

## 2019-03-03 PROCEDURE — 85610 PROTHROMBIN TIME: CPT | Performed by: EMERGENCY MEDICINE

## 2019-03-03 PROCEDURE — 85027 COMPLETE CBC AUTOMATED: CPT | Performed by: EMERGENCY MEDICINE

## 2019-03-03 PROCEDURE — 36415 COLL VENOUS BLD VENIPUNCTURE: CPT | Performed by: EMERGENCY MEDICINE

## 2019-03-03 PROCEDURE — 94640 AIRWAY INHALATION TREATMENT: CPT

## 2019-03-03 RX ORDER — METHYLPREDNISOLONE SODIUM SUCCINATE 125 MG/2ML
125 INJECTION, POWDER, LYOPHILIZED, FOR SOLUTION INTRAMUSCULAR; INTRAVENOUS ONCE
Status: COMPLETED | OUTPATIENT
Start: 2019-03-03 | End: 2019-03-03

## 2019-03-03 RX ORDER — PREDNISONE 20 MG/1
40 TABLET ORAL DAILY
Qty: 8 TABLET | Refills: 0 | Status: SHIPPED | OUTPATIENT
Start: 2019-03-03 | End: 2019-03-05 | Stop reason: ALTCHOICE

## 2019-03-03 RX ORDER — IPRATROPIUM BROMIDE AND ALBUTEROL SULFATE 2.5; .5 MG/3ML; MG/3ML
3 SOLUTION RESPIRATORY (INHALATION) 4 TIMES DAILY
Qty: 30 VIAL | Refills: 0 | Status: SHIPPED | OUTPATIENT
Start: 2019-03-03 | End: 2019-03-05 | Stop reason: SDUPTHER

## 2019-03-03 RX ORDER — ALBUTEROL SULFATE 2.5 MG/3ML
5 SOLUTION RESPIRATORY (INHALATION) ONCE
Status: COMPLETED | OUTPATIENT
Start: 2019-03-03 | End: 2019-03-03

## 2019-03-03 RX ORDER — ALBUTEROL SULFATE 2.5 MG/3ML
2.5 SOLUTION RESPIRATORY (INHALATION) EVERY 6 HOURS PRN
Qty: 75 ML | Refills: 0 | Status: SHIPPED | OUTPATIENT
Start: 2019-03-03 | End: 2019-04-09 | Stop reason: ALTCHOICE

## 2019-03-03 RX ADMIN — METHYLPREDNISOLONE SODIUM SUCCINATE 125 MG: 125 INJECTION, POWDER, FOR SOLUTION INTRAMUSCULAR; INTRAVENOUS at 10:23

## 2019-03-03 RX ADMIN — IODIXANOL 85 ML: 320 INJECTION, SOLUTION INTRAVASCULAR at 11:18

## 2019-03-03 RX ADMIN — ALBUTEROL SULFATE 5 MG: 2.5 SOLUTION RESPIRATORY (INHALATION) at 11:39

## 2019-03-03 RX ADMIN — ALBUTEROL SULFATE 5 MG: 2.5 SOLUTION RESPIRATORY (INHALATION) at 10:23

## 2019-03-03 RX ADMIN — IPRATROPIUM BROMIDE 0.5 MG: 0.5 SOLUTION RESPIRATORY (INHALATION) at 10:23

## 2019-03-03 RX ADMIN — SODIUM CHLORIDE 250 ML: 0.9 INJECTION, SOLUTION INTRAVENOUS at 10:23

## 2019-03-03 NOTE — ED PROCEDURE NOTE
PROCEDURE  ECG 12 Lead Documentation  Date/Time: 3/3/2019 10:13 AM  Performed by: Megan Coles DO  Authorized by: Megan Coles DO     Indications / Diagnosis:  Chest pain cough shortness of breath, wheezing  ECG reviewed by me, the ED Provider: yes    Patient location:  ED  Previous ECG:     Previous ECG:  Unavailable (When compared to prior patient continues with incomplete right bundle-branch block rate previously 102)  Interpretation:     Interpretation: non-specific    Rate:     ECG rate:  107    ECG rate assessment: tachycardic    Rhythm:     Rhythm: sinus tachycardia    ST segments:     ST segments:  Non-specific         Megan Coles DO  03/03/19 1014

## 2019-03-03 NOTE — ED PROVIDER NOTES
History  Chief Complaint   Patient presents with    Shortness of Breath     Pt's wife bringing pt to ED for evaluation of worsening shortness of breath for 2 weeks  Reports pt coughs more when supine  Pt reports productive cough this morning with white sputum  Pt's wife reports, "His legs always looks like this "      60-year-old male with a history of multiple myeloma COPD adenocarcinoma along with previous pulmonary emboli presents complaining of shortness of breath  Wife states the patient shortness of breath has been increasing over the course of the last week but got really bad last night  Patient is audibly wheezing  Patient is wife were very concerned about which tests are going to be performed today in the emergency department  I did attempt to talk pulmonology but the patient's usual pulmonologist not available today  History provided by:  Patient  Shortness of Breath   Severity:  Mild  Onset quality:  Gradual  Duration:  1 week  Timing:  Constant  Progression:  Worsening  Chronicity:  New  Context: activity    Context: not animal exposure, not emotional upset and not fumes    Relieved by:  Rest  Worsened by:  Deep breathing, exertion and movement  Associated symptoms: chest pain, cough and diaphoresis    Associated symptoms: no abdominal pain and no claudication    Risk factors: no recent alcohol use and no family hx of DVT        Prior to Admission Medications   Prescriptions Last Dose Informant Patient Reported? Taking?    Lactobacillus-Inulin ([address] PO)  Spouse/Significant Other Yes Yes   Sig: Take by mouth daily   RAPAFLO 8 MG CAPS  Spouse/Significant Other Yes Yes   Sig: Take 1 capsule by mouth daily at bedtime     albuterol (5 mg/mL) 0 5 % nebulizer solution Not Taking at Unknown time Spouse/Significant Other No No   Sig: Take 0 5 mL (2 5 mg total) by nebulization every 6 (six) hours as needed for wheezing   Patient not taking: Reported on 3/3/2019   albuterol (PROVENTIL HFA,VENTOLIN HFA) 90 mcg/act inhaler   No Yes   Sig: Inhale 2 puffs every 4 (four) hours as needed for wheezing   apixaban (ELIQUIS) 5 mg   No Yes   Sig: Take 1 tablet (5 mg total) by mouth 2 (two) times a day   Patient taking differently: Take 5 mg by mouth daily    cholecalciferol (VITAMIN D3) 1,000 units tablet  Spouse/Significant Other Yes Yes   Sig: Take 5,000 Units by mouth daily     cyanocobalamin (VITAMIN B-12) 1,000 mcg tablet  Spouse/Significant Other Yes Yes   Sig: Take by mouth daily   dexamethasone (DECADRON) 4 mg tablet  Spouse/Significant Other No Yes   Si tablets every week with food   Patient taking differently: 2 mg daily with breakfast 5 tablets every week with food   finasteride (PROSCAR) 5 mg tablet  Spouse/Significant Other Yes Yes   Sig: Take 5 mg by mouth daily at bedtime     guaiFENesin (MUCINEX) 600 mg 12 hr tablet  Spouse/Significant Other No Yes   Sig: Take 1 tablet (600 mg total) by mouth every 12 (twelve) hours   Patient taking differently: Take 600 mg by mouth every 12 (twelve) hours as needed     ipratropium-albuterol (DUO-NEB) 0 5-2 5 mg/mL  Spouse/Significant Other Yes Yes   Sig: Take 3 mL by nebulization daily     lenalidomide (REVLIMID) 5 MG CAPS   No Yes   Sig: 3 weeks on/3 weeks off adult male/auth# [id number]   lidocaine-prilocaine (EMLA) cream  Spouse/Significant Other Yes Yes   Sig: APPLY TO AREA AND COVER WITH TAPE 1 HOUR BEFORE APPOINTMENT    melphalan (ALKERAN) 2 MG tablet   No Yes   Sig: Take 12 mg daily for 4 days every 6 weeks   predniSONE 10 mg tablet Not Taking at Unknown time  No No   Si tabs daily on - 3 tabs on - 2 tabs on - 19 1 tab on -19   Patient not taking: Reported on 3/3/2019      Facility-Administered Medications: None       Past Medical History:   Diagnosis Date    Chronic pain disorder     3 fractured vertebrae    COPD (chronic obstructive pulmonary disease) (HCC)     Diarrhea     Enlarged prostate     Kidney stone     [facility]     Pulmonary embolism ([facility] ) 2018    Rectal adenocarcinoma [facility]     Sleep apnea     no cpap       Past Surgical History:   Procedure Laterality Date    CHOLECYSTECTOMY      COLONOSCOPY W/ ENDOSCOPIC US N/A 2017    Procedure: ANAL ENDOSCOPIC U/S;  Surgeon: Robyn Gregory MD;  [facility]; Service: Colorectal    HERNIA REPAIR      KIDNEY STONE SURGERY      LUNG LOBECTOMY Right     UT COLONOSCOPY FLX DX W/COLLJ SPEC WHEN PFRMD N/A 7/10/2017    Procedure: COLONOSCOPY;  Surgeon: Willi Bernabe MD;  [facility]; Service: Gastroenterology    UT RECTAL TUMOR EXCISION, TRANSANAL ENDOSCOPIC MICROSURGICAL, FULL THICK N/A 2017    Procedure: TRANSANAL ENDOSCOPIC MICROSURGERY TEM;  Surgeon: Robyn Gregory MD;  [facility];  Service: Colorectal       Family History   Problem Relation Age of Onset    Arthritis Mother     Colon cancer Father     Heart attack Father     Diabetes type I Sister      I have reviewed and agree with the history as documented  Social History     Tobacco Use    Smoking status: Former Smoker     Packs/day: 1 00     Years: 50 00     Pack years: 50 00     Types: Cigarettes     Last attempt to quit: 2010     Years since quittin 2    Smokeless tobacco: Never Used    Tobacco comment: quit in    Substance Use Topics    Alcohol use: Yes     Comment: rarely    Drug use: No        Review of Systems   Constitutional: Positive for diaphoresis  HENT: Negative for congestion, dental problem, drooling and ear discharge  Eyes: Negative for pain, discharge and itching  Respiratory: Positive for cough and shortness of breath  Cardiovascular: Positive for chest pain  Negative for claudication  Gastrointestinal: Negative for abdominal pain  Endocrine: Negative for cold intolerance  Genitourinary: Negative for difficulty urinating, dysuria, enuresis and flank pain  Musculoskeletal: Negative for arthralgias and back pain  Skin: Negative for color change and pallor  Allergic/Immunologic: Negative for environmental allergies and food allergies  Neurological: Negative for dizziness and facial asymmetry  Hematological: Negative for adenopathy  Psychiatric/Behavioral: Negative for agitation, behavioral problems and confusion  All other systems reviewed and are negative  Physical Exam  Physical Exam   Constitutional: He appears well-developed and well-nourished  HENT:   Head: Normocephalic  Mouth/Throat: Oropharynx is clear and moist  No oropharyngeal exudate or posterior oropharyngeal edema  Eyes: Pupils are equal, round, and reactive to light  Neck: Tracheal deviation present  No thyromegaly present  Cardiovascular: Normal pulses  Tachycardia present  Exam reveals no friction rub  No murmur heard  Pulmonary/Chest: He has decreased breath sounds  He has wheezes  Abdominal: Soft  He exhibits no distension  There is no tenderness  There is no guarding  Musculoskeletal: He exhibits edema  +2 edema bilateral lower extremities   Neurological: He is alert  He displays normal reflexes  No cranial nerve deficit  He exhibits normal muscle tone  Coordination normal    Skin: Skin is warm  Capillary refill takes less than 2 seconds  No erythema  Psychiatric: He has a normal mood and affect  His behavior is normal    Vitals reviewed        Vital Signs  ED Triage Vitals [03/03/19 0956]   Temperature Pulse Respirations Blood Pressure SpO2   98 2 °F (36 8 °C) (!) 111 (!) 24 115/62 96 %      Temp Source Heart Rate Source Patient Position - Orthostatic VS BP Location FiO2 (%)   Oral Monitor Sitting Right arm --      Pain Score       No Pain           Vitals:    03/03/19 0956 03/03/19 1045 03/03/19 1145   BP: 115/62 118/53 104/56   Pulse: (!) 111 (!) 116 (!) 115   Patient Position - Orthostatic VS: Sitting Lying Lying       Visual Acuity      ED Medications  Medications   sodium chloride 0 9 % bolus 250 mL (0 mL Intravenous Stopped 3/3/19 1120)   albuterol inhalation solution 5 mg (5 mg Nebulization Given 3/3/19 1023)   ipratropium (ATROVENT) 0 02 % inhalation solution 0 5 mg (0 5 mg Nebulization Given 3/3/19 1023)   methylPREDNISolone sodium succinate (Solu-MEDROL) injection 125 mg (125 mg Intravenous Given 3/3/19 1023)   iodixanol (VISIPAQUE) 320 MG/ML injection 85 mL (85 mL Intravenous Given 3/3/19 1118)   albuterol inhalation solution 5 mg (5 mg Nebulization Given 3/3/19 1139)       Diagnostic Studies  Results Reviewed     Procedure Component Value Units Date/Time    CBC and differential [[id number]]  (Abnormal) Collected:  03/03/19 1013    Lab Status:  Final result Specimen:  Blood from Arm, Right Updated:  03/03/19 1118     WBC 3 17 Thousand/uL      RBC 3 15 Million/uL      Hemoglobin 10 5 g/dL      Hematocrit 32 5 %       fL      MCH 33 3 pg      MCHC 32 3 g/dL      RDW 15 3 %      MPV 9 2 fL      Platelets 78 Thousands/uL      nRBC 0 /100 WBCs     Lactic acid, plasma [[id number]]  (Normal) Collected:  03/03/19 1013    Lab Status:  Final result Specimen:  Blood from Arm, Right Updated:  03/03/19 1046     LACTIC ACID 1 3 mmol/L     Narrative:       Result may be elevated if tourniquet was used during collection      Troponin I [[id number]]  (Normal) Collected:  03/03/19 1013    Lab Status:  Final result Specimen:  Blood from Arm, Right Updated:  03/03/19 1045     Troponin I <0 02 ng/mL     Comprehensive metabolic panel [[id number]]  (Abnormal) Collected:  03/03/19 1013    Lab Status:  Final result Specimen:  Blood from Arm, Right Updated:  03/03/19 1042     Sodium 138 mmol/L      Potassium 3 6 mmol/L      Chloride 102 mmol/L      CO2 27 mmol/L      ANION GAP 9 mmol/L      BUN 14 mg/dL      Creatinine 1 22 mg/dL      Glucose 93 mg/dL      Calcium 9 0 mg/dL      AST 30 U/L      ALT 52 U/L      Alkaline Phosphatase 79 U/L      Total Protein 6 5 g/dL Albumin 2 6 g/dL      Total Bilirubin 1 41 mg/dL      eGFR 55 ml/min/1 73sq m     Narrative:       National Kidney Disease Education Program recommendations are as follows:  GFR calculation is accurate only with a steady state creatinine  Chronic Kidney disease less than 60 ml/min/1 73 sq  meters  Kidney failure less than 15 ml/min/1 73 sq  meters  Blood culture #2 [[id number]] Collected:  03/03/19 1034    Lab Status: In process Specimen:  Blood from Arm, Left Updated:  03/03/19 1040    Protime-INR [[id number]]  (Normal) Collected:  03/03/19 1013    Lab Status:  Final result Specimen:  Blood from Arm, Right Updated:  03/03/19 1038     Protime 13 1 seconds      INR 0 98    APTT [[id number]]  (Normal) Collected:  03/03/19 1013    Lab Status:  Final result Specimen:  Blood from Arm, Right Updated:  03/03/19 1038     PTT 34 seconds     Procalcitonin [[id number]] Collected:  03/03/19 1013    Lab Status: In process Specimen:  Blood from Arm, Right Updated:  03/03/19 1021    Blood culture #1 [[id number]] Collected:  03/03/19 1016    Lab Status: In process Specimen:  Blood from Arm, Right Updated:  03/03/19 1020                 CTA ED chest PE study   Final Result by Travon Quevedo MD (03/03 1127)      No evidence of pulmonary embolus  Posterior  Changes again present within the right hemithorax  Previously suggested left lower lobe nodule far less discrete currently most consistent with an infectious or inflammatory process              Workstation performed: [id number]                    Procedures  Procedures       Phone Contacts  ED Phone Contact    ED Course         HEART Risk Score      Most Recent Value   History  1 Filed at: 03/03/2019 1013   ECG  1 Filed at: 03/03/2019 1013   Age  1 Filed at: 03/03/2019 1013   Risk Factors  1 Filed at: 03/03/2019 1013   Troponin  0 Filed at: 03/03/2019 1013   Heart Score Risk Calculator   History  1 Filed at: 03/03/2019 1013   ECG  1 Filed at: 03/03/2019 1013   Age  1 Filed at: 03/03/2019 1013   Risk Factors  1 Filed at: 03/03/2019 1013   Troponin  0 Filed at: 03/03/2019 1013   HEART Score  4 Filed at: 03/03/2019 1013   HEART Score  4 Filed at: 03/03/2019 1013                            [facility]  Number of Diagnoses or Management Options  Diagnosis management comments: I Called and spoke with Critical Care : At this time will proceed with testing and re-evaluate    Differential diagnosis 1  Pneumonia 2  COPD exacerbation 3  CHF 4  Pulmonary embolism  I will perform CTA of chest check labs  Patient did not take his dexamethasone this morning, so I will give him 125 Solu-Medrol along with a breathing treatment    1223  Spoke to the patient's family at length  At this time patient would like to go home, I reviewed all the labs including low platelet count  He is scheduled sees PCP tomorrow and scheduled to see pulmonology on Tuesday     I did speak to about a regiment for using his albuterol and Atrovent  Also will start patient on prednisone 40 mg daily until he sees pulmonology         Amount and/or Complexity of Data Reviewed  Clinical lab tests: reviewed  Tests in the radiology section of CPT®: reviewed  Tests in the medicine section of CPT®: reviewed    Risk of Complications, Morbidity, and/or Mortality  Presenting problems: high  Diagnostic procedures: high  Management options: high        Disposition  Final diagnoses:   COPD [facility] exacerbation [facility] )   Peripheral edema   Multiple myeloma, remission status unspecified ([facility] )   Adenocarcinoma of right lung ([facility] )     Time reflects when diagnosis was documented in both MDM as applicable and the Disposition within this note     Time User Action Codes Description Comment    3/3/2019 12:24 PM Shazia Alba Add [J44 1] COPD with acute exacerbation ([facility] )     3/3/2019 12:26 PM Shazia Alba Add [R60 9] Peripheral edema     3/3/2019 12:26 PM Shazia Alba Add [C90 00] Multiple myeloma, remission status unspecified ([facility] )     3/3/2019 12:27 PM Chyna  Add [C34 91] Adenocarcinoma of right lung [facility])       ED Disposition     ED Disposition Condition Date/Time Comment    Discharge Stable Sun Mar 3, 2019 12:24  [address] discharge to home/self care  Follow-up Information    None         Patient's Medications   Discharge Prescriptions    ALBUTEROL (2 5 MG/3 ML) 0 083 % NEBULIZER SOLUTION    Take 1 vial (2 5 mg total) by nebulization every 6 (six) hours as needed for wheezing or shortness of breath       Start Date: 3/3/2019  End Date: --       Order Dose: 2 5 mg       Quantity: 75 mL    Refills: 0    IPRATROPIUM-ALBUTEROL (DUO-NEB) 0 5-2 5 MG/3 ML NEBULIZER SOLUTION    Take 1 vial (3 mL total) by nebulization 4 (four) times a day       Start Date: 3/3/2019  End Date: --       Order Dose: 3 mL       Quantity: 30 vial    Refills: 0    PREDNISONE 20 MG TABLET    Take 2 tablets (40 mg total) by mouth daily for 4 days       Start Date: 3/3/2019  End Date: 3/7/2019       Order Dose: 40 mg       Quantity: 8 tablet    Refills: 0     No discharge procedures on file      ED Provider  Electronically Signed by           Anthony Correa DO  03/03/19 3128

## 2019-03-03 NOTE — ED NOTES
Pt returned from CT to ED 30  Dr Ro Comes in with pt at this time        Anna Babcock, RN  03/03/19 9117

## 2019-03-04 LAB
ATRIAL RATE: 107 BPM
P AXIS: 54 DEGREES
PR INTERVAL: 188 MS
QRS AXIS: 66 DEGREES
QRSD INTERVAL: 112 MS
QT INTERVAL: 356 MS
QTC INTERVAL: 475 MS
T WAVE AXIS: 44 DEGREES
VENTRICULAR RATE: 107 BPM

## 2019-03-04 PROCEDURE — 93010 ELECTROCARDIOGRAM REPORT: CPT | Performed by: INTERNAL MEDICINE

## 2019-03-05 ENCOUNTER — OFFICE VISIT (OUTPATIENT)
Dept: PULMONOLOGY | Facility: CLINIC | Age: 84
End: 2019-03-05
Payer: MEDICARE

## 2019-03-05 VITALS
RESPIRATION RATE: 19 BRPM | SYSTOLIC BLOOD PRESSURE: 100 MMHG | OXYGEN SATURATION: 98 % | BODY MASS INDEX: 30.01 KG/M2 | HEIGHT: 67 IN | HEART RATE: 107 BPM | WEIGHT: 191.2 LBS | TEMPERATURE: 97 F | DIASTOLIC BLOOD PRESSURE: 62 MMHG

## 2019-03-05 DIAGNOSIS — R93.89 ABNORMAL CT OF THE CHEST: ICD-10-CM

## 2019-03-05 DIAGNOSIS — J44.9 CHRONIC OBSTRUCTIVE PULMONARY DISEASE, UNSPECIFIED COPD TYPE (HCC): Chronic | ICD-10-CM

## 2019-03-05 DIAGNOSIS — I35.0 AORTIC STENOSIS, MODERATE: ICD-10-CM

## 2019-03-05 DIAGNOSIS — J43.9 PULMONARY EMPHYSEMA, UNSPECIFIED EMPHYSEMA TYPE (HCC): Primary | Chronic | ICD-10-CM

## 2019-03-05 DIAGNOSIS — I26.99 OTHER ACUTE PULMONARY EMBOLISM WITHOUT ACUTE COR PULMONALE (HCC): ICD-10-CM

## 2019-03-05 PROCEDURE — 99215 OFFICE O/P EST HI 40 MIN: CPT | Performed by: INTERNAL MEDICINE

## 2019-03-05 RX ORDER — PREDNISONE 10 MG/1
10 TABLET ORAL SEE ADMIN INSTRUCTIONS
Qty: 21 TABLET | Refills: 0 | Status: SHIPPED | OUTPATIENT
Start: 2019-03-05 | End: 2019-03-21 | Stop reason: SDUPTHER

## 2019-03-05 RX ORDER — ALBUTEROL SULFATE 90 UG/1
2 AEROSOL, METERED RESPIRATORY (INHALATION) EVERY 4 HOURS PRN
Qty: 1 INHALER | Refills: 6 | Status: SHIPPED | OUTPATIENT
Start: 2019-03-05 | End: 2020-03-04

## 2019-03-05 RX ORDER — IPRATROPIUM BROMIDE AND ALBUTEROL SULFATE 2.5; .5 MG/3ML; MG/3ML
3 SOLUTION RESPIRATORY (INHALATION) 4 TIMES DAILY
COMMUNITY
End: 2019-03-05 | Stop reason: ALTCHOICE

## 2019-03-05 NOTE — ASSESSMENT & PLAN NOTE
· I have adjusted his nebulizer regimen  He is getting too much albuterol and has resultant tachycardia  He will take ipratropium nebulizers 4 times daily and reserve albuterol use for as needed  · I did discuss with him about possibly obtaining a portable battery operated nebulizer  · He has never been particularly bronchodilator responsive with other inhalers and he has tried multiple inhalers in the past   · Additionally, he has problems with crash" after steroid bursts  I have prolonged course of steroids over the next 2 weeks  I have recommended tapering dose as opposed to an abrupt cessation after 5 days

## 2019-03-05 NOTE — PROGRESS NOTES
Bubble above     Progress note - Pulmonary Medicine   Rip Tara Gillis 81 y o  male MRN: [id number]       Impression & Plan:     COPD (chronic obstructive pulmonary disease) ([facility] [address] )  · I have adjusted his nebulizer regimen  He is getting too much albuterol and has resultant tachycardia  He will take ipratropium nebulizers 4 times daily and reserve albuterol use for as needed  · I did discuss with him about possibly obtaining a portable battery operated nebulizer  · He has never been particularly bronchodilator responsive with other inhalers and he has tried multiple inhalers in the past   · Additionally, he has problems with crash" after steroid bursts  I have prolonged course of steroids over the next 2 weeks  I have recommended tapering dose as opposed to an abrupt cessation after 5 days  Abnormal CT of the chest  · I have had further discussions with him about CT imaging of the chest  · I would ultimately like him to avoid the emergency department if possible and I have asked him to call our office with any worsening symptoms  · He has had multiple CT scans some of which I believe are unnecessary  Unfortunately, his recent presentation to the emergency department was over a week and that I was not on call  · He does not require follow-up CT imaging for at least 6 months from my perspective  Ailin Montanez History of pulm embolism  · Stable on Xarelto  · No indication of recurrent pulmonary embolism or primary anticoagulation failure  Aortic stenosis, moderate  · Moderate by echocardiogram with an aortic valve area 1 1 centimeter squared  · I do think that this does contribute to his respiratory symptoms of shortness of breath with exertion although is not the primary factor  · Follow-up was recommended in 1 year from July 2018 by Dr Haider Headley will follow up with me in 1 month's time    This was his previously scheduled appointment    ______________________________________________________________________    HPI:    Sol Umana presents today for follow-up of a recent emergency department visit  He had progressive shortness of breath and ultimately went to the ER  This was on a Sunday  He did notify them that I have recommended that he not have another CT scan of the chest   He is already anticoagulated for pulmonary embolism  The likelihood of repeat pulmonary embolism is low  He has also had multiple recent scans which have not showed significant difference  Despite this recommendation he did receive hard time from the emergency department providers who reluctantly did call the Pulmonary provider available who permitted a repeat scan  This scan, as expected, did not show pulmonary embolism and did not show any new pulmonary finding  It actually showed that the prior nodule finding was slightly improved from the prior  He was placed on treatment for COPD exacerbation  They gave him DuoNeb been told him to take it 4 times daily  They gave him a 5 day course of steroids at a high dose with no taper  Review of Systems:  Review of Systems   Constitutional: Negative for activity change, chills, fatigue and fever  HENT: Negative  Negative for postnasal drip and sore throat  Gastrointestinal: Negative  Musculoskeletal: Negative for arthralgias and gait problem  Skin: Negative for rash  Allergic/Immunologic: Negative for environmental allergies  Neurological: Negative for syncope and headaches  Psychiatric/Behavioral: The patient is not nervous/anxious  All other systems reviewed and are negative          Past medical history, surgical history, and family history were reviewed and updated as appropriate    Social history updates:  Social History     Tobacco Use   Smoking Status Former Smoker    Packs/day: 1 00    Years: 50 00    Pack years: 50 00    Types: Cigarettes    Last attempt to quit: 2010    Years since quittin 2   Smokeless Tobacco Never Used   Tobacco Comment    quit in        PhysicalExamination:  Vitals:   /62 (BP Location: Left arm, Patient Position: Sitting, Cuff Size: Standard)   Pulse (!) 107   Temp (!) 97 °F (36 1 °C) (Tympanic)   Resp 19   Ht 5' 7" (1 702 m)   Wt 86 7 kg (191 lb 3 2 oz)   SpO2 98%   BMI 29 95 kg/m²     Gen:  He has become somewhat cushingoid  Clears his throat frequently and has some rattling in the throat  HEENT: PERRL  Oropharynx is clear without any erythema or exudate  Neck: Supple  There is no JVD, lymphadenopathy or thyromegaly appreciated  Trachea is midline  Chest: Symmetric chest wall excursion  Lung fields with coarse breath sounds  No wheezes, rales or rhonchi  Normal resonance to percussion  Cardiac: Regular rate and rhythm  Murmur of aortic stenosis  Abdomen: Soft and nontender  Benign  Extremities:  3+ ankle and pretibial edema  Neurologic: No focal deficits  Skin: No appreciable rashes  Diagnostic Data:  Labs: I personally reviewed the most recent laboratory data pertinent to today's visit    Lab Results   Component Value Date    WBC 3 17 (L) 2019    HGB 10 5 (L) 2019    HCT 32 5 (L) 2019     (H) 2019    PLT 78 (L) 2019     Lab Results   Component Value Date    SODIUM 138 2019    K 3 6 2019    CO2 27 2019     2019    BUN 14 2019    CALCIUM 9 0 2019     No results found for: IGE  Lab Results   Component Value Date    ALT 52 2019    AST 30 2019    ALKPHOS 79 2019    BILITOT 0 63 2016       Imaging:  I personally reviewed the images on the [facility] system pertinent to today's visit  CT scan of the chest just performed in the emergency department not show evidence of pulmonary embolism  There are stable postradiation changes  No new nodules or masses    The prior nodule of concern from the study approximately a month before has reduced in size suggestive of infectious/inflammatory etiology    Steven Thomas MD

## 2019-03-05 NOTE — LETTER
March 5, 2019     [place], [place]  [address] [facility] [address] [facility]    Patient: Valeriy Lindsay   YOB: 1935   Date of Visit: 3/5/2019       Dear Dr Driscoll Barrier: Thank you for referring Emanuel Murphy to me for evaluation  Below are my notes for this consultation  If you have questions, please do not hesitate to call me  I look forward to following your patient along with you  Sincerely,        Cathy Jeffries MD        CC: MD Cathy Austin MD  3/5/2019  7:15 PM  Sign at close encounter  Bubble above     Progress note - Pulmonary Medicine   Dianelys Hesterati 81 y o  male MRN: [id number]       Impression & Plan:     COPD (chronic obstructive pulmonary disease) ([facility] 75 )  · I have adjusted his nebulizer regimen  He is getting too much albuterol and has resultant tachycardia  He will take ipratropium nebulizers 4 times daily and reserve albuterol use for as needed  · I did discuss with him about possibly obtaining a portable battery operated nebulizer  · He has never been particularly bronchodilator responsive with other inhalers and he has tried multiple inhalers in the past   · Additionally, he has problems with crash" after steroid bursts  I have prolonged course of steroids over the next 2 weeks  I have recommended tapering dose as opposed to an abrupt cessation after 5 days  Abnormal CT of the chest  · I have had further discussions with him about CT imaging of the chest  · I would ultimately like him to avoid the emergency department if possible and I have asked him to call our office with any worsening symptoms  · He has had multiple CT scans some of which I believe are unnecessary  Unfortunately, his recent presentation to the emergency department was over a week and that I was not on call  · He does not require follow-up CT imaging for at least 6 months from my perspective  Kamari Carrasco History of pulm embolism  · Stable on Xarelto    · No indication of recurrent pulmonary embolism or primary anticoagulation failure  Aortic stenosis, moderate  · Moderate by echocardiogram with an aortic valve area 1 1 centimeter squared  · I do think that this does contribute to his respiratory symptoms of shortness of breath with exertion although is not the primary factor  · Follow-up was recommended in 1 year from July 2018 by Dr Herlinda Leggett will follow up with me in 1 month's time  This was his previously scheduled appointment    ______________________________________________________________________    HPI:    Barber Mcnulty presents today for follow-up of a recent emergency department visit  He had progressive shortness of breath and ultimately went to the ER  This was on a Sunday  He did notify them that I have recommended that he not have another CT scan of the chest   He is already anticoagulated for pulmonary embolism  The likelihood of repeat pulmonary embolism is low  He has also had multiple recent scans which have not showed significant difference  Despite this recommendation he did receive hard time from the emergency department providers who reluctantly did call the Pulmonary provider available who permitted a repeat scan  This scan, as expected, did not show pulmonary embolism and did not show any new pulmonary finding  It actually showed that the prior nodule finding was slightly improved from the prior  He was placed on treatment for COPD exacerbation  They gave him DuoNeb been told him to take it 4 times daily  They gave him a 5 day course of steroids at a high dose with no taper  Review of Systems:  Review of Systems   Constitutional: Negative for activity change, chills, fatigue and fever  HENT: Negative  Negative for postnasal drip and sore throat  Gastrointestinal: Negative  Musculoskeletal: Negative for arthralgias and gait problem  Skin: Negative for rash  Allergic/Immunologic: Negative for environmental allergies  Neurological: Negative for syncope and headaches  Psychiatric/Behavioral: The patient is not nervous/anxious  All other systems reviewed and are negative  Past medical history, surgical history, and family history were reviewed and updated as appropriate    Social history updates:  Social History     Tobacco Use   Smoking Status Former Smoker    Packs/day: 1 00    Years: 50 00    Pack years: 50 00    Types: Cigarettes    Last attempt to quit: 2010    Years since quittin 2   Smokeless Tobacco Never Used   Tobacco Comment    quit in        PhysicalExamination:  Vitals:   /62 (BP Location: Left arm, Patient Position: Sitting, Cuff Size: Standard)   Pulse (!) 107   Temp (!) 97 °F (36 1 °C) (Tympanic)   Resp 19   Ht 5' 7" (1 702 m)   Wt 86 7 kg (191 lb 3 2 oz)   SpO2 98%   BMI 29 95 kg/m²      Gen:  He has become somewhat cushingoid  Clears his throat frequently and has some rattling in the throat  HEENT: PERRL  Oropharynx is clear without any erythema or exudate  Neck: Supple  There is no JVD, lymphadenopathy or thyromegaly appreciated  Trachea is midline  Chest: Symmetric chest wall excursion  Lung fields with coarse breath sounds  No wheezes, rales or rhonchi  Normal resonance to percussion  Cardiac: Regular rate and rhythm  Murmur of aortic stenosis  Abdomen: Soft and nontender  Benign  Extremities:  3+ ankle and pretibial edema  Neurologic: No focal deficits  Skin: No appreciable rashes  Diagnostic Data:  Labs:   I personally reviewed the most recent laboratory data pertinent to today's visit    Lab Results   Component Value Date    WBC 3 17 (L) 2019    HGB 10 5 (L) 2019    HCT 32 5 (L) 2019     (H) 2019    PLT 78 (L) 2019     Lab Results   Component Value Date    SODIUM 138 2019    K 3 6 2019    CO2 27 2019     2019    BUN 14 2019    CALCIUM 9 0 2019     No results found for: IGE  Lab Results   Component Value Date    ALT 52 03/03/2019    AST 30 03/03/2019    ALKPHOS 79 03/03/2019    BILITOT 0 63 01/04/2016       Imaging:  I personally reviewed the images on the [facility] system pertinent to today's visit  CT scan of the chest just performed in the emergency department not show evidence of pulmonary embolism  There are stable postradiation changes  No new nodules or masses    The prior nodule of concern from the study approximately a month before has reduced in size suggestive of infectious/inflammatory etiology    Wilver Rosenthal MD

## 2019-03-05 NOTE — ASSESSMENT & PLAN NOTE
· I have had further discussions with him about CT imaging of the chest  · I would ultimately like him to avoid the emergency department if possible and I have asked him to call our office with any worsening symptoms  · He has had multiple CT scans some of which I believe are unnecessary  Unfortunately, his recent presentation to the emergency department was over a week and that I was not on call  · He does not require follow-up CT imaging for at least 6 months from my perspective  Giovanni Tristan

## 2019-03-05 NOTE — ASSESSMENT & PLAN NOTE
· Stable on Xarelto  · No indication of recurrent pulmonary embolism or primary anticoagulation failure

## 2019-03-06 NOTE — ASSESSMENT & PLAN NOTE
· Moderate by echocardiogram with an aortic valve area 1 1 centimeter squared  · I do think that this does contribute to his respiratory symptoms of shortness of breath with exertion although is not the primary factor    · Follow-up was recommended in 1 year from July 2018 by Dr Luiz Melgar

## 2019-03-08 LAB
BACTERIA BLD CULT: NORMAL
BACTERIA BLD CULT: NORMAL

## 2019-03-13 DIAGNOSIS — C90.00 MULTIPLE MYELOMA, REMISSION STATUS UNSPECIFIED (HCC): ICD-10-CM

## 2019-03-13 RX ORDER — LENALIDOMIDE 5 MG/1
CAPSULE ORAL
Qty: 21 CAPSULE | Refills: 0 | Status: SHIPPED | OUTPATIENT
Start: 2019-03-13 | End: 2019-05-06 | Stop reason: SDUPTHER

## 2019-03-18 ENCOUNTER — APPOINTMENT (OUTPATIENT)
Dept: LAB | Facility: MEDICAL CENTER | Age: 84
End: 2019-03-18
Payer: MEDICARE

## 2019-03-18 DIAGNOSIS — C90.00 MULTIPLE MYELOMA, REMISSION STATUS UNSPECIFIED (HCC): ICD-10-CM

## 2019-03-18 LAB
ALBUMIN SERPL BCP-MCNC: 2.8 G/DL (ref 3.5–5)
ALP SERPL-CCNC: 79 U/L (ref 46–116)
ALT SERPL W P-5'-P-CCNC: 29 U/L (ref 12–78)
ANION GAP SERPL CALCULATED.3IONS-SCNC: 6 MMOL/L (ref 4–13)
AST SERPL W P-5'-P-CCNC: 13 U/L (ref 5–45)
BASOPHILS # BLD AUTO: 0.05 THOUSANDS/ΜL (ref 0–0.1)
BASOPHILS NFR BLD AUTO: 1 % (ref 0–1)
BILIRUB SERPL-MCNC: 0.73 MG/DL (ref 0.2–1)
BUN SERPL-MCNC: 13 MG/DL (ref 5–25)
CALCIUM SERPL-MCNC: 9.2 MG/DL (ref 8.3–10.1)
CHLORIDE SERPL-SCNC: 105 MMOL/L (ref 100–108)
CO2 SERPL-SCNC: 27 MMOL/L (ref 21–32)
CREAT SERPL-MCNC: 1 MG/DL (ref 0.6–1.3)
EOSINOPHIL # BLD AUTO: 0.07 THOUSAND/ΜL (ref 0–0.61)
EOSINOPHIL NFR BLD AUTO: 2 % (ref 0–6)
ERYTHROCYTE [DISTWIDTH] IN BLOOD BY AUTOMATED COUNT: 15.1 % (ref 11.6–15.1)
GFR SERPL CREATININE-BSD FRML MDRD: 70 ML/MIN/1.73SQ M
GLUCOSE P FAST SERPL-MCNC: 86 MG/DL (ref 65–99)
HCT VFR BLD AUTO: 33.9 % (ref 36.5–49.3)
HGB BLD-MCNC: 10.7 G/DL (ref 12–17)
IGA SERPL-MCNC: 82 MG/DL (ref 70–400)
IGG SERPL-MCNC: 677 MG/DL (ref 700–1600)
IGM SERPL-MCNC: 78 MG/DL (ref 40–230)
IMM GRANULOCYTES # BLD AUTO: 0.08 THOUSAND/UL (ref 0–0.2)
IMM GRANULOCYTES NFR BLD AUTO: 2 % (ref 0–2)
LYMPHOCYTES # BLD AUTO: 0.54 THOUSANDS/ΜL (ref 0.6–4.47)
LYMPHOCYTES NFR BLD AUTO: 12 % (ref 14–44)
MCH RBC QN AUTO: 32.8 PG (ref 26.8–34.3)
MCHC RBC AUTO-ENTMCNC: 31.6 G/DL (ref 31.4–37.4)
MCV RBC AUTO: 104 FL (ref 82–98)
MONOCYTES # BLD AUTO: 0.45 THOUSAND/ΜL (ref 0.17–1.22)
MONOCYTES NFR BLD AUTO: 10 % (ref 4–12)
NEUTROPHILS # BLD AUTO: 3.37 THOUSANDS/ΜL (ref 1.85–7.62)
NEUTS SEG NFR BLD AUTO: 73 % (ref 43–75)
NRBC BLD AUTO-RTO: 0 /100 WBCS
PLATELET # BLD AUTO: 142 THOUSANDS/UL (ref 149–390)
PMV BLD AUTO: 9.8 FL (ref 8.9–12.7)
POTASSIUM SERPL-SCNC: 4 MMOL/L (ref 3.5–5.3)
PROT SERPL-MCNC: 6.3 G/DL (ref 6.4–8.2)
RBC # BLD AUTO: 3.26 MILLION/UL (ref 3.88–5.62)
SODIUM SERPL-SCNC: 138 MMOL/L (ref 136–145)
WBC # BLD AUTO: 4.56 THOUSAND/UL (ref 4.31–10.16)

## 2019-03-18 PROCEDURE — 82784 ASSAY IGA/IGD/IGG/IGM EACH: CPT

## 2019-03-18 PROCEDURE — 85025 COMPLETE CBC W/AUTO DIFF WBC: CPT

## 2019-03-18 PROCEDURE — 36415 COLL VENOUS BLD VENIPUNCTURE: CPT

## 2019-03-18 PROCEDURE — 80053 COMPREHEN METABOLIC PANEL: CPT

## 2019-03-18 PROCEDURE — 83883 ASSAY NEPHELOMETRY NOT SPEC: CPT

## 2019-03-19 LAB
KAPPA LC FREE SER-MCNC: 10.1 MG/L (ref 3.3–19.4)
KAPPA LC FREE/LAMBDA FREE SER: 0.15 {RATIO} (ref 0.26–1.65)
LAMBDA LC FREE SERPL-MCNC: 68.1 MG/L (ref 5.7–26.3)

## 2019-03-21 ENCOUNTER — TELEPHONE (OUTPATIENT)
Dept: PULMONOLOGY | Facility: CLINIC | Age: 84
End: 2019-03-21

## 2019-03-21 ENCOUNTER — DOCUMENTATION (OUTPATIENT)
Dept: HEMATOLOGY ONCOLOGY | Facility: CLINIC | Age: 84
End: 2019-03-21

## 2019-03-21 ENCOUNTER — OFFICE VISIT (OUTPATIENT)
Dept: HEMATOLOGY ONCOLOGY | Facility: CLINIC | Age: 84
End: 2019-03-21
Payer: MEDICARE

## 2019-03-21 VITALS
SYSTOLIC BLOOD PRESSURE: 108 MMHG | OXYGEN SATURATION: 90 % | BODY MASS INDEX: 29.98 KG/M2 | RESPIRATION RATE: 20 BRPM | WEIGHT: 191 LBS | HEART RATE: 123 BPM | DIASTOLIC BLOOD PRESSURE: 62 MMHG | HEIGHT: 67 IN

## 2019-03-21 DIAGNOSIS — D80.9 IMMUNOGLOBULIN DEFICIENCY (HCC): ICD-10-CM

## 2019-03-21 DIAGNOSIS — J43.9 PULMONARY EMPHYSEMA, UNSPECIFIED EMPHYSEMA TYPE (HCC): Chronic | ICD-10-CM

## 2019-03-21 DIAGNOSIS — C90.00 MULTIPLE MYELOMA, REMISSION STATUS UNSPECIFIED (HCC): ICD-10-CM

## 2019-03-21 DIAGNOSIS — C34.91 ADENOCARCINOMA OF RIGHT LUNG (HCC): Primary | Chronic | ICD-10-CM

## 2019-03-21 PROCEDURE — 99214 OFFICE O/P EST MOD 30 MIN: CPT | Performed by: INTERNAL MEDICINE

## 2019-03-21 RX ORDER — PREDNISONE 1 MG/1
5 TABLET ORAL DAILY
Qty: 90 TABLET | Refills: 3 | Status: SHIPPED | OUTPATIENT
Start: 2019-03-21 | End: 2020-02-18 | Stop reason: SDUPTHER

## 2019-03-21 NOTE — PROGRESS NOTES
Hematology Outpatient Follow - Up Note  Gisell Marie 81 y o  male MRN: @ Encounter: [id number]        Date:  3/21/2019        Assessment/ Plan:  Heavily pretreated 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 chain multiple myeloma stage III with multiple compression fractures as mentioned in the history of present illness, currently on melphalan 12 milligram p o  Daily for 4 days every 6 weeks, lenalidomide 5 milligram p o  Daily 3 weeks on 3 weeks off, prednisone 5 milligram p o  Daily to prevent exacerbation of COPD    Exacerbation of COPD with your sputum, I took the liberty and the patient at this time I sent, he will follow up with Pulmonary service    Subsegmental left lower pulmonary embolism with epistaxis on apixaban 2 5 milligram p o  B i d     Follow-up in 6 weeks with CBC, CMP, free light chain, IgG          HPI: #1 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, multiple compression fractures, status post kyphoplasty to L1, L2, L5, treated with radiation therapy to the left intra trochanteric area, received melphalan/Velcade/prednisone in October 2010 for 4 cycles with excellent response and normalization of lambda light chain, had been on Velcade maintenance till January 2013 when lambda light chain went up to 111,treated with Velcade 1 3 mg meter square weekly 3 weeks on one week off, Decadron 20 mg by mouth weekly, lambda light chain down to 30 then creeping up to 110, on Revlimid 15 mg by mouth every other day  Lambda light chain normal at 28 however he reported skin rash on the upper chest area we stopped the Revlimid for 2 month  Then reinitiated Revlimid 5 mg by mouth daily     #2  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 stage I, with progression by scan, status post resection of the right upper lobe with few foci in the same lobe consistent with stage IIIA done in January 2015 adenocarcinoma, well differentiated   Status post Alimta/carboplatin x4 cycles finished in May 2015 and radiation therapy     #3  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     #4  Rectal adenocarcinoma status post resection stage I in November 2017     #5  Pulmonary embolism involving the subsegmental left lower lobe in December 2018         Interval History:  He has exacerbation of COPD, evaluation by Pulmonary service decided to start the patient on maintenance prednisone 5 milligram p o  Daily       Previous Treatment:         Test Results:    Imaging: Cta Ed Chest Pe Study    Result Date: 3/3/2019  Narrative: CTA - CHEST WITH IV CONTRAST - PULMONARY ANGIOGRAM INDICATION:   cancer patient - chest pain , wheeze hx DVT  COMPARISON: 12/21/2018, 1/14/2019  TECHNIQUE: CTA examination of the chest was performed using angiographic technique according to a protocol specifically tailored to evaluate for pulmonary embolism  Axial, sagittal, and coronal 2D reformatted images were created from the source data and  submitted for interpretation  In addition, coronal 3D MIP postprocessing was performed on the acquisition scanner  Radiation dose length product (DLP) for this visit:  355 mGy-cm   This examination, like all CT scans performed in the [facility], was performed utilizing techniques to minimize radiation dose exposure, including the use of iterative reconstruction and automated exposure control  IV Contrast:  85 mL of iodixanol (VISIPAQUE)  FINDINGS: PULMONARY ARTERIAL TREE:  No pulmonary embolus is seen  LUNGS:  Patient is again status post right upper lobectomy  Postradiation scarring is again seen at the right lung apex  Stable emphysema is noted  Previously noted left lower lobe nodule is much less discrete on the current exam most consistent with an infectious or inflammatory etiology  PLEURA:  Unremarkable  HEART/GREAT VESSELS:  Unremarkable for patient's age  MEDIASTINUM AND GLEN:  Unremarkable  CHEST WALL AND LOWER NECK:   Right chest wall port catheter is again present  VISUALIZED STRUCTURES IN THE UPPER ABDOMEN:  Patient is status post cholecystectomy  OSSEOUS STRUCTURES:  No acute fracture or destructive osseous lesion  Impression: No evidence of pulmonary embolus  Posterior  Changes again present within the right hemithorax  Previously suggested left lower lobe nodule far less discrete currently most consistent with an infectious or inflammatory process  Workstation performed: [id number]       Labs:   Lab Results   Component Value Date    WBC 4 56 03/18/2019    HGB 10 7 (L) 03/18/2019    HCT 33 9 (L) 03/18/2019     (H) 03/18/2019     (L) 03/18/2019     Lab Results   Component Value Date     01/04/2016    K 4 0 03/18/2019     03/18/2019    CO2 27 03/18/2019    ANIONGAP 7 01/04/2016    BUN 13 03/18/2019    CREATININE 1 00 03/18/2019    GLUCOSE 88 01/04/2016    GLUF 86 03/18/2019    CALCIUM 9 2 03/18/2019    AST 13 03/18/2019    ALT 29 03/18/2019    ALKPHOS 79 03/18/2019    PROT 6 8 01/04/2016    BILITOT 0 63 01/04/2016    EGFR 70 03/18/2019    On 03/18/2019 lambda light chain 68, kappa light chain 10, IgG 677, IgA 82, IgM 78    No results found for: IRON, TIBC, FERRITIN    No results found for: [id number]      ROS:   Review of Systems   Constitutional: Negative for activity change, appetite change, chills, diaphoresis, fatigue, fever and unexpected weight change  HENT: Negative for congestion, dental problem, facial swelling, hearing loss, mouth sores, nosebleeds, postnasal drip, rhinorrhea, sore throat, trouble swallowing and voice change  Eyes: Negative for photophobia, pain, discharge, redness, itching and visual disturbance  Respiratory: Positive for cough and shortness of breath  Negative for choking, chest tightness and wheezing  Cardiovascular: Positive for leg swelling  Negative for chest pain and palpitations     Gastrointestinal: Negative for abdominal distention, abdominal pain, anal bleeding, blood in stool, constipation, diarrhea, nausea, rectal pain and vomiting  Endocrine: Negative for cold intolerance and heat intolerance  Genitourinary: Negative for decreased urine volume, difficulty urinating, dysuria, flank pain, frequency, hematuria and urgency  Musculoskeletal: Negative for arthralgias, back pain, gait problem, joint swelling, myalgias, neck pain and neck stiffness  Skin: Negative for color change, pallor, rash and wound  Allergic/Immunologic: Negative for immunocompromised state  Neurological: Negative for dizziness, tremors, seizures, syncope, facial asymmetry, speech difficulty, weakness, light-headedness, numbness and headaches  Hematological: Negative for adenopathy  Does not bruise/bleed easily  Psychiatric/Behavioral: Negative for agitation, confusion, decreased concentration, dysphoric mood and sleep disturbance  The patient is not nervous/anxious  All other systems reviewed and are negative  Current Medications: Reviewed  Allergies: Reviewed  PMH/FH/SH:  Reviewed      Physical Exam:    Body surface area is 1 98 meters squared  Wt Readings from Last 3 Encounters:   03/21/19 86 6 kg (191 lb)   03/05/19 86 7 kg (191 lb 3 2 oz)   03/03/19 86 2 kg (190 lb 0 6 oz)        Temp Readings from Last 3 Encounters:   03/05/19 (!) 97 °F (36 1 °C) (Tympanic)   03/03/19 98 2 °F (36 8 °C) (Oral)   02/21/19 (!) 97 4 °F (36 3 °C) (Tympanic)        BP Readings from Last 3 Encounters:   03/21/19 108/62   03/05/19 100/62   03/03/19 98/58         Pulse Readings from Last 3 Encounters:   03/21/19 (!) 123   03/05/19 (!) 107   03/03/19 (!) 120        Physical Exam   Constitutional: He is oriented to person, place, and time  He appears well-developed and well-nourished  No distress  HENT:   Head: Normocephalic and atraumatic  Mouth/Throat: Oropharynx is clear and moist  No oropharyngeal exudate  Eyes: Pupils are equal, round, and reactive to light   Conjunctivae and EOM are normal    Neck: Normal range of motion  Neck supple  No tracheal deviation present  No thyromegaly present  Cardiovascular: Normal rate and regular rhythm  Exam reveals no gallop and no friction rub  No murmur heard  Pulmonary/Chest: Effort normal  No respiratory distress  He has wheezes  He has no rales  He exhibits no tenderness  Abdominal: Soft  Bowel sounds are normal  He exhibits no distension and no mass  There is no tenderness  There is no rebound and no guarding  Musculoskeletal: Normal range of motion  He exhibits edema (Plus three edema bilaterally)  Lymphadenopathy:     He has no cervical adenopathy  Neurological: He is alert and oriented to person, place, and time  Skin: Skin is warm and dry  No rash noted  He is not diaphoretic  No erythema  No pallor  Psychiatric: He has a normal mood and affect  His behavior is normal  Judgment and thought content normal    Vitals reviewed  Goals and Barriers:  Current Goal: Minimize effects of disease  Barriers: None  Patient's Capacity to Self Care:  Patient is able to self care      Code Status: [unfilled]

## 2019-03-21 NOTE — PROGRESS NOTES
Patient's wife called concerned that the patient is more fatigued since this weekend with a cough  He ended his steroid taper from pulmonary on 3/19  He is not experiencing a fever, O2 is 92-94% and HR low 100s  Per the last note from Dr Xiomara Betancur he wants the patient to stay away from the ER  Notified the RN with pulmonary and she stated she will reach out to the patient's wife  I instructed her to go to the ER if she feels he is getting worse or his oxygen status changes  Patient's wife verbalized understanding  He does have an appt with Dr Forest Nielsen this afternoon  Will keep this appt for now

## 2019-03-21 NOTE — TELEPHONE ENCOUNTER
Received message from oncology nurse that wife had called regarding Zaina Monae and having a COPD exacerbation  I called and spoke to wife  She says he has been SOB on exertion with his pulse ox at 94%  He is normally between 97-98%  He is coughing up clear sputum and is wheezing  He has chills  Denies fevers or night sweats  He is taking Mucinex and using his nebulizer  Please advise

## 2019-03-21 NOTE — TELEPHONE ENCOUNTER
Telephone note- Pulmonary Medicine   Sully Edson Gillis 81 y o  male MRN: [id number]    Reason for call:  Andrew Barragan just completed prednisone on 03/19  Having increasing wheezing, cough, and worsened shortness of breath  Feels chills but no documented temperature  Pertinent details:  He seems to have recurrent issues whenever he comes off of prednisone  I have recommended that we place him on a low-dose at 5 mg daily and keep this as maintenance therapy  I was trying to avoid chronic long-term prednisone use but given multiple recurrences and failure after coming off of prednisone tapers, it is reasonable to try low-dose prolonged use  He is otherwise using his nebulizer medication as prescribed  He has a rescue inhaler as well    He will be seeing Dr Leodan Paris later in the day today    Jesus Berry MD

## 2019-03-21 NOTE — LETTER
March 21, 2019     [address] [facility]    Patient: Rajan Delgado   YOB: 1935   Date of Visit: 3/21/2019       Dear Dr Julian Mark:    Thank you for referring Javonvictor manuel Reed to me for evaluation  Below are my notes for this consultation  If you have questions, please do not hesitate to call me  I look forward to following your patient along with you  Sincerely,        Emilie Hand MD        CC: No Recipients  mEilie Hand MD  3/21/2019  3:12 PM  Sign at close encounter  Hematology Outpatient Follow - Up Note  Rajan Delgado 81 y o  male MRN: @ Encounter: [id number]        Date:  3/21/2019        Assessment/ Plan:  Heavily pretreated 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 chain multiple myeloma stage III with multiple compression fractures as mentioned in the history of present illness, currently on melphalan 12 milligram p o  Daily for 4 days every 6 weeks, lenalidomide 5 milligram p o  Daily 3 weeks on 3 weeks off, prednisone 5 milligram p o   Daily to prevent exacerbation of COPD    Exacerbation of COPD with your sputum, I took the liberty and the patient at this time I sent, he will follow up with Pulmonary service    Subsegmental left lower pulmonary embolism with epistaxis on apixaban 2 5 milligram p o  B i d     Follow-up in 6 weeks with CBC, CMP, free light chain, IgG          HPI: #1 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, multiple compression fractures, status post kyphoplasty to L1, L2, L5, treated with radiation therapy to the left intra trochanteric area, received melphalan/Velcade/prednisone in October 2010 for 4 cycles with excellent response and normalization of lambda light chain, had been on Velcade maintenance till January 2013 when lambda light chain went up to 111,treated with Velcade 1 3 mg meter square weekly 3 weeks on one week off, Decadron 20 mg by mouth weekly, lambda light chain down to 30 then creeping up to 110, on Revlimid 15 mg by mouth every other day  Lambda light chain normal at 28 however he reported skin rash on the upper chest area we stopped the Revlimid for 2 month  Then reinitiated Revlimid 5 mg by mouth daily     #2  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 stage I, with progression by scan, status post resection of the right upper lobe with few foci in the same lobe consistent with stage IIIA done in January 2015 adenocarcinoma, well differentiated  Status post Alimta/carboplatin x4 cycles finished in May 2015 and radiation therapy     #3  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     #4  Rectal adenocarcinoma status post resection stage I in November 2017     #5  Pulmonary embolism involving the subsegmental left lower lobe in December 2018         Interval History:  He has exacerbation of COPD, evaluation by Pulmonary service decided to start the patient on maintenance prednisone 5 milligram p o  Daily       Previous Treatment:         Test Results:    Imaging: Cta Ed Chest Pe Study    Result Date: 3/3/2019  Narrative: CTA - CHEST WITH IV CONTRAST - PULMONARY ANGIOGRAM INDICATION:   cancer patient - chest pain , wheeze hx DVT  COMPARISON: 12/21/2018, 1/14/2019  TECHNIQUE: CTA examination of the chest was performed using angiographic technique according to a protocol specifically tailored to evaluate for pulmonary embolism  Axial, sagittal, and coronal 2D reformatted images were created from the source data and  submitted for interpretation  In addition, coronal 3D MIP postprocessing was performed on the acquisition scanner  Radiation dose length product (DLP) for this visit:  355 mGy-cm   This examination, like all CT scans performed in the [facility], was performed utilizing techniques to minimize radiation dose exposure, including the use of iterative reconstruction and automated exposure control   IV Contrast:  85 mL of iodixanol (VISIPAQUE)  FINDINGS: PULMONARY ARTERIAL TREE:  No pulmonary embolus is seen  LUNGS:  Patient is again status post right upper lobectomy  Postradiation scarring is again seen at the right lung apex  Stable emphysema is noted  Previously noted left lower lobe nodule is much less discrete on the current exam most consistent with an infectious or inflammatory etiology  PLEURA:  Unremarkable  HEART/GREAT VESSELS:  Unremarkable for patient's age  MEDIASTINUM AND GLEN:  Unremarkable  CHEST WALL AND LOWER NECK:   Right chest wall port catheter is again present  VISUALIZED STRUCTURES IN THE UPPER ABDOMEN:  Patient is status post cholecystectomy  OSSEOUS STRUCTURES:  No acute fracture or destructive osseous lesion  Impression: No evidence of pulmonary embolus  Posterior  Changes again present within the right hemithorax  Previously suggested left lower lobe nodule far less discrete currently most consistent with an infectious or inflammatory process  Workstation performed: [id number]       Labs:   Lab Results   Component Value Date    WBC 4 56 03/18/2019    HGB 10 7 (L) 03/18/2019    HCT 33 9 (L) 03/18/2019     (H) 03/18/2019     (L) 03/18/2019     Lab Results   Component Value Date     01/04/2016    K 4 0 03/18/2019     03/18/2019    CO2 27 03/18/2019    ANIONGAP 7 01/04/2016    BUN 13 03/18/2019    CREATININE 1 00 03/18/2019    GLUCOSE 88 01/04/2016    GLUF 86 03/18/2019    CALCIUM 9 2 03/18/2019    AST 13 03/18/2019    ALT 29 03/18/2019    ALKPHOS 79 03/18/2019    PROT 6 8 01/04/2016    BILITOT 0 63 01/04/2016    EGFR 70 03/18/2019    On 03/18/2019 lambda light chain 68, kappa light chain 10, IgG 677, IgA 82, IgM 78    No results found for: IRON, TIBC, FERRITIN    No results found for: [id number]      ROS:   Review of Systems   Constitutional: Negative for activity change, appetite change, chills, diaphoresis, fatigue, fever and unexpected weight change     HENT: Negative for congestion, dental problem, facial swelling, hearing loss, mouth sores, nosebleeds, postnasal drip, rhinorrhea, sore throat, trouble swallowing and voice change  Eyes: Negative for photophobia, pain, discharge, redness, itching and visual disturbance  Respiratory: Positive for cough and shortness of breath  Negative for choking, chest tightness and wheezing  Cardiovascular: Positive for leg swelling  Negative for chest pain and palpitations  Gastrointestinal: Negative for abdominal distention, abdominal pain, anal bleeding, blood in stool, constipation, diarrhea, nausea, rectal pain and vomiting  Endocrine: Negative for cold intolerance and heat intolerance  Genitourinary: Negative for decreased urine volume, difficulty urinating, dysuria, flank pain, frequency, hematuria and urgency  Musculoskeletal: Negative for arthralgias, back pain, gait problem, joint swelling, myalgias, neck pain and neck stiffness  Skin: Negative for color change, pallor, rash and wound  Allergic/Immunologic: Negative for immunocompromised state  Neurological: Negative for dizziness, tremors, seizures, syncope, facial asymmetry, speech difficulty, weakness, light-headedness, numbness and headaches  Hematological: Negative for adenopathy  Does not bruise/bleed easily  Psychiatric/Behavioral: Negative for agitation, confusion, decreased concentration, dysphoric mood and sleep disturbance  The patient is not nervous/anxious  All other systems reviewed and are negative  Current Medications: Reviewed  Allergies: Reviewed  PMH/FH/SH:  Reviewed      Physical Exam:    Body surface area is 1 98 meters squared      Wt Readings from Last 3 Encounters:   03/21/19 86 6 kg (191 lb)   03/05/19 86 7 kg (191 lb 3 2 oz)   03/03/19 86 2 kg (190 lb 0 6 oz)        Temp Readings from Last 3 Encounters:   03/05/19 (!) 97 °F (36 1 °C) (Tympanic)   03/03/19 98 2 °F (36 8 °C) (Oral)   02/21/19 (!) 97 4 °F (36 3 °C) (Tympanic)        BP Readings from Last 3 Encounters:   03/21/19 108/62   03/05/19 100/62   03/03/19 98/58         Pulse Readings from Last 3 Encounters:   03/21/19 (!) 123   03/05/19 (!) 107   03/03/19 (!) 120        Physical Exam   Constitutional: He is oriented to person, place, and time  He appears well-developed and well-nourished  No distress  HENT:   Head: Normocephalic and atraumatic  Mouth/Throat: Oropharynx is clear and moist  No oropharyngeal exudate  Eyes: Pupils are equal, round, and reactive to light  Conjunctivae and EOM are normal    Neck: Normal range of motion  Neck supple  No tracheal deviation present  No thyromegaly present  Cardiovascular: Normal rate and regular rhythm  Exam reveals no gallop and no friction rub  No murmur heard  Pulmonary/Chest: Effort normal  No respiratory distress  He has wheezes  He has no rales  He exhibits no tenderness  Abdominal: Soft  Bowel sounds are normal  He exhibits no distension and no mass  There is no tenderness  There is no rebound and no guarding  Musculoskeletal: Normal range of motion  He exhibits edema (Plus three edema bilaterally)  Lymphadenopathy:     He has no cervical adenopathy  Neurological: He is alert and oriented to person, place, and time  Skin: Skin is warm and dry  No rash noted  He is not diaphoretic  No erythema  No pallor  Psychiatric: He has a normal mood and affect  His behavior is normal  Judgment and thought content normal    Vitals reviewed  Goals and Barriers:  Current Goal: Minimize effects of disease  Barriers: None  Patient's Capacity to Self Care:  Patient is able to self care      Code Status: [unfilled]

## 2019-03-26 RX ORDER — SODIUM CHLORIDE 9 MG/ML
20 INJECTION, SOLUTION INTRAVENOUS CONTINUOUS
Status: DISCONTINUED | OUTPATIENT
Start: 2019-03-27 | End: 2019-03-30 | Stop reason: HOSPADM

## 2019-03-27 ENCOUNTER — HOSPITAL ENCOUNTER (OUTPATIENT)
Dept: INFUSION CENTER | Facility: CLINIC | Age: 84
Discharge: HOME | End: 2019-03-27
Payer: MEDICARE

## 2019-03-27 VITALS
BODY MASS INDEX: 29.64 KG/M2 | SYSTOLIC BLOOD PRESSURE: 127 MMHG | RESPIRATION RATE: 18 BRPM | HEART RATE: 108 BPM | DIASTOLIC BLOOD PRESSURE: 80 MMHG | WEIGHT: 189.26 LBS | TEMPERATURE: 96.9 F

## 2019-03-27 PROCEDURE — 96365 THER/PROPH/DIAG IV INF INIT: CPT

## 2019-03-27 PROCEDURE — 96366 THER/PROPH/DIAG IV INF ADDON: CPT

## 2019-03-27 RX ADMIN — SODIUM CHLORIDE 20 ML/HR: 0.9 INJECTION, SOLUTION INTRAVENOUS at 11:17

## 2019-03-27 RX ADMIN — IMMUNE GLOBULIN (HUMAN) 25 G: 10 INJECTION INTRAVENOUS; SUBCUTANEOUS at 11:33

## 2019-03-27 NOTE — PLAN OF CARE
Problem: Potential for Falls  Goal: Patient will remain free of falls  Description  INTERVENTIONS:  - Assess patient frequently for physical needs  -  Identify cognitive and physical deficits and behaviors that affect risk of falls    -  [place] fall precautions as indicated by assessment   - Educate patient/family on patient safety including physical limitations  - Instruct patient to call for assistance with activity based on assessment  - Modify environment to reduce risk of injury  - Consider OT/PT consult to assist with strengthening/mobility  Outcome: Progressing

## 2019-03-28 ENCOUNTER — TELEPHONE (OUTPATIENT)
Dept: CARDIOLOGY CLINIC | Facility: CLINIC | Age: 84
End: 2019-03-28

## 2019-03-28 NOTE — TELEPHONE ENCOUNTER
Harsha Case,    I spoke with her  Asked him to continue watching salt and keep legs up when able  I mentioned we can consider a water pill if it gets worse  I mentioned we can have her come in before the July appt with Dr Amaury Moura or Tamara Durbin if needed  Thank you      Jocelyn Gillis

## 2019-03-28 NOTE — TELEPHONE ENCOUNTER
Pt's wife Ranulfo Mention calling, pt has complaints of leg swelling from feet to his knees, Left leg is more swollen  Pt is staying hydrated, taking all meds, Ranulfo Mention does state she watches his sodium intake but pt does cheat and eat chips  Pt is also back on steroids  There is SOB but pt also has COPD  Pt does have office visit with PCP next Friday 4/5/19  Pt has no weight gain, Pt wt today is 189 lbs  She would like to discuss this as pt is not taking any water pills, Please advise

## 2019-04-02 NOTE — TELEPHONE ENCOUNTER
Spoke with wife, Rico King has been doing ok, swelling is still there but has gone down, pt has been resting, wife has cut out salt from diet  No weight gain pt is still 189 lbs  Pt has not been wanting to eat, she has had to force him to eat  Pt is taking Prednisone 5 mg from Dr Myra Wallace  Wife would like to discuss with Dr Goran Esparza medications and what they can do  Pt does not want to be on a water pill  Please call at earliest convenience   Thanks

## 2019-04-02 NOTE — TELEPHONE ENCOUNTER
Arpan Galvan, can you check up on Mr  Elis again today to get update about leg swelling?   Thx    RP

## 2019-04-03 NOTE — TELEPHONE ENCOUNTER
Spoke w/ pt's wife who will try to make appt  Recommended ACE wrap to help w/ external compression as she cannot fit stockings

## 2019-04-09 ENCOUNTER — OFFICE VISIT (OUTPATIENT)
Dept: PULMONOLOGY | Facility: CLINIC | Age: 84
End: 2019-04-09
Payer: MEDICARE

## 2019-04-09 VITALS
TEMPERATURE: 97.6 F | WEIGHT: 186 LBS | HEIGHT: 67 IN | HEART RATE: 103 BPM | BODY MASS INDEX: 29.19 KG/M2 | SYSTOLIC BLOOD PRESSURE: 114 MMHG | DIASTOLIC BLOOD PRESSURE: 72 MMHG | OXYGEN SATURATION: 97 %

## 2019-04-09 DIAGNOSIS — C34.91 ADENOCARCINOMA OF RIGHT LUNG (HCC): Chronic | ICD-10-CM

## 2019-04-09 DIAGNOSIS — I26.99 OTHER ACUTE PULMONARY EMBOLISM WITHOUT ACUTE COR PULMONALE (HCC): ICD-10-CM

## 2019-04-09 DIAGNOSIS — J44.9 CHRONIC OBSTRUCTIVE PULMONARY DISEASE, UNSPECIFIED COPD TYPE (HCC): Primary | Chronic | ICD-10-CM

## 2019-04-09 PROCEDURE — 94010 BREATHING CAPACITY TEST: CPT | Performed by: INTERNAL MEDICINE

## 2019-04-09 PROCEDURE — 99214 OFFICE O/P EST MOD 30 MIN: CPT | Performed by: INTERNAL MEDICINE

## 2019-04-18 ENCOUNTER — TELEPHONE (OUTPATIENT)
Dept: GASTROENTEROLOGY | Facility: CLINIC | Age: 84
End: 2019-04-18

## 2019-04-18 DIAGNOSIS — R19.7 DIARRHEA, UNSPECIFIED TYPE: Primary | ICD-10-CM

## 2019-04-18 DIAGNOSIS — R19.7 DIARRHEA OF PRESUMED INFECTIOUS ORIGIN: Primary | ICD-10-CM

## 2019-04-18 RX ORDER — DIPHENOXYLATE HYDROCHLORIDE AND ATROPINE SULFATE 2.5; .025 MG/1; MG/1
1 TABLET ORAL 4 TIMES DAILY PRN
Qty: 30 TABLET | Refills: 0 | Status: SHIPPED | OUTPATIENT
Start: 2019-04-18 | End: 2020-01-01 | Stop reason: ALTCHOICE

## 2019-04-22 ENCOUNTER — TRANSCRIBE ORDERS (OUTPATIENT)
Dept: ADMINISTRATIVE | Facility: HOSPITAL | Age: 84
End: 2019-04-22

## 2019-04-22 ENCOUNTER — APPOINTMENT (OUTPATIENT)
Dept: LAB | Facility: HOSPITAL | Age: 84
End: 2019-04-22
Payer: MEDICARE

## 2019-04-22 ENCOUNTER — APPOINTMENT (OUTPATIENT)
Dept: LAB | Facility: HOSPITAL | Age: 84
End: 2019-04-22
Attending: INTERNAL MEDICINE
Payer: MEDICARE

## 2019-04-22 DIAGNOSIS — C34.91 ADENOCARCINOMA OF RIGHT LUNG (HCC): Chronic | ICD-10-CM

## 2019-04-22 DIAGNOSIS — C90.00 MULTIPLE MYELOMA, REMISSION STATUS UNSPECIFIED (HCC): ICD-10-CM

## 2019-04-22 DIAGNOSIS — D80.9 IMMUNOGLOBULIN DEFICIENCY (HCC): ICD-10-CM

## 2019-04-22 LAB
ALBUMIN SERPL BCP-MCNC: 2.7 G/DL (ref 3.5–5)
ALP SERPL-CCNC: 65 U/L (ref 46–116)
ALT SERPL W P-5'-P-CCNC: 25 U/L (ref 12–78)
ANION GAP SERPL CALCULATED.3IONS-SCNC: 6 MMOL/L (ref 4–13)
AST SERPL W P-5'-P-CCNC: 17 U/L (ref 5–45)
BASOPHILS # BLD AUTO: 0.01 THOUSANDS/ΜL (ref 0–0.1)
BASOPHILS NFR BLD AUTO: 0 % (ref 0–1)
BILIRUB SERPL-MCNC: 1.25 MG/DL (ref 0.2–1)
BUN SERPL-MCNC: 10 MG/DL (ref 5–25)
CALCIUM SERPL-MCNC: 8.7 MG/DL (ref 8.3–10.1)
CHLORIDE SERPL-SCNC: 106 MMOL/L (ref 100–108)
CO2 SERPL-SCNC: 30 MMOL/L (ref 21–32)
CREAT SERPL-MCNC: 1.23 MG/DL (ref 0.6–1.3)
EOSINOPHIL # BLD AUTO: 0.08 THOUSAND/ΜL (ref 0–0.61)
EOSINOPHIL NFR BLD AUTO: 3 % (ref 0–6)
ERYTHROCYTE [DISTWIDTH] IN BLOOD BY AUTOMATED COUNT: 15.4 % (ref 11.6–15.1)
GFR SERPL CREATININE-BSD FRML MDRD: 54 ML/MIN/1.73SQ M
GLUCOSE SERPL-MCNC: 99 MG/DL (ref 65–140)
HCT VFR BLD AUTO: 34 % (ref 36.5–49.3)
HGB BLD-MCNC: 10.8 G/DL (ref 12–17)
IGA SERPL-MCNC: 77 MG/DL (ref 70–400)
IGG SERPL-MCNC: 702 MG/DL (ref 700–1600)
IGM SERPL-MCNC: 42 MG/DL (ref 40–230)
IMM GRANULOCYTES # BLD AUTO: 0.05 THOUSAND/UL (ref 0–0.2)
IMM GRANULOCYTES NFR BLD AUTO: 2 % (ref 0–2)
LYMPHOCYTES # BLD AUTO: 0.57 THOUSANDS/ΜL (ref 0.6–4.47)
LYMPHOCYTES NFR BLD AUTO: 19 % (ref 14–44)
MCH RBC QN AUTO: 32.5 PG (ref 26.8–34.3)
MCHC RBC AUTO-ENTMCNC: 31.8 G/DL (ref 31.4–37.4)
MCV RBC AUTO: 102 FL (ref 82–98)
MONOCYTES # BLD AUTO: 0.41 THOUSAND/ΜL (ref 0.17–1.22)
MONOCYTES NFR BLD AUTO: 14 % (ref 4–12)
NEUTROPHILS # BLD AUTO: 1.84 THOUSANDS/ΜL (ref 1.85–7.62)
NEUTS SEG NFR BLD AUTO: 62 % (ref 43–75)
NRBC BLD AUTO-RTO: 0 /100 WBCS
PLATELET # BLD AUTO: 107 THOUSANDS/UL (ref 149–390)
PMV BLD AUTO: 9.9 FL (ref 8.9–12.7)
POTASSIUM SERPL-SCNC: 3.4 MMOL/L (ref 3.5–5.3)
PROT SERPL-MCNC: 6.4 G/DL (ref 6.4–8.2)
RBC # BLD AUTO: 3.32 MILLION/UL (ref 3.88–5.62)
SODIUM SERPL-SCNC: 142 MMOL/L (ref 136–145)
WBC # BLD AUTO: 2.96 THOUSAND/UL (ref 4.31–10.16)

## 2019-04-22 PROCEDURE — 87493 C DIFF AMPLIFIED PROBE: CPT

## 2019-04-22 PROCEDURE — 85025 COMPLETE CBC W/AUTO DIFF WBC: CPT

## 2019-04-22 PROCEDURE — 87505 NFCT AGENT DETECTION GI: CPT

## 2019-04-22 PROCEDURE — 80053 COMPREHEN METABOLIC PANEL: CPT

## 2019-04-22 PROCEDURE — 36415 COLL VENOUS BLD VENIPUNCTURE: CPT

## 2019-04-22 PROCEDURE — 82784 ASSAY IGA/IGD/IGG/IGM EACH: CPT

## 2019-04-22 PROCEDURE — 83883 ASSAY NEPHELOMETRY NOT SPEC: CPT

## 2019-04-23 ENCOUNTER — CLINICAL SUPPORT (OUTPATIENT)
Dept: PULMONOLOGY | Age: 84
End: 2019-04-23
Payer: MEDICARE

## 2019-04-23 VITALS — HEIGHT: 67 IN | BODY MASS INDEX: 29.51 KG/M2 | WEIGHT: 188 LBS

## 2019-04-23 DIAGNOSIS — J44.9 CHRONIC OBSTRUCTIVE PULMONARY DISEASE, UNSPECIFIED COPD TYPE (HCC): Chronic | ICD-10-CM

## 2019-04-23 LAB
C DIFF TOX GENS STL QL NAA+PROBE: NORMAL
CAMPYLOBACTER DNA SPEC NAA+PROBE: NORMAL
KAPPA LC FREE SER-MCNC: 13.6 MG/L (ref 3.3–19.4)
KAPPA LC FREE/LAMBDA FREE SER: 0.28 {RATIO} (ref 0.26–1.65)
LAMBDA LC FREE SERPL-MCNC: 49.1 MG/L (ref 5.7–26.3)
SALMONELLA DNA SPEC QL NAA+PROBE: NORMAL
SHIGA TOXIN STX GENE SPEC NAA+PROBE: NORMAL
SHIGELLA DNA SPEC QL NAA+PROBE: NORMAL

## 2019-04-25 ENCOUNTER — OFFICE VISIT (OUTPATIENT)
Dept: URGENT CARE | Facility: MEDICAL CENTER | Age: 84
End: 2019-04-25
Payer: MEDICARE

## 2019-04-25 VITALS
HEART RATE: 94 BPM | OXYGEN SATURATION: 97 % | SYSTOLIC BLOOD PRESSURE: 117 MMHG | BODY MASS INDEX: 29.51 KG/M2 | WEIGHT: 188 LBS | HEIGHT: 67 IN | TEMPERATURE: 97.6 F | DIASTOLIC BLOOD PRESSURE: 56 MMHG | RESPIRATION RATE: 18 BRPM

## 2019-04-25 DIAGNOSIS — M43.6 TORTICOLLIS, ACQUIRED: Primary | ICD-10-CM

## 2019-04-25 PROCEDURE — G0463 HOSPITAL OUTPT CLINIC VISIT: HCPCS | Performed by: FAMILY MEDICINE

## 2019-04-25 PROCEDURE — 99213 OFFICE O/P EST LOW 20 MIN: CPT | Performed by: FAMILY MEDICINE

## 2019-04-25 RX ORDER — KETOROLAC TROMETHAMINE 30 MG/ML
30 INJECTION, SOLUTION INTRAMUSCULAR; INTRAVENOUS ONCE
Status: COMPLETED | OUTPATIENT
Start: 2019-04-25 | End: 2019-04-25

## 2019-04-25 RX ADMIN — KETOROLAC TROMETHAMINE 30 MG: 30 INJECTION, SOLUTION INTRAMUSCULAR; INTRAVENOUS at 12:38

## 2019-04-30 ENCOUNTER — CLINICAL SUPPORT (OUTPATIENT)
Dept: PULMONOLOGY | Age: 84
End: 2019-04-30
Payer: MEDICARE

## 2019-04-30 DIAGNOSIS — J44.9 CHRONIC OBSTRUCTIVE PULMONARY DISEASE, UNSPECIFIED COPD TYPE (HCC): Chronic | ICD-10-CM

## 2019-04-30 PROCEDURE — G0424 PULMONARY REHAB W EXER: HCPCS

## 2019-05-02 ENCOUNTER — OFFICE VISIT (OUTPATIENT)
Dept: HEMATOLOGY ONCOLOGY | Facility: CLINIC | Age: 84
End: 2019-05-02
Payer: MEDICARE

## 2019-05-02 VITALS
HEIGHT: 67 IN | WEIGHT: 186 LBS | SYSTOLIC BLOOD PRESSURE: 116 MMHG | OXYGEN SATURATION: 96 % | BODY MASS INDEX: 29.19 KG/M2 | RESPIRATION RATE: 18 BRPM | HEART RATE: 97 BPM | DIASTOLIC BLOOD PRESSURE: 60 MMHG

## 2019-05-02 DIAGNOSIS — C90.00 MULTIPLE MYELOMA, REMISSION STATUS UNSPECIFIED (HCC): ICD-10-CM

## 2019-05-02 DIAGNOSIS — R60.9 PERIPHERAL EDEMA: ICD-10-CM

## 2019-05-02 DIAGNOSIS — R31.0 HEMATURIA, GROSS: Primary | ICD-10-CM

## 2019-05-02 PROCEDURE — 99214 OFFICE O/P EST MOD 30 MIN: CPT | Performed by: INTERNAL MEDICINE

## 2019-05-02 RX ORDER — ALBUTEROL SULFATE 90 UG/1
2 AEROSOL, METERED RESPIRATORY (INHALATION) EVERY 6 HOURS PRN
COMMUNITY
End: 2019-05-02

## 2019-05-02 RX ORDER — LIDOCAINE AND PRILOCAINE 25; 25 MG/G; MG/G
CREAM TOPICAL
COMMUNITY
End: 2019-05-02

## 2019-05-02 RX ORDER — MELPHALAN USP, 2 MG 2 MG/1
TABLET ORAL
Qty: 24 TABLET | Refills: 3 | Status: SHIPPED | OUTPATIENT
Start: 2019-05-02 | End: 2019-07-16 | Stop reason: SDUPTHER

## 2019-05-02 RX ORDER — MELPHALAN USP, 2 MG 2 MG/1
TABLET ORAL
COMMUNITY
Start: 2018-07-23 | End: 2019-05-02

## 2019-05-02 RX ORDER — FINASTERIDE 5 MG/1
TABLET, FILM COATED ORAL
COMMUNITY
Start: 2019-01-10 | End: 2019-05-02

## 2019-05-03 ENCOUNTER — APPOINTMENT (OUTPATIENT)
Dept: LAB | Facility: HOSPITAL | Age: 84
End: 2019-05-03
Attending: INTERNAL MEDICINE
Payer: MEDICARE

## 2019-05-03 ENCOUNTER — OFFICE VISIT (OUTPATIENT)
Dept: CARDIOLOGY CLINIC | Facility: CLINIC | Age: 84
End: 2019-05-03
Payer: MEDICARE

## 2019-05-03 VITALS
HEART RATE: 98 BPM | HEIGHT: 67 IN | SYSTOLIC BLOOD PRESSURE: 104 MMHG | BODY MASS INDEX: 29.03 KG/M2 | WEIGHT: 185 LBS | DIASTOLIC BLOOD PRESSURE: 64 MMHG

## 2019-05-03 DIAGNOSIS — I35.0 CALCIFIC AORTIC STENOSIS: Primary | ICD-10-CM

## 2019-05-03 DIAGNOSIS — R60.9 EDEMA, UNSPECIFIED TYPE: ICD-10-CM

## 2019-05-03 LAB
AMORPH URATE CRY URNS QL MICRO: ABNORMAL /HPF
BACTERIA UR QL AUTO: ABNORMAL /HPF
BILIRUB UR QL STRIP: NEGATIVE
CLARITY UR: CLEAR
COLOR UR: YELLOW
GLUCOSE UR STRIP-MCNC: NEGATIVE MG/DL
HGB UR QL STRIP.AUTO: ABNORMAL
KETONES UR STRIP-MCNC: NEGATIVE MG/DL
LEUKOCYTE ESTERASE UR QL STRIP: NEGATIVE
MUCOUS THREADS UR QL AUTO: ABNORMAL
NITRITE UR QL STRIP: NEGATIVE
NON-SQ EPI CELLS URNS QL MICRO: ABNORMAL /HPF
PH UR STRIP.AUTO: 5.5 [PH]
PROT UR STRIP-MCNC: NEGATIVE MG/DL
RBC #/AREA URNS AUTO: ABNORMAL /HPF
SP GR UR STRIP.AUTO: >=1.03 (ref 1–1.03)
UROBILINOGEN UR QL STRIP.AUTO: 0.2 E.U./DL
WBC #/AREA URNS AUTO: ABNORMAL /HPF

## 2019-05-03 PROCEDURE — 81001 URINALYSIS AUTO W/SCOPE: CPT | Performed by: INTERNAL MEDICINE

## 2019-05-03 PROCEDURE — 99214 OFFICE O/P EST MOD 30 MIN: CPT | Performed by: INTERNAL MEDICINE

## 2019-05-06 ENCOUNTER — TELEPHONE (OUTPATIENT)
Dept: HEMATOLOGY ONCOLOGY | Facility: CLINIC | Age: 84
End: 2019-05-06

## 2019-05-06 ENCOUNTER — TELEPHONE (OUTPATIENT)
Dept: UROLOGY | Facility: AMBULATORY SURGERY CENTER | Age: 84
End: 2019-05-06

## 2019-05-06 ENCOUNTER — APPOINTMENT (OUTPATIENT)
Dept: LAB | Facility: MEDICAL CENTER | Age: 84
End: 2019-05-06
Payer: MEDICARE

## 2019-05-06 DIAGNOSIS — R31.0 GROSS HEMATURIA: Primary | ICD-10-CM

## 2019-05-06 DIAGNOSIS — C90.00 MULTIPLE MYELOMA, REMISSION STATUS UNSPECIFIED (HCC): ICD-10-CM

## 2019-05-06 DIAGNOSIS — R31.0 GROSS HEMATURIA: ICD-10-CM

## 2019-05-06 PROCEDURE — 87086 URINE CULTURE/COLONY COUNT: CPT

## 2019-05-06 RX ORDER — LENALIDOMIDE 5 MG/1
CAPSULE ORAL
Qty: 21 CAPSULE | Refills: 0 | Status: SHIPPED | OUTPATIENT
Start: 2019-05-06 | End: 2019-06-11 | Stop reason: SDUPTHER

## 2019-05-07 ENCOUNTER — APPOINTMENT (OUTPATIENT)
Dept: PULMONOLOGY | Age: 84
End: 2019-05-07

## 2019-05-07 LAB — BACTERIA UR CULT: NORMAL

## 2019-05-08 RX ORDER — SODIUM CHLORIDE 9 MG/ML
20 INJECTION, SOLUTION INTRAVENOUS CONTINUOUS
Status: DISCONTINUED | OUTPATIENT
Start: 2019-05-09 | End: 2019-05-12 | Stop reason: HOSPADM

## 2019-05-09 ENCOUNTER — HOSPITAL ENCOUNTER (OUTPATIENT)
Dept: INFUSION CENTER | Facility: CLINIC | Age: 84
Discharge: HOME | End: 2019-05-09
Payer: MEDICARE

## 2019-05-09 VITALS
WEIGHT: 187.7 LBS | RESPIRATION RATE: 16 BRPM | BODY MASS INDEX: 29.4 KG/M2 | DIASTOLIC BLOOD PRESSURE: 72 MMHG | HEART RATE: 98 BPM | SYSTOLIC BLOOD PRESSURE: 103 MMHG | TEMPERATURE: 97.9 F

## 2019-05-09 PROCEDURE — 96366 THER/PROPH/DIAG IV INF ADDON: CPT

## 2019-05-09 PROCEDURE — 96365 THER/PROPH/DIAG IV INF INIT: CPT

## 2019-05-09 RX ADMIN — SODIUM CHLORIDE 20 ML/HR: 0.9 INJECTION, SOLUTION INTRAVENOUS at 11:24

## 2019-05-09 RX ADMIN — Medication 25 G: at 11:40

## 2019-05-09 NOTE — PROGRESS NOTES
Patient tolerated IVIG infusion  Offers no complaints  Pt and wife are aware of all future appointments

## 2019-05-09 NOTE — PLAN OF CARE
Problem: Potential for Falls  Goal: Patient will remain free of falls  Description  INTERVENTIONS:  - Assess patient frequently for physical needs  -  Identify cognitive and physical deficits and behaviors that affect risk of falls    -  [place] fall precautions as indicated by assessment   - Educate patient/family on patient safety including physical limitations  - Instruct patient to call for assistance with activity based on assessment  - Modify environment to reduce risk of injury  - Consider OT/PT consult to assist with strengthening/mobility  Outcome: Progressing
oral

## 2019-05-10 ENCOUNTER — TELEPHONE (OUTPATIENT)
Dept: UROLOGY | Facility: CLINIC | Age: 84
End: 2019-05-10

## 2019-05-10 ENCOUNTER — HOSPITAL ENCOUNTER (OUTPATIENT)
Dept: ULTRASOUND IMAGING | Facility: MEDICAL CENTER | Age: 84
Discharge: HOME | End: 2019-05-10
Payer: MEDICARE

## 2019-05-10 DIAGNOSIS — R31.0 GROSS HEMATURIA: ICD-10-CM

## 2019-05-10 PROCEDURE — 76770 US EXAM ABDO BACK WALL COMP: CPT

## 2019-05-14 ENCOUNTER — CONSULT (OUTPATIENT)
Dept: VASCULAR SURGERY | Facility: CLINIC | Age: 84
End: 2019-05-14
Payer: MEDICARE

## 2019-05-14 ENCOUNTER — APPOINTMENT (OUTPATIENT)
Dept: PULMONOLOGY | Age: 84
End: 2019-05-14

## 2019-05-14 VITALS
SYSTOLIC BLOOD PRESSURE: 110 MMHG | RESPIRATION RATE: 20 BRPM | TEMPERATURE: 98.3 F | DIASTOLIC BLOOD PRESSURE: 60 MMHG | HEART RATE: 84 BPM | WEIGHT: 186 LBS | BODY MASS INDEX: 29.19 KG/M2 | HEIGHT: 67 IN

## 2019-05-14 DIAGNOSIS — R60.9 PERIPHERAL EDEMA: Primary | ICD-10-CM

## 2019-05-14 DIAGNOSIS — I87.2 VENOUS INSUFFICIENCY (CHRONIC) (PERIPHERAL): ICD-10-CM

## 2019-05-14 PROCEDURE — 99204 OFFICE O/P NEW MOD 45 MIN: CPT | Performed by: NURSE PRACTITIONER

## 2019-05-15 ENCOUNTER — PROCEDURE VISIT (OUTPATIENT)
Dept: UROLOGY | Facility: CLINIC | Age: 84
End: 2019-05-15
Payer: MEDICARE

## 2019-05-15 ENCOUNTER — TELEPHONE (OUTPATIENT)
Dept: UROLOGY | Facility: CLINIC | Age: 84
End: 2019-05-15

## 2019-05-15 VITALS
BODY MASS INDEX: 28.94 KG/M2 | HEART RATE: 82 BPM | SYSTOLIC BLOOD PRESSURE: 120 MMHG | HEIGHT: 67 IN | DIASTOLIC BLOOD PRESSURE: 68 MMHG | WEIGHT: 184.4 LBS

## 2019-05-15 DIAGNOSIS — R31.0 GROSS HEMATURIA: Primary | ICD-10-CM

## 2019-05-15 LAB
SL AMB  POCT GLUCOSE, UA: NORMAL
SL AMB LEUKOCYTE ESTERASE,UA: NORMAL
SL AMB POCT BILIRUBIN,UA: NORMAL
SL AMB POCT BLOOD,UA: NORMAL
SL AMB POCT CLARITY,UA: NORMAL
SL AMB POCT COLOR,UA: NORMAL
SL AMB POCT KETONES,UA: NORMAL
SL AMB POCT NITRITE,UA: NORMAL
SL AMB POCT PH,UA: 6.5
SL AMB POCT SPECIFIC GRAVITY,UA: 1.01
SL AMB POCT URINE PROTEIN: NORMAL
SL AMB POCT UROBILINOGEN: NORMAL

## 2019-05-15 PROCEDURE — 52000 CYSTOURETHROSCOPY: CPT | Performed by: UROLOGY

## 2019-05-15 PROCEDURE — 99213 OFFICE O/P EST LOW 20 MIN: CPT | Performed by: UROLOGY

## 2019-05-15 PROCEDURE — 81002 URINALYSIS NONAUTO W/O SCOPE: CPT | Performed by: UROLOGY

## 2019-05-15 RX ORDER — FINASTERIDE 5 MG/1
5 TABLET, FILM COATED ORAL
Qty: 90 TABLET | Refills: 3 | Status: SHIPPED | OUTPATIENT
Start: 2019-05-15 | End: 2020-01-01 | Stop reason: SDUPTHER

## 2019-05-16 ENCOUNTER — APPOINTMENT (OUTPATIENT)
Dept: PULMONOLOGY | Age: 84
End: 2019-05-16

## 2019-05-21 ENCOUNTER — APPOINTMENT (OUTPATIENT)
Dept: PULMONOLOGY | Age: 84
End: 2019-05-21

## 2019-05-21 NOTE — PROGRESS NOTES
Cardiology Follow Up        [facility] [address]      1935      [id number]      Discussion/Summary:    1  Lower extremity edema:  Suspect multifactorial secondary to hypoalbuminemia, prednisone treatment, possible venous insufficiency, possible right-sided heart failure from underlying COPD  Prior venous Dopplers 09/2017 revealed no evidence of DVT  Patient started on spironolactone 50 mg daily by Hematology, thereafter complains of more fatigue  Blood pressure marginally low on today's visit  Advised decreasing dose to 25 mg once daily  Repeat echocardiogram 07/20/2018 with normal left ventricular systolic function  Repeat NT proBNP 06/25/2018 unremarkable at 248, with prior level in April 2018 normal at 210     2   Moderate aortic valve stenosis:  Will repeat echocardiogram in 2 years  Follow-up in 1 year        Interval History: This is a very pleasant 80-year-old male with a history of chronic lower extremity edema, multifactorial and moderate aortic root dilatation up to 4 5 cm as well as mild-to-moderate aortic valve stenosis  He has a history of multiple myeloma, squamous cell skin carcinoma, and lung adenocarcinoma status post right upper lobe resection with adjuvant chemotherapy/radiation, and rectal carcinoma with once a month prednisone treatment  He presents today for evaluation of his lower extremity edema  He has been initiated on spironolactone 50 mg daily by his hematologist, and congestive heart failure has been considered as an etiology of his edema  He admits to increased exertional dyspnea as of late, and his wife states recently he has been initiated on nebulizer treatments  He follows Dr Chucho Guadarrama of pulmonary medicine  He denies any worsening of lower extremity edema, and states that has been stable/chronic  His wife does admit that he has certain days when his edema is worse  He denies orthopnea  He has chronic diarrhea  Occupational Therapy Evaluation    ASSESSMENT:   Patient seen on 3W nursing unit.  Patient presents significantly below baseline which was supervision with functional household mobility and activities of daily living. For safe return to prior living situation the patient needs to be at a 1 person assist for ADLs and mobility.    The patient now presents with impairments in activity tolerance, balance, coordination, neglect, safety awareness and strength which impact safe and effective performance ineating, grooming, bathing, dressing, toileting and functional transfers/mobility .  Patient is currently functioning at total assist (2 person assistance minimal - moderate assistance) for mobility and maximal assist  for ADLs.     Further skilled occupational therapy services are reasonable and necessary to address the above impairments and performance deficits to maximize the patient's independence    OT Identified Barriers to Discharge: Weakness, decreased cooridination, medical condition      PLAN AND RECOMMENDATIONS:   Recommendations for Discharge:  Recommendations for Discharge: OT: 24 Hour assist, Post acute therapy(Return to group home if they accept his level of care vs SNF)      Plan:   Continue skilled OT, including the following Treatment Interventions: ADL retraining;Functional transfer training;Endurance training;Patient/Family training;Neuro muscular reeducation;Compensatory technique education (05/21/19 1500)    OT Frequency: 5 days/week (05/21/19 1500)     Treatment Plan for Next Session: ADL retraining, functional transfer training, safey awareness training.                  EQUIPMENT:   PT/OT ADL Equipment for Discharge: TBD (05/21/19 1500)  PT/OT Mobility Equipment for Discharge: TBD (05/21/19 1409)     DIAGNOSIS:   1. Fever of unknown origin    2. Hypernatremia    3. Hypoxia           PRECAUTIONS:   Precautions  Other Precautions: Fall risk        EDUCATION:   On this date, the patient was educated  Vitals:  Vitals:    07/25/18 1002   BP: 92/60   Pulse: 92   Resp: 16   Weight: 79 2 kg (174 lb 9 6 oz)   Height: 5' 7" (1 702 m)         Past Medical History:   Diagnosis Date    Cancer ([address] )     lung and multiple myloma    Chemoprevention     Chronic pain disorder     3 fractured vertebrae    COPD (chronic obstructive pulmonary disease) (HCC)     Diarrhea     Emphysema lung (HCC)     Enlarged prostate     [place]     Kidney stone     Low blood pressure     Lung cancer (HCC)     Multiple myeloma (HCC)     Multiple myeloma (HCC)     Pneumonia     Pulmonary emphysema (HCC)     Rectal adenocarcinoma (HCC)     Shortness of breath     Sleep apnea     no cpap     Social History     Social History    Marital status: /Civil Union     Spouse name: N/A    Number of children: N/A    Years of education: N/A     Occupational History    Not on file  Social History Main Topics    Smoking status: Former Smoker     Packs/day: 1 00     Years: 50 00     Types: Cigarettes     Quit date: 12/7/2010    Smokeless tobacco: Never Used      Comment: quit in 1999    Alcohol use Yes      Comment: rarely    Drug use: No    Sexual activity: Not on file     Other Topics Concern    Not on file     Social History Narrative    No narrative on file      Family History   Problem Relation Age of Onset    Arthritis Mother     Colon cancer Father     Heart attack Father     Diabetes type I Sister      Past Surgical History:   Procedure Laterality Date    CHOLECYSTECTOMY      COLONOSCOPY W/ ENDOSCOPIC US N/A 7/21/2017    Procedure: ANAL ENDOSCOPIC U/S;  Surgeon: Unruly Connolly MD;  [facility]: [facility]; Service: Colorectal    HERNIA REPAIR      KIDNEY STONE SURGERY      LUNG LOBECTOMY Right     TN COLONOSCOPY FLX DX W/COLLJ SPEC WHEN PFRMD N/A 7/10/2017    Procedure: COLONOSCOPY;  Surgeon: Juan Hauser MD;  Location: [facility];   Service: Gastroenterology    TN RECTAL TUMOR EXCISION, on Bed Mobility, Transfers, Safety Awareness, Therapy Plan of Care and Energy Conservation  .  The response to education was: Verbalizes understanding and Needs reinforcement.     SUBJECTIVE:   Subjective: That feels really good.  (05/21/19 1500)       OBJECTIVE:   Self Cares/ADLs:   Self Cares/ADL's  Toileting Assistance: Maximal Assist (Max) (05/21/19 1500)  Toileting Deficit: Setup;Steadying;Verbal cueing;Supervision/Safety;Increased time to complete;Clothing management up;Clothing management down;Perineal hygiene (05/21/19 1500)  Self Cares/ADL's Comments #1: Patient requires step by step verbal and tactile cues to engage in activity, and complete tasks.  (05/21/19 1500)      Household Mobility:    Household Mobility  Rolling: Touching/Steadying Assistance (05/21/19 1500)  Sit to Supine: Minimal Assist (Min) (05/21/19 1500)  Sit to Stand: Total Assist - Non-dependent(minimal - moderate assistance of 2 people) (05/21/19 1500)  Stand Pivot Transfers: Total Assist - Non-dependent(moderate assistance of 2 people. ) (05/21/19 1500)  Toilet Transfers: Total Assist - Non-dependent(Moderate assistance of 2 people ) (05/21/19 1500)  Sitting - Dynamic: Supervision (05/21/19 1500)  Standing - Static: Minimal Assist (Min) (05/21/19 1500)  Standing - Dynamic: Moderate Assist (Mod) (05/21/19 1500)  Base of Support: Hip Width (05/21/19 1500)  Household Mobility Comments #1: Patient tends to shuffle when ambulating, hand held assistance.  (05/21/19 1500)    Home Management:    not addressed during today's OT session     GOALS:   Short Term Goals to Be Reviewed On: 05/28/19 (05/21/19 1500)  Short Term Goals Are The Same as Discharge Goals: Yes (05/21/19 1500)  Goal Agreement: Patient agrees with goals and treatment plan (05/21/19 1500)  Toileting Discharge Goal 1: patient will complete with moderate assistance of 1 person.  (05/21/19 1500)  Other Discharge Goal 1: Patient will engage in activity, and sustain attention for 5  TRANSANAL ENDOSCOPIC MICROSURGICAL, FULL THICK N/A 11/2/2017    Procedure: TRANSANAL ENDOSCOPIC MICROSURGERY TEM;  Surgeon: Vel Tolliver MD;  [facility];  Service: Colorectal       Current Outpatient Prescriptions:     albuterol (PROVENTIL HFA,VENTOLIN HFA) 90 mcg/act inhaler, Inhale 2 puffs every 4 (four) hours as needed for wheezing, Disp: 1 Inhaler, Rfl: 6    cholecalciferol (VITAMIN D3) 1,000 units tablet, Take 5,000 Units by mouth daily  , Disp: , Rfl:     cyanocobalamin (VITAMIN B-12) 1,000 mcg tablet, Take by mouth daily, Disp: , Rfl:     dexamethasone (DECADRON) 4 mg tablet, 5 tablets every week with food, Disp: 60 tablet, Rfl: 0    DHA-EPA-Coenzyme Q10-Vitamin E (CO Q-10 VITAMIN E FISH OIL) 17-43- CAPS, Take 1 capsule by mouth daily, Disp: , Rfl:     finasteride (PROSCAR) 5 mg tablet, Take 5 mg by mouth daily at bedtime  , Disp: , Rfl:     guaiFENesin (MUCINEX) 600 mg 12 hr tablet, Take 1 tablet (600 mg total) by mouth every 12 (twelve) hours (Patient taking differently: Take 600 mg by mouth every 12 (twelve) hours as needed  ), Disp: 60 tablet, Rfl: 0    ipratropium-albuterol (DUO-NEB) 0 5-2 5 mg/mL, Take 3 mL by nebulization daily  , Disp: , Rfl:     Lactobacillus-Inulin ([facility] [employer] [facility] PO), Take by mouth daily, Disp: , Rfl:     lenalidomide (REVLIMID) 5 MG CAPS, Take 1 capsule (5 mg total) by mouth daily 3 weeks on/3 weeks off adult male/auth# [id number], Disp: 21 each, Rfl: 0    lidocaine-prilocaine (EMLA) cream, APPLY TO AREA AND COVER WITH TAPE 1 HOUR BEFORE APPOINTMENT , Disp: , Rfl: 2    Loperamide HCl (IMODIUM PO), Take 1 tablet by mouth as needed, Disp: , Rfl:     melphalan (ALKERAN) 2 MG tablet, 6 tablets daily D1-4 monthly   12mg daily D1-4, Disp: 24 tablet, Rfl: 0    predniSONE 50 mg tablet, BID WITH MEALS D1-4, Disp: 8 tablet, Rfl: 4    RAPAFLO 8 MG CAPS, Take 1 capsule by mouth daily, Disp: , Rfl: 3    spironolactone (ALDACTONE) 50 mg tablet, minutes.  (05/21/19 1500)       EVALUATION CODE JUSTIFICATION:   Eval Code:  $ OT Eval: High Complexity: 1 Procedure  Problems/Co-morbidities:   Patient Active Problem List   Diagnosis   • Autism   • Mental retardation   • Vitamin D deficiency   • Schizoaffective disorder (CMS/HCC)   • Allergic rhinitis   • Chronic anemia   • Acquired hypothyroidism   • History of seizure   • Hyperlipidemia, mixed   • Weight loss   • Acute hypernatremia   • Mononucleosis syndrome     Personal Occupations Profile Affected: toileting/toilet hygiene, upper body dressing, lower body dressing, functional mobility/transfers, personal hygiene/grooming  Task Modification: Significant task modification  Clinical decision making: Complex/High - Patient has several limitations (5+), comorbidities and/or complexities, as noted in comprehensive assessment above, that impact their occupational profile.  Resulting in multiple treatment options and significant task modification consistent with high clinical decision making complexity.      BILLING INFORMATION:   Total Treatment Time: OT Time Spent: 60 minutes   Timed Treatment Minutes:        Take 1 tablet (50 mg total) by mouth daily, Disp: 30 tablet, Rfl: 3  No current facility-administered medications for this visit  Facility-Administered Medications Ordered in Other Visits:     heparin lock flush 100 units/mL injection 300 Units, 300 Units, Intracatheter, Q1H PRN, Lakeisha Fong MD, 300 Units at 07/24/18 1315        Review of Systems:  Review of Systems   Constitutional: Positive for fatigue  Respiratory: Positive for shortness of breath  Cardiovascular: Positive for leg swelling  All other systems reviewed and are negative  Physical Exam:  Physical Exam   Constitutional: He is oriented to person, place, and time  He appears well-developed and well-nourished  No distress  HENT:   Head: Normocephalic and atraumatic  Eyes: EOM are normal  Pupils are equal, round, and reactive to light  Right eye exhibits no discharge  No scleral icterus  Neck: Normal range of motion  Neck supple  No thyromegaly present  Cardiovascular: Normal rate and regular rhythm  Exam reveals no gallop and no friction rub  Murmur heard  Pulmonary/Chest: Effort normal  He has no wheezes  Abdominal: He exhibits no distension  There is no tenderness  There is no rebound and no guarding  Musculoskeletal: Normal range of motion  Trace LE edema   Neurological: He is alert and oriented to person, place, and time  Coordination normal    Skin: Skin is warm and dry  No rash noted  He is not diaphoretic  No erythema  No pallor  Psychiatric: He has a normal mood and affect   His behavior is normal  Judgment and thought content normal

## 2019-05-28 ENCOUNTER — APPOINTMENT (OUTPATIENT)
Dept: PULMONOLOGY | Age: 84
End: 2019-05-28

## 2019-05-30 ENCOUNTER — APPOINTMENT (OUTPATIENT)
Dept: PULMONOLOGY | Age: 84
End: 2019-05-30

## 2019-06-03 ENCOUNTER — APPOINTMENT (OUTPATIENT)
Dept: LAB | Facility: CLINIC | Age: 84
End: 2019-06-03
Payer: MEDICARE

## 2019-06-03 DIAGNOSIS — R60.9 PERIPHERAL EDEMA: ICD-10-CM

## 2019-06-03 DIAGNOSIS — C90.00 MULTIPLE MYELOMA, REMISSION STATUS UNSPECIFIED (HCC): ICD-10-CM

## 2019-06-03 LAB
ALBUMIN SERPL BCP-MCNC: 3.2 G/DL (ref 3.5–5)
ALP SERPL-CCNC: 79 U/L (ref 46–116)
ALT SERPL W P-5'-P-CCNC: 28 U/L (ref 12–78)
ANION GAP SERPL CALCULATED.3IONS-SCNC: 7 MMOL/L (ref 4–13)
AST SERPL W P-5'-P-CCNC: 19 U/L (ref 5–45)
BASOPHILS # BLD AUTO: 0.03 THOUSANDS/ΜL (ref 0–0.1)
BASOPHILS NFR BLD AUTO: 1 % (ref 0–1)
BILIRUB SERPL-MCNC: 1.11 MG/DL (ref 0.2–1)
BUN SERPL-MCNC: 11 MG/DL (ref 5–25)
CALCIUM SERPL-MCNC: 8.7 MG/DL (ref 8.3–10.1)
CHLORIDE SERPL-SCNC: 108 MMOL/L (ref 100–108)
CO2 SERPL-SCNC: 26 MMOL/L (ref 21–32)
CREAT SERPL-MCNC: 1.06 MG/DL (ref 0.6–1.3)
EOSINOPHIL # BLD AUTO: 0.08 THOUSAND/ΜL (ref 0–0.61)
EOSINOPHIL NFR BLD AUTO: 2 % (ref 0–6)
ERYTHROCYTE [DISTWIDTH] IN BLOOD BY AUTOMATED COUNT: 15.3 % (ref 11.6–15.1)
GFR SERPL CREATININE-BSD FRML MDRD: 65 ML/MIN/1.73SQ M
GLUCOSE SERPL-MCNC: 94 MG/DL (ref 65–140)
HCT VFR BLD AUTO: 36.7 % (ref 36.5–49.3)
HGB BLD-MCNC: 11.7 G/DL (ref 12–17)
IGA SERPL-MCNC: 90 MG/DL (ref 70–400)
IGG SERPL-MCNC: 966 MG/DL (ref 700–1600)
IGM SERPL-MCNC: 43 MG/DL (ref 40–230)
IMM GRANULOCYTES # BLD AUTO: 0.04 THOUSAND/UL (ref 0–0.2)
IMM GRANULOCYTES NFR BLD AUTO: 1 % (ref 0–2)
LYMPHOCYTES # BLD AUTO: 0.67 THOUSANDS/ΜL (ref 0.6–4.47)
LYMPHOCYTES NFR BLD AUTO: 18 % (ref 14–44)
MCH RBC QN AUTO: 32.1 PG (ref 26.8–34.3)
MCHC RBC AUTO-ENTMCNC: 31.9 G/DL (ref 31.4–37.4)
MCV RBC AUTO: 101 FL (ref 82–98)
MONOCYTES # BLD AUTO: 0.5 THOUSAND/ΜL (ref 0.17–1.22)
MONOCYTES NFR BLD AUTO: 13 % (ref 4–12)
NEUTROPHILS # BLD AUTO: 2.4 THOUSANDS/ΜL (ref 1.85–7.62)
NEUTS SEG NFR BLD AUTO: 65 % (ref 43–75)
NRBC BLD AUTO-RTO: 0 /100 WBCS
PLATELET # BLD AUTO: 124 THOUSANDS/UL (ref 149–390)
PMV BLD AUTO: 10.1 FL (ref 8.9–12.7)
POTASSIUM SERPL-SCNC: 3.9 MMOL/L (ref 3.5–5.3)
PROT SERPL-MCNC: 6.7 G/DL (ref 6.4–8.2)
RBC # BLD AUTO: 3.65 MILLION/UL (ref 3.88–5.62)
SODIUM SERPL-SCNC: 141 MMOL/L (ref 136–145)
WBC # BLD AUTO: 3.72 THOUSAND/UL (ref 4.31–10.16)

## 2019-06-03 PROCEDURE — 83883 ASSAY NEPHELOMETRY NOT SPEC: CPT

## 2019-06-03 PROCEDURE — 85025 COMPLETE CBC W/AUTO DIFF WBC: CPT

## 2019-06-03 PROCEDURE — 82784 ASSAY IGA/IGD/IGG/IGM EACH: CPT

## 2019-06-03 PROCEDURE — 36415 COLL VENOUS BLD VENIPUNCTURE: CPT

## 2019-06-03 PROCEDURE — 82232 ASSAY OF BETA-2 PROTEIN: CPT

## 2019-06-03 PROCEDURE — 80053 COMPREHEN METABOLIC PANEL: CPT

## 2019-06-04 ENCOUNTER — APPOINTMENT (OUTPATIENT)
Dept: PULMONOLOGY | Age: 84
End: 2019-06-04

## 2019-06-04 LAB
KAPPA LC FREE SER-MCNC: 16.2 MG/L (ref 3.3–19.4)
KAPPA LC FREE/LAMBDA FREE SER: 0.44 {RATIO} (ref 0.26–1.65)
LAMBDA LC FREE SERPL-MCNC: 37.2 MG/L (ref 5.7–26.3)

## 2019-06-05 LAB — B2 MICROGLOB SERPL-MCNC: 2.1 MG/L (ref 0.6–2.4)

## 2019-06-06 ENCOUNTER — APPOINTMENT (OUTPATIENT)
Dept: PULMONOLOGY | Age: 84
End: 2019-06-06

## 2019-06-06 ENCOUNTER — OFFICE VISIT (OUTPATIENT)
Dept: HEMATOLOGY ONCOLOGY | Facility: CLINIC | Age: 84
End: 2019-06-06
Payer: MEDICARE

## 2019-06-06 VITALS
HEIGHT: 67 IN | RESPIRATION RATE: 16 BRPM | DIASTOLIC BLOOD PRESSURE: 70 MMHG | WEIGHT: 182 LBS | BODY MASS INDEX: 28.56 KG/M2 | TEMPERATURE: 97.1 F | SYSTOLIC BLOOD PRESSURE: 122 MMHG

## 2019-06-06 DIAGNOSIS — C90.00 MULTIPLE MYELOMA, REMISSION STATUS UNSPECIFIED (HCC): Primary | ICD-10-CM

## 2019-06-06 PROCEDURE — 99214 OFFICE O/P EST MOD 30 MIN: CPT | Performed by: INTERNAL MEDICINE

## 2019-06-11 ENCOUNTER — APPOINTMENT (OUTPATIENT)
Dept: PULMONOLOGY | Age: 84
End: 2019-06-11

## 2019-06-11 DIAGNOSIS — C90.00 MULTIPLE MYELOMA, REMISSION STATUS UNSPECIFIED (HCC): ICD-10-CM

## 2019-06-11 RX ORDER — LENALIDOMIDE 5 MG/1
CAPSULE ORAL
Qty: 21 CAPSULE | Refills: 0 | Status: SHIPPED | OUTPATIENT
Start: 2019-06-11 | End: 2019-07-12 | Stop reason: SDUPTHER

## 2019-06-12 ENCOUNTER — HOSPITAL ENCOUNTER (OUTPATIENT)
Dept: RADIOLOGY | Facility: HOSPITAL | Age: 84
Discharge: HOME | End: 2019-06-12
Payer: MEDICARE

## 2019-06-12 ENCOUNTER — TRANSCRIBE ORDERS (OUTPATIENT)
Dept: ADMINISTRATIVE | Facility: HOSPITAL | Age: 84
End: 2019-06-12

## 2019-06-12 DIAGNOSIS — R10.31 RT GROIN PAIN: Primary | ICD-10-CM

## 2019-06-12 DIAGNOSIS — R10.31 RT GROIN PAIN: ICD-10-CM

## 2019-06-12 PROCEDURE — 73502 X-RAY EXAM HIP UNI 2-3 VIEWS: CPT

## 2019-06-13 ENCOUNTER — APPOINTMENT (OUTPATIENT)
Dept: PULMONOLOGY | Age: 84
End: 2019-06-13

## 2019-06-18 ENCOUNTER — APPOINTMENT (OUTPATIENT)
Dept: PULMONOLOGY | Age: 84
End: 2019-06-18

## 2019-06-19 ENCOUNTER — HOSPITAL ENCOUNTER (OUTPATIENT)
Dept: INFUSION CENTER | Facility: CLINIC | Age: 84
Discharge: HOME | End: 2019-06-19

## 2019-06-20 ENCOUNTER — APPOINTMENT (OUTPATIENT)
Dept: PULMONOLOGY | Age: 84
End: 2019-06-20

## 2019-06-25 ENCOUNTER — APPOINTMENT (OUTPATIENT)
Dept: PULMONOLOGY | Age: 84
End: 2019-06-25

## 2019-06-27 ENCOUNTER — APPOINTMENT (OUTPATIENT)
Dept: PULMONOLOGY | Age: 84
End: 2019-06-27

## 2019-07-02 ENCOUNTER — APPOINTMENT (OUTPATIENT)
Dept: PULMONOLOGY | Age: 84
End: 2019-07-02

## 2019-07-03 PROBLEM — C20 RECTAL CANCER (HCC): Status: ACTIVE | Noted: 2019-07-03

## 2019-07-09 ENCOUNTER — APPOINTMENT (OUTPATIENT)
Dept: PULMONOLOGY | Age: 84
End: 2019-07-09

## 2019-07-11 ENCOUNTER — APPOINTMENT (OUTPATIENT)
Dept: PULMONOLOGY | Age: 84
End: 2019-07-11

## 2019-07-12 ENCOUNTER — TELEPHONE (OUTPATIENT)
Dept: HEMATOLOGY ONCOLOGY | Facility: CLINIC | Age: 84
End: 2019-07-12

## 2019-07-12 DIAGNOSIS — C90.00 MULTIPLE MYELOMA, REMISSION STATUS UNSPECIFIED (HCC): ICD-10-CM

## 2019-07-12 RX ORDER — LENALIDOMIDE 5 MG/1
CAPSULE ORAL
Qty: 21 CAPSULE | Refills: 0 | Status: SHIPPED | OUTPATIENT
Start: 2019-07-12 | End: 2019-08-30 | Stop reason: SDUPTHER

## 2019-07-12 NOTE — TELEPHONE ENCOUNTER
Charleen Cano from patients pharmacy called and stated she wanetd to speak with you because patients daughter called in to request medication refill on revlamid but they were not authorized to speak with patients daughter according to records next cycle of revlamid isnt due until 7/29 anyway and they just wanted to make you aware in case she calls

## 2019-07-12 NOTE — TELEPHONE ENCOUNTER
Tc to pt s and wife cell phone number  Left voice msg stating rec d call from [facility] that pt s dtr tried to discuss pt s medications and they told her she is not on the contact list  Please call our office and she can be added to contacts if you want

## 2019-07-16 ENCOUNTER — TELEPHONE (OUTPATIENT)
Dept: HEMATOLOGY ONCOLOGY | Facility: CLINIC | Age: 84
End: 2019-07-16

## 2019-07-16 ENCOUNTER — APPOINTMENT (OUTPATIENT)
Dept: PULMONOLOGY | Age: 84
End: 2019-07-16

## 2019-07-16 DIAGNOSIS — C90.00 MULTIPLE MYELOMA, REMISSION STATUS UNSPECIFIED (HCC): ICD-10-CM

## 2019-07-16 RX ORDER — MELPHALAN USP, 2 MG 2 MG/1
TABLET ORAL
Qty: 24 TABLET | Refills: 3 | Status: ON HOLD | OUTPATIENT
Start: 2019-07-16 | End: 2020-01-06 | Stop reason: SDUPTHER

## 2019-07-16 NOTE — TELEPHONE ENCOUNTER
CALL FROM PT'S DAUGHTER  JODY     SHE DID RECEIVE THE REVLIMIDE BUT THE MELPHALAN WAS NOT IN THE BOX   PT'S WIFE JOEL'S NOTE SAYS BOTH MEDICATIONS MUST BE TAKEN TOGETHER     CAN YOU PLEASE Gris HER  [phone]

## 2019-07-29 ENCOUNTER — APPOINTMENT (OUTPATIENT)
Dept: LAB | Facility: MEDICAL CENTER | Age: 84
End: 2019-07-29
Payer: MEDICARE

## 2019-07-29 DIAGNOSIS — C20 RECTAL CANCER (HCC): ICD-10-CM

## 2019-07-29 DIAGNOSIS — C90.00 MULTIPLE MYELOMA, REMISSION STATUS UNSPECIFIED (HCC): ICD-10-CM

## 2019-07-29 LAB
ALBUMIN SERPL BCP-MCNC: 3.4 G/DL (ref 3.5–5)
ALP SERPL-CCNC: 81 U/L (ref 46–116)
ALT SERPL W P-5'-P-CCNC: 35 U/L (ref 12–78)
ANION GAP SERPL CALCULATED.3IONS-SCNC: 5 MMOL/L (ref 4–13)
AST SERPL W P-5'-P-CCNC: 25 U/L (ref 5–45)
BASOPHILS # BLD AUTO: 0.05 THOUSANDS/ΜL (ref 0–0.1)
BASOPHILS NFR BLD AUTO: 1 % (ref 0–1)
BILIRUB SERPL-MCNC: 1.05 MG/DL (ref 0.2–1)
BUN SERPL-MCNC: 21 MG/DL (ref 5–25)
CALCIUM SERPL-MCNC: 9 MG/DL (ref 8.3–10.1)
CEA SERPL-MCNC: 3.7 NG/ML (ref 0–3)
CHLORIDE SERPL-SCNC: 107 MMOL/L (ref 100–108)
CO2 SERPL-SCNC: 27 MMOL/L (ref 21–32)
CREAT SERPL-MCNC: 1.21 MG/DL (ref 0.6–1.3)
EOSINOPHIL # BLD AUTO: 0.06 THOUSAND/ΜL (ref 0–0.61)
EOSINOPHIL NFR BLD AUTO: 1 % (ref 0–6)
ERYTHROCYTE [DISTWIDTH] IN BLOOD BY AUTOMATED COUNT: 15.1 % (ref 11.6–15.1)
GFR SERPL CREATININE-BSD FRML MDRD: 55 ML/MIN/1.73SQ M
GLUCOSE P FAST SERPL-MCNC: 107 MG/DL (ref 65–99)
HCT VFR BLD AUTO: 38.9 % (ref 36.5–49.3)
HGB BLD-MCNC: 12.4 G/DL (ref 12–17)
IGA SERPL-MCNC: 86 MG/DL (ref 70–400)
IGG SERPL-MCNC: 905 MG/DL (ref 700–1600)
IGM SERPL-MCNC: 36 MG/DL (ref 40–230)
IMM GRANULOCYTES # BLD AUTO: 0.04 THOUSAND/UL (ref 0–0.2)
IMM GRANULOCYTES NFR BLD AUTO: 1 % (ref 0–2)
LYMPHOCYTES # BLD AUTO: 1.18 THOUSANDS/ΜL (ref 0.6–4.47)
LYMPHOCYTES NFR BLD AUTO: 19 % (ref 14–44)
MCH RBC QN AUTO: 31.6 PG (ref 26.8–34.3)
MCHC RBC AUTO-ENTMCNC: 31.9 G/DL (ref 31.4–37.4)
MCV RBC AUTO: 99 FL (ref 82–98)
MONOCYTES # BLD AUTO: 0.48 THOUSAND/ΜL (ref 0.17–1.22)
MONOCYTES NFR BLD AUTO: 8 % (ref 4–12)
NEUTROPHILS # BLD AUTO: 4.49 THOUSANDS/ΜL (ref 1.85–7.62)
NEUTS SEG NFR BLD AUTO: 70 % (ref 43–75)
NRBC BLD AUTO-RTO: 0 /100 WBCS
PLATELET # BLD AUTO: 131 THOUSANDS/UL (ref 149–390)
PMV BLD AUTO: 9.9 FL (ref 8.9–12.7)
POTASSIUM SERPL-SCNC: 4 MMOL/L (ref 3.5–5.3)
PROT SERPL-MCNC: 6.6 G/DL (ref 6.4–8.2)
RBC # BLD AUTO: 3.93 MILLION/UL (ref 3.88–5.62)
SODIUM SERPL-SCNC: 139 MMOL/L (ref 136–145)
WBC # BLD AUTO: 6.3 THOUSAND/UL (ref 4.31–10.16)

## 2019-07-29 PROCEDURE — 80053 COMPREHEN METABOLIC PANEL: CPT

## 2019-07-29 PROCEDURE — 82784 ASSAY IGA/IGD/IGG/IGM EACH: CPT

## 2019-07-29 PROCEDURE — 85025 COMPLETE CBC W/AUTO DIFF WBC: CPT

## 2019-07-29 PROCEDURE — 82378 CARCINOEMBRYONIC ANTIGEN: CPT

## 2019-07-29 PROCEDURE — 36415 COLL VENOUS BLD VENIPUNCTURE: CPT

## 2019-07-29 PROCEDURE — 83883 ASSAY NEPHELOMETRY NOT SPEC: CPT

## 2019-07-30 LAB
KAPPA LC FREE SER-MCNC: 12.8 MG/L (ref 3.3–19.4)
KAPPA LC FREE/LAMBDA FREE SER: 0.4 {RATIO} (ref 0.26–1.65)
LAMBDA LC FREE SERPL-MCNC: 32.1 MG/L (ref 5.7–26.3)

## 2019-07-31 ENCOUNTER — HOSPITAL ENCOUNTER (OUTPATIENT)
Dept: INFUSION CENTER | Facility: CLINIC | Age: 84
End: 2019-07-31

## 2019-07-31 ENCOUNTER — TELEPHONE (OUTPATIENT)
Dept: HEMATOLOGY ONCOLOGY | Facility: CLINIC | Age: 84
End: 2019-07-31

## 2019-07-31 NOTE — TELEPHONE ENCOUNTER
Tc to pt spoke with wife infromed that today pt does not need treatment as his Igg was 905 and per Dr Mark Muller only needs tx if 500 or less  Reminded office appt on 8/2/19  Wife states they are aware of appt  And no tx is great news as she has 3 appts today of her own  Tc to infusion spoke with elizabeth domínguez pt no tx today

## 2019-08-02 ENCOUNTER — OFFICE VISIT (OUTPATIENT)
Dept: HEMATOLOGY ONCOLOGY | Facility: CLINIC | Age: 84
End: 2019-08-02
Payer: MEDICARE

## 2019-08-02 VITALS
RESPIRATION RATE: 14 BRPM | WEIGHT: 180 LBS | TEMPERATURE: 97.6 F | DIASTOLIC BLOOD PRESSURE: 78 MMHG | HEIGHT: 67 IN | BODY MASS INDEX: 28.25 KG/M2 | SYSTOLIC BLOOD PRESSURE: 118 MMHG | HEART RATE: 80 BPM

## 2019-08-02 DIAGNOSIS — R60.9 PERIPHERAL EDEMA: ICD-10-CM

## 2019-08-02 DIAGNOSIS — C20 RECTAL CANCER (HCC): ICD-10-CM

## 2019-08-02 DIAGNOSIS — C90.00 MULTIPLE MYELOMA, REMISSION STATUS UNSPECIFIED (HCC): Primary | ICD-10-CM

## 2019-08-02 PROCEDURE — 99214 OFFICE O/P EST MOD 30 MIN: CPT | Performed by: INTERNAL MEDICINE

## 2019-08-02 NOTE — PROGRESS NOTES
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 currently on lenalidomide 5 mg p o  Daily 3 weeks on 3 weeks of, melphalan 12 mg p  O  Daily for 4 days every 6 weeks, prednisone 5 mg p  O  Daily to prevent exacerbation of COPD    Lambda light chain 32    2  Hypogammaglobinemia, on IVIG every 6 weeks, hold IVIG at this time the level is 900    3  Pulmonary emboli, on apixaban 5 mg p  O  Daily, dose was reduced because of epistaxis, the expense of the medicine    Rectal adenocarcinoma stage I followed by Colorectal surgery    Adenocarcinoma of the right upper lobe of the lungs followed by Pulmonary service    Follow-up in 6 weeks with CBC, CMP free light chain and IgG level     HPI:       #1 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, multiple compression fractures, status post kyphoplasty to L1, L2, L5, treated with radiation therapy to the left intra trochanteric area, received melphalan/Velcade/prednisone in October 2010 for 4 cycles with excellent response and normalization of lambda light chain, had been on Velcade maintenance till January 2013 when lambda light chain went up to 111,treated with Velcade 1 3 mg meter square weekly 3 weeks on one week off, Decadron 20 mg by mouth weekly, lambda light chain down to 30 then creeping up to 110, on Revlimid 15 mg by mouth every other day  Lambda light chain normal at 28 however he reported skin rash on the upper chest area we stopped the Revlimid for 2 month  Then reinitiated Revlimid 5 mg by mouth daily     #2  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 stage I, with progression by scan, status post resection of the right upper lobe with few foci in the same lobe consistent with stage IIIA done in January 2015 adenocarcinoma, well differentiated   Status post Alimta/carboplatin x4 cycles finished in May 2015 and radiation therapy     #3  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     #4  Rectal adenocarcinoma status post resection stage I in November 2017     #5  Pulmonary embolism involving the subsegmental left lower lobe in December 2018, currently on apixaban 5 mg once a day because of epistaxis     #6  Hematuria secondary to benign prostatic hypertrophy and small bladder stones and being on apixaban       Interval History:  His wife had an open heart surgery       Previous Treatment:         Test Results:    Imaging: No results found  Labs:   Lab Results   Component Value Date    WBC 6 30 07/29/2019    HGB 12 4 07/29/2019    HCT 38 9 07/29/2019    MCV 99 (H) 07/29/2019     (L) 07/29/2019     Lab Results   Component Value Date     01/04/2016    K 4 0 07/29/2019     07/29/2019    CO2 27 07/29/2019    ANIONGAP 7 01/04/2016    BUN 21 07/29/2019    CREATININE 1 21 07/29/2019    GLUCOSE 88 01/04/2016    GLUF 107 (H) 07/29/2019    CALCIUM 9 0 07/29/2019    AST 25 07/29/2019    ALT 35 07/29/2019    ALKPHOS 81 07/29/2019    PROT 6 8 01/04/2016    BILITOT 0 63 01/04/2016    EGFR 55 07/29/2019    Lambda light chain 32    No results found for: IRON, TIBC, FERRITIN    No results found for: [id number]      ROS:   Review of Systems   Constitutional: Negative for activity change, appetite change, chills, diaphoresis, fatigue, fever and unexpected weight change  HENT: Negative for congestion, dental problem, facial swelling, hearing loss, mouth sores, nosebleeds, postnasal drip, rhinorrhea, sore throat, trouble swallowing and voice change  Eyes: Negative for photophobia, pain, discharge, redness, itching and visual disturbance  Respiratory: Positive for cough, shortness of breath (Exertional) and wheezing ( intermittent)  Negative for choking and chest tightness  Cardiovascular: Positive for leg swelling   Negative for chest pain and palpitations  Gastrointestinal: Negative for abdominal distention, abdominal pain, anal bleeding, blood in stool, constipation, diarrhea, nausea, rectal pain and vomiting  Endocrine: Negative for cold intolerance and heat intolerance  Genitourinary: Negative for decreased urine volume, difficulty urinating, dysuria, flank pain, frequency, hematuria and urgency  Musculoskeletal: Positive for arthralgias  Negative for back pain, gait problem, joint swelling, myalgias, neck pain and neck stiffness  Skin: Negative for color change, pallor, rash and wound  Allergic/Immunologic: Negative for immunocompromised state  Neurological: Negative for dizziness, tremors, seizures, syncope, facial asymmetry, speech difficulty, weakness, light-headedness, numbness and headaches  Hematological: Negative for adenopathy  Does not bruise/bleed easily  Psychiatric/Behavioral: Negative for agitation, confusion, decreased concentration, dysphoric mood and sleep disturbance  The patient is not nervous/anxious  All other systems reviewed and are negative  Current Medications: Reviewed  Allergies: Reviewed  PMH/FH/SH:  Reviewed      Physical Exam:    Body surface area is 1 93 meters squared  Wt Readings from Last 3 Encounters:   08/02/19 81 6 kg (180 lb)   07/03/19 82 6 kg (182 lb)   06/06/19 82 6 kg (182 lb)        Temp Readings from Last 3 Encounters:   08/02/19 97 6 °F (36 4 °C)   06/06/19 (!) 97 1 °F (36 2 °C) (Tympanic)   05/14/19 98 3 °F (36 8 °C) (Tympanic)        BP Readings from Last 3 Encounters:   08/02/19 118/78   07/03/19 90/60   06/06/19 122/70         Pulse Readings from Last 3 Encounters:   08/02/19 80   07/03/19 95   05/15/19 82        Physical Exam   Constitutional: He is oriented to person, place, and time  He appears well-developed and well-nourished  No distress  HENT:   Head: Normocephalic and atraumatic  Eyes: Conjunctivae are normal    Neck: Normal range of motion  Neck supple   No tracheal deviation present  Cardiovascular: Normal rate and regular rhythm  Exam reveals no gallop and no friction rub  No murmur heard  Pulmonary/Chest: Effort normal  No respiratory distress  He has no wheezes  He has rales (Dry in the right lower lobe)  He exhibits no tenderness  Abdominal: Soft  He exhibits no distension  There is no tenderness  Musculoskeletal: He exhibits edema ( +3 edema in the left lower extremity, +2 edema in the right lower extremity)  Lymphadenopathy:     He has no cervical adenopathy  Neurological: He is alert and oriented to person, place, and time  Skin: Skin is warm and dry  He is not diaphoretic  No erythema  No pallor  Psychiatric: He has a normal mood and affect  His behavior is normal  Judgment and thought content normal    Vitals reviewed  ECO  Goals and Barriers:  Current Goal: Minimize effects of disease  Barriers: None  Patient's Capacity to Self Care:  Patient is able to self care      Code Status: [unfilled]

## 2019-08-16 PROBLEM — R31.0 GROSS HEMATURIA: Status: ACTIVE | Noted: 2019-08-16

## 2019-08-16 PROBLEM — R97.20 ELEVATED PSA: Status: ACTIVE | Noted: 2019-08-16

## 2019-08-16 NOTE — PROGRESS NOTES
8/19/2019      Chief Complaint   Patient presents with   Stew Ritter Hematuria       Assessment and Plan    81 y o  male managed by Dr Dimitri Mcrae    1  BPH  - PVR 28mL  - continue Rapaflo 8 mg  - continue finasteride 5 mg    2  Elevated PSA  - PSA 2 6 (5/31/16)  - due to age and comorbidities no further screening needed for prostate cancer    3  Resolved gross hematuria secondary to prostate bleeding  - no recurrence  - obtain micro in 1 year    4  Bladder calculi  - small and required no intervention, recommend proper hydration was 60 oz of water daily  - will recheck in 1 year    FU 1 year with U/S and micro prior      History of Present Illness  Mckay Suarez is a 81 y o  male here for follow up evaluation of BPH, history of an elevated PSA, resolved gross hematuria status post negative workup, and bladder calculi  The patient's workup obtained 05/2019 did reveal an enlarged friable prostate and punctate calculi within the bladder  He continues on Rapaflo and finasteride daily  He has no urinary complaints at his visit today and denies any recurrent gross hematuria  Review of Systems   Constitutional: Negative for activity change, chills and fever  Gastrointestinal: Negative for abdominal distention and abdominal pain  Musculoskeletal: Negative for back pain and gait problem  Psychiatric/Behavioral: Negative for behavioral problems and confusion  Urinary Incontinence Screening      Most Recent Value   Urinary Incontinence   Urinary Incontinence? No   Incomplete emptying? No   Urinary frequency? No   Urinary urgency? No   Urinary hesitancy? Yes   Dysuria (painful difficult urination)? No   Nocturia (waking up to use the bathroom)? No   Straining (having to push to go)? No   Weak stream?  Yes   Intermittent stream?  No   Post void dribbling?   No            Past Medical History  Past Medical History:   Diagnosis Date    Chronic pain disorder     3 fractured vertebrae    COPD (chronic obstructive pulmonary disease) (HCC)     Diarrhea     Enlarged prostate     Kidney stone     [facility]     Pulmonary embolism ([facility] ) 2018    Rectal adenocarcinoma ([facility]     Sleep apnea     no cpap       Past Social History  Past Surgical History:   Procedure Laterality Date    CHOLECYSTECTOMY      COLONOSCOPY W/ ENDOSCOPIC US N/A 2017    Procedure: ANAL ENDOSCOPIC U/S;  Surgeon: Aaliyah Mendoza MD;  [facility]; Service: Colorectal    HERNIA REPAIR      KIDNEY STONE SURGERY      LUNG LOBECTOMY Right     GA COLONOSCOPY FLX DX W/COLLJ SPEC WHEN PFRMD N/A 7/10/2017    Procedure: COLONOSCOPY;  Surgeon: Cat Reynoso MD;  [facility];   Service: Gastroenterology    GA RECTAL TUMOR EXCISION, TRANSANAL ENDOSCOPIC MICROSURGICAL, FULL THICK N/A 2017    Procedure: TRANSANAL ENDOSCOPIC MICROSURGERY TEM;  Surgeon: Aaliyah Mendoza MD;  [facility];  Service: Colorectal     Social History     Tobacco Use   Smoking Status Former Smoker    Packs/day: 1 00    Years: 50 00    Pack years: 50 00    Types: Cigarettes    Last attempt to quit: 2010    Years since quittin 7   Smokeless Tobacco Never Used   Tobacco Comment    quit in        Past Family History  Family History   Problem Relation Age of Onset    Arthritis Mother     Colon cancer Father     Heart attack Father     Diabetes type I Sister        Past Social history  Social History     Socioeconomic History    Marital status: /Civil Union     Spouse name: Not on file    Number of children: Not on file    Years of education: Not on file    Highest education level: Not on file   Occupational History    Not on file   Social Needs    Financial resource strain: Not on file    Food insecurity:     Worry: Not on file     Inability: Not on file    Transportation needs:     Medical: Not on file     Non-medical: Not on file   Tobacco Use    Smoking status: Former Smoker Packs/day: 1 00     Years: 50 00     Pack years: 50 00     Types: Cigarettes     Last attempt to quit: 2010     Years since quittin 7    Smokeless tobacco: Never Used    Tobacco comment: quit in    Substance and Sexual Activity    Alcohol use: Yes     Comment: rarely    Drug use: No    Sexual activity: Not on file   Lifestyle    Physical activity:     Days per week: Not on file     Minutes per session: Not on file    Stress: Not on file   Relationships    Social connections:     Talks on phone: Not on file     Gets together: Not on file     Attends Latter day service: Not on file     Active member of club or organization: Not on file     Attends meetings of clubs or organizations: Not on file     Relationship status: Not on file    Intimate partner violence:     Fear of current or ex partner: Not on file     Emotionally abused: Not on file     Physically abused: Not on file     Forced sexual activity: Not on file   Other Topics Concern    Not on file   Social History Narrative    Not on file       Current Medications  Current Outpatient Medications   Medication Sig Dispense Refill    albuterol (PROVENTIL HFA,VENTOLIN HFA) 90 mcg/act inhaler Inhale 2 puffs every 4 (four) hours as needed for wheezing 1 Inhaler 6    apixaban (ELIQUIS) 5 mg Take 1 tablet (5 mg total) by mouth 2 (two) times a day (Patient taking differently: Take 5 mg by mouth daily ) 60 tablet 3    cholecalciferol (VITAMIN D3) 1,000 units tablet Take 5,000 Units by mouth daily        cyanocobalamin (VITAMIN B-12) 1,000 mcg tablet Take by mouth daily      diphenoxylate-atropine (LOMOTIL) 2 5-0 025 mg per tablet Take 1 tablet by mouth 4 (four) times a day as needed for diarrhea 30 tablet 0    finasteride (PROSCAR) 5 mg tablet Take 1 tablet (5 mg total) by mouth daily at bedtime 90 tablet 3    guaiFENesin (MUCINEX) 600 mg 12 hr tablet Take 1 tablet (600 mg total) by mouth every 12 (twelve) hours (Patient taking differently: Take 600 mg by mouth every 12 (twelve) hours as needed  ) 60 tablet 0    ipratropium (ATROVENT) 0 02 % nebulizer solution Take 1 vial (0 5 mg total) by nebulization 4 (four) times a day 300 mL 6    Lactobacillus-Inulin ([facility]) Take by mouth daily      lenalidomide (REVLIMID) 5 MG CAPS 3 weeks on/3 weeks off adult male/auth#[id number] 7/12/19 21 capsule 0    lidocaine-prilocaine (EMLA) cream APPLY TO AREA AND COVER WITH TAPE 1 HOUR BEFORE APPOINTMENT   2    melphalan (ALKERAN) 2 MG tablet Take 12 mg daily for 4 days every 6 weeks 24 tablet 3    predniSONE 5 mg tablet Take 1 tablet (5 mg total) by mouth daily 90 tablet 3    RAPAFLO 8 MG CAPS Take 1 capsule by mouth daily at bedtime    3     No current facility-administered medications for this visit  Allergies  Allergies   Allergen Reactions    Sulfa Antibiotics Itching    Aldactone [Spironolactone] Other (See Comments)     Breast swelling and pain  Breast swelling and pain    Penicillin V Rash     AS CHILD         The following portions of the patient's history were reviewed and updated as appropriate: allergies, current medications, past medical history, past social history, past surgical history and problem list       Vitals  There were no vitals filed for this visit  Physical Exam  Constitutional   General appearance: Patient is seated and in no acute distress, well appearing and well nourished  Head and Face   Head and face: Normal     Eyes   Conjunctiva and lids: No erythema, swelling or discharge  Ears, Nose, Mouth, and Throat   Hearing: Normal     Pulmonary   Respiratory effort: No increased work of breathing or signs of respiratory distress  Cardiovascular   Examination of extremities for edema and/or varicosities: Normal     Abdomen   Abdomen: Non-tender, no masses  Musculoskeletal   Gait and station: Normal     Skin   Skin and subcutaneous tissue: Warm, dry, and intact   No visible lesions or milagros  Psychiatric   Judgment and insight: Normal  Recent and remote memory:  Normal  Mood and affect: Normal      Results  No results found for this or any previous visit (from the past 1 hour(s)) ]  Lab Results   Component Value Date    PSA 2 6 05/31/2016    PSA 4 0 05/01/2015    PSA 3 2 05/02/2014     Lab Results   Component Value Date    GLUCOSE 88 01/04/2016    CALCIUM 9 0 07/29/2019     01/04/2016    K 4 0 07/29/2019    CO2 27 07/29/2019     07/29/2019    BUN 21 07/29/2019    CREATININE 1 21 07/29/2019     Lab Results   Component Value Date    WBC 6 30 07/29/2019    HGB 12 4 07/29/2019    HCT 38 9 07/29/2019    MCV 99 (H) 07/29/2019     (L) 07/29/2019           Orders  No orders of the defined types were placed in this encounter

## 2019-08-19 ENCOUNTER — OFFICE VISIT (OUTPATIENT)
Dept: UROLOGY | Facility: CLINIC | Age: 84
End: 2019-08-19
Payer: MEDICARE

## 2019-08-19 VITALS
WEIGHT: 181 LBS | SYSTOLIC BLOOD PRESSURE: 124 MMHG | DIASTOLIC BLOOD PRESSURE: 70 MMHG | HEART RATE: 90 BPM | HEIGHT: 67 IN | BODY MASS INDEX: 28.41 KG/M2

## 2019-08-19 DIAGNOSIS — R31.0 GROSS HEMATURIA: ICD-10-CM

## 2019-08-19 DIAGNOSIS — N40.1 BENIGN PROSTATIC HYPERPLASIA WITH LOWER URINARY TRACT SYMPTOMS, SYMPTOM DETAILS UNSPECIFIED: Primary | ICD-10-CM

## 2019-08-19 DIAGNOSIS — R97.20 ELEVATED PSA: ICD-10-CM

## 2019-08-19 LAB — POST-VOID RESIDUAL VOLUME, ML POC: 28 ML

## 2019-08-19 PROCEDURE — 51798 US URINE CAPACITY MEASURE: CPT | Performed by: PHYSICIAN ASSISTANT

## 2019-08-19 PROCEDURE — 99213 OFFICE O/P EST LOW 20 MIN: CPT | Performed by: PHYSICIAN ASSISTANT

## 2019-08-30 ENCOUNTER — TELEPHONE (OUTPATIENT)
Dept: HEMATOLOGY ONCOLOGY | Facility: CLINIC | Age: 84
End: 2019-08-30

## 2019-08-30 DIAGNOSIS — C90.00 MULTIPLE MYELOMA, REMISSION STATUS UNSPECIFIED (HCC): ICD-10-CM

## 2019-08-30 RX ORDER — LENALIDOMIDE 5 MG/1
CAPSULE ORAL
Qty: 21 CAPSULE | Refills: 0 | Status: SHIPPED | OUTPATIENT
Start: 2019-08-30 | End: 2019-10-24 | Stop reason: SDUPTHER

## 2019-08-30 NOTE — TELEPHONE ENCOUNTER
[facility] pharmacy # [phone] is requesting a refill on patients Revlimid 5mg #21 last fill was on July 12 please send it electronically or fax it to #[phone]

## 2019-09-04 ENCOUNTER — HOSPITAL ENCOUNTER (OUTPATIENT)
Dept: NON INVASIVE DIAGNOSTICS | Facility: CLINIC | Age: 84
Discharge: HOME | End: 2019-09-04
Payer: MEDICARE

## 2019-09-04 ENCOUNTER — OFFICE VISIT (OUTPATIENT)
Dept: CARDIOLOGY CLINIC | Facility: CLINIC | Age: 84
End: 2019-09-04
Payer: MEDICARE

## 2019-09-04 VITALS
HEIGHT: 67 IN | WEIGHT: 181 LBS | SYSTOLIC BLOOD PRESSURE: 108 MMHG | OXYGEN SATURATION: 98 % | HEART RATE: 83 BPM | BODY MASS INDEX: 28.41 KG/M2 | DIASTOLIC BLOOD PRESSURE: 67 MMHG

## 2019-09-04 DIAGNOSIS — I35.0 CALCIFIC AORTIC STENOSIS: ICD-10-CM

## 2019-09-04 DIAGNOSIS — R60.9 EDEMA, UNSPECIFIED TYPE: Primary | ICD-10-CM

## 2019-09-04 PROCEDURE — 93306 TTE W/DOPPLER COMPLETE: CPT

## 2019-09-04 PROCEDURE — 99214 OFFICE O/P EST MOD 30 MIN: CPT | Performed by: INTERNAL MEDICINE

## 2019-09-04 NOTE — PROGRESS NOTES
Cardiology Follow Up        [address] [facility]      1935      [id number]      Discussion/Summary:    1  Lower extremity edema, multifactorial, suspect predominantly venous insufficiency   2  Moderate to severe aortic stenosis  3  History of DVT, PE  4  COPD  5  Multiple myeloma  6  BPH    · Suspect lower extremity edema multifactorial secondary to hypoalbuminemia, prior DVT, and likely predominant venous insufficiency  Cannot completely rule out underlying chronic diastolic heart failure, although NT proBNP has been fairly unremarkable in the past   Kermit has not recommended in light of his chronic diarrhea to avoid volume depletion  Agree with elevation of legs, low-sodium diet, compression stockings  I appreciate vascular Clinic evaluation  Leg edema not very bothersome to pt  · Reviewed echocardiogram from today, aortic valve stenosis appears moderate to severe, aortic valve area 1 centimeter squared  Will likely repeat in 1 year      Follow-up in 6 months      Interval History: This is a very pleasant 44-year-old male with a history of moderate aortic root dilatation to 4 5 cm, moderate aortic valve stenosis, multiple myeloma, squamous cell skin carcinoma, and lung adenocarcinoma status post right upper lobe resection with adjuvant chemotherapy and radiation, as well as rectal carcinoma  He has COPD, and is managed by Dr Jerica Cortés of pulmonary medicine and sees Dr Dianne Newell of hematology      He went to the hospital 12/2018 with increased shortness of breath and lower extremity edema  CT chest revealed left-sided pulmonary embolus and he was initiated on Eliquis  He also had a mild COPD exacerbation  He was thereafter hospitalized 01/2018 with fever and diarrhea  He continues to have intermittent diarrhea  He was seen by vascular surgery for venous insufficiency 05/14/2019    Compression stockings were recommended for him along with leg elevation, exercise, and low-sodium diet  Prior NT proBNP 2019 was 353                Vitals:  Vitals:    19 1608   BP: 108/67   BP Location: Right arm   Patient Position: Sitting   Cuff Size: Adult   Pulse: 83   SpO2: 98%   Weight: 82 1 kg (181 lb)   Height: 5' 7" (1 702 m)         Past Medical History:   Diagnosis Date    Chronic pain disorder     3 fractured vertebrae    COPD (chronic obstructive pulmonary disease) (HCC)     Diarrhea     Enlarged prostate     Kidney stone     [facility] )     Pulmonary embolism ([facility] ) 2018    Rectal adenocarcinoma (HCC)     Sleep apnea     no cpap     Social History     Socioeconomic History    Marital status: /Civil Union     Spouse name: Not on file    Number of children: Not on file    Years of education: Not on file    Highest education level: Not on file   Occupational History    Not on file   Social Needs    Financial resource strain: Not on file    Food insecurity:     Worry: Not on file     Inability: Not on file    Transportation needs:     Medical: Not on file     Non-medical: Not on file   Tobacco Use    Smoking status: Former Smoker     Packs/day: 1 00     Years: 50 00     Pack years: 50 00     Types: Cigarettes     Last attempt to quit: 2010     Years since quittin 7    Smokeless tobacco: Never Used    Tobacco comment: quit in    Substance and Sexual Activity    Alcohol use: Yes     Comment: rarely    Drug use: No    Sexual activity: Not on file   Lifestyle    Physical activity:     Days per week: Not on file     Minutes per session: Not on file    Stress: Not on file   Relationships    Social connections:     Talks on phone: Not on file     Gets together: Not on file     Attends Jain service: Not on file     Active member of club or organization: Not on file     Attends meetings of clubs or organizations: Not on file     Relationship status: Not on file    Intimate partner violence:     Fear of current or ex partner: Not on file     Emotionally abused: Not on file     Physically abused: Not on file     Forced sexual activity: Not on file   Other Topics Concern    Not on file   Social History Narrative    Not on file      Family History   Problem Relation Age of Onset    Arthritis Mother     Colon cancer Father     Heart attack Father     Diabetes type I Sister      Past Surgical History:   Procedure Laterality Date    CHOLECYSTECTOMY      COLONOSCOPY W/ ENDOSCOPIC US N/A 7/21/2017    Procedure: ANAL ENDOSCOPIC U/S;  Surgeon: Hadley Oates MD;  [facility]; Service: Colorectal    HERNIA REPAIR      KIDNEY STONE SURGERY      LUNG LOBECTOMY Right     LA COLONOSCOPY FLX DX W/COLLJ SPEC WHEN PFRMD N/A 7/10/2017    Procedure: COLONOSCOPY;  Surgeon: Jorden Rodriguez MD;  [facility];   Service: Gastroenterology    LA RECTAL TUMOR EXCISION, TRANSANAL ENDOSCOPIC MICROSURGICAL, FULL THICK N/A 11/2/2017    Procedure: TRANSANAL ENDOSCOPIC MICROSURGERY TEM;  Surgeon: Hadley Oates MD;  [facility];  Service: Colorectal       Current Outpatient Medications:     albuterol (PROVENTIL HFA,VENTOLIN HFA) 90 mcg/act inhaler, Inhale 2 puffs every 4 (four) hours as needed for wheezing, Disp: 1 Inhaler, Rfl: 6    apixaban (ELIQUIS) 5 mg, Take 1 tablet (5 mg total) by mouth 2 (two) times a day (Patient taking differently: Take 5 mg by mouth daily ), Disp: 60 tablet, Rfl: 3    cholecalciferol (VITAMIN D3) 1,000 units tablet, Take 5,000 Units by mouth daily  , Disp: , Rfl:     cyanocobalamin (VITAMIN B-12) 1,000 mcg tablet, Take by mouth daily, Disp: , Rfl:     diphenoxylate-atropine (LOMOTIL) 2 5-0 025 mg per tablet, Take 1 tablet by mouth 4 (four) times a day as needed for diarrhea, Disp: 30 tablet, Rfl: 0    finasteride (PROSCAR) 5 mg tablet, Take 1 tablet (5 mg total) by mouth daily at bedtime, Disp: 90 tablet, Rfl: 3    guaiFENesin (MUCINEX) 600 mg 12 hr tablet, Take 1 tablet (600 mg total) by mouth every 12 (twelve) hours (Patient taking differently: Take 600 mg by mouth every 12 (twelve) hours as needed  ), Disp: 60 tablet, Rfl: 0    ipratropium (ATROVENT) 0 02 % nebulizer solution, Take 1 vial (0 5 mg total) by nebulization 4 (four) times a day, Disp: 300 mL, Rfl: 6    Lactobacillus-Inulin ([facility]), Take by mouth daily, Disp: , Rfl:     lenalidomide (REVLIMID) 5 MG CAPS, 3 weeks on/3 weeks off adult male/auth#[id number] 8/30/19, Disp: 21 capsule, Rfl: 0    lidocaine-prilocaine (EMLA) cream, APPLY TO AREA AND COVER WITH TAPE 1 HOUR BEFORE APPOINTMENT , Disp: , Rfl: 2    melphalan (ALKERAN) 2 MG tablet, Take 12 mg daily for 4 days every 6 weeks, Disp: 24 tablet, Rfl: 3    predniSONE 5 mg tablet, Take 1 tablet (5 mg total) by mouth daily, Disp: 90 tablet, Rfl: 3    RAPAFLO 8 MG CAPS, Take 1 capsule by mouth daily at bedtime  , Disp: , Rfl: 3        Review of Systems:  Review of Systems      Physical Exam:  Physical Exam    This note was completed in part utilizing [employer] Fluency Direct Software  Grammatical errors, random word insertions, spelling mistakes, and incomplete sentences can be an occasional consequence of this system secondary to software limitations, ambient noise, and hardware issues  If you have any questions or concerns about the content, text, or information contained within the body of this dictation, please contact the provider for clarification

## 2019-09-05 PROCEDURE — 93306 TTE W/DOPPLER COMPLETE: CPT | Performed by: INTERNAL MEDICINE

## 2019-09-09 ENCOUNTER — APPOINTMENT (OUTPATIENT)
Dept: LAB | Facility: CLINIC | Age: 84
End: 2019-09-09
Payer: MEDICARE

## 2019-09-09 ENCOUNTER — OFFICE VISIT (OUTPATIENT)
Dept: PULMONOLOGY | Facility: CLINIC | Age: 84
End: 2019-09-09
Payer: MEDICARE

## 2019-09-09 VITALS
TEMPERATURE: 97.7 F | OXYGEN SATURATION: 97 % | DIASTOLIC BLOOD PRESSURE: 58 MMHG | HEART RATE: 101 BPM | BODY MASS INDEX: 28.69 KG/M2 | HEIGHT: 67 IN | SYSTOLIC BLOOD PRESSURE: 102 MMHG | RESPIRATION RATE: 16 BRPM | WEIGHT: 182.8 LBS

## 2019-09-09 DIAGNOSIS — J44.9 CHRONIC OBSTRUCTIVE PULMONARY DISEASE, UNSPECIFIED COPD TYPE (HCC): Chronic | ICD-10-CM

## 2019-09-09 DIAGNOSIS — C34.91 ADENOCARCINOMA OF RIGHT LUNG (HCC): Primary | Chronic | ICD-10-CM

## 2019-09-09 DIAGNOSIS — R60.9 PERIPHERAL EDEMA: ICD-10-CM

## 2019-09-09 DIAGNOSIS — C90.00 MULTIPLE MYELOMA, REMISSION STATUS UNSPECIFIED (HCC): ICD-10-CM

## 2019-09-09 DIAGNOSIS — C20 RECTAL CANCER (HCC): ICD-10-CM

## 2019-09-09 LAB
ALBUMIN SERPL BCP-MCNC: 3 G/DL (ref 3.5–5)
ALP SERPL-CCNC: 76 U/L (ref 46–116)
ALT SERPL W P-5'-P-CCNC: 21 U/L (ref 12–78)
ANION GAP SERPL CALCULATED.3IONS-SCNC: 3 MMOL/L (ref 4–13)
AST SERPL W P-5'-P-CCNC: 10 U/L (ref 5–45)
BASOPHILS # BLD AUTO: 0.04 THOUSANDS/ΜL (ref 0–0.1)
BASOPHILS NFR BLD AUTO: 1 % (ref 0–1)
BILIRUB SERPL-MCNC: 1.3 MG/DL (ref 0.2–1)
BUN SERPL-MCNC: 15 MG/DL (ref 5–25)
CALCIUM SERPL-MCNC: 8.8 MG/DL (ref 8.3–10.1)
CHLORIDE SERPL-SCNC: 108 MMOL/L (ref 100–108)
CO2 SERPL-SCNC: 27 MMOL/L (ref 21–32)
CREAT SERPL-MCNC: 1.02 MG/DL (ref 0.6–1.3)
EOSINOPHIL # BLD AUTO: 0.07 THOUSAND/ΜL (ref 0–0.61)
EOSINOPHIL NFR BLD AUTO: 2 % (ref 0–6)
ERYTHROCYTE [DISTWIDTH] IN BLOOD BY AUTOMATED COUNT: 15.5 % (ref 11.6–15.1)
GFR SERPL CREATININE-BSD FRML MDRD: 68 ML/MIN/1.73SQ M
GLUCOSE SERPL-MCNC: 82 MG/DL (ref 65–140)
HCT VFR BLD AUTO: 34.6 % (ref 36.5–49.3)
HGB BLD-MCNC: 11.1 G/DL (ref 12–17)
IGA SERPL-MCNC: 64 MG/DL (ref 70–400)
IGG SERPL-MCNC: 726 MG/DL (ref 700–1600)
IGM SERPL-MCNC: 28 MG/DL (ref 40–230)
IMM GRANULOCYTES # BLD AUTO: 0.03 THOUSAND/UL (ref 0–0.2)
IMM GRANULOCYTES NFR BLD AUTO: 1 % (ref 0–2)
LYMPHOCYTES # BLD AUTO: 0.64 THOUSANDS/ΜL (ref 0.6–4.47)
LYMPHOCYTES NFR BLD AUTO: 14 % (ref 14–44)
MCH RBC QN AUTO: 32.1 PG (ref 26.8–34.3)
MCHC RBC AUTO-ENTMCNC: 32.1 G/DL (ref 31.4–37.4)
MCV RBC AUTO: 100 FL (ref 82–98)
MONOCYTES # BLD AUTO: 0.37 THOUSAND/ΜL (ref 0.17–1.22)
MONOCYTES NFR BLD AUTO: 8 % (ref 4–12)
NEUTROPHILS # BLD AUTO: 3.5 THOUSANDS/ΜL (ref 1.85–7.62)
NEUTS SEG NFR BLD AUTO: 74 % (ref 43–75)
NRBC BLD AUTO-RTO: 0 /100 WBCS
PLATELET # BLD AUTO: 109 THOUSANDS/UL (ref 149–390)
PMV BLD AUTO: 9.8 FL (ref 8.9–12.7)
POTASSIUM SERPL-SCNC: 3.5 MMOL/L (ref 3.5–5.3)
PROT SERPL-MCNC: 6.2 G/DL (ref 6.4–8.2)
RBC # BLD AUTO: 3.46 MILLION/UL (ref 3.88–5.62)
SODIUM SERPL-SCNC: 138 MMOL/L (ref 136–145)
WBC # BLD AUTO: 4.65 THOUSAND/UL (ref 4.31–10.16)

## 2019-09-09 PROCEDURE — 99214 OFFICE O/P EST MOD 30 MIN: CPT | Performed by: INTERNAL MEDICINE

## 2019-09-09 PROCEDURE — 83883 ASSAY NEPHELOMETRY NOT SPEC: CPT

## 2019-09-09 PROCEDURE — 80053 COMPREHEN METABOLIC PANEL: CPT

## 2019-09-09 PROCEDURE — 85025 COMPLETE CBC W/AUTO DIFF WBC: CPT

## 2019-09-09 PROCEDURE — 82784 ASSAY IGA/IGD/IGG/IGM EACH: CPT

## 2019-09-09 PROCEDURE — 36415 COLL VENOUS BLD VENIPUNCTURE: CPT

## 2019-09-09 NOTE — PROGRESS NOTES
Progress note - Pulmonary Medicine   Roni Krueger 81 y o  male MRN: [id number]       Impression & Plan:     COPD (chronic obstructive pulmonary disease) ([facility] [address] )  · Symptoms currently stable  · Recently got over a bout of bronchitis  · Using nebulized Atrovent 3-4 times daily with good response  · Does have some chronic cough, clearing of his throat, and clear mucus production    Adenocarcinoma, lung ([facility] [address] )  · No evidence of recurrence as of the last CT scan in March  · Would defer repeat CT imaging of the chest until March 2020    Lorenzo Mendez will follow up with me in 4 months  ______________________________________________________________________    HPI:    Roni Krueger presents today for follow-up of chronic cough and phlegm production with COPD and history of adenocarcinoma of the lung  He has been doing well since I saw him last   It is been close to 6 months since the last visit  He does have a chronic cough and produces some clear phlegm but this has been under reasonable control with the use of ipratropium nebulizer and occasional guaifenesin  He has not required any additional agents, steroid courses or other medications in the interim  He is followed closely by Oncology  This is for his multiple myeloma  Recently his protein counts of been reasonable  He reports no new symptoms from that standpoint  He also has a history of lung cancer status post resection and has not demonstrated any evidence of recurrence  He has had a chronically abnormal CT scan but the most recent CT scan in March suggested stability and actual improvement in 1 of the areas of concern    Unfortunately has been under fair amount of stress lately as his wife required emergent cardiac surgery  She is doing better and is undergoing cardiac rehab  He has not been able to participate in his pulmonary rehabilitation program during her illness however  Lower extremity edema has been stable  Weight and appetite has been stable    No other new complaints today    Current Medications:    Current Outpatient Medications:     albuterol (PROVENTIL HFA,VENTOLIN HFA) 90 mcg/act inhaler, Inhale 2 puffs every 4 (four) hours as needed for wheezing, Disp: 1 Inhaler, Rfl: 6    apixaban (ELIQUIS) 5 mg, Take 1 tablet (5 mg total) by mouth 2 (two) times a day (Patient taking differently: Take 5 mg by mouth daily ), Disp: 60 tablet, Rfl: 3    cholecalciferol (VITAMIN D3) 1,000 units tablet, Take 5,000 Units by mouth daily  , Disp: , Rfl:     cyanocobalamin (VITAMIN B-12) 1,000 mcg tablet, Take by mouth daily, Disp: , Rfl:     diphenoxylate-atropine (LOMOTIL) 2 5-0 025 mg per tablet, Take 1 tablet by mouth 4 (four) times a day as needed for diarrhea, Disp: 30 tablet, Rfl: 0    finasteride (PROSCAR) 5 mg tablet, Take 1 tablet (5 mg total) by mouth daily at bedtime, Disp: 90 tablet, Rfl: 3    guaiFENesin (MUCINEX) 600 mg 12 hr tablet, Take 1 tablet (600 mg total) by mouth every 12 (twelve) hours (Patient taking differently: Take 600 mg by mouth every 12 (twelve) hours as needed  ), Disp: 60 tablet, Rfl: 0    ipratropium (ATROVENT) 0 02 % nebulizer solution, Take 1 vial (0 5 mg total) by nebulization 4 (four) times a day, Disp: 300 mL, Rfl: 6    Lactobacillus-Inulin ([facility]), Take by mouth daily, Disp: , Rfl:     lenalidomide (REVLIMID) 5 MG CAPS, 3 weeks on/3 weeks off adult male/auth#[id number] 8/30/19, Disp: 21 capsule, Rfl: 0    lidocaine-prilocaine (EMLA) cream, APPLY TO AREA AND COVER WITH TAPE 1 HOUR BEFORE APPOINTMENT , Disp: , Rfl: 2    melphalan (ALKERAN) 2 MG tablet, Take 12 mg daily for 4 days every 6 weeks, Disp: 24 tablet, Rfl: 3    predniSONE 5 mg tablet, Take 1 tablet (5 mg total) by mouth daily, Disp: 90 tablet, Rfl: 3    RAPAFLO 8 MG CAPS, Take 1 capsule by mouth daily at bedtime  , Disp: , Rfl: 3    Review of Systems:  Review of Systems   Constitutional: Positive for fatigue   Negative for activity change, appetite change and unexpected weight change  HENT: Positive for congestion and postnasal drip  Respiratory: Positive for cough and shortness of breath  Cardiovascular: Positive for leg swelling  Negative for chest pain  Gastrointestinal: Negative  Musculoskeletal: Positive for gait problem  Allergic/Immunologic: Positive for environmental allergies  Neurological: Negative for speech difficulty, weakness and headaches  Psychiatric/Behavioral: The patient is not nervous/anxious  All other systems reviewed and are negative  Past medical history, surgical history, and family history were reviewed and updated as appropriate    Social history updates:  Social History     Tobacco Use   Smoking Status Former Smoker    Packs/day: 1 00    Years: 50 00    Pack years: 50 00    Types: Cigarettes    Start date: 57    Last attempt to quit: 2010    Years since quittin 7   Smokeless Tobacco Never Used   Tobacco Comment    quit in        PhysicalExamination:  Vitals:   /58 (BP Location: Left arm, Patient Position: Sitting, Cuff Size: Standard)   Pulse 101   Temp 97 7 °F (36 5 °C) (Tympanic)   Resp 16   Ht 5' 7" (1 702 m)   Wt 82 9 kg (182 lb 12 8 oz)   SpO2 97%   BMI 28 63 kg/m²     Gen: Comfortable  Non-labored  HEENT: PERRL  O/P: clear  moist  Neck: Trachea is midline  No JVD  No adenopathy  Chest:  Few coarse breath sounds which clear with cough  Limited chest wall excursion  Cardiac:  Heart tones are slightly distant  There is a late peaking murmur of aortic stenosis  Abdomen: Soft and nontender  Bowel sounds are present  Extremities:  3+ pedal and pretibial edema  Neuro:  Awake alert and pleasant  Nonfocal    Diagnostic Data:  Labs:   I personally reviewed the most recent laboratory data pertinent to today's visit    Lab Results   Component Value Date    WBC 6 30 2019    HGB 12 4 2019    HCT 38 9 2019    MCV 99 (H) 2019     (L) 07/29/2019     Lab Results   Component Value Date    SODIUM 139 07/29/2019    K 4 0 07/29/2019    CO2 27 07/29/2019     07/29/2019    BUN 21 07/29/2019    CREATININE 1 21 07/29/2019    CALCIUM 9 0 07/29/2019       Imaging:  I personally reviewed the images on the [facility] system pertinent to today's visit  Most recent CT scan of the chest was from March 2019  No evidence of lung cancer recurrence  Evidence of prior radiation  Left lower lobe nodule much less prominent    Echocardiogram:  Ejection fraction 55% with grade 1 diastolic dysfunction    Calcific Aortic stenosis with a peak velocity of 3 2 centimeters/second, gradient 19 mmHg, and aortic valve area 1 1 centimeter squared    Agus Patrick MD

## 2019-09-09 NOTE — ASSESSMENT & PLAN NOTE
· Symptoms currently stable  · Recently got over a bout of bronchitis  · Using nebulized Atrovent 3-4 times daily with good response  · Does have some chronic cough, clearing of his throat, and clear mucus production

## 2019-09-09 NOTE — ASSESSMENT & PLAN NOTE
· No evidence of recurrence as of the last CT scan in March  · Would defer repeat CT imaging of the chest until March 2020

## 2019-09-10 LAB
KAPPA LC FREE SER-MCNC: 11.8 MG/L (ref 3.3–19.4)
KAPPA LC FREE/LAMBDA FREE SER: 0.48 {RATIO} (ref 0.26–1.65)
LAMBDA LC FREE SERPL-MCNC: 24.6 MG/L (ref 5.7–26.3)

## 2019-09-11 ENCOUNTER — HOSPITAL ENCOUNTER (OUTPATIENT)
Dept: INFUSION CENTER | Facility: CLINIC | Age: 84
Discharge: HOME | End: 2019-09-11

## 2019-09-12 ENCOUNTER — OFFICE VISIT (OUTPATIENT)
Dept: HEMATOLOGY ONCOLOGY | Facility: CLINIC | Age: 84
End: 2019-09-12
Payer: MEDICARE

## 2019-09-12 VITALS
TEMPERATURE: 97.7 F | BODY MASS INDEX: 28.35 KG/M2 | WEIGHT: 180.6 LBS | RESPIRATION RATE: 14 BRPM | HEART RATE: 96 BPM | DIASTOLIC BLOOD PRESSURE: 56 MMHG | HEIGHT: 67 IN | SYSTOLIC BLOOD PRESSURE: 104 MMHG

## 2019-09-12 DIAGNOSIS — C90.01 MULTIPLE MYELOMA IN REMISSION (HCC): Primary | ICD-10-CM

## 2019-09-12 DIAGNOSIS — C20 RECTAL CANCER (HCC): ICD-10-CM

## 2019-09-12 DIAGNOSIS — C34.91 ADENOCARCINOMA OF RIGHT LUNG (HCC): Chronic | ICD-10-CM

## 2019-09-12 PROCEDURE — 1124F ACP DISCUSS-NO DSCNMKR DOCD: CPT | Performed by: INTERNAL MEDICINE

## 2019-09-12 PROCEDURE — 99214 OFFICE O/P EST MOD 30 MIN: CPT | Performed by: INTERNAL MEDICINE

## 2019-09-12 NOTE — PROGRESS NOTES
Hematology Outpatient Follow - Up Note  Christiano Armas 81 y o  male MRN: @ Encounter: [id number]        Date:  9/12/2019        Assessment/ Plan:    Multiple myeloma, lambda light chain, heavily pretreated, stage III currently on lenalidomide 5 milligram p o  Daily 3 weeks on 3 weeks off, melphalan 12 milligram p o  Daily for 4 days every 6 weeks, prednisone 5 milligram p o  Daily to prevent exacerbation of COPD, lambda light chain back to normal, continue treatment    Hypogammaglobinemia no need for IVIG    Pulmonary emboli currently on apixaban 5 milligram p o  Daily the dose was reduced because of hematuria and epistaxis    History of rectal adenocarcinoma stage I followed by Colorectal surgery    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 followed by Pulmonary service status post resection and chemotherapy with radiation      HPI:#1  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, multiple compression fractures, status post kyphoplasty to L1, L2, L5, treated with radiation therapy to the left intra trochanteric area, received melphalan/Velcade/prednisone in October 2010 for 4 cycles with excellent response and normalization of lambda light chain, had been on Velcade maintenance till January 2013 when lambda light chain went up to 111,treated with Velcade 1 3 mg meter square weekly 3 weeks on one week off, Decadron 20 mg by mouth weekly, lambda light chain down to 30 then creeping up to 110, on Revlimid 15 mg by mouth every other day  Lambda light chain normal at 28 however he reported skin rash on the upper chest area we stopped the Revlimid for 2 month  Then reinitiated Revlimid 5 mg by mouth daily     #2  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 stage I, with progression by scan, status post resection of the right upper lobe with few foci in the same lobe consistent with stage IIIA done in January 2015 adenocarcinoma, well differentiated   Status post Alimta/carboplatin x4 cycles finished in May 2015 and radiation therapy     #3  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     #4  Rectal adenocarcinoma status post resection stage I in November 2017     #5  Pulmonary embolism involving the subsegmental left lower lobe in December 2018, currently on apixaban 5 mg once a day because of epistaxis     #6  Hematuria secondary to benign prostatic hypertrophy and small bladder stones and being on apixaban, apixaban was reduced to 5 milligram once a day      Interval History:  Exertional dyspnea, wheezing        Previous Treatment:         Test Results:    Imaging: No results found  Labs:   Lab Results   Component Value Date    WBC 4 65 09/09/2019    HGB 11 1 (L) 09/09/2019    HCT 34 6 (L) 09/09/2019     (H) 09/09/2019     (L) 09/09/2019     Lab Results   Component Value Date     01/04/2016    K 3 5 09/09/2019     09/09/2019    CO2 27 09/09/2019    ANIONGAP 7 01/04/2016    BUN 15 09/09/2019    CREATININE 1 02 09/09/2019    GLUCOSE 88 01/04/2016    GLUF 107 (H) 07/29/2019    CALCIUM 8 8 09/09/2019    AST 10 09/09/2019    ALT 21 09/09/2019    ALKPHOS 76 09/09/2019    PROT 6 8 01/04/2016    BILITOT 0 63 01/04/2016    EGFR 68 09/09/2019    Lambda light chain 24 6 (5 7-26  3)    No results found for: IRON, TIBC, FERRITIN    No results found for: [id number]      ROS:   Review of Systems   Constitutional: Negative for activity change, appetite change, chills, diaphoresis, fatigue, fever and unexpected weight change  HENT: Negative for congestion, dental problem, facial swelling, hearing loss, mouth sores, nosebleeds, postnasal drip, rhinorrhea, sore throat, trouble swallowing and voice change  Eyes: Negative for photophobia, pain, discharge, redness, itching and visual disturbance  Respiratory: Positive for cough, shortness of breath and wheezing  Negative for choking and chest tightness      Cardiovascular: Positive for leg swelling  Negative for chest pain and palpitations  Gastrointestinal: Negative for abdominal distention, abdominal pain, anal bleeding, blood in stool, constipation, diarrhea, nausea, rectal pain and vomiting  Endocrine: Negative for cold intolerance and heat intolerance  Genitourinary: Negative for decreased urine volume, difficulty urinating, dysuria, flank pain, frequency, hematuria and urgency  Musculoskeletal: Negative for arthralgias, back pain, gait problem, joint swelling, myalgias, neck pain and neck stiffness  Skin: Negative for color change, pallor, rash and wound  Allergic/Immunologic: Negative for immunocompromised state  Neurological: Negative for dizziness, tremors, seizures, syncope, facial asymmetry, speech difficulty, weakness, light-headedness, numbness and headaches  Hematological: Negative for adenopathy  Does not bruise/bleed easily  Psychiatric/Behavioral: Negative for agitation, confusion, decreased concentration, dysphoric mood and sleep disturbance  The patient is not nervous/anxious  All other systems reviewed and are negative  Current Medications: Reviewed  Allergies: Reviewed  PMH/FH/SH:  Reviewed      Physical Exam:    Body surface area is 1 94 meters squared  Wt Readings from Last 3 Encounters:   09/12/19 81 9 kg (180 lb 9 6 oz)   09/09/19 82 9 kg (182 lb 12 8 oz)   09/04/19 82 1 kg (181 lb)        Temp Readings from Last 3 Encounters:   09/12/19 97 7 °F (36 5 °C)   09/09/19 97 7 °F (36 5 °C) (Tympanic)   08/02/19 97 6 °F (36 4 °C)        BP Readings from Last 3 Encounters:   09/12/19 104/56   09/09/19 102/58   09/04/19 108/67         Pulse Readings from Last 3 Encounters:   09/12/19 96   09/09/19 101   09/04/19 83        Physical Exam   Constitutional: He is oriented to person, place, and time  He appears well-developed and well-nourished  No distress  HENT:   Head: Normocephalic and atraumatic     Eyes: Conjunctivae are normal    Neck: Normal range of motion  Neck supple  No tracheal deviation present  Cardiovascular: Normal rate and regular rhythm  Exam reveals no gallop and no friction rub  Murmur heard  Pulmonary/Chest: Effort normal  No respiratory distress  He has wheezes ( in the left lower lobe)  He has no rales  He exhibits no tenderness  Abdominal: Soft  He exhibits no distension  There is no tenderness  Musculoskeletal: He exhibits edema (Plus three edema in the left lower extremity +2 edema in the right lower extremity)  Lymphadenopathy:     He has no cervical adenopathy  Neurological: He is alert and oriented to person, place, and time  Skin: Skin is warm and dry  He is not diaphoretic  No erythema  No pallor  Psychiatric: He has a normal mood and affect  His behavior is normal  Judgment and thought content normal    Vitals reviewed  ECO    Goals and Barriers:  Current Goal: Minimize effects of disease  Barriers: None  Patient's Capacity to Self Care:  Patient is able to self care      Code Status: [unfilled]

## 2019-09-19 ENCOUNTER — TELEPHONE (OUTPATIENT)
Dept: PULMONOLOGY | Age: 84
End: 2019-09-19

## 2019-09-23 ENCOUNTER — CLINICAL SUPPORT (OUTPATIENT)
Dept: PULMONOLOGY | Age: 84
End: 2019-09-23
Payer: MEDICARE

## 2019-09-23 DIAGNOSIS — J44.9 CHRONIC OBSTRUCTIVE PULMONARY DISEASE, UNSPECIFIED COPD TYPE (HCC): Chronic | ICD-10-CM

## 2019-09-23 PROCEDURE — G0424 PULMONARY REHAB W EXER: HCPCS

## 2019-09-25 ENCOUNTER — CLINICAL SUPPORT (OUTPATIENT)
Dept: PULMONOLOGY | Age: 84
End: 2019-09-25
Payer: MEDICARE

## 2019-09-25 DIAGNOSIS — J44.9 CHRONIC OBSTRUCTIVE PULMONARY DISEASE, UNSPECIFIED COPD TYPE (HCC): Chronic | ICD-10-CM

## 2019-09-25 PROCEDURE — G0424 PULMONARY REHAB W EXER: HCPCS

## 2019-09-27 ENCOUNTER — CLINICAL SUPPORT (OUTPATIENT)
Dept: PULMONOLOGY | Age: 84
End: 2019-09-27
Payer: MEDICARE

## 2019-09-27 DIAGNOSIS — J44.9 CHRONIC OBSTRUCTIVE PULMONARY DISEASE, UNSPECIFIED COPD TYPE (HCC): Chronic | ICD-10-CM

## 2019-09-27 PROCEDURE — G0424 PULMONARY REHAB W EXER: HCPCS

## 2019-09-30 ENCOUNTER — APPOINTMENT (OUTPATIENT)
Dept: PULMONOLOGY | Age: 84
End: 2019-09-30
Payer: MEDICARE

## 2019-10-02 ENCOUNTER — CLINICAL SUPPORT (OUTPATIENT)
Dept: PULMONOLOGY | Age: 84
End: 2019-10-02
Payer: MEDICARE

## 2019-10-02 DIAGNOSIS — J44.9 CHRONIC OBSTRUCTIVE PULMONARY DISEASE, UNSPECIFIED COPD TYPE (HCC): Chronic | ICD-10-CM

## 2019-10-02 PROCEDURE — G0424 PULMONARY REHAB W EXER: HCPCS

## 2019-10-04 ENCOUNTER — CLINICAL SUPPORT (OUTPATIENT)
Dept: PULMONOLOGY | Age: 84
End: 2019-10-04
Payer: MEDICARE

## 2019-10-04 DIAGNOSIS — J44.9 CHRONIC OBSTRUCTIVE PULMONARY DISEASE, UNSPECIFIED COPD TYPE (HCC): Chronic | ICD-10-CM

## 2019-10-04 PROCEDURE — G0424 PULMONARY REHAB W EXER: HCPCS

## 2019-10-07 ENCOUNTER — CLINICAL SUPPORT (OUTPATIENT)
Dept: PULMONOLOGY | Age: 84
End: 2019-10-07
Payer: MEDICARE

## 2019-10-07 DIAGNOSIS — J44.9 CHRONIC OBSTRUCTIVE PULMONARY DISEASE, UNSPECIFIED COPD TYPE (HCC): Chronic | ICD-10-CM

## 2019-10-07 PROCEDURE — G0424 PULMONARY REHAB W EXER: HCPCS

## 2019-10-09 ENCOUNTER — CLINICAL SUPPORT (OUTPATIENT)
Dept: PULMONOLOGY | Age: 84
End: 2019-10-09
Payer: MEDICARE

## 2019-10-09 DIAGNOSIS — J44.9 CHRONIC OBSTRUCTIVE PULMONARY DISEASE, UNSPECIFIED COPD TYPE (HCC): Chronic | ICD-10-CM

## 2019-10-09 PROCEDURE — G0424 PULMONARY REHAB W EXER: HCPCS

## 2019-10-11 ENCOUNTER — APPOINTMENT (OUTPATIENT)
Dept: PULMONOLOGY | Age: 84
End: 2019-10-11
Payer: MEDICARE

## 2019-10-14 ENCOUNTER — APPOINTMENT (OUTPATIENT)
Dept: PULMONOLOGY | Age: 84
End: 2019-10-14
Payer: MEDICARE

## 2019-10-14 ENCOUNTER — APPOINTMENT (OUTPATIENT)
Dept: LAB | Facility: MEDICAL CENTER | Age: 84
End: 2019-10-14
Payer: MEDICARE

## 2019-10-14 DIAGNOSIS — C34.91 ADENOCARCINOMA OF RIGHT LUNG (HCC): Chronic | ICD-10-CM

## 2019-10-14 DIAGNOSIS — C90.01 MULTIPLE MYELOMA IN REMISSION (HCC): ICD-10-CM

## 2019-10-14 DIAGNOSIS — C20 RECTAL CANCER (HCC): ICD-10-CM

## 2019-10-14 LAB
ALBUMIN SERPL BCP-MCNC: 3.6 G/DL (ref 3.5–5)
ALP SERPL-CCNC: 73 U/L (ref 46–116)
ALT SERPL W P-5'-P-CCNC: 26 U/L (ref 12–78)
ANION GAP SERPL CALCULATED.3IONS-SCNC: 9 MMOL/L (ref 4–13)
AST SERPL W P-5'-P-CCNC: 16 U/L (ref 5–45)
BASOPHILS # BLD AUTO: 0.08 THOUSANDS/ΜL (ref 0–0.1)
BASOPHILS NFR BLD AUTO: 2 % (ref 0–1)
BILIRUB SERPL-MCNC: 0.96 MG/DL (ref 0.2–1)
BUN SERPL-MCNC: 15 MG/DL (ref 5–25)
CALCIUM SERPL-MCNC: 9.6 MG/DL (ref 8.3–10.1)
CHLORIDE SERPL-SCNC: 110 MMOL/L (ref 100–108)
CO2 SERPL-SCNC: 25 MMOL/L (ref 21–32)
CREAT SERPL-MCNC: 1.26 MG/DL (ref 0.6–1.3)
EOSINOPHIL # BLD AUTO: 0 THOUSAND/ΜL (ref 0–0.61)
EOSINOPHIL NFR BLD AUTO: 0 % (ref 0–6)
ERYTHROCYTE [DISTWIDTH] IN BLOOD BY AUTOMATED COUNT: 15.4 % (ref 11.6–15.1)
GFR SERPL CREATININE-BSD FRML MDRD: 52 ML/MIN/1.73SQ M
GLUCOSE SERPL-MCNC: 112 MG/DL (ref 65–140)
HCT VFR BLD AUTO: 35.2 % (ref 36.5–49.3)
HGB BLD-MCNC: 11.2 G/DL (ref 12–17)
IGA SERPL-MCNC: 70 MG/DL (ref 70–400)
IGG SERPL-MCNC: 678 MG/DL (ref 700–1600)
IGM SERPL-MCNC: 24 MG/DL (ref 40–230)
IMM GRANULOCYTES # BLD AUTO: 0.02 THOUSAND/UL (ref 0–0.2)
IMM GRANULOCYTES NFR BLD AUTO: 0 % (ref 0–2)
LYMPHOCYTES # BLD AUTO: 0.59 THOUSANDS/ΜL (ref 0.6–4.47)
LYMPHOCYTES NFR BLD AUTO: 13 % (ref 14–44)
MCH RBC QN AUTO: 32.5 PG (ref 26.8–34.3)
MCHC RBC AUTO-ENTMCNC: 31.8 G/DL (ref 31.4–37.4)
MCV RBC AUTO: 102 FL (ref 82–98)
MONOCYTES # BLD AUTO: 0.38 THOUSAND/ΜL (ref 0.17–1.22)
MONOCYTES NFR BLD AUTO: 9 % (ref 4–12)
NEUTROPHILS # BLD AUTO: 3.38 THOUSANDS/ΜL (ref 1.85–7.62)
NEUTS SEG NFR BLD AUTO: 76 % (ref 43–75)
NRBC BLD AUTO-RTO: 0 /100 WBCS
PLATELET # BLD AUTO: 106 THOUSANDS/UL (ref 149–390)
PMV BLD AUTO: 9.9 FL (ref 8.9–12.7)
POTASSIUM SERPL-SCNC: 4.4 MMOL/L (ref 3.5–5.3)
PROT SERPL-MCNC: 6.3 G/DL (ref 6.4–8.2)
RBC # BLD AUTO: 3.45 MILLION/UL (ref 3.88–5.62)
SODIUM SERPL-SCNC: 144 MMOL/L (ref 136–145)
WBC # BLD AUTO: 4.45 THOUSAND/UL (ref 4.31–10.16)

## 2019-10-14 PROCEDURE — 85025 COMPLETE CBC W/AUTO DIFF WBC: CPT

## 2019-10-14 PROCEDURE — 36415 COLL VENOUS BLD VENIPUNCTURE: CPT

## 2019-10-14 PROCEDURE — 82784 ASSAY IGA/IGD/IGG/IGM EACH: CPT

## 2019-10-14 PROCEDURE — 80053 COMPREHEN METABOLIC PANEL: CPT

## 2019-10-14 PROCEDURE — 83883 ASSAY NEPHELOMETRY NOT SPEC: CPT

## 2019-10-15 ENCOUNTER — CLINICAL SUPPORT (OUTPATIENT)
Dept: PULMONOLOGY | Age: 84
End: 2019-10-15
Payer: MEDICARE

## 2019-10-15 DIAGNOSIS — J44.9 CHRONIC OBSTRUCTIVE PULMONARY DISEASE, UNSPECIFIED COPD TYPE (HCC): Primary | Chronic | ICD-10-CM

## 2019-10-15 LAB
KAPPA LC FREE SER-MCNC: 12.6 MG/L (ref 3.3–19.4)
KAPPA LC FREE/LAMBDA FREE SER: 0.58 {RATIO} (ref 0.26–1.65)
LAMBDA LC FREE SERPL-MCNC: 21.8 MG/L (ref 5.7–26.3)

## 2019-10-15 PROCEDURE — G0424 PULMONARY REHAB W EXER: HCPCS

## 2019-10-16 ENCOUNTER — CLINICAL SUPPORT (OUTPATIENT)
Dept: PULMONOLOGY | Age: 84
End: 2019-10-16
Payer: MEDICARE

## 2019-10-16 DIAGNOSIS — J44.9 CHRONIC OBSTRUCTIVE PULMONARY DISEASE, UNSPECIFIED COPD TYPE (HCC): Chronic | ICD-10-CM

## 2019-10-16 PROCEDURE — G0424 PULMONARY REHAB W EXER: HCPCS

## 2019-10-17 ENCOUNTER — OFFICE VISIT (OUTPATIENT)
Dept: HEMATOLOGY ONCOLOGY | Facility: CLINIC | Age: 84
End: 2019-10-17
Payer: MEDICARE

## 2019-10-17 VITALS
BODY MASS INDEX: 28.25 KG/M2 | RESPIRATION RATE: 18 BRPM | TEMPERATURE: 97.6 F | DIASTOLIC BLOOD PRESSURE: 70 MMHG | SYSTOLIC BLOOD PRESSURE: 120 MMHG | HEIGHT: 67 IN | OXYGEN SATURATION: 98 % | HEART RATE: 91 BPM | WEIGHT: 180 LBS

## 2019-10-17 DIAGNOSIS — D80.9 IMMUNOGLOBULIN DEFICIENCY (HCC): ICD-10-CM

## 2019-10-17 DIAGNOSIS — C90.01 MULTIPLE MYELOMA IN REMISSION (HCC): Primary | ICD-10-CM

## 2019-10-17 DIAGNOSIS — K52.9 CHRONIC DIARRHEA: ICD-10-CM

## 2019-10-17 DIAGNOSIS — C34.91 ADENOCARCINOMA OF RIGHT LUNG (HCC): Chronic | ICD-10-CM

## 2019-10-17 PROCEDURE — 99214 OFFICE O/P EST MOD 30 MIN: CPT | Performed by: INTERNAL MEDICINE

## 2019-10-17 NOTE — PROGRESS NOTES
Hematology Outpatient Follow - Up Note  Mireya Dye 81 y o  male MRN: @ Encounter: [id number]        Date:  10/17/2019        Assessment/ Plan:    1  Lambda light chain multiple myeloma heavily pretreated stage III currently on lenalidomide 5 mg p  O  Daily 3 weeks on 3 weeks off, melphalan 12 mg p o  Daily for 4 days every 6 weeks, prednisone 5 mg p o  Daily, lambda light chain back to normal, continue treatment    Hypogammaglobinemia, no need for IVIG    Pulmonary emboli, apixaban 5 mg p o  Daily the dose was reduced to once a day for epistaxis, hematuria and expense is    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s status post resection with chemotherapy and radiation therapy    COPD followed by Pulmonary    Follow-up in 6 weeks with CBC, CMP, free light chain, quantitative immunoglobulins             HPI:  :#1  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, multiple compression fractures, status post kyphoplasty to L1, L2, L5, treated with radiation therapy to the left intra trochanteric area, received melphalan/Velcade/prednisone in October 2010 for 4 cycles with excellent response and normalization of lambda light chain, had been on Velcade maintenance till January 2013 when lambda light chain went up to 111,treated with Velcade 1 3 mg meter square weekly 3 weeks on one week off, Decadron 20 mg by mouth weekly, lambda light chain down to 30 then creeping up to 110, on Revlimid 15 mg by mouth every other day  Lambda light chain normal at 28 however he reported skin rash on the upper chest area we stopped the Revlimid for 2 month  Then reinitiated Revlimid 5 mg by mouth daily     #2  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 stage I, with progression by scan, status post resection of the right upper lobe with few foci in the same lobe consistent with stage IIIA done in January 2015 adenocarcinoma, well differentiated   Status post Alimta/carboplatin x4 cycles finished in May 2015 and radiation therapy     #3  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     #4  Rectal adenocarcinoma status post resection stage I in November 2017     #5  Pulmonary embolism involving the subsegmental left lower lobe in December 2018, currently on apixaban 5 mg once a day because of epistaxis     #6  Hematuria secondary to benign prostatic hypertrophy and small bladder stones and being on apixaban, apixaban was reduced to 5 milligram once a day      Interval History:  He is on physical therapy, he still have edema of the lower extremities more pronounced on the left than the right       Previous Treatment:         Test Results:    Imaging: No results found  Labs:   Lab Results   Component Value Date    WBC 4 45 10/14/2019    HGB 11 2 (L) 10/14/2019    HCT 35 2 (L) 10/14/2019     (H) 10/14/2019     (L) 10/14/2019     Lab Results   Component Value Date     01/04/2016    K 4 4 10/14/2019     (H) 10/14/2019    CO2 25 10/14/2019    ANIONGAP 7 01/04/2016    BUN 15 10/14/2019    CREATININE 1 26 10/14/2019    GLUCOSE 88 01/04/2016    GLUF 107 (H) 07/29/2019    CALCIUM 9 6 10/14/2019    AST 16 10/14/2019    ALT 26 10/14/2019    ALKPHOS 73 10/14/2019    PROT 6 8 01/04/2016    BILITOT 0 63 01/04/2016    EGFR 52 10/14/2019     IgA 70, IgG 678, IgM 24, kappa light chain 12, lambda light chain 21  No results found for: IRON, TIBC, FERRITIN    No results found for: [id number]      ROS:   Review of Systems   Constitutional: Positive for fatigue  Negative for activity change, appetite change, chills, diaphoresis, fever and unexpected weight change  HENT: Negative for congestion, dental problem, facial swelling, hearing loss, mouth sores, nosebleeds, postnasal drip, rhinorrhea, sore throat, trouble swallowing and voice change  Eyes: Negative for photophobia, pain, discharge, redness, itching and visual disturbance     Respiratory: Positive for cough and shortness of breath  Negative for choking, chest tightness and wheezing  Cardiovascular: Positive for leg swelling  Negative for chest pain and palpitations  Gastrointestinal: Negative for abdominal distention, abdominal pain, anal bleeding, blood in stool, constipation, diarrhea, nausea, rectal pain and vomiting  Endocrine: Negative for cold intolerance and heat intolerance  Genitourinary: Negative for decreased urine volume, difficulty urinating, dysuria, flank pain, frequency, hematuria and urgency  Musculoskeletal: Negative for arthralgias, back pain, gait problem, joint swelling, myalgias, neck pain and neck stiffness  Skin: Negative for color change, pallor, rash and wound  Allergic/Immunologic: Negative for immunocompromised state  Neurological: Negative for dizziness, tremors, seizures, syncope, facial asymmetry, speech difficulty, weakness, light-headedness, numbness and headaches  Hematological: Negative for adenopathy  Does not bruise/bleed easily  Psychiatric/Behavioral: Negative for agitation, confusion, decreased concentration, dysphoric mood and sleep disturbance  The patient is not nervous/anxious  All other systems reviewed and are negative  Current Medications: Reviewed  Allergies: Reviewed  PMH/FH/SH:  Reviewed      Physical Exam:    Body surface area is 1 93 meters squared  Wt Readings from Last 3 Encounters:   10/17/19 81 6 kg (180 lb)   09/12/19 81 9 kg (180 lb 9 6 oz)   09/09/19 82 9 kg (182 lb 12 8 oz)        Temp Readings from Last 3 Encounters:   10/17/19 97 6 °F (36 4 °C) (Tympanic)   09/12/19 97 7 °F (36 5 °C)   09/09/19 97 7 °F (36 5 °C) (Tympanic)        BP Readings from Last 3 Encounters:   10/17/19 120/70   09/12/19 104/56   09/09/19 102/58         Pulse Readings from Last 3 Encounters:   10/17/19 91   09/12/19 96   09/09/19 101        Physical Exam   Constitutional: He is oriented to person, place, and time   He appears well-developed and well-nourished  No distress  Using cane for ambulation   HENT:   Head: Normocephalic and atraumatic  Eyes: Conjunctivae are normal    Neck: Normal range of motion  Neck supple  No tracheal deviation present  Cardiovascular: Normal rate and regular rhythm  Exam reveals no gallop and no friction rub  Murmur heard  Pulmonary/Chest: Effort normal  No respiratory distress  He has wheezes  He has no rales  He exhibits no tenderness  Rhonchi in both bases   Abdominal: Soft  He exhibits no distension  There is no tenderness  Musculoskeletal: He exhibits edema (Plus three edema in the left lower extremity +2 edema in the right lower extremity)  Lymphadenopathy:     He has no cervical adenopathy  Neurological: He is alert and oriented to person, place, and time  Skin: Skin is warm and dry  He is not diaphoretic  No erythema  No pallor  Psychiatric: He has a normal mood and affect  His behavior is normal  Judgment and thought content normal    Vitals reviewed  ECOG:    Goals and Barriers:  Current Goal: Minimize effects of disease  Barriers: None  Patient's Capacity to Self Care:  Patient is able to self care      Code Status: [facility]

## 2019-10-18 ENCOUNTER — CLINICAL SUPPORT (OUTPATIENT)
Dept: PULMONOLOGY | Age: 84
End: 2019-10-18
Payer: MEDICARE

## 2019-10-18 DIAGNOSIS — J44.9 CHRONIC OBSTRUCTIVE PULMONARY DISEASE, UNSPECIFIED COPD TYPE (HCC): Chronic | ICD-10-CM

## 2019-10-18 PROCEDURE — G0424 PULMONARY REHAB W EXER: HCPCS

## 2019-10-21 ENCOUNTER — CLINICAL SUPPORT (OUTPATIENT)
Dept: PULMONOLOGY | Age: 84
End: 2019-10-21
Payer: MEDICARE

## 2019-10-21 DIAGNOSIS — J44.9 CHRONIC OBSTRUCTIVE PULMONARY DISEASE, UNSPECIFIED COPD TYPE (HCC): Chronic | ICD-10-CM

## 2019-10-21 PROCEDURE — G0424 PULMONARY REHAB W EXER: HCPCS

## 2019-10-21 NOTE — PROGRESS NOTES
Pulmonary Rehabilitation Plan of Care   30 day           Today's date: 10/21/2019   Total visits to date: 6  Patient name: Nilesh Rossi      : 1935  Age: 81 y o  MRN: [id number]  Referring Physician: Mychal Ahn MD  Provider: Via Andrew Alvarado  Clinician: Delmis Kearns MS, CEP, CCRP    Dx:   Encounter Diagnosis   Name Primary?  Chronic obstructive pulmonary disease, unspecified COPD type ([facility] )      Date of onset:     COMMENTS:  Eduardo Diaz has complete 11 sessions of pulmonary rehab  He attends 3x/wk with his wife Micheline Cespedes in cardiac rehab  He has progressed his duration to 40  Continuous mins and tolerates 1 8-3 5 METs plus wt training  SaO2 Sat on RA @ rest 96% and maintains 93% - 95% with exercise on RA  He reports he feels increased arm and leg strength  Arijairowild Rubina states he is not faithful using his nebulizer 4x/day  He does not rely on his cane regularly - only with walking long distances  Resting BP 98/60 - 112/62 increasing appropriately with exercise reaching 128/62  He has an appt for his 30 day face to face with Dr Mandeep Melendrez on Wednesday, Oct  22 and plans to continue Pulmonary Rehab  We will continue to progress as tolerated to maintain RPE 4-6  I will encourage home walking 5 mins every hour - moving more throughout the day          Medication compliance: Yes   Comments: none  Fall Risk: low to Moderate   Comments: uses a cane walking distances  EXERCISE/ACTIVITY    Cardiopulmonary Goals:   Min: 30-50   HR:    RPE: 4-6 moderate to somewhat hard exercise   O2 sat: >90%    Modalities: Treadmill, UBE, NuStep and Recumbent bike  Strength trainin-3 days / week, 12-15 repitations  and 1-2 sets per modality    Modalities: Leg press, Chest press, Lateral raise, Arm curl and Seated Row    Exercise Progression: yes - progressed duration to 40 mins       RPE: 4-5  Home activity: little  Goals: home exercise days opposite [place] and >150 mins of exercise/wk  Education: Benefit of exercise, home exercise, pursed lip breathing and O2 saturation monitoring   Plan:home exercise target 30 mins, 2 days opposite HI and exercise education video  Readiness to change: Action    NUTRITION    Weight control:    Starting weight: 188    Current weight: 181  Diabetes: N/A  Goals:Improved Rate Your Plate score  Education:hydration, Heart Healthy eating class, How to read food Labels  portion control  Plan: avoid processed foods,   Readiness to change: Action    PSYCHOSOCIAL    Emotional: Patient reports he/she is coping well with good social support and denies depression or anxiety  Social support: good  Goals: Physical Fitness in [facility] Score < 3, Daily Activity in [facility] Score < 3, Overall Health in [facility] Score < 3 and Quality of Life in [facility] Score < 3   Education: Mental Health Education, benefits of positive support system  Plan: Anxiety and Lung disease  Readiness to change: Preparation    OTHER CORE COMPONENTS     Tobacco:   Social History     Tobacco Use   Smoking Status Former Smoker    Packs/day: 1 00    Years: 50 00    Pack years: 50 00    Types: Cigarettes    Start date: 56    Last attempt to quit: 2010    Years since quittin 8   Smokeless Tobacco Never Used   Tobacco Comment    quit in      Oxygen: room air  96%  Blood pressure:    Restin/60 - 112/62   Exercise:  128/62  Goals: consistent exercise >150 mins/wk  Education: Pulmonary Disease, physiology, Exercise, strength, flexibility, Conservation of energy, oxygen, breathing techniques, Mental Health, Diet,nutrition, Medication and Avoiding Infections  Plan: Exercise benefits, increase home activity    Readiness to change: Action

## 2019-10-23 ENCOUNTER — CLINICAL SUPPORT (OUTPATIENT)
Dept: PULMONOLOGY | Age: 84
End: 2019-10-23
Payer: MEDICARE

## 2019-10-23 ENCOUNTER — OFFICE VISIT (OUTPATIENT)
Dept: PULMONOLOGY | Facility: CLINIC | Age: 84
End: 2019-10-23

## 2019-10-23 ENCOUNTER — HOSPITAL ENCOUNTER (OUTPATIENT)
Dept: INFUSION CENTER | Facility: CLINIC | Age: 84
Discharge: HOME | End: 2019-10-23

## 2019-10-23 DIAGNOSIS — J44.9 CHRONIC OBSTRUCTIVE PULMONARY DISEASE, UNSPECIFIED COPD TYPE (HCC): Primary | Chronic | ICD-10-CM

## 2019-10-23 DIAGNOSIS — J44.9 CHRONIC OBSTRUCTIVE PULMONARY DISEASE, UNSPECIFIED COPD TYPE (HCC): Chronic | ICD-10-CM

## 2019-10-23 PROCEDURE — RECHECK: Performed by: INTERNAL MEDICINE

## 2019-10-23 PROCEDURE — G0424 PULMONARY REHAB W EXER: HCPCS

## 2019-10-23 NOTE — PROGRESS NOTES
Medical Director 30 day Pulmonary Rehabilitation Review    I certify that I have met with Yadira mcfarlane to provide a 30 day progress review of his/her pulmonary rehabilitation program at [address] [place] [address]    I have reviewed the most recent individualized treatment plan (ITP), outcomes assessment, and provided opportunity for discussion with the patient    Comments:  Doing well with current rehab plan    Continue with current treatment plan yes    Please provide the following modifications to the current treatment plan: Lorrain Scheuermann, MD

## 2019-10-24 ENCOUNTER — TELEPHONE (OUTPATIENT)
Dept: HEMATOLOGY ONCOLOGY | Facility: CLINIC | Age: 84
End: 2019-10-24

## 2019-10-24 DIAGNOSIS — C90.00 MULTIPLE MYELOMA, REMISSION STATUS UNSPECIFIED (HCC): ICD-10-CM

## 2019-10-24 RX ORDER — LENALIDOMIDE 5 MG/1
CAPSULE ORAL
Qty: 21 CAPSULE | Refills: 0 | Status: SHIPPED | OUTPATIENT
Start: 2019-10-24 | End: 2019-11-19 | Stop reason: SDUPTHER

## 2019-10-24 NOTE — TELEPHONE ENCOUNTER
Patient's wife Coco Joaquín called regarding a refill for lenalidomide (REVLIMID) 5 MG  She stated that [facility] [address] stated that they have been trying to get in contact with our office  Coco Yanes can be reached at [phone]

## 2019-10-25 ENCOUNTER — CLINICAL SUPPORT (OUTPATIENT)
Dept: PULMONOLOGY | Age: 84
End: 2019-10-25
Payer: MEDICARE

## 2019-10-25 DIAGNOSIS — J44.9 CHRONIC OBSTRUCTIVE PULMONARY DISEASE, UNSPECIFIED COPD TYPE (HCC): Chronic | ICD-10-CM

## 2019-10-25 PROCEDURE — G0424 PULMONARY REHAB W EXER: HCPCS

## 2019-10-28 ENCOUNTER — APPOINTMENT (OUTPATIENT)
Dept: PULMONOLOGY | Age: 84
End: 2019-10-28
Payer: MEDICARE

## 2019-10-30 ENCOUNTER — CLINICAL SUPPORT (OUTPATIENT)
Dept: PULMONOLOGY | Age: 84
End: 2019-10-30
Payer: MEDICARE

## 2019-10-30 DIAGNOSIS — J44.9 CHRONIC OBSTRUCTIVE PULMONARY DISEASE, UNSPECIFIED COPD TYPE (HCC): Chronic | ICD-10-CM

## 2019-10-30 PROCEDURE — G0424 PULMONARY REHAB W EXER: HCPCS

## 2019-11-01 ENCOUNTER — CLINICAL SUPPORT (OUTPATIENT)
Dept: PULMONOLOGY | Age: 84
End: 2019-11-01
Payer: MEDICARE

## 2019-11-01 DIAGNOSIS — J44.9 CHRONIC OBSTRUCTIVE PULMONARY DISEASE, UNSPECIFIED COPD TYPE (HCC): Chronic | ICD-10-CM

## 2019-11-01 PROCEDURE — G0424 PULMONARY REHAB W EXER: HCPCS

## 2019-11-04 ENCOUNTER — CLINICAL SUPPORT (OUTPATIENT)
Dept: PULMONOLOGY | Age: 84
End: 2019-11-04
Payer: MEDICARE

## 2019-11-04 DIAGNOSIS — J44.9 CHRONIC OBSTRUCTIVE PULMONARY DISEASE, UNSPECIFIED COPD TYPE (HCC): Chronic | ICD-10-CM

## 2019-11-04 PROCEDURE — G0424 PULMONARY REHAB W EXER: HCPCS

## 2019-11-06 ENCOUNTER — CLINICAL SUPPORT (OUTPATIENT)
Dept: PULMONOLOGY | Age: 84
End: 2019-11-06
Payer: MEDICARE

## 2019-11-06 DIAGNOSIS — J44.9 CHRONIC OBSTRUCTIVE PULMONARY DISEASE, UNSPECIFIED COPD TYPE (HCC): Chronic | ICD-10-CM

## 2019-11-06 PROCEDURE — G0424 PULMONARY REHAB W EXER: HCPCS

## 2019-11-08 ENCOUNTER — CLINICAL SUPPORT (OUTPATIENT)
Dept: PULMONOLOGY | Age: 84
End: 2019-11-08
Payer: MEDICARE

## 2019-11-08 DIAGNOSIS — J44.9 CHRONIC OBSTRUCTIVE PULMONARY DISEASE, UNSPECIFIED COPD TYPE (HCC): Chronic | ICD-10-CM

## 2019-11-08 PROCEDURE — G0424 PULMONARY REHAB W EXER: HCPCS

## 2019-11-11 ENCOUNTER — CLINICAL SUPPORT (OUTPATIENT)
Dept: PULMONOLOGY | Age: 84
End: 2019-11-11
Payer: MEDICARE

## 2019-11-11 DIAGNOSIS — J44.9 CHRONIC OBSTRUCTIVE PULMONARY DISEASE, UNSPECIFIED COPD TYPE (HCC): Chronic | ICD-10-CM

## 2019-11-11 PROCEDURE — G0424 PULMONARY REHAB W EXER: HCPCS

## 2019-11-13 ENCOUNTER — CLINICAL SUPPORT (OUTPATIENT)
Dept: PULMONOLOGY | Age: 84
End: 2019-11-13
Payer: MEDICARE

## 2019-11-13 ENCOUNTER — TELEPHONE (OUTPATIENT)
Dept: PULMONOLOGY | Facility: CLINIC | Age: 84
End: 2019-11-13

## 2019-11-13 DIAGNOSIS — J44.9 CHRONIC OBSTRUCTIVE PULMONARY DISEASE, UNSPECIFIED COPD TYPE (HCC): Chronic | ICD-10-CM

## 2019-11-13 PROCEDURE — G0424 PULMONARY REHAB W EXER: HCPCS

## 2019-11-15 ENCOUNTER — CLINICAL SUPPORT (OUTPATIENT)
Dept: PULMONOLOGY | Age: 84
End: 2019-11-15
Payer: MEDICARE

## 2019-11-15 DIAGNOSIS — J44.9 CHRONIC OBSTRUCTIVE PULMONARY DISEASE, UNSPECIFIED COPD TYPE (HCC): Chronic | ICD-10-CM

## 2019-11-15 PROCEDURE — G0424 PULMONARY REHAB W EXER: HCPCS

## 2019-11-18 ENCOUNTER — TRANSCRIBE ORDERS (OUTPATIENT)
Dept: ADMINISTRATIVE | Facility: HOSPITAL | Age: 84
End: 2019-11-18

## 2019-11-18 ENCOUNTER — CLINICAL SUPPORT (OUTPATIENT)
Dept: PULMONOLOGY | Age: 84
End: 2019-11-18
Payer: MEDICARE

## 2019-11-18 DIAGNOSIS — J44.9 CHRONIC OBSTRUCTIVE PULMONARY DISEASE, UNSPECIFIED COPD TYPE (HCC): Chronic | ICD-10-CM

## 2019-11-18 DIAGNOSIS — R10.0 ACUTE ABDOMINAL PAIN SYNDROME: ICD-10-CM

## 2019-11-18 DIAGNOSIS — M25.551 RIGHT HIP PAIN: Primary | ICD-10-CM

## 2019-11-18 PROCEDURE — G0424 PULMONARY REHAB W EXER: HCPCS

## 2019-11-19 ENCOUNTER — TELEPHONE (OUTPATIENT)
Dept: HEMATOLOGY ONCOLOGY | Facility: CLINIC | Age: 84
End: 2019-11-19

## 2019-11-19 DIAGNOSIS — C90.00 MULTIPLE MYELOMA, REMISSION STATUS UNSPECIFIED (HCC): ICD-10-CM

## 2019-11-19 RX ORDER — LENALIDOMIDE 5 MG/1
CAPSULE ORAL
Qty: 21 CAPSULE | Refills: 0 | Status: SHIPPED | OUTPATIENT
Start: 2019-11-19 | End: 2020-01-02 | Stop reason: SDUPTHER

## 2019-11-19 NOTE — TELEPHONE ENCOUNTER
PT WIFE STOPPED IN OFFICE     SAYING PT WILL NEED REVLIDMID BY 12/10/19    JUST WANTED TO GIVE US A HEADS UP BECAUSE PHARMACY ALWAYS TELLS PT THEY DO NOT RECEIVE A SCRIPT FROM OUR OFFICE EVEN AFTER MULTIPLY FAXES      I DID EXPLAIN TO PT'S WIFE THAT WE ARE UP ON THE SCRIPT AND IT IS ALWAYS TAKEN CARE OF IN A TIMELY FASHION

## 2019-11-20 ENCOUNTER — CLINICAL SUPPORT (OUTPATIENT)
Dept: PULMONOLOGY | Age: 84
End: 2019-11-20
Payer: MEDICARE

## 2019-11-20 DIAGNOSIS — J44.9 CHRONIC OBSTRUCTIVE PULMONARY DISEASE, UNSPECIFIED COPD TYPE (HCC): Chronic | ICD-10-CM

## 2019-11-20 PROCEDURE — G0424 PULMONARY REHAB W EXER: HCPCS

## 2019-11-20 NOTE — PROGRESS NOTES
Pulmonary Rehabilitation Plan of Care   60 day          Today's date: 2019   Total visits to date: 23  Patient name: Félix Galeana      : 1935  Age: 81 y o  MRN: [id number]  Referring Physician: Beatriz Leyva MD  Provider: Honey Patricia  Clinician: Nas Santamaria MS, CEP    Dx:   Encounter Diagnosis   Name Primary?  Chronic obstructive pulmonary disease, unspecified COPD type ([facility] 75 )      Date of onset:     COMMENTS: Cheryl Macedo has been compliant with exercise and completed 23 exercise sessions so far  He tolerates 40 min of exercise at 1 7-3 5 METs plus weight training with minimal SOB  He does not use the treadmill in [place] d/t back pain while walking  His greatest complaint of SOB today was on the chest press with normal SPO2%  Cheryl Macedo maintains anywhere from 90-98% SPO2 at rest and with exercise  Normal heart rate response to exercise with frequent low BP, especially in recovery  Cheryl Macedo is encouraged to keep up with drinking water to avoid dehydration related hypotension  Cheryl Macedo states that he typically drinks either soda or juice, not water  Today, he had 3 cups of water during exercise and his recovery BP remained 108/72  Cheryl Macedo does not complain of symptoms with lower BP  We will continue to encourage hydration with exercise and at home  He reports feeling increased arm and leg strength with pulmonary rehab  He also reports that he is using his nebulizer 3x/day but does not feel that it helps at all  He has an appt for his 60 day face to face tomorrow and plans to continue Pulmonary Rehab  We will continue to progress as tolerated to maintain RPE 4-6      Medication compliance: Yes   Comments: none  Fall Risk: low to Moderate   Comments: uses a cane walking distances    EXERCISE/ACTIVITY    Cardiopulmonary Goals:   Min: 35-50   HR: 102-114   RPE: 4-6 moderate to somewhat hard exercise   O2 sat: >90%    Modalities: Treadmill, UBE, NuStep and Recumbent bike  Strength trainin-3 days / week, 12-15 repitations  and 1-2 sets per modality    Modalities: Leg press, Chest press, Lateral raise, Arm curl and Seated Row    Exercise Progression: No - same exercise intensity, duration, and modes used   Continues to exercise at 4-5 RPE with minimal SOB      RPE: 4-5  Home activity: little  Goals: home exercise days opposite [place] and >150 mins of exercise/wk  Education: Benefit of exercise, home exercise, pursed lip breathing and O2 saturation monitoring   Plan:home exercise target 30 mins, 2 days opposite [place] and exercise education video  Readiness to change: Action    NUTRITION    Weight control:    Starting weight: 188    Current weight: 179  Diabetes: N/A  Goals:Improved Rate Your Plate score  Education:hydration, Heart Healthy eating class, How to read food Labels  portion control  Plan: avoid processed foods,   Readiness to change: Action    PSYCHOSOCIAL    Emotional: Patient reports he/she is coping well with good social support and denies depression or anxiety  Social support: good  Goals: Physical Fitness in [facility] Score < 3, Daily Activity in [facility] Score < 3, Overall Health in [facility] Score < 3 and Quality of Life in [facility] Score < 3   Education: Mental Health Education, benefits of positive support system  Plan: Anxiety and Lung disease  Readiness to change: Preparation    OTHER CORE COMPONENTS     Tobacco:   Social History     Tobacco Use   Smoking Status Former Smoker    Packs/day: 1 00    Years: 50 00    Pack years: 50 00    Types: Cigarettes    Start date: 57    Last attempt to quit: 2010    Years since quittin 9   Smokeless Tobacco Never Used   Tobacco Comment    quit in      Oxygen: room air  96%  Blood pressure:    Restin/58 - 114/74   Exercise:  100/60 - 122/60  Goals: consistent exercise >150 mins/wk  Education: Pulmonary Disease, physiology, Exercise, strength, flexibility, Conservation of energy, oxygen, breathing techniques, Mental Health, Diet,nutrition, Medication and Avoiding Infections  Plan: Exercise benefits, increase home activity    Readiness to change: Action

## 2019-11-21 ENCOUNTER — OFFICE VISIT (OUTPATIENT)
Dept: PULMONOLOGY | Facility: CLINIC | Age: 84
End: 2019-11-21

## 2019-11-21 DIAGNOSIS — J44.9 CHRONIC OBSTRUCTIVE PULMONARY DISEASE, UNSPECIFIED COPD TYPE (HCC): Primary | Chronic | ICD-10-CM

## 2019-11-21 PROCEDURE — RECHECK: Performed by: INTERNAL MEDICINE

## 2019-11-21 NOTE — PROGRESS NOTES
Medical Director 60 day Pulmonary Rehabilitation Review    I certify that I have met with Connie Naranjo face-to face to provide a 60 day progress review of his/her pulmonary rehabilitation program at [address]  I have reviewed the most recent individualized treatment plan (ITP), outcomes assessment, and provided opportunity for discussion with the patient    Comments:   Tolerating well, no complaints except for the treadmill that he gets back pain after few minutes on the treadmill due to history of vertebral fracture    Continue with current treatment plan yes    Please provide the following modifications to the current treatment plan: Leslee Metz MD

## 2019-11-22 ENCOUNTER — CLINICAL SUPPORT (OUTPATIENT)
Dept: PULMONOLOGY | Age: 84
End: 2019-11-22
Payer: MEDICARE

## 2019-11-22 DIAGNOSIS — J44.9 CHRONIC OBSTRUCTIVE PULMONARY DISEASE, UNSPECIFIED COPD TYPE (HCC): Primary | Chronic | ICD-10-CM

## 2019-11-22 PROCEDURE — G0424 PULMONARY REHAB W EXER: HCPCS

## 2019-11-24 ENCOUNTER — HOSPITAL ENCOUNTER (OUTPATIENT)
Dept: CT IMAGING | Facility: HOSPITAL | Age: 84
Discharge: HOME | End: 2019-11-24
Payer: MEDICARE

## 2019-11-24 DIAGNOSIS — R10.0 ACUTE ABDOMINAL PAIN SYNDROME: ICD-10-CM

## 2019-11-24 DIAGNOSIS — M25.551 RIGHT HIP PAIN: ICD-10-CM

## 2019-11-24 PROCEDURE — 74177 CT ABD & PELVIS W/CONTRAST: CPT

## 2019-11-24 RX ADMIN — IODIXANOL 100 ML: 320 INJECTION, SOLUTION INTRAVASCULAR at 11:33

## 2019-11-25 ENCOUNTER — CLINICAL SUPPORT (OUTPATIENT)
Dept: PULMONOLOGY | Age: 84
End: 2019-11-25
Payer: MEDICARE

## 2019-11-25 DIAGNOSIS — J44.9 CHRONIC OBSTRUCTIVE PULMONARY DISEASE, UNSPECIFIED COPD TYPE (HCC): Chronic | ICD-10-CM

## 2019-11-25 PROCEDURE — G0424 PULMONARY REHAB W EXER: HCPCS

## 2019-11-27 ENCOUNTER — CLINICAL SUPPORT (OUTPATIENT)
Dept: PULMONOLOGY | Age: 84
End: 2019-11-27
Payer: MEDICARE

## 2019-11-27 DIAGNOSIS — J44.9 CHRONIC OBSTRUCTIVE PULMONARY DISEASE, UNSPECIFIED COPD TYPE (HCC): Chronic | ICD-10-CM

## 2019-11-27 PROCEDURE — G0424 PULMONARY REHAB W EXER: HCPCS

## 2019-11-29 ENCOUNTER — CLINICAL SUPPORT (OUTPATIENT)
Dept: PULMONOLOGY | Age: 84
End: 2019-11-29
Payer: MEDICARE

## 2019-11-29 DIAGNOSIS — J44.9 CHRONIC OBSTRUCTIVE PULMONARY DISEASE, UNSPECIFIED COPD TYPE (HCC): Chronic | ICD-10-CM

## 2019-11-29 PROCEDURE — G0424 PULMONARY REHAB W EXER: HCPCS

## 2019-12-02 ENCOUNTER — CLINICAL SUPPORT (OUTPATIENT)
Dept: PULMONOLOGY | Age: 84
End: 2019-12-02
Payer: MEDICARE

## 2019-12-02 DIAGNOSIS — J44.9 CHRONIC OBSTRUCTIVE PULMONARY DISEASE, UNSPECIFIED COPD TYPE (HCC): Chronic | ICD-10-CM

## 2019-12-02 PROCEDURE — G0424 PULMONARY REHAB W EXER: HCPCS

## 2019-12-03 ENCOUNTER — APPOINTMENT (OUTPATIENT)
Dept: LAB | Facility: MEDICAL CENTER | Age: 84
End: 2019-12-03
Payer: MEDICARE

## 2019-12-03 DIAGNOSIS — C34.91 ADENOCARCINOMA OF RIGHT LUNG (HCC): Chronic | ICD-10-CM

## 2019-12-03 DIAGNOSIS — C90.01 MULTIPLE MYELOMA IN REMISSION (HCC): ICD-10-CM

## 2019-12-03 DIAGNOSIS — K52.9 CHRONIC DIARRHEA: ICD-10-CM

## 2019-12-03 DIAGNOSIS — D80.9 IMMUNOGLOBULIN DEFICIENCY (HCC): ICD-10-CM

## 2019-12-03 LAB
ALBUMIN SERPL BCP-MCNC: 3.7 G/DL (ref 3.5–5)
ALP SERPL-CCNC: 71 U/L (ref 46–116)
ALT SERPL W P-5'-P-CCNC: 34 U/L (ref 12–78)
ANION GAP SERPL CALCULATED.3IONS-SCNC: 7 MMOL/L (ref 4–13)
AST SERPL W P-5'-P-CCNC: 14 U/L (ref 5–45)
BASOPHILS # BLD AUTO: 0.06 THOUSANDS/ΜL (ref 0–0.1)
BASOPHILS NFR BLD AUTO: 1 % (ref 0–1)
BILIRUB SERPL-MCNC: 1.45 MG/DL (ref 0.2–1)
BUN SERPL-MCNC: 16 MG/DL (ref 5–25)
CALCIUM SERPL-MCNC: 9.3 MG/DL (ref 8.3–10.1)
CHLORIDE SERPL-SCNC: 111 MMOL/L (ref 100–108)
CO2 SERPL-SCNC: 26 MMOL/L (ref 21–32)
CREAT SERPL-MCNC: 1.19 MG/DL (ref 0.6–1.3)
EOSINOPHIL # BLD AUTO: 0.04 THOUSAND/ΜL (ref 0–0.61)
EOSINOPHIL NFR BLD AUTO: 1 % (ref 0–6)
ERYTHROCYTE [DISTWIDTH] IN BLOOD BY AUTOMATED COUNT: 14.6 % (ref 11.6–15.1)
GFR SERPL CREATININE-BSD FRML MDRD: 56 ML/MIN/1.73SQ M
GLUCOSE SERPL-MCNC: 97 MG/DL (ref 65–140)
HCT VFR BLD AUTO: 37 % (ref 36.5–49.3)
HGB BLD-MCNC: 11.9 G/DL (ref 12–17)
IGA SERPL-MCNC: 74 MG/DL (ref 70–400)
IGG SERPL-MCNC: 725 MG/DL (ref 700–1600)
IGM SERPL-MCNC: 31 MG/DL (ref 40–230)
IMM GRANULOCYTES # BLD AUTO: 0.04 THOUSAND/UL (ref 0–0.2)
IMM GRANULOCYTES NFR BLD AUTO: 1 % (ref 0–2)
LYMPHOCYTES # BLD AUTO: 0.83 THOUSANDS/ΜL (ref 0.6–4.47)
LYMPHOCYTES NFR BLD AUTO: 16 % (ref 14–44)
MCH RBC QN AUTO: 32.8 PG (ref 26.8–34.3)
MCHC RBC AUTO-ENTMCNC: 32.2 G/DL (ref 31.4–37.4)
MCV RBC AUTO: 102 FL (ref 82–98)
MONOCYTES # BLD AUTO: 0.43 THOUSAND/ΜL (ref 0.17–1.22)
MONOCYTES NFR BLD AUTO: 8 % (ref 4–12)
NEUTROPHILS # BLD AUTO: 3.77 THOUSANDS/ΜL (ref 1.85–7.62)
NEUTS SEG NFR BLD AUTO: 73 % (ref 43–75)
NRBC BLD AUTO-RTO: 0 /100 WBCS
PLATELET # BLD AUTO: 120 THOUSANDS/UL (ref 149–390)
PMV BLD AUTO: 9.5 FL (ref 8.9–12.7)
POTASSIUM SERPL-SCNC: 4 MMOL/L (ref 3.5–5.3)
PROT SERPL-MCNC: 6.3 G/DL (ref 6.4–8.2)
RBC # BLD AUTO: 3.63 MILLION/UL (ref 3.88–5.62)
SODIUM SERPL-SCNC: 144 MMOL/L (ref 136–145)
WBC # BLD AUTO: 5.17 THOUSAND/UL (ref 4.31–10.16)

## 2019-12-03 PROCEDURE — 85025 COMPLETE CBC W/AUTO DIFF WBC: CPT

## 2019-12-03 PROCEDURE — 36415 COLL VENOUS BLD VENIPUNCTURE: CPT

## 2019-12-03 PROCEDURE — 83883 ASSAY NEPHELOMETRY NOT SPEC: CPT

## 2019-12-03 PROCEDURE — 80053 COMPREHEN METABOLIC PANEL: CPT

## 2019-12-03 PROCEDURE — 82784 ASSAY IGA/IGD/IGG/IGM EACH: CPT

## 2019-12-04 ENCOUNTER — CLINICAL SUPPORT (OUTPATIENT)
Dept: PULMONOLOGY | Age: 84
End: 2019-12-04
Payer: MEDICARE

## 2019-12-04 DIAGNOSIS — J44.9 CHRONIC OBSTRUCTIVE PULMONARY DISEASE, UNSPECIFIED COPD TYPE (HCC): Chronic | ICD-10-CM

## 2019-12-04 LAB
KAPPA LC FREE SER-MCNC: 10.6 MG/L (ref 3.3–19.4)
KAPPA LC FREE/LAMBDA FREE SER: 0.52 {RATIO} (ref 0.26–1.65)
LAMBDA LC FREE SERPL-MCNC: 20.3 MG/L (ref 5.7–26.3)

## 2019-12-04 PROCEDURE — G0424 PULMONARY REHAB W EXER: HCPCS

## 2019-12-04 NOTE — PROGRESS NOTES
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  Currently on lenalidomide 5 mg p  O  Daily 3 weeks on 3 weeks off, melphalan 12 mg p o  Daily for 4 days every 6 weeks, prednisone 5 mg p o  Daily, lambda light chain remains normal, continue treatment      2  Hypogammaglobinemia - improved  No need for IVIG     3   Pulmonary emboli, apixaban 5 mg p o  Daily  The dose was reduced to once a day for epistaxis, hematuria and expense is     4  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, status post resection with chemotherapy and radiation therapy completed 3/2015  CTA 3/2019  GERARD  Follows with Dr Amilcar Read  COPD followed by Pulmonary     5   Rectal adenocarcinoma status post resection stage I in November 2017  CT A/P 11/24/2019:  GERARD  F/U with Dr Nilesh Rincon scheduled for 1/7/2020     6   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  Lesion on his ear  Referred to dermatology  7   Port -a-cath in place  Will arrange for port flush  Recommend every 3 months  Follow-up in 6 weeks with CBC, CMP, free light chain, quantitative immunoglobulins                  HPI:  :#1  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, multiple compression fractures, status post kyphoplasty to L1, L2, L5, treated with radiation therapy to the left intra trochanteric area, received melphalan/Velcade/prednisone in October 2010 for 4 cycles with excellent response and normalization of lambda light chain, had been on Velcade maintenance till January 2013 when lambda light chain went up to 111,treated with Velcade 1 3 mg meter square weekly 3 weeks on one week off, Decadron 20 mg by mouth weekly, lambda light chain down to 30 then creeping up to 110, on Revlimid 15 mg by mouth every other day  Lambda light chain normal at 28 however he reported skin rash on the upper chest area we stopped the Revlimid for 2 month  Then reinitiated Revlimid 5 mg by mouth daily     #2  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 stage I, with progression by scan, status post resection of the right upper lobe with few foci in the same lobe consistent with stage IIIA done in January 2015 adenocarcinoma, well differentiated  Status post Alimta/carboplatin x4 cycles finished in May 2015 and radiation therapy  CTA 3/3/2019:  GERARD          #3  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     #4  Rectal adenocarcinoma status post resection stage I in November 2017     #5  Pulmonary embolism involving the subsegmental left lower lobe in December 2018, currently on apixaban 5 mg once a day because of epistaxis     #6  Hematuria secondary to benign prostatic hypertrophy and small bladder stones and being on apixaban, apixaban was reduced to 5 milligram once a day       Interval History:   CT A/P 11/24/2019:  No CT findings of acute abdominopelvic abnormality  CTA 3/3/2019:  Compared to 1/14/2019 and 12/21/2018:  Previously suggested left lower lobe nodule far less discrete currently most consistent with an infectious or inflammatory process        12/2/2019: IgG remains normal   CBCD and   CMP remain stable  He feels well            Test Results:        Labs:   Lab Results   Component Value Date    HGB 11 9 (L) 12/03/2019    HCT 37 0 12/03/2019     (H) 12/03/2019     (L) 12/03/2019    WBC 5 17 12/03/2019    NRBC 0 12/03/2019    BANDSPCT 1 03/03/2019    ATYLMPCT 1 (H) 03/03/2019     Lab Results   Component Value Date     01/04/2016    K 4 0 12/03/2019     (H) 12/03/2019    CO2 26 12/03/2019    ANIONGAP 7 01/04/2016    BUN 16 12/03/2019    CREATININE 1 19 12/03/2019    GLUCOSE 88 01/04/2016    GLUF 107 (H) 07/29/2019    CALCIUM 9 3 12/03/2019    AST 14 12/03/2019    ALT 34 12/03/2019    ALKPHOS 71 12/03/2019    PROT 6 8 01/04/2016    BILITOT 0 63 01/04/2016    EGFR 56 12/03/2019       Imaging: Ct Abdomen Pelvis W Contrast    Result Date: 11/24/2019  Narrative: CT ABDOMEN AND PELVIS WITH IV CONTRAST INDICATION:   M25 551: Pain in right hip R10 0: Acute abdomen  COMPARISON:  CT 11/16/2017 TECHNIQUE:  CT examination of the abdomen and pelvis was performed  Axial, sagittal, and coronal 2D reformatted images were created from the source data and submitted for interpretation  Radiation dose length product (DLP) for this visit:  542 mGy-cm   This examination, like all CT scans performed in the [facility], was performed utilizing techniques to minimize radiation dose exposure, including the use of iterative reconstruction and automated exposure control  IV Contrast:  100 mL of iodixanol (VISIPAQUE) Enteric Contrast:  Enteric contrast was administered  FINDINGS: ABDOMEN LOWER CHEST: Unchanged elevation of the right hemidiaphragm  Severe coronary artery calcification  No pleural or pericardial effusion  Emphysematous changes and mild pleural parenchymal scarring  LIVER/BILIARY TREE:  Unremarkable  GALLBLADDER:  Surgically absent  SPLEEN:  Unremarkable  PANCREAS:  Diffusely atrophic  No focal lesion  No main ductal dilation  ADRENAL GLANDS:  Unremarkable  KIDNEYS/URETERS: No hydronephrosis  No suspicious renal lesion  Surgical clips at the posterior aspect of the left kidney are again noted  STOMACH AND BOWEL:  No disproportionate dilation of small or large bowel loops  Colonic diverticulosis without findings of diverticulitis  Colonic interposition between the liver and anterior abdominal wall  APPENDIX:  No findings to suggest appendicitis  ABDOMINOPELVIC CAVITY:  No ascites or free intraperitoneal air  No lymphadenopathy  VESSELS:  Unremarkable for patient's age  PELVIS REPRODUCTIVE ORGANS:  Markedly enlarged prostate   URINARY BLADDER: Underdistended  Circumferential wall thickening, greater than expected for underdistention, likely due to chronic outlet obstruction  ABDOMINAL WALL/INGUINAL REGIONS:  Unremarkable  OSSEOUS STRUCTURES: No acute fracture  No destructive osseous lesion  Severe bony demineralization  Multilevel chronic compression deformities of the thoracolumbar spine with some degree of loss of vertebral body height status post multilevel vertebroplasty, grossly unchanged compared to 11/16/2017  Mild bilateral hip osteoarthritis  Stable 8 mm sclerotic focus within the left iliac wing (series 2, image 53)  Impression: 1  No CT findings of acute abdominopelvic abnormality  2   No acute osseous abnormality to explain right hip pain  There is mild bilateral hip osteoarthritis  3   Grossly unchanged additional chronic findings as described in the body of the report  Workstation performed: [id number]           ROS:  As mentioned in HPI & Interval History otherwise 14 point ROS negative  Allergies: Allergies   Allergen Reactions    Sulfa Antibiotics Itching    Aldactone [Spironolactone] Other (See Comments)     Breast swelling and pain  Breast swelling and pain    Penicillin V Rash     AS CHILD     Current Medications: Reviewed  PMH/FH/SH:  Reviewed      Physical Exam:    There is no height or weight on file to calculate BSA  Ht Readings from Last 3 Encounters:   10/17/19 5' 7" (1 702 m)   09/12/19 5' 7" (1 702 m)   09/09/19 5' 7" (1 702 m)        Wt Readings from Last 3 Encounters:   10/17/19 81 6 kg (180 lb)   09/12/19 81 9 kg (180 lb 9 6 oz)   09/09/19 82 9 kg (182 lb 12 8 oz)        Temp Readings from Last 3 Encounters:   10/17/19 97 6 °F (36 4 °C) (Tympanic)   09/12/19 97 7 °F (36 5 °C)   09/09/19 97 7 °F (36 5 °C) (Tympanic)        BP Readings from Last 3 Encounters:   10/17/19 120/70   09/12/19 104/56   09/09/19 102/58           Physical Exam   Constitutional: He is oriented to person, place, and time   He appears well-developed and well-nourished  No distress  HENT:   Head: Normocephalic and atraumatic  Eyes: Conjunctivae are normal    Neck: Normal range of motion  Neck supple  No tracheal deviation present  Cardiovascular: Normal rate and regular rhythm  Exam reveals no gallop and no friction rub  Murmur heard  Pulmonary/Chest: Effort normal  No respiratory distress  He has no wheezes  He has no rales  He exhibits no tenderness  Decreased breath sounds   Abdominal: Soft  He exhibits no distension  There is no tenderness  Lymphadenopathy:     He has no cervical adenopathy  Neurological: He is alert and oriented to person, place, and time  Skin: Skin is warm and dry  He is not diaphoretic  No erythema  No pallor  Psychiatric: He has a normal mood and affect  His behavior is normal  Judgment and thought content normal    Vitals reviewed        ECO      Emergency Contacts:    [address] [facility], [phone],

## 2019-12-05 ENCOUNTER — OFFICE VISIT (OUTPATIENT)
Dept: HEMATOLOGY ONCOLOGY | Facility: CLINIC | Age: 84
End: 2019-12-05
Payer: MEDICARE

## 2019-12-05 VITALS
RESPIRATION RATE: 16 BRPM | HEIGHT: 67 IN | HEART RATE: 75 BPM | TEMPERATURE: 97.7 F | WEIGHT: 187 LBS | OXYGEN SATURATION: 96 % | DIASTOLIC BLOOD PRESSURE: 80 MMHG | SYSTOLIC BLOOD PRESSURE: 122 MMHG | BODY MASS INDEX: 29.35 KG/M2

## 2019-12-05 DIAGNOSIS — C34.91 ADENOCARCINOMA OF RIGHT LUNG (HCC): Primary | Chronic | ICD-10-CM

## 2019-12-05 DIAGNOSIS — I26.99 OTHER PULMONARY EMBOLISM WITHOUT ACUTE COR PULMONALE, UNSPECIFIED CHRONICITY (HCC): ICD-10-CM

## 2019-12-05 DIAGNOSIS — L98.9 SKIN LESION: ICD-10-CM

## 2019-12-05 DIAGNOSIS — D80.1 HYPOGAMMAGLOBULINEMIA (HCC): ICD-10-CM

## 2019-12-05 DIAGNOSIS — C90.01 MULTIPLE MYELOMA IN REMISSION (HCC): ICD-10-CM

## 2019-12-05 PROBLEM — Z95.828 PORT-A-CATH IN PLACE: Status: ACTIVE | Noted: 2019-12-05

## 2019-12-05 PROCEDURE — 99214 OFFICE O/P EST MOD 30 MIN: CPT | Performed by: PHYSICIAN ASSISTANT

## 2019-12-07 ENCOUNTER — HOSPITAL ENCOUNTER (EMERGENCY)
Facility: HOSPITAL | Age: 84
Discharge: HOME | End: 2019-12-07
Attending: EMERGENCY MEDICINE | Admitting: EMERGENCY MEDICINE
Payer: MEDICARE

## 2019-12-07 ENCOUNTER — APPOINTMENT (EMERGENCY)
Dept: RADIOLOGY | Facility: HOSPITAL | Age: 84
End: 2019-12-07
Payer: MEDICARE

## 2019-12-07 VITALS
TEMPERATURE: 98.6 F | OXYGEN SATURATION: 100 % | BODY MASS INDEX: 28.21 KG/M2 | DIASTOLIC BLOOD PRESSURE: 58 MMHG | WEIGHT: 180.12 LBS | RESPIRATION RATE: 20 BRPM | SYSTOLIC BLOOD PRESSURE: 127 MMHG | HEART RATE: 78 BPM

## 2019-12-07 DIAGNOSIS — R05.9 COUGH: Primary | ICD-10-CM

## 2019-12-07 PROCEDURE — 99283 EMERGENCY DEPT VISIT LOW MDM: CPT

## 2019-12-07 PROCEDURE — 99284 EMERGENCY DEPT VISIT MOD MDM: CPT | Performed by: EMERGENCY MEDICINE

## 2019-12-07 PROCEDURE — 71046 X-RAY EXAM CHEST 2 VIEWS: CPT

## 2019-12-07 RX ORDER — FLUTICASONE PROPIONATE 50 MCG
1 SPRAY, SUSPENSION (ML) NASAL DAILY
Status: DISCONTINUED | OUTPATIENT
Start: 2019-12-07 | End: 2019-12-07 | Stop reason: HOSPADM

## 2019-12-07 RX ADMIN — FLUTICASONE PROPIONATE 1 SPRAY: 50 SPRAY, METERED NASAL at 14:57

## 2019-12-07 NOTE — ED PROVIDER NOTES
History  Chief Complaint   Patient presents with    Cold Like Symptoms     patient head cold, runny nose, sore throat, productive cough  began yesterday  [facility]  [facility]  referred to ED  hx of COPD reports SOB with COPD is Baseline  states  [facility] something [facility] in lung but patient reportsthis is normal       HPI    This is a 80 yo M who presents today with cold like symptoms  Patient states he has a head cold with runny nose, productive cough and sore throat since yesterday  [facility] and sent patient here to get evaluated due to abnormality on the CXR  States he already knows he has a spot on his lung which is being followed by his pulmonologist  States also hx of partial lung resection due to lung cancer in the past  He denies any fever  Impressioon; 80 yo M with cold like symptoms  Will repeat CXR  Attempted to upload CD but unsuccessful     Prior to Admission Medications   Prescriptions Last Dose Informant Patient Reported? Taking?    Lactobacillus-Inulin ([facility])  Spouse/Significant Other Yes Yes   Sig: Take by mouth daily   RAPAFLO 8 MG CAPS  Spouse/Significant Other Yes Yes   Sig: Take 1 capsule by mouth daily at bedtime     albuterol (PROVENTIL HFA,VENTOLIN HFA) 90 mcg/act inhaler  Spouse/Significant Other No Yes   Sig: Inhale 2 puffs every 4 (four) hours as needed for wheezing   apixaban (ELIQUIS) 5 mg  Spouse/Significant Other No Yes   Sig: Take 1 tablet (5 mg total) by mouth 2 (two) times a day   Patient taking differently: Take 5 mg by mouth daily    cholecalciferol (VITAMIN D3) 1,000 units tablet  Spouse/Significant Other Yes Yes   Sig: Take 5,000 Units by mouth daily     cyanocobalamin (VITAMIN B-12) 1,000 mcg tablet  Spouse/Significant Other Yes No   Sig: Take by mouth daily   diphenoxylate-atropine (LOMOTIL) 2 5-0 025 mg per tablet  Spouse/Significant Other No Yes   Sig: Take 1 tablet by mouth 4 (four) times a day as needed for diarrhea   finasteride (PROSCAR) 5 mg tablet  Spouse/Significant Other No Yes   Sig: Take 1 tablet (5 mg total) by mouth daily at bedtime   guaiFENesin (MUCINEX) 600 mg 12 hr tablet  Spouse/Significant Other No Yes   Sig: Take 1 tablet (600 mg total) by mouth every 12 (twelve) hours   Patient taking differently: Take 600 mg by mouth every 12 (twelve) hours as needed     ipratropium (ATROVENT) 0 02 % nebulizer solution  Spouse/Significant Other No Yes   Sig: Take 1 vial (0 5 mg total) by nebulization 4 (four) times a day   lenalidomide (REVLIMID) 5 MG CAPS  Spouse/Significant Other No Yes   Sig: 3 weeks on/3 weeks off adult male/auth# [id number] 11/19/19   lidocaine-prilocaine (EMLA) cream  Spouse/Significant Other Yes Yes   Sig: APPLY TO AREA AND COVER WITH TAPE 1 HOUR BEFORE APPOINTMENT    melphalan (ALKERAN) 2 MG tablet  Spouse/Significant Other No Yes   Sig: Take 12 mg daily for 4 days every 6 weeks   predniSONE 5 mg tablet  Spouse/Significant Other No Yes   Sig: Take 1 tablet (5 mg total) by mouth daily      Facility-Administered Medications: None       Past Medical History:   Diagnosis Date    Chronic pain disorder     3 fractured vertebrae    COPD (chronic obstructive pulmonary disease) (HCC)     Diarrhea     Enlarged prostate     Kidney stone     [facility]     Pulmonary embolism ([facility] [address] ) 12/24/2018    Rectal adenocarcinoma ([facility] [address] )     Sleep apnea     no cpap       Past Surgical History:   Procedure Laterality Date    CHOLECYSTECTOMY      COLONOSCOPY W/ ENDOSCOPIC US N/A 7/21/2017    Procedure: ANAL ENDOSCOPIC U/S;  Surgeon: Elio Briones MD;  [facility]: [facility]; Service: Colorectal    HERNIA REPAIR      KIDNEY STONE SURGERY      LUNG LOBECTOMY Right     ME COLONOSCOPY FLX DX W/COLLJ SPEC WHEN PFRMD N/A 7/10/2017    Procedure: COLONOSCOPY;  Surgeon: Gabe Nichols MD;  [facility]: [facility];   Service: Gastroenterology    ME RECTAL TUMOR EXCISION, TRANSANAL ENDOSCOPIC MICROSURGICAL, FULL THICK N/A 2017    Procedure: TRANSANAL ENDOSCOPIC MICROSURGERY TEM;  Surgeon: Jaleel Betancourt MD;  Location: [facility];  Service: Colorectal       Family History   Problem Relation Age of Onset    Arthritis Mother     Colon cancer Father     Heart attack Father     Diabetes type I Sister      I have reviewed and agree with the history as documented  Social History     Tobacco Use    Smoking status: Former Smoker     Packs/day: 1 00     Years: 50 00     Pack years: 50 00     Types: Cigarettes     Start date:      Last attempt to quit: 2010     Years since quittin 0    Smokeless tobacco: Never Used    Tobacco comment: quit in    Substance Use Topics    Alcohol use: Yes     Comment: rarely    Drug use: No        Review of Systems   Constitutional: Negative  Negative for diaphoresis and fever  HENT: Positive for congestion and rhinorrhea  Respiratory: Negative  Negative for cough, shortness of breath and wheezing  Cardiovascular: Negative  Negative for chest pain, palpitations and leg swelling  Gastrointestinal: Negative for abdominal distention, abdominal pain, nausea and vomiting  Genitourinary: Negative  Musculoskeletal: Negative  Skin: Negative  Neurological: Negative  Psychiatric/Behavioral: Negative  All other systems reviewed and are negative  Physical Exam  Physical Exam   Constitutional: He is oriented to person, place, and time  He appears well-developed and well-nourished  No distress  HENT:   Head: Normocephalic and atraumatic  Nose: Nose normal    Mouth/Throat: Oropharynx is clear and moist    Eyes: Pupils are equal, round, and reactive to light  Conjunctivae and EOM are normal    Neck: Normal range of motion  Neck supple  Cardiovascular: Normal rate, regular rhythm and normal heart sounds  No murmur heard  Pulmonary/Chest: Effort normal and breath sounds normal  No respiratory distress   He has no wheezes  He has no rales  Abdominal: Soft  Bowel sounds are normal  He exhibits no distension  There is no tenderness  There is no rebound and no guarding  Musculoskeletal: Normal range of motion  He exhibits no edema, tenderness or deformity  Neurological: He is alert and oriented to person, place, and time  No cranial nerve deficit  Skin: Skin is warm and dry  No rash noted  He is not diaphoretic  No pallor  Psychiatric: He has a normal mood and affect  Vitals reviewed  Vital Signs  ED Triage Vitals [12/07/19 1423]   Temperature Pulse Respirations Blood Pressure SpO2   98 6 °F (37 °C) 95 20 120/63 98 %      Temp Source Heart Rate Source Patient Position - Orthostatic VS BP Location FiO2 (%)   Temporal Monitor Sitting Right arm --      Pain Score       No Pain           Vitals:    12/07/19 1423 12/07/19 1532   BP: 120/63 127/58   Pulse: 95 78   Patient Position - Orthostatic VS: Sitting Lying         Visual Acuity      ED Medications  Medications   fluticasone (FLONASE) 50 mcg/act nasal spray 1 spray (1 spray Each Nare Given 12/7/19 1457)       Diagnostic Studies  Results Reviewed     None                 XR chest 2 views    (Results Pending)              Procedures  Procedures         ED Course  ED Course as of Dec 07 1728   Sat Dec 07, 2019   1522 CXR unchanged from prior  Will discharge with appropriate followup      1534 Discussed with patient results                                   MDM      Disposition  Final diagnoses:   Cough     Time reflects when diagnosis was documented in both MDM as applicable and the Disposition within this note     Time User Action Codes Description Comment    12/7/2019  3:29 PM Martin Frank Add [R05] Cough       ED Disposition     ED Disposition Condition Date/Time Comment    Discharge Stable Sat Dec 7, 2019  3:29 PM [address] [place] [address] discharge to home/self care              Follow-up Information     Follow up With Specialties Details Why Contact Joyce Mensah Alayna Santos, DO Family Medicine  As needed [id number] [facility] [address]  [phone]            Discharge Medication List as of 12/7/2019  3:30 PM      CONTINUE these medications which have NOT CHANGED    Details   albuterol (PROVENTIL HFA,VENTOLIN HFA) 90 mcg/act inhaler Inhale 2 puffs every 4 (four) hours as needed for wheezing, Starting Tue 3/5/2019, Until Wed 3/4/2020, Normal      apixaban (ELIQUIS) 5 mg Take 1 tablet (5 mg total) by mouth 2 (two) times a day, Starting Thu 12/27/2018, Normal      cholecalciferol (VITAMIN D3) 1,000 units tablet Take 5,000 Units by mouth daily  , Historical Med      diphenoxylate-atropine (LOMOTIL) 2 5-0 025 mg per tablet Take 1 tablet by mouth 4 (four) times a day as needed for diarrhea, Starting Thu 4/18/2019, Normal      finasteride (PROSCAR) 5 mg tablet Take 1 tablet (5 mg total) by mouth daily at bedtime, Starting Wed 5/15/2019, Normal      guaiFENesin (MUCINEX) 600 mg 12 hr tablet Take 1 tablet (600 mg total) by mouth every 12 (twelve) hours, Starting Fri 3/16/2018, Print      ipratropium (ATROVENT) 0 02 % nebulizer solution Take 1 vial (0 5 mg total) by nebulization 4 (four) times a day, Starting Mon 9/9/2019, Normal      Lactobacillus-Inulin ([address]) Take by mouth daily, Historical Med      lenalidomide (REVLIMID) 5 MG CAPS 3 weeks on/3 weeks off adult male/auth# [id number] 11/19/19, Normal      lidocaine-prilocaine (EMLA) cream APPLY TO AREA AND COVER WITH TAPE 1 HOUR BEFORE APPOINTMENT , Historical Med      melphalan (ALKERAN) 2 MG tablet Take 12 mg daily for 4 days every 6 weeks, Normal      predniSONE 5 mg tablet Take 1 tablet (5 mg total) by mouth daily, Starting Thu 3/21/2019, Normal      RAPAFLO 8 MG CAPS Take 1 capsule by mouth daily at bedtime  , Starting Thu 5/24/2018, Historical Med      cyanocobalamin (VITAMIN B-12) 1,000 mcg tablet Take by mouth daily, Historical Med           No discharge procedures on file      ED Provider  Electronically Signed by           Nicolasa Short MD  12/07/19 2348

## 2019-12-07 NOTE — ED NOTES
Assumed care of patient at this time  Patient resting comfortably in bed with wife at bedside  No distress or complaints  Updated on plan of care  ID band on and call bell within reach        Sarabjit Slade RN  12/07/19 6388

## 2019-12-09 ENCOUNTER — APPOINTMENT (OUTPATIENT)
Dept: PULMONOLOGY | Age: 84
End: 2019-12-09
Payer: MEDICARE

## 2019-12-11 ENCOUNTER — APPOINTMENT (OUTPATIENT)
Dept: PULMONOLOGY | Age: 84
End: 2019-12-11
Payer: MEDICARE

## 2019-12-13 ENCOUNTER — CLINICAL SUPPORT (OUTPATIENT)
Dept: PULMONOLOGY | Age: 84
End: 2019-12-13
Payer: MEDICARE

## 2019-12-13 DIAGNOSIS — J44.9 CHRONIC OBSTRUCTIVE PULMONARY DISEASE, UNSPECIFIED COPD TYPE (HCC): Chronic | ICD-10-CM

## 2019-12-13 PROCEDURE — G0424 PULMONARY REHAB W EXER: HCPCS

## 2019-12-16 ENCOUNTER — CLINICAL SUPPORT (OUTPATIENT)
Dept: PULMONOLOGY | Age: 84
End: 2019-12-16
Payer: MEDICARE

## 2019-12-16 DIAGNOSIS — J44.9 CHRONIC OBSTRUCTIVE PULMONARY DISEASE, UNSPECIFIED COPD TYPE (HCC): Chronic | ICD-10-CM

## 2019-12-16 PROCEDURE — G0424 PULMONARY REHAB W EXER: HCPCS

## 2019-12-18 ENCOUNTER — CLINICAL SUPPORT (OUTPATIENT)
Dept: PULMONOLOGY | Age: 84
End: 2019-12-18
Payer: MEDICARE

## 2019-12-18 DIAGNOSIS — J44.9 CHRONIC OBSTRUCTIVE PULMONARY DISEASE, UNSPECIFIED COPD TYPE (HCC): Chronic | ICD-10-CM

## 2019-12-18 PROCEDURE — G0424 PULMONARY REHAB W EXER: HCPCS

## 2019-12-18 NOTE — PROGRESS NOTES
Pulmonary Rehabilitation Plan of Care   90 day         Today's date: 2019   Total visits to date: 28  Patient name: Yessy Pacheco      : 1935  Age: 81 y o  MRN: [id number]  Referring Physician: Junie Canas MD  Provider: iMne Massey  Clinician: Araseli Lee MS, CEP, CCRP    Dx:   Encounter Diagnosis   Name Primary?  Chronic obstructive pulmonary disease, unspecified COPD type ([facility] )      Date of onset:     COMMENTS:   Amena Díaz continues to be compliant with pulmonary rehab 3x/wk  He reports exercise is helping his functional capacity with ADLs  He continues to tolerate 40 mins at 2 0-3 5 METs with minimal dyspnea  SaO2 at rest 95-98% at rest and maintains 88-94% with exercise on RA  He has been better at hydrating with water throughout the day and his resting BP has been improved 92/60 - 102/60  He is asymptomatic with low BP  Exercise BP increases with exercise reaching 120/60  He has 3 sessions remaining to complete 36  He plans to join the fitness center at [address] to maintain a 3 day/wk schedule  He has his 30 day face to face with Dr Supriya Magana on Dec  20       Medication compliance: Yes   Comments: none  Fall Risk: low to Moderate   Comments: uses a cane walking distances    EXERCISE/ACTIVITY    Cardiopulmonary Goals:   Min: 35-50   HR: 102-114   RPE: 4-6   O2 sat: >90%    Modalities: Treadmill, UBE, NuStep and Recumbent bike  Strength trainin-3 days / week, 12-15 repitations  and 1-2 sets per modality    Modalities: Leg press, Chest press, Lateral raise, Arm curl and Seated Row    Exercise Progression: No - same exercise intensity, duration, and modes used   Continues to exercise at 4-5 RPE with minimal SOB      RPE: 4-5  Home activity: light to moderate ADLs, no exercise outside of [place]  Goals: home exercise days opposite CO and >150 mins of exercise/wk  Education: Benefit of exercise, home exercise, pursed lip breathing and O2 saturation monitoring   Plan:home exercise target 30 mins, 2 days opposite NM and exercise education video  Readiness to change: Action    NUTRITION    Weight control:    Starting weight: 188    Current weight: 179  Diabetes: N/A  Goals:Improved Rate Your Plate score  Education:hydration, Heart Healthy eating class, How to read food Labels  portion control  Plan: avoid processed foods,   Readiness to change: Action    PSYCHOSOCIAL    Emotional: Patient reports he/she is coping well with good social support and denies depression or anxiety  Social support: good  Goals: Physical Fitness in [facility] Score < 3, Daily Activity in [facility] Score < 3, Overall Health in [facility] Score < 3 and Quality of Life in [facility] Score < 3   Education: Mental Health Education, benefits of positive support system  Plan: Anxiety and Lung disease  Readiness to change: Preparation    OTHER CORE COMPONENTS     Tobacco:   Social History     Tobacco Use   Smoking Status Former Smoker    Packs/day: 1 00    Years: 50 00    Pack years: 50 00    Types: Cigarettes    Start date:     Last attempt to quit: 2010    Years since quittin 0   Smokeless Tobacco Never Used   Tobacco Comment    quit in      Oxygen: room air  96 - 98% at rest,  88-94% with exercise  Blood pressure:    Restin/60 - 102/60   Exercise:  120/60  Goals: consistent exercise >150 mins/wk  Education: Pulmonary Disease, physiology, Exercise, strength, flexibility, Conservation of energy, oxygen, breathing techniques, Mental Health, Diet,nutrition, Medication and Avoiding Infections  Plan: Exercise benefits, increase home activity    Readiness to change: Action

## 2019-12-20 ENCOUNTER — OFFICE VISIT (OUTPATIENT)
Dept: PULMONOLOGY | Facility: CLINIC | Age: 84
End: 2019-12-20

## 2019-12-20 ENCOUNTER — APPOINTMENT (OUTPATIENT)
Dept: PULMONOLOGY | Age: 84
End: 2019-12-20
Payer: MEDICARE

## 2019-12-20 DIAGNOSIS — J44.9 COPD (CHRONIC OBSTRUCTIVE PULMONARY DISEASE) (HCC): Primary | ICD-10-CM

## 2019-12-20 PROCEDURE — RECHECK: Performed by: INTERNAL MEDICINE

## 2019-12-27 ENCOUNTER — CLINICAL SUPPORT (OUTPATIENT)
Dept: PULMONOLOGY | Age: 84
End: 2019-12-27
Payer: MEDICARE

## 2019-12-27 DIAGNOSIS — J44.9 CHRONIC OBSTRUCTIVE PULMONARY DISEASE, UNSPECIFIED COPD TYPE (HCC): Chronic | ICD-10-CM

## 2019-12-27 PROCEDURE — G0424 PULMONARY REHAB W EXER: HCPCS

## 2019-12-30 ENCOUNTER — CLINICAL SUPPORT (OUTPATIENT)
Dept: PULMONOLOGY | Age: 84
End: 2019-12-30
Payer: MEDICARE

## 2019-12-30 DIAGNOSIS — J44.9 CHRONIC OBSTRUCTIVE PULMONARY DISEASE, UNSPECIFIED COPD TYPE (HCC): Chronic | ICD-10-CM

## 2019-12-30 PROCEDURE — G0424 PULMONARY REHAB W EXER: HCPCS

## 2020-01-01 ENCOUNTER — TELEMEDICINE (OUTPATIENT)
Dept: HEMATOLOGY ONCOLOGY | Facility: CLINIC | Age: 85
End: 2020-01-01
Payer: MEDICARE

## 2020-01-01 ENCOUNTER — TELEPHONE (OUTPATIENT)
Dept: PULMONOLOGY | Facility: CLINIC | Age: 85
End: 2020-01-01

## 2020-01-01 ENCOUNTER — OFFICE VISIT (OUTPATIENT)
Dept: PULMONOLOGY | Facility: CLINIC | Age: 85
End: 2020-01-01
Payer: MEDICARE

## 2020-01-01 ENCOUNTER — LAB (OUTPATIENT)
Dept: LAB | Facility: MEDICAL CENTER | Age: 85
End: 2020-01-01
Payer: MEDICARE

## 2020-01-01 ENCOUNTER — APPOINTMENT (OUTPATIENT)
Dept: LAB | Facility: MEDICAL CENTER | Age: 85
End: 2020-01-01
Payer: MEDICARE

## 2020-01-01 ENCOUNTER — HOSPITAL ENCOUNTER (OUTPATIENT)
Dept: ULTRASOUND IMAGING | Facility: HOSPITAL | Age: 85
Discharge: HOME | End: 2020-09-28
Payer: MEDICARE

## 2020-01-01 ENCOUNTER — OFFICE VISIT (OUTPATIENT)
Dept: HEMATOLOGY ONCOLOGY | Facility: CLINIC | Age: 85
End: 2020-01-01
Payer: MEDICARE

## 2020-01-01 ENCOUNTER — APPOINTMENT (EMERGENCY)
Dept: CT IMAGING | Facility: HOSPITAL | Age: 85
End: 2020-01-01
Payer: MEDICARE

## 2020-01-01 ENCOUNTER — APPOINTMENT (EMERGENCY)
Dept: RADIOLOGY | Facility: HOSPITAL | Age: 85
End: 2020-01-01
Payer: MEDICARE

## 2020-01-01 ENCOUNTER — HOSPITAL ENCOUNTER (OUTPATIENT)
Dept: NON INVASIVE DIAGNOSTICS | Facility: HOSPITAL | Age: 85
Discharge: HOME | End: 2020-09-04
Payer: MEDICARE

## 2020-01-01 ENCOUNTER — PATIENT OUTREACH (OUTPATIENT)
Dept: INFUSION CENTER | Facility: CLINIC | Age: 85
End: 2020-01-01

## 2020-01-01 ENCOUNTER — OFFICE VISIT (OUTPATIENT)
Dept: CARDIOLOGY CLINIC | Facility: CLINIC | Age: 85
End: 2020-01-01
Payer: MEDICARE

## 2020-01-01 ENCOUNTER — HOSPITAL ENCOUNTER (OUTPATIENT)
Dept: CT IMAGING | Facility: HOSPITAL | Age: 85
Discharge: HOME | End: 2020-09-12
Attending: INTERNAL MEDICINE
Payer: MEDICARE

## 2020-01-01 ENCOUNTER — TELEPHONE (OUTPATIENT)
Dept: HEMATOLOGY ONCOLOGY | Facility: MEDICAL CENTER | Age: 85
End: 2020-01-01

## 2020-01-01 ENCOUNTER — HOSPITAL ENCOUNTER (INPATIENT)
Facility: HOSPITAL | Age: 85
LOS: 2 days | Discharge: HOME HEALTH | DRG: 190 | End: 2020-12-04
Attending: EMERGENCY MEDICINE | Admitting: INTERNAL MEDICINE
Payer: MEDICARE

## 2020-01-01 ENCOUNTER — HOSPITAL ENCOUNTER (OUTPATIENT)
Dept: RADIOLOGY | Age: 85
Discharge: HOME | End: 2020-10-07
Payer: MEDICARE

## 2020-01-01 ENCOUNTER — TELEPHONE (OUTPATIENT)
Dept: HEMATOLOGY ONCOLOGY | Facility: CLINIC | Age: 85
End: 2020-01-01

## 2020-01-01 ENCOUNTER — TELEPHONE (OUTPATIENT)
Dept: OTHER | Facility: OTHER | Age: 85
End: 2020-01-01

## 2020-01-01 ENCOUNTER — HOSPITAL ENCOUNTER (EMERGENCY)
Facility: HOSPITAL | Age: 85
Discharge: HOME | End: 2020-11-16
Attending: EMERGENCY MEDICINE
Payer: MEDICARE

## 2020-01-01 ENCOUNTER — APPOINTMENT (EMERGENCY)
Dept: RADIOLOGY | Facility: HOSPITAL | Age: 85
DRG: 190 | End: 2020-01-01
Payer: MEDICARE

## 2020-01-01 ENCOUNTER — HOSPITAL ENCOUNTER (OUTPATIENT)
Dept: INFUSION CENTER | Facility: CLINIC | Age: 85
Discharge: HOME | End: 2020-06-18
Payer: MEDICARE

## 2020-01-01 VITALS
SYSTOLIC BLOOD PRESSURE: 100 MMHG | TEMPERATURE: 97.9 F | HEIGHT: 67 IN | OXYGEN SATURATION: 99 % | WEIGHT: 179.01 LBS | RESPIRATION RATE: 20 BRPM | DIASTOLIC BLOOD PRESSURE: 55 MMHG | HEART RATE: 106 BPM | BODY MASS INDEX: 28.1 KG/M2

## 2020-01-01 VITALS
BODY MASS INDEX: 28.17 KG/M2 | SYSTOLIC BLOOD PRESSURE: 116 MMHG | RESPIRATION RATE: 20 BRPM | HEART RATE: 102 BPM | OXYGEN SATURATION: 98 % | WEIGHT: 179.45 LBS | DIASTOLIC BLOOD PRESSURE: 55 MMHG | TEMPERATURE: 97.5 F | HEIGHT: 67 IN

## 2020-01-01 VITALS
WEIGHT: 182.6 LBS | SYSTOLIC BLOOD PRESSURE: 96 MMHG | HEIGHT: 67 IN | DIASTOLIC BLOOD PRESSURE: 50 MMHG | BODY MASS INDEX: 28.66 KG/M2 | HEART RATE: 111 BPM | OXYGEN SATURATION: 97 %

## 2020-01-01 VITALS
OXYGEN SATURATION: 99 % | TEMPERATURE: 97.8 F | SYSTOLIC BLOOD PRESSURE: 102 MMHG | DIASTOLIC BLOOD PRESSURE: 62 MMHG | RESPIRATION RATE: 18 BRPM | BODY MASS INDEX: 28.41 KG/M2 | HEIGHT: 67 IN | HEART RATE: 88 BPM | WEIGHT: 181 LBS

## 2020-01-01 VITALS
BODY MASS INDEX: 28.97 KG/M2 | DIASTOLIC BLOOD PRESSURE: 60 MMHG | WEIGHT: 184.6 LBS | SYSTOLIC BLOOD PRESSURE: 116 MMHG | HEIGHT: 67 IN | HEART RATE: 88 BPM | TEMPERATURE: 99.1 F

## 2020-01-01 VITALS
DIASTOLIC BLOOD PRESSURE: 60 MMHG | SYSTOLIC BLOOD PRESSURE: 100 MMHG | BODY MASS INDEX: 28.88 KG/M2 | TEMPERATURE: 97.1 F | RESPIRATION RATE: 20 BRPM | WEIGHT: 184 LBS | HEIGHT: 67 IN | OXYGEN SATURATION: 97 % | HEART RATE: 97 BPM

## 2020-01-01 VITALS
DIASTOLIC BLOOD PRESSURE: 61 MMHG | WEIGHT: 181.6 LBS | RESPIRATION RATE: 17 BRPM | SYSTOLIC BLOOD PRESSURE: 120 MMHG | HEIGHT: 67 IN | HEART RATE: 111 BPM | TEMPERATURE: 98.4 F | BODY MASS INDEX: 28.5 KG/M2 | OXYGEN SATURATION: 97 %

## 2020-01-01 VITALS
BODY MASS INDEX: 29.1 KG/M2 | WEIGHT: 185.4 LBS | RESPIRATION RATE: 20 BRPM | TEMPERATURE: 97.4 F | HEIGHT: 67 IN | HEIGHT: 67 IN | RESPIRATION RATE: 18 BRPM | OXYGEN SATURATION: 97 % | WEIGHT: 183.6 LBS | HEART RATE: 88 BPM | DIASTOLIC BLOOD PRESSURE: 58 MMHG | HEART RATE: 68 BPM | BODY MASS INDEX: 28.82 KG/M2 | OXYGEN SATURATION: 97 % | SYSTOLIC BLOOD PRESSURE: 118 MMHG

## 2020-01-01 VITALS
RESPIRATION RATE: 18 BRPM | SYSTOLIC BLOOD PRESSURE: 110 MMHG | TEMPERATURE: 97.5 F | OXYGEN SATURATION: 97 % | BODY MASS INDEX: 29.18 KG/M2 | HEART RATE: 90 BPM | WEIGHT: 185.9 LBS | HEIGHT: 67 IN | DIASTOLIC BLOOD PRESSURE: 72 MMHG

## 2020-01-01 VITALS
TEMPERATURE: 98 F | SYSTOLIC BLOOD PRESSURE: 102 MMHG | DIASTOLIC BLOOD PRESSURE: 60 MMHG | WEIGHT: 177 LBS | RESPIRATION RATE: 18 BRPM | BODY MASS INDEX: 27.78 KG/M2 | HEIGHT: 67 IN | HEART RATE: 108 BPM | OXYGEN SATURATION: 98 %

## 2020-01-01 VITALS
RESPIRATION RATE: 16 BRPM | HEART RATE: 109 BPM | TEMPERATURE: 98.1 F | DIASTOLIC BLOOD PRESSURE: 63 MMHG | SYSTOLIC BLOOD PRESSURE: 96 MMHG

## 2020-01-01 DIAGNOSIS — I26.99 OTHER PULMONARY EMBOLISM WITHOUT ACUTE COR PULMONALE, UNSPECIFIED CHRONICITY (HCC): ICD-10-CM

## 2020-01-01 DIAGNOSIS — C34.91 ADENOCARCINOMA OF RIGHT LUNG (HCC): Chronic | ICD-10-CM

## 2020-01-01 DIAGNOSIS — C90.02 MULTIPLE MYELOMA IN RELAPSE (HCC): ICD-10-CM

## 2020-01-01 DIAGNOSIS — R10.9 RIGHT FLANK PAIN: ICD-10-CM

## 2020-01-01 DIAGNOSIS — C90.00 MULTIPLE MYELOMA, REMISSION STATUS UNSPECIFIED (HCC): ICD-10-CM

## 2020-01-01 DIAGNOSIS — D80.1 HYPOGAMMAGLOBULINEMIA (HCC): ICD-10-CM

## 2020-01-01 DIAGNOSIS — C90.00 MULTIPLE MYELOMA, REMISSION STATUS UNSPECIFIED (HCC): Primary | ICD-10-CM

## 2020-01-01 DIAGNOSIS — I50.32 CHRONIC DIASTOLIC CONGESTIVE HEART FAILURE (HCC): ICD-10-CM

## 2020-01-01 DIAGNOSIS — R53.1 ASTHENIA: Primary | ICD-10-CM

## 2020-01-01 DIAGNOSIS — I35.0 CALCIFIC AORTIC STENOSIS: Primary | ICD-10-CM

## 2020-01-01 DIAGNOSIS — R17 TOTAL BILIRUBIN, ELEVATED: ICD-10-CM

## 2020-01-01 DIAGNOSIS — J44.9 CHRONIC OBSTRUCTIVE PULMONARY DISEASE, UNSPECIFIED COPD TYPE (HCC): Primary | ICD-10-CM

## 2020-01-01 DIAGNOSIS — C90.02 MULTIPLE MYELOMA IN RELAPSE (HCC): Primary | ICD-10-CM

## 2020-01-01 DIAGNOSIS — R91.1 LUNG NODULE: ICD-10-CM

## 2020-01-01 DIAGNOSIS — J44.9 CHRONIC OBSTRUCTIVE PULMONARY DISEASE, UNSPECIFIED COPD TYPE (HCC): ICD-10-CM

## 2020-01-01 DIAGNOSIS — Z95.828 PORT-A-CATH IN PLACE: ICD-10-CM

## 2020-01-01 DIAGNOSIS — R31.0 GROSS HEMATURIA: ICD-10-CM

## 2020-01-01 DIAGNOSIS — R77.8 ELEVATED TOTAL PROTEIN: ICD-10-CM

## 2020-01-01 DIAGNOSIS — C90.00 MULTIPLE MYELOMA NOT HAVING ACHIEVED REMISSION (HCC): Primary | ICD-10-CM

## 2020-01-01 DIAGNOSIS — R05.3 CHRONIC COUGH: ICD-10-CM

## 2020-01-01 DIAGNOSIS — D38.1 NEOPLASM OF UNCERTAIN BEHAVIOR OF LUNG: ICD-10-CM

## 2020-01-01 DIAGNOSIS — E86.0 DEHYDRATION: ICD-10-CM

## 2020-01-01 DIAGNOSIS — R91.1 LUNG NODULE: Primary | ICD-10-CM

## 2020-01-01 DIAGNOSIS — I35.9 AORTIC VALVE DISEASE: ICD-10-CM

## 2020-01-01 DIAGNOSIS — R53.1 WEAKNESS: Primary | ICD-10-CM

## 2020-01-01 DIAGNOSIS — J44.1 COPD WITH EXACERBATION (HCC): ICD-10-CM

## 2020-01-01 DIAGNOSIS — C20 RECTAL CANCER (HCC): ICD-10-CM

## 2020-01-01 DIAGNOSIS — N18.32 STAGE 3B CHRONIC KIDNEY DISEASE (HCC): ICD-10-CM

## 2020-01-01 DIAGNOSIS — Z85.3 PERSONAL HISTORY OF MALIGNANT NEOPLASM OF BREAST: ICD-10-CM

## 2020-01-01 DIAGNOSIS — R05.9 COUGH: Primary | ICD-10-CM

## 2020-01-01 DIAGNOSIS — Z79.01 CHRONIC ANTICOAGULATION: ICD-10-CM

## 2020-01-01 DIAGNOSIS — R06.00 DYSPNEA: ICD-10-CM

## 2020-01-01 DIAGNOSIS — E88.09 HYPOALBUMINEMIA: ICD-10-CM

## 2020-01-01 DIAGNOSIS — R60.9 EDEMA, UNSPECIFIED TYPE: ICD-10-CM

## 2020-01-01 DIAGNOSIS — J44.9 COPD (CHRONIC OBSTRUCTIVE PULMONARY DISEASE) (HCC): ICD-10-CM

## 2020-01-01 DIAGNOSIS — E87.6 HYPOKALEMIA: ICD-10-CM

## 2020-01-01 DIAGNOSIS — D72.819 LEUKOPENIA: ICD-10-CM

## 2020-01-01 DIAGNOSIS — I45.10 RBBB: ICD-10-CM

## 2020-01-01 DIAGNOSIS — R05.9 COUGH: ICD-10-CM

## 2020-01-01 DIAGNOSIS — D64.9 ANEMIA: ICD-10-CM

## 2020-01-01 DIAGNOSIS — C90.00 MULTIPLE MYELOMA NOT HAVING ACHIEVED REMISSION (HCC): ICD-10-CM

## 2020-01-01 DIAGNOSIS — G47.34 NOCTURNAL HYPOXIA: ICD-10-CM

## 2020-01-01 DIAGNOSIS — C34.91 ADENOCARCINOMA OF RIGHT LUNG (HCC): ICD-10-CM

## 2020-01-01 DIAGNOSIS — D80.9 IMMUNOGLOBULIN DEFICIENCY (HCC): Primary | ICD-10-CM

## 2020-01-01 DIAGNOSIS — C34.91 ADENOCARCINOMA OF RIGHT LUNG (HCC): Primary | Chronic | ICD-10-CM

## 2020-01-01 DIAGNOSIS — I35.0 NONRHEUMATIC AORTIC VALVE STENOSIS: Primary | ICD-10-CM

## 2020-01-01 DIAGNOSIS — D80.9 IMMUNOGLOBULIN DEFICIENCY (HCC): ICD-10-CM

## 2020-01-01 DIAGNOSIS — I35.0 SEVERE AORTIC STENOSIS: ICD-10-CM

## 2020-01-01 LAB
ACANTHOCYTES BLD QL SMEAR: PRESENT
ALBUMIN SERPL BCP-MCNC: 2.5 G/DL (ref 3.5–5)
ALBUMIN SERPL BCP-MCNC: 2.7 G/DL (ref 3.5–5)
ALBUMIN SERPL BCP-MCNC: 3 G/DL (ref 3.5–5)
ALBUMIN SERPL BCP-MCNC: 3 G/DL (ref 3.5–5)
ALBUMIN SERPL BCP-MCNC: 3.1 G/DL (ref 3.5–5)
ALBUMIN SERPL BCP-MCNC: 3.2 G/DL (ref 3.5–5)
ALBUMIN SERPL ELPH-MCNC: 3.43 G/DL (ref 3.5–5)
ALBUMIN SERPL ELPH-MCNC: 3.54 G/DL (ref 3.5–5)
ALBUMIN SERPL ELPH-MCNC: 56.3 % (ref 52–65)
ALBUMIN SERPL ELPH-MCNC: 61.1 % (ref 52–65)
ALP SERPL-CCNC: 71 U/L (ref 46–116)
ALP SERPL-CCNC: 74 U/L (ref 46–116)
ALP SERPL-CCNC: 76 U/L (ref 46–116)
ALP SERPL-CCNC: 77 U/L (ref 46–116)
ALP SERPL-CCNC: 85 U/L (ref 46–116)
ALP SERPL-CCNC: 99 U/L (ref 46–116)
ALPHA1 GLOB SERPL ELPH-MCNC: 0.32 G/DL (ref 0.1–0.4)
ALPHA1 GLOB SERPL ELPH-MCNC: 0.49 G/DL (ref 0.1–0.4)
ALPHA1 GLOB SERPL ELPH-MCNC: 5.5 % (ref 2.5–5)
ALPHA1 GLOB SERPL ELPH-MCNC: 8.1 % (ref 2.5–5)
ALPHA2 GLOB SERPL ELPH-MCNC: 0.68 G/DL (ref 0.4–1.2)
ALPHA2 GLOB SERPL ELPH-MCNC: 0.89 G/DL (ref 0.4–1.2)
ALPHA2 GLOB SERPL ELPH-MCNC: 11.8 % (ref 7–13)
ALPHA2 GLOB SERPL ELPH-MCNC: 14.6 % (ref 7–13)
ALT SERPL W P-5'-P-CCNC: 20 U/L (ref 12–78)
ALT SERPL W P-5'-P-CCNC: 24 U/L (ref 12–78)
ALT SERPL W P-5'-P-CCNC: 26 U/L (ref 12–78)
ALT SERPL W P-5'-P-CCNC: 33 U/L (ref 12–78)
ALT SERPL W P-5'-P-CCNC: 37 U/L (ref 12–78)
ALT SERPL W P-5'-P-CCNC: 37 U/L (ref 12–78)
ANION GAP SERPL CALCULATED.3IONS-SCNC: 10 MMOL/L (ref 4–13)
ANION GAP SERPL CALCULATED.3IONS-SCNC: 3 MMOL/L (ref 4–13)
ANION GAP SERPL CALCULATED.3IONS-SCNC: 3 MMOL/L (ref 4–13)
ANION GAP SERPL CALCULATED.3IONS-SCNC: 4 MMOL/L (ref 4–13)
ANION GAP SERPL CALCULATED.3IONS-SCNC: 5 MMOL/L (ref 4–13)
ANION GAP SERPL CALCULATED.3IONS-SCNC: 6 MMOL/L (ref 4–13)
ANION GAP SERPL CALCULATED.3IONS-SCNC: 6 MMOL/L (ref 4–13)
ANION GAP SERPL CALCULATED.3IONS-SCNC: 7 MMOL/L (ref 4–13)
ANION GAP SERPL CALCULATED.3IONS-SCNC: 7 MMOL/L (ref 4–13)
ANISOCYTOSIS BLD QL SMEAR: PRESENT
ANISOCYTOSIS BLD QL SMEAR: PRESENT
AST SERPL W P-5'-P-CCNC: 12 U/L (ref 5–45)
AST SERPL W P-5'-P-CCNC: 12 U/L (ref 5–45)
AST SERPL W P-5'-P-CCNC: 17 U/L (ref 5–45)
AST SERPL W P-5'-P-CCNC: 17 U/L (ref 5–45)
AST SERPL W P-5'-P-CCNC: 18 U/L (ref 5–45)
AST SERPL W P-5'-P-CCNC: 19 U/L (ref 5–45)
ATRIAL RATE: 106 BPM
ATRIAL RATE: 111 BPM
ATRIAL RATE: 156 BPM
BACTERIA BLD CULT: NORMAL
BACTERIA BLD CULT: NORMAL
BACTERIA UR QL AUTO: ABNORMAL /HPF
BACTERIA UR QL AUTO: ABNORMAL /HPF
BASOPHILS # BLD AUTO: 0.02 THOUSANDS/ΜL (ref 0–0.1)
BASOPHILS # BLD AUTO: 0.03 THOUSANDS/ΜL (ref 0–0.1)
BASOPHILS # BLD AUTO: 0.04 THOUSANDS/ΜL (ref 0–0.1)
BASOPHILS # BLD AUTO: 0.04 THOUSANDS/ΜL (ref 0–0.1)
BASOPHILS # BLD AUTO: 0.09 THOUSANDS/ΜL (ref 0–0.1)
BASOPHILS # BLD MANUAL: 0 THOUSAND/UL (ref 0–0.1)
BASOPHILS # BLD MANUAL: 0 THOUSAND/UL (ref 0–0.1)
BASOPHILS # BLD MANUAL: 0.24 THOUSAND/UL (ref 0–0.1)
BASOPHILS NFR BLD AUTO: 1 % (ref 0–1)
BASOPHILS NFR BLD AUTO: 2 % (ref 0–1)
BASOPHILS NFR MAR MANUAL: 0 % (ref 0–1)
BASOPHILS NFR MAR MANUAL: 0 % (ref 0–1)
BASOPHILS NFR MAR MANUAL: 6 % (ref 0–1)
BETA GLOB ABNORMAL SERPL ELPH-MCNC: 0.4 G/DL (ref 0.4–0.8)
BETA GLOB ABNORMAL SERPL ELPH-MCNC: 0.41 G/DL (ref 0.4–0.8)
BETA1 GLOB SERPL ELPH-MCNC: 6.8 % (ref 5–13)
BETA1 GLOB SERPL ELPH-MCNC: 6.9 % (ref 5–13)
BETA2 GLOB SERPL ELPH-MCNC: 4.4 % (ref 2–8)
BETA2 GLOB SERPL ELPH-MCNC: 4.5 % (ref 2–8)
BETA2+GAMMA GLOB SERPL ELPH-MCNC: 0.26 G/DL (ref 0.2–0.5)
BETA2+GAMMA GLOB SERPL ELPH-MCNC: 0.27 G/DL (ref 0.2–0.5)
BILIRUB SERPL-MCNC: 0.95 MG/DL (ref 0.2–1)
BILIRUB SERPL-MCNC: 1.26 MG/DL (ref 0.2–1)
BILIRUB SERPL-MCNC: 1.4 MG/DL (ref 0.2–1)
BILIRUB SERPL-MCNC: 1.42 MG/DL (ref 0.2–1)
BILIRUB SERPL-MCNC: 2.06 MG/DL (ref 0.2–1)
BILIRUB SERPL-MCNC: 2.2 MG/DL (ref 0.2–1)
BILIRUB UR QL STRIP: ABNORMAL
BILIRUB UR QL STRIP: NEGATIVE
BUN SERPL-MCNC: 10 MG/DL (ref 5–25)
BUN SERPL-MCNC: 13 MG/DL (ref 5–25)
BUN SERPL-MCNC: 13 MG/DL (ref 5–25)
BUN SERPL-MCNC: 17 MG/DL (ref 5–25)
BUN SERPL-MCNC: 18 MG/DL (ref 5–25)
BUN SERPL-MCNC: 19 MG/DL (ref 5–25)
BUN SERPL-MCNC: 19 MG/DL (ref 5–25)
BUN SERPL-MCNC: 21 MG/DL (ref 5–25)
BUN SERPL-MCNC: 27 MG/DL (ref 5–25)
CALCIUM ALBUM COR SERPL-MCNC: 10 MG/DL (ref 8.3–10.1)
CALCIUM ALBUM COR SERPL-MCNC: 10.4 MG/DL (ref 8.3–10.1)
CALCIUM ALBUM COR SERPL-MCNC: 9.8 MG/DL (ref 8.3–10.1)
CALCIUM SERPL-MCNC: 8.6 MG/DL (ref 8.3–10.1)
CALCIUM SERPL-MCNC: 8.6 MG/DL (ref 8.3–10.1)
CALCIUM SERPL-MCNC: 8.7 MG/DL (ref 8.3–10.1)
CALCIUM SERPL-MCNC: 8.9 MG/DL (ref 8.3–10.1)
CALCIUM SERPL-MCNC: 9 MG/DL (ref 8.3–10.1)
CALCIUM SERPL-MCNC: 9 MG/DL (ref 8.3–10.1)
CALCIUM SERPL-MCNC: 9.3 MG/DL (ref 8.3–10.1)
CALCIUM SERPL-MCNC: 9.5 MG/DL (ref 8.3–10.1)
CALCIUM SERPL-MCNC: 9.6 MG/DL (ref 8.3–10.1)
CHLORIDE SERPL-SCNC: 104 MMOL/L (ref 100–108)
CHLORIDE SERPL-SCNC: 105 MMOL/L (ref 100–108)
CHLORIDE SERPL-SCNC: 105 MMOL/L (ref 100–108)
CHLORIDE SERPL-SCNC: 106 MMOL/L (ref 100–108)
CHLORIDE SERPL-SCNC: 107 MMOL/L (ref 100–108)
CHLORIDE SERPL-SCNC: 107 MMOL/L (ref 100–108)
CHLORIDE SERPL-SCNC: 108 MMOL/L (ref 100–108)
CHLORIDE SERPL-SCNC: 109 MMOL/L (ref 100–108)
CHLORIDE SERPL-SCNC: 110 MMOL/L (ref 100–108)
CLARITY UR: CLEAR
CLARITY UR: CLEAR
CO2 SERPL-SCNC: 23 MMOL/L (ref 21–32)
CO2 SERPL-SCNC: 24 MMOL/L (ref 21–32)
CO2 SERPL-SCNC: 26 MMOL/L (ref 21–32)
CO2 SERPL-SCNC: 28 MMOL/L (ref 21–32)
CO2 SERPL-SCNC: 28 MMOL/L (ref 21–32)
CO2 SERPL-SCNC: 29 MMOL/L (ref 21–32)
CO2 SERPL-SCNC: 30 MMOL/L (ref 21–32)
COLOR UR: ABNORMAL
COLOR UR: YELLOW
CREAT SERPL-MCNC: 1.12 MG/DL (ref 0.6–1.3)
CREAT SERPL-MCNC: 1.13 MG/DL (ref 0.6–1.3)
CREAT SERPL-MCNC: 1.17 MG/DL (ref 0.6–1.3)
CREAT SERPL-MCNC: 1.18 MG/DL (ref 0.6–1.3)
CREAT SERPL-MCNC: 1.19 MG/DL (ref 0.6–1.3)
CREAT SERPL-MCNC: 1.2 MG/DL (ref 0.6–1.3)
CREAT SERPL-MCNC: 1.2 MG/DL (ref 0.6–1.3)
CREAT SERPL-MCNC: 1.27 MG/DL (ref 0.6–1.3)
CREAT SERPL-MCNC: 1.31 MG/DL (ref 0.6–1.3)
D DIMER PPP FEU-MCNC: 0.57 UG/ML FEU
EOSINOPHIL # BLD AUTO: 0 THOUSAND/ΜL (ref 0–0.61)
EOSINOPHIL # BLD AUTO: 0 THOUSAND/ΜL (ref 0–0.61)
EOSINOPHIL # BLD AUTO: 0.06 THOUSAND/ΜL (ref 0–0.61)
EOSINOPHIL # BLD AUTO: 0.14 THOUSAND/ΜL (ref 0–0.61)
EOSINOPHIL # BLD AUTO: 0.27 THOUSAND/ΜL (ref 0–0.61)
EOSINOPHIL # BLD MANUAL: 0 THOUSAND/UL (ref 0–0.4)
EOSINOPHIL # BLD MANUAL: 0.04 THOUSAND/UL (ref 0–0.4)
EOSINOPHIL # BLD MANUAL: 0.19 THOUSAND/UL (ref 0–0.4)
EOSINOPHIL NFR BLD AUTO: 0 % (ref 0–6)
EOSINOPHIL NFR BLD AUTO: 0 % (ref 0–6)
EOSINOPHIL NFR BLD AUTO: 2 % (ref 0–6)
EOSINOPHIL NFR BLD AUTO: 3 % (ref 0–6)
EOSINOPHIL NFR BLD AUTO: 6 % (ref 0–6)
EOSINOPHIL NFR BLD MANUAL: 0 % (ref 0–6)
EOSINOPHIL NFR BLD MANUAL: 1 % (ref 0–6)
EOSINOPHIL NFR BLD MANUAL: 2 % (ref 0–6)
ERYTHROCYTE [DISTWIDTH] IN BLOOD BY AUTOMATED COUNT: 14.1 % (ref 11.6–15.1)
ERYTHROCYTE [DISTWIDTH] IN BLOOD BY AUTOMATED COUNT: 15.4 % (ref 11.6–15.1)
ERYTHROCYTE [DISTWIDTH] IN BLOOD BY AUTOMATED COUNT: 15.7 % (ref 11.6–15.1)
ERYTHROCYTE [DISTWIDTH] IN BLOOD BY AUTOMATED COUNT: 15.9 % (ref 11.6–15.1)
ERYTHROCYTE [DISTWIDTH] IN BLOOD BY AUTOMATED COUNT: 16 % (ref 11.6–15.1)
ERYTHROCYTE [DISTWIDTH] IN BLOOD BY AUTOMATED COUNT: 18.3 % (ref 11.6–15.1)
ERYTHROCYTE [DISTWIDTH] IN BLOOD BY AUTOMATED COUNT: 18.4 % (ref 11.6–15.1)
ERYTHROCYTE [DISTWIDTH] IN BLOOD BY AUTOMATED COUNT: 18.7 % (ref 11.6–15.1)
ERYTHROCYTE [DISTWIDTH] IN BLOOD BY AUTOMATED COUNT: 20.3 % (ref 11.6–15.1)
FLUAV RNA RESP QL NAA+PROBE: NEGATIVE
FLUAV RNA RESP QL NAA+PROBE: NEGATIVE
FLUBV RNA RESP QL NAA+PROBE: NEGATIVE
FLUBV RNA RESP QL NAA+PROBE: NEGATIVE
GAMMA GLOB ABNORMAL SERPL ELPH-MCNC: 0.59 G/DL (ref 0.5–1.6)
GAMMA GLOB ABNORMAL SERPL ELPH-MCNC: 0.6 G/DL (ref 0.5–1.6)
GAMMA GLOB SERPL ELPH-MCNC: 10.2 % (ref 12–22)
GAMMA GLOB SERPL ELPH-MCNC: 9.8 % (ref 12–22)
GFR SERPL CREATININE-BSD FRML MDRD: 50 ML/MIN/1.73SQ M
GFR SERPL CREATININE-BSD FRML MDRD: 52 ML/MIN/1.73SQ M
GFR SERPL CREATININE-BSD FRML MDRD: 55 ML/MIN/1.73SQ M
GFR SERPL CREATININE-BSD FRML MDRD: 56 ML/MIN/1.73SQ M
GFR SERPL CREATININE-BSD FRML MDRD: 56 ML/MIN/1.73SQ M
GFR SERPL CREATININE-BSD FRML MDRD: 57 ML/MIN/1.73SQ M
GFR SERPL CREATININE-BSD FRML MDRD: 57 ML/MIN/1.73SQ M
GFR SERPL CREATININE-BSD FRML MDRD: 59 ML/MIN/1.73SQ M
GFR SERPL CREATININE-BSD FRML MDRD: 60 ML/MIN/1.73SQ M
GLUCOSE P FAST SERPL-MCNC: 77 MG/DL (ref 65–99)
GLUCOSE P FAST SERPL-MCNC: 94 MG/DL (ref 65–99)
GLUCOSE SERPL-MCNC: 103 MG/DL (ref 65–140)
GLUCOSE SERPL-MCNC: 115 MG/DL (ref 65–140)
GLUCOSE SERPL-MCNC: 137 MG/DL (ref 65–140)
GLUCOSE SERPL-MCNC: 147 MG/DL (ref 65–140)
GLUCOSE SERPL-MCNC: 89 MG/DL (ref 65–140)
GLUCOSE SERPL-MCNC: 91 MG/DL (ref 65–140)
GLUCOSE SERPL-MCNC: 95 MG/DL (ref 65–140)
GLUCOSE SERPL-MCNC: 96 MG/DL (ref 65–140)
GLUCOSE UR STRIP-MCNC: NEGATIVE MG/DL
GLUCOSE UR STRIP-MCNC: NEGATIVE MG/DL
HCT VFR BLD AUTO: 27.4 % (ref 36.5–49.3)
HCT VFR BLD AUTO: 29.9 % (ref 36.5–49.3)
HCT VFR BLD AUTO: 30.2 % (ref 36.5–49.3)
HCT VFR BLD AUTO: 30.8 % (ref 36.5–49.3)
HCT VFR BLD AUTO: 34.6 % (ref 36.5–49.3)
HCT VFR BLD AUTO: 37.4 % (ref 36.5–49.3)
HCT VFR BLD AUTO: 37.5 % (ref 36.5–49.3)
HCT VFR BLD AUTO: 38.4 % (ref 36.5–49.3)
HCT VFR BLD AUTO: 38.9 % (ref 36.5–49.3)
HELMET CELLS BLD QL SMEAR: PRESENT
HGB BLD-MCNC: 10.8 G/DL (ref 12–17)
HGB BLD-MCNC: 11.5 G/DL (ref 12–17)
HGB BLD-MCNC: 11.9 G/DL (ref 12–17)
HGB BLD-MCNC: 12.1 G/DL (ref 12–17)
HGB BLD-MCNC: 12.5 G/DL (ref 12–17)
HGB BLD-MCNC: 8.5 G/DL (ref 12–17)
HGB BLD-MCNC: 9.2 G/DL (ref 12–17)
HGB BLD-MCNC: 9.5 G/DL (ref 12–17)
HGB BLD-MCNC: 9.7 G/DL (ref 12–17)
HGB UR QL STRIP.AUTO: ABNORMAL
HGB UR QL STRIP.AUTO: ABNORMAL
HYALINE CASTS #/AREA URNS LPF: ABNORMAL /LPF
IGA SERPL-MCNC: 117 MG/DL (ref 70–400)
IGA SERPL-MCNC: 56 MG/DL (ref 70–400)
IGA SERPL-MCNC: 64 MG/DL (ref 70–400)
IGA SERPL-MCNC: 82 MG/DL (ref 70–400)
IGA SERPL-MCNC: 85 MG/DL (ref 70–400)
IGG SERPL-MCNC: 1010 MG/DL (ref 700–1600)
IGG SERPL-MCNC: 431 MG/DL (ref 700–1600)
IGG SERPL-MCNC: 501 MG/DL (ref 700–1600)
IGG SERPL-MCNC: 625 MG/DL (ref 700–1600)
IGG SERPL-MCNC: 644 MG/DL (ref 700–1600)
IGG/ALB SER: 1.29 {RATIO} (ref 1.1–1.8)
IGG/ALB SER: 1.57 {RATIO} (ref 1.1–1.8)
IGM SERPL-MCNC: 26 MG/DL (ref 40–230)
IGM SERPL-MCNC: 31 MG/DL (ref 40–230)
IGM SERPL-MCNC: 41 MG/DL (ref 40–230)
IGM SERPL-MCNC: 49 MG/DL (ref 40–230)
IGM SERPL-MCNC: 57 MG/DL (ref 40–230)
IMM GRANULOCYTES # BLD AUTO: 0.06 THOUSAND/UL (ref 0–0.2)
IMM GRANULOCYTES # BLD AUTO: 0.06 THOUSAND/UL (ref 0–0.2)
IMM GRANULOCYTES # BLD AUTO: 0.08 THOUSAND/UL (ref 0–0.2)
IMM GRANULOCYTES # BLD AUTO: 0.09 THOUSAND/UL (ref 0–0.2)
IMM GRANULOCYTES # BLD AUTO: 0.1 THOUSAND/UL (ref 0–0.2)
IMM GRANULOCYTES NFR BLD AUTO: 1 % (ref 0–2)
IMM GRANULOCYTES NFR BLD AUTO: 2 % (ref 0–2)
IMM GRANULOCYTES NFR BLD AUTO: 4 % (ref 0–2)
KAPPA LC FREE SER-MCNC: 10.9 MG/L (ref 3.3–19.4)
KAPPA LC FREE SER-MCNC: 11.2 MG/L (ref 3.3–19.4)
KAPPA LC FREE SER-MCNC: 11.5 MG/L (ref 3.3–19.4)
KAPPA LC FREE SER-MCNC: 12.1 MG/L (ref 3.3–19.4)
KAPPA LC FREE SER-MCNC: 7.4 MG/L (ref 3.3–19.4)
KAPPA LC FREE/LAMBDA FREE SER: 0.03 {RATIO} (ref 0.26–1.65)
KAPPA LC FREE/LAMBDA FREE SER: 0.05 {RATIO} (ref 0.26–1.65)
KAPPA LC FREE/LAMBDA FREE SER: 0.07 {RATIO} (ref 0.26–1.65)
KAPPA LC FREE/LAMBDA FREE SER: 0.08 {RATIO} (ref 0.26–1.65)
KAPPA LC FREE/LAMBDA FREE SER: 0.08 {RATIO} (ref 0.26–1.65)
KETONES UR STRIP-MCNC: NEGATIVE MG/DL
KETONES UR STRIP-MCNC: NEGATIVE MG/DL
LACTATE SERPL-SCNC: 1.7 MMOL/L (ref 0.5–2)
LAMBDA LC FREE SERPL-MCNC: 130 MG/L (ref 5.7–26.3)
LAMBDA LC FREE SERPL-MCNC: 134.5 MG/L (ref 5.7–26.3)
LAMBDA LC FREE SERPL-MCNC: 154.1 MG/L (ref 5.7–26.3)
LAMBDA LC FREE SERPL-MCNC: 240.7 MG/L (ref 5.7–26.3)
LAMBDA LC FREE SERPL-MCNC: 259.7 MG/L (ref 5.7–26.3)
LEUKOCYTE ESTERASE UR QL STRIP: NEGATIVE
LEUKOCYTE ESTERASE UR QL STRIP: NEGATIVE
LIPASE SERPL-CCNC: 49 U/L (ref 73–393)
LYMPHOCYTES # BLD AUTO: 0.35 THOUSANDS/ΜL (ref 0.6–4.47)
LYMPHOCYTES # BLD AUTO: 0.37 THOUSAND/UL (ref 0.6–4.47)
LYMPHOCYTES # BLD AUTO: 0.47 THOUSANDS/ΜL (ref 0.6–4.47)
LYMPHOCYTES # BLD AUTO: 0.6 THOUSAND/UL (ref 0.6–4.47)
LYMPHOCYTES # BLD AUTO: 0.71 THOUSAND/UL (ref 0.6–4.47)
LYMPHOCYTES # BLD AUTO: 0.76 THOUSANDS/ΜL (ref 0.6–4.47)
LYMPHOCYTES # BLD AUTO: 1.02 THOUSANDS/ΜL (ref 0.6–4.47)
LYMPHOCYTES # BLD AUTO: 1.18 THOUSANDS/ΜL (ref 0.6–4.47)
LYMPHOCYTES # BLD AUTO: 18 % (ref 14–44)
LYMPHOCYTES # BLD AUTO: 24 % (ref 14–44)
LYMPHOCYTES # BLD AUTO: 4 % (ref 14–44)
LYMPHOCYTES NFR BLD AUTO: 11 % (ref 14–44)
LYMPHOCYTES NFR BLD AUTO: 16 % (ref 14–44)
LYMPHOCYTES NFR BLD AUTO: 18 % (ref 14–44)
LYMPHOCYTES NFR BLD AUTO: 22 % (ref 14–44)
LYMPHOCYTES NFR BLD AUTO: 31 % (ref 14–44)
MACROCYTES BLD QL AUTO: PRESENT
MAGNESIUM SERPL-MCNC: 1.6 MG/DL (ref 1.6–2.6)
MCH RBC QN AUTO: 30.9 PG (ref 26.8–34.3)
MCH RBC QN AUTO: 31.1 PG (ref 26.8–34.3)
MCH RBC QN AUTO: 31.4 PG (ref 26.8–34.3)
MCH RBC QN AUTO: 31.7 PG (ref 26.8–34.3)
MCH RBC QN AUTO: 32 PG (ref 26.8–34.3)
MCH RBC QN AUTO: 32.2 PG (ref 26.8–34.3)
MCH RBC QN AUTO: 32.7 PG (ref 26.8–34.3)
MCHC RBC AUTO-ENTMCNC: 30.7 G/DL (ref 31.4–37.4)
MCHC RBC AUTO-ENTMCNC: 30.8 G/DL (ref 31.4–37.4)
MCHC RBC AUTO-ENTMCNC: 30.8 G/DL (ref 31.4–37.4)
MCHC RBC AUTO-ENTMCNC: 31 G/DL (ref 31.4–37.4)
MCHC RBC AUTO-ENTMCNC: 31.1 G/DL (ref 31.4–37.4)
MCHC RBC AUTO-ENTMCNC: 31.2 G/DL (ref 31.4–37.4)
MCHC RBC AUTO-ENTMCNC: 31.7 G/DL (ref 31.4–37.4)
MCHC RBC AUTO-ENTMCNC: 32.1 G/DL (ref 31.4–37.4)
MCHC RBC AUTO-ENTMCNC: 32.6 G/DL (ref 31.4–37.4)
MCV RBC AUTO: 100 FL (ref 82–98)
MCV RBC AUTO: 100 FL (ref 82–98)
MCV RBC AUTO: 101 FL (ref 82–98)
MCV RBC AUTO: 101 FL (ref 82–98)
MCV RBC AUTO: 102 FL (ref 82–98)
MCV RBC AUTO: 102 FL (ref 82–98)
MCV RBC AUTO: 103 FL (ref 82–98)
MCV RBC AUTO: 103 FL (ref 82–98)
MCV RBC AUTO: 98 FL (ref 82–98)
METAMYELOCYTES NFR BLD MANUAL: 2 % (ref 0–1)
MONOCYTES # BLD AUTO: 0.34 THOUSAND/ΜL (ref 0.17–1.22)
MONOCYTES # BLD AUTO: 0.37 THOUSAND/UL (ref 0–1.22)
MONOCYTES # BLD AUTO: 0.44 THOUSAND/UL (ref 0–1.22)
MONOCYTES # BLD AUTO: 0.53 THOUSAND/ΜL (ref 0.17–1.22)
MONOCYTES # BLD AUTO: 0.57 THOUSAND/ΜL (ref 0.17–1.22)
MONOCYTES # BLD AUTO: 0.6 THOUSAND/UL (ref 0–1.22)
MONOCYTES # BLD AUTO: 0.67 THOUSAND/ΜL (ref 0.17–1.22)
MONOCYTES # BLD AUTO: 0.81 THOUSAND/ΜL (ref 0.17–1.22)
MONOCYTES NFR BLD AUTO: 10 % (ref 4–12)
MONOCYTES NFR BLD AUTO: 14 % (ref 4–12)
MONOCYTES NFR BLD AUTO: 16 % (ref 4–12)
MONOCYTES NFR BLD AUTO: 17 % (ref 4–12)
MONOCYTES NFR BLD AUTO: 21 % (ref 4–12)
MONOCYTES NFR BLD: 11 % (ref 4–12)
MONOCYTES NFR BLD: 24 % (ref 4–12)
MONOCYTES NFR BLD: 4 % (ref 4–12)
MYELOCYTES NFR BLD MANUAL: 1 % (ref 0–1)
NEUTROPHILS # BLD AUTO: 0.82 THOUSANDS/ΜL (ref 1.85–7.62)
NEUTROPHILS # BLD AUTO: 1.85 THOUSANDS/ΜL (ref 1.85–7.62)
NEUTROPHILS # BLD AUTO: 2.64 THOUSANDS/ΜL (ref 1.85–7.62)
NEUTROPHILS # BLD AUTO: 3.17 THOUSANDS/ΜL (ref 1.85–7.62)
NEUTROPHILS # BLD AUTO: 3.73 THOUSANDS/ΜL (ref 1.85–7.62)
NEUTROPHILS # BLD MANUAL: 1.26 THOUSAND/UL (ref 1.85–7.62)
NEUTROPHILS # BLD MANUAL: 2.38 THOUSAND/UL (ref 1.85–7.62)
NEUTROPHILS # BLD MANUAL: 7.96 THOUSAND/UL (ref 1.85–7.62)
NEUTS BAND NFR BLD MANUAL: 5 % (ref 0–8)
NEUTS SEG NFR BLD AUTO: 46 % (ref 43–75)
NEUTS SEG NFR BLD AUTO: 49 % (ref 43–75)
NEUTS SEG NFR BLD AUTO: 52 % (ref 43–75)
NEUTS SEG NFR BLD AUTO: 60 % (ref 43–75)
NEUTS SEG NFR BLD AUTO: 64 % (ref 43–75)
NEUTS SEG NFR BLD AUTO: 64 % (ref 43–75)
NEUTS SEG NFR BLD AUTO: 67 % (ref 43–75)
NEUTS SEG NFR BLD AUTO: 86 % (ref 43–75)
NITRITE UR QL STRIP: NEGATIVE
NITRITE UR QL STRIP: NEGATIVE
NON-SQ EPI CELLS URNS QL MICRO: ABNORMAL /HPF
NON-SQ EPI CELLS URNS QL MICRO: ABNORMAL /HPF
NRBC BLD AUTO-RTO: 0 /100 WBCS
NT-PROBNP SERPL-MCNC: 233 PG/ML
NT-PROBNP SERPL-MCNC: 489 PG/ML
OVALOCYTES BLD QL SMEAR: PRESENT
OVALOCYTES BLD QL SMEAR: PRESENT
P AXIS: 40 DEGREES
P AXIS: 72 DEGREES
PH UR STRIP.AUTO: 5.5 [PH] (ref 4.5–8)
PH UR STRIP.AUTO: 6 [PH]
PLATELET # BLD AUTO: 111 THOUSANDS/UL (ref 149–390)
PLATELET # BLD AUTO: 116 THOUSANDS/UL (ref 149–390)
PLATELET # BLD AUTO: 118 THOUSANDS/UL (ref 149–390)
PLATELET # BLD AUTO: 126 THOUSANDS/UL (ref 149–390)
PLATELET # BLD AUTO: 126 THOUSANDS/UL (ref 149–390)
PLATELET # BLD AUTO: 129 THOUSANDS/UL (ref 149–390)
PLATELET # BLD AUTO: 136 THOUSANDS/UL (ref 149–390)
PLATELET # BLD AUTO: 146 THOUSANDS/UL (ref 149–390)
PLATELET # BLD AUTO: 163 THOUSANDS/UL (ref 149–390)
PLATELET BLD QL SMEAR: ABNORMAL
PLATELET BLD QL SMEAR: ABNORMAL
PLATELET BLD QL SMEAR: ADEQUATE
PMV BLD AUTO: 10.2 FL (ref 8.9–12.7)
PMV BLD AUTO: 11 FL (ref 8.9–12.7)
PMV BLD AUTO: 11.6 FL (ref 8.9–12.7)
PMV BLD AUTO: 11.8 FL (ref 8.9–12.7)
PMV BLD AUTO: 11.9 FL (ref 8.9–12.7)
PMV BLD AUTO: 12 FL (ref 8.9–12.7)
PMV BLD AUTO: 12 FL (ref 8.9–12.7)
PMV BLD AUTO: 12.1 FL (ref 8.9–12.7)
PMV BLD AUTO: 9.8 FL (ref 8.9–12.7)
POIKILOCYTOSIS BLD QL SMEAR: PRESENT
POIKILOCYTOSIS BLD QL SMEAR: PRESENT
POLYCHROMASIA BLD QL SMEAR: PRESENT
POLYCHROMASIA BLD QL SMEAR: PRESENT
POTASSIUM SERPL-SCNC: 3.4 MMOL/L (ref 3.5–5.3)
POTASSIUM SERPL-SCNC: 3.7 MMOL/L (ref 3.5–5.3)
POTASSIUM SERPL-SCNC: 3.7 MMOL/L (ref 3.5–5.3)
POTASSIUM SERPL-SCNC: 3.8 MMOL/L (ref 3.5–5.3)
POTASSIUM SERPL-SCNC: 4 MMOL/L (ref 3.5–5.3)
POTASSIUM SERPL-SCNC: 4 MMOL/L (ref 3.5–5.3)
POTASSIUM SERPL-SCNC: 4.1 MMOL/L (ref 3.5–5.3)
POTASSIUM SERPL-SCNC: 4.1 MMOL/L (ref 3.5–5.3)
POTASSIUM SERPL-SCNC: 4.6 MMOL/L (ref 3.5–5.3)
PR INTERVAL: 174 MS
PR INTERVAL: 188 MS
PROCALCITONIN SERPL-MCNC: 0.06 NG/ML
PROCALCITONIN SERPL-MCNC: <0.05 NG/ML
PROT PATTERN SERPL ELPH-IMP: ABNORMAL
PROT PATTERN SERPL ELPH-IMP: ABNORMAL
PROT SERPL-MCNC: 5.5 G/DL (ref 6.4–8.2)
PROT SERPL-MCNC: 5.8 G/DL (ref 6.4–8.2)
PROT SERPL-MCNC: 5.8 G/DL (ref 6.4–8.2)
PROT SERPL-MCNC: 6 G/DL (ref 6.4–8.2)
PROT SERPL-MCNC: 6.1 G/DL (ref 6.4–8.2)
PROT SERPL-MCNC: 6.1 G/DL (ref 6.4–8.2)
PROT SERPL-MCNC: 6.4 G/DL (ref 6.4–8.2)
PROT SERPL-MCNC: 6.7 G/DL (ref 6.4–8.2)
PROT UR STRIP-MCNC: ABNORMAL MG/DL
PROT UR STRIP-MCNC: ABNORMAL MG/DL
QRS AXIS: 34 DEGREES
QRS AXIS: 34 DEGREES
QRS AXIS: 67 DEGREES
QRSD INTERVAL: 112 MS
QRSD INTERVAL: 120 MS
QRSD INTERVAL: 124 MS
QT INTERVAL: 332 MS
QT INTERVAL: 340 MS
QT INTERVAL: 448 MS
QTC INTERVAL: 417 MS
QTC INTERVAL: 438 MS
QTC INTERVAL: 595 MS
RBC # BLD AUTO: 2.73 MILLION/UL (ref 3.88–5.62)
RBC # BLD AUTO: 2.98 MILLION/UL (ref 3.88–5.62)
RBC # BLD AUTO: 3.03 MILLION/UL (ref 3.88–5.62)
RBC # BLD AUTO: 3.09 MILLION/UL (ref 3.88–5.62)
RBC # BLD AUTO: 3.44 MILLION/UL (ref 3.88–5.62)
RBC # BLD AUTO: 3.63 MILLION/UL (ref 3.88–5.62)
RBC # BLD AUTO: 3.69 MILLION/UL (ref 3.88–5.62)
RBC # BLD AUTO: 3.78 MILLION/UL (ref 3.88–5.62)
RBC # BLD AUTO: 3.82 MILLION/UL (ref 3.88–5.62)
RBC #/AREA URNS AUTO: ABNORMAL /HPF
RBC #/AREA URNS AUTO: ABNORMAL /HPF
RBC MORPH BLD: PRESENT
RBC MORPH BLD: PRESENT
RSV RNA RESP QL NAA+PROBE: NEGATIVE
RSV RNA RESP QL NAA+PROBE: NEGATIVE
SARS-COV-2 RNA RESP QL NAA+PROBE: NEGATIVE
SARS-COV-2 RNA RESP QL NAA+PROBE: NEGATIVE
SODIUM SERPL-SCNC: 138 MMOL/L (ref 136–145)
SODIUM SERPL-SCNC: 138 MMOL/L (ref 136–145)
SODIUM SERPL-SCNC: 139 MMOL/L (ref 136–145)
SODIUM SERPL-SCNC: 139 MMOL/L (ref 136–145)
SODIUM SERPL-SCNC: 140 MMOL/L (ref 136–145)
SODIUM SERPL-SCNC: 141 MMOL/L (ref 136–145)
SP GR UR STRIP.AUTO: 1.02 (ref 1–1.03)
SP GR UR STRIP.AUTO: 1.03 (ref 1–1.03)
T WAVE AXIS: 23 DEGREES
T WAVE AXIS: 35 DEGREES
T WAVE AXIS: 40 DEGREES
TOTAL CELLS COUNTED SPEC: 100
TROPONIN I SERPL-MCNC: <0.02 NG/ML
TROPONIN I SERPL-MCNC: <0.02 NG/ML
UROBILINOGEN UR QL STRIP.AUTO: 1 E.U./DL
UROBILINOGEN UR QL STRIP.AUTO: 1 E.U./DL
VARIANT LYMPHS # BLD AUTO: 2 %
VARIANT LYMPHS # BLD AUTO: 4 %
VENTRICULAR RATE: 100 BPM
VENTRICULAR RATE: 106 BPM
VENTRICULAR RATE: 95 BPM
WBC # BLD AUTO: 1.59 THOUSAND/UL (ref 4.31–10.16)
WBC # BLD AUTO: 2.48 THOUSAND/UL (ref 4.31–10.16)
WBC # BLD AUTO: 3.36 THOUSAND/UL (ref 4.31–10.16)
WBC # BLD AUTO: 3.77 THOUSAND/UL (ref 4.31–10.16)
WBC # BLD AUTO: 3.97 THOUSAND/UL (ref 4.31–10.16)
WBC # BLD AUTO: 4.19 THOUSAND/UL (ref 4.31–10.16)
WBC # BLD AUTO: 4.86 THOUSAND/UL (ref 4.31–10.16)
WBC # BLD AUTO: 5.58 THOUSAND/UL (ref 4.31–10.16)
WBC # BLD AUTO: 9.26 THOUSAND/UL (ref 4.31–10.16)
WBC #/AREA URNS AUTO: ABNORMAL /HPF
WBC #/AREA URNS AUTO: ABNORMAL /HPF

## 2020-01-01 PROCEDURE — 85007 BL SMEAR W/DIFF WBC COUNT: CPT

## 2020-01-01 PROCEDURE — 85025 COMPLETE CBC W/AUTO DIFF WBC: CPT

## 2020-01-01 PROCEDURE — 84145 PROCALCITONIN (PCT): CPT | Performed by: INTERNAL MEDICINE

## 2020-01-01 PROCEDURE — 84165 PROTEIN E-PHORESIS SERUM: CPT

## 2020-01-01 PROCEDURE — 96365 THER/PROPH/DIAG IV INF INIT: CPT

## 2020-01-01 PROCEDURE — 83883 ASSAY NEPHELOMETRY NOT SPEC: CPT

## 2020-01-01 PROCEDURE — 97163 PT EVAL HIGH COMPLEX 45 MIN: CPT

## 2020-01-01 PROCEDURE — 82784 ASSAY IGA/IGD/IGG/IGM EACH: CPT

## 2020-01-01 PROCEDURE — 93005 ELECTROCARDIOGRAM TRACING: CPT

## 2020-01-01 PROCEDURE — 36415 COLL VENOUS BLD VENIPUNCTURE: CPT

## 2020-01-01 PROCEDURE — 99214 OFFICE O/P EST MOD 30 MIN: CPT | Performed by: INTERNAL MEDICINE

## 2020-01-01 PROCEDURE — 94760 N-INVAS EAR/PLS OXIMETRY 1: CPT

## 2020-01-01 PROCEDURE — 85007 BL SMEAR W/DIFF WBC COUNT: CPT | Performed by: EMERGENCY MEDICINE

## 2020-01-01 PROCEDURE — G1004 CDSM NDSC: HCPCS

## 2020-01-01 PROCEDURE — 0241U HB NFCT DS VIR RESP RNA 4 TRGT: CPT | Performed by: PHYSICIAN ASSISTANT

## 2020-01-01 PROCEDURE — 85027 COMPLETE CBC AUTOMATED: CPT

## 2020-01-01 PROCEDURE — 82948 REAGENT STRIP/BLOOD GLUCOSE: CPT

## 2020-01-01 PROCEDURE — 83605 ASSAY OF LACTIC ACID: CPT | Performed by: EMERGENCY MEDICINE

## 2020-01-01 PROCEDURE — 81001 URINALYSIS AUTO W/SCOPE: CPT | Performed by: INTERNAL MEDICINE

## 2020-01-01 PROCEDURE — 93306 TTE W/DOPPLER COMPLETE: CPT

## 2020-01-01 PROCEDURE — 83735 ASSAY OF MAGNESIUM: CPT | Performed by: STUDENT IN AN ORGANIZED HEALTH CARE EDUCATION/TRAINING PROGRAM

## 2020-01-01 PROCEDURE — 71045 X-RAY EXAM CHEST 1 VIEW: CPT

## 2020-01-01 PROCEDURE — 80053 COMPREHEN METABOLIC PANEL: CPT

## 2020-01-01 PROCEDURE — 99285 EMERGENCY DEPT VISIT HI MDM: CPT

## 2020-01-01 PROCEDURE — 99215 OFFICE O/P EST HI 40 MIN: CPT | Performed by: INTERNAL MEDICINE

## 2020-01-01 PROCEDURE — 99223 1ST HOSP IP/OBS HIGH 75: CPT | Performed by: INTERNAL MEDICINE

## 2020-01-01 PROCEDURE — 99222 1ST HOSP IP/OBS MODERATE 55: CPT | Performed by: INTERNAL MEDICINE

## 2020-01-01 PROCEDURE — 84165 PROTEIN E-PHORESIS SERUM: CPT | Performed by: PATHOLOGY

## 2020-01-01 PROCEDURE — 85027 COMPLETE CBC AUTOMATED: CPT | Performed by: STUDENT IN AN ORGANIZED HEALTH CARE EDUCATION/TRAINING PROGRAM

## 2020-01-01 PROCEDURE — 80048 BASIC METABOLIC PNL TOTAL CA: CPT | Performed by: STUDENT IN AN ORGANIZED HEALTH CARE EDUCATION/TRAINING PROGRAM

## 2020-01-01 PROCEDURE — 85027 COMPLETE CBC AUTOMATED: CPT | Performed by: EMERGENCY MEDICINE

## 2020-01-01 PROCEDURE — 71250 CT THORAX DX C-: CPT

## 2020-01-01 PROCEDURE — 84484 ASSAY OF TROPONIN QUANT: CPT | Performed by: PHYSICIAN ASSISTANT

## 2020-01-01 PROCEDURE — 84484 ASSAY OF TROPONIN QUANT: CPT | Performed by: EMERGENCY MEDICINE

## 2020-01-01 PROCEDURE — 97166 OT EVAL MOD COMPLEX 45 MIN: CPT

## 2020-01-01 PROCEDURE — 0241U HB NFCT DS VIR RESP RNA 4 TRGT: CPT | Performed by: EMERGENCY MEDICINE

## 2020-01-01 PROCEDURE — 80053 COMPREHEN METABOLIC PANEL: CPT | Performed by: PHYSICIAN ASSISTANT

## 2020-01-01 PROCEDURE — 83880 ASSAY OF NATRIURETIC PEPTIDE: CPT | Performed by: EMERGENCY MEDICINE

## 2020-01-01 PROCEDURE — 99232 SBSQ HOSP IP/OBS MODERATE 35: CPT

## 2020-01-01 PROCEDURE — 99232 SBSQ HOSP IP/OBS MODERATE 35: CPT | Performed by: STUDENT IN AN ORGANIZED HEALTH CARE EDUCATION/TRAINING PROGRAM

## 2020-01-01 PROCEDURE — 81001 URINALYSIS AUTO W/SCOPE: CPT

## 2020-01-01 PROCEDURE — 99285 EMERGENCY DEPT VISIT HI MDM: CPT | Performed by: PHYSICIAN ASSISTANT

## 2020-01-01 PROCEDURE — 96366 THER/PROPH/DIAG IV INF ADDON: CPT

## 2020-01-01 PROCEDURE — 80048 BASIC METABOLIC PNL TOTAL CA: CPT | Performed by: INTERNAL MEDICINE

## 2020-01-01 PROCEDURE — 97116 GAIT TRAINING THERAPY: CPT

## 2020-01-01 PROCEDURE — 93306 TTE W/DOPPLER COMPLETE: CPT | Performed by: INTERNAL MEDICINE

## 2020-01-01 PROCEDURE — 36415 COLL VENOUS BLD VENIPUNCTURE: CPT | Performed by: PHYSICIAN ASSISTANT

## 2020-01-01 PROCEDURE — 83690 ASSAY OF LIPASE: CPT | Performed by: PHYSICIAN ASSISTANT

## 2020-01-01 PROCEDURE — 93010 ELECTROCARDIOGRAM REPORT: CPT | Performed by: INTERNAL MEDICINE

## 2020-01-01 PROCEDURE — 85025 COMPLETE CBC W/AUTO DIFF WBC: CPT | Performed by: PHYSICIAN ASSISTANT

## 2020-01-01 PROCEDURE — 76770 US EXAM ABDO BACK WALL COMP: CPT

## 2020-01-01 PROCEDURE — 99215 OFFICE O/P EST HI 40 MIN: CPT | Performed by: NURSE PRACTITIONER

## 2020-01-01 PROCEDURE — 94640 AIRWAY INHALATION TREATMENT: CPT

## 2020-01-01 PROCEDURE — 99239 HOSP IP/OBS DSCHRG MGMT >30: CPT | Performed by: STUDENT IN AN ORGANIZED HEALTH CARE EDUCATION/TRAINING PROGRAM

## 2020-01-01 PROCEDURE — 99214 OFFICE O/P EST MOD 30 MIN: CPT | Performed by: PHYSICIAN ASSISTANT

## 2020-01-01 PROCEDURE — 78815 PET IMAGE W/CT SKULL-THIGH: CPT

## 2020-01-01 PROCEDURE — 99232 SBSQ HOSP IP/OBS MODERATE 35: CPT | Performed by: INTERNAL MEDICINE

## 2020-01-01 PROCEDURE — 85025 COMPLETE CBC W/AUTO DIFF WBC: CPT | Performed by: INTERNAL MEDICINE

## 2020-01-01 PROCEDURE — 87040 BLOOD CULTURE FOR BACTERIA: CPT | Performed by: EMERGENCY MEDICINE

## 2020-01-01 PROCEDURE — 99285 EMERGENCY DEPT VISIT HI MDM: CPT | Performed by: EMERGENCY MEDICINE

## 2020-01-01 PROCEDURE — 80048 BASIC METABOLIC PNL TOTAL CA: CPT | Performed by: EMERGENCY MEDICINE

## 2020-01-01 PROCEDURE — A9552 F18 FDG: HCPCS

## 2020-01-01 PROCEDURE — 99442 PR PHYS/QHP TELEPHONE EVALUATION 11-20 MIN: CPT | Performed by: INTERNAL MEDICINE

## 2020-01-01 PROCEDURE — 83880 ASSAY OF NATRIURETIC PEPTIDE: CPT | Performed by: PHYSICIAN ASSISTANT

## 2020-01-01 PROCEDURE — RECHECK: Performed by: STUDENT IN AN ORGANIZED HEALTH CARE EDUCATION/TRAINING PROGRAM

## 2020-01-01 PROCEDURE — 36415 COLL VENOUS BLD VENIPUNCTURE: CPT | Performed by: EMERGENCY MEDICINE

## 2020-01-01 PROCEDURE — 85379 FIBRIN DEGRADATION QUANT: CPT | Performed by: EMERGENCY MEDICINE

## 2020-01-01 RX ORDER — MELPHALAN USP, 2 MG 2 MG/1
TABLET ORAL
Qty: 24 TABLET | Refills: 3 | Status: SHIPPED | OUTPATIENT
Start: 2020-01-01 | End: 2020-01-01 | Stop reason: CLARIF

## 2020-01-01 RX ORDER — PREDNISONE 10 MG/1
TABLET ORAL
Qty: 21 TABLET | Refills: 0 | Status: SHIPPED | OUTPATIENT
Start: 2020-01-01 | End: 2020-01-01 | Stop reason: HOSPADM

## 2020-01-01 RX ORDER — LEVALBUTEROL 1.25 MG/.5ML
1.25 SOLUTION, CONCENTRATE RESPIRATORY (INHALATION) EVERY 6 HOURS PRN
Status: DISCONTINUED | OUTPATIENT
Start: 2020-01-01 | End: 2020-01-01

## 2020-01-01 RX ORDER — METHYLPREDNISOLONE SODIUM SUCCINATE 40 MG/ML
40 INJECTION, POWDER, LYOPHILIZED, FOR SOLUTION INTRAMUSCULAR; INTRAVENOUS EVERY 12 HOURS SCHEDULED
Status: DISCONTINUED | OUTPATIENT
Start: 2020-01-01 | End: 2020-01-01

## 2020-01-01 RX ORDER — ALBUTEROL SULFATE 90 UG/1
2 AEROSOL, METERED RESPIRATORY (INHALATION) EVERY 6 HOURS PRN
Qty: 3 INHALER | Refills: 3 | Status: SHIPPED | OUTPATIENT
Start: 2020-01-01 | End: 2021-01-01 | Stop reason: HOSPADM

## 2020-01-01 RX ORDER — BUDESONIDE 0.5 MG/2ML
0.5 INHALANT ORAL
Status: DISCONTINUED | OUTPATIENT
Start: 2020-01-01 | End: 2020-01-01 | Stop reason: HOSPADM

## 2020-01-01 RX ORDER — FUROSEMIDE 20 MG/1
20 TABLET ORAL DAILY
Status: COMPLETED | OUTPATIENT
Start: 2020-01-01 | End: 2020-01-01

## 2020-01-01 RX ORDER — PREDNISONE 20 MG/1
40 TABLET ORAL ONCE
Status: COMPLETED | OUTPATIENT
Start: 2020-01-01 | End: 2020-01-01

## 2020-01-01 RX ORDER — ALBUTEROL SULFATE 2.5 MG/3ML
2.5 SOLUTION RESPIRATORY (INHALATION) ONCE
Status: COMPLETED | OUTPATIENT
Start: 2020-01-01 | End: 2020-01-01

## 2020-01-01 RX ORDER — ALBUTEROL SULFATE 90 UG/1
AEROSOL, METERED RESPIRATORY (INHALATION)
Qty: 25.5 G | Refills: 3 | Status: SHIPPED | OUTPATIENT
Start: 2020-01-01 | End: 2020-01-01 | Stop reason: SDUPTHER

## 2020-01-01 RX ORDER — SODIUM CHLORIDE FOR INHALATION 0.9 %
3 VIAL, NEBULIZER (ML) INHALATION
Status: DISCONTINUED | OUTPATIENT
Start: 2020-01-01 | End: 2020-01-01

## 2020-01-01 RX ORDER — ALBUTEROL SULFATE 90 UG/1
2 AEROSOL, METERED RESPIRATORY (INHALATION) EVERY 6 HOURS PRN
Qty: 1 INHALER | Refills: 3 | Status: SHIPPED | OUTPATIENT
Start: 2020-01-01 | End: 2020-01-01

## 2020-01-01 RX ORDER — PREDNISONE 10 MG/1
10 TABLET ORAL SEE ADMIN INSTRUCTIONS
Qty: 21 TABLET | Refills: 0 | Status: SHIPPED | OUTPATIENT
Start: 2020-01-01 | End: 2020-01-01 | Stop reason: HOSPADM

## 2020-01-01 RX ORDER — LEVALBUTEROL 1.25 MG/.5ML
1.25 SOLUTION, CONCENTRATE RESPIRATORY (INHALATION)
Status: DISCONTINUED | OUTPATIENT
Start: 2020-01-01 | End: 2020-01-01 | Stop reason: HOSPADM

## 2020-01-01 RX ORDER — AZITHROMYCIN 250 MG/1
TABLET, FILM COATED ORAL
Qty: 6 TABLET | Refills: 0 | Status: SHIPPED | OUTPATIENT
Start: 2020-01-01 | End: 2020-01-01

## 2020-01-01 RX ORDER — FINASTERIDE 5 MG/1
TABLET, FILM COATED ORAL
Qty: 90 TABLET | Refills: 0 | Status: SHIPPED | OUTPATIENT
Start: 2020-01-01 | End: 2021-01-01 | Stop reason: HOSPADM

## 2020-01-01 RX ORDER — PREDNISONE 10 MG/1
TABLET ORAL
Qty: 82 TABLET | Refills: 0 | Status: SHIPPED | OUTPATIENT
Start: 2020-01-01 | End: 2021-01-01 | Stop reason: ALTCHOICE

## 2020-01-01 RX ORDER — SODIUM CHLORIDE FOR INHALATION 0.9 %
3 VIAL, NEBULIZER (ML) INHALATION EVERY 6 HOURS PRN
Status: DISCONTINUED | OUTPATIENT
Start: 2020-01-01 | End: 2020-01-01

## 2020-01-01 RX ORDER — GUAIFENESIN AND CODEINE PHOSPHATE 100; 10 MG/5ML; MG/5ML
10 SYRUP ORAL EVERY 4 HOURS
COMMUNITY
End: 2021-01-01 | Stop reason: HOSPADM

## 2020-01-01 RX ORDER — LENALIDOMIDE 5 MG/1
CAPSULE ORAL
Qty: 21 CAPSULE | Refills: 0 | Status: SHIPPED | OUTPATIENT
Start: 2020-01-01 | End: 2020-01-01 | Stop reason: SDUPTHER

## 2020-01-01 RX ORDER — ALBUMIN, HUMAN INJ 5% 5 %
12.5 SOLUTION INTRAVENOUS ONCE
Status: COMPLETED | OUTPATIENT
Start: 2020-01-01 | End: 2020-01-01

## 2020-01-01 RX ORDER — MAGNESIUM HYDROXIDE/ALUMINUM HYDROXICE/SIMETHICONE 120; 1200; 1200 MG/30ML; MG/30ML; MG/30ML
30 SUSPENSION ORAL EVERY 6 HOURS PRN
Status: DISCONTINUED | OUTPATIENT
Start: 2020-01-01 | End: 2020-01-01 | Stop reason: HOSPADM

## 2020-01-01 RX ORDER — FAMOTIDINE 20 MG/1
20 TABLET, FILM COATED ORAL AS NEEDED
COMMUNITY
End: 2021-01-01 | Stop reason: HOSPADM

## 2020-01-01 RX ORDER — BENZONATATE 100 MG/1
100 CAPSULE ORAL 3 TIMES DAILY PRN
Status: DISCONTINUED | OUTPATIENT
Start: 2020-01-01 | End: 2020-01-01 | Stop reason: HOSPADM

## 2020-01-01 RX ORDER — SODIUM CHLORIDE 9 MG/ML
20 INJECTION, SOLUTION INTRAVENOUS ONCE
Status: CANCELLED | OUTPATIENT
Start: 2020-01-01

## 2020-01-01 RX ORDER — SODIUM CHLORIDE 9 MG/ML
20 INJECTION, SOLUTION INTRAVENOUS ONCE
Status: COMPLETED | OUTPATIENT
Start: 2020-01-01 | End: 2020-01-01

## 2020-01-01 RX ORDER — HYDROCODONE POLISTIREX AND CHLORPHENIRAMINE POLISTIREX 10; 8 MG/5ML; MG/5ML
5 SUSPENSION, EXTENDED RELEASE ORAL
Qty: 120 ML | Refills: 0 | Status: SHIPPED | OUTPATIENT
Start: 2020-01-01 | End: 2020-01-01 | Stop reason: HOSPADM

## 2020-01-01 RX ORDER — PANTOPRAZOLE SODIUM 40 MG/1
40 TABLET, DELAYED RELEASE ORAL
Status: DISCONTINUED | OUTPATIENT
Start: 2020-01-01 | End: 2020-01-01 | Stop reason: HOSPADM

## 2020-01-01 RX ORDER — FINASTERIDE 5 MG/1
5 TABLET, FILM COATED ORAL
Qty: 90 TABLET | Refills: 0 | Status: SHIPPED | OUTPATIENT
Start: 2020-01-01 | End: 2020-01-01

## 2020-01-01 RX ORDER — FINASTERIDE 5 MG/1
5 TABLET, FILM COATED ORAL
Status: DISCONTINUED | OUTPATIENT
Start: 2020-01-01 | End: 2020-01-01 | Stop reason: HOSPADM

## 2020-01-01 RX ORDER — FERROUS SULFATE 325(65) MG
45 TABLET ORAL
COMMUNITY
End: 2021-01-01 | Stop reason: HOSPADM

## 2020-01-01 RX ORDER — ALBUMIN, HUMAN INJ 5% 5 %
12.5 SOLUTION INTRAVENOUS ONCE
Status: DISCONTINUED | OUTPATIENT
Start: 2020-01-01 | End: 2020-01-01

## 2020-01-01 RX ORDER — TAMSULOSIN HYDROCHLORIDE 0.4 MG/1
0.4 CAPSULE ORAL
COMMUNITY
End: 2021-01-01 | Stop reason: HOSPADM

## 2020-01-01 RX ORDER — ACETAMINOPHEN 325 MG/1
650 TABLET ORAL EVERY 6 HOURS PRN
Status: DISCONTINUED | OUTPATIENT
Start: 2020-01-01 | End: 2020-01-01 | Stop reason: HOSPADM

## 2020-01-01 RX ORDER — LENALIDOMIDE 5 MG/1
CAPSULE ORAL
Qty: 21 CAPSULE | Refills: 0 | Status: SHIPPED | OUTPATIENT
Start: 2020-01-01 | End: 2021-01-01 | Stop reason: SDUPTHER

## 2020-01-01 RX ORDER — PREDNISONE 20 MG/1
40 TABLET ORAL DAILY
Status: DISCONTINUED | OUTPATIENT
Start: 2020-01-01 | End: 2020-01-01 | Stop reason: HOSPADM

## 2020-01-01 RX ADMIN — METHYLPREDNISOLONE SODIUM SUCCINATE 40 MG: 40 INJECTION, POWDER, FOR SOLUTION INTRAMUSCULAR; INTRAVENOUS at 21:00

## 2020-01-01 RX ADMIN — APIXABAN 2.5 MG: 2.5 TABLET, FILM COATED ORAL at 20:59

## 2020-01-01 RX ADMIN — IPRATROPIUM BROMIDE 0.5 MG: 0.5 SOLUTION RESPIRATORY (INHALATION) at 10:17

## 2020-01-01 RX ADMIN — METHYLPREDNISOLONE SODIUM SUCCINATE 40 MG: 40 INJECTION, POWDER, FOR SOLUTION INTRAMUSCULAR; INTRAVENOUS at 08:05

## 2020-01-01 RX ADMIN — Medication 20 G: at 11:37

## 2020-01-01 RX ADMIN — LEVALBUTEROL HYDROCHLORIDE 1.25 MG: 1.25 SOLUTION, CONCENTRATE RESPIRATORY (INHALATION) at 01:24

## 2020-01-01 RX ADMIN — PREDNISONE 40 MG: 20 TABLET ORAL at 12:24

## 2020-01-01 RX ADMIN — ALBUTEROL SULFATE 2.5 MG: 2.5 SOLUTION RESPIRATORY (INHALATION) at 10:17

## 2020-01-01 RX ADMIN — APIXABAN 2.5 MG: 2.5 TABLET, FILM COATED ORAL at 08:03

## 2020-01-01 RX ADMIN — BUDESONIDE 0.5 MG: 0.5 INHALANT ORAL at 07:17

## 2020-01-01 RX ADMIN — METHYLPREDNISOLONE SODIUM SUCCINATE 40 MG: 40 INJECTION, POWDER, FOR SOLUTION INTRAMUSCULAR; INTRAVENOUS at 21:29

## 2020-01-01 RX ADMIN — IPRATROPIUM BROMIDE 0.5 MG: 0.5 SOLUTION RESPIRATORY (INHALATION) at 19:14

## 2020-01-01 RX ADMIN — LEVALBUTEROL HYDROCHLORIDE 1.25 MG: 1.25 SOLUTION, CONCENTRATE RESPIRATORY (INHALATION) at 13:20

## 2020-01-01 RX ADMIN — FINASTERIDE 5 MG: 5 TABLET, FILM COATED ORAL at 21:29

## 2020-01-01 RX ADMIN — APIXABAN 2.5 MG: 2.5 TABLET, FILM COATED ORAL at 08:04

## 2020-01-01 RX ADMIN — FUROSEMIDE 20 MG: 20 TABLET ORAL at 08:03

## 2020-01-01 RX ADMIN — IPRATROPIUM BROMIDE 0.5 MG: 0.5 SOLUTION RESPIRATORY (INHALATION) at 13:20

## 2020-01-01 RX ADMIN — APIXABAN 2.5 MG: 2.5 TABLET, FILM COATED ORAL at 17:16

## 2020-01-01 RX ADMIN — LEVALBUTEROL HYDROCHLORIDE 1.25 MG: 1.25 SOLUTION, CONCENTRATE RESPIRATORY (INHALATION) at 07:17

## 2020-01-01 RX ADMIN — SODIUM CHLORIDE 20 ML/HR: 0.9 INJECTION, SOLUTION INTRAVENOUS at 11:28

## 2020-01-01 RX ADMIN — LEVALBUTEROL HYDROCHLORIDE 1.25 MG: 1.25 SOLUTION, CONCENTRATE RESPIRATORY (INHALATION) at 19:14

## 2020-01-01 RX ADMIN — PANTOPRAZOLE SODIUM 40 MG: 40 TABLET, DELAYED RELEASE ORAL at 05:18

## 2020-01-01 RX ADMIN — METHYLPREDNISOLONE SODIUM SUCCINATE 40 MG: 40 INJECTION, POWDER, FOR SOLUTION INTRAMUSCULAR; INTRAVENOUS at 08:03

## 2020-01-01 RX ADMIN — LEVALBUTEROL HYDROCHLORIDE 1.25 MG: 1.25 SOLUTION, CONCENTRATE RESPIRATORY (INHALATION) at 13:24

## 2020-01-01 RX ADMIN — PANTOPRAZOLE SODIUM 40 MG: 40 TABLET, DELAYED RELEASE ORAL at 06:23

## 2020-01-01 RX ADMIN — IPRATROPIUM BROMIDE 0.5 MG: 0.5 SOLUTION RESPIRATORY (INHALATION) at 13:24

## 2020-01-01 RX ADMIN — IPRATROPIUM BROMIDE 0.5 MG: 0.5 SOLUTION RESPIRATORY (INHALATION) at 01:24

## 2020-01-01 RX ADMIN — FUROSEMIDE 20 MG: 20 TABLET ORAL at 11:33

## 2020-01-01 RX ADMIN — IPRATROPIUM BROMIDE 0.5 MG: 0.5 SOLUTION RESPIRATORY (INHALATION) at 07:17

## 2020-01-01 RX ADMIN — FINASTERIDE 5 MG: 5 TABLET, FILM COATED ORAL at 21:00

## 2020-01-01 RX ADMIN — ALBUMIN (HUMAN) 12.5 G: 12.5 INJECTION, SOLUTION INTRAVENOUS at 02:26

## 2020-01-01 RX ADMIN — BUDESONIDE 0.5 MG: 0.5 INHALANT ORAL at 19:14

## 2020-01-01 RX ADMIN — APIXABAN 2.5 MG: 2.5 TABLET, FILM COATED ORAL at 16:40

## 2020-01-02 DIAGNOSIS — C90.00 MULTIPLE MYELOMA, REMISSION STATUS UNSPECIFIED (HCC): ICD-10-CM

## 2020-01-02 RX ORDER — LENALIDOMIDE 5 MG/1
CAPSULE ORAL
Qty: 21 CAPSULE | Refills: 0 | Status: SHIPPED | OUTPATIENT
Start: 2020-01-02 | End: 2020-02-13 | Stop reason: SDUPTHER

## 2020-01-03 ENCOUNTER — APPOINTMENT (EMERGENCY)
Dept: RADIOLOGY | Facility: HOSPITAL | Age: 85
DRG: 871 | End: 2020-01-03
Payer: MEDICARE

## 2020-01-03 ENCOUNTER — APPOINTMENT (EMERGENCY)
Dept: CT IMAGING | Facility: HOSPITAL | Age: 85
DRG: 871 | End: 2020-01-03
Payer: MEDICARE

## 2020-01-03 ENCOUNTER — HOSPITAL ENCOUNTER (INPATIENT)
Facility: HOSPITAL | Age: 85
LOS: 6 days | Discharge: HOME | DRG: 871 | End: 2020-01-09
Attending: EMERGENCY MEDICINE | Admitting: INTERNAL MEDICINE
Payer: MEDICARE

## 2020-01-03 DIAGNOSIS — J18.9 PNEUMONIA: ICD-10-CM

## 2020-01-03 DIAGNOSIS — R11.2 NAUSEA & VOMITING: Primary | ICD-10-CM

## 2020-01-03 DIAGNOSIS — A41.9 SEPSIS (HCC): ICD-10-CM

## 2020-01-03 DIAGNOSIS — R00.0 TACHYCARDIA: ICD-10-CM

## 2020-01-03 DIAGNOSIS — A41.9 SEPTIC SHOCK (HCC): ICD-10-CM

## 2020-01-03 DIAGNOSIS — R65.21 SEPTIC SHOCK (HCC): ICD-10-CM

## 2020-01-03 PROBLEM — J96.01 ACUTE RESPIRATORY FAILURE WITH HYPOXIA (HCC): Status: ACTIVE | Noted: 2020-01-03

## 2020-01-03 PROBLEM — J44.9 COPD (CHRONIC OBSTRUCTIVE PULMONARY DISEASE) (HCC): Status: ACTIVE | Noted: 2017-08-22

## 2020-01-03 LAB
ALBUMIN SERPL BCP-MCNC: 3.1 G/DL (ref 3.5–5)
ALP SERPL-CCNC: 78 U/L (ref 46–116)
ALT SERPL W P-5'-P-CCNC: 35 U/L (ref 12–78)
ANION GAP SERPL CALCULATED.3IONS-SCNC: 10 MMOL/L (ref 4–13)
ANION GAP SERPL CALCULATED.3IONS-SCNC: 8 MMOL/L (ref 4–13)
AST SERPL W P-5'-P-CCNC: 24 U/L (ref 5–45)
ATRIAL RATE: 127 BPM
BACTERIA UR QL AUTO: ABNORMAL /HPF
BASOPHILS # BLD AUTO: 0.01 THOUSANDS/ΜL (ref 0–0.1)
BASOPHILS NFR BLD AUTO: 0 % (ref 0–1)
BILIRUB DIRECT SERPL-MCNC: 0.29 MG/DL (ref 0–0.2)
BILIRUB SERPL-MCNC: 1.36 MG/DL (ref 0.2–1)
BILIRUB UR QL STRIP: NEGATIVE
BUN SERPL-MCNC: 11 MG/DL (ref 5–25)
BUN SERPL-MCNC: 12 MG/DL (ref 5–25)
CALCIUM SERPL-MCNC: 7.8 MG/DL (ref 8.3–10.1)
CALCIUM SERPL-MCNC: 8.6 MG/DL (ref 8.3–10.1)
CHLORIDE SERPL-SCNC: 106 MMOL/L (ref 100–108)
CHLORIDE SERPL-SCNC: 108 MMOL/L (ref 100–108)
CLARITY UR: CLEAR
CO2 SERPL-SCNC: 24 MMOL/L (ref 21–32)
CO2 SERPL-SCNC: 27 MMOL/L (ref 21–32)
COLOR UR: YELLOW
CORTIS SERPL-MCNC: 15.1 UG/DL
CREAT SERPL-MCNC: 1.13 MG/DL (ref 0.6–1.3)
CREAT SERPL-MCNC: 1.15 MG/DL (ref 0.6–1.3)
EOSINOPHIL # BLD AUTO: 0 THOUSAND/ΜL (ref 0–0.61)
EOSINOPHIL NFR BLD AUTO: 0 % (ref 0–6)
ERYTHROCYTE [DISTWIDTH] IN BLOOD BY AUTOMATED COUNT: 14.6 % (ref 11.6–15.1)
ERYTHROCYTE [DISTWIDTH] IN BLOOD BY AUTOMATED COUNT: 15 % (ref 11.6–15.1)
FLUAV RNA NPH QL NAA+PROBE: NORMAL
FLUBV RNA NPH QL NAA+PROBE: NORMAL
GFR SERPL CREATININE-BSD FRML MDRD: 59 ML/MIN/1.73SQ M
GFR SERPL CREATININE-BSD FRML MDRD: 60 ML/MIN/1.73SQ M
GLUCOSE SERPL-MCNC: 118 MG/DL (ref 65–140)
GLUCOSE SERPL-MCNC: 119 MG/DL (ref 65–140)
GLUCOSE UR STRIP-MCNC: NEGATIVE MG/DL
HCT VFR BLD AUTO: 32.9 % (ref 36.5–49.3)
HCT VFR BLD AUTO: 36.9 % (ref 36.5–49.3)
HGB BLD-MCNC: 10.8 G/DL (ref 12–17)
HGB BLD-MCNC: 12.1 G/DL (ref 12–17)
HGB UR QL STRIP.AUTO: ABNORMAL
IMM GRANULOCYTES # BLD AUTO: 0.02 THOUSAND/UL (ref 0–0.2)
IMM GRANULOCYTES NFR BLD AUTO: 1 % (ref 0–2)
KETONES UR STRIP-MCNC: NEGATIVE MG/DL
L PNEUMO1 AG UR QL IA.RAPID: NEGATIVE
LACTATE SERPL-SCNC: 1.3 MMOL/L (ref 0.5–2)
LACTATE SERPL-SCNC: 2.5 MMOL/L (ref 0.5–2)
LEUKOCYTE ESTERASE UR QL STRIP: NEGATIVE
LIPASE SERPL-CCNC: 76 U/L (ref 73–393)
LYMPHOCYTES # BLD AUTO: 0.9 THOUSANDS/ΜL (ref 0.6–4.47)
LYMPHOCYTES NFR BLD AUTO: 31 % (ref 14–44)
MAGNESIUM SERPL-MCNC: 1.2 MG/DL (ref 1.6–2.6)
MCH RBC QN AUTO: 33.7 PG (ref 26.8–34.3)
MCH RBC QN AUTO: 33.8 PG (ref 26.8–34.3)
MCHC RBC AUTO-ENTMCNC: 32.8 G/DL (ref 31.4–37.4)
MCHC RBC AUTO-ENTMCNC: 32.8 G/DL (ref 31.4–37.4)
MCV RBC AUTO: 103 FL (ref 82–98)
MCV RBC AUTO: 103 FL (ref 82–98)
MONOCYTES # BLD AUTO: 0.2 THOUSAND/ΜL (ref 0.17–1.22)
MONOCYTES NFR BLD AUTO: 7 % (ref 4–12)
NEUTROPHILS # BLD AUTO: 1.82 THOUSANDS/ΜL (ref 1.85–7.62)
NEUTS SEG NFR BLD AUTO: 61 % (ref 43–75)
NITRITE UR QL STRIP: NEGATIVE
NON-SQ EPI CELLS URNS QL MICRO: ABNORMAL /HPF
NRBC BLD AUTO-RTO: 0 /100 WBCS
NT-PROBNP SERPL-MCNC: 268 PG/ML
P AXIS: 51 DEGREES
PH UR STRIP.AUTO: 6.5 [PH] (ref 4.5–8)
PLATELET # BLD AUTO: 95 THOUSANDS/UL (ref 149–390)
PLATELET # BLD AUTO: 97 THOUSANDS/UL (ref 149–390)
PMV BLD AUTO: 8.8 FL (ref 8.9–12.7)
PMV BLD AUTO: 9.2 FL (ref 8.9–12.7)
POTASSIUM SERPL-SCNC: 3.6 MMOL/L (ref 3.5–5.3)
POTASSIUM SERPL-SCNC: 3.8 MMOL/L (ref 3.5–5.3)
PROCALCITONIN SERPL-MCNC: 7.7 NG/ML
PROCALCITONIN SERPL-MCNC: <0.05 NG/ML
PROT SERPL-MCNC: 6.4 G/DL (ref 6.4–8.2)
PROT UR STRIP-MCNC: NEGATIVE MG/DL
QRS AXIS: 48 DEGREES
QRSD INTERVAL: 116 MS
QT INTERVAL: 340 MS
QTC INTERVAL: 476 MS
RBC # BLD AUTO: 3.2 MILLION/UL (ref 3.88–5.62)
RBC # BLD AUTO: 3.59 MILLION/UL (ref 3.88–5.62)
RBC #/AREA URNS AUTO: ABNORMAL /HPF
RSV RNA NPH QL NAA+PROBE: NORMAL
S PNEUM AG UR QL: POSITIVE
SODIUM SERPL-SCNC: 140 MMOL/L (ref 136–145)
SODIUM SERPL-SCNC: 143 MMOL/L (ref 136–145)
SP GR UR STRIP.AUTO: 1.02 (ref 1–1.03)
T WAVE AXIS: 54 DEGREES
TROPONIN I SERPL-MCNC: 0.02 NG/ML
UROBILINOGEN UR QL STRIP.AUTO: 0.2 E.U./DL
VENTRICULAR RATE: 118 BPM
WBC # BLD AUTO: 2.95 THOUSAND/UL (ref 4.31–10.16)
WBC # BLD AUTO: 4.22 THOUSAND/UL (ref 4.31–10.16)
WBC #/AREA URNS AUTO: ABNORMAL /HPF

## 2020-01-03 PROCEDURE — 83735 ASSAY OF MAGNESIUM: CPT | Performed by: EMERGENCY MEDICINE

## 2020-01-03 PROCEDURE — 81001 URINALYSIS AUTO W/SCOPE: CPT

## 2020-01-03 PROCEDURE — 99291 CRITICAL CARE FIRST HOUR: CPT | Performed by: INTERNAL MEDICINE

## 2020-01-03 PROCEDURE — 80048 BASIC METABOLIC PNL TOTAL CA: CPT | Performed by: INTERNAL MEDICINE

## 2020-01-03 PROCEDURE — 84145 PROCALCITONIN (PCT): CPT | Performed by: NURSE PRACTITIONER

## 2020-01-03 PROCEDURE — 87631 RESP VIRUS 3-5 TARGETS: CPT | Performed by: EMERGENCY MEDICINE

## 2020-01-03 PROCEDURE — 87449 NOS EACH ORGANISM AG IA: CPT | Performed by: INTERNAL MEDICINE

## 2020-01-03 PROCEDURE — 87040 BLOOD CULTURE FOR BACTERIA: CPT | Performed by: EMERGENCY MEDICINE

## 2020-01-03 PROCEDURE — 96361 HYDRATE IV INFUSION ADD-ON: CPT

## 2020-01-03 PROCEDURE — 96375 TX/PRO/DX INJ NEW DRUG ADDON: CPT

## 2020-01-03 PROCEDURE — 80048 BASIC METABOLIC PNL TOTAL CA: CPT | Performed by: EMERGENCY MEDICINE

## 2020-01-03 PROCEDURE — 94760 N-INVAS EAR/PLS OXIMETRY 1: CPT

## 2020-01-03 PROCEDURE — 99285 EMERGENCY DEPT VISIT HI MDM: CPT

## 2020-01-03 PROCEDURE — 85027 COMPLETE CBC AUTOMATED: CPT | Performed by: INTERNAL MEDICINE

## 2020-01-03 PROCEDURE — 82533 TOTAL CORTISOL: CPT | Performed by: NURSE PRACTITIONER

## 2020-01-03 PROCEDURE — 74177 CT ABD & PELVIS W/CONTRAST: CPT

## 2020-01-03 PROCEDURE — 36415 COLL VENOUS BLD VENIPUNCTURE: CPT | Performed by: EMERGENCY MEDICINE

## 2020-01-03 PROCEDURE — 71046 X-RAY EXAM CHEST 2 VIEWS: CPT

## 2020-01-03 PROCEDURE — 83880 ASSAY OF NATRIURETIC PEPTIDE: CPT | Performed by: EMERGENCY MEDICINE

## 2020-01-03 PROCEDURE — 99291 CRITICAL CARE FIRST HOUR: CPT | Performed by: EMERGENCY MEDICINE

## 2020-01-03 PROCEDURE — 93005 ELECTROCARDIOGRAM TRACING: CPT

## 2020-01-03 PROCEDURE — 94640 AIRWAY INHALATION TREATMENT: CPT

## 2020-01-03 PROCEDURE — 84145 PROCALCITONIN (PCT): CPT | Performed by: EMERGENCY MEDICINE

## 2020-01-03 PROCEDURE — 93010 ELECTROCARDIOGRAM REPORT: CPT | Performed by: INTERNAL MEDICINE

## 2020-01-03 PROCEDURE — 87147 CULTURE TYPE IMMUNOLOGIC: CPT | Performed by: EMERGENCY MEDICINE

## 2020-01-03 PROCEDURE — 83690 ASSAY OF LIPASE: CPT | Performed by: EMERGENCY MEDICINE

## 2020-01-03 PROCEDURE — 84484 ASSAY OF TROPONIN QUANT: CPT | Performed by: EMERGENCY MEDICINE

## 2020-01-03 PROCEDURE — 83605 ASSAY OF LACTIC ACID: CPT | Performed by: EMERGENCY MEDICINE

## 2020-01-03 PROCEDURE — 85025 COMPLETE CBC W/AUTO DIFF WBC: CPT | Performed by: EMERGENCY MEDICINE

## 2020-01-03 PROCEDURE — 80076 HEPATIC FUNCTION PANEL: CPT | Performed by: EMERGENCY MEDICINE

## 2020-01-03 PROCEDURE — 1123F ACP DISCUSS/DSCN MKR DOCD: CPT | Performed by: NURSE PRACTITIONER

## 2020-01-03 PROCEDURE — 96365 THER/PROPH/DIAG IV INF INIT: CPT

## 2020-01-03 RX ORDER — ONDANSETRON 2 MG/ML
4 INJECTION INTRAMUSCULAR; INTRAVENOUS EVERY 6 HOURS PRN
Status: DISCONTINUED | OUTPATIENT
Start: 2020-01-03 | End: 2020-01-09 | Stop reason: HOSPADM

## 2020-01-03 RX ORDER — ACETAMINOPHEN 325 MG/1
650 TABLET ORAL EVERY 6 HOURS PRN
Status: DISCONTINUED | OUTPATIENT
Start: 2020-01-03 | End: 2020-01-09 | Stop reason: HOSPADM

## 2020-01-03 RX ORDER — FINASTERIDE 5 MG/1
5 TABLET, FILM COATED ORAL
Status: DISCONTINUED | OUTPATIENT
Start: 2020-01-03 | End: 2020-01-09 | Stop reason: HOSPADM

## 2020-01-03 RX ORDER — GUAIFENESIN 600 MG
600 TABLET, EXTENDED RELEASE 12 HR ORAL EVERY 12 HOURS PRN
Status: DISCONTINUED | OUTPATIENT
Start: 2020-01-03 | End: 2020-01-09 | Stop reason: HOSPADM

## 2020-01-03 RX ORDER — LACTOBACILLUS ACIDOPHILUS / LACTOBACILLUS BULGARICUS 100 MILLION CFU STRENGTH
1 GRANULES ORAL DAILY
Status: DISCONTINUED | OUTPATIENT
Start: 2020-01-03 | End: 2020-01-09 | Stop reason: HOSPADM

## 2020-01-03 RX ORDER — LIDOCAINE 40 MG/G
CREAM TOPICAL ONCE
Status: COMPLETED | OUTPATIENT
Start: 2020-01-03 | End: 2020-01-03

## 2020-01-03 RX ORDER — METOCLOPRAMIDE HYDROCHLORIDE 5 MG/ML
10 INJECTION INTRAMUSCULAR; INTRAVENOUS ONCE
Status: COMPLETED | OUTPATIENT
Start: 2020-01-03 | End: 2020-01-03

## 2020-01-03 RX ORDER — MAGNESIUM SULFATE HEPTAHYDRATE 40 MG/ML
2 INJECTION, SOLUTION INTRAVENOUS ONCE
Status: COMPLETED | OUTPATIENT
Start: 2020-01-03 | End: 2020-01-03

## 2020-01-03 RX ORDER — LEVALBUTEROL 1.25 MG/.5ML
1.25 SOLUTION, CONCENTRATE RESPIRATORY (INHALATION)
Status: DISCONTINUED | OUTPATIENT
Start: 2020-01-03 | End: 2020-01-09 | Stop reason: HOSPADM

## 2020-01-03 RX ORDER — ACETAMINOPHEN 325 MG/1
650 TABLET ORAL ONCE
Status: COMPLETED | OUTPATIENT
Start: 2020-01-03 | End: 2020-01-03

## 2020-01-03 RX ORDER — LEVALBUTEROL 1.25 MG/.5ML
1.25 SOLUTION, CONCENTRATE RESPIRATORY (INHALATION) EVERY 6 HOURS PRN
Status: DISCONTINUED | OUTPATIENT
Start: 2020-01-03 | End: 2020-01-05

## 2020-01-03 RX ORDER — LEVALBUTEROL 1.25 MG/.5ML
1.25 SOLUTION, CONCENTRATE RESPIRATORY (INHALATION)
Status: DISCONTINUED | OUTPATIENT
Start: 2020-01-03 | End: 2020-01-03

## 2020-01-03 RX ORDER — ONDANSETRON 2 MG/ML
1 INJECTION INTRAMUSCULAR; INTRAVENOUS ONCE
Status: COMPLETED | OUTPATIENT
Start: 2020-01-03 | End: 2020-01-03

## 2020-01-03 RX ADMIN — LEVALBUTEROL HYDROCHLORIDE 1.25 MG: 1.25 SOLUTION, CONCENTRATE RESPIRATORY (INHALATION) at 07:51

## 2020-01-03 RX ADMIN — HYDROCORTISONE SODIUM SUCCINATE 50 MG: 100 INJECTION, POWDER, FOR SOLUTION INTRAMUSCULAR; INTRAVENOUS at 23:12

## 2020-01-03 RX ADMIN — METRONIDAZOLE 500 MG: 500 INJECTION, SOLUTION INTRAVENOUS at 20:21

## 2020-01-03 RX ADMIN — CEFEPIME HYDROCHLORIDE 2000 MG: 2 INJECTION, POWDER, FOR SOLUTION INTRAVENOUS at 13:56

## 2020-01-03 RX ADMIN — NOREPINEPHRINE BITARTRATE 5 MCG/MIN: 1 INJECTION INTRAVENOUS at 04:04

## 2020-01-03 RX ADMIN — ACETAMINOPHEN 650 MG: 325 TABLET ORAL at 01:48

## 2020-01-03 RX ADMIN — LEVALBUTEROL HYDROCHLORIDE 1.25 MG: 1.25 SOLUTION, CONCENTRATE RESPIRATORY (INHALATION) at 13:13

## 2020-01-03 RX ADMIN — IOHEXOL 100 ML: 350 INJECTION, SOLUTION INTRAVENOUS at 01:45

## 2020-01-03 RX ADMIN — SODIUM CHLORIDE 500 ML: 0.9 INJECTION, SOLUTION INTRAVENOUS at 03:17

## 2020-01-03 RX ADMIN — NOREPINEPHRINE BITARTRATE 6 MCG/MIN: 1 INJECTION INTRAVENOUS at 05:50

## 2020-01-03 RX ADMIN — IPRATROPIUM BROMIDE 0.5 MG: 0.5 SOLUTION RESPIRATORY (INHALATION) at 13:13

## 2020-01-03 RX ADMIN — VANCOMYCIN HYDROCHLORIDE 125 MG: 500 INJECTION, POWDER, LYOPHILIZED, FOR SOLUTION INTRAVENOUS at 20:21

## 2020-01-03 RX ADMIN — MAGNESIUM SULFATE HEPTAHYDRATE 2 G: 40 INJECTION, SOLUTION INTRAVENOUS at 02:08

## 2020-01-03 RX ADMIN — NOREPINEPHRINE BITARTRATE 7 MCG/MIN: 1 INJECTION INTRAVENOUS at 19:35

## 2020-01-03 RX ADMIN — Medication 1 PACKET: at 10:04

## 2020-01-03 RX ADMIN — SODIUM CHLORIDE 1000 ML: 0.9 INJECTION, SOLUTION INTRAVENOUS at 01:50

## 2020-01-03 RX ADMIN — CEFEPIME HYDROCHLORIDE 2000 MG: 2 INJECTION, POWDER, FOR SOLUTION INTRAVENOUS at 01:49

## 2020-01-03 RX ADMIN — LIDOCAINE 4%: 4 CREAM TOPICAL at 05:38

## 2020-01-03 RX ADMIN — SODIUM CHLORIDE 1000 ML: 0.9 INJECTION, SOLUTION INTRAVENOUS at 00:47

## 2020-01-03 RX ADMIN — APIXABAN 5 MG: 5 TABLET, FILM COATED ORAL at 10:03

## 2020-01-03 RX ADMIN — IPRATROPIUM BROMIDE 0.5 MG: 0.5 SOLUTION RESPIRATORY (INHALATION) at 19:31

## 2020-01-03 RX ADMIN — FINASTERIDE 5 MG: 5 TABLET, FILM COATED ORAL at 23:12

## 2020-01-03 RX ADMIN — METRONIDAZOLE 500 MG: 500 INJECTION, SOLUTION INTRAVENOUS at 05:38

## 2020-01-03 RX ADMIN — HYDROCORTISONE SODIUM SUCCINATE 50 MG: 100 INJECTION, POWDER, FOR SOLUTION INTRAMUSCULAR; INTRAVENOUS at 12:05

## 2020-01-03 RX ADMIN — HYDROCORTISONE SODIUM SUCCINATE 50 MG: 100 INJECTION, POWDER, FOR SOLUTION INTRAMUSCULAR; INTRAVENOUS at 18:16

## 2020-01-03 RX ADMIN — NOREPINEPHRINE BITARTRATE 5 MCG/MIN: 1 INJECTION INTRAVENOUS at 06:11

## 2020-01-03 RX ADMIN — NOREPINEPHRINE BITARTRATE 7 MCG/MIN: 1 INJECTION INTRAVENOUS at 20:08

## 2020-01-03 RX ADMIN — NOREPINEPHRINE BITARTRATE 5 MCG/MIN: 1 INJECTION INTRAVENOUS at 23:18

## 2020-01-03 RX ADMIN — LEVALBUTEROL HYDROCHLORIDE 1.25 MG: 1.25 SOLUTION, CONCENTRATE RESPIRATORY (INHALATION) at 19:31

## 2020-01-03 RX ADMIN — METRONIDAZOLE 500 MG: 500 INJECTION, SOLUTION INTRAVENOUS at 13:19

## 2020-01-03 RX ADMIN — VANCOMYCIN HYDROCHLORIDE 125 MG: 500 INJECTION, POWDER, LYOPHILIZED, FOR SOLUTION INTRAVENOUS at 12:05

## 2020-01-03 RX ADMIN — METOCLOPRAMIDE 10 MG: 5 INJECTION, SOLUTION INTRAMUSCULAR; INTRAVENOUS at 00:51

## 2020-01-03 RX ADMIN — NOREPINEPHRINE BITARTRATE 8 MCG/MIN: 1 INJECTION INTRAVENOUS at 11:50

## 2020-01-03 RX ADMIN — IPRATROPIUM BROMIDE 0.5 MG: 0.5 SOLUTION RESPIRATORY (INHALATION) at 07:51

## 2020-01-03 NOTE — ASSESSMENT & PLAN NOTE
· Secondary to pneumonia  · Currently requiring 2L NC  · Wean with goal SpO2 >90%  · Cont ABX as above

## 2020-01-03 NOTE — ED PROCEDURE NOTE
PROCEDURE  CriticalCare Time  Performed by: Satish Daniels DO  Authorized by: Satish Daniels DO     Critical care provider statement:     Critical care time (minutes):  30    Critical care time was exclusive of:  Separately billable procedures and treating other patients and teaching time    Critical care was necessary to treat or prevent imminent or life-threatening deterioration of the following conditions:  Sepsis and shock    Critical care was time spent personally by me on the following activities:  Blood draw for specimens, obtaining history from patient or surrogate, development of treatment plan with patient or surrogate, discussions with consultants, evaluation of patient's response to treatment, examination of patient, interpretation of cardiac output measurements, ordering and performing treatments and interventions, ordering and review of laboratory studies, ordering and review of radiographic studies, review of old charts and re-evaluation of patient's condition    I assumed direction of critical care for this patient from another provider in my specialty: keke Daniels DO  01/03/20 [id number]

## 2020-01-03 NOTE — ED PROVIDER NOTES
History  Chief Complaint   Patient presents with    Vomiting     Patient c/o N/V/D which began yesterday  also reports chills  hx of COPD and Cancer  82 YO male presents with nausea and vomiting  States this has been present since yesterday, mostly constant, denies blood or bile in emesis  Pt has no abdominal pain, states nausea worsens until emesis  Pt is unsure regarding sick contacts  He has had decreased PO intake  Pt notes diarrhea but states this has been an ongoing issue and has not changed  He additionally notes chronic shortness of breath which is at baseline, peripheral edema which is also at baseline  Pt has a Hx of adenocarcinoma of the lung, colon and multiple myeloma  He has known Hx of PE's, currently taking anticoagulation  Pt denies chest pain  Family states pt had a fever to 102 en route to the ED  Pt denies CP, no dysuria, burning on urination or blood in urine  History provided by:  Patient and relative   used: No    Vomiting   Severity:  Moderate  Timing:  Constant  Quality:  Stomach contents  Progression:  Unchanged  Chronicity:  New  Recent urination:  Normal  Relieved by:  Nothing  Worsened by:  Nothing  Ineffective treatments:  None tried  Associated symptoms: chills, diarrhea and fever    Associated symptoms: no abdominal pain    Diarrhea:     Quality:  Semi-solid    Severity:  Moderate    Timing:  Constant    Progression:  Unchanged  Fever:     Timing:  Constant    Max temp PTA:  102    Progression:  Waxing and waning      Prior to Admission Medications   Prescriptions Last Dose Informant Patient Reported? Taking?    Lactobacillus-Inulin ([address])  Spouse/Significant Other Yes Yes   Sig: Take by mouth daily   RAPAFLO 8 MG CAPS  Spouse/Significant Other Yes Yes   Sig: Take 1 capsule by mouth daily at bedtime     albuterol (PROVENTIL HFA,VENTOLIN HFA) 90 mcg/act inhaler  Spouse/Significant Other No Yes   Sig: Inhale 2 puffs every 4 (four) hours as needed for wheezing   apixaban (ELIQUIS) 5 mg  Spouse/Significant Other No Yes   Sig: Take 1 tablet (5 mg total) by mouth 2 (two) times a day   Patient taking differently: Take 5 mg by mouth daily    cholecalciferol (VITAMIN D3) 1,000 units tablet  Spouse/Significant Other Yes Yes   Sig: Take 5,000 Units by mouth daily     cyanocobalamin (VITAMIN B-12) 1,000 mcg tablet  Spouse/Significant Other Yes Yes   Sig: Take by mouth daily   diphenoxylate-atropine (LOMOTIL) 2 5-0 025 mg per tablet  Spouse/Significant Other No Yes   Sig: Take 1 tablet by mouth 4 (four) times a day as needed for diarrhea   finasteride (PROSCAR) 5 mg tablet  Spouse/Significant Other No Yes   Sig: Take 1 tablet (5 mg total) by mouth daily at bedtime   guaiFENesin (MUCINEX) 600 mg 12 hr tablet  Spouse/Significant Other No Yes   Sig: Take 1 tablet (600 mg total) by mouth every 12 (twelve) hours   Patient taking differently: Take 600 mg by mouth every 12 (twelve) hours as needed     ipratropium (ATROVENT) 0 02 % nebulizer solution  Spouse/Significant Other No Yes   Sig: Take 1 vial (0 5 mg total) by nebulization 4 (four) times a day   lenalidomide (REVLIMID) 5 MG CAPS   No Yes   Sig: 3 weeks on/3 weeks off adult male/auth# [id number] 1/2/19   lidocaine-prilocaine (EMLA) cream  Spouse/Significant Other Yes Yes   Sig: APPLY TO AREA AND COVER WITH TAPE 1 HOUR BEFORE APPOINTMENT    melphalan (ALKERAN) 2 MG tablet  Spouse/Significant Other No No   Sig: Take 12 mg daily for 4 days every 6 weeks   predniSONE 5 mg tablet  Spouse/Significant Other No Yes   Sig: Take 1 tablet (5 mg total) by mouth daily      Facility-Administered Medications: None       Past Medical History:   Diagnosis Date    Chronic pain disorder     3 fractured vertebrae    COPD (chronic obstructive pulmonary disease) (HCC)     Diarrhea     Enlarged prostate     Kidney stone     [facility]     Pulmonary embolism ([facility] ) 12/24/2018    [facility]     Sleep apnea     no cpap       Past Surgical History:   Procedure Laterality Date    CHOLECYSTECTOMY      COLONOSCOPY W/ ENDOSCOPIC US N/A 2017    Procedure: ANAL ENDOSCOPIC U/S;  Surgeon: Lupe Weems MD;  [facility]; Service: Colorectal    HERNIA REPAIR      KIDNEY STONE SURGERY      LUNG LOBECTOMY Right     MO COLONOSCOPY FLX DX W/COLLJ SPEC WHEN PFRMD N/A 7/10/2017    Procedure: COLONOSCOPY;  Surgeon: Carlos Alberto Manzo MD;  [facility]; Service: Gastroenterology    MO RECTAL TUMOR EXCISION, TRANSANAL ENDOSCOPIC MICROSURGICAL, FULL THICK N/A 2017    Procedure: TRANSANAL ENDOSCOPIC MICROSURGERY TEM;  Surgeon: Lupe Weems MD;  [facility];  Service: Colorectal       Family History   Problem Relation Age of Onset    Arthritis Mother     Colon cancer Father     Heart attack Father     Diabetes type I Sister      I have reviewed and agree with the history as documented  Social History     Tobacco Use    Smoking status: Former Smoker     Packs/day: 1 00     Years: 50 00     Pack years: 50 00     Types: Cigarettes     Start date:      Last attempt to quit: 2010     Years since quittin 0    Smokeless tobacco: Never Used    Tobacco comment: quit in    Substance Use Topics    Alcohol use: Yes     Comment: rarely    Drug use: No        Review of Systems   Constitutional: Positive for chills and fever  HENT: Negative for dental problem  Eyes: Negative for visual disturbance  Respiratory: Negative for shortness of breath  Cardiovascular: Negative for chest pain  Gastrointestinal: Positive for diarrhea and vomiting  Negative for abdominal pain and nausea  Genitourinary: Negative for dysuria and frequency  Musculoskeletal: Negative for neck pain and neck stiffness  Skin: Negative for rash  Neurological: Negative for dizziness, weakness and light-headedness     Psychiatric/Behavioral: Negative for agitation, behavioral problems and confusion  All other systems reviewed and are negative  Physical Exam  Physical Exam   Constitutional: He is oriented to person, place, and time  Uncomfortable   HENT:   Head: Normocephalic and atraumatic  Eyes: Pupils are equal, round, and reactive to light  EOM are normal    Neck: Normal range of motion  Cardiovascular: Regular rhythm and normal heart sounds  Tachycardia present  Pulmonary/Chest: Effort normal and breath sounds normal    Abdominal: Soft  There is no tenderness  Musculoskeletal: Normal range of motion  Neurological: He is alert and oriented to person, place, and time  Skin: Skin is warm and dry  Psychiatric: He has a normal mood and affect  His behavior is normal    Nursing note and vitals reviewed        Vital Signs  ED Triage Vitals [01/03/20 0033]   Temperature Pulse Respirations Blood Pressure SpO2   98 5 °F (36 9 °C) (!) 120 22 128/57 91 %      Temp Source Heart Rate Source Patient Position - Orthostatic VS BP Location FiO2 (%)   Oral Monitor Lying Right arm --      Pain Score       No Pain           Vitals:    01/03/20 1500 01/03/20 1600 01/03/20 1700 01/03/20 1800   BP: 93/53 98/55 107/54 (!) 103/47   Pulse: 74 76 78 74   Patient Position - Orthostatic VS:             Visual Acuity  Visual Acuity      Most Recent Value   L Pupil Size (mm)  3   R Pupil Size (mm)  3   L Pupil Shape  Round   R Pupil Shape  Round          ED Medications  Medications   norepinephrine (LEVOPHED) 4 mg (STANDARD CONCENTRATION) IV in sodium chloride 0 9% 250 mL (8 mcg/min Intravenous New Bag 1/3/20 1150)   hydrocortisone sodium succinate (PF) (Solu-CORTEF) injection 100 mg (100 mg Intravenous Not Given 1/3/20 0542)   apixaban (ELIQUIS) tablet 5 mg (5 mg Oral Given 1/3/20 1003)   finasteride (PROSCAR) tablet 5 mg (has no administration in time range)   guaiFENesin (MUCINEX) 12 hr tablet 600 mg (has no administration in time range)   lactobacillus acidophilus-bulgaricus (FLORANEX) packet 1 packet (1 packet Oral Given 1/3/20 1004)   cefepime (MAXIPIME) 2 g/50 mL dextrose IVPB (2,000 mg Intravenous New Bag 1/3/20 1356)   hydrocortisone sodium succinate (PF) (Solu-CORTEF) injection 50 mg (50 mg Intravenous Given 1/3/20 1816)   acetaminophen (TYLENOL) tablet 650 mg (has no administration in time range)   ondansetron (ZOFRAN) injection 4 mg (has no administration in time range)   metroNIDAZOLE (FLAGYL) IVPB (premix) 500 mg (500 mg Intravenous New Bag 1/3/20 1319)   ipratropium (ATROVENT) 0 02 % inhalation solution 0 5 mg (0 5 mg Nebulization Given 1/3/20 1313)   levalbuterol (XOPENEX) inhalation solution 1 25 mg (1 25 mg Nebulization Given 1/3/20 1313)   ipratropium (ATROVENT) 0 02 % inhalation solution 0 5 mg (has no administration in time range)   levalbuterol (XOPENEX) inhalation solution 1 25 mg (has no administration in time range)   vancomycin (VANCOCIN) oral solution 125 mg (125 mg Oral Given 1/3/20 1205)   ondansetron (FOR EMS ONLY) (ZOFRAN) 4 mg/2 mL injection 4 mg (0 mg Does not apply Given to EMS 1/3/20 0037)   metoclopramide (REGLAN) injection 10 mg (10 mg Intravenous Given 1/3/20 0051)   sodium chloride 0 9 % bolus 1,000 mL (0 mL Intravenous Stopped 1/3/20 0148)   acetaminophen (TYLENOL) tablet 650 mg (650 mg Oral Given 1/3/20 0148)   sodium chloride 0 9 % bolus 1,000 mL (0 mL Intravenous Stopped 1/3/20 0241)   cefepime (MAXIPIME) 2 g/50 mL dextrose IVPB (0 mg Intravenous Stopped 1/3/20 0219)   iohexol (OMNIPAQUE) 350 MG/ML injection (MULTI-DOSE) 100 mL (100 mL Intravenous Given 1/3/20 0145)   magnesium sulfate 2 g/50 mL IVPB (premix) 2 g (0 g Intravenous Stopped 1/3/20 0348)   sodium chloride 0 9 % bolus 500 mL (0 mL Intravenous Stopped 1/3/20 0348)   lidocaine (LMX) 4 % cream ( Topical Given 1/3/20 6101)       Diagnostic Studies  Results Reviewed     Procedure Component Value Units Date/Time    Legionella antigen, urine [[id number]] Collected:  01/03/20 3115    Lab Status:   In process Specimen:  Urine, Clean Catch Updated:  01/03/20 1700    Strep Pneumoniae, Urine [[id number]]  (Abnormal) Collected:  01/03/20 0101    Lab Status:  Final result Specimen:  Urine Updated:  01/03/20 1156     Strep pneumoniae antigen, urine Positive    Cortisol [[id number]]  (Normal) Collected:  01/03/20 0428    Lab Status:  Final result Specimen:  Blood from Arm, Right Updated:  01/03/20 1109     Cortisol, Random 15 1 ug/dL     Blood culture #1 [[id number]] Collected:  01/03/20 0048    Lab Status:  Preliminary result Specimen:  Blood from Arm, Left Updated:  01/03/20 0801     Blood Culture Received in Microbiology Lab  Culture in Progress  Blood culture #2 [[id number]] Collected:  01/03/20 0041    Lab Status:  Preliminary result Specimen:  Blood from Arm, Right Updated:  01/03/20 0801     Blood Culture Received in Microbiology Lab  Culture in Progress  Procalcitonin [[id number]]  (Normal) Collected:  01/03/20 0045    Lab Status:  Final result Specimen:  Blood from Arm, Right Updated:  01/03/20 0651     Procalcitonin <0 05 ng/ml     Lactic acid, plasma [[id number]]  (Normal) Collected:  01/03/20 0241    Lab Status:  Final result Specimen:  Blood from Arm, Left Updated:  01/03/20 0311     LACTIC ACID 1 3 mmol/L     Narrative:       Result may be elevated if tourniquet was used during collection      Influenza A/B and RSV PCR [[id number]]  (Normal) Collected:  01/03/20 0146    Lab Status:  Final result Specimen:  Nares from Nose Updated:  01/03/20 0248     INFLUENZA A PCR None Detected     INFLUENZA B PCR None Detected     RSV PCR None Detected    Urine Microscopic [[id number]]  (Abnormal) Collected:  01/03/20 0059    Lab Status:  Final result Specimen:  Urine, Other Updated:  01/03/20 0214     RBC, UA 2-4 /hpf      WBC, UA None Seen /hpf      Epithelial Cells Occasional /hpf      Bacteria, UA Occasional /hpf     Hepatic function panel [[id number]]  (Abnormal) Collected:  01/03/20 0045    Lab Status:  Final result Specimen:  Blood from Arm, Right Updated:  01/03/20 0146     Total Bilirubin 1 36 mg/dL      Bilirubin, Direct 0 29 mg/dL      Alkaline Phosphatase 78 U/L      AST 24 U/L      ALT 35 U/L      Total Protein 6 4 g/dL      Albumin 3 1 g/dL     Magnesium [[id number]]  (Abnormal) Collected:  01/03/20 0045    Lab Status:  Final result Specimen:  Blood from Arm, Right Updated:  01/03/20 0146     Magnesium 1 2 mg/dL     NT-BNP PRO [[id number]]  (Normal) Collected:  01/03/20 0045    Lab Status:  Final result Specimen:  Blood from Arm, Right Updated:  01/03/20 0146     NT-proBNP 268 pg/mL     Lipase [[id number]]  (Normal) Collected:  01/03/20 0045    Lab Status:  Final result Specimen:  Blood from Arm, Right Updated:  01/03/20 0146     Lipase 76 u/L     CBC and differential [[id number]]  (Abnormal) Collected:  01/03/20 0045    Lab Status:  Final result Specimen:  Blood from Arm, Right Updated:  01/03/20 0134     WBC 2 95 Thousand/uL      RBC 3 59 Million/uL      Hemoglobin 12 1 g/dL      Hematocrit 36 9 %       fL      MCH 33 7 pg      MCHC 32 8 g/dL      RDW 14 6 %      MPV 9 2 fL      Platelets 97 Thousands/uL      nRBC 0 /100 WBCs      Neutrophils Relative 61 %      Immat GRANS % 1 %      Lymphocytes Relative 31 %      Monocytes Relative 7 %      Eosinophils Relative 0 %      Basophils Relative 0 %      Neutrophils Absolute 1 82 Thousands/µL      Immature Grans Absolute 0 02 Thousand/uL      Lymphocytes Absolute 0 90 Thousands/µL      Monocytes Absolute 0 20 Thousand/µL      Eosinophils Absolute 0 00 Thousand/µL      Basophils Absolute 0 01 Thousands/µL     Troponin I [[id number]]  (Normal) Collected:  01/03/20 0045    Lab Status:  Final result Specimen:  Blood from Arm, Right Updated:  01/03/20 0129     Troponin I 0 02 ng/mL     Basic metabolic panel [[id number]] Collected:  01/03/20 0045    Lab Status:  Final result Specimen:  Blood from Arm, Right Updated:  01/03/20 0124     Sodium 143 mmol/L      Potassium 3 6 mmol/L Chloride 106 mmol/L      CO2 27 mmol/L      ANION GAP 10 mmol/L      BUN 12 mg/dL      Creatinine 1 15 mg/dL      Glucose 119 mg/dL      Calcium 8 6 mg/dL      eGFR 59 ml/min/1 73sq m     Narrative:       [place] guidelines for Chronic Kidney Disease (CKD):     Stage 1 with normal or high GFR (GFR > 90 mL/min/1 73 square meters)    Stage 2 Mild CKD (GFR = 60-89 mL/min/1 73 square meters)    Stage 3A Moderate CKD (GFR = 45-59 mL/min/1 73 square meters)    Stage 3B Moderate CKD (GFR = 30-44 mL/min/1 73 square meters)    Stage 4 Severe CKD (GFR = 15-29 mL/min/1 73 square meters)    Stage 5 End Stage CKD (GFR <15 mL/min/1 73 square meters)  Note: GFR calculation is accurate only with a steady state creatinine    Lactic acid, plasma [[id number]]  (Abnormal) Collected:  01/03/20 0045    Lab Status:  Final result Specimen:  Blood from Arm, Right Updated:  01/03/20 0123     LACTIC ACID 2 5 mmol/L     Narrative:       Result may be elevated if tourniquet was used during collection  POCT urinalysis dipstick [[id number]]  (Abnormal) Resulted:  01/03/20 0102    Lab Status:  Final result Specimen:  Urine Updated:  01/03/20 0102    Urine Macroscopic, POC [[id number]]  (Abnormal) Collected:  01/03/20 0059    Lab Status:  Final result Specimen:  Urine Updated:  01/03/20 0101     Color, UA Yellow     Clarity, UA Clear     pH, UA 6 5     Leukocytes, UA Negative     Nitrite, UA Negative     Protein, UA Negative mg/dl      Glucose, UA Negative mg/dl      Ketones, UA Negative mg/dl      Urobilinogen, UA 0 2 E U /dl      Bilirubin, UA Negative     Blood, UA Small     Specific [place], UA 1 020    Narrative:       CLINITEK RESULT                 XR chest 2 views   Final Result by Stacey Avila MD (01/03 3184)      Mildly prominent reticular interstitial markings suggesting potential component of interstitial fluid  Mild central venous congestion  Otherwise stable findings as above  Workstation performed: [id number]         CT abdomen pelvis with contrast   Final Result by Gerardo Lopez MD (01/03 9662)      Partially visualized right lower lobe infiltrate suspicious for bronchopneumonia  Correlation with chest x-ray findings is recommended  No acute intra-abdominal abnormality  No free air or free fluid  Scattered colonic diverticulosis with no inflammatory changes present to suggest acute diverticulitis  Stable left renal postoperative changes  No evidence of recurrent tumor  Workstation performed: [id number]                    Procedures  Procedures         ED Course  ED Course as of Jan 03 1835 Fri Jan 03, 2020   0116 Pt ideal body weight determined to be 66 4kg, will use this weight for fluid management  Initial Sepsis Screening     Row Name 01/03/20 0146                Is the patient's history suggestive of a new or worsening infection? (!) Yes (Proceed)  -DH        Suspected source of infection  pneumonia  -DH        Are two or more of the following signs & symptoms of infection both present and new to the patient? (!) Yes (Proceed)  -DH        Indicate SIRS criteria  Tachycardia > 90 bpm;Tachypnea > 20 resp per min;Leukopenia (WBC < 4000 IJL)  -DH        If the answer is yes to both questions, suspicion of sepsis is present          If severe sepsis is present AND tissue hypoperfusion perists in the hour after fluid resuscitation or lactate > 4, the patient meets criteria for SEPTIC SHOCK          Are any of the following organ dysfunction criteria present within 6 hours of suspected infection and SIRS criteria that are NOT considered to be chronic conditions?   (!) Yes  -DH        Organ dysfunction  Lactate > 2 0 mmol/L  -DH        Date of presentation of severe sepsis  01/03/20  -DH        Time of presentation of severe sepsis  0146  -DH        Tissue hypoperfusion persists in the hour after crystalloid fluid administration, evidenced, by either:  SBP < 90 mm/Hg ( ___ mm/Hg in comment field)  -GS        Was hypotension present within one hour of the conclusion of crystalloid fluid administration? Yes  -GS        Date of presentation of septic shock  01/03/20  -GS        Time of presentation of septic shock  0402  -GS          User Key  (r) = Recorded By, (t) = Taken By, (c) = Cosigned By    [address] Name Provider Type    Hiram Schwarz MD Physician    [id number] , DO Physician                  MDM  Number of Diagnoses or Management Options  Nausea & vomiting: new and requires workup  Pneumonia: new and requires workup  Sepsis [facility]): new and requires workup  Septic shock [facility]): new and requires workup  Tachycardia: new and requires workup  Diagnosis management comments: 1  Nausea/vomiting - Pt afebrile in the ED, wife states he did have a fever  He is tachycardic and tachypneic, concern for sepsis  Pt does look unwell  Will check troponin to assess acute MI, CXR for PNA, CBC for leukocytosis, metabolic panel for electrolyte abnormalities and dehydration, blood cultures and lactic acid  Will give fluids for resuscitation, likely start Abx  Will order CT abdomen to rule out SBO         Amount and/or Complexity of Data Reviewed  Clinical lab tests: ordered and reviewed  Tests in the radiology section of CPT®: ordered and reviewed  Obtain history from someone other than the patient: yes  Review and summarize past medical records: yes  Discuss the patient with other providers: yes  Independent visualization of images, tracings, or specimens: yes    Patient Progress  Patient progress: stable        Disposition  Final diagnoses:   Nausea & vomiting   Tachycardia   Pneumonia   Sepsis ([facility] )   Septic shock ([facility] )     Time reflects when diagnosis was documented in both MDM as applicable and the Disposition within this note     Time User Action Codes Description Comment    1/3/2020  2:00 AM Mehreen MATUTE Add [R11 2] Nausea & vomiting 1/3/2020  2:00 AM Gerson Dunn E Add [R00 0] Tachycardia     1/3/2020  2:00 AM Samir Marino E Add [J18 9] Pneumonia     1/3/2020  2:01 AM Samir Pichardo E Add [A41 9] Sepsis ([facility] )     1/3/2020  3:55 AM YolandeglJeni schroeder Heart Add [A41 9,  R65 21] Septic shock [facility])       ED Disposition     ED Disposition Condition Date/Time Comment    Admit Stable Fri Tevin 3, 2020  3:00 AM Case was discussed with REJI and the patient's admission status was agreed to be Admission Status: inpatient status to the service of Dr Gage Gordon   Follow-up Information    None         Current Discharge Medication List      CONTINUE these medications which have NOT CHANGED    Details   albuterol (PROVENTIL HFA,VENTOLIN HFA) 90 mcg/act inhaler Inhale 2 puffs every 4 (four) hours as needed for wheezing  Qty: 1 Inhaler, Refills: 6    Associated Diagnoses: Chronic obstructive pulmonary disease, unspecified COPD type (HCC)      apixaban (ELIQUIS) 5 mg Take 1 tablet (5 mg total) by mouth 2 (two) times a day  Qty: 60 tablet, Refills: 3    Associated Diagnoses: Adenocarcinoma of right lung ([facility] ); Multiple myeloma, remission status unspecified ([place] [address] ); 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[facility] );  Personal history of malignant neoplasm of breast      cholecalciferol (VITAMIN D3) 1,000 units tablet Take 5,000 Units by mouth daily        cyanocobalamin (VITAMIN B-12) 1,000 mcg tablet Take by mouth daily      diphenoxylate-atropine (LOMOTIL) 2 5-0 025 mg per tablet Take 1 tablet by mouth 4 (four) times a day as needed for diarrhea  Qty: 30 tablet, Refills: 0    Associated Diagnoses: Diarrhea, unspecified type      finasteride (PROSCAR) 5 mg tablet Take 1 tablet (5 mg total) by mouth daily at bedtime  Qty: 90 tablet, Refills: 3    Associated Diagnoses: Gross hematuria      guaiFENesin (MUCINEX) 600 mg 12 hr tablet Take 1 tablet (600 mg total) by mouth every 12 (twelve) hours  Qty: 60 tablet, Refills: 0    Associated Diagnoses: Chronic obstructive pulmonary disease, unspecified COPD type (HCC)      ipratropium (ATROVENT) 0 02 % nebulizer solution Take 1 vial (0 5 mg total) by nebulization 4 (four) times a day  Qty: 300 mL, Refills: 6    Associated Diagnoses: Chronic obstructive pulmonary disease, unspecified COPD type (HCC)      Lactobacillus-Inulin ([address]) Take by mouth daily      lenalidomide (REVLIMID) 5 MG CAPS 3 weeks on/3 weeks off adult male/auth# [id number] 1/2/19  Qty: 21 capsule, Refills: 0    Associated Diagnoses: Multiple myeloma, remission status unspecified ([facility])      lidocaine-prilocaine (EMLA) cream APPLY TO AREA AND COVER WITH TAPE 1 HOUR BEFORE APPOINTMENT  Refills: 2      predniSONE 5 mg tablet Take 1 tablet (5 mg total) by mouth daily  Qty: 90 tablet, Refills: 3    Associated Diagnoses: Pulmonary emphysema, unspecified emphysema type (HCC)      RAPAFLO 8 MG CAPS Take 1 capsule by mouth daily at bedtime    Refills: 3      melphalan (ALKERAN) 2 MG tablet Take 12 mg daily for 4 days every 6 weeks  Qty: 24 tablet, Refills: 3    Associated Diagnoses: [facility], [facility] status unspecified [facility]           No discharge procedures on file      ED Provider  Electronically Signed by           Malena Matthew MD  01/03/20 2209

## 2020-01-03 NOTE — ASSESSMENT & PLAN NOTE
· No apparent exacerbation  · Adenocarcinoma of the lung, follows with Dr Nancie Lozano outpatient  · Currently in pulmonary rehab  · Atrovent and Xopenex QID

## 2020-01-03 NOTE — H&P
H&P- Gerardo Bashir 1935, 81 y o  male MRN: [id number]    Unit/Bed#: ICU 08 Encounter: [id number]    Primary Care Provider: Rikki Denney DO   Date and time admitted to hospital: 1/3/2020 12:28 AM        * Septic shock ([facility] )  Assessment & Plan  · Secondary to pneumonia aspiration vs community acquired  · CXR - right lower lobe infiltrate  · Initial lactate 2 5, post IVF 1 3  · Received total 2 5L NSS, Cefepime, Hydrocortisone 100 mg in the ED  · BP initially stable however hypotensive after IVF resuscitation  · Likely adrenal insufficiency worsened by sepsis - on chronic prednisone  · Cont Cefepime, add Flagyl for aspiration pneumonia coverage  · Cont Levo with Goal MAP >65  · Cont Hydrocortisone 50 mg IV Q6    Acute respiratory failure with hypoxia ([facility] )  Assessment & Plan  · Secondary to pneumonia  · Currently requiring 2L NC  · Wean with goal SpO2 >90%  · Cont ABX as above    Pneumonia  Assessment & Plan  · Aspiration vs Community Acquired  · Cont ABX as above    COPD (chronic obstructive pulmonary disease) ([facility] )  Assessment & Plan  · No apparent exacerbation  · Adenocarcinoma of the lung, follows with Dr Yg Gan outpatient  · Currently in pulmonary rehab  · Atrovent and Xopenex QID    Multiple myeloma ([facility] )  Assessment & Plan  · Last seen by Hem/Onc 12/5  · Home regimen on lenalidomide 5 mg daily 3 weeks on 3 weeks off, Melphalan 12 mg daily for 4 days every 6 weeks, prednisone 5 mg daily  · Cont Hydrocortisone for sepsis and likely adrenal insufficiency    Chronic diarrhea  Assessment & Plan  · Reports worsening episodes yesterday with a few episodes of vomiting  · Cont home probiotic    History of pulm embolism  Assessment & Plan  · Cont Eliquis      -------------------------------------------------------------------------------------------------------------  Chief Complaint: SOB, vomiting, diarrhea    History of Present Illness     Gerardo Bashir is a 81 y o  male with significant history of adenocarcinoma of the lung and rectum, s/p resection, chemo and radiation, multiple myeloma on maintenance chemo and prednisone, pulmonary embolism on eliquis, copd, and mod aortic stenosis presents with one day history of nausea, vomiting, and worsening of chronic diarrhea  Pt and pt's wife state throughout the day his chronic diarrhea worsened and after dinner had apx 3 episodes of vomiting  Pt also admitted to worsening cough which began yesterday with clear productive sputum  Pt presented to the ED and was initially HD stable with a lactate of 2 5, was given 2L NSS and Cefepime  Despite IVF pt became hypotensive with SBP 80's  Pt was given an additional 500cc bolus and started on Levophed  Pt's family stated grandson had a GI bug on xmas and pt's wife was seen in the ED last week for a "cold and cough "  Pt was admitted to the ICU for vasopressors and management of septic shock, anticipate greater than 2 midnight stay  History obtained from the patient   -------------------------------------------------------------------------------------------------------------  Dispo: Continue Critical Care     Code Status: Level 1 - Full Code  --------------------------------------------------------------------------------------------------------------  Review of Systems   Respiratory: Positive for cough and shortness of breath  Gastrointestinal: Positive for diarrhea, nausea and vomiting  A 12-point, complete review of systems was reviewed and negative except as stated above     Physical Exam   Constitutional: He is oriented to person, place, and time  He appears well-developed and well-nourished  HENT:   Head: Normocephalic and atraumatic  Eyes: Pupils are equal, round, and reactive to light  Neck: No JVD present  No tracheal deviation present  Cardiovascular: Normal rate and intact distal pulses  Exam reveals no gallop and no friction rub  Murmur heard    Pulmonary/Chest: Effort normal  No stridor  No respiratory distress  He has decreased breath sounds in the right lower field and the left lower field  He has no wheezes  He has rhonchi  Abdominal: Soft  Bowel sounds are normal  He exhibits no distension  There is no tenderness  Musculoskeletal: He exhibits edema (chronic lower extremity edema)  Neurological: He is alert and oriented to person, place, and time  Skin: Skin is warm and dry  Capillary refill takes less than 2 seconds  Psychiatric: He has a normal mood and affect      --------------------------------------------------------------------------------------------------------------  Historical Information   Past Medical History:   Diagnosis Date    Chronic pain disorder     3 fractured vertebrae    COPD (chronic obstructive pulmonary disease) (HCC)     Diarrhea     Enlarged prostate     Kidney stone     Multiple myeloma ([facility] )     Pulmonary embolism ([facility] ) 12/24/2018    Rectal adenocarcinoma ([facility] )     Sleep apnea     no cpap     Past Surgical History:   Procedure Laterality Date    CHOLECYSTECTOMY      COLONOSCOPY W/ ENDOSCOPIC US N/A 7/21/2017    Procedure: ANAL ENDOSCOPIC U/S;  Surgeon: Trenton Pittman MD;  Location: [facility]; Service: Colorectal    HERNIA REPAIR      KIDNEY STONE SURGERY      LUNG LOBECTOMY Right     WI COLONOSCOPY FLX DX W/COLLJ SPEC WHEN PFRMD N/A 7/10/2017    Procedure: COLONOSCOPY;  Surgeon: Jovan Alatorre MD;  Location: [facility];   Service: Gastroenterology    WI RECTAL TUMOR EXCISION, TRANSANAL ENDOSCOPIC MICROSURGICAL, FULL THICK N/A 11/2/2017    Procedure: TRANSANAL ENDOSCOPIC MICROSURGERY TEM;  Surgeon: Trenton Pittman MD;  Location: [facility];  Service: Colorectal     Social History   Social History     Substance and Sexual Activity   Alcohol Use Yes    Comment: rarely     Social History     Substance and Sexual Activity   Drug Use No     Social History     Tobacco Use   Smoking Status Former Smoker    Packs/day: 1 00    Years: 50 00    Pack years: 50 00    Types: Cigarettes    Start date: 57    Last attempt to quit: 2010    Years since quittin 0   Smokeless Tobacco Never Used   Tobacco Comment    quit in      Exercise History: n/a  Family History:   Family History   Problem Relation Age of Onset    Arthritis Mother     Colon cancer Father     Heart attack Father     Diabetes type I Sister      Family history unknown and I have reviewed this patient's family history and commented on sigificant items within the HPI    Vitals:   Vitals:    20 0403 20 0417 20 0437 20 0448   BP: (!) 85/48 106/50  90/53   BP Location: Left arm Left arm  Left arm   Pulse: 87 99  90   Resp: (!) 24 (!) 24  (!) 24   Temp:       TempSrc:       SpO2: 91% 92%  91%   Weight:   85 6 kg (188 lb 11 4 oz)      Temp  Min: 98 5 °F (36 9 °C)  Max: 98 5 °F (36 9 °C)  IBW: -88 kg     Body mass index is 29 56 kg/m²        Medications:  Current Facility-Administered Medications   Medication Dose Route Frequency    acetaminophen (TYLENOL) tablet 650 mg  650 mg Oral Q6H PRN    apixaban (ELIQUIS) tablet 5 mg  5 mg Oral Daily    cefepime (MAXIPIME) 2 g/50 mL dextrose IVPB  2,000 mg Intravenous Q12H    [facility]   Oral Daily    finasteride (PROSCAR) tablet 5 mg  5 mg Oral HS    guaiFENesin (MUCINEX) 12 hr tablet 600 mg  600 mg Oral Q12H PRN    hydrocortisone sodium succinate (PF) (Solu-CORTEF) injection 100 mg  100 mg Intravenous Once    hydrocortisone sodium succinate (PF) (Solu-CORTEF) injection 50 mg  50 mg Intravenous Q6H [facility]    ipratropium (ATROVENT) 0 02 % inhalation solution 0 5 mg  0 5 mg Nebulization TID    ipratropium (ATROVENT) 0 02 % inhalation solution 0 5 mg  0 5 mg Nebulization Q6H PRN    levalbuterol (XOPENEX) inhalation solution 1 25 mg  1 25 mg Nebulization TID    levalbuterol (XOPENEX) inhalation solution 1 25 mg  1 25 mg Nebulization Q6H PRN    lidocaine (LMX) 4 % cream   Topical Once    metroNIDAZOLE (FLAGYL) IVPB (premix) 500 mg  500 mg Intravenous Q8H    norepinephrine (LEVOPHED) 4 mg (STANDARD CONCENTRATION) IV in sodium chloride 0 9% 250 mL  1-30 mcg/min Intravenous Titrated    ondansetron (ZOFRAN) injection 4 mg  4 mg Intravenous Q6H PRN     Home medications:  Prior to Admission Medications   Prescriptions Last Dose Informant Patient Reported? Taking?    Lactobacillus-Inulin ([facility])  Spouse/Significant Other Yes Yes   Sig: Take by mouth daily   RAPAFLO 8 MG CAPS  Spouse/Significant Other Yes Yes   Sig: Take 1 capsule by mouth daily at bedtime     albuterol (PROVENTIL HFA,VENTOLIN HFA) 90 mcg/act inhaler  Spouse/Significant Other No Yes   Sig: Inhale 2 puffs every 4 (four) hours as needed for wheezing   apixaban (ELIQUIS) 5 mg  Spouse/Significant Other No Yes   Sig: Take 1 tablet (5 mg total) by mouth 2 (two) times a day   Patient taking differently: Take 5 mg by mouth daily    cholecalciferol (VITAMIN D3) 1,000 units tablet  Spouse/Significant Other Yes Yes   Sig: Take 5,000 Units by mouth daily     cyanocobalamin (VITAMIN B-12) 1,000 mcg tablet  Spouse/Significant Other Yes Yes   Sig: Take by mouth daily   diphenoxylate-atropine (LOMOTIL) 2 5-0 025 mg per tablet  Spouse/Significant Other No Yes   Sig: Take 1 tablet by mouth 4 (four) times a day as needed for diarrhea   finasteride (PROSCAR) 5 mg tablet  Spouse/Significant Other No Yes   Sig: Take 1 tablet (5 mg total) by mouth daily at bedtime   guaiFENesin (MUCINEX) 600 mg 12 hr tablet  Spouse/Significant Other No Yes   Sig: Take 1 tablet (600 mg total) by mouth every 12 (twelve) hours   Patient taking differently: Take 600 mg by mouth every 12 (twelve) hours as needed     ipratropium (ATROVENT) 0 02 % nebulizer solution  Spouse/Significant Other No Yes   Sig: Take 1 vial (0 5 mg total) by nebulization 4 (four) times a day   lenalidomide (REVLIMID) 5 MG CAPS   No Yes   Sig: 3 weeks on/3 weeks off adult male/auth# [id number] 1/2/19   lidocaine-prilocaine (EMLA) cream  Spouse/Significant Other Yes Yes   Sig: APPLY TO AREA AND COVER WITH TAPE 1 HOUR BEFORE APPOINTMENT    melphalan (ALKERAN) 2 MG tablet  Spouse/Significant Other No No   Sig: Take 12 mg daily for 4 days every 6 weeks   predniSONE 5 mg tablet  Spouse/Significant Other No Yes   Sig: Take 1 tablet (5 mg total) by mouth daily      Facility-Administered Medications: None     Allergies:   Allergies   Allergen Reactions    Sulfa Antibiotics Itching    Aldactone [Spironolactone] Other (See Comments)     Breast swelling and pain  Breast swelling and pain    Penicillin V Rash     AS CHILD         Laboratory and Diagnostics:  Results from last 7 days   Lab Units 01/03/20  0045   WBC Thousand/uL 2 95*   HEMOGLOBIN g/dL 12 1   HEMATOCRIT % 36 9   PLATELETS Thousands/uL 97*   NEUTROS PCT % 61   MONOS PCT % 7     Results from last 7 days   Lab Units 01/03/20  0045   SODIUM mmol/L 143   POTASSIUM mmol/L 3 6   CHLORIDE mmol/L 106   CO2 mmol/L 27   ANION GAP mmol/L 10   BUN mg/dL 12   CREATININE mg/dL 1 15   CALCIUM mg/dL 8 6   GLUCOSE RANDOM mg/dL 119   ALT U/L 35   AST U/L 24   ALK PHOS U/L 78   ALBUMIN g/dL 3 1*   TOTAL BILIRUBIN mg/dL 1 36*     Results from last 7 days   Lab Units 01/03/20  0045   MAGNESIUM mg/dL 1 2*           Results from last 7 days   Lab Units 01/03/20  0045   TROPONIN I ng/mL 0 02     Results from last 7 days   Lab Units 01/03/20  0241 01/03/20  0045   LACTIC ACID mmol/L 1 3 2 5*     ABG:    VBG:          Micro:          EKG: Sinus Tachycardia  Imaging: I have personally reviewed pertinent films in PACS    ------------------------------------------------------------------------------------------------------------  Advance Directive and Living Will:      Power of :    POLST:    ------------------------------------------------------------------------------------------------------------  Anticipated Length of Stay is > 2 midnights    Counseling / Coordination of Care        JACK Lopez        Portions of the record may have been created with voice recognition software  Occasional wrong word or "sound a like" substitutions may have occurred due to the inherent limitations of voice recognition software    Read the chart carefully and recognize, using context, where substitutions have occurred

## 2020-01-03 NOTE — ASSESSMENT & PLAN NOTE
· Secondary to pneumonia aspiration vs community acquired  · CXR - right lower lobe infiltrate  · Initial lactate 2 5, post IVF 1 3  · Received total 2 5L NSS, Cefepime, Hydrocortisone 100 mg in the ED  · BP initially stable however hypotensive after IVF resuscitation  · Likely adrenal insufficiency worsened by sepsis - on chronic prednisone  · Cont Cefepime, add Flagyl for aspiration pneumonia coverage  · Cont Levo with Goal MAP >65  · Cont Hydrocortisone 50 mg IV Q6

## 2020-01-03 NOTE — SOCIAL WORK
CM met with the patient's wife at bedside as the patient was soundly sleeping:     The patient lives with his wife in a split level home  The patient is able to navigate steps on his own  He is independent with adls  He is independent with ambulation  He drives  No hx of vna  No hx of STR  He uses the [facility] [address] for all rx  No D&A history  No MH history  His PCP is Dr Alonso Marroquin  His POA is his wife of Afshan Scherer CM following for discharge needs

## 2020-01-03 NOTE — ASSESSMENT & PLAN NOTE
· Last seen by Hem/Onc 12/5  · Home regimen on lenalidomide 5 mg daily 3 weeks on 3 weeks off, Melphalan 12 mg daily for 4 days every 6 weeks, prednisone 5 mg daily  · Cont Hydrocortisone for sepsis and likely adrenal insufficiency

## 2020-01-03 NOTE — PLAN OF CARE
Problem: Potential for Falls  Goal: Patient will remain free of falls  Description  INTERVENTIONS:  - Assess patient frequently for physical needs  -  Identify cognitive and physical deficits and behaviors that affect risk of falls    -  [place] fall precautions as indicated by assessment   - Educate patient/family on patient safety including physical limitations  - Instruct patient to call for assistance with activity based on assessment  - Modify environment to reduce risk of injury  - Consider OT/PT consult to assist with strengthening/mobility  Outcome: Progressing

## 2020-01-03 NOTE — PLAN OF CARE
Problem: Potential for Falls  Goal: Patient will remain free of falls  Description  INTERVENTIONS:  - Assess patient frequently for physical needs  -  Identify cognitive and physical deficits and behaviors that affect risk of falls    -  [place] fall precautions as indicated by assessment   - Educate patient/family on patient safety including physical limitations  - Instruct patient to call for assistance with activity based on assessment  - Modify environment to reduce risk of injury  - Consider OT/PT consult to assist with strengthening/mobility  Outcome: Progressing     Problem: RESPIRATORY - ADULT  Goal: Achieves optimal ventilation and oxygenation  Description  INTERVENTIONS:  - Assess for changes in respiratory status  - Assess for changes in mentation and behavior  - Position to facilitate oxygenation and minimize respiratory effort  - Oxygen administered by appropriate delivery if ordered  - Initiate smoking cessation education as indicated  - Encourage broncho-pulmonary hygiene including cough, deep breathe, Incentive Spirometry  - Assess the need for suctioning and aspirate as needed  - Assess and instruct to report SOB or any respiratory difficulty  - Respiratory Therapy support as indicated  Outcome: Progressing     Problem: GASTROINTESTINAL - ADULT  Goal: Minimal or absence of nausea and/or vomiting  Description  INTERVENTIONS:  - Administer IV fluids if ordered to ensure adequate hydration  - Maintain NPO status until nausea and vomiting are resolved  - Nasogastric tube if ordered  - Administer ordered antiemetic medications as needed  - Provide nonpharmacologic comfort measures as appropriate  - Advance diet as tolerated, if ordered  - Consider nutrition services referral to assist patient with adequate nutrition and appropriate food choices  Outcome: Progressing  Goal: Maintains or returns to baseline bowel function  Description  INTERVENTIONS:  - Assess bowel function  - Encourage oral fluids to ensure adequate hydration  - Administer IV fluids if ordered to ensure adequate hydration  - Administer ordered medications as needed  - Encourage mobilization and activity  - Consider nutritional services referral to assist patient with adequate nutrition and appropriate food choices  Outcome: Progressing  Goal: Maintains adequate nutritional intake  Description  INTERVENTIONS:  - Monitor percentage of each meal consumed  - Identify factors contributing to decreased intake, treat as appropriate  - Assist with meals as needed  - Monitor I&O, weight, and lab values if indicated  - Obtain nutrition services referral as needed  Outcome: Progressing     Problem: METABOLIC, FLUID AND ELECTROLYTES - ADULT  Goal: Electrolytes maintained within normal limits  Description  INTERVENTIONS:  - Monitor labs and assess patient for signs and symptoms of electrolyte imbalances  - Administer electrolyte replacement as ordered  - Monitor response to electrolyte replacements, including repeat lab results as appropriate  - Instruct patient on fluid and nutrition as appropriate  Outcome: Progressing     Problem: SKIN/TISSUE INTEGRITY - ADULT  Goal: Skin integrity remains intact  Description  INTERVENTIONS  - Identify patients at risk for skin breakdown  - Assess and monitor skin integrity  - Assess and monitor nutrition and hydration status  - Monitor labs (i e  albumin)  - Assess for incontinence   - Turn and reposition patient  - Assist with mobility/ambulation  - Relieve pressure over bony prominences  - Avoid friction and shearing  - Provide appropriate hygiene as needed including keeping skin clean and dry  - Evaluate need for skin moisturizer/barrier cream  - Collaborate with interdisciplinary team (i e  Nutrition, Rehabilitation, etc )   - Patient/family teaching  Outcome: Progressing     Problem: MUSCULOSKELETAL - ADULT  Goal: Maintain or return mobility to safest level of function  Description  INTERVENTIONS:  - Assess patient's ability to carry out ADLs; assess patient's baseline for ADL function and identify physical deficits which impact ability to perform ADLs (bathing, care of mouth/teeth, toileting, grooming, dressing, etc )  - Assess/evaluate cause of self-care deficits   - Assess range of motion  - Assess patient's mobility  - Assess patient's need for assistive devices and provide as appropriate  - Encourage maximum independence but intervene and supervise when necessary  - Involve family in performance of ADLs  - Assess for home care needs following discharge   - Consider OT consult to assist with ADL evaluation and planning for discharge  - Provide patient education as appropriate  Outcome: Progressing

## 2020-01-03 NOTE — ED RE-EVALUATION NOTE
3:54 AM  Patient was signed out to me earlier  Patient was to be admitted to Internal Medicine for right lower lobe pneumonia  Patient's blood pressure started to trend down  500 cc of IV fluid more were given  Repeat lactate acid is now normal   Despite more fluids patient's blood pressure remained low  Mental status is unchanged  Patient resting comfortably  Will discuss with ICU for admission  Will also start on Levophed     4:08 AM  Spoke with Dr Thomas Guardado from the ICU  Agrees on plan of care thus far  Will admit to his service       Frank Mcpherson DO  01/03/20 [id number]

## 2020-01-04 LAB
ANION GAP SERPL CALCULATED.3IONS-SCNC: 8 MMOL/L (ref 4–13)
BASOPHILS # BLD MANUAL: 0 THOUSAND/UL (ref 0–0.1)
BASOPHILS NFR MAR MANUAL: 0 % (ref 0–1)
BUN SERPL-MCNC: 11 MG/DL (ref 5–25)
CALCIUM SERPL-MCNC: 7.6 MG/DL (ref 8.3–10.1)
CHLORIDE SERPL-SCNC: 108 MMOL/L (ref 100–108)
CO2 SERPL-SCNC: 22 MMOL/L (ref 21–32)
CREAT SERPL-MCNC: 1.05 MG/DL (ref 0.6–1.3)
EOSINOPHIL # BLD MANUAL: 0 THOUSAND/UL (ref 0–0.4)
EOSINOPHIL NFR BLD MANUAL: 0 % (ref 0–6)
ERYTHROCYTE [DISTWIDTH] IN BLOOD BY AUTOMATED COUNT: 15 % (ref 11.6–15.1)
GFR SERPL CREATININE-BSD FRML MDRD: 65 ML/MIN/1.73SQ M
GLUCOSE SERPL-MCNC: 145 MG/DL (ref 65–140)
HCT VFR BLD AUTO: 29.7 % (ref 36.5–49.3)
HGB BLD-MCNC: 9.6 G/DL (ref 12–17)
HYPERCHROMIA BLD QL SMEAR: PRESENT
LYMPHOCYTES # BLD AUTO: 0.27 THOUSAND/UL (ref 0.6–4.47)
LYMPHOCYTES # BLD AUTO: 5 % (ref 14–44)
MCH RBC QN AUTO: 33.4 PG (ref 26.8–34.3)
MCHC RBC AUTO-ENTMCNC: 32.3 G/DL (ref 31.4–37.4)
MCV RBC AUTO: 104 FL (ref 82–98)
METAMYELOCYTES NFR BLD MANUAL: 6 % (ref 0–1)
MONOCYTES # BLD AUTO: 0.21 THOUSAND/UL (ref 0–1.22)
MONOCYTES NFR BLD: 4 % (ref 4–12)
NEUTROPHILS # BLD MANUAL: 4.51 THOUSAND/UL (ref 1.85–7.62)
NEUTS BAND NFR BLD MANUAL: 21 % (ref 0–8)
NEUTS SEG NFR BLD AUTO: 64 % (ref 43–75)
NRBC BLD AUTO-RTO: 0 /100 WBCS
OVALOCYTES BLD QL SMEAR: PRESENT
PLATELET # BLD AUTO: 79 THOUSANDS/UL (ref 149–390)
PLATELET BLD QL SMEAR: ABNORMAL
PMV BLD AUTO: 9.3 FL (ref 8.9–12.7)
POTASSIUM SERPL-SCNC: 4 MMOL/L (ref 3.5–5.3)
RBC # BLD AUTO: 2.87 MILLION/UL (ref 3.88–5.62)
RBC MORPH BLD: PRESENT
SMUDGE CELLS BLD QL SMEAR: PRESENT
SODIUM SERPL-SCNC: 138 MMOL/L (ref 136–145)
TOTAL CELLS COUNTED SPEC: 100
WBC # BLD AUTO: 5.3 THOUSAND/UL (ref 4.31–10.16)

## 2020-01-04 PROCEDURE — 99232 SBSQ HOSP IP/OBS MODERATE 35: CPT | Performed by: INTERNAL MEDICINE

## 2020-01-04 PROCEDURE — 85027 COMPLETE CBC AUTOMATED: CPT | Performed by: NURSE PRACTITIONER

## 2020-01-04 PROCEDURE — 85007 BL SMEAR W/DIFF WBC COUNT: CPT | Performed by: NURSE PRACTITIONER

## 2020-01-04 PROCEDURE — 80048 BASIC METABOLIC PNL TOTAL CA: CPT | Performed by: NURSE PRACTITIONER

## 2020-01-04 PROCEDURE — 94640 AIRWAY INHALATION TREATMENT: CPT

## 2020-01-04 RX ORDER — MAGNESIUM HYDROXIDE/ALUMINUM HYDROXICE/SIMETHICONE 120; 1200; 1200 MG/30ML; MG/30ML; MG/30ML
30 SUSPENSION ORAL EVERY 4 HOURS PRN
Status: DISCONTINUED | OUTPATIENT
Start: 2020-01-04 | End: 2020-01-09 | Stop reason: HOSPADM

## 2020-01-04 RX ADMIN — VANCOMYCIN HYDROCHLORIDE 125 MG: 500 INJECTION, POWDER, LYOPHILIZED, FOR SOLUTION INTRAVENOUS at 22:59

## 2020-01-04 RX ADMIN — HYDROCORTISONE SODIUM SUCCINATE 50 MG: 100 INJECTION, POWDER, FOR SOLUTION INTRAMUSCULAR; INTRAVENOUS at 13:27

## 2020-01-04 RX ADMIN — IPRATROPIUM BROMIDE 0.5 MG: 0.5 SOLUTION RESPIRATORY (INHALATION) at 13:43

## 2020-01-04 RX ADMIN — APIXABAN 5 MG: 5 TABLET, FILM COATED ORAL at 08:15

## 2020-01-04 RX ADMIN — IPRATROPIUM BROMIDE 0.5 MG: 0.5 SOLUTION RESPIRATORY (INHALATION) at 07:58

## 2020-01-04 RX ADMIN — METRONIDAZOLE 500 MG: 500 INJECTION, SOLUTION INTRAVENOUS at 05:27

## 2020-01-04 RX ADMIN — FINASTERIDE 5 MG: 5 TABLET, FILM COATED ORAL at 22:59

## 2020-01-04 RX ADMIN — HYDROCORTISONE SODIUM SUCCINATE 50 MG: 100 INJECTION, POWDER, FOR SOLUTION INTRAMUSCULAR; INTRAVENOUS at 18:28

## 2020-01-04 RX ADMIN — VANCOMYCIN HYDROCHLORIDE 125 MG: 500 INJECTION, POWDER, LYOPHILIZED, FOR SOLUTION INTRAVENOUS at 08:15

## 2020-01-04 RX ADMIN — LEVALBUTEROL HYDROCHLORIDE 1.25 MG: 1.25 SOLUTION, CONCENTRATE RESPIRATORY (INHALATION) at 19:58

## 2020-01-04 RX ADMIN — Medication 1 PACKET: at 08:15

## 2020-01-04 RX ADMIN — HYDROCORTISONE SODIUM SUCCINATE 50 MG: 100 INJECTION, POWDER, FOR SOLUTION INTRAMUSCULAR; INTRAVENOUS at 05:27

## 2020-01-04 RX ADMIN — ALUMINUM HYDROXIDE, MAGNESIUM HYDROXIDE, AND SIMETHICONE 30 ML: 200; 200; 20 SUSPENSION ORAL at 15:17

## 2020-01-04 RX ADMIN — HYDROCORTISONE SODIUM SUCCINATE 50 MG: 100 INJECTION, POWDER, FOR SOLUTION INTRAMUSCULAR; INTRAVENOUS at 23:01

## 2020-01-04 RX ADMIN — CEFEPIME HYDROCHLORIDE 2000 MG: 2 INJECTION, POWDER, FOR SOLUTION INTRAVENOUS at 02:39

## 2020-01-04 RX ADMIN — LEVALBUTEROL HYDROCHLORIDE 1.25 MG: 1.25 SOLUTION, CONCENTRATE RESPIRATORY (INHALATION) at 13:43

## 2020-01-04 RX ADMIN — LEVALBUTEROL HYDROCHLORIDE 1.25 MG: 1.25 SOLUTION, CONCENTRATE RESPIRATORY (INHALATION) at 07:58

## 2020-01-04 RX ADMIN — ALUMINUM HYDROXIDE, MAGNESIUM HYDROXIDE, AND SIMETHICONE 30 ML: 200; 200; 20 SUSPENSION ORAL at 19:35

## 2020-01-04 RX ADMIN — METRONIDAZOLE 500 MG: 500 INJECTION, SOLUTION INTRAVENOUS at 13:27

## 2020-01-04 RX ADMIN — CEFEPIME HYDROCHLORIDE 2000 MG: 2 INJECTION, POWDER, FOR SOLUTION INTRAVENOUS at 14:04

## 2020-01-04 RX ADMIN — IPRATROPIUM BROMIDE 0.5 MG: 0.5 SOLUTION RESPIRATORY (INHALATION) at 19:59

## 2020-01-04 NOTE — ASSESSMENT & PLAN NOTE
· Likely secondary to strep pneumonia with positive antigen, in the setting of immunocompromise due to chemotherapy  Also some concern for aspiration  · Continue broad spectrum coverage empirically for now while awaiting blood cultures  1 of 2 blood cultures is preliminarily positive for GPC in pairs  2nd set is still pending  · Adrenal insufficiency in the setting of chronic steroid use is likely contributing factor to hypotension  Continue hydrocortisone 50mg every 6 hours for now  Hemodynamics are slowly improving  Levophed requirement is improving  Continue to wean as tolerated to maintain MAP>65

## 2020-01-04 NOTE — ASSESSMENT & PLAN NOTE
· Secondary to strep pneumonia  · Minimal oxygen requirement via NC  Wean as tolerated to maintain saturation >88%  · Aggressive pulmonary toileting  Encourage cough and deep breathing  Use IS  Get oob to the chair during the day

## 2020-01-04 NOTE — ASSESSMENT & PLAN NOTE
· No signs of acute exacerbation  · Adenocarcinoma of the lung, follows with Dr Maria L Kirk outpatient  · Continue nebulized bronchodilators

## 2020-01-04 NOTE — PLAN OF CARE
Problem: Potential for Falls  Goal: Patient will remain free of falls  Description  INTERVENTIONS:  - Assess patient frequently for physical needs  -  Identify cognitive and physical deficits and behaviors that affect risk of falls    -  [place] fall precautions as indicated by assessment   - Educate patient/family on patient safety including physical limitations  - Instruct patient to call for assistance with activity based on assessment  - Modify environment to reduce risk of injury  - Consider OT/PT consult to assist with strengthening/mobility  Outcome: Progressing     Problem: RESPIRATORY - ADULT  Goal: Achieves optimal ventilation and oxygenation  Description  INTERVENTIONS:  - Assess for changes in respiratory status  - Assess for changes in mentation and behavior  - Position to facilitate oxygenation and minimize respiratory effort  - Oxygen administered by appropriate delivery if ordered  - Initiate smoking cessation education as indicated  - Encourage broncho-pulmonary hygiene including cough, deep breathe, Incentive Spirometry  - Assess the need for suctioning and aspirate as needed  - Assess and instruct to report SOB or any respiratory difficulty  - Respiratory Therapy support as indicated  Outcome: Progressing     Problem: GASTROINTESTINAL - ADULT  Goal: Minimal or absence of nausea and/or vomiting  Description  INTERVENTIONS:  - Administer IV fluids if ordered to ensure adequate hydration  - Maintain NPO status until nausea and vomiting are resolved  - Nasogastric tube if ordered  - Administer ordered antiemetic medications as needed  - Provide nonpharmacologic comfort measures as appropriate  - Advance diet as tolerated, if ordered  - Consider nutrition services referral to assist patient with adequate nutrition and appropriate food choices  Outcome: Progressing  Goal: Maintains or returns to baseline bowel function  Description  INTERVENTIONS:  - Assess bowel function  - Encourage oral fluids to ensure adequate hydration  - Administer IV fluids if ordered to ensure adequate hydration  - Administer ordered medications as needed  - Encourage mobilization and activity  - Consider nutritional services referral to assist patient with adequate nutrition and appropriate food choices  Outcome: Progressing  Goal: Maintains adequate nutritional intake  Description  INTERVENTIONS:  - Monitor percentage of each meal consumed  - Identify factors contributing to decreased intake, treat as appropriate  - Assist with meals as needed  - Monitor I&O, weight, and lab values if indicated  - Obtain nutrition services referral as needed  Outcome: Progressing     Problem: METABOLIC, FLUID AND ELECTROLYTES - ADULT  Goal: Electrolytes maintained within normal limits  Description  INTERVENTIONS:  - Monitor labs and assess patient for signs and symptoms of electrolyte imbalances  - Administer electrolyte replacement as ordered  - Monitor response to electrolyte replacements, including repeat lab results as appropriate  - Instruct patient on fluid and nutrition as appropriate  Outcome: Progressing     Problem: SKIN/TISSUE INTEGRITY - ADULT  Goal: Skin integrity remains intact  Description  INTERVENTIONS  - Identify patients at risk for skin breakdown  - Assess and monitor skin integrity  - Assess and monitor nutrition and hydration status  - Monitor labs (i e  albumin)  - Assess for incontinence   - Turn and reposition patient  - Assist with mobility/ambulation  - Relieve pressure over bony prominences  - Avoid friction and shearing  - Provide appropriate hygiene as needed including keeping skin clean and dry  - Evaluate need for skin moisturizer/barrier cream  - Collaborate with interdisciplinary team (i e  Nutrition, Rehabilitation, etc )   - Patient/family teaching  Outcome: Progressing     Problem: MUSCULOSKELETAL - ADULT  Goal: Maintain or return mobility to safest level of function  Description  INTERVENTIONS:  - Assess patient's ability to carry out ADLs; assess patient's baseline for ADL function and identify physical deficits which impact ability to perform ADLs (bathing, care of mouth/teeth, toileting, grooming, dressing, etc )  - Assess/evaluate cause of self-care deficits   - Assess range of motion  - Assess patient's mobility  - Assess patient's need for assistive devices and provide as appropriate  - Encourage maximum independence but intervene and supervise when necessary  - Involve family in performance of ADLs  - Assess for home care needs following discharge   - Consider OT consult to assist with ADL evaluation and planning for discharge  - Provide patient education as appropriate  Outcome: Progressing

## 2020-01-04 NOTE — ASSESSMENT & PLAN NOTE
· Last seen by Hem/Onc 12/5  · Home regimen on lenalidomide 5 mg daily 3 weeks on 3 weeks off, Melphalan 12 mg daily for 4 days every 6 weeks, prednisone 5 mg daily  · Cont Hydrocortisone for adrenal insufficiency

## 2020-01-04 NOTE — PLAN OF CARE
Problem: Potential for Falls  Goal: Patient will remain free of falls  Description  INTERVENTIONS:  - Assess patient frequently for physical needs  -  Identify cognitive and physical deficits and behaviors that affect risk of falls    -  [place] fall precautions as indicated by assessment   - Educate patient/family on patient safety including physical limitations  - Instruct patient to call for assistance with activity based on assessment  - Modify environment to reduce risk of injury  - Consider OT/PT consult to assist with strengthening/mobility  Outcome: Progressing     Problem: RESPIRATORY - ADULT  Goal: Achieves optimal ventilation and oxygenation  Description  INTERVENTIONS:  - Assess for changes in respiratory status  - Assess for changes in mentation and behavior  - Position to facilitate oxygenation and minimize respiratory effort  - Oxygen administered by appropriate delivery if ordered  - Initiate smoking cessation education as indicated  - Encourage broncho-pulmonary hygiene including cough, deep breathe, Incentive Spirometry  - Assess the need for suctioning and aspirate as needed  - Assess and instruct to report SOB or any respiratory difficulty  - Respiratory Therapy support as indicated  Outcome: Progressing     Problem: GASTROINTESTINAL - ADULT  Goal: Minimal or absence of nausea and/or vomiting  Description  INTERVENTIONS:  - Administer IV fluids if ordered to ensure adequate hydration  - Maintain NPO status until nausea and vomiting are resolved  - Nasogastric tube if ordered  - Administer ordered antiemetic medications as needed  - Provide nonpharmacologic comfort measures as appropriate  - Advance diet as tolerated, if ordered  - Consider nutrition services referral to assist patient with adequate nutrition and appropriate food choices  Outcome: Progressing  Goal: Maintains or returns to baseline bowel function  Description  INTERVENTIONS:  - Assess bowel function  - Encourage oral fluids to ensure adequate hydration  - Administer IV fluids if ordered to ensure adequate hydration  - Administer ordered medications as needed  - Encourage mobilization and activity  - Consider nutritional services referral to assist patient with adequate nutrition and appropriate food choices  Outcome: Progressing  Goal: Maintains adequate nutritional intake  Description  INTERVENTIONS:  - Monitor percentage of each meal consumed  - Identify factors contributing to decreased intake, treat as appropriate  - Assist with meals as needed  - Monitor I&O, weight, and lab values if indicated  - Obtain nutrition services referral as needed  Outcome: Progressing     Problem: METABOLIC, FLUID AND ELECTROLYTES - ADULT  Goal: Electrolytes maintained within normal limits  Description  INTERVENTIONS:  - Monitor labs and assess patient for signs and symptoms of electrolyte imbalances  - Administer electrolyte replacement as ordered  - Monitor response to electrolyte replacements, including repeat lab results as appropriate  - Instruct patient on fluid and nutrition as appropriate  Outcome: Progressing     Problem: SKIN/TISSUE INTEGRITY - ADULT  Goal: Skin integrity remains intact  Description  INTERVENTIONS  - Identify patients at risk for skin breakdown  - Assess and monitor skin integrity  - Assess and monitor nutrition and hydration status  - Monitor labs (i e  albumin)  - Assess for incontinence   - Turn and reposition patient  - Assist with mobility/ambulation  - Relieve pressure over bony prominences  - Avoid friction and shearing  - Provide appropriate hygiene as needed including keeping skin clean and dry  - Evaluate need for skin moisturizer/barrier cream  - Collaborate with interdisciplinary team (i e  Nutrition, Rehabilitation, etc )   - Patient/family teaching  Outcome: Progressing     Problem: MUSCULOSKELETAL - ADULT  Goal: Maintain or return mobility to safest level of function  Description  INTERVENTIONS:  - Assess patient's ability to carry out ADLs; assess patient's baseline for ADL function and identify physical deficits which impact ability to perform ADLs (bathing, care of mouth/teeth, toileting, grooming, dressing, etc )  - Assess/evaluate cause of self-care deficits   - Assess range of motion  - Assess patient's mobility  - Assess patient's need for assistive devices and provide as appropriate  - Encourage maximum independence but intervene and supervise when necessary  - Involve family in performance of ADLs  - Assess for home care needs following discharge   - Consider OT consult to assist with ADL evaluation and planning for discharge  - Provide patient education as appropriate  Outcome: Progressing

## 2020-01-04 NOTE — PROGRESS NOTES
Progress Note Catarino Link 1935, 81 y o  male MRN: [id number]    Unit/Bed#: ICU 08 Encounter: [id number]    Primary Care Provider: Ryan Edmonds DO   Date and time admitted to hospital: 1/3/2020 12:28 AM        * Septic shock [facility])  Assessment & Plan  · Likely secondary to strep pneumonia with positive antigen, in the setting of immunocompromise due to chemotherapy  Also some concern for aspiration  · Continue broad spectrum coverage empirically for now while awaiting blood cultures  1 of 2 blood cultures is preliminarily positive for GPC in pairs  2nd set is still pending  · Adrenal insufficiency in the setting of chronic steroid use is likely contributing factor to hypotension  Continue hydrocortisone 50mg every 6 hours for now  Hemodynamics are slowly improving  Levophed requirement is improving  Continue to wean as tolerated to maintain MAP>65  Acute respiratory failure with hypoxia (HCC)  Assessment & Plan  · Secondary to strep pneumonia  · Minimal oxygen requirement via NC  Wean as tolerated to maintain saturation >88%  · Aggressive pulmonary toileting  Encourage cough and deep breathing  Use IS  Get oob to the chair during the day  Pneumonia  Assessment & Plan  · Continue antibiotic therapy as above  COPD (chronic obstructive pulmonary disease) (HCC)  Assessment & Plan  · No signs of acute exacerbation  · Adenocarcinoma of the lung, follows with Dr Nancie Lozano outpatient  · Continue nebulized bronchodilators    Multiple myeloma ([facility] [address] )  Assessment & Plan  · Last seen by Hem/Onc 12/5  · Home regimen on lenalidomide 5 mg daily 3 weeks on 3 weeks off, Melphalan 12 mg daily for 4 days every 6 weeks, prednisone 5 mg daily  · Cont Hydrocortisone for adrenal insufficiency    Chronic diarrhea  Assessment & Plan  · Cont home probiotic  · Continue po vancomycin prophylaxis    History of pulm embolism  Assessment & Plan  · Cont home Eliquis      HPI/24hr events:   Afebrile    No acute events overnight  Medications:    Current Facility-Administered Medications:  acetaminophen 650 mg Oral Q6H PRN Susan A Ramella, CRNP    apixaban 5 mg Oral Daily Susan A Ramella, CRNP    cefepime 2,000 mg Intravenous Q12H Susan A Ramella, CRNP Last Rate: Stopped (01/04/20 0309)   finasteride 5 mg Oral HS Susan A Ramella, CRNP    guaiFENesin 600 mg Oral Q12H PRN Susan A Ramella, CRNP    hydrocortisone sodium succinate 100 mg Intravenous Once Susan A Ramella, CRNP    hydrocortisone sodium succinate 50 mg Intravenous Q6H [address] Susansacha Beltreella, JACK    ipratropium 0 5 mg Nebulization TID Derek Gordonserpat, MD    ipratropium 0 5 mg Nebulization Q6H PRN Derek Bellamyt, MD    lactobacillus acidophilus-bulgaricus 1 packet Oral Daily Susan A Ramella, CRNP    levalbuterol 1 25 mg Nebulization TID Derek Gordonsert, MD    levalbuterol 1 25 mg Nebulization Q6H PRN Derek Nguyễn, MD    metroNIDAZOLE 500 mg Intravenous Q8H Susan A Ramella, CRNP Last Rate: 500 mg (01/04/20 0527)   norepinephrine 1-30 mcg/min Intravenous Titrated Suasn A Ramella, CRNP Last Rate: 2 mcg/min (01/04/20 0537)   ondansetron 4 mg Intravenous Q6H PRN Susan A Ramella, CRNP    vancomycin 125 mg Oral Q12H [address] BEE BrambilaNP          norepinephrine 1-30 mcg/min Last Rate: 2 mcg/min (01/04/20 0537)         Physical exam:  Vitals: Body mass index is 29 56 kg/m²  Blood pressure 97/58, pulse 80, temperature 97 6 °F (36 4 °C), temperature source Temporal, resp  rate (!) 23, height 5' 7" (1 702 m), weight 85 6 kg (188 lb 11 4 oz), SpO2 94 %  ,  Temp  Min: 97 5 °F (36 4 °C)  Max: 99 °F (37 2 °C)  IBW: 66 1 kg    SpO2: 94 %  SpO2 Activity: At Rest  O2 Device: Nasal cannula      Intake/Output Summary (Last 24 hours) at 1/4/2020 5932  Last data filed at 1/4/2020 0537  Gross per 24 hour   Intake 676 07 ml   Output 650 ml   Net 26 07 ml       Invasive/non-invasive ventilation settings:   Respiratory    Lab Data (Last 4 hours)    None         O2/Vent Data (Last 4 hours)    None              Invasive Devices     Central Venous Catheter Line            Port A Cath 12/10/17 Right Subclavian 754 days          Peripheral Intravenous Line            Peripheral IV 01/03/20 Left Antecubital 1 day                  Physical Exam:  Gen: Sleeping but easily arousable  HEENT: atraumatic, normocephalic, EOMI, pupils 3mm eqaual and reactive, oropahrynx without erythema or exudate  Neck: supple, trachea midline, no JVD, no lymphadenopathy  Chest: diminished on the bases worse on the R with some fine crackles, no wheeze or rhonchi  Cor: Single S1/S2, no m/r/g, RRR  Abd: soft, nontender, nondistended, BS normoactive  Ext: no edema, no clubbing or cyanosis  Neuro: oriented x3, CN 2-12 grossly intact, no focal deficits  Skin: warm, dry      Diagnostic Data:  Lab: I have personally reviewed pertinent lab results  CBC:   Results from last 7 days   Lab Units 01/04/20  0449 01/03/20  1106 01/03/20  0045   WBC Thousand/uL 5 30 4 22* 2 95*   HEMOGLOBIN g/dL 9 6* 10 8* 12 1   HEMATOCRIT % 29 7* 32 9* 36 9   PLATELETS Thousands/uL 79* 95* 97*       CMP:   Results from last 7 days   Lab Units 01/04/20  0449 01/03/20  1106 01/03/20  0045   SODIUM mmol/L 138 140 143   POTASSIUM mmol/L 4 0 3 8 3 6   CHLORIDE mmol/L 108 108 106   CO2 mmol/L 22 24 27   BUN mg/dL 11 11 12   CREATININE mg/dL 1 05 1 13 1 15   CALCIUM mg/dL 7 6* 7 8* 8 6   ALK PHOS U/L  --   --  78   ALT U/L  --   --  35   AST U/L  --   --  24     PT/INR:   No results found for: PT, INR,   Magnesium:   Results from last 7 days   Lab Units 01/03/20  0045   MAGNESIUM mg/dL 1 2*     Phosphorous:       Microbiology:  Results from last 7 days   Lab Units 01/03/20  1655 01/03/20  0101 01/03/20  0048 01/03/20  0041   BLOOD CULTURE   --   --  Received in Microbiology Lab  Culture in Progress    --    GRAM STAIN RESULT   --   --   --  Gram positive cocci in pairs*   STREP PNEUMONIAE ANTIGEN, URINE   --  Positive*  --   --    LEGIONELLA URINARY ANTIGEN Negative  --   --   --        Imaging:  No new imaging    Cardiac lab/EKG/telemetry/ECHO:   Sinus rhythm on telemetry    VTE Prophylaxis: Eliquis, SCDs    Code Status: Level 1 - Full Code    Ever Roll Spatzer, CRNP    Portions of the record may have been created with voice recognition software  Occasional wrong word or "sound a like" substitutions may have occurred due to the inherent limitations of voice recognition software  Read the chart carefully and recognize, using context, where substitutions have occurred

## 2020-01-05 PROBLEM — I50.32 CHRONIC DIASTOLIC CONGESTIVE HEART FAILURE (HCC): Status: ACTIVE | Noted: 2020-01-05

## 2020-01-05 LAB
HCT VFR BLD AUTO: 27.6 % (ref 36.5–49.3)
HGB BLD-MCNC: 9 G/DL (ref 12–17)

## 2020-01-05 PROCEDURE — 87040 BLOOD CULTURE FOR BACTERIA: CPT | Performed by: NURSE PRACTITIONER

## 2020-01-05 PROCEDURE — 85018 HEMOGLOBIN: CPT | Performed by: NURSE PRACTITIONER

## 2020-01-05 PROCEDURE — NC001 PR NO CHARGE: Performed by: NURSE PRACTITIONER

## 2020-01-05 PROCEDURE — 94640 AIRWAY INHALATION TREATMENT: CPT

## 2020-01-05 PROCEDURE — 99232 SBSQ HOSP IP/OBS MODERATE 35: CPT | Performed by: INTERNAL MEDICINE

## 2020-01-05 PROCEDURE — 85014 HEMATOCRIT: CPT | Performed by: NURSE PRACTITIONER

## 2020-01-05 RX ORDER — MIDODRINE HYDROCHLORIDE 5 MG/1
5 TABLET ORAL
Status: DISCONTINUED | OUTPATIENT
Start: 2020-01-05 | End: 2020-01-07

## 2020-01-05 RX ORDER — PREDNISONE 1 MG/1
5 TABLET ORAL DAILY
Status: DISCONTINUED | OUTPATIENT
Start: 2020-01-06 | End: 2020-01-09 | Stop reason: HOSPADM

## 2020-01-05 RX ADMIN — LEVALBUTEROL HYDROCHLORIDE 1.25 MG: 1.25 SOLUTION, CONCENTRATE RESPIRATORY (INHALATION) at 13:14

## 2020-01-05 RX ADMIN — FINASTERIDE 5 MG: 5 TABLET, FILM COATED ORAL at 23:55

## 2020-01-05 RX ADMIN — SODIUM CHLORIDE 500 ML: 0.9 INJECTION, SOLUTION INTRAVENOUS at 13:24

## 2020-01-05 RX ADMIN — IPRATROPIUM BROMIDE 0.5 MG: 0.5 SOLUTION RESPIRATORY (INHALATION) at 08:00

## 2020-01-05 RX ADMIN — HYDROCORTISONE SODIUM SUCCINATE 50 MG: 100 INJECTION, POWDER, FOR SOLUTION INTRAMUSCULAR; INTRAVENOUS at 05:23

## 2020-01-05 RX ADMIN — MIDODRINE HYDROCHLORIDE 5 MG: 5 TABLET ORAL at 16:35

## 2020-01-05 RX ADMIN — APIXABAN 5 MG: 5 TABLET, FILM COATED ORAL at 09:53

## 2020-01-05 RX ADMIN — MIDODRINE HYDROCHLORIDE 5 MG: 5 TABLET ORAL at 12:06

## 2020-01-05 RX ADMIN — VANCOMYCIN HYDROCHLORIDE 125 MG: 500 INJECTION, POWDER, LYOPHILIZED, FOR SOLUTION INTRAVENOUS at 10:01

## 2020-01-05 RX ADMIN — MIDODRINE HYDROCHLORIDE 5 MG: 5 TABLET ORAL at 03:45

## 2020-01-05 RX ADMIN — Medication 1 PACKET: at 09:53

## 2020-01-05 RX ADMIN — CEFEPIME HYDROCHLORIDE 2000 MG: 2 INJECTION, POWDER, FOR SOLUTION INTRAVENOUS at 03:45

## 2020-01-05 RX ADMIN — LEVALBUTEROL HYDROCHLORIDE 1.25 MG: 1.25 SOLUTION, CONCENTRATE RESPIRATORY (INHALATION) at 20:37

## 2020-01-05 RX ADMIN — Medication 125 MG: at 13:23

## 2020-01-05 RX ADMIN — Medication 125 MG: at 23:56

## 2020-01-05 RX ADMIN — Medication 125 MG: at 18:32

## 2020-01-05 RX ADMIN — HYDROCORTISONE SODIUM SUCCINATE 50 MG: 100 INJECTION, POWDER, FOR SOLUTION INTRAMUSCULAR; INTRAVENOUS at 12:06

## 2020-01-05 RX ADMIN — LEVALBUTEROL HYDROCHLORIDE 1.25 MG: 1.25 SOLUTION, CONCENTRATE RESPIRATORY (INHALATION) at 08:00

## 2020-01-05 RX ADMIN — IPRATROPIUM BROMIDE 0.5 MG: 0.5 SOLUTION RESPIRATORY (INHALATION) at 20:37

## 2020-01-05 RX ADMIN — CEFTRIAXONE 1000 MG: 1 INJECTION, POWDER, FOR SOLUTION INTRAMUSCULAR; INTRAVENOUS at 13:23

## 2020-01-05 RX ADMIN — IPRATROPIUM BROMIDE 0.5 MG: 0.5 SOLUTION RESPIRATORY (INHALATION) at 13:14

## 2020-01-05 NOTE — PROGRESS NOTES
Progress Note - ICU Transfer to SD/MS tele/MS   Nikole Weems 81 y o  male MRN: [id number]  [facility]   Unit[facility]Bed[facility] ICU 08 Encounter: [id number]    Code Status: Level 1 - Full Code  Referring Physician: Gevena Cooks  Accepting Physician: Chucho Urban  _____________________________________________________________________    Reason for ICU/SD admission:  Sepsis secondary to pneumonia    History of Present Illness:  81 y o  male with significant history of adenocarcinoma of the lung and rectum, s/p resection, chemo and radiation, multiple myeloma on maintenance chemo and prednisone, pulmonary embolism on eliquis, copd, and mod aortic stenosis presents with one day history of nausea, vomiting, and worsening of chronic diarrhea  Pt and pt's wife state throughout the day his chronic diarrhea worsened and after dinner had apx 3 episodes of vomiting  Pt also admitted to worsening cough which began yesterday with clear productive sputum  Pt presented to the ED and was initially HD stable with a lactate of 2 5, was given 2L NSS and Cefepime  Despite IVF pt became hypotensive with SBP 80's  Pt was given an additional 500cc bolus and started on Levophed  Pt's family stated grandson had a GI bug on xmas and pt's wife was seen in the ED last week for a "cold and cough "  Pt was admitted to the ICU for vasopressors and management of septic shock,     Summary of clinical course:  Patient was admitted to the ICU and treated with IV pressors and antibiotics  Pressures are weaned off  The patient continued to improve with antibiotic therapy and was weaned off of oxygen     He was deemed medically fit for transfer to the medical-surgical unit today     Consultants:  None  _____________________________________________________________________    Diagnostic Data:  CBC:  Results from last 7 days   Lab Units 01/05/20  0459 01/04/20  0449   WBC Thousand/uL  --  5 30   HEMOGLOBIN g/dL 9 0* 9 6*   HEMATOCRIT % 27 6* 29 7* PLATELETS Thousands/uL  --  79*      CMP: No results found for: SODIUM, K, CL, CO2, ANIONGAP, BUN, CREATININE, GLUCOSE, CALCIUM, AST, ALT, ALKPHOS, PROT, BILITOT, EGFR,   PT/INR: No results found for: PT, INR,   Magnesium: No components found for: MAG,  Phosphorous: No results found for: PHOS    ABG: No results found for: PHART, HMX6AKV, PO2ART, CLB0ZZA, G1NZAVJK, BEART, SOURCE,     Microbiology:  Results from last 7 days   Lab Units 01/05/20  0522 01/05/20  0521 01/03/20  1655 01/03/20  0048 01/03/20  0041   BLOOD CULTURE  Received in Microbiology Lab  Culture in Progress  Received in Microbiology Lab  Culture in Progress  --  No Growth at 48 hrs  Staphylococcus coagulase negative*   GRAM STAIN RESULT   --   --   --   --  Gram positive cocci in pairs*   LEGIONELLA URINARY ANTIGEN   --   --  Negative  --   --        Imaging: I have personally reviewed the pertinent imaging studies on the PACS system  Chest x-ray done 3 January 2020:   Mildly prominent reticular interstitial markings suggesting potential component of interstitial fluid  Mild central venous congestion  Otherwise stable findings     Cardiac/EKG/telemetry/Echo:   Results from last 7 days   Lab Units 01/03/20  0045   TROPONIN I ng/mL 0 02   NT-PRO BNP pg/mL 268     Chronic atrial fibrillation with a controlled ventricular response  _____________________________________________________________________    Recent or scheduled procedures:  None    Outstanding/pending diagnostics:  None     Mobilization Plan: Mobilize    Spoke with Dr Eddi Perez  regarding transfer  Please call [phone] with any questions or concerns  Portions of the record may have been created with voice recognition software  Occasional wrong word or "sound a like" substitutions may have occurred due to the inherent limitations of voice recognition software  Read the chart carefully and recognize, using context, where substitutions have occurred      JACK Diop

## 2020-01-05 NOTE — ASSESSMENT & PLAN NOTE
· No signs of acute exacerbation  · Adenocarcinoma of the lung, follows with Dr Rock Bowman outpatient  · Continue nebulized bronchodilators

## 2020-01-05 NOTE — ASSESSMENT & PLAN NOTE
· Secondary to strep pneumonia  · Improved  Supplemental oxygen has been weaned off during the day  Very low oxygen requirement overnight  · Aggressive pulmonary toileting  Encourage cough and deep breathing  Use IS  Get oob to the chair during the day

## 2020-01-05 NOTE — PROGRESS NOTES
Progress Note Corie Clancy 1935, 81 y o  male MRN: [id number]    Unit/Bed#: ICU 08 Encounter: [id number]    Primary Care Provider: Cooper Robertson DO   Date and time admitted to hospital: 1/3/2020 12:28 AM        * Septic shock [facility])  Assessment & Plan  · Likely secondary to strep pneumonia with positive antigen, in the setting of immunocompromise due to chemotherapy  Also some concern for aspiration  · 1 of 2 blood cultures is preliminarily positive for GPC in pairs  2nd set is negative so far so there may be a contaminant  Continue ceftriaxone and vancomycin for now  Repeat cultures are pending  If they are negative can D/C vancomycin  · Adrenal insufficiency in the setting of chronic steroid use is likely contributing factor to hypotension  Continue hydrocortisone 50mg every 6 hours for now  · Shock component has resolved  Hemodynamics are slowly improving  · Levophed has been weaned off, although pressure is labile at times but he is asymptomatic  Will start midodrine 5mg tid    Acute respiratory failure with hypoxia (HCC)  Assessment & Plan  · Secondary to strep pneumonia  · Improved  Supplemental oxygen has been weaned off during the day  Very low oxygen requirement overnight  · Aggressive pulmonary toileting  Encourage cough and deep breathing  Use IS  Get oob to the chair during the day  Pneumonia  Assessment & Plan  · Continue antibiotic therapy as above      COPD (chronic obstructive pulmonary disease) (HCC)  Assessment & Plan  · No signs of acute exacerbation  · Adenocarcinoma of the lung, follows with Dr Smita Rankin outpatient  · Continue nebulized bronchodilators    Multiple myeloma ([facility] [address] )  Assessment & Plan  · Last seen by Hem/Onc 12/5  · Home regimen on lenalidomide 5 mg daily 3 weeks on 3 weeks off, Melphalan 12 mg daily for 4 days every 6 weeks, prednisone 5 mg daily  · Cont Hydrocortisone for adrenal insufficiency    Chronic diarrhea  Assessment & Plan  · Cont home probiotic  · Continue po vancomycin prophylaxis    History of pulm embolism  Assessment & Plan  · Cont home Eliquis    Chronic diastolic congestive heart failure [facility])  Assessment & Plan  Wt Readings from Last 3 Encounters:   01/04/20 85 8 kg (189 lb 2 5 oz)   12/07/19 81 7 kg (180 lb 1 9 oz)   12/05/19 84 8 kg (187 lb)     · Echo from 9/2019 shows EF of 55% with grade 1 diastolic dysfunction  Pt does exhibit some lower extremity edema  Monitor fluid status closely for need for diuresis          HPI/24hr events:   Afebrile  No acute events overnight  Medications:    Current Facility-Administered Medications:  acetaminophen 650 mg Oral Q6H PRN Susan A Ramella, CRNP    aluminum-magnesium hydroxide-simethicone 30 mL Oral Q4H PRN Tarsha Draft, CRNP    apixaban 5 mg Oral Daily Susan A Ramella, CRNP    cefepime 2,000 mg Intravenous Q12H Susan A Ramella, CRNP Last Rate: 2,000 mg (01/05/20 0345)   finasteride 5 mg Oral HS Susan A Ramella, CRNP    guaiFENesin 600 mg Oral Q12H PRN Susan A Ramella, CRNP    hydrocortisone sodium succinate 100 mg Intravenous Once Susan A Ramella, CRNP    hydrocortisone sodium succinate 50 mg Intravenous Q6H [address] Susan A Ramella, CRNP    ipratropium 0 5 mg Nebulization TID Yuli Hays MD    lactobacillus acidophilus-bulgaricus 1 packet Oral Daily Susan A Ramella, CRNP    levalbuterol 1 25 mg Nebulization TID Yuli Hays MD    midodrine 5 mg Oral TID AC Juli Naas Spatzer, CRNP    norepinephrine 1-30 mcg/min Intravenous Titrated Susan A Ramella, CRNP Last Rate: Stopped (01/04/20 0815)   ondansetron 4 mg Intravenous Q6H PRN Susan A Ramella, CRNP    vancomycin 125 mg Oral Q12H [address] Tarsha Draft, CRNP          norepinephrine 1-30 mcg/min Last Rate: Stopped (01/04/20 0815)         Physical exam:  Vitals: Body mass index is 28 28 kg/m²  Blood pressure (!) 80/48, pulse 74, temperature 97 5 °F (36 4 °C), temperature source Temporal, resp   rate 22, height 5' 7" (1 702 m), weight 81 9 kg (180 lb 8 9 oz), SpO2 95 %  ,  Temp  Min: 97 5 °F (36 4 °C)  Max: 99 °F (37 2 °C)  IBW: 66 1 kg    SpO2: 95 %  SpO2 Activity: At Rest  O2 Device: None (Room air)      Intake/Output Summary (Last 24 hours) at 1/5/2020 0622  Last data filed at 1/4/2020 0815  Gross per 24 hour   Intake    Output 150 ml   Net -150 ml       Invasive/non-invasive ventilation settings:   Respiratory    Lab Data (Last 4 hours)    None         O2/Vent Data (Last 4 hours)    None              Invasive Devices     Central Venous Catheter Line            Port A Cath 12/10/17 Right Subclavian 755 days          Peripheral Intravenous Line            Peripheral IV 01/03/20 Left Antecubital 2 days                  Physical Exam:  Gen: Awake, alert, appropriate, no acute distress  HEENT: atraumatic, normocephalic, EOMI, pupils 3mm equal and reactive, o p clear  Neck: supple, trachea midline, no JVD, no lymphadenopathy  Chest: diminished with mild intermittent expiratory wheeze b/l  Cor: Single S1/S2, no m/r/g, RRR  Abd: soft, nontender, nondistended, BS normoactive  Ext: 2+BLE edema, no clubbing or cyanosis  Neuro: oriented x3, cn 2-12 grossly intact, no focal deficits  Skin: warm,dry      Diagnostic Data:  Lab: I have personally reviewed pertinent lab results     CBC:   Results from last 7 days   Lab Units 01/05/20  0459 01/04/20  0449 01/03/20  1106 01/03/20  0045   WBC Thousand/uL  --  5 30 4 22* 2 95*   HEMOGLOBIN g/dL 9 0* 9 6* 10 8* 12 1   HEMATOCRIT % 27 6* 29 7* 32 9* 36 9   PLATELETS Thousands/uL  --  79* 95* 97*       CMP:   Results from last 7 days   Lab Units 01/04/20  0449 01/03/20  1106 01/03/20  0045   SODIUM mmol/L 138 140 143   POTASSIUM mmol/L 4 0 3 8 3 6   CHLORIDE mmol/L 108 108 106   CO2 mmol/L 22 24 27   BUN mg/dL 11 11 12   CREATININE mg/dL 1 05 1 13 1 15   CALCIUM mg/dL 7 6* 7 8* 8 6   ALK PHOS U/L  --   --  78   ALT U/L  --   --  35   AST U/L  --   --  24     PT/INR:   No results found for: PT, INR, Magnesium:   Results from last 7 days   Lab Units 01/03/20  0045   MAGNESIUM mg/dL 1 2*     Phosphorous:       Microbiology:  Results from last 7 days   Lab Units 01/03/20  1655 01/03/20  0101 01/03/20  0048 01/03/20  0041   BLOOD CULTURE   --   --  No Growth at 24 hrs   --    GRAM STAIN RESULT   --   --   --  Gram positive cocci in pairs*   STREP PNEUMONIAE ANTIGEN, URINE   --  Positive*  --   --    LEGIONELLA URINARY ANTIGEN  Negative  --   --   --        Imaging:  No new imaging    Cardiac lab/EKG/telemetry/ECHO:   Sinus rhythm    VTE Prophylaxis: Eliquis, SCDs    Code Status: Level 1 - Full Code    Georgette Sabot Spatzer, CRNP    Portions of the record may have been created with voice recognition software  Occasional wrong word or "sound a like" substitutions may have occurred due to the inherent limitations of voice recognition software  Read the chart carefully and recognize, using context, where substitutions have occurred

## 2020-01-05 NOTE — ASSESSMENT & PLAN NOTE
Wt Readings from Last 3 Encounters:   01/04/20 85 8 kg (189 lb 2 5 oz)   12/07/19 81 7 kg (180 lb 1 9 oz)   12/05/19 84 8 kg (187 lb)     · Echo from 9/2019 shows EF of 55% with grade 1 diastolic dysfunction  Pt does exhibit some lower extremity edema    Monitor fluid status closely for need for diuresis

## 2020-01-05 NOTE — PLAN OF CARE
Problem: Potential for Falls  Goal: Patient will remain free of falls  Description  INTERVENTIONS:  - Assess patient frequently for physical needs  -  Identify cognitive and physical deficits and behaviors that affect risk of falls    -  [place] fall precautions as indicated by assessment   - Educate patient/family on patient safety including physical limitations  - Instruct patient to call for assistance with activity based on assessment  - Modify environment to reduce risk of injury  - Consider OT/PT consult to assist with strengthening/mobility  Outcome: Progressing     Problem: RESPIRATORY - ADULT  Goal: Achieves optimal ventilation and oxygenation  Description  INTERVENTIONS:  - Assess for changes in respiratory status  - Assess for changes in mentation and behavior  - Position to facilitate oxygenation and minimize respiratory effort  - Oxygen administered by appropriate delivery if ordered  - Initiate smoking cessation education as indicated  - Encourage broncho-pulmonary hygiene including cough, deep breathe, Incentive Spirometry  - Assess the need for suctioning and aspirate as needed  - Assess and instruct to report SOB or any respiratory difficulty  - Respiratory Therapy support as indicated  Outcome: Progressing     Problem: GASTROINTESTINAL - ADULT  Goal: Minimal or absence of nausea and/or vomiting  Description  INTERVENTIONS:  - Administer IV fluids if ordered to ensure adequate hydration  - Maintain NPO status until nausea and vomiting are resolved  - Nasogastric tube if ordered  - Administer ordered antiemetic medications as needed  - Provide nonpharmacologic comfort measures as appropriate  - Advance diet as tolerated, if ordered  - Consider nutrition services referral to assist patient with adequate nutrition and appropriate food choices  Outcome: Progressing  Goal: Maintains or returns to baseline bowel function  Description  INTERVENTIONS:  - Assess bowel function  - Encourage oral fluids to ensure adequate hydration  - Administer IV fluids if ordered to ensure adequate hydration  - Administer ordered medications as needed  - Encourage mobilization and activity  - Consider nutritional services referral to assist patient with adequate nutrition and appropriate food choices  Outcome: Progressing  Goal: Maintains adequate nutritional intake  Description  INTERVENTIONS:  - Monitor percentage of each meal consumed  - Identify factors contributing to decreased intake, treat as appropriate  - Assist with meals as needed  - Monitor I&O, weight, and lab values if indicated  - Obtain nutrition services referral as needed  Outcome: Progressing     Problem: METABOLIC, FLUID AND ELECTROLYTES - ADULT  Goal: Electrolytes maintained within normal limits  Description  INTERVENTIONS:  - Monitor labs and assess patient for signs and symptoms of electrolyte imbalances  - Administer electrolyte replacement as ordered  - Monitor response to electrolyte replacements, including repeat lab results as appropriate  - Instruct patient on fluid and nutrition as appropriate  Outcome: Progressing     Problem: SKIN/TISSUE INTEGRITY - ADULT  Goal: Skin integrity remains intact  Description  INTERVENTIONS  - Identify patients at risk for skin breakdown  - Assess and monitor skin integrity  - Assess and monitor nutrition and hydration status  - Monitor labs (i e  albumin)  - Assess for incontinence   - Turn and reposition patient  - Assist with mobility/ambulation  - Relieve pressure over bony prominences  - Avoid friction and shearing  - Provide appropriate hygiene as needed including keeping skin clean and dry  - Evaluate need for skin moisturizer/barrier cream  - Collaborate with interdisciplinary team (i e  Nutrition, Rehabilitation, etc )   - Patient/family teaching  Outcome: Progressing     Problem: MUSCULOSKELETAL - ADULT  Goal: Maintain or return mobility to safest level of function  Description  INTERVENTIONS:  - Assess patient's ability to carry out ADLs; assess patient's baseline for ADL function and identify physical deficits which impact ability to perform ADLs (bathing, care of mouth/teeth, toileting, grooming, dressing, etc )  - Assess/evaluate cause of self-care deficits   - Assess range of motion  - Assess patient's mobility  - Assess patient's need for assistive devices and provide as appropriate  - Encourage maximum independence but intervene and supervise when necessary  - Involve family in performance of ADLs  - Assess for home care needs following discharge   - Consider OT consult to assist with ADL evaluation and planning for discharge  - Provide patient education as appropriate  Outcome: Progressing

## 2020-01-05 NOTE — ASSESSMENT & PLAN NOTE
· Likely secondary to strep pneumonia with positive antigen, in the setting of immunocompromise due to chemotherapy  Also some concern for aspiration  · 1 of 2 blood cultures is preliminarily positive for GPC in pairs  2nd set is negative so far so there may be a contaminant  Continue ceftriaxone and vancomycin for now  Repeat cultures are pending  If they are negative can D/C vancomycin  · Adrenal insufficiency in the setting of chronic steroid use is likely contributing factor to hypotension  Continue hydrocortisone 50mg every 6 hours for now  · Shock component has resolved  Hemodynamics are slowly improving  · Levophed has been weaned off, although pressure is labile at times but he is asymptomatic    Will start midodrine 5mg tid

## 2020-01-06 ENCOUNTER — TELEPHONE (OUTPATIENT)
Dept: HEMATOLOGY ONCOLOGY | Facility: CLINIC | Age: 85
End: 2020-01-06

## 2020-01-06 DIAGNOSIS — C90.00 MULTIPLE MYELOMA, REMISSION STATUS UNSPECIFIED (HCC): ICD-10-CM

## 2020-01-06 PROBLEM — I95.9 HYPOTENSION: Status: ACTIVE | Noted: 2020-01-06

## 2020-01-06 LAB
ANION GAP SERPL CALCULATED.3IONS-SCNC: 5 MMOL/L (ref 4–13)
BACTERIA BLD CULT: ABNORMAL
BUN SERPL-MCNC: 15 MG/DL (ref 5–25)
CALCIUM SERPL-MCNC: 8.2 MG/DL (ref 8.3–10.1)
CHLORIDE SERPL-SCNC: 113 MMOL/L (ref 100–108)
CO2 SERPL-SCNC: 25 MMOL/L (ref 21–32)
CREAT SERPL-MCNC: 0.86 MG/DL (ref 0.6–1.3)
GFR SERPL CREATININE-BSD FRML MDRD: 80 ML/MIN/1.73SQ M
GLUCOSE SERPL-MCNC: 82 MG/DL (ref 65–140)
GRAM STN SPEC: ABNORMAL
MAGNESIUM SERPL-MCNC: 1.7 MG/DL (ref 1.6–2.6)
POTASSIUM SERPL-SCNC: 3.4 MMOL/L (ref 3.5–5.3)
PROCALCITONIN SERPL-MCNC: 2.25 NG/ML
SODIUM SERPL-SCNC: 143 MMOL/L (ref 136–145)

## 2020-01-06 PROCEDURE — 94760 N-INVAS EAR/PLS OXIMETRY 1: CPT

## 2020-01-06 PROCEDURE — 99232 SBSQ HOSP IP/OBS MODERATE 35: CPT | Performed by: INTERNAL MEDICINE

## 2020-01-06 PROCEDURE — 83735 ASSAY OF MAGNESIUM: CPT | Performed by: NURSE PRACTITIONER

## 2020-01-06 PROCEDURE — 84145 PROCALCITONIN (PCT): CPT | Performed by: NURSE PRACTITIONER

## 2020-01-06 PROCEDURE — 80048 BASIC METABOLIC PNL TOTAL CA: CPT | Performed by: INTERNAL MEDICINE

## 2020-01-06 PROCEDURE — 94640 AIRWAY INHALATION TREATMENT: CPT

## 2020-01-06 RX ORDER — FAMOTIDINE 20 MG/1
20 TABLET, FILM COATED ORAL
Status: DISCONTINUED | OUTPATIENT
Start: 2020-01-06 | End: 2020-01-09 | Stop reason: HOSPADM

## 2020-01-06 RX ORDER — MELPHALAN USP, 2 MG 2 MG/1
TABLET ORAL
Qty: 24 TABLET | Refills: 3 | Status: SHIPPED | OUTPATIENT
Start: 2020-01-06 | End: 2020-02-13 | Stop reason: SDUPTHER

## 2020-01-06 RX ORDER — CEFUROXIME AXETIL 250 MG/1
500 TABLET ORAL EVERY 12 HOURS SCHEDULED
Status: COMPLETED | OUTPATIENT
Start: 2020-01-06 | End: 2020-01-08

## 2020-01-06 RX ORDER — LANOLIN ALCOHOL/MO/W.PET/CERES
3 CREAM (GRAM) TOPICAL
Status: DISCONTINUED | OUTPATIENT
Start: 2020-01-06 | End: 2020-01-09 | Stop reason: HOSPADM

## 2020-01-06 RX ADMIN — GUAIFENESIN 600 MG: 600 TABLET, EXTENDED RELEASE ORAL at 05:26

## 2020-01-06 RX ADMIN — Medication 125 MG: at 17:14

## 2020-01-06 RX ADMIN — Medication 125 MG: at 12:19

## 2020-01-06 RX ADMIN — IPRATROPIUM BROMIDE 0.5 MG: 0.5 SOLUTION RESPIRATORY (INHALATION) at 12:50

## 2020-01-06 RX ADMIN — MIDODRINE HYDROCHLORIDE 5 MG: 5 TABLET ORAL at 05:25

## 2020-01-06 RX ADMIN — Medication 125 MG: at 07:28

## 2020-01-06 RX ADMIN — IPRATROPIUM BROMIDE 0.5 MG: 0.5 SOLUTION RESPIRATORY (INHALATION) at 18:41

## 2020-01-06 RX ADMIN — MIDODRINE HYDROCHLORIDE 5 MG: 5 TABLET ORAL at 12:19

## 2020-01-06 RX ADMIN — FINASTERIDE 5 MG: 5 TABLET, FILM COATED ORAL at 21:17

## 2020-01-06 RX ADMIN — CEFUROXIME AXETIL 500 MG: 250 TABLET ORAL at 12:19

## 2020-01-06 RX ADMIN — MELATONIN TAB 3 MG 3 MG: 3 TAB at 21:17

## 2020-01-06 RX ADMIN — FAMOTIDINE 20 MG: 20 TABLET ORAL at 17:13

## 2020-01-06 RX ADMIN — MIDODRINE HYDROCHLORIDE 5 MG: 5 TABLET ORAL at 17:13

## 2020-01-06 RX ADMIN — LEVALBUTEROL HYDROCHLORIDE 1.25 MG: 1.25 SOLUTION, CONCENTRATE RESPIRATORY (INHALATION) at 18:42

## 2020-01-06 RX ADMIN — LEVALBUTEROL HYDROCHLORIDE 1.25 MG: 1.25 SOLUTION, CONCENTRATE RESPIRATORY (INHALATION) at 12:50

## 2020-01-06 RX ADMIN — CEFUROXIME AXETIL 500 MG: 250 TABLET ORAL at 21:17

## 2020-01-06 RX ADMIN — PREDNISONE 5 MG: 5 TABLET ORAL at 09:08

## 2020-01-06 RX ADMIN — Medication 1 PACKET: at 09:08

## 2020-01-06 RX ADMIN — APIXABAN 5 MG: 5 TABLET, FILM COATED ORAL at 09:08

## 2020-01-06 NOTE — TELEPHONE ENCOUNTER
Called [facility] and they have the prescription on file from 1/2  Called the patient's wife back to confirm

## 2020-01-06 NOTE — TELEPHONE ENCOUNTER
CALL FROM PT'S WIFE SAYING SHE CALLED FOR REVLIMID BUT PHARM NEEDS OTHER MEDICATION SCRIPT BECAUSE THE NEED TO DELIVER THEM TOGETHER    HOPE YOU KNOW WHAT MEDICATION PT'S NEEDS AND CAN TAKE CARE OF THIS TODAY    THANKS

## 2020-01-06 NOTE — TELEPHONE ENCOUNTER
Call from [place], [address] [facility] from wife stating she received a call from [facility] and no Revlimid was sent  Per Epic it was sent on 1/2/2020  Please check with pharm    Call her back at [phone]

## 2020-01-06 NOTE — PROGRESS NOTES
Progress Note - Pulmonary   Yessy Minor 81 y o  male MRN: [id number]  Unit/Bed#: E5 -01 Encounter: [id number]      Assessment/Plan:  1  Severe sepsis/septic shock secondary to pneumonia present on admission-and improving  1  Vital signs stable at and further management per Internal Medicine  2  Continues on midodrine  2  Acute hypoxic respiratory failure-resolved  1  Oxygenating well on room air does not require oxygen at baseline  2  Maintain SpO2 greater than equal to 89%  3  Pulmonary toilet incentive spirometry, flutter valve, out of bed increasing activity as tolerated  3  Pneumonia present on admission-streptococcal pneumonia antigen positive  1  Continue antibiotics currently ceftriaxone to complete 7 day course today is day 5 of 7-Will transition to Ceftin  2  Pulmonary toilet as indicated above  3  Repeat chest x-ray in 6-8 weeks  4  Outpatient Pulmonary follow-up as previously scheduled  4  Chronic obstructive pulmonary disease without acute exacerbation  1  Continue prednisone 5 mg daily  2  Continue Atrovent 3-4 times daily  5  Multiple myeloma in remission on Revlimid   6  History of PE on Eliquis  1  Continue Eliquis  7  History of adenocarcinoma of the right lung status post left 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 years prior  1  Imaging is remain stable  2  Follow-up patient with Dr Isak Torres  8  Chronic diarrhea-POA        Subjective:     Alphia Course suzanne was seen sitting in bed upon entering  Denies acute events  Continues to have a cough that is productive of light tan sputum  Denies:  Chest pain, pain inspiration of fevers, chills, night sweats, bronchospasm hemoptysis    Objective:         Vitals: Blood pressure 112/78, pulse 71, temperature 97 5 °F (36 4 °C), temperature source Temporal, resp  rate 18, height 5' 7" (1 702 m), weight 81 9 kg (180 lb 8 9 oz), SpO2 98 % , RA, Body mass index is 28 28 kg/m²        Intake/Output Summary (Last 24 hours) at 1/6/2020 1126  Last data filed at 1/6/2020 0800  Gross per 24 hour   Intake 710 ml   Output    Net 710 ml         Physical Exam  Gen: Awake, alert, oriented x 3, no acute distress  HEENT: Mucous membranes moist, no oral lesions, no thrush  NECK: no accessory muscle use, JVP not elevated  Cardiac: Regular, single S1, single S2, no murmurs, no rubs, no gallops  Lungs: clear breath sounds bilaterally  Abdomen: normoactive bowel sounds, soft nontender, nondistended, no rebound or rigidity, no guarding  Extremities: no cyanosis, no clubbing, no edema    Labs: I have personally reviewed pertinent lab results  ,     none    Imaging and other studies: none      Uriah Gan

## 2020-01-06 NOTE — PLAN OF CARE
Problem: Potential for Falls  Goal: Patient will remain free of falls  Description  INTERVENTIONS:  - Assess patient frequently for physical needs  -  Identify cognitive and physical deficits and behaviors that affect risk of falls    -  [place] fall precautions as indicated by assessment   - Educate patient/family on patient safety including physical limitations  - Instruct patient to call for assistance with activity based on assessment  - Modify environment to reduce risk of injury  - Consider OT/PT consult to assist with strengthening/mobility  Outcome: Progressing     Problem: RESPIRATORY - ADULT  Goal: Achieves optimal ventilation and oxygenation  Description  INTERVENTIONS:  - Assess for changes in respiratory status  - Assess for changes in mentation and behavior  - Position to facilitate oxygenation and minimize respiratory effort  - Oxygen administered by appropriate delivery if ordered  - Initiate smoking cessation education as indicated  - Encourage broncho-pulmonary hygiene including cough, deep breathe, Incentive Spirometry  - Assess the need for suctioning and aspirate as needed  - Assess and instruct to report SOB or any respiratory difficulty  - Respiratory Therapy support as indicated  Outcome: Progressing     Problem: GASTROINTESTINAL - ADULT  Goal: Minimal or absence of nausea and/or vomiting  Description  INTERVENTIONS:  - Administer IV fluids if ordered to ensure adequate hydration  - Maintain NPO status until nausea and vomiting are resolved  - Nasogastric tube if ordered  - Administer ordered antiemetic medications as needed  - Provide nonpharmacologic comfort measures as appropriate  - Advance diet as tolerated, if ordered  - Consider nutrition services referral to assist patient with adequate nutrition and appropriate food choices  Outcome: Progressing  Goal: Maintains or returns to baseline bowel function  Description  INTERVENTIONS:  - Assess bowel function  - Encourage oral fluids to ensure adequate hydration  - Administer IV fluids if ordered to ensure adequate hydration  - Administer ordered medications as needed  - Encourage mobilization and activity  - Consider nutritional services referral to assist patient with adequate nutrition and appropriate food choices  Outcome: Progressing  Goal: Maintains adequate nutritional intake  Description  INTERVENTIONS:  - Monitor percentage of each meal consumed  - Identify factors contributing to decreased intake, treat as appropriate  - Assist with meals as needed  - Monitor I&O, weight, and lab values if indicated  - Obtain nutrition services referral as needed  Outcome: Progressing     Problem: METABOLIC, FLUID AND ELECTROLYTES - ADULT  Goal: Electrolytes maintained within normal limits  Description  INTERVENTIONS:  - Monitor labs and assess patient for signs and symptoms of electrolyte imbalances  - Administer electrolyte replacement as ordered  - Monitor response to electrolyte replacements, including repeat lab results as appropriate  - Instruct patient on fluid and nutrition as appropriate  Outcome: Progressing     Problem: SKIN/TISSUE INTEGRITY - ADULT  Goal: Skin integrity remains intact  Description  INTERVENTIONS  - Identify patients at risk for skin breakdown  - Assess and monitor skin integrity  - Assess and monitor nutrition and hydration status  - Monitor labs (i e  albumin)  - Assess for incontinence   - Turn and reposition patient  - Assist with mobility/ambulation  - Relieve pressure over bony prominences  - Avoid friction and shearing  - Provide appropriate hygiene as needed including keeping skin clean and dry  - Evaluate need for skin moisturizer/barrier cream  - Collaborate with interdisciplinary team (i e  Nutrition, Rehabilitation, etc )   - Patient/family teaching  Outcome: Progressing     Problem: MUSCULOSKELETAL - ADULT  Goal: Maintain or return mobility to safest level of function  Description  INTERVENTIONS:  - Assess patient's ability to carry out ADLs; assess patient's baseline for ADL function and identify physical deficits which impact ability to perform ADLs (bathing, care of mouth/teeth, toileting, grooming, dressing, etc )  - Assess/evaluate cause of self-care deficits   - Assess range of motion  - Assess patient's mobility  - Assess patient's need for assistive devices and provide as appropriate  - Encourage maximum independence but intervene and supervise when necessary  - Involve family in performance of ADLs  - Assess for home care needs following discharge   - Consider OT consult to assist with ADL evaluation and planning for discharge  - Provide patient education as appropriate  Outcome: Progressing

## 2020-01-06 NOTE — PROGRESS NOTES
Agree with prior nurses assessment  Patient resting comfortably in bed with no complaints  Call bell within reach, bed at lowest position

## 2020-01-06 NOTE — PROGRESS NOTES
Progress Note Catarino Link 1935, 81 y o  male MRN: [id number]    Unit/Bed#: E5 -01 Encounter: [id number]    Primary Care Provider: Ryan Edmonds,    Date and time admitted to hospital: 1/3/2020 12:28 AM        * Septic shock [facility])  Assessment & Plan  Septic shock most likely secondary to strep pneumonia in the setting of immunocompromise requiring pressors--resolved  Strep antigen positive  1/3/2020 1/2 blood cultures positive for staphylococcal coagulase negative, most likely contaminant  Repeat blood cultures 1/5 negative so far  Patient was on vancomycin and ceftriaxone  Antibiotic day 5  Today he was switched to Ceftin by pulmonology  Patient is on chronic steroids due to COPD, he was on hydrocortisone for possible adrenal insufficiency and hypotension  His hemodynamics improved and he was restarted on prednisone 5 mg daily  Pneumonia  Assessment & Plan  Strep pneumonia  See plan above  Repeat chest x-ray in 6-8 weeks to follow up resolution    Acute respiratory failure with hypoxia [facility])  Assessment & Plan  Secondary to pneumonia, resolved  He is saturating well on room air  COPD (chronic obstructive pulmonary disease) (HCC)  Assessment & Plan  No signs of acute exacerbation  Continue neb treatment    History of pulm embolism  Assessment & Plan  Continue Eliquis    Multiple myeloma (HCC)  Assessment & Plan  Follows Heme-Onc    Chronic diastolic congestive heart failure (HCC)  Assessment & Plan  Wt Readings from Last 3 Encounters:   01/05/20 81 9 kg (180 lb 8 9 oz)   12/07/19 81 7 kg (180 lb 1 9 oz)   12/05/19 84 8 kg (187 lb)       Not in acute exacerbation  Last echo done 09/2019 showed ejection fraction 55% and grade 1 diastolic dysfunction  Continue to monitor volume status    Chronic diarrhea  Assessment & Plan  Started on p o   Vanco prophylaxis  He has a history of C diff in 07/2018    Hypotension  Assessment & Plan  Started on Midodrine     VTE Pharmacologic Prophylaxis:   Pharmacologic: Apixaban (Eliquis)  Mechanical VTE Prophylaxis in Place: Yes    Patient Centered Rounds: I have performed bedside rounds with nursing staff today  Discussions with Specialists or Other Care Team Provider:  Pulmonology    Education and Discussions with Family / Patient:  Patient and wife at bedside    Time Spent for Care: 30 minutes  More than 50% of total time spent on counseling and coordination of care as described above  Current Length of Stay: 3 day(s)    Current Patient Status: Inpatient   Certification Statement: The patient will continue to require additional inpatient hospital stay due to Optimizing respiratory status    Discharge Plan:  Most likely the next 24-48 hours, pending PT OT eval    Code Status: Level 1 - Full Code      Subjective:   Patient was seen and evaluated bedside, he is feeling well, denies chest pain, shortness of breath  Objective:     Vitals:   Temp (24hrs), Av 9 °F (36 6 °C), Min:97 4 °F (36 3 °C), Max:99 1 °F (37 3 °C)    Temp:  [97 4 °F (36 3 °C)-99 1 °F (37 3 °C)] 97 4 °F (36 3 °C)  HR:  [71-96] 84  Resp:  [18-38] 18  BP: (100-127)/(60-78) 100/62  SpO2:  [90 %-98 %] 90 %  Body mass index is 28 28 kg/m²  Input and Output Summary (last 24 hours): Intake/Output Summary (Last 24 hours) at 2020 1628  Last data filed at 2020 0800  Gross per 24 hour   Intake 180 ml   Output    Net 180 ml       Physical Exam:     Physical Exam   Constitutional: He is oriented to person, place, and time  He appears well-developed and well-nourished  No distress  HENT:   Head: Normocephalic and atraumatic  Neck: Neck supple  Cardiovascular: Normal rate, regular rhythm and normal heart sounds  Exam reveals no gallop and no friction rub  No murmur heard  Pulmonary/Chest: Effort normal and breath sounds normal  No respiratory distress  He has no wheezes  Abdominal: Soft  Bowel sounds are normal  He exhibits no distension   There is no tenderness  Musculoskeletal: Normal range of motion  He exhibits no edema  Neurological: He is alert and oriented to person, place, and time  No cranial nerve deficit  Skin: Skin is warm and dry  Psychiatric: His behavior is normal      Additional Data:     Labs:    Results from last 7 days   Lab Units 01/05/20  0459 01/04/20  0449  01/03/20  0045   WBC Thousand/uL  --  5 30   < > 2 95*   HEMOGLOBIN g/dL 9 0* 9 6*   < > 12 1   HEMATOCRIT % 27 6* 29 7*   < > 36 9   PLATELETS Thousands/uL  --  79*   < > 97*   BANDS PCT %  --  21*  --   --    NEUTROS PCT %  --   --   --  61   LYMPHS PCT %  --   --   --  31   LYMPHO PCT %  --  5*  --   --    MONOS PCT %  --   --   --  7   MONO PCT %  --  4  --   --    EOS PCT %  --  0  --  0    < > = values in this interval not displayed  Results from last 7 days   Lab Units 01/06/20  0620  01/03/20  0045   SODIUM mmol/L 143   < > 143   POTASSIUM mmol/L 3 4*   < > 3 6   CHLORIDE mmol/L 113*   < > 106   CO2 mmol/L 25   < > 27   BUN mg/dL 15   < > 12   CREATININE mg/dL 0 86   < > 1 15   ANION GAP mmol/L 5   < > 10   CALCIUM mg/dL 8 2*   < > 8 6   ALBUMIN g/dL  --   --  3 1*   TOTAL BILIRUBIN mg/dL  --   --  1 36*   ALK PHOS U/L  --   --  78   ALT U/L  --   --  35   AST U/L  --   --  24   GLUCOSE RANDOM mg/dL 82   < > 119    < > = values in this interval not displayed  Results from last 7 days   Lab Units 01/06/20  0620 01/03/20  1106 01/03/20  0241 01/03/20  0045   LACTIC ACID mmol/L  --   --  1 3 2 5*   PROCALCITONIN ng/ml 2 25* 7 70*  --  <0 05           * I Have Reviewed All Lab Data Listed Above  * Additional Pertinent Lab Tests Reviewed:  [facility] 67 Admission Reviewed    Imaging:    Imaging Reports Reviewed Today Include: all  Imaging Personally Reviewed by Myself Includes:      Recent Cultures (last 7 days):     Results from last 7 days   Lab Units 01/05/20 0522 01/05/20  0521 01/03/20  1655 01/03/20  0048 01/03/20  0041   BLOOD CULTURE  No Growth at 24 hrs  No Growth at 24 hrs   --  No Growth at 72 hrs  Staphylococcus coagulase negative*   GRAM STAIN RESULT   --   --   --   --  Gram positive cocci in pairs*   LEGIONELLA URINARY ANTIGEN   --   --  Negative  --   --        Last 24 Hours Medication List:     Current Facility-Administered Medications:  acetaminophen 650 mg Oral Q6H PRN JACK Pena   aluminum-magnesium hydroxide-simethicone 30 mL Oral Q4H PRN JACK Pena   apixaban 5 mg Oral Daily JACK Pena   cefuroxime 500 mg Oral Q12H [address], JACK   finasteride 5 mg Oral HS JACK Pena   guaiFENesin 600 mg Oral Q12H PRN JACK Pena   ipratropium 0 5 mg Nebulization TID JACK Pena   lactobacillus acidophilus-bulgaricus 1 packet Oral Daily JACK Pena   levalbuterol 1 25 mg Nebulization TID JACK Pena   melatonin 3 mg Oral HS Patrice Hicks MD   midodrine 5 mg Oral TID AC JACK Pena   ondansetron 4 mg Intravenous Q6H PRN JACK Pena   predniSONE 5 mg Oral Daily JACK Pena   vancomycin 125 mg Oral Q6H [facility] JACK Pena        Today, Patient Was Seen By: Patrice Hicks MD    ** Please Note: Dictation voice to text software may have been used in the creation of this document   **

## 2020-01-06 NOTE — NURSING NOTE
Agree with previous nurse's assessment  Patient resting comfortably in bed with no c/o pain/discomfort at this time  Bed low and locked with call bell and belongings within place  Will continue to monitor

## 2020-01-06 NOTE — ASSESSMENT & PLAN NOTE
Wt Readings from Last 3 Encounters:   01/05/20 81 9 kg (180 lb 8 9 oz)   12/07/19 81 7 kg (180 lb 1 9 oz)   12/05/19 84 8 kg (187 lb)       Not in acute exacerbation  Last echo done 09/2019 showed ejection fraction 55% and grade 1 diastolic dysfunction  Continue to monitor volume status

## 2020-01-07 LAB
ANION GAP SERPL CALCULATED.3IONS-SCNC: 8 MMOL/L (ref 4–13)
BASOPHILS # BLD AUTO: 0.02 THOUSANDS/ΜL (ref 0–0.1)
BASOPHILS NFR BLD AUTO: 1 % (ref 0–1)
BUN SERPL-MCNC: 16 MG/DL (ref 5–25)
CALCIUM SERPL-MCNC: 8.3 MG/DL (ref 8.3–10.1)
CHLORIDE SERPL-SCNC: 109 MMOL/L (ref 100–108)
CO2 SERPL-SCNC: 27 MMOL/L (ref 21–32)
CREAT SERPL-MCNC: 0.91 MG/DL (ref 0.6–1.3)
EOSINOPHIL # BLD AUTO: 0.03 THOUSAND/ΜL (ref 0–0.61)
EOSINOPHIL NFR BLD AUTO: 1 % (ref 0–6)
ERYTHROCYTE [DISTWIDTH] IN BLOOD BY AUTOMATED COUNT: 15.1 % (ref 11.6–15.1)
GFR SERPL CREATININE-BSD FRML MDRD: 78 ML/MIN/1.73SQ M
GLUCOSE SERPL-MCNC: 85 MG/DL (ref 65–140)
HCT VFR BLD AUTO: 28.2 % (ref 36.5–49.3)
HGB BLD-MCNC: 9.1 G/DL (ref 12–17)
IMM GRANULOCYTES # BLD AUTO: 0.02 THOUSAND/UL (ref 0–0.2)
IMM GRANULOCYTES NFR BLD AUTO: 1 % (ref 0–2)
LYMPHOCYTES # BLD AUTO: 0.39 THOUSANDS/ΜL (ref 0.6–4.47)
LYMPHOCYTES NFR BLD AUTO: 12 % (ref 14–44)
MCH RBC QN AUTO: 33.5 PG (ref 26.8–34.3)
MCHC RBC AUTO-ENTMCNC: 32.3 G/DL (ref 31.4–37.4)
MCV RBC AUTO: 104 FL (ref 82–98)
MONOCYTES # BLD AUTO: 0.4 THOUSAND/ΜL (ref 0.17–1.22)
MONOCYTES NFR BLD AUTO: 13 % (ref 4–12)
NEUTROPHILS # BLD AUTO: 2.29 THOUSANDS/ΜL (ref 1.85–7.62)
NEUTS SEG NFR BLD AUTO: 72 % (ref 43–75)
NRBC BLD AUTO-RTO: 0 /100 WBCS
PLATELET # BLD AUTO: 69 THOUSANDS/UL (ref 149–390)
PMV BLD AUTO: 9.5 FL (ref 8.9–12.7)
POTASSIUM SERPL-SCNC: 3.3 MMOL/L (ref 3.5–5.3)
RBC # BLD AUTO: 2.72 MILLION/UL (ref 3.88–5.62)
SODIUM SERPL-SCNC: 144 MMOL/L (ref 136–145)
WBC # BLD AUTO: 3.15 THOUSAND/UL (ref 4.31–10.16)

## 2020-01-07 PROCEDURE — 97166 OT EVAL MOD COMPLEX 45 MIN: CPT

## 2020-01-07 PROCEDURE — 82533 TOTAL CORTISOL: CPT | Performed by: NURSE PRACTITIONER

## 2020-01-07 PROCEDURE — 80048 BASIC METABOLIC PNL TOTAL CA: CPT | Performed by: INTERNAL MEDICINE

## 2020-01-07 PROCEDURE — 94760 N-INVAS EAR/PLS OXIMETRY 1: CPT

## 2020-01-07 PROCEDURE — 94640 AIRWAY INHALATION TREATMENT: CPT

## 2020-01-07 PROCEDURE — 97535 SELF CARE MNGMENT TRAINING: CPT | Performed by: PHYSICAL THERAPIST

## 2020-01-07 PROCEDURE — 97163 PT EVAL HIGH COMPLEX 45 MIN: CPT | Performed by: PHYSICAL THERAPIST

## 2020-01-07 PROCEDURE — 99232 SBSQ HOSP IP/OBS MODERATE 35: CPT | Performed by: INTERNAL MEDICINE

## 2020-01-07 PROCEDURE — 85025 COMPLETE CBC W/AUTO DIFF WBC: CPT | Performed by: INTERNAL MEDICINE

## 2020-01-07 RX ORDER — POTASSIUM CHLORIDE 20 MEQ/1
40 TABLET, EXTENDED RELEASE ORAL ONCE
Status: COMPLETED | OUTPATIENT
Start: 2020-01-07 | End: 2020-01-07

## 2020-01-07 RX ORDER — MIDODRINE HYDROCHLORIDE 5 MG/1
5 TABLET ORAL
Status: DISCONTINUED | OUTPATIENT
Start: 2020-01-07 | End: 2020-01-08

## 2020-01-07 RX ADMIN — Medication 125 MG: at 12:06

## 2020-01-07 RX ADMIN — FAMOTIDINE 20 MG: 20 TABLET ORAL at 05:40

## 2020-01-07 RX ADMIN — Medication 125 MG: at 00:18

## 2020-01-07 RX ADMIN — MIDODRINE HYDROCHLORIDE 5 MG: 5 TABLET ORAL at 05:40

## 2020-01-07 RX ADMIN — Medication 1 PACKET: at 09:09

## 2020-01-07 RX ADMIN — FINASTERIDE 5 MG: 5 TABLET, FILM COATED ORAL at 23:11

## 2020-01-07 RX ADMIN — LEVALBUTEROL HYDROCHLORIDE 1.25 MG: 1.25 SOLUTION, CONCENTRATE RESPIRATORY (INHALATION) at 13:06

## 2020-01-07 RX ADMIN — LEVALBUTEROL HYDROCHLORIDE 1.25 MG: 1.25 SOLUTION, CONCENTRATE RESPIRATORY (INHALATION) at 19:57

## 2020-01-07 RX ADMIN — IPRATROPIUM BROMIDE 0.5 MG: 0.5 SOLUTION RESPIRATORY (INHALATION) at 19:57

## 2020-01-07 RX ADMIN — LEVALBUTEROL HYDROCHLORIDE 1.25 MG: 1.25 SOLUTION, CONCENTRATE RESPIRATORY (INHALATION) at 07:12

## 2020-01-07 RX ADMIN — Medication 125 MG: at 06:03

## 2020-01-07 RX ADMIN — MIDODRINE HYDROCHLORIDE 5 MG: 5 TABLET ORAL at 11:31

## 2020-01-07 RX ADMIN — Medication 125 MG: at 23:12

## 2020-01-07 RX ADMIN — IPRATROPIUM BROMIDE 0.5 MG: 0.5 SOLUTION RESPIRATORY (INHALATION) at 07:12

## 2020-01-07 RX ADMIN — MIDODRINE HYDROCHLORIDE 5 MG: 5 TABLET ORAL at 16:33

## 2020-01-07 RX ADMIN — APIXABAN 5 MG: 5 TABLET, FILM COATED ORAL at 09:08

## 2020-01-07 RX ADMIN — IPRATROPIUM BROMIDE 0.5 MG: 0.5 SOLUTION RESPIRATORY (INHALATION) at 13:06

## 2020-01-07 RX ADMIN — Medication 125 MG: at 18:43

## 2020-01-07 RX ADMIN — PREDNISONE 5 MG: 5 TABLET ORAL at 09:09

## 2020-01-07 RX ADMIN — POTASSIUM CHLORIDE 40 MEQ: 1500 TABLET, EXTENDED RELEASE ORAL at 14:16

## 2020-01-07 RX ADMIN — CEFUROXIME AXETIL 500 MG: 250 TABLET ORAL at 09:09

## 2020-01-07 RX ADMIN — FAMOTIDINE 20 MG: 20 TABLET ORAL at 16:33

## 2020-01-07 RX ADMIN — GUAIFENESIN 600 MG: 600 TABLET, EXTENDED RELEASE ORAL at 12:41

## 2020-01-07 RX ADMIN — CEFUROXIME AXETIL 500 MG: 250 TABLET ORAL at 20:45

## 2020-01-07 RX ADMIN — MELATONIN TAB 3 MG 3 MG: 3 TAB at 23:10

## 2020-01-07 NOTE — PROGRESS NOTES
Progress Note Corie Clancy 1935, 81 y o  male MRN: [id number]    Unit/Bed#: E5 -01 Encounter: [id number]    Primary Care Provider: Cooper Robertson DO   Date and time admitted to hospital: 1/3/2020 12:28 AM        * Septic shock [facility])  Assessment & Plan  Septic shock most likely secondary to strep pneumonia in the setting of immunocompromise requiring pressors--resolved  Strep antigen positive  1/3/2020 1/2 blood cultures positive for staphylococcal coagulase negative, most likely contaminant  Repeat blood cultures 1/5 negative so far  Patient was on vancomycin and ceftriaxone  Antibiotic day 6/7  He was switched to Ceftin by pulmonology yesterday  Patient is on chronic steroids due to COPD, he was on hydrocortisone for possible adrenal insufficiency and hypotension during septic shock  His hemodynamics improved and he was restarted on prednisone 5 mg daily, his home dose  Blood pressure still running on the lower side, but patient states his blood pressure normally runs on the low side  Pneumonia  Assessment & Plan  Strep pneumonia  See plan above  Repeat chest x-ray in 6-8 weeks to follow up resolution    Acute respiratory failure with hypoxia [facility])  Assessment & Plan  Secondary to pneumonia, resolved  He is saturating well on room air    He became short of breath on ambulation his oxygen saturation dropped to 86%  He was placed on 2 L of oxygen his oxygenation improved to the 90s  Will do nocturnal pulse ox  He might need home O2 eval    COPD (chronic obstructive pulmonary disease) (HCC)  Assessment & Plan  No signs of acute exacerbation  Continue neb treatment    History of pulm embolism  Assessment & Plan  Continue Eliquis    Multiple myeloma [facility])  Assessment & Plan  Follows Heme-Onc    Chronic diastolic congestive heart failure (HCC)  Assessment & Plan  Wt Readings from Last 3 Encounters:   01/07/20 81 9 kg (180 lb 8 9 oz)   12/07/19 81 7 kg (180 lb 1 9 oz)   12/05/19 84 8 kg (187 lb)       Not in acute exacerbation  Last echo done 2019 showed ejection fraction 55% and grade 1 diastolic dysfunction  Continue to monitor volume status    Chronic diarrhea  Assessment & Plan  Started on p o  Vanco prophylaxis  He has a history of C diff in 2018  Now improved    Hypotension  Assessment & Plan  Still requiring midodrine, but his blood pressure at home is running on the lower side  Will check cortisol level  Trie to wean Midodrine        VTE Pharmacologic Prophylaxis:   Pharmacologic: Apixaban (Eliquis)  Mechanical VTE Prophylaxis in Place: Yes    Patient Centered Rounds: I have performed bedside rounds with nursing staff today  Discussions with Specialists or Other Care Team Provider:     Education and Discussions with Family / Patient:  Patient and wife at bedside    Time Spent for Care: 30 minutes  More than 50% of total time spent on counseling and coordination of care as described above  Current Length of Stay: 4 day(s)    Current Patient Status: Inpatient   Certification Statement: The patient will continue to require additional inpatient hospital stay due to Optimizing respiratory status and blood pressure    Discharge Plan:  Not ready for discharge yet, plan in the next 48 hours    Code Status: Level 1 - Full Code      Subjective:   Patient was seen evaluated at bedside, he is feeling better  Objective:     Vitals:   Temp (24hrs), Av 1 °F (37 3 °C), Min:98 3 °F (36 8 °C), Max:99 6 °F (37 6 °C)    Temp:  [98 3 °F (36 8 °C)-99 6 °F (37 6 °C)] 98 3 °F (36 8 °C)  HR:  [66-77] 77  Resp:  [18] 18  BP: ()/(50-70) 117/70  SpO2:  [91 %-97 %] 97 %  Body mass index is 28 28 kg/m²  Input and Output Summary (last 24 hours):        Intake/Output Summary (Last 24 hours) at 2020 1824  Last data filed at 2020 1001  Gross per 24 hour   Intake 480 ml   Output 325 ml   Net 155 ml       Physical Exam:     Physical Exam   Constitutional: He is oriented to person, place, and time  He appears well-developed and well-nourished  No distress  HENT:   Head: Normocephalic and atraumatic  Eyes: EOM are normal    Neck: Normal range of motion  Neck supple  Cardiovascular: Normal rate, regular rhythm and normal heart sounds  Exam reveals no gallop and no friction rub  No murmur heard  Pulmonary/Chest: Effort normal and breath sounds normal  No respiratory distress  He has no wheezes  Abdominal: Soft  Bowel sounds are normal  He exhibits no distension  There is no tenderness  Musculoskeletal: Normal range of motion  Bilateral nonpitting edema   Neurological: He is alert and oriented to person, place, and time  No cranial nerve deficit  Skin: Skin is warm and dry  Psychiatric: He has a normal mood and affect  Additional Data:     Labs:    Results from last 7 days   Lab Units 01/07/20  0539  01/04/20  0449   WBC Thousand/uL 3 15*  --  5 30   HEMOGLOBIN g/dL 9 1*   < > 9 6*   HEMATOCRIT % 28 2*   < > 29 7*   PLATELETS Thousands/uL 69*  --  79*   BANDS PCT %  --   --  21*   NEUTROS PCT % 72  --   --    LYMPHS PCT % 12*  --   --    LYMPHO PCT %  --   --  5*   MONOS PCT % 13*  --   --    MONO PCT %  --   --  4   EOS PCT % 1  --  0    < > = values in this interval not displayed  Results from last 7 days   Lab Units 01/07/20  0539  01/03/20  0045   SODIUM mmol/L 144   < > 143   POTASSIUM mmol/L 3 3*   < > 3 6   CHLORIDE mmol/L 109*   < > 106   CO2 mmol/L 27   < > 27   BUN mg/dL 16   < > 12   CREATININE mg/dL 0 91   < > 1 15   ANION GAP mmol/L 8   < > 10   CALCIUM mg/dL 8 3   < > 8 6   ALBUMIN g/dL  --   --  3 1*   TOTAL BILIRUBIN mg/dL  --   --  1 36*   ALK PHOS U/L  --   --  78   ALT U/L  --   --  35   AST U/L  --   --  24   GLUCOSE RANDOM mg/dL 85   < > 119    < > = values in this interval not displayed                   Results from last 7 days   Lab Units 01/06/20  0620 01/03/20  1106 01/03/20  0241 01/03/20  0045   LACTIC ACID mmol/L  --   --  1 3 2 5*   PROCALCITONIN ng/ml 2 25* 7 70*  --  <0 05           * I Have Reviewed All Lab Data Listed Above  * Additional Pertinent Lab Tests Reviewed: [facility] Admission Reviewed    Imaging:    Imaging Reports Reviewed Today Include: all  Imaging Personally Reviewed by Myself Includes:      Recent Cultures (last 7 days):     Results from last 7 days   Lab Units 01/05/20  0522 01/05/20  0521 01/03/20  1655 01/03/20  0048 01/03/20  0041   BLOOD CULTURE  No Growth at 48 hrs  No Growth at 48 hrs  --  No Growth After 4 Days  Staphylococcus coagulase negative*   GRAM STAIN RESULT   --   --   --   --  Gram positive cocci in pairs*   LEGIONELLA URINARY ANTIGEN   --   --  Negative  --   --        Last 24 Hours Medication List:     Current Facility-Administered Medications:  acetaminophen 650 mg Oral Q6H PRN Estle Abraham, CRNP   aluminum-magnesium hydroxide-simethicone 30 mL Oral Q4H PRN Estle Pierce, CRNP   apixaban 5 mg Oral Daily Estle Pierce, CRNP   cefuroxime 500 mg Oral Q12H [facility], CRNP   famotidine 20 mg Oral BID AC Deric To, BEENP   finasteride 5 mg Oral HS Estle Abraham, CRNP   guaiFENesin 600 mg Oral Q12H PRN Estle Pierce, CRNP   ipratropium 0 5 mg Nebulization TID Estle Abraham, CRNP   lactobacillus acidophilus-bulgaricus 1 packet Oral Daily Estle Abraham, CRNP   levalbuterol 1 25 mg Nebulization TID Estle Abraham, CRNP   melatonin 3 mg Oral HS Nini Morocho MD   midodrine 5 mg Oral BID AC Deric To, JACK   ondansetron 4 mg Intravenous Q6H PRN Estle Pierce, CRNP   predniSONE 5 mg Oral Daily Estle Abraham, CRNP   vancomycin 125 mg Oral Q6H [facility] JACK Paulino        Today, Patient Was Seen By: Nini Morocho MD    ** Please Note: Dictation voice to text software may have been used in the creation of this document   **

## 2020-01-07 NOTE — ASSESSMENT & PLAN NOTE
Still requiring midodrine, but his blood pressure at home is running on the lower side  Will check cortisol level  Trie to wean Midodrine

## 2020-01-07 NOTE — ASSESSMENT & PLAN NOTE
Wt Readings from Last 3 Encounters:   01/07/20 81 9 kg (180 lb 8 9 oz)   12/07/19 81 7 kg (180 lb 1 9 oz)   12/05/19 84 8 kg (187 lb)       Not in acute exacerbation  Last echo done 09/2019 showed ejection fraction 55% and grade 1 diastolic dysfunction  Continue to monitor volume status

## 2020-01-07 NOTE — PLAN OF CARE
Problem: Potential for Falls  Goal: Patient will remain free of falls  Description  INTERVENTIONS:  - Assess patient frequently for physical needs  -  Identify cognitive and physical deficits and behaviors that affect risk of falls    -  [place] fall precautions as indicated by assessment   - Educate patient/family on patient safety including physical limitations  - Instruct patient to call for assistance with activity based on assessment  - Modify environment to reduce risk of injury  - Consider OT/PT consult to assist with strengthening/mobility  Outcome: Progressing     Problem: RESPIRATORY - ADULT  Goal: Achieves optimal ventilation and oxygenation  Description  INTERVENTIONS:  - Assess for changes in respiratory status  - Assess for changes in mentation and behavior  - Position to facilitate oxygenation and minimize respiratory effort  - Oxygen administered by appropriate delivery if ordered  - Initiate smoking cessation education as indicated  - Encourage broncho-pulmonary hygiene including cough, deep breathe, Incentive Spirometry  - Assess the need for suctioning and aspirate as needed  - Assess and instruct to report SOB or any respiratory difficulty  - Respiratory Therapy support as indicated  Outcome: Progressing     Problem: GASTROINTESTINAL - ADULT  Goal: Minimal or absence of nausea and/or vomiting  Description  INTERVENTIONS:  - Administer IV fluids if ordered to ensure adequate hydration  - Maintain NPO status until nausea and vomiting are resolved  - Nasogastric tube if ordered  - Administer ordered antiemetic medications as needed  - Provide nonpharmacologic comfort measures as appropriate  - Advance diet as tolerated, if ordered  - Consider nutrition services referral to assist patient with adequate nutrition and appropriate food choices  Outcome: Progressing  Goal: Maintains or returns to baseline bowel function  Description  INTERVENTIONS:  - Assess bowel function  - Encourage oral fluids to ensure adequate hydration  - Administer IV fluids if ordered to ensure adequate hydration  - Administer ordered medications as needed  - Encourage mobilization and activity  - Consider nutritional services referral to assist patient with adequate nutrition and appropriate food choices  Outcome: Progressing  Goal: Maintains adequate nutritional intake  Description  INTERVENTIONS:  - Monitor percentage of each meal consumed  - Identify factors contributing to decreased intake, treat as appropriate  - Assist with meals as needed  - Monitor I&O, weight, and lab values if indicated  - Obtain nutrition services referral as needed  Outcome: Progressing     Problem: METABOLIC, FLUID AND ELECTROLYTES - ADULT  Goal: Electrolytes maintained within normal limits  Description  INTERVENTIONS:  - Monitor labs and assess patient for signs and symptoms of electrolyte imbalances  - Administer electrolyte replacement as ordered  - Monitor response to electrolyte replacements, including repeat lab results as appropriate  - Instruct patient on fluid and nutrition as appropriate  Outcome: Progressing     Problem: SKIN/TISSUE INTEGRITY - ADULT  Goal: Skin integrity remains intact  Description  INTERVENTIONS  - Identify patients at risk for skin breakdown  - Assess and monitor skin integrity  - Assess and monitor nutrition and hydration status  - Monitor labs (i e  albumin)  - Assess for incontinence   - Turn and reposition patient  - Assist with mobility/ambulation  - Relieve pressure over bony prominences  - Avoid friction and shearing  - Provide appropriate hygiene as needed including keeping skin clean and dry  - Evaluate need for skin moisturizer/barrier cream  - Collaborate with interdisciplinary team (i e  Nutrition, Rehabilitation, etc )   - Patient/family teaching  Outcome: Progressing     Problem: MUSCULOSKELETAL - ADULT  Goal: Maintain or return mobility to safest level of function  Description  INTERVENTIONS:  - Assess patient's ability to carry out ADLs; assess patient's baseline for ADL function and identify physical deficits which impact ability to perform ADLs (bathing, care of mouth/teeth, toileting, grooming, dressing, etc )  - Assess/evaluate cause of self-care deficits   - Assess range of motion  - Assess patient's mobility  - Assess patient's need for assistive devices and provide as appropriate  - Encourage maximum independence but intervene and supervise when necessary  - Involve family in performance of ADLs  - Assess for home care needs following discharge   - Consider OT consult to assist with ADL evaluation and planning for discharge  - Provide patient education as appropriate  Outcome: Progressing

## 2020-01-07 NOTE — PHYSICAL THERAPY NOTE
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[facility] )    Aortic stenosis, moderate    Peripheral edema    History of pulm embolism    Venous insufficiency (chronic) (peripheral)    Rectal cancer (HCC)    Elevated PSA    Gross hematuria    Hypogammaglobulinemia (HCC)    Skin lesion    Port-A-Cath in place    Septic shock ([facility] )    Pneumonia    Acute respiratory failure with hypoxia ([facility] )    Chronic diastolic congestive heart failure (HCC)    Hypotension       Past Medical History:   Diagnosis Date    Chronic pain disorder     3 fractured vertebrae    COPD (chronic obstructive pulmonary disease) (HCC)     Diarrhea     Enlarged prostate     Kidney stone     Multiple myeloma (HCC)     Pulmonary embolism ([facility] ) 12/24/2018    Rectal adenocarcinoma (HCC)     Sleep apnea     no cpap       Past Surgical History:   Procedure Laterality Date    CHOLECYSTECTOMY      COLONOSCOPY W/ ENDOSCOPIC US N/A 7/21/2017    Procedure: ANAL ENDOSCOPIC U/S;  Surgeon: Jackie Jiménez MD;  [facility]; Service: Colorectal    HERNIA REPAIR      KIDNEY STONE SURGERY      LUNG LOBECTOMY Right     CA COLONOSCOPY FLX DX W/COLLJ SPEC WHEN PFRMD N/A 7/10/2017    Procedure: COLONOSCOPY;  Surgeon: Eli Costello MD;  [facility];   Service: Gastroenterology    CA RECTAL TUMOR EXCISION, TRANSANAL ENDOSCOPIC MICROSURGICAL, FULL THICK N/A 11/2/2017    Procedure: TRANSANAL ENDOSCOPIC MICROSURGERY TEM;  Surgeon: Jackie Jiménez MD;  [facility];  Service: Colorectal      01/07/20 1114   Note Type   Note type Eval/Treat   Pain Assessment   Pain Assessment 0-10   Pain Score No Pain   Home Living   Type of Home House   Home Layout   (bilevel, 5+5 interior stairs)   Bathroom Equipment Shower chair;Commode   Home Equipment Walker;Cane   Additional Comments has home O2 setup for nighttime but did not knwo if concentrator or cpap, short hose for O2 tubing  Prior Function   Level of [place] Independent with ADLs and functional mobility   Lives With Spouse   Receives Help From Family   ADL Assistance Independent   IADLs Independent   Falls in the last 6 months 0   Comments pt indep and was getting "rehab" at OP facility with wife who was getting cardiac rehab  thinks it was pulmonary rehab and was almost ready to discharge   Restrictions/Precautions   Weight Bearing Precautions Per Order No   Other Precautions Multiple lines;O2   General   Family/Caregiver Present Yes   Cognition   Overall Cognitive Status WFL   Orientation Level Oriented X4   RUE Assessment   RUE Assessment WFL   LUE Assessment   LUE Assessment WFL   RLE Assessment   RLE Assessment X  (strength 4+/5)   LLE Assessment   LLE Assessment X  (strength 4+/5)   Coordination   Movements are Fluid and Coordinated 1   Sensation WFL   Bed Mobility   Rolling R 7  Independent   Rolling L 7  Independent   Supine to Sit 7  Independent   Sit to Supine 7  Independent   Transfers   Sit to Stand 7  Independent   Stand to Sit 7  Independent   Ambulation/Elevation   Gait pattern WNL; Excessively slow   Gait Assistance 7  Independent   Assistive Device None   Distance 150'   Balance   Static Sitting Normal   Dynamic Sitting Good   Static Standing Good   Dynamic Standing Fair +   Ambulatory Good   Endurance Deficit   Endurance Deficit Yes   Endurance Deficit Description spO2 walking 86% on RA  resting spO2 90% on arrival to room    after O2 placed at 2L pt achieved 96% in about 60 seconds   Activity Tolerance   Activity Tolerance Patient limited by fatigue;Treatment limited secondary to medical complications (Comment)   Nurse Made Aware yes   Assessment   Assessment pt admitted with pneumonia and sepsis with shock  pt now referred to PT  pt was indep PTA, no assistive device but participating in pulmonary rehab  pt demonstrated indep mobility without assistive device  pt does have mild deficits in strength, activity tolerance due to pulmonary compromise, balance, cardiac  pt noted to have resting spO2 at 90% on arrival to room, room air  pt dropped spO2 to 86% while walking  pt placed back on O2 at 2L and quickly briseyda to 96%  pt tolerated activity well otherwise  pt does not need skilled PT and can return home, recommend continuation of pulmonary rehab as OP  notified nursing of spO2 drop while amb  may need home O2 for walking  see treatment note and then d/c PT   Barriers to Discharge None   Goals   Patient Goals go home   STG Expiration Date 01/08/20   Short Term Goal #1 demonstrate competence using incentive spirometer, potential use of home O2 for walking  deep breathing exercises  Plan   Treatment/Interventions Patient/family training   PT Frequency One time visit   Recommendation   Recommendation Home with family support  (OP pulmonary rehab)   PT - OK to Discharge Yes   Modified [place] Scale   Modified Wendy Scale 1   Barthel Index   Feeding 10   Bathing 5   Grooming Score 5   Dressing Score 10   Bladder Score 10   Bowels Score 10   Toilet Use Score 10   Transfers (Bed/Chair) Score 15   Mobility (Level Surface) Score 15   Stairs Score 0   Barthel Index Score 90   History: co - morbidities,  multiple lines  Exam: impairments in locomotion, musculoskeletal, balance,  pulmonary,   Clinical: unstable/unpredictable  Complexity:high  Jessica Owens, PT  Time In:1103  Time Out:1114  Total Time: 11 minutes      S:  Surprised that his oxygen level dropped so much  O:  Educated pt on deep breathing and use of incentive spirometer to be used 10 x per hour   Pt also educated in use of O2 if prescribed by MD for activities such as walking and traveling  Also advised pt that physician will determine home O2 needs  Pt and wife both educated and demonstrated understanding  Pt was encouraged to amb at home to build endurance  A:  No further skilled PT needs     P:  D/c PT    Zaynab Lewis, PT

## 2020-01-07 NOTE — ASSESSMENT & PLAN NOTE
Septic shock most likely secondary to strep pneumonia in the setting of immunocompromise requiring pressors--resolved  Strep antigen positive  1/3/2020 1/2 blood cultures positive for staphylococcal coagulase negative, most likely contaminant  Repeat blood cultures 1/5 negative so far  Patient was on vancomycin and ceftriaxone  Antibiotic day 6/7  He was switched to Ceftin by pulmonology yesterday  Patient is on chronic steroids due to COPD, he was on hydrocortisone for possible adrenal insufficiency and hypotension during septic shock  His hemodynamics improved and he was restarted on prednisone 5 mg daily, his home dose  Blood pressure still running on the lower side, but patient states his blood pressure normally runs on the low side

## 2020-01-07 NOTE — OCCUPATIONAL THERAPY NOTE
[address]  Evaluation     Patient Name: Tremayne Samuel  XGYWF'O Date: 1/7/2020  Problem List  Principal Problem:    Septic shock ([facility] )  Active Problems:    Multiple myeloma ([facility] )    Chronic diarrhea    COPD (chronic obstructive pulmonary disease) ([facility] )    History of pulm embolism    Pneumonia    Acute respiratory failure with hypoxia (HCC)    Chronic diastolic congestive heart failure (HCC)    Hypotension    Past Medical History  Past Medical History:   Diagnosis Date    Chronic pain disorder     3 fractured vertebrae    COPD (chronic obstructive pulmonary disease) (HCC)     Diarrhea     Enlarged prostate     Kidney stone     Multiple myeloma ([facility] )     Pulmonary embolism ([facility] ) 12/24/2018    [facility]     Sleep apnea     no cpap     Past Surgical History  Past Surgical History:   Procedure Laterality Date    CHOLECYSTECTOMY      COLONOSCOPY W/ ENDOSCOPIC US N/A 7/21/2017    Procedure: ANAL ENDOSCOPIC U/S;  Surgeon: Efraín Nguyen MD;  [facility]; Service: Colorectal    HERNIA REPAIR      KIDNEY STONE SURGERY      LUNG LOBECTOMY Right     NH COLONOSCOPY FLX DX W/COLLJ SPEC WHEN PFRMD N/A 7/10/2017    Procedure: COLONOSCOPY;  Surgeon: May Sanders MD;  [facility];   Service: Gastroenterology    NH RECTAL TUMOR EXCISION, TRANSANAL ENDOSCOPIC MICROSURGICAL, FULL THICK N/A 11/2/2017    Procedure: TRANSANAL ENDOSCOPIC MICROSURGERY TEM;  Surgeon: Efraín Nguyen MD;  [facility];  Service: Colorectal         01/07/20 6697   Note Type   Note type Eval only   Restrictions/Precautions   Weight Bearing Precautions Per Order No   Other Precautions Fall Risk   Pain Assessment   Pain Assessment No/denies pain   Pain Score No Pain   Home Living   Type of Home House   Home Layout Two level  (bi-level; 5+5 interior stairs)   Bathroom Shower/Tub Tub/shower unit   Bathroom Toilet Standard   Bathroom Equipment Shower chair;Commode  (plans to install grab bars in shower)   [address] Walker;Cane   Additional Comments Pt lives with spouse in a bi-level house with 0 SELMA through garage and 5+5 interior steps   Prior Function   Level of [place] Independent with ADLs and functional mobility; Needs assistance with IADLs   Lives With Spouse   Receives Help From Family   ADL Assistance Independent   IADLs Needs assistance   Falls in the last 6 months 1 to 4  (1 per pt report)   Vocational Retired   Comments At baseline, pt was I w/ ADLs and functional mobility/transfers w/ use of SPC for community distances only, required assist w/ IADLs, (+) , and reports 1 fall PTA  Lifestyle   Autonomy At baseline, pt was I w/ ADLs and functional mobility/transfers w/ use of SPC for community distances only, required assist w/ IADLs, (+) , and reports 1 fall PTA  Reciprocal Relationships Spouse   Service to Others Retired- [employer]   Intrinsic Gratification Watching TV   Psychosocial   Psychosocial (WDL) WDL   ADL   Where Assessed Edge of bed   Eating Assistance 6  Modified independent   [address] 6  Modified independent   [address]  6  Modified independent   Functional Assistance 6  Modified independent   Additional Comments Pt and pt's spouse reports pt gets assistance to don/doff B/L socks at home  Decline further education on [facility], spouse reports she can continue to assist in home environment   Bed Mobility   Supine to Sit Unable to assess   Sit to Supine Unable to assess   Additional Comments N/A  Pt seated EOB at start/end of session  Call bell and phone within reach   All needs met and pt and pt's spouse report no further questions for OT at this time   Transfers   Sit to Stand 6  Modified independent Additional items Increased time required; Bedrails   Stand to Sit 6  Modified independent   Additional items Increased time required   Functional Mobility   Functional Mobility 5  Supervision   Additional Comments w/o use of AD; No LOBs noted; O2 sats pre-mobility: 96% on RA, post-mobility: 88-90% on RA   Balance   Static Sitting Normal   Dynamic Sitting Good   Static Standing Fair +   Dynamic Standing Fair +   Ambulatory Fair   Activity Tolerance   Activity Tolerance Patient tolerated treatment well;Patient limited by fatigue   Nurse Made Aware yes; Su Cox RN   RUE Assessment   RUE Assessment WFL   RUE Strength   RUE Overall Strength Within Functional Limits - able to perform ADL tasks with strength  (4/5 throughout)   LUE Assessment   LUE Assessment WFL   LUE Strength   LUE Overall Strength Within Functional Limits - able to perform ADL tasks with strength  (4/5 throughout)   Hand Function   Gross Motor Coordination Functional   Fine Motor Coordination Functional   Sensation   Light Touch No apparent deficits   Proprioception   Proprioception No apparent deficits   Vision - Complex Assessment   Ocular Range of Motion WFL   Acuity Able to read clock/calendar on wall without difficulty   Perception   Inattention/Neglect Appears intact   Cognition   Overall Cognitive Status [facility]   Arousal/Participation Alert; Cooperative   Attention Within functional limits   Orientation Level Oriented X4   Memory Within functional limits   Following Commands Follows multistep commands without difficulty   Comments Pt pleasant and cooperative; Engages in conversation appropriately   Assessment   Prognosis Good   Assessment Pt is a 81 y o  male seen for OT evaluation s/p adm to [facility] on 1/3/2020 w/ SOB, vomiting, and diarrhea and dx'd w/ Septic shock, acute respiratory failure with hypoxia, and Pneumonia   Comorbidities affecting pts functional performance include a significant PMH of Chronic pain disorder, COPD, Multiple myeloma, and pulmonary embolism  Pt with active OT orders and activity orders for Up with assistance  Pt lives with spouse in a bi-level house with 0 SELMA through garage and 5+5 interior steps  At baseline, pt was I w/ ADLs and functional mobility/transfers w/ use of SPC for community distances only, required assist w/ IADLs, (+) , and reports 1 fall PTA  Upon evaluation, pt currently functioning at a Mod I level for overall ADLs, Mod I for transfers, and Supervision for functional mobility 2* the following deficits impacting occupational performance: weakness and decreased tolerance  These impairments, however do not limit pts ability to safely engage in all baseline areas of occupation, as pt is functioning near baseline level of performance and reports no concerns regarding returning home and performing  Pt scored overall 80/100 on the Barthel Index  Based on the aforementioned OT evaluation, functional performance deficits, and assessments, pt has been identified as a Moderate complexity evaluation  No further acute OT needs identified at this time  Recommend continued mobilization with hospital staff while in the hospital to increase pts endurance and strength upon D/C  From OT standpoint, recommend D/C home with family support when medically cleared  D/C pt from OT caseload at this time      Goals   Patient Goals To go home   Plan   OT Frequency Eval only   Recommendation   OT Discharge Recommendation Home with family support   OT - OK to Discharge Yes  (when medically cleared)   Barthel Index   Feeding 10   Bathing 5   Grooming Score 5   Dressing Score 5   Bladder Score 10   Bowels Score 10   Toilet Use Score 10   Transfers (Bed/Chair) Score 15   Mobility (Level Surface) Score 10   Stairs Score 0  (not tested, denies concerns)   Barthel Index Score 80   Modified [place] Scale   Modified [place] Scale 1       Khanh Coral, OTR/L

## 2020-01-07 NOTE — ASSESSMENT & PLAN NOTE
Secondary to pneumonia, resolved  He is saturating well on room air    He became short of breath on ambulation his oxygen saturation dropped to 86%  He was placed on 2 L of oxygen his oxygenation improved to the 90s  Will do nocturnal pulse ox  He might need home O2 eval

## 2020-01-07 NOTE — PROGRESS NOTES
Progress Note - Pulmonary   Rakel Ring 81 y o  male MRN: [id number]  Unit/Bed#: E5 -01 Encounter: [id number]    Addendum: will wean midodrine to 5 mg BID and check random cortisol for evaluation of adrenal insufficiency      Assessment/Plan:  1  Severe sepsis/septic shock secondary to pneumonia present on admission and improving   1  Vital signs stable  2  Continues require midodrine, although Marisol Rocha does report longstanding history of lower systolic blood pressure and for an extended period of time prior to feeling ill noting systolic blood pressures in the 80s  At this time may consider low-dose midodrine vs attempt to wean today will discuss further with attending  2  Acute hypoxic respiratory failure-resolved  1  Oxygenating well on room air does not require oxygen baseline  2  Maintain SpO2 greater than equal to 89 %  3  Pulmonary toilet:  Incentive spirometry, out of bed with increasing activity as tolerated  3  Pneumonia present on admission streptococcal pneumonia antigen positive  1  Today is day 6 of 7 currently on Ceftin  2  Pulmonary toilet as indicated above  3  Repeat chest x-ray in 6-8 weeks  4  Outpatient Pulmonary follow-up as previously scheduled  4  Chronic obstructive pulmonary disease without acute exacerbation  1  Continue daily prednisone 5 mg  2  Continue Atrovent 3-4 times daily  5  Multiple myeloma in remission-Revlimid tx  1  Continue follow-up outpatient with Oncology  6  History of adenocarcinoma right lung status post left 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 years prior  1  Outpatient oncology follow-up  7  Chronic diarrhea present on admission  8  History of PE on Eliquis        Subjective:     Marisol Rocha was seen lying in bed upon entering the room  Denies acute events  Reports a cough that is productive of clear sputum today  Does report significant improvement in breathing each time he is able to produce sputum    Denies:  Chest pain, pain inspiration, fevers, chills, night sweats, bronchospasm hemoptysis  Objective:         Vitals: Blood pressure 96/55, pulse 71, temperature 99 4 °F (37 4 °C), temperature source Temporal, resp  rate 18, height 5' 7" (1 702 m), weight 81 9 kg (180 lb 8 9 oz), SpO2 91 % , RA, Body mass index is 28 28 kg/m²  Intake/Output Summary (Last 24 hours) at 1/7/2020 1147  Last data filed at 1/7/2020 1001  Gross per 24 hour   Intake 480 ml   Output 325 ml   Net 155 ml         Physical Exam  Gen: Awake, alert, oriented x 3, no acute distress  HEENT: Mucous membranes moist, no oral lesions, no thrush  NECK: no accessory muscle use, JVP not elevated  Cardiac: Regular, single S1, single S2, no murmurs, no rubs, no gallops  Lungs: right basilar crackles, no rhonchi or wheezes  Abdomen: normoactive bowel sounds, soft nontender, nondistended, no rebound or rigidity, no guarding  Extremities: no cyanosis, no clubbing, no edema    Labs: I have personally reviewed pertinent lab results  , CBC:   Lab Results   Component Value Date    WBC 3 15 (L) 01/07/2020    HGB 9 1 (L) 01/07/2020    HCT 28 2 (L) 01/07/2020     (H) 01/07/2020    PLT 69 (L) 01/07/2020    MCH 33 5 01/07/2020    MCHC 32 3 01/07/2020    RDW 15 1 01/07/2020    MPV 9 5 01/07/2020    NRBC 0 01/07/2020   , CMP:   Lab Results   Component Value Date    SODIUM 144 01/07/2020    K 3 3 (L) 01/07/2020     (H) 01/07/2020    CO2 27 01/07/2020    BUN 16 01/07/2020    CREATININE 0 91 01/07/2020    CALCIUM 8 3 01/07/2020    EGFR 78 01/07/2020     Imaging and other studies: none      Uriah Mckinnon

## 2020-01-08 LAB
ANION GAP SERPL CALCULATED.3IONS-SCNC: 7 MMOL/L (ref 4–13)
BACTERIA BLD CULT: NORMAL
BASOPHILS # BLD AUTO: 0.02 THOUSANDS/ΜL (ref 0–0.1)
BASOPHILS NFR BLD AUTO: 1 % (ref 0–1)
BUN SERPL-MCNC: 12 MG/DL (ref 5–25)
CALCIUM SERPL-MCNC: 8.4 MG/DL (ref 8.3–10.1)
CHLORIDE SERPL-SCNC: 107 MMOL/L (ref 100–108)
CO2 SERPL-SCNC: 27 MMOL/L (ref 21–32)
CORTIS SERPL-MCNC: 11.5 UG/DL
CORTIS SERPL-MCNC: 6.8 UG/DL
CREAT SERPL-MCNC: 0.86 MG/DL (ref 0.6–1.3)
EOSINOPHIL # BLD AUTO: 0.04 THOUSAND/ΜL (ref 0–0.61)
EOSINOPHIL NFR BLD AUTO: 2 % (ref 0–6)
ERYTHROCYTE [DISTWIDTH] IN BLOOD BY AUTOMATED COUNT: 14.7 % (ref 11.6–15.1)
GFR SERPL CREATININE-BSD FRML MDRD: 80 ML/MIN/1.73SQ M
GLUCOSE SERPL-MCNC: 91 MG/DL (ref 65–140)
HCT VFR BLD AUTO: 29.1 % (ref 36.5–49.3)
HGB BLD-MCNC: 9.3 G/DL (ref 12–17)
IMM GRANULOCYTES # BLD AUTO: 0.02 THOUSAND/UL (ref 0–0.2)
IMM GRANULOCYTES NFR BLD AUTO: 1 % (ref 0–2)
LYMPHOCYTES # BLD AUTO: 0.53 THOUSANDS/ΜL (ref 0.6–4.47)
LYMPHOCYTES NFR BLD AUTO: 22 % (ref 14–44)
MCH RBC QN AUTO: 33.1 PG (ref 26.8–34.3)
MCHC RBC AUTO-ENTMCNC: 32 G/DL (ref 31.4–37.4)
MCV RBC AUTO: 104 FL (ref 82–98)
MONOCYTES # BLD AUTO: 0.37 THOUSAND/ΜL (ref 0.17–1.22)
MONOCYTES NFR BLD AUTO: 16 % (ref 4–12)
NEUTROPHILS # BLD AUTO: 1.41 THOUSANDS/ΜL (ref 1.85–7.62)
NEUTS SEG NFR BLD AUTO: 58 % (ref 43–75)
NRBC BLD AUTO-RTO: 0 /100 WBCS
PLATELET # BLD AUTO: 84 THOUSANDS/UL (ref 149–390)
PMV BLD AUTO: 10.1 FL (ref 8.9–12.7)
POTASSIUM SERPL-SCNC: 3.6 MMOL/L (ref 3.5–5.3)
RBC # BLD AUTO: 2.81 MILLION/UL (ref 3.88–5.62)
SODIUM SERPL-SCNC: 141 MMOL/L (ref 136–145)
WBC # BLD AUTO: 2.39 THOUSAND/UL (ref 4.31–10.16)

## 2020-01-08 PROCEDURE — 94760 N-INVAS EAR/PLS OXIMETRY 1: CPT

## 2020-01-08 PROCEDURE — 94762 N-INVAS EAR/PLS OXIMTRY CONT: CPT

## 2020-01-08 PROCEDURE — 94640 AIRWAY INHALATION TREATMENT: CPT

## 2020-01-08 PROCEDURE — 99232 SBSQ HOSP IP/OBS MODERATE 35: CPT | Performed by: INTERNAL MEDICINE

## 2020-01-08 PROCEDURE — 85025 COMPLETE CBC W/AUTO DIFF WBC: CPT | Performed by: INTERNAL MEDICINE

## 2020-01-08 PROCEDURE — 82533 TOTAL CORTISOL: CPT | Performed by: INTERNAL MEDICINE

## 2020-01-08 PROCEDURE — 80048 BASIC METABOLIC PNL TOTAL CA: CPT | Performed by: INTERNAL MEDICINE

## 2020-01-08 RX ADMIN — IPRATROPIUM BROMIDE 0.5 MG: 0.5 SOLUTION RESPIRATORY (INHALATION) at 19:10

## 2020-01-08 RX ADMIN — Medication 1 PACKET: at 10:43

## 2020-01-08 RX ADMIN — IPRATROPIUM BROMIDE 0.5 MG: 0.5 SOLUTION RESPIRATORY (INHALATION) at 13:57

## 2020-01-08 RX ADMIN — CEFUROXIME AXETIL 500 MG: 250 TABLET ORAL at 22:33

## 2020-01-08 RX ADMIN — FINASTERIDE 5 MG: 5 TABLET, FILM COATED ORAL at 22:34

## 2020-01-08 RX ADMIN — Medication 125 MG: at 08:10

## 2020-01-08 RX ADMIN — FAMOTIDINE 20 MG: 20 TABLET ORAL at 16:20

## 2020-01-08 RX ADMIN — MIDODRINE HYDROCHLORIDE 5 MG: 5 TABLET ORAL at 06:27

## 2020-01-08 RX ADMIN — FAMOTIDINE 20 MG: 20 TABLET ORAL at 06:27

## 2020-01-08 RX ADMIN — Medication 125 MG: at 12:34

## 2020-01-08 RX ADMIN — LEVALBUTEROL HYDROCHLORIDE 1.25 MG: 1.25 SOLUTION, CONCENTRATE RESPIRATORY (INHALATION) at 19:10

## 2020-01-08 RX ADMIN — LEVALBUTEROL HYDROCHLORIDE 1.25 MG: 1.25 SOLUTION, CONCENTRATE RESPIRATORY (INHALATION) at 08:43

## 2020-01-08 RX ADMIN — CEFUROXIME AXETIL 500 MG: 250 TABLET ORAL at 10:42

## 2020-01-08 RX ADMIN — Medication 125 MG: at 17:59

## 2020-01-08 RX ADMIN — PREDNISONE 5 MG: 5 TABLET ORAL at 10:43

## 2020-01-08 RX ADMIN — APIXABAN 5 MG: 5 TABLET, FILM COATED ORAL at 10:43

## 2020-01-08 RX ADMIN — LEVALBUTEROL HYDROCHLORIDE 1.25 MG: 1.25 SOLUTION, CONCENTRATE RESPIRATORY (INHALATION) at 13:57

## 2020-01-08 RX ADMIN — IPRATROPIUM BROMIDE 0.5 MG: 0.5 SOLUTION RESPIRATORY (INHALATION) at 08:43

## 2020-01-08 RX ADMIN — MELATONIN TAB 3 MG 3 MG: 3 TAB at 22:33

## 2020-01-08 NOTE — ASSESSMENT & PLAN NOTE
Wt Readings from Last 3 Encounters:   01/08/20 81 9 kg (180 lb 8 9 oz)   12/07/19 81 7 kg (180 lb 1 9 oz)   12/05/19 84 8 kg (187 lb)       Not in acute exacerbation  Last echo done 09/2019 showed ejection fraction 55% and grade 1 diastolic dysfunction  Continue to monitor volume status

## 2020-01-08 NOTE — ASSESSMENT & PLAN NOTE
Started on p o  Vanco prophylaxis on admission  He has a history of C diff in 07/2018  Patient has been having chronic diarrhea secondary to Revlimid, it got slightly worse during this hospital stay most likely due to antibiotics    He finished his antibiotics today, will continue to monitor

## 2020-01-08 NOTE — PROGRESS NOTES
Progress Note Josee Ramos 1935, 81 y o  male MRN: [id number]    Unit/Bed#: E5 -01 Encounter: [id number]    Primary Care Provider: Satish Canada DO   Date and time admitted to hospital: 1/3/2020 12:28 AM        * Septic shock [facility])  Assessment & Plan  Septic shock most likely secondary to strep pneumonia in the setting of immunocompromise requiring pressors--resolved  Strep antigen positive  1/3/2020 1/2 blood cultures positive for staphylococcal coagulase negative, most likely contaminant  Repeat blood cultures 1/5 negative so far  Patient was on vancomycin and ceftriaxone  He was switched to Ceftin by pulmonology yesterday  Finished a 7 day course today  Patient is on chronic steroids due to COPD, he was on hydrocortisone for possible adrenal insufficiency and hypotension during septic shock  His hemodynamics improved and he was restarted on prednisone 5 mg daily, his home dose  Blood pressure still running on the lower side, but patient states his blood pressure normally runs on the low side  Pneumonia  Assessment & Plan  Strep pneumonia  See plan above  Repeat chest x-ray in 6-8 weeks to follow up resolution    Acute respiratory failure with hypoxia [facility])  Assessment & Plan  Secondary to pneumonia, resolved  He is saturating well on room air    He became short of breath on ambulation, his oxygen saturation dropped to 86% yesterday  Off oxygen today, denies shortness of breath at rest but gets short of breath when he walks  Home O2 eval tomorrow    COPD (chronic obstructive pulmonary disease) (HCC)  Assessment & Plan  No signs of acute exacerbation  Continue neb treatment    History of pulm embolism  Assessment & Plan  Continue Eliquis    Multiple myeloma (HCC)  Assessment & Plan  Follows Heme-Onc    Chronic diastolic congestive heart failure (HCC)  Assessment & Plan  Wt Readings from Last 3 Encounters:   01/08/20 81 9 kg (180 lb 8 9 oz)   12/07/19 81 7 kg (180 lb 1 9 oz) 19 84 8 kg (187 lb)       Not in acute exacerbation  Last echo done 2019 showed ejection fraction 55% and grade 1 diastolic dysfunction  Continue to monitor volume status    Chronic diarrhea  Assessment & Plan  Started on p o  Vanco prophylaxis on admission  He has a history of C diff in 2018  Patient has been having chronic diarrhea secondary to Revlimid, it got slightly worse during this hospital stay most likely due to antibiotics  He finished his antibiotics today, will continue to monitor    Hypotension  Assessment & Plan  Off midodrine today, systolic blood pressure from 90s to 110s, asymptomatic          VTE Pharmacologic Prophylaxis:   Pharmacologic: Apixaban (Eliquis)  Mechanical VTE Prophylaxis in Place: Yes    Patient Centered Rounds: I have performed bedside rounds with nursing staff today  Discussions with Specialists or Other Care Team Provider:     Education and Discussions with Family / Patient:  Patient, wife and daughter at bedside    Time Spent for Care: 30 minutes  More than 50% of total time spent on counseling and coordination of care as described above  Current Length of Stay: 5 day(s)    Current Patient Status: Inpatient   Certification Statement: The patient will continue to require additional inpatient hospital stay due to Monitor blood pressure     Discharge Plan:  The next 24-48 hours    Code Status: Level 1 - Full Code      Subjective:   Patient was seen and evaluated bedside, he is feeling well but slightly weak, shortness of breath improved  Objective:     Vitals:   Temp (24hrs), Av 1 °F (36 7 °C), Min:97 8 °F (36 6 °C), Max:98 8 °F (37 1 °C)    Temp:  [97 8 °F (36 6 °C)-98 8 °F (37 1 °C)] 98 8 °F (37 1 °C)  HR:  [79-94] 90  Resp:  [18] 18  BP: ()/(52-72) 90/52  SpO2:  [90 %-96 %] 92 %  Body mass index is 28 28 kg/m²       Input and Output Summary (last 24 hours):     No intake or output data in the 24 hours ending 20 7645    Physical Exam: Physical Exam   Constitutional: He is oriented to person, place, and time  He appears well-developed and well-nourished  No distress  HENT:   Head: Normocephalic and atraumatic  Eyes: EOM are normal    Neck: Normal range of motion  Neck supple  Cardiovascular: Normal rate and regular rhythm  Exam reveals no gallop and no friction rub  Murmur heard  Pulmonary/Chest: Effort normal and breath sounds normal  No respiratory distress  He has no wheezes  Abdominal: Soft  Bowel sounds are normal  He exhibits no distension  There is no tenderness  Musculoskeletal:   Nonpitting bilateral edema   Neurological: He is alert and oriented to person, place, and time  No cranial nerve deficit  Skin: Skin is warm and dry  Psychiatric: He has a normal mood and affect  Additional Data:     Labs:    Results from last 7 days   Lab Units 01/08/20  0629  01/04/20  0449   WBC Thousand/uL 2 39*   < > 5 30   HEMOGLOBIN g/dL 9 3*   < > 9 6*   HEMATOCRIT % 29 1*   < > 29 7*   PLATELETS Thousands/uL 84*   < > 79*   BANDS PCT %  --   --  21*   NEUTROS PCT % 58   < >  --    LYMPHS PCT % 22   < >  --    LYMPHO PCT %  --   --  5*   MONOS PCT % 16*   < >  --    MONO PCT %  --   --  4   EOS PCT % 2   < > 0    < > = values in this interval not displayed  Results from last 7 days   Lab Units 01/08/20  0723  01/03/20  0045   SODIUM mmol/L 141   < > 143   POTASSIUM mmol/L 3 6   < > 3 6   CHLORIDE mmol/L 107   < > 106   CO2 mmol/L 27   < > 27   BUN mg/dL 12   < > 12   CREATININE mg/dL 0 86   < > 1 15   ANION GAP mmol/L 7   < > 10   CALCIUM mg/dL 8 4   < > 8 6   ALBUMIN g/dL  --   --  3 1*   TOTAL BILIRUBIN mg/dL  --   --  1 36*   ALK PHOS U/L  --   --  78   ALT U/L  --   --  35   AST U/L  --   --  24   GLUCOSE RANDOM mg/dL 91   < > 119    < > = values in this interval not displayed                   Results from last 7 days   Lab Units 01/06/20  0620 01/03/20  1106 01/03/20  0241 01/03/20  0045   LACTIC ACID mmol/L  --   --  1 3 2 5*   PROCALCITONIN ng/ml 2 25* 7 70*  --  <0 05           * I Have Reviewed All Lab Data Listed Above  * Additional Pertinent Lab Tests Reviewed: Waldemar 67 Admission Reviewed    Imaging:    Imaging Reports Reviewed Today Include: all  Imaging Personally Reviewed by Myself Includes:      Recent Cultures (last 7 days):     Results from last 7 days   Lab Units 01/05/20  0522 01/05/20  0521 01/03/20  1655 01/03/20  0048 01/03/20  0041   BLOOD CULTURE  No Growth at 72 hrs  No Growth at 72 hrs   --  No Growth After 5 Days  Staphylococcus coagulase negative*   GRAM STAIN RESULT   --   --   --   --  Gram positive cocci in pairs*   LEGIONELLA URINARY ANTIGEN   --   --  Negative  --   --        Last 24 Hours Medication List:     Current Facility-Administered Medications:  acetaminophen 650 mg Oral Q6H PRN JACK Bonilla   aluminum-magnesium hydroxide-simethicone 30 mL Oral Q4H PRN JACK Bonilla   apixaban 5 mg Oral Daily JACK Bonilla   cefuroxime 500 mg Oral Q12H 10 [facility], JACK   famotidine 20 mg Oral BID [facility], JACK   finasteride 5 mg Oral HS JACK Bonilla   guaiFENesin 600 mg Oral Q12H PRN JACK Bonilla   ipratropium 0 5 mg Nebulization TID JACK Bonilla   lactobacillus acidophilus-bulgaricus 1 packet Oral Daily JACK Bonilla   levalbuterol 1 25 mg Nebulization TID JACK Bonilla   melatonin 3 mg Oral HS Barb Aleman MD   ondansetron 4 mg Intravenous Q6H PRN JACK Bonilla   predniSONE 5 mg Oral Daily JACK Bonilla   vancomycin 125 mg Oral Q6H [facility] JACK Bonilla        Today, Patient Was Seen By: Barb Aleman MD    ** Please Note: Dictation voice to text software may have been used in the creation of this document   **

## 2020-01-08 NOTE — ASSESSMENT & PLAN NOTE
Septic shock most likely secondary to strep pneumonia in the setting of immunocompromise requiring pressors--resolved  Strep antigen positive  1/3/2020 1/2 blood cultures positive for staphylococcal coagulase negative, most likely contaminant  Repeat blood cultures 1/5 negative so far  Patient was on vancomycin and ceftriaxone  He was switched to Ceftin by pulmonology yesterday  Finished a 7 day course today  Patient is on chronic steroids due to COPD, he was on hydrocortisone for possible adrenal insufficiency and hypotension during septic shock  His hemodynamics improved and he was restarted on prednisone 5 mg daily, his home dose  Blood pressure still running on the lower side, but patient states his blood pressure normally runs on the low side

## 2020-01-08 NOTE — ASSESSMENT & PLAN NOTE
Secondary to pneumonia, resolved  He is saturating well on room air    He became short of breath on ambulation, his oxygen saturation dropped to 86% yesterday  Off oxygen today, denies shortness of breath at rest but gets short of breath when he walks  Home O2 eval tomorrow

## 2020-01-08 NOTE — PLAN OF CARE
Problem: Potential for Falls  Goal: Patient will remain free of falls  Description  INTERVENTIONS:  - Assess patient frequently for physical needs  -  Identify cognitive and physical deficits and behaviors that affect risk of falls    -  [place] fall precautions as indicated by assessment   - Educate patient/family on patient safety including physical limitations  - Instruct patient to call for assistance with activity based on assessment  - Modify environment to reduce risk of injury  - Consider OT/PT consult to assist with strengthening/mobility  Outcome: Progressing     Problem: RESPIRATORY - ADULT  Goal: Achieves optimal ventilation and oxygenation  Description  INTERVENTIONS:  - Assess for changes in respiratory status  - Assess for changes in mentation and behavior  - Position to facilitate oxygenation and minimize respiratory effort  - Oxygen administered by appropriate delivery if ordered  - Initiate smoking cessation education as indicated  - Encourage broncho-pulmonary hygiene including cough, deep breathe, Incentive Spirometry  - Assess the need for suctioning and aspirate as needed  - Assess and instruct to report SOB or any respiratory difficulty  - Respiratory Therapy support as indicated  Outcome: Progressing     Problem: GASTROINTESTINAL - ADULT  Goal: Minimal or absence of nausea and/or vomiting  Description  INTERVENTIONS:  - Administer IV fluids if ordered to ensure adequate hydration  - Maintain NPO status until nausea and vomiting are resolved  - Nasogastric tube if ordered  - Administer ordered antiemetic medications as needed  - Provide nonpharmacologic comfort measures as appropriate  - Advance diet as tolerated, if ordered  - Consider nutrition services referral to assist patient with adequate nutrition and appropriate food choices  Outcome: Progressing  Goal: Maintains or returns to baseline bowel function  Description  INTERVENTIONS:  - Assess bowel function  - Encourage oral fluids to ensure adequate hydration  - Administer IV fluids if ordered to ensure adequate hydration  - Administer ordered medications as needed  - Encourage mobilization and activity  - Consider nutritional services referral to assist patient with adequate nutrition and appropriate food choices  Outcome: Progressing  Goal: Maintains adequate nutritional intake  Description  INTERVENTIONS:  - Monitor percentage of each meal consumed  - Identify factors contributing to decreased intake, treat as appropriate  - Assist with meals as needed  - Monitor I&O, weight, and lab values if indicated  - Obtain nutrition services referral as needed  Outcome: Progressing     Problem: METABOLIC, FLUID AND ELECTROLYTES - ADULT  Goal: Electrolytes maintained within normal limits  Description  INTERVENTIONS:  - Monitor labs and assess patient for signs and symptoms of electrolyte imbalances  - Administer electrolyte replacement as ordered  - Monitor response to electrolyte replacements, including repeat lab results as appropriate  - Instruct patient on fluid and nutrition as appropriate  Outcome: Progressing     Problem: SKIN/TISSUE INTEGRITY - ADULT  Goal: Skin integrity remains intact  Description  INTERVENTIONS  - Identify patients at risk for skin breakdown  - Assess and monitor skin integrity  - Assess and monitor nutrition and hydration status  - Monitor labs (i e  albumin)  - Assess for incontinence   - Turn and reposition patient  - Assist with mobility/ambulation  - Relieve pressure over bony prominences  - Avoid friction and shearing  - Provide appropriate hygiene as needed including keeping skin clean and dry  - Evaluate need for skin moisturizer/barrier cream  - Collaborate with interdisciplinary team (i e  Nutrition, Rehabilitation, etc )   - Patient/family teaching  Outcome: Progressing     Problem: MUSCULOSKELETAL - ADULT  Goal: Maintain or return mobility to safest level of function  Description  INTERVENTIONS:  - Assess patient's ability to carry out ADLs; assess patient's baseline for ADL function and identify physical deficits which impact ability to perform ADLs (bathing, care of mouth/teeth, toileting, grooming, dressing, etc )  - Assess/evaluate cause of self-care deficits   - Assess range of motion  - Assess patient's mobility  - Assess patient's need for assistive devices and provide as appropriate  - Encourage maximum independence but intervene and supervise when necessary  - Involve family in performance of ADLs  - Assess for home care needs following discharge   - Consider OT consult to assist with ADL evaluation and planning for discharge  - Provide patient education as appropriate  Outcome: Progressing

## 2020-01-08 NOTE — PROGRESS NOTES
Progress Note - Pulmonary   Doc Parminder 81 y o  male MRN: [id number]  Unit/Bed#: E5 -01 Encounter: [id number]      Assessment/Plan:      1  Severe sepsis/septic shock secondary to pneumonia present on admission  1  Vital signs remained stable  2  Midodrine decreased yesterday-blood pressure remained stable will discontinue today and monitor for the next 24 hours  If blood pressure remains stable will likely be appropriate for discharge  3  Will follow cortisol level  2  Acute on chronic hypoxic respiratory failure-acute component resolved  1  Continues oxygenating well on room air-he does have oxygen at home that he uses as needed  2  Maintain SpO2 greater than equal to 89%  3  Pulmonary toilet:  Incentive spirometry, out of bed increasing activity as tolerated  3  Pneumonia present on admission positive streptococcal antigen  1  Today will complete day 7 of Ceftin  2  Pulmonary toilet as indicated above  3  Repeat chest x-ray in 6-8 weeks with pulmonary follow-up  4  Chronic obstructive pulmonary disease without acute exacerbation  1  Continue daily prednisone 5 mg  2  Continue Atrovent 3-4 times daily  5  Multiple myeloma in remission  1  Maintains on Revlimid  6  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  1  Continue outpatient oncology follow-up as indicated  7   chronic diarrhea-present on admission  8  History of PE-maintains on Eliquis      Subjective:     Brina Mins was seen lying in bed upon entering the room  Reports mild improvement although he is not at his baseline  He is able to expectorate his secretions and are clear  Experienced an episode of shortness of breath overnight which responded to oxygen therapy  Denies:  Chest pain, pain inspiration, fevers, chills, night sweats, bronchospasm hemoptysis    Objective:         Vitals: Blood pressure 118/58, pulse 81, temperature 98 °F (36 7 °C), temperature source Tympanic, resp   rate 18, height 5' 7" (1 702 m), weight 81 9 kg (180 lb 8 9 oz), SpO2 93 % , room air, Body mass index is 28 28 kg/m²  Intake/Output Summary (Last 24 hours) at 1/8/2020 0916  Last data filed at 1/7/2020 1001  Gross per 24 hour   Intake 480 ml   Output 125 ml   Net 355 ml         Physical Exam  Gen: Awake, alert, oriented x 3, no acute distress  HEENT: Mucous membranes moist, no oral lesions, no thrush  NECK:  No accessory muscle use, JVP not elevated  Cardiac: Regular, single S1, single S2, no murmurs, no rubs, no gallops  Lungs:  Clear breath sounds bilaterally, no wheezes rhonchi or rales appreciated  Abdomen: normoactive bowel sounds, soft nontender, nondistended, no rebound or rigidity, no guarding  Extremities: no cyanosis, no clubbing, no edema    Labs: I have personally reviewed pertinent lab results  , CBC:   Lab Results   Component Value Date    WBC 2 39 (L) 01/08/2020    HGB 9 3 (L) 01/08/2020    HCT 29 1 (L) 01/08/2020     (H) 01/08/2020    PLT 84 (L) 01/08/2020    MCH 33 1 01/08/2020    MCHC 32 0 01/08/2020    RDW 14 7 01/08/2020    MPV 10 1 01/08/2020    NRBC 0 01/08/2020   , CMP:   Lab Results   Component Value Date    SODIUM 141 01/08/2020    K 3 6 01/08/2020     01/08/2020    CO2 27 01/08/2020    BUN 12 01/08/2020    CREATININE 0 86 01/08/2020    CALCIUM 8 4 01/08/2020    EGFR 80 01/08/2020     Imaging and other studies: none      Uriah La

## 2020-01-09 ENCOUNTER — TELEPHONE (OUTPATIENT)
Dept: HEMATOLOGY ONCOLOGY | Facility: CLINIC | Age: 85
End: 2020-01-09

## 2020-01-09 VITALS
RESPIRATION RATE: 18 BRPM | TEMPERATURE: 98.8 F | OXYGEN SATURATION: 92 % | BODY MASS INDEX: 28.34 KG/M2 | HEART RATE: 80 BPM | SYSTOLIC BLOOD PRESSURE: 103 MMHG | WEIGHT: 180.56 LBS | HEIGHT: 67 IN | DIASTOLIC BLOOD PRESSURE: 56 MMHG

## 2020-01-09 PROBLEM — R65.21 SEPTIC SHOCK (HCC): Status: RESOLVED | Noted: 2020-01-03 | Resolved: 2020-01-09

## 2020-01-09 PROBLEM — A41.9 SEPTIC SHOCK (HCC): Status: RESOLVED | Noted: 2020-01-03 | Resolved: 2020-01-09

## 2020-01-09 PROBLEM — I95.9 HYPOTENSION: Status: RESOLVED | Noted: 2020-01-06 | Resolved: 2020-01-09

## 2020-01-09 PROBLEM — J96.01 ACUTE RESPIRATORY FAILURE WITH HYPOXIA (HCC): Status: RESOLVED | Noted: 2020-01-03 | Resolved: 2020-01-09

## 2020-01-09 LAB
ANION GAP SERPL CALCULATED.3IONS-SCNC: 7 MMOL/L (ref 4–13)
BASOPHILS # BLD AUTO: 0.02 THOUSANDS/ΜL (ref 0–0.1)
BASOPHILS NFR BLD AUTO: 1 % (ref 0–1)
BUN SERPL-MCNC: 12 MG/DL (ref 5–25)
CALCIUM SERPL-MCNC: 8.2 MG/DL (ref 8.3–10.1)
CHLORIDE SERPL-SCNC: 107 MMOL/L (ref 100–108)
CO2 SERPL-SCNC: 27 MMOL/L (ref 21–32)
CREAT SERPL-MCNC: 0.9 MG/DL (ref 0.6–1.3)
EOSINOPHIL # BLD AUTO: 0.04 THOUSAND/ΜL (ref 0–0.61)
EOSINOPHIL NFR BLD AUTO: 2 % (ref 0–6)
ERYTHROCYTE [DISTWIDTH] IN BLOOD BY AUTOMATED COUNT: 14.5 % (ref 11.6–15.1)
GFR SERPL CREATININE-BSD FRML MDRD: 79 ML/MIN/1.73SQ M
GLUCOSE SERPL-MCNC: 81 MG/DL (ref 65–140)
HCT VFR BLD AUTO: 27.9 % (ref 36.5–49.3)
HGB BLD-MCNC: 9 G/DL (ref 12–17)
IMM GRANULOCYTES # BLD AUTO: 0.02 THOUSAND/UL (ref 0–0.2)
IMM GRANULOCYTES NFR BLD AUTO: 1 % (ref 0–2)
LYMPHOCYTES # BLD AUTO: 0.49 THOUSANDS/ΜL (ref 0.6–4.47)
LYMPHOCYTES NFR BLD AUTO: 24 % (ref 14–44)
MCH RBC QN AUTO: 33.1 PG (ref 26.8–34.3)
MCHC RBC AUTO-ENTMCNC: 32.3 G/DL (ref 31.4–37.4)
MCV RBC AUTO: 103 FL (ref 82–98)
MONOCYTES # BLD AUTO: 0.25 THOUSAND/ΜL (ref 0.17–1.22)
MONOCYTES NFR BLD AUTO: 12 % (ref 4–12)
NEUTROPHILS # BLD AUTO: 1.19 THOUSANDS/ΜL (ref 1.85–7.62)
NEUTS SEG NFR BLD AUTO: 60 % (ref 43–75)
NRBC BLD AUTO-RTO: 0 /100 WBCS
PLATELET # BLD AUTO: 79 THOUSANDS/UL (ref 149–390)
PMV BLD AUTO: 10.4 FL (ref 8.9–12.7)
POTASSIUM SERPL-SCNC: 3.8 MMOL/L (ref 3.5–5.3)
RBC # BLD AUTO: 2.72 MILLION/UL (ref 3.88–5.62)
SODIUM SERPL-SCNC: 141 MMOL/L (ref 136–145)
WBC # BLD AUTO: 2.01 THOUSAND/UL (ref 4.31–10.16)

## 2020-01-09 PROCEDURE — 85025 COMPLETE CBC W/AUTO DIFF WBC: CPT | Performed by: INTERNAL MEDICINE

## 2020-01-09 PROCEDURE — 80048 BASIC METABOLIC PNL TOTAL CA: CPT | Performed by: INTERNAL MEDICINE

## 2020-01-09 PROCEDURE — 99232 SBSQ HOSP IP/OBS MODERATE 35: CPT | Performed by: NURSE PRACTITIONER

## 2020-01-09 PROCEDURE — 99239 HOSP IP/OBS DSCHRG MGMT >30: CPT | Performed by: INTERNAL MEDICINE

## 2020-01-09 RX ADMIN — APIXABAN 5 MG: 5 TABLET, FILM COATED ORAL at 09:59

## 2020-01-09 RX ADMIN — FAMOTIDINE 20 MG: 20 TABLET ORAL at 06:21

## 2020-01-09 RX ADMIN — Medication 125 MG: at 05:40

## 2020-01-09 RX ADMIN — PREDNISONE 5 MG: 5 TABLET ORAL at 09:59

## 2020-01-09 RX ADMIN — Medication 125 MG: at 00:18

## 2020-01-09 RX ADMIN — Medication 1 PACKET: at 09:59

## 2020-01-09 NOTE — RESPIRATORY THERAPY NOTE
Home Oxygen Qualifying Test       Patient name: Mikhial Sorto        : 1935   Date of Test:  2020  Diagnosis:      Home Oxygen Test:    Medicare Guidelines require item(s) 1-5 on all ambulatory patients or 1 and 2 on non-ambulatory patients  1   Baseline SPO2 on Room Air at rest 94 %  2   SPO2 during exercise on Room Air 92 %  During exercise monitor SpO2  If SPO2 increases >=89% with ambulation do not add supplemental             oxygen  If <= 88% on room air add O2 via NC and titrate patient  Patient must be ambulated with O2 and titrated to > 88% with exertion  3   SPO2 on Oxygen at rest na % na lpm     4   SPO2 during exercise on Oxygen  na% a liter flow of na lpm     5   Exercise performed: Patient walked 600 ft , for 6 min  []  Supplemental Home Oxygen is indicated  [x]  Client does not qualify for home oxygen        Respiratory Additional Notes-     Ildefonso Mcneil, RT

## 2020-01-09 NOTE — NURSING NOTE
Discharge instructions reviewed and questions answered  Port de-accessed by qualified RN  Discharged to home with wife

## 2020-01-09 NOTE — TELEPHONE ENCOUNTER
Pt is suppose to start his lenalidomide (REVLIMID) 5 MG CAPS next week  He just got out the hospital and he was on strong medication for phnemonia   Should she wait a week before starting? PT wife wants a call back from [address]

## 2020-01-09 NOTE — TELEPHONE ENCOUNTER
Called and spoke to patient's wife Lehr regarding the patient's recent hospitalization  Instructed the patient's wife to hold the revlimid until Dr Shira Thornton sees the patient in the office on 1/16/20  Patient's wife verbalized understanding

## 2020-01-09 NOTE — PROGRESS NOTES
Progress Note - Pulmonary   Sofie Marx 81 y o  male MRN: [id number]  Unit/Bed#: E5 -01 Encounter: [id number]      Assessment/Plan:    1  Severe sepsis/septic shock secondary pneumonia present on admission-significantly improved  1  Vital signs have remained stable  2  Midodrine was discontinued and blood pressure remained stable  3  Cortisone level 11 5-within normal limits  2  Acute on chronic hypoxic respiratory failure-acute component resolved  1  Demonstrates nocturnal hypoxia per overnight pulse oximetry  2  Does not require oxygen for home O2 evaluation during the day  3  Does report having a concentrator at home, is requesting [facility] medical to evaluate  3  Pneumonia present on admission with positive streptococcal antigen  1  abx tx completed-7 days   2  repeat cxr in 6 weeks with outpatient pulmonary follow up  4  Chronic obstructive pulmonary disease without acute exacerbation  1  Continue on daily prednisone 5 mg  2  Continue Atrovent 3-4 times daily  5  Multiple myeloma in remission  1  Maintains on Revlimid  6  History of adenocarcinoma of the right lung status post left 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 years prior  1  Continue Oncology Outpatient follow-up as indicated  7  Chronic diarrhea-present on admission  8  History of PE-maintains on Eliquis    * Reina Hurtado is appropriate for discharge from pulmonary standpoint he will follow up patient with Pulmonary as previously scheduled  We will sign off please call with questions or concerns    Subjective:     Reina Hurtado was seen sitting in chair upon entering the room  Denies acute events  Reports that his breathing is improved although he is not quite at his prehospitalization baseline  Denies:  Fevers, chills, night sweats, bronchospasm, sputum production hemoptysis  Objective:         Vitals: Blood pressure 103/56, pulse 80, temperature 98 8 °F (37 1 °C), temperature source Temporal, resp   rate 18, height 5' 7" (1 702 m), weight 81 9 kg (180 lb 8 9 oz), SpO2 92 % , RA, Body mass index is 28 28 kg/m²  Intake/Output Summary (Last 24 hours) at 1/9/2020 1121  Last data filed at 1/9/2020 0849  Gross per 24 hour   Intake 120 ml   Output    Net 120 ml         Physical Exam  Gen: Awake, alert, oriented x 3, no acute distress  HEENT: Mucous membranes moist, no oral lesions, no thrush  NECK: no accessory muscle use, JVP not elevated  Cardiac: Regular, single S1, single S2, no murmurs, no rubs, no gallops  Lungs: clear breath sounds bilatearlly  Abdomen: normoactive bowel sounds, soft nontender, nondistended, no rebound or rigidity, no guarding  Extremities: no cyanosis, no clubbing, +2 chronic pitting edema edema    Labs: I have personally reviewed pertinent lab results  , CBC:   Lab Results   Component Value Date    WBC 2 01 (L) 01/09/2020    HGB 9 0 (L) 01/09/2020    HCT 27 9 (L) 01/09/2020     (H) 01/09/2020    PLT 79 (L) 01/09/2020    MCH 33 1 01/09/2020    MCHC 32 3 01/09/2020    RDW 14 5 01/09/2020    MPV 10 4 01/09/2020    NRBC 0 01/09/2020   , CMP:   Lab Results   Component Value Date    SODIUM 141 01/09/2020    K 3 8 01/09/2020     01/09/2020    CO2 27 01/09/2020    BUN 12 01/09/2020    CREATININE 0 90 01/09/2020    CALCIUM 8 2 (L) 01/09/2020    EGFR 79 01/09/2020     Imaging and other studies: none      Uriah Street

## 2020-01-10 LAB
BACTERIA BLD CULT: NORMAL
BACTERIA BLD CULT: NORMAL

## 2020-01-10 NOTE — DISCHARGE SUMMARY
Discharge Summary - Gerardo Bashir 81 y o  male MRN: [id number]    Unit/Bed#: E5 -01 Encounter: [id number]    Admission Date:   Admission Orders (From admission, onward)     Ordered        01/03/20 0424  Inpatient Admission (expected length of stay for this patient Order details is greater than two midnights)  Once         01/03/20 0301  Inpatient Admission (expected length of stay for this patient Order details is greater than two midnights)  Once                     Admitting Diagnosis: Vomiting [R11 10]  Pneumonia [J18 9]  Tachycardia [R00 0]  Nausea & vomiting [R11 2]  Septic shock ([facility] ) [A41 9, R65 21]  Sepsis ([facility] ) [A41 9]    HPI: Gerardo Bashir is a 81 y o  male with significant history of adenocarcinoma of the lung and rectum, s/p resection, chemo and radiation, multiple myeloma on maintenance chemo and prednisone, pulmonary embolism on eliquis, copd, and mod aortic stenosis presents with one day history of nausea, vomiting, and worsening of chronic diarrhea  Pt and pt's wife state throughout the day his chronic diarrhea worsened and after dinner had apx 3 episodes of vomiting  Pt also admitted to worsening cough which began yesterday with clear productive sputum  Pt presented to the ED and was initially HD stable with a lactate of 2 5, was given 2L NSS and Cefepime  Despite IVF pt became hypotensive with SBP 80's  Pt was given an additional 500cc bolus and started on Levophed  Pt's family stated grandson had a GI bug on xmas and pt's wife was seen in the ED last week for a "cold and cough "  Pt was admitted to the ICU for vasopressors and management of septic shock, anticipate greater than 2 midnight stay    Procedures Performed:   Orders Placed This Encounter   Procedures   [address] Course:  Patient admitted to the ICU with septic shock secondary to pneumonia, acute on chronic hypoxic respiratory failure secondary to pneumonia  Strep pneumonia antigen positive  Requiring pressors, started on vancomycin and ceftriaxone  Patient is on chronic steroids, placed on hydrocortisone  1/2 blood cultures growing staphylococcal coagulase negative, most likely a contaminant  Repeat blood cultures negative  Shock resolved, patient was weaned off Levophed, he was placed back on prednisone 5 mg daily  His antibiotics were switched to Ceftin and he finished a total 7 day course of antibiotics  Upon transition from ICU he was placed on midodrine which was weaned off before discharge  Home O2 evaluation was done on day of discharge he did not qualify for home oxygen  Patient was also started on PO Vanco prophylactically due to history of C diff  Patient has chronic diarrhea, suspected secondary therapy for multiple myeloma  His diarrhea, slightly got worse while he was on antibiotics and improved when he finished his 7 day course  He has a follow-up appointment with pulmonology on 01/21, they recommended a repeat chest x-ray 4-6 weeks after discharge, which will be ordered by pulmonology  Blood work already scheduled by Hematology for 1/13  He has a follow-up appointment with PCP next week  John madrigal will be evaluating his oxygen concentrator tomorrow that they have at home  Offered to arrange home health, patient refused      Significant Findings, Care, Treatment and Services Provided:  As above    Complications:  None    Discharge Diagnosis:   Septic shock secondary to pneumonia  Acute on chronic hypoxic respiratory failure secondary to pneumonia  Streptococcal pneumonia  Hypotension  COPD without acute exacerbation  Multiple myeloma  History of pulmonary embolism  Chronic diastolic congestive heart failure    Resolved Problems  Date Reviewed: 1/8/2020          Resolved    * (Principal) [facility] 1/9/2020     Resolved by  Stephane Borja MD    Acute respiratory failure with hypoxia ([facility] [address] ) 1/9/2020     Resolved by  Stephane Borja MD    Hypotension 1/9/2020 Resolved by  Yaritza Benites MD          Condition at Discharge: good         Discharge instructions/Information to patient and family:   See after visit summary for information provided to patient and family  Provisions for Follow-Up Care:  See after visit summary for information related to follow-up care and any pertinent home health orders  PCP: Tegan Dinero DO    Disposition: Home    Planned Readmission: No      Discharge Statement   I spent 35 minutes discharging the patient  This time was spent on the day of discharge  I had direct contact with the patient on the day of discharge  Additional documentation is required if more than 30 minutes were spent on discharge  Discharge Medications:  See after visit summary for reconciled discharge medications provided to patient and family

## 2020-01-13 ENCOUNTER — APPOINTMENT (OUTPATIENT)
Dept: LAB | Facility: MEDICAL CENTER | Age: 85
End: 2020-01-13
Payer: MEDICARE

## 2020-01-13 DIAGNOSIS — C34.91 ADENOCARCINOMA OF RIGHT LUNG (HCC): Chronic | ICD-10-CM

## 2020-01-13 LAB
ALBUMIN SERPL BCP-MCNC: 2.6 G/DL (ref 3.5–5)
ALP SERPL-CCNC: 101 U/L (ref 46–116)
ALT SERPL W P-5'-P-CCNC: 39 U/L (ref 12–78)
ANION GAP SERPL CALCULATED.3IONS-SCNC: 3 MMOL/L (ref 4–13)
AST SERPL W P-5'-P-CCNC: 20 U/L (ref 5–45)
BASOPHILS # BLD AUTO: 0.05 THOUSANDS/ΜL (ref 0–0.1)
BASOPHILS NFR BLD AUTO: 1 % (ref 0–1)
BILIRUB SERPL-MCNC: 0.62 MG/DL (ref 0.2–1)
BUN SERPL-MCNC: 14 MG/DL (ref 5–25)
CALCIUM SERPL-MCNC: 9.2 MG/DL (ref 8.3–10.1)
CHLORIDE SERPL-SCNC: 112 MMOL/L (ref 100–108)
CO2 SERPL-SCNC: 27 MMOL/L (ref 21–32)
CREAT SERPL-MCNC: 0.97 MG/DL (ref 0.6–1.3)
EOSINOPHIL # BLD AUTO: 0.09 THOUSAND/ΜL (ref 0–0.61)
EOSINOPHIL NFR BLD AUTO: 2 % (ref 0–6)
ERYTHROCYTE [DISTWIDTH] IN BLOOD BY AUTOMATED COUNT: 14.3 % (ref 11.6–15.1)
GFR SERPL CREATININE-BSD FRML MDRD: 72 ML/MIN/1.73SQ M
GLUCOSE SERPL-MCNC: 89 MG/DL (ref 65–140)
HCT VFR BLD AUTO: 31.5 % (ref 36.5–49.3)
HGB BLD-MCNC: 10 G/DL (ref 12–17)
IGA SERPL-MCNC: 136 MG/DL (ref 70–400)
IGG SERPL-MCNC: 638 MG/DL (ref 700–1600)
IGM SERPL-MCNC: 53 MG/DL (ref 40–230)
IMM GRANULOCYTES # BLD AUTO: 0.03 THOUSAND/UL (ref 0–0.2)
IMM GRANULOCYTES NFR BLD AUTO: 1 % (ref 0–2)
LYMPHOCYTES # BLD AUTO: 0.96 THOUSANDS/ΜL (ref 0.6–4.47)
LYMPHOCYTES NFR BLD AUTO: 22 % (ref 14–44)
MCH RBC QN AUTO: 32.8 PG (ref 26.8–34.3)
MCHC RBC AUTO-ENTMCNC: 31.7 G/DL (ref 31.4–37.4)
MCV RBC AUTO: 103 FL (ref 82–98)
MONOCYTES # BLD AUTO: 0.31 THOUSAND/ΜL (ref 0.17–1.22)
MONOCYTES NFR BLD AUTO: 7 % (ref 4–12)
NEUTROPHILS # BLD AUTO: 3.01 THOUSANDS/ΜL (ref 1.85–7.62)
NEUTS SEG NFR BLD AUTO: 67 % (ref 43–75)
NRBC BLD AUTO-RTO: 0 /100 WBCS
PLATELET # BLD AUTO: 103 THOUSANDS/UL (ref 149–390)
PMV BLD AUTO: 11.4 FL (ref 8.9–12.7)
POTASSIUM SERPL-SCNC: 3.6 MMOL/L (ref 3.5–5.3)
PROT SERPL-MCNC: 6.2 G/DL (ref 6.4–8.2)
RBC # BLD AUTO: 3.05 MILLION/UL (ref 3.88–5.62)
SODIUM SERPL-SCNC: 142 MMOL/L (ref 136–145)
WBC # BLD AUTO: 4.45 THOUSAND/UL (ref 4.31–10.16)

## 2020-01-13 PROCEDURE — 82784 ASSAY IGA/IGD/IGG/IGM EACH: CPT

## 2020-01-13 PROCEDURE — 85025 COMPLETE CBC W/AUTO DIFF WBC: CPT

## 2020-01-13 PROCEDURE — 83883 ASSAY NEPHELOMETRY NOT SPEC: CPT

## 2020-01-13 PROCEDURE — 36415 COLL VENOUS BLD VENIPUNCTURE: CPT

## 2020-01-13 PROCEDURE — 80053 COMPREHEN METABOLIC PANEL: CPT

## 2020-01-14 LAB
KAPPA LC FREE SER-MCNC: 17.1 MG/L (ref 3.3–19.4)
KAPPA LC FREE/LAMBDA FREE SER: 0.59 {RATIO} (ref 0.26–1.65)
LAMBDA LC FREE SERPL-MCNC: 29.1 MG/L (ref 5.7–26.3)

## 2020-01-16 ENCOUNTER — OFFICE VISIT (OUTPATIENT)
Dept: HEMATOLOGY ONCOLOGY | Facility: CLINIC | Age: 85
End: 2020-01-16
Payer: MEDICARE

## 2020-01-16 VITALS
BODY MASS INDEX: 27.94 KG/M2 | SYSTOLIC BLOOD PRESSURE: 104 MMHG | WEIGHT: 178 LBS | DIASTOLIC BLOOD PRESSURE: 68 MMHG | HEIGHT: 67 IN | TEMPERATURE: 98.7 F | RESPIRATION RATE: 14 BRPM | HEART RATE: 80 BPM

## 2020-01-16 DIAGNOSIS — D80.1 HYPOGAMMAGLOBULINEMIA (HCC): ICD-10-CM

## 2020-01-16 DIAGNOSIS — D80.9 IMMUNOGLOBULIN DEFICIENCY (HCC): ICD-10-CM

## 2020-01-16 DIAGNOSIS — C90.01 MULTIPLE MYELOMA IN REMISSION (HCC): Primary | ICD-10-CM

## 2020-01-16 PROCEDURE — 99214 OFFICE O/P EST MOD 30 MIN: CPT | Performed by: INTERNAL MEDICINE

## 2020-01-16 RX ORDER — SODIUM CHLORIDE 9 MG/ML
20 INJECTION, SOLUTION INTRAVENOUS ONCE
Status: CANCELLED | OUTPATIENT
Start: 2020-01-22

## 2020-01-16 RX ORDER — DIPHENHYDRAMINE HCL 25 MG
50 TABLET ORAL ONCE
Status: CANCELLED | OUTPATIENT
Start: 2020-01-22

## 2020-01-16 RX ORDER — ACETAMINOPHEN 325 MG/1
650 TABLET ORAL ONCE
Status: CANCELLED | OUTPATIENT
Start: 2020-01-22

## 2020-01-16 NOTE — PROGRESS NOTES
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 currently on lenalidomide 5 milligram p o  Daily 3 weeks on 3 weeks of, melphalan 12 milligram p o  Daily for 4 days every 6 weeks, prednisone 5 milligram p o  Daily, lambda light chain came down to normal range and now it is up to 29 he was hospitalized with right lower lobe pneumonia    Proceed with therapy    Hypogammaglobinemia continue IVIG 20 grams every 6 weeks    Pulmonary emboli apixaban 5 milligram p o  Daily the dose was reduced to once a day for epistaxis, expense    History of adenocarcinoma the right lower lobe of the lung status post resection with chemotherapy and radiation therapy completed on 03/2015 no evidence of disease followed by Pulmonary    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    Port-A-Cath in place chau flush every 3 months            HPI:  :#1  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, multiple compression fractures, status post kyphoplasty to L1, L2, L5, treated with radiation therapy to the left intra trochanteric area, received melphalan/Velcade/prednisone in October 2010 for 4 cycles with excellent response and normalization of lambda light chain, had been on Velcade maintenance till January 2013 when lambda light chain went up to 111,treated with Velcade 1 3 mg meter square weekly 3 weeks on one week off, Decadron 20 mg by mouth weekly, lambda light chain down to 30 then creeping up to 110, on Revlimid 15 mg by mouth every other day  Lambda light chain normal at 28 however he reported skin rash on the upper chest area we stopped the Revlimid for 2 month  Then reinitiated Revlimid 5 mg by mouth daily     #2  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 stage I, with progression by scan, status post resection of the right upper lobe with few foci in the same lobe consistent with stage IIIA done in January 2015 adenocarcinoma, well differentiated  Status post Alimta/carboplatin x4 cycles finished in May 2015 and radiation therapy      CTA 3/3/2019:  GERARD        #3  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     #4  Rectal adenocarcinoma status post resection stage I in November 2017     #5  Pulmonary embolism involving the subsegmental left lower lobe in December 2018, currently on apixaban 5 mg once a day because of epistaxis     #6  Hematuria secondary to benign prostatic hypertrophy and small bladder stones and being on apixaban, apixaban was reduced to 5 milligram once a day      Interval History:  Admitted to the hospital in January 2020 with pneumonia of the right lower lobe treated with cefepime for 5 days      Previous Treatment:         Test Results:    Imaging: Xr Chest 2 Views    Result Date: 1/3/2020  Narrative: CHEST INDICATION:   cough  COMPARISON:  December 7, 2019 EXAM PERFORMED/VIEWS:  XR CHEST PA & LATERAL FINDINGS:  Chronic elevation of the right hemidiaphragm  Right-sided Port-A-Cath present  Cardiomediastinal silhouette appears unremarkable  Stable right sided volume loss and opacification at the right apex  Mildly prominent reticular interstitial markings in the periphery of the lung base, may reflect component of interstitial fluid  No airspace infiltrates demonstrated  No pneumothorax or pleural effusion  Osseous structures appear within normal limits for patient age  Impression: Mildly prominent reticular interstitial markings suggesting potential component of interstitial fluid  Mild central venous congestion  Otherwise stable findings as above  Workstation performed: [id number]     Ct Abdomen Pelvis With Contrast    Result Date: 1/3/2020  Narrative: CT ABDOMEN AND PELVIS WITH IV CONTRAST INDICATION:   N/V fevers   COMPARISON:  CT abdomen/pelvis dated November 24, 2019  TECHNIQUE:  CT examination of the abdomen and pelvis was performed  Axial, sagittal, and coronal 2D reformatted images were created from the source data and submitted for interpretation  Radiation dose length product (DLP) for this visit:  668 mGy-cm   This examination, like all CT scans performed in the [facility], was performed utilizing techniques to minimize radiation dose exposure, including the use of iterative reconstruction and automated exposure control  IV Contrast:  100 mL of iohexol (OMNIPAQUE) Enteric Contrast:  Enteric contrast was not administered  FINDINGS: ABDOMEN LOWER CHEST:  There is a partially visualized infiltrate at the posterior right lower lobe suspicious for bronchopneumonia  The left lung base is clear  LIVER/BILIARY TREE:  Liver is diffusely decreased in density consistent with fatty change  No CT evidence of suspicious hepatic mass  Normal hepatic contours  No biliary dilatation  GALLBLADDER:  Gallbladder is surgically absent  SPLEEN:  Unremarkable  PANCREAS:  Unremarkable  ADRENAL GLANDS:  Unremarkable  KIDNEYS/URETERS:  No hydronephrosis  Again noted are postoperative changes at the posterior aspect of the left kidney with a few surgical clips, stable  No new liver lesions  One or more sharply circumscribed subcentimeter renal hypodensities are noted  These lesions are too small to accurately characterize, but are statistically most likely to represent benign cortical renal cyst(s)  According to the guidelines published in  the [facility] Paper of the ACR Incidental Findings Committee (Radiology 2010), no further workup of these lesions is recommended  STOMACH AND BOWEL:  There is colonic diverticulosis without evidence of acute diverticulitis  APPENDIX:  A normal appendix was visualized  ABDOMINOPELVIC CAVITY:  No ascites or free intraperitoneal air  No lymphadenopathy  VESSELS:  Atherosclerotic changes are present  No evidence of aneurysm  PELVIS REPRODUCTIVE ORGANS:  The prostate gland is mildly enlarged measuring 5 6 x 5 5 cm  URINARY BLADDER:  Unremarkable  ABDOMINAL WALL/INGUINAL REGIONS:  There is a small left-sided fat-containing inguinal hernia  OSSEOUS STRUCTURES:  No acute fracture or destructive osseous lesion  There is diffuse bone demineralization  Multilevel chronic compression deformities of the thoracolumbar spine is reidentified with vertebroplasty changes at L2, L3 and L5  There is moderate chronic compression deformity of the L1, L2, L3, L4 and L5 vertebral bodies  There is mild chronic compression deformity of the T12 vertebral body  There is a stable subcentimeter sclerotic density at the left iliac bone  Impression: Partially visualized right lower lobe infiltrate suspicious for bronchopneumonia  Correlation with chest x-ray findings is recommended  No acute intra-abdominal abnormality  No free air or free fluid  Scattered colonic diverticulosis with no inflammatory changes present to suggest acute diverticulitis  Stable left renal postoperative changes  No evidence of recurrent tumor  Workstation performed: [id number]       Labs:   Lab Results   Component Value Date    WBC 4 45 01/13/2020    HGB 10 0 (L) 01/13/2020    HCT 31 5 (L) 01/13/2020     (H) 01/13/2020     (L) 01/13/2020     Lab Results   Component Value Date     01/04/2016    K 3 6 01/13/2020     (H) 01/13/2020    CO2 27 01/13/2020    ANIONGAP 7 01/04/2016    BUN 14 01/13/2020    CREATININE 0 97 01/13/2020    GLUCOSE 88 01/04/2016    GLUF 107 (H) 07/29/2019    CALCIUM 9 2 01/13/2020    AST 20 01/13/2020    ALT 39 01/13/2020    ALKPHOS 101 01/13/2020    PROT 6 8 01/04/2016    BILITOT 0 63 01/04/2016    EGFR 72 01/13/2020       No results found for: IRON, TIBC, FERRITIN    No results found for: [id number]      ROS: Review of Systems   Constitutional: Positive for fatigue   Negative for appetite change, chills, diaphoresis, fever and unexpected weight change  HENT:   Negative for hearing loss, lump/mass, mouth sores, nosebleeds, sore throat, trouble swallowing and voice change  Eyes: Negative  Negative for eye problems and icterus  Respiratory: Positive for hemoptysis and shortness of breath  Negative for chest tightness and cough  Cardiovascular: Negative for chest pain and leg swelling  Gastrointestinal: Negative for abdominal distention, abdominal pain, blood in stool, constipation, diarrhea and nausea  Endocrine: Negative  Genitourinary: Negative for dysuria, frequency, hematuria and pelvic pain  Musculoskeletal: Negative  Negative for arthralgias, back pain, flank pain, gait problem, myalgias and neck stiffness  Skin: Negative for itching and rash  Neurological: Negative for dizziness, gait problem, headaches, light-headedness, numbness and speech difficulty  Hematological: Negative for adenopathy  Does not bruise/bleed easily  Psychiatric/Behavioral: Negative for confusion, decreased concentration, depression and sleep disturbance  The patient is not nervous/anxious  Current Medications: Reviewed  Allergies: Reviewed  PMH/FH/SH:  Reviewed      Physical Exam:    Body surface area is 1 92 meters squared  Wt Readings from Last 3 Encounters:   01/16/20 80 7 kg (178 lb)   01/09/20 81 9 kg (180 lb 8 9 oz)   12/07/19 81 7 kg (180 lb 1 9 oz)        Temp Readings from Last 3 Encounters:   01/16/20 98 7 °F (37 1 °C)   01/09/20 98 8 °F (37 1 °C) (Temporal)   12/07/19 98 6 °F (37 °C) (Temporal)        BP Readings from Last 3 Encounters:   01/16/20 104/68   01/09/20 103/56   12/07/19 127/58         Pulse Readings from Last 3 Encounters:   01/16/20 80   01/09/20 80   12/07/19 78        Physical Exam   Constitutional: He is oriented to person, place, and time  He appears well-developed and well-nourished  No distress  HENT:   Head: Normocephalic and atraumatic     Eyes: Conjunctivae are normal    Neck: Normal range of motion  Neck supple  No tracheal deviation present  Cardiovascular: Normal rate and regular rhythm  Exam reveals no gallop and no friction rub  Murmur heard  Pulmonary/Chest: Effort normal  No respiratory distress  He has no wheezes  He has rhonchi in the right middle field and the right lower field  He has no rales  He exhibits no tenderness  Abdominal: Soft  He exhibits no distension  There is no tenderness  Musculoskeletal: He exhibits edema (Plus three edema bilaterally)  Lymphadenopathy:     He has no cervical adenopathy  Neurological: He is alert and oriented to person, place, and time  Skin: Skin is warm and dry  He is not diaphoretic  No erythema  No pallor  Psychiatric: He has a normal mood and affect  His behavior is normal  Judgment and thought content normal    Vitals reviewed  ECO    Goals and Barriers:  Current Goal: Minimize effects of disease  Barriers: None  Patient's Capacity to Self Care:  Patient is able to self care      Code Status: [unfilled]

## 2020-01-21 ENCOUNTER — OFFICE VISIT (OUTPATIENT)
Dept: PULMONOLOGY | Facility: CLINIC | Age: 85
End: 2020-01-21
Payer: MEDICARE

## 2020-01-21 VITALS
OXYGEN SATURATION: 99 % | RESPIRATION RATE: 20 BRPM | SYSTOLIC BLOOD PRESSURE: 102 MMHG | HEIGHT: 67 IN | HEART RATE: 99 BPM | WEIGHT: 178.2 LBS | TEMPERATURE: 97.8 F | DIASTOLIC BLOOD PRESSURE: 68 MMHG | BODY MASS INDEX: 27.97 KG/M2

## 2020-01-21 DIAGNOSIS — C34.91 ADENOCARCINOMA OF RIGHT LUNG (HCC): Chronic | ICD-10-CM

## 2020-01-21 DIAGNOSIS — J18.9 PNEUMONIA OF RIGHT LOWER LOBE DUE TO INFECTIOUS ORGANISM: Primary | ICD-10-CM

## 2020-01-21 DIAGNOSIS — J44.9 CHRONIC OBSTRUCTIVE PULMONARY DISEASE, UNSPECIFIED COPD TYPE (HCC): ICD-10-CM

## 2020-01-21 PROCEDURE — 99214 OFFICE O/P EST MOD 30 MIN: CPT | Performed by: INTERNAL MEDICINE

## 2020-01-21 RX ORDER — AZITHROMYCIN 250 MG/1
TABLET, FILM COATED ORAL
Qty: 6 TABLET | Refills: 0 | Status: SHIPPED | OUTPATIENT
Start: 2020-01-21 | End: 2020-01-25

## 2020-01-21 NOTE — PROGRESS NOTES
Pulmonary Rehabilitation Plan of Care   90 day         Today's date: 2020   Total visits to date: 28  Patient name: Arben Maxwell      : 1935  Age: 81 y o  MRN: [id number]  Referring Physician: Xiomara Morley MD  Provider: Moe Hernandez  Clinician: Willis Hackett, MS, CEP, CCRP        COMMENTS:   This is a discharge note for Corinda Breath  His last pulmonary rehab visit was 2019  Since then he has been hospitalized  His wife Maldonado Santos called to report he will most likely not resume UT  He completed only one session since his last progress note on   Dx:   Encounter Diagnosis   Name Primary?  Chronic obstructive pulmonary disease, unspecified [facility]      Date of onset:                     Medication compliance: Yes   Comments: none  Fall Risk: low to Moderate   Comments: uses a cane walking distances    EXERCISE/ACTIVITY    Cardiopulmonary Goals:   Min: 35-50   HR: 102-114   RPE: 4-6   O2 sat: >90%    Modalities: Treadmill, UBE, NuStep and Recumbent bike  Strength trainin-3 days / week, 12-15 repitations  and 1-2 sets per modality    Modalities: Leg press, Chest press, Lateral raise, Arm curl and Seated Row    Exercise Progression: No - same exercise intensity, duration, and modes used   Continues to exercise at 4-5 RPE with minimal SOB      RPE: 4-5  Home activity: light to moderate ADLs, no exercise outside of UT  Goals: home exercise days opposite UT and >150 mins of exercise/wk  Education: Benefit of exercise, home exercise, pursed lip breathing and O2 saturation monitoring   Plan:home exercise target 30 mins, 2 days opposite UT and exercise education video  Readiness to change: Action    NUTRITION    Weight control:    Starting weight: 188    Current weight: 179  Diabetes: N/A  Goals:Improved Rate Your Plate score  Education:hydration, Heart Healthy eating class, How to read food Labels  portion control  Plan: avoid processed foods, Readiness to change: Action    PSYCHOSOCIAL    Emotional: Patient reports he/she is coping well with good social support and denies depression or anxiety  Social support: good  Goals: Physical Fitness in [facility] Score < 3, Daily Activity in [facility] Score < 3, Overall Health in [facility] Score < 3 and Quality of Life in [facility] Score < 3   Education: Mental Health Education, benefits of positive support system  Plan: Anxiety and Lung disease  Readiness to change: Preparation    OTHER CORE COMPONENTS     Tobacco:   Social History     Tobacco Use   Smoking Status Former Smoker    Packs/day: 1 00    Years: 50 00    Pack years: 50 00    Types: Cigarettes    Start date: [facility] Last attempt to quit: 2010    Years since quittin 1   Smokeless Tobacco Never Used   Tobacco Comment    quit in      Oxygen: room air  96 - 98% at rest,  88-94% with exercise  Blood pressure:    Restin/60 - 102/60   Exercise:  120/60  Goals: consistent exercise >150 mins/wk  Education: Pulmonary Disease, physiology, Exercise, strength, flexibility, Conservation of energy, oxygen, breathing techniques, Mental Health, Diet,nutrition, Medication and Avoiding Infections  Plan: Exercise benefits, increase home activity    Readiness to change: Action

## 2020-01-22 ENCOUNTER — HOSPITAL ENCOUNTER (OUTPATIENT)
Dept: INFUSION CENTER | Facility: CLINIC | Age: 85
Discharge: HOME | End: 2020-01-22
Payer: MEDICARE

## 2020-01-22 VITALS
SYSTOLIC BLOOD PRESSURE: 118 MMHG | RESPIRATION RATE: 18 BRPM | DIASTOLIC BLOOD PRESSURE: 66 MMHG | TEMPERATURE: 97.4 F | HEART RATE: 92 BPM

## 2020-01-22 DIAGNOSIS — Z95.828 PORT-A-CATH IN PLACE: Primary | ICD-10-CM

## 2020-01-22 DIAGNOSIS — D80.9 IMMUNOGLOBULIN DEFICIENCY (HCC): Primary | ICD-10-CM

## 2020-01-22 PROCEDURE — 96366 THER/PROPH/DIAG IV INF ADDON: CPT

## 2020-01-22 PROCEDURE — 96365 THER/PROPH/DIAG IV INF INIT: CPT

## 2020-01-22 RX ORDER — DIPHENHYDRAMINE HCL 25 MG
50 TABLET ORAL ONCE
Status: DISCONTINUED | OUTPATIENT
Start: 2020-01-22 | End: 2020-01-25 | Stop reason: HOSPADM

## 2020-01-22 RX ORDER — LIDOCAINE AND PRILOCAINE 25; 25 MG/G; MG/G
CREAM TOPICAL AS NEEDED
Qty: 30 G | Refills: 2 | Status: SHIPPED | OUTPATIENT
Start: 2020-01-22 | End: 2020-01-01 | Stop reason: ALTCHOICE

## 2020-01-22 RX ORDER — SODIUM CHLORIDE 9 MG/ML
20 INJECTION, SOLUTION INTRAVENOUS ONCE
Status: CANCELLED | OUTPATIENT
Start: 2020-03-04

## 2020-01-22 RX ORDER — SODIUM CHLORIDE 9 MG/ML
20 INJECTION, SOLUTION INTRAVENOUS ONCE
Status: COMPLETED | OUTPATIENT
Start: 2020-01-22 | End: 2020-01-22

## 2020-01-22 RX ORDER — DIPHENHYDRAMINE HCL 25 MG
50 TABLET ORAL ONCE
Status: CANCELLED | OUTPATIENT
Start: 2020-03-04

## 2020-01-22 RX ORDER — ACETAMINOPHEN 325 MG/1
650 TABLET ORAL ONCE
Status: DISCONTINUED | OUTPATIENT
Start: 2020-01-22 | End: 2020-01-25 | Stop reason: HOSPADM

## 2020-01-22 RX ORDER — ACETAMINOPHEN 325 MG/1
650 TABLET ORAL ONCE
Status: CANCELLED | OUTPATIENT
Start: 2020-03-04

## 2020-01-22 RX ADMIN — SODIUM CHLORIDE 20 ML/HR: 0.9 INJECTION, SOLUTION INTRAVENOUS at 12:40

## 2020-01-22 RX ADMIN — Medication 20 G: at 12:40

## 2020-01-22 NOTE — ASSESSMENT & PLAN NOTE
· He had basilar pneumonia which was not seen on chest x-ray but seen on abdominal CT scan  · Completed antibiotics and discharged from the hospital   Clinically improved from a symptom perspective    · He is due for CT scan for adenocarcinoma follow-up and this will also assess his pneumonia

## 2020-01-22 NOTE — ASSESSMENT & PLAN NOTE
· Symptoms are currently controlled on low-dose prednisone and ipratropium nebulizer solution  · His wife has significant concerns about recurrent infection    I did give her prescription for azithromycin which she will start if he begins to develop worsening respiratory symptoms again

## 2020-01-22 NOTE — PLAN OF CARE
Problem: Potential for Falls  Goal: Patient will remain free of falls  Description  INTERVENTIONS:  - Assess patient frequently for physical needs  -  Identify cognitive and physical deficits and behaviors that affect risk of falls    -  [place] fall precautions as indicated by assessment   - Educate patient/family on patient safety including physical limitations  - Instruct patient to call for assistance with activity based on assessment  - Modify environment to reduce risk of injury  - Consider OT/PT consult to assist with strengthening/mobility  Outcome: Progressing

## 2020-01-22 NOTE — PROGRESS NOTES
Progress note - Pulmonary Medicine   Raisa Jackson 81 y o  male MRN: [id number]       Impression & Plan:     COPD (chronic obstructive pulmonary disease) ([facility] )  · Symptoms are currently controlled on low-dose prednisone and ipratropium nebulizer solution  · His wife has significant concerns about recurrent infection  I did give her prescription for azithromycin which she will start if he begins to develop worsening respiratory symptoms again    Pneumonia  · He had basilar pneumonia which was not seen on chest x-ray but seen on abdominal CT scan  · Completed antibiotics and discharged from the hospital   Clinically improved from a symptom perspective  · He is due for CT scan for adenocarcinoma follow-up and this will also assess his pneumonia     Adenocarcinoma, lung ([facility] )  · He is due for annual CT follow-up in March    I will see Ryan Cortes in March for follow-up of his CT scan  Use instructed to call me with any new worsening symptoms in the interim  ______________________________________________________________________    HPI:    Raisa Jackson presents today for follow-up of hospitalization for pneumonia  Initially had diarrhea and altered mental status  He was taken by ambulance to the hospital and identified as having a basilar pneumonia  He initially was admitted to the intensive care unit for metabolic encephalopathy but had fairly rapid clinical improvement and was treated with an antibiotic course for pneumonia  He is now home again  He is glad to be home with normal food  He had plenty of complaints about the hospital food  His appetite remains intact  He has no complaints of arthralgias or myalgias  No abdominal pain  He does still have loose stool likely secondary to his medications  Due to immunocompromise he has been restarted on immunoglobulin by Dr Sixto Boas  He is due to resume his Revlimid for myeloma      Current Medications:    Current Outpatient Medications:     albuterol (PROVENTIL HFA,VENTOLIN HFA) 90 mcg/act inhaler, Inhale 2 puffs every 4 (four) hours as needed for wheezing, Disp: 1 Inhaler, Rfl: 6    apixaban (ELIQUIS) 5 mg, Take 1 tablet (5 mg total) by mouth 2 (two) times a day (Patient taking differently: Take 5 mg by mouth daily ), Disp: 60 tablet, Rfl: 3    cholecalciferol (VITAMIN D3) 1,000 units tablet, Take 5,000 Units by mouth daily  , Disp: , Rfl:     cyanocobalamin (VITAMIN B-12) 1,000 mcg tablet, Take by mouth daily, Disp: , Rfl:     diphenoxylate-atropine (LOMOTIL) 2 5-0 025 mg per tablet, Take 1 tablet by mouth 4 (four) times a day as needed for diarrhea, Disp: 30 tablet, Rfl: 0    finasteride (PROSCAR) 5 mg tablet, Take 1 tablet (5 mg total) by mouth daily at bedtime, Disp: 90 tablet, Rfl: 3    guaiFENesin (MUCINEX) 600 mg 12 hr tablet, Take 1 tablet (600 mg total) by mouth every 12 (twelve) hours (Patient taking differently: Take 600 mg by mouth every 12 (twelve) hours as needed  ), Disp: 60 tablet, Rfl: 0    ipratropium (ATROVENT) 0 02 % nebulizer solution, Take 1 vial (0 5 mg total) by nebulization 4 (four) times a day, Disp: 300 mL, Rfl: 6    Lactobacillus-Inulin ([facility]), Take by mouth daily, Disp: , Rfl:     lenalidomide (REVLIMID) 5 MG CAPS, 3 weeks on/3 weeks off adult male/auth# [id number] 1/2/19, Disp: 21 capsule, Rfl: 0    lidocaine-prilocaine (EMLA) cream, APPLY TO AREA AND COVER WITH TAPE 1 HOUR BEFORE APPOINTMENT , Disp: , Rfl: 2    melphalan (ALKERAN) 2 MG tablet, Take 12 mg daily for 4 days every 6 weeks, Disp: 24 tablet, Rfl: 3    predniSONE 5 mg tablet, Take 1 tablet (5 mg total) by mouth daily, Disp: 90 tablet, Rfl: 3    RAPAFLO 8 MG CAPS, Take 1 capsule by mouth daily at bedtime  , Disp: , Rfl: 3    azithromycin (ZITHROMAX) 250 mg tablet, Take 2 tablets today then 1 tablet daily x 4 days, Disp: 6 tablet, Rfl: 0    Review of Systems:  Aside from what is mentioned in the HPI is otherwise negative    Past medical history, surgical history, and family history were reviewed and updated as appropriate    Social history updates:  Social History     Tobacco Use   Smoking Status Former Smoker    Packs/day: 1 00    Years: 50 00    Pack years: 50 00    Types: Cigarettes    Start date: 57    Last attempt to quit: 2010    Years since quittin 1   Smokeless Tobacco Never Used   Tobacco Comment    quit in        PhysicalExamination:  Vitals:   /68   Pulse 99   Temp 97 8 °F (36 6 °C)   Resp 20   Ht 5' 7" (1 702 m)   Wt 80 8 kg (178 lb 3 2 oz)   SpO2 99%   BMI 27 91 kg/m²     Gen: Comfortable  Non-labored  HEENT: PERRL  O/P: clear  moist  Neck: Trachea is midline  No JVD  No adenopathy  Chest:  Occasional wet cough  Lungs are otherwise clear but slightly shallow with distant breath sounds  Cardiac:  Regular  Has murmur of aortic stenosis  Abdomen: Soft and nontender  Bowel sounds are present  Extremities:  2+ edema  Neuro:  Nonfocal  Skin:  No rash    Diagnostic Data:  Labs: I personally reviewed the most recent laboratory data pertinent to today's visit    Lab Results   Component Value Date    WBC 4 45 2020    HGB 10 0 (L) 2020    HCT 31 5 (L) 2020     (H) 2020     (L) 2020     Lab Results   Component Value Date    SODIUM 142 2020    K 3 6 2020    CO2 27 2020     (H) 2020    BUN 14 2020    CREATININE 0 97 2020    CALCIUM 9 2 2020       Imaging:  I personally reviewed the images on the [facility] system pertinent to today's visit  I did review his x-ray and abdominal CT scan on the [facility] system  This abdominal CT was notable for basilar pneumonia  Other studies:  I reviewed his echocardiogram from 2019  This is notable for moderate aortic stenosis      Raul Herrera MD

## 2020-01-23 ENCOUNTER — TELEPHONE (OUTPATIENT)
Dept: HEMATOLOGY ONCOLOGY | Facility: CLINIC | Age: 85
End: 2020-01-23

## 2020-01-23 NOTE — TELEPHONE ENCOUNTER
Patient's wife Karolyn Mcneill called stating the insurance company need's a letter of medical necessity for lidocaine-prilocaine for port  Karolyn Mcneill can be reached at [phone]

## 2020-01-24 ENCOUNTER — DOCUMENTATION (OUTPATIENT)
Dept: HEMATOLOGY ONCOLOGY | Facility: CLINIC | Age: 85
End: 2020-01-24

## 2020-01-24 NOTE — PROGRESS NOTES
1/23/2020 received notification from cover my meds that [facility] started an auth for lidocaine prilocaine cream    Pt has [employer]  ID # [id number]    Submitted for auth through cover my meds  1/24/2020 received approval letter from cover my meds    Itz Bolden #[id number] is valid from 1/23/2020 through 4/23/2020    [facility] pharmacy @ 3:51 ([phone])   Had to leave a message on the pharmacist's vm

## 2020-02-12 ENCOUNTER — APPOINTMENT (EMERGENCY)
Dept: RADIOLOGY | Facility: HOSPITAL | Age: 85
End: 2020-02-12
Payer: MEDICARE

## 2020-02-12 ENCOUNTER — APPOINTMENT (EMERGENCY)
Dept: NON INVASIVE DIAGNOSTICS | Facility: HOSPITAL | Age: 85
End: 2020-02-12
Payer: MEDICARE

## 2020-02-12 ENCOUNTER — HOSPITAL ENCOUNTER (EMERGENCY)
Facility: HOSPITAL | Age: 85
Discharge: HOME | End: 2020-02-13
Attending: EMERGENCY MEDICINE | Admitting: EMERGENCY MEDICINE
Payer: MEDICARE

## 2020-02-12 VITALS
BODY MASS INDEX: 28.04 KG/M2 | WEIGHT: 179.01 LBS | HEART RATE: 88 BPM | TEMPERATURE: 97.5 F | SYSTOLIC BLOOD PRESSURE: 116 MMHG | DIASTOLIC BLOOD PRESSURE: 58 MMHG | OXYGEN SATURATION: 99 % | RESPIRATION RATE: 18 BRPM

## 2020-02-12 DIAGNOSIS — M25.522 LEFT ELBOW PAIN: Primary | ICD-10-CM

## 2020-02-12 PROCEDURE — 93971 EXTREMITY STUDY: CPT

## 2020-02-12 PROCEDURE — 99284 EMERGENCY DEPT VISIT MOD MDM: CPT | Performed by: EMERGENCY MEDICINE

## 2020-02-12 PROCEDURE — 99284 EMERGENCY DEPT VISIT MOD MDM: CPT

## 2020-02-12 PROCEDURE — 73080 X-RAY EXAM OF ELBOW: CPT

## 2020-02-12 PROCEDURE — 73030 X-RAY EXAM OF SHOULDER: CPT

## 2020-02-13 DIAGNOSIS — C90.00 MULTIPLE MYELOMA, REMISSION STATUS UNSPECIFIED (HCC): ICD-10-CM

## 2020-02-13 PROCEDURE — 93971 EXTREMITY STUDY: CPT | Performed by: SURGERY

## 2020-02-13 RX ORDER — LENALIDOMIDE 5 MG/1
CAPSULE ORAL
Qty: 21 CAPSULE | Refills: 0 | Status: SHIPPED | OUTPATIENT
Start: 2020-02-13 | End: 2020-01-01 | Stop reason: SDUPTHER

## 2020-02-13 RX ORDER — MELPHALAN USP, 2 MG 2 MG/1
TABLET ORAL
Qty: 24 TABLET | Refills: 3 | Status: SHIPPED | OUTPATIENT
Start: 2020-02-13 | End: 2020-01-01 | Stop reason: SDUPTHER

## 2020-02-13 NOTE — ED PROVIDER NOTES
History  Chief Complaint   Patient presents with    Arm Pain     Reports pain in area of L AC and L shoulder pain since last night, chronic shortness of breath related to COPD  This is an 27-year-old male with a history of COPD, multiple myeloma, PE on Eliquis who presents with left elbow pain  The patient states that starting last night, he started to experience left elbow pain located in his antecubital fossa  Denies any inciting event  He states that the pain is sharp and radiates into his left shoulder  Pain is made worse with moving the left elbow and palpation  Denies any swelling to the area  The patient states that he was caring groceries yesterday  Denies any issues with the shoulder or the elbow in the past   Denies any chest pain or worsening shortness of breath from baseline  Patient and wife are worried about a blood clot  Denies fever/chills, nausea/vomiting, lightheadedness/dizziness, numbness/weakness, headache, change in vision, URI symptoms, neck pain, chest pain, palpitations, shortness of breath, cough, back pain, flank pain, abdominal pain, diarrhea, hematochezia, melena, dysuria, hematuria  Prior to Admission Medications   Prescriptions Last Dose Informant Patient Reported? Taking?    Lactobacillus-Inulin ([address] [place] [address])  Spouse/Significant Other Yes Yes   Sig: Take by mouth daily   RAPAFLO 8 MG CAPS  Spouse/Significant Other Yes Yes   Sig: Take 1 capsule by mouth daily at bedtime     albuterol (PROVENTIL HFA,VENTOLIN HFA) 90 mcg/act inhaler  Spouse/Significant Other No Yes   Sig: Inhale 2 puffs every 4 (four) hours as needed for wheezing   apixaban (ELIQUIS) 5 mg  Spouse/Significant Other No Yes   Sig: Take 1 tablet (5 mg total) by mouth 2 (two) times a day   Patient taking differently: Take 5 mg by mouth daily    cholecalciferol (VITAMIN D3) 1,000 units tablet  Spouse/Significant Other Yes Yes   Sig: Take 5,000 Units by mouth daily     cyanocobalamin (VITAMIN B-12) 1,000 mcg tablet  Spouse/Significant Other Yes Yes   Sig: Take by mouth daily   diphenoxylate-atropine (LOMOTIL) 2 5-0 025 mg per tablet  Spouse/Significant Other No Yes   Sig: Take 1 tablet by mouth 4 (four) times a day as needed for diarrhea   finasteride (PROSCAR) 5 mg tablet  Spouse/Significant Other No Yes   Sig: Take 1 tablet (5 mg total) by mouth daily at bedtime   guaiFENesin (MUCINEX) 600 mg 12 hr tablet  Spouse/Significant Other No Yes   Sig: Take 1 tablet (600 mg total) by mouth every 12 (twelve) hours   Patient taking differently: Take 600 mg by mouth every 12 (twelve) hours as needed     ipratropium (ATROVENT) 0 02 % nebulizer solution  Spouse/Significant Other No Yes   Sig: Take 1 vial (0 5 mg total) by nebulization 4 (four) times a day   lenalidomide (REVLIMID) 5 MG CAPS  Spouse/Significant Other No Yes   Sig: 3 weeks on/3 weeks off adult male/auth# [id number] 1/2/19   lidocaine-prilocaine (EMLA) cream   No Yes   Sig: Apply topically as needed for mild pain   melphalan (ALKERAN) 2 MG tablet  Spouse/Significant Other No Yes   Sig: Take 12 mg daily for 4 days every 6 weeks   predniSONE 5 mg tablet  Spouse/Significant Other No Yes   Sig: Take 1 tablet (5 mg total) by mouth daily      Facility-Administered Medications: None       Past Medical History:   Diagnosis Date    Chronic pain disorder     3 fractured vertebrae    COPD (chronic obstructive pulmonary disease) (HCC)     Diarrhea     Enlarged prostate     Kidney stone     Multiple myeloma (HCC)     [facility]monary embolism ([facility] ) 12/24/2018    Rectal adenocarcinoma ([facility] )     Sleep apnea     no cpap       Past Surgical History:   Procedure Laterality Date    CHOLECYSTECTOMY      COLONOSCOPY W/ ENDOSCOPIC US N/A 7/21/2017    Procedure: ANAL ENDOSCOPIC U/S;  Surgeon: Javier Mcclellan MD;  [facility]: [facility];   Service: Colorectal    HERNIA REPAIR      KIDNEY STONE SURGERY      LUNG LOBECTOMY Right     MN COLONOSCOPY FLX DX W/COLLJ SPEC WHEN PFRMD N/A 7/10/2017    Procedure: COLONOSCOPY;  Surgeon: Margaret Siegel MD;  Location: [facility]; Service: Gastroenterology    [place] RECTAL TUMOR EXCISION, TRANSANAL ENDOSCOPIC MICROSURGICAL, FULL THICK N/A 2017    Procedure: TRANSANAL ENDOSCOPIC MICROSURGERY TEM;  Surgeon: Austin Hill MD;  [facility]: [facility];  Service: Colorectal       Family History   Problem Relation Age of Onset    Arthritis Mother     Colon cancer Father     Heart attack Father     Diabetes type I Sister      I have reviewed and agree with the history as documented  Social History     Tobacco Use    Smoking status: Former Smoker     Packs/day: 1 00     Years: 50 00     Pack years: 50 00     Types: Cigarettes     Start date:      Last attempt to quit: 2010     Years since quittin 1    Smokeless tobacco: Never Used    Tobacco comment: quit in    Substance Use Topics    Alcohol use: Not Currently     Comment: rarely    Drug use: No       Review of Systems   Constitutional: Negative for chills, fatigue and fever  HENT: Negative for rhinorrhea, sore throat and trouble swallowing  Eyes: Negative for photophobia and visual disturbance  Respiratory: Negative for cough, chest tightness and shortness of breath  Cardiovascular: Negative for chest pain, palpitations and leg swelling  Gastrointestinal: Negative for abdominal pain, blood in stool, diarrhea, nausea and vomiting  Endocrine: Negative for polyuria  Genitourinary: Negative for dysuria, flank pain and hematuria  Musculoskeletal: Positive for arthralgias  Negative for back pain and neck pain  Skin: Negative for color change and rash  Allergic/Immunologic: Negative for immunocompromised state  Neurological: Negative for dizziness, weakness, light-headedness, numbness and headaches  All other systems reviewed and are negative        Physical Exam  Physical Exam   Constitutional: Vital signs are normal  He appears well-developed and well-nourished  He is cooperative  No distress  HENT:   Mouth/Throat: Uvula is midline and oropharynx is clear and moist    Eyes: Pupils are equal, round, and reactive to light  Conjunctivae, EOM and lids are normal    Neck: Trachea normal  No thyroid mass and no thyromegaly present  Cardiovascular: Normal rate, regular rhythm, normal heart sounds, intact distal pulses and normal pulses  No murmur heard  Pulmonary/Chest: Effort normal and breath sounds normal    Abdominal: Soft  Normal appearance and bowel sounds are normal  There is no tenderness  There is no rebound, no guarding, no CVA tenderness and negative Oliveros's sign  Musculoskeletal:   No evidence of trauma, ecchymosis, deformity, swelling to left upper extremity  Patient has full range of motion of left elbow and left shoulder  He does complain of pain when moving his left elbow  The patient does have tenderness in the left antecubital fossa  No masses appreciated  Neurovascularly intact distal to left elbow  Left hand is pink, warm, perfusing well  Neurological: He is alert  Skin: Skin is warm, dry and intact  Psychiatric: He has a normal mood and affect  His speech is normal and behavior is normal  Thought content normal        Vital Signs  ED Triage Vitals [02/12/20 2236]   Temperature Pulse Respirations Blood Pressure SpO2   97 5 °F (36 4 °C) 88 18 116/58 99 %      Temp Source Heart Rate Source Patient Position - Orthostatic VS BP Location FiO2 (%)   Oral Monitor Sitting Right arm --      Pain Score       No Pain           Vitals:    02/12/20 2236   BP: 116/58   Pulse: 88   Patient Position - Orthostatic VS: Sitting         Visual Acuity      ED Medications  Medications - No data to display    Diagnostic Studies  Results Reviewed     None                 XR elbow 3+ views LEFT   ED Interpretation by Lázaro Escalante MD (02/12 2285)   No acute osseous abnormality as interpreted by myself        XR shoulder 2+ views LEFT   ED Interpretation by Connie Alfaro MD (02/12 2357)   No acute osseous abnormality as interpreted by myself  VAS upper limb venous duplex scan, unilateral/limited    (Results Pending)              Procedures  Procedures         ED Course  ED Course as of Feb 13 0000 Wed Feb 12, 2020   2317 No DVT per vascular tech  [facility]  Number of Diagnoses or Management Options  Diagnosis management comments: X-rays of left shoulder and left elbow  Duplex of left upper extremity  The patient will ultimately be discharged  Disposition  Final diagnoses:   Left elbow pain     Time reflects when diagnosis was documented in both MDM as applicable and the Disposition within this note     Time User Action Codes Description Comment    2/12/2020 11:57 PM Marco West Add [[id number]] Left elbow pain       ED Disposition     ED Disposition Condition Date/Time Comment    Discharge Stable Wed Feb 12, 2020 11:57  [address] discharge to home/self care  Follow-up Information     Follow up With Specialties Details Why Contact Info Additional Information    Chiara Boyce DO Family Medicine Schedule an appointment as soon as possible for a visit   [address] [address] Emergency Department Emergency Medicine Go to  If symptoms worsen [facility] [address]  [phone] [place] [facility], [address] [facility] , [place], [place], [address]          Patient's Medications   Discharge Prescriptions    No medications on file     No discharge procedures on file      PDMP Review     None          ED Provider  Electronically Signed by           Connie Alfaro MD  02/13/20 0000

## 2020-02-16 ENCOUNTER — HOSPITAL ENCOUNTER (EMERGENCY)
Facility: HOSPITAL | Age: 85
Discharge: HOME | End: 2020-02-16
Attending: EMERGENCY MEDICINE
Payer: MEDICARE

## 2020-02-16 VITALS
DIASTOLIC BLOOD PRESSURE: 55 MMHG | WEIGHT: 176.37 LBS | TEMPERATURE: 98.6 F | SYSTOLIC BLOOD PRESSURE: 114 MMHG | HEART RATE: 102 BPM | OXYGEN SATURATION: 99 % | BODY MASS INDEX: 27.62 KG/M2 | RESPIRATION RATE: 16 BRPM

## 2020-02-16 DIAGNOSIS — B02.9 HERPES ZOSTER WITHOUT COMPLICATION: Primary | ICD-10-CM

## 2020-02-16 PROCEDURE — 99282 EMERGENCY DEPT VISIT SF MDM: CPT

## 2020-02-16 PROCEDURE — 99284 EMERGENCY DEPT VISIT MOD MDM: CPT | Performed by: EMERGENCY MEDICINE

## 2020-02-16 RX ORDER — TRAMADOL HYDROCHLORIDE 50 MG/1
50 TABLET ORAL EVERY 6 HOURS PRN
Qty: 15 TABLET | Refills: 0 | Status: SHIPPED | OUTPATIENT
Start: 2020-02-16 | End: 2020-02-26

## 2020-02-16 RX ORDER — VALACYCLOVIR HYDROCHLORIDE 1 G/1
1000 TABLET, FILM COATED ORAL EVERY 8 HOURS SCHEDULED
Qty: 21 TABLET | Refills: 0 | Status: SHIPPED | OUTPATIENT
Start: 2020-02-16 | End: 2020-01-01 | Stop reason: ALTCHOICE

## 2020-02-16 NOTE — ED PROVIDER NOTES
History  Chief Complaint   Patient presents with    Rash     Patient seen in ED a couple days ago for left arm pain, patient now experiencing painful rash  Also reports temp of 100 2       81-year-old male with a history of COPD, multiple myeloma on chemotherapy presents to the emergency department with rash to left arm  Patient was seen here on February 12th for pain in the left elbow and had a venous duplex to rule out DVT  Patient is also being treated for bronchitis with Zithromax  He took his 1st dose yesterday  He has had a temp at home of 100 2 without chills  No increasing shortness of breath over baseline  Patient's white believes he may be having an allergic reaction to the ultrasound gel  Patient states the pain in his left elbow is not as severe as it was when he came in on February 12th  History provided by:  Patient and spouse   used: No    Rash   Location:  Shoulder/arm  Shoulder/arm rash location:  L upper arm, L elbow and L forearm  Quality: blistering and painful    Pain details:     Quality:  Burning    Onset quality:  Unable to specify    Duration:  1 day    Timing:  Constant    Progression:  Unchanged  Duration:  1 day  Timing:  Constant  Progression:  Unchanged  Context comment:  Possible reaction to ultrasound gel  Relieved by:  None tried  Worsened by:  Nothing  Ineffective treatments:  None tried  Associated symptoms: fever    Associated symptoms: no abdominal pain, no diarrhea, no fatigue, no headaches, no induration, no joint pain, no myalgias, no nausea, no shortness of breath, no sore throat, no throat swelling, no tongue swelling, no URI, not vomiting and not wheezing        Prior to Admission Medications   Prescriptions Last Dose Informant Patient Reported? Taking?    Lactobacillus-Inulin ([address] PO)  Spouse/Significant Other Yes No   Sig: Take by mouth daily   RAPAFLO 8 MG CAPS  Spouse/Significant Other Yes No   Sig: Take 1 capsule by mouth daily at bedtime     albuterol (PROVENTIL HFA,VENTOLIN HFA) 90 mcg/act inhaler  Spouse/Significant Other No No   Sig: Inhale 2 puffs every 4 (four) hours as needed for wheezing   apixaban (ELIQUIS) 5 mg  Spouse/Significant Other No No   Sig: Take 1 tablet (5 mg total) by mouth 2 (two) times a day   Patient taking differently: Take 5 mg by mouth daily    cholecalciferol (VITAMIN D3) 1,000 units tablet  Spouse/Significant Other Yes No   Sig: Take 5,000 Units by mouth daily     cyanocobalamin (VITAMIN B-12) 1,000 mcg tablet  Spouse/Significant Other Yes No   Sig: Take by mouth daily   diphenoxylate-atropine (LOMOTIL) 2 5-0 025 mg per tablet  Spouse/Significant Other No No   Sig: Take 1 tablet by mouth 4 (four) times a day as needed for diarrhea   finasteride (PROSCAR) 5 mg tablet  Spouse/Significant Other No No   Sig: Take 1 tablet (5 mg total) by mouth daily at bedtime   guaiFENesin (MUCINEX) 600 mg 12 hr tablet  Spouse/Significant Other No No   Sig: Take 1 tablet (600 mg total) by mouth every 12 (twelve) hours   Patient taking differently: Take 600 mg by mouth every 12 (twelve) hours as needed     ipratropium (ATROVENT) 0 02 % nebulizer solution  Spouse/Significant Other No No   Sig: Take 1 vial (0 5 mg total) by nebulization 4 (four) times a day   lenalidomide (REVLIMID) 5 MG CAPS   No No   Sig: 3 weeks on/3 weeks off adult male/auth# [id number] 2/13/20   lidocaine-prilocaine (EMLA) cream   No No   Sig: Apply topically as needed for mild pain   melphalan (ALKERAN) 2 MG tablet   No No   Sig: Take 12 mg daily for 4 days every 6 weeks   predniSONE 5 mg tablet  Spouse/Significant Other No No   Sig: Take 1 tablet (5 mg total) by mouth daily      Facility-Administered Medications: None       Past Medical History:   Diagnosis Date    Chronic pain disorder     3 fractured vertebrae    COPD (chronic obstructive pulmonary disease) (HCC)     Diarrhea     Enlarged prostate     Kidney stone     Multiple myeloma (HCC)  Pulmonary embolism ([facility] [address] ) 2018    Rectal adenocarcinoma ([facility] [address] )     Sleep apnea     no cpap       Past Surgical History:   Procedure Laterality Date    CHOLECYSTECTOMY      COLONOSCOPY W/ ENDOSCOPIC US N/A 2017    Procedure: ANAL ENDOSCOPIC U/S;  Surgeon: Yuniel Gonzalez MD;  Location: [facility]; Service: Colorectal    HERNIA REPAIR      KIDNEY STONE SURGERY      LUNG LOBECTOMY Right     VT COLONOSCOPY FLX DX W/COLLJ SPEC WHEN PFRMD N/A 7/10/2017    Procedure: COLONOSCOPY;  Surgeon: Boby Olivier MD;  Location: [facility]; Service: Gastroenterology    VT RECTAL TUMOR EXCISION, TRANSANAL ENDOSCOPIC MICROSURGICAL, FULL THICK N/A 2017    Procedure: TRANSANAL ENDOSCOPIC MICROSURGERY TEM;  Surgeon: Yuniel Gonzalez MD;  Location: [facility];  Service: Colorectal       Family History   Problem Relation Age of Onset    Arthritis Mother     Colon cancer Father     Heart attack Father     Diabetes type I Sister      I have reviewed and agree with the history as documented  Social History     Tobacco Use    Smoking status: Former Smoker     Packs/day: 1 00     Years: 50 00     Pack years: 50 00     Types: Cigarettes     Start date:      Last attempt to quit: 2010     Years since quittin 2    Smokeless tobacco: Never Used    Tobacco comment: quit in    Substance Use Topics    Alcohol use: Not Currently     Comment: rarely    Drug use: No       Review of Systems   Constitutional: Positive for fever  Negative for chills and fatigue  HENT: Negative  Negative for sore throat  Eyes: Negative  Respiratory: Negative  Negative for shortness of breath and wheezing  Cardiovascular: Negative  Gastrointestinal: Negative  Negative for abdominal pain, diarrhea, nausea and vomiting  Genitourinary: Negative  Musculoskeletal: Negative for arthralgias, myalgias and neck pain  Skin: Positive for rash  Allergic/Immunologic: Negative  Neurological: Negative  Negative for weakness, numbness and headaches  Hematological: Negative  Psychiatric/Behavioral: Negative  All other systems reviewed and are negative  Physical Exam  Physical Exam   Constitutional: He is oriented to person, place, and time  He appears well-developed and well-nourished  Non-toxic appearance  He does not have a sickly appearance  He does not appear ill  No distress  HENT:   Head: Normocephalic and atraumatic  Right Ear: External ear normal    Left Ear: External ear normal    Mouth/Throat: Oropharynx is clear and moist    Eyes: Pupils are equal, round, and reactive to light  Conjunctivae and EOM are normal  No scleral icterus  Neck: Normal range of motion  Neck supple  No JVD present  No thyromegaly present  Cardiovascular: Normal rate, regular rhythm and normal heart sounds  Pulmonary/Chest: Effort normal and breath sounds normal  He has no wheezes  He has no rales  Abdominal: Soft  Bowel sounds are normal  He exhibits no distension and no mass  There is no tenderness  No hernia  Musculoskeletal: Normal range of motion  He exhibits no edema, tenderness or deformity  Patient has blistering/vesicular rash to the entire left arm  The rash does not cross the midline  Lymphadenopathy:     He has no cervical adenopathy  Neurological: He is alert and oriented to person, place, and time  He has normal strength and normal reflexes  He exhibits normal muscle tone  Skin: Skin is warm and dry  No rash noted  He is not diaphoretic  No erythema  No pallor  Psychiatric: He has a normal mood and affect  Nursing note and vitals reviewed                Vital Signs  ED Triage Vitals [02/16/20 1228]   Temperature Pulse Respirations Blood Pressure SpO2   98 6 °F (37 °C) 102 16 114/55 99 %      Temp Source Heart Rate Source Patient Position - Orthostatic VS BP Location FiO2 (%)   Temporal Monitor Sitting Right arm --      Pain Score       4           Vitals:    02/16/20 1228   BP: 114/55   Pulse: 102   Patient Position - Orthostatic VS: Sitting         Visual Acuity      ED Medications  Medications - No data to display    Diagnostic Studies  Results Reviewed     None                 No orders to display              Procedures  Procedures         ED Course                               MDM  Number of Diagnoses or Management Options  Herpes zoster without complication:   Diagnosis management comments: 45-year-old male presents with rash to the left arm  He was seen here on the 12th for left elbow pain and had a venous duplex which was negative for DVT  He now has developed a rash which is less painful  States he did have a fever at home of 100 2 but he is also being treated for bronchitis with Zithromax  He otherwise is in his normal state of health  He looks well on exam   His lungs are clear  He does have a vesicular/blistering rash to the entire left arm which does not cross the midline  Given that his pain started before the rash this could be shingles  Will start patient on valtrex        Disposition  Final diagnoses:   Herpes zoster without complication     Time reflects when diagnosis was documented in both MDM as applicable and the Disposition within this note     Time User Action Codes Description Comment    2/16/2020  1:02 PM Kimi DÍAZ Add [B02 9] Herpes zoster without complication       ED Disposition     ED Disposition Condition Date/Time Comment    Discharge Good Sun Feb 16, 2020  1:02  [facility] [address] discharge to home/self care  Follow-up Information     Follow up With Specialties Details Why Contact Info    María Elena Galloway DO Family Medicine Schedule an appointment as soon as possible for a visit in 1 day  [address]    [address] [place] [address] Rd  [phone]            Patient's Medications   Discharge Prescriptions    TRAMADOL (ULTRAM) 50 MG TABLET    Take 1 tablet (50 mg total) by mouth every 6 (six) hours as needed for moderate pain for up to 10 days       Start Date: 2/16/2020 End Date: 2/26/2020       Order Dose: 50 mg       Quantity: 15 tablet    Refills: 0    VALACYCLOVIR (VALTREX) 1,000 MG TABLET    Take 1 tablet (1,000 mg total) by mouth every 8 (eight) hours for 7 days       Start Date: 2/16/2020 End Date: 2/23/2020       Order Dose: 1,000 mg       Quantity: 21 tablet    Refills: 0     No discharge procedures on file      PDMP Review     None          ED Provider  Electronically Signed by           Benji Read DO  02/16/20 8903

## 2020-02-18 DIAGNOSIS — J43.9 PULMONARY EMPHYSEMA, UNSPECIFIED EMPHYSEMA TYPE (HCC): Chronic | ICD-10-CM

## 2020-02-18 RX ORDER — PREDNISONE 1 MG/1
5 TABLET ORAL DAILY
Qty: 90 TABLET | Refills: 3 | Status: SHIPPED | OUTPATIENT
Start: 2020-02-18 | End: 2020-01-01 | Stop reason: HOSPADM

## 2020-02-24 ENCOUNTER — APPOINTMENT (OUTPATIENT)
Dept: LAB | Facility: MEDICAL CENTER | Age: 85
End: 2020-02-24
Payer: MEDICARE

## 2020-02-24 DIAGNOSIS — C90.01 MULTIPLE MYELOMA IN REMISSION (HCC): ICD-10-CM

## 2020-02-24 DIAGNOSIS — D80.9 IMMUNOGLOBULIN DEFICIENCY (HCC): ICD-10-CM

## 2020-02-24 DIAGNOSIS — D80.1 HYPOGAMMAGLOBULINEMIA (HCC): ICD-10-CM

## 2020-02-24 LAB
ALBUMIN SERPL BCP-MCNC: 3.1 G/DL (ref 3.5–5)
ALP SERPL-CCNC: 74 U/L (ref 46–116)
ALT SERPL W P-5'-P-CCNC: 19 U/L (ref 12–78)
ANION GAP SERPL CALCULATED.3IONS-SCNC: 6 MMOL/L (ref 4–13)
AST SERPL W P-5'-P-CCNC: 13 U/L (ref 5–45)
BASOPHILS # BLD AUTO: 0.01 THOUSANDS/ΜL (ref 0–0.1)
BASOPHILS NFR BLD AUTO: 0 % (ref 0–1)
BILIRUB SERPL-MCNC: 0.92 MG/DL (ref 0.2–1)
BUN SERPL-MCNC: 15 MG/DL (ref 5–25)
CALCIUM SERPL-MCNC: 9.3 MG/DL (ref 8.3–10.1)
CHLORIDE SERPL-SCNC: 109 MMOL/L (ref 100–108)
CO2 SERPL-SCNC: 25 MMOL/L (ref 21–32)
CREAT SERPL-MCNC: 0.98 MG/DL (ref 0.6–1.3)
EOSINOPHIL # BLD AUTO: 0 THOUSAND/ΜL (ref 0–0.61)
EOSINOPHIL NFR BLD AUTO: 0 % (ref 0–6)
ERYTHROCYTE [DISTWIDTH] IN BLOOD BY AUTOMATED COUNT: 15.9 % (ref 11.6–15.1)
GFR SERPL CREATININE-BSD FRML MDRD: 71 ML/MIN/1.73SQ M
GLUCOSE P FAST SERPL-MCNC: 92 MG/DL (ref 65–99)
HCT VFR BLD AUTO: 34.2 % (ref 36.5–49.3)
HGB BLD-MCNC: 11.1 G/DL (ref 12–17)
IGA SERPL-MCNC: 187 MG/DL (ref 70–400)
IGG SERPL-MCNC: 925 MG/DL (ref 700–1600)
IGM SERPL-MCNC: 48 MG/DL (ref 40–230)
IMM GRANULOCYTES # BLD AUTO: 0.02 THOUSAND/UL (ref 0–0.2)
IMM GRANULOCYTES NFR BLD AUTO: 0 % (ref 0–2)
LYMPHOCYTES # BLD AUTO: 0.81 THOUSANDS/ΜL (ref 0.6–4.47)
LYMPHOCYTES NFR BLD AUTO: 18 % (ref 14–44)
MCH RBC QN AUTO: 32.5 PG (ref 26.8–34.3)
MCHC RBC AUTO-ENTMCNC: 32.5 G/DL (ref 31.4–37.4)
MCV RBC AUTO: 100 FL (ref 82–98)
MONOCYTES # BLD AUTO: 0.5 THOUSAND/ΜL (ref 0.17–1.22)
MONOCYTES NFR BLD AUTO: 11 % (ref 4–12)
NEUTROPHILS # BLD AUTO: 3.17 THOUSANDS/ΜL (ref 1.85–7.62)
NEUTS SEG NFR BLD AUTO: 71 % (ref 43–75)
NRBC BLD AUTO-RTO: 0 /100 WBCS
PLATELET # BLD AUTO: 117 THOUSANDS/UL (ref 149–390)
PMV BLD AUTO: 10.8 FL (ref 8.9–12.7)
POTASSIUM SERPL-SCNC: 3.9 MMOL/L (ref 3.5–5.3)
PROT SERPL-MCNC: 6.7 G/DL (ref 6.4–8.2)
RBC # BLD AUTO: 3.42 MILLION/UL (ref 3.88–5.62)
SODIUM SERPL-SCNC: 140 MMOL/L (ref 136–145)
WBC # BLD AUTO: 4.51 THOUSAND/UL (ref 4.31–10.16)

## 2020-02-24 PROCEDURE — 36415 COLL VENOUS BLD VENIPUNCTURE: CPT

## 2020-02-24 PROCEDURE — 82784 ASSAY IGA/IGD/IGG/IGM EACH: CPT

## 2020-02-24 PROCEDURE — 83883 ASSAY NEPHELOMETRY NOT SPEC: CPT

## 2020-02-24 PROCEDURE — 85025 COMPLETE CBC W/AUTO DIFF WBC: CPT

## 2020-02-24 PROCEDURE — 80053 COMPREHEN METABOLIC PANEL: CPT

## 2020-02-25 LAB
KAPPA LC FREE SER-MCNC: 20.1 MG/L (ref 3.3–19.4)
KAPPA LC FREE/LAMBDA FREE SER: 0.82 {RATIO} (ref 0.26–1.65)
LAMBDA LC FREE SERPL-MCNC: 24.4 MG/L (ref 5.7–26.3)

## 2020-02-27 ENCOUNTER — OFFICE VISIT (OUTPATIENT)
Dept: HEMATOLOGY ONCOLOGY | Facility: CLINIC | Age: 85
End: 2020-02-27
Payer: MEDICARE

## 2020-02-27 VITALS
SYSTOLIC BLOOD PRESSURE: 112 MMHG | OXYGEN SATURATION: 97 % | HEIGHT: 67 IN | BODY MASS INDEX: 27.15 KG/M2 | WEIGHT: 173 LBS | HEART RATE: 69 BPM | DIASTOLIC BLOOD PRESSURE: 62 MMHG | RESPIRATION RATE: 14 BRPM | TEMPERATURE: 97.8 F

## 2020-02-27 DIAGNOSIS — C90.02 MULTIPLE MYELOMA IN RELAPSE (HCC): Primary | ICD-10-CM

## 2020-02-27 DIAGNOSIS — D80.1 HYPOGAMMAGLOBULINEMIA (HCC): ICD-10-CM

## 2020-02-27 PROCEDURE — 99214 OFFICE O/P EST MOD 30 MIN: CPT | Performed by: INTERNAL MEDICINE

## 2020-02-27 RX ORDER — AZITHROMYCIN 250 MG/1
TABLET, FILM COATED ORAL
COMMUNITY
Start: 2020-01-21 | End: 2020-05-19 | Stop reason: ALTCHOICE

## 2020-02-27 RX ORDER — METHYLPREDNISOLONE 4 MG/1
TABLET ORAL
COMMUNITY
Start: 2020-02-22 | End: 2020-05-19 | Stop reason: ALTCHOICE

## 2020-02-27 NOTE — PROGRESS NOTES
Hematology Outpatient Follow - Up Note  Hilton Matthew 81 y o  male MRN: @ Encounter: [id number]        Date:  2/27/2020        Assessment/ Plan:    1  Heavily pretreated lambda light chain multiple myeloma stage III, on lenalidomide 5 mg p o  Daily 3 weeks on 3 weeks off, melphalan 12 mg p o  Daily for 4 days every 6 weeks, prednisone 5 mg p o  Daily, with normalization of lambda light chain,    Recent history of herpes zoster of the left upper extremity, I will hold lenalidomide as well as melphalan for the next 3 weeks    2  I will see the patient back in 3 weeks for re-evaluation    3  Hypogammaglobinemia continue IVIG 20 g every 6 weeks    4  Pulmonary emboli, continue apixaban 5 mg p o  Daily the dose was reduced to once a day for epistaxis and expenses    5  Port-A-Cath in place flush the Port-A-Cath every 3 months    Number 6  History of adenocarcinoma the right lower lobe status post resection with chemotherapy and radiation therapy completed on 03/2015, followed by Pulmonary with COPD             HPI: #1 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, multiple compression fractures, status post kyphoplasty to L1, L2, L5, treated with radiation therapy to the left intra trochanteric area, received melphalan/Velcade/prednisone in October 2010 for 4 cycles with excellent response and normalization of lambda light chain, had been on Velcade maintenance till January 2013 when lambda light chain went up to 111,treated with Velcade 1 3 mg meter square weekly 3 weeks on one week off, Decadron 20 mg by mouth weekly, lambda light chain down to 30 then creeping up to 110, on Revlimid 15 mg by mouth every other day  Lambda light chain normal at 28 however he reported skin rash on the upper chest area we stopped the Revlimid for 2 month  Then reinitiated Revlimid 5 mg by mouth daily     #2  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 stage I, with progression by scan, status post resection of the right upper lobe with few foci in the same lobe consistent with stage IIIA done in January 2015 adenocarcinoma, well differentiated  Status post Alimta/carboplatin x4 cycles finished in May 2015 and radiation therapy      CTA 3/3/2019:  GERARD           #3  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     #4  Rectal adenocarcinoma status post resection stage I in November 2017     #5  Pulmonary embolism involving the subsegmental left lower lobe in December 2018, currently on apixaban 5 mg once a day because of epistaxis     #6  Hematuria secondary to benign prostatic hypertrophy and small bladder stones and being on apixaban, apixaban was reduced to 5 milligram once a day      Interval History:  Herpes zoster involving the left upper extremity        Previous Treatment:         Test Results:    Imaging: Xr Shoulder 2+ Views Left    Result Date: 2/13/2020  Narrative: LEFT ELBOW INDICATION:   elbow pain  COMPARISON:  None VIEWS:  XR ELBOW 3+ VW LEFT, XR SHOULDER 2+ VW LEFT FINDINGS: There is no acute fracture or dislocation  There is no joint effusion  Changes of mild hydroxyapatite deposition disease in the rotator cuff tendons noted  No lytic or blastic lesions are seen  Soft tissues are unremarkable  Impression: No acute osseous abnormality  Workstation performed: [id number]     Xr Elbow 3+ Views Left    Result Date: 2/13/2020  Narrative: LEFT ELBOW INDICATION:   elbow pain  COMPARISON:  None VIEWS:  XR ELBOW 3+ VW LEFT, XR SHOULDER 2+ VW LEFT FINDINGS: There is no acute fracture or dislocation  There is no joint effusion  Changes of mild hydroxyapatite deposition disease in the rotator cuff tendons noted  No lytic or blastic lesions are seen  Soft tissues are unremarkable  Impression: No acute osseous abnormality   Workstation performed: [id number]     Vas Upper Limb Venous Duplex Scan, Unilateral/limited    Result Date: 2/13/2020  Narrative:  [facility] REPORT [facility]INICAL: Indications: Patient presents with left upper extremity pain x 1 day  Operative History: right lobe lung removed Risk Factors: Patient has history of PE, chronic venous insufficiency, CHF and previous smoking (quit >10 yrs ago)  FINDINGS:  Segment       Right            Left                        Impression       Impression       Int  Jugular  Normal (Patent)  Normal (Patent)     CONCLUSION:  Impression RIGHT UPPER LIMB LIMITED: Evaluation shows no evidence of thrombus in the internal jugular vein, subclavian vein, and the brachiocephalic vein  LEFT UPPER LIMB: No evidence of acute or chronic deep vein thrombosis  No evidence of superficial thrombophlebitis noted  Doppler evaluation shows a normal response to augmentation maneuvers  Technical findings given to his RN at the time of the exam and sent to chart  SIGNATURE: Electronically Signed by: Trino Berrios on 2020-02-13 04:33:51 PM      Labs:   Lab Results   Component Value Date    WBC 4 51 02/24/2020    HGB 11 1 (L) 02/24/2020    HCT 34 2 (L) 02/24/2020     (H) 02/24/2020     (L) 02/24/2020     Lab Results   Component Value Date     01/04/2016    K 3 9 02/24/2020     (H) 02/24/2020    CO2 25 02/24/2020    ANIONGAP 7 01/04/2016    BUN 15 02/24/2020    CREATININE 0 98 02/24/2020    GLUCOSE 88 01/04/2016    GLUF 92 02/24/2020    CALCIUM 9 3 02/24/2020    AST 13 02/24/2020    ALT 19 02/24/2020    ALKPHOS 74 02/24/2020    PROT 6 8 01/04/2016    BILITOT 0 63 01/04/2016    EGFR 71 02/24/2020       No results found for: IRON, TIBC, FERRITIN    No results found for: [id number]      ROS: Review of Systems   Constitutional: Positive for fatigue  Negative for appetite change, chills, diaphoresis, fever and unexpected weight change  HENT:   Negative for hearing loss, lump/mass, mouth sores, nosebleeds, sore throat, trouble swallowing and voice change  Eyes: Negative  Negative for eye problems and icterus  Respiratory: Positive for cough, shortness of breath and wheezing  Negative for chest tightness and hemoptysis  Cardiovascular: Positive for leg swelling  Negative for chest pain  Gastrointestinal: Negative for abdominal distention, abdominal pain, blood in stool, constipation, diarrhea and nausea  Endocrine: Negative  Genitourinary: Negative for dysuria, frequency, hematuria and pelvic pain  Musculoskeletal: Negative  Negative for arthralgias, back pain, flank pain, gait problem, myalgias and neck stiffness  Skin: Positive for itching (Of the left upper extremity)  Negative for rash  Neurological: Negative for dizziness, gait problem, headaches, light-headedness, numbness and speech difficulty  Hematological: Negative for adenopathy  Does not bruise/bleed easily  Psychiatric/Behavioral: Negative for confusion, decreased concentration, depression and sleep disturbance  The patient is not nervous/anxious  Current Medications: Reviewed  Allergies: Reviewed  PMH/FH/SH:  Reviewed      Physical Exam:    Body surface area is 1 9 meters squared  Wt Readings from Last 3 Encounters:   02/27/20 78 5 kg (173 lb)   02/16/20 80 kg (176 lb 5 9 oz)   02/12/20 81 2 kg (179 lb 0 2 oz)        Temp Readings from Last 3 Encounters:   02/27/20 97 8 °F (36 6 °C)   02/16/20 98 6 °F (37 °C) (Temporal)   02/12/20 97 5 °F (36 4 °C) (Oral)        BP Readings from Last 3 Encounters:   02/27/20 112/62   02/16/20 114/55   02/12/20 116/58         Pulse Readings from Last 3 Encounters:   02/27/20 69   02/16/20 102   02/12/20 88        Physical Exam   Constitutional: He is oriented to person, place, and time  He appears well-developed and well-nourished  No distress  HENT:   Head: Normocephalic and atraumatic  Eyes: Conjunctivae are normal    Neck: Normal range of motion  Neck supple  No tracheal deviation present  Cardiovascular: Normal rate and regular rhythm  Exam reveals no gallop and no friction rub  Murmur heard  Pulmonary/Chest: Effort normal  No respiratory distress  He has wheezes  He has no rales  He exhibits no tenderness  Abdominal: Soft  He exhibits no distension  There is no tenderness  Musculoskeletal: He exhibits edema (2 to 3 edema bilaterally)  Lymphadenopathy:     He has no cervical adenopathy  Neurological: He is alert and oriented to person, place, and time  Skin: Skin is warm and dry  Rash ( of the left upper extremity consistent with herpes zoster that is healing very well) noted  He is not diaphoretic  No erythema  No pallor  Psychiatric: He has a normal mood and affect  His behavior is normal  Judgment and thought content normal    Vitals reviewed  ECO    Goals and Barriers:  Current Goal: Minimize effects of disease  Barriers: None  Patient's Capacity to Self Care:  Patient is able to self care      [facility] Status: @[facility]@

## 2020-03-04 ENCOUNTER — HOSPITAL ENCOUNTER (OUTPATIENT)
Dept: INFUSION CENTER | Facility: CLINIC | Age: 85
Discharge: HOME | End: 2020-03-04
Payer: MEDICARE

## 2020-03-04 ENCOUNTER — OFFICE VISIT (OUTPATIENT)
Dept: CARDIOLOGY CLINIC | Facility: CLINIC | Age: 85
End: 2020-03-04
Payer: MEDICARE

## 2020-03-04 VITALS
DIASTOLIC BLOOD PRESSURE: 68 MMHG | HEART RATE: 83 BPM | TEMPERATURE: 97.8 F | RESPIRATION RATE: 18 BRPM | SYSTOLIC BLOOD PRESSURE: 106 MMHG | WEIGHT: 175.71 LBS | BODY MASS INDEX: 27.52 KG/M2

## 2020-03-04 VITALS
HEART RATE: 74 BPM | SYSTOLIC BLOOD PRESSURE: 126 MMHG | DIASTOLIC BLOOD PRESSURE: 76 MMHG | WEIGHT: 176.8 LBS | BODY MASS INDEX: 27.69 KG/M2

## 2020-03-04 DIAGNOSIS — D80.9 IMMUNOGLOBULIN DEFICIENCY (HCC): Primary | ICD-10-CM

## 2020-03-04 DIAGNOSIS — I35.9 AORTIC VALVE DISEASE: Primary | ICD-10-CM

## 2020-03-04 PROCEDURE — 99214 OFFICE O/P EST MOD 30 MIN: CPT | Performed by: INTERNAL MEDICINE

## 2020-03-04 PROCEDURE — 96366 THER/PROPH/DIAG IV INF ADDON: CPT

## 2020-03-04 PROCEDURE — 96365 THER/PROPH/DIAG IV INF INIT: CPT

## 2020-03-04 RX ORDER — SODIUM CHLORIDE 9 MG/ML
20 INJECTION, SOLUTION INTRAVENOUS ONCE
Status: CANCELLED | OUTPATIENT
Start: 2020-04-15

## 2020-03-04 RX ORDER — SODIUM CHLORIDE 9 MG/ML
20 INJECTION, SOLUTION INTRAVENOUS ONCE
Status: COMPLETED | OUTPATIENT
Start: 2020-03-04 | End: 2020-03-04

## 2020-03-04 RX ADMIN — SODIUM CHLORIDE 20 ML/HR: 0.9 INJECTION, SOLUTION INTRAVENOUS at 12:45

## 2020-03-04 RX ADMIN — Medication 20 G: at 12:45

## 2020-03-04 NOTE — PROGRESS NOTES
Patient tolerated his IVIG well without adverse reaction  Patient and his wife are aware of his next appointment

## 2020-03-04 NOTE — PROGRESS NOTES
Cardiology Follow Up        [address]      1935      [id number]      Discussion/Summary:    1  Lower extremity edema, multifactorial, suspect predominantly venous insufficiency   2  Moderate to severe aortic stenosis  3  History of DVT, PE  4  COPD  5  Multiple myeloma  6  BPH    · Stable lower extremity edema with fairly unremarkable NT proBNP in the past   He has done well off diuresis, not recommended in the past secondary to chronic diarrhea  Continue compression stockings, low-sodium diet, leg elevation  · Repeat echocardiogram with next visit in 6 months to re-evaluate aortic stenosis  Sounds moderate to severe on examination today      Follow-up in 6 months after echocardiogram        Interval History: This is a very pleasant 43-year-old male with a history of moderate aortic root dilatation to 4 5 cm, moderate to severe aortic valve stenosis, multiple myeloma, squamous cell skin carcinoma, venous insufficiency, and lung adenocarcinoma status post right upper lobe resection with adjuvant chemotherapy and radiation, as well as rectal carcinoma   He has COPD, and is managed by Dr Shabana Shaffer of pulmonary medicine and sees Dr Kehinde Salomon of hematology      He went to the hospital 12/2018 with increased shortness of breath and lower extremity edema   CT chest revealed left-sided pulmonary embolus and he was initiated on Eliquis   He also had a mild COPD exacerbation  Carri Longest was thereafter hospitalized 01/2018 with fever and diarrhea  He presents today for follow-up  Prior echocardiogram 09/2019 revealed ejection fraction 55% with moderate aortic valve stenosis, aortic valve area about 1 1 centimeter squared  His aortic root was 4 5 cm at the sinuses of Valsalva  His shortness of breath is chronic but stable as is his lower extremity edema  He has no new complaints on today's visit  He denies orthopnea, dizziness, palpitations, chest discomfort  Vitals:  Vitals:    20 1524   BP: 126/76   BP Location: Right arm   Patient Position: Sitting   Cuff Size: Large   Pulse: 74   Weight: 80 2 kg (176 lb 12 8 oz)         Past Medical History:   Diagnosis Date    Chronic pain disorder     3 fractured vertebrae    COPD (chronic obstructive pulmonary disease) (HCC)     Diarrhea     Enlarged prostate     Kidney stone     [facility] ([facility] [address] )     [facility]    Rectal adenocarcinoma (HCC)     Sleep apnea     no cpap     Social History     Socioeconomic History    Marital status: /Civil Union     Spouse name: Not on file    Number of children: Not on file    Years of education: Not on file    Highest education level: Not on file   Occupational History    Not on file   Social Needs    Financial resource strain: Not on file    Food insecurity:     Worry: Not on file     Inability: Not on file    Transportation needs:     Medical: Not on file     Non-medical: Not on file   Tobacco Use    Smoking status: Former Smoker     Packs/day: 1 00     Years: 50 00     Pack years: 50 00     Types: Cigarettes     Start date:      Last attempt to quit: 2010     Years since quittin 2    Smokeless tobacco: Never Used    Tobacco comment: quit in    Substance and Sexual Activity    Alcohol use: Not Currently     Comment: rarely    Drug use: No    Sexual activity: Not on file   Lifestyle    Physical activity:     Days per week: Not on file     Minutes per session: Not on file    Stress: Not on file   Relationships    Social connections:     Talks on phone: Not on file     Gets together: Not on file     Attends Spiritism service: Not on file     Active member of club or organization: Not on file     Attends meetings of clubs or organizations: Not on file     Relationship status: Not on file    Intimate partner violence:     Fear of current or ex partner: Not on file     Emotionally abused: Not on file Physically abused: Not on file     Forced sexual activity: Not on file   Other Topics Concern    Not on file   Social History Narrative    Not on file      Family History   Problem Relation Age of Onset    Arthritis Mother     Colon cancer Father     Heart attack Father     Diabetes type I Sister      Past Surgical History:   Procedure Laterality Date    CHOLECYSTECTOMY      COLONOSCOPY W/ ENDOSCOPIC US N/A 7/21/2017    Procedure: ANAL ENDOSCOPIC U/S;  Surgeon: Austin Hill MD;  [facility]: [facility]; Service: Colorectal    HERNIA REPAIR      KIDNEY STONE SURGERY      LUNG LOBECTOMY Right     LA COLONOSCOPY FLX DX W/COLLJ SPEC WHEN PFRMD N/A 7/10/2017    Procedure: COLONOSCOPY;  Surgeon: Margaret Siegel MD;  [facility];   Service: Gastroenterology    LA RECTAL TUMOR EXCISION, TRANSANAL ENDOSCOPIC MICROSURGICAL, FULL THICK N/A 11/2/2017    Procedure: TRANSANAL ENDOSCOPIC MICROSURGERY TEM;  Surgeon: Austin Hill MD;  [facility]: [facility];  Service: Colorectal       Current Outpatient Medications:     albuterol (PROVENTIL HFA,VENTOLIN HFA) 90 mcg/act inhaler, Inhale 2 puffs every 4 (four) hours as needed for wheezing, Disp: 1 Inhaler, Rfl: 6    apixaban (ELIQUIS) 5 mg, Take 1 tablet (5 mg total) by mouth 2 (two) times a day (Patient taking differently: Take 5 mg by mouth daily ), Disp: 60 tablet, Rfl: 3    cholecalciferol (VITAMIN D3) 1,000 units tablet, Take 5,000 Units by mouth daily  , Disp: , Rfl:     cyanocobalamin (VITAMIN B-12) 1,000 mcg tablet, Take by mouth daily, Disp: , Rfl:     diphenoxylate-atropine (LOMOTIL) 2 5-0 025 mg per tablet, Take 1 tablet by mouth 4 (four) times a day as needed for diarrhea, Disp: 30 tablet, Rfl: 0    finasteride (PROSCAR) 5 mg tablet, Take 1 tablet (5 mg total) by mouth daily at bedtime, Disp: 90 tablet, Rfl: 3    guaiFENesin (MUCINEX) 600 mg 12 hr tablet, Take 1 tablet (600 mg total) by mouth every 12 (twelve) hours (Patient taking differently: Take 600 mg by mouth every 12 (twelve) hours as needed  ), Disp: 60 tablet, Rfl: 0    ipratropium (ATROVENT) 0 02 % nebulizer solution, Take 1 vial (0 5 mg total) by nebulization 4 (four) times a day, Disp: 300 mL, Rfl: 6    lenalidomide (REVLIMID) 5 MG CAPS, 3 weeks on/3 weeks off adult male/auth# [id number] 2/13/20, Disp: 21 capsule, Rfl: 0    lidocaine-prilocaine (EMLA) cream, Apply topically as needed for mild pain, Disp: 30 g, Rfl: 2    melphalan (ALKERAN) 2 MG tablet, Take 12 mg daily for 4 days every 6 weeks, Disp: 24 tablet, Rfl: 3    predniSONE 5 mg tablet, Take 1 tablet (5 mg total) by mouth daily, Disp: 90 tablet, Rfl: 3    RAPAFLO 8 MG CAPS, Take 1 capsule by mouth daily at bedtime  , Disp: , Rfl: 3    azithromycin (ZITHROMAX) 250 mg tablet, TAKE 2 TABLETS BY MOUTH TOGETHER ON DAY 1 THEN TAKE 1 TABLET DAILY ON DAYS 2 THROUGH 5, Disp: , Rfl:     Lactobacillus-Inulin ([facility] ), Take by mouth daily, Disp: , Rfl:     methylPREDNISolone 4 MG tablet therapy pack, TAKE AS DIRECTED PER PACKAGE INSTRUCTIONS, Disp: , Rfl:     valACYclovir (VALTREX) 1,000 mg tablet, Take 1 tablet (1,000 mg total) by mouth every 8 (eight) hours for 7 days (Patient not taking: Reported on 3/4/2020), Disp: 21 tablet, Rfl: 0  No current facility-administered medications for this visit  Review of Systems:  Review of Systems   Respiratory: Positive for shortness of breath  Cardiovascular: Positive for leg swelling  All other systems reviewed and are negative  Physical Exam:  Physical Exam   Constitutional: He is oriented to person, place, and time  He appears well-developed and well-nourished  No distress  HENT:   Head: Normocephalic and atraumatic  Eyes: Pupils are equal, round, and reactive to light  EOM are normal  Right eye exhibits no discharge  No scleral icterus  Neck: Normal range of motion  Neck supple  No thyromegaly present     Cardiovascular: Normal rate and regular rhythm  Exam reveals no gallop and no friction rub  No murmur heard  2/6 systolic ejection murmur with minimally audible S2   Pulmonary/Chest: Effort normal and breath sounds normal    Abdominal: He exhibits no distension  There is no tenderness  There is no rebound and no guarding  Musculoskeletal: Normal range of motion  He exhibits edema  Neurological: He is alert and oriented to person, place, and time  Coordination normal    Skin: Skin is warm and dry  No rash noted  He is not diaphoretic  No erythema  No pallor  Psychiatric: He has a normal mood and affect  His behavior is normal  Judgment and thought content normal        This note was completed in part utilizing "[employer] Direct Software  Grammatical errors, random word insertions, spelling mistakes, and incomplete sentences can be an occasional consequence of this system secondary to software limitations, ambient noise, and hardware issues  If you have any questions or concerns about the content, text, or information contained within the body of this dictation, please contact the provider for clarification

## 2020-03-16 ENCOUNTER — TELEPHONE (OUTPATIENT)
Dept: HEMATOLOGY ONCOLOGY | Facility: CLINIC | Age: 85
End: 2020-03-16

## 2020-03-16 NOTE — TELEPHONE ENCOUNTER
Patient called to reschedule appointment with Landy Krause  Original appointment date and time: 03/19 at 11:30am   Appointment rescheduled to: 05/07 at 2:00pm  Location: [facility]  Patient verbalized understanding of above

## 2020-04-06 ENCOUNTER — TELEPHONE (OUTPATIENT)
Dept: HEMATOLOGY ONCOLOGY | Facility: CLINIC | Age: 85
End: 2020-04-06

## 2020-04-08 ENCOUNTER — TELEPHONE (OUTPATIENT)
Dept: HEMATOLOGY ONCOLOGY | Facility: CLINIC | Age: 85
End: 2020-04-08

## 2020-04-15 ENCOUNTER — HOSPITAL ENCOUNTER (OUTPATIENT)
Dept: INFUSION CENTER | Facility: CLINIC | Age: 85
End: 2020-04-15

## 2020-04-24 ENCOUNTER — TELEPHONE (OUTPATIENT)
Dept: HEMATOLOGY ONCOLOGY | Facility: CLINIC | Age: 85
End: 2020-04-24

## 2020-05-01 ENCOUNTER — TELEPHONE (OUTPATIENT)
Dept: HEMATOLOGY ONCOLOGY | Facility: CLINIC | Age: 85
End: 2020-05-01

## 2020-05-07 ENCOUNTER — OFFICE VISIT (OUTPATIENT)
Dept: HEMATOLOGY ONCOLOGY | Facility: CLINIC | Age: 85
End: 2020-05-07
Payer: MEDICARE

## 2020-05-07 ENCOUNTER — HOSPITAL ENCOUNTER (OUTPATIENT)
Dept: INFUSION CENTER | Facility: CLINIC | Age: 85
Discharge: HOME | End: 2020-05-07
Payer: MEDICARE

## 2020-05-07 ENCOUNTER — TELEPHONE (OUTPATIENT)
Dept: HEMATOLOGY ONCOLOGY | Facility: MEDICAL CENTER | Age: 85
End: 2020-05-07

## 2020-05-07 VITALS
RESPIRATION RATE: 16 BRPM | HEART RATE: 92 BPM | DIASTOLIC BLOOD PRESSURE: 77 MMHG | OXYGEN SATURATION: 98 % | TEMPERATURE: 98 F | SYSTOLIC BLOOD PRESSURE: 111 MMHG | BODY MASS INDEX: 28.21 KG/M2 | WEIGHT: 180.12 LBS

## 2020-05-07 VITALS
TEMPERATURE: 97.5 F | HEART RATE: 90 BPM | SYSTOLIC BLOOD PRESSURE: 130 MMHG | WEIGHT: 181 LBS | DIASTOLIC BLOOD PRESSURE: 82 MMHG | BODY MASS INDEX: 28.35 KG/M2 | RESPIRATION RATE: 22 BRPM

## 2020-05-07 DIAGNOSIS — C90.02 MULTIPLE MYELOMA IN RELAPSE (HCC): ICD-10-CM

## 2020-05-07 DIAGNOSIS — D80.1 HYPOGAMMAGLOBULINEMIA (HCC): ICD-10-CM

## 2020-05-07 DIAGNOSIS — Z95.828 PORT-A-CATH IN PLACE: ICD-10-CM

## 2020-05-07 DIAGNOSIS — C90.02 MULTIPLE MYELOMA IN RELAPSE (HCC): Primary | ICD-10-CM

## 2020-05-07 DIAGNOSIS — D80.9 IMMUNOGLOBULIN DEFICIENCY (HCC): Primary | ICD-10-CM

## 2020-05-07 LAB
ALBUMIN SERPL BCP-MCNC: 3 G/DL (ref 3.5–5.7)
ALP SERPL-CCNC: 70 U/L (ref 46–116)
ALT SERPL W P-5'-P-CCNC: 27 U/L (ref 12–78)
ANION GAP SERPL CALCULATED.3IONS-SCNC: 9 MMOL/L (ref 4–13)
AST SERPL W P-5'-P-CCNC: 23 U/L (ref 5–45)
BASOPHILS # BLD MANUAL: 0 THOUSAND/UL (ref 0–0.1)
BASOPHILS NFR MAR MANUAL: 0 % (ref 0–1)
BILIRUB SERPL-MCNC: 1 MG/DL (ref 0.2–1)
BUN SERPL-MCNC: 22 MG/DL (ref 5–25)
CALCIUM SERPL-MCNC: 9.3 MG/DL (ref 8.3–10.1)
CHLORIDE SERPL-SCNC: 107 MMOL/L (ref 98–108)
CO2 SERPL-SCNC: 27 MMOL/L (ref 21–32)
CREAT SERPL-MCNC: 1.5 MG/DL (ref 0.6–1.3)
EOSINOPHIL # BLD MANUAL: 0 THOUSAND/UL (ref 0–0.4)
EOSINOPHIL NFR BLD MANUAL: 0 % (ref 0–6)
ERYTHROCYTE [DISTWIDTH] IN BLOOD BY AUTOMATED COUNT: 14.3 % (ref 11.6–15.1)
GFR SERPL CREATININE-BSD FRML MDRD: 42 ML/MIN/1.73SQ M
GLUCOSE SERPL-MCNC: 106 MG/DL (ref 65–140)
HCT VFR BLD AUTO: 34.3 % (ref 36.5–49.3)
HGB BLD-MCNC: 11.4 G/DL (ref 12–17)
IGA SERPL-MCNC: 117 MG/DL (ref 70–400)
IGG SERPL-MCNC: 636 MG/DL (ref 700–1600)
IGM SERPL-MCNC: 46 MG/DL (ref 40–230)
LG PLATELETS BLD QL SMEAR: PRESENT
LYMPHOCYTES # BLD AUTO: 0.67 THOUSAND/UL (ref 0.6–4.47)
LYMPHOCYTES # BLD AUTO: 7 % (ref 14–44)
MCH RBC QN AUTO: 33.1 PG (ref 26.8–34.3)
MCHC RBC AUTO-ENTMCNC: 33.2 G/DL (ref 31.4–37.4)
MCV RBC AUTO: 100 FL (ref 82–98)
MONOCYTES # BLD AUTO: 0.77 THOUSAND/UL (ref 0–1.22)
MONOCYTES NFR BLD: 8 % (ref 4–12)
NEUTROPHILS # BLD MANUAL: 8.15 THOUSAND/UL (ref 1.85–7.62)
NEUTS BAND NFR BLD MANUAL: 2 % (ref 0–8)
NEUTS SEG NFR BLD AUTO: 83 % (ref 43–75)
OVALOCYTES BLD QL SMEAR: PRESENT
PLATELET # BLD AUTO: 131 THOUSANDS/UL (ref 149–390)
PLATELET BLD QL SMEAR: ABNORMAL
PMV BLD AUTO: 9 FL (ref 8.9–12.7)
POTASSIUM SERPL-SCNC: 4.3 MMOL/L (ref 3.5–5.3)
PROT SERPL-MCNC: 6 G/DL (ref 6.4–8.2)
RBC # BLD AUTO: 3.44 MILLION/UL (ref 3.88–5.62)
SODIUM SERPL-SCNC: 143 MMOL/L (ref 136–145)
TOTAL CELLS COUNTED SPEC: 100
WBC # BLD AUTO: 9.59 THOUSAND/UL (ref 4.31–10.16)

## 2020-05-07 PROCEDURE — 85027 COMPLETE CBC AUTOMATED: CPT

## 2020-05-07 PROCEDURE — 99214 OFFICE O/P EST MOD 30 MIN: CPT | Performed by: PHYSICIAN ASSISTANT

## 2020-05-07 PROCEDURE — 85007 BL SMEAR W/DIFF WBC COUNT: CPT

## 2020-05-07 PROCEDURE — 82784 ASSAY IGA/IGD/IGG/IGM EACH: CPT

## 2020-05-07 PROCEDURE — 96366 THER/PROPH/DIAG IV INF ADDON: CPT

## 2020-05-07 PROCEDURE — 83883 ASSAY NEPHELOMETRY NOT SPEC: CPT

## 2020-05-07 PROCEDURE — 80053 COMPREHEN METABOLIC PANEL: CPT

## 2020-05-07 PROCEDURE — 96365 THER/PROPH/DIAG IV INF INIT: CPT

## 2020-05-07 RX ORDER — TRAMADOL HYDROCHLORIDE 50 MG/1
TABLET ORAL
COMMUNITY
Start: 2020-03-24 | End: 2021-01-01 | Stop reason: HOSPADM

## 2020-05-07 RX ORDER — TRIAMCINOLONE ACETONIDE 5 MG/G
CREAM TOPICAL
COMMUNITY
Start: 2020-03-26 | End: 2021-01-01 | Stop reason: HOSPADM

## 2020-05-07 RX ORDER — SODIUM CHLORIDE 9 MG/ML
20 INJECTION, SOLUTION INTRAVENOUS ONCE
Status: COMPLETED | OUTPATIENT
Start: 2020-05-07 | End: 2020-05-07

## 2020-05-07 RX ORDER — SODIUM CHLORIDE 9 MG/ML
20 INJECTION, SOLUTION INTRAVENOUS ONCE
Status: CANCELLED | OUTPATIENT
Start: 2020-01-01

## 2020-05-07 RX ORDER — ALBUTEROL SULFATE 90 UG/1
2 AEROSOL, METERED RESPIRATORY (INHALATION) EVERY 6 HOURS PRN
COMMUNITY
End: 2020-01-01 | Stop reason: SDUPTHER

## 2020-05-07 RX ORDER — ALBUTEROL SULFATE 2.5 MG/3ML
2.5 SOLUTION RESPIRATORY (INHALATION) EVERY 6 HOURS PRN
COMMUNITY
Start: 2020-03-24 | End: 2021-01-01 | Stop reason: SDUPTHER

## 2020-05-07 RX ORDER — GABAPENTIN 100 MG/1
100 CAPSULE ORAL 3 TIMES DAILY
COMMUNITY
Start: 2020-05-05 | End: 2020-01-01 | Stop reason: CLARIF

## 2020-05-07 RX ORDER — PANTOPRAZOLE SODIUM 40 MG/1
TABLET, DELAYED RELEASE ORAL
COMMUNITY
Start: 2020-05-04 | End: 2020-01-01 | Stop reason: CLARIF

## 2020-05-07 RX ORDER — BENZONATATE 100 MG/1
100 CAPSULE ORAL 3 TIMES DAILY PRN
COMMUNITY
Start: 2020-04-06 | End: 2020-01-01 | Stop reason: ALTCHOICE

## 2020-05-07 RX ORDER — LEVOFLOXACIN 750 MG/1
TABLET ORAL
COMMUNITY
Start: 2020-05-04 | End: 2020-05-19 | Stop reason: ALTCHOICE

## 2020-05-07 RX ADMIN — SODIUM CHLORIDE 20 ML/HR: 0.9 INJECTION, SOLUTION INTRAVENOUS at 10:48

## 2020-05-07 RX ADMIN — Medication 20 G: at 11:11

## 2020-05-08 ENCOUNTER — HOSPITAL ENCOUNTER (OUTPATIENT)
Dept: CT IMAGING | Facility: HOSPITAL | Age: 85
Discharge: HOME | End: 2020-05-08
Attending: INTERNAL MEDICINE
Payer: MEDICARE

## 2020-05-08 DIAGNOSIS — C34.91 ADENOCARCINOMA OF RIGHT LUNG (HCC): ICD-10-CM

## 2020-05-08 DIAGNOSIS — J18.9 PNEUMONIA OF RIGHT LOWER LOBE DUE TO INFECTIOUS ORGANISM: ICD-10-CM

## 2020-05-08 LAB
KAPPA LC FREE SER-MCNC: 7.8 MG/L (ref 3.3–19.4)
KAPPA LC FREE/LAMBDA FREE SER: 0.27 {RATIO} (ref 0.26–1.65)
LAMBDA LC FREE SERPL-MCNC: 28.5 MG/L (ref 5.7–26.3)

## 2020-05-08 PROCEDURE — 71250 CT THORAX DX C-: CPT

## 2020-05-11 ENCOUNTER — TELEPHONE (OUTPATIENT)
Dept: PULMONOLOGY | Facility: CLINIC | Age: 85
End: 2020-05-11

## 2020-05-18 ENCOUNTER — TELEPHONE (OUTPATIENT)
Dept: PULMONOLOGY | Facility: CLINIC | Age: 85
End: 2020-05-18

## 2020-05-19 ENCOUNTER — OFFICE VISIT (OUTPATIENT)
Dept: PULMONOLOGY | Facility: CLINIC | Age: 85
End: 2020-05-19
Payer: MEDICARE

## 2020-05-19 ENCOUNTER — APPOINTMENT (OUTPATIENT)
Dept: LAB | Facility: CLINIC | Age: 85
End: 2020-05-19
Payer: MEDICARE

## 2020-05-19 VITALS
BODY MASS INDEX: 27.88 KG/M2 | OXYGEN SATURATION: 96 % | TEMPERATURE: 98.3 F | SYSTOLIC BLOOD PRESSURE: 90 MMHG | HEIGHT: 67 IN | WEIGHT: 177.6 LBS | DIASTOLIC BLOOD PRESSURE: 58 MMHG | HEART RATE: 96 BPM

## 2020-05-19 DIAGNOSIS — I50.32 CHRONIC DIASTOLIC CONGESTIVE HEART FAILURE (HCC): ICD-10-CM

## 2020-05-19 DIAGNOSIS — J44.9 CHRONIC OBSTRUCTIVE PULMONARY DISEASE, UNSPECIFIED COPD TYPE (HCC): ICD-10-CM

## 2020-05-19 DIAGNOSIS — I26.99 OTHER PULMONARY EMBOLISM WITHOUT ACUTE COR PULMONALE, UNSPECIFIED CHRONICITY (HCC): ICD-10-CM

## 2020-05-19 DIAGNOSIS — C34.91 ADENOCARCINOMA OF RIGHT LUNG (HCC): Primary | Chronic | ICD-10-CM

## 2020-05-19 DIAGNOSIS — N18.30 CKD (CHRONIC KIDNEY DISEASE) STAGE 3, GFR 30-59 ML/MIN (HCC): ICD-10-CM

## 2020-05-19 PROBLEM — J18.9 PNEUMONIA: Status: RESOLVED | Noted: 2020-01-03 | Resolved: 2020-05-19

## 2020-05-19 LAB
ANION GAP SERPL CALCULATED.3IONS-SCNC: 3 MMOL/L (ref 4–13)
BUN SERPL-MCNC: 21 MG/DL (ref 5–25)
CALCIUM SERPL-MCNC: 9.2 MG/DL (ref 8.3–10.1)
CHLORIDE SERPL-SCNC: 110 MMOL/L (ref 100–108)
CO2 SERPL-SCNC: 27 MMOL/L (ref 21–32)
CREAT SERPL-MCNC: 1.23 MG/DL (ref 0.6–1.3)
GFR SERPL CREATININE-BSD FRML MDRD: 54 ML/MIN/1.73SQ M
GLUCOSE SERPL-MCNC: 93 MG/DL (ref 65–140)
NT-PROBNP SERPL-MCNC: 179 PG/ML
POTASSIUM SERPL-SCNC: 3.8 MMOL/L (ref 3.5–5.3)
SODIUM SERPL-SCNC: 140 MMOL/L (ref 136–145)

## 2020-05-19 PROCEDURE — 83880 ASSAY OF NATRIURETIC PEPTIDE: CPT

## 2020-05-19 PROCEDURE — 80048 BASIC METABOLIC PNL TOTAL CA: CPT

## 2020-05-19 PROCEDURE — 99214 OFFICE O/P EST MOD 30 MIN: CPT | Performed by: INTERNAL MEDICINE

## 2020-05-19 PROCEDURE — 36415 COLL VENOUS BLD VENIPUNCTURE: CPT

## 2020-05-20 ENCOUNTER — APPOINTMENT (OUTPATIENT)
Dept: LAB | Facility: MEDICAL CENTER | Age: 85
End: 2020-05-20
Payer: MEDICARE

## 2020-05-20 DIAGNOSIS — C90.02 MULTIPLE MYELOMA IN RELAPSE (HCC): ICD-10-CM

## 2020-05-20 LAB
ALBUMIN SERPL BCP-MCNC: 2.8 G/DL (ref 3.5–5)
ALP SERPL-CCNC: 98 U/L (ref 46–116)
ALT SERPL W P-5'-P-CCNC: 24 U/L (ref 12–78)
ANION GAP SERPL CALCULATED.3IONS-SCNC: 6 MMOL/L (ref 4–13)
AST SERPL W P-5'-P-CCNC: 10 U/L (ref 5–45)
BASOPHILS # BLD AUTO: 0.02 THOUSANDS/ΜL (ref 0–0.1)
BASOPHILS NFR BLD AUTO: 0 % (ref 0–1)
BILIRUB SERPL-MCNC: 1.19 MG/DL (ref 0.2–1)
BUN SERPL-MCNC: 17 MG/DL (ref 5–25)
CALCIUM SERPL-MCNC: 9.1 MG/DL (ref 8.3–10.1)
CHLORIDE SERPL-SCNC: 111 MMOL/L (ref 100–108)
CO2 SERPL-SCNC: 26 MMOL/L (ref 21–32)
CREAT SERPL-MCNC: 1.15 MG/DL (ref 0.6–1.3)
EOSINOPHIL # BLD AUTO: 0.11 THOUSAND/ΜL (ref 0–0.61)
EOSINOPHIL NFR BLD AUTO: 2 % (ref 0–6)
ERYTHROCYTE [DISTWIDTH] IN BLOOD BY AUTOMATED COUNT: 14 % (ref 11.6–15.1)
GFR SERPL CREATININE-BSD FRML MDRD: 59 ML/MIN/1.73SQ M
GLUCOSE SERPL-MCNC: 86 MG/DL (ref 65–140)
HCT VFR BLD AUTO: 36 % (ref 36.5–49.3)
HGB BLD-MCNC: 11.3 G/DL (ref 12–17)
IGA SERPL-MCNC: 108 MG/DL (ref 70–400)
IGG SERPL-MCNC: 766 MG/DL (ref 700–1600)
IGM SERPL-MCNC: 38 MG/DL (ref 40–230)
IMM GRANULOCYTES # BLD AUTO: 0.05 THOUSAND/UL (ref 0–0.2)
IMM GRANULOCYTES NFR BLD AUTO: 1 % (ref 0–2)
LYMPHOCYTES # BLD AUTO: 1.02 THOUSANDS/ΜL (ref 0.6–4.47)
LYMPHOCYTES NFR BLD AUTO: 18 % (ref 14–44)
MCH RBC QN AUTO: 32.8 PG (ref 26.8–34.3)
MCHC RBC AUTO-ENTMCNC: 31.4 G/DL (ref 31.4–37.4)
MCV RBC AUTO: 105 FL (ref 82–98)
MONOCYTES # BLD AUTO: 0.48 THOUSAND/ΜL (ref 0.17–1.22)
MONOCYTES NFR BLD AUTO: 9 % (ref 4–12)
NEUTROPHILS # BLD AUTO: 3.87 THOUSANDS/ΜL (ref 1.85–7.62)
NEUTS SEG NFR BLD AUTO: 70 % (ref 43–75)
NRBC BLD AUTO-RTO: 0 /100 WBCS
PLATELET # BLD AUTO: 130 THOUSANDS/UL (ref 149–390)
PMV BLD AUTO: 9.8 FL (ref 8.9–12.7)
POTASSIUM SERPL-SCNC: 3.5 MMOL/L (ref 3.5–5.3)
PROT SERPL-MCNC: 6.3 G/DL (ref 6.4–8.2)
RBC # BLD AUTO: 3.44 MILLION/UL (ref 3.88–5.62)
SODIUM SERPL-SCNC: 143 MMOL/L (ref 136–145)
WBC # BLD AUTO: 5.55 THOUSAND/UL (ref 4.31–10.16)

## 2020-05-20 PROCEDURE — 80053 COMPREHEN METABOLIC PANEL: CPT

## 2020-05-20 PROCEDURE — 36415 COLL VENOUS BLD VENIPUNCTURE: CPT

## 2020-05-20 PROCEDURE — 85025 COMPLETE CBC W/AUTO DIFF WBC: CPT

## 2020-05-20 PROCEDURE — 82784 ASSAY IGA/IGD/IGG/IGM EACH: CPT

## 2020-05-20 PROCEDURE — 83883 ASSAY NEPHELOMETRY NOT SPEC: CPT

## 2020-05-21 ENCOUNTER — OFFICE VISIT (OUTPATIENT)
Dept: HEMATOLOGY ONCOLOGY | Facility: CLINIC | Age: 85
End: 2020-05-21
Payer: MEDICARE

## 2020-05-21 ENCOUNTER — TELEPHONE (OUTPATIENT)
Dept: HEMATOLOGY ONCOLOGY | Facility: CLINIC | Age: 85
End: 2020-05-21

## 2020-05-21 VITALS
TEMPERATURE: 97.2 F | DIASTOLIC BLOOD PRESSURE: 62 MMHG | BODY MASS INDEX: 28.25 KG/M2 | RESPIRATION RATE: 16 BRPM | WEIGHT: 180 LBS | SYSTOLIC BLOOD PRESSURE: 104 MMHG | HEIGHT: 67 IN

## 2020-05-21 DIAGNOSIS — C90.02 MULTIPLE MYELOMA IN RELAPSE (HCC): Primary | ICD-10-CM

## 2020-05-21 LAB
KAPPA LC FREE SER-MCNC: 10.3 MG/L (ref 3.3–19.4)
KAPPA LC FREE/LAMBDA FREE SER: 0.18 {RATIO} (ref 0.26–1.65)
LAMBDA LC FREE SERPL-MCNC: 56.2 MG/L (ref 5.7–26.3)

## 2020-05-21 PROCEDURE — 99214 OFFICE O/P EST MOD 30 MIN: CPT | Performed by: INTERNAL MEDICINE

## 2020-06-04 ENCOUNTER — TELEPHONE (OUTPATIENT)
Dept: HEMATOLOGY ONCOLOGY | Facility: CLINIC | Age: 85
End: 2020-06-04

## 2020-07-18 NOTE — TELEPHONE ENCOUNTER
Paged via Betsy Rubio / Kamar Brewster (wife) / Harlan Jaime / 5- / Provider: Dr Mega Herrera / Patient has COPD and he is experiencing increased SOB today  BP, O2, and body temp are all normal for him   Patient's wife would like a call back "

## 2020-07-18 NOTE — TELEPHONE ENCOUNTER
[facility] Connect:  [phone] / Basilio Gamboa (wife) / Blayne Sanchez / 9- / Provider: Dr Ayoub Kidney / Patient has COPD and he is experiencing increased SOB today  BP, O2, and body temp are all normal for him  Patient's wife would like a call back

## 2020-08-05 NOTE — PROGRESS NOTES
Hematology Outpatient Follow - Up Note  Dilip Suh 81 y o  male MRN: @ Encounter: [id number]        Date:  8/5/2020        Assessment/ Plan:      He is 81-year-old  male with lambda light chain multiple myeloma, heavily pretreated, stage III, he is on lenalidomide, melphalan, dexamethasone initiated on 05/2018 and discontinued on January 2020 after normalization of lambda light chain and need a break of chemotherapy    Lambda light chain a is elevated as expected at 157 (130 in June 2020), (28 in January 2020)    Recovery from recent herpes zoster    Will resume melphalan 12 milligram p o  Daily for 4 days every 6 weeks, prednisone 5 milligram p o  Daily, lenalidomide 5 milligram p o  Daily 3 weeks on, 3 weeks of    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 20 grams every 6 weeks, IgG level went down to 600 range, resume    Pulmonary emboli on apixaban 5 milligram once a day or 2 5 milligram p o  B i d  Depends on the samples the dose was reduced because the epistaxis and expenses    Port-A-Cath flush every 3 months    Advanced COPD, rectal cancer, pulmonary adenocarcinoma, now with squamous cell carcinoma from Hematology point of view he is cleared for skin cancer surgery however it is advised to do at week 4  Or 5 from the beginning of the cycle of the chemotherapy        HPI: #1 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, multiple compression fractures, status post kyphoplasty to L1, L2, L5, treated with radiation therapy to the left intra trochanteric area, received melphalan/Velcade/prednisone in October 2010 for 4 cycles with excellent response and normalization of lambda light chain, had been on Velcade maintenance till January 2013 when lambda light chain went up to 111,treated with Velcade 1 3 mg meter square weekly 3 weeks on one week off, Decadron 20 mg by mouth weekly, lambda light chain down to 30 then creeping up to 110, on Revlimid 15 mg by mouth every other day   Lambda light chain normal at 28 however he reported skin rash on the upper chest area we stopped the Revlimid for 2 month  Then reinitiated Revlimid 5 mg by mouth daily     Progression of disease he received lenalidomide, melphalan, dexamethasone since May 2018 until January 2020 he took a break of the therapy after normalization of the lambda light chain     In June 2020 lambda light chain went up from 28 to 130     #2  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 stage I, with progression by scan, status post resection of the right upper lobe with few foci in the same lobe consistent with stage IIIA done in January 2015 adenocarcinoma, well differentiated  Status post Alimta/carboplatin x4 cycles finished in May 2015 and radiation therapy      CTA 3/3/2019:  GERARD           #3  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     #4  Rectal adenocarcinoma status post resection stage I in November 2017     #5  Pulmonary embolism involving the subsegmental left lower lobe in December 2018, currently on apixaban 5 mg once a day because of epistaxis     #6  Hematuria secondary to benign prostatic hypertrophy and small bladder stones and being on apixaban, apixaban was reduced to 5 milligram once a day     #7   Herpes zoster involving the left upper extremity could not tolerate gabapentin because of sedation Benadryl was ineffective he received topical lidocaine we hold his chemotherapy utilizing melphalan and Revlimid     Interval History:  Recovered from herpes zoster, was diagnosed with squamous cell carcinoma scheduled for surgery in the near future        Previous Treatment:         Test Results:    Imaging: No results found      Labs:   Lab Results   Component Value Date    WBC 3 77 (L) 08/01/2020    HGB 11 9 (L) 08/01/2020    HCT 37 5 08/01/2020     (H) 08/01/2020     (L) 08/01/2020     Lab Results   Component Value Date     01/04/2016    K 4 0 08/01/2020     08/01/2020 CO2 29 08/01/2020    ANIONGAP 7 01/04/2016    BUN 19 08/01/2020    CREATININE 1 20 08/01/2020    GLUCOSE 88 01/04/2016    GLUF 94 08/01/2020    CALCIUM 8 9 08/01/2020    AST 17 08/01/2020    ALT 33 08/01/2020    ALKPHOS 77 08/01/2020    PROT 6 8 01/04/2016    BILITOT 0 63 01/04/2016    EGFR 56 08/01/2020       No results found for: IRON, TIBC, FERRITIN    No results found for: [id number]      ROS: Review of Systems   Constitutional: Negative  Negative for appetite change, chills, diaphoresis, fatigue, fever and unexpected weight change  HENT:   Negative for hearing loss, lump/mass, mouth sores, nosebleeds, sore throat, trouble swallowing and voice change  Eyes: Negative  Negative for eye problems and icterus  Respiratory: Positive for shortness of breath and wheezing  Negative for chest tightness, cough and hemoptysis  Cardiovascular: Positive for leg swelling  Negative for chest pain  Gastrointestinal: Negative for abdominal distention, abdominal pain, blood in stool, constipation, diarrhea and nausea  Endocrine: Negative  Genitourinary: Negative for dysuria, frequency, hematuria and pelvic pain  Musculoskeletal: Positive for arthralgias and back pain  Negative for flank pain, gait problem, myalgias and neck stiffness  Skin: Negative for itching and rash  Neurological: Negative for dizziness, gait problem, headaches, light-headedness, numbness and speech difficulty  Hematological: Negative for adenopathy  Does not bruise/bleed easily  Psychiatric/Behavioral: Negative for confusion, decreased concentration, depression and sleep disturbance  The patient is not nervous/anxious  Current Medications: Reviewed  Allergies: Reviewed  PMH/FH/SH:  Reviewed      Physical Exam:    Body surface area is 1 96 meters squared      Wt Readings from Last 3 Encounters:   08/05/20 84 3 kg (185 lb 14 4 oz)   06/25/20 82 1 kg (181 lb)   05/21/20 81 6 kg (180 lb)        Temp Readings from Last 3 Encounters:   20 97 5 °F (36 4 °C) (Tympanic)   20 97 8 °F (36 6 °C)   20 98 1 °F (36 7 °C) (Tympanic)        BP Readings from Last 3 Encounters:   20 110/72   20 102/62   20 96/63         Pulse Readings from Last 3 Encounters:   20 90   20 88   20 (!) 109        Physical Exam   Constitutional: He is oriented to person, place, and time  He appears well-developed  No distress  HENT:   Head: Normocephalic and atraumatic  Eyes: Conjunctivae are normal    Neck: Normal range of motion  Neck supple  No tracheal deviation present  Cardiovascular: Normal rate and regular rhythm  Exam reveals no gallop and no friction rub  Murmur heard  Pulmonary/Chest: Effort normal  No respiratory distress  He has no wheezes  He has rhonchi  He has no rales  He exhibits no tenderness  Abdominal: Soft  He exhibits no distension  There is no abdominal tenderness  Musculoskeletal:         General: Swelling (Of the left lower extremity +3 edema) present  Lymphadenopathy:     He has no cervical adenopathy  Neurological: He is alert and oriented to person, place, and time  Skin: Skin is warm and dry  He is not diaphoretic  No erythema  No pallor  Psychiatric: His behavior is normal  Judgment and thought content normal    Vitals reviewed  ECO    Goals and Barriers:  Current Goal: Minimize effects of disease  Barriers: None  Patient's Capacity to Self Care:  Patient is able to self care      Code Status: [unfilled]

## 2020-08-11 NOTE — TELEPHONE ENCOUNTER
An Auto-fax Refill Request for Finasteride 5mg was received from [facility] [id number]  The patient has an upcoming office visit scheduled for 10/2/10 with Dameon Dawkins PA-C in the [facility] location but will run out of medication until then    Request for same, 90 day supply with NO refills was queued and forwarded to the Advanced Practitioner covering the [facility] location for approval

## 2020-09-03 NOTE — TELEPHONE ENCOUNTER
Spoke to patients wife and informed her that we would like to move Lv's appt to 3:20 pm on 9/17/20 with Dr Cj Shane at the [facility] location  Patient wife was fine with the change

## 2020-09-09 PROBLEM — R91.1 LUNG NODULE: Status: ACTIVE | Noted: 2020-01-01

## 2020-09-09 PROBLEM — R10.9 RIGHT FLANK PAIN: Status: ACTIVE | Noted: 2020-01-01

## 2020-09-09 PROBLEM — G47.34 NOCTURNAL HYPOXIA: Status: ACTIVE | Noted: 2020-01-01

## 2020-09-09 NOTE — ASSESSMENT & PLAN NOTE
· Remains on 2 L at nighttime, this is through [facility]  · Nocturnal hypoxia is on the basis of primary lung disease with COPD and prior lung cancer  · Continue present therapy

## 2020-09-09 NOTE — ASSESSMENT & PLAN NOTE
· Reports increased shortness of breath, wheezing, and cough with mucus production  · Continues on ipratropium nebulizer treatments  · Not using albuterol nebulizer due to tachycardia and tremulousness  · He does have a rescue albuterol inhaler which he may use sporadically  · I have suggested that a short course of prednisone for 2 weeks  20 mg for 1 week  10 mg the subsequent week  He is normally maintained on 5 mg daily from an oncology perspective  · He is due within the next month for a CT scan  We will perform this slightly sooner given the increase in respiratory symptoms

## 2020-09-09 NOTE — ASSESSMENT & PLAN NOTE
· Previous scan in May showed a new 4 mm right lung nodule  He has a high risk patient with history of prior lung cancer  · Recommended six-month follow-up study    We will perform this slightly earlier given his current respiratory symptoms

## 2020-09-09 NOTE — PROGRESS NOTES
Progress note - Pulmonary Medicine   Alisha Campos 81 y o  male MRN: [id number]       Impression & Plan:     COPD (chronic obstructive pulmonary disease) ([facility] [address] )  · Reports increased shortness of breath, wheezing, and cough with mucus production  · Continues on ipratropium nebulizer treatments  · Not using albuterol nebulizer due to tachycardia and tremulousness  · He does have a rescue albuterol inhaler which he may use sporadically  · I have suggested that a short course of prednisone for 2 weeks  20 mg for 1 week  10 mg the subsequent week  He is normally maintained on 5 mg daily from an oncology perspective  · He is due within the next month for a CT scan  We will perform this slightly sooner given the increase in respiratory symptoms  Lung nodule  · Previous scan in May showed a new 4 mm right lung nodule  He has a high risk patient with history of prior lung cancer  · Recommended six-month follow-up study  We will perform this slightly earlier given his current respiratory symptoms    Pulmonary embolism (HCC)  · Remains on anticoagulation with Eliquis once daily  · Continuation will be indefinite  Followed by Hematology/Oncology    Right flank pain  · This has been bothersome to him for the last week or more  Similar to previous episode he had a year ago  He has had 2 CT scans of the abdomen which include the pelvis  · Previously identified sclerotic area in the right iliac wing  Additionally had right hip osteoarthritis  He has multilevel spinal disease with prior vertebroplasty related to compression fractures  · Suspect that this etiology is musculoskeletal   I suggested he discussed with his primary care physician and may benefit from physical therapy    · At this time, repeat imaging likely would not be beneficial     Nocturnal hypoxia  · Remains on 2 L at nighttime, this is through [facility]  · Nocturnal hypoxia is on the basis of primary lung disease with COPD and prior lung cancer  · Continue present therapy    I will be in contact with Agatha Sosa with the results of his CT scan  Follow-up will be dependent upon the results of the scan  I do anticipate a 3 a 4 month follow-up interval however  ______________________________________________________________________    HPI:    Tarah Montanez presents today for follow-up of COPD with chronic cough  He has history of lung cancer and a newly found pulmonary nodule on the last imaging study in May  He reports no new issues from an oncology perspective  He remains on treatment by Dr Chico Combs for his multiple myeloma  This has been controlled  He has had increasing wheezing, shortness of breath, and some chest tightness however over the last several weeks  He does feel that the cough and phlegm production is worse than has been typical for him in the past   He does not have fever, chills, or infection symptoms  No sick contacts  He denies any other infection symptoms  His cough is intermittently productive of phlegm which typically is not discolored  He has not had hemoptysis  He denies any chest pain or cardiac complaints  He does have chronic venous stasis and lower extremity edema  This has not worsened  He is not on diuretic therapy  He does have new symptoms of right back/flank pain which radiates around his chest and sometimes is notable in the abdomen and hip  This is very similar to a pain he had approximately a year ago that resolved spontaneously  He had imaging studies done at that time which showed significant ST arthritis of the hip, sclerosis of the right iliac crest and multilevel spinous disease with compression deformities and evidence of prior vertebroplasty  He has no jus abdominal pain  No radiation into the hips  No worsening gait dysfunction    It is positional   He is questioning whether repeat CT scan would be indicated    Current Medications:    Current Outpatient Medications:     albuterol (PROVENTIL HFA,VENTOLIN HFA) 90 mcg/act inhaler, Inhale 2 puffs every 6 (six) hours as needed for wheezing, Disp: 1 Inhaler, Rfl: 3    apixaban (ELIQUIS) 5 mg, Take 1 tablet (5 mg total) by mouth 2 (two) times a day (Patient taking differently: Take 5 mg by mouth daily ), Disp: 60 tablet, Rfl: 3    cholecalciferol (VITAMIN D3) 1,000 units tablet, Take 5,000 Units by mouth daily  , Disp: , Rfl:     cyanocobalamin (VITAMIN B-12) 1,000 mcg tablet, Take by mouth daily, Disp: , Rfl:     finasteride (PROSCAR) 5 mg tablet, Take 1 tablet (5 mg total) by mouth daily at bedtime, Disp: 90 tablet, Rfl: 0    guaiFENesin (MUCINEX) 600 mg 12 hr tablet, Take 1 tablet (600 mg total) by mouth every 12 (twelve) hours (Patient taking differently: Take 600 mg by mouth every 12 (twelve) hours as needed  ), Disp: 60 tablet, Rfl: 0    ipratropium (ATROVENT) 0 02 % nebulizer solution, Take 1 vial (0 5 mg total) by nebulization 4 (four) times a day, Disp: 300 mL, Rfl: 6    Lactobacillus-Inulin ([facility]), Take by mouth daily, Disp: , Rfl:     lenalidomide (REVLIMID) 5 MG CAPS, 3 weeks on/3 weeks off adult male/auth# [id number] 8/5/20, Disp: 21 capsule, Rfl: 0    melphalan (ALKERAN) 2 MG tablet, Take 12 mg daily for 4 days every 6 weeks, Disp: 24 tablet, Rfl: 3    pantoprazole (PROTONIX) 40 mg tablet, , Disp: , Rfl:     predniSONE 5 mg tablet, Take 1 tablet (5 mg total) by mouth daily, Disp: 90 tablet, Rfl: 3    RAPAFLO 8 MG CAPS, Take 1 capsule by mouth daily at bedtime  , Disp: , Rfl: 3    albuterol (2 5 mg/3 mL) 0 083 % nebulizer solution, Inhale 2 5 mg every 6 (six) hours as needed, Disp: , Rfl:     benzonatate (TESSALON PERLES) 100 mg capsule, Take 100 mg by mouth 3 (three) times a day as needed, Disp: , Rfl:     gabapentin (NEURONTIN) 100 mg capsule, Take 100 mg by mouth 3 (three) times a day, Disp: , Rfl:     predniSONE 10 mg tablet, Take 1 tablet (10 mg total) by mouth see administration instructions 2 tablet daily for 1 week then 1 tablet daily for 1 week, Disp: 21 tablet, Rfl: 0    traMADol (ULTRAM) 50 mg tablet, TAKE ONE TABLET BY MOUTH EVERY 6 HOURS AS NEEDED FOR MODERATE PAIN (PAIN SCORE 4-6), Disp: , Rfl:     triamcinolone (KENALOG) 0 5 % cream, APPLY TOPICALLY THREE TIMES DAILY TO SHINGLES RASH, Disp: , Rfl:     Review of Systems:  Aside from what is mentioned in the HPI, the review of systems is otherwise negative    Past medical history, surgical history, and family history were reviewed and updated as appropriate    Social history updates:  Social History     Tobacco Use   Smoking Status Former Smoker    Packs/day: 1 00    Years: 50 00    Pack years: 50 00    Types: Cigarettes    Start date:     Last attempt to quit: 2010    Years since quittin 7   Smokeless Tobacco Never Used   Tobacco Comment    quit in        PhysicalExamination:  Vitals:   /58   Pulse 68   Temp (!) 97 4 °F (36 3 °C)   Resp 20   Ht 5' 7" (1 702 m)   Wt 83 3 kg (183 lb 9 6 oz)   SpO2 97%   BMI 28 76 kg/m²   Gen:  Comfortable on room air and without respiratory distress  He does have occasional wet cough  No sputum expectoration  HEENT: PERRL  Oropharynx is clear without any erythema or exudate  Neck: Supple  There is no JVD, lymphadenopathy or thyromegaly appreciated  Trachea is midline  Chest: Symmetric chest wall excursion  Lung fields with bronchial breath sounds in the right upper lobe and some wheezing  Cardiac: Regular rate and rhythm  There are no murmurs  Abdomen: Soft and nontender  Benign  Extremities:  Bilateral extensive pretibial, ankle and pedal edema  Neurologic: No focal deficits  Walks with a cane  Gait is somewhat antalgic  Skin: No appreciable rashes  Diagnostic Data:  Labs:   I personally reviewed the most recent laboratory data pertinent to today's visit    Lab Results   Component Value Date    WBC 3 77 (L) 2020    HGB 11 9 (L) 2020    HCT 37 5 08/01/2020     (H) 08/01/2020     (L) 08/01/2020     Lab Results   Component Value Date    SODIUM 141 08/01/2020    K 4 0 08/01/2020    CO2 29 08/01/2020     08/01/2020    BUN 19 08/01/2020    CREATININE 1 20 08/01/2020    CALCIUM 8 9 08/01/2020       Imaging:  I personally reviewed the images on the [facility] system pertinent to today's visit  CT scan from May 2020 shows ground-glass opacity as well as a new 4 mm left upper lobe nodule    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

## 2020-09-09 NOTE — ASSESSMENT & PLAN NOTE
· Remains on anticoagulation with Eliquis once daily  · Continuation will be indefinite    Followed by Hematology/Oncology

## 2020-09-09 NOTE — ASSESSMENT & PLAN NOTE
· This has been bothersome to him for the last week or more  Similar to previous episode he had a year ago  He has had 2 CT scans of the abdomen which include the pelvis  · Previously identified sclerotic area in the right iliac wing  Additionally had right hip osteoarthritis  He has multilevel spinal disease with prior vertebroplasty related to compression fractures  · Suspect that this etiology is musculoskeletal   I suggested he discussed with his primary care physician and may benefit from physical therapy    · At this time, repeat imaging likely would not be beneficial

## 2020-09-12 NOTE — LETTER
[address] [place] [address]  [address] [facility] [address]      September 24, 2020    MRN: [id number]     Phone: [phone]     Dear Mr Clifton Crawford recently had a(n) Cat Scan performed on 9/12/2020 at  [address] [place] [address] that was requested by Reji Hoyt MD  The study was reviewed by a radiologist, which is a physician who specializes in medical imaging  The radiologist issued a report describing his or her findings  In that report there was a finding that the radiologist felt warranted further discussion with your health care provider and that discussion would be beneficial to you  The results were sent to Reji Hoyt MD on 9/16/2020  We recommend that you contact Reji Hoyt MD at [phone] or set up an appointment to discuss the results of the imaging test  If you have already heard from Reji Hoyt MD regarding the results of your study, you can disregard this letter  This letter is not meant to alarm you, but intended to encourage you to follow-up on your results with the provider that sent you for the imaging study  In addition, we have enclosed answers to frequently asked questions by other patients who have also received a letter to review results with their health care provider (see page two)  Thank you for choosing [facility] for your medical imaging needs  FREQUENTLY ASKED QUESTIONS    1  Why am I receiving this letter? [facility] requires us to notify patients who have findings on imaging exams that may require more testing or follow-up with a health professional within the next 3 months          2  How serious is the finding on the imaging test?  This letter is sent to all patients who may need follow-up or more testing within the next 3 months  Receiving this letter does not necessarily mean you have a life-threatening imaging finding or disease  Recommendations in the radiologists imaging report are general in nature and it is up to your healthcare provider to say whether those recommendations make sense for your situation  You are strongly encouraged to talk to your health care provider about the results and ask whether additional steps need to be taken  3  Where can I get a copy of the final report for my recent radiology exam?  To get a full copy of the report you can access your records online at [email or web address] or please contact [address] [place] [address] Records Department at [phone] Monday through Friday between 8 am and 6 pm          4  What do I need to do now? Please contact your health care provider who requested the imaging study to discuss what further actions (if any) are needed  You may have already heard from (your ordering provider) in regard to this test in which case you can disregard this letter  NOTICE IN ACCORDANCE WITH THE [facility] PATIENT TEST RESULT INFORMATION ACT OF 2018    You are receiving this notice as a result of a determination by your diagnostic imaging service that further discussions of your test results are warranted and would be beneficial to you  The complete results of your test or tests have been or will be sent to the health care practitioner that ordered the test or tests  It is recommended that you contact your health care practitioner to discuss your results as soon as possible

## 2020-09-16 NOTE — PROGRESS NOTES
Cardiology Follow Up        [address]      1935      [id number]      Discussion/Summary:    1  Lower extremity edema, multifactorial, suspect predominantly venous insufficiency   2  Moderate to severe aortic stenosis  3  History of DVT, PE  4  COPD  5  Multiple myeloma  6  BPH    · Stable lower extremity edema with fairly unremarkable NT proBNP in the past   He has done well off diuresis, not recommended in the past secondary to chronic diarrhea  In the past, diuretics also did not help much with lower extremity edema  May consider starting furosemide if any worsening shortness of breath as he is prone to having diastolic heart failure and diastolic dysfunction in the setting of moderate to severe aortic stenosis  Continue compression stockings, low-sodium diet, leg elevation  · Repeat echocardiogram earlier this month revealing moderate to severe aortic stenosis  Will repeat in 1 year      Follow-up in 6 months  Will repeat echocardiogram in 1 year        Interval History: This is a very pleasant 28-year-old male with a history of moderate aortic root dilatation to 4 5 cm, moderate to severe aortic valve stenosis, multiple myeloma, squamous cell skin carcinoma, venous insufficiency, and lung adenocarcinoma status post right upper lobe resection with adjuvant chemotherapy and radiation, as well as rectal carcinoma   He has COPD, and is managed by Dr Roxana Jimenez of pulmonary medicine and sees Dr Patricia Coley of hematology      He went to the hospital 12/2018 with increased shortness of breath and lower extremity edema   CT chest revealed left-sided pulmonary embolus and he was initiated on Eliquis   He also had a mild COPD exacerbation  Trude Leak was thereafter hospitalized 01/2018 with fever and diarrhea  He presents today for follow-up  He has chronic but stable exertional dyspnea and states he has good days and bad days  He denies chest pain, dizziness, palpitations  His lower extremity edema has been stable          Vitals:  Vitals:    20 1137   BP: 116/60   Pulse: 88   Temp: 99 1 °F (37 3 °C)   Weight: 83 7 kg (184 lb 9 6 oz)   Height: 5' 7" (1 702 m)         Past Medical History:   Diagnosis Date    Chronic pain disorder     3 fractured vertebrae    COPD (chronic obstructive pulmonary disease) (HCC)     Diarrhea     Enlarged prostate     Kidney stone     [facility] [address] )     [facility]    Rectal adenocarcinoma (HCC)     Sleep apnea     no cpap     Social History     Socioeconomic History    Marital status: /Civil Union     Spouse name: Not on file    Number of children: Not on file    Years of education: Not on file    Highest education level: Not on file   Occupational History    Not on file   Social Needs    Financial resource strain: Not on file    Food insecurity     Worry: Not on file     Inability: Not on file   LUMOback needs     Medical: Not on file     Non-medical: Not on file   Tobacco Use    Smoking status: Former Smoker     Packs/day: 1 00     Years: 50 00     Pack years: 50 00     Types: Cigarettes     Start date:      Last attempt to quit: 2010     Years since quittin 7    Smokeless tobacco: Never Used    Tobacco comment: quit in    Substance and Sexual Activity    Alcohol use: Not Currently     Comment: rarely    Drug use: No    Sexual activity: Not on file   Lifestyle    Physical activity     Days per week: Not on file     Minutes per session: Not on file    Stress: Not on file   Relationships    Social connections     Talks on phone: Not on file     Gets together: Not on file     Attends Worship service: Not on file     Active member of club or organization: Not on file     Attends meetings of clubs or organizations: Not on file     Relationship status: Not on file    Intimate partner violence     Fear of current or ex partner: Not on file     Emotionally abused: Not on file     Physically abused: Not on file     Forced sexual activity: Not on file   Other Topics Concern    Not on file   Social History Narrative    Not on file      Family History   Problem Relation Age of Onset    Arthritis Mother     Colon cancer Father     Heart attack Father     Diabetes type I Sister      Past Surgical History:   Procedure Laterality Date    CHOLECYSTECTOMY      COLONOSCOPY W/ ENDOSCOPIC US N/A 7/21/2017    Procedure: ANAL ENDOSCOPIC U/S;  Surgeon: Viridiana Tamez MD;  [facility]: [facility]; Service: Colorectal    HERNIA REPAIR      KIDNEY STONE SURGERY      LUNG LOBECTOMY Right     HI COLONOSCOPY FLX DX W/COLLJ SPEC WHEN PFRMD N/A 7/10/2017    Procedure: COLONOSCOPY;  Surgeon: Clara Cade MD;  [facility]: [facility];   Service: Gastroenterology    HI RECTAL TUMOR EXCISION, TRANSANAL ENDOSCOPIC MICROSURGICAL, FULL THICK N/A 11/2/2017    Procedure: TRANSANAL ENDOSCOPIC MICROSURGERY TEM;  Surgeon: Viridiana Tamez MD;  [facility]: [facility];  Service: Colorectal       Current Outpatient Medications:     albuterol (2 5 mg/3 mL) 0 083 % nebulizer solution, Inhale 2 5 mg every 6 (six) hours as needed, Disp: , Rfl:     albuterol (PROVENTIL HFA,VENTOLIN HFA) 90 mcg/act inhaler, INHALE 2 PUFFS BY MOUTH EVERY 6 HOURS AS NEEDED FOR WHEEZING, Disp: 25 5 g, Rfl: 3    apixaban (ELIQUIS) 5 mg, Take 1 tablet (5 mg total) by mouth 2 (two) times a day (Patient taking differently: Take 5 mg by mouth daily ), Disp: 60 tablet, Rfl: 3    benzonatate (TESSALON PERLES) 100 mg capsule, Take 100 mg by mouth 3 (three) times a day as needed, Disp: , Rfl:     cholecalciferol (VITAMIN D3) 1,000 units tablet, Take 5,000 Units by mouth daily  , Disp: , Rfl:     cyanocobalamin (VITAMIN B-12) 1,000 mcg tablet, Take by mouth daily, Disp: , Rfl:     finasteride (PROSCAR) 5 mg tablet, Take 1 tablet (5 mg total) by mouth daily at bedtime, Disp: 90 tablet, Rfl: 0    guaiFENesin (MUCINEX) 600 mg 12 hr tablet, Take 1 tablet (600 mg total) by mouth every 12 (twelve) hours (Patient taking differently: Take 600 mg by mouth every 12 (twelve) hours as needed  ), Disp: 60 tablet, Rfl: 0    ipratropium (ATROVENT) 0 02 % nebulizer solution, Take 1 vial (0 5 mg total) by nebulization 4 (four) times a day, Disp: 300 mL, Rfl: 6    Lactobacillus-Inulin ([facility]), Take by mouth daily, Disp: , Rfl:     lenalidomide (REVLIMID) 5 MG CAPS, 3 weeks on/3 weeks off adult male/auth# [id number] 8/5/20, Disp: 21 capsule, Rfl: 0    pantoprazole (PROTONIX) 40 mg tablet, , Disp: , Rfl:     predniSONE 10 mg tablet, Take 1 tablet (10 mg total) by mouth see administration instructions 2 tablet daily for 1 week then 1 tablet daily for 1 week, Disp: 21 tablet, Rfl: 0    predniSONE 5 mg tablet, Take 1 tablet (5 mg total) by mouth daily, Disp: 90 tablet, Rfl: 3    RAPAFLO 8 MG CAPS, Take 1 capsule by mouth daily at bedtime  , Disp: , Rfl: 3    traMADol (ULTRAM) 50 mg tablet, TAKE ONE TABLET BY MOUTH EVERY 6 HOURS AS NEEDED FOR MODERATE PAIN (PAIN SCORE 4-6), Disp: , Rfl:     triamcinolone (KENALOG) 0 5 % cream, APPLY TOPICALLY THREE TIMES DAILY TO SHINGLES RASH, Disp: , Rfl:     gabapentin (NEURONTIN) 100 mg capsule, Take 100 mg by mouth 3 (three) times a day, Disp: , Rfl:     melphalan (ALKERAN) 2 MG tablet, Take 12 mg daily for 4 days every 6 weeks (Patient not taking: Reported on 9/16/2020), Disp: 24 tablet, Rfl: 3        Review of Systems:  Review of Systems   Respiratory: Positive for shortness of breath  Negative for cough and chest tightness  Cardiovascular: Positive for leg swelling  Musculoskeletal: Positive for back pain and gait problem  All other systems reviewed and are negative  Physical Exam:  Physical Exam  Constitutional:       General: He is not in acute distress  Appearance: He is well-developed  He is not diaphoretic  HENT:      Head: Normocephalic and atraumatic     Eyes: General: No scleral icterus  Right eye: No discharge  Pupils: Pupils are equal, round, and reactive to light  Neck:      Musculoskeletal: Normal range of motion and neck supple  Thyroid: No thyromegaly  Cardiovascular:      Rate and Rhythm: Normal rate and regular rhythm  Heart sounds: Murmur present  No friction rub  No gallop  Comments: 2/6 systolic ejection murmur with minimally audible S2  Pulmonary:      Effort: Pulmonary effort is normal       Breath sounds: Normal breath sounds  Abdominal:      General: There is no distension  Tenderness: There is no abdominal tenderness  There is no guarding or rebound  Musculoskeletal: Normal range of motion  Skin:     General: Skin is warm and dry  Coloration: Skin is not pale  Findings: No erythema or rash  Neurological:      Mental Status: He is alert and oriented to person, place, and time  Coordination: Coordination normal    Psychiatric:         Behavior: Behavior normal          Thought Content: Thought content normal          Judgment: Judgment normal          This note was completed in part utilizing [employer] Software  Grammatical errors, random word insertions, spelling mistakes, and incomplete sentences can be an occasional consequence of this system secondary to software limitations, ambient noise, and hardware issues  If you have any questions or concerns about the content, text, or information contained within the body of this dictation, please contact the provider for clarification

## 2020-09-17 NOTE — PROGRESS NOTES
Hematology Outpatient Follow - Up Note  Lazarus Espino 81 y o  male MRN: @ Encounter: [id number]        Date:  9/17/2020        Assessment/ Plan:      67-year-old  male with lambda light chain multiple myeloma, heavily pretreated, stage III, he is on lenalidomide, melphalan, dexamethasone initiated on 05/2018 and discontinued on January 2020 after normalization of lambda light chain and need a break of chemotherapy     Lambda light chain a is elevated as expected at 157 (130 in June 2020), (28 in January 2020) now it is coming down to 134 in September 2020     Recovery from recent herpes zoster     Will resume melphalan 12 milligram p o  Daily for 4 days every 6 weeks, prednisone 5 milligram p o  Daily, lenalidomide 5 milligram p o  Daily 3 weeks on, 3 weeks off    Hypogammaglobinemia continue IVIG 20 g every 6 weeks    Growing nodule of the left upper lobe of the lung a, concerning for malignancy, patient to follow up with Pulmonary service for either watchful observation or SB RT    Pulmonary emboli she is on apixaban 5 mg once a day or 2 5 mg p o  B i d  Depends on the samples we have    Rectal cancer advanced COPD, pulmonary adenocarcinoma, squamous cell carcinoma of the skin followed by individual specialists          HPI:  #1 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, multiple compression fractures, status post kyphoplasty to L1, L2, L5, treated with radiation therapy to the left intra trochanteric area, received melphalan/Velcade/prednisone in October 2010 for 4 cycles with excellent response and normalization of lambda light chain, had been on Velcade maintenance till January 2013 when lambda light chain went up to 111,treated with Velcade 1 3 mg meter square weekly 3 weeks on one week off, Decadron 20 mg by mouth weekly, lambda light chain down to 30 then creeping up to 110, on Revlimid 15 mg by mouth every other day   Lambda light chain normal at 28 however he reported skin rash on the upper chest area we stopped the Revlimid for 2 month  Then reinitiated Revlimid 5 mg by mouth daily     Progression of disease he received lenalidomide, melphalan, dexamethasone since May 2018 until January 2020 he took a break of the therapy after normalization of the lambda light chain     In June 2020 lambda light chain went up from 28 to 130     #2  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 stage I, with progression by scan, status post resection of the right upper lobe with few foci in the same lobe consistent with stage IIIA done in January 2015 adenocarcinoma, well differentiated  Status post Alimta/carboplatin x4 cycles finished in May 2015 and radiation therapy      CT scan in August 2020 showed a growing 7 mm nodule in the left upper lobe of the lung followed by Pulmonary service        #3  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     #4  Rectal adenocarcinoma status post resection stage I in November 2017     #5  Pulmonary embolism involving the subsegmental left lower lobe in December 2018, currently on apixaban 5 mg once a day because of epistaxis     #6  Hematuria secondary to benign prostatic hypertrophy and small bladder stones and being on apixaban, apixaban was reduced to 5 milligram once a day     #7   Herpes zoster involving the left upper extremity could not tolerate gabapentin because of sedation Benadryl was ineffective he received topical lidocaine we hold his chemotherapy utilizing melphalan and Revlimid        Interval History:        Previous Treatment:         Test Results:    Imaging: Ct Chest Without Contrast    Result Date: 9/16/2020  Narrative: CT CHEST WITHOUT IV CONTRAST INDICATION:   R91 1: Solitary pulmonary nodule  History of lung cancer   COMPARISON:  3/3/2019, 5/8/2020 TECHNIQUE: CT examination of the chest was performed without intravenous contrast   Axial, sagittal, and coronal 2D reformatted images were created from the source data and submitted for interpretation  Radiation dose length product (DLP) for this visit:  150 mGy-cm   This examination, like all CT scans performed in the [facility], was performed utilizing techniques to minimize radiation dose exposure, including the use of iterative reconstruction and automated exposure control  FINDINGS: LUNGS: 4 mm left upper lobe lung nodule seen on the prior study has increased in size now measuring 7 mm  There is a 3 mm left upper lobe nodule on series 3, image 23, previously 2 mm  There is a 3 mm left lower lobe nodule on series 3, image 42, previously 2 mm  Multiple groundglass opacities are seen throughout the left upper and lower lobes  The patient is status post right upper lobectomy with stable changes of radiation fibrosis in the right apex  There is no tracheal or endobronchial lesion  PLEURA:  Unremarkable  HEART/GREAT VESSELS:  Unremarkable for patient's age  MEDIASTINUM AND GLEN:  Unremarkable  CHEST WALL AND LOWER NECK:   Unremarkable  VISUALIZED STRUCTURES IN THE UPPER ABDOMEN:  Unremarkable  OSSEOUS STRUCTURES:  No acute fracture or destructive osseous lesion  Impression: 1  Significant interval enlargement of left upper lobe irregular nodule now measuring up to 7 mm  This is suspicious for metachronous primary malignancy or metastasis  Additional small nodules on the left appear slightly more prominent than on the prior study though remain indeterminate  Consider PET/CT evaluation, tissue sampling, or short interval follow-up  2   Stable posttreatment changes on the right  The study was marked in EPIC for significant notification   Workstation performed: [id number]       Labs:   Lab Results   Component Value Date    WBC 4 86 09/14/2020    HGB 12 5 09/14/2020    HCT 38 4 09/14/2020     (H) 09/14/2020     (L) 09/14/2020     Lab Results   Component Value Date     01/04/2016    K 4 0 09/14/2020     (H) 09/14/2020 CO2 24 09/14/2020    ANIONGAP 7 01/04/2016    BUN 21 09/14/2020    CREATININE 1 13 09/14/2020    GLUCOSE 88 01/04/2016    GLUF 94 08/01/2020    CALCIUM 9 5 09/14/2020    AST 12 09/14/2020    ALT 24 09/14/2020    ALKPHOS 74 09/14/2020    PROT 6 8 01/04/2016    BILITOT 0 63 01/04/2016    EGFR 59 09/14/2020       No results found for: IRON, TIBC, FERRITIN    No results found for: [id number]      ROS: Review of Systems   Constitutional: Positive for fatigue  Negative for appetite change, chills, diaphoresis, fever and unexpected weight change  HENT:   Negative for hearing loss, lump/mass, mouth sores, nosebleeds, sore throat, trouble swallowing and voice change  Eyes: Negative  Negative for eye problems and icterus  Respiratory: Positive for cough, shortness of breath and wheezing  Negative for chest tightness and hemoptysis  Cardiovascular: Positive for leg swelling  Negative for chest pain  Gastrointestinal: Negative for abdominal distention, abdominal pain, blood in stool, constipation, diarrhea and nausea  Endocrine: Negative  Genitourinary: Negative for dysuria, frequency, hematuria and pelvic pain  Musculoskeletal: Negative  Negative for arthralgias, back pain, flank pain, gait problem, myalgias and neck stiffness  Skin: Negative for itching and rash  Neurological: Negative for dizziness, gait problem, headaches, light-headedness, numbness and speech difficulty  Hematological: Negative for adenopathy  Does not bruise/bleed easily  Psychiatric/Behavioral: Negative for confusion, decreased concentration, depression and sleep disturbance  The patient is not nervous/anxious  Current Medications: Reviewed  Allergies: Reviewed  PMH/FH/SH:  Reviewed      Physical Exam:    Body surface area is 1 96 meters squared      Wt Readings from Last 3 Encounters:   09/17/20 84 1 kg (185 lb 6 4 oz)   09/16/20 83 7 kg (184 lb 9 6 oz)   09/09/20 83 3 kg (183 lb 9 6 oz)        Temp Readings from Last 3 Encounters:   20 99 1 °F (37 3 °C)   20 (!) 97 4 °F (36 3 °C)   20 97 5 °F (36 4 °C) (Tympanic)        BP Readings from Last 3 Encounters:   20 116/60   20 118/58   20 110/72         Pulse Readings from Last 3 Encounters:   20 88   20 88   20 68        Physical Exam  Vitals signs reviewed  Constitutional:       General: He is not in acute distress  Appearance: Normal appearance  He is well-developed  He is not diaphoretic  Comments: Using cane for ambulation   HENT:      Head: Normocephalic and atraumatic  Eyes:      Conjunctiva/sclera: Conjunctivae normal    Neck:      Musculoskeletal: Normal range of motion and neck supple  Trachea: No tracheal deviation  Cardiovascular:      Rate and Rhythm: Normal rate and regular rhythm  Heart sounds: No murmur  No friction rub  No gallop  Pulmonary:      Effort: Pulmonary effort is normal  No respiratory distress  Breath sounds: Normal breath sounds  No wheezing or rales  Chest:      Chest wall: No tenderness  Abdominal:      General: There is no distension  Palpations: Abdomen is soft  Tenderness: There is no abdominal tenderness  Musculoskeletal:      Right lower leg: Edema present  Left lower leg: Edema present  Lymphadenopathy:      Cervical: No cervical adenopathy  Skin:     General: Skin is warm and dry  Coloration: Skin is not pale  Findings: No erythema  Neurological:      Mental Status: He is alert and oriented to person, place, and time  Psychiatric:         Behavior: Behavior normal          Thought Content: Thought content normal          Judgment: Judgment normal          ECO    Goals and Barriers:  Current Goal: Minimize effects of disease  Barriers: None  Patient's Capacity to Self Care:  Patient is able to self care      Code Status: [unfilled]

## 2020-09-22 NOTE — RESULT ENCOUNTER NOTE
I spoke with the patient today about the results of his CT scan  No pneumonia  He does have a growing small pulmonary nodule  This is fairly small for PET scan evaluation at 7 mm  Likely is a small tumor that is growing given Lv's history    I will discuss and coordinate follow up with his oncologist, Dr Treasure Traore

## 2020-09-28 NOTE — PROGRESS NOTES
Telephone note- Pulmonary Medicine   Ton Gillis 81 y o  male MRN: [id number]    Reason for call: Follow-up of CT scan  The CT scan did show a lung nodule  I discussed with his oncologist as well  Pertinent details:  Current nodule is 7 mm in size, this is at the limit of resolution for PET scan  At this time, I have recommended a three-month follow-up PET scan  This will allow some additional time for interval growth and would be a reasonable follow-up interval   Dr Jhony Cooper, the patient's oncologist is also agreeable with this approach    Discussed with the patient and he is agreeable as well    Yuan Asher MD

## 2020-11-11 PROBLEM — K92.1 BLOOD IN STOOL: Status: ACTIVE | Noted: 2020-01-01

## 2020-12-02 PROBLEM — J44.1 COPD WITH EXACERBATION (HCC): Status: ACTIVE | Noted: 2017-08-22

## 2020-12-02 PROBLEM — Z79.01 CHRONIC ANTICOAGULATION: Status: ACTIVE | Noted: 2018-12-21

## 2020-12-02 PROBLEM — R53.1 GENERALIZED WEAKNESS: Status: ACTIVE | Noted: 2020-01-01

## 2020-12-03 PROBLEM — D61.818 PANCYTOPENIA (HCC): Status: ACTIVE | Noted: 2020-01-01

## 2020-12-03 PROBLEM — J96.01 ACUTE RESPIRATORY FAILURE WITH HYPOXIA (HCC): Status: ACTIVE | Noted: 2020-01-01

## 2020-12-15 PROBLEM — J96.01 ACUTE RESPIRATORY FAILURE WITH HYPOXIA (HCC): Status: RESOLVED | Noted: 2020-01-01 | Resolved: 2020-01-01

## 2021-01-01 ENCOUNTER — HOSPITAL ENCOUNTER (OUTPATIENT)
Dept: INFUSION CENTER | Facility: CLINIC | Age: 86
End: 2021-01-01

## 2021-01-01 ENCOUNTER — TELEPHONE (OUTPATIENT)
Dept: HEMATOLOGY ONCOLOGY | Facility: CLINIC | Age: 86
End: 2021-01-01

## 2021-01-01 ENCOUNTER — APPOINTMENT (EMERGENCY)
Dept: RADIOLOGY | Facility: HOSPITAL | Age: 86
DRG: 193 | End: 2021-01-01
Payer: MEDICARE

## 2021-01-01 ENCOUNTER — LAB REQUISITION (OUTPATIENT)
Dept: LAB | Facility: HOSPITAL | Age: 86
End: 2021-01-01
Payer: MEDICARE

## 2021-01-01 ENCOUNTER — APPOINTMENT (EMERGENCY)
Dept: RADIOLOGY | Facility: HOSPITAL | Age: 86
DRG: 177 | End: 2021-01-01
Payer: MEDICARE

## 2021-01-01 ENCOUNTER — OFFICE VISIT (OUTPATIENT)
Dept: HEMATOLOGY ONCOLOGY | Facility: CLINIC | Age: 86
End: 2021-01-01
Payer: MEDICARE

## 2021-01-01 ENCOUNTER — OFFICE VISIT (OUTPATIENT)
Dept: CARDIOLOGY CLINIC | Facility: CLINIC | Age: 86
End: 2021-01-01
Payer: MEDICARE

## 2021-01-01 ENCOUNTER — TELEPHONE (OUTPATIENT)
Dept: PULMONOLOGY | Facility: CLINIC | Age: 86
End: 2021-01-01

## 2021-01-01 ENCOUNTER — DOCUMENTATION (OUTPATIENT)
Dept: HEMATOLOGY ONCOLOGY | Facility: CLINIC | Age: 86
End: 2021-01-01

## 2021-01-01 ENCOUNTER — APPOINTMENT (EMERGENCY)
Dept: CT IMAGING | Facility: HOSPITAL | Age: 86
End: 2021-01-01
Payer: MEDICARE

## 2021-01-01 ENCOUNTER — PATIENT OUTREACH (OUTPATIENT)
Dept: CASE MANAGEMENT | Facility: HOSPITAL | Age: 86
End: 2021-01-01

## 2021-01-01 ENCOUNTER — HOSPITAL ENCOUNTER (EMERGENCY)
Facility: HOSPITAL | Age: 86
Discharge: HOME | End: 2021-02-04
Attending: EMERGENCY MEDICINE
Payer: MEDICARE

## 2021-01-01 ENCOUNTER — TELEPHONE (OUTPATIENT)
Dept: HEMATOLOGY ONCOLOGY | Facility: MEDICAL CENTER | Age: 86
End: 2021-01-01

## 2021-01-01 ENCOUNTER — OFFICE VISIT (OUTPATIENT)
Dept: PULMONOLOGY | Facility: CLINIC | Age: 86
End: 2021-01-01
Payer: MEDICARE

## 2021-01-01 ENCOUNTER — TRANSCRIBE ORDERS (OUTPATIENT)
Dept: LAB | Facility: CLINIC | Age: 86
End: 2021-01-01

## 2021-01-01 ENCOUNTER — HOSPITAL ENCOUNTER (INPATIENT)
Facility: HOSPITAL | Age: 86
LOS: 11 days | DRG: 177 | End: 2021-06-12
Attending: EMERGENCY MEDICINE | Admitting: INTERNAL MEDICINE
Payer: MEDICARE

## 2021-01-01 ENCOUNTER — APPOINTMENT (EMERGENCY)
Dept: RADIOLOGY | Facility: HOSPITAL | Age: 86
End: 2021-01-01
Payer: MEDICARE

## 2021-01-01 ENCOUNTER — HOSPITAL ENCOUNTER (EMERGENCY)
Facility: HOSPITAL | Age: 86
Discharge: HOME | End: 2021-04-02
Attending: EMERGENCY MEDICINE | Admitting: EMERGENCY MEDICINE
Payer: MEDICARE

## 2021-01-01 ENCOUNTER — HOSPITAL ENCOUNTER (OUTPATIENT)
Dept: INFUSION CENTER | Facility: CLINIC | Age: 86
Discharge: HOME | End: 2021-05-26
Payer: MEDICARE

## 2021-01-01 ENCOUNTER — APPOINTMENT (INPATIENT)
Dept: NON INVASIVE DIAGNOSTICS | Facility: HOSPITAL | Age: 86
DRG: 177 | End: 2021-01-01
Payer: MEDICARE

## 2021-01-01 ENCOUNTER — TELEMEDICINE (OUTPATIENT)
Dept: GERIATRICS | Age: 86
End: 2021-01-01
Payer: MEDICARE

## 2021-01-01 ENCOUNTER — APPOINTMENT (OUTPATIENT)
Dept: LAB | Facility: MEDICAL CENTER | Age: 86
End: 2021-01-01
Payer: MEDICARE

## 2021-01-01 ENCOUNTER — TELEPHONE (OUTPATIENT)
Dept: CARDIOLOGY CLINIC | Facility: CLINIC | Age: 86
End: 2021-01-01

## 2021-01-01 ENCOUNTER — HOSPITAL ENCOUNTER (OUTPATIENT)
Dept: INFUSION CENTER | Facility: CLINIC | Age: 86
Discharge: HOME | End: 2021-05-12
Payer: MEDICARE

## 2021-01-01 ENCOUNTER — HOSPITAL ENCOUNTER (INPATIENT)
Facility: HOSPITAL | Age: 86
LOS: 2 days | Discharge: HOME | DRG: 193 | End: 2021-04-21
Attending: EMERGENCY MEDICINE | Admitting: INTERNAL MEDICINE
Payer: MEDICARE

## 2021-01-01 ENCOUNTER — HOSPITAL ENCOUNTER (OUTPATIENT)
Dept: INFUSION CENTER | Facility: CLINIC | Age: 86
Discharge: HOME | End: 2021-05-05
Payer: MEDICARE

## 2021-01-01 ENCOUNTER — HOSPITAL ENCOUNTER (OUTPATIENT)
Dept: INFUSION CENTER | Facility: CLINIC | Age: 86
Discharge: HOME | End: 2021-05-19
Payer: MEDICARE

## 2021-01-01 ENCOUNTER — OFFICE VISIT (OUTPATIENT)
Dept: CARDIAC SURGERY | Facility: CLINIC | Age: 86
End: 2021-01-01
Payer: MEDICARE

## 2021-01-01 ENCOUNTER — APPOINTMENT (INPATIENT)
Dept: RADIOLOGY | Facility: HOSPITAL | Age: 86
DRG: 177 | End: 2021-01-01
Payer: MEDICARE

## 2021-01-01 ENCOUNTER — LAB (OUTPATIENT)
Dept: LAB | Facility: MEDICAL CENTER | Age: 86
End: 2021-01-01
Payer: MEDICARE

## 2021-01-01 VITALS
TEMPERATURE: 99.3 F | WEIGHT: 175.2 LBS | DIASTOLIC BLOOD PRESSURE: 60 MMHG | BODY MASS INDEX: 27.5 KG/M2 | HEART RATE: 110 BPM | HEIGHT: 67 IN | RESPIRATION RATE: 20 BRPM | OXYGEN SATURATION: 97 % | SYSTOLIC BLOOD PRESSURE: 84 MMHG

## 2021-01-01 VITALS
WEIGHT: 181 LBS | HEART RATE: 98 BPM | HEIGHT: 68 IN | TEMPERATURE: 98.5 F | BODY MASS INDEX: 27.43 KG/M2 | RESPIRATION RATE: 20 BRPM | DIASTOLIC BLOOD PRESSURE: 56 MMHG | SYSTOLIC BLOOD PRESSURE: 102 MMHG | OXYGEN SATURATION: 98 %

## 2021-01-01 VITALS
SYSTOLIC BLOOD PRESSURE: 132 MMHG | HEART RATE: 111 BPM | TEMPERATURE: 98.9 F | BODY MASS INDEX: 27.65 KG/M2 | HEIGHT: 67 IN | RESPIRATION RATE: 18 BRPM | OXYGEN SATURATION: 98 % | DIASTOLIC BLOOD PRESSURE: 70 MMHG | WEIGHT: 176.2 LBS

## 2021-01-01 VITALS
WEIGHT: 180.6 LBS | HEART RATE: 80 BPM | BODY MASS INDEX: 27.47 KG/M2 | DIASTOLIC BLOOD PRESSURE: 65 MMHG | SYSTOLIC BLOOD PRESSURE: 106 MMHG

## 2021-01-01 VITALS
DIASTOLIC BLOOD PRESSURE: 68 MMHG | WEIGHT: 184.3 LBS | BODY MASS INDEX: 27.93 KG/M2 | RESPIRATION RATE: 36 BRPM | OXYGEN SATURATION: 95 % | HEIGHT: 68 IN | TEMPERATURE: 98 F | HEART RATE: 108 BPM | SYSTOLIC BLOOD PRESSURE: 110 MMHG

## 2021-01-01 VITALS
BODY MASS INDEX: 27.54 KG/M2 | DIASTOLIC BLOOD PRESSURE: 52 MMHG | SYSTOLIC BLOOD PRESSURE: 100 MMHG | OXYGEN SATURATION: 97 % | HEART RATE: 74 BPM | TEMPERATURE: 98.9 F | WEIGHT: 175.49 LBS | HEIGHT: 67 IN | RESPIRATION RATE: 24 BRPM

## 2021-01-01 VITALS
SYSTOLIC BLOOD PRESSURE: 124 MMHG | TEMPERATURE: 99.9 F | RESPIRATION RATE: 18 BRPM | DIASTOLIC BLOOD PRESSURE: 60 MMHG | HEART RATE: 67 BPM | OXYGEN SATURATION: 97 % | BODY MASS INDEX: 27.28 KG/M2 | HEIGHT: 68 IN | WEIGHT: 180 LBS

## 2021-01-01 VITALS
WEIGHT: 180.2 LBS | BODY MASS INDEX: 27.41 KG/M2 | SYSTOLIC BLOOD PRESSURE: 102 MMHG | DIASTOLIC BLOOD PRESSURE: 65 MMHG | HEART RATE: 88 BPM

## 2021-01-01 VITALS
HEIGHT: 68 IN | DIASTOLIC BLOOD PRESSURE: 47 MMHG | WEIGHT: 170.64 LBS | OXYGEN SATURATION: 93 % | TEMPERATURE: 99.2 F | SYSTOLIC BLOOD PRESSURE: 67 MMHG | BODY MASS INDEX: 25.86 KG/M2

## 2021-01-01 VITALS
TEMPERATURE: 98 F | WEIGHT: 178 LBS | SYSTOLIC BLOOD PRESSURE: 106 MMHG | HEIGHT: 67 IN | BODY MASS INDEX: 27.94 KG/M2 | HEART RATE: 118 BPM | RESPIRATION RATE: 18 BRPM | OXYGEN SATURATION: 98 % | DIASTOLIC BLOOD PRESSURE: 60 MMHG

## 2021-01-01 VITALS
HEIGHT: 68 IN | HEART RATE: 93 BPM | TEMPERATURE: 98.6 F | SYSTOLIC BLOOD PRESSURE: 116 MMHG | WEIGHT: 181.44 LBS | BODY MASS INDEX: 27.5 KG/M2 | OXYGEN SATURATION: 97 % | RESPIRATION RATE: 20 BRPM | DIASTOLIC BLOOD PRESSURE: 52 MMHG

## 2021-01-01 VITALS
SYSTOLIC BLOOD PRESSURE: 99 MMHG | HEART RATE: 98 BPM | DIASTOLIC BLOOD PRESSURE: 69 MMHG | RESPIRATION RATE: 18 BRPM | TEMPERATURE: 97.9 F | HEIGHT: 68 IN | WEIGHT: 182.1 LBS | BODY MASS INDEX: 27.6 KG/M2

## 2021-01-01 VITALS
HEART RATE: 120 BPM | OXYGEN SATURATION: 96 % | RESPIRATION RATE: 20 BRPM | BODY MASS INDEX: 27.15 KG/M2 | HEIGHT: 67 IN | WEIGHT: 173 LBS | TEMPERATURE: 100.7 F | DIASTOLIC BLOOD PRESSURE: 58 MMHG | SYSTOLIC BLOOD PRESSURE: 100 MMHG

## 2021-01-01 VITALS
OXYGEN SATURATION: 98 % | WEIGHT: 173.4 LBS | HEIGHT: 67 IN | TEMPERATURE: 97.1 F | HEART RATE: 103 BPM | DIASTOLIC BLOOD PRESSURE: 54 MMHG | BODY MASS INDEX: 27.21 KG/M2 | RESPIRATION RATE: 18 BRPM | SYSTOLIC BLOOD PRESSURE: 118 MMHG

## 2021-01-01 VITALS
WEIGHT: 175 LBS | BODY MASS INDEX: 27.47 KG/M2 | OXYGEN SATURATION: 98 % | HEIGHT: 67 IN | HEART RATE: 106 BPM | SYSTOLIC BLOOD PRESSURE: 144 MMHG | DIASTOLIC BLOOD PRESSURE: 72 MMHG

## 2021-01-01 VITALS
HEART RATE: 84 BPM | BODY MASS INDEX: 27.5 KG/M2 | HEIGHT: 67 IN | SYSTOLIC BLOOD PRESSURE: 106 MMHG | WEIGHT: 175.2 LBS | DIASTOLIC BLOOD PRESSURE: 62 MMHG | TEMPERATURE: 97.2 F

## 2021-01-01 VITALS
HEART RATE: 88 BPM | TEMPERATURE: 98.1 F | DIASTOLIC BLOOD PRESSURE: 55 MMHG | RESPIRATION RATE: 16 BRPM | SYSTOLIC BLOOD PRESSURE: 109 MMHG | OXYGEN SATURATION: 98 %

## 2021-01-01 VITALS
OXYGEN SATURATION: 90 % | SYSTOLIC BLOOD PRESSURE: 106 MMHG | RESPIRATION RATE: 18 BRPM | BODY MASS INDEX: 27.47 KG/M2 | TEMPERATURE: 97.5 F | DIASTOLIC BLOOD PRESSURE: 56 MMHG | WEIGHT: 181.22 LBS | HEART RATE: 98 BPM | HEIGHT: 68 IN

## 2021-01-01 VITALS
WEIGHT: 179 LBS | HEART RATE: 100 BPM | HEIGHT: 68 IN | SYSTOLIC BLOOD PRESSURE: 102 MMHG | OXYGEN SATURATION: 98 % | BODY MASS INDEX: 27.13 KG/M2 | DIASTOLIC BLOOD PRESSURE: 70 MMHG

## 2021-01-01 VITALS
SYSTOLIC BLOOD PRESSURE: 101 MMHG | DIASTOLIC BLOOD PRESSURE: 61 MMHG | BODY MASS INDEX: 27.85 KG/M2 | RESPIRATION RATE: 22 BRPM | WEIGHT: 183.75 LBS | HEIGHT: 68 IN | HEART RATE: 118 BPM | TEMPERATURE: 98.3 F

## 2021-01-01 DIAGNOSIS — I50.32 CHRONIC DIASTOLIC CHF (CONGESTIVE HEART FAILURE) (HCC): ICD-10-CM

## 2021-01-01 DIAGNOSIS — I48.19 PERSISTENT ATRIAL FIBRILLATION (HCC): Primary | ICD-10-CM

## 2021-01-01 DIAGNOSIS — J96.10 CHRONIC RESPIRATORY FAILURE, UNSPECIFIED WHETHER WITH HYPOXIA OR HYPERCAPNIA (HCC): ICD-10-CM

## 2021-01-01 DIAGNOSIS — R04.0 EPISTAXIS: ICD-10-CM

## 2021-01-01 DIAGNOSIS — C90.02 MULTIPLE MYELOMA IN RELAPSE (HCC): Primary | ICD-10-CM

## 2021-01-01 DIAGNOSIS — J44.9 COPD MIXED TYPE (HCC): Primary | ICD-10-CM

## 2021-01-01 DIAGNOSIS — J44.1 COPD WITH EXACERBATION (HCC): Primary | ICD-10-CM

## 2021-01-01 DIAGNOSIS — I35.0 SEVERE AORTIC STENOSIS: Primary | ICD-10-CM

## 2021-01-01 DIAGNOSIS — I35.0 SEVERE AORTIC STENOSIS: ICD-10-CM

## 2021-01-01 DIAGNOSIS — J44.9 CHRONIC OBSTRUCTIVE PULMONARY DISEASE, UNSPECIFIED COPD TYPE (HCC): Primary | ICD-10-CM

## 2021-01-01 DIAGNOSIS — C90.00 MULTIPLE MYELOMA, REMISSION STATUS UNSPECIFIED (HCC): ICD-10-CM

## 2021-01-01 DIAGNOSIS — J44.1 COPD WITH EXACERBATION (HCC): ICD-10-CM

## 2021-01-01 DIAGNOSIS — Z85.3 PERSONAL HISTORY OF MALIGNANT NEOPLASM OF BREAST: ICD-10-CM

## 2021-01-01 DIAGNOSIS — I35.0 NONRHEUMATIC AORTIC VALVE STENOSIS: Primary | ICD-10-CM

## 2021-01-01 DIAGNOSIS — N18.9 CKD (CHRONIC KIDNEY DISEASE): ICD-10-CM

## 2021-01-01 DIAGNOSIS — I87.2 VENOUS INSUFFICIENCY (CHRONIC) (PERIPHERAL): ICD-10-CM

## 2021-01-01 DIAGNOSIS — C90.02 MULTIPLE MYELOMA IN RELAPSE (HCC): ICD-10-CM

## 2021-01-01 DIAGNOSIS — E86.0 DEHYDRATION: ICD-10-CM

## 2021-01-01 DIAGNOSIS — J44.9 COPD MIXED TYPE (HCC): ICD-10-CM

## 2021-01-01 DIAGNOSIS — N18.32 STAGE 3B CHRONIC KIDNEY DISEASE (HCC): ICD-10-CM

## 2021-01-01 DIAGNOSIS — C90.00 MULTIPLE MYELOMA (HCC): ICD-10-CM

## 2021-01-01 DIAGNOSIS — I48.0 PAF (PAROXYSMAL ATRIAL FIBRILLATION) (HCC): ICD-10-CM

## 2021-01-01 DIAGNOSIS — D80.9 IMMUNOGLOBULIN DEFICIENCY (HCC): ICD-10-CM

## 2021-01-01 DIAGNOSIS — J44.9 CHRONIC OBSTRUCTIVE PULMONARY DISEASE, UNSPECIFIED COPD TYPE (HCC): ICD-10-CM

## 2021-01-01 DIAGNOSIS — Z79.01 CHRONIC ANTICOAGULATION: ICD-10-CM

## 2021-01-01 DIAGNOSIS — D80.1 HYPOGAMMAGLOBULINEMIA (HCC): ICD-10-CM

## 2021-01-01 DIAGNOSIS — N18.9 ACUTE KIDNEY INJURY SUPERIMPOSED ON CHRONIC KIDNEY DISEASE (HCC): ICD-10-CM

## 2021-01-01 DIAGNOSIS — C20 RECTAL CANCER (HCC): ICD-10-CM

## 2021-01-01 DIAGNOSIS — C34.91 ADENOCARCINOMA OF RIGHT LUNG (HCC): Chronic | ICD-10-CM

## 2021-01-01 DIAGNOSIS — K52.9 CHRONIC DIARRHEA: ICD-10-CM

## 2021-01-01 DIAGNOSIS — I50.32 CHRONIC DIASTOLIC CONGESTIVE HEART FAILURE (HCC): ICD-10-CM

## 2021-01-01 DIAGNOSIS — K57.92 DIVERTICULITIS: Primary | ICD-10-CM

## 2021-01-01 DIAGNOSIS — C34.91 ADENOCARCINOMA OF RIGHT LUNG (HCC): Primary | Chronic | ICD-10-CM

## 2021-01-01 DIAGNOSIS — D69.6 THROMBOCYTOPENIA (HCC): ICD-10-CM

## 2021-01-01 DIAGNOSIS — N40.0 ENLARGED PROSTATE WITHOUT LOWER URINARY TRACT SYMPTOMS (LUTS): ICD-10-CM

## 2021-01-01 DIAGNOSIS — C90.00 MULTIPLE MYELOMA NOT HAVING ACHIEVED REMISSION (HCC): ICD-10-CM

## 2021-01-01 DIAGNOSIS — Z78.9 NEED FOR FOLLOW-UP BY SOCIAL WORKER: ICD-10-CM

## 2021-01-01 DIAGNOSIS — J18.9 COMMUNITY ACQUIRED PNEUMONIA: Primary | ICD-10-CM

## 2021-01-01 DIAGNOSIS — R10.9 ABDOMINAL PAIN: ICD-10-CM

## 2021-01-01 DIAGNOSIS — K57.92 ACUTE DIVERTICULITIS: Primary | ICD-10-CM

## 2021-01-01 DIAGNOSIS — C34.91 ADENOCARCINOMA OF RIGHT LUNG (HCC): ICD-10-CM

## 2021-01-01 DIAGNOSIS — G47.34 NOCTURNAL HYPOXIA: ICD-10-CM

## 2021-01-01 DIAGNOSIS — C90.00 MULTIPLE MYELOMA, REMISSION STATUS UNSPECIFIED (HCC): Primary | ICD-10-CM

## 2021-01-01 DIAGNOSIS — I50.32 CHRONIC DIASTOLIC (CONGESTIVE) HEART FAILURE (HCC): ICD-10-CM

## 2021-01-01 DIAGNOSIS — Z95.828 PORT-A-CATH IN PLACE: ICD-10-CM

## 2021-01-01 DIAGNOSIS — I48.0 PAROXYSMAL ATRIAL FIBRILLATION (HCC): ICD-10-CM

## 2021-01-01 DIAGNOSIS — R60.9 EDEMA, UNSPECIFIED TYPE: ICD-10-CM

## 2021-01-01 DIAGNOSIS — D64.9 ANEMIA: ICD-10-CM

## 2021-01-01 DIAGNOSIS — R06.00 DYSPNEA: ICD-10-CM

## 2021-01-01 DIAGNOSIS — C90.00 MULTIPLE MYELOMA NOT HAVING ACHIEVED REMISSION (HCC): Primary | ICD-10-CM

## 2021-01-01 DIAGNOSIS — R06.00 EXERTIONAL DYSPNEA: ICD-10-CM

## 2021-01-01 DIAGNOSIS — J44.9 COPD (CHRONIC OBSTRUCTIVE PULMONARY DISEASE) (HCC): ICD-10-CM

## 2021-01-01 DIAGNOSIS — Z95.828 PORT-A-CATH IN PLACE: Primary | ICD-10-CM

## 2021-01-01 DIAGNOSIS — R79.89 ELEVATED LACTIC ACID LEVEL: ICD-10-CM

## 2021-01-01 DIAGNOSIS — I45.10 RIGHT BUNDLE BRANCH BLOCK: ICD-10-CM

## 2021-01-01 DIAGNOSIS — R53.1 ASTHENIA: Primary | ICD-10-CM

## 2021-01-01 DIAGNOSIS — N17.9 ACUTE KIDNEY INJURY SUPERIMPOSED ON CHRONIC KIDNEY DISEASE (HCC): ICD-10-CM

## 2021-01-01 DIAGNOSIS — R06.02 SOB (SHORTNESS OF BREATH): ICD-10-CM

## 2021-01-01 DIAGNOSIS — R53.81 DEBILITY: ICD-10-CM

## 2021-01-01 LAB
ABO GROUP BLD BPU: NORMAL
ABO GROUP BLD: NORMAL
ALBUMIN SERPL BCP-MCNC: 2.2 G/DL (ref 3.5–5)
ALBUMIN SERPL BCP-MCNC: 2.5 G/DL (ref 3.5–5)
ALBUMIN SERPL BCP-MCNC: 2.5 G/DL (ref 3.5–5)
ALBUMIN SERPL BCP-MCNC: 2.6 G/DL (ref 3.5–5.7)
ALBUMIN SERPL BCP-MCNC: 2.6 G/DL (ref 3.5–5.7)
ALBUMIN SERPL BCP-MCNC: 2.7 G/DL (ref 3.5–5)
ALBUMIN SERPL BCP-MCNC: 2.8 G/DL (ref 3.5–5)
ALBUMIN SERPL BCP-MCNC: 2.9 G/DL (ref 3.5–5)
ALBUMIN SERPL BCP-MCNC: 3 G/DL (ref 3.5–5)
ALBUMIN SERPL BCP-MCNC: 3 G/DL (ref 3.5–5)
ALBUMIN SERPL BCP-MCNC: 3.3 G/DL (ref 3.5–5)
ALBUMIN SERPL BCP-MCNC: 3.6 G/DL (ref 3.5–5)
ALP SERPL-CCNC: 123 U/L (ref 46–116)
ALP SERPL-CCNC: 137 U/L (ref 46–116)
ALP SERPL-CCNC: 59 U/L (ref 46–116)
ALP SERPL-CCNC: 68 U/L (ref 46–116)
ALP SERPL-CCNC: 72 U/L (ref 46–116)
ALP SERPL-CCNC: 74 U/L (ref 46–116)
ALP SERPL-CCNC: 75 U/L (ref 46–116)
ALP SERPL-CCNC: 76 U/L (ref 46–116)
ALP SERPL-CCNC: 77 U/L (ref 46–116)
ALP SERPL-CCNC: 79 U/L (ref 46–116)
ALP SERPL-CCNC: 81 U/L (ref 46–116)
ALP SERPL-CCNC: 99 U/L (ref 46–116)
ALT SERPL W P-5'-P-CCNC: 115 U/L (ref 12–78)
ALT SERPL W P-5'-P-CCNC: 155 U/L (ref 12–78)
ALT SERPL W P-5'-P-CCNC: 19 U/L (ref 12–78)
ALT SERPL W P-5'-P-CCNC: 22 U/L (ref 12–78)
ALT SERPL W P-5'-P-CCNC: 23 U/L (ref 12–78)
ALT SERPL W P-5'-P-CCNC: 27 U/L (ref 12–78)
ALT SERPL W P-5'-P-CCNC: 32 U/L (ref 12–78)
ALT SERPL W P-5'-P-CCNC: 39 U/L (ref 12–78)
ALT SERPL W P-5'-P-CCNC: 47 U/L (ref 12–78)
ALT SERPL W P-5'-P-CCNC: 49 U/L (ref 12–78)
ALT SERPL W P-5'-P-CCNC: 59 U/L (ref 12–78)
ALT SERPL W P-5'-P-CCNC: 60 U/L (ref 12–78)
ANION GAP SERPL CALCULATED.3IONS-SCNC: 10 MMOL/L (ref 4–13)
ANION GAP SERPL CALCULATED.3IONS-SCNC: 11 MMOL/L (ref 4–13)
ANION GAP SERPL CALCULATED.3IONS-SCNC: 11 MMOL/L (ref 4–13)
ANION GAP SERPL CALCULATED.3IONS-SCNC: 3 MMOL/L (ref 4–13)
ANION GAP SERPL CALCULATED.3IONS-SCNC: 4 MMOL/L (ref 4–13)
ANION GAP SERPL CALCULATED.3IONS-SCNC: 4 MMOL/L (ref 4–13)
ANION GAP SERPL CALCULATED.3IONS-SCNC: 5 MMOL/L (ref 4–13)
ANION GAP SERPL CALCULATED.3IONS-SCNC: 6 MMOL/L (ref 4–13)
ANION GAP SERPL CALCULATED.3IONS-SCNC: 7 MMOL/L (ref 4–13)
ANION GAP SERPL CALCULATED.3IONS-SCNC: 7 MMOL/L (ref 4–13)
ANION GAP SERPL CALCULATED.3IONS-SCNC: 8 MMOL/L (ref 4–13)
ANION GAP SERPL CALCULATED.3IONS-SCNC: 9 MMOL/L (ref 4–13)
ANION GAP SERPL CALCULATED.3IONS-SCNC: 9 MMOL/L (ref 4–13)
ANISOCYTOSIS BLD QL SMEAR: PRESENT
APTT PPP: 28 SECONDS (ref 23–37)
APTT PPP: 29 SECONDS (ref 23–37)
AST SERPL W P-5'-P-CCNC: 13 U/L (ref 5–45)
AST SERPL W P-5'-P-CCNC: 130 U/L (ref 5–45)
AST SERPL W P-5'-P-CCNC: 14 U/L (ref 5–45)
AST SERPL W P-5'-P-CCNC: 14 U/L (ref 5–45)
AST SERPL W P-5'-P-CCNC: 15 U/L (ref 5–45)
AST SERPL W P-5'-P-CCNC: 19 U/L (ref 5–45)
AST SERPL W P-5'-P-CCNC: 21 U/L (ref 5–45)
AST SERPL W P-5'-P-CCNC: 24 U/L (ref 5–45)
AST SERPL W P-5'-P-CCNC: 27 U/L (ref 5–45)
AST SERPL W P-5'-P-CCNC: 28 U/L (ref 5–45)
AST SERPL W P-5'-P-CCNC: 42 U/L (ref 5–45)
AST SERPL W P-5'-P-CCNC: 9 U/L (ref 5–45)
ATRIAL RATE: 104 BPM
ATRIAL RATE: 106 BPM
ATRIAL RATE: 109 BPM
ATRIAL RATE: 116 BPM
ATRIAL RATE: 98 BPM
BACTERIA BLD CULT: ABNORMAL
BACTERIA BLD CULT: NORMAL
BACTERIA UR QL AUTO: ABNORMAL /HPF
BACTERIA UR QL AUTO: ABNORMAL /HPF
BASE EX.OXY STD BLDV CALC-SCNC: 95.6 % (ref 60–80)
BASE EXCESS BLDV CALC-SCNC: 6.7 MMOL/L
BASO STIPL BLD QL SMEAR: PRESENT
BASOPHILS # BLD MANUAL: 0 THOUSAND/UL (ref 0–0.1)
BASOPHILS # BLD MANUAL: 0.07 THOUSAND/UL (ref 0–0.1)
BASOPHILS # BLD MANUAL: 0.11 THOUSAND/UL (ref 0–0.1)
BASOPHILS # BLD MANUAL: 0.15 THOUSAND/UL (ref 0–0.1)
BASOPHILS NFR BLD AUTO: 1 % (ref 0–1)
BASOPHILS NFR MAR MANUAL: 0 % (ref 0–1)
BASOPHILS NFR MAR MANUAL: 1 % (ref 0–1)
BASOPHILS NFR MAR MANUAL: 3 % (ref 0–1)
BASOPHILS NFR MAR MANUAL: 4 % (ref 0–1)
BILIRUB SERPL-MCNC: 1.16 MG/DL (ref 0.2–1)
BILIRUB SERPL-MCNC: 1.3 MG/DL (ref 0.2–1)
BILIRUB SERPL-MCNC: 1.36 MG/DL (ref 0.2–1)
BILIRUB SERPL-MCNC: 1.4 MG/DL (ref 0.2–1)
BILIRUB SERPL-MCNC: 1.47 MG/DL (ref 0.2–1)
BILIRUB SERPL-MCNC: 1.59 MG/DL (ref 0.2–1)
BILIRUB SERPL-MCNC: 1.6 MG/DL (ref 0.2–1)
BILIRUB SERPL-MCNC: 1.87 MG/DL (ref 0.2–1)
BILIRUB SERPL-MCNC: 1.92 MG/DL (ref 0.2–1)
BILIRUB SERPL-MCNC: 1.95 MG/DL (ref 0.2–1)
BILIRUB SERPL-MCNC: 2.09 MG/DL (ref 0.2–1)
BILIRUB SERPL-MCNC: 2.23 MG/DL (ref 0.2–1)
BILIRUB UR QL STRIP: NEGATIVE
BLD GP AB SCN SERPL QL: NEGATIVE
BLD GP AB SCN SERPL QL: POSITIVE
BLD SMEAR INTERP: NORMAL
BLOOD GROUP ANTIBODIES SERPL: NORMAL
BPU ID: NORMAL
BUN SERPL-MCNC: 15 MG/DL (ref 5–25)
BUN SERPL-MCNC: 15 MG/DL (ref 5–25)
BUN SERPL-MCNC: 16 MG/DL (ref 5–25)
BUN SERPL-MCNC: 16 MG/DL (ref 5–25)
BUN SERPL-MCNC: 18 MG/DL (ref 5–25)
BUN SERPL-MCNC: 18 MG/DL (ref 5–25)
BUN SERPL-MCNC: 19 MG/DL (ref 5–25)
BUN SERPL-MCNC: 19 MG/DL (ref 5–25)
BUN SERPL-MCNC: 20 MG/DL (ref 5–25)
BUN SERPL-MCNC: 21 MG/DL (ref 5–25)
BUN SERPL-MCNC: 21 MG/DL (ref 5–25)
BUN SERPL-MCNC: 22 MG/DL (ref 5–25)
BUN SERPL-MCNC: 22 MG/DL (ref 5–25)
BUN SERPL-MCNC: 24 MG/DL (ref 5–25)
BUN SERPL-MCNC: 24 MG/DL (ref 5–25)
BUN SERPL-MCNC: 25 MG/DL (ref 5–25)
BUN SERPL-MCNC: 26 MG/DL (ref 5–25)
BUN SERPL-MCNC: 26 MG/DL (ref 5–25)
BUN SERPL-MCNC: 27 MG/DL (ref 5–25)
BUN SERPL-MCNC: 27 MG/DL (ref 5–25)
BUN SERPL-MCNC: 29 MG/DL (ref 5–25)
BUN SERPL-MCNC: 30 MG/DL (ref 5–25)
CALCIUM ALBUM COR SERPL-MCNC: 10.1 MG/DL (ref 8.3–10.1)
CALCIUM ALBUM COR SERPL-MCNC: 10.2 MG/DL (ref 8.3–10.1)
CALCIUM ALBUM COR SERPL-MCNC: 10.3 MG/DL (ref 8.3–10.1)
CALCIUM ALBUM COR SERPL-MCNC: 10.4 MG/DL (ref 8.3–10.1)
CALCIUM ALBUM COR SERPL-MCNC: 10.4 MG/DL (ref 8.3–10.1)
CALCIUM ALBUM COR SERPL-MCNC: 10.6 MG/DL (ref 8.3–10.1)
CALCIUM ALBUM COR SERPL-MCNC: 9.6 MG/DL (ref 8.3–10.1)
CALCIUM ALBUM COR SERPL-MCNC: 9.6 MG/DL (ref 8.3–10.1)
CALCIUM ALBUM COR SERPL-MCNC: 9.7 MG/DL (ref 8.3–10.1)
CALCIUM ALBUM COR SERPL-MCNC: 9.9 MG/DL (ref 8.3–10.1)
CALCIUM ALBUM COR SERPL-MCNC: 9.9 MG/DL (ref 8.3–10.1)
CALCIUM SERPL-MCNC: 7.7 MG/DL (ref 8.3–10.1)
CALCIUM SERPL-MCNC: 8.1 MG/DL (ref 8.3–10.1)
CALCIUM SERPL-MCNC: 8.1 MG/DL (ref 8.3–10.1)
CALCIUM SERPL-MCNC: 8.2 MG/DL (ref 8.3–10.1)
CALCIUM SERPL-MCNC: 8.5 MG/DL (ref 8.3–10.1)
CALCIUM SERPL-MCNC: 8.7 MG/DL (ref 8.3–10.1)
CALCIUM SERPL-MCNC: 8.8 MG/DL (ref 8.3–10.1)
CALCIUM SERPL-MCNC: 8.9 MG/DL (ref 8.3–10.1)
CALCIUM SERPL-MCNC: 9 MG/DL (ref 8.3–10.1)
CALCIUM SERPL-MCNC: 9.1 MG/DL (ref 8.3–10.1)
CALCIUM SERPL-MCNC: 9.2 MG/DL (ref 8.3–10.1)
CALCIUM SERPL-MCNC: 9.4 MG/DL (ref 8.3–10.1)
CALCIUM SERPL-MCNC: 9.4 MG/DL (ref 8.3–10.1)
CALCIUM SERPL-MCNC: 9.7 MG/DL (ref 8.3–10.1)
CHLORIDE SERPL-SCNC: 101 MMOL/L (ref 100–108)
CHLORIDE SERPL-SCNC: 102 MMOL/L (ref 100–108)
CHLORIDE SERPL-SCNC: 103 MMOL/L (ref 100–108)
CHLORIDE SERPL-SCNC: 103 MMOL/L (ref 98–108)
CHLORIDE SERPL-SCNC: 104 MMOL/L (ref 100–108)
CHLORIDE SERPL-SCNC: 105 MMOL/L (ref 100–108)
CHLORIDE SERPL-SCNC: 106 MMOL/L (ref 100–108)
CHLORIDE SERPL-SCNC: 106 MMOL/L (ref 98–108)
CHLORIDE SERPL-SCNC: 107 MMOL/L (ref 100–108)
CHLORIDE SERPL-SCNC: 107 MMOL/L (ref 100–108)
CHLORIDE SERPL-SCNC: 109 MMOL/L (ref 100–108)
CHLORIDE SERPL-SCNC: 99 MMOL/L (ref 100–108)
CLARITY UR: CLEAR
CO2 SERPL-SCNC: 24 MMOL/L (ref 21–32)
CO2 SERPL-SCNC: 24 MMOL/L (ref 21–32)
CO2 SERPL-SCNC: 26 MMOL/L (ref 21–32)
CO2 SERPL-SCNC: 28 MMOL/L (ref 21–32)
CO2 SERPL-SCNC: 29 MMOL/L (ref 21–32)
CO2 SERPL-SCNC: 30 MMOL/L (ref 21–32)
CO2 SERPL-SCNC: 31 MMOL/L (ref 21–32)
CO2 SERPL-SCNC: 32 MMOL/L (ref 21–32)
CO2 SERPL-SCNC: 32 MMOL/L (ref 21–32)
CO2 SERPL-SCNC: 33 MMOL/L (ref 21–32)
CO2 SERPL-SCNC: 35 MMOL/L (ref 21–32)
COLOR UR: YELLOW
CREAT SERPL-MCNC: 1.03 MG/DL (ref 0.6–1.3)
CREAT SERPL-MCNC: 1.07 MG/DL (ref 0.6–1.3)
CREAT SERPL-MCNC: 1.08 MG/DL (ref 0.6–1.3)
CREAT SERPL-MCNC: 1.11 MG/DL (ref 0.6–1.3)
CREAT SERPL-MCNC: 1.15 MG/DL (ref 0.6–1.3)
CREAT SERPL-MCNC: 1.15 MG/DL (ref 0.6–1.3)
CREAT SERPL-MCNC: 1.16 MG/DL (ref 0.6–1.3)
CREAT SERPL-MCNC: 1.17 MG/DL (ref 0.6–1.3)
CREAT SERPL-MCNC: 1.19 MG/DL (ref 0.6–1.3)
CREAT SERPL-MCNC: 1.2 MG/DL (ref 0.6–1.3)
CREAT SERPL-MCNC: 1.22 MG/DL (ref 0.6–1.3)
CREAT SERPL-MCNC: 1.23 MG/DL (ref 0.6–1.3)
CREAT SERPL-MCNC: 1.26 MG/DL (ref 0.6–1.3)
CREAT SERPL-MCNC: 1.26 MG/DL (ref 0.6–1.3)
CREAT SERPL-MCNC: 1.27 MG/DL (ref 0.6–1.3)
CREAT SERPL-MCNC: 1.29 MG/DL (ref 0.6–1.3)
CREAT SERPL-MCNC: 1.3 MG/DL (ref 0.6–1.3)
CREAT SERPL-MCNC: 1.33 MG/DL (ref 0.6–1.3)
CREAT SERPL-MCNC: 1.4 MG/DL (ref 0.6–1.3)
CREAT SERPL-MCNC: 1.41 MG/DL (ref 0.6–1.3)
CREAT SERPL-MCNC: 1.46 MG/DL (ref 0.6–1.3)
CREAT SERPL-MCNC: 1.52 MG/DL (ref 0.6–1.3)
CROSSMATCH: NORMAL
CROSSMATCH: NORMAL
D DIMER PPP FEU-MCNC: 2.46 UG/ML FEU
DACRYOCYTES BLD QL SMEAR: PRESENT
DACRYOCYTES BLD QL SMEAR: PRESENT
DEPRECATED AT III PPP: 82 % OF NORMAL (ref 92–136)
EOSINOPHIL # BLD MANUAL: 0 THOUSAND/UL (ref 0–0.4)
EOSINOPHIL # BLD MANUAL: 0.02 THOUSAND/UL (ref 0–0.4)
EOSINOPHIL # BLD MANUAL: 0.04 THOUSAND/UL (ref 0–0.4)
EOSINOPHIL # BLD MANUAL: 0.05 THOUSAND/UL (ref 0–0.4)
EOSINOPHIL # BLD MANUAL: 0.06 THOUSAND/UL (ref 0–0.4)
EOSINOPHIL # BLD MANUAL: 0.11 THOUSAND/UL (ref 0–0.4)
EOSINOPHIL # BLD MANUAL: 0.17 THOUSAND/UL (ref 0–0.4)
EOSINOPHIL # BLD MANUAL: 0.21 THOUSAND/UL (ref 0–0.4)
EOSINOPHIL NFR BLD AUTO: 1 % (ref 0–6)
EOSINOPHIL NFR BLD MANUAL: 0 % (ref 0–6)
EOSINOPHIL NFR BLD MANUAL: 1 % (ref 0–6)
EOSINOPHIL NFR BLD MANUAL: 2 % (ref 0–6)
EOSINOPHIL NFR BLD MANUAL: 2 % (ref 0–6)
EOSINOPHIL NFR BLD MANUAL: 4 % (ref 0–6)
ERYTHROCYTE [DISTWIDTH] IN BLOOD BY AUTOMATED COUNT: 19.2 % (ref 11.6–15.1)
ERYTHROCYTE [DISTWIDTH] IN BLOOD BY AUTOMATED COUNT: 19.2 % (ref 11.6–15.1)
ERYTHROCYTE [DISTWIDTH] IN BLOOD BY AUTOMATED COUNT: 19.3 % (ref 11.6–15.1)
ERYTHROCYTE [DISTWIDTH] IN BLOOD BY AUTOMATED COUNT: 19.4 % (ref 11.6–15.1)
ERYTHROCYTE [DISTWIDTH] IN BLOOD BY AUTOMATED COUNT: 19.7 % (ref 11.6–15.1)
ERYTHROCYTE [DISTWIDTH] IN BLOOD BY AUTOMATED COUNT: 19.8 % (ref 11.6–15.1)
ERYTHROCYTE [DISTWIDTH] IN BLOOD BY AUTOMATED COUNT: 20.3 % (ref 11.6–15.1)
ERYTHROCYTE [DISTWIDTH] IN BLOOD BY AUTOMATED COUNT: 20.8 % (ref 11.6–15.1)
ERYTHROCYTE [DISTWIDTH] IN BLOOD BY AUTOMATED COUNT: 21.1 % (ref 11.6–15.1)
ERYTHROCYTE [DISTWIDTH] IN BLOOD BY AUTOMATED COUNT: 21.3 % (ref 11.6–15.1)
ERYTHROCYTE [DISTWIDTH] IN BLOOD BY AUTOMATED COUNT: 21.6 % (ref 11.6–15.1)
ERYTHROCYTE [DISTWIDTH] IN BLOOD BY AUTOMATED COUNT: 21.7 % (ref 11.6–15.1)
ERYTHROCYTE [DISTWIDTH] IN BLOOD BY AUTOMATED COUNT: 22 % (ref 11.6–15.1)
ERYTHROCYTE [DISTWIDTH] IN BLOOD BY AUTOMATED COUNT: 22.3 % (ref 11.6–15.1)
ERYTHROCYTE [DISTWIDTH] IN BLOOD BY AUTOMATED COUNT: 22.6 % (ref 11.6–15.1)
ERYTHROCYTE [DISTWIDTH] IN BLOOD BY AUTOMATED COUNT: 22.7 % (ref 11.6–15.1)
ERYTHROCYTE [DISTWIDTH] IN BLOOD BY AUTOMATED COUNT: 23 % (ref 11.6–15.1)
ERYTHROCYTE [DISTWIDTH] IN BLOOD BY AUTOMATED COUNT: 23 % (ref 11.6–15.1)
ERYTHROCYTE [DISTWIDTH] IN BLOOD BY AUTOMATED COUNT: 23.1 % (ref 11.6–15.1)
ERYTHROCYTE [DISTWIDTH] IN BLOOD BY AUTOMATED COUNT: 23.6 % (ref 11.6–15.1)
ERYTHROCYTE [DISTWIDTH] IN BLOOD BY AUTOMATED COUNT: 24 % (ref 11.6–15.1)
FDP BLD QL AGGL: <10
FERRITIN SERPL-MCNC: 950 NG/ML (ref 8–388)
FIBRINOGEN PPP-MCNC: 586 MG/DL (ref 227–495)
FLUAV RNA RESP QL NAA+PROBE: NEGATIVE
FLUBV RNA RESP QL NAA+PROBE: NEGATIVE
FOLATE SERPL-MCNC: >20 NG/ML (ref 3.1–17.5)
GFR SERPL CREATININE-BSD FRML MDRD: 41 ML/MIN/1.73SQ M
GFR SERPL CREATININE-BSD FRML MDRD: 44 ML/MIN/1.73SQ M
GFR SERPL CREATININE-BSD FRML MDRD: 45 ML/MIN/1.73SQ M
GFR SERPL CREATININE-BSD FRML MDRD: 46 ML/MIN/1.73SQ M
GFR SERPL CREATININE-BSD FRML MDRD: 49 ML/MIN/1.73SQ M
GFR SERPL CREATININE-BSD FRML MDRD: 50 ML/MIN/1.73SQ M
GFR SERPL CREATININE-BSD FRML MDRD: 51 ML/MIN/1.73SQ M
GFR SERPL CREATININE-BSD FRML MDRD: 52 ML/MIN/1.73SQ M
GFR SERPL CREATININE-BSD FRML MDRD: 54 ML/MIN/1.73SQ M
GFR SERPL CREATININE-BSD FRML MDRD: 54 ML/MIN/1.73SQ M
GFR SERPL CREATININE-BSD FRML MDRD: 55 ML/MIN/1.73SQ M
GFR SERPL CREATININE-BSD FRML MDRD: 56 ML/MIN/1.73SQ M
GFR SERPL CREATININE-BSD FRML MDRD: 57 ML/MIN/1.73SQ M
GFR SERPL CREATININE-BSD FRML MDRD: 58 ML/MIN/1.73SQ M
GFR SERPL CREATININE-BSD FRML MDRD: 61 ML/MIN/1.73SQ M
GFR SERPL CREATININE-BSD FRML MDRD: 63 ML/MIN/1.73SQ M
GFR SERPL CREATININE-BSD FRML MDRD: 63 ML/MIN/1.73SQ M
GFR SERPL CREATININE-BSD FRML MDRD: 66 ML/MIN/1.73SQ M
GIANT PLATELETS BLD QL SMEAR: PRESENT
GLUCOSE SERPL-MCNC: 103 MG/DL (ref 65–140)
GLUCOSE SERPL-MCNC: 104 MG/DL (ref 65–140)
GLUCOSE SERPL-MCNC: 105 MG/DL (ref 65–140)
GLUCOSE SERPL-MCNC: 108 MG/DL (ref 65–140)
GLUCOSE SERPL-MCNC: 109 MG/DL (ref 65–140)
GLUCOSE SERPL-MCNC: 114 MG/DL (ref 65–140)
GLUCOSE SERPL-MCNC: 123 MG/DL (ref 65–140)
GLUCOSE SERPL-MCNC: 126 MG/DL (ref 65–140)
GLUCOSE SERPL-MCNC: 133 MG/DL (ref 65–140)
GLUCOSE SERPL-MCNC: 150 MG/DL (ref 65–140)
GLUCOSE SERPL-MCNC: 158 MG/DL (ref 65–140)
GLUCOSE SERPL-MCNC: 80 MG/DL (ref 65–140)
GLUCOSE SERPL-MCNC: 81 MG/DL (ref 65–140)
GLUCOSE SERPL-MCNC: 84 MG/DL (ref 65–140)
GLUCOSE SERPL-MCNC: 84 MG/DL (ref 65–140)
GLUCOSE SERPL-MCNC: 86 MG/DL (ref 65–140)
GLUCOSE SERPL-MCNC: 92 MG/DL (ref 65–140)
GLUCOSE SERPL-MCNC: 95 MG/DL (ref 65–140)
GLUCOSE SERPL-MCNC: 96 MG/DL (ref 65–140)
GLUCOSE SERPL-MCNC: 96 MG/DL (ref 65–140)
GLUCOSE UR STRIP-MCNC: NEGATIVE MG/DL
GRAM STN SPEC: ABNORMAL
HCO3 BLDV-SCNC: 31.3 MMOL/L (ref 24–30)
HCT VFR BLD AUTO: 19.4 % (ref 36.5–49.3)
HCT VFR BLD AUTO: 22.7 % (ref 36.5–49.3)
HCT VFR BLD AUTO: 23.9 % (ref 36.5–49.3)
HCT VFR BLD AUTO: 25.5 % (ref 36.5–49.3)
HCT VFR BLD AUTO: 26.7 % (ref 36.5–49.3)
HCT VFR BLD AUTO: 27.8 % (ref 36.5–49.3)
HCT VFR BLD AUTO: 28.4 % (ref 36.5–49.3)
HCT VFR BLD AUTO: 28.7 % (ref 36.5–49.3)
HCT VFR BLD AUTO: 29.1 % (ref 36.5–49.3)
HCT VFR BLD AUTO: 29.6 % (ref 36.5–49.3)
HCT VFR BLD AUTO: 29.7 % (ref 36.5–49.3)
HCT VFR BLD AUTO: 29.7 % (ref 36.5–49.3)
HCT VFR BLD AUTO: 30.3 % (ref 36.5–49.3)
HCT VFR BLD AUTO: 30.5 % (ref 36.5–49.3)
HCT VFR BLD AUTO: 31 % (ref 36.5–49.3)
HCT VFR BLD AUTO: 32.9 % (ref 36.5–49.3)
HCT VFR BLD AUTO: 33.5 % (ref 36.5–49.3)
HCT VFR BLD AUTO: 33.7 % (ref 36.5–49.3)
HCT VFR BLD AUTO: 34.8 % (ref 36.5–49.3)
HCT VFR BLD AUTO: 35.2 % (ref 36.5–49.3)
HCT VFR BLD AUTO: 36.8 % (ref 36.5–49.3)
HCT VFR BLD AUTO: 37 % (ref 36.5–49.3)
HEMOCCULT STL QL: NEGATIVE
HGB BLD-MCNC: 10 G/DL (ref 12–17)
HGB BLD-MCNC: 10.1 G/DL (ref 12–17)
HGB BLD-MCNC: 10.2 G/DL (ref 12–17)
HGB BLD-MCNC: 10.4 G/DL (ref 12–17)
HGB BLD-MCNC: 10.6 G/DL (ref 12–17)
HGB BLD-MCNC: 11.3 G/DL (ref 12–17)
HGB BLD-MCNC: 11.3 G/DL (ref 12–17)
HGB BLD-MCNC: 6 G/DL (ref 12–17)
HGB BLD-MCNC: 7.2 G/DL (ref 12–17)
HGB BLD-MCNC: 7.6 G/DL (ref 12–17)
HGB BLD-MCNC: 7.9 G/DL (ref 12–17)
HGB BLD-MCNC: 8.3 G/DL (ref 12–17)
HGB BLD-MCNC: 8.6 G/DL (ref 12–17)
HGB BLD-MCNC: 8.9 G/DL (ref 12–17)
HGB BLD-MCNC: 9 G/DL (ref 12–17)
HGB BLD-MCNC: 9.1 G/DL (ref 12–17)
HGB BLD-MCNC: 9.3 G/DL (ref 12–17)
HGB BLD-MCNC: 9.5 G/DL (ref 12–17)
HGB BLD-MCNC: 9.5 G/DL (ref 12–17)
HGB BLD-MCNC: 9.6 G/DL (ref 12–17)
HGB UR QL STRIP.AUTO: ABNORMAL
HGB UR QL STRIP.AUTO: ABNORMAL
HGB UR QL STRIP.AUTO: NEGATIVE
IGA SERPL-MCNC: 41 MG/DL (ref 70–400)
IGA SERPL-MCNC: 71 MG/DL (ref 70–400)
IGG SERPL-MCNC: 370 MG/DL (ref 700–1600)
IGG SERPL-MCNC: 475 MG/DL (ref 700–1600)
IGM SERPL-MCNC: 26 MG/DL (ref 40–230)
IGM SERPL-MCNC: 38 MG/DL (ref 40–230)
IMM GRANULOCYTES NFR BLD AUTO: 6 % (ref 0–2)
INR PPP: 1.01 (ref 0.84–1.19)
INR PPP: 1.13 (ref 0.84–1.19)
IRON SATN MFR SERPL: 17 %
IRON SERPL-MCNC: 44 UG/DL (ref 65–175)
KAPPA LC FREE SER-MCNC: 2.4 MG/L (ref 3.3–19.4)
KAPPA LC FREE SER-MCNC: 6.1 MG/L (ref 3.3–19.4)
KAPPA LC FREE SER-MCNC: 9 MG/L (ref 3.3–19.4)
KAPPA LC FREE/LAMBDA FREE SER: 0 {RATIO} (ref 0.26–1.65)
KAPPA LC FREE/LAMBDA FREE SER: 0.01 {RATIO} (ref 0.26–1.65)
KAPPA LC FREE/LAMBDA FREE SER: 0.02 {RATIO} (ref 0.26–1.65)
KETONES UR STRIP-MCNC: ABNORMAL MG/DL
KETONES UR STRIP-MCNC: NEGATIVE MG/DL
KETONES UR STRIP-MCNC: NEGATIVE MG/DL
LACTATE SERPL-SCNC: 1.7 MMOL/L (ref 0.5–2)
LACTATE SERPL-SCNC: 1.9 MMOL/L (ref 0.5–2)
LACTATE SERPL-SCNC: 2.6 MMOL/L (ref 0.5–2)
LAMBDA LC FREE SERPL-MCNC: 1173.3 MG/L (ref 5.7–26.3)
LAMBDA LC FREE SERPL-MCNC: 580.7 MG/L (ref 5.7–26.3)
LAMBDA LC FREE SERPL-MCNC: 762.4 MG/L (ref 5.7–26.3)
LEUKOCYTE ESTERASE UR QL STRIP: NEGATIVE
LG PLATELETS BLD QL SMEAR: PRESENT
LIPASE SERPL-CCNC: 76 U/L (ref 73–393)
LYMPHOCYTES # BLD AUTO: 0 % (ref 14–44)
LYMPHOCYTES # BLD AUTO: 0 THOUSAND/UL (ref 0.6–4.47)
LYMPHOCYTES # BLD AUTO: 0.09 THOUSAND/UL (ref 0.6–4.47)
LYMPHOCYTES # BLD AUTO: 0.09 THOUSAND/UL (ref 0.6–4.47)
LYMPHOCYTES # BLD AUTO: 0.13 THOUSAND/UL (ref 0.6–4.47)
LYMPHOCYTES # BLD AUTO: 0.13 THOUSAND/UL (ref 0.6–4.47)
LYMPHOCYTES # BLD AUTO: 0.14 THOUSAND/UL (ref 0.6–4.47)
LYMPHOCYTES # BLD AUTO: 0.16 THOUSAND/UL (ref 0.6–4.47)
LYMPHOCYTES # BLD AUTO: 0.17 THOUSAND/UL (ref 0.6–4.47)
LYMPHOCYTES # BLD AUTO: 0.31 THOUSAND/UL (ref 0.6–4.47)
LYMPHOCYTES # BLD AUTO: 0.37 THOUSAND/UL (ref 0.6–4.47)
LYMPHOCYTES # BLD AUTO: 0.41 THOUSAND/UL (ref 0.6–4.47)
LYMPHOCYTES # BLD AUTO: 0.55 THOUSAND/UL (ref 0.6–4.47)
LYMPHOCYTES # BLD AUTO: 0.58 THOUSAND/UL (ref 0.6–4.47)
LYMPHOCYTES # BLD AUTO: 0.61 THOUSAND/UL (ref 0.6–4.47)
LYMPHOCYTES # BLD AUTO: 1 % (ref 14–44)
LYMPHOCYTES # BLD AUTO: 10 % (ref 14–44)
LYMPHOCYTES # BLD AUTO: 2 % (ref 14–44)
LYMPHOCYTES # BLD AUTO: 3 % (ref 14–44)
LYMPHOCYTES # BLD AUTO: 5 % (ref 14–44)
LYMPHOCYTES # BLD AUTO: 6 % (ref 14–44)
LYMPHOCYTES # BLD AUTO: 6 % (ref 14–44)
LYMPHOCYTES # BLD AUTO: 7 % (ref 14–44)
LYMPHOCYTES # BLD AUTO: 7 % (ref 14–44)
LYMPHOCYTES # BLD AUTO: 9 % (ref 14–44)
LYMPHOCYTES # BLD AUTO: 9 % (ref 14–44)
LYMPHOCYTES NFR BLD AUTO: 3 % (ref 14–44)
MACROCYTES BLD QL AUTO: PRESENT
MAGNESIUM SERPL-MCNC: 1.6 MG/DL (ref 1.6–2.6)
MAGNESIUM SERPL-MCNC: 1.7 MG/DL (ref 1.6–2.6)
MAGNESIUM SERPL-MCNC: 1.7 MG/DL (ref 1.6–2.6)
MAGNESIUM SERPL-MCNC: 1.9 MG/DL (ref 1.6–2.6)
MCH RBC QN AUTO: 29.2 PG (ref 26.8–34.3)
MCH RBC QN AUTO: 29.2 PG (ref 26.8–34.3)
MCH RBC QN AUTO: 30.3 PG (ref 26.8–34.3)
MCH RBC QN AUTO: 30.4 PG (ref 26.8–34.3)
MCH RBC QN AUTO: 30.5 PG (ref 26.8–34.3)
MCH RBC QN AUTO: 30.5 PG (ref 26.8–34.3)
MCH RBC QN AUTO: 30.6 PG (ref 26.8–34.3)
MCH RBC QN AUTO: 30.7 PG (ref 26.8–34.3)
MCH RBC QN AUTO: 30.8 PG (ref 26.8–34.3)
MCH RBC QN AUTO: 30.8 PG (ref 26.8–34.3)
MCH RBC QN AUTO: 31.3 PG (ref 26.8–34.3)
MCH RBC QN AUTO: 31.6 PG (ref 26.8–34.3)
MCH RBC QN AUTO: 31.8 PG (ref 26.8–34.3)
MCH RBC QN AUTO: 31.9 PG (ref 26.8–34.3)
MCH RBC QN AUTO: 32.3 PG (ref 26.8–34.3)
MCHC RBC AUTO-ENTMCNC: 29.5 G/DL (ref 31.4–37.4)
MCHC RBC AUTO-ENTMCNC: 29.7 G/DL (ref 31.4–37.4)
MCHC RBC AUTO-ENTMCNC: 29.9 G/DL (ref 31.4–37.4)
MCHC RBC AUTO-ENTMCNC: 30 G/DL (ref 31.4–37.4)
MCHC RBC AUTO-ENTMCNC: 30.1 G/DL (ref 31.4–37.4)
MCHC RBC AUTO-ENTMCNC: 30.4 G/DL (ref 31.4–37.4)
MCHC RBC AUTO-ENTMCNC: 30.5 G/DL (ref 31.4–37.4)
MCHC RBC AUTO-ENTMCNC: 30.7 G/DL (ref 31.4–37.4)
MCHC RBC AUTO-ENTMCNC: 30.7 G/DL (ref 31.4–37.4)
MCHC RBC AUTO-ENTMCNC: 30.9 G/DL (ref 31.4–37.4)
MCHC RBC AUTO-ENTMCNC: 30.9 G/DL (ref 31.4–37.4)
MCHC RBC AUTO-ENTMCNC: 31 G/DL (ref 31.4–37.4)
MCHC RBC AUTO-ENTMCNC: 31 G/DL (ref 31.4–37.4)
MCHC RBC AUTO-ENTMCNC: 31.1 G/DL (ref 31.4–37.4)
MCHC RBC AUTO-ENTMCNC: 31.3 G/DL (ref 31.4–37.4)
MCHC RBC AUTO-ENTMCNC: 31.3 G/DL (ref 31.4–37.4)
MCHC RBC AUTO-ENTMCNC: 31.4 G/DL (ref 31.4–37.4)
MCHC RBC AUTO-ENTMCNC: 31.4 G/DL (ref 31.4–37.4)
MCHC RBC AUTO-ENTMCNC: 31.7 G/DL (ref 31.4–37.4)
MCHC RBC AUTO-ENTMCNC: 31.8 G/DL (ref 31.4–37.4)
MCHC RBC AUTO-ENTMCNC: 32 G/DL (ref 31.4–37.4)
MCV RBC AUTO: 100 FL (ref 82–98)
MCV RBC AUTO: 101 FL (ref 82–98)
MCV RBC AUTO: 102 FL (ref 82–98)
MCV RBC AUTO: 103 FL (ref 82–98)
MCV RBC AUTO: 103 FL (ref 82–98)
MCV RBC AUTO: 96 FL (ref 82–98)
MCV RBC AUTO: 97 FL (ref 82–98)
MCV RBC AUTO: 98 FL (ref 82–98)
MCV RBC AUTO: 99 FL (ref 82–98)
METAMYELOCYTES NFR BLD MANUAL: 1 % (ref 0–1)
METAMYELOCYTES NFR BLD MANUAL: 4 % (ref 0–1)
MONOCYTES # BLD AUTO: 0 THOUSAND/UL (ref 0–1.22)
MONOCYTES # BLD AUTO: 0 THOUSAND/UL (ref 0–1.22)
MONOCYTES # BLD AUTO: 0.02 THOUSAND/UL (ref 0–1.22)
MONOCYTES # BLD AUTO: 0.09 THOUSAND/UL (ref 0–1.22)
MONOCYTES # BLD AUTO: 0.13 THOUSAND/UL (ref 0–1.22)
MONOCYTES # BLD AUTO: 0.14 THOUSAND/UL (ref 0–1.22)
MONOCYTES # BLD AUTO: 0.18 THOUSAND/UL (ref 0–1.22)
MONOCYTES # BLD AUTO: 0.19 THOUSAND/UL (ref 0–1.22)
MONOCYTES # BLD AUTO: 0.41 THOUSAND/UL (ref 0–1.22)
MONOCYTES # BLD AUTO: 0.52 THOUSAND/UL (ref 0–1.22)
MONOCYTES # BLD AUTO: 0.58 THOUSAND/UL (ref 0–1.22)
MONOCYTES # BLD AUTO: 0.69 THOUSAND/UL (ref 0–1.22)
MONOCYTES # BLD AUTO: 0.7 THOUSAND/UL (ref 0–1.22)
MONOCYTES # BLD AUTO: 0.77 THOUSAND/UL (ref 0–1.22)
MONOCYTES NFR BLD AUTO: 3 % (ref 4–12)
MONOCYTES NFR BLD: 0 % (ref 4–12)
MONOCYTES NFR BLD: 0 % (ref 4–12)
MONOCYTES NFR BLD: 1 % (ref 4–12)
MONOCYTES NFR BLD: 10 % (ref 4–12)
MONOCYTES NFR BLD: 14 % (ref 4–12)
MONOCYTES NFR BLD: 2 % (ref 4–12)
MONOCYTES NFR BLD: 2 % (ref 4–12)
MONOCYTES NFR BLD: 3 % (ref 4–12)
MONOCYTES NFR BLD: 3 % (ref 4–12)
MONOCYTES NFR BLD: 7 % (ref 4–12)
MONOCYTES NFR BLD: 8 % (ref 4–12)
MONOCYTES NFR BLD: 9 % (ref 4–12)
MYELOCYTES NFR BLD MANUAL: 1 % (ref 0–1)
NEUTROPHILS # BLD MANUAL: 2.22 THOUSAND/UL (ref 1.85–7.62)
NEUTROPHILS # BLD MANUAL: 2.25 THOUSAND/UL (ref 1.85–7.62)
NEUTROPHILS # BLD MANUAL: 2.81 THOUSAND/UL (ref 1.85–7.62)
NEUTROPHILS # BLD MANUAL: 3.65 THOUSAND/UL (ref 1.85–7.62)
NEUTROPHILS # BLD MANUAL: 3.91 THOUSAND/UL (ref 1.85–7.62)
NEUTROPHILS # BLD MANUAL: 3.97 THOUSAND/UL (ref 1.85–7.62)
NEUTROPHILS # BLD MANUAL: 4.02 THOUSAND/UL (ref 1.85–7.62)
NEUTROPHILS # BLD MANUAL: 4.47 THOUSAND/UL (ref 1.85–7.62)
NEUTROPHILS # BLD MANUAL: 5.28 THOUSAND/UL (ref 1.85–7.62)
NEUTROPHILS # BLD MANUAL: 6.12 THOUSAND/UL (ref 1.85–7.62)
NEUTROPHILS # BLD MANUAL: 6.13 THOUSAND/UL (ref 1.85–7.62)
NEUTROPHILS # BLD MANUAL: 7.17 THOUSAND/UL (ref 1.85–7.62)
NEUTROPHILS # BLD MANUAL: 7.73 THOUSAND/UL (ref 1.85–7.62)
NEUTROPHILS # BLD MANUAL: 8.24 THOUSAND/UL (ref 1.85–7.62)
NEUTS BAND NFR BLD MANUAL: 1 % (ref 0–8)
NEUTS BAND NFR BLD MANUAL: 2 % (ref 0–8)
NEUTS BAND NFR BLD MANUAL: 3 % (ref 0–8)
NEUTS BAND NFR BLD MANUAL: 3 % (ref 0–8)
NEUTS BAND NFR BLD MANUAL: 4 % (ref 0–8)
NEUTS BAND NFR BLD MANUAL: 4 % (ref 0–8)
NEUTS BAND NFR BLD MANUAL: 6 % (ref 0–8)
NEUTS BAND NFR BLD MANUAL: 7 % (ref 0–8)
NEUTS BAND NFR BLD MANUAL: 8 % (ref 0–8)
NEUTS SEG NFR BLD AUTO: 72 % (ref 43–75)
NEUTS SEG NFR BLD AUTO: 75 % (ref 43–75)
NEUTS SEG NFR BLD AUTO: 79 % (ref 43–75)
NEUTS SEG NFR BLD AUTO: 79 % (ref 43–75)
NEUTS SEG NFR BLD AUTO: 82 % (ref 43–75)
NEUTS SEG NFR BLD AUTO: 82 % (ref 43–75)
NEUTS SEG NFR BLD AUTO: 84 % (ref 43–75)
NEUTS SEG NFR BLD AUTO: 84 % (ref 43–75)
NEUTS SEG NFR BLD AUTO: 85 % (ref 43–75)
NEUTS SEG NFR BLD AUTO: 86 % (ref 43–75)
NEUTS SEG NFR BLD AUTO: 87 % (ref 43–75)
NEUTS SEG NFR BLD AUTO: 90 % (ref 43–75)
NEUTS SEG NFR BLD AUTO: 90 % (ref 43–75)
NEUTS SEG NFR BLD AUTO: 91 % (ref 43–75)
NEUTS SEG NFR BLD AUTO: 93 % (ref 43–75)
NITRITE UR QL STRIP: NEGATIVE
NON-SQ EPI CELLS URNS QL MICRO: ABNORMAL /HPF
NON-SQ EPI CELLS URNS QL MICRO: ABNORMAL /HPF
NRBC BLD AUTO-RTO: 0 /100 WBCS
NRBC BLD AUTO-RTO: 1 /100 WBC (ref 0–2)
NRBC BLD AUTO-RTO: 1 /100 WBCS
NT-PROBNP SERPL-MCNC: 253 PG/ML
NT-PROBNP SERPL-MCNC: 346 PG/ML
NT-PROBNP SERPL-MCNC: 604 PG/ML
O2 CT BLDV-SCNC: 14.5 ML/DL
OVALOCYTES BLD QL SMEAR: PRESENT
P AXIS: 37 DEGREES
P AXIS: 83 DEGREES
P AXIS: 91 DEGREES
PCO2 BLDV: 44.8 MM HG (ref 42–50)
PH BLDV: 7.46 [PH] (ref 7.3–7.4)
PH UR STRIP.AUTO: 5.5 [PH]
PH UR STRIP.AUTO: 5.5 [PH] (ref 4.5–8)
PH UR STRIP.AUTO: 5.5 [PH] (ref 4.5–8)
PHOSPHATE SERPL-MCNC: 1.7 MG/DL (ref 2.3–4.1)
PHOSPHATE SERPL-MCNC: 3.7 MG/DL (ref 2.3–4.1)
PLATELET # BLD AUTO: 104 THOUSANDS/UL (ref 149–390)
PLATELET # BLD AUTO: 116 THOUSANDS/UL (ref 149–390)
PLATELET # BLD AUTO: 121 THOUSANDS/UL (ref 149–390)
PLATELET # BLD AUTO: 129 THOUSANDS/UL (ref 149–390)
PLATELET # BLD AUTO: 131 THOUSANDS/UL (ref 149–390)
PLATELET # BLD AUTO: 139 THOUSANDS/UL (ref 149–390)
PLATELET # BLD AUTO: 145 THOUSANDS/UL (ref 149–390)
PLATELET # BLD AUTO: 19 THOUSANDS/UL (ref 149–390)
PLATELET # BLD AUTO: 23 THOUSANDS/UL (ref 149–390)
PLATELET # BLD AUTO: 23 THOUSANDS/UL (ref 149–390)
PLATELET # BLD AUTO: 28 THOUSANDS/UL (ref 149–390)
PLATELET # BLD AUTO: 29 THOUSANDS/UL (ref 149–390)
PLATELET # BLD AUTO: 33 THOUSANDS/UL (ref 149–390)
PLATELET # BLD AUTO: 34 THOUSANDS/UL (ref 149–390)
PLATELET # BLD AUTO: 50 THOUSANDS/UL (ref 149–390)
PLATELET # BLD AUTO: 51 THOUSANDS/UL (ref 149–390)
PLATELET # BLD AUTO: 51 THOUSANDS/UL (ref 149–390)
PLATELET # BLD AUTO: 54 THOUSANDS/UL (ref 149–390)
PLATELET # BLD AUTO: 56 THOUSANDS/UL (ref 149–390)
PLATELET # BLD AUTO: 77 THOUSANDS/UL (ref 149–390)
PLATELET # BLD AUTO: 82 THOUSANDS/UL (ref 149–390)
PLATELET BLD QL SMEAR: ABNORMAL
PLATELET BLD QL SMEAR: ADEQUATE
PMV BLD AUTO: 10.4 FL (ref 8.9–12.7)
PMV BLD AUTO: 10.4 FL (ref 8.9–12.7)
PMV BLD AUTO: 10.5 FL (ref 8.9–12.7)
PMV BLD AUTO: 11 FL (ref 8.9–12.7)
PMV BLD AUTO: 11 FL (ref 8.9–12.7)
PMV BLD AUTO: 11.2 FL (ref 8.9–12.7)
PMV BLD AUTO: 11.4 FL (ref 8.9–12.7)
PMV BLD AUTO: 11.7 FL (ref 8.9–12.7)
PMV BLD AUTO: 11.9 FL (ref 8.9–12.7)
PMV BLD AUTO: 12 FL (ref 8.9–12.7)
PMV BLD AUTO: 12.2 FL (ref 8.9–12.7)
PMV BLD AUTO: 12.2 FL (ref 8.9–12.7)
PMV BLD AUTO: 12.6 FL (ref 8.9–12.7)
PMV BLD AUTO: 12.7 FL (ref 8.9–12.7)
PMV BLD AUTO: 12.9 FL (ref 8.9–12.7)
PMV BLD AUTO: 13.1 FL (ref 8.9–12.7)
PMV BLD AUTO: 13.3 FL (ref 8.9–12.7)
PMV BLD AUTO: 9.5 FL (ref 8.9–12.7)
PO2 BLDV: 101.8 MM HG (ref 35–45)
POIKILOCYTOSIS BLD QL SMEAR: PRESENT
POLYCHROMASIA BLD QL SMEAR: PRESENT
POTASSIUM SERPL-SCNC: 3.2 MMOL/L (ref 3.5–5.3)
POTASSIUM SERPL-SCNC: 3.3 MMOL/L (ref 3.5–5.3)
POTASSIUM SERPL-SCNC: 3.4 MMOL/L (ref 3.5–5.3)
POTASSIUM SERPL-SCNC: 3.5 MMOL/L (ref 3.5–5.3)
POTASSIUM SERPL-SCNC: 3.7 MMOL/L (ref 3.5–5.3)
POTASSIUM SERPL-SCNC: 3.8 MMOL/L (ref 3.5–5.3)
POTASSIUM SERPL-SCNC: 3.9 MMOL/L (ref 3.5–5.3)
POTASSIUM SERPL-SCNC: 3.9 MMOL/L (ref 3.5–5.3)
POTASSIUM SERPL-SCNC: 4 MMOL/L (ref 3.5–5.3)
POTASSIUM SERPL-SCNC: 4.1 MMOL/L (ref 3.5–5.3)
POTASSIUM SERPL-SCNC: 4.1 MMOL/L (ref 3.5–5.3)
POTASSIUM SERPL-SCNC: 4.2 MMOL/L (ref 3.5–5.3)
POTASSIUM SERPL-SCNC: 4.2 MMOL/L (ref 3.5–5.3)
POTASSIUM SERPL-SCNC: 4.5 MMOL/L (ref 3.5–5.3)
POTASSIUM SERPL-SCNC: 4.6 MMOL/L (ref 3.5–5.3)
POTASSIUM SERPL-SCNC: 4.6 MMOL/L (ref 3.5–5.3)
PR INTERVAL: 192 MS
PR INTERVAL: 206 MS
PR INTERVAL: 314 MS
PROCALCITONIN SERPL-MCNC: 0.08 NG/ML
PROCALCITONIN SERPL-MCNC: 0.2 NG/ML
PROCALCITONIN SERPL-MCNC: 0.26 NG/ML
PROCALCITONIN SERPL-MCNC: 0.26 NG/ML
PROCALCITONIN SERPL-MCNC: 0.28 NG/ML
PROCALCITONIN SERPL-MCNC: 0.37 NG/ML
PROCALCITONIN SERPL-MCNC: 0.47 NG/ML
PROCALCITONIN SERPL-MCNC: <0.05 NG/ML
PROT SERPL-MCNC: 4.7 G/DL (ref 6.4–8.2)
PROT SERPL-MCNC: 4.7 G/DL (ref 6.4–8.2)
PROT SERPL-MCNC: 5 G/DL (ref 6.4–8.2)
PROT SERPL-MCNC: 5 G/DL (ref 6.4–8.2)
PROT SERPL-MCNC: 5.4 G/DL (ref 6.4–8.2)
PROT SERPL-MCNC: 5.6 G/DL (ref 6.4–8.2)
PROT SERPL-MCNC: 5.7 G/DL (ref 6.4–8.2)
PROT SERPL-MCNC: 5.7 G/DL (ref 6.4–8.2)
PROT SERPL-MCNC: 5.9 G/DL (ref 6.4–8.2)
PROT SERPL-MCNC: 6 G/DL (ref 6.4–8.2)
PROT SERPL-MCNC: 6.3 G/DL (ref 6.4–8.2)
PROT SERPL-MCNC: 6.4 G/DL (ref 6.4–8.2)
PROT UR STRIP-MCNC: ABNORMAL MG/DL
PROT UR STRIP-MCNC: NEGATIVE MG/DL
PROT UR STRIP-MCNC: NEGATIVE MG/DL
PROTHROMBIN TIME: 13.1 SECONDS (ref 11.6–14.5)
PROTHROMBIN TIME: 14.3 SECONDS (ref 11.6–14.5)
QRS AXIS: 40 DEGREES
QRS AXIS: 47 DEGREES
QRS AXIS: 58 DEGREES
QRS AXIS: 59 DEGREES
QRS AXIS: 76 DEGREES
QRSD INTERVAL: 104 MS
QRSD INTERVAL: 104 MS
QRSD INTERVAL: 106 MS
QRSD INTERVAL: 106 MS
QRSD INTERVAL: 108 MS
QT INTERVAL: 316 MS
QT INTERVAL: 334 MS
QT INTERVAL: 340 MS
QT INTERVAL: 346 MS
QT INTERVAL: 356 MS
QTC INTERVAL: 425 MS
QTC INTERVAL: 430 MS
QTC INTERVAL: 435 MS
QTC INTERVAL: 451 MS
QTC INTERVAL: 480 MS
RBC # BLD AUTO: 1.92 MILLION/UL (ref 3.88–5.62)
RBC # BLD AUTO: 2.23 MILLION/UL (ref 3.88–5.62)
RBC # BLD AUTO: 2.38 MILLION/UL (ref 3.88–5.62)
RBC # BLD AUTO: 2.65 MILLION/UL (ref 3.88–5.62)
RBC # BLD AUTO: 2.84 MILLION/UL (ref 3.88–5.62)
RBC # BLD AUTO: 2.84 MILLION/UL (ref 3.88–5.62)
RBC # BLD AUTO: 2.86 MILLION/UL (ref 3.88–5.62)
RBC # BLD AUTO: 2.94 MILLION/UL (ref 3.88–5.62)
RBC # BLD AUTO: 2.94 MILLION/UL (ref 3.88–5.62)
RBC # BLD AUTO: 3.03 MILLION/UL (ref 3.88–5.62)
RBC # BLD AUTO: 3.08 MILLION/UL (ref 3.88–5.62)
RBC # BLD AUTO: 3.08 MILLION/UL (ref 3.88–5.62)
RBC # BLD AUTO: 3.11 MILLION/UL (ref 3.88–5.62)
RBC # BLD AUTO: 3.15 MILLION/UL (ref 3.88–5.62)
RBC # BLD AUTO: 3.2 MILLION/UL (ref 3.88–5.62)
RBC # BLD AUTO: 3.32 MILLION/UL (ref 3.88–5.62)
RBC # BLD AUTO: 3.38 MILLION/UL (ref 3.88–5.62)
RBC # BLD AUTO: 3.43 MILLION/UL (ref 3.88–5.62)
RBC # BLD AUTO: 3.46 MILLION/UL (ref 3.88–5.62)
RBC # BLD AUTO: 3.68 MILLION/UL (ref 3.88–5.62)
RBC # BLD AUTO: 3.72 MILLION/UL (ref 3.88–5.62)
RBC #/AREA URNS AUTO: ABNORMAL /HPF
RBC #/AREA URNS AUTO: ABNORMAL /HPF
RBC MORPH BLD: PRESENT
RH BLD: POSITIVE
RSV RNA RESP QL NAA+PROBE: NEGATIVE
SARS-COV-2 RNA RESP QL NAA+PROBE: NEGATIVE
SARS-COV-2 RNA RESP QL NAA+PROBE: NEGATIVE
SARS-COV-2 RNA SPEC QL NAA+PROBE: NOT DETECTED
SCHISTOCYTES BLD QL SMEAR: PRESENT
SCHISTOCYTES BLD QL SMEAR: PRESENT
SODIUM SERPL-SCNC: 138 MMOL/L (ref 136–145)
SODIUM SERPL-SCNC: 139 MMOL/L (ref 136–145)
SODIUM SERPL-SCNC: 139 MMOL/L (ref 136–145)
SODIUM SERPL-SCNC: 140 MMOL/L (ref 136–145)
SODIUM SERPL-SCNC: 141 MMOL/L (ref 136–145)
SODIUM SERPL-SCNC: 142 MMOL/L (ref 136–145)
SODIUM SERPL-SCNC: 143 MMOL/L (ref 136–145)
SODIUM SERPL-SCNC: 145 MMOL/L (ref 136–145)
SP GR UR STRIP.AUTO: 1.01 (ref 1–1.03)
SPECIMEN EXPIRATION DATE: NORMAL
T WAVE AXIS: 24 DEGREES
T WAVE AXIS: 33 DEGREES
T WAVE AXIS: 46 DEGREES
T WAVE AXIS: 46 DEGREES
T WAVE AXIS: 67 DEGREES
T4 FREE SERPL-MCNC: 1.21 NG/DL (ref 0.76–1.46)
TIBC SERPL-MCNC: 254 UG/DL (ref 250–450)
TOTAL CELLS COUNTED SPEC: 100
TPA PPP QL CHRO: 71 % OF NORMAL (ref 77–138)
TROPONIN I SERPL-MCNC: <0.02 NG/ML
TSH SERPL DL<=0.05 MIU/L-ACNC: 0.17 UIU/ML (ref 0.36–3.74)
UNIT DISPENSE STATUS: NORMAL
UNIT PRODUCT CODE: NORMAL
UNIT RH: NORMAL
UROBILINOGEN UR QL STRIP.AUTO: 0.2 E.U./DL
VARIANT LYMPHS # BLD AUTO: 1 %
VARIANT LYMPHS # BLD AUTO: 3 %
VARIANT LYMPHS # BLD AUTO: 4 %
VENTRICULAR RATE: 102 BPM
VENTRICULAR RATE: 106 BPM
VENTRICULAR RATE: 109 BPM
VENTRICULAR RATE: 116 BPM
VENTRICULAR RATE: 88 BPM
VIT B12 SERPL-MCNC: 879 PG/ML (ref 100–900)
WBC # BLD AUTO: 2.44 THOUSAND/UL (ref 4.31–10.16)
WBC # BLD AUTO: 2.71 THOUSAND/UL (ref 4.31–10.16)
WBC # BLD AUTO: 3.02 THOUSAND/UL (ref 4.31–10.16)
WBC # BLD AUTO: 3.41 THOUSAND/UL (ref 4.31–10.16)
WBC # BLD AUTO: 3.58 THOUSAND/UL (ref 4.31–10.16)
WBC # BLD AUTO: 4.06 THOUSAND/UL (ref 4.31–10.16)
WBC # BLD AUTO: 4.1 THOUSAND/UL (ref 4.31–10.16)
WBC # BLD AUTO: 4.32 THOUSAND/UL (ref 4.31–10.16)
WBC # BLD AUTO: 4.4 THOUSAND/UL (ref 4.31–10.16)
WBC # BLD AUTO: 4.45 THOUSAND/UL (ref 4.31–10.16)
WBC # BLD AUTO: 5.1 THOUSAND/UL (ref 4.31–10.16)
WBC # BLD AUTO: 5.15 THOUSAND/UL (ref 4.31–10.16)
WBC # BLD AUTO: 5.2 THOUSAND/UL (ref 4.31–10.16)
WBC # BLD AUTO: 5.51 THOUSAND/UL (ref 4.31–10.16)
WBC # BLD AUTO: 6.38 THOUSAND/UL (ref 4.31–10.16)
WBC # BLD AUTO: 6.44 THOUSAND/UL (ref 4.31–10.16)
WBC # BLD AUTO: 6.81 THOUSAND/UL (ref 4.31–10.16)
WBC # BLD AUTO: 8.59 THOUSAND/UL (ref 4.31–10.16)
WBC # BLD AUTO: 8.74 THOUSAND/UL (ref 4.31–10.16)
WBC # BLD AUTO: 8.85 THOUSAND/UL (ref 4.31–10.16)
WBC # BLD AUTO: 9.15 THOUSAND/UL (ref 4.31–10.16)
WBC #/AREA URNS AUTO: ABNORMAL /HPF
WBC #/AREA URNS AUTO: ABNORMAL /HPF

## 2021-01-01 PROCEDURE — 96376 TX/PRO/DX INJ SAME DRUG ADON: CPT

## 2021-01-01 PROCEDURE — 99349 HOME/RES VST EST MOD MDM 40: CPT | Performed by: NURSE PRACTITIONER

## 2021-01-01 PROCEDURE — 99233 SBSQ HOSP IP/OBS HIGH 50: CPT | Performed by: INTERNAL MEDICINE

## 2021-01-01 PROCEDURE — 80048 BASIC METABOLIC PNL TOTAL CA: CPT | Performed by: INTERNAL MEDICINE

## 2021-01-01 PROCEDURE — 99223 1ST HOSP IP/OBS HIGH 75: CPT | Performed by: INTERNAL MEDICINE

## 2021-01-01 PROCEDURE — 97166 OT EVAL MOD COMPLEX 45 MIN: CPT

## 2021-01-01 PROCEDURE — 84145 PROCALCITONIN (PCT): CPT | Performed by: INTERNAL MEDICINE

## 2021-01-01 PROCEDURE — 96413 CHEMO IV INFUSION 1 HR: CPT

## 2021-01-01 PROCEDURE — 85027 COMPLETE CBC AUTOMATED: CPT | Performed by: INTERNAL MEDICINE

## 2021-01-01 PROCEDURE — 85007 BL SMEAR W/DIFF WBC COUNT: CPT | Performed by: INTERNAL MEDICINE

## 2021-01-01 PROCEDURE — 99214 OFFICE O/P EST MOD 30 MIN: CPT | Performed by: INTERNAL MEDICINE

## 2021-01-01 PROCEDURE — 85384 FIBRINOGEN ACTIVITY: CPT | Performed by: INTERNAL MEDICINE

## 2021-01-01 PROCEDURE — 80053 COMPREHEN METABOLIC PANEL: CPT

## 2021-01-01 PROCEDURE — 99285 EMERGENCY DEPT VISIT HI MDM: CPT

## 2021-01-01 PROCEDURE — 96415 CHEMO IV INFUSION ADDL HR: CPT

## 2021-01-01 PROCEDURE — 85025 COMPLETE CBC W/AUTO DIFF WBC: CPT | Performed by: PHYSICIAN ASSISTANT

## 2021-01-01 PROCEDURE — 83735 ASSAY OF MAGNESIUM: CPT | Performed by: INTERNAL MEDICINE

## 2021-01-01 PROCEDURE — 85025 COMPLETE CBC W/AUTO DIFF WBC: CPT

## 2021-01-01 PROCEDURE — 97530 THERAPEUTIC ACTIVITIES: CPT

## 2021-01-01 PROCEDURE — 87040 BLOOD CULTURE FOR BACTERIA: CPT | Performed by: EMERGENCY MEDICINE

## 2021-01-01 PROCEDURE — 99291 CRITICAL CARE FIRST HOUR: CPT | Performed by: INTERNAL MEDICINE

## 2021-01-01 PROCEDURE — 36415 COLL VENOUS BLD VENIPUNCTURE: CPT

## 2021-01-01 PROCEDURE — 93308 TTE F-UP OR LMTD: CPT | Performed by: INTERNAL MEDICINE

## 2021-01-01 PROCEDURE — 96417 CHEMO IV INFUS EACH ADDL SEQ: CPT

## 2021-01-01 PROCEDURE — 99327 PR DOMICIL/REST HOME NEW PT VISIT HI SEVER 60 MIN: CPT | Performed by: NURSE PRACTITIONER

## 2021-01-01 PROCEDURE — 99222 1ST HOSP IP/OBS MODERATE 55: CPT | Performed by: INTERNAL MEDICINE

## 2021-01-01 PROCEDURE — 84145 PROCALCITONIN (PCT): CPT | Performed by: NURSE PRACTITIONER

## 2021-01-01 PROCEDURE — 93321 DOPPLER ECHO F-UP/LMTD STD: CPT | Performed by: INTERNAL MEDICINE

## 2021-01-01 PROCEDURE — 81003 URINALYSIS AUTO W/O SCOPE: CPT

## 2021-01-01 PROCEDURE — 99232 SBSQ HOSP IP/OBS MODERATE 35: CPT | Performed by: INTERNAL MEDICINE

## 2021-01-01 PROCEDURE — 82784 ASSAY IGA/IGD/IGG/IGM EACH: CPT

## 2021-01-01 PROCEDURE — 80048 BASIC METABOLIC PNL TOTAL CA: CPT | Performed by: NURSE PRACTITIONER

## 2021-01-01 PROCEDURE — 84484 ASSAY OF TROPONIN QUANT: CPT | Performed by: EMERGENCY MEDICINE

## 2021-01-01 PROCEDURE — 99285 EMERGENCY DEPT VISIT HI MDM: CPT | Performed by: EMERGENCY MEDICINE

## 2021-01-01 PROCEDURE — 83540 ASSAY OF IRON: CPT | Performed by: PHYSICIAN ASSISTANT

## 2021-01-01 PROCEDURE — 96360 HYDRATION IV INFUSION INIT: CPT

## 2021-01-01 PROCEDURE — 80048 BASIC METABOLIC PNL TOTAL CA: CPT | Performed by: PHYSICIAN ASSISTANT

## 2021-01-01 PROCEDURE — G1004 CDSM NDSC: HCPCS

## 2021-01-01 PROCEDURE — 86921 COMPATIBILITY TEST INCUBATE: CPT

## 2021-01-01 PROCEDURE — 1124F ACP DISCUSS-NO DSCNMKR DOCD: CPT | Performed by: INTERNAL MEDICINE

## 2021-01-01 PROCEDURE — 30233R1 TRANSFUSION OF NONAUTOLOGOUS PLATELETS INTO PERIPHERAL VEIN, PERCUTANEOUS APPROACH: ICD-10-PCS | Performed by: INTERNAL MEDICINE

## 2021-01-01 PROCEDURE — 74177 CT ABD & PELVIS W/CONTRAST: CPT

## 2021-01-01 PROCEDURE — 85420 FIBRINOLYTIC PLASMINOGEN: CPT | Performed by: INTERNAL MEDICINE

## 2021-01-01 PROCEDURE — U0005 INFEC AGEN DETEC AMPLI PROBE: HCPCS | Performed by: INTERNAL MEDICINE

## 2021-01-01 PROCEDURE — 94760 N-INVAS EAR/PLS OXIMETRY 1: CPT

## 2021-01-01 PROCEDURE — 80053 COMPREHEN METABOLIC PANEL: CPT | Performed by: EMERGENCY MEDICINE

## 2021-01-01 PROCEDURE — 82728 ASSAY OF FERRITIN: CPT | Performed by: PHYSICIAN ASSISTANT

## 2021-01-01 PROCEDURE — 85610 PROTHROMBIN TIME: CPT | Performed by: INTERNAL MEDICINE

## 2021-01-01 PROCEDURE — 93005 ELECTROCARDIOGRAM TRACING: CPT

## 2021-01-01 PROCEDURE — 85025 COMPLETE CBC W/AUTO DIFF WBC: CPT | Performed by: NURSE PRACTITIONER

## 2021-01-01 PROCEDURE — 84145 PROCALCITONIN (PCT): CPT | Performed by: PHYSICIAN ASSISTANT

## 2021-01-01 PROCEDURE — 80053 COMPREHEN METABOLIC PANEL: CPT | Performed by: INTERNAL MEDICINE

## 2021-01-01 PROCEDURE — 99291 CRITICAL CARE FIRST HOUR: CPT | Performed by: NURSE PRACTITIONER

## 2021-01-01 PROCEDURE — 83550 IRON BINDING TEST: CPT | Performed by: PHYSICIAN ASSISTANT

## 2021-01-01 PROCEDURE — 86901 BLOOD TYPING SEROLOGIC RH(D): CPT | Performed by: NURSE PRACTITIONER

## 2021-01-01 PROCEDURE — 83690 ASSAY OF LIPASE: CPT | Performed by: EMERGENCY MEDICINE

## 2021-01-01 PROCEDURE — 71045 X-RAY EXAM CHEST 1 VIEW: CPT

## 2021-01-01 PROCEDURE — 83883 ASSAY NEPHELOMETRY NOT SPEC: CPT

## 2021-01-01 PROCEDURE — 85007 BL SMEAR W/DIFF WBC COUNT: CPT | Performed by: EMERGENCY MEDICINE

## 2021-01-01 PROCEDURE — 83880 ASSAY OF NATRIURETIC PEPTIDE: CPT | Performed by: EMERGENCY MEDICINE

## 2021-01-01 PROCEDURE — 96367 TX/PROPH/DG ADDL SEQ IV INF: CPT

## 2021-01-01 PROCEDURE — 96375 TX/PRO/DX INJ NEW DRUG ADDON: CPT

## 2021-01-01 PROCEDURE — 82272 OCCULT BLD FECES 1-3 TESTS: CPT | Performed by: NURSE PRACTITIONER

## 2021-01-01 PROCEDURE — 94761 N-INVAS EAR/PLS OXIMETRY MLT: CPT

## 2021-01-01 PROCEDURE — 94640 AIRWAY INHALATION TREATMENT: CPT

## 2021-01-01 PROCEDURE — 84100 ASSAY OF PHOSPHORUS: CPT | Performed by: INTERNAL MEDICINE

## 2021-01-01 PROCEDURE — U0003 INFECTIOUS AGENT DETECTION BY NUCLEIC ACID (DNA OR RNA); SEVERE ACUTE RESPIRATORY SYNDROME CORONAVIRUS 2 (SARS-COV-2) (CORONAVIRUS DISEASE [COVID-19]), AMPLIFIED PROBE TECHNIQUE, MAKING USE OF HIGH THROUGHPUT TECHNOLOGIES AS DESCRIBED BY CMS-2020-01-R: HCPCS | Performed by: EMERGENCY MEDICINE

## 2021-01-01 PROCEDURE — 85027 COMPLETE CBC AUTOMATED: CPT

## 2021-01-01 PROCEDURE — 82607 VITAMIN B-12: CPT | Performed by: PHYSICIAN ASSISTANT

## 2021-01-01 PROCEDURE — 93010 ELECTROCARDIOGRAM REPORT: CPT | Performed by: INTERNAL MEDICINE

## 2021-01-01 PROCEDURE — 36415 COLL VENOUS BLD VENIPUNCTURE: CPT | Performed by: EMERGENCY MEDICINE

## 2021-01-01 PROCEDURE — 30233N1 TRANSFUSION OF NONAUTOLOGOUS RED BLOOD CELLS INTO PERIPHERAL VEIN, PERCUTANEOUS APPROACH: ICD-10-PCS | Performed by: INTERNAL MEDICINE

## 2021-01-01 PROCEDURE — 93325 DOPPLER ECHO COLOR FLOW MAPG: CPT | Performed by: INTERNAL MEDICINE

## 2021-01-01 PROCEDURE — 85049 AUTOMATED PLATELET COUNT: CPT | Performed by: INTERNAL MEDICINE

## 2021-01-01 PROCEDURE — 85300 ANTITHROMBIN III ACTIVITY: CPT | Performed by: INTERNAL MEDICINE

## 2021-01-01 PROCEDURE — 85027 COMPLETE CBC AUTOMATED: CPT | Performed by: PHYSICIAN ASSISTANT

## 2021-01-01 PROCEDURE — 85007 BL SMEAR W/DIFF WBC COUNT: CPT

## 2021-01-01 PROCEDURE — 85025 COMPLETE CBC W/AUTO DIFF WBC: CPT | Performed by: INTERNAL MEDICINE

## 2021-01-01 PROCEDURE — 85379 FIBRIN DEGRADATION QUANT: CPT | Performed by: INTERNAL MEDICINE

## 2021-01-01 PROCEDURE — 86850 RBC ANTIBODY SCREEN: CPT | Performed by: NURSE PRACTITIONER

## 2021-01-01 PROCEDURE — 84439 ASSAY OF FREE THYROXINE: CPT | Performed by: INTERNAL MEDICINE

## 2021-01-01 PROCEDURE — 83605 ASSAY OF LACTIC ACID: CPT | Performed by: PHYSICIAN ASSISTANT

## 2021-01-01 PROCEDURE — 85730 THROMBOPLASTIN TIME PARTIAL: CPT | Performed by: EMERGENCY MEDICINE

## 2021-01-01 PROCEDURE — 0241U HB NFCT DS VIR RESP RNA 4 TRGT: CPT | Performed by: EMERGENCY MEDICINE

## 2021-01-01 PROCEDURE — 99238 HOSP IP/OBS DSCHRG MGMT 30/<: CPT | Performed by: PHYSICIAN ASSISTANT

## 2021-01-01 PROCEDURE — 85610 PROTHROMBIN TIME: CPT | Performed by: EMERGENCY MEDICINE

## 2021-01-01 PROCEDURE — 83605 ASSAY OF LACTIC ACID: CPT | Performed by: EMERGENCY MEDICINE

## 2021-01-01 PROCEDURE — 85007 BL SMEAR W/DIFF WBC COUNT: CPT | Performed by: PHYSICIAN ASSISTANT

## 2021-01-01 PROCEDURE — 83883 ASSAY NEPHELOMETRY NOT SPEC: CPT | Performed by: INTERNAL MEDICINE

## 2021-01-01 PROCEDURE — P9037 PLATE PHERES LEUKOREDU IRRAD: HCPCS

## 2021-01-01 PROCEDURE — 85730 THROMBOPLASTIN TIME PARTIAL: CPT | Performed by: INTERNAL MEDICINE

## 2021-01-01 PROCEDURE — 85027 COMPLETE CBC AUTOMATED: CPT | Performed by: EMERGENCY MEDICINE

## 2021-01-01 PROCEDURE — 99205 OFFICE O/P NEW HI 60 MIN: CPT | Performed by: PHYSICIAN ASSISTANT

## 2021-01-01 PROCEDURE — 86870 RBC ANTIBODY IDENTIFICATION: CPT | Performed by: INTERNAL MEDICINE

## 2021-01-01 PROCEDURE — U0005 INFEC AGEN DETEC AMPLI PROBE: HCPCS | Performed by: EMERGENCY MEDICINE

## 2021-01-01 PROCEDURE — 99497 ADVNCD CARE PLAN 30 MIN: CPT | Performed by: INTERNAL MEDICINE

## 2021-01-01 PROCEDURE — 80048 BASIC METABOLIC PNL TOTAL CA: CPT | Performed by: EMERGENCY MEDICINE

## 2021-01-01 PROCEDURE — 87040 BLOOD CULTURE FOR BACTERIA: CPT | Performed by: INTERNAL MEDICINE

## 2021-01-01 PROCEDURE — 36415 COLL VENOUS BLD VENIPUNCTURE: CPT | Performed by: INTERNAL MEDICINE

## 2021-01-01 PROCEDURE — 99292 CRITICAL CARE ADDL 30 MIN: CPT | Performed by: INTERNAL MEDICINE

## 2021-01-01 PROCEDURE — 99215 OFFICE O/P EST HI 40 MIN: CPT | Performed by: INTERNAL MEDICINE

## 2021-01-01 PROCEDURE — 99232 SBSQ HOSP IP/OBS MODERATE 35: CPT | Performed by: PHYSICIAN ASSISTANT

## 2021-01-01 PROCEDURE — 85362 FIBRIN DEGRADATION PRODUCTS: CPT | Performed by: INTERNAL MEDICINE

## 2021-01-01 PROCEDURE — 82746 ASSAY OF FOLIC ACID SERUM: CPT | Performed by: PHYSICIAN ASSISTANT

## 2021-01-01 PROCEDURE — 94660 CPAP INITIATION&MGMT: CPT

## 2021-01-01 PROCEDURE — 71275 CT ANGIOGRAPHY CHEST: CPT

## 2021-01-01 PROCEDURE — 83735 ASSAY OF MAGNESIUM: CPT | Performed by: NURSE PRACTITIONER

## 2021-01-01 PROCEDURE — 36430 TRANSFUSION BLD/BLD COMPNT: CPT

## 2021-01-01 PROCEDURE — 99356 PR PROLONGED SVC I/P OR OBS SETTING 1ST HOUR: CPT | Performed by: INTERNAL MEDICINE

## 2021-01-01 PROCEDURE — 93000 ELECTROCARDIOGRAM COMPLETE: CPT | Performed by: INTERNAL MEDICINE

## 2021-01-01 PROCEDURE — 81001 URINALYSIS AUTO W/SCOPE: CPT | Performed by: EMERGENCY MEDICINE

## 2021-01-01 PROCEDURE — U0003 INFECTIOUS AGENT DETECTION BY NUCLEIC ACID (DNA OR RNA); SEVERE ACUTE RESPIRATORY SYNDROME CORONAVIRUS 2 (SARS-COV-2) (CORONAVIRUS DISEASE [COVID-19]), AMPLIFIED PROBE TECHNIQUE, MAKING USE OF HIGH THROUGHPUT TECHNOLOGIES AS DESCRIBED BY CMS-2020-01-R: HCPCS | Performed by: INTERNAL MEDICINE

## 2021-01-01 PROCEDURE — 99239 HOSP IP/OBS DSCHRG MGMT >30: CPT | Performed by: INTERNAL MEDICINE

## 2021-01-01 PROCEDURE — 93308 TTE F-UP OR LMTD: CPT

## 2021-01-01 PROCEDURE — 85027 COMPLETE CBC AUTOMATED: CPT | Performed by: NURSE PRACTITIONER

## 2021-01-01 PROCEDURE — 80053 COMPREHEN METABOLIC PANEL: CPT | Performed by: NURSE PRACTITIONER

## 2021-01-01 PROCEDURE — 71260 CT THORAX DX C+: CPT

## 2021-01-01 PROCEDURE — 86900 BLOOD TYPING SEROLOGIC ABO: CPT | Performed by: NURSE PRACTITIONER

## 2021-01-01 PROCEDURE — 81001 URINALYSIS AUTO W/SCOPE: CPT

## 2021-01-01 PROCEDURE — 84100 ASSAY OF PHOSPHORUS: CPT | Performed by: NURSE PRACTITIONER

## 2021-01-01 PROCEDURE — 85018 HEMOGLOBIN: CPT | Performed by: PHYSICIAN ASSISTANT

## 2021-01-01 PROCEDURE — 86922 COMPATIBILITY TEST ANTIGLOB: CPT

## 2021-01-01 PROCEDURE — 85007 BL SMEAR W/DIFF WBC COUNT: CPT | Performed by: NURSE PRACTITIONER

## 2021-01-01 PROCEDURE — 71046 X-RAY EXAM CHEST 2 VIEWS: CPT

## 2021-01-01 PROCEDURE — 84443 ASSAY THYROID STIM HORMONE: CPT | Performed by: PHYSICIAN ASSISTANT

## 2021-01-01 PROCEDURE — 97163 PT EVAL HIGH COMPLEX 45 MIN: CPT

## 2021-01-01 PROCEDURE — NC001 PR NO CHARGE: Performed by: NURSE PRACTITIONER

## 2021-01-01 PROCEDURE — P9016 RBC LEUKOCYTES REDUCED: HCPCS

## 2021-01-01 PROCEDURE — 85014 HEMATOCRIT: CPT | Performed by: PHYSICIAN ASSISTANT

## 2021-01-01 PROCEDURE — 82805 BLOOD GASES W/O2 SATURATION: CPT | Performed by: INTERNAL MEDICINE

## 2021-01-01 RX ORDER — ACETAMINOPHEN 325 MG/1
650 TABLET ORAL ONCE
Status: CANCELLED
Start: 2021-01-01

## 2021-01-01 RX ORDER — FUROSEMIDE 10 MG/ML
80 INJECTION INTRAMUSCULAR; INTRAVENOUS ONCE
Status: DISCONTINUED | OUTPATIENT
Start: 2021-01-01 | End: 2021-01-01

## 2021-01-01 RX ORDER — SILODOSIN 8 MG/1
CAPSULE ORAL DAILY
COMMUNITY
End: 2021-01-01 | Stop reason: HOSPADM

## 2021-01-01 RX ORDER — POTASSIUM CHLORIDE 750 MG/1
TABLET, EXTENDED RELEASE ORAL
Qty: 30 TABLET | Refills: 1 | OUTPATIENT
Start: 2021-01-01

## 2021-01-01 RX ORDER — HEPARIN SODIUM 1000 [USP'U]/ML
4000 INJECTION, SOLUTION INTRAVENOUS; SUBCUTANEOUS
Status: DISCONTINUED | OUTPATIENT
Start: 2021-01-01 | End: 2021-01-01

## 2021-01-01 RX ORDER — MIDODRINE HYDROCHLORIDE 5 MG/1
5 TABLET ORAL
Status: DISCONTINUED | OUTPATIENT
Start: 2021-01-01 | End: 2021-01-01

## 2021-01-01 RX ORDER — FUROSEMIDE 10 MG/ML
40 INJECTION INTRAMUSCULAR; INTRAVENOUS
Status: DISCONTINUED | OUTPATIENT
Start: 2021-01-01 | End: 2021-01-01

## 2021-01-01 RX ORDER — AMOXICILLIN AND CLAVULANATE POTASSIUM 875; 125 MG/1; MG/1
1 TABLET, FILM COATED ORAL ONCE
Status: COMPLETED | OUTPATIENT
Start: 2021-01-01 | End: 2021-01-01

## 2021-01-01 RX ORDER — SODIUM CHLORIDE 9 MG/ML
20 INJECTION, SOLUTION INTRAVENOUS ONCE
Status: CANCELLED | OUTPATIENT
Start: 2021-01-01

## 2021-01-01 RX ORDER — LENALIDOMIDE 5 MG/1
CAPSULE ORAL
Qty: 21 CAPSULE | Refills: 0 | Status: SHIPPED | OUTPATIENT
Start: 2021-01-01 | End: 2021-01-01 | Stop reason: SDUPTHER

## 2021-01-01 RX ORDER — MAGNESIUM SULFATE HEPTAHYDRATE 40 MG/ML
2 INJECTION, SOLUTION INTRAVENOUS ONCE
Status: COMPLETED | OUTPATIENT
Start: 2021-01-01 | End: 2021-01-01

## 2021-01-01 RX ORDER — ALBUMIN (HUMAN) 12.5 G/50ML
12.5 SOLUTION INTRAVENOUS EVERY 12 HOURS
Status: CANCELLED | OUTPATIENT
Start: 2021-01-01 | End: 2021-01-01

## 2021-01-01 RX ORDER — POTASSIUM CHLORIDE 20 MEQ/1
20 TABLET, EXTENDED RELEASE ORAL DAILY
Status: DISCONTINUED | OUTPATIENT
Start: 2021-01-01 | End: 2021-01-01

## 2021-01-01 RX ORDER — METHYLPREDNISOLONE SODIUM SUCCINATE 40 MG/ML
40 INJECTION, POWDER, LYOPHILIZED, FOR SOLUTION INTRAMUSCULAR; INTRAVENOUS EVERY 12 HOURS SCHEDULED
Status: DISCONTINUED | OUTPATIENT
Start: 2021-01-01 | End: 2021-01-01

## 2021-01-01 RX ORDER — PREDNISONE 10 MG/1
10 TABLET ORAL DAILY
Qty: 90 TABLET | Refills: 3 | Status: SHIPPED | OUTPATIENT
Start: 2021-01-01 | End: 2021-01-01 | Stop reason: HOSPADM

## 2021-01-01 RX ORDER — ACETAMINOPHEN 325 MG/1
650 TABLET ORAL EVERY 6 HOURS PRN
Status: DISCONTINUED | OUTPATIENT
Start: 2021-01-01 | End: 2021-01-01 | Stop reason: HOSPADM

## 2021-01-01 RX ORDER — MAGNESIUM SULFATE HEPTAHYDRATE 40 MG/ML
2 INJECTION, SOLUTION INTRAVENOUS ONCE
Status: DISCONTINUED | OUTPATIENT
Start: 2021-01-01 | End: 2021-01-01

## 2021-01-01 RX ORDER — IPRATROPIUM BROMIDE AND ALBUTEROL SULFATE 2.5; .5 MG/3ML; MG/3ML
3 SOLUTION RESPIRATORY (INHALATION)
Status: DISCONTINUED | OUTPATIENT
Start: 2021-01-01 | End: 2021-01-01

## 2021-01-01 RX ORDER — LENALIDOMIDE 5 MG/1
5 CAPSULE ORAL 2 TIMES DAILY
Status: DISCONTINUED | OUTPATIENT
Start: 2021-01-01 | End: 2021-01-01

## 2021-01-01 RX ORDER — POTASSIUM CHLORIDE 20 MEQ/1
20 TABLET, EXTENDED RELEASE ORAL ONCE
Status: COMPLETED | OUTPATIENT
Start: 2021-01-01 | End: 2021-01-01

## 2021-01-01 RX ORDER — DILTIAZEM HYDROCHLORIDE 180 MG/1
180 CAPSULE, COATED, EXTENDED RELEASE ORAL DAILY
Qty: 30 CAPSULE | Refills: 5 | Status: SHIPPED | OUTPATIENT
Start: 2021-01-01 | End: 2021-01-01 | Stop reason: HOSPADM

## 2021-01-01 RX ORDER — IPRATROPIUM BROMIDE AND ALBUTEROL SULFATE 2.5; .5 MG/3ML; MG/3ML
SOLUTION RESPIRATORY (INHALATION)
Status: DISPENSED
Start: 2021-01-01 | End: 2021-01-01

## 2021-01-01 RX ORDER — GUAIFENESIN 600 MG
600 TABLET, EXTENDED RELEASE 12 HR ORAL EVERY 12 HOURS SCHEDULED
Status: DISCONTINUED | OUTPATIENT
Start: 2021-01-01 | End: 2021-01-01 | Stop reason: HOSPADM

## 2021-01-01 RX ORDER — ACETAMINOPHEN 325 MG/1
650 TABLET ORAL ONCE
Status: COMPLETED | OUTPATIENT
Start: 2021-01-01 | End: 2021-01-01

## 2021-01-01 RX ORDER — FUROSEMIDE 20 MG/1
20 TABLET ORAL DAILY
Qty: 30 TABLET | Refills: 0 | Status: SHIPPED | OUTPATIENT
Start: 2021-01-01 | End: 2021-01-01 | Stop reason: SDUPTHER

## 2021-01-01 RX ORDER — PREDNISONE 1 MG/1
TABLET ORAL
Qty: 90 TABLET | Refills: 2 | OUTPATIENT
Start: 2021-01-01

## 2021-01-01 RX ORDER — ALPRAZOLAM 0.25 MG/1
0.25 TABLET ORAL ONCE
Status: COMPLETED | OUTPATIENT
Start: 2021-01-01 | End: 2021-01-01

## 2021-01-01 RX ORDER — PREDNISONE 10 MG/1
10 TABLET ORAL DAILY
Status: DISCONTINUED | OUTPATIENT
Start: 2021-01-01 | End: 2021-01-01

## 2021-01-01 RX ORDER — FAMOTIDINE 20 MG/1
20 TABLET, FILM COATED ORAL DAILY
Status: DISCONTINUED | OUTPATIENT
Start: 2021-01-01 | End: 2021-01-01 | Stop reason: HOSPADM

## 2021-01-01 RX ORDER — ACETAMINOPHEN 325 MG/1
650 TABLET ORAL EVERY 6 HOURS PRN
Status: DISCONTINUED | OUTPATIENT
Start: 2021-01-01 | End: 2021-01-01

## 2021-01-01 RX ORDER — FINASTERIDE 5 MG/1
5 TABLET, FILM COATED ORAL
Status: DISCONTINUED | OUTPATIENT
Start: 2021-01-01 | End: 2021-01-01 | Stop reason: HOSPADM

## 2021-01-01 RX ORDER — POTASSIUM CHLORIDE 750 MG/1
10 TABLET, EXTENDED RELEASE ORAL 2 TIMES DAILY
Qty: 90 TABLET | Refills: 3
Start: 2021-01-01 | End: 2021-01-01 | Stop reason: SDUPTHER

## 2021-01-01 RX ORDER — POTASSIUM CHLORIDE 750 MG/1
10 TABLET, EXTENDED RELEASE ORAL 2 TIMES DAILY
Qty: 90 TABLET | Refills: 3 | Status: SHIPPED | OUTPATIENT
Start: 2021-01-01 | End: 2021-01-01 | Stop reason: HOSPADM

## 2021-01-01 RX ORDER — SODIUM CHLORIDE 9 MG/ML
20 INJECTION, SOLUTION INTRAVENOUS ONCE
Status: COMPLETED | OUTPATIENT
Start: 2021-01-01 | End: 2021-01-01

## 2021-01-01 RX ORDER — CIPROFLOXACIN 500 MG/1
500 TABLET, FILM COATED ORAL ONCE
Status: COMPLETED | OUTPATIENT
Start: 2021-01-01 | End: 2021-01-01

## 2021-01-01 RX ORDER — POTASSIUM CHLORIDE 20 MEQ/1
40 TABLET, EXTENDED RELEASE ORAL 2 TIMES DAILY
Status: COMPLETED | OUTPATIENT
Start: 2021-01-01 | End: 2021-01-01

## 2021-01-01 RX ORDER — HYDROMORPHONE HCL/PF 1 MG/ML
0.2 SYRINGE (ML) INJECTION EVERY 2 HOUR PRN
Status: DISCONTINUED | OUTPATIENT
Start: 2021-01-01 | End: 2021-01-01

## 2021-01-01 RX ORDER — FUROSEMIDE 10 MG/ML
40 INJECTION INTRAMUSCULAR; INTRAVENOUS ONCE
Status: COMPLETED | OUTPATIENT
Start: 2021-01-01 | End: 2021-01-01

## 2021-01-01 RX ORDER — MIDODRINE HYDROCHLORIDE 5 MG/1
10 TABLET ORAL ONCE
Status: COMPLETED | OUTPATIENT
Start: 2021-01-01 | End: 2021-01-01

## 2021-01-01 RX ORDER — POTASSIUM CHLORIDE 750 MG/1
10 TABLET, EXTENDED RELEASE ORAL 3 TIMES WEEKLY
Qty: 30 TABLET | Refills: 1 | Status: ON HOLD | OUTPATIENT
Start: 2021-01-01 | End: 2021-01-01 | Stop reason: SDUPTHER

## 2021-01-01 RX ORDER — FUROSEMIDE 20 MG/1
20 TABLET ORAL DAILY
Status: DISCONTINUED | OUTPATIENT
Start: 2021-01-01 | End: 2021-01-01 | Stop reason: HOSPADM

## 2021-01-01 RX ORDER — TAMSULOSIN HYDROCHLORIDE 0.4 MG/1
0.8 CAPSULE ORAL
Status: DISCONTINUED | OUTPATIENT
Start: 2021-01-01 | End: 2021-01-01

## 2021-01-01 RX ORDER — METHYLPREDNISOLONE SODIUM SUCCINATE 40 MG/ML
40 INJECTION, POWDER, LYOPHILIZED, FOR SOLUTION INTRAMUSCULAR; INTRAVENOUS EVERY 8 HOURS SCHEDULED
Status: DISCONTINUED | OUTPATIENT
Start: 2021-01-01 | End: 2021-01-01

## 2021-01-01 RX ORDER — POTASSIUM CHLORIDE 750 MG/1
10 TABLET, EXTENDED RELEASE ORAL DAILY
Qty: 30 TABLET | Refills: 0 | Status: SHIPPED | OUTPATIENT
Start: 2021-01-01 | End: 2021-01-01 | Stop reason: SDUPTHER

## 2021-01-01 RX ORDER — POTASSIUM CHLORIDE 750 MG/1
10 TABLET, EXTENDED RELEASE ORAL 2 TIMES DAILY
Status: DISCONTINUED | OUTPATIENT
Start: 2021-01-01 | End: 2021-01-01

## 2021-01-01 RX ORDER — LORAZEPAM 2 MG/ML
0.5 INJECTION INTRAMUSCULAR EVERY 2 HOUR PRN
Status: DISCONTINUED | OUTPATIENT
Start: 2021-01-01 | End: 2021-01-01

## 2021-01-01 RX ORDER — POTASSIUM CHLORIDE 20 MEQ/1
20 TABLET, EXTENDED RELEASE ORAL 2 TIMES DAILY
Status: DISCONTINUED | OUTPATIENT
Start: 2021-01-01 | End: 2021-01-01

## 2021-01-01 RX ORDER — HYDROMORPHONE HCL/PF 1 MG/ML
0.1 SYRINGE (ML) INJECTION EVERY 2 HOUR PRN
Status: DISCONTINUED | OUTPATIENT
Start: 2021-01-01 | End: 2021-01-01

## 2021-01-01 RX ORDER — POTASSIUM CHLORIDE 750 MG/1
10 TABLET, EXTENDED RELEASE ORAL DAILY
Qty: 90 TABLET | Refills: 3 | Status: SHIPPED | OUTPATIENT
Start: 2021-01-01 | End: 2021-01-01 | Stop reason: SDUPTHER

## 2021-01-01 RX ORDER — HALOPERIDOL 5 MG/ML
0.5 INJECTION INTRAMUSCULAR EVERY 4 HOURS PRN
Status: DISCONTINUED | OUTPATIENT
Start: 2021-01-01 | End: 2021-01-01

## 2021-01-01 RX ORDER — AZITHROMYCIN 500 MG/1
500 TABLET, FILM COATED ORAL DAILY
Qty: 3 TABLET | Refills: 0 | Status: SHIPPED | OUTPATIENT
Start: 2021-01-01 | End: 2021-01-01

## 2021-01-01 RX ORDER — FUROSEMIDE 20 MG/1
TABLET ORAL
Qty: 30 TABLET | Refills: 1 | OUTPATIENT
Start: 2021-01-01

## 2021-01-01 RX ORDER — ALBUTEROL SULFATE 2.5 MG/3ML
5 SOLUTION RESPIRATORY (INHALATION) ONCE
Status: COMPLETED | OUTPATIENT
Start: 2021-01-01 | End: 2021-01-01

## 2021-01-01 RX ORDER — MELPHALAN USP, 2 MG 2 MG/1
TABLET ORAL
Qty: 24 TABLET | Refills: 5 | Status: SHIPPED | OUTPATIENT
Start: 2021-01-01 | End: 2021-01-01 | Stop reason: HOSPADM

## 2021-01-01 RX ORDER — METRONIDAZOLE 500 MG/1
500 TABLET ORAL EVERY 8 HOURS SCHEDULED
Qty: 30 TABLET | Refills: 0 | Status: SHIPPED | OUTPATIENT
Start: 2021-01-01 | End: 2021-01-01

## 2021-01-01 RX ORDER — HYDROMORPHONE HCL/PF 1 MG/ML
1 SYRINGE (ML) INJECTION
Status: DISCONTINUED | OUTPATIENT
Start: 2021-01-01 | End: 2021-01-01

## 2021-01-01 RX ORDER — PREDNISONE 20 MG/1
TABLET ORAL
Qty: 15 TABLET | Refills: 0 | Status: SHIPPED | OUTPATIENT
Start: 2021-01-01 | End: 2021-01-01 | Stop reason: ALTCHOICE

## 2021-01-01 RX ORDER — DIPHENHYDRAMINE HYDROCHLORIDE 50 MG/ML
50 INJECTION INTRAMUSCULAR; INTRAVENOUS ONCE
Status: COMPLETED | OUTPATIENT
Start: 2021-01-01 | End: 2021-01-01

## 2021-01-01 RX ORDER — HYDROMORPHONE HCL/PF 1 MG/ML
0.5 SYRINGE (ML) INJECTION
Status: DISCONTINUED | OUTPATIENT
Start: 2021-01-01 | End: 2021-01-01

## 2021-01-01 RX ORDER — ALBUTEROL SULFATE 2.5 MG/3ML
2.5 SOLUTION RESPIRATORY (INHALATION) EVERY 6 HOURS PRN
Status: DISCONTINUED | OUTPATIENT
Start: 2021-01-01 | End: 2021-01-01

## 2021-01-01 RX ORDER — ALBUTEROL SULFATE 2.5 MG/3ML
2.5 SOLUTION RESPIRATORY (INHALATION) ONCE
Status: DISCONTINUED | OUTPATIENT
Start: 2021-01-01 | End: 2021-01-01 | Stop reason: HOSPADM

## 2021-01-01 RX ORDER — FINASTERIDE 5 MG/1
5 TABLET, FILM COATED ORAL
Status: DISCONTINUED | OUTPATIENT
Start: 2021-01-01 | End: 2021-01-01

## 2021-01-01 RX ORDER — FUROSEMIDE 20 MG/1
20 TABLET ORAL 2 TIMES DAILY
Qty: 90 TABLET | Refills: 3
Start: 2021-01-01 | End: 2021-01-01 | Stop reason: SDUPTHER

## 2021-01-01 RX ORDER — HYDROMORPHONE HCL/PF 1 MG/ML
0.5 SYRINGE (ML) INJECTION EVERY 2 HOUR PRN
Status: DISCONTINUED | OUTPATIENT
Start: 2021-01-01 | End: 2021-01-01

## 2021-01-01 RX ORDER — ALBUMIN (HUMAN) 12.5 G/50ML
25 SOLUTION INTRAVENOUS ONCE
Status: COMPLETED | OUTPATIENT
Start: 2021-01-01 | End: 2021-01-01

## 2021-01-01 RX ORDER — FUROSEMIDE 10 MG/ML
20 INJECTION INTRAMUSCULAR; INTRAVENOUS ONCE
Status: COMPLETED | OUTPATIENT
Start: 2021-01-01 | End: 2021-01-01

## 2021-01-01 RX ORDER — LENALIDOMIDE 5 MG/1
CAPSULE ORAL
Qty: 21 CAPSULE | Refills: 0 | Status: SHIPPED | OUTPATIENT
Start: 2021-01-01 | End: 2021-01-01 | Stop reason: HOSPADM

## 2021-01-01 RX ORDER — HEPARIN SODIUM 5000 [USP'U]/ML
5000 INJECTION, SOLUTION INTRAVENOUS; SUBCUTANEOUS EVERY 8 HOURS SCHEDULED
Status: DISCONTINUED | OUTPATIENT
Start: 2021-01-01 | End: 2021-01-01

## 2021-01-01 RX ORDER — MIDODRINE HYDROCHLORIDE 5 MG/1
10 TABLET ORAL
Status: DISCONTINUED | OUTPATIENT
Start: 2021-01-01 | End: 2021-01-01

## 2021-01-01 RX ORDER — IPRATROPIUM BROMIDE AND ALBUTEROL SULFATE 2.5; .5 MG/3ML; MG/3ML
3 SOLUTION RESPIRATORY (INHALATION) EVERY 6 HOURS PRN
Status: DISCONTINUED | OUTPATIENT
Start: 2021-01-01 | End: 2021-01-01 | Stop reason: HOSPADM

## 2021-01-01 RX ORDER — FAMOTIDINE 20 MG/1
20 TABLET, FILM COATED ORAL DAILY
Status: DISCONTINUED | OUTPATIENT
Start: 2021-01-01 | End: 2021-01-01

## 2021-01-01 RX ORDER — DILTIAZEM HYDROCHLORIDE 180 MG/1
180 CAPSULE, COATED, EXTENDED RELEASE ORAL DAILY
Status: DISCONTINUED | OUTPATIENT
Start: 2021-01-01 | End: 2021-01-01 | Stop reason: HOSPADM

## 2021-01-01 RX ORDER — PREDNISONE 10 MG/1
10 TABLET ORAL DAILY
Qty: 30 TABLET | Refills: 1 | Status: SHIPPED | OUTPATIENT
Start: 2021-01-01 | End: 2021-01-01

## 2021-01-01 RX ORDER — AMOXICILLIN AND CLAVULANATE POTASSIUM 875; 125 MG/1; MG/1
1 TABLET, FILM COATED ORAL EVERY 8 HOURS
Qty: 30 TABLET | Refills: 0 | Status: SHIPPED | OUTPATIENT
Start: 2021-01-01 | End: 2021-01-01

## 2021-01-01 RX ORDER — FUROSEMIDE 10 MG/ML
5 SYRINGE (ML) INJECTION CONTINUOUS
Status: DISCONTINUED | OUTPATIENT
Start: 2021-01-01 | End: 2021-01-01

## 2021-01-01 RX ORDER — FUROSEMIDE 20 MG/1
20 TABLET ORAL 3 TIMES WEEKLY
Qty: 30 TABLET | Refills: 1 | Status: ON HOLD | OUTPATIENT
Start: 2021-01-01 | End: 2021-01-01 | Stop reason: SDUPTHER

## 2021-01-01 RX ORDER — ALBUTEROL SULFATE 2.5 MG/3ML
2.5 SOLUTION RESPIRATORY (INHALATION) EVERY 6 HOURS PRN
Qty: 360 ML | Refills: 5 | Status: SHIPPED | OUTPATIENT
Start: 2021-01-01 | End: 2021-01-01 | Stop reason: HOSPADM

## 2021-01-01 RX ORDER — METRONIDAZOLE 500 MG/1
500 TABLET ORAL ONCE
Status: COMPLETED | OUTPATIENT
Start: 2021-01-01 | End: 2021-01-01

## 2021-01-01 RX ORDER — PREDNISONE 20 MG/1
20 TABLET ORAL DAILY
COMMUNITY
Start: 2021-01-01 | End: 2021-01-01 | Stop reason: HOSPADM

## 2021-01-01 RX ORDER — ALBUTEROL SULFATE 90 UG/1
2 AEROSOL, METERED RESPIRATORY (INHALATION) EVERY 4 HOURS PRN
Status: DISCONTINUED | OUTPATIENT
Start: 2021-01-01 | End: 2021-01-01 | Stop reason: HOSPADM

## 2021-01-01 RX ORDER — GUAIFENESIN 600 MG
600 TABLET, EXTENDED RELEASE 12 HR ORAL EVERY 12 HOURS SCHEDULED
Qty: 14 TABLET | Refills: 0 | Status: SHIPPED | OUTPATIENT
Start: 2021-01-01 | End: 2021-01-01

## 2021-01-01 RX ORDER — PREDNISONE 20 MG/1
40 TABLET ORAL DAILY
Status: DISCONTINUED | OUTPATIENT
Start: 2021-01-01 | End: 2021-01-01 | Stop reason: HOSPADM

## 2021-01-01 RX ORDER — MAGNESIUM HYDROXIDE/ALUMINUM HYDROXICE/SIMETHICONE 120; 1200; 1200 MG/30ML; MG/30ML; MG/30ML
30 SUSPENSION ORAL EVERY 6 HOURS PRN
Status: DISCONTINUED | OUTPATIENT
Start: 2021-01-01 | End: 2021-01-01 | Stop reason: HOSPADM

## 2021-01-01 RX ORDER — HEPARIN SODIUM 1000 [USP'U]/ML
4000 INJECTION, SOLUTION INTRAVENOUS; SUBCUTANEOUS ONCE
Status: DISCONTINUED | OUTPATIENT
Start: 2021-01-01 | End: 2021-01-01

## 2021-01-01 RX ORDER — ALBUTEROL SULFATE 90 UG/1
2 AEROSOL, METERED RESPIRATORY (INHALATION) EVERY 6 HOURS PRN
Status: DISCONTINUED | OUTPATIENT
Start: 2021-01-01 | End: 2021-01-01

## 2021-01-01 RX ORDER — FAMOTIDINE 20 MG/50ML
20 INJECTION, SOLUTION INTRAVENOUS ONCE
Status: COMPLETED | OUTPATIENT
Start: 2021-01-01 | End: 2021-01-01

## 2021-01-01 RX ORDER — LEVALBUTEROL 1.25 MG/.5ML
1.25 SOLUTION, CONCENTRATE RESPIRATORY (INHALATION)
Status: DISCONTINUED | OUTPATIENT
Start: 2021-01-01 | End: 2021-01-01 | Stop reason: HOSPADM

## 2021-01-01 RX ORDER — CIPROFLOXACIN 500 MG/1
500 TABLET, FILM COATED ORAL 2 TIMES DAILY
Qty: 20 TABLET | Refills: 0 | Status: SHIPPED | OUTPATIENT
Start: 2021-01-01 | End: 2021-01-01

## 2021-01-01 RX ORDER — PREDNISONE 10 MG/1
TABLET ORAL DAILY
COMMUNITY
End: 2021-01-01 | Stop reason: SDUPTHER

## 2021-01-01 RX ORDER — ALBUMIN, HUMAN INJ 5% 5 %
50 SOLUTION INTRAVENOUS 3 TIMES DAILY
Status: DISCONTINUED | OUTPATIENT
Start: 2021-01-01 | End: 2021-01-01

## 2021-01-01 RX ORDER — HEPARIN SODIUM 1000 [USP'U]/ML
2000 INJECTION, SOLUTION INTRAVENOUS; SUBCUTANEOUS
Status: DISCONTINUED | OUTPATIENT
Start: 2021-01-01 | End: 2021-01-01

## 2021-01-01 RX ORDER — IPRATROPIUM BROMIDE AND ALBUTEROL SULFATE 2.5; .5 MG/3ML; MG/3ML
3 SOLUTION RESPIRATORY (INHALATION) ONCE
Status: COMPLETED | OUTPATIENT
Start: 2021-01-01 | End: 2021-01-01

## 2021-01-01 RX ORDER — FUROSEMIDE 10 MG/ML
80 INJECTION INTRAMUSCULAR; INTRAVENOUS
Status: DISCONTINUED | OUTPATIENT
Start: 2021-01-01 | End: 2021-01-01

## 2021-01-01 RX ORDER — ONDANSETRON 2 MG/ML
4 INJECTION INTRAMUSCULAR; INTRAVENOUS EVERY 6 HOURS PRN
Status: DISCONTINUED | OUTPATIENT
Start: 2021-01-01 | End: 2021-01-01 | Stop reason: HOSPADM

## 2021-01-01 RX ORDER — MIDODRINE HYDROCHLORIDE 5 MG/1
15 TABLET ORAL
Status: DISCONTINUED | OUTPATIENT
Start: 2021-01-01 | End: 2021-01-01

## 2021-01-01 RX ORDER — FUROSEMIDE 10 MG/ML
20 INJECTION INTRAMUSCULAR; INTRAVENOUS ONCE
Status: DISCONTINUED | OUTPATIENT
Start: 2021-01-01 | End: 2021-01-01

## 2021-01-01 RX ORDER — METHYLPREDNISOLONE SODIUM SUCCINATE 40 MG/ML
40 INJECTION, POWDER, LYOPHILIZED, FOR SOLUTION INTRAMUSCULAR; INTRAVENOUS EVERY 12 HOURS SCHEDULED
Status: COMPLETED | OUTPATIENT
Start: 2021-01-01 | End: 2021-01-01

## 2021-01-01 RX ORDER — TAMSULOSIN HYDROCHLORIDE 0.4 MG/1
0.4 CAPSULE ORAL
Status: DISCONTINUED | OUTPATIENT
Start: 2021-01-01 | End: 2021-01-01 | Stop reason: HOSPADM

## 2021-01-01 RX ORDER — FUROSEMIDE 20 MG/1
20 TABLET ORAL DAILY
Qty: 90 TABLET | Refills: 3 | Status: SHIPPED | OUTPATIENT
Start: 2021-01-01 | End: 2021-01-01 | Stop reason: SDUPTHER

## 2021-01-01 RX ORDER — FUROSEMIDE 20 MG/1
20 TABLET ORAL 2 TIMES DAILY
Qty: 90 TABLET | Refills: 3 | Status: SHIPPED | OUTPATIENT
Start: 2021-01-01 | End: 2021-01-01 | Stop reason: HOSPADM

## 2021-01-01 RX ORDER — PREDNISONE 20 MG/1
20 TABLET ORAL DAILY
Qty: 30 TABLET | Refills: 3 | Status: ON HOLD | OUTPATIENT
Start: 2021-01-01 | End: 2021-01-01 | Stop reason: SDUPTHER

## 2021-01-01 RX ORDER — HEPARIN SODIUM 10000 [USP'U]/100ML
3-20 INJECTION, SOLUTION INTRAVENOUS
Status: DISCONTINUED | OUTPATIENT
Start: 2021-01-01 | End: 2021-01-01

## 2021-01-01 RX ORDER — LIDOCAINE AND PRILOCAINE 25; 25 MG/G; MG/G
CREAM TOPICAL AS NEEDED
Qty: 30 G | Refills: 1 | Status: SHIPPED | OUTPATIENT
Start: 2021-01-01 | End: 2021-01-01 | Stop reason: HOSPADM

## 2021-01-01 RX ADMIN — FAMOTIDINE 20 MG: 20 TABLET ORAL at 08:20

## 2021-01-01 RX ADMIN — ACETAMINOPHEN 650 MG: 325 TABLET, FILM COATED ORAL at 06:57

## 2021-01-01 RX ADMIN — ACETAMINOPHEN 650 MG: 325 TABLET, FILM COATED ORAL at 09:27

## 2021-01-01 RX ADMIN — IPRATROPIUM BROMIDE AND ALBUTEROL SULFATE 3 ML: 2.5; .5 SOLUTION RESPIRATORY (INHALATION) at 07:21

## 2021-01-01 RX ADMIN — Medication 125 MG: at 21:19

## 2021-01-01 RX ADMIN — MIDODRINE HYDROCHLORIDE 15 MG: 5 TABLET ORAL at 14:38

## 2021-01-01 RX ADMIN — IPRATROPIUM BROMIDE AND ALBUTEROL SULFATE 3 ML: 2.5; .5 SOLUTION RESPIRATORY (INHALATION) at 19:07

## 2021-01-01 RX ADMIN — Medication 5 MG/HR: at 10:39

## 2021-01-01 RX ADMIN — ACETAMINOPHEN 650 MG: 325 TABLET, FILM COATED ORAL at 06:15

## 2021-01-01 RX ADMIN — Medication 0.5 MG/HR: at 03:24

## 2021-01-01 RX ADMIN — FAMOTIDINE 20 MG: 10 INJECTION INTRAVENOUS at 09:17

## 2021-01-01 RX ADMIN — POTASSIUM CHLORIDE 20 MEQ: 1500 TABLET, EXTENDED RELEASE ORAL at 08:33

## 2021-01-01 RX ADMIN — HYDROMORPHONE HYDROCHLORIDE 0.1 MG: 1 INJECTION, SOLUTION INTRAMUSCULAR; INTRAVENOUS; SUBCUTANEOUS at 16:23

## 2021-01-01 RX ADMIN — IPRATROPIUM BROMIDE AND ALBUTEROL SULFATE 3 ML: 2.5; .5 SOLUTION RESPIRATORY (INHALATION) at 14:43

## 2021-01-01 RX ADMIN — SODIUM CHLORIDE 20 ML/HR: 0.9 INJECTION, SOLUTION INTRAVENOUS at 09:30

## 2021-01-01 RX ADMIN — SODIUM CHLORIDE 250 ML: 0.9 INJECTION, SOLUTION INTRAVENOUS at 21:30

## 2021-01-01 RX ADMIN — FUROSEMIDE 20 MG: 20 TABLET ORAL at 08:59

## 2021-01-01 RX ADMIN — IPRATROPIUM BROMIDE 0.5 MG: 0.5 SOLUTION RESPIRATORY (INHALATION) at 07:20

## 2021-01-01 RX ADMIN — MIDODRINE HYDROCHLORIDE 15 MG: 5 TABLET ORAL at 05:55

## 2021-01-01 RX ADMIN — DEXAMETHASONE SODIUM PHOSPHATE 20 MG: 10 INJECTION, SOLUTION INTRAMUSCULAR; INTRAVENOUS at 09:36

## 2021-01-01 RX ADMIN — DIBASIC SODIUM PHOSPHATE, MONOBASIC POTASSIUM PHOSPHATE AND MONOBASIC SODIUM PHOSPHATE 2 TABLET: 852; 155; 130 TABLET ORAL at 15:55

## 2021-01-01 RX ADMIN — MIDODRINE HYDROCHLORIDE 15 MG: 5 TABLET ORAL at 16:30

## 2021-01-01 RX ADMIN — NOREPINEPHRINE BITARTRATE 5 MCG/MIN: 1 INJECTION, SOLUTION, CONCENTRATE INTRAVENOUS at 07:35

## 2021-01-01 RX ADMIN — SODIUM CHLORIDE 800 MG: 0.9 INJECTION, SOLUTION INTRAVENOUS at 10:18

## 2021-01-01 RX ADMIN — POTASSIUM CHLORIDE 20 MEQ: 1500 TABLET, EXTENDED RELEASE ORAL at 09:21

## 2021-01-01 RX ADMIN — GUAIFENESIN 600 MG: 600 TABLET ORAL at 10:33

## 2021-01-01 RX ADMIN — FINASTERIDE 5 MG: 5 TABLET, FILM COATED ORAL at 21:29

## 2021-01-01 RX ADMIN — FUROSEMIDE 80 MG: 10 INJECTION, SOLUTION INTRAVENOUS at 15:17

## 2021-01-01 RX ADMIN — NOREPINEPHRINE BITARTRATE 5 MCG/MIN: 1 INJECTION, SOLUTION, CONCENTRATE INTRAVENOUS at 14:41

## 2021-01-01 RX ADMIN — POTASSIUM CHLORIDE 20 MEQ: 1500 TABLET, EXTENDED RELEASE ORAL at 08:53

## 2021-01-01 RX ADMIN — SODIUM CHLORIDE 250 ML: 0.9 INJECTION, SOLUTION INTRAVENOUS at 09:45

## 2021-01-01 RX ADMIN — IPRATROPIUM BROMIDE AND ALBUTEROL SULFATE 3 ML: 2.5; .5 SOLUTION RESPIRATORY (INHALATION) at 07:54

## 2021-01-01 RX ADMIN — DIPHENHYDRAMINE HYDROCHLORIDE 50 MG: 50 INJECTION, SOLUTION INTRAMUSCULAR; INTRAVENOUS at 09:09

## 2021-01-01 RX ADMIN — IPRATROPIUM BROMIDE 0.5 MG: 0.5 SOLUTION RESPIRATORY (INHALATION) at 18:45

## 2021-01-01 RX ADMIN — SODIUM CHLORIDE 20 ML/HR: 0.9 INJECTION, SOLUTION INTRAVENOUS at 08:40

## 2021-01-01 RX ADMIN — IPRATROPIUM BROMIDE AND ALBUTEROL SULFATE 3 ML: 2.5; .5 SOLUTION RESPIRATORY (INHALATION) at 07:41

## 2021-01-01 RX ADMIN — NOREPINEPHRINE BITARTRATE 5 MCG/MIN: 1 INJECTION, SOLUTION, CONCENTRATE INTRAVENOUS at 18:01

## 2021-01-01 RX ADMIN — PREDNISONE 10 MG: 10 TABLET ORAL at 08:53

## 2021-01-01 RX ADMIN — Medication 125 MG: at 08:13

## 2021-01-01 RX ADMIN — IPRATROPIUM BROMIDE 0.5 MG: 0.5 SOLUTION RESPIRATORY (INHALATION) at 19:20

## 2021-01-01 RX ADMIN — POTASSIUM CHLORIDE 20 MEQ: 1500 TABLET, EXTENDED RELEASE ORAL at 10:50

## 2021-01-01 RX ADMIN — MIDODRINE HYDROCHLORIDE 15 MG: 5 TABLET ORAL at 12:07

## 2021-01-01 RX ADMIN — FAMOTIDINE 20 MG: 20 TABLET ORAL at 08:53

## 2021-01-01 RX ADMIN — FUROSEMIDE 20 MG: 10 INJECTION, SOLUTION INTRAVENOUS at 18:01

## 2021-01-01 RX ADMIN — PREDNISONE 10 MG: 10 TABLET ORAL at 09:00

## 2021-01-01 RX ADMIN — IPRATROPIUM BROMIDE AND ALBUTEROL SULFATE 3 ML: 2.5; .5 SOLUTION RESPIRATORY (INHALATION) at 13:20

## 2021-01-01 RX ADMIN — CARFILZOMIB 110 MG: 10 INJECTION, POWDER, LYOPHILIZED, FOR SOLUTION INTRAVENOUS at 14:06

## 2021-01-01 RX ADMIN — CEFEPIME HYDROCHLORIDE 2000 MG: 2 INJECTION, POWDER, FOR SOLUTION INTRAVENOUS at 08:00

## 2021-01-01 RX ADMIN — CEFEPIME HYDROCHLORIDE 2000 MG: 2 INJECTION, POWDER, FOR SOLUTION INTRAVENOUS at 18:47

## 2021-01-01 RX ADMIN — IOHEXOL 100 ML: 350 INJECTION, SOLUTION INTRAVENOUS at 17:45

## 2021-01-01 RX ADMIN — FINASTERIDE 5 MG: 5 TABLET, FILM COATED ORAL at 21:45

## 2021-01-01 RX ADMIN — MIDODRINE HYDROCHLORIDE 15 MG: 5 TABLET ORAL at 06:14

## 2021-01-01 RX ADMIN — SODIUM CHLORIDE 20 ML/HR: 0.9 INJECTION, SOLUTION INTRAVENOUS at 08:37

## 2021-01-01 RX ADMIN — CEFEPIME HYDROCHLORIDE 2000 MG: 2 INJECTION, POWDER, FOR SOLUTION INTRAVENOUS at 18:06

## 2021-01-01 RX ADMIN — FAMOTIDINE 20 MG: 20 TABLET ORAL at 08:59

## 2021-01-01 RX ADMIN — SODIUM CHLORIDE 250 ML: 0.9 INJECTION, SOLUTION INTRAVENOUS at 08:39

## 2021-01-01 RX ADMIN — POTASSIUM CHLORIDE 20 MEQ: 1500 TABLET, EXTENDED RELEASE ORAL at 18:06

## 2021-01-01 RX ADMIN — Medication 125 MG: at 09:00

## 2021-01-01 RX ADMIN — ALBUMIN (HUMAN) 50 G: 12.5 INJECTION, SOLUTION INTRAVENOUS at 20:00

## 2021-01-01 RX ADMIN — AMOXICILLIN AND CLAVULANATE POTASSIUM 1 TABLET: 875; 125 TABLET, FILM COATED ORAL at 23:46

## 2021-01-01 RX ADMIN — IPRATROPIUM BROMIDE 0.5 MG: 0.5 SOLUTION RESPIRATORY (INHALATION) at 13:29

## 2021-01-01 RX ADMIN — IPRATROPIUM BROMIDE AND ALBUTEROL SULFATE 3 ML: 2.5; .5 SOLUTION RESPIRATORY (INHALATION) at 19:56

## 2021-01-01 RX ADMIN — CEFEPIME HYDROCHLORIDE 2000 MG: 2 INJECTION, POWDER, FOR SOLUTION INTRAVENOUS at 06:14

## 2021-01-01 RX ADMIN — CARFILZOMIB 110 MG: 10 INJECTION, POWDER, LYOPHILIZED, FOR SOLUTION INTRAVENOUS at 12:21

## 2021-01-01 RX ADMIN — MIDODRINE HYDROCHLORIDE 10 MG: 5 TABLET ORAL at 08:00

## 2021-01-01 RX ADMIN — IPRATROPIUM BROMIDE AND ALBUTEROL SULFATE 3 ML: 2.5; .5 SOLUTION RESPIRATORY (INHALATION) at 06:26

## 2021-01-01 RX ADMIN — HYDROMORPHONE HYDROCHLORIDE 0.5 MG: 1 INJECTION, SOLUTION INTRAMUSCULAR; INTRAVENOUS; SUBCUTANEOUS at 00:14

## 2021-01-01 RX ADMIN — GUAIFENESIN 600 MG: 600 TABLET ORAL at 21:28

## 2021-01-01 RX ADMIN — IPRATROPIUM BROMIDE 0.5 MG: 0.5 SOLUTION RESPIRATORY (INHALATION) at 19:52

## 2021-01-01 RX ADMIN — GUAIFENESIN 600 MG: 600 TABLET ORAL at 08:59

## 2021-01-01 RX ADMIN — FINASTERIDE 5 MG: 5 TABLET, FILM COATED ORAL at 21:00

## 2021-01-01 RX ADMIN — CIPROFLOXACIN HYDROCHLORIDE 500 MG: 500 TABLET, FILM COATED ORAL at 18:41

## 2021-01-01 RX ADMIN — METHYLPREDNISOLONE SODIUM SUCCINATE 40 MG: 40 INJECTION, POWDER, FOR SOLUTION INTRAMUSCULAR; INTRAVENOUS at 22:06

## 2021-01-01 RX ADMIN — CEFEPIME HYDROCHLORIDE 2000 MG: 2 INJECTION, POWDER, FOR SOLUTION INTRAVENOUS at 06:28

## 2021-01-01 RX ADMIN — IPRATROPIUM BROMIDE AND ALBUTEROL SULFATE 3 ML: 2.5; .5 SOLUTION RESPIRATORY (INHALATION) at 13:34

## 2021-01-01 RX ADMIN — HYDROMORPHONE HYDROCHLORIDE 0.5 MG: 1 INJECTION, SOLUTION INTRAMUSCULAR; INTRAVENOUS; SUBCUTANEOUS at 01:18

## 2021-01-01 RX ADMIN — IPRATROPIUM BROMIDE 0.5 MG: 0.5 SOLUTION RESPIRATORY (INHALATION) at 20:24

## 2021-01-01 RX ADMIN — ALBUMIN (HUMAN) 25 G: 0.25 INJECTION, SOLUTION INTRAVENOUS at 12:32

## 2021-01-01 RX ADMIN — IPRATROPIUM BROMIDE 0.5 MG: 0.5 SOLUTION RESPIRATORY (INHALATION) at 07:32

## 2021-01-01 RX ADMIN — FINASTERIDE 5 MG: 5 TABLET, FILM COATED ORAL at 23:57

## 2021-01-01 RX ADMIN — CEFEPIME HYDROCHLORIDE 2000 MG: 2 INJECTION, POWDER, FOR SOLUTION INTRAVENOUS at 06:02

## 2021-01-01 RX ADMIN — HYDROMORPHONE HYDROCHLORIDE 0.5 MG: 1 INJECTION, SOLUTION INTRAMUSCULAR; INTRAVENOUS; SUBCUTANEOUS at 22:08

## 2021-01-01 RX ADMIN — ALBUMIN (HUMAN) 50 G: 12.5 INJECTION, SOLUTION INTRAVENOUS at 17:47

## 2021-01-01 RX ADMIN — Medication 125 MG: at 10:49

## 2021-01-01 RX ADMIN — IPRATROPIUM BROMIDE 0.5 MG: 0.5 SOLUTION RESPIRATORY (INHALATION) at 16:56

## 2021-01-01 RX ADMIN — PREDNISONE 10 MG: 10 TABLET ORAL at 08:23

## 2021-01-01 RX ADMIN — IPRATROPIUM BROMIDE 0.5 MG: 0.5 SOLUTION RESPIRATORY (INHALATION) at 13:43

## 2021-01-01 RX ADMIN — IPRATROPIUM BROMIDE 0.5 MG: 0.5 SOLUTION RESPIRATORY (INHALATION) at 07:26

## 2021-01-01 RX ADMIN — FAMOTIDINE 20 MG: 20 TABLET ORAL at 10:47

## 2021-01-01 RX ADMIN — CEFTRIAXONE SODIUM 2000 MG: 10 INJECTION, POWDER, FOR SOLUTION INTRAVENOUS at 22:14

## 2021-01-01 RX ADMIN — CEFEPIME HYDROCHLORIDE 2000 MG: 2 INJECTION, POWDER, FOR SOLUTION INTRAVENOUS at 19:51

## 2021-01-01 RX ADMIN — Medication 125 MG: at 09:21

## 2021-01-01 RX ADMIN — MIDODRINE HYDROCHLORIDE 15 MG: 5 TABLET ORAL at 13:33

## 2021-01-01 RX ADMIN — Medication 125 MG: at 21:30

## 2021-01-01 RX ADMIN — FAMOTIDINE 20 MG: 20 INJECTION, SOLUTION INTRAVENOUS at 12:29

## 2021-01-01 RX ADMIN — HYDROMORPHONE HYDROCHLORIDE 1 MG: 1 INJECTION, SOLUTION INTRAMUSCULAR; INTRAVENOUS; SUBCUTANEOUS at 02:23

## 2021-01-01 RX ADMIN — IPRATROPIUM BROMIDE AND ALBUTEROL SULFATE 3 ML: 2.5; .5 SOLUTION RESPIRATORY (INHALATION) at 07:55

## 2021-01-01 RX ADMIN — ALPRAZOLAM 0.25 MG: 0.25 TABLET ORAL at 06:03

## 2021-01-01 RX ADMIN — MIDODRINE HYDROCHLORIDE 15 MG: 5 TABLET ORAL at 06:02

## 2021-01-01 RX ADMIN — CEFEPIME HYDROCHLORIDE 2000 MG: 2 INJECTION, POWDER, FOR SOLUTION INTRAVENOUS at 06:10

## 2021-01-01 RX ADMIN — PREDNISONE 10 MG: 10 TABLET ORAL at 08:20

## 2021-01-01 RX ADMIN — FINASTERIDE 5 MG: 5 TABLET, FILM COATED ORAL at 21:28

## 2021-01-01 RX ADMIN — FAMOTIDINE 20 MG: 20 TABLET ORAL at 10:22

## 2021-01-01 RX ADMIN — PREDNISONE 10 MG: 10 TABLET ORAL at 09:07

## 2021-01-01 RX ADMIN — NYSTATIN 500000 UNITS: 100000 SUSPENSION ORAL at 21:28

## 2021-01-01 RX ADMIN — ACETAMINOPHEN 650 MG: 325 TABLET, FILM COATED ORAL at 08:41

## 2021-01-01 RX ADMIN — DIPHENHYDRAMINE HYDROCHLORIDE 50 MG: 50 INJECTION, SOLUTION INTRAMUSCULAR; INTRAVENOUS at 09:39

## 2021-01-01 RX ADMIN — CEFEPIME HYDROCHLORIDE 2000 MG: 2 INJECTION, POWDER, FOR SOLUTION INTRAVENOUS at 20:00

## 2021-01-01 RX ADMIN — METRONIDAZOLE 500 MG: 500 TABLET ORAL at 18:41

## 2021-01-01 RX ADMIN — HYDROMORPHONE HYDROCHLORIDE 0.1 MG: 1 INJECTION, SOLUTION INTRAMUSCULAR; INTRAVENOUS; SUBCUTANEOUS at 18:18

## 2021-01-01 RX ADMIN — MIDODRINE HYDROCHLORIDE 15 MG: 5 TABLET ORAL at 16:39

## 2021-01-01 RX ADMIN — ALBUMIN (HUMAN) 50 G: 12.5 INJECTION, SOLUTION INTRAVENOUS at 10:11

## 2021-01-01 RX ADMIN — CEFEPIME HYDROCHLORIDE 2000 MG: 2 INJECTION, POWDER, FOR SOLUTION INTRAVENOUS at 18:01

## 2021-01-01 RX ADMIN — METHYLPREDNISOLONE SODIUM SUCCINATE 40 MG: 40 INJECTION, POWDER, FOR SOLUTION INTRAMUSCULAR; INTRAVENOUS at 05:55

## 2021-01-01 RX ADMIN — POTASSIUM CHLORIDE 10 MEQ: 750 TABLET, EXTENDED RELEASE ORAL at 19:00

## 2021-01-01 RX ADMIN — IPRATROPIUM BROMIDE AND ALBUTEROL SULFATE 3 ML: 2.5; .5 SOLUTION RESPIRATORY (INHALATION) at 13:11

## 2021-01-01 RX ADMIN — IPRATROPIUM BROMIDE 0.5 MG: 0.5 SOLUTION RESPIRATORY (INHALATION) at 07:44

## 2021-01-01 RX ADMIN — MIDODRINE HYDROCHLORIDE 15 MG: 5 TABLET ORAL at 06:16

## 2021-01-01 RX ADMIN — FAMOTIDINE 20 MG: 10 INJECTION INTRAVENOUS at 08:46

## 2021-01-01 RX ADMIN — DIPHENHYDRAMINE HYDROCHLORIDE 50 MG: 50 INJECTION, SOLUTION INTRAMUSCULAR; INTRAVENOUS at 08:58

## 2021-01-01 RX ADMIN — TAMSULOSIN HYDROCHLORIDE 0.4 MG: 0.4 CAPSULE ORAL at 16:13

## 2021-01-01 RX ADMIN — GUAIFENESIN 600 MG: 600 TABLET ORAL at 21:30

## 2021-01-01 RX ADMIN — IPRATROPIUM BROMIDE AND ALBUTEROL SULFATE 3 ML: 2.5; .5 SOLUTION RESPIRATORY (INHALATION) at 21:31

## 2021-01-01 RX ADMIN — HYDROMORPHONE HYDROCHLORIDE 0.1 MG: 1 INJECTION, SOLUTION INTRAMUSCULAR; INTRAVENOUS; SUBCUTANEOUS at 15:49

## 2021-01-01 RX ADMIN — CEFEPIME HYDROCHLORIDE 2000 MG: 2 INJECTION, POWDER, FOR SOLUTION INTRAVENOUS at 06:24

## 2021-01-01 RX ADMIN — PREDNISONE 10 MG: 10 TABLET ORAL at 08:33

## 2021-01-01 RX ADMIN — MIDODRINE HYDROCHLORIDE 5 MG: 5 TABLET ORAL at 10:13

## 2021-01-01 RX ADMIN — ALBUTEROL SULFATE 5 MG: 2.5 SOLUTION RESPIRATORY (INHALATION) at 16:56

## 2021-01-01 RX ADMIN — FAMOTIDINE 20 MG: 20 TABLET ORAL at 09:07

## 2021-01-01 RX ADMIN — LEVALBUTEROL HYDROCHLORIDE 1.25 MG: 1.25 SOLUTION, CONCENTRATE RESPIRATORY (INHALATION) at 20:24

## 2021-01-01 RX ADMIN — NYSTATIN 500000 UNITS: 100000 SUSPENSION ORAL at 17:13

## 2021-01-01 RX ADMIN — FINASTERIDE 5 MG: 5 TABLET, FILM COATED ORAL at 21:11

## 2021-01-01 RX ADMIN — SODIUM CHLORIDE 250 ML: 0.9 INJECTION, SOLUTION INTRAVENOUS at 07:00

## 2021-01-01 RX ADMIN — DILTIAZEM HYDROCHLORIDE 180 MG: 180 CAPSULE, COATED, EXTENDED RELEASE ORAL at 10:24

## 2021-01-01 RX ADMIN — MIDODRINE HYDROCHLORIDE 15 MG: 5 TABLET ORAL at 10:47

## 2021-01-01 RX ADMIN — FUROSEMIDE 20 MG: 20 TABLET ORAL at 10:24

## 2021-01-01 RX ADMIN — MIDODRINE HYDROCHLORIDE 10 MG: 5 TABLET ORAL at 20:00

## 2021-01-01 RX ADMIN — POTASSIUM CHLORIDE 20 MEQ: 1500 TABLET, EXTENDED RELEASE ORAL at 17:21

## 2021-01-01 RX ADMIN — Medication 125 MG: at 21:11

## 2021-01-01 RX ADMIN — HYDROMORPHONE HYDROCHLORIDE 0.1 MG: 1 INJECTION, SOLUTION INTRAMUSCULAR; INTRAVENOUS; SUBCUTANEOUS at 06:36

## 2021-01-01 RX ADMIN — Medication 125 MG: at 08:53

## 2021-01-01 RX ADMIN — ALBUTEROL SULFATE 2.5 MG: 2.5 SOLUTION RESPIRATORY (INHALATION) at 07:44

## 2021-01-01 RX ADMIN — SODIUM CHLORIDE 800 MG: 0.9 INJECTION, SOLUTION INTRAVENOUS at 10:30

## 2021-01-01 RX ADMIN — MIDODRINE HYDROCHLORIDE 15 MG: 5 TABLET ORAL at 06:31

## 2021-01-01 RX ADMIN — LORAZEPAM 0.5 MG: 2 INJECTION INTRAMUSCULAR; INTRAVENOUS at 23:16

## 2021-01-01 RX ADMIN — SODIUM CHLORIDE 250 ML: 0.9 INJECTION, SOLUTION INTRAVENOUS at 08:43

## 2021-01-01 RX ADMIN — DIBASIC SODIUM PHOSPHATE, MONOBASIC POTASSIUM PHOSPHATE AND MONOBASIC SODIUM PHOSPHATE 2 TABLET: 852; 155; 130 TABLET ORAL at 14:45

## 2021-01-01 RX ADMIN — IPRATROPIUM BROMIDE 0.5 MG: 0.5 SOLUTION RESPIRATORY (INHALATION) at 15:55

## 2021-01-01 RX ADMIN — APIXABAN 5 MG: 5 TABLET, FILM COATED ORAL at 17:13

## 2021-01-01 RX ADMIN — FAMOTIDINE 20 MG: 20 TABLET ORAL at 08:23

## 2021-01-01 RX ADMIN — NOREPINEPHRINE BITARTRATE 4 MCG/MIN: 1 INJECTION, SOLUTION, CONCENTRATE INTRAVENOUS at 20:52

## 2021-01-01 RX ADMIN — LORAZEPAM 0.5 MG: 2 INJECTION INTRAMUSCULAR; INTRAVENOUS at 14:14

## 2021-01-01 RX ADMIN — NOREPINEPHRINE BITARTRATE 7 MCG/MIN: 1 INJECTION, SOLUTION, CONCENTRATE INTRAVENOUS at 11:49

## 2021-01-01 RX ADMIN — Medication 125 MG: at 09:06

## 2021-01-01 RX ADMIN — IOHEXOL 100 ML: 350 INJECTION, SOLUTION INTRAVENOUS at 21:36

## 2021-01-01 RX ADMIN — PREDNISONE 10 MG: 10 TABLET ORAL at 09:20

## 2021-01-01 RX ADMIN — NOREPINEPHRINE BITARTRATE 2 MCG/MIN: 1 INJECTION, SOLUTION, CONCENTRATE INTRAVENOUS at 23:35

## 2021-01-01 RX ADMIN — MIDODRINE HYDROCHLORIDE 15 MG: 5 TABLET ORAL at 21:20

## 2021-01-01 RX ADMIN — PREDNISONE 10 MG: 10 TABLET ORAL at 10:47

## 2021-01-01 RX ADMIN — Medication 125 MG: at 21:10

## 2021-01-01 RX ADMIN — POTASSIUM CHLORIDE 10 MEQ: 750 TABLET, EXTENDED RELEASE ORAL at 09:07

## 2021-01-01 RX ADMIN — HYDROMORPHONE HYDROCHLORIDE 0.5 MG: 1 INJECTION, SOLUTION INTRAMUSCULAR; INTRAVENOUS; SUBCUTANEOUS at 19:38

## 2021-01-01 RX ADMIN — Medication 125 MG: at 21:54

## 2021-01-01 RX ADMIN — MAGNESIUM SULFATE HEPTAHYDRATE 2 G: 40 INJECTION, SOLUTION INTRAVENOUS at 09:33

## 2021-01-01 RX ADMIN — NOREPINEPHRINE BITARTRATE 1 MCG/MIN: 1 INJECTION, SOLUTION, CONCENTRATE INTRAVENOUS at 11:16

## 2021-01-01 RX ADMIN — MIDODRINE HYDROCHLORIDE 15 MG: 5 TABLET ORAL at 15:55

## 2021-01-01 RX ADMIN — IPRATROPIUM BROMIDE AND ALBUTEROL SULFATE 3 ML: 2.5; .5 SOLUTION RESPIRATORY (INHALATION) at 19:05

## 2021-01-01 RX ADMIN — MIDODRINE HYDROCHLORIDE 5 MG: 5 TABLET ORAL at 17:20

## 2021-01-01 RX ADMIN — IPRATROPIUM BROMIDE 0.5 MG: 0.5 SOLUTION RESPIRATORY (INHALATION) at 13:46

## 2021-01-01 RX ADMIN — IPRATROPIUM BROMIDE AND ALBUTEROL SULFATE 3 ML: 2.5; .5 SOLUTION RESPIRATORY (INHALATION) at 13:12

## 2021-01-01 RX ADMIN — IPRATROPIUM BROMIDE AND ALBUTEROL SULFATE 3 ML: 2.5; .5 SOLUTION RESPIRATORY (INHALATION) at 19:26

## 2021-01-01 RX ADMIN — HYDROMORPHONE HYDROCHLORIDE 0.1 MG: 1 INJECTION, SOLUTION INTRAMUSCULAR; INTRAVENOUS; SUBCUTANEOUS at 13:35

## 2021-01-01 RX ADMIN — FUROSEMIDE 20 MG: 10 INJECTION, SOLUTION INTRAVENOUS at 13:54

## 2021-01-01 RX ADMIN — ALBUTEROL SULFATE 5 MG: 2.5 SOLUTION RESPIRATORY (INHALATION) at 20:12

## 2021-01-01 RX ADMIN — POTASSIUM CHLORIDE 10 MEQ: 750 TABLET, EXTENDED RELEASE ORAL at 08:20

## 2021-01-01 RX ADMIN — Medication 125 MG: at 08:33

## 2021-01-01 RX ADMIN — POTASSIUM CHLORIDE 10 MEQ: 750 TABLET, EXTENDED RELEASE ORAL at 19:54

## 2021-01-01 RX ADMIN — IPRATROPIUM BROMIDE AND ALBUTEROL SULFATE 3 ML: 2.5; .5 SOLUTION RESPIRATORY (INHALATION) at 19:59

## 2021-01-01 RX ADMIN — ACETAMINOPHEN 650 MG: 325 TABLET, FILM COATED ORAL at 09:45

## 2021-01-01 RX ADMIN — DEXAMETHASONE SODIUM PHOSPHATE 20 MG: 10 INJECTION, SOLUTION INTRAMUSCULAR; INTRAVENOUS at 09:26

## 2021-01-01 RX ADMIN — Medication 125 MG: at 20:32

## 2021-01-01 RX ADMIN — SODIUM CHLORIDE 500 ML: 0.9 INJECTION, SOLUTION INTRAVENOUS at 06:07

## 2021-01-01 RX ADMIN — Medication 125 MG: at 12:42

## 2021-01-01 RX ADMIN — AZITHROMYCIN MONOHYDRATE 500 MG: 500 INJECTION, POWDER, LYOPHILIZED, FOR SOLUTION INTRAVENOUS at 23:13

## 2021-01-01 RX ADMIN — CEFEPIME HYDROCHLORIDE 2000 MG: 2 INJECTION, POWDER, FOR SOLUTION INTRAVENOUS at 06:29

## 2021-01-01 RX ADMIN — IPRATROPIUM BROMIDE 0.5 MG: 0.5 SOLUTION RESPIRATORY (INHALATION) at 20:13

## 2021-01-01 RX ADMIN — FUROSEMIDE 40 MG: 10 INJECTION, SOLUTION INTRAVENOUS at 18:59

## 2021-01-01 RX ADMIN — DIBASIC SODIUM PHOSPHATE, MONOBASIC POTASSIUM PHOSPHATE AND MONOBASIC SODIUM PHOSPHATE 2 TABLET: 852; 155; 130 TABLET ORAL at 14:54

## 2021-01-01 RX ADMIN — IPRATROPIUM BROMIDE AND ALBUTEROL SULFATE 3 ML: 2.5; .5 SOLUTION RESPIRATORY (INHALATION) at 07:31

## 2021-01-01 RX ADMIN — SODIUM CHLORIDE 800 MG: 0.9 INJECTION, SOLUTION INTRAVENOUS at 10:36

## 2021-01-01 RX ADMIN — IPRATROPIUM BROMIDE AND ALBUTEROL SULFATE 3 ML: 2.5; .5 SOLUTION RESPIRATORY (INHALATION) at 13:09

## 2021-01-01 RX ADMIN — NOREPINEPHRINE BITARTRATE 7 MCG/MIN: 1 INJECTION, SOLUTION, CONCENTRATE INTRAVENOUS at 02:21

## 2021-01-01 RX ADMIN — IPRATROPIUM BROMIDE 0.5 MG: 0.5 SOLUTION RESPIRATORY (INHALATION) at 08:57

## 2021-01-01 RX ADMIN — FAMOTIDINE 20 MG: 20 TABLET ORAL at 08:12

## 2021-01-01 RX ADMIN — LORAZEPAM 0.5 MG: 2 INJECTION INTRAMUSCULAR; INTRAVENOUS at 04:14

## 2021-01-01 RX ADMIN — IPRATROPIUM BROMIDE AND ALBUTEROL SULFATE 3 ML: 2.5; .5 SOLUTION RESPIRATORY (INHALATION) at 19:35

## 2021-01-01 RX ADMIN — PREDNISONE 40 MG: 20 TABLET ORAL at 10:23

## 2021-01-01 RX ADMIN — LORAZEPAM 0.5 MG: 2 INJECTION INTRAMUSCULAR; INTRAVENOUS at 02:41

## 2021-01-01 RX ADMIN — MIDODRINE HYDROCHLORIDE 15 MG: 5 TABLET ORAL at 21:36

## 2021-01-01 RX ADMIN — HYDROMORPHONE HYDROCHLORIDE 0.1 MG: 1 INJECTION, SOLUTION INTRAMUSCULAR; INTRAVENOUS; SUBCUTANEOUS at 20:58

## 2021-01-01 RX ADMIN — LEVALBUTEROL HYDROCHLORIDE 1.25 MG: 1.25 SOLUTION, CONCENTRATE RESPIRATORY (INHALATION) at 13:29

## 2021-01-01 RX ADMIN — FAMOTIDINE 20 MG: 20 TABLET ORAL at 09:20

## 2021-01-01 RX ADMIN — SODIUM CHLORIDE 250 ML: 0.9 INJECTION, SOLUTION INTRAVENOUS at 09:00

## 2021-01-01 RX ADMIN — FAMOTIDINE 20 MG: 20 TABLET ORAL at 08:33

## 2021-01-01 RX ADMIN — HYDROCORTISONE SODIUM SUCCINATE 100 MG: 100 INJECTION, POWDER, FOR SOLUTION INTRAMUSCULAR; INTRAVENOUS at 12:27

## 2021-01-01 RX ADMIN — Medication 125 MG: at 21:29

## 2021-01-01 RX ADMIN — POTASSIUM CHLORIDE 20 MEQ: 1500 TABLET, EXTENDED RELEASE ORAL at 18:01

## 2021-01-01 RX ADMIN — APIXABAN 5 MG: 5 TABLET, FILM COATED ORAL at 08:59

## 2021-01-01 RX ADMIN — AZITHROMYCIN MONOHYDRATE 500 MG: 500 INJECTION, POWDER, LYOPHILIZED, FOR SOLUTION INTRAVENOUS at 21:28

## 2021-01-01 RX ADMIN — CEFEPIME HYDROCHLORIDE 2000 MG: 2 INJECTION, POWDER, FOR SOLUTION INTRAVENOUS at 19:58

## 2021-01-01 RX ADMIN — IPRATROPIUM BROMIDE 0.5 MG: 0.5 SOLUTION RESPIRATORY (INHALATION) at 20:32

## 2021-01-01 RX ADMIN — IPRATROPIUM BROMIDE AND ALBUTEROL SULFATE 3 ML: 2.5; .5 SOLUTION RESPIRATORY (INHALATION) at 13:49

## 2021-01-01 RX ADMIN — Medication 125 MG: at 20:03

## 2021-01-01 RX ADMIN — APIXABAN 5 MG: 5 TABLET, FILM COATED ORAL at 10:23

## 2021-01-01 RX ADMIN — METHYLPREDNISOLONE SODIUM SUCCINATE 40 MG: 40 INJECTION, POWDER, FOR SOLUTION INTRAMUSCULAR; INTRAVENOUS at 21:28

## 2021-01-01 RX ADMIN — DIPHENHYDRAMINE HYDROCHLORIDE 25 MG: 50 INJECTION, SOLUTION INTRAMUSCULAR; INTRAVENOUS at 12:25

## 2021-01-01 RX ADMIN — PREDNISONE 10 MG: 10 TABLET ORAL at 08:12

## 2021-01-01 RX ADMIN — SODIUM CHLORIDE 500 ML: 0.9 INJECTION, SOLUTION INTRAVENOUS at 14:43

## 2021-01-01 RX ADMIN — Medication 125 MG: at 08:23

## 2021-01-01 RX ADMIN — CARFILZOMIB 40 MG: 10 INJECTION, POWDER, LYOPHILIZED, FOR SOLUTION INTRAVENOUS at 13:48

## 2021-01-01 RX ADMIN — POTASSIUM CHLORIDE 40 MEQ: 1500 TABLET, EXTENDED RELEASE ORAL at 10:32

## 2021-01-01 RX ADMIN — LEVALBUTEROL HYDROCHLORIDE 1.25 MG: 1.25 SOLUTION, CONCENTRATE RESPIRATORY (INHALATION) at 07:20

## 2021-01-01 RX ADMIN — IPRATROPIUM BROMIDE 0.5 MG: 0.5 SOLUTION RESPIRATORY (INHALATION) at 17:34

## 2021-01-01 RX ADMIN — HYDROMORPHONE HYDROCHLORIDE 0.1 MG: 1 INJECTION, SOLUTION INTRAMUSCULAR; INTRAVENOUS; SUBCUTANEOUS at 09:09

## 2021-01-01 RX ADMIN — CEFEPIME HYDROCHLORIDE 2000 MG: 2 INJECTION, POWDER, FOR SOLUTION INTRAVENOUS at 18:59

## 2021-01-01 RX ADMIN — IPRATROPIUM BROMIDE 0.5 MG: 0.5 SOLUTION RESPIRATORY (INHALATION) at 11:39

## 2021-01-01 RX ADMIN — Medication 125 MG: at 20:12

## 2021-01-01 RX ADMIN — IPRATROPIUM BROMIDE 0.5 MG: 0.5 SOLUTION RESPIRATORY (INHALATION) at 16:48

## 2021-01-01 RX ADMIN — POTASSIUM CHLORIDE 40 MEQ: 1500 TABLET, EXTENDED RELEASE ORAL at 17:13

## 2021-01-01 RX ADMIN — FAMOTIDINE 20 MG: 10 INJECTION INTRAVENOUS at 08:37

## 2021-01-01 RX ADMIN — DEXAMETHASONE SODIUM PHOSPHATE 20 MG: 10 INJECTION, SOLUTION INTRAMUSCULAR; INTRAVENOUS at 10:02

## 2021-01-01 RX ADMIN — FUROSEMIDE 40 MG: 10 INJECTION, SOLUTION INTRAVENOUS at 10:58

## 2021-01-04 NOTE — TELEPHONE ENCOUNTER
Patients wife is calling about Revlimid Rx  Wife is asking if patient can resume Revlimid  Per wife medication was held last month due to illness   If patient is ok to resume treatment wife states patient needs a refill on both Revlimid and Melphalan    Will send to RN to discuss with MD    Wife requesting a call back with instructions on restarting mediation     Call back number [phone]

## 2021-01-05 NOTE — PROGRESS NOTES
1-5-21   Received new oral chemo start- MELPHALAN & REVLIMID      Patient has an active homero with [facility] ID#  [id number]  CARD#  [id number]  PCN#  [id number]  GRP#  [id number]  BIN#  [id number]  AMT $ [id number]    Epic NOTED, EMAIL TO TEAM

## 2021-01-06 NOTE — PROGRESS NOTES
1/6/2021  Received notification from clinical pt will be restarting Melphalan 12mg q d  x 4 days q 42 days    Pt has [employer]  ID # [id number]  BIN # [id number]  PCN # [id number]  GRP # [id number]    Submitted for auth through cover my meds    Received denial letter from [employer]  Per the denial letter this must go through  part b    Notified clinical, homestar, and finance

## 2021-01-07 NOTE — TELEPHONE ENCOUNTER
Patients wife calling to report that patient does not feel well enough to start his Revlimid  He is weak and tired  He is also having issues with a cough and COPD  Patient does not have a fever or any other symptoms  Patient was going to start his Revlimid and Melphalan on Nicholas 1/10/21  Wife will hold off on starting if he is not feeling better        Wife is asking that Dr Ernestine Hull be updated and is looking for further recommendation for fatigue and weakness    Will send to RN to review with MD    Best call back number [phone]

## 2021-01-07 NOTE — TELEPHONE ENCOUNTER
Dr Chavez Nuñez recommended lab work and hold Melphalan and Revlimid  When I called pt's wife Pastor Reyna with this info she mentioned pt had chills overnight and now has a temp 99 0  Dr Chavez Nuñez recommending ER for further evaluation  Wife states he is feeling better now and will not go to the ER  Asked her to call me back tomorrow with an update on how he is doing

## 2021-01-08 NOTE — TELEPHONE ENCOUNTER
Spoke with pt's wife again today  She reports he has remained afebrile  He did okay overnight but "feels terrible this morning"  He is achy and "everything hurts"  Pulse ox 93%, denies shortness of breath or chest pain  Pt still refusing ER visit

## 2021-01-08 NOTE — TELEPHONE ENCOUNTER
Patient's wife Leilani Pantoja returned call to [facility] regarding patient's condition   Please call Leilani Pantoja back at [phone]

## 2021-01-12 NOTE — TELEPHONE ENCOUNTER
patient wife Blayne Dixon called In stated that she was following up with [facility] as she received a letter advising that medication was denied from her insurance ( medicare part D ) lenalidomide (REVLIMID) 5 MG CAPS and [employer] ask for a call back at [phone]        I did asked if the medication needed an authorization and Blayne Dixon said no

## 2021-01-13 NOTE — ASSESSMENT & PLAN NOTE
Wt Readings from Last 3 Encounters:   01/13/21 80 7 kg (178 lb)   12/21/20 82 8 kg (182 lb 9 6 oz)   12/15/20 83 5 kg (184 lb)     · I do think he has a component of congestive heart failure contributing despite his weight loss  His weight loss is attributable to poor appetite but he has had increase in his shortness of breath, increased peripheral edema as well  · I have recommended a trial of Lasix  He will take it daily for 3 days and then every Monday Wednesday and Friday at 20 mg   I have also prescribed supplemental potassium  · I suspect that his aortic stenosis and component of diastolic heart failure contributing currently  · I did caution him to be sure he is drinking adequately and to monitor his symptoms  We have to be cautious not dehydrate him and he always runs a fairly low blood pressure    If he experiences lightheadedness or symptoms of orthostasis, he will need to notify me

## 2021-01-13 NOTE — TELEPHONE ENCOUNTER
Rebecca Strong is calling for further Prednisone instructions  Patient had been instructed to take Prednisone 10 mg daily and do a COVID test on 1/8/21 by Dr Christel Main  1/8/21 COVID test negative  Patient is going to see Dr Berger Alison his Pulmonologist now  Patient SOB is severe  Teresa's call back # [phone] ok to leave a message with Prednisone instructions

## 2021-01-13 NOTE — ASSESSMENT & PLAN NOTE
· He is using supplemental oxygen at nighttime  · I did suggest that he could use this during the daytime if he has shortness of breath  · He has not demonstrated substantial hypoxemia however during the daytime

## 2021-01-13 NOTE — PATIENT INSTRUCTIONS
· Take the Lasix this Thursday Friday and Saturday and then change to every Monday Wednesday Friday to continue therapy  · Take potassium only on the days that you take Lasix

## 2021-01-13 NOTE — ASSESSMENT & PLAN NOTE
· CT follow-up is scheduled in the second week of February  · He will see me in the office after this CT scan and we will re-evaluate

## 2021-01-13 NOTE — ASSESSMENT & PLAN NOTE
· Still bothered by substantial cough and secretions  · Remains on 10 mg of prednisone with minimal benefit  · Has also been on nebulizer treatments without substantial benefit  · He did get significantly better in the hospital but this required high dose of steroids and also Lasix  · Has tried multiple different inhalers, guaifenesin, and cough suppressant/tessalon with no substantial benefit  · Encouraged to use flutter device

## 2021-01-13 NOTE — TELEPHONE ENCOUNTER
Discussed with Dr Rashard Vickers - prednisone dosing instructions deferred to Dr Phil Sanchez  Left Joseph Lee a message with this info and encouraged a call back with any questions

## 2021-01-13 NOTE — PROGRESS NOTES
Progress note - Pulmonary Medicine   Yessy Pacheco 81 y o  male MRN: [id number]       Impression & Plan:     COPD mixed type [facility])  · Still bothered by substantial cough and secretions  · Remains on 10 mg of prednisone with minimal benefit  · Has also been on nebulizer treatments without substantial benefit  · He did get significantly better in the hospital but this required high dose of steroids and also Lasix  · Has tried multiple different inhalers, guaifenesin, and cough suppressant/tessalon with no substantial benefit  · Encouraged to use flutter device      Chronic diastolic congestive heart failure ([facility] [address] )  Wt Readings from Last 3 Encounters:   01/13/21 80 7 kg (178 lb)   12/21/20 82 8 kg (182 lb 9 6 oz)   12/15/20 83 5 kg (184 lb)     · I do think he has a component of congestive heart failure contributing despite his weight loss  His weight loss is attributable to poor appetite but he has had increase in his shortness of breath, increased peripheral edema as well  · I have recommended a trial of Lasix  He will take it daily for 3 days and then every Monday Wednesday and Friday at 20 mg   I have also prescribed supplemental potassium  · I suspect that his aortic stenosis and component of diastolic heart failure contributing currently  · I did caution him to be sure he is drinking adequately and to monitor his symptoms  We have to be cautious not dehydrate him and he always runs a fairly low blood pressure    If he experiences lightheadedness or symptoms of orthostasis, he will need to notify me        Nocturnal hypoxia  · He is using supplemental oxygen at nighttime  · I did suggest that he could use this during the daytime if he has shortness of breath  · He has not demonstrated substantial hypoxemia however during the daytime    Adenocarcinoma, lung ([facility] [address] )  · CT follow-up is scheduled in the second week of February  · He will see me in the office after this CT scan and we will re-evaluate      ______________________________________________________________________    HPI:    Mireya Dye presents today for follow-up of shortness of breath, chronic cough and mucus production and wheezing  He had benefit when he was hospitalized with a combination of high-dose steroids and Lasix  He has completed his prednisone taper and was tapered completely off  Recently he was started back on 10 mg daily of prednisone by his oncologist   He did not notice a substantial difference but slight improvement in his breathing  Over the last several days however he has continued to feel worse with increasing fatigue, a lot of cough with mucus that is difficult to expectorate  The mucus remains clear however  No hemoptysis  He has substantial leg swelling  He has had chronic venous insufficiency  His appetite has been poor and he is eating very little over the last week or so  He does not report chest pain, palpitations or other cardiovascular complaints  He has not had headache or visual change  He has felt very poorly and fatigued    No fever, chills, or sick contacts    Current Medications:    Current Outpatient Medications:     albuterol (2 5 mg/3 mL) 0 083 % nebulizer solution, Inhale 2 5 mg every 6 (six) hours as needed, Disp: , Rfl:     albuterol (PROVENTIL HFA,VENTOLIN HFA) 90 mcg/act inhaler, Inhale 2 puffs every 6 (six) hours as needed for wheezing, Disp: 3 Inhaler, Rfl: 3    apixaban (ELIQUIS) 5 mg, Take 1 tablet (5 mg total) by mouth 2 (two) times a day (Patient taking differently: Take 2 5 mg by mouth 2 (two) times a day ), Disp: 60 tablet, Rfl: 3    cholecalciferol (VITAMIN D3) 1,000 units tablet, Take 5,000 Units by mouth daily  , Disp: , Rfl:     cyanocobalamin (VITAMIN B-12) 1,000 mcg tablet, Take by mouth daily, Disp: , Rfl:     famotidine (Pepcid) 20 mg tablet, Take 20 mg by mouth as needed , Disp: , Rfl:     ferrous sulfate 325 (65 Fe) mg tablet, Take 325 mg by mouth daily with breakfast, Disp: , Rfl:     ipratropium (ATROVENT) 0 02 % nebulizer solution, Take 1 vial (0 5 mg total) by nebulization 4 (four) times a day, Disp: 300 mL, Rfl: 6    Loperamide HCl (IMODIUM PO), Take 4 mg by mouth daily, Disp: , Rfl:     predniSONE 10 mg tablet, Take by mouth daily, Disp: , Rfl:     finasteride (PROSCAR) 5 mg tablet, TAKE 1 TABLET BY MOUTH AT BEDTIME (Patient not taking: Reported on 1/13/2021), Disp: 90 tablet, Rfl: 0    furosemide (LASIX) 20 mg tablet, Take 1 tablet (20 mg total) by mouth 3 (three) times a week, Disp: 30 tablet, Rfl: 1    guaifenesin-codeine (guaiFENesin AC) 100-10 MG/5ML liquid, Take 10 mL by mouth every 4 (four) hours, Disp: , Rfl:     lenalidomide (REVLIMID) 5 MG CAPS, Take one capsule daily 3 weeks on followed by 3 weeks off  [id number] (Patient not taking: Reported on 1/13/2021), Disp: 21 capsule, Rfl: 0    potassium chloride (K-DUR,KLOR-CON) 10 mEq tablet, Take 1 tablet (10 mEq total) by mouth 3 (three) times a week Take only on days you take lasix, Disp: 30 tablet, Rfl: 1    tamsulosin (FLOMAX) 0 4 mg, Take 0 4 mg by mouth daily with dinner, Disp: , Rfl:     traMADol (ULTRAM) 50 mg tablet, TAKE ONE TABLET BY MOUTH EVERY 6 HOURS AS NEEDED FOR MODERATE PAIN (PAIN SCORE 4-6), Disp: , Rfl:     triamcinolone (KENALOG) 0 5 % cream, APPLY TOPICALLY THREE TIMES DAILY TO SHINGLES RASH, Disp: , Rfl:     Review of Systems:    Aside from what is mentioned in the HPI, the review of systems is otherwise negative    Past medical history, surgical history, and family history were reviewed and updated as appropriate    Social history updates:  Social History     Tobacco Use   Smoking Status Former Smoker    Packs/day: 1 00    Years: 50 00    Pack years: 50 00    Types: Cigarettes    Start date: 57    Quit date: 12/7/2010    Years since quitting: 10 1   Smokeless Tobacco Never Used   Tobacco Comment    quit in 1999       PhysicalExamination:  Vitals:   BP 106/60   Pulse (!) 118   Temp 98 °F (36 7 °C)   Resp 18   Ht 5' 7" (1 702 m)   Wt 80 7 kg (178 lb)   SpO2 98%   BMI 27 88 kg/m²   He does appear uncomfortable and fatigued  HEENT: PERRL  O/P: clear  moist   Mild pallor  Neck: Trachea is midline  No JVD  No adenopathy  Chest:  Coarse breath sounds bilaterally  Breath sounds do clear with coughing  No wheeze  Cardiac:  Distant heart tones  Has murmur of aortic stenosis which is somewhat late peaking  Abdomen: Soft and nontender  Bowel sounds are present  Extremities:  3+ edema  Neuro:  Nonfocal   Generalized weakness and ambulatory dysfunction  Skin:  No rash  Easy bruisability    Diagnostic Data:  Labs:   I personally reviewed the most recent laboratory data pertinent to today's visit    Lab Results   Component Value Date    WBC 5 15 01/07/2021    HGB 10 4 (L) 01/07/2021    HCT 34 8 (L) 01/07/2021     (H) 01/07/2021     (L) 01/07/2021     Lab Results   Component Value Date    SODIUM 142 01/07/2021    K 3 8 01/07/2021    CO2 29 01/07/2021     (H) 01/07/2021    BUN 15 01/07/2021    CREATININE 1 16 01/07/2021    CALCIUM 9 4 01/07/2021       He has not had new imaging studies since my last evaluation    Malena Perry MD

## 2021-01-14 NOTE — TELEPHONE ENCOUNTER
Daughter calling with some questions  She is asking if Lucila Mortimer should remain on 10 mg of prednisone or decrease it to 5 mg daily  She said Dr Christian Scott was managing this but he called her last night and said he is pushing it to Dr Gwen Cardona now  She would also like a conversation with just Dr Gwen Cardona to discuss home care  She said he needs some sort of help at home  I advised there is a referral in here from last month  She said someone came twice a week for an hour  I advised that is how [facility] is  She wants an aide there in the morning for half a day  She said Lucila Mortimer is just up and getting around now  She want someone to help him up, get dressed and make sure he is taking his medications properly  I then asked if she ever heard of Palliative Care and she said no  I explained what it was and she is very interested  She said she does not want him back in the hospital and she knows he does not have a lot time left  I will place a referral  Any suggestions on the aide?

## 2021-01-20 NOTE — TELEPHONE ENCOUNTER
Spoke with ROSY Martino  Advised her the her mother declined the palliative care appt when called  ROSY Martino said, "well they should have called med"  I advised the bolded number in his chart is the number one contact which is the home phone  Advised if they are unable to take calls and understand, we can change the number in his chart to hers and her sisters  Also advised I can place a new referral and comment to call her  She wants to talk to her sister and also explain to her parents what palliative care is  She will call me back and let me know   Please give call to Lalit Rodriguez

## 2021-01-25 NOTE — PROGRESS NOTES
Cardiology   MD Aniyah Franklin MD Ather Mansoor, MD Coralie Rooks, DO, Yolanda Paiz DO, [facility] [place]  -------------------------------------------------------------------  [facility]  [address], [place] [id number] [phone]  [address] [place] [address]                      1935                     [id number]          Assessment/Plan:    1  Moderate to severe, likely severe aortic stenosis--aortic valve area 0 9 cm2  2  Chronic diastolic heart failure  3  History of DVT, PE--on Eliquis anticoagulation  4  COPD  5  Multiple myeloma  6  BPH  7  Basal cell skin carcinoma    · Patient has been scheduled for CT surgery next month after he left the hospital last month with CHF with aortic stenosis likely contributing  By exam, AS sounds severe  RADHAMES 0 9 cm2 by last echo 9/2020  · Continue compression stockings, furosemide 20 mg 3 times weekly, which has helped   · Blood pressure controlled      Follow-up in 2 months      Interval History: This is a very pleasant 71-year-old male with a history of moderate aortic root dilatation to 4 5 cm, moderate to severe aortic valve stenosis, multiple myeloma, squamous cell skin carcinoma, venous insufficiency, and lung adenocarcinoma status post right upper lobe resection with adjuvant chemotherapy and radiation, as well as rectal carcinoma   He has COPD, and is managed by Dr Gardenia Alvarado of pulmonary medicine and sees Dr Natalie Emery of hematology      He went to the hospital 12/2018 with increased shortness of breath and lower extremity edema   CT chest revealed left-sided pulmonary embolus and he was initiated on Eliquis   He also had a mild COPD exacerbation  [facility] was thereafter hospitalized 01/2018 with fever and diarrhea  He was hospitalized 12/02/2020 with COPD exacerbation, bronchitis, and a possible component of CHF  His discharge weight was 179 lb    He was seen by our nurse practitioner 12/21/2020, and was scheduled for a referral to see Cardiovascular surgery, and has an appointment next month  From a symptomatic standpoint he feels fairly well  He states his breathing has significantly improved since his hospitalization last month  His lower extremity edema has improved as well on furosemide 20 mg 3 times weekly  He continues to have exertional dyspnea after walking up a flight of stairs flight           Vitals:  Vitals:    01/25/21 1414   BP: 106/62   BP Location: Right arm   Patient Position: Sitting   Cuff Size: Adult   Pulse: 84   Temp: (!) 97 2 °F (36 2 °C)   Weight: 79 5 kg (175 lb 3 2 oz)   Height: 5' 7" (1 702 m)         Past Medical History:   Diagnosis Date    Chronic pain disorder     3 fractured vertebrae    COPD (chronic obstructive pulmonary disease) (HCC)     Diarrhea     Enlarged prostate     Kidney stone     [facility] [address] [facility]     Pulmonary embolism ([facility] ) 12/24/2018    Rectal adenocarcinoma ([facility] )     Sleep apnea     no cpap     Social History     Socioeconomic History    Marital status: /Civil Union     Spouse name: Not on file    Number of children: Not on file    Years of education: Not on file    Highest education level: Not on file   Occupational History    Not on file   Social Needs    Financial resource strain: Not on file    Food insecurity     Worry: Not on file     Inability: Not on file   [place] needs     Medical: Not on file     Non-medical: Not on file   Tobacco Use    Smoking status: Former Smoker     Packs/day: 1 00     Years: 50 00     Pack years: 50 00     Types: Cigarettes     Start date: 57     Quit date: 12/7/2010     Years since quitting: 10 1    Smokeless tobacco: Never Used    Tobacco comment: quit in 1999   Substance and Sexual Activity    Alcohol use: Not Currently     Comment: rarely    Drug use: No    Sexual activity: Not on file   Lifestyle    Physical activity     Days per week: Not on file Minutes per session: Not on file    Stress: Not on file   Relationships    Social connections     Talks on phone: Not on file     Gets together: Not on file     Attends Buddhism service: Not on file     Active member of club or organization: Not on file     Attends meetings of clubs or organizations: Not on file     Relationship status: Not on file    Intimate partner violence     Fear of current or ex partner: Not on file     Emotionally abused: Not on file     Physically abused: Not on file     Forced sexual activity: Not on file   Other Topics Concern    Not on file   Social History Narrative    Not on file      Family History   Problem Relation Age of Onset    Arthritis Mother     Colon cancer Father     Heart attack Father     Diabetes type I Sister      Past Surgical History:   Procedure Laterality Date    CHOLECYSTECTOMY      COLONOSCOPY W/ ENDOSCOPIC US N/A 7/21/2017    Procedure: ANAL ENDOSCOPIC U/S;  Surgeon: Lupe Weems MD;  [facility]: [facility]; Service: Colorectal    HERNIA REPAIR      KIDNEY STONE SURGERY      LUNG LOBECTOMY Right     IN COLONOSCOPY FLX DX W/COLLJ SPEC WHEN PFRMD N/A 7/10/2017    Procedure: COLONOSCOPY;  Surgeon: Carlos Alberto Manzo MD;  [facility];   Service: Gastroenterology    IN RECTAL TUMOR EXCISION, TRANSANAL ENDOSCOPIC MICROSURGICAL, FULL THICK N/A 11/2/2017    Procedure: TRANSANAL ENDOSCOPIC MICROSURGERY TEM;  Surgeon: Lupe Weems MD;  [facility];  Service: Colorectal       Current Outpatient Medications:     albuterol (2 5 mg/3 mL) 0 083 % nebulizer solution, Inhale 2 5 mg every 6 (six) hours as needed, Disp: , Rfl:     albuterol (PROVENTIL HFA,VENTOLIN HFA) 90 mcg/act inhaler, Inhale 2 puffs every 6 (six) hours as needed for wheezing, Disp: 3 Inhaler, Rfl: 3    apixaban (ELIQUIS) 5 mg, Take 1 tablet (5 mg total) by mouth 2 (two) times a day (Patient taking differently: Take 2 5 mg by mouth 2 (two) times a day ), Disp: 60 tablet, Rfl: 3    famotidine (Pepcid) 20 mg tablet, Take 20 mg by mouth as needed , Disp: , Rfl:     ferrous sulfate 325 (65 Fe) mg tablet, Take 325 mg by mouth daily with breakfast, Disp: , Rfl:     finasteride (PROSCAR) 5 mg tablet, TAKE 1 TABLET BY MOUTH AT BEDTIME, Disp: 90 tablet, Rfl: 0    furosemide (LASIX) 20 mg tablet, Take 1 tablet (20 mg total) by mouth 3 (three) times a week, Disp: 30 tablet, Rfl: 1    ipratropium (ATROVENT) 0 02 % nebulizer solution, Take 1 vial (0 5 mg total) by nebulization 4 (four) times a day, Disp: 300 mL, Rfl: 6    lenalidomide (REVLIMID) 5 MG CAPS, Take one capsule daily 3 weeks on followed by 3 weeks off  [facility]#[id number] on 1/4/21, Disp: 21 capsule, Rfl: 0    Loperamide HCl (IMODIUM PO), Take 4 mg by mouth daily, Disp: , Rfl:     potassium chloride (K-DUR,KLOR-CON) 10 mEq tablet, Take 1 tablet (10 mEq total) by mouth 3 (three) times a week Take only on days you take lasix, Disp: 30 tablet, Rfl: 1    predniSONE 10 mg tablet, Take by mouth daily, Disp: , Rfl:     traMADol (ULTRAM) 50 mg tablet, TAKE ONE TABLET BY MOUTH EVERY 6 HOURS AS NEEDED FOR MODERATE PAIN (PAIN SCORE 4-6), Disp: , Rfl:     cholecalciferol (VITAMIN D3) 1,000 units tablet, Take 5,000 Units by mouth daily  , Disp: , Rfl:     cyanocobalamin (VITAMIN B-12) 1,000 mcg tablet, Take by mouth daily, Disp: , Rfl:     guaifenesin-codeine (guaiFENesin AC) 100-10 MG/5ML liquid, Take 10 mL by mouth every 4 (four) hours, Disp: , Rfl:     tamsulosin (FLOMAX) 0 4 mg, Take 0 4 mg by mouth daily with dinner, Disp: , Rfl:     triamcinolone (KENALOG) 0 5 % cream, APPLY TOPICALLY THREE TIMES DAILY TO SHINGLES RASH, Disp: , Rfl:         Review of Systems:  Review of Systems   Constitutional: Positive for fatigue  Respiratory: Positive for shortness of breath  Cardiovascular: Positive for leg swelling  Musculoskeletal: Positive for gait problem  All other systems reviewed and are negative          Physical Exam:  Physical Exam  Constitutional:       General: He is not in acute distress  Appearance: He is well-developed  He is not diaphoretic  HENT:      Head: Normocephalic and atraumatic  Eyes:      General: No scleral icterus  Right eye: No discharge  Pupils: Pupils are equal, round, and reactive to light  Neck:      Musculoskeletal: Normal range of motion and neck supple  Thyroid: No thyromegaly  Cardiovascular:      Rate and Rhythm: Normal rate and regular rhythm  Heart sounds: No murmur  No friction rub  No gallop  Comments: 3/6 systolic ejection murmur, inaudible S2  Pulmonary:      Effort: Pulmonary effort is normal       Breath sounds: Normal breath sounds  Abdominal:      General: There is no distension  Tenderness: There is no abdominal tenderness  There is no guarding or rebound  Musculoskeletal: Normal range of motion  Skin:     General: Skin is warm and dry  Coloration: Skin is not pale  Findings: No erythema or rash  Neurological:      Mental Status: He is alert and oriented to person, place, and time  Coordination: Coordination normal    Psychiatric:         Behavior: Behavior normal          Thought Content: Thought content normal          Judgment: Judgment normal          This note was completed in part utilizing [employer] Mobile Fluency Direct Software  Grammatical errors, random word insertions, spelling mistakes, and incomplete sentences can be an occasional consequence of this system secondary to software limitations, ambient noise, and hardware issues  If you have any questions or concerns about the content, text, or information contained within the body of this dictation, please contact the provider for clarification

## 2021-01-29 NOTE — TELEPHONE ENCOUNTER
Joshua Knight advised that Dr Da Silva [facility] office sent in his Prednisone prescription  Joshua Knight verbalized understanding of above

## 2021-01-29 NOTE — TELEPHONE ENCOUNTER
Patient's daughter is calling for a refill on patient's Prednisone  Per Dr Tristan Luu 1/8/21 telephone note he would like Dr Laurie Kenny to manage Prednisone  I will forward request to Dr Laurie Kenny  Please see above  [facility]'s call back # [phone] (H)

## 2021-01-29 NOTE — TELEPHONE ENCOUNTER
daMedication Refill     Who is Calling  Patient daughter Andrés Christie    Medication predniSONE 10 mg tablet      How many pills left 10   [facility] [address] [facility]   [phone]  [phone]   Call back number [phone]   Relevant Information

## 2021-02-04 NOTE — TELEPHONE ENCOUNTER
Andre Schaumann patient's daughter called stating patient has been in ER waiting room for over 2 hrs   Call transferred to [facility]

## 2021-02-04 NOTE — PROGRESS NOTES
Hematology Outpatient Follow - Up Note  Latoya Layton 81 y o  male MRN: @ Encounter: [id number]        Date:  2/4/2021        Assessment/ Plan:     1  Acute respiratory distress, patient to go to the ER for evaluation and possible CTA of the chest, abdomen and pelvis  2  Hypogastric pain, dark urine, minimized urine output, he was initiated on furosemide by Pulmonary service, he has also prostatomegaly, he had been taking Mucinex for long time, rule out urinary outlet obstruction  3  History of lambda light chain multiple myeloma, heavily pretreated stage III, had been of therapy for the past 3 months at least now with lambda light chain increasing in the range of 500, will hold on treatment until ER evaluation and later on will start the patient on melphalan, Revlimid, prednisone   4  Hypogammaglobinemia he had been on IVIG 20 g every 6 weeks  5  History of adenocarcinoma of the lung status post resection and radiation therapy, history of rectal adenocarcinoma, history of skin cancer, history of severe aortic stenosis, pulmonary emboli, COPD        Labs and imaging studies are reviewed by ordering provider once results are available  If there are findings that need immediate attention, you will be contacted when results available  Discussing results and the implication on your healthcare is best discussed in person at your follow-up visit         HPI: #1 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, multiple compression fractures, status post kyphoplasty to L1, L2, L5, treated with radiation therapy to the left intra trochanteric area, received melphalan/Velcade/prednisone in October 2010 for 4 cycles with excellent response and normalization of lambda light chain, had been on Velcade maintenance till January 2013 when lambda light chain went up to 111,treated with Velcade 1 3 mg meter square weekly 3 weeks on one week off, Decadron 20 mg by mouth weekly, lambda light chain down to 30 then creeping up to 110, on Revlimid 15 mg by mouth every other day  Lambda light chain normal at 28 however he reported skin rash on the upper chest area we stopped the Revlimid for 2 month  Then reinitiated Revlimid 5 mg by mouth daily     Progression of disease he received lenalidomide, melphalan, dexamethasone since May 2018 until January 2020 he took a break of the therapy after normalization of the lambda light chain     In June 2020 lambda light chain went up from 28 to 130     He had been off therapy for the past 3 months, lambda light chain increased to 256 on 12/2020 however without treatment he feels much better     #2  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 stage I, with progression by scan, status post resection of the right upper lobe with few foci in the same lobe consistent with stage IIIA done in January 2015 adenocarcinoma, well differentiated  Status post Alimta/carboplatin x4 cycles finished in May 2015 and radiation therapy      CT scan in August 2020 showed a growing 7 mm nodule in the left upper lobe of the lung followed by Pulmonary service        #3  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     #4  Rectal adenocarcinoma status post resection stage I in November 2017     #5  Pulmonary embolism involving the subsegmental left lower lobe in December 2018   Currently on apixaban 5 mg once a day because of epistaxis     #6   Hematuria secondary to benign prostatic hypertrophy and small bladder stones and being on apixaban, apixaban was reduced to 5 milligram once a day      #7   Herpes zoster involving the left upper extremity 2/2020   He could not tolerate gabapentin because of sedation  Benadryl was ineffective he received topical lidocaine      Interval History:  He had been off multiple myeloma therapy for the past 3 months, he came today with dyspnea, wheezing, hypogastric pain, loss of appetite, 5 lb weight loss, he was initiated on furosemide by Pulmonary service       Previous Treatment:         Test Results:    Imaging: No results found  Labs:   Lab Results   Component Value Date    WBC 8 85 01/29/2021    HGB 10 0 (L) 01/29/2021    HCT 33 7 (L) 01/29/2021    MCV 98 01/29/2021    PLT  01/29/2021      Comment:      Unable to perform due to clumped platelets     Lab Results   Component Value Date     01/04/2016    K 4 1 01/29/2021     01/29/2021    CO2 29 01/29/2021    ANIONGAP 7 01/04/2016    BUN 16 01/29/2021    CREATININE 1 17 01/29/2021    GLUCOSE 88 01/04/2016    GLUF 94 08/01/2020    CALCIUM 9 7 01/29/2021    CORRECTEDCA 10 6 (H) 01/29/2021    AST 13 01/29/2021    ALT 23 01/29/2021    ALKPHOS 72 01/29/2021    PROT 6 8 01/04/2016    BILITOT 0 63 01/04/2016    EGFR 57 01/29/2021       No results found for: IRON, TIBC, FERRITIN    No results found for: [id number]      ROS: Review of Systems   Constitutional: Positive for appetite change, fatigue and unexpected weight change  Negative for chills, diaphoresis and fever  HENT:   Negative for hearing loss, lump/mass, mouth sores, nosebleeds, sore throat, trouble swallowing and voice change  Eyes: Negative  Negative for eye problems and icterus  Respiratory: Positive for cough, shortness of breath and wheezing  Negative for chest tightness and hemoptysis  Cardiovascular: Positive for leg swelling  Negative for chest pain  Gastrointestinal: Positive for abdominal pain  Negative for abdominal distention, blood in stool, constipation, diarrhea and nausea  Endocrine: Negative  Genitourinary: Positive for difficulty urinating, hematuria and pelvic pain  Negative for dysuria and frequency  Musculoskeletal: Negative  Negative for arthralgias, back pain, flank pain, gait problem, myalgias and neck stiffness  Skin: Negative for itching and rash  Neurological: Negative for dizziness, gait problem, headaches, light-headedness, numbness and speech difficulty     Hematological: Negative for adenopathy  Does not bruise/bleed easily  Psychiatric/Behavioral: Negative for confusion, decreased concentration, depression and sleep disturbance  The patient is not nervous/anxious  Current Medications: Reviewed  Allergies: Reviewed  PMH/FH/SH:  Reviewed      Physical Exam:    Body surface area is 1 92 meters squared  Wt Readings from Last 3 Encounters:   21 79 9 kg (176 lb 3 2 oz)   21 79 5 kg (175 lb 3 2 oz)   21 80 7 kg (178 lb)        Temp Readings from Last 3 Encounters:   21 98 9 °F (37 2 °C) (Tympanic)   21 (!) 97 2 °F (36 2 °C)   21 98 °F (36 7 °C)        BP Readings from Last 3 Encounters:   21 132/70   21 106/62   21 106/60         Pulse Readings from Last 3 Encounters:   21 (!) 111   21 84   21 (!) 118        Physical Exam  Vitals signs reviewed  Constitutional:       General: He is in acute distress  Appearance: He is well-developed  He is not diaphoretic  HENT:      Head: Normocephalic and atraumatic  Eyes:      General: Scleral icterus present  Conjunctiva/sclera: Conjunctivae normal    Neck:      Musculoskeletal: Normal range of motion and neck supple  Trachea: No tracheal deviation  Cardiovascular:      Rate and Rhythm: Regular rhythm  Tachycardia present  Heart sounds: Murmur present  No friction rub  No gallop  Pulmonary:      Effort: Pulmonary effort is normal  No respiratory distress  Breath sounds: Examination of the right-upper field reveals rhonchi  Examination of the right-middle field reveals wheezing and rhonchi  Examination of the right-lower field reveals wheezing  Wheezing and rhonchi present  No rales  Chest:      Chest wall: No tenderness  Abdominal:      General: There is no distension  Palpations: Abdomen is soft  Tenderness: There is no abdominal tenderness  Musculoskeletal:      Right lower le+ Pitting Edema present  Left lower le+ Pitting Edema present  Lymphadenopathy:      Cervical: No cervical adenopathy  Skin:     General: Skin is warm and dry  Coloration: Skin is not pale  Findings: No erythema  Neurological:      Mental Status: He is alert and oriented to person, place, and time  Psychiatric:         Behavior: Behavior normal          Thought Content: Thought content normal          Judgment: Judgment normal          ECO  Goals and Barriers:  Current Goal: Minimize effects of disease  Barriers: None  Patient's Capacity to Self Care:  Patient is able to self care      Code Status: @[facility]@

## 2021-02-05 NOTE — ED ATTENDING ATTESTATION
2/4/2021  IAna Maria, DO, saw and evaluated the patient  I have discussed the patient with the resident/non-physician practitioner and agree with the resident's/non-physician practitioner's findings, Plan of Care, and MDM as documented in the resident's/non-physician practitioner's note, except where noted  All available labs and Radiology studies were reviewed  I was present for key portions of any procedure(s) performed by the resident/non-physician practitioner and I was immediately available to provide assistance  At this point I agree with the current assessment done in the Emergency Department  I have conducted an independent evaluation of this patient a history and physical is as follows:    ED Course     81 y o  M w/h/o multiple myeloma, rectal adenocarcinoma s/p resection, lung CA s/p lung resection/radiation, h/o PE (on Eliquis) p/w multiple complaints  Pt did come in earlier, but left to go to a different hospital because he didn't want to wait, however he returned  Pt sent in from hem/onc for lower abd pain, dark urine, and SOB  Using nebs without relief  Having productive cough  Denies F/C, CP  Pt notes he's on Lasix for lower extremity edema and also has an enlarged prostate, so he's not sure if he's dehydrated from the Lasix and that's causing the dark urine  Plan: Labs, CTA CAP      Critical Care Time  Procedures

## 2021-02-05 NOTE — ED RE-EVALUATION NOTE
124 [facility] called questioning dose of Augmentin, I confirmed that it should indeed be twice a day and not 3 times a day      Tatyana Olea PA-C  02/05/21 2142

## 2021-02-05 NOTE — DISCHARGE INSTRUCTIONS
CT PULMONARY ANGIOGRAM OF THE CHEST AND CT ABDOMEN AND PELVIS WITH INTRAVENOUS CONTRAST     INDICATION:   SOB hx of PE and abdominal pain  COMPARISON:  CT scan chest dated September 12, 2020     TECHNIQUE:  CT examination of the chest, abdomen and pelvis was performed  Thin section CT angiographic technique was used in the chest in order to evaluate for pulmonary embolus and coronal 3D MIP postprocessing was performed on the acquisition   scanner  Axial, sagittal, and coronal 2D reformatted images were created from the source data and submitted for interpretation  Radiation dose length product (DLP) for this visit:  663 mGy-cm   This examination, like all CT scans performed in the [facility], was performed utilizing techniques to minimize radiation dose exposure, including the use of iterative   reconstruction and automated exposure control  IV Contrast:  100 mL of iohexol (OMNIPAQUE)  Enteric Contrast:  Enteric contrast was not administered  FINDINGS:     CHEST     PULMONARY ARTERIAL TREE:  No pulmonary embolus is seen  LUNGS:  There is a unchanged 7 mm pulmonary nodule seen within the left upper lobe  There is a 3 mm left upper lobe nodule similar to prior study  There is a unchanged 3 mm left lower lobe nodule  Multiple groundglass opacities are again visualized   within the left lung  The patient is status post right upper lobectomy with 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  The trachea and central bronchial tree are patent  Emphysematous changes within the lungs are visualized  PLEURA:  Unremarkable  HEART/AORTA:  Unremarkable for patient's age  MEDIASTINUM AND GLEN:  Unremarkable  CHEST WALL AND LOWER NECK:   Right-sided chest port is seen  ABDOMEN     LIVER/BILIARY TREE:  Unremarkable  GALLBLADDER:  Gallbladder is surgically absent  SPLEEN:  Unremarkable  PANCREAS:  Unremarkable       ADRENAL GLANDS: Unremarkable  KIDNEYS/URETERS:  No hydronephrosis or urinary tract calculus  One or more sharply circumscribed subcentimeter renal hypodensities are present, too small to accurately characterize, and statistically most likely benign findings  According to recent   literature (Radiology 2019) no further workup of these findings is recommended  STOMACH AND BOWEL:  Colonic diverticulosis with minimal inflammatory changes around the sigmoid colon     APPENDIX:  No findings to suggest appendicitis  ABDOMINOPELVIC CAVITY:  No ascites  No pneumoperitoneum  No lymphadenopathy  VESSELS:  Unremarkable for patient's age  PELVIS     REPRODUCTIVE ORGANS:  The prostate is enlarged  URINARY BLADDER:  Minimal inflammatory changes around the urinary bladder  Tiny calcification in the dome of the urinary bladder  ABDOMINAL WALL/INGUINAL REGIONS:  Unremarkable  OSSEOUS STRUCTURES:  Multiple compression deformities of the spine     IMPRESSION:     No evidence of pulmonary artery embolus  Colonic diverticulosis with minimal inflammatory changes around the sigmoid colon which may represent acute diverticulitis  No drainable fluid collection  If not already performed recently, colonoscopy after the acute illness is recommended to rule out possibility of colon cancer mimicking diverticulitis  Minimal inflammatory changes around the urinary bladder which may represent cystitis  Multiple pulmonary nodules similar to prior study  Status post right upper lobe ectomy  The study was marked in [facility] for immediate notification

## 2021-02-05 NOTE — ED PROVIDER NOTES
Final Diagnosis:  1  Acute diverticulitis    2  Abdominal pain    3  Stage 3b chronic kidney disease    4  Enlarged prostate without lower urinary tract symptoms (luts)    5  SOB (shortness of breath)    6  Chronic diastolic congestive heart failure ([facility] [address] )    7  COPD mixed type ([facility] [address] )    8  Multiple myeloma, remission status unspecified [facility])      ED Course as of Feb 04 2353   Thu Feb 04, 2021 1955 Procedure Note: EKG  Date/Time: 02/04/21 7:55 PM   Interpreted by: Dary Ramirez  Indications / Diagnosis: CP  ECG reviewed by me, the ED Provider: yes   The EKG demonstrates:  Rhythm: sinus tachycardia  Intervals: TX interval prolonged (206 ms) otherwise normal intervals  Axis: normal axis  QRS/Blocks: incomplete RBBB; otherwise normal QRS  ST Changes: T wave elevations in I, II; inversion in V1/V2 stable from last EKG Dec 2; otherwise no acute ST Changes, no STD/SELMA           2112 Stable   TOTAL BILIRUBIN(!): 1 16 2207 Decreased from baseline   Hemoglobin(!): 9 0     Chief Complaint   Patient presents with    Shortness of Breath     worsening SOB, COPD hx, seen at PCP office today was sent to ED for CT scan due to having "a problem with bladder and prostate"       ASSESSMENT + PLAN:   - Nursing note reviewed  1  Abdominal pain  -will perform CT abdomen pelvis, basic labs, urinalysis  -CT abdomen pelvis does show some diverticulosis and some inflammation concerning for possible diverticulitis  -PO fluids  -patient is high risk given his immunocompromised, offered the patient admission and IV antibiotics  Patient prefers to be discharged on p o  Antibiotics with close follow-up  He understands importance of taking his antibiotics and return for any worsening symptoms  -patient's vitals appear to be a baseline while on Lasix  Did advise the patient that he needs to stay well hydrated    2   Shortness of breath  -patient with history of COPD and CHF, lung cancer status post lung resection and radiation  -is on Eliquis, but will obtain CTA of his chest  -treat symptomatically  -patient says that he feels as though his breathing is improved, will continue to do nebulizer treatments at home        Final Dispo   Patient was reassessed  Vital signs stable  Patient and/or family given discharge instructions and return precautions  Patient understands that he may have diverticulitis and so was high risk given his immunocompromise  Patient and/or family was reassured  The patient and/or family vocalizes understanding and says that they will return if he has any worsening symptoms  Answered all of the patient's and/or family's questions  Will follow up with his colorectal team and his Hematology-Oncologist  Patient and/or family are agreeable to the plan          Medications   albuterol inhalation solution 5 mg (5 mg Nebulization Given 2/4/21 2012)   ipratropium (ATROVENT) 0 02 % inhalation solution 0 5 mg (0 5 mg Nebulization Given 2/4/21 2013)   iohexol (OMNIPAQUE) 350 MG/ML injection (MULTI-DOSE) 100 mL (100 mL Intravenous Given 2/4/21 2136)   amoxicillin-clavulanate (AUGMENTIN) 875-125 mg per tablet 1 tablet (1 tablet Oral Given 2/4/21 2346)     Time reflects when diagnosis was documented in both MDM as applicable and the Disposition within this note     Time User Action Codes Description Comment    2/4/2021 11:15 PM Lv Robert [R10 9] Abdominal pain     2/4/2021 11:15 PM Brennon Nava 48 [K57 92] Acute diverticulitis     2/4/2021 11:15 PM Lv Robert [N18 32] Stage 3b chronic kidney disease     2/4/2021 11:15 PM Lv Robert [N40 0] Enlarged prostate without lower urinary tract symptoms (luts)     2/4/2021 11:19 PM Nailastephen Jacosb Modify [R10 9] Abdominal pain     2/4/2021 11:19 PM Nailastephen Jacobs Modify [K57 92] Acute diverticulitis     2/4/2021 11:53 PM Lv Robert [R06 02] SOB (shortness of breath)     2/4/2021 11:53 PM Lv Robert [V27 09] Chronic diastolic congestive heart failure ([address] ) 2/4/2021 11:53 PM Naila Jacobs Add [J44 9] COPD mixed type ([address] )     2/4/2021 11:53 PM Pauletteluis enriqueLv biggs Add [C90 00] Multiple myeloma, remission status unspecified [facility])       ED Disposition     ED Disposition Condition Date/Time Comment    Discharge Stable Thu Feb 4, 2021 11:14  [address] discharge to home/self care              Follow-up Information     Follow up With Specialties Details Why Contact Info    Naomie Mcknight MD Colon and Rectal Surgery Call   [address] [facility] [phone]      Lavern Gilbert MD Hematology, Hematology and Oncology, Oncology Call   [address] [facility] [address]  [phone]          Discharge Medication List as of 2/4/2021 11:29 PM      START taking these medications    Details   amoxicillin-clavulanate (AUGMENTIN) 875-125 mg per tablet Take 1 tablet by mouth every 8 (eight) hours for 10 days, Starting Thu 2/4/2021, Until Sun 2/14/2021, Normal         CONTINUE these medications which have NOT CHANGED    Details   albuterol (2 5 mg/3 mL) 0 083 % nebulizer solution Inhale 2 5 mg every 6 (six) hours as needed, Starting Tue 3/24/2020, Historical Med      albuterol (PROVENTIL HFA,VENTOLIN HFA) 90 mcg/act inhaler Inhale 2 puffs every 6 (six) hours as needed for wheezing, Starting e 10/27/2020, Normal      apixaban (ELIQUIS) 5 mg Take 1 tablet (5 mg total) by mouth 2 (two) times a day, Starting u 12/27/2018, Normal      cholecalciferol (VITAMIN D3) 1,000 units tablet Take 5,000 Units by mouth daily  , Historical Med      cyanocobalamin (VITAMIN B-12) 1,000 mcg tablet Take by mouth daily, Historical Med      famotidine (Pepcid) 20 mg tablet Take 20 mg by mouth as needed , Historical Med      ferrous sulfate 325 (65 Fe) mg tablet Take 45 mg by mouth daily with breakfast , Historical Med      finasteride (PROSCAR) 5 mg tablet TAKE 1 TABLET BY MOUTH AT BEDTIME, Normal      furosemide (LASIX) 20 mg tablet Take 1 tablet (20 mg total) by mouth 3 (three) times a week, Starting Wed 1/13/2021, Normal      guaifenesin-codeine (guaiFENesin AC) 100-10 MG/5ML liquid Take 10 mL by mouth every 4 (four) hours, Historical Med      ipratropium (ATROVENT) 0 02 % nebulizer solution Take 1 vial (0 5 mg total) by nebulization 4 (four) times a day, Starting Wed 9/9/2020, Print      lenalidomide (REVLIMID) 5 MG CAPS Take one capsule daily 3 weeks on followed by 3 weeks off  [facility]#[id number] on 1/4/21, Normal      Loperamide HCl (IMODIUM PO) Take 4 mg by mouth daily, Historical Med      potassium chloride (K-DUR,KLOR-CON) 10 mEq tablet Take 1 tablet (10 mEq total) by mouth 3 (three) times a week Take only on days you take lasix, Starting Wed 1/13/2021, Normal      predniSONE 10 mg tablet Take 1 tablet (10 mg total) by mouth daily, Starting Fri 1/29/2021, Normal      Silodosin 8 MG CAPS Take by mouth, Historical Med      tamsulosin (FLOMAX) 0 4 mg Take 0 4 mg by mouth daily with dinner, Historical Med      traMADol (ULTRAM) 50 mg tablet TAKE ONE TABLET BY MOUTH EVERY 6 HOURS AS NEEDED FOR MODERATE PAIN (PAIN SCORE 4-6), Historical Med      triamcinolone (KENALOG) 0 5 % cream APPLY TOPICALLY THREE TIMES DAILY TO SHINGLES RASH, Historical Med           No discharge procedures on file  Prior to Admission Medications   Prescriptions Last Dose Informant Patient Reported? Taking?    Loperamide HCl (IMODIUM PO)  Child Yes Yes   Sig: Take 4 mg by mouth daily   Silodosin 8 MG CAPS  Child Yes Yes   Sig: Take by mouth   albuterol (2 5 mg/3 mL) 0 083 % nebulizer solution  Child Yes Yes   Sig: Inhale 2 5 mg every 6 (six) hours as needed   albuterol (PROVENTIL HFA,VENTOLIN HFA) 90 mcg/act inhaler  Child No Yes   Sig: Inhale 2 puffs every 6 (six) hours as needed for wheezing   apixaban (ELIQUIS) 5 mg  Child No Yes   Sig: Take 1 tablet (5 mg total) by mouth 2 (two) times a day   Patient taking differently: Take 2 5 mg by mouth 2 (two) times a day    cholecalciferol (VITAMIN D3) 1,000 units tablet Child Yes Yes   Sig: Take 5,000 Units by mouth daily     cyanocobalamin (VITAMIN B-12) 1,000 mcg tablet  Child Yes Yes   Sig: Take by mouth daily   famotidine (Pepcid) 20 mg tablet  Child Yes Yes   Sig: Take 20 mg by mouth as needed    ferrous sulfate 325 (65 Fe) mg tablet  Child Yes Yes   Sig: Take 45 mg by mouth daily with breakfast    finasteride (PROSCAR) 5 mg tablet  Child No Yes   Sig: TAKE 1 TABLET BY MOUTH AT BEDTIME   furosemide (LASIX) 20 mg tablet  Child No Yes   Sig: Take 1 tablet (20 mg total) by mouth 3 (three) times a week   guaifenesin-codeine (guaiFENesin AC) 100-10 MG/5ML liquid  Child Yes Yes   Sig: Take 10 mL by mouth every 4 (four) hours   ipratropium (ATROVENT) 0 02 % nebulizer solution  Child No Yes   Sig: Take 1 vial (0 5 mg total) by nebulization 4 (four) times a day   lenalidomide (REVLIMID) 5 MG CAPS  Child No Yes   Sig: Take one capsule daily 3 weeks on followed by 3 weeks off  [facility]#[id number] on 1/4/21   potassium chloride (K-DUR,KLOR-CON) 10 mEq tablet  Child No Yes   Sig: Take 1 tablet (10 mEq total) by mouth 3 (three) times a week Take only on days you take lasix   predniSONE 10 mg tablet  Child No Yes   Sig: Take 1 tablet (10 mg total) by mouth daily   tamsulosin (FLOMAX) 0 4 mg  Child Yes Yes   Sig: Take 0 4 mg by mouth daily with dinner   traMADol (ULTRAM) 50 mg tablet  Child Yes Yes   Sig: TAKE ONE TABLET BY MOUTH EVERY 6 HOURS AS NEEDED FOR MODERATE PAIN (PAIN SCORE 4-6)   triamcinolone (KENALOG) 0 5 % cream  Child Yes Yes   Sig: APPLY TOPICALLY THREE TIMES DAILY TO SHINGLES RASH      Facility-Administered Medications: None       History of Present Illness: This is a 81 y o  male presenting today for evaluation of abdominal pain and shortness of breath  Patient is sent in from and [facility] he is office with a complaint of lower abdominal pain, darkened urine, and increased shortness of breath  Patient does have a history of COPD and CHF and is on Lasix because of his edema    He says that he has not been drinking as much as he should  He denies any recent fever or chills  He denies any nausea, vomiting, diarrhea  Patient is actively on chemotherapy  Says this is for his multiple myeloma  Patient denies any dysuria  He does say that he has been having increased pressure in his lower abdomen  Unsure if it is because of his prostate her his bladder or another source  Does have a history of PE and is on Eliquis  He also has a history of multiple other cancers  - No language barrier    - History obtained from patient and chart and wife and daughters   - There are no limitations to the history obtained  - Previous charting was reviewed  Some data reviewed included below for ease of access whether or not it is relevant to this patient encounter  Past Medical, Past Surgical History:    has a past medical history of Chronic pain disorder, COPD (chronic obstructive pulmonary disease) ([facility] [address] ), Diarrhea, Enlarged prostate, Kidney stone, Multiple myeloma ([facility] [address] ), Pulmonary embolism ([facility] [address] ) (12/24/2018), Rectal adenocarcinoma ([facility] [address] ), and Sleep apnea  has a past surgical history that includes Cholecystectomy; Lung lobectomy (Right); Hernia repair; pr colonoscopy flx dx w/collj spec when pfrmd (N/A, 7/10/2017); Kidney stone surgery; Colonoscopy w/ endoscopic US (N/A, 7/21/2017); and pr rectal tumor excision, transanal endoscopic microsurgical, full thick (N/A, 11/2/2017)  Allergies:      Allergies   Allergen Reactions    Gabapentin      somnolence    Metaxalone     Nisoldipine     Sulfa Antibiotics Itching    Aldactone [Spironolactone] Other (See Comments)     Breast swelling and pain  Breast swelling and pain    Penicillin V Rash     AS CHILD       Social and Family History:     Social History     Substance and Sexual Activity   Alcohol Use Not Currently    Comment: rarely     Social History     Tobacco Use   Smoking Status Former Smoker    Packs/day: 1 00    Years: 50 00    Pack years: 50 00    Types: Cigarettes    Start date: 57    Quit date: 12/7/2010    Years since quitting: 10 1   Smokeless Tobacco Never Used   Tobacco Comment    quit in 1999     Social History     Substance and Sexual Activity   Drug Use No       Review of Systems:   Review of Systems   Constitutional: Negative for chills, diaphoresis and fever  HENT: Negative  Eyes: Negative  Negative for visual disturbance  Respiratory: Positive for cough and shortness of breath  Cardiovascular: Negative  Negative for chest pain  Gastrointestinal: Positive for abdominal pain  Negative for nausea and vomiting  Endocrine: Negative  Genitourinary: Negative  Musculoskeletal: Negative  Negative for myalgias  Skin: Negative  Negative for rash  Allergic/Immunologic: Negative  Neurological: Negative  Negative for weakness, light-headedness, numbness and headaches  Hematological: Negative  Psychiatric/Behavioral: Negative  All other systems reviewed and are negative  Physical Examination     Vitals:    02/04/21 2224 02/04/21 2230 02/04/21 2314 02/04/21 2345   BP: 94/59 108/58  100/52   BP Location: Right arm Right arm  Right arm   Pulse: 104 (!) 110 78 74   Resp: (!) 24 (!) 24  (!) 24   Temp:       TempSrc:       SpO2: 97% 97%  97%   Weight:       Height:         Vitals reviewed by me  Physical Exam  Vitals signs and nursing note reviewed  Constitutional:       Appearance: He is well-developed  Comments: Appears stated age  Actively wheezing   HENT:      Head: Normocephalic and atraumatic  Eyes:      Conjunctiva/sclera: Conjunctivae normal    Neck:      Musculoskeletal: Normal range of motion and neck supple  Cardiovascular:      Rate and Rhythm: Normal rate and regular rhythm  Pulmonary:      Effort: Pulmonary effort is normal       Breath sounds: Wheezing present  Abdominal:      General: There is no distension  Palpations: Abdomen is soft  Tenderness:  There is no abdominal tenderness  Musculoskeletal: Normal range of motion  Skin:     General: Skin is warm and dry  Neurological:      Mental Status: He is alert and oriented to person, place, and time  Comments: Grossly neuro intact; Able to move all extremities                           ED Course as of Feb 04 2353   Thu Feb 04, 2021 1955 Procedure Note: EKG  Date/Time: 02/04/21 7:55 PM   Interpreted by: Tri Martinez  Indications / Diagnosis: CP  ECG reviewed by me, the ED Provider: yes   The EKG demonstrates:  Rhythm: sinus tachycardia  Intervals: VT interval prolonged (206 ms) otherwise normal intervals  Axis: normal axis  QRS/Blocks: incomplete RBBB; otherwise normal QRS  ST Changes: T wave elevations in I, II; inversion in V1/V2 stable from last EKG Dec 2; otherwise no acute ST Changes, no STD/SELMA           2112 Stable   TOTAL BILIRUBIN(!): 1 16 2207 Decreased from baseline   Hemoglobin(!): 9 0     PE Study with CT abdomen & pelvis with contrast   Final Result      No evidence of pulmonary artery embolus  Colonic diverticulosis with minimal inflammatory changes around the sigmoid colon which may represent acute diverticulitis  No drainable fluid collection  If not already performed recently, colonoscopy after the acute illness is recommended to rule out possibility of colon cancer mimicking diverticulitis  Minimal inflammatory changes around the urinary bladder which may represent cystitis  Multiple pulmonary nodules similar to prior study  Status post right upper lobe ectomy  The study was marked in [facility] for immediate notification                 Workstation performed: [id number]           Orders Placed This Encounter   Procedures    PE Study with CT abdomen & pelvis with contrast    UA w Reflex to Microscopic w Reflex to Culture    CBC and differential    Troponin I    Comprehensive metabolic panel    Urine Microscopic    ECG 12 lead       Labs:     Labs Reviewed UA W REFLEX TO MICROSCOPIC WITH REFLEX TO CULTURE - Abnormal       Result Value Ref Range Status    Color, UA Yellow   Final    Clarity, UA Clear   Final    Specific Gilbert, UA 1 015  1 003 - 1 030 Final    pH, UA 5 5  4 5, 5 0, 5 5, 6 0, 6 5, 7 0, 7 5, 8 0 Final    Leukocytes, UA Negative  Negative Final    Nitrite, UA Negative  Negative Final    Protein, UA Trace (*) Negative mg/dl Final    Glucose, UA Negative  Negative mg/dl Final    Ketones, UA Trace (*) Negative mg/dl Final    Urobilinogen, UA 0 2  0 2, 1 0 E U /dl E U /dl Final    Bilirubin, UA Negative  Negative Final    Blood, UA Large (*) Negative Final   CBC AND DIFFERENTIAL - Abnormal    WBC 6 81  4 31 - 10 16 Thousand/uL Final    RBC 3 08 (*) 3 88 - 5 62 Million/uL Final    Hemoglobin 9 0 (*) 12 0 - 17 0 g/dL Final    Hematocrit 30 5 (*) 36 5 - 49 3 % Final    MCV 99 (*) 82 - 98 fL Final    MCH 29 2  26 8 - 34 3 pg Final    MCHC 29 5 (*) 31 4 - 37 4 g/dL Final    RDW 19 3 (*) 11 6 - 15 1 % Final    MPV 11 4  8 9 - 12 7 fL Final    Platelets 013 (*) 058 - 390 Thousands/uL Final    nRBC 0  /100 WBCs Final   COMPREHENSIVE METABOLIC PANEL - Abnormal    Sodium 143  136 - 145 mmol/L Final    Potassium 4 5  3 5 - 5 3 mmol/L Final    Chloride 107  100 - 108 mmol/L Final    CO2 30  21 - 32 mmol/L Final    ANION GAP 6  4 - 13 mmol/L Final    BUN 24  5 - 25 mg/dL Final    Creatinine 1 26  0 60 - 1 30 mg/dL Final    Comment: Standardized to IDMS reference method    Glucose 96  65 - 140 mg/dL Final    Comment: If the patient is fasting, the ADA then defines impaired fasting glucose as > 100 mg/dL and diabetes as > or equal to 123 mg/dL  Specimen collection should occur prior to Sulfasalazine administration due to the potential for falsely depressed results  Specimen collection should occur prior to Sulfapyridine administration due to the potential for falsely elevated results      Calcium 9 2  8 3 - 10 1 mg/dL Final    Corrected Calcium 10 4 (*) 8 3 - 10 1 mg/dL Final    AST 15  5 - 45 U/L Final    Comment: Specimen collection should occur prior to Sulfasalazine administration due to the potential for falsely depressed results  ALT 22  12 - 78 U/L Final    Comment: Specimen collection should occur prior to Sulfasalazine administration due to the potential for falsely depressed results  Alkaline Phosphatase 68  46 - 116 U/L Final    Total Protein 5 9 (*) 6 4 - 8 2 g/dL Final    Albumin 2 5 (*) 3 5 - 5 0 g/dL Final    Total Bilirubin 1 16 (*) 0 20 - 1 00 mg/dL Final    Comment: Use of this assay is not recommended for patients undergoing treatment with eltrombopag due to the potential for falsely elevated results      eGFR 52  ml/min/1 73sq m Final    Narrative:     National Kidney Disease Foundation guidelines for Chronic Kidney Disease (CKD):     Stage 1 with normal or high GFR (GFR > 90 mL/min/1 73 square meters)    Stage 2 Mild CKD (GFR = 60-89 mL/min/1 73 square meters)    Stage 3A Moderate CKD (GFR = 45-59 mL/min/1 73 square meters)    Stage 3B Moderate CKD (GFR = 30-44 mL/min/1 73 square meters)    Stage 4 Severe CKD (GFR = 15-29 mL/min/1 73 square meters)    Stage 5 End Stage CKD (GFR <15 mL/min/1 73 square meters)  Note: GFR calculation is accurate only with a steady state creatinine   URINE MICROSCOPIC - Abnormal    RBC, UA Innumerable (*) None Seen, 2-4 /hpf Final    WBC, UA 2-4  None Seen, 2-4 /hpf Final    Epithelial Cells Occasional  None Seen, Occasional /hpf Final    Bacteria, UA Occasional  None Seen, Occasional /hpf Final   MANUAL DIFFERENTIAL(PHLEBS DO NOT ORDER) - Abnormal    Segmented % 87 (*) 43 - 75 % Final    Bands % 3  0 - 8 % Final    Lymphocytes % 6 (*) 14 - 44 % Final    Monocytes % 2 (*) 4 - 12 % Final    Eosinophils, % 0  0 - 6 % Final    Basophils % 1  0 - 1 % Final    Myelocytes % 1  0 - 1 % Final    Absolute Neutrophils 6 13  1 85 - 7 62 Thousand/uL Final    Lymphocytes Absolute 0 41 (*) 0 60 - 4 47 Thousand/uL Final    Monocytes Absolute 0 14  0 00 - 1 22 Thousand/uL Final    Eosinophils Absolute 0 00  0 00 - 0 40 Thousand/uL Final    Basophils Absolute 0 07  0 00 - 0 10 Thousand/uL Final    Total Counted 100   Final    Anisocytosis Present   Final    Basophilic Stippling Present   Final    Ovalocytes Present   Final    Polychromasia Present   Final    Schistocytes Present   Final    Platelet Estimate Borderline (*) Adequate Final    Large Platelet Present   Final   TROPONIN I - Normal    Troponin I <0 02  <=0 04 ng/mL Final    Comment: 3Autovalidation override  [employer] Chemistry analyzer 99% cutoff is > 0 04 ng/mL in network labs     o cTnI 99% cutoff is useful only when applied to patients in the clinical setting of myocardial ischemia   o cTnI 99% cutoff should be interpreted in the context of clinical history, ECG findings and possibly cardiac imaging to establish correct diagnosis  o cTnI 99% cutoff may be suggestive but clearly not indicative of a coronary event without the clinical setting of myocardial ischemia  Imaging:   Pe Study With Ct Abdomen & Pelvis With Contrast    Result Date: 2/4/2021  Narrative: CT PULMONARY ANGIOGRAM OF THE CHEST AND CT ABDOMEN AND PELVIS WITH INTRAVENOUS CONTRAST INDICATION:   SOB hx of PE and abdominal pain  COMPARISON:  CT scan chest dated September 12, 2020 TECHNIQUE:  CT examination of the chest, abdomen and pelvis was performed  Thin section CT angiographic technique was used in the chest in order to evaluate for pulmonary embolus and coronal 3D MIP postprocessing was performed on the acquisition scanner  Axial, sagittal, and coronal 2D reformatted images were created from the source data and submitted for interpretation  Radiation dose length product (DLP) for this visit:  663 mGy-cm     This examination, like all CT scans performed in the [facility], was performed utilizing techniques to minimize radiation dose exposure, including the use of iterative reconstruction and automated exposure control  IV Contrast:  100 mL of iohexol (OMNIPAQUE) Enteric Contrast:  Enteric contrast was not administered  FINDINGS: CHEST PULMONARY ARTERIAL TREE:  No pulmonary embolus is seen  LUNGS:  There is a unchanged 7 mm pulmonary nodule seen within the left upper lobe  There is a 3 mm left upper lobe nodule similar to prior study  There is a unchanged 3 mm left lower lobe nodule  Multiple groundglass opacities are again visualized within the left lung  The patient is status post right upper lobectomy with 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  The trachea and central bronchial tree are patent  Emphysematous changes within the lungs are visualized  PLEURA:  Unremarkable  HEART/AORTA:  Unremarkable for patient's age  MEDIASTINUM AND GLEN:  Unremarkable  CHEST WALL AND LOWER NECK:   Right-sided chest port is seen  ABDOMEN LIVER/BILIARY TREE:  Unremarkable  GALLBLADDER:  Gallbladder is surgically absent  SPLEEN:  Unremarkable  PANCREAS:  Unremarkable  ADRENAL GLANDS:  Unremarkable  KIDNEYS/URETERS:  No hydronephrosis or urinary tract calculus  One or more sharply circumscribed subcentimeter renal hypodensities are present, too small to accurately characterize, and statistically most likely benign findings  According to recent literature (Radiology 2019) no further workup of these findings is recommended  STOMACH AND BOWEL:  Colonic diverticulosis with minimal inflammatory changes around the sigmoid colon APPENDIX:  No findings to suggest appendicitis  ABDOMINOPELVIC CAVITY:  No ascites  No pneumoperitoneum  No lymphadenopathy  VESSELS:  Unremarkable for patient's age  PELVIS REPRODUCTIVE ORGANS:  The prostate is enlarged  URINARY BLADDER:  Minimal inflammatory changes around the urinary bladder  Tiny calcification in the dome of the urinary bladder  ABDOMINAL WALL/INGUINAL REGIONS:  Unremarkable   OSSEOUS STRUCTURES:  Multiple compression deformities of the spine Impression: No evidence of pulmonary artery embolus  Colonic diverticulosis with minimal inflammatory changes around the sigmoid colon which may represent acute diverticulitis  No drainable fluid collection  If not already performed recently, colonoscopy after the acute illness is recommended to rule out possibility of colon cancer mimicking diverticulitis  Minimal inflammatory changes around the urinary bladder which may represent cystitis  Multiple pulmonary nodules similar to prior study  Status post right upper lobe ectomy  The study was marked in [facility] for immediate notification  Workstation performed: [id number]        Final Diagnosis:  1  Acute diverticulitis    2  Abdominal pain    3  Stage 3b chronic kidney disease    4  Enlarged prostate without lower urinary tract symptoms (luts)    5  SOB (shortness of breath)    6  Chronic diastolic congestive heart failure ([address] )    7  COPD mixed type ([address] )    8  Multiple myeloma, remission status unspecified (HCC)        Code Status: Prior    Portions of the record may have been created with voice recognition software  Occasional wrong word or "sound a like" substitutions may have occurred due to the inherent limitations of voice recognition software  Read the chart carefully and recognize, using context, where substitutions have occurred      Electronically signed by:  Ofelia Aguilera, PGY 2, MD Amy Houston MD  02/04/21 [id number]

## 2021-02-10 NOTE — LETTER
February 10, 2021     Schuyler Livingston, [address]  [address] [address]    Patient: Raisa Jackson   YOB: 1935   Date of Visit: 2/10/2021       Dear Dr Goran Sandoval:    Thank you for referring Brandi Stovall to me for evaluation  Below are my notes for this consultation  If you have questions, please do not hesitate to call me  I look forward to following your patient along with you  Sincerely,        Aneta Will DO        CC: Severiano Sierras, DO  [place], [place]  2/10/2021  3:38 PM  Attested  Consultation - Cardiothoracic Surgery   Raisa Jackson 81 y o  male MRN: [id number]    Physician Requesting Consult: JACK Piña/Dr Lida Barnes    Reason for Consult / Principal Problem: Aortic stenosis, Non-Rheumatic    History of Present Illness: Raisa Jackson is a 81y o  year old male who presents for initial outpatient surgical consultation for severe symptomatic aortic stenosis  The patient reports a history of a heart murmur for several years, followed by Dr Lida Barnes  He complains of progressive shortness of breath with exertion, fatigue and LE edema  He had an echo 9/4/20, which revealed severe AS with an RADHAMES 0 9cm2 and mean gradient 28mmHg  He has been referred for TAVR evaluation  The patient was recently hospitalized 12/2/20 for COPD exacerbation with a component of CHF  He was also hospitalized 2/4/21 for acute diverticulitis and treated with antibiotics, which he currently is on  He denies CP, PND, orthopnea, syncope, palpitations, or fevers  He states he cannot walk over 100ft without becoming short of breath  He admits to a productive cough with sputum  The patient has a history of multiple myeloma, lung cancer and rectal adenocarcimona  He underwent resection of his rectal cancer and unerwent a RUL VAT wedge resection and then right upper lobectomy   He has an increasing 7mm TOM pulmonary nodule, COPD and nocturnal hypoxia, requiring 2L O2 at night  He also has a history of PE, JACK (not using CPAP), chronic diastolic CHF, BPH, Chronic pain and an ascending aortic aneurysm measuring 4 5cm  He has full upper and lower dentures  He quit smoking in 2010, having smoked 1 ppd x 50 years  He denies any family history of cardiac disease  Past Medical History:  Past Medical History:   Diagnosis Date    Chronic pain disorder     3 fractured vertebrae    COPD (chronic obstructive pulmonary disease) (HCC)     Diarrhea     Diverticulitis     Enlarged prostate     Kidney stone     Multiple myeloma (HCC)     Pulmonary embolism ([address] ) 12/24/2018    Rectal adenocarcinoma (HCC)     Sleep apnea     no cpap         Past Surgical History:   Past Surgical History:   Procedure Laterality Date    CHOLECYSTECTOMY      COLONOSCOPY W/ ENDOSCOPIC US N/A 7/21/2017    Procedure: ANAL ENDOSCOPIC U/S;  Surgeon: Kelsy Valdovinos MD;  Location: [facility]; Service: Colorectal    HERNIA REPAIR      KIDNEY STONE SURGERY      LUNG LOBECTOMY Right     VA COLONOSCOPY FLX DX W/COLLJ SPEC WHEN PFRMD N/A 7/10/2017    Procedure: COLONOSCOPY;  Surgeon: Indra Campos MD;  Location: [facility];   Service: Gastroenterology    VA RECTAL TUMOR EXCISION, TRANSANAL ENDOSCOPIC MICROSURGICAL, FULL THICK N/A 11/2/2017    Procedure: TRANSANAL ENDOSCOPIC MICROSURGERY TEM;  Surgeon: Kelsy Valdovinos MD;  Location: [facility];  Service: Colorectal         Family History:  Family History   Problem Relation Age of Onset    Arthritis Mother     Colon cancer Father     Heart attack Father     Diabetes type I Sister          Social History:    Social History     Substance and Sexual Activity   Alcohol Use Yes    Comment: rarely     Social History     Substance and Sexual Activity   Drug Use No     Social History     Tobacco Use   Smoking Status Former Smoker    Packs/day: 1 00    Years: 50 00    Pack years: 50 00    Types: Cigarettes    Start date: 57    Quit date: 12/7/2010    Years since quitting: 10 1   Smokeless Tobacco Never Used   Tobacco Comment    quit in 1999       Home Medications:   Prior to Admission medications    Medication Sig Start Date End Date Taking?  Authorizing Provider   albuterol (2 5 mg/3 mL) 0 083 % nebulizer solution Inhale 2 5 mg every 6 (six) hours as needed 3/24/20  Yes Historical Provider, MD   albuterol (PROVENTIL HFA,VENTOLIN HFA) 90 mcg/act inhaler Inhale 2 puffs every 6 (six) hours as needed for wheezing 10/27/20  Yes JACK Cordova   amoxicillin-clavulanate (AUGMENTIN) 875-125 mg per tablet Take 1 tablet by mouth every 8 (eight) hours for 10 days 2/4/21 2/14/21 Yes Dale Patterson MD   apixaban (ELIQUIS) 5 mg Take 1 tablet (5 mg total) by mouth 2 (two) times a day  Patient taking differently: Take 2 5 mg by mouth 2 (two) times a day  12/27/18  Yes Castillo Mckinney MD   cholecalciferol (VITAMIN D3) 1,000 units tablet Take 5,000 Units by mouth daily     Yes Historical Provider, MD   cyanocobalamin (VITAMIN B-12) 1,000 mcg tablet Take by mouth daily   Yes Historical Provider, MD   famotidine (Pepcid) 20 mg tablet Take 20 mg by mouth as needed    Yes Historical Provider, MD   ferrous sulfate 325 (65 Fe) mg tablet Take 45 mg by mouth daily with breakfast    Yes Historical Provider, MD   finasteride (PROSCAR) 5 mg tablet TAKE 1 TABLET BY MOUTH AT BEDTIME 11/20/20  Yes Hector Farris PA-C   furosemide (LASIX) 20 mg tablet Take 1 tablet (20 mg total) by mouth 3 (three) times a week 1/13/21  Yes Fern Sevilla MD   guaifenesin-codeine (guaiFENesin AC) 100-10 MG/5ML liquid Take 10 mL by mouth every 4 (four) hours   Yes Historical Provider, MD   ipratropium (ATROVENT) 0 02 % nebulizer solution Take 1 vial (0 5 mg total) by nebulization 4 (four) times a day 9/9/20  Yes Fern Sevilla MD   Loperamide HCl (IMODIUM PO) Take 4 mg by mouth daily   Yes Historical Provider, MD   potassium chloride (K-DUR,KLOR-CON) 10 mEq tablet Take 1 tablet (10 mEq total) by mouth 3 (three) times a week Take only on days you take lasix 1/13/21  Yes Carley Lara MD   predniSONE 10 mg tablet Take 1 tablet (10 mg total) by mouth daily 1/29/21  Yes JACK Delacruz   Silodosin 8 MG CAPS Take by mouth   Yes Historical Provider, MD   tamsulosin (FLOMAX) 0 4 mg Take 0 4 mg by mouth daily with dinner   Yes Historical Provider, MD   traMADol (ULTRAM) 50 mg tablet TAKE ONE TABLET BY MOUTH EVERY 6 HOURS AS NEEDED FOR MODERATE PAIN (PAIN SCORE 4-6) 3/24/20  Yes Historical Provider, MD   lenalidomide (REVLIMID) 5 MG CAPS Take one capsule daily 3 weeks on followed by 3 weeks off  [facility]#[id number] on 1/4/21  Patient not taking: Reported on 2/10/2021 1/5/21   Polo Sorto MD   triamcinolone (KENALOG) 0 5 % cream APPLY TOPICALLY THREE TIMES DAILY TO SHINGLES RASH 3/26/20   Historical Provider, MD       Allergies: Allergies   Allergen Reactions    Gabapentin      somnolence    Metaxalone     Nisoldipine     Sulfa Antibiotics Itching    Aldactone [Spironolactone] Other (See Comments)     Breast swelling and pain  Breast swelling and pain    Penicillin V Rash     AS CHILD       Review of Systems:     Review of Systems   Constitutional: Positive for fatigue  Negative for chills, diaphoresis and fever  HENT: Negative  Negative for dental problem  Eyes: Negative  Respiratory: Positive for cough and shortness of breath  Negative for chest tightness  Cardiovascular: Positive for leg swelling  Negative for chest pain and palpitations  Gastrointestinal: Negative  Negative for diarrhea, nausea and vomiting  Endocrine: Negative  Genitourinary: Negative  Musculoskeletal: Negative  Skin: Negative  Allergic/Immunologic: Negative  Neurological: Negative  Negative for dizziness, syncope, weakness, light-headedness and numbness  Hematological: Negative for adenopathy  Does not bruise/bleed easily  Psychiatric/Behavioral: Negative      All other systems reviewed and are negative  Vital Signs:     Vitals:    02/10/21 1431 02/10/21 1437   BP: (!) 74/52 (!) 84/60   BP Location: Left arm Right arm   Patient Position: Sitting Sitting   Cuff Size: Adult Adult   Pulse:  (!) 110   Resp: 20    Temp: 99 3 °F (37 4 °C)    TempSrc: Tympanic    SpO2: 97%    Weight: 79 5 kg (175 lb 3 2 oz)    Height: 5' 7" (1 702 m)        Physical Exam:     Physical Exam  Vitals signs and nursing note reviewed  Constitutional:       General: He is not in acute distress  Appearance: He is well-developed and overweight  He is ill-appearing  He is not diaphoretic  HENT:      Head: Normocephalic and atraumatic  Right Ear: External ear normal       Left Ear: External ear normal       Nose: Nose normal       Mouth/Throat:      Pharynx: No oropharyngeal exudate  Eyes:      General: No scleral icterus  Right eye: No discharge  Left eye: No discharge  Conjunctiva/sclera: Conjunctivae normal       Pupils: Pupils are equal, round, and reactive to light  Neck:      Musculoskeletal: Normal range of motion and neck supple  Vascular: No JVD  Trachea: No tracheal deviation  Cardiovascular:      Rate and Rhythm: Normal rate and regular rhythm  Heart sounds: Murmur present  Systolic murmur present with a grade of 3/6  No friction rub  Pulmonary:      Effort: Pulmonary effort is normal  Prolonged expiration present  No respiratory distress  Breath sounds: No stridor  Wheezing and rhonchi present  No rales  Abdominal:      General: Bowel sounds are normal  There is no distension  Palpations: Abdomen is soft  There is no mass  Tenderness: There is no abdominal tenderness  There is no guarding  Musculoskeletal: Normal range of motion  General: No tenderness or deformity  Right lower le+ Pitting Edema present  Left lower le+ Pitting Edema present  Skin:     General: Skin is warm and dry  Coloration: Skin is not pale  Findings: No erythema or rash  Neurological:      Mental Status: He is alert and oriented to person, place, and time  Cranial Nerves: No cranial nerve deficit  Sensory: No sensory deficit  Motor: No abnormal muscle tone  Coordination: Coordination normal       Deep Tendon Reflexes: Reflexes normal    Psychiatric:         Behavior: Behavior normal  Behavior is cooperative  Thought Content: Thought content normal          Judgment: Judgment normal          Lab Results:     Results from last 7 days   Lab Units 02/04/21 2021   WBC Thousand/uL 6 81   HEMOGLOBIN g/dL 9 0*   HEMATOCRIT % 30 5*   PLATELETS Thousands/uL 131*     Results from last 7 days   Lab Units 02/04/21 2021   POTASSIUM mmol/L 4 5   CHLORIDE mmol/L 107   CO2 mmol/L 30   BUN mg/dL 24   CREATININE mg/dL 1 26   CALCIUM mg/dL 9 2         Lab Results   Component Value Date    HGBA1C 4 9 02/19/2020     Lab Results   Component Value Date    CKTOTAL 63 02/28/2014    TROPONINI <0 02 02/04/2021       Imaging Studies:     Echocardiogram 9/4/20: LEFT VENTRICLE: Normal left ventricular cavity size, mild concentric left ventricular hypertrophy, normal left ventricular systolic function  Abnormal septal motion consistent with bundle branch block otherwise no regional wall motion  abnormality noted  Ejection fraction is estimated as around 55%  Doppler evaluation suggests grade 1 diastolic dysfunction  Estimated left ventricular filling pressure is normal      RIGHT VENTRICLE: Normal right ventricular size and systolic function  Right ventricular systolic pressure could not be estimated due to inadequate tricuspid regurgitation profile      LEFT ATRIUM: Normal left atrial cavity size  Intact interatrial septum      RIGHT ATRIUM: Normal right atrial cavity size      MITRAL VALVE: Mitral annular calcification and sclerosis  Reduced mobility of posterior mitral valve leaflet  No mitral valve stenosis   Trace mitral valve regurgitation      AORTIC VALVE: heavily calcified tricuspid aortic valve with a markedly reduced cuspal separation  There is moderate to severe aortic valve stenosis with mild aortic valve regurgitation  Peak transaortic velocity is 3 23 m/sec, mean  gradient is 28 mmHg and calculated aortic valve area is 0 9 cm2  Doppler velocity index is 0 26 suggesting significant aortic valve stenosis      TRICUSPID VALVE: Trace tricuspid valve regurgitation      PULMONIC VALVE: No pulmonic valve regurgitation      PERICARDIUM: No pericardial effusion      AORTA: Dilated aortic root and proximal ascending aorta measuring up to 4 5 cm  Ascending thoracic aorta aneurysm      SYSTEMIC VEINS: IVC: Inferior vena cava is not well visualized      SYSTEM MEASUREMENT TABLES     2D  %FS: 34 7 %  Ao Diam: 4 1 cm  EDV(Teich): 60 3 ml  EF(Teich): 64 7 %  ESV(Teich): 21 3 ml  IVSd: 1 2 cm  LA Area: 11 9 cm2  LA Diam: 3 7 cm  LVEDV MOD A4C: 53 5 ml  LVEF MOD A4C: 43 3 %  LVESV MOD A4C: 30 4 ml  LVIDd: 3 8 cm  LVIDs: 2 5 cm  LVLd A4C: 7 8 cm  LVLs A4C: 6 9 cm  LVOT Diam: 2 cm  LVPWd: 1 1 cm  RA Area: 12 1 cm2  RVIDd: 3 7 cm  SV MOD A4C: 23 2 ml  SV(Teich): 39 ml     CW  AR Dec Terry: 1 5 m/s2  AR Dec Time: 1820 1 ms  AR PHT: 527 8 ms  AR Vmax: 2 8 m/s  AR maxP 6 mmHg  AV Env  Ti: 256 4 ms  AV VTI: 65 2 cm  AV Vmax: 3 2 m/s  AV Vmean: 2 5 m/s  AV maxP 6 mmHg  AV meanP 6 mmHg     MM  TAPSE: 1 6 cm     PW  RADHAMES (VTI): 1 cm2  RADHAMES Vmax: 0 9 cm2  AVAI (VTI): 0 cm2/m2  AVAI Vmax: 0 cm2/m2  E': 0 1 m/s  E/E': 10 6  LVOT Env  Ti: 301 1 ms  LVOT VTI: 19 6 cm  LVOT Vmax: 0 9 m/s  LVOT Vmean: 0 7 m/s  LVOT maxP 9 mmHg  LVOT meanP 8 mmHg  LVSI Dopp: 32 7 ml/m2  LVSV Dopp: 64 1 ml  MV A Bertin: 0 9 m/s  MV Dec Terry: 5 m/s2  MV DecT: 169 9 ms  MV E Bertin: 0 8 m/s  MV E/A Ratio: 0 9  MV PHT: 49 3 ms  MVA By PHT: 4 5 cm2    CTA C/A/P 21: No evidence of pulmonary artery embolus      Colonic diverticulosis with minimal inflammatory changes around the sigmoid colon which may represent acute diverticulitis  No drainable fluid collection      If not already performed recently, colonoscopy after the acute illness is recommended to rule out possibility of colon cancer mimicking diverticulitis      Minimal inflammatory changes around the urinary bladder which may represent cystitis      Multiple pulmonary nodules similar to prior study      Status post right upper lobe ectomy        I have personally reviewed pertinent reports  and I have personally reviewed pertinent films in PACS    TAVR evaluation Assessment:     Jaqueline Lee 122: III    STS Risk Score: 4 9 %, mortality risk    5 Meter Walk Test:  deferred    KCCQ-12 completed        Assessment:  Patient Active Problem List    Diagnosis Date Noted    Pancytopenia ([facility] [address] ) 12/03/2020    Blood in stool 11/11/2020    Lung nodule 09/09/2020    Right flank pain 09/09/2020    Nocturnal hypoxia 09/09/2020    CKD (chronic kidney disease) stage 3, GFR 30-59 ml/min 05/13/2020    Chronic diastolic congestive heart failure ([address] ) 01/05/2020    Hypogammaglobulinemia ([facility] [address] ) 12/05/2019    Skin lesion 12/05/2019    Port-A-Cath in place 12/05/2019    Elevated PSA 08/16/2019    Gross hematuria 08/16/2019    Rectal cancer ([address] ) 07/03/2019    Venous insufficiency (chronic) (peripheral) 05/14/2019    Chronic anticoagulation 12/21/2018    Peripheral edema 12/05/2018    Severe aortic stenosis 07/17/2018    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    Right bundle branch block 01/03/2018    COPD mixed type ([address] ) 08/22/2017    Chronic rhinitis 03/03/2016    Adenocarcinoma, lung ([address] ) 03/03/2016    BPH (benign prostatic hyperplasia) 03/03/2016    Multiple myeloma ([address] ) 03/03/2016    Chronic diarrhea 03/03/2016    Osteoporosis 06/17/2013    Enlarged prostate without lower urinary tract symptoms (luts) 06/21/2012     Severe aortic stenosis;  Ongoing TAVR workup    Plan:    Mikayla Rogel has severe symptomatic aortic stenosis  He also has COPD requiring O2 at night, multiple myeloma, lung cancer and rectal cancer  The patient is uncertain of whether he would like to proceed with TAVR, as there is no certainty undergoing this procedure would significantly improve his respiratory problems  He wishes to think things over and discuss his options with his family  He will contact us if he decides to proceed with preop testing  Rina Oreilly was comfortable with our recommendations, and their questions were answered to their satisfaction  Thank you for allowing us to participate in the care of this patient  The patient recently had a screening colonoscopy in 2014  Therefore GI referral is not indicated at this time  SIGNATURE: Kmi Le [place]  DATE: February 10, 2021  TIME: 3:07 PM    * This note was completed in part utilizing [employer] direct voice recognition software  Grammatical errors, random word insertion, spelling mistakes, and incomplete sentences may be an occasional consequence of the system secondary to software limitations, ambient noise and hardware issues  At the time of dictation, efforts were made to edit, clarify and /or correct errors  Please read the chart carefully and recognize, using context, where substitutions have occurred  If you have any questions or concerns about the context, text or information contained within the body of this dictation, please contact myself, the provider, for further clarification  Attestation signed by Russel Harris DO at 2/10/2021  3:49 PM:  The patient was seen and examined, and I agree with the midlevel's history, physical exam, assessment and plan with the following additions:      Nick Vann was seen in the office today to discuss treatment options for his aortic stenosis  He experiences shortness of breath, which is multifactorial, only 1 portion of which could be related to his aortic stenosis    His echocardiogram was reviewed by myself personally and discussed with him  This demonstrates aortic stenosis with preserved left ventricular function  Feel likely a shortness of breath is attributed to his lung disease, and his mucous complaints are not related to heart failure  While TAVR treatment for his aortic stenosis is possible, it carries significant risk with his comorbidities  Additionally the likelihood is that he will have only small improvement if that  At this point he wants to discuss with his family if he wishes to continue with treatment and TAVR

## 2021-02-10 NOTE — PROGRESS NOTES
Consultation - Cardiothoracic Surgery   Connie Naranjo 81 y o  male MRN: [id number]    Physician Requesting Consult: JACK Mcmaohn/Dr Giuseppe Madison    Reason for Consult / Principal Problem: Aortic stenosis, Non-Rheumatic    History of Present Illness: Connie Naranjo is a 81y o  year old male who presents for initial outpatient surgical consultation for severe symptomatic aortic stenosis  The patient reports a history of a heart murmur for several years, followed by Dr Giuseppe Madison  He complains of progressive shortness of breath with exertion, fatigue and LE edema  He had an echo 9/4/20, which revealed severe AS with an RADHAMES 0 9cm2 and mean gradient 28mmHg  He has been referred for TAVR evaluation  The patient was recently hospitalized 12/2/20 for COPD exacerbation with a component of CHF  He was also hospitalized 2/4/21 for acute diverticulitis and treated with antibiotics, which he currently is on  He denies CP, PND, orthopnea, syncope, palpitations, or fevers  He states he cannot walk over 100ft without becoming short of breath  He admits to a productive cough with sputum  The patient has a history of multiple myeloma, lung cancer and rectal adenocarcimona  He underwent resection of his rectal cancer and unerwent a RUL VAT wedge resection and then right upper lobectomy  He has an increasing 7mm TOM pulmonary nodule, COPD and nocturnal hypoxia, requiring 2L O2 at night  He also has a history of PE, JACK (not using CPAP), chronic diastolic CHF, BPH, Chronic pain and an ascending aortic aneurysm measuring 4 5cm  He has full upper and lower dentures  He quit smoking in 2010, having smoked 1 ppd x 50 years  He denies any family history of cardiac disease      Past Medical History:  Past Medical History:   Diagnosis Date    Chronic pain disorder     3 fractured vertebrae    COPD (chronic obstructive pulmonary disease) (HCC)     Diarrhea     Diverticulitis     Enlarged prostate     Kidney stone     [facility]     Pulmonary embolism ([facility] ) 12/24/2018    Rectal adenocarcinoma ([facility] )     Sleep apnea     no cpap         Past Surgical History:   Past Surgical History:   Procedure Laterality Date    CHOLECYSTECTOMY      COLONOSCOPY W/ ENDOSCOPIC US N/A 7/21/2017    Procedure: ANAL ENDOSCOPIC U/S;  Surgeon: Sandy Hernandez MD;  [facility]: [facility]; Service: Colorectal    HERNIA REPAIR      KIDNEY STONE SURGERY      LUNG LOBECTOMY Right     OK COLONOSCOPY FLX DX W/COLLJ SPEC WHEN PFRMD N/A 7/10/2017    Procedure: COLONOSCOPY;  Surgeon: Lorraine Dinh MD;  [facility]: [facility]; Service: Gastroenterology    OK RECTAL TUMOR EXCISION, TRANSANAL ENDOSCOPIC MICROSURGICAL, FULL THICK N/A 11/2/2017    Procedure: TRANSANAL ENDOSCOPIC MICROSURGERY TEM;  Surgeon: Sandy Hernandez MD;  [facility]: [facility];  Service: Colorectal         Family History:  Family History   Problem Relation Age of Onset    Arthritis Mother     Colon cancer Father     Heart attack Father     Diabetes type I Sister          Social History:    Social History     Substance and Sexual Activity   Alcohol Use Yes    Comment: rarely     Social History     Substance and Sexual Activity   Drug Use No     Social History     Tobacco Use   Smoking Status Former Smoker    Packs/day: 1 00    Years: 50 00    Pack years: 50 00    Types: Cigarettes    Start date: 57    Quit date: 12/7/2010    Years since quitting: 10 1   Smokeless Tobacco Never Used   Tobacco Comment    quit in [place] Medications:   Prior to Admission medications    Medication Sig Start Date End Date Taking?  Authorizing Provider   albuterol (2 5 mg/3 mL) 0 083 % nebulizer solution Inhale 2 5 mg every 6 (six) hours as needed 3/24/20  Yes Historical Provider, MD   albuterol (PROVENTIL HFA,VENTOLIN HFA) 90 mcg/act inhaler Inhale 2 puffs every 6 (six) hours as needed for wheezing 10/27/20  Yes JACK Domingo   amoxicillin-clavulanate (AUGMENTIN) 875-125 mg per tablet Take 1 tablet by mouth every 8 (eight) hours for 10 days 2/4/21 2/14/21 Yes Lenora Gibson MD   apixaban (ELIQUIS) 5 mg Take 1 tablet (5 mg total) by mouth 2 (two) times a day  Patient taking differently: Take 2 5 mg by mouth 2 (two) times a day  12/27/18  Yes Gaurang Callaway MD   cholecalciferol (VITAMIN D3) 1,000 units tablet Take 5,000 Units by mouth daily     Yes Historical Provider, MD   cyanocobalamin (VITAMIN B-12) 1,000 mcg tablet Take by mouth daily   Yes Historical Provider, MD   famotidine (Pepcid) 20 mg tablet Take 20 mg by mouth as needed    Yes Historical Provider, MD   ferrous sulfate 325 (65 Fe) mg tablet Take 45 mg by mouth daily with breakfast    Yes Historical Provider, MD   finasteride (PROSCAR) 5 mg tablet TAKE 1 TABLET BY MOUTH AT BEDTIME 11/20/20  Yes Renetta Ac PA-C   furosemide (LASIX) 20 mg tablet Take 1 tablet (20 mg total) by mouth 3 (three) times a week 1/13/21  Yes Abdoul Miles MD   guaifenesin-codeine (guaiFENesin AC) 100-10 MG/5ML liquid Take 10 mL by mouth every 4 (four) hours   Yes Historical Provider, MD   ipratropium (ATROVENT) 0 02 % nebulizer solution Take 1 vial (0 5 mg total) by nebulization 4 (four) times a day 9/9/20  Yes Abdoul Miles MD   Loperamide HCl (IMODIUM PO) Take 4 mg by mouth daily   Yes Historical Provider, MD   potassium chloride (K-DUR,KLOR-CON) 10 mEq tablet Take 1 tablet (10 mEq total) by mouth 3 (three) times a week Take only on days you take lasix 1/13/21  Yes Abdoul Miles MD   predniSONE 10 mg tablet Take 1 tablet (10 mg total) by mouth daily 1/29/21  Yes JACK Schneider   Silodosin 8 MG CAPS Take by mouth   Yes Historical Provider, MD   tamsulosin (FLOMAX) 0 4 mg Take 0 4 mg by mouth daily with dinner   Yes Historical Provider, MD   traMADol (ULTRAM) 50 mg tablet TAKE ONE TABLET BY MOUTH EVERY 6 HOURS AS NEEDED FOR MODERATE PAIN (PAIN SCORE 4-6) 3/24/20  Yes Historical Provider, MD   lenalidomide (REVLIMID) 5 MG CAPS Take one capsule daily 3 weeks on followed by 3 weeks off  [facility]#[id number] on 1/4/21  Patient not taking: Reported on 2/10/2021 1/5/21   Kinga Pérez MD   triamcinolone (KENALOG) 0 5 % cream APPLY TOPICALLY THREE TIMES DAILY TO SHINGLES RASH 3/26/20   Historical Provider, MD       Allergies: Allergies   Allergen Reactions    Gabapentin      somnolence    Metaxalone     Nisoldipine     Sulfa Antibiotics Itching    Aldactone [Spironolactone] Other (See Comments)     Breast swelling and pain  Breast swelling and pain    Penicillin V Rash     AS CHILD       Review of Systems:     Review of Systems   Constitutional: Positive for fatigue  Negative for chills, diaphoresis and fever  HENT: Negative  Negative for dental problem  Eyes: Negative  Respiratory: Positive for cough and shortness of breath  Negative for chest tightness  Cardiovascular: Positive for leg swelling  Negative for chest pain and palpitations  Gastrointestinal: Negative  Negative for diarrhea, nausea and vomiting  Endocrine: Negative  Genitourinary: Negative  Musculoskeletal: Negative  Skin: Negative  Allergic/Immunologic: Negative  Neurological: Negative  Negative for dizziness, syncope, weakness, light-headedness and numbness  Hematological: Negative for adenopathy  Does not bruise/bleed easily  Psychiatric/Behavioral: Negative  All other systems reviewed and are negative  Vital Signs:     Vitals:    02/10/21 1431 02/10/21 1437   BP: (!) 74/52 (!) 84/60   BP Location: Left arm Right arm   Patient Position: Sitting Sitting   Cuff Size: Adult Adult   Pulse:  (!) 110   Resp: 20    Temp: 99 3 °F (37 4 °C)    TempSrc: Tympanic    SpO2: 97%    Weight: 79 5 kg (175 lb 3 2 oz)    Height: 5' 7" (1 702 m)        Physical Exam:     Physical Exam  Vitals signs and nursing note reviewed  Constitutional:       General: He is not in acute distress  Appearance: He is well-developed and overweight  He is ill-appearing  He is not diaphoretic  HENT:      Head: Normocephalic and atraumatic  Right Ear: External ear normal       Left Ear: External ear normal       Nose: Nose normal       Mouth/Throat:      Pharynx: No oropharyngeal exudate  Eyes:      General: No scleral icterus  Right eye: No discharge  Left eye: No discharge  Conjunctiva/sclera: Conjunctivae normal       Pupils: Pupils are equal, round, and reactive to light  Neck:      Musculoskeletal: Normal range of motion and neck supple  Vascular: No JVD  Trachea: No tracheal deviation  Cardiovascular:      Rate and Rhythm: Normal rate and regular rhythm  Heart sounds: Murmur present  Systolic murmur present with a grade of 3/6  No friction rub  Pulmonary:      Effort: Pulmonary effort is normal  Prolonged expiration present  No respiratory distress  Breath sounds: No stridor  Wheezing and rhonchi present  No rales  Abdominal:      General: Bowel sounds are normal  There is no distension  Palpations: Abdomen is soft  There is no mass  Tenderness: There is no abdominal tenderness  There is no guarding  Musculoskeletal: Normal range of motion  General: No tenderness or deformity  Right lower le+ Pitting Edema present  Left lower le+ Pitting Edema present  Skin:     General: Skin is warm and dry  Coloration: Skin is not pale  Findings: No erythema or rash  Neurological:      Mental Status: He is alert and oriented to person, place, and time  Cranial Nerves: No cranial nerve deficit  Sensory: No sensory deficit  Motor: No abnormal muscle tone  Coordination: Coordination normal       Deep Tendon Reflexes: Reflexes normal    Psychiatric:         Behavior: Behavior normal  Behavior is cooperative  Thought Content:  Thought content normal          Judgment: Judgment normal          Lab Results:     Results from last 7 days   Lab Units 21 WBC Thousand/uL 6 81   HEMOGLOBIN g/dL 9 0*   HEMATOCRIT % 30 5*   PLATELETS Thousands/uL 131*     Results from last 7 days   Lab Units 02/04/21 2021   POTASSIUM mmol/L 4 5   CHLORIDE mmol/L 107   CO2 mmol/L 30   BUN mg/dL 24   CREATININE mg/dL 1 26   CALCIUM mg/dL 9 2         Lab Results   Component Value Date    HGBA1C 4 9 02/19/2020     Lab Results   Component Value Date    CKTOTAL 63 02/28/2014    TROPONINI <0 02 02/04/2021       Imaging Studies:     Echocardiogram 9/4/20: LEFT VENTRICLE: Normal left ventricular cavity size, mild concentric left ventricular hypertrophy, normal left ventricular systolic function  Abnormal septal motion consistent with bundle branch block otherwise no regional wall motion  abnormality noted  Ejection fraction is estimated as around 55%  Doppler evaluation suggests grade 1 diastolic dysfunction  Estimated left ventricular filling pressure is normal      RIGHT VENTRICLE: Normal right ventricular size and systolic function  Right ventricular systolic pressure could not be estimated due to inadequate tricuspid regurgitation profile      LEFT ATRIUM: Normal left atrial cavity size  Intact interatrial septum      RIGHT ATRIUM: Normal right atrial cavity size      MITRAL VALVE: Mitral annular calcification and sclerosis  Reduced mobility of posterior mitral valve leaflet  No mitral valve stenosis  Trace mitral valve regurgitation      AORTIC VALVE: heavily calcified tricuspid aortic valve with a markedly reduced cuspal separation  There is moderate to severe aortic valve stenosis with mild aortic valve regurgitation  Peak transaortic velocity is 3 23 m/sec, mean  gradient is 28 mmHg and calculated aortic valve area is 0 9 cm2   Doppler velocity index is 0 26 suggesting significant aortic valve stenosis      TRICUSPID VALVE: Trace tricuspid valve regurgitation      PULMONIC VALVE: No pulmonic valve regurgitation      PERICARDIUM: No pericardial effusion      AORTA: Dilated aortic root and proximal ascending aorta measuring up to 4 5 cm  Ascending thoracic aorta aneurysm      SYSTEMIC VEINS: IVC: Inferior vena cava is not well visualized      SYSTEM MEASUREMENT TABLES     2D  %FS: 34 7 %  Ao Diam: 4 1 cm  EDV(Teich): 60 3 ml  EF(Teich): 64 7 %  ESV(Teich): 21 3 ml  IVSd: 1 2 cm  LA Area: 11 9 cm2  LA Diam: 3 7 cm  LVEDV MOD A4C: 53 5 ml  LVEF MOD A4C: 43 3 %  LVESV MOD A4C: 30 4 ml  LVIDd: 3 8 cm  LVIDs: 2 5 cm  LVLd A4C: 7 8 cm  LVLs A4C: 6 9 cm  LVOT Diam: 2 cm  LVPWd: 1 1 cm  RA Area: 12 1 cm2  RVIDd: 3 7 cm  SV MOD A4C: 23 2 ml  SV(Teich): 39 ml     CW  AR Dec [place]: 1 5 m/s2  AR Dec Time: 1820 1 ms  AR PHT: 527 8 ms  AR Vmax: 2 8 m/s  AR maxP 6 mmHg  AV Env  Ti: 256 4 ms  AV VTI: 65 2 cm  AV Vmax: 3 2 m/s  AV Vmean: 2 5 m/s  AV maxP 6 mmHg  AV meanP 6 mmHg     MM  TAPSE: 1 6 cm     PW  RADHAMES (VTI): 1 cm2  RADHAMES Vmax: 0 9 cm2  AVAI (VTI): 0 cm2/m2  AVAI Vmax: 0 cm2/m2  E': 0 1 m/s  E/E': 10 6  LVOT Env  Ti: 301 1 ms  LVOT VTI: 19 6 cm  LVOT Vmax: 0 9 m/s  LVOT Vmean: 0 7 m/s  LVOT maxP 9 mmHg  LVOT meanP 8 mmHg  LVSI Dopp: 32 7 ml/m2  LVSV Dopp: 64 1 ml  MV A Bertin: 0 9 m/s  MV Dec [place]: 5 m/s2  MV DecT: 169 9 ms  MV E Bertin: 0 8 m/s  MV E/A Ratio: 0 9  MV PHT: 49 3 ms  MVA By PHT: 4 5 cm2    CTA C/A/P 21: No evidence of pulmonary artery embolus      Colonic diverticulosis with minimal inflammatory changes around the sigmoid colon which may represent acute diverticulitis  No drainable fluid collection      If not already performed recently, colonoscopy after the acute illness is recommended to rule out possibility of colon cancer mimicking diverticulitis      Minimal inflammatory changes around the urinary bladder which may represent cystitis      Multiple pulmonary nodules similar to prior study      Status post right upper lobe ectomy        I have personally reviewed pertinent reports     and I have personally reviewed pertinent films in PACS    TAVR evaluation Assessment: Jaqueline Lee 122: III    STS Risk Score: 4 9 %, mortality risk    5 Meter Walk Test:  deferred    KCCQ-12 completed        Assessment:  Patient Active Problem List    Diagnosis Date Noted    Pancytopenia ([facility] ) 12/03/2020    Blood in stool 11/11/2020    Lung nodule 09/09/2020    Right flank pain 09/09/2020    Nocturnal hypoxia 09/09/2020    CKD (chronic kidney disease) stage 3, GFR 30-59 ml/min 05/13/2020    Chronic diastolic congestive heart failure ([facility] ) 01/05/2020    Hypogammaglobulinemia ([facility] ) 12/05/2019    Skin lesion 12/05/2019    Port-A-Cath in place 12/05/2019    Elevated PSA 08/16/2019    Gross hematuria 08/16/2019    Rectal cancer ([place] [address] ) 07/03/2019    Venous insufficiency (chronic) (peripheral) 05/14/2019    Chronic anticoagulation 12/21/2018    Peripheral edema 12/05/2018    Severe aortic stenosis 07/17/2018    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    Right bundle branch block 01/03/2018    COPD mixed type ([place] [address] ) 08/22/2017    Chronic rhinitis 03/03/2016    Adenocarcinoma, lung ([place] [address] ) 03/03/2016    BPH (benign prostatic hyperplasia) 03/03/2016    Multiple myeloma ([place] [address] ) 03/03/2016    Chronic diarrhea 03/03/2016    Osteoporosis 06/17/2013    Enlarged prostate without lower urinary tract symptoms (luts) 06/21/2012     Severe aortic stenosis; Ongoing TAVR workup    Plan:    Vale Tijerina has severe symptomatic aortic stenosis  He also has COPD requiring O2 at night, multiple myeloma, lung cancer and rectal cancer  The patient is uncertain of whether he would like to proceed with TAVR, as there is no certainty undergoing this procedure would significantly improve his respiratory problems  He wishes to think things over and discuss his options with his family  He will contact us if he decides to proceed with preop testing  Vale Tijerina was comfortable with our recommendations, and their questions were answered to their satisfaction   Thank you for allowing us to participate in the care of this patient  The patient recently had a screening colonoscopy in 2014  Therefore GI referral is not indicated at this time  SIGNATURE: Courtney Perez  DATE: February 10, 2021  TIME: 3:07 PM    * This note was completed in part utilizing m-modal fluency direct voice recognition software  Grammatical errors, random word insertion, spelling mistakes, and incomplete sentences may be an occasional consequence of the system secondary to software limitations, ambient noise and hardware issues  At the time of dictation, efforts were made to edit, clarify and /or correct errors  Please read the chart carefully and recognize, using context, where substitutions have occurred  If you have any questions or concerns about the context, text or information contained within the body of this dictation, please contact myself, the provider, for further clarification
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
## 2021-02-19 NOTE — TELEPHONE ENCOUNTER
Attempted to call [facility] twice and was on hold for extended periods of time  Melphalan is on hold  Pt has follow up on 2/25

## 2021-02-19 NOTE — TELEPHONE ENCOUNTER
[facility] pharmacy is calling following up on fax refill request for Melphalan prescription  Per 2/4/21 office note: will hold on treatment until ER evaluation and later on will start the patient on melphalan, Revlimid, prednisone  Please review with Dr Bernie Curtis if patient's medication is to be restarted and refilled  [facility] # [phone]

## 2021-02-24 NOTE — ASSESSMENT & PLAN NOTE
·  Recent CT scan done during the hospitalization in December showed no change  He has chronic stable changes and nodules  ·  would advise against continued imaging from the pulmonary perspective at this time

## 2021-02-24 NOTE — PROGRESS NOTES
Progress note - Pulmonary Medicine   Max Astorga 81 y o  male MRN: [id number]       Impression & Plan:     COPD mixed type [facility])  ·  Margaret Lopez presents again today quite symptomatic   · He did not take his nebulizer treatment this morning and has a lot of mucus and rattle in his chest  · Has continued to have low-grade temperatures since his hospital stay  No change in color of his phlegm which remains clear  I advised against antibiotics at this time given recent antibiotic course  · We had a long discussion with Margaret Lopez, his wife, and his daughter about his pulmonary condition  He has multiple medical problems  There are minimal interventions at this time that we can attempt to improve his condition  · He has never been particularly responsive to bronchodilators and I would continue with the nebulizer treatments that are currently ordered  · He is only on 10 mg of prednisone daily,  I recommended increase to 20 mg daily  At higher doses he had side effects and personality changes where he became more irritable  ·  we discussed the possibility of adding scopolamine to the regimen however given his prostate symptoms, age, and significant somnolence already, this may be a poor choice of medication for him  They have declined at this time  This would be a high risk medication for him  ·  once again discussed palliative care with him  I have agreed that this is an appropriate course for him  Margaret Lopez is very hesitant to have helped in the home but I did explain that this is also necessary for both him as well as the benefit of his wife and family to help take care of him  His daughter has also engaged some private duty nursing through [facility] for daily assistance     overall Margaret Lopez has experienced continued decline  His overall nutritional status is worse  His overall medical condition is worse    From my perspective, he would be appropriate for palliative care and for hospice but overall he and his wife have been resistant to hospice in particular  ______________________________________________________________________    HPI:    Odessa Rodriguez presents today for follow-up of  Shortness of breath which is multifactorial   He has continued to have severe respiratory symptomatology  He has fatigue, hypersomnolence, poor nutritional status, and continued respiratory symptoms despite therapy  He has been on prednisone at high doses with some improvement but when on lower doses has worsened symptoms  He continues to cough and produce mucus  He has wheezing, particularly with exertion  The wheezes related to the retained mucoid secretions  He is using his nebulizer 3 to 4 times a day but did forget to take his nebulizer today  He presented to the office today very short of breath and symptomatic  He was wheezing as well  He has been much weaker  He has sustained a fall at home according to his daughter  He had to call family assistance to get him up  He became trapped between the commode and the wall  His appetite is poor  He does not feel like eating at all in the morning  He sometimes eats later in the day however  He has lost weight  from the cardiac standpoint  He did see Cardiac surgery  He was advised against aortic valve replacement  I do think he would be a poor candidate for this given his multiple comorbidities  I do however think his cardiac status contributes to his respiratory symptomatology however    His wife and daughter complained that he does a lot of sleeping in his fatigued throughout the day  He cannot tolerate any medications that contribute to his somnolence  He has had personality changes as well with increased irritability  This was particularly bad when he was on high doses of prednisone  He has become very difficult to care for due to his limitations and his wife also has failing health and memory which has made it difficult for her to care for him as well      Current Medications:    Current Outpatient Medications:     albuterol (2 5 mg/3 mL) 0 083 % nebulizer solution, Inhale 2 5 mg every 6 (six) hours as needed, Disp: , Rfl:     albuterol (PROVENTIL HFA,VENTOLIN HFA) 90 mcg/act inhaler, Inhale 2 puffs every 6 (six) hours as needed for wheezing, Disp: 3 Inhaler, Rfl: 3    apixaban (ELIQUIS) 5 mg, Take 1 tablet (5 mg total) by mouth 2 (two) times a day (Patient taking differently: Take 2 5 mg by mouth 2 (two) times a day ), Disp: 60 tablet, Rfl: 3    cholecalciferol (VITAMIN D3) 1,000 units tablet, Take 5,000 Units by mouth daily  , Disp: , Rfl:     cyanocobalamin (VITAMIN B-12) 1,000 mcg tablet, Take by mouth daily, Disp: , Rfl:     famotidine (Pepcid) 20 mg tablet, Take 20 mg by mouth as needed , Disp: , Rfl:     ferrous sulfate 325 (65 Fe) mg tablet, Take 45 mg by mouth daily with breakfast , Disp: , Rfl:     finasteride (PROSCAR) 5 mg tablet, TAKE 1 TABLET BY MOUTH AT BEDTIME, Disp: 90 tablet, Rfl: 0    furosemide (LASIX) 20 mg tablet, Take 1 tablet (20 mg total) by mouth 3 (three) times a week, Disp: 30 tablet, Rfl: 1    guaifenesin-codeine (guaiFENesin AC) 100-10 MG/5ML liquid, Take 10 mL by mouth every 4 (four) hours, Disp: , Rfl:     ipratropium (ATROVENT) 0 02 % nebulizer solution, Take 1 vial (0 5 mg total) by nebulization 4 (four) times a day, Disp: 300 mL, Rfl: 6    lenalidomide (REVLIMID) 5 MG CAPS, Take one capsule daily 3 weeks on followed by 3 weeks off  [facility][id number] on 1/4/21, Disp: 21 capsule, Rfl: 0    Loperamide HCl (IMODIUM PO), Take 4 mg by mouth daily, Disp: , Rfl:     potassium chloride (K-DUR,KLOR-CON) 10 mEq tablet, Take 1 tablet (10 mEq total) by mouth 3 (three) times a week Take only on days you take lasix, Disp: 30 tablet, Rfl: 1    predniSONE 10 mg tablet, Take 1 tablet (10 mg total) by mouth daily, Disp: 30 tablet, Rfl: 1    tamsulosin (FLOMAX) 0 4 mg, Take 0 4 mg by mouth daily with dinner, Disp: , Rfl:     traMADol (ULTRAM) 50 mg tablet, TAKE ONE TABLET BY MOUTH EVERY 6 HOURS AS NEEDED FOR MODERATE PAIN (PAIN SCORE 4-6), Disp: , Rfl:     triamcinolone (KENALOG) 0 5 % cream, APPLY TOPICALLY THREE TIMES DAILY TO SHINGLES RASH, Disp: , Rfl:     predniSONE 20 mg tablet, Take 1 tablet (20 mg total) by mouth daily, Disp: 30 tablet, Rfl: 3    Silodosin 8 MG CAPS, Take by mouth, Disp: , Rfl:     Review of Systems:  Aside from what is mentioned in the HPI, the review of systems is otherwise negative    Past medical history, surgical history, and family history were reviewed and updated as appropriate    Social history updates:  Social History     Tobacco Use   Smoking Status Former Smoker    Packs/day: 1 00    Years: 50 00    Pack years: 50 00    Types: Cigarettes    Start date: 57    Quit date: 12/7/2010    Years since quitting: 10 2   Smokeless Tobacco Never Used   Tobacco Comment    quit in 1999       PhysicalExamination:  Vitals:   /58   Pulse (!) 120   Temp (!) 100 7 °F (38 2 °C)   Resp 20   Ht 5' 7" (1 702 m)   Wt 78 5 kg (173 lb)   SpO2 96%   BMI 27 10 kg/m²    Gen:  Bryant Olivera appears fatigued and somewhat short of breath  He is frustrated and somewhat irritable related to his medical condition  HEENT: PERRL  Oropharynx is clear, moist  Neck: Supple  There is no JVD, lymphadenopathy or thyromegaly  Trachea is midline  Chest:  Chest excursion symmetric  Lungs are hyperinflated  Breath sounds are distant bilaterally but otherwise clear  Hyper-resonant to percussion  Cardiac:  regular  He has a late peaking murmur  Consistent with aortic stenosis  Abdomen: Soft and nontender  Benign  Extremities: No clubbing, cyanosis or edema  Neurologic: No focal deficits  Normal affect  Skin: No appreciable rashes  Diagnostic Data:  Labs:   I personally reviewed the most recent laboratory data pertinent to today's visit    Lab Results   Component Value Date    WBC 6 81 02/04/2021    HGB 9 0 (L) 02/04/2021    HCT 30 5 (L) 02/04/2021    MCV 99 (H) 02/04/2021     (L) 02/04/2021     Lab Results   Component Value Date    SODIUM 143 02/04/2021    K 4 5 02/04/2021    CO2 30 02/04/2021     02/04/2021    BUN 24 02/04/2021    CREATININE 1 26 02/04/2021    CALCIUM 9 2 02/04/2021         Imaging:  I personally reviewed the images on the [facility] system pertinent to today's visit   December CT scan of the chest abdomen and pelvis was reviewed and shows stable pulmonary findings in comparison with the last study  No new findings  Chronic emphysema and evidence of prior lung surgery    Stable nodularity    Joe Martinez MD

## 2021-02-24 NOTE — ASSESSMENT & PLAN NOTE
·  Deepika Moses presents again today quite symptomatic   · He did not take his nebulizer treatment this morning and has a lot of mucus and rattle in his chest  · Has continued to have low-grade temperatures since his hospital stay  No change in color of his phlegm which remains clear  I advised against antibiotics at this time given recent antibiotic course  · We had a long discussion with Deepika Moses, his wife, and his daughter about his pulmonary condition  He has multiple medical problems  There are minimal interventions at this time that we can attempt to improve his condition  · He has never been particularly responsive to bronchodilators and I would continue with the nebulizer treatments that are currently ordered  · He is only on 10 mg of prednisone daily,  I recommended increase to 20 mg daily  At higher doses he had side effects and personality changes where he became more irritable  ·  we discussed the possibility of adding scopolamine to the regimen however given his prostate symptoms, age, and significant somnolence already, this may be a poor choice of medication for him  They have declined at this time  This would be a high risk medication for him  ·  once again discussed palliative care with him  I have agreed that this is an appropriate course for him  Deepika Moses is very hesitant to have helped in the home but I did explain that this is also necessary for both him as well as the benefit of his wife and family to help take care of him    His daughter has also engaged some private duty nursing through [facility] for daily assistance

## 2021-02-25 PROBLEM — R91.1 LUNG NODULE: Status: RESOLVED | Noted: 2020-01-01 | Resolved: 2021-01-01

## 2021-02-25 NOTE — PROGRESS NOTES
Hematology Outpatient Follow - Up Note  Katelynn Hatch 81 y o  male MRN: @ Encounter: [id number]        Date:  2/25/2021        Assessment/ Plan:      78-year-old  male with lambda light chain multiple myeloma, heavily pretreated, stage III  Lenalidomide, melphalan, dexamethasone was initiated on 05/2018 and discontinued in January 2020 after normalization of lambda light chain and patient need for a break from chemotherapy      Melphalan 12 milligram p o  Daily for 4 days every 6 weeks, prednisone 5 milligram p o  Daily, lenalidomide 5 milligram p o  Daily 3 weeks on, 3 weeks off resumed 6/2020     Fatigue requiring holding of the treatment on 10/2020, lambda light chain increasing 580 on 01/29/2021    He was seen in the hospital for pneumonia, he received COVID-19 vaccine, scheduled for next vaccine on 03/10/2021     Patient to restart antimyeloma therapy 2 weeks after finishing the 2nd vaccine of COVID-19     Apixaban 2 5 mg p o  b i d  for atrial fibrillation, severe aortic stenosis     Rectal cancer, pulmonary cancer, skin cancer followed by specialists     Prednisone 20 mg p o  daily      Labs and imaging studies are reviewed by ordering provider once results are available  If there are findings that need immediate attention, you will be contacted when results available  Discussing results and the implication on your healthcare is best discussed in person at your follow-up visit         HPI:      #1 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, multiple compression fractures, status post kyphoplasty to L1, L2, L5, treated with radiation therapy to the left intra trochanteric area, received melphalan/Velcade/prednisone in October 2010 for 4 cycles with excellent response and normalization of lambda light chain, had been on Velcade maintenance till January 2013 when lambda light chain went up to 111,treated with Velcade 1 3 mg meter square weekly 3 weeks on one week off, Decadron 20 mg by mouth weekly, lambda light chain down to 30 then creeping up to 110, on Revlimid 15 mg by mouth every other day  Lambda light chain normal at 28 however he reported skin rash on the upper chest area we stopped the Revlimid for 2 month  Then reinitiated Revlimid 5 mg by mouth daily     Progression of disease he received lenalidomide, melphalan, dexamethasone since May 2018 until January 2020 he took a break of the therapy after normalization of the lambda light chain     In June 2020 lambda light chain went up from 28 to 130     He had been off therapy for the past 3 months, lambda light chain increased to 256 on 12/2020 however without treatment he feels much better     #2  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 stage I, with progression by scan, status post resection of the right upper lobe with few foci in the same lobe consistent with stage IIIA done in January 2015 adenocarcinoma, well differentiated  Status post Alimta/carboplatin x4 cycles finished in May 2015 and radiation therapy      CT scan in August 2020 showed a growing 7 mm nodule in the left upper lobe of the lung followed by Pulmonary service        #3  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     #4  Rectal adenocarcinoma status post resection stage I in November 2017     #5  Pulmonary embolism involving the subsegmental left lower lobe in December 2018   Currently on apixaban 5 mg once a day because of epistaxis     #6  Hematuria secondary to benign prostatic hypertrophy and small bladder stones and being on apixaban, apixaban was reduced to 5 milligram once a day      #7   Herpes zoster involving the left upper extremity 2/2020   He could not tolerate gabapentin because of sedation  Benadryl was ineffective he received topical lidocaine      Had been off multiple myeloma therapy for 3 months    Evaluated in the emergency room, initiated on amoxicillin for 10 days and later on currently on prednisone 20 mg p o  daily       Interval History:        Previous Treatment:         Test Results:    Imaging: Pe Study With Ct Abdomen & Pelvis With Contrast    Result Date: 2/4/2021  Narrative: CT PULMONARY ANGIOGRAM OF THE CHEST AND CT ABDOMEN AND PELVIS WITH INTRAVENOUS CONTRAST INDICATION:   SOB hx of PE and abdominal pain  COMPARISON:  CT scan chest dated September 12, 2020 TECHNIQUE:  CT examination of the chest, abdomen and pelvis was performed  Thin section CT angiographic technique was used in the chest in order to evaluate for pulmonary embolus and coronal 3D MIP postprocessing was performed on the acquisition scanner  Axial, sagittal, and coronal 2D reformatted images were created from the source data and submitted for interpretation  Radiation dose length product (DLP) for this visit:  663 mGy-cm   This examination, like all CT scans performed in the [facility], was performed utilizing techniques to minimize radiation dose exposure, including the use of iterative reconstruction and automated exposure control  IV Contrast:  100 mL of iohexol (OMNIPAQUE) Enteric Contrast:  Enteric contrast was not administered  FINDINGS: CHEST PULMONARY ARTERIAL TREE:  No pulmonary embolus is seen  LUNGS:  There is a unchanged 7 mm pulmonary nodule seen within the left upper lobe  There is a 3 mm left upper lobe nodule similar to prior study  There is a unchanged 3 mm left lower lobe nodule  Multiple groundglass opacities are again visualized within the left lung  The patient is status post right upper lobectomy with 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  The trachea and central bronchial tree are patent  Emphysematous changes within the lungs are visualized  PLEURA:  Unremarkable  HEART/AORTA:  Unremarkable for patient's age  MEDIASTINUM AND GLEN:  Unremarkable  CHEST WALL AND LOWER NECK:   Right-sided chest port is seen  ABDOMEN LIVER/BILIARY TREE:  Unremarkable   GALLBLADDER: Gallbladder is surgically absent  SPLEEN:  Unremarkable  PANCREAS:  Unremarkable  ADRENAL GLANDS:  Unremarkable  KIDNEYS/URETERS:  No hydronephrosis or urinary tract calculus  One or more sharply circumscribed subcentimeter renal hypodensities are present, too small to accurately characterize, and statistically most likely benign findings  According to recent literature (Radiology 2019) no further workup of these findings is recommended  STOMACH AND BOWEL:  Colonic diverticulosis with minimal inflammatory changes around the sigmoid colon APPENDIX:  No findings to suggest appendicitis  ABDOMINOPELVIC CAVITY:  No ascites  No pneumoperitoneum  No lymphadenopathy  VESSELS:  Unremarkable for patient's age  PELVIS REPRODUCTIVE ORGANS:  The prostate is enlarged  URINARY BLADDER:  Minimal inflammatory changes around the urinary bladder  Tiny calcification in the dome of the urinary bladder  ABDOMINAL WALL/INGUINAL REGIONS:  Unremarkable  OSSEOUS STRUCTURES:  Multiple compression deformities of the spine     Impression: No evidence of pulmonary artery embolus  Colonic diverticulosis with minimal inflammatory changes around the sigmoid colon which may represent acute diverticulitis  No drainable fluid collection  If not already performed recently, colonoscopy after the acute illness is recommended to rule out possibility of colon cancer mimicking diverticulitis  Minimal inflammatory changes around the urinary bladder which may represent cystitis  Multiple pulmonary nodules similar to prior study  Status post right upper lobe ectomy  The study was marked in [facility] for immediate notification   Workstation performed: [id number]       Labs:   Lab Results   Component Value Date    WBC 6 81 02/04/2021    HGB 9 0 (L) 02/04/2021    HCT 30 5 (L) 02/04/2021    MCV 99 (H) 02/04/2021     (L) 02/04/2021     Lab Results   Component Value Date     01/04/2016    K 4 5 02/04/2021     02/04/2021    CO2 30 02/04/2021 ANIONGAP 7 01/04/2016    BUN 24 02/04/2021    CREATININE 1 26 02/04/2021    GLUCOSE 88 01/04/2016    GLUF 94 08/01/2020    CALCIUM 9 2 02/04/2021    CORRECTEDCA 10 4 (H) 02/04/2021    AST 15 02/04/2021    ALT 22 02/04/2021    ALKPHOS 68 02/04/2021    PROT 6 8 01/04/2016    BILITOT 0 63 01/04/2016    EGFR 52 02/04/2021       No results found for: IRON, TIBC, FERRITIN    No results found for: [id number]      ROS: Review of Systems   Constitutional: Negative  Negative for appetite change, chills, diaphoresis, fatigue, fever and unexpected weight change  HENT:   Negative for hearing loss, lump/mass, mouth sores, nosebleeds, sore throat, trouble swallowing and voice change  Eyes: Negative  Negative for eye problems and icterus  Respiratory: Positive for cough, hemoptysis and shortness of breath  Negative for chest tightness  Cardiovascular: Positive for leg swelling  Negative for chest pain  Gastrointestinal: Negative for abdominal distention, abdominal pain, blood in stool, constipation, diarrhea and nausea  Endocrine: Negative  Genitourinary: Negative for dysuria, frequency, hematuria and pelvic pain  Musculoskeletal: Negative  Negative for arthralgias, back pain, flank pain, gait problem, myalgias and neck stiffness  Skin: Negative for itching and rash  Neurological: Negative for dizziness, gait problem, headaches, light-headedness, numbness and speech difficulty  Hematological: Negative for adenopathy  Does not bruise/bleed easily  Psychiatric/Behavioral: Negative for confusion, decreased concentration, depression and sleep disturbance  The patient is nervous/anxious  Current Medications: Reviewed  Allergies: Reviewed  PMH/FH/SH:  Reviewed      Physical Exam:    Body surface area is 1 9 meters squared      Wt Readings from Last 3 Encounters:   02/25/21 78 7 kg (173 lb 6 4 oz)   02/24/21 78 5 kg (173 lb)   02/10/21 79 5 kg (175 lb 3 2 oz)        Temp Readings from Last 3 Encounters:   21 (!) 97 1 °F (36 2 °C) (Tympanic)   21 (!) 100 7 °F (38 2 °C)   02/10/21 99 3 °F (37 4 °C) (Tympanic)        BP Readings from Last 3 Encounters:   21 118/54   21 100/58   02/10/21 (!) 84/60         Pulse Readings from Last 3 Encounters:   21 103   21 (!) 120   02/10/21 (!) 110        Physical Exam  Vitals signs reviewed  Constitutional:       General: He is not in acute distress  Appearance: He is well-developed  He is not diaphoretic  HENT:      Head: Normocephalic and atraumatic  Eyes:      Conjunctiva/sclera: Conjunctivae normal    Neck:      Musculoskeletal: Normal range of motion and neck supple  Trachea: No tracheal deviation  Cardiovascular:      Rate and Rhythm: Normal rate and regular rhythm  Heart sounds: Murmur present  No friction rub  No gallop  Pulmonary:      Effort: Pulmonary effort is normal  No respiratory distress  Breath sounds: Examination of the right-middle field reveals rhonchi  Examination of the left-middle field reveals rhonchi  Examination of the right-lower field reveals rhonchi  Examination of the left-lower field reveals decreased breath sounds and rhonchi  Decreased breath sounds and rhonchi present  No wheezing or rales  Chest:      Chest wall: No tenderness  Abdominal:      General: There is no distension  Palpations: Abdomen is soft  Tenderness: There is no abdominal tenderness  Musculoskeletal:      Right lower leg: Edema present  Left lower leg: Edema present  Lymphadenopathy:      Cervical: No cervical adenopathy  Skin:     General: Skin is warm and dry  Coloration: Skin is not pale  Findings: No erythema  Neurological:      Mental Status: He is alert and oriented to person, place, and time  Psychiatric:         Behavior: Behavior normal          Thought Content:  Thought content normal          Judgment: Judgment normal          ECO    Goals and Barriers: Current Goal: Minimize effects of disease  Barriers: None  Patient's Capacity to Self Care:  Patient is able to self care      [facility]

## 2021-02-26 NOTE — PROGRESS NOTES
Oncology LSW received pt's completed distress thermometer from yesterday, in which pt self scored a 5/10 and noted family health issues and 10/22 physical stressors  Pt has a h/o multiple myeloma and symptomatic COPD  LSW has previously spoke with pt and wife in November, 2020  Spoke with Mrs Gillis and her daughter  Pt continues to have breathing difficulties throughout the night, however his providers are aware of this  She reported pt will restart Revlimid a few weeks after his 2nd COVID vaccine is given  Mrs Gillis stated her daughters are very supportive and continue to help as needed  They are looking into home care services to help  During conversation a visitor arrived, therefore call concluded  Mrs Gillis appreciated call and follow up  No other needs at this time

## 2021-03-10 NOTE — TELEPHONE ENCOUNTER
Spoke with patient's wife  She is aware patient to restart Revlimid and Melphalan 2 weeks after COVID vaccine  Patient will restart on 3/21, COVID shot received on 3/5

## 2021-03-17 NOTE — TELEPHONE ENCOUNTER
Patients wife calling to see if samples of Eliquis are available  Per wife Dr Guzman Counts gives patient samples though this is prescribed by another physician    Patient takes Elliquis 5mg     Call back number for patients wife - [phone]

## 2021-03-17 NOTE — TELEPHONE ENCOUNTER
Samples are available at [facility]  Spoke with pt's wife and she plans to pick them up tomorrow afternoon

## 2021-03-17 NOTE — PROGRESS NOTES
Cardiology   MD Mehreen Grace MD Ather Mansoor, MD Luann Rubens, DO, Luna Leon DO, Oneyda Sosa DO, [facility] [place]  -------------------------------------------------------------------  Critical access hospital and [facility] [address] [id number] [phone]  [address] [facility]                      1935                     [id number]          Assessment/Plan:     1  Paroxysmal atrial fibrillation, new onset  2  Moderate to severe, likely severe aortic stenosis--aortic valve area 0 9 cm2  3  Chronic diastolic heart failure  4  History of DVT, PE--on Eliquis anticoagulation  5  COPD  6  Multiple myeloma  7  BPH  8  Basal cell skin carcinoma     · New onset atrial fibrillation noted on today's ECG and examination with mild rapid ventricular response  Will start diltiazem 180 mg p o  daily  Potential adverse drug reactions were reviewed with him and he will call if he experiences any  · Increase Eliquis to 5 mg p o  b i d  which is the appropriate dosing for stroke prevention in the setting of atrial fibrillation, given his age, weight, and renal function  Patient does admit to bruising at this time, and was asked to look out for any increase in bruising in which case he will call  Potential bleeding complications were reviewed with him and his family  · Continue furosemide 20 mg 3 times weekly for lower extremity edema and chronic diastolic heart failure    He states he gets a brisk response with furosemide when he takes it   · Patient was seen by CT surgery, and it was thought that his shortness of breath would not improve significantly with aortic valve replacement       Follow-up in 1 months        Interval History:      This is a very pleasant 69-year-old male with a history of moderate aortic root dilatation to 4 5 cm, moderate to severe aortic valve stenosis, multiple myeloma, squamous cell skin carcinoma, venous insufficiency, and lung adenocarcinoma status post right upper lobe resection with adjuvant chemotherapy and radiation, as well as rectal carcinoma   He has COPD, and is managed by Dr Alan Ferrari of pulmonary medicine and sees Dr Sixto Boas of hematology      He went to the hospital 12/2018 with increased shortness of breath and lower extremity edema   CT chest revealed left-sided pulmonary embolus and he was initiated on Eliquis   He also had a mild COPD exacerbation  Gabriele Stain was thereafter hospitalized 01/2018 with fever and diarrhea      He was hospitalized 12/02/2020 with COPD exacerbation, bronchitis, and a possible component of CHF  His discharge weight was 179 lb  He was seen by our nurse practitioner 12/21/2020, and was scheduled for a referral to see Cardiovascular surgery, and it was thought that surgery would not help his shortness of breath significantly  Presents today for follow-up  He continues to have shortness of breath which he states is unchanged since his last visit  His wife and daughter state that his lower extremity edema has improved                       Vitals:  Vitals:    03/17/21 1326   BP: 144/72   BP Location: Right arm   Patient Position: Sitting   Cuff Size: Adult   Pulse: (!) 106   SpO2: 98%   Weight: 79 4 kg (175 lb)   Height: 5' 7" (1 702 m)         Past Medical History:   Diagnosis Date    Chronic pain disorder     3 fractured vertebrae    COPD (chronic obstructive pulmonary disease) (HCC)     Diarrhea     Diverticulitis     Enlarged prostate     Kidney stone     [facility] myeloma ([facility] [address] )     Pulmonary embolism ([facility] [address] ) 12/24/2018    Rectal adenocarcinoma (HCC)     Sleep apnea     no cpap     Social History     Socioeconomic History    Marital status: /Civil Union     Spouse name: Not on file    Number of children: Not on file    Years of education: Not on file    Highest education level: Not on file   Occupational History    Not on file   Social Needs    Financial resource strain: Not on file    Food insecurity     Worry: Not on file     Inability: Not on file    Transportation needs     Medical: Not on file     Non-medical: Not on file   Tobacco Use    Smoking status: Former Smoker     Packs/day: 1 00     Years: 50 00     Pack years: 50 00     Types: Cigarettes     Start date: 57     Quit date: 12/7/2010     Years since quitting: 10 2    Smokeless tobacco: Never Used    Tobacco comment: quit in 1999   Substance and Sexual Activity    Alcohol use: Yes     Comment: rarely    Drug use: No    Sexual activity: Not on file   Lifestyle    Physical activity     Days per week: Not on file     Minutes per session: Not on file    Stress: Not on file   Relationships    Social connections     Talks on phone: Not on file     Gets together: Not on file     Attends Voodoo service: Not on file     Active member of club or organization: Not on file     Attends meetings of clubs or organizations: Not on file     Relationship status: Not on file    Intimate partner violence     Fear of current or ex partner: Not on file     Emotionally abused: Not on file     Physically abused: Not on file     Forced sexual activity: Not on file   Other Topics Concern    Not on file   Social History Narrative    Not on file      Family History   Problem Relation Age of Onset    Arthritis Mother     Colon cancer Father     Heart attack Father     Diabetes type I Sister      Past Surgical History:   Procedure Laterality Date    CHOLECYSTECTOMY      COLONOSCOPY W/ ENDOSCOPIC US N/A 7/21/2017    Procedure: ANAL ENDOSCOPIC U/S;  Surgeon: Radha Connolly MD;  Location: [facility]; Service: Colorectal    HERNIA REPAIR      KIDNEY STONE SURGERY      LUNG LOBECTOMY Right     IL COLONOSCOPY FLX DX W/COLLJ SPEC WHEN PFRMD N/A 7/10/2017    Procedure: COLONOSCOPY;  Surgeon: Ashwin Mae MD;  Location: [facility];   Service: Gastroenterology    IL RECTAL TUMOR EXCISION, TRANSANAL ENDOSCOPIC MICROSURGICAL, FULL THICK N/A 11/2/2017    Procedure: TRANSANAL ENDOSCOPIC MICROSURGERY TEM;  Surgeon: Melani Vazquez MD;  Location: [facility];  Service: Colorectal       Current Outpatient Medications:     albuterol (2 5 mg/3 mL) 0 083 % nebulizer solution, Inhale 2 5 mg every 6 (six) hours as needed, Disp: , Rfl:     albuterol (PROVENTIL HFA,VENTOLIN HFA) 90 mcg/act inhaler, Inhale 2 puffs every 6 (six) hours as needed for wheezing, Disp: 3 Inhaler, Rfl: 3    apixaban (ELIQUIS) 5 mg, Take 1 tablet (5 mg total) by mouth 2 (two) times a day (Patient taking differently: Take 2 5 mg by mouth 2 (two) times a day ), Disp: 60 tablet, Rfl: 3    cholecalciferol (VITAMIN D3) 1,000 units tablet, Take 5,000 Units by mouth daily  , Disp: , Rfl:     cyanocobalamin (VITAMIN B-12) 1,000 mcg tablet, Take by mouth daily, Disp: , Rfl:     famotidine (Pepcid) 20 mg tablet, Take 20 mg by mouth as needed , Disp: , Rfl:     ferrous sulfate 325 (65 Fe) mg tablet, Take 45 mg by mouth daily with breakfast , Disp: , Rfl:     finasteride (PROSCAR) 5 mg tablet, TAKE 1 TABLET BY MOUTH AT BEDTIME, Disp: 90 tablet, Rfl: 0    furosemide (LASIX) 20 mg tablet, Take 1 tablet (20 mg total) by mouth 3 (three) times a week, Disp: 30 tablet, Rfl: 1    guaifenesin-codeine (guaiFENesin AC) 100-10 MG/5ML liquid, Take 10 mL by mouth every 4 (four) hours, Disp: , Rfl:     ipratropium (ATROVENT) 0 02 % nebulizer solution, Take 1 vial (0 5 mg total) by nebulization 4 (four) times a day, Disp: 300 mL, Rfl: 6    Loperamide HCl (IMODIUM PO), Take 4 mg by mouth daily, Disp: , Rfl:     potassium chloride (K-DUR,KLOR-CON) 10 mEq tablet, Take 1 tablet (10 mEq total) by mouth 3 (three) times a week Take only on days you take lasix, Disp: 30 tablet, Rfl: 1    predniSONE 20 mg tablet, Take 1 tablet (20 mg total) by mouth daily, Disp: 30 tablet, Rfl: 3    Silodosin 8 MG CAPS, Take by mouth, Disp: , Rfl:     tamsulosin (FLOMAX) 0 4 mg, Take 0 4 mg by mouth daily with dinner, Disp: , Rfl:     traMADol (ULTRAM) 50 mg tablet, TAKE ONE TABLET BY MOUTH EVERY 6 HOURS AS NEEDED FOR MODERATE PAIN (PAIN SCORE 4-6), Disp: , Rfl:     triamcinolone (KENALOG) 0 5 % cream, APPLY TOPICALLY THREE TIMES DAILY TO SHINGLES RASH, Disp: , Rfl:     lenalidomide (REVLIMID) 5 MG CAPS, Take one capsule daily 3 weeks on followed by 3 weeks off  [facility]#[id number] on 1/4/21 (Patient not taking: Reported on 2/25/2021), Disp: 21 capsule, Rfl: 0        Review of Systems:  Review of Systems   Respiratory: Negative  Cardiovascular: Negative  All other systems reviewed and are negative  Physical Exam:  Physical Exam  Constitutional:       General: He is not in acute distress  Appearance: He is well-developed  He is not diaphoretic  HENT:      Head: Normocephalic and atraumatic  Eyes:      General: No scleral icterus  Right eye: No discharge  Pupils: Pupils are equal, round, and reactive to light  Neck:      Musculoskeletal: Normal range of motion and neck supple  Thyroid: No thyromegaly  Cardiovascular:      Rate and Rhythm: Normal rate and regular rhythm  Heart sounds: Normal heart sounds  No murmur  No friction rub  No gallop  Pulmonary:      Effort: Pulmonary effort is normal       Breath sounds: Normal breath sounds  Abdominal:      General: There is no distension  Tenderness: There is no abdominal tenderness  There is no guarding or rebound  Musculoskeletal: Normal range of motion  Skin:     General: Skin is warm and dry  Coloration: Skin is not pale  Findings: No erythema or rash  Neurological:      Mental Status: He is alert and oriented to person, place, and time  Coordination: Coordination normal    Psychiatric:         Behavior: Behavior normal          Thought Content:  Thought content normal          Judgment: Judgment normal          This note was completed in part utilizing M-Modal Fluency Direct Software  Grammatical errors, random word insertions, spelling mistakes, and incomplete sentences can be an occasional consequence of this system secondary to software limitations, ambient noise, and hardware issues  If you have any questions or concerns about the content, text, or information contained within the body of this dictation, please contact the provider for clarification

## 2021-03-25 NOTE — TELEPHONE ENCOUNTER
[id number] calling regarding Rev[facility]  They received a call from patients wife requesting fill  [facility]omat states wife told them patient got his COVID vaccine and is ready to restart Revlimid    Will send to RN to obtain auth and refill medication    Call back number for [facility] if needed - [phone]

## 2021-04-01 NOTE — TELEPHONE ENCOUNTER
Patients wife calling regarding Revlimid  Wife received a call from [facility] regarding Revlimid delivery  Wife states she did not call to request refill  Per wife patient still has 9 days left of current Revlimid cycle and then patient will have 3 weeks off  Wife reports patient did not get his Revlimid for 2 weeks during current cycle  There was miscommunication among caregivers  Wife thought he was receiving medication but was not      Wife will call  to the time when patient is due for refill to have it processed again  Confirmed follow up appointment with Dr Sharmin Stacy is 4/29/21    Will send to RN to update

## 2021-04-02 NOTE — DISCHARGE INSTRUCTIONS
Complete course of antibiotics as prescribed  Return to the ED if you develop worsening pain, fevers or vomiting  You should follow-up with Colorectal surgery for further evaluation

## 2021-04-02 NOTE — ED PROVIDER NOTES
History  Chief Complaint   Patient presents with    Abdominal Pain     lower abd pain that started today  pt states seen 1month ago for same dx with infection in intestines and was started on antibiotics  An 52-year-old male with past medical history of severe aortic stenosis, multiple myeloma, squamous cell skin carcinoma, lung adenocarcinoma s/p resection with chemotherapy, rectal cancer s/p resection and diastolic CHF, DVT/PE on Eliquis, COPD and (recently diagnosed) AFib; presents with left lower quadrant abdominal pain that began this morning  Patient states yesterday he felt unwell and had several episodes of nonbloody diarrhea  Patient states since waking up this morning the pain has been persistent, and is nonradiating  Patient has not yet had a bowel movement today  Patient denies associated nausea and vomiting  Patient reports a similar episode a few months ago, where he was treated with antibiotics  Patient reports symptoms had resolved after his antibiotics  Patient also complains of shortness of breath, however states this is baseline  Patient does use his nebulizers without relief  Patient otherwise denies fever, chills, chest pain, nausea, vomiting, dysuria and rashes  Patient does have significant peripheral edema, however states this is baseline and unchanged  A/P:  Left lower quadrant abdominal pain, with reproducible tenderness  Patient also has several areas of ecchymosis over the abdominal wall however denies any recent injuries  Reviewed recent records, patient was seen in the ED on 2/4 and diagnosed with acute diverticulitis  Patient was treated with a course of Augmentin  Will obtain lab work and CT scan to evaluate for recurrent diverticulitis vs abscess formation  Dyspnea, patient with bilateral rales and wheezing (right > left)  Offered patient breathing treatment however he denies  Will obtain chest x-ray to evaluate for pneumonia vs vascular congestion  History provided by:  Patient, spouse and medical records  Abdominal Pain  Associated symptoms: shortness of breath        Prior to Admission Medications   Prescriptions Last Dose Informant Patient Reported? Taking?    Loperamide HCl (IMODIUM PO)  Child Yes No   Sig: Take 4 mg by mouth daily   Silodosin 8 MG CAPS  Child Yes No   Sig: Take by mouth   albuterol (2 5 mg/3 mL) 0 083 % nebulizer solution  Child Yes No   Sig: Inhale 2 5 mg every 6 (six) hours as needed   albuterol (PROVENTIL HFA,VENTOLIN HFA) 90 mcg/act inhaler  Child No No   Sig: Inhale 2 puffs every 6 (six) hours as needed for wheezing   apixaban (ELIQUIS) 5 mg   No No   Sig: Take 1 tablet (5 mg total) by mouth 2 (two) times a day   cholecalciferol (VITAMIN D3) 1,000 units tablet  Child Yes No   Sig: Take 5,000 Units by mouth daily     cyanocobalamin (VITAMIN B-12) 1,000 mcg tablet  Child Yes No   Sig: Take by mouth daily   diltiazem (CARDIZEM CD) 180 mg 24 hr capsule   No No   Sig: Take 1 capsule (180 mg total) by mouth daily   famotidine (Pepcid) 20 mg tablet  Child Yes No   Sig: Take 20 mg by mouth as needed    ferrous sulfate 325 (65 Fe) mg tablet  Child Yes No   Sig: Take 45 mg by mouth daily with breakfast    finasteride (PROSCAR) 5 mg tablet  Child No No   Sig: TAKE 1 TABLET BY MOUTH AT BEDTIME   furosemide (LASIX) 20 mg tablet  Child No No   Sig: Take 1 tablet (20 mg total) by mouth 3 (three) times a week   guaifenesin-codeine (guaiFENesin AC) 100-10 MG/5ML liquid  Child Yes No   Sig: Take 10 mL by mouth every 4 (four) hours   ipratropium (ATROVENT) 0 02 % nebulizer solution  Child No No   Sig: Take 1 vial (0 5 mg total) by nebulization 4 (four) times a day   lenalidomide (REVLIMID) 5 MG CAPS   No No   Sig: Take one capsule daily 3 weeks on followed by 3 weeks off  [facility]#[id number] on 3/25/21   potassium chloride (K-DUR,KLOR-CON) 10 mEq tablet  Child No No   Sig: Take 1 tablet (10 mEq total) by mouth 3 (three) times a week Take only on days you take lasix   predniSONE 20 mg tablet   No No   Sig: Take 1 tablet (20 mg total) by mouth daily   tamsulosin (FLOMAX) 0 4 mg  Child Yes No   Sig: Take 0 4 mg by mouth daily with dinner   traMADol (ULTRAM) 50 mg tablet  Child Yes No   Sig: TAKE ONE TABLET BY MOUTH EVERY 6 HOURS AS NEEDED FOR MODERATE PAIN (PAIN SCORE 4-6)   triamcinolone (KENALOG) 0 5 % cream  Child Yes No   Sig: APPLY TOPICALLY THREE TIMES DAILY TO SHINGLES RASH      Facility-Administered Medications: None       Past Medical History:   Diagnosis Date    Cancer ([place] [address] )     Chronic pain disorder     3 fractured vertebrae    COPD (chronic obstructive pulmonary disease) (HCC)     Diarrhea     Diverticulitis     Enlarged prostate     Kidney stone     Multiple myeloma ([place] [address] )     Pulmonary embolism ([place] [address] ) 12/24/2018    Rectal adenocarcinoma ([place] [address] )     Sleep apnea     no cpap       Past Surgical History:   Procedure Laterality Date    CHOLECYSTECTOMY      COLONOSCOPY W/ ENDOSCOPIC US N/A 7/21/2017    Procedure: ANAL ENDOSCOPIC U/S;  Surgeon: Laurel Sanders MD;  [facility]; Service: Colorectal    HERNIA REPAIR      KIDNEY STONE SURGERY      LUNG LOBECTOMY Right     MA COLONOSCOPY FLX DX W/COLLJ SPEC WHEN PFRMD N/A 7/10/2017    Procedure: COLONOSCOPY;  Surgeon: Shelby Pinzon MD;  [facility]; Service: Gastroenterology    MA RECTAL TUMOR EXCISION, TRANSANAL ENDOSCOPIC MICROSURGICAL, FULL THICK N/A 11/2/2017    Procedure: TRANSANAL ENDOSCOPIC MICROSURGERY TEM;  Surgeon: Laurel Sanders MD;  [facility];  Service: Colorectal       Family History   Problem Relation Age of Onset    Arthritis Mother     Colon cancer Father     Heart attack Father     Diabetes type I Sister      I have reviewed and agree with the history as documented      E-Cigarette/Vaping    E-Cigarette Use Never User      E-Cigarette/Vaping Substances    Nicotine No     THC No     CBD No     Flavoring No     Other No     Unknown No Social History     Tobacco Use    Smoking status: Former Smoker     Packs/day: 1 00     Years: 50 00     Pack years: 50 00     Types: Cigarettes     Start date: 57     Quit date: 12/7/2010     Years since quitting: 10 3    Smokeless tobacco: Never Used    Tobacco comment: quit in 1999   Substance Use Topics    Alcohol use: Yes     Comment: rarely    Drug use: No       Review of Systems   Respiratory: Positive for shortness of breath  Gastrointestinal: Positive for abdominal pain  All other systems reviewed and are negative  Physical Exam  Physical Exam  General Appearance: alert and oriented, nad, non toxic appearing  Skin:  Warm, dry, intact  HEENT: atraumatic, normocephalic  Neck: Supple, trachea midline  Cardiac: RRR; no murmurs, rub, gallops  Pulmonary:  No acute respiratory distress  Bilateral rales and expiratory wheezing, right > left  Gastrointestinal: abdomen soft, LLQ tenderness, nondistended; no guarding or rebound tenderness; good bowel sounds, no mass or bruits  Ecchymosis of varying size to epigastric and RLQ      Extremities:  3+ pitting edema to bilateral lower extremities, 2+ pulses; no calf tenderness, no clubbing, no cyanosis  Neuro:  no focal motor or sensory deficits, CN 2-12 grossly intact  Psych:  Normal mood and affect, normal judgement and insight      Vital Signs  ED Triage Vitals   Temperature Pulse Respirations Blood Pressure SpO2   04/02/21 1715 04/02/21 1444 04/02/21 1444 04/02/21 1444 04/02/21 1444   98 1 °F (36 7 °C) 100 16 97/62 96 %      Temp Source Heart Rate Source Patient Position - Orthostatic VS BP Location FiO2 (%)   04/02/21 1715 04/02/21 1444 04/02/21 1444 04/02/21 1444 --   Oral Monitor Lying Right arm       Pain Score       04/02/21 1444       8           Vitals:    04/02/21 1444 04/02/21 1552 04/02/21 1715 04/02/21 1842   BP: 97/62 102/57 97/52 109/55   Pulse: 100 74 78 88   Patient Position - Orthostatic VS: Lying Lying Lying Lying         Visual Acuity      ED Medications  Medications   iohexol (OMNIPAQUE) 350 MG/ML injection (SINGLE-DOSE) 100 mL (100 mL Intravenous Given 4/2/21 1745)   ciprofloxacin (CIPRO) tablet 500 mg (500 mg Oral Given 4/2/21 1841)   metroNIDAZOLE (FLAGYL) tablet 500 mg (500 mg Oral Given 4/2/21 1841)       Diagnostic Studies  Results Reviewed     Procedure Component Value Units Date/Time    CBC and differential [[id number]]  (Abnormal) Collected: 04/02/21 1531    Lab Status: Final result Specimen: Blood from Arm, Left Updated: 04/02/21 1652     WBC 8 74 Thousand/uL      RBC 3 68 Million/uL      Hemoglobin 11 3 g/dL      Hematocrit 36 8 %       fL      MCH 30 7 pg      MCHC 30 7 g/dL      RDW 24 0 %      MPV 11 9 fL      Platelets 798 Thousands/uL      nRBC 0 /100 WBCs     Manual Differential(PHLEBS Do Not Order) [[id number]]  (Abnormal) Collected: 04/02/21 1531    Lab Status: Final result Specimen: Blood from Arm, Left Updated: 04/02/21 1652     Segmented % 79 %      Bands % 3 %      Lymphocytes % 7 %      Monocytes % 8 %      Eosinophils, % 2 %      Basophils % 0 %      Metamyelocytes% 1 %      Absolute Neutrophils 7 17 Thousand/uL      Lymphocytes Absolute 0 61 Thousand/uL      Monocytes Absolute 0 70 Thousand/uL      Eosinophils Absolute 0 17 Thousand/uL      Basophils Absolute 0 00 Thousand/uL      Total Counted 100     Anisocytosis Present     Basophilic Stippling Present     Ovalocytes Present     Polychromasia Present     Schistocytes Present     Platelet Estimate Borderline     Large Platelet Present    Lipase [[id number]]  (Normal) Collected: 04/02/21 1531    Lab Status: Final result Specimen: Blood from Arm, Left Updated: 04/02/21 1608     Lipase 76 u/L     NT-BNP PRO [[id number]]  (Abnormal) Collected: 04/02/21 1531    Lab Status: Final result Specimen: Blood from Arm, Left Updated: 04/02/21 1608     NT-proBNP 604 pg/mL     Troponin I [[id number]]  (Normal) Collected: 04/02/21 1531    Lab Status: Final result Specimen: Blood from Arm, Left Updated: 04/02/21 1603     Troponin I <0 02 ng/mL     Urine Microscopic [[id number]]  (Abnormal) Collected: 04/02/21 1530    Lab Status: Final result Specimen: Urine, Clean Catch Updated: 04/02/21 1602     RBC, UA 2-4 /hpf      WBC, UA 0-1 /hpf      Epithelial Cells Occasional /hpf      Bacteria, UA Occasional /hpf     Comprehensive metabolic panel [[id number]]  (Abnormal) Collected: 04/02/21 1531    Lab Status: Final result Specimen: Blood from Arm, Left Updated: 04/02/21 1601     Sodium 142 mmol/L      Potassium 3 5 mmol/L      Chloride 103 mmol/L      CO2 30 mmol/L      ANION GAP 9 mmol/L      BUN 15 mg/dL      Creatinine 1 27 mg/dL      Glucose 80 mg/dL      Calcium 8 9 mg/dL      Corrected Calcium 9 7 mg/dL      AST 19 U/L      ALT 47 U/L      Alkaline Phosphatase 99 U/L      Total Protein 6 0 g/dL      Albumin 3 0 g/dL      Total Bilirubin 1 95 mg/dL      eGFR 52 ml/min/1 73sq m     Narrative:      [facility] guidelines for Chronic Kidney Disease (CKD):     Stage 1 with normal or high GFR (GFR > 90 mL/min/1 73 square meters)    Stage 2 Mild CKD (GFR = 60-89 mL/min/1 73 square meters)    Stage 3A Moderate CKD (GFR = 45-59 mL/min/1 73 square meters)    Stage 3B Moderate CKD (GFR = 30-44 mL/min/1 73 square meters)    Stage 4 Severe CKD (GFR = 15-29 mL/min/1 73 square meters)    Stage 5 End Stage CKD (GFR <15 mL/min/1 73 square meters)  Note: GFR calculation is accurate only with a steady state creatinine    Urine Macroscopic, POC [[id number]]  (Abnormal) Collected: 04/02/21 1530    Lab Status: Final result Specimen: Urine Updated: 04/02/21 1532     Color, UA Yellow     Clarity, UA Clear     pH, UA 5 5     Leukocytes, UA Negative     Nitrite, UA Negative     Protein, UA Negative mg/dl      Glucose, UA Negative mg/dl      Ketones, UA Negative mg/dl      Urobilinogen, UA 0 2 E U /dl      Bilirubin, UA Negative     Blood, UA Small     Specific Howes, UA 1 015 Narrative:      CLINITEK RESULT                 CT chest abdomen pelvis w contrast   Final Result by Efrain Baez MD (04/02 1818)      Mild focal fat stranding about a distal descending colonic diverticulum #2/88 and #601/73 in keeping with mild acute diverticulitis  18 mm left lower lobe groundglass opacity; #3/65  Deep right lung base groundglass opacities have largely resolved  Follow-up with CT chest in 12 months  The study was marked in [facility] for immediate notification  Workstation performed: [id number]         XR chest 2 views   ED Interpretation by Liam Troy DO (04/02 1618)   Large right sided pleural effusion vs infiltrate, increased from prior studies      Final Result by Tio Johnson MD (04/02 1954)      No acute cardiopulmonary disease  Radiation changes and volume loss in the right hemithorax related to right upper lobectomy  Workstation performed: [id number]                    Procedures  Procedures   ECG 12 Lead Documentation  Date/Time: today/date: 4/4/2021  Performed by: Gosia Molina    ECG reviewed by me, the ED Provider: yes    Patient location:  ED   Previous ECG:  Compared to current, no change   Rate:  106  ECG rate assessment: normal    Rhythm: sinus tachycardia    Ectopy:  PAC's    QRS axis:  Normal  Intervals: RBBB  Q waves: None   ST segments:  Normal  T waves: normal      Impression:  Sinus tachycardia with frequent PACs, right bundle-branch block          ED Course  ED Course as of Apr 04 0726 Fri Apr 02, 2021   1619 Labs within normal limits  Chest x-ray with right-sided effusion versus infiltrate, will add on CT chest to the abdomen and pelvis      1650 Patient and wife updated on lab results  Pending CT scan  Patient resting comfortably at this time        1654 Segs Relative(!): 79   1822 1 ) Mild focal fat stranding about a distal descending colonic diverticulum and in keeping with mild acute diverticulitis  2 ) 18 mm left lower lobe groundglass opacity  Deep right lung base groundglass opacities have largely resolved  Follow-up with CT chest in 12 months  CT chest abdomen pelvis w contrast   7757 Pt updated on CT results  Unclear why pt developed recurrent diverticulitis in such a short interval, however doubt treatment failure since he had resolution of symptoms the first time  Pt agreeable to trialing course of outpatient antibiotics  Strict return precautions discussed  Patient does have follow-up with Colorectal in the next few weeks  SBIRT 20yo+      Most Recent Value   SBIRT (22 yo +)   In order to provide better care to our patients, we are screening all of our patients for alcohol and drug use  Would it be okay to ask you these screening questions? No Filed at: 04/02/2021 1455                    MDM    Disposition  Final diagnoses:   Diverticulitis     Time reflects when diagnosis was documented in both MDM as applicable and the Disposition within this note     Time User Action Codes Description Comment    4/2/2021  6:32 PM Constancepat Ford Add [K57 92] Diverticulitis       ED Disposition     ED Disposition Condition Date/Time Comment    Discharge Stable Fri Apr 2, 2021  6:32 PM Alisha Campos discharge to home/self care  Follow-up Information     Follow up With Specialties Details Why Contact Info Additional Information    Tea Kennedy,  Family Medicine Schedule an appointment as soon as possible for a visit in 2 days For re-evaluation [address] [facility] [address] Emergency Department Emergency Medicine Go to  If symptoms worsen [place] [id number]  [address] [place] [address] Emergency Department, [address] [facility] , [place], [place], [address]          Discharge Medication List as of 4/2/2021  6:35 PM      START taking these medications    Details   ciprofloxacin (CIPRO) 500 mg tablet Take 1 tablet (500 mg total) by mouth 2 (two) times a day for 10 days, Starting Fri 4/2/2021, Until Mon 4/12/2021, Print      metroNIDAZOLE (FLAGYL) 500 mg tablet Take 1 tablet (500 mg total) by mouth every 8 (eight) hours for 10 days, Starting Fri 4/2/2021, Until Mon 4/12/2021, Print         CONTINUE these medications which have NOT CHANGED    Details   albuterol (2 5 mg/3 mL) 0 083 % nebulizer solution Inhale 2 5 mg every 6 (six) hours as needed, Starting Tue 3/24/2020, Historical Med      albuterol (PROVENTIL HFA,VENTOLIN HFA) 90 mcg/act inhaler Inhale 2 puffs every 6 (six) hours as needed for wheezing, Starting Tue 10/27/2020, Normal      apixaban (ELIQUIS) 5 mg Take 1 tablet (5 mg total) by mouth 2 (two) times a day, Starting Wed 3/17/2021, Normal      cholecalciferol (VITAMIN D3) 1,000 units tablet Take 5,000 Units by mouth daily  , Historical Med      cyanocobalamin (VITAMIN B-12) 1,000 mcg tablet Take by mouth daily, Historical Med      diltiazem (CARDIZEM CD) 180 mg 24 hr capsule Take 1 capsule (180 mg total) by mouth daily, Starting Wed 3/17/2021, Normal      famotidine (Pepcid) 20 mg tablet Take 20 mg by mouth as needed , Historical Med      ferrous sulfate 325 (65 Fe) mg tablet Take 45 mg by mouth daily with breakfast , Historical Med      finasteride (PROSCAR) 5 mg tablet TAKE 1 TABLET BY MOUTH AT BEDTIME, Normal      furosemide (LASIX) 20 mg tablet Take 1 tablet (20 mg total) by mouth 3 (three) times a week, Starting Wed 1/13/2021, Normal      guaifenesin-codeine (guaiFENesin AC) 100-10 MG/5ML liquid Take 10 mL by mouth every 4 (four) hours, Historical Med      ipratropium (ATROVENT) 0 02 % nebulizer solution Take 1 vial (0 5 mg total) by nebulization 4 (four) times a day, Starting Wed 9/9/2020, Print      lenalidomide (REVLIMID) 5 MG CAPS Take one capsule daily 3 weeks on followed by 3 weeks off  [facility]#[id number] on 3/25/21, Normal      Loperamide HCl (IMODIUM PO) Take 4 mg by mouth daily, Historical Med      potassium chloride (K-DUR,KLOR-CON) 10 mEq tablet Take 1 tablet (10 mEq total) by mouth 3 (three) times a week Take only on days you take lasix, Starting Wed 1/13/2021, Normal      predniSONE 20 mg tablet Take 1 tablet (20 mg total) by mouth daily, Starting Wed 2/24/2021, Normal      Silodosin 8 MG CAPS Take by mouth, Historical Med      tamsulosin (FLOMAX) 0 4 mg Take 0 4 mg by mouth daily with dinner, Historical Med      traMADol (ULTRAM) 50 mg tablet TAKE ONE TABLET BY MOUTH EVERY 6 HOURS AS NEEDED FOR MODERATE PAIN (PAIN SCORE 4-6), Historical Med      triamcinolone (KENALOG) 0 5 % cream APPLY TOPICALLY THREE TIMES DAILY TO SHINGLES RASH, Historical Med           No discharge procedures on file      PDMP Review       Value Time User    PDMP Reviewed  Yes 11/11/2020  3:25 PM Jovani Hutton MD          ED Provider  Electronically Signed by           Laquita Delacruz DO  04/04/21 8818

## 2021-04-08 NOTE — TELEPHONE ENCOUNTER
[facility] called  Patient's wife stated patient needs a refill of melphalan  [facility] does not fill medication per rep  Do not see in Epic where melphalan is filled  Will send to RN to reorder    Revlimid is out for delivery today

## 2021-04-19 PROBLEM — J96.00 ACUTE RESPIRATORY FAILURE (HCC): Status: ACTIVE | Noted: 2020-01-01

## 2021-04-19 PROBLEM — J96.20 ACUTE ON CHRONIC RESPIRATORY FAILURE (HCC): Status: ACTIVE | Noted: 2020-01-01

## 2021-04-19 NOTE — ED PROVIDER NOTES
History  Chief Complaint   Patient presents with    Chest Pain     patient visiting wife in ER when started to have increased SOB and CP  hx of lung cancer and skin cancer  has missed a few doses of breathing treatments     85y M here for complaints of worsening chest pain/pressure along w/ sob/olivia  Pt w/ hx of afib and chf, mod to severe AS, denies hx of CAD/MI  Sig cancer hx - multiple myeloma, lung ca, colon ca, skin ca  Pt here with wife for the last couple of hours (she is here for htn eval)  Pt normally uses nebs QID but has only had 1 neb this am      C/o 5/10 central chest pressure, no radiation  Nothing makes better/worse  Notes sob/olivia w/ some increased phlegm production  Denies f/c/s, no abd pain, normal appetite, no n/v, no changes in bms  Has chronic LE edema that he states is unchanged  History provided by:  Patient   used: No    Chest Pain  Pain location:  Substernal area  Pain quality: aching and tightness    Pain radiates to:  Does not radiate  Pain radiates to the back: no    Pain severity:  Moderate  Onset quality:  Gradual  Timing:  Constant  Progression:  Worsening  Chronicity:  New  Context: stress    Context: no trauma    Relieved by:  Nothing  Worsened by:  Nothing tried  Ineffective treatments:  None tried  Associated symptoms: cough, lower extremity edema, palpitations and shortness of breath    Associated symptoms: no abdominal pain, no anorexia, no back pain, no diaphoresis, no fever, no heartburn, no nausea, no numbness and no weakness    Risk factors: male sex        Prior to Admission Medications   Prescriptions Last Dose Informant Patient Reported? Taking?    Loperamide HCl (IMODIUM PO)  Child Yes No   Sig: Take 4 mg by mouth daily   Silodosin 8 MG CAPS  Child Yes No   Sig: Take by mouth   albuterol (2 5 mg/3 mL) 0 083 % nebulizer solution  Child Yes No   Sig: Inhale 2 5 mg every 6 (six) hours as needed   albuterol (PROVENTIL HFA,VENTOLIN HFA) 90 mcg/act inhaler  Child No No   Sig: Inhale 2 puffs every 6 (six) hours as needed for wheezing   apixaban (ELIQUIS) 5 mg   No No   Sig: Take 1 tablet (5 mg total) by mouth 2 (two) times a day   cholecalciferol (VITAMIN D3) 1,000 units tablet  Child Yes No   Sig: Take 5,000 Units by mouth daily     cyanocobalamin (VITAMIN B-12) 1,000 mcg tablet  Child Yes No   Sig: Take by mouth daily   diltiazem (CARDIZEM CD) 180 mg 24 hr capsule   No No   Sig: Take 1 capsule (180 mg total) by mouth daily   famotidine (Pepcid) 20 mg tablet  Child Yes No   Sig: Take 20 mg by mouth as needed    ferrous sulfate 325 (65 Fe) mg tablet  Child Yes No   Sig: Take 45 mg by mouth daily with breakfast    finasteride (PROSCAR) 5 mg tablet  Child No No   Sig: TAKE 1 TABLET BY MOUTH AT BEDTIME   furosemide (LASIX) 20 mg tablet  Child No No   Sig: Take 1 tablet (20 mg total) by mouth 3 (three) times a week   guaifenesin-codeine (guaiFENesin AC) 100-10 MG/5ML liquid  Child Yes No   Sig: Take 10 mL by mouth every 4 (four) hours   ipratropium (ATROVENT) 0 02 % nebulizer solution  Child No No   Sig: Take 1 vial (0 5 mg total) by nebulization 4 (four) times a day   lenalidomide (REVLIMID) 5 MG CAPS   No No   Sig: Take one capsule daily 3 weeks on followed by 3 weeks off  [facility]#[id number] on 3/25/21   melphalan (ALKERAN) 2 MG tablet   No No   Sig: Take 6 tablets (12 mg total) by mouth for 4 days every 6 weeks   potassium chloride (K-DUR,KLOR-CON) 10 mEq tablet  Child No No   Sig: Take 1 tablet (10 mEq total) by mouth 3 (three) times a week Take only on days you take lasix   predniSONE 20 mg tablet   No No   Sig: Take 1 tablet (20 mg total) by mouth daily   tamsulosin (FLOMAX) 0 4 mg  Child Yes No   Sig: Take 0 4 mg by mouth daily with dinner   traMADol (ULTRAM) 50 mg tablet  Child Yes No   Sig: TAKE ONE TABLET BY MOUTH EVERY 6 HOURS AS NEEDED FOR MODERATE PAIN (PAIN SCORE 4-6)   triamcinolone (KENALOG) 0 5 % cream  Child Yes No   Sig: APPLY TOPICALLY THREE TIMES DAILY TO SHINGLES RASH      Facility-Administered Medications: None       Past Medical History:   Diagnosis Date    Cancer ([place] [address] )     Chronic pain disorder     3 fractured vertebrae    COPD (chronic obstructive pulmonary disease) (HCC)     Diarrhea     Diverticulitis     Enlarged prostate     Kidney stone     [facility] )     Pulmonary embolism ([facility] ) 12/24/2018    Rectal adenocarcinoma ([place] [address] )     Sleep apnea     no cpap       Past Surgical History:   Procedure Laterality Date    CHOLECYSTECTOMY      COLONOSCOPY W/ ENDOSCOPIC US N/A 7/21/2017    Procedure: ANAL ENDOSCOPIC U/S;  Surgeon: Agatha Brown MD;  [facility]; Service: Colorectal    HERNIA REPAIR      KIDNEY STONE SURGERY      LUNG LOBECTOMY Right     RI COLONOSCOPY FLX DX W/COLLJ SPEC WHEN PFRMD N/A 7/10/2017    Procedure: COLONOSCOPY;  Surgeon: Regino Delcid MD;  [facility]; Service: Gastroenterology    RI RECTAL TUMOR EXCISION, TRANSANAL ENDOSCOPIC MICROSURGICAL, FULL THICK N/A 11/2/2017    Procedure: TRANSANAL ENDOSCOPIC MICROSURGERY TEM;  Surgeon: Agatha Brown MD;  [facility];  Service: Colorectal       Family History   Problem Relation Age of Onset    Arthritis Mother     Colon cancer Father     Heart attack Father     Diabetes type I Sister      I have reviewed and agree with the history as documented      E-Cigarette/Vaping    E-Cigarette Use Never User      E-Cigarette/Vaping Substances    Nicotine No     THC No     CBD No     Flavoring No     Other No     Unknown No      Social History     Tobacco Use    Smoking status: Former Smoker     Packs/day: 1 00     Years: 50 00     Pack years: 50 00     Types: Cigarettes     Start date: 57     Quit date: 12/7/2010     Years since quitting: 10 3    Smokeless tobacco: Never Used    Tobacco comment: quit in 1999   Substance Use Topics    Alcohol use: Yes     Comment: rarely    Drug use: No       Review of Systems   Constitutional: Negative for diaphoresis and fever  Respiratory: Positive for cough and shortness of breath  Cardiovascular: Positive for chest pain and palpitations  Gastrointestinal: Negative for abdominal pain, anorexia, heartburn and nausea  Musculoskeletal: Negative for back pain  Neurological: Negative for weakness and numbness  All other systems reviewed and are negative  Physical Exam  Physical Exam  Vitals signs and nursing note reviewed  Constitutional:       General: He is in acute distress  Appearance: He is well-developed  He is not ill-appearing  HENT:      Mouth/Throat:      Mouth: Mucous membranes are moist    Eyes:      Pupils: Pupils are equal, round, and reactive to light  Neck:      Musculoskeletal: Neck supple  Comments: No JVD    Cardiovascular:      Rate and Rhythm: Tachycardia present  Rhythm irregular  Heart sounds: Murmur present  Pulmonary:      Effort: Respiratory distress present  Breath sounds: Rhonchi present  Comments: tachypnic w/ increased work of breathing  Conversationally dyspneic w/ some accessory muscle use noted  Work of breathing improved once patient in bed and had rested for a few minutes  Lung exam - good effort w/ some decreased movement  Coarse rhonchi noted throughout  Musculoskeletal:         General: Swelling present  No tenderness  Right lower leg: Edema present  Left lower leg: Edema present  Lymphadenopathy:      Cervical: No cervical adenopathy  Skin:     General: Skin is warm  Capillary Refill: Capillary refill takes less than 2 seconds  Coloration: Skin is pale  Neurological:      General: No focal deficit present  Mental Status: He is alert and oriented to person, place, and time     Psychiatric:         Mood and Affect: Mood normal          Vital Signs  ED Triage Vitals   Temperature Pulse Respirations Blood Pressure SpO2   04/19/21 1636 04/19/21 1632 04/19/21 1632 04/19/21 1632 04/19/21 1632   98 1 °F (36 7 °C) 100 22 119/57 98 %      Temp Source Heart Rate Source Patient Position - Orthostatic VS BP Location FiO2 (%)   04/19/21 1636 04/19/21 1632 04/19/21 1632 04/19/21 1632 --   Oral Monitor Lying Right arm       Pain Score       04/19/21 1632       5           Vitals:    04/19/21 1830 04/19/21 1930 04/19/21 1952 04/19/21 2245   BP: 104/59 106/55  101/53   Pulse: 98 (!) 106 (!) 133 (!) 106   Patient Position - Orthostatic VS:  Lying  Lying         Visual Acuity      ED Medications  Medications   ipratropium-albuterol (DUO-NEB) 0 5-2 5 mg/3 mL inhalation solution 3 mL (3 mL Nebulization Given 4/19/21 2131)   methylPREDNISolone sodium succinate (Solu-MEDROL) injection 40 mg (40 mg Intravenous Given 4/19/21 2206)   guaiFENesin (MUCINEX) 12 hr tablet 600 mg (600 mg Oral Given 4/19/21 2130)   furosemide (LASIX) tablet 20 mg (has no administration in time range)   azithromycin (ZITHROMAX) 500 mg in sodium chloride 0 9% 250mL IVPB 500 mg (500 mg Intravenous New Bag 4/19/21 2313)   ceftriaxone (ROCEPHIN) 2 g/50 mL in dextrose IVPB (2,000 mg Intravenous New Bag 4/19/21 2214)   albuterol inhalation solution 5 mg (5 mg Nebulization Given 4/19/21 1656)   ipratropium (ATROVENT) 0 02 % inhalation solution 0 5 mg (0 5 mg Nebulization Given 4/19/21 1656)       Diagnostic Studies  Results Reviewed     Procedure Component Value Units Date/Time    Lactic acid, plasma [[id number]]  (Normal) Collected: 04/19/21 2306    Lab Status: Final result Specimen: Blood from Arm, Right Updated: 04/19/21 2335     LACTIC ACID 1 9 mmol/L     Narrative:      Result may be elevated if tourniquet was used during collection  Procalcitonin with AM Reflex [[id number]] Collected: 04/19/21 2306    Lab Status:  In process Specimen: Blood from Arm, Right Updated: 04/19/21 2310    COVID19, Influenza A/B, RSV PCR, [facility] [[id number]]  (Normal) Collected: 04/19/21 2055    Lab Status: Final result Specimen: Nares from Nasopharyngeal Swab Updated: 04/19/21 2154     SARS-CoV-2 Negative     INFLUENZA A PCR Negative     INFLUENZA B PCR Negative     RSV PCR Negative    Narrative: This test has been authorized by FDA under an EUA (Emergency Use Assay) for use by authorized laboratories  Clinical caution and judgement should be used with the interpretation of these results with consideration of the clinical impression and other laboratory testing  Testing reported as "Positive" or "Negative" has been proven to be accurate according to standard laboratory validation requirements  All testing is performed with control materials showing appropriate reactivity at standard intervals      Sputum culture and Gram stain [[id number]]     Lab Status: No result Specimen: Sputum     CBC and differential [[id number]]  (Abnormal) Collected: 04/19/21 1656    Lab Status: Final result Specimen: Blood from Arm, Left Updated: 04/19/21 1815     WBC 5 51 Thousand/uL      RBC 3 46 Million/uL      Hemoglobin 10 6 g/dL      Hematocrit 35 2 %       fL      MCH 30 6 pg      MCHC 30 1 g/dL      RDW 23 1 %      MPV 11 2 fL      Platelets 931 Thousands/uL      nRBC 0 /100 WBCs     Manual Differential(PHLEBS Do Not Order) [[id number]]  (Abnormal) Collected: 04/19/21 1656    Lab Status: Final result Specimen: Blood from Arm, Left Updated: 04/19/21 1815     Segmented % 72 %      Bands % 1 %      Lymphocytes % 10 %      Monocytes % 14 %      Eosinophils, % 2 %      Basophils % 0 %      Myelocytes % 1 %      Absolute Neutrophils 4 02 Thousand/uL      Lymphocytes Absolute 0 55 Thousand/uL      Monocytes Absolute 0 77 Thousand/uL      Eosinophils Absolute 0 11 Thousand/uL      Basophils Absolute 0 00 Thousand/uL      Total Counted 100     Anisocytosis Present     Macrocytes Present     Polychromasia Present     Platelet Estimate Borderline    Basic metabolic panel [[id number]] Collected: 04/19/21 1656    Lab Status: Final result Specimen: Blood from Arm, Left Updated: 04/19/21 8585 Sodium 141 mmol/L      Potassium 4 5 mmol/L      Chloride 105 mmol/L      CO2 28 mmol/L      ANION GAP 8 mmol/L      BUN 19 mg/dL      Creatinine 1 08 mg/dL      Glucose 84 mg/dL      Calcium 8 9 mg/dL      eGFR 63 ml/min/1 73sq m     Narrative:      [place] guidelines for Chronic Kidney Disease (CKD):     Stage 1 with normal or high GFR (GFR > 90 mL/min/1 73 square meters)    Stage 2 Mild CKD (GFR = 60-89 mL/min/1 73 square meters)    Stage 3A Moderate CKD (GFR = 45-59 mL/min/1 73 square meters)    Stage 3B Moderate CKD (GFR = 30-44 mL/min/1 73 square meters)    Stage 4 Severe CKD (GFR = 15-29 mL/min/1 73 square meters)    Stage 5 End Stage CKD (GFR <15 mL/min/1 73 square meters)  Note: GFR calculation is accurate only with a steady state creatinine    NT-BNP PRO [[id number]]  (Normal) Collected: 04/19/21 1656    Lab Status: Final result Specimen: Blood from Arm, Left Updated: 04/19/21 1746     NT-proBNP 346 pg/mL     Troponin I [[id number]]  (Normal) Collected: 04/19/21 1656    Lab Status: Final result Specimen: Blood from Arm, Left Updated: 04/19/21 1741     Troponin I <0 02 ng/mL     Protime-INR [[id number]]  (Normal) Collected: 04/19/21 1656    Lab Status: Final result Specimen: Blood from Arm, Left Updated: 04/19/21 1737     Protime 14 3 seconds      INR 1 13    APTT [[id number]]  (Normal) Collected: 04/19/21 1656    Lab Status: Final result Specimen: Blood from Arm, Left Updated: 04/19/21 1737     PTT 28 seconds                  XR chest 1 view portable   ED Interpretation by Hope Shirley DO (04/19 1857)   Patchy left basilar infiltrate                 Procedures  ECG 12 Lead Documentation Only    Date/Time: 4/19/2021 4:30 PM  Performed by: Hope Shirley DO  Authorized by: Hope Shirley DO     ECG reviewed by me, the ED Provider: yes    Patient location:  ED  Previous ECG:     Previous ECG:  Compared to current    Similarity:  No change  Interpretation: Interpretation: non-specific    Rate:     ECG rate:  88    ECG rate assessment: normal    Rhythm:     Rhythm: atrial fibrillation    Ectopy:     Ectopy: PVCs      PVCs:  Infrequent  QRS:     QRS axis:  Normal  ST segments:     ST segments:  Normal  T waves:     T waves: normal               ED Course  ED Course as of Apr 19 2348   Mon Apr 19, 2021 1812 NT-proBNP: 346   1812 Troponin I: <0 02   1851 Stable     CBC and differential(!)   1900 CXR w/ patchy LLL infiltrate  D/w pt and he wants to go home  Has oxygen at home to use at night  He says if available during the day, but he doesn't use it  Will get ambulatory pulse ox and re-eval      1952 Pt ambulated - Pulse ox steady at 95-96%, but HR increased to 130-140s and pt noted to have increase WOB/exertional dyspnea  Pt now agreeable to stay  Will contact [facility] and add COVID swab      2005 Contacted [facility] for admission                                SBIRT 20yo+      Most Recent Value   SBIRT (25 yo +)   In order to provide better care to our patients, we are screening all of our patients for alcohol and drug use  Would it be okay to ask you these screening questions? No Filed at: 04/19/2021 1811                    MDM  Number of Diagnoses or Management Options  Community acquired pneumonia: new and requires workup  Exertional dyspnea: new and requires workup  Paroxysmal atrial fibrillation [facility]): new and requires workup  Diagnosis management comments: 81y M here w/ CP/exertional dyspnea, increased phlegm/sputum w/ chronic cough  ddx broad and includes but not limited to acs, chf, paf, COPD exacerbation, occult PNA  Will ck ekg, cxr, labs   Give neb and re-eval       Amount and/or Complexity of Data Reviewed  Clinical lab tests: reviewed  Tests in the radiology section of CPT®: reviewed  Tests in the medicine section of CPT®: reviewed  Decide to obtain previous medical records or to obtain history from someone other than the patient: yes  Independent visualization of images, tracings, or specimens: yes    Patient Progress  Patient progress: stable      Disposition  Final diagnoses:   Community acquired pneumonia   Paroxysmal atrial fibrillation ([place] [address] )   Exertional dyspnea     Time reflects when diagnosis was documented in both MDM as applicable and the Disposition within this note     Time User Action Codes Description Comment    4/19/2021  8:11 PM Mari Abreu Add [J18 9] Community acquired pneumonia     4/19/2021  8:11 PM Brady, [address] [place] [address] [I48 0] Paroxysmal atrial fibrillation ([place] [address] )     4/19/2021  8:11 PM Mari Abreu Add [R06 00] Exertional dyspnea     4/19/2021  9:17 PM Shabana Sonja Add [C34 91] Adenocarcinoma of right lung ([place] [address] )     4/19/2021  9:17 PM Aidee, [address] [place] [C34 91] Adenocarcinoma of right lung ([place] [address] )     4/19/2021  9:17 PM Shabana Sonja Remove [C34 91] Adenocarcinoma of right lung ([place] [address] )     4/19/2021  9:17 PM Shabana Sonja Add [C34 91] Adenocarcinoma of right lung ([place] [address] )     4/19/2021  9:17 PM Aidee, [address] [place] [C34 91] Adenocarcinoma of right lung ([place] [address] )     4/19/2021  9:19 PM Aidee, [address] [place] [C34 91] Adenocarcinoma of right lung ([place] [address] )     4/19/2021  9:41 PM Shabana Sonja Add [C90 00] Multiple myeloma, remission status unspecified ([place] [address] )     4/19/2021  9:41 PM Shabana Sonja Add [I35 0] Severe aortic stenosis     4/19/2021  9:41 PM Aidee, [address]  [facility] [place] Rectal cancer [facility])       ED Disposition     ED Disposition Condition Date/Time Comment    Admit Stable Mon Apr 19, 2021  8:11 PM Case was discussed with Dr Ml Chen and the patient's admission status was agreed to be Admission Status: inpatient status to the service of Dr Ml Chen   Follow-up Information    None         Patient's Medications   Discharge Prescriptions    No medications on file     No discharge procedures on file      PDMP Review       Value Time User    PDMP Reviewed  Yes 11/11/2020  3:25 PM Gato Vilchis MD ED Provider  Electronically Signed by           Nicole Bang,   04/19/21 2043       Nicole Bang,   04/19/21 6941

## 2021-04-20 PROBLEM — B37.0 THRUSH: Status: ACTIVE | Noted: 2021-01-01

## 2021-04-20 NOTE — OCCUPATIONAL THERAPY NOTE
Occupational Therapy Evaluation     Patient Name: Wyatt Lynch  [id number] Date: 4/20/2021  Problem List  Principal Problem:    Acute on chronic respiratory failure (HCC)  Active Problems:    Adenocarcinoma, lung (HCC)    Multiple myeloma (HCC)    COPD mixed type ([facility] )    Enlarged prostate without lower urinary tract symptoms (luts)    Chronic anticoagulation    Chronic diastolic congestive heart failure (HCC)    CKD (chronic kidney disease) stage 3, GFR 30-59 ml/min    Past Medical History  Past Medical History:   Diagnosis Date    Cancer ([facility] )     Chronic pain disorder     3 fractured vertebrae    COPD (chronic obstructive pulmonary disease) (HCC)     Diarrhea     Diverticulitis     Enlarged prostate     Kidney stone     Multiple myeloma ([facility] )     Pulmonary embolism ([facility] ) 12/24/2018    [facility]     Sleep apnea     no cpap     Past Surgical History  Past Surgical History:   Procedure Laterality Date    CHOLECYSTECTOMY      COLONOSCOPY W/ ENDOSCOPIC US N/A 7/21/2017    Procedure: ANAL ENDOSCOPIC U/S;  Surgeon: Radha Bunch MD;  [facility]; Service: Colorectal    HERNIA REPAIR      KIDNEY STONE SURGERY      LUNG LOBECTOMY Right     MA COLONOSCOPY FLX DX W/COLLJ SPEC WHEN PFRMD N/A 7/10/2017    Procedure: COLONOSCOPY;  Surgeon: Aurora Velazquez MD;  [facility]; Service: Gastroenterology    MA RECTAL TUMOR EXCISION, TRANSANAL ENDOSCOPIC MICROSURGICAL, FULL THICK N/A 11/2/2017    Procedure: TRANSANAL ENDOSCOPIC MICROSURGERY TEM;  Surgeon: Radha Bunch MD;  [facility];  Service: Colorectal           04/20/21 1109   Note Type   Note type Evaluation   Restrictions/Precautions   Weight Bearing Precautions Per Order No   Other Precautions Chair Alarm; Bed Alarm; Fall Risk;Telemetry;Hard of hearing   Pain Assessment   Pain Assessment Tool Pain Assessment not indicated - pt denies pain   Pain Score No Pain   Home Living   Type [address] Multi-level;Stairs to enter with rails;Bed/bath upstairs  (split level house  3 SELMA, 5 steps between levels)   Bathroom Shower/Tub Tub/shower unit   Bathroom Toilet Standard   Bathroom Equipment Grab bars in shower; Shower chair;Grab bars around toilet   [address] Walker;Cane;Other (Comment)  (Home O2 at night only)   Additional Comments Pt lives with spouse in a split-level house with 3 SELMA and 6 steps between levels  Pt reports (-) home alone  Spouse currently in ED at [address]  Prior Function   Level of [place] Independent with ADLs and functional mobility; Needs assistance with IADLs   Lives With Spouse   Receives Help From Family   ADL Assistance Independent   IADLs Needs assistance   Falls in the last 6 months 0   Vocational Retired   Comments At baseline, pt was I w/ ADLs and functional mobility/transfers w/o use of AD for household distances and use of SPC for community mobility, required assist w/ IADLs, (+) , and denies falls PTA   Lifestyle   Autonomy At baseline, pt was I w/ ADLs and functional mobility/transfers w/o use of AD for household distances and use of SPC for community mobility, required assist w/ IADLs, (+) , and denies falls PTA   Reciprocal Relationships Spouse   Service to Others Retired- [employer]   Intrinsic Gratification Fishing   Psychosocial   Psychosocial ([facility]) [address] [facility] 5  [address] [facility] 5  [address] [facility] 5  [address] [facility] 4  [address] 5  [address] [facility] 4  [address] [facility]  5  [address] 5  Supervision/Setup   Bed Mobility   Supine to [address]   Additional items HOB elevated; Bedrails; Increased time required   Sit to Supine Unable to assess   Additional Comments Pt seated OOB in chair at end of session w/ chair alarm activated  Call bell and phone within reach  All needs met and pt reports no further questions for OT at this time   Transfers   Sit to Stand 5  Supervision   Additional items Increased time required;Verbal cues   Stand to Sit 5  Supervision   Additional items Armrests; Increased time required;Verbal cues   Additional Comments Cues for safe technique   Functional Mobility   Functional Mobility 5  Supervision   Additional Comments Assist x1 w/o use of AD; SPO2: 93-96% on RA   Balance   Static Sitting Good   Dynamic Sitting Fair +   Static Standing Fair   Dynamic Standing Fair   Ambulatory Fair -   Activity Tolerance   Activity Tolerance Patient limited by fatigue;Treatment limited secondary to medical complications (Comment); Patient tolerated treatment well   Medical Staff Made Aware Doug, PT   Nurse Made Aware yes; Maggie Jamil, RN   RUE Assessment   RUE Assessment WFL   RUE Strength   RUE Overall Strength Within Functional Limits - able to perform ADL tasks with strength  (4/5 throughout)   LUE Assessment   LUE Assessment WFL   LUE Strength   LUE Overall Strength Within Functional Limits - able to perform ADL tasks with strength  (4/5 throughout)   Hand Function   Gross Motor Coordination Functional   Fine Motor Coordination Functional   Sensation   Light Touch Partial deficits in the RLE;Partial deficits in the LLE   Proprioception   Proprioception No apparent deficits   Vision-Basic Assessment   Current Vision Wears glasses all the time   Visual History Cataracts   Vision - Complex Assessment   Acuity Able to read clock/calendar on wall without difficulty; Able to read employee name badge without difficulty   Additional Comments Reports decreased acuity in R eye 2* cataracts   Perception   Inattention/Neglect Appears intact   Cognition   Overall Cognitive Status [facility]   Arousal/Participation Alert; Cooperative   Attention Within functional limits   Orientation Level Oriented X4 Memory Within functional limits   Following Commands Follows one step commands without difficulty   Comments Pleasant and cooperative  Engages in conversation appropriately   Assessment   Limitation Decreased ADL status; Decreased UE strength;Decreased endurance;Decreased self-care trans;Decreased high-level ADLs   Prognosis Good   Assessment Pt is a 81 y o  male seen for OT evaluation s/p adm to [facility] on 4/19/2021 w/ SOB x3 days and dx'd w/ Acute on chronic respiratory failure and COPD  Comorbidities affecting pts functional performance include a significant PMH of A-Fib, CA, COPD, CHF, Diverticulitis, DVT, Multiple myeloma, Pulmonary embolism, and lung adenocarcinoma  Pt with active OT orders and activity orders for Up and OOB as tolerated   Pt lives with spouse in a split-level house with 3 SELMA and 6 steps between levels  Pt reports (-) home alone  Spouse currently in ED at [facility]  At baseline, pt was I w/ ADLs and functional mobility/transfers w/o use of AD for household distances and use of [facility] for community mobility, required assist w/ IADLs, (+) , and denies falls PTA  Upon evaluation, pt currently requires Supervision for UB ADLs, Min A for LB ADLs, Supervision for toileting, Supervision for bed mobility, and Supervision for functional mobility/transfers 2* the following deficits impacting occupational performance: weakness, decreased strength, decreased balance and decreased tolerance  These impairments, as well at pts steps to enter environment and difficulty performing ADLS limit pts ability to safely engage in all baseline areas of occupation  The patient's raw score on the AM-PAC Daily Activity inpatient short form is 21, standardized score is 44 27, greater than 39 4  Patients at this level are likely to benefit from discharge to home  Please refer to the recommendation of the Occupational Therapist for safe discharge planning   Based on the aforementioned OT evaluation, functional performance deficits, and assessments, pt has been identified as a Moderate complexity evaluation  Pt to continue to benefit from continued acute OT services during hospital stay to address defined deficits and to maximize level of functional independence in the following Occupational Performance areas: grooming, bathing/shower, toilet hygiene, dressing, medication management, health maintenance, functional mobility, community mobility, clothing management and social participation  From OT standpoint, recommend Home w/ social support and continued PT upon D/C  OT will continue to follow pt 2-3x/wk to address the following goals to  w/in 10-14 days:   Goals   Patient Goals To go home   LTG Time Frame 10-   Long Term Goal Please refer to LTGs listed below   Plan   Treatment Interventions ADL retraining;Functional transfer training;UE strengthening/ROM; Endurance training;Patient/family training;Equipment evaluation/education; Compensatory technique education; Energy conservation; Activityengagement   Goal Expiration Date 21   OT Treatment Day 0   OT Frequency 2-3x/wk   Recommendation   OT Discharge Recommendation No rehabilitation needs  (Home w/ family support and continued PT)   OT - OK to Discharge Yes  (when medically cleared)   AM-PAC Daily Activity Inpatient   Lower Body Dressing 3   Bathing 3   Toileting 3   Upper Body Dressing 4   Grooming 4   Eating 4   Daily Activity Raw Score 21   Daily Activity Standardized Score (Calc for Raw Score >=11) 44 27   AM-PAC Applied Cognition Inpatient   Following a Speech/Presentation 4   Understanding Ordinary Conversation 4   Taking Medications 3   Remembering Where Things Are Placed or Put Away 4   Remembering List of 4-5 Errands 4   Taking Care of Complicated Tasks 3   Applied Cognition Raw Score 22   Applied Cognition Standardized Score 47 83        GOALS    1   Pt will improve activity tolerance to G for min 30 min txment sessions for increase engagement in functional tasks    2  Pt will complete bed mobility at a Mod I level w/ G balance/safety demonstrated to decrease caregiver assistance required     3  Pt will complete UB dressing/self care w/ mod I using adaptive device and DME as needed     4  Pt will complete LB dressing/self care w/ mod I using adaptive device and DME as needed    5  Pt will complete toileting w/ mod I w/ G hygiene/thoroughness using DME as needed    6  Pt will improve functional transfers to Mod I on/off all surfaces using DME as needed w/ G balance/safety     7  Pt will improve functional mobility during ADL/IADL/leisure tasks to Mod I using DME as needed w/ G balance/safety     8  Pt will be attentive 100% of the time during ongoing cognitive assessment w/ G participation to assist w/ safe d/c planning/recommendations    9  Pt will demonstrate G carryover of pt/caregiver education and training as appropriate w/o cues w/ good tolerance to increase safety during functional tasks    10  Pt will demonstrate 100% carryover of energy conservation techniques t/o functional I/ADL/leisure tasks w/o cues s/p skilled education to increase endurance during functional tasks    11  Pt will increase BUE strength by 1MM grade via AROM exercises to increase independence in ADLs and transfers    12   Pt will increase standing tolerance to 8-10 mins with Fair+ dynamic standing balance to increase safety during participation in ADLs         FELIX Masterson/L

## 2021-04-20 NOTE — ASSESSMENT & PLAN NOTE
Lab Results   Component Value Date    EGFR 63 04/19/2021    EGFR 52 04/02/2021    EGFR 52 02/04/2021    CREATININE 1 08 04/19/2021    CREATININE 1 27 04/02/2021    CREATININE 1 26 02/04/2021   Baseline creatinine 1 - 1 3  Currently 1 08, at baseline

## 2021-04-20 NOTE — ASSESSMENT & PLAN NOTE
Lab Results   Component Value Date    EGFR 63 04/19/2021    EGFR 52 04/02/2021    EGFR 52 02/04/2021    CREATININE 1 08 04/19/2021    CREATININE 1 27 04/02/2021    CREATININE 1 26 02/04/2021   Baseline creatinine 1 - 1 3  Currently 1 08, at baseline    Avoid nephrotoxins  Avoid hypotension

## 2021-04-20 NOTE — CONSULTS
Consultation - Palliative & Supportive Care   Vin Gillis  81 y o   male  Jodi 4 /E4 MS 18-*   MRN: [id number]  Encounter: [id number]    ASSESSMENT:    Patient Active Problem List   Diagnosis    Chronic rhinitis    Adenocarcinoma[facility] lung ([facility] )    BPH (benign prostatic hyperplasia)    [facility]    Chronic diarrhea    COPD [facility] type ([facility] )    Right bundle branch block    Enlarged prostate without lower urinary tract symptoms (luts)    Osteoporosis    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HCC)    Severe aortic stenosis    Peripheral edema    Chronic anticoagulation    Venous insufficiency (chronic) (peripheral)    Rectal cancer (HCC)    Elevated PSA    Gross hematuria    Hypogammaglobulinemia (HCC)    Skin lesion    Port-A-Cath in place    Acute respiratory failure with hypoxia (HCC)    Chronic diastolic congestive heart failure (HCC)    CKD (chronic kidney disease) stage 3, GFR 30-59 ml/min    Right flank pain    Nocturnal hypoxia    Blood in stool    Acute on chronic respiratory failure (HCC)    Pancytopenia (HCC)       PLAN:    1  Goals:    Already made a level 3 by Dr Perico Fletcher on earlier conversation with patient   He is not ready for hospice yet   He would like to continue seeing and receiving treatments from Dr Amanda Reyna and Dr Jessa Craft He is encouraged to follow up with [facility] [place] as an OP  He said he will think about it   PSC will sign off  Call if with any other questions  Code status: Level 3 - DNAR and DNI   Decisional apparatus:  Patient does have capacity to make medical decisions on my exam today  If such capacity is lost, patient's substitute decision maker would default to wife by [place] 169  Advance Directive / Living Will / POLST:  None on file    We appreciate the opportunity to participate in this patient's care  We will continue to follow as an outpatient   Please do not hesitate to contact our on-call provider through our clinic answering service at [phone] should you have acute symptom control concerns  IDENTIFICATION:  Inpatient consult to Palliative Care  Consult performed by: Gabriella English MD  Consult ordered by: Chloe Cope DO    Inpatient consult to Palliative Care  Consult performed by: Gabriella English MD  Consult ordered by: Amira Raines PA-C        Reason for Consult / Principal Problem: C    HISTORY OF PRESENT ILLNESS:    Yovana Fernández is a 81 y o  male with COPD, chronic HFpEF, severe aortic stenosis, adenocarcinoma of the RUL s/p VATS, multiple myeloma on chemo (Malphalan and Revlimid) who was admitted on 4/19 from home because of shortness of breath  CXR showed new L sided infiltrate and was started on antibiotics for suspected CAP  He was seen by pulmonology who continued him on nebs and steroids  They will continue cares as an OP  [facility] consulted for [address]  Prior to this consult, patient already had a conversation with Dr Shawanda Zhong with whom he agreed he'd like to be a level 3 code status  Introduced palliative care with emphasis on goals of care conversation  He tells me his history, including MM that was diagnosed 12 years ago and mostly controlled with his current regimen that he was tolerating well  He said that his lungs are what's causing him majority of his issues right now  He sees Dr Pratt as an OP and was told that treatments for his COPD is getting less and less  We briefly discussed what his plans are should Dr Pratt said he has no more treatments left to offer him  He said "then I will try to live with what I have and hope to be comfortable"  To this end, we spoke about hospice as the next option should this happen  We briefly about what the goal is on hospice and what the expectations are      At this time, if his prognosis is significantly impacted negatively by his lung issues more than his MM, then he may have to consider stopping chemotherapy should he elect for hospice as the chemotherapy will no longer improve his overall prognosis and may start to negatively impact it  At this time, he is not ready to stop anything yet and does not believe he is quite ready for hospice yet  He said that as long as Dr Jon Burkitt or Dr Larios He are "not giving up on me yet", then he will not give up either  He is very grateful for the information and feels that I have enlightened him more  Interview and exam limited by: none    Review of Systems   Constitutional: Positive for activity change  Negative for appetite change, chills, fatigue, fever and unexpected weight change  HENT: Negative for trouble swallowing  Respiratory: Positive for cough and shortness of breath  Cardiovascular: Negative for chest pain  Gastrointestinal: Negative for abdominal pain  Musculoskeletal: Positive for back pain  Neurological: Negative for weakness  Psychiatric/Behavioral: Negative for sleep disturbance  The patient is not nervous/anxious  All other systems reviewed and are negative  Past Medical History:   Diagnosis Date    [facility])     Chronic pain disorder     3 fractured vertebrae    COPD (chronic obstructive pulmonary disease) (HCC)     Diarrhea     Diverticulitis     Enlarged prostate     Kidney stone     [facility] )     Pulmonary embolism ([facility] ) 12/24/2018    Rectal adenocarcinoma ([facility] )     Sleep apnea     no cpap     Past Surgical History:   Procedure Laterality Date    CHOLECYSTECTOMY      COLONOSCOPY W/ ENDOSCOPIC US N/A 7/21/2017    Procedure: ANAL ENDOSCOPIC U/S;  Surgeon: Priscila Ramírez MD;  [facility]; Service: Colorectal    HERNIA REPAIR      KIDNEY STONE SURGERY      LUNG LOBECTOMY Right     [place] COLONOSCOPY FLX DX W/COLLJ SPEC WHEN PFRMD N/A 7/10/2017    Procedure: COLONOSCOPY;  Surgeon: Timmy Beckett MD;  [facility];   Service: Gastroenterology    [place] RECTAL TUMOR EXCISION, TRANSANAL ENDOSCOPIC MICROSURGICAL, FULL THICK N/A 11/2/2017    Procedure: TRANSANAL ENDOSCOPIC MICROSURGERY TEM;  Surgeon: Agatha Brown MD;  [facility];  Service: Colorectal     Social History     Socioeconomic History    Marital status: /Civil Union     Spouse name: Not on file    Number of children: Not on file    Years of education: Not on file    Highest education level: Not on file   Occupational History    Not on file   Social Needs    Financial resource strain: Not on file    Food insecurity     Worry: Not on file     Inability: Not on file    Transportation needs     Medical: Not on file     Non-medical: Not on file   Tobacco Use    Smoking status: Former Smoker     Packs/day: 1 00     Years: 50 00     Pack years: 50 00     Types: Cigarettes     Start date: 57     Quit date: 12/7/2010     Years since quitting: 10 3    Smokeless tobacco: Never Used    Tobacco comment: quit in 1999   Substance and Sexual Activity    Alcohol use: Yes     Comment: rarely    Drug use: No    Sexual activity: Not on file   Lifestyle    Physical activity     Days per week: Not on file     Minutes per session: Not on file    Stress: Not on file   Relationships    Social connections     Talks on phone: Not on file     Gets together: Not on file     Attends Buddhism service: Not on file     Active member of club or organization: Not on file     Attends meetings of clubs or organizations: Not on file     Relationship status: Not on file    Intimate partner violence     Fear of current or ex partner: Not on file     Emotionally abused: Not on file     Physically abused: Not on file     Forced sexual activity: Not on file   Other Topics Concern    Not on file   Social History Narrative    Not on file     Family History   Problem Relation Age of Onset    Arthritis Mother     Colon cancer Father     Heart attack Father     Diabetes type I Sister        MEDICATIONS / ALLERGIES:  all current active meds have been reviewed    Allergies   Allergen Reactions    Gabapentin      somnolence  Metaxalone     Nisoldipine     Sulfa Antibiotics Itching    Aldactone [Spironolactone] Other (See Comments)     Breast swelling and pain  Breast swelling and pain    Penicillin V Rash     AS CHILD       OBJECTIVE:  BP 90/57   Pulse 85   Temp 98 9 °F (37 2 °C) (Temporal)   Resp 18   Ht 5' 8" (1 727 m)   Wt 82 2 kg (181 lb 3 5 oz)   SpO2 91%   BMI 27 55 kg/m²   Physical Exam:  Constitutional: Appears well-developed and well-nourished  Appears as stated age  In no acute physical or emotional distress  Head: Normocephalic and atraumatic  Eyes: EOM are normal  No ocular discharge  No scleral icterus  Neck: No visible adenopathy or masses  Respiratory: Effort normal  No stridor  No respiratory distress  On NC  Has frequent wet cough  Gastrointestinal: No abdominal distension  Musculoskeletal: No edema  Neurological: Alert, oriented and appropriately conversant  Hard of hearing  Skin: Dry, no diaphoresis  Pale   Psychiatric: Displays a normal mood and affect  Behavior, judgment and thought content appear normal      Lab Results: I have personally reviewed pertinent labs  Imaging Studies: I have personally reviewed pertinent reports  EKG, Pathology, and Other Studies: I have personally reviewed pertinent reports  Counseling / Coordination of Care:  Counseling / Coordination of Care  Total floor / unit time spent today 20 minutes  Greater than 50% of total time was spent with the patient and / or family counseling and / or coordination of care  A description of the counseling / coordination of care: provided medical updates, discussed palliative care, discussed hospice care, determined competency, determined goals of care, determined POA, determined social/family support, discussed plans of care, discussed symptom management, provided psychosocial support       José Bañuelos MD  [address] and Supportive Care

## 2021-04-20 NOTE — ASSESSMENT & PLAN NOTE
Chronic anticoagulation it on 5 mg Eliquis  For recent diagnosis of AFib as well as DVT prophylaxis as patient's cancer treatment may cause hypercoagulability

## 2021-04-20 NOTE — PLAN OF CARE
Problem: Potential for Falls  Goal: Patient will remain free of falls  Description: INTERVENTIONS:  - Assess patient frequently for physical needs  -  Identify cognitive and physical deficits and behaviors that affect risk of falls  -  [place] fall precautions as indicated by assessment   - Educate patient/family on patient safety including physical limitations  - Instruct patient to call for assistance with activity based on assessment  - Modify environment to reduce risk of injury  - Consider OT/PT consult to assist with strengthening/mobility  Outcome: Progressing     Problem: DISCHARGE PLANNING  Goal: Discharge to home or other facility with appropriate resources  Description: INTERVENTIONS:  - Identify barriers to discharge w/patient and caregiver  - Arrange for needed discharge resources and transportation as appropriate  - Identify discharge learning needs (meds, wound care, etc )  - Arrange for interpretive services to assist at discharge as needed  - Refer to Case Management Department for coordinating discharge planning if the patient needs post-hospital services based on physician/advanced practitioner order or complex needs related to functional status, cognitive ability, or social support system  Outcome: Progressing     Problem: Knowledge Deficit  Goal: Patient/family/caregiver demonstrates understanding of disease process, treatment plan, medications, and discharge instructions  Description: Complete learning assessment and assess knowledge base    Interventions:  - Provide teaching at level of understanding  - Provide teaching via preferred learning methods  Outcome: Progressing     Problem: RESPIRATORY - ADULT  Goal: Achieves optimal ventilation and oxygenation  Description: INTERVENTIONS:  - Assess for changes in respiratory status  - Assess for changes in mentation and behavior  - Position to facilitate oxygenation and minimize respiratory effort  - Oxygen administered by appropriate delivery if ordered  - Initiate smoking cessation education as indicated  - Encourage broncho-pulmonary hygiene including cough, deep breathe, Incentive Spirometry  - Assess the need for suctioning and aspirate as needed  - Assess and instruct to report SOB or any respiratory difficulty  - Respiratory Therapy support as indicated  Outcome: Progressing

## 2021-04-20 NOTE — ASSESSMENT & PLAN NOTE
Wt Readings from Last 3 Encounters:   03/17/21 79 4 kg (175 lb)   02/25/21 78 7 kg (173 lb 6 4 oz)   02/24/21 78 5 kg (173 lb)     Does not appear to be in a CHF exacerbation  Home regimen:  Lasix 20 mg p o   Every other day or as needed  No lower leg edema, no JVD  Daily weights  I&Os  Continue Lasix

## 2021-04-20 NOTE — ACP (ADVANCE CARE PLANNING)
Serious Illness Conversation    1  What is your understanding now of where you are with your illness? Prognostic Understanding: overestimates prognosis  Think you are doing ok  Not usually on oxygen at night only     2  How much information about what is likely to be ahead with your illness would you like to have? Information: patient wants to be fully informed     3  What did you (clinician) communicate to the patient? Prognostic Communication: Uncertain - It can be difficult to predict what will happen with your illness  I hope you will continue to live well for a long time but Im worried that you could get sick quickly, and I think it is important to prepare for that possibility  4  If your health situation worsens, what are your most important goals? Goals: be at home     5  What are the biggest fears and worries about the future and your health? Fears/Worries: being a physical burden  His biggest fear is lung and breathing  6  What abilities are so critical to your life that you cannot imagine living without them? Being unable to talk to family  7  What gives you strength as you think about the future with your illness? Not very much anymore, can't do a lot of stuff because of weakness  8  If you become sicker, how much are you willing to go through for the possibility of gaining more time? 9  How much does your proxy and family know about your priorities and wishes? Discussion: extensive discussion with family about goals and wishes     Haley Brewer heard you say that breathing, and working on being active in her home is really important to you  Keeping that in mind, and what we know about your illness, I recommend that we continue our current care and see what Pulmonary has to say  I have changed her code status per our discussion  This will help us make sure that your treatment plans reflect whats important to you  How does this plan sound to you?  I will do everything I can to help you through this    Patient verbalized understanding of the plan     I have spent 35 minutes speaking with my patient on advanced care planning today or during this visit     Advanced directives  Five Wishes:  Patient does not have 5 wishes

## 2021-04-20 NOTE — PLAN OF CARE
Problem: PHYSICAL THERAPY ADULT  Goal: Performs mobility at highest level of function for planned discharge setting  See evaluation for individualized goals  Description: Treatment/Interventions: Functional transfer training, LE strengthening/ROM, Elevations, Therapeutic exercise, Endurance training, Patient/family training, Bed mobility, Gait training, Spoke to nursing, OT          See flowsheet documentation for full assessment, interventions and recommendations  Note: Prognosis: Good  Problem List: Decreased strength, Decreased endurance, Impaired balance, Decreased mobility, Impaired hearing  Assessment: Pt  85 y o male presents with generalized weakness and dyspnea  Past medical hx includes severe aortic stenosis, AFib, multiple myeloma, rectal cancer, pulmonary cancer, skin cancer, COPD   Pt admitted for Acute on chronic respiratory failure (HCC) w/ Paroxysmal atrial fibrillation, Rectal cancer, Chest pain, Exertional dyspnea, Severe aortic stenosis, Adenocarcinoma of right lung, Multiple myeloma  Per chart, xray noted possible new L lower lob infiltrate  Pt referred to PT for functional mobility evaluation & D/C planning w/ orders of up & OOB as tolerated  PTA, pt reports being Independent w/ use of SPC in community  Pt states he has home O2 use at night however unable to provide quantity  During evaluation, deficits included dec mobility, balance, ambulation  Pt demonstrated dec endurance and tolerance to activity  Observed significant B/L LE swelling  SpO2 maintained 93-96% on RA  Evaluation of functional mobility required S for bed mobility, transfers and amb  Pt was able to ambulate 90' w/o AD  Inc fatigue following amb  Gait deviations as above, impulsive during turns but no gross LOB noted  Denies reports of dizziness or SOB t/o session   Nsg staff most recent vital signs as follows: BP 90/57   Pulse 85   Temp 98 9 °F (37 2 °C) (Temporal)   Resp 18   Ht 5' 8" (1 727 m)   Wt 82 2 kg (181 lb 3 5 oz)   SpO2 93%   BMI 27 55 kg/m²   At end of session, pt OOB in chair in stable condition, call bell & phone in reach, chair alarm activated  Pt was educated on fall precautions and reinforced w/ good understanding  Pt would benefit from continued PT to address deficits as defined above and maximize level of independence with functional mobility and safety  The patient's AM-PAC Basic Mobility Inpatient Short Form Raw Score is 20, Standardized Score is 43 99  A standardized score greater than 42 9 suggests the patient may benefit from discharge to home  Please also refer to the recommendation of the Physical Therapist for safe discharge planning  From PT/mobility standpoint recommendation for D/C to home w/ HHPT vs OPPT pending progress, when medically cleared based on objective measures above and current function  CM to follow  Nsg staff to continue to mobilized pt (OOB in chair for all meals & ambulate in room/unit) as tolerated to prevent further decline in function  Nsg notified  Barriers to Discharge: Inaccessible home environment  Barriers to Discharge Comments: stairs     PT Discharge Recommendation: Home with home health rehabilitation(HHPT vs OPPT pending progress)          See flowsheet documentation for full assessment

## 2021-04-20 NOTE — ASSESSMENT & PLAN NOTE
Wt Readings from Last 3 Encounters:   03/17/21 79 4 kg (175 lb)   02/25/21 78 7 kg (173 lb 6 4 oz)   02/24/21 78 5 kg (173 lb)     Does not appear to be in a CHF exacerbation  Home regimen:  Lasix 20 mg p o   Every other day or as needed

## 2021-04-20 NOTE — PHYSICAL THERAPY NOTE
PT EVALUATION    Pt  Name: Jessenia Reed  Pt  Age: 81 y o  MRN: [id number]  LENGTH OF STAY: 1    Patient Active Problem List   Diagnosis    Chronic rhinitis    [facility]    BPH (benign prostatic hyperplasia)    Multiple myeloma (HCC)    Chronic diarrhea    COPD mixed type ([facility] )    Right bundle branch block    Enlarged prostate without lower urinary tract symptoms (luts)    Osteoporosis    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HCC)    Severe aortic stenosis    Peripheral edema    Chronic anticoagulation    Venous insufficiency (chronic) (peripheral)    Rectal cancer (HCC)    Elevated PSA    Gross hematuria    Hypogammaglobulinemia (HCC)    Skin lesion    Port-A-Cath in place    Acute respiratory failure with hypoxia (HCC)    Chronic diastolic congestive heart failure (HCC)    CKD (chronic kidney disease) stage 3, GFR 30-59 ml/min    Right flank pain    Nocturnal hypoxia    Blood in stool    Acute on chronic respiratory failure (HCC)    Pancytopenia (HCC)       Admitting Diagnoses:   Paroxysmal atrial fibrillation (HCC) [I48 0]  Rectal cancer (HCC) [C20]  Chest pain [R07 9]  Exertional dyspnea [R06 00]  Community acquired pneumonia [J18 9]  Severe aortic stenosis [I35 0]  Adenocarcinoma of right lung (HCC) [C34 91]  Multiple myeloma, remission status unspecified (HCC) [C90 00]    Past Medical History:   Diagnosis Date    Cancer ([place] [address] )     Chronic pain disorder     3 fractured vertebrae    COPD (chronic obstructive pulmonary disease) (HCC)     Diarrhea     Diverticulitis     Enlarged prostate     Kidney stone     Multiple myeloma (HCC)     Pulmonary embolism ([facility] ) 12/24/2018    Rectal adenocarcinoma ([facility] )     Sleep apnea     no cpap       Past Surgical History:   Procedure Laterality Date    CHOLECYSTECTOMY      COLONOSCOPY W/ ENDOSCOPIC US N/A 7/21/2017    Procedure: ANAL ENDOSCOPIC U/S;  Surgeon: Nakia Combs MD;  [facility];   Service: Colorectal  HERNIA REPAIR      KIDNEY STONE SURGERY      LUNG LOBECTOMY Right     TX COLONOSCOPY FLX DX W/COLLJ SPEC WHEN PFRMD N/A 7/10/2017    Procedure: COLONOSCOPY;  Surgeon: Caroline Peoples MD;  Location: [facility]; Service: Gastroenterology    TX RECTAL TUMOR EXCISION, TRANSANAL ENDOSCOPIC MICROSURGICAL, FULL THICK N/A 11/2/2017    Procedure: TRANSANAL ENDOSCOPIC MICROSURGERY TEM;  Surgeon: Agustin Mcintosh MD;  [facility]: [facility];  Service: Colorectal       Imaging Studies:  XR chest 1 view portable   ED Interpretation by Lexis Laird DO (04/19 0837)   Patchy left basilar infiltrate      Final Result by Taylor Beltran MD (04/20 3817)      New groundglass opacity in the left midlung and left base suspicious for pneumonia  Workstation performed: [id number]              04/20/21 1110   PT Last Visit   PT Visit Date 04/20/21   Note Type   Note type Evaluation   Pain Assessment   Pain Assessment Tool Pain Assessment not indicated - pt denies pain   Pain Score No Pain   Home Living   Type of Home House   Home Layout Multi-level;Bed/bath upstairs;Stairs to enter with rails  (Split level; 3STE; 5 steps to bed/bath and between levels)   Bathroom Shower/Tub Tub/shower unit   Bathroom Toilet Standard   Bathroom Equipment Grab bars in shower; Shower chair;Grab bars around toilet   [address] [place];Cane  (Home O2)   Additional Comments Pt unable to provide quantity for home O2 use however states he only uses it at night    Prior Function   Level of [place] Independent with ADLs and functional mobility  (w/ use of SPC in community )   Lives With [address] in the last 6 months 0   Vocational Retired   Comments (+)    Restrictions/Precautions   [place] Bearing Precautions Per Order No   Other Precautions Chair Alarm; Bed Alarm;Telemetry; Fall Risk;Hard of hearing   General   Family/Caregiver Present No Cognition   Overall Cognitive Status WFL   Arousal/Participation Alert   Attention Within functional limits   Orientation Level Oriented X4   Following Commands Follows one step commands without difficulty   Comments Cooperative and pleasant   RUE Assessment   RUE Assessment   (refer to OT)   LUE Assessment   LUE Assessment   (refer to OT)   RLE Assessment   RLE Assessment WFL  (4/5 grossly)   LLE Assessment   LLE Assessment WFL  (4/5 grossly )   Coordination   Sensation WFL   Bed Mobility   Supine to Sit 5  Supervision   Additional items HOB elevated; Bedrails; Increased time required   Sit to Supine Unable to assess   Additional Comments Demonstrated good technique and safety; Pt OOB in chair post session    Transfers   Sit to Stand 5  Supervision   Additional items Increased time required;Verbal cues   Stand to Sit 5  Supervision   Additional items Armrests; Increased time required;Verbal cues   Additional Comments Cues for technique and safety   Ambulation/Elevation   Gait pattern Decreased foot clearance; Short stride  (impulsive on turns)   Gait Assistance 5  Supervision   Assistive Device None   Distance 90'x1   Balance   Static Sitting Good   Dynamic Sitting Fair +   Static Standing Fair   Dynamic Standing Fair   Ambulatory Fair -   Endurance Deficit   Endurance Deficit Yes   Endurance Deficit Description fatigue   Activity Tolerance   Activity Tolerance Patient limited by fatigue;Treatment limited secondary to medical complications (Comment)   Medical Staff Made Aware OT Nelia Lance   Nurse Made Aware NORMA Trujillo   Assessment   Prognosis Good   Problem List Decreased strength;Decreased endurance; Impaired balance;Decreased mobility; Impaired hearing   Assessment Pt  85 y o male presents with generalized weakness and dyspnea  Past medical hx includes severe aortic stenosis, AFib, multiple myeloma, rectal cancer, pulmonary cancer, skin cancer, COPD    Pt admitted for Acute on chronic respiratory failure ([facility] ) w/ Paroxysmal atrial fibrillation, Rectal cancer, Chest pain, Exertional dyspnea, Severe aortic stenosis, Adenocarcinoma of right lung, Multiple myeloma  Per chart, xray noted possible new L lower lob infiltrate  Pt referred to PT for functional mobility evaluation & D/C planning w/ orders of up & OOB as tolerated  PTA, pt reports being Independent w/ use of SPC in community  Pt states he has home O2 use at night however unable to provide quantity  During evaluation, deficits included dec mobility, balance, ambulation  Pt demonstrated dec endurance and tolerance to activity  Observed significant B/L LE swelling  SpO2 maintained 93-96% on RA  Evaluation of functional mobility required S for bed mobility, transfers and amb  Pt was able to ambulate 90' w/o AD  Inc fatigue following amb  Gait deviations as above, impulsive during turns but no gross LOB noted  Denies reports of dizziness or SOB t/o session  Nsg staff most recent vital signs as follows: BP 90/57   Pulse 85   Temp 98 9 °F (37 2 °C) (Temporal)   Resp 18   Ht 5' 8" (1 727 m)   Wt 82 2 kg (181 lb 3 5 oz)   SpO2 93%   BMI 27 55 kg/m²   At end of session, pt OOB in chair in stable condition, call bell & phone in reach, chair alarm activated  Pt was educated on fall precautions and reinforced w/ good understanding  Pt would benefit from continued PT to address deficits as defined above and maximize level of independence with functional mobility and safety  The patient's AM-PAC Basic Mobility Inpatient Short Form Raw Score is 20, Standardized Score is 43 99  A standardized score greater than 42 9 suggests the patient may benefit from discharge to home  Please also refer to the recommendation of the Physical Therapist for safe discharge planning  From PT/mobility standpoint recommendation for D/C to home w/ HHPT vs OPPT pending progress, when medically cleared based on objective measures above and current function  CM to follow   [facility] staff to continue to mobilized pt (OOB in chair for all meals & ambulate in room/unit) as tolerated to prevent further decline in function  Nsg notified  Barriers to Discharge Inaccessible home environment   Barriers to Discharge Comments stairs   Goals   Patient Goals To go home   STG Expiration Date 05/04/21   Short Term Goal #1 1) Inc overall LE strength by 1/2 MMT grade to improve functional mobility; 2) Pt will demonstrate improved bed mobility I to dec caregiver burden; 3) Pt will demonstrate improved transfers I for inc safety; 4) Pt will be able to amb w/ I >150' w/ appropriate AD for household distances to inc safety and dec caregiver burden; 5) Pt will be able to navigate stairs with mod I to dec caregiver burden and inc safety with functional mobility; 6) Improve general balance by 1 grade to inc safety; 7) PT for ongoing patient and caregiver education    PT Treatment Day 0   Plan   Treatment/Interventions Functional transfer training;LE strengthening/ROM; Elevations; Therapeutic exercise; Endurance training;Patient/family training;Bed mobility;Gait training;Spoke to nursing;OT   PT Frequency Other (Comment)  (3-5x/wk)   Recommendation   PT Discharge Recommendation Home with home health rehabilitation  (HHPT vs OPPT pending progress)   AM-PAC Basic Mobility Inpatient   Turning in Bed Without Bedrails 4   Lying on Back to Sitting on Edge of Flat Bed 4   Moving Bed to Chair 3   Standing Up From Chair 3   Walk in Room 3   Climb 3-5 Stairs 3   Basic Mobility Inpatient Raw Score 20   Basic Mobility Standardized Score 43 99   Hx/personal factors: co-morbidities, inaccessible home, advanced age, telemetry, fall risk and O2  Examination: dec mobility, dec balance, dec endurance, dec amb, risk for falls  Clinical: unpredictable (ongoing medical status, abnormal lab values and risk for falls)  Complexity: high     Deisi April

## 2021-04-20 NOTE — PLAN OF CARE
Problem: Potential for Falls  Goal: Patient will remain free of falls  Description: INTERVENTIONS:  - Assess patient frequently for physical needs  -  Identify cognitive and physical deficits and behaviors that affect risk of falls  -  [place] fall precautions as indicated by assessment   - Educate patient/family on patient safety including physical limitations  - Instruct patient to call for assistance with activity based on assessment  - Modify environment to reduce risk of injury  - Consider OT/PT consult to assist with strengthening/mobility  Outcome: Progressing     Problem: DISCHARGE PLANNING  Goal: Discharge to home or other facility with appropriate resources  Description: INTERVENTIONS:  - Identify barriers to discharge w/patient and caregiver  - Arrange for needed discharge resources and transportation as appropriate  - Identify discharge learning needs (meds, wound care, etc )  - Arrange for interpretive services to assist at discharge as needed  - Refer to Case Management Department for coordinating discharge planning if the patient needs post-hospital services based on physician/advanced practitioner order or complex needs related to functional status, cognitive ability, or social support system  Outcome: Progressing     Problem: Knowledge Deficit  Goal: Patient/family/caregiver demonstrates understanding of disease process, treatment plan, medications, and discharge instructions  Description: Complete learning assessment and assess knowledge base    Interventions:  - Provide teaching at level of understanding  - Provide teaching via preferred learning methods  Outcome: Progressing     Problem: RESPIRATORY - ADULT  Goal: Achieves optimal ventilation and oxygenation  Description: INTERVENTIONS:  - Assess for changes in respiratory status  - Assess for changes in mentation and behavior  - Position to facilitate oxygenation and minimize respiratory effort  - Oxygen administered by appropriate delivery if ordered  - Initiate smoking cessation education as indicated  - Encourage broncho-pulmonary hygiene including cough, deep breathe, Incentive Spirometry  - Assess the need for suctioning and aspirate as needed  - Assess and instruct to report SOB or any respiratory difficulty  - Respiratory Therapy support as indicated  Outcome: Progressing

## 2021-04-20 NOTE — PLAN OF CARE
Problem: OCCUPATIONAL THERAPY ADULT  Goal: Performs self-care activities at highest level of function for planned discharge setting  See evaluation for individualized goals  Description: Treatment Interventions: ADL retraining, Functional transfer training, UE strengthening/ROM, Endurance training, Patient/family training, Equipment evaluation/education, Compensatory technique education, Energy conservation, Activityengagement          See flowsheet documentation for full assessment, interventions and recommendations  Note: Limitation: Decreased ADL status, Decreased UE strength, Decreased endurance, Decreased self-care trans, Decreased high-level ADLs  Prognosis: Good  Assessment: Pt is a 81 y o  male seen for OT evaluation s/p adm to [address] on 4/19/2021 w/ SOB x3 days and dx'd w/ Acute on chronic respiratory failure and COPD  Comorbidities affecting pts functional performance include a significant PMH of A-Fib, CA, COPD, CHF, Diverticulitis, DVT, Multiple myeloma, Pulmonary embolism, and lung adenocarcinoma  Pt with active OT orders and activity orders for Up and OOB as tolerated   Pt lives with spouse in a split-level house with 3 SELMA and 6 steps between levels  Pt reports (-) home alone  Spouse [facility] in ED at [facility]  At baseline, pt was I w/ ADLs and functional mobility/transfers w/o use of AD for household distances and use of [facility] for community mobility, required assist w/ IADLs, (+) , and denies falls PTA  Upon evaluation, pt currently requires Supervision for UB ADLs, Min A for LB ADLs, Supervision for toileting, Supervision for bed mobility, and Supervision for functional mobility/transfers 2* the following deficits impacting occupational performance: weakness, decreased strength, decreased balance and decreased tolerance   These impairments, as well at pts steps to enter environment and difficulty performing ADLS limit pts ability to safely engage in all baseline areas of occupation  The patient's raw score on the AM-PAC Daily Activity inpatient short form is 21, standardized score is 44 27, greater than 39 4  Patients at this level are likely to benefit from discharge to home  Please refer to the recommendation of the Occupational Therapist for safe discharge planning  Based on the aforementioned OT evaluation, functional performance deficits, and assessments, pt has been identified as a Moderate complexity evaluation  Pt to continue to benefit from continued acute OT services during hospital stay to address defined deficits and to maximize level of functional independence in the following Occupational Performance areas: grooming, bathing/shower, toilet hygiene, dressing, medication management, health maintenance, functional mobility, community mobility, clothing management and social participation  From OT standpoint, recommend Home w/ social support and continued PT upon D/C   OT will continue to follow pt 2-3x/wk to address the following goals to  w/in 10-14 days:     OT Discharge Recommendation: No rehabilitation needs(Home w/ family support and continued PT)  OT - OK to Discharge: Yes(when medically cleared)

## 2021-04-20 NOTE — ASSESSMENT & PLAN NOTE
Presents with shortness of breath while in the ED with his wife  Has shortness of breath at rest   Conversational dyspnea  Satting 94% on room air  Typically does nebulizer treatments 4x/day, has missed 2 of 4 today    Ambulate  Continue azithromycin  Still bronchospastic

## 2021-04-20 NOTE — ASSESSMENT & PLAN NOTE
Follows with Dr Ivonne Walker from [address] [facility] [place]  Being treated with Malphalan and Revlimid  Currently on hold  Oncology consulted  Appreciate recs

## 2021-04-20 NOTE — ASSESSMENT & PLAN NOTE
Follows with Dr Tiffany Ramon from [address] [facility] [place]  Patient reports no longer on lenalidomide, discontinue  Oncology consulted  Appreciate recs

## 2021-04-20 NOTE — RESPIRATORY THERAPY NOTE
RT Protocol Note  Dilip Suh 81 y o  male MRN: [id number]  Unit/Bed#: E4 -01 Encounter: [id number]    Assessment    Principal Problem:    Acute on chronic respiratory failure (HCC)  Active Problems:    Adenocarcinoma, lung (HCC)    Multiple myeloma (HCC)    COPD mixed type ([facility] )    Enlarged prostate without lower urinary tract symptoms (luts)    Chronic anticoagulation    Chronic diastolic congestive heart failure (HCC)    CKD (chronic kidney disease) stage 3, GFR 30-59 ml/min      Home Pulmonary Medications:    Home Devices/Therapy: Home O2    Past Medical History:   Diagnosis Date    Cancer ([place] [address] )     Chronic pain disorder     3 fractured vertebrae    [facility]) [facility])     Diarrhea     Diverticulitis     Enlarged prostate     Kidney stone     Multiple myeloma ([place] [address] )     Pulmonary embolism ([place] [address] ) 12/24/2018    Rectal adenocarcinoma ([place] [address] )     Sleep apnea     no cpap     Social History     Socioeconomic History    Marital status: /Civil Union     Spouse name: None    Number of children: None    Years of education: None    Highest education level: None   Occupational History    None   Social Needs    Financial resource strain: None    Food insecurity     Worry: None     Inability: None    Transportation needs     Medical: None     Non-medical: None   Tobacco Use    Smoking status: Former Smoker     Packs/day: 1 00     Years: 50 00     Pack years: 50 00     Types: Cigarettes     Start date: 57     Quit date: 12/7/2010     Years since quitting: 10 3    Smokeless tobacco: Never Used    Tobacco comment: quit in 1999   Substance and Sexual Activity    Alcohol use: Yes     Comment: rarely    Drug use: No    Sexual activity: None   Lifestyle    Physical activity     Days per week: None     Minutes per session: None    Stress: None   Relationships    Social connections     Talks on phone: None     Gets together: None     Attends Mandaeism service: None Active member of club or organization: None     Attends meetings of clubs or organizations: None     Relationship status: None    Intimate partner violence     Fear of current or ex partner: None     Emotionally abused: None     Physically abused: None     Forced sexual activity: None   Other Topics Concern    None   Social History Narrative    None       Subjective         Objective    Physical Exam:   Assessment Type: Assess only  General Appearance: Drowsy  Respiratory Pattern: Normal  Chest Assessment: Chest expansion symmetrical  Bilateral Breath Sounds: Diminished  O2 Device: 2 LPM NC    Vitals:  Blood pressure 103/55, pulse 89, temperature 99 2 °F (37 3 °C), temperature source Temporal, resp  rate 18, height 5' 8" (1 727 m), weight 82 2 kg (181 lb 3 5 oz), SpO2 92 %  Imaging and other studies: I have personally reviewed pertinent reports  O2 Device: 2 LPM NC     Plan    Respiratory Plan: Home Bronchodilator Patient pathway        Resp Comments: Patient refused hs tx, in TUSHAR

## 2021-04-20 NOTE — ASSESSMENT & PLAN NOTE
Developed worsening SOB while in ED with wife  Reports dyspnea has been worsening over the last several years due to his lung adenocarcinoma and COPD  He chronically feels short of breath, but states has been worse the last several days  Has shortness of breath at rest, worsening dyspnea with conversation  Maintain saturations on room air  Also noticed increased cough, mucus production  Denies fever, chills, nausea, vomiting, chest pain      Patient clinically improved although with significant wheezing on exam - very difficult to determine his pulmonary process  Has been transition to oral prednisone tomorrow  Potential discharge in the next 24 hours  Weaned to room air  Still with mucus production - continue azithromycin

## 2021-04-20 NOTE — ASSESSMENT & PLAN NOTE
Developed worsening SOB while in ED with wife  Reports dyspnea has been worsening over the last several years due to his lung adenocarcinoma and COPD  He chronically feels short of breath, but states has been worse the last several days  Has shortness of breath at rest, worsening dyspnea with conversation  Maintain saturations on room air  Also noticed increased cough, mucus production  Denies fever, chills, nausea, vomiting, chest pain  Follows with Dr Julian Mark for pulmonology, follows with Dr Treasure Traore for oncology, follows with Dr Ramiro Cox for cardiology  (+) rales and (+) wheezing on exam  CXR- worsening chest x-ray, possibly new left-sided infiltrate  WBC 5 5  Afebrile  Plan:  - started on ceftriaxone and azithromycin for possible pneumonia  Will discontinue once procalcitonin is negative x2  - treat COPD with DuoNebs Q 4h and IV Solu-Medrol  - titrate O2 as needed to maintain saturations > 88%  - procalcitonin pending  - lactic pending  - pulmonology consult  Appreciate input

## 2021-04-20 NOTE — PROGRESS NOTES
[address] [facility]  Progress Note Ollie Antony 1935, 81 y o  male MRN: [id number]  Unit/Bed#: E4 -01 Encounter: [id number]  Primary Care Provider: Arthur Daniels DO   Date and time admitted to hospital: 4/19/2021  4:23 PM    [address]  For nystatin swish and swallow - was present prior to admission    CKD (chronic kidney disease) stage 3, GFR 30-59 ml/min  Assessment & Plan  Lab Results   Component Value Date    EGFR 63 04/19/2021    EGFR 52 04/02/2021    EGFR 52 02/04/2021    CREATININE 1 08 04/19/2021    CREATININE 1 27 04/02/2021    CREATININE 1 26 02/04/2021   Baseline creatinine 1 - 1 3  Currently 1 08, at baseline  Chronic diastolic congestive heart failure (HCC)  Assessment & Plan  Wt Readings from Last 3 Encounters:   03/17/21 79 4 kg (175 lb)   02/25/21 78 7 kg (173 lb 6 4 oz)   02/24/21 78 5 kg (173 lb)     Does not appear to be in a CHF exacerbation  Home regimen:  Lasix 20 mg p o  Every other day or as needed        Chronic anticoagulation  Assessment & Plan  Chronic anticoagulation it on 5 mg Eliquis  For recent diagnosis of AFib as well as DVT prophylaxis as patient's cancer treatment may cause hypercoagulability  Enlarged prostate without lower urinary tract symptoms (luts)  Assessment & Plan  Continue finasteride and tamsulosin  I/Os  Continue current regimen    COPD mixed type [facility])  Assessment & Plan  Presents with shortness of breath while in the ED with his wife  Has shortness of breath at rest   Conversational dyspnea  Satting 94% on room air  Typically does nebulizer treatments 4x/day, has missed 2 of 4 today  Ambulate  Continue azithromycin  Still bronchospastic    Multiple myeloma ([facility] )  Assessment & Plan  Follows with Dr Malena Nava from [address] [facility]  Patient reports no longer on lenalidomide, discontinue  Oncology consulted  Appreciate recs      Adenocarcinoma, lung ([facility] )  Assessment & Plan  S/p right upper lobectomy, felt to be in remission    * Acute on chronic respiratory failure [facility])  Assessment & Plan  Developed worsening SOB while in ED with wife  Reports dyspnea has been worsening over the last several years due to his lung adenocarcinoma and COPD  He chronically feels short of breath, but states has been worse the last several days  Has shortness of breath at rest, worsening dyspnea with conversation  Maintain saturations on room air  Also noticed increased cough, mucus production  Denies fever, chills, nausea, vomiting, chest pain  Patient clinically improved although with significant wheezing on exam - very difficult to determine his pulmonary process  Has been transition to oral prednisone tomorrow  Potential discharge in the next 24 hours  Weaned to room air  Still with mucus production - continue azithromycin      [facility] Progress Note  Patient: Jessenia Reed 81 y o  male   MRN: [id number]  PCP: Loly Jimenez DO  Unit/Bed#: E4 -01 Encounter: [id number]  Date Of Visit: 04/20/21    Assessment:    Principal Problem:    Acute on chronic respiratory failure ([facility] )  Active Problems:    Adenocarcinoma, lung ([facility] )    Multiple myeloma ([facility] )    COPD mixed type ([facility] )    Enlarged prostate without lower urinary tract symptoms (luts)    Chronic anticoagulation    Chronic diastolic congestive heart failure (HCC)    CKD (chronic kidney disease) stage 3, GFR 30-59 ml/min    Thrush      Plan:    · Continue prednisone  · Will start nystatin  · Hopeful for discharge within the next 24 hours  · Procalcitonin negative -  unlikely bacterial pneumonia       VTE Pharmacologic Prophylaxis:   Pharmacologic: Apixaban (Eliquis)  Mechanical VTE Prophylaxis in Place: Yes    Patient Centered Rounds: I have performed bedside rounds with nursing staff today      Discussions with Specialists or Other Care Team Provider:  Discussed with     Education and Discussions with Family / Patient:  Patient's daughter, his wife is currently hospitalized    Time Spent for Care: 45 minutes  More than 50% of total time spent on counseling and coordination of care as described above  Current Length of Stay: 1 day(s)    Current Patient Status: Inpatient   Certification Statement: The patient will continue to require additional inpatient hospital stay due to Shortness of breath, bronchospasm    Discharge Plan / Estimated Discharge Date:  24-48 hours    Code Status: Level 3 - DNAR and DNI      Subjective:   Patient seen examined, reports his breathing is improving  States that he has sputum production  Reports his breathing is improved    Did discuss code status, he does not want a corona measures including chest compressions and CPR  Does not want intubation    A complete and comprehensive 14 point organ system review has been performed and all other systems are negative other than stated above  Objective:     Vitals:   Temp (24hrs), Av 7 °F (37 1 °C), Min:98 2 °F (36 8 °C), Max:99 2 °F (37 3 °C)    Temp:  [98 2 °F (36 8 °C)-99 2 °F (37 3 °C)] 98 2 °F (36 8 °C)  HR:  [] 101  Resp:  [18-20] 18  BP: ()/(53-59) 92/55  SpO2:  [91 %-97 %] 92 %  Body mass index is 27 55 kg/m²  Input and Output Summary (last 24 hours):        Intake/Output Summary (Last 24 hours) at 2021 1714  Last data filed at 2021 0601  Gross per 24 hour   Intake 540 ml   Output --   Net 540 ml       Physical Exam:     General: well appearing, no acute distress  HEENT: atraumatic, PERRLA, moist mucosa, normal pharynx, normal tonsils and adenoids, normal tongue, no fluid in sinuses  Neck: Trachea midline, no carotid bruit, no masses  Respiratory: normal chest wall expansion, CTA B, no r/r/w, no rubs  Cardiovascular: RRR, no m/r/g, Normal S1 and S2  Abdomen: Soft, non-tender, non-distended, normal bowel sounds in all quadrants, no hepatosplenomegaly, no tympany  Rectal: deferred  Musculoskeletal: normal ROM in upper and lower extremities  Integumentary: warm, dry, and pink, with no rash, purpura, or petechia  Heme/Lymph: no lymphadenopathy, no bruises  Neurological: Cranial Nerves II-XII grossly intact, no tics, normal sensation to pressure and light touch  Psychiatric: cooperative with normal mood, affect, and cognition      Additional Data:     Labs:    Results from last 7 days   Lab Units 04/20/21  0621   WBC Thousand/uL 2 71*   HEMOGLOBIN g/dL 8 9*   HEMATOCRIT % 29 7*   PLATELETS Thousands/uL 121*   LYMPHO PCT % 5*   MONO PCT % 7   EOS PCT % 0     Results from last 7 days   Lab Units 04/20/21  0621   POTASSIUM mmol/L 4 6   CHLORIDE mmol/L 106   CO2 mmol/L 24   BUN mg/dL 20   CREATININE mg/dL 1 29   CALCIUM mg/dL 8 1*     Results from last 7 days   Lab Units 04/19/21  1656   INR  1 13       * I Have Reviewed All Lab Data Listed Above  * Additional Pertinent Lab Tests Reviewed:  [facility] Admission Reviewed    Imaging:    Imaging Reports Reviewed Today Include:  Chest x-ray  Imaging Personally Reviewed by Myself Includes:  Chest x-ray    Recent Cultures (last 7 days):           Last 24 Hours Medication List:   Current Facility-Administered Medications   Medication Dose Route Frequency Provider Last Rate    acetaminophen  650 mg Oral Q6H PRN Rulon Machado, PA-C      albuterol  2 puff Inhalation Q4H PRN Sandhya Rodríguez MD      aluminum-magnesium hydroxide-simethicone  30 mL Oral Q6H PRN Rulon Machado, PA-C      apixaban  5 mg Oral BID Rulon Machado, PA-C      azithromycin  500 mg Intravenous Q24H Rulon Machado, PASharonC Stopped (04/20/21 0028)    diltiazem  180 mg Oral Daily [place], PA-C      famotidine  20 mg Oral Daily [place], [place]      finasteride  5 mg Oral HS [facility], PA-C      furosemide  20 mg Oral Daily [place], [place]      guaiFENesin  600 mg Oral Q12H [address] [place] [address]      ipratropium  0 5 mg Nebulization TID Matt Shankar DO      ipratropium-albuterol  3 mL Nebulization Q6H PRN Matt Shankar DO      levalbuterol  1 25 mg Nebulization TID Jose Alfredo Carpenter DO      methylPREDNISolone sodium succinate  40 mg Intravenous Q12H [facility] Josey Jefferson PA-C      nystatin  500,000 Units Swish & Swallow 4x Daily Jose Alfredo Carpenter DO      ondansetron  4 mg Intravenous Q6H PRN Maria Eugenia Machado PA-C      [START ON 4/21/2021] predniSONE  40 mg Oral Daily [place], [place]      [START ON 4/21/2021] tamsulosin  0 4 mg Oral Daily With Dinner Matt Shankar DO          Today, Patient Was Seen By: fEra Boyce DO    ** Please Note: This note was completed in part utilizing [employer] Fluency Direct Software  Grammatical errors, random word insertions, spelling mistakes, and incomplete sentences may be an occasional consequence of this system secondary to software limitations, ambient noise, and hardware issues  If you have any questions or concerns about the content, text, or information contained within the body of this dictation, please contact the provider for clarification   **

## 2021-04-20 NOTE — CONSULTS
Consultation - Pulmonary Medicine   Christine Gillis 81 y o  male MRN: [id number]  Unit/Bed#: E4 -01 Encounter: [id number]      Assessment:  Shortness of breath   Chronic respiratory failure with hypoxia  Abnormal imaging of the chest, possible pneumonia   COPD exacerbation   CHF  Multiple myeloma stage III  Adenocarcinoma of RUL status post VATS    Plan:   Patient saturating well on room air at rest   Continue 2 L nocturnally which is his baseline  Determine if there is any exertional hypoxia prior to discharge  Reviewed chest x-ray with new ground-glass opacity in the left mid lung and left base suspicious for pneumonia the patient has been started on antibiotics  First procalcitonin has been normal, follow-up on repeat procalcitonin  Consider empirically treating for at least 5 days regardless  Continue Xopenex/Atrovent 3 times daily  Decrease Solu-Medrol 40 mg q 8 hours to q 12 hours  Diuretics per primary service  Outpatient follow-up with oncology team  Oncology consult pending    He will require follow-up with his pulmonologist following discharge from the hospital    History of Present Illness   Physician Requesting Consult: Schuyler Hinds DO  Reason for Consult / Principal Problem: shortness of breath  Hx and PE limited by: none  HPI: Gisell Marie is a 81y o  year old male  Former smoker with past medical history including COPD, chronic respiratory failure on 2 L nocturnally, CHF,  PE December 2018 on Eliquis, Afib, multiple myeloma, adenocarcinoma of the lung, rectal cancer who presents with  Shortness of breath  Patient states that onset of symptoms was approximately 2 days prior to hospitalization  He was seen by his pulmonologist not too long ago and states that his breathing was fine at that time  He noted increased cough and sputum production, does typically have a daily cough productive for clear sputum however recently noticed increased amount and more brown/discolored in appearance    He has not had any fevers or chills  He received both doses of COVID-19 vaccine  He states that there was chest pain yesterday which has since resolved  He does admit to lower extremity edema but does not feel that is much worse than usual   Patient overall has been having declining health over time, dealing with multiple medical condition and cancer treatment  Inpatient consult to Pulmonology  Consult performed by: Clayton Silva PA-C  Consult ordered by: Steve Schultz PA-C          Review of Systems   Constitutional: Negative for chills and fever  Respiratory: Positive for cough and shortness of breath  Cardiovascular: Positive for leg swelling  Negative for chest pain (resolved)  All other systems reviewed and are negative  Historical Information   Past Medical History:   Diagnosis Date    Cancer [facility])     Chronic pain disorder     3 fractured vertebrae    COPD (chronic obstructive pulmonary disease) (HCC)     Diarrhea     Diverticulitis     Enlarged prostate     Kidney stone     Multiple myeloma ([facility] )     Pulmonary embolism ([facility] ) 12/24/2018    Rectal adenocarcinoma ([facility] )     Sleep apnea     no cpap     Past Surgical History:   Procedure Laterality Date    CHOLECYSTECTOMY      COLONOSCOPY W/ ENDOSCOPIC US N/A 7/21/2017    Procedure: ANAL ENDOSCOPIC U/S;  Surgeon: Radha Bunch MD;  [facility]: [facility]; Service: Colorectal    HERNIA REPAIR      KIDNEY STONE SURGERY      LUNG LOBECTOMY Right     MD COLONOSCOPY FLX DX W/COLLJ SPEC WHEN PFRMD N/A 7/10/2017    Procedure: COLONOSCOPY;  Surgeon: Aurora Velazquez MD;  Location: [facility];   Service: Gastroenterology    MD RECTAL TUMOR EXCISION, TRANSANAL ENDOSCOPIC MICROSURGICAL, FULL THICK N/A 11/2/2017    Procedure: TRANSANAL ENDOSCOPIC MICROSURGERY TEM;  Surgeon: Radha Bunch MD;  Location: [facility];  Service: Colorectal     Social History   Social History     Substance and Sexual Activity   Alcohol Use Yes    Comment: rarely     Social History     Substance and Sexual Activity   Drug Use No     E-Cigarette/Vaping    E-Cigarette Use Never User      E-Cigarette/Vaping Substances    Nicotine No     THC No     CBD No     Flavoring No     Other No     Unknown No      Social History     Tobacco Use   Smoking Status Former Smoker    Packs/day: 1 00    Years: 50 00    Pack years: 50 00    Types: Cigarettes    Start date: 57    Quit date: 12/7/2010    Years since quitting: 10 3   Smokeless Tobacco Never Used   Tobacco Comment    quit in 1999     Occupational History:     Family History:   Family History   Problem Relation Age of Onset    Arthritis Mother     Colon cancer Father     Heart attack Father     Diabetes type I Sister        Meds/Allergies   all current active meds have been reviewed    Allergies   Allergen Reactions    Gabapentin      somnolence    Metaxalone     Nisoldipine     Sulfa Antibiotics Itching    Aldactone [Spironolactone] Other (See Comments)     Breast swelling and pain  Breast swelling and pain    Penicillin V Rash     AS CHILD       Objective   Vitals: Blood pressure 90/57, pulse 85, temperature 98 9 °F (37 2 °C), temperature source Temporal, resp  rate 18, height 5' 8" (1 727 m), weight 82 2 kg (181 lb 3 5 oz), SpO2 93 %  ,Body mass index is 27 55 kg/m²  Intake/Output Summary (Last 24 hours) at 4/20/2021 1025  Last data filed at 4/20/2021 0601  Gross per 24 hour   Intake 540 ml   Output --   Net 540 ml     Invasive Devices     Central Venous Catheter Line            Port A Cath 12/10/17 Right Subclavian 1226 days          Peripheral Intravenous Line            Peripheral IV 04/19/21 Right Forearm less than 1 day    Peripheral IV 04/19/21 Right Forearm less than 1 day                Physical Exam  Vitals signs and nursing note reviewed  Constitutional:       General: He is not in acute distress  Appearance: He is well-developed  He is not diaphoretic     HENT:      Head: Normocephalic and atraumatic  Right Ear: External ear normal       Left Ear: External ear normal    Eyes:      General: No scleral icterus  Right eye: No discharge  Left eye: No discharge  Conjunctiva/sclera: Conjunctivae normal    Neck:      Musculoskeletal: Normal range of motion and neck supple  Trachea: No tracheal deviation  Cardiovascular:      Rate and Rhythm: Normal rate  Pulmonary:      Effort: Pulmonary effort is normal  No respiratory distress  Breath sounds: No stridor  Wheezing present  Abdominal:      General: There is no distension  Tenderness: There is no guarding  Musculoskeletal: Normal range of motion  General: No tenderness or deformity  Right lower leg: Edema present  Left lower leg: Edema present  Skin:     General: Skin is warm and dry  Coloration: Skin is not pale  Findings: No erythema or rash  Neurological:      Mental Status: He is alert and oriented to person, place, and time  Cranial Nerves: No cranial nerve deficit  Motor: No abnormal muscle tone  Psychiatric:         Behavior: Behavior normal          Thought Content: Thought content normal          Judgment: Judgment normal          Lab Results: I have personally reviewed pertinent lab results  Imaging Studies: I have personally reviewed pertinent reports  and I have personally reviewed pertinent films in PACS  EKG, Pathology, and Other Studies: I have personally reviewed pertinent reports      VTE Prophylaxis: Eliquis    Code Status: Level 1 - Full Code  Advance Directive and Living Will:      Power of :    POLST:

## 2021-04-20 NOTE — ASSESSMENT & PLAN NOTE
Presents with shortness of breath while in the ED with his wife  Has shortness of breath at rest   Conversational dyspnea  Satting 94% on room air  Typically does nebulizer treatments 4x/day, has missed 2 of 4 today    Lungs with rales and some wheezing on auscultation  Received DuoNeb x2 in ED/with EMS    Plan:  - Solu-Medrol 40 mg TID  - DuoNebs Q 4h  - Mucinex  - titrate O2 as needed maintain saturations greater than 88%

## 2021-04-20 NOTE — H&P
[address] [facility]  H&P- Lazarus Espino 1935, 81 y o  male MRN: [id number]  Unit/Bed#: ED 23 Encounter: [id number]  Primary Care Provider: Sunshine Ma DO   Date and time admitted to hospital: 4/19/2021  4:23 PM    COPD mixed type [facility])  Assessment & Plan  Presents with shortness of breath while in the ED with his wife  Has shortness of breath at rest   Conversational dyspnea  Satting 94% on room air  Typically does nebulizer treatments 4x/day, has missed 2 of 4 today  Lungs with rales and some wheezing on auscultation  Received DuoNeb x2 in ED/with EMS    Plan:  - Solu-Medrol 40 mg TID  - DuoNebs Q 4h  - Mucinex  - titrate O2 as needed maintain saturations greater than 88%    * Acute on chronic respiratory failure (HCC)  Assessment & Plan  Developed worsening SOB while in ED with wife  Reports dyspnea has been worsening over the last several years due to his lung adenocarcinoma and COPD  He chronically feels short of breath, but states has been worse the last several days  Has shortness of breath at rest, worsening dyspnea with conversation  Maintain saturations on room air  Also noticed increased cough, mucus production  Denies fever, chills, nausea, vomiting, chest pain  Follows with Dr Sierra Silva for pulmonology, follows with Dr Andreia Knox for oncology, follows with Dr Cammie Almonte for cardiology  (+) rales and (+) wheezing on exam  CXR- worsening chest x-ray, possibly new left-sided infiltrate  WBC 5 5  Afebrile  Plan:  - started on ceftriaxone and azithromycin for possible pneumonia  Will discontinue once procalcitonin is negative x2  - treat COPD with DuoNebs Q 4h and IV Solu-Medrol  - titrate O2 as needed to maintain saturations > 88%  - procalcitonin pending  - lactic pending  - pulmonology consult  Appreciate input      Chronic diastolic congestive heart failure (HCC)  Assessment & Plan  Wt Readings from Last 3 Encounters:   03/17/21 79 4 kg (175 lb)   02/25/21 78 7 kg (173 lb 6 4 oz)   02/24/21 78 5 kg (173 lb)     Does not appear to be in a CHF exacerbation  Home regimen:  Lasix 20 mg p o  Every other day or as needed  +2 lower leg edema, no JVD  Daily weights  I&Os  Continue Lasix        CKD (chronic kidney disease) stage 3, GFR 30-59 ml/min  Assessment & Plan  Lab Results   Component Value Date    EGFR 63 04/19/2021    EGFR 52 04/02/2021    EGFR 52 02/04/2021    CREATININE 1 08 04/19/2021    CREATININE 1 27 04/02/2021    CREATININE 1 26 02/04/2021   Baseline creatinine 1 - 1 3  Currently 1 08, at baseline  Avoid nephrotoxins  Avoid hypotension    Chronic anticoagulation  Assessment & Plan  Chronic anticoagulation it on 5 mg Eliquis  For recent diagnosis of AFib as well as DVT prophylaxis as patient's cancer treatment may cause hypercoagulability    Enlarged prostate without lower urinary tract symptoms (luts)  Assessment & Plan  Continue finasteride and tamsulosin  I/Os    Multiple myeloma ([facility] [address] )  Assessment & Plan  Follows with Dr Sharmin Stacy from [address] [place] [address]  Being treated with Malphalan and Revlimid  Currently on hold  Oncology consulted  Appreciate recs  Adenocarcinoma, lung ([facility] [address] )  Assessment & Plan  S/p right upper lobectomy    VTE Prophylaxis: Apixaban (Eliquis)  / sequential compression device   Code Status:  Level 1 full code  POLST: There is no POLST form on file for this patient (pre-hospital)  Discussion with family:  Patient    Anticipated Length of Stay:  Patient will be admitted on an Inpatient basis with an anticipated length of stay of  greater than 2 midnights  Justification for Hospital Stay:  Shortness of breath    Total Time for Visit, including Counseling / Coordination of Care: 30 minutes  Greater than 50% of this total time spent on direct patient counseling and coordination of care      Chief Complaint:   Shortness of breath    History of Present Illness:    Mauro Silvestre is a 81 y o  male a complicated PMH including lung adenocarcinoma, multiple myeloma, rectal cancer, COPD, history of tobacco use, CHF, DVT/PE on Eliquis, recently diagnosed with AFib who presents with shortness of breath x2 days  Patient is chronically ill due to his lung adenocarcinoma and COPD  He states he is always short of breath, and that his nebulizers rarely help at home  He notes that he has had increased shortness of breath with associated increased mucus production increased cough for the last 2-3 days  At baseline, patient uses walker and is limited to only walking several steps secondary to his shortness of breath  He denies fevers, chills, chest pain, abdominal pain, nausea, vomiting, lightheadedness  In the ED, lab work was unremarkable  CXR appears to be worse with a possible new left lower lobe infiltrate  On exam, he has severe dyspnea, or worsening with conversation  He also has rales and wheezing in his lungs on auscultation  Review of Systems:    Review of Systems   Constitutional: Positive for fatigue  Negative for appetite change, chills and fever  HENT: Negative for ear pain, sore throat and trouble swallowing  Eyes: Negative for visual disturbance  Respiratory: Positive for shortness of breath and wheezing  Negative for cough and chest tightness  Cardiovascular: Negative for chest pain, palpitations and leg swelling  Gastrointestinal: Negative for abdominal distention, abdominal pain, diarrhea, nausea and vomiting  Endocrine: Negative  Genitourinary: Negative for dysuria  Musculoskeletal: Negative for gait problem and myalgias  Skin: Negative for pallor  Allergic/Immunologic: Negative for immunocompromised state  Neurological: Negative for dizziness, syncope, light-headedness, numbness and headaches         Past Medical and Surgical History:     Past Medical History:   Diagnosis Date    Cancer [facility])     Chronic pain disorder     3 fractured vertebrae    COPD (chronic obstructive pulmonary disease) (HCC)     Diarrhea     Diverticulitis     Enlarged prostate     Kidney stone     [facility] [address] )     Pulmonary embolism ([facility] [address] ) 12/24/2018    Rectal adenocarcinoma ([facility] [address] )     Sleep apnea     no cpap       Past Surgical History:   Procedure Laterality Date    CHOLECYSTECTOMY      COLONOSCOPY W/ ENDOSCOPIC US N/A 7/21/2017    Procedure: ANAL ENDOSCOPIC U/S;  Surgeon: Mary Lou Warren MD;  Location: [facility]; Service: Colorectal    HERNIA REPAIR      KIDNEY STONE SURGERY      LUNG LOBECTOMY Right     TX COLONOSCOPY FLX DX W/COLLJ SPEC WHEN PFRMD N/A 7/10/2017    Procedure: COLONOSCOPY;  Surgeon: Nikky Pteerson MD;  Location: [facility]; Service: Gastroenterology    TX RECTAL TUMOR EXCISION, TRANSANAL ENDOSCOPIC MICROSURGICAL, FULL THICK N/A 11/2/2017    Procedure: TRANSANAL ENDOSCOPIC MICROSURGERY TEM;  Surgeon: Mary Lou Warren MD;  Location: [facility];  Service: Colorectal       Meds/Allergies:    Prior to Admission medications    Medication Sig Start Date End Date Taking?  Authorizing Provider   albuterol (2 5 mg/3 mL) 0 083 % nebulizer solution Inhale 2 5 mg every 6 (six) hours as needed 3/24/20   Historical Provider, MD   albuterol (PROVENTIL HFA,VENTOLIN HFA) 90 mcg/act inhaler Inhale 2 puffs every 6 (six) hours as needed for wheezing 10/27/20   JACK Lord   apixaban (ELIQUIS) 5 mg Take 1 tablet (5 mg total) by mouth 2 (two) times a day 3/17/21   Car Lynn, DO   cholecalciferol (VITAMIN D3) 1,000 units tablet Take 5,000 Units by mouth daily      Historical Provider, MD   cyanocobalamin (VITAMIN B-12) 1,000 mcg tablet Take by mouth daily    Historical Provider, MD   diltiazem (CARDIZEM CD) 180 mg 24 hr capsule Take 1 capsule (180 mg total) by mouth daily 3/17/21   Car Lynn DO   famotidine (Pepcid) 20 mg tablet Take 20 mg by mouth as needed     Historical Provider, MD   ferrous sulfate 325 (65 Fe) mg tablet Take 45 mg by mouth daily with breakfast     Historical Provider, MD   finasteride (PROSCAR) 5 mg tablet TAKE 1 TABLET BY MOUTH AT BEDTIME 11/20/20   Monika Rosado PA-C   furosemide (LASIX) 20 mg tablet Take 1 tablet (20 mg total) by mouth 3 (three) times a week 1/13/21   Wilver Rosenthal MD   guaifenesin-codeine (guaiFENesin AC) 100-10 MG/5ML liquid Take 10 mL by mouth every 4 (four) hours    Historical Provider, MD   ipratropium (ATROVENT) 0 02 % nebulizer solution Take 1 vial (0 5 mg total) by nebulization 4 (four) times a day 9/9/20   Wilver Rosenthal MD   lenalidomide (REVLIMID) 5 MG CAPS Take one capsule daily 3 weeks on followed by 3 weeks off  [facility]#[id number] on 3/25/21 3/25/21   Tessy Proctor MD   Loperamide HCl (IMODIUM PO) Take 4 mg by mouth daily    Historical Provider, MD   melphalan (ALKERAN) 2 MG tablet Take 6 tablets (12 mg total) by mouth for 4 days every 6 weeks 4/8/21   Tessy Proctor MD   potassium chloride (K-DUR,KLOR-CON) 10 mEq tablet Take 1 tablet (10 mEq total) by mouth 3 (three) times a week Take only on days you take lasix 1/13/21   Wilver Rosenthal MD   predniSONE 20 mg tablet Take 1 tablet (20 mg total) by mouth daily 2/24/21   Wilver Rosenthal MD   Silodosin 8 MG CAPS Take by mouth    Historical Provider, MD   tamsulosin (FLOMAX) 0 4 mg Take 0 4 mg by mouth daily with dinner    Historical Provider, MD   traMADol (ULTRAM) 50 mg tablet TAKE ONE TABLET BY MOUTH EVERY 6 HOURS AS NEEDED FOR MODERATE PAIN (PAIN SCORE 4-6) 3/24/20   Historical Provider, MD   triamcinolone (KENALOG) 0 5 % cream APPLY TOPICALLY THREE TIMES DAILY TO SHINGLES RASH 3/26/20   Historical Provider, MD SORIA have reviewed home medications with patient personally  Allergies:    Allergies   Allergen Reactions    Gabapentin      somnolence    Metaxalone     Nisoldipine     Sulfa Antibiotics Itching    Aldactone [Spironolactone] Other (See Comments)     Breast swelling and pain  Breast swelling and pain    Penicillin V Rash     AS CHILD       Social History:     Marital Status: /[employer] Occupation:  Retired  Patient Pre-hospital Living Situation:  Unknown  Patient Pre-hospital Level of Mobility:  Limited, with Walker  Patient Pre-hospital Diet Restrictions:  Diabetic  Substance Use History:   Social History     Substance and Sexual Activity   Alcohol Use Yes    Comment: rarely     Social History     Tobacco Use   Smoking Status Former Smoker    Packs/day: 1 00    Years: 50 00    Pack years: 50 00    Types: Cigarettes    Start date: 57    Quit date: 12/7/2010    Years since quitting: 10 3   Smokeless Tobacco Never Used   Tobacco Comment    quit in 1999     Social History     Substance and Sexual Activity   Drug Use No       Family History:    non-contributory    Physical Exam:     Vitals:   Blood Pressure: 106/55 (04/19/21 1930)  Pulse: (!) 133 (04/19/21 1952)  Temperature: 98 1 °F (36 7 °C) (04/19/21 1636)  Temp Source: Oral (04/19/21 1636)  Respirations: 20 (04/19/21 1930)  SpO2: 96 % (04/19/21 1952)    Physical Exam  Vitals signs and nursing note reviewed  Constitutional:       Appearance: He is ill-appearing  HENT:      Head: Normocephalic and atraumatic  Mouth/Throat:      Mouth: Mucous membranes are moist       Pharynx: Oropharynx is clear  No oropharyngeal exudate  Eyes:      Extraocular Movements: Extraocular movements intact  Cardiovascular:      Rate and Rhythm: Regular rhythm  Tachycardia present  Pulses: Normal pulses  Heart sounds: Normal heart sounds  No murmur  No friction rub  No gallop  Pulmonary:      Effort: Pulmonary effort is normal  No respiratory distress  Breath sounds: No stridor  Wheezing and rales present  Abdominal:      General: Abdomen is flat  Bowel sounds are normal  There is no distension  Palpations: Abdomen is soft  Tenderness: There is no abdominal tenderness  Musculoskeletal:      Right lower leg: No edema  Left lower leg: No edema  Skin:     General: Skin is warm and dry     Neurological: General: No focal deficit present  Mental Status: He is alert and oriented to person, place, and time  Additional Data:     Lab Results: I have personally reviewed pertinent reports  Results from last 7 days   Lab Units 04/19/21  1656   WBC Thousand/uL 5 51   HEMOGLOBIN g/dL 10 6*   HEMATOCRIT % 35 2*   PLATELETS Thousands/uL 145*   BANDS PCT % 1   LYMPHO PCT % 10*   MONO PCT % 14*   EOS PCT % 2     Results from last 7 days   Lab Units 04/19/21  1656   SODIUM mmol/L 141   POTASSIUM mmol/L 4 5   CHLORIDE mmol/L 105   CO2 mmol/L 28   BUN mg/dL 19   CREATININE mg/dL 1 08   ANION GAP mmol/L 8   CALCIUM mg/dL 8 9   GLUCOSE RANDOM mg/dL 84     Results from last 7 days   Lab Units 04/19/21  1656   INR  1 13                   Imaging: I have personally reviewed pertinent reports  XR chest 1 view portable   ED Interpretation by Veena Deras DO (04/19 1857)   Patchy left basilar infiltrate          EKG, Pathology, and Other Studies Reviewed on Admission:   · EKG  ·     Allscripts / Epic Records Reviewed: Yes     ** Please Note: This note has been constructed using a voice recognition system   **

## 2021-04-21 NOTE — ASSESSMENT & PLAN NOTE
Developed worsening SOB while in ED with wife  Reports dyspnea has been worsening over the last several years due to his lung adenocarcinoma and COPD  He chronically feels short of breath, but states has been worse the last several days  Has shortness of breath at rest, worsening dyspnea with conversation  Maintain saturations on room air  Also noticed increased cough, mucus production  Denies fever, chills, nausea, vomiting, chest pain  Patient clinically improved although with significant wheezing on exam - very difficult to determine his pulmonary process  Remained on IV steroids and now stable for transition to oral prednisone  Clear for discharge from pulmonary standpoint  Weaned to room air  Will check oxygen saturation with ambulation prior to discharge  Patient uses 2 L of supplemental oxygen nocturnally    Still with mucus production - continue azithromycin to complete 5 days of treatment per Pulmonary recommendations  Stable for discharge home today

## 2021-04-21 NOTE — ASSESSMENT & PLAN NOTE
For nystatin swish and swallow - was present prior to admission  Continue to complete 5 more days of treatment upon discharge

## 2021-04-21 NOTE — PROGRESS NOTES
Progress Note - Pulmonary   Iliana Delaney 81 y o  male MRN: [id number]  Unit/Bed#: E4 -01 Encounter: [id number]    Assessment:  Shortness of breath   Chronic respiratory failure with hypoxia   Abnormal imaging of the chest, possible pneumonia  COPD exacerbation  CHF   Multiple myeloma stage III   Adenocarcinoma of right upper lobe status post VATS    Plan:  Patient saturating well on room air at rest   Continue 2 L nocturnally which is his baseline  Recommend determining if there is any exertional hypoxia prior to discharge  Reviewed chest x-ray with new ground-glass opacity in the left mid lung and left base suspicious for pneumonia the patient has been started on antibiotics  First procalcitonin has been normal, follow-up on repeat procalcitonin  Consider empirically treating for at least 5 days regardless  Will need follow-up imaging as an outpatient  Continue Xopenex/Atrovent 3 times daily  Patient started on prednisone 40 mg today, continue to taper down the dose  Diuretics per primary service  Outpatient follow-up with oncology team  Discussed pulmonary rehab with the patient, he is unsure about this  Would like to think about it for now     Outpatient follow-up scheduled with Dr Nahun Flores 4/27/21    Reached out to Nirmal Strong attending via TT    Subjective:   Patient seen and examined this morning  He has a very bright affect and states that his breathing is actually better than usual   He would like to go home  12 point review of systems otherwise negative  Objective:     Vitals: Blood pressure 106/56, pulse 98, temperature 97 5 °F (36 4 °C), temperature source Temporal, resp  rate 18, height 5' 8" (1 727 m), weight 82 2 kg (181 lb 3 5 oz), SpO2 90 %  ,Body mass index is 27 55 kg/m²        Intake/Output Summary (Last 24 hours) at 4/21/2021 1053  Last data filed at 4/21/2021 0900  Gross per 24 hour   Intake --   Output 200 ml   Net -200 ml       Invasive Devices     Central Venous Catheter Line Port A Cath 12/10/17 Right Subclavian 1227 days          Peripheral Intravenous Line            Peripheral IV 04/19/21 Right Forearm 1 day    Peripheral IV 04/19/21 Right Forearm 1 day                Physical Exam:   General appearance: alert and oriented, in no acute distress  Head: Normocephalic, without obvious abnormality, atraumatic  Eyes: EOMI  No discharge bilaterally  No scleral icterus  Neck: supple, symmetrical, trachea midline  Lungs:   Decreased breath sounds  Heart: regular rate and rhythm, S1, S2 normal, no murmur, click, rub or gallop  Abdomen:  No appreciable distension or tenderness  Extremities:  Lower extremity edema present  Skin: No lesions or pallor noted  No rash  Neurologic: Grossly normal     Labs: I have personally reviewed pertinent lab results  Imaging and other studies: I have personally reviewed pertinent reports

## 2021-04-21 NOTE — RESPIRATORY THERAPY NOTE
Home Oxygen Qualifying Test       Patient name: Juan Rodriguez        : 1935   Date of Test:  2021  Diagnosis:      Home Oxygen Test:    **Medicare Guidelines require item(s) 1-5 on all ambulatory patients or 1 and 2 on non-ambulatory patients  1   Baseline SPO2 on Room Air at rest 97 %  2   SPO2 during exercise on Room Air 96 %  During exercise monitor SpO2  If SPO2 increases >=89% with ambulation do not add supplemental             oxygen  If <= 88% on room air add O2 via NC and titrate patient  Patient must be ambulated with O2 and titrated to > 88% with exertion  3   SPO2 on Oxygen at rest   NA    4  SPO2 during exercise on Oxygen  NA    5  Exercise performed:          Ambulation with cane for approx  6 min  X 100 feet  []  Supplemental Home Oxygen is indicated  [x]  Client does not qualify for home oxygen  Respiratory Additional Notes- Pt  Denies any SOB or resp  difficulties during ambulation      Linda Nowak, RT

## 2021-04-21 NOTE — DISCHARGE SUMMARY
[facility]  Discharge- Rajan Delgado 1935, 81 y o  male MRN: [id number]  Unit/Bed#: E4 -01 Encounter: [id number]  Primary Care Provider: Yaritza Gonzalez DO   Date and time admitted to hospital: 4/19/2021  4:23 PM    * Acute on chronic respiratory failure [facility])  Assessment & Plan  Developed worsening SOB while in ED with wife  Reports dyspnea has been worsening over the last several years due to his lung adenocarcinoma and COPD  He chronically feels short of breath, but states has been worse the last several days  Has shortness of breath at rest, worsening dyspnea with conversation  Maintain saturations on room air  Also noticed increased cough, mucus production  Denies fever, chills, nausea, vomiting, chest pain  Patient clinically improved although with significant wheezing on exam - very difficult to determine his pulmonary process  Remained on IV steroids and now stable for transition to oral prednisone  Clear for discharge from pulmonary standpoint  Weaned to room air  Will check oxygen saturation with ambulation prior to discharge  Patient uses 2 L of supplemental oxygen nocturnally  Still with mucus production - continue azithromycin to complete 5 days of treatment per Pulmonary recommendations  Stable for discharge home today    Thrush  Assessment & Plan  For nystatin swish and swallow - was present prior to admission  Continue to complete 5 more days of treatment upon discharge    CKD (chronic kidney disease) stage 3, GFR 30-59 ml/min  Assessment & Plan  Lab Results   Component Value Date    EGFR 51 04/20/2021    EGFR 63 04/19/2021    EGFR 52 04/02/2021    CREATININE 1 29 04/20/2021    CREATININE 1 08 04/19/2021    CREATININE 1 27 04/02/2021   Baseline creatinine 1 - 1 3  Currently 1 08, at baseline        Chronic diastolic congestive heart failure (HCC)  Assessment & Plan  Wt Readings from Last 3 Encounters:   04/19/21 82 2 kg (181 lb 3 5 oz)   03/17/21 79 4 kg (175 lb)   02/25/21 78 7 kg (173 lb 6 4 oz)     Does not appear to be in a CHF exacerbation  Home regimen:  Lasix 20 mg p o  Change to Lasix 20 mg daily on discharge  Continue with potassium supplementation  Chronic anticoagulation  Assessment & Plan  Chronic anticoagulation it on 5 mg Eliquis  For recent diagnosis of AFib as well as DVT prophylaxis as patient's cancer treatment may cause hypercoagulability  Enlarged prostate without lower urinary tract symptoms (luts)  Assessment & Plan  Continue finasteride and tamsulosin  I/Os  Continue current regimen    COPD with acute exacerbation  Assessment & Plan  Presents with shortness of breath while in the ED with his wife  Has shortness of breath at rest   Conversational dyspnea  Satting 94% on room air  Typically does nebulizer treatments 4x/day, has missed 2 of 4 today  Respiratory status has improved on IV steroids  Stable for transition to p o  Prednisone  Continue with tapering prednisone  Continue with azithromycin to complete 5 days of treatment  Continue with nebulizer inhaler treatment on discharge  Patient has appointment with his outpatient pulmonologist Dr Carmen Solorzano on 04/27  Stable for discharge home today    Multiple myeloma [facility])  Assessment & Plan  Follows with Dr Natalia Mcclain from [address] [place] [address]  Patient reports no longer on lenalidomide, discontinue  Oncology consulted  Appreciate recs    Outpatient Oncology follow-up on discharge    Adenocarcinoma, lung [facility])  Assessment & Plan  S/p right upper lobectomy, felt to be in remission      Discharging Physician / Practitioner: Thelma Rowland MD  PCP: Misty Samano DO  Admission Date:   Admission Orders (From admission, onward)     Ordered        04/19/21 2012  Inpatient Admission  Once                   Discharge Date: 04/21/21    Resolved Problems  Date Reviewed: 4/20/2021    None          Consultations During Hospital Stay:  · Pulmonology  · Palliative Medicine    Procedures Performed:   · Chest x-ray with ground-glass opacity in the left mid lung and left base  Significant Findings / Test Results:   · COVID-19 PCR negative    Incidental Findings:   · None     Test Results Pending at Discharge (will require follow up):   · NA     Outpatient Tests Requested:  · Repeat thyroid function test in 4 weeks    Complications:  None     Reason for Admission:  Shortness of breath  Hospital Course:     Wyatt Lynch is a 81 y o  male patient who originally presented to the hospital on 4/19/2021 due to shortness of breath and was thought to be secondary to COPD exacerbation  He was maintained initially on IV steroids and was subsequently switched to p o  Prednisone  Chest x-ray had new ground-glass opacity for which he was maintained on antibiotics to complete 5 days of treatment  He was maintained on nebulizer inhaler treatment and was deemed stable for discharge with outpatient pulmonary follow-up  Outpatient Oncology follow-up was recommended for his underlying malignancies  Patient discharged home in a stable condition  Of note during his hospitalization his TSH was low  Free T4 was pending on discharge  Recommended outpatient follow-up thyroid function test for risk  Please see above list of diagnoses and related plan for additional information  Condition at Discharge: stable     Discharge Day Visit / Exam:     Subjective:  Patient seen and examined  Reports improvement in breathing  Still with occasional productive cough  Remains afebrile  Vitals: Blood Pressure: 106/56 (04/21/21 0722)  Pulse: 98 (04/21/21 0722)  Temperature: 97 5 °F (36 4 °C) (04/21/21 0722)  Temp Source: Temporal (04/21/21 0722)  Respirations: 18 (04/21/21 0722)  Height: 5' 8" (172 7 cm) (04/19/21 2350)  Weight - Scale: 82 2 kg (181 lb 3 5 oz) (04/19/21 2350)  SpO2: 90 % (04/21/21 0722)  Exam:   Physical Exam  Constitutional:       Appearance: Normal appearance  HENT:      Head: Normocephalic  Nose: Nose normal       Mouth/Throat:      Mouth: Mucous membranes are moist    Eyes:      Extraocular Movements: Extraocular movements intact  Pupils: Pupils are equal, round, and reactive to light  Neck:      Musculoskeletal: Neck supple  Pulmonary:      Effort: Pulmonary effort is normal       Comments: Right chest wall Port-A-Cath in place  Improved wheezing bilaterally  Abdominal:      General: Abdomen is flat  Palpations: Abdomen is soft  Musculoskeletal:      Right lower leg: No edema  Left lower leg: No edema  Skin:     General: Skin is warm  Neurological:      General: No focal deficit present  Mental Status: He is alert and oriented to person, place, and time  Psychiatric:         Mood and Affect: Mood normal          Discussion with Family:  Discussed with patient's daughter Lopezside over the phone    Discharge instructions/Information to patient and family:   See after visit summary for information provided to patient and family  Provisions for Follow-Up Care:  See after visit summary for information related to follow-up care and any pertinent home health orders  Disposition:     Home    For Discharges to [facility] SNF:   · Not Applicable to this Patient - Not Applicable to this Patient    Planned Readmission: No     Discharge Statement:  I spent 35minutes discharging the patient  This time was spent on the day of discharge  I had direct contact with the patient on the day of discharge  Greater than 50% of the total time was spent examining patient, answering all patient questions, arranging and discussing plan of care with patient as well as directly providing post-discharge instructions  Additional time then spent on discharge activities  Discharge Medications:  See after visit summary for reconciled discharge medications provided to patient and family        ** Please Note: This note has been constructed using a voice recognition system **

## 2021-04-21 NOTE — ASSESSMENT & PLAN NOTE
Lab Results   Component Value Date    EGFR 51 04/20/2021    EGFR 63 04/19/2021    EGFR 52 04/02/2021    CREATININE 1 29 04/20/2021    CREATININE 1 08 04/19/2021    CREATININE 1 27 04/02/2021   Baseline creatinine 1 - 1 3  Currently 1 08, at baseline

## 2021-04-21 NOTE — CASE MANAGEMENT
MD is planning on discharging pt today  PT is recommending home PT  Patient's spouse is currently a patient down the grant  Met with patient and offer VNA for therapy and pt refused  Pt stated he would be safe to return home and knows what exercise to do  Pt is hoping his spouse will also be discharge today  Pt stated one of his dtrs can stay with him if his spouse is not discharge  Dtr will transport home  Pt' needs PCP appointment set up with Dr Suhas Robin on Friday April 30 at 10:30 pm  This was written on discharge instruction sheets

## 2021-04-21 NOTE — PLAN OF CARE
Problem: Potential for Falls  Goal: Patient will remain free of falls  Description: INTERVENTIONS:  - Assess patient frequently for physical needs  -  Identify cognitive and physical deficits and behaviors that affect risk of falls  -  [place] fall precautions as indicated by assessment   - Educate patient/family on patient safety including physical limitations  - Instruct patient to call for assistance with activity based on assessment  - Modify environment to reduce risk of injury  - Consider OT/PT consult to assist with strengthening/mobility  Outcome: Progressing     Problem: DISCHARGE PLANNING  Goal: Discharge to home or other facility with appropriate resources  Description: INTERVENTIONS:  - Identify barriers to discharge w/patient and caregiver  - Arrange for needed discharge resources and transportation as appropriate  - Identify discharge learning needs (meds, wound care, etc )  - Arrange for interpretive services to assist at discharge as needed  - Refer to Case Management Department for coordinating discharge planning if the patient needs post-hospital services based on physician/advanced practitioner order or complex needs related to functional status, cognitive ability, or social support system  Outcome: Progressing     Problem: Knowledge Deficit  Goal: Patient/family/caregiver demonstrates understanding of disease process, treatment plan, medications, and discharge instructions  Description: Complete learning assessment and assess knowledge base    Interventions:  - Provide teaching at level of understanding  - Provide teaching via preferred learning methods  Outcome: Progressing     Problem: RESPIRATORY - ADULT  Goal: Achieves optimal ventilation and oxygenation  Description: INTERVENTIONS:  - Assess for changes in respiratory status  - Assess for changes in mentation and behavior  - Position to facilitate oxygenation and minimize respiratory effort  - Oxygen administered by appropriate delivery if ordered  - Initiate smoking cessation education as indicated  - Encourage broncho-pulmonary hygiene including cough, deep breathe, Incentive Spirometry  - Assess the need for suctioning and aspirate as needed  - Assess and instruct to report SOB or any respiratory difficulty  - Respiratory Therapy support as indicated  Outcome: Progressing

## 2021-04-21 NOTE — ASSESSMENT & PLAN NOTE
Wt Readings from Last 3 Encounters:   04/19/21 82 2 kg (181 lb 3 5 oz)   03/17/21 79 4 kg (175 lb)   02/25/21 78 7 kg (173 lb 6 4 oz)     Does not appear to be in a CHF exacerbation  Home regimen:  Lasix 20 mg p o  Change to Lasix 20 mg daily on discharge  Continue with potassium supplementation

## 2021-04-21 NOTE — ASSESSMENT & PLAN NOTE
Follows with Dr Forest Nielsen from [address] [facility]  Patient reports no longer on lenalidomide, discontinue  Oncology consulted  Appreciate recs    Outpatient Oncology follow-up on discharge

## 2021-04-21 NOTE — ASSESSMENT & PLAN NOTE
Presents with shortness of breath while in the ED with his wife  Has shortness of breath at rest   Conversational dyspnea  Satting 94% on room air  Typically does nebulizer treatments 4x/day, has missed 2 of 4 today  Respiratory status has improved on IV steroids  Stable for transition to p o  Prednisone  Continue with tapering prednisone  Continue with azithromycin to complete 5 days of treatment  Continue with nebulizer inhaler treatment on discharge  Patient has appointment with his outpatient pulmonologist Dr Xiomara Betancur on 04/27    Stable for discharge home today

## 2021-04-26 NOTE — TELEPHONE ENCOUNTER
Labs Before Visit     Patient called to see if they need labs drawn prior to visit     Person calling in [place]   Date Of Appointment  04/29/2021   Call back number  [phone]

## 2021-04-26 NOTE — TELEPHONE ENCOUNTER
Returned call to patients wife  Explained labs are needed prior to appointment on 4/29/21  Orders are in the system - entered on 2/25/21 @ last office visit

## 2021-04-27 PROBLEM — J43.2 CENTRILOBULAR EMPHYSEMA (HCC): Status: ACTIVE | Noted: 2017-08-22

## 2021-04-27 PROBLEM — J96.01 ACUTE RESPIRATORY FAILURE WITH HYPOXIA (HCC): Status: RESOLVED | Noted: 2020-01-03 | Resolved: 2021-01-01

## 2021-04-27 PROBLEM — J96.10 CHRONIC RESPIRATORY FAILURE (HCC): Status: ACTIVE | Noted: 2020-01-01

## 2021-04-28 PROBLEM — B37.0 THRUSH: Status: RESOLVED | Noted: 2021-01-01 | Resolved: 2021-01-01

## 2021-04-28 PROBLEM — Z85.3 PERSONAL HISTORY OF MALIGNANT NEOPLASM OF BREAST: Status: ACTIVE | Noted: 2021-01-01

## 2021-04-28 NOTE — ASSESSMENT & PLAN NOTE
· He has been evaluated by Cardiology in the hospital and deemed not to be a candidate for intervention for his aortic valve due to other comorbid medical conditions      · Continues with medical management, Dr Kortney Velasquez   · On daily diuretics, diltiazem, and anticoagulation  · Aortic pathology does definitely contribute to his complaints of dyspnea

## 2021-04-28 NOTE — PROGRESS NOTES
Cardiology   MD Robson Guerrero MD Ather Mansoor, MD Margarite Gurney, DO, Wilian Durbin DO, Carli Lopez DO, [facility] [place]  -------------------------------------------------------------------  [facility], [address] [place] [id number] [phone]  [address] [place] [address]                      1935                     [id number]          Assessment/Plan:     1  Paroxysmal atrial fibrillation  2  Moderate to severe, likely severe aortic stenosis--aortic valve area 0 9 cm2  3  Chronic diastolic heart failure  4  History of DVT, PE--on Eliquis anticoagulation  5  COPD  6  Multiple myeloma  7  BPH  8  Basal cell skin carcinoma     · Sinus rhythm with frequent PACs on today's ECG, continue diltiazem 180 mg daily   · Continue Eliquis anticoagulation  He wants to cut back the dose 2 5 mg twice daily, in light of significant bruising in both of his arms which he does have  I explained to him that it may not be efficacious at the 5 mg dose, and expresses understanding  His creatinine is 1 2, and he is over the age of [de-identified] with heavy bruising, therefore I have given her permission to reduce the dose to 2 5 mg after the above-mentioned discussion  · Continue furosemide 20 mg p o  daily  He is reluctant to increase the dose any further in light of polyuria and frequent urination at this dose    He does have significant residual lower extremity edema   · Patient was seen by CT surgery, and it was thought that his shortness of breath would not improve significantly with aortic valve replacement        Follow-up in 4 months        Interval History:      This is a very pleasant 72-year-old male with a history of moderate aortic root dilatation to 4 5 cm, moderate to severe aortic valve stenosis, multiple myeloma, squamous cell skin carcinoma, venous insufficiency, and lung adenocarcinoma status post right upper lobe resection with adjuvant chemotherapy and radiation, as well as rectal Gregoria Hussein has COPD, and is managed by Dr Acosta Murillo of pulmonary medicine and sees Dr Leopoldo Prose of hematology      He went to the hospital 12/2018 with increased shortness of breath and lower extremity edema   CT chest revealed left-sided pulmonary embolus and he was initiated on Eliquis   He also had a mild COPD exacerbation  Rubi Pitt was thereafter hospitalized 01/2018 with fever and diarrhea      He was hospitalized 12/02/2020 with COPD exacerbation, bronchitis, and a possible component of CHF   His discharge weight was 179 lb  Rubi Pitt was seen by our nurse practitioner 12/21/2020, and was scheduled for a referral to see Cardiovascular surgery, and it was thought that surgery would not help his shortness of breath significantly  He was last seen by me 03/2021 at which time he was noted to have new onset atrial fibrillation  Diltiazem 180 mg daily was started for rate control  He was then hospitalized on 04/19/2021 with acute hypoxic respiratory failure thought to be secondary to COPD exacerbation  He was discharged on higher dose of furosemide at 20 mg daily where as prior he was on 3 times weekly  He presents today for follow-up  He complains of significant bruising in both of his arms  He states his shortness of breath is stable, and notes stable lower extremity edema               Vitals:  Vitals:    04/28/21 1354   BP: 102/70   Pulse: 100   SpO2: 98%   Weight: 81 2 kg (179 lb)   Height: 5' 8" (1 727 m)         Past Medical History:   Diagnosis Date    Cancer [facility])     Chronic pain disorder     3 fractured vertebrae    COPD (chronic obstructive pulmonary disease) (HCC)     Diarrhea     Diverticulitis     Enlarged prostate     Kidney stone     Multiple myeloma ([address] )     Pulmonary embolism ([address] ) 12/24/2018    Rectal adenocarcinoma [address]     Sleep apnea     no cpap     Social History     Socioeconomic History    Marital status: /Civil Union     Spouse name: Not on file    Number of children: Not on file    Years of education: Not on file    Highest education level: Not on file   Occupational History    Not on file   Social Needs    Financial resource strain: Not on file    Food insecurity     Worry: Not on file     Inability: Not on file    Transportation needs     Medical: Not on file     Non-medical: Not on file   Tobacco Use    Smoking status: Former Smoker     Packs/day: 1 00     Years: 50 00     Pack years: 50 00     Types: Cigarettes     Start date: 57     Quit date: 12/7/2010     Years since quitting: 10 3    Smokeless tobacco: Never Used    Tobacco comment: quit in 1999   Substance and Sexual Activity    Alcohol use: Yes     Comment: rarely    Drug use: No    Sexual activity: Not on file   Lifestyle    Physical activity     Days per week: Not on file     Minutes per session: Not on file    Stress: Not on file   Relationships    Social connections     Talks on phone: Not on file     Gets together: Not on file     Attends [employer] service: Not on file     Active member of club or organization: Not on file     Attends meetings of clubs or organizations: Not on file     Relationship status: Not on file    Intimate partner violence     Fear of current or ex partner: Not on file     Emotionally abused: Not on file     Physically abused: Not on file     Forced sexual activity: Not on file   Other Topics Concern    Not on file   Social History Narrative    Not on file      Family History   Problem Relation Age of Onset    Arthritis Mother     Colon cancer Father     Heart attack Father     Diabetes type I Sister      Past Surgical History:   Procedure Laterality Date    CHOLECYSTECTOMY      COLONOSCOPY W/ ENDOSCOPIC US N/A 7/21/2017    Procedure: ANAL ENDOSCOPIC U/S;  Surgeon: Satish Gonsales MD;  [facility]: [facility];   Service: Colorectal    HERNIA REPAIR      KIDNEY STONE SURGERY      LUNG LOBECTOMY Right     IA COLONOSCOPY FLX DX W/COLLJ SPEC WHEN PFRMD N/A 7/10/2017    Procedure: COLONOSCOPY;  Surgeon: Kevan Gonsales MD;  Location: [facility]; Service: Gastroenterology    IA RECTAL TUMOR EXCISION, TRANSANAL ENDOSCOPIC MICROSURGICAL, FULL THICK N/A 11/2/2017    Procedure: TRANSANAL ENDOSCOPIC MICROSURGERY TEM;  Surgeon: Ramin Cotton MD;  [facility]: [facility];  Service: Colorectal       Current Outpatient Medications:     albuterol (2 5 mg/3 mL) 0 083 % nebulizer solution, Inhale 2 5 mg every 6 (six) hours as needed, Disp: , Rfl:     albuterol (PROVENTIL HFA,VENTOLIN HFA) 90 mcg/act inhaler, Inhale 2 puffs every 6 (six) hours as needed for wheezing, Disp: 3 Inhaler, Rfl: 3    apixaban (ELIQUIS) 2 5 mg, Take 1 tablet (2 5 mg total) by mouth 2 (two) times a day, Disp: 60 tablet, Rfl: 5    diltiazem (CARDIZEM CD) 180 mg 24 hr capsule, Take 1 capsule (180 mg total) by mouth daily, Disp: 30 capsule, Rfl: 5    famotidine (Pepcid) 20 mg tablet, Take 20 mg by mouth as needed , Disp: , Rfl:     finasteride (PROSCAR) 5 mg tablet, TAKE 1 TABLET BY MOUTH AT BEDTIME, Disp: 90 tablet, Rfl: 0    furosemide (LASIX) 20 mg tablet, Take 1 tablet (20 mg total) by mouth daily, Disp: 30 tablet, Rfl: 0    guaiFENesin (MUCINEX) 600 mg 12 hr tablet, Take 1 tablet (600 mg total) by mouth every 12 (twelve) hours for 7 days, Disp: 14 tablet, Rfl: 0    ipratropium (ATROVENT) 0 02 % nebulizer solution, Take 1 vial (0 5 mg total) by nebulization 4 (four) times a day, Disp: 300 mL, Rfl: 6    potassium chloride (K-DUR,KLOR-CON) 10 mEq tablet, Take 1 tablet (10 mEq total) by mouth daily Take only on days you take lasix, Disp: 30 tablet, Rfl: 0    predniSONE 10 mg tablet, Take 1 tablet (10 mg total) by mouth daily, Disp: 90 tablet, Rfl: 3    Silodosin 8 MG CAPS, Take by mouth, Disp: , Rfl:         Review of Systems:  Review of Systems   Respiratory: Negative  Cardiovascular: Negative      All other systems reviewed and are negative  Physical Exam:  Physical Exam  Constitutional:       General: He is not in acute distress  Appearance: He is well-developed  He is not diaphoretic  HENT:      Head: Normocephalic and atraumatic  Eyes:      General: No scleral icterus  Right eye: No discharge  Pupils: Pupils are equal, round, and reactive to light  Neck:      Musculoskeletal: Normal range of motion and neck supple  Thyroid: No thyromegaly  Cardiovascular:      Rate and Rhythm: Normal rate  Rhythm irregular  Heart sounds: Normal heart sounds  No murmur  No friction rub  No gallop  Pulmonary:      Effort: Pulmonary effort is normal       Breath sounds: Normal breath sounds  No wheezing  Comments: Distant breath sounds  Abdominal:      General: There is no distension  Tenderness: There is no abdominal tenderness  There is no guarding or rebound  Musculoskeletal: Normal range of motion  General: Swelling present  Right lower leg: Edema present  Left lower leg: Edema present  Skin:     General: Skin is warm and dry  Coloration: Skin is not pale  Findings: No erythema or rash  Neurological:      Mental Status: He is alert and oriented to person, place, and time  Coordination: Coordination normal    Psychiatric:         Behavior: Behavior normal          Thought Content: Thought content normal          Judgment: Judgment normal          This note was completed in part utilizing 3D [employer] Direct Software  Grammatical errors, random word insertions, spelling mistakes, and incomplete sentences can be an occasional consequence of this system secondary to software limitations, ambient noise, and hardware issues  If you have any questions or concerns about the content, text, or information contained within the body of this dictation, please contact the provider for clarification

## 2021-04-28 NOTE — ASSESSMENT & PLAN NOTE
· He does have supplemental oxygen at home    He is using this regularly for sleep  · Does not use his oxygen for exertion although could potentially benefit from this   · Continue nocturnal use at this time

## 2021-04-28 NOTE — PROGRESS NOTES
Progress note - Pulmonary Medicine   Valeriy Lindsay 81 y o  male MRN: [id number]       Impression & Plan:     Centrilobular emphysema ([address] )  · Currently remains on moderate dose prednisone taper  Currently 20 mg dose tapering down to 10 mg dose imminently  · Has done better with chronic prednisone therapy and recommended that we continue this at 10 mg daily  Hopefully this will be adequate to maintain symptom control  · He has also done better with daily diuretics although he does not like to take these daily due to frequency of urination  · Continue nebulized ipratropium   · Prednisone prescription renewed    Nocturnal hypoxia  · He does have supplemental oxygen at home  He is using this regularly for sleep  · Does not use his oxygen for exertion although could potentially benefit from this   · Continue nocturnal use at this time    Severe aortic stenosis  · He has been evaluated by Cardiology in the hospital and deemed not to be a candidate for intervention for his aortic valve due to other comorbid medical conditions  · Continues with medical management, Dr Americo Anthony   · On daily diuretics, diltiazem, and anticoagulation  · Aortic pathology does definitely contribute to his complaints of dyspnea      Follow-up in 2 months  ______________________________________________________________________    HPI:    Valeriy Lindsay presents today for follow-up of recent hospitalization for worsening shortness of breath, fatigue, and possible mild component of congestive heart failure related to severe aortic stenosis  He did have adjustment of his medication regimen  From the pulmonary perspective he is on a prednisone taper and is feeling better  He had been maintained on chronic prednisone prior to hospitalization however  He was increased with regard to his diuretic regimen and is now taking this daily as opposed to every other day      His aortic valve was discussed further and he was deemed by the surgeons not to be a aortic valve replacement candidate due to multiple comorbid conditions  He continues on medical management and is followed by Dr Spenser Forrest  He otherwise reports no new symptoms  He continues to have loose stools and is going to resume Imodium  He has no fever, chills, or infection symptoms  He continues to have wet cough and difficulty mobilizing any phlegm  He occasionally takes Mucinex  He has been using the ipratropium nebulizer consistently      Current Medications:    Current Outpatient Medications:     albuterol (PROVENTIL HFA,VENTOLIN HFA) 90 mcg/act inhaler, Inhale 2 puffs every 6 (six) hours as needed for wheezing, Disp: 3 Inhaler, Rfl: 3    apixaban (ELIQUIS) 5 mg, Take 1 tablet (5 mg total) by mouth 2 (two) times a day, Disp: 60 tablet, Rfl: 5    diltiazem (CARDIZEM CD) 180 mg 24 hr capsule, Take 1 capsule (180 mg total) by mouth daily, Disp: 30 capsule, Rfl: 5    famotidine (Pepcid) 20 mg tablet, Take 20 mg by mouth as needed , Disp: , Rfl:     finasteride (PROSCAR) 5 mg tablet, TAKE 1 TABLET BY MOUTH AT BEDTIME, Disp: 90 tablet, Rfl: 0    furosemide (LASIX) 20 mg tablet, Take 1 tablet (20 mg total) by mouth daily, Disp: 30 tablet, Rfl: 0    guaiFENesin (MUCINEX) 600 mg 12 hr tablet, Take 1 tablet (600 mg total) by mouth every 12 (twelve) hours for 7 days, Disp: 14 tablet, Rfl: 0    ipratropium (ATROVENT) 0 02 % nebulizer solution, Take 1 vial (0 5 mg total) by nebulization 4 (four) times a day, Disp: 300 mL, Rfl: 6    potassium chloride (K-DUR,KLOR-CON) 10 mEq tablet, Take 1 tablet (10 mEq total) by mouth daily Take only on days you take lasix, Disp: 30 tablet, Rfl: 0    Silodosin 8 MG CAPS, Take by mouth, Disp: , Rfl:     albuterol (2 5 mg/3 mL) 0 083 % nebulizer solution, Inhale 2 5 mg every 6 (six) hours as needed, Disp: , Rfl:     predniSONE 10 mg tablet, Take 1 tablet (10 mg total) by mouth daily, Disp: 90 tablet, Rfl: 3    Review of Systems:  Aside from what is mentioned in the HPI, the review of systems is otherwise negative    Past medical history, surgical history, and family history were reviewed and updated as appropriate    Social history updates:  Social History     Tobacco Use   Smoking Status Former Smoker    Packs/day: 1 00    Years: 50 00    Pack years: 50 00    Types: Cigarettes    Start date: 57    Quit date: 12/7/2010    Years since quitting: 10 3   Smokeless Tobacco Never Used   Tobacco Comment    quit in 1999       PhysicalExamination:  Vitals:   /56   Pulse 98   Temp 98 5 °F (36 9 °C)   Resp 20   Ht 5' 8" (1 727 m)   Wt 82 1 kg (181 lb)   SpO2 98%   BMI 27 52 kg/m²   Gen:  Appears chronically ill  Mentation appropriate  No conversational dyspnea today  HEENT:  Conjugate gaze  Sclera anicteric  Oropharynx exam deferred due to COVID-19 and face mask  Neck: Trachea is midline  No JVD  No adenopathy  Chest:  Symmetric chest wall excursion with coarse wet rhonchi  No wheezing today however  Rhonchi do clear with coughing  Still difficulty expectorating phlegm    Cardiac:  Has persistent murmur of aortic stenosis  Abdomen:  Benign  Extremities:  Chronic severe peripheral edema with chronic stasis change  Neuro:  Speech and mentation normal    Diagnostic Data:  Labs: I personally reviewed the most recent laboratory data pertinent to today's visit    Lab Results   Component Value Date    WBC 9 15 04/26/2021    HGB 11 3 (L) 04/26/2021    HCT 37 0 04/26/2021     (H) 04/26/2021     (L) 04/26/2021     Lab Results   Component Value Date    SODIUM 141 04/26/2021    K 3 9 04/26/2021    CO2 29 04/26/2021     04/26/2021    BUN 26 (H) 04/26/2021    CREATININE 1 26 04/26/2021    CALCIUM 9 1 04/26/2021       Imaging:  I personally reviewed the images on the [facility] system pertinent to today's visit  CT scan from hospitalization in April does show possible mild acute diverticulitis    There is a small ground-glass density in the left lower lobe lung base which will need follow-up in 1 year    Other chronic changes of radiation and prior surgery      Gato Vilchis MD

## 2021-04-28 NOTE — ASSESSMENT & PLAN NOTE
· Currently remains on moderate dose prednisone taper  Currently 20 mg dose tapering down to 10 mg dose imminently  · Has done better with chronic prednisone therapy and recommended that we continue this at 10 mg daily    Hopefully this will be adequate to maintain symptom control  · He has also done better with daily diuretics although he does not like to take these daily due to frequency of urination  · Continue nebulized ipratropium   · Prednisone prescription renewed

## 2021-04-29 PROBLEM — C90.02 MULTIPLE MYELOMA IN RELAPSE (HCC): Status: ACTIVE | Noted: 2021-01-01

## 2021-04-29 NOTE — PROGRESS NOTES
Hematology Outpatient Follow - Up Note  Iliana Delaney 81 y o  male MRN: @ Encounter: [id number]        Date:  4/29/2021        Assessment/ Plan:    81-year-old  male with lambda light chain multiple myeloma, heavily pretreated, stage III  Lenalidomide, melphalan, dexamethasone was initiated on 05/2018 and discontinued in January 2020 after normalization of lambda light chain and patient need for a break from chemotherapy      Melphalan 12 milligram p o  Daily for 4 days every 6 weeks, prednisone 5 milligram p o  Daily, lenalidomide 5 milligram p o  Daily 3 weeks on, 3 weeks off resumed 6/2020     Fatigue requiring holding of the treatment on 10/2020, lambda light chain increasing 580 on 01/29/2021     He was seen in the hospital for pneumonia, he received COVID-19 vaccine, scheduled for next vaccine on 03/10/2021    Resumed antimyeloma therapy 2 weeks after finishing 2nd dose of COVID-19 vaccine    Admitted to the hospital many times with COPD exacerbation, aortic stenosis, atrial fibrillation, diastolic congestive heart failure     Now with lambda light chain in the range of 1100 ( 500 2 months ago)    I had long discussion with his wife, his daughter and the patient, the option is not to do anything and just palliative/ hospice care, the patient declined    Second therapy is isatuximab 10 milligram/kilogram weekly for the 1st month and then every other week along with carfilzomib 20 milligram/meter squared the 1st dose and then 56 mg per m2 thereafter every week    Side effects such as dyspnea, nausea, vomiting, pancytopenia, opportunistic infection, death, fluid overload, anaphylactic reaction, hemolytic anemia told, he agree to proceed, he signed the consent     Prognosis guarded         Labs and imaging studies are reviewed by ordering provider once results are available  If there are findings that need immediate attention, you will be contacted when results available     Discussing results and the implication on your healthcare is best discussed in person at your follow-up visit  HPI:    #1 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, multiple compression fractures, status post kyphoplasty to L1, L2, L5, treated with radiation therapy to the left intra trochanteric area, received melphalan/Velcade/prednisone in October 2010 for 4 cycles with excellent response and normalization of lambda light chain, had been on Velcade maintenance till January 2013 when lambda light chain went up to 111,treated with Velcade 1 3 mg meter square weekly 3 weeks on one week off, Decadron 20 mg by mouth weekly, lambda light chain down to 30 then creeping up to 110, on Revlimid 15 mg by mouth every other day  Lambda light chain normal at 28 however he reported skin rash on the upper chest area we stopped the Revlimid for 2 month  Then reinitiated Revlimid 5 mg by mouth daily     Progression of disease he received lenalidomide, melphalan, dexamethasone since May 2018 until January 2020 he took a break of the therapy after normalization of the lambda light chain     In June 2020 lambda light chain went up from 28 to 130     He had been off therapy for the past 3 months, lambda light chain increased to 256 on 12/2020 however without treatment he feels much better     #2  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 stage I, with progression by scan, status post resection of the right upper lobe with few foci in the same lobe consistent with stage IIIA done in January 2015 adenocarcinoma, well differentiated  Status post Alimta/carboplatin x4 cycles finished in May 2015 and radiation therapy      CT scan in August 2020 showed a growing 7 mm nodule in the left upper lobe of the lung followed by Pulmonary service        #3  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     #4  Rectal adenocarcinoma status post resection stage I in November 2017     #5   Pulmonary embolism involving the subsegmental left lower lobe in December 2018   Currently on apixaban 5 mg once a day because of epistaxis     #6  Hematuria secondary to benign prostatic hypertrophy and small bladder stones and being on apixaban, apixaban was reduced to 5 milligram once a day      #7   Herpes zoster involving the left upper extremity 2/2020   He could not tolerate gabapentin because of sedation  Benadryl was ineffective he received topical lidocaine  Multiple admission to the hospital with COPD exacerbation with IV steroids, oral candidiasis, chronic diastolic congestive heart failure, atrial fibrillation, severe aortic stenosis  Interval History:        Previous Treatment:         Test Results:    Imaging: Xr Chest 1 View Portable    Result Date: 4/20/2021  Narrative: CHEST INDICATION:   dyspnea  Covid, influenza A and B, and RSV PCR negative on 4/19/2021  History of lung cancer  Worsening chest pain  COMPARISON:  Chest radiograph and CT from 4/2/2021  EXAM PERFORMED/VIEWS:  XR CHEST PORTABLE FINDINGS:  Right port at cavoatrial junction  Cardiomediastinal silhouette appears unremarkable  Ground glass opacity in the left midlung and left base  Surgical changes in the right hemithorax with radiation fibrosis in the right apex  No effusion or pneumothorax  Osseous structures appear within normal limits for patient age  Impression: New groundglass opacity in the left midlung and left base suspicious for pneumonia  Workstation performed: [id number]     Xr Chest 2 Views    Result Date: 4/2/2021  Narrative: CHEST INDICATION:   SOB  History of lung cancer  COMPARISON:  Chest radiograph from 12/2/2020  Subsequent chest CT from 4/2/2021  EXAM PERFORMED/VIEWS:  XR CHEST PA & LATERAL  FINDINGS:  Right port at cavoatrial junction  Cardiomediastinal silhouette normal  No acute disease  Volume loss and elevation of the right hemidiaphragm from right upper lobectomy with radiation changes in the right apex    No effusion or pneumothorax  Cholecystectomy  Mild degenerative disease in the spine with old compression deformity in the lumbar spine  Impression: No acute cardiopulmonary disease  Radiation changes and volume loss in the right hemithorax related to right upper lobectomy  Workstation performed: [id number]     Ct Chest Abdomen Pelvis W Contrast    Result Date: 4/2/2021  Narrative: CT CHEST, ABDOMEN AND PELVIS WITH IV CONTRAST INDICATION:   Sepsis LLQ pain, abnormal CXR with R pleural effusion vs infiltrate  COMPARISON:  CTA chest abdomen pelvis 2/4/2021  2 view chest from earlier today  TECHNIQUE: CT examination of the chest, abdomen and pelvis was performed  Axial, sagittal, and coronal 2D reformatted images were created from the source data and submitted for interpretation  Radiation dose length product (DLP) for this visit:  560 mGy-cm   This examination, like all CT scans performed in the [facility], was performed utilizing techniques to minimize radiation dose exposure, including the use of iterative reconstruction and automated exposure control  IV Contrast:  100 mL of iohexol (OMNIPAQUE) Enteric Contrast: Enteric contrast was administered  FINDINGS: CHEST LUNGS:  Status post right upper lobectomy with residual right upper lung radiation fibrotic changes  18 mm left lower lobe groundglass opacity; #3/65  Deep right lung base groundglass opacities have largely resolved  Unchanged scattered pulmonary nodules  A 7 mm left upper lobe nodule is unchanged  #3/26  Elevation of the right hemidiaphragm secondary to the postsurgical right lung volume loss  PLEURA:  Unremarkable  HEART/GREAT VESSELS:  Dense aortic annulus calcifications  No thoracic aortic aneurysm  Atherosclerotic calcifications of the aorta and coronary arteries  MEDIASTINUM AND GLEN:  Unremarkable  CHEST WALL AND LOWER NECK:   Right anterior chest wall vascular port  ABDOMEN LIVER/BILIARY TREE:  Unremarkable   GALLBLADDER: Gallbladder is surgically absent  SPLEEN:  Unremarkable  PANCREAS:  Unremarkable  ADRENAL GLANDS:  Unremarkable  KIDNEYS/URETERS:  Unremarkable  No hydronephrosis  STOMACH AND BOWEL:  Mild focal fat stranding about a distal descending colonic diverticulum #2/88 and #601/73 in keeping with mild acute diverticulitis  No bowel obstruction or bowel pneumatosis  APPENDIX:  No findings to suggest appendicitis  ABDOMINOPELVIC CAVITY:  No ascites  No pneumoperitoneum  No lymphadenopathy  VESSELS:  Unremarkable for patient's age  PELVIS REPRODUCTIVE ORGANS:  Prostamegaly protruding into the bladder base  URINARY BLADDER:  Unremarkable  ABDOMINAL WALL/INGUINAL REGIONS:  Uncomplicated fat-containing left inguinal hernia  Reidentified, unchanged mild compression deformities at T12, L1, and L3-L5  OSSEOUS STRUCTURES:  No acute fracture or destructive osseous lesion  Impression: Mild focal fat stranding about a distal descending colonic diverticulum #2/88 and #601/73 in keeping with mild acute diverticulitis  18 mm left lower lobe groundglass opacity; #3/65  Deep right lung base groundglass opacities have largely resolved  Follow-up with CT chest in 12 months  The study was marked in [facility] for immediate notification   Workstation performed: [id number]       Labs:   Lab Results   Component Value Date    WBC 9 15 04/26/2021    HGB 11 3 (L) 04/26/2021    HCT 37 0 04/26/2021     (H) 04/26/2021     (L) 04/26/2021     Lab Results   Component Value Date     01/04/2016    K 3 9 04/26/2021     04/26/2021    CO2 29 04/26/2021    ANIONGAP 7 01/04/2016    BUN 26 (H) 04/26/2021    CREATININE 1 26 04/26/2021    GLUCOSE 88 01/04/2016    GLUF 94 08/01/2020    CALCIUM 9 1 04/26/2021    CORRECTEDCA 9 9 04/26/2021    AST 14 04/26/2021    ALT 59 04/26/2021    ALKPHOS 76 04/26/2021    PROT 6 8 01/04/2016    BILITOT 0 63 01/04/2016    EGFR 52 04/26/2021       No results found for: IRON, TIBC, FERRITIN    No results found for: [id number]      ROS: Review of Systems   Constitutional: Negative  Negative for appetite change, chills, diaphoresis, fatigue, fever and unexpected weight change  HENT:   Negative for hearing loss, lump/mass, mouth sores, nosebleeds, sore throat, trouble swallowing and voice change  Eyes: Negative  Negative for eye problems and icterus  Respiratory: Positive for cough, shortness of breath and wheezing  Negative for chest tightness and hemoptysis  Cardiovascular: Positive for leg swelling  Negative for chest pain  Gastrointestinal: Negative for abdominal distention, abdominal pain, blood in stool, constipation, diarrhea and nausea  Endocrine: Negative  Genitourinary: Negative for dysuria, frequency, hematuria and pelvic pain  Musculoskeletal: Positive for arthralgias  Negative for back pain, flank pain, gait problem, myalgias and neck stiffness  Skin: Negative for itching and rash  Neurological: Negative for dizziness, gait problem, headaches, light-headedness, numbness and speech difficulty  Hematological: Negative for adenopathy  Does not bruise/bleed easily  Psychiatric/Behavioral: Negative for confusion, decreased concentration, depression and sleep disturbance  The patient is not nervous/anxious  Current Medications: Reviewed  Allergies: Reviewed  PMH/FH/SH:  Reviewed      Physical Exam:    Body surface area is 1 95 meters squared  Wt Readings from Last 3 Encounters:   04/29/21 81 6 kg (180 lb)   04/28/21 81 2 kg (179 lb)   04/27/21 82 1 kg (181 lb)        Temp Readings from Last 3 Encounters:   04/29/21 99 9 °F (37 7 °C) (Tympanic)   04/27/21 98 5 °F (36 9 °C)   04/21/21 97 5 °F (36 4 °C) (Temporal)        BP Readings from Last 3 Encounters:   04/29/21 124/60   04/28/21 102/70   04/27/21 102/56         Pulse Readings from Last 3 Encounters:   04/29/21 67   04/28/21 100   04/27/21 98        Physical Exam  Vitals signs reviewed     Constitutional:       General: He is not in acute distress  Appearance: He is well-developed  He is ill-appearing and diaphoretic  HENT:      Head: Normocephalic and atraumatic  Eyes:      Conjunctiva/sclera: Conjunctivae normal    Neck:      Musculoskeletal: Normal range of motion and neck supple  Trachea: No tracheal deviation  Cardiovascular:      Rate and Rhythm: Normal rate and regular rhythm  Heart sounds: Murmur present  No friction rub  No gallop  Pulmonary:      Effort: Pulmonary effort is normal  No respiratory distress  Breath sounds: Rhonchi and rales present  No wheezing  Chest:      Chest wall: No tenderness  Abdominal:      General: There is no distension  Palpations: Abdomen is soft  Tenderness: There is no abdominal tenderness  Musculoskeletal:         General: Swelling present  Right lower leg: Edema present  Left lower leg: Edema present  Lymphadenopathy:      Cervical: No cervical adenopathy  Skin:     General: Skin is warm  Coloration: Skin is not pale  Findings: No erythema  Neurological:      Mental Status: He is alert and oriented to person, place, and time  Psychiatric:         Behavior: Behavior normal          Thought Content: Thought content normal          Judgment: Judgment normal          ECO  Goals and Barriers:  Current Goal: Minimize effects of disease  Barriers: None  Patient's Capacity to Self Care:  Patient is able to self care      Code Status: [unfilled]

## 2021-05-05 NOTE — PROGRESS NOTES
Patient and wife given AVS and patient calendar for upcoming appointments, aware of when to get blood work  Discharged in stable condition

## 2021-05-05 NOTE — PLAN OF CARE
Problem: Potential for Falls  Goal: Patient will remain free of falls  Description: INTERVENTIONS:  - Assess patient frequently for physical needs  -  Identify cognitive and physical deficits and behaviors that affect risk of falls    -  Dow fall precautions as indicated by assessment   - Educate patient/family on patient safety including physical limitations  - Instruct patient to call for assistance with activity based on assessment  - Modify environment to reduce risk of injury  - Consider OT/PT consult to assist with strengthening/mobility  Outcome: Progressing

## 2021-05-05 NOTE — PROGRESS NOTES
/68, patient 97% on r/a ok to run kyprolis, kyprolis tolerated well  Informed Ridge Keyes RN of all assessments and issues

## 2021-05-05 NOTE — PROGRESS NOTES
Presented today for chemotherapy sarclisa initiated s/p premeds  Sarclisa titrated to 75ml/hr at 1220, pt began coughing, and c/o shortness of breath  Also c/o mild nausea and all over warm feeling, sarclisa stopped and hypersensitivity protocol initiated  BP 78/52, p/o 88%, patent put on non-rebreather mask, at 4ltr

## 2021-05-12 PROBLEM — R04.0 EPISTAXIS: Status: ACTIVE | Noted: 2021-01-01

## 2021-05-12 PROBLEM — R53.81 DEBILITY: Status: ACTIVE | Noted: 2021-01-01

## 2021-05-12 NOTE — ASSESSMENT & PLAN NOTE
· Ambulates in home without assistive devices  · Denies any pain  · Continue with PT in home  · Educate on fall precautions in home

## 2021-05-12 NOTE — PROGRESS NOTES
Patient tolerated all chemotherapy infusions without difficulty or reaction  SPO2 checked upon infusion completion, patient 98% on RA, HR 82  Denied any difficulty breathing although breathing remained tachypnic  AVS printed and patient aware of next appointment, left infusion center in his baseline condition

## 2021-05-12 NOTE — ASSESSMENT & PLAN NOTE
· States he has chronic dyspnea at rest  · At this time pt also has coarse crackles in lungs with increased edema  · Educated pt to take lasix as directed on script 20mg daily  · Monitor for any increaseing dyspnea

## 2021-05-12 NOTE — ASSESSMENT & PLAN NOTE
· Instructed pt on humidified air for O2 concentrator  · Saline gel or spray for nose daily  · Do not use petroleum based products near O2 NC

## 2021-05-12 NOTE — ASSESSMENT & PLAN NOTE
· Visibly short of breath at rest, pt states that is chronic for him  · RN reports 3+ pitting edema BLLE  · Weight is up at 180 6lb  · Pt has furosemide but states he only takes it 3 x week    Script states to take 20mg daily  · Educated pt and wife to take furosemide daily  · RN reported coarse crackles in lungs  · Educated pt on signs/symptoms of CHF fluid buildup  · Continue to monitor edema and crackles   · Continue with medication teaching  · Encourage pt to take daily weights and document

## 2021-05-12 NOTE — ASSESSMENT & PLAN NOTE
· Labs are at baseline  · Continue to avoid nephrotoxins as much as possible  · Continue to monitor BMP periodically for renal function  · F/u with PCP

## 2021-05-12 NOTE — PROGRESS NOTES
Virtual Regular Visit      Assessment/Plan:    Problem List Items Addressed This Visit        Digestive    Chronic diarrhea     · Pt states he has chronic loose stool  · Monitor for diarrhea  · Encourage fluids            Respiratory    Adenocarcinoma, lung (HCC) - Primary (Chronic)     · Currently under oncology care         Chronic respiratory failure ([address] )     · States he has chronic dyspnea at rest  · At this time pt also has coarse crackles in lungs with increased edema  · Educated pt to take lasix as directed on script 20mg daily  · Monitor for any increaseing dyspnea            Cardiovascular and Mediastinum    Chronic diastolic congestive heart failure (HCC)     · Visibly short of breath at rest, pt states that is chronic for him  · RN reports 3+ pitting edema BLLE  · Weight is up at 180 6lb  · Pt has furosemide but states he only takes it 3 x week    Script states to take 20mg daily  · Educated pt and wife to take furosemide daily  · RN reported coarse crackles in lungs  · Educated pt on signs/symptoms of CHF fluid buildup  · Continue to monitor edema and crackles   · Continue with medication teaching  · Encourage pt to take daily weights and document                Genitourinary    CKD (chronic kidney disease) stage 3, GFR 30-59 ml/min ([facility])     · Labs are at baseline  · Continue to avoid nephrotoxins as much as possible  · Continue to monitor BMP periodically for renal function  · F/u with PCP            Other    Debility     · Ambulates in home without assistive devices  · Denies any pain  · Continue with PT in home  · Educate on fall precautions in home         Epistaxis     · Instructed pt on humidified air for O2 concentrator  · Saline gel or spray for nose daily  · Do not use petroleum based products near O2 NC                    Reason for visit is   Chief Complaint   Patient presents with    Virtual Regular Visit        Encounter provider JACK Foster    Provider located at [facility] [address]  [address] [facility]  [place] [address] [facility] [address]  [phone]      Recent Visits  Date Type Provider Dept   05/11/21 Telemedicine JACK Peterson [facility]   Showing recent visits within past 7 days and meeting all other requirements     Future Appointments  No visits were found meeting these conditions  Showing future appointments within next 150 days and meeting all other requirements        The patient was identified by name and date of birth  Chau Urena was informed that this is a telemedicine visit and that the visit is being conducted through [employer] and patient was informed that this is a secure, HIPAA-compliant platform  He agrees to proceed     My office door was closed  No one else was in the room  He acknowledged consent and understanding of privacy and security of the video platform  The patient has agreed to participate and understands they can discontinue the visit at any time  Patient is aware this is a billable service  Subjective  Chau Urena is a 81 y o  male with history of Cancer, chronic pain, CHF, COPD, multiple myeloma who was recently went to the hospital with increased shortness of breath thought to me due to exacerbation of COPD  Pt was treated with steroids  Chest xray showed new ground glass opacity which was treated with a round of antibiotics  When medically stable pt to discharged to home with outpatient oncology followup for multiple malignancies  Pt lives with wife who helps in his care  Pt states he is feeling weak and is "always short of breath"  Denies any pain, dyspnea at rest with coarse cracles in lungs  C/o chronic loose stool  Denies any chest pain  [facility] RN/PT/OT in home at this time   Ambulates without any assistive devices in home       Past Medical History:   Diagnosis Date    Cancer ([address] )     Chronic pain disorder     3 fractured vertebrae    COPD [facility]ch[facility] obstructive [facility] disease) [facility]     Diarrhea     Diverticulitis     Enlarged prostate     Kidney stone     [facility] myeloma [facility]     [facility] 12/24/2018    Rectal adenocarcinoma ([facility] )     Sleep apnea     no cpap       Past Surgical History:   Procedure Laterality Date    CHOLECYSTECTOMY      COLONOSCOPY W/ ENDOSCOPIC US N/A 7/21/2017    Procedure: ANAL ENDOSCOPIC U/S;  Surgeon: Latoya Ordonez MD;  [facility]; Service: Colorectal    HERNIA REPAIR      KIDNEY STONE SURGERY      LUNG LOBECTOMY Right     DE COLONOSCOPY FLX DX W/COLLJ SPEC WHEN PFRMD N/A 7/10/2017    Procedure: COLONOSCOPY;  Surgeon: Janette Muhammad MD;  [facility];   Service: Gastroenterology    DE RECTAL TUMOR EXCISION, TRANSANAL ENDOSCOPIC MICROSURGICAL, FULL THICK N/A 11/2/2017    Procedure: TRANSANAL ENDOSCOPIC MICROSURGERY TEM;  Surgeon: Latoya Ordonez MD;  [facility];  Service: Colorectal       Current Outpatient Medications   Medication Sig Dispense Refill    albuterol (2 5 mg/3 mL) 0 083 % nebulizer solution Take 1 vial (2 5 mg total) by nebulization every 6 (six) hours as needed for wheezing 360 mL 5    albuterol (PROVENTIL HFA,VENTOLIN HFA) 90 mcg/act inhaler Inhale 2 puffs every 6 (six) hours as needed for wheezing 3 Inhaler 3    apixaban (ELIQUIS) 2 5 mg Take 1 tablet (2 5 mg total) by mouth 2 (two) times a day 60 tablet 5    diltiazem (CARDIZEM CD) 180 mg 24 hr capsule Take 1 capsule (180 mg total) by mouth daily 30 capsule 5    famotidine (Pepcid) 20 mg tablet Take 20 mg by mouth as needed       finasteride (PROSCAR) 5 mg tablet TAKE 1 TABLET BY MOUTH AT BEDTIME 90 tablet 0    furosemide (LASIX) 20 mg tablet Take 1 tablet (20 mg total) by mouth daily 90 tablet 3    ipratropium (ATROVENT) 0 02 % nebulizer solution Take 1 vial (0 5 mg total) by nebulization 4 (four) times a day 300 mL 6    lidocaine-prilocaine (EMLA) cream Apply topically as needed for mild pain 30 g 1    potassium chloride (K-DUR,KLOR-CON) 10 mEq tablet Take 1 tablet (10 mEq total) by mouth daily Take only on days you take lasix 90 tablet 3    predniSONE 10 mg tablet Take 1 tablet (10 mg total) by mouth daily 90 tablet 3    Silodosin 8 MG CAPS Take by mouth daily        No current facility-administered medications for this visit  Allergies   Allergen Reactions    Gabapentin      somnolence    Metaxalone     Nisoldipine     Sulfa Antibiotics Itching    Aldactone [Spironolactone] Other (See Comments)     Breast swelling and pain  Breast swelling and pain    Penicillin V Rash     AS CHILD       Review of Systems   Constitutional: Positive for activity change (decreased) and fatigue  Negative for chills and fever  HENT: Negative for ear pain and sore throat  Eyes: Negative for pain and visual disturbance  Respiratory: Positive for shortness of breath (  +3 BLLE) and wheezing  Negative for cough  Cardiovascular: Positive for leg swelling (  +3 BLLE)  Negative for chest pain and palpitations  Gastrointestinal: Positive for diarrhea  Negative for abdominal pain and vomiting  Genitourinary: Negative for dysuria and hematuria  Musculoskeletal: Positive for gait problem  Negative for arthralgias and back pain  Skin: Negative for color change and rash  Neurological: Positive for weakness  Negative for seizures and syncope  All other systems reviewed and are negative  Video Exam    Vitals:    05/12/21 1846   BP: 106/65   Pulse: 80   Weight: 81 9 kg (180 lb 9 6 oz)       Physical Exam  Vitals signs reviewed  Constitutional:       Appearance: Normal appearance  HENT:      Head: Normocephalic and atraumatic  Eyes:      Conjunctiva/sclera: Conjunctivae normal    Neck:      Musculoskeletal: Normal range of motion  Cardiovascular:      Rate and Rhythm: Normal rate and regular rhythm  Heart sounds: Normal heart sounds, S1 normal and S2 normal  No murmur     Pulmonary:      Effort: Pulmonary effort is normal  No respiratory distress  Breath sounds: Decreased air movement present  Wheezing present  Abdominal:      General: Bowel sounds are normal  There is no distension  Palpations: Abdomen is soft  Tenderness: There is no abdominal tenderness  Musculoskeletal: Normal range of motion  Right lower leg: 3+ Edema present  Left lower leg: 3+ Edema present  Comments: Generalized weakness   Skin:     General: Skin is warm and dry  Neurological:      Mental Status: He is alert  Psychiatric:         Attention and Perception: Attention normal          Mood and Affect: Mood normal          Speech: Speech normal          Behavior: Behavior normal          Thought Content: Thought content normal          Cognition and Memory: Cognition normal           Ambulation: Unassisted: yes   Walker: no Wheelchair: no   Nonambulatory: no      Basic ADLs Independent:   Hygiene: no   Toileting: yes    Bathing: yes   Dressing: yes   Eating: yes   Transfers: yes     IADLs:   Shopping: no     Medications: no      Finances: no     Meals: no   Driving: no    Continence:   Stool: yes    Urine: yes    Advance Directives  Code status: Level 3: DNAR and DNI  Living Will on file: Spouse health care agent      VIRTUAL VISIT DISCLAIMER    Nela Lua acknowledges that he has consented to an online visit or consultation  He understands that the online visit is based solely on information provided by him, and that, in the absence of a face-to-face physical evaluation by the physician, the diagnosis he receives is both limited and provisional in terms of accuracy and completeness  This is not intended to replace a full medical face-to-face evaluation by the physician  Nela Lua understands and accepts these terms

## 2021-05-12 NOTE — PROGRESS NOTES
Patient arrived to unit  Patient noted to be SOB and tachypnic, 95% on RA,   Patient stated this is his baseline and he feels how "I always feel " Lung fields assessed, no noted crackles, expiratory wheezing heard on assessment  At this time time sarclisa infusing, documenting RN at side to assess for reaction due to documented reaction during first infusion  Will continue to monitor

## 2021-05-12 NOTE — PLAN OF CARE
Problem: Potential for Falls  Goal: Patient will remain free of falls  Description: INTERVENTIONS:  - Assess patient frequently for physical needs  -  Identify cognitive and physical deficits and behaviors that affect risk of falls    -  [place] fall precautions as indicated by assessment   - Educate patient/family on patient safety including physical limitations  - Instruct patient to call for assistance with activity based on assessment  - Modify environment to reduce risk of injury  - Consider OT/PT consult to assist with strengthening/mobility  Outcome: Progressing

## 2021-05-13 NOTE — TELEPHONE ENCOUNTER
Alex from [facility] called was ask to check on patient post chemo treatment  Upon checking patient she states more cardiac concerns than post chemo  Patients weight was up from 180 0 and to 183 today and has bilateral pitting +3 edema, Bp 100/58 P 80 irregular, pt had on own decreased lasix dose from20 daily to only 3 times weekly  Was recommended by [facility] to restart on daily basis and suggested pt go to ED but refuses  She states pulse ox 94-97% on room air,she also states post infusion yesterday patient had poor dietary choices with a can of soup  Please advise

## 2021-05-14 NOTE — TELEPHONE ENCOUNTER
Called daughter with instructions and she states that last night she did give additional lasix because he was so SOB and refused to go to ED  She understood given instructions with also call [facility] with same  Pt to have BMP in 2 weeks

## 2021-05-14 NOTE — TELEPHONE ENCOUNTER
Please advise furosemide increase to 20 mg po BID (from 3 times weekly)  and potassium to 10 meq BID (from once daily)--will need BMP in 2 weeks      RP

## 2021-05-19 NOTE — TELEPHONE ENCOUNTER
Can you please call [facility] and tell them to stop sending this to me?  This is managed by Dr Cheko Scott, his cardiologist

## 2021-05-19 NOTE — PROGRESS NOTES
Platelet 50; office of Dr Forest Nielsen notifed  Per Dr Forest Nielsen, pt will not receive chemotherapy today  Future appointment scheduled for one week as ordered  RN spoke with both spouse and daughter Lei Hess  Redness, edema and pain noted on LLE  Spoke with daughter, Glendale Maw who will make appointment with PCP  AVS provided prior to discharge  Pt  Quinton Hay understanding to follow up with physician, ER with bleeding related to decreased platelets  Pt  Spouse and daughter also verbalized understanding

## 2021-05-19 NOTE — PLAN OF CARE
Problem: Knowledge Deficit  Goal: Patient/family/caregiver demonstrates understanding of disease process, treatment plan, medications, and discharge instructions  Description: Complete learning assessment and assess knowledge base  Interventions:  - Provide teaching at level of understanding  - Provide teaching via preferred learning methods  Outcome: Progressing     Problem: Potential for Falls  Goal: Patient will remain free of falls  Description: INTERVENTIONS:  - Assess patient frequently for physical needs  -  Identify cognitive and physical deficits and behaviors that affect risk of falls    -  [place] fall precautions as indicated by assessment   - Educate patient/family on patient safety including physical limitations  - Instruct patient to call for assistance with activity based on assessment  - Modify environment to reduce risk of injury  - Consider OT/PT consult to assist with strengthening/mobility  Outcome: Progressing

## 2021-05-21 NOTE — ASSESSMENT & PLAN NOTE
· Chronic +3 pitting edema bilateral lower extremity  · RN states pt lungs were wheezy with sputum, HR irreg  · Weights monitored daily  · Takes furosemide 20mg BID  · Pt to be recommended for [facility] PALS group  · Pt does not wish to be followed by care at home any longer due to all other appointments he is going to      · Actively on chemo and seeing oncology

## 2021-05-21 NOTE — PROGRESS NOTES
Virtual Regular Visit      Assessment/Plan:    Problem List Items Addressed This Visit        Respiratory    Adenocarcinoma, lung ([facility] ) - Primary (Chronic)    Chronic respiratory failure ([facility] )     · Same as above for CHF  · Pt currently on chemo treatments for lung CA  · Pt fatigue all the time  · Pt no longer wants to be under care at home care  · Will dc pt with this visit  · Being followed closely by oncology            Cardiovascular and Mediastinum    Chronic diastolic congestive heart failure (HCC)     · Chronic +3 pitting edema bilateral lower extremity  · RN states pt lungs were wheezy with sputum, HR irreg  · Weights monitored daily  · Takes furosemide 20mg BID  · Pt to be recommended for [facility] PALS group  · Pt does not wish to be followed by care at home any longer due to all other appointments he is going to  · Actively on chemo and seeing oncology                Genitourinary    CKD (chronic kidney disease) stage 3, GFR 30-59 ml/min ([facility])     · Doing well  · Labs at baseline  · Avoid nephrotoxins  · Monitor BMP for renal function  · F/u with PCP                    Reason for visit is   Chief Complaint   Patient presents with    Virtual Regular Visit        Encounter provider JACK Snow    Provider located at [address] [place] [address] [address] [place] [address]  [phone]      Recent Visits  Date Type Provider Dept   05/20/21 Telemedicine Regulo Snow [facility]   Showing recent visits within past 7 days and meeting all other requirements     Future Appointments  No visits were found meeting these conditions  Showing future appointments within next 150 days and meeting all other requirements        The patient was identified by name and date of birth   Meka Bones was informed that this is a telemedicine visit and that the visit is being conducted through [employer] and patient was informed that this is a secure, HIPAA-compliant platform  He agrees to proceed     My office door was closed  No one else was in the room  He acknowledged consent and understanding of privacy and security of the video platform  The patient has agreed to participate and understands they can discontinue the visit at any time  Patient is aware this is a billable service  Subjective  Dilip Suh is a 81 y o  male with history of Cancer, chronic pain, CHF, COPD, multiple myeloma who was recently went to the hospital with increased shortness of breath thought to me due to exacerbation of COPD  Pt was treated with steroids  Chest xray showed new ground glass opacity which was treated with a round of antibiotics  When medically stable pt to discharged to home with outpatient oncology followup for multiple malignancies  Pt lives with wife who helps in his care  Pt states he is feeling weak and is "always short of breath"  Denies any pain, dyspnea at rest with coarse cracles in lungs  C/o chronic loose stool  Denies any chest pain  [facility] RN/PT/OT in home at this time  Ambulates without any assistive devices in home  Pt expressed he would no longer like to be part of care at home  Pt followed closely by oncology for his chemo          Past Medical History:   Diagnosis Date    Cancer [facility])     Chronic pain disorder     3 fractured vertebrae    COPD (chronic obstructive pulmonary disease) (HCC)     Diarrhea     Diverticulitis     Enlarged prostate     Kidney stone     [facility]     Pulmonary embolism ([facility] ) 12/24/2018    Rectal adenocarcinoma ([facility]     Sleep apnea     no cpap       Past Surgical History:   Procedure Laterality Date    CHOLECYSTECTOMY      COLONOSCOPY W/ ENDOSCOPIC US N/A 7/21/2017    Procedure: ANAL ENDOSCOPIC U/S;  Surgeon: Rashi Tavares MD;  [facility];   Service: Colorectal    HERNIA REPAIR      KIDNEY STONE SURGERY      LUNG LOBECTOMY Right     MD COLONOSCOPY FLX DX W/COLLJ SPEC WHEN PFRMD N/A 7/10/2017    Procedure: COLONOSCOPY;  Surgeon: Margot Roberts MD;  [facility]; Service: Gastroenterology    NH RECTAL TUMOR EXCISION, TRANSANAL ENDOSCOPIC MICROSURGICAL, FULL THICK N/A 11/2/2017    Procedure: TRANSANAL ENDOSCOPIC MICROSURGERY TEM;  Surgeon: Karo Kendall MD;  [facility];  Service: Colorectal       Current Outpatient Medications   Medication Sig Dispense Refill    albuterol (2 5 mg/3 mL) 0 083 % nebulizer solution Take 1 vial (2 5 mg total) by nebulization every 6 (six) hours as needed for wheezing 360 mL 5    albuterol (PROVENTIL HFA,VENTOLIN HFA) 90 mcg/act inhaler Inhale 2 puffs every 6 (six) hours as needed for wheezing 3 Inhaler 3    apixaban (ELIQUIS) 2 5 mg Take 1 tablet (2 5 mg total) by mouth 2 (two) times a day 60 tablet 5    diltiazem (CARDIZEM CD) 180 mg 24 hr capsule Take 1 capsule (180 mg total) by mouth daily 30 capsule 5    famotidine (Pepcid) 20 mg tablet Take 20 mg by mouth as needed       finasteride (PROSCAR) 5 mg tablet TAKE 1 TABLET BY MOUTH AT BEDTIME 90 tablet 0    furosemide (LASIX) 20 mg tablet Take 1 tablet (20 mg total) by mouth 2 (two) times a day 90 tablet 3    ipratropium (ATROVENT) 0 02 % nebulizer solution Take 1 vial (0 5 mg total) by nebulization 4 (four) times a day 300 mL 6    lidocaine-prilocaine (EMLA) cream Apply topically as needed for mild pain 30 g 1    potassium chloride (K-DUR,KLOR-CON) 10 mEq tablet Take 1 tablet (10 mEq total) by mouth 2 (two) times a day Take only on days you take lasix 90 tablet 3    predniSONE 10 mg tablet Take 1 tablet (10 mg total) by mouth daily 90 tablet 3    Silodosin 8 MG CAPS Take by mouth daily        No current facility-administered medications for this visit           Allergies   Allergen Reactions    Gabapentin      somnolence    Metaxalone     Nisoldipine     Sulfa Antibiotics Itching    Aldactone [Spironolactone] Other (See Comments)     Breast swelling and pain  Breast swelling and pain    Penicillin V Rash     AS CHILD       Review of Systems   Constitutional: Positive for activity change (decreased) and fatigue  Negative for chills and fever  HENT: Negative for ear pain and sore throat  Eyes: Negative for pain and visual disturbance  Respiratory: Negative for cough and shortness of breath  Cardiovascular: Positive for leg swelling  Negative for chest pain and palpitations  Gastrointestinal: Negative for abdominal pain and vomiting  Genitourinary: Negative for dysuria and hematuria  Musculoskeletal: Positive for gait problem  Negative for arthralgias and back pain  Skin: Negative for color change and rash  Neurological: Positive for weakness  Negative for seizures and syncope  All other systems reviewed and are negative  Video Exam    Vitals:    05/21/21 1615   BP: 102/65   Pulse: 88   Weight: 81 7 kg (180 lb 3 2 oz)       Physical Exam  Vitals signs reviewed  Constitutional:       Appearance: Normal appearance  HENT:      Head: Normocephalic and atraumatic  Eyes:      Conjunctiva/sclera: Conjunctivae normal    Neck:      Musculoskeletal: Normal range of motion  Cardiovascular:      Rate and Rhythm: Normal rate and regular rhythm  Heart sounds: Normal heart sounds, S1 normal and S2 normal  No murmur  Pulmonary:      Effort: Pulmonary effort is normal  No respiratory distress  Breath sounds: Normal breath sounds  No wheezing  Abdominal:      General: Bowel sounds are normal  There is no distension  Palpations: Abdomen is soft  Tenderness: There is no abdominal tenderness  Musculoskeletal: Normal range of motion  Right lower leg: 3+ Edema present  Left lower leg: 3+ Edema present  Comments: Generalized weakness   Skin:     General: Skin is warm and dry  Neurological:      Mental Status: He is alert     Psychiatric:         Attention and Perception: Attention normal          Mood and Affect: Mood normal          Speech: Speech normal          Behavior: Behavior normal          Thought Content: Thought content normal          Cognition and Memory: Cognition normal           Ambulation: Unassisted: yes            Walker: no Wheelchair: no   Nonambulatory: no                            Basic ADLs Independent:   Hygiene: no   Toileting: yes               Bathing: yes     Dressing: yes   Eating: yes       Transfers: yes                 IADLs:   Shopping: no     Medications: no           Finances: no     Meals: no          Driving: no     Continence:   Stool: yes                     Urine: yes     Advance Directives  Code status: Level 3: DNAR and DNI  Living Will on file: Spouse health care agent      VIRTUAL VISIT DISCLAIMER    Niurka Knight acknowledges that he has consented to an online visit or consultation  He understands that the online visit is based solely on information provided by him, and that, in the absence of a face-to-face physical evaluation by the physician, the diagnosis he receives is both limited and provisional in terms of accuracy and completeness  This is not intended to replace a full medical face-to-face evaluation by the physician  Niurka Knight understands and accepts these terms

## 2021-05-21 NOTE — ASSESSMENT & PLAN NOTE
· Same as above for CHF  · Pt currently on chemo treatments for lung CA  · Pt fatigue all the time  · Pt no longer wants to be under care at home care  · Will dc pt with this visit    · Being followed closely by oncology

## 2021-05-21 NOTE — ASSESSMENT & PLAN NOTE
· Doing well  · Labs at baseline  · Avoid nephrotoxins  · Monitor BMP for renal function  · F/u with PCP

## 2021-05-26 NOTE — PROGRESS NOTES
Patient tolerated infusions without any adverse reactions  AVS printed and given to patient  Patient left infusion center in baseline condition

## 2021-05-26 NOTE — PLAN OF CARE
Problem: Potential for Falls  Goal: Patient will remain free of falls  Description: INTERVENTIONS:  - Assess patient frequently for physical needs  -  Identify cognitive and physical deficits and behaviors that affect risk of falls    -  [place] fall precautions as indicated by assessment   - Educate patient/family on patient safety including physical limitations  - Instruct patient to call for assistance with activity based on assessment  - Modify environment to reduce risk of injury  - Consider OT/PT consult to assist with strengthening/mobility  Outcome: Progressing

## 2021-06-01 PROBLEM — R53.1 ASTHENIA DUE TO DISEASE: Status: ACTIVE | Noted: 2021-01-01

## 2021-06-01 PROBLEM — D69.6 THROMBOCYTOPENIA (HCC): Status: ACTIVE | Noted: 2021-01-01

## 2021-06-01 NOTE — ASSESSMENT & PLAN NOTE
· Multifactorial due to multiple myeloma with ongoing chemotherapy, severe AS, peripheral edema, emphysema, advanced age and deconditioning

## 2021-06-01 NOTE — ASSESSMENT & PLAN NOTE
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

## 2021-06-01 NOTE — ED PROVIDER NOTES
History  Chief Complaint   Patient presents with    Weakness - Generalized     Pt c/o generalized weakness/fatigue and sob over past few weeks  Hx of metastatic CA  81-year-old male presents for evaluation of worsening weakness over the past several weeks  Patient is currently undergoing treatment for multiple myeloma and had a his most recent treatment last Wednesday  He states he becoming progressively weak has gotten severe  He states that he is unable to get up and care for himself and is going to the bathroom is a chore  He reports feeling short of breath for a while and states that is unchanged is worse with exertion  Denies cough, fevers, chills, headache, focal neuro deficits or weakness, chest pain, abdominal pain  History provided by:  Patient      Prior to Admission Medications   Prescriptions Last Dose Informant Patient Reported? Taking?    Silodosin 8 MG CAPS  Child Yes No   Sig: Take by mouth daily    albuterol (2 5 mg/3 mL) 0 083 % nebulizer solution   No No   Sig: Take 1 vial (2 5 mg total) by nebulization every 6 (six) hours as needed for wheezing   albuterol (PROVENTIL HFA,VENTOLIN HFA) 90 mcg/act inhaler  Child No No   Sig: Inhale 2 puffs every 6 (six) hours as needed for wheezing   apixaban (ELIQUIS) 2 5 mg  Child No No   Sig: Take 1 tablet (2 5 mg total) by mouth 2 (two) times a day   diltiazem (CARDIZEM CD) 180 mg 24 hr capsule  Child No No   Sig: Take 1 capsule (180 mg total) by mouth daily   famotidine (Pepcid) 20 mg tablet  Child Yes No   Sig: Take 20 mg by mouth as needed    finasteride (PROSCAR) 5 mg tablet  Child No No   Sig: TAKE 1 TABLET BY MOUTH AT BEDTIME   furosemide (LASIX) 20 mg tablet   No No   Sig: Take 1 tablet (20 mg total) by mouth 2 (two) times a day   ipratropium (ATROVENT) 0 02 % nebulizer solution  Child No No   Sig: Take 1 vial (0 5 mg total) by nebulization 4 (four) times a day   lidocaine-prilocaine (EMLA) cream   No No   Sig: Apply topically as needed for mild pain   potassium chloride (K-DUR,KLOR-CON) 10 mEq tablet   No No   Sig: Take 1 tablet (10 mEq total) by mouth 2 (two) times a day Take only on days you take lasix   predniSONE 10 mg tablet  Child No No   Sig: Take 1 tablet (10 mg total) by mouth daily      Facility-Administered Medications: None       Past Medical History:   Diagnosis Date    Cancer ([place] [address] )     Chronic pain disorder     3 fractured vertebrae    Congestive heart failure (CHF) (HCC)     COPD (chronic obstructive pulmonary disease) (HCC)     Diarrhea     Diverticulitis     Enlarged prostate     Kidney stone     [place] myeloma ([place] [address] )     Pulmonary embolism ([place] [address] ) 12/24/2018    Rectal adenocarcinoma ([place] [address] )     Sleep apnea     no cpap       Past Surgical History:   Procedure Laterality Date    CHOLECYSTECTOMY      COLONOSCOPY W/ ENDOSCOPIC US N/A 7/21/2017    Procedure: ANAL ENDOSCOPIC U/S;  Surgeon: Charles Cutler MD;  [facility]; Service: Colorectal    HERNIA REPAIR      KIDNEY STONE SURGERY      LUNG LOBECTOMY Right     CT COLONOSCOPY FLX DX W/COLLJ SPEC WHEN PFRMD N/A 7/10/2017    Procedure: COLONOSCOPY;  Surgeon: Julio Bennett MD;  [facility]: [facility]; Service: Gastroenterology    CT RECTAL TUMOR EXCISION, TRANSANAL ENDOSCOPIC MICROSURGICAL, FULL THICK N/A 11/2/2017    Procedure: TRANSANAL ENDOSCOPIC MICROSURGERY TEM;  Surgeon: Charles Cutler MD;  [facility]: [facility];  Service: Colorectal       Family History   Problem Relation Age of Onset    Arthritis Mother     Colon cancer Father     Heart attack Father     Diabetes type I Sister      I have reviewed and agree with the history as documented      E-Cigarette/Vaping    E-Cigarette Use Never User      E-Cigarette/Vaping Substances    Nicotine No     THC No     CBD No     Flavoring No     Other No     Unknown No      Social History     Tobacco Use    Smoking status: Former Smoker     Packs/day: 1 00     Years: 50 00     Pack years: 50 00     Types: Cigarettes     Start date: 57     Quit date: 12/7/2010     Years since quitting: 10 4    Smokeless tobacco: Never Used    Tobacco comment: quit in 1999   Substance Use Topics    Alcohol use: Yes     Comment: rarely    Drug use: No       Review of Systems   Constitutional: Positive for fatigue  Negative for chills, diaphoresis and fever  HENT: Negative for congestion, ear pain, rhinorrhea, sinus pressure, sneezing, sore throat and trouble swallowing  Eyes: Negative for pain and visual disturbance  Respiratory: Positive for shortness of breath and wheezing  Negative for cough, chest tightness and stridor  Cardiovascular: Negative for chest pain, palpitations and leg swelling  Gastrointestinal: Negative for abdominal pain, blood in stool, constipation, diarrhea, nausea and vomiting  Endocrine: Negative for polydipsia, polyphagia and polyuria  Genitourinary: Negative for decreased urine volume, dysuria, flank pain, frequency, hematuria and urgency  Musculoskeletal: Negative for arthralgias and myalgias  Skin: Negative for color change and rash  Neurological: Positive for weakness  Negative for dizziness, seizures, light-headedness, numbness and headaches  Hematological: Negative for adenopathy  Psychiatric/Behavioral: The patient is not nervous/anxious  Physical Exam  Physical Exam  Constitutional:       Appearance: He is well-developed  HENT:      Head: Normocephalic and atraumatic  Eyes:      General: No scleral icterus  Conjunctiva/sclera: Conjunctivae normal    Neck:      Musculoskeletal: Normal range of motion and neck supple  Vascular: No JVD  Trachea: No tracheal deviation  Cardiovascular:      Rate and Rhythm: Normal rate and regular rhythm  Pulmonary:      Effort: Pulmonary effort is normal  No respiratory distress  Breath sounds: Wheezing present  No rhonchi or rales  Abdominal:      General: There is no distension  Tenderness:  There is no abdominal tenderness  Musculoskeletal: Normal range of motion  General: No deformity  Skin:     Coloration: Skin is not pale  Findings: No erythema or rash  Neurological:      Mental Status: He is alert and oriented to person, place, and time     Psychiatric:         Behavior: Behavior normal          Vital Signs  ED Triage Vitals [06/01/21 1411]   Temperature Pulse Respirations Blood Pressure SpO2   98 4 °F (36 9 °C) 104 20 112/55 96 %      Temp Source Heart Rate Source Patient Position - Orthostatic VS BP Location FiO2 (%)   Oral Monitor Lying Right arm --      Pain Score       --           Vitals:    06/01/21 1411 06/01/21 1522   BP: 112/55 93/52   Pulse: 104 90   Patient Position - Orthostatic VS: Lying          Visual Acuity      ED Medications  Medications   sodium chloride 0 9 % bolus 500 mL (500 mL Intravenous New Bag 6/1/21 1443)   ipratropium-albuterol (DUO-NEB) 0 5-2 5 mg/3 mL inhalation solution 3 mL (3 mL Nebulization Given 6/1/21 1443)       Diagnostic Studies  Results Reviewed     Procedure Component Value Units Date/Time    CBC and differential [[id number]]  (Abnormal) Collected: 06/01/21 1429    Lab Status: Final result Specimen: Blood from Arm, Left Updated: 06/01/21 1526     WBC 6 38 Thousand/uL      RBC 2 86 Million/uL      Hemoglobin 9 1 g/dL      Hematocrit 29 1 %       fL      MCH 31 8 pg      MCHC 31 3 g/dL      RDW 22 3 %      Platelets 23 Thousands/uL      nRBC 0 /100 WBCs     Manual Differential(PHLEBS Do Not Order) [[id number]]  (Abnormal) Collected: 06/01/21 1429    Lab Status: Final result Specimen: Blood from Arm, Left Updated: 06/01/21 1526     Segmented % 90 %      Bands % 6 %      Lymphocytes % 2 %      Monocytes % 0 %      Eosinophils, % 1 %      Basophils % 0 %      Myelocytes % 1 %      Absolute Neutrophils 6 12 Thousand/uL      Lymphocytes Absolute 0 13 Thousand/uL      Monocytes Absolute 0 00 Thousand/uL      Eosinophils Absolute 0 06 Thousand/uL Basophils Absolute 0 00 Thousand/uL      Total Counted 100     Anisocytosis Present     Ovalocytes Present     Polychromasia Present     Platelet Estimate Decreased    Comprehensive metabolic panel [[id number]]  (Abnormal) Collected: 06/01/21 1429    Lab Status: Final result Specimen: Blood from Arm, Left Updated: 06/01/21 1518     Sodium 140 mmol/L      Potassium 4 2 mmol/L      Chloride 101 mmol/L      CO2 30 mmol/L      ANION GAP 9 mmol/L      BUN 26 mg/dL      Creatinine 1 41 mg/dL      Glucose 109 mg/dL      Calcium 8 2 mg/dL      Corrected Calcium 9 6 mg/dL      AST 28 U/L      ALT 49 U/L      Alkaline Phosphatase 74 U/L      Total Protein 4 7 g/dL      Albumin 2 2 g/dL      Total Bilirubin 1 92 mg/dL      eGFR 45 ml/min/1 73sq m     Narrative:      [place] guidelines for Chronic Kidney Disease (CKD):     Stage 1 with normal or high GFR (GFR > 90 mL/min/1 73 square meters)    Stage 2 Mild CKD (GFR = 60-89 mL/min/1 73 square meters)    Stage 3A Moderate CKD (GFR = 45-59 mL/min/1 73 square meters)    Stage 3B Moderate CKD (GFR = 30-44 mL/min/1 73 square meters)    Stage 4 Severe CKD (GFR = 15-29 mL/min/1 73 square meters)    Stage 5 End Stage CKD (GFR <15 mL/min/1 73 square meters)  Note: GFR calculation is accurate only with a steady state creatinine    NT-BNP PRO [[id number]]  (Normal) Collected: 06/01/21 1429    Lab Status: Final result Specimen: Blood from Arm, Left Updated: 06/01/21 1518     NT-proBNP 253 pg/mL     Lactic acid [[id number]]  (Abnormal) Collected: 06/01/21 1429    Lab Status: Final result Specimen: Blood from Arm, Left Updated: 06/01/21 1513     LACTIC ACID 2 6 mmol/L     Narrative:      Result may be elevated if tourniquet was used during collection      Lactic acid 2 Hours [[id number]]     Lab Status: No result Specimen: Blood     Troponin I [[id number]]  (Normal) Collected: 06/01/21 1429    Lab Status: Final result Specimen: Blood from Arm, Left Updated: 06/01/21 1500     Troponin I <0 02 ng/mL     Novel Coronavirus Estephanie MARTINE[facility] - 2 Hour Stat [[id number]] Collected: 06/01/21 1429    Lab Status: In process Specimen: Nares from Nose Updated: 06/01/21 1435    Blood culture #2 [[id number]] Collected: 06/01/21 1429    Lab Status: In process Specimen: Blood from Hand, Left Updated: 06/01/21 1435    Blood culture #1 [[id number]] Collected: 06/01/21 1429    Lab Status: In process Specimen: Blood from Arm, Left Updated: 06/01/21 1435                 XR chest 1 view portable   ED Interpretation by Meghna Nguyễn MD (06/01 1459)   Primary reviewed:No acute abnormality                 Procedures  ECG 12 Lead Documentation Only    Date/Time: 6/1/2021 2:25 PM  Performed by: Meghna Nguyễn MD  Authorized by: Meghna Nguyễn MD     ECG reviewed by me, the ED Provider: yes    Patient location:  ED  Rate:     ECG rate:  109    ECG rate assessment: tachycardic    Rhythm:     Rhythm: sinus tachycardia    Ectopy:     Ectopy: PAC    QRS:     QRS axis:  Normal    QRS intervals:  Normal  Conduction:     Conduction: abnormal      Abnormal conduction: incomplete RBBB    ST segments:     ST segments:  Normal  T waves:     T waves: non-specific               ED Course  ED Course as of Jun 01 1541   Tue Jun 01, 2021   1522 Creatinine(!): 1 41   1525 Hemoglobin(!): 9 1   1525 Platelet Count(!!): 23                             SBIRT 20yo+      Most Recent Value   SBIRT (23 yo +)   In order to provide better care to our patients, we are screening all of our patients for alcohol and drug use  Would it be okay to ask you these screening questions? No Filed at: 06/01/2021 1522                    MDM  Number of Diagnoses or Management Options  Anemia:   Asthenia:   CKD (chronic kidney disease):   COPD (chronic obstructive pulmonary disease) ([facility] ):   Dehydration:   Dyspnea:   Elevated lactic acid level:   Multiple myeloma ([facility] ):    Thrombocytopenia [facility]):   Diagnosis management comments: Weakness shortness of breath-will do cardiac/septic workup, admit      Disposition  Final diagnoses:   Asthenia   Anemia   Dehydration   Elevated lactic acid level   CKD (chronic kidney disease)   Dyspnea   Multiple myeloma (HCC)   Thrombocytopenia (HCC)   [facility]ch[facility]) ([facility] )     Time reflects when diagnosis was documented in both MDM as applicable and the Disposition within this note     Time User Action Codes Description Comment    6/1/2021  3:29 PM Jacquelene Mule Add [R53 1] Asthenia     6/1/2021  3:29 PM Jacquelene Mule Add [D64 9] Anemia     6/1/2021  3:29 PM Aicha Lindsay J Add [E86 0] Dehydration     6/1/2021  3:29 PM Jacquelene Mule Add [R79 89] Elevated lactic acid level     6/1/2021  3:29 PM Aaron Reddy J Add [N18 9] CKD (chronic kidney disease)     6/1/2021  3:29 PM Aichavesna Bustamantey J Add [R06 00] Dyspnea     6/1/2021  3:29 PM HosAaron mayes J Add [C90 00] Multiple myeloma ([facility] )     6/1/2021  3:30 PM Aichavesna Bustamantey J Add [D69 6] Thrombocytopenia ([facility] )     6/1/2021  3:30 PM Taj Reddy Add [J44 9] COPD (chronic obstructive pulmonary disease) [facility])       ED Disposition     ED Disposition Condition Date/Time Comment    Admit Stable Tue Jun 1, 2021  3:29 PM Case was discussed with Dr Yuri Alfaro and the patient's admission status was agreed to be Admission Status: observation status to the service of Dr Yuri Alfaro   Follow-up Information    None         Patient's Medications   Discharge Prescriptions    No medications on file     No discharge procedures on file      PDMP Review       Value Time User    PDMP Reviewed  Yes 11/11/2020  3:25 PM Wilver Rosenthal MD          ED Provider  Electronically Signed by           Yulia Laboy MD  06/01/21 7206

## 2021-06-01 NOTE — ASSESSMENT & PLAN NOTE
· Patient is immunocompromised with multiple myeloma and ongoing chemotherapy with persistent left lower lung infiltrate    · In addition he has had persistent cough with sputum production  · Will treat as Hcap with IV cefepime  · Due to History of C diff in 2018, will add oral vancomycin for prophylaxis

## 2021-06-01 NOTE — ASSESSMENT & PLAN NOTE
· Due to Myeloma and chemotherapy  · It is now at 24 and with epistaxis and reported skin bleeding at home  · Will transfuse with platelets  · Consent obtained

## 2021-06-01 NOTE — ASSESSMENT & PLAN NOTE
· Patient is noted to have significant peripheral edema and hypoalbuminemia on top of severe AS  · Will give Lasix 40 mg IV b i d   As well as IV albumin 12 5 g is b i d   · His wife is requesting a Cardiology consultation  · Given his comorbidities, he is not an ideal surgical candidate

## 2021-06-01 NOTE — ASSESSMENT & PLAN NOTE
· With recent chemotherapy on 05/26/2021  · The patient will be admitted for at least 2 midnights  · Anticipate that his chemotherapy will have to be held while in the hospital

## 2021-06-01 NOTE — H&P
[facility]  H&P- Heriberto Palmaggers 1935, 81 y o  male MRN: [id number]  Unit/Bed#: ED 06 Encounter: [id number]  Primary Care Provider: Channing Mayorga DO   Date and time admitted to hospital: 6/1/2021  2:06 PM    * 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  Assessment & Plan  · Multifactorial due to multiple myeloma with ongoing chemotherapy, severe AS, peripheral edema, emphysema, advanced age and deconditioning    HCAP (healthcare-associated pneumonia)  Assessment & Plan  · Patient is immunocompromised with multiple myeloma and ongoing chemotherapy with persistent left lower lung infiltrate    · In addition he has had persistent cough with sputum production  · Will treat as Hcap with IV cefepime  · Due to History of C diff in 2018, will add oral vancomycin for prophylaxis    Severe aortic stenosis  Assessment & Plan  · Patient is noted to have significant peripheral edema and hypoalbuminemia on top of severe AS  · Will give Lasix 40 mg IV b i d    · Consider adding IV albumin if blood pressure is too low  · His wife is requesting a Cardiology consultation  · Given his comorbidities, he is not an ideal surgical candidate    Centrilobular emphysema ([address] )  Assessment & Plan  · Continue updraft nebulizations  · Add oxygen as needed  · He appears to be chronically steroid dependent on prednisone 10 mg daily    Multiple myeloma ([facility] )  Assessment & Plan  · With recent chemotherapy on 05/26/2021  · The patient will be admitted for at least 2 midnights  · Anticipate that his chemotherapy will have to be held while in the hospital    Thrombocytopenia [facility])  Assessment & Plan  · Due to Myeloma and chemotherapy  · It is now at 23 and with epistaxis and reported skin bleeding at home  · Will transfuse with platelets  · Consent obtained    Chronic anticoagulation  Assessment & Plan  · Hold Eliquis temporarily due to critically low platelet of 23 and bleeding (skin/epistaxis)      VTE Prophylaxis: Eliquis held due to critically low platelet and epistaxis  / sequential compression device   Code Status:  Level 3 DNR  POLST: There is no POLST form on file for this patient (pre-hospital)  Discussion with family:  Daughter and wife    Anticipated Length of Stay:  Patient will be admitted on an Inpatient basis with an anticipated length of stay of  at least 2 midnights  Justification for Hospital Stay:  Multiple issues    Total Time for Visit, including Counseling / Coordination of Care: 45 minutes  Greater than 50% of this total time spent on direct patient counseling and coordination of care  Chief Complaint:   Weakness    History of Present Illness:    Marino Leach is a 81 y o  male who presents with progressive weakness since last week after his chemotherapy infusion  He has known history of multiple myeloma currently being treated every 28 days with Sarclisa , Carfizomib and decadron  In addition he has severe AS, emphysema, chronic anticoagulation due to paroxysmal atrial fibrillation and history of PE  He presented to the hospital today due to worsening shortness of breath, associated with productive cough, subjective fever, and weakness  He would walk for few steps in the house and would be short of breath right away  He noticed that his legs are very swollen and he had to use more pillows to sleep  He had to use a wedge in order to sit lay a little upright while sleeping  He could not sleep while laying flat  He also admits having nose bleeding and bleeding in the skin of the legs  He had chemotherapy on 05/26/2021 and since then he has been getting worse  Review of Systems:    Review of Systems   Constitutional: Positive for chills and fever  HENT: Positive for nosebleeds  Eyes: Negative  Respiratory: Positive for cough and shortness of breath  Cardiovascular: Positive for leg swelling  Gastrointestinal: Negative  Chronic diarrhea   Genitourinary: Negative      Musculoskeletal: Positive for gait problem  Skin:        Bleeding   Neurological: Positive for weakness  Psychiatric/Behavioral: Negative  Past Medical and Surgical History:     Past Medical History:   Diagnosis Date    Cancer [facility])     Chronic pain disorder     3 fractured vertebrae    Congestive heart failure (CHF) (HCC)     COPD (chronic obstructive pulmonary disease) (HCC)     Diarrhea     Diverticulitis     Enlarged prostate     Kidney stone     Multiple myeloma ([facility] )     Pulmonary embolism ([facility] ) 12/24/2018    Rectal adenocarcinoma ([facility] )     Sleep apnea     no cpap       Past Surgical History:   Procedure Laterality Date    CHOLECYSTECTOMY      COLONOSCOPY W/ ENDOSCOPIC US N/A 7/21/2017    Procedure: ANAL ENDOSCOPIC U/S;  Surgeon: Gasper Rachel MD;  [facility]: [facility]; Service: Colorectal    HERNIA REPAIR      KIDNEY STONE SURGERY      LUNG LOBECTOMY Right     IN COLONOSCOPY FLX DX W/COLLJ SPEC WHEN PFRMD N/A 7/10/2017    Procedure: COLONOSCOPY;  Surgeon: Cheko Gamboa MD;  Location: [facility]; Service: Gastroenterology    IN RECTAL TUMOR EXCISION, TRANSANAL ENDOSCOPIC MICROSURGICAL, FULL THICK N/A 11/2/2017    Procedure: TRANSANAL ENDOSCOPIC MICROSURGERY TEM;  Surgeon: Gasper Rachel MD;  Location: [facility];  Service: Colorectal       Meds/Allergies:    Prior to Admission medications    Medication Sig Start Date End Date Taking?  Authorizing Provider   albuterol (2 5 mg/3 mL) 0 083 % nebulizer solution Take 1 vial (2 5 mg total) by nebulization every 6 (six) hours as needed for wheezing 5/3/21   [facility] DAVION Oliver PA-C   albuterol (PROVENTIL HFA,VENTOLIN HFA) 90 mcg/act inhaler Inhale 2 puffs every 6 (six) hours as needed for wheezing 10/27/20   JACK Herrera   apixaban (ELIQUIS) 2 5 mg Take 1 tablet (2 5 mg total) by mouth 2 (two) times a day 4/28/21   Car Lynn DO   diltiazem (CARDIZEM CD) 180 mg 24 hr capsule Take 1 capsule (180 mg total) by mouth daily 3/17/21   Rujul Shane, DO   famotidine (Pepcid) 20 mg tablet Take 20 mg by mouth as needed     Historical Provider, MD   finasteride (PROSCAR) 5 mg tablet TAKE 1 TABLET BY MOUTH AT BEDTIME 11/20/20   Mora Edward PA-C   furosemide (LASIX) 20 mg tablet Take 1 tablet (20 mg total) by mouth 2 (two) times a day 5/17/21   Rukaitlin Lynn, DO   ipratropium (ATROVENT) 0 02 % nebulizer solution Take 1 vial (0 5 mg total) by nebulization 4 (four) times a day 4/29/21   Lynwood Severin, MD   lidocaine-prilocaine (EMLA) cream Apply topically as needed for mild pain 5/5/21   Bebe Vargas MD   potassium chloride (K-DUR,KLOR-CON) 10 mEq tablet Take 1 tablet (10 mEq total) by mouth 2 (two) times a day Take only on days you take lasix 5/17/21   Rujul Lynn, DO   predniSONE 10 mg tablet Take 1 tablet (10 mg total) by mouth daily 4/27/21   Lynwood Severin, MD   predniSONE 20 mg tablet Take 20 mg by mouth daily 5/31/21   Historical Provider, MD   Silodosin 8 MG CAPS Take by mouth daily     Historical Provider, MD     Reviewed via epic    Allergies:    Allergies   Allergen Reactions    Gabapentin      somnolence    Metaxalone     Nisoldipine     Sulfa Antibiotics Itching    Aldactone [Spironolactone] Other (See Comments)     Breast swelling and pain  Breast swelling and pain    Penicillin V Rash     AS CHILD       Social History:     Marital Status: /Civil Union   Occupation:  None  Patient Pre-hospital Living Situation:  Lives with family  Patient Pre-hospital Level of Mobility:  With assistance  Patient Pre-hospital Diet Restrictions:  None  Substance Use History:   Social History     Substance and Sexual Activity   Alcohol Use Yes    Comment: rarely     Social History     Tobacco Use   Smoking Status Former Smoker    Packs/day: 1 00    Years: 50 00    Pack years: 50 00    Types: Cigarettes    Start date: 57    Quit date: 12/7/2010    Years since quitting: 10 4   Smokeless Tobacco Never Used   Tobacco Comment    quit in 1999 Social History     Substance and Sexual Activity   Drug Use No       Family History:    Family History   Problem Relation Age of Onset    Arthritis Mother     Colon cancer Father     Heart attack Father     Diabetes type I Sister        Physical Exam:     Vitals:   Blood Pressure: 90/63 (06/01/21 1637)  Pulse: 105 (06/01/21 1637)  Temperature: 98 4 °F (36 9 °C) (06/01/21 1411)  Temp Source: Oral (06/01/21 1411)  Respirations: 18 (06/01/21 1637)  Weight - Scale: 83 kg (182 lb 15 7 oz) (06/01/21 1411)  SpO2: 95 % (06/01/21 1637)    Physical Exam  Vitals signs reviewed  Constitutional:       Appearance: He is ill-appearing  HENT:      Head: Normocephalic and atraumatic  Nose:      Comments: Dried epistaxis in the nares  Eyes:      General: No scleral icterus  Neck:      Musculoskeletal: Neck supple  Cardiovascular:      Rate and Rhythm: Rhythm irregular  Heart sounds: Murmur present  Pulmonary:      Comments: Coarse breath sounds  Abdominal:      General: Abdomen is flat  Palpations: Abdomen is soft  Musculoskeletal:      Comments: Plus three pitting edema   Skin:     Comments: Petechia of the legs   Neurological:      Mental Status: He is oriented to person, place, and time  Psychiatric:         Mood and Affect: Mood normal          Behavior: Behavior normal        dditional Data:     Lab Results: I have personally reviewed pertinent reports        Results from last 7 days   Lab Units 06/01/21  1429   WBC Thousand/uL 6 38   HEMOGLOBIN g/dL 9 1*   HEMATOCRIT % 29 1*   PLATELETS Thousands/uL 23*   BANDS PCT % 6   LYMPHO PCT % 2*   MONO PCT % 0*   EOS PCT % 1     Results from last 7 days   Lab Units 06/01/21  1429   SODIUM mmol/L 140   POTASSIUM mmol/L 4 2   CHLORIDE mmol/L 101   CO2 mmol/L 30   BUN mg/dL 26*   CREATININE mg/dL 1 41*   ANION GAP mmol/L 9   CALCIUM mg/dL 8 2*   ALBUMIN g/dL 2 2*   TOTAL BILIRUBIN mg/dL 1 92*   ALK PHOS U/L 74   ALT U/L 49   AST U/L 28   GLUCOSE RANDOM mg/dL 109                 Results from last 7 days   Lab Units 06/01/21  1630 06/01/21  1429   LACTIC ACID mmol/L 1 7 2 6*       Imaging: I have personally reviewed pertinent reports  XR chest 1 view portable   ED Interpretation by Violet Merida MD (06/01 4190)   Primary reviewed:No acute abnormality      Final Result by Yovany Sanchez MD (06/01 1791)      Stable volume loss on the right with radiation fibrotic changes at the right apex  Persistent groundglass opacity at the left base attributed to pneumonia  Workstation performed: [id number]             EKG, Pathology, and Other Studies Reviewed on Admission:   · EKG:  Sinus tachycardia with PACs    Allscripts / Epic Records Reviewed: Yes     ** Please Note: This note has been constructed using a voice recognition system   **

## 2021-06-01 NOTE — ASSESSMENT & PLAN NOTE
· Patient is noted to have significant peripheral edema and hypoalbuminemia on top of severe AS  · Conitnue Lasix 40 mg IV b i d    · Consider adding  IV albumin if blood pressure is too low  · Cardiology consultation appreciated- echo ordered  · Given his comorbidities, he is not an ideal surgical candidate

## 2021-06-02 PROBLEM — D64.9 ANEMIA: Status: ACTIVE | Noted: 2021-01-01

## 2021-06-02 NOTE — ASSESSMENT & PLAN NOTE
· Hold Eliquis temporarily due to critically low platelet of 23 and bleeding (skin/epistaxis)  · Patient also now with worsening anemia with hgb of 9 1 -> 7 2 -> 7 9

## 2021-06-02 NOTE — ASSESSMENT & PLAN NOTE
· Possible gram negative pneumonia in the setting of immunocompromised with multiple myeloma and ongoing chemotherapy with persistent left lower lung infiltrate    · In addition he has had persistent cough with sputum production  · Continue to treat as Hcap with IV cefepime  · Due to History of C diff in 2018,  added oral vancomycin for prophylaxis

## 2021-06-02 NOTE — ED NOTES
Pt O2 saturation at 88% on RA  Pt reports he wears 2l nasal canula at bedtime  Placed on pt        Gela Spangler RN  06/01/21 5785

## 2021-06-02 NOTE — ASSESSMENT & PLAN NOTE
· Due to Myeloma and chemotherapy  · plt of 23 on admission with epistaxis and reported skin bleeding at home  · Patient s/p transfuse with platelets 6/1  · Platelets of 54 today, continue to monitor

## 2021-06-02 NOTE — ASSESSMENT & PLAN NOTE
· Hemoglobin of 9 1 on admission  · Decreased to 7 2 this am, repeat at 7 9  · Will transfuse if hemoglobin is less than 7 g/dL  · Check Iron panel, folate, Vitamin B12  · Heme test stools  · Monitor CBC

## 2021-06-02 NOTE — PLAN OF CARE
Problem: Potential for Falls  Goal: Patient will remain free of falls  Description: INTERVENTIONS:  - Assess patient frequently for physical needs  -  Identify cognitive and physical deficits and behaviors that affect risk of falls    -  [place] fall precautions as indicated by assessment   - Educate patient/family on patient safety including physical limitations  - Instruct patient to call for assistance with activity based on assessment  - Modify environment to reduce risk of injury  - Consider OT/PT consult to assist with strengthening/mobility  Outcome: Progressing     Problem: Prexisting or High Potential for Compromised Skin Integrity  Goal: Skin integrity is maintained or improved  Description: INTERVENTIONS:  - Identify patients at risk for skin breakdown  - Assess and monitor skin integrity  - Assess and monitor nutrition and hydration status  - Monitor labs   - Assess for incontinence   - Turn and reposition patient  - Assist with mobility/ambulation  - Relieve pressure over bony prominences  - Avoid friction and shearing  - Provide appropriate hygiene as needed including keeping skin clean and dry  - Evaluate need for skin moisturizer/barrier cream  - Collaborate with interdisciplinary team   - Patient/family teaching  - Consider wound care consult   Outcome: Progressing     Problem: PAIN - ADULT  Goal: Verbalizes/displays adequate comfort level or baseline comfort level  Description: Interventions:  - Encourage patient to monitor pain and request assistance  - Assess pain using appropriate pain scale  - Administer analgesics based on type and severity of pain and evaluate response  - Implement non-pharmacological measures as appropriate and evaluate response  - Consider cultural and social influences on pain and pain management  - Notify physician/advanced practitioner if interventions unsuccessful or patient reports new pain  Outcome: Progressing     Problem: INFECTION - ADULT  Goal: Absence or prevention of progression during hospitalization  Description: INTERVENTIONS:  - Assess and monitor for signs and symptoms of infection  - Monitor lab/diagnostic results  - Monitor all insertion sites, i e  indwelling lines, tubes, and drains  - Monitor endotracheal if appropriate and nasal secretions for changes in amount and color  - [place] appropriate cooling/warming therapies per order  - Administer medications as ordered  - Instruct and encourage patient and family to use good hand hygiene technique  - Identify and instruct in appropriate isolation precautions for identified infection/condition  Outcome: Progressing  Goal: Absence of fever/infection during neutropenic period  Description: INTERVENTIONS:  - Monitor WBC    Outcome: Progressing     Problem: SAFETY ADULT  Goal: Patient will remain free of falls  Description: INTERVENTIONS:  - Assess patient frequently for physical needs  -  Identify cognitive and physical deficits and behaviors that affect risk of falls    -  [place] fall precautions as indicated by assessment   - Educate patient/family on patient safety including physical limitations  - Instruct patient to call for assistance with activity based on assessment  - Modify environment to reduce risk of injury  - Consider OT/PT consult to assist with strengthening/mobility  Outcome: Progressing  Goal: Maintain or return to baseline ADL function  Description: INTERVENTIONS:  -  Assess patient's ability to carry out ADLs; assess patient's baseline for ADL function and identify physical deficits which impact ability to perform ADLs (bathing, care of mouth/teeth, toileting, grooming, dressing, etc )  - Assess/evaluate cause of self-care deficits   - Assess range of motion  - Assess patient's mobility; develop plan if impaired  - Assess patient's need for assistive devices and provide as appropriate  - Encourage maximum independence but intervene and supervise when necessary  - Involve family in performance of ADLs  - Assess for home care needs following discharge   - Consider OT consult to assist with ADL evaluation and planning for discharge  - Provide patient education as appropriate  Outcome: Progressing  Goal: Maintain or return mobility status to optimal level  Description: INTERVENTIONS:  - Assess patient's baseline mobility status (ambulation, transfers, stairs, etc )    - Identify cognitive and physical deficits and behaviors that affect mobility  - Identify mobility aids required to assist with transfers and/or ambulation (gait belt, sit-to-stand, lift, walker, cane, etc )  - [place] fall precautions as indicated by assessment  - Record patient progress and toleration of activity level on Mobility SBAR; progress patient to next Phase/Stage  - Instruct patient to call for assistance with activity based on assessment  - Consider rehabilitation consult to assist with strengthening/weightbearing, etc   Outcome: Progressing     Problem: DISCHARGE PLANNING  Goal: Discharge to home or other facility with appropriate resources  Description: INTERVENTIONS:  - Identify barriers to discharge w/patient and caregiver  - Arrange for needed discharge resources and transportation as appropriate  - Identify discharge learning needs (meds, wound care, etc )  - Arrange for interpretive services to assist at discharge as needed  - Refer to Case Management Department for coordinating discharge planning if the patient needs post-hospital services based on physician/advanced practitioner order or complex needs related to functional status, cognitive ability, or social support system  Outcome: Progressing     Problem: Knowledge Deficit  Goal: Patient/family/caregiver demonstrates understanding of disease process, treatment plan, medications, and discharge instructions  Description: Complete learning assessment and assess knowledge base    Interventions:  - Provide teaching at level of understanding  - Provide teaching via preferred learning methods  Outcome: Progressing     Problem: CARDIOVASCULAR - ADULT  Goal: Maintains optimal cardiac output and hemodynamic stability  Description: INTERVENTIONS:  - Monitor I/O, vital signs and rhythm  - Monitor for S/S and trends of decreased cardiac output  - Administer and titrate ordered vasoactive medications to optimize hemodynamic stability  - Assess quality of pulses, skin color and temperature  - Assess for signs of decreased coronary artery perfusion  - Instruct patient to report change in severity of symptoms  Outcome: Progressing  Goal: Absence of cardiac dysrhythmias or at baseline rhythm  Description: INTERVENTIONS:  - Continuous cardiac monitoring, vital signs, obtain 12 lead EKG if ordered  - Administer antiarrhythmic and heart rate control medications as ordered  - Monitor electrolytes and administer replacement therapy as ordered  Outcome: Progressing     Problem: RESPIRATORY - ADULT  Goal: Achieves optimal ventilation and oxygenation  Description: INTERVENTIONS:  - Assess for changes in respiratory status  - Assess for changes in mentation and behavior  - Position to facilitate oxygenation and minimize respiratory effort  - Oxygen administered by appropriate delivery if ordered  - Initiate smoking cessation education as indicated  - Encourage broncho-pulmonary hygiene including cough, deep breathe, Incentive Spirometry  - Assess the need for suctioning and aspirate as needed  - Assess and instruct to report SOB or any respiratory difficulty  - Respiratory Therapy support as indicated  Outcome: Progressing

## 2021-06-02 NOTE — CONSULTS
Cardiology Consultation  MD Puja Kramer MD Liborio Evan, DO, MD Libra Muhammad DO, Alber Arreola DO, [facility]  ----------------------------------------------------------------  [address] [place] [address]    Alisha Campos 81 y o  male MRN: [id number]  Unit/Bed#: ED 22 Encounter: [id number]      Reason for Consultation:  Volume overload/severe AS      ASSESSMENT:   Abdi Guzman associated pneumonia   Severe aortic stenosis   Volume overload with possible acute heart failure   Acute kidney injury   LVEF 47%, grade 1 diastolic dysfunction, mild LVH, severe AS w/ Vmax 3 23 m/s, mean PG 28 mmHg, RADHAMES 0 9 cm2, mild MAC, trace MR/TR, mild to moderate ascending aortic root dilatation 4 5 cm, September 2020    Multiple myeloma   Pancytopenia on chemotherapy for multiple myeloma   Lactic acidosis   Hypoalbuminemia at 2 2 g/dL, June 1, 2021   RBBB   Centrilobular emphysema   History of pulmonary embolism on Eliquis   Paroxysmal atrial fibrillation on Eliquis    PLAN:  Patient is short of breath due to his volume overload and potential pneumonia  Legs are significantly swollen  Likely some component of heart failure with concomitant hypoalbuminemia  Initial troponin negative and ECG nonischemic  Would treat for acute heart failure  Lasix 40 mg IV b i d   With low threshold uptitrate  Strict I/Os and daily weights  Blood pressure borderline making it difficult to up add neurohormonal medications  Can check Limited repeat 2D echocardiogram to reassess cardiac structure and function  On examination valve sounds severe, but will look for any change in LV function  Anticoagulation on hold due to pancytopenia; restart when reasonable from primary service perspective  Antibiotics as per primary team  Guarded prognosis    Signed: Sai Bennett DO, [facility]      History of Present Illness:  Alisha Campos is a 81 y o  male with Severe aortic stenosis, diastolic dysfunction, multiple myeloma on chemotherapy, centrilobular emphysema and chronic RBBB has been undergoing chemotherapy treatment for multiple myeloma  He has been experiencing progressive dyspnea with dyspnea on exertion and cough  He reports subjective fevers with orthopnea  He has also been experiencing nose bleeding and bleeding on his lower extremities  As his symptoms continue to worsen, he presented to [facility] for evaluation  Blood pressures were found to be borderline in the 80s to low 386A systolic  Oxygen saturation was within normal limits  He follows with Dr Wallace Mai as an outpatient  In the past, patient was seen by CT surgery and it was thought that his shortness of breath but not improve significantly with valve replacement and is not plan for transcatheter valve  Laboratory data demonstrated pancytopenia with platelets as low as 34,610 and declining hemoglobin as well as WBCs  Chest x-ray showed ground-glass opacities at the left base felt to be consistent with pneumonia  He denies any chest pain, pressure, tightness or squeezing  Admits to progressive lower extremity swelling  Denies orthopnea or paroxysmal nocturnal dyspnea  Review of Systems:  Review of Systems   Constitution: Negative for decreased appetite, fever, weight gain and weight loss  HENT: Negative for congestion and sore throat  Eyes: Negative for visual disturbance  Cardiovascular: Positive for dyspnea on exertion and leg swelling  Negative for chest pain, near-syncope and palpitations  Respiratory: Positive for shortness of breath  Negative for cough  Hematologic/Lymphatic: Negative for bleeding problem  Skin: Negative for rash  Musculoskeletal: Negative for myalgias and neck pain  Gastrointestinal: Negative for abdominal pain and nausea  Neurological: Negative for light-headedness and weakness  Psychiatric/Behavioral: Negative for depression  Past Medical History:   Diagnosis Date    Cancer [facility])     Chronic pain disorder     3 fractured vertebrae    Congestive heart failure (CHF) (HCC)     COPD (chronic obstructive pulmonary disease) (HCC)     Diarrhea     Diverticulitis     Enlarged prostate     Kidney stone     [facility] myel[facility] )     Pulmonary embolism ([facility] ) 12/24/2018    Rectal adenocarcinoma ([facility] )     Sleep apnea     no cpap       Past Surgical History:   Procedure Laterality Date    CHOLECYSTECTOMY      COLONOSCOPY W/ ENDOSCOPIC US N/A 7/21/2017    Procedure: ANAL ENDOSCOPIC U/S;  Surgeon: Charles Cutler MD;  [facility]; Service: Colorectal    HERNIA REPAIR      KIDNEY STONE SURGERY      LUNG LOBECTOMY Right     LA COLONOSCOPY FLX DX W/COLLJ SPEC WHEN PFRMD N/A 7/10/2017    Procedure: COLONOSCOPY;  Surgeon: Julio Bennett MD;  [facility]: [facility];   Service: Gastroenterology    LA RECTAL TUMOR EXCISION, TRANSANAL ENDOSCOPIC MICROSURGICAL, FULL THICK N/A 11/2/2017    Procedure: TRANSANAL ENDOSCOPIC MICROSURGERY TEM;  Surgeon: Charles Cutler MD;  [facility];  Service: Colorectal       Social History     Socioeconomic History    Marital status: /Civil Union     Spouse name: None    Number of children: None    Years of education: None    Highest education level: None   Occupational History    None   Social Needs    Financial resource strain: None    Food insecurity     Worry: None     Inability: None    Transportation needs     Medical: None     Non-medical: None   Tobacco Use    Smoking status: Former Smoker     Packs/day: 1 00     Years: 50 00     Pack years: 50 00     Types: Cigarettes     Start date: 57     Quit date: 12/7/2010     Years since quitting: 10 4    Smokeless tobacco: Never Used    Tobacco comment: quit in 1999   Substance and Sexual Activity    Alcohol use: Yes     Comment: rarely    Drug use: No    Sexual activity: None   Lifestyle    Physical activity Days per week: None     Minutes per session: None    Stress: None   Relationships    Social connections     Talks on phone: None     Gets together: None     Attends Alevism service: None     Active member of club or organization: None     Attends meetings of clubs or organizations: None     Relationship status: None    Intimate partner violence     Fear of current or ex partner: None     Emotionally abused: None     Physically abused: None     Forced sexual activity: None   Other Topics Concern    None   Social History Narrative    None       Family History   Problem Relation Age of Onset    Arthritis Mother     Colon cancer Father     Heart attack Father     Diabetes type I Sister        Allergies   Allergen Reactions    Gabapentin      somnolence    Metaxalone     Nisoldipine     Sulfa Antibiotics Itching    Aldactone [Spironolactone] Other (See Comments)     Breast swelling and pain  Breast swelling and pain    Penicillin V Rash     AS CHILD       No current facility-administered medications on file prior to encounter        Current Outpatient Medications on File Prior to Encounter   Medication Sig    albuterol (2 5 mg/3 mL) 0 083 % nebulizer solution Take 1 vial (2 5 mg total) by nebulization every 6 (six) hours as needed for wheezing    albuterol (PROVENTIL HFA,VENTOLIN HFA) 90 mcg/act inhaler Inhale 2 puffs every 6 (six) hours as needed for wheezing    apixaban (ELIQUIS) 2 5 mg Take 1 tablet (2 5 mg total) by mouth 2 (two) times a day    diltiazem (CARDIZEM CD) 180 mg 24 hr capsule Take 1 capsule (180 mg total) by mouth daily    famotidine (Pepcid) 20 mg tablet Take 20 mg by mouth as needed     finasteride (PROSCAR) 5 mg tablet TAKE 1 TABLET BY MOUTH AT BEDTIME    furosemide (LASIX) 20 mg tablet Take 1 tablet (20 mg total) by mouth 2 (two) times a day    ipratropium (ATROVENT) 0 02 % nebulizer solution Take 1 vial (0 5 mg total) by nebulization 4 (four) times a day    lidocaine-prilocaine (EMLA) cream Apply topically as needed for mild pain    potassium chloride (K-DUR,KLOR-CON) 10 mEq tablet Take 1 tablet (10 mEq total) by mouth 2 (two) times a day Take only on days you take lasix    predniSONE 10 mg tablet Take 1 tablet (10 mg total) by mouth daily    predniSONE 20 mg tablet Take 20 mg by mouth daily    Silodosin 8 MG CAPS Take by mouth daily         Current Facility-Administered Medications   Medication Dose Route Frequency Provider Last Rate    albuterol  2 puff Inhalation Q6H PRN Elijah Mehta MD      albuterol  2 5 mg Nebulization Q6H PRN Elijah Mehta MD      cefepime  2,000 mg Intravenous Q12H Elijah Mehta MD Stopped (06/02/21 7103)    famotidine  20 mg Oral Daily Elijah Mehta MD      finasteride  5 mg Oral HS Elijah Mehta MD      furosemide  40 mg Intravenous BID (diuretic) Elijah Mehta MD      ipratropium  0 5 mg Nebulization 4x Daily Elijah Mehta MD      potassium chloride  10 mEq Oral BID Elijah Mehta MD      predniSONE  10 mg Oral Daily Elijah Mehta MD      vancomycin  125 mg Oral Q12H [facility] Elijah Mehta MD              Vitals:    06/02/21 0314 06/02/21 0315 06/02/21 0531 06/02/21 0815   BP: (!) 85/49 (!) 88/53 (!) 89/59 103/58   BP Location: Right arm Left arm Right arm Right arm   Pulse: 81 86 83 78   Resp: 18 18 20 19   Temp:       TempSrc:       SpO2: 97% 97% 98% 100%   Weight:           I/O last 3 completed shifts: In: 827 [Blood:267; IV Piggyback:560]  Out: 200 [Urine:200]      Intake/Output Summary (Last 24 hours) at 6/2/2021 7006  Last data filed at 6/1/2021 2137  Gross per 24 hour   Intake 827 ml   Output 200 ml   Net 627 ml       Weight change:     PHYSICAL EXAMINATION:  Gen: Awake, Alert, NAD  Head/eyes: AT/NC, pupils equal and round, Anicteric  ENT: mmm  Neck: Supple, +JVP, trachea midline  Resp:  Bilateral wheezes  CV: RRR +S1, S2, No m/r/g  Abd: Soft, NT/ND + BS  Ext: +2 pitting LE edema bilaterally  Neuro:  Follows commands, moves all extermities  Psych: Appropriate affect, normal mood, pleasant attitude, non-combative  Skin: warm; no rash, erythema or venous stasis changes on exposed skin    Lab Results:  Results from last 7 days   Lab Units 21  0531   WBC Thousand/uL 3 41*   HEMOGLOBIN g/dL 7 2*   HEMATOCRIT % 22 7*   PLATELETS Thousands/uL 54*     Results from last 7 days   Lab Units 21  0531 21  1429   POTASSIUM mmol/L 4 1 4 2   CHLORIDE mmol/L 104 101   CO2 mmol/L 29 30   BUN mg/dL 24 26*   CREATININE mg/dL 1 15 1 41*   CALCIUM mg/dL 7 7* 8 2*   ALK PHOS U/L  --  74   ALT U/L  --  49   AST U/L  --  28     No results found for: TROPONINT      Results from last 7 days   Lab Units 21  1429   TROPONIN I ng/mL <0 02                   Results for orders placed during the hospital encounter of 20   Echo complete with contrast if indicated    Narrative [address] [place] [address], [facility]  [phone]    Transthoracic Echocardiogram  2D, M-mode, Doppler, and Color Doppler    Study date:  04-Sep-2020    Patient: Shazia Calhoun  MR number: [id number]  Account number: [de-identified]  : 1935  Age: 81 years  Gender: Male  Status: Outpatient  Location: [place] [address]  Height: 67 in  Weight: 184 6 lb  BP: 110/ 72 mmHg    Indications: Aortic valve disease  Diagnoses: I35 9 - Nonrheumatic aortic valve disorder, unspecified    Sonographer:  Erickson Ford RDCS  Primary Physician:  Mario Alberto Marquez DO  Referring Physician:  Barrie Vann DO  Group:  [id number]  Interpreting Physician:  Citlali Reyes MD    IMPRESSIONS:  Technical quality: Fair  Technically difficult study due to body habitus and poor echo penetration and lung interference  Cardiac rhythm: Sinus with bundle-branch block  1  Mild concentric left ventricular hypertrophy, normal left ventricular systolic function  EF around 55%  Suspected grade 1 diastolic dysfunction    2  Normal right ventricular size and systolic function  3  Normal left and right atrial cavity size  4  Heavily calcified aortic valve with moderate to severe aortic valve stenosis and mild aortic valve regurgitation  (Peak transaortic velocity is 3 23 m/sec, mean gradient is 28 mmHg and calculated aortic valve area is 0 9 cm2)  5  Mitral annular calcification, trace mitral valve regurgitation  Next 6  Trace tricuspid valve regurgitation  7  No obvious pulmonary hypertension  8  No pericardial effusion  9  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 up to 4 5 cm  Compared to report of previous echocardiogram from September 2019 there appears to be some progression of aortic valve stenosis  SUMMARY    LEFT VENTRICLE:  Normal left ventricular cavity size, mild concentric left ventricular hypertrophy, normal left ventricular systolic function  Abnormal septal motion consistent with bundle branch block otherwise no regional wall motion abnormality noted  Ejection fraction is estimated as around 55%  Doppler evaluation suggests grade 1 diastolic dysfunction  Estimated left ventricular filling pressure is normal     RIGHT VENTRICLE:  Normal right ventricular size and systolic function  Right ventricular systolic pressure could not be estimated due to inadequate tricuspid regurgitation profile  LEFT ATRIUM:  Normal left atrial cavity size  Intact interatrial septum  RIGHT ATRIUM:  Normal right atrial cavity size  MITRAL VALVE:  Mitral annular calcification and sclerosis  Reduced mobility of posterior mitral valve leaflet  No mitral valve stenosis  Trace mitral valve regurgitation  AORTIC VALVE:  heavily calcified tricuspid aortic valve with a markedly reduced cuspal separation  There is moderate to severe aortic valve stenosis with mild aortic valve regurgitation  Peak transaortic velocity is 3 23 m/sec, mean gradient is 28 mmHg  and calculated aortic valve area is 0 9 cm2   Doppler velocity index is 0 26 suggesting significant aortic valve stenosis  TRICUSPID VALVE:  Trace tricuspid valve regurgitation  PULMONIC VALVE:  No pulmonic valve regurgitation  AORTA:  Dilated aortic root and proximal ascending aorta measuring up to 4 5 cm  Ascending thoracic aorta aneurysm  IVC, HEPATIC VEINS:  Inferior vena cava is not well visualized  PERICARDIUM:  No pericardial effusion  HISTORY: PRIOR HISTORY: COPD  Cancer  Right bundle branch block  Aortic stenosis  CHF  Pulmonary embolism  CKD 3  Former smoker  PROCEDURE: The study was performed in the [facility]  This was a routine study  The transthoracic approach was used  The study included complete 2D imaging, M-mode, complete spectral Doppler, and color Doppler  The heart rate was 89 bpm, at  the start of the study  Images were obtained from the parasternal, apical, subcostal, and suprasternal notch acoustic windows  Echocardiographic views were limited due to decreased penetration and lung interference  This was a technically  difficult study  LEFT VENTRICLE: Normal left ventricular cavity size, mild concentric left ventricular hypertrophy, normal left ventricular systolic function  Abnormal septal motion consistent with bundle branch block otherwise no regional wall motion  abnormality noted  Ejection fraction is estimated as around 55%  Doppler evaluation suggests grade 1 diastolic dysfunction  Estimated left ventricular filling pressure is normal     RIGHT VENTRICLE: Normal right ventricular size and systolic function  Right ventricular systolic pressure could not be estimated due to inadequate tricuspid regurgitation profile  LEFT ATRIUM: Normal left atrial cavity size  Intact interatrial septum  RIGHT ATRIUM: Normal right atrial cavity size  MITRAL VALVE: Mitral annular calcification and sclerosis  Reduced mobility of posterior mitral valve leaflet  No mitral valve stenosis  Trace mitral valve regurgitation      AORTIC VALVE: heavily calcified tricuspid aortic valve with a markedly reduced cuspal separation  There is moderate to severe aortic valve stenosis with mild aortic valve regurgitation  Peak transaortic velocity is 3 23 m/sec, mean  gradient is 28 mmHg and calculated aortic valve area is 0 9 cm2  Doppler velocity index is 0 26 suggesting significant aortic valve stenosis  TRICUSPID VALVE: Trace tricuspid valve regurgitation  PULMONIC VALVE: No pulmonic valve regurgitation  PERICARDIUM: No pericardial effusion  AORTA: Dilated aortic root and proximal ascending aorta measuring up to 4 5 cm  Ascending thoracic aorta aneurysm  SYSTEMIC VEINS: IVC: Inferior vena cava is not well visualized  SYSTEM MEASUREMENT TABLES    2D  %FS: 34 7 %  Ao Diam: 4 1 cm  EDV(Teich): 60 3 ml  EF(Teich): 64 7 %  ESV(Teich): 21 3 ml  IVSd: 1 2 cm  LA Area: 11 9 cm2  LA Diam: 3 7 cm  LVEDV MOD A4C: 53 5 ml  LVEF MOD A4C: 43 3 %  LVESV MOD A4C: 30 4 ml  LVIDd: 3 8 cm  LVIDs: 2 5 cm  LVLd A4C: 7 8 cm  LVLs A4C: 6 9 cm  LVOT Diam: 2 cm  LVPWd: 1 1 cm  RA Area: 12 1 cm2  RVIDd: 3 7 cm  SV MOD A4C: 23 2 ml  SV(Teich): 39 ml    CW  AR Dec [place]: 1 5 m/s2  AR Dec Time: 1820 1 ms  AR PHT: 527 8 ms  AR Vmax: 2 8 m/s  AR maxP 6 mmHg  AV Env  Ti: 256 4 ms  AV VTI: 65 2 cm  AV Vmax: 3 2 m/s  AV Vmean: 2 5 m/s  AV maxP 6 mmHg  AV meanP 6 mmHg    MM  TAPSE: 1 6 cm    PW  RADHAMES (VTI): 1 cm2  RADHAMES Vmax: 0 9 cm2  AVAI (VTI): 0 cm2/m2  AVAI Vmax: 0 cm2/m2  E': 0 1 m/s  E/E': 10 6  LVOT Env  Ti: 301 1 ms  LVOT VTI: 19 6 cm  LVOT Vmax: 0 9 m/s  LVOT Vmean: 0 7 m/s  LVOT maxP 9 mmHg  LVOT meanP 8 mmHg  LVSI Dopp: 32 7 ml/m2  LVSV Dopp: 64 1 ml  MV A Bertin: 0 9 m/s  MV Dec [place]: 5 m/s2  MV DecT: 169 9 ms  MV E Bertin: 0 8 m/s  MV E/A Ratio: 0 9  MV PHT: 49 3 ms  MVA By PHT: 4 5 cm2    [employer] Accredited Echocardiography Laboratory    Prepared and electronically signed by    Galina Navas MD  Signed 04-Sep-2020 18:44:49 No results found for this or any previous visit  No results found for this or any previous visit  No results found for this or any previous visit  CXR:   Results for orders placed during the hospital encounter of 04/02/21   XR chest 2 views    Narrative CHEST     INDICATION:   SOB  History of lung cancer  COMPARISON:  Chest radiograph from 12/2/2020  Subsequent chest CT from 4/2/2021  EXAM PERFORMED/VIEWS:  XR CHEST PA & LATERAL  FINDINGS:  Right port at cavoatrial junction  Cardiomediastinal silhouette normal     No acute disease  Volume loss and elevation of the right hemidiaphragm from right upper lobectomy with radiation changes in the right apex  No effusion or pneumothorax  Cholecystectomy  Mild degenerative disease in the spine with old compression deformity in the lumbar spine  Impression No acute cardiopulmonary disease  Radiation changes and volume loss in the right hemithorax related to right upper lobectomy  Workstation performed: [id number]       Results for orders placed during the hospital encounter of 06/01/21   XR chest 1 view portable    Narrative CHEST     INDICATION:   weakness  COMPARISON:  4/19/2021    EXAM PERFORMED/VIEWS:  E      FINDINGS:  Stable right chest port position  Cardiomediastinal silhouette appears unremarkable  Stable volume loss on the right with radiation fibrotic changes at the right apex  Persistent groundglass opacity at the left base  Osseous structures appear within normal limits for patient age  Impression Stable volume loss on the right with radiation fibrotic changes at the right apex  Persistent groundglass opacity at the left base attributed to pneumonia  Workstation performed: [id number]         ECG:  Sinus rhythm frequent APCs, rare VPCs and incomplete RBBB    This note was completed in part utilizing M-Modal Fluency Direct Software    Grammatical errors, random word insertions, spelling mistakes, and incomplete sentences may be an occasional consequence of this system secondary to software limitations, ambient noise, and hardware issues  If you have any questions or concerns about the content, text, or information contained within the body of this dictation, please contact the provider for clarification

## 2021-06-02 NOTE — PROGRESS NOTES
[address] [place] [address]  Progress Note Paulina Andrew 1935, 81 y o  male MRN: [id number]  Unit/Bed#: E4 -01 Encounter: [id number]  Primary Care Provider: Coleen Joe DO   Date and time admitted to hospital: 6/1/2021  2:06 PM    * 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  Assessment & Plan  · Multifactorial due to multiple myeloma with ongoing chemotherapy, severe AS, peripheral edema, emphysema, advanced age and deconditioning    HCAP (healthcare-associated pneumonia)  Assessment & Plan  · Possible gram negative pneumonia in the setting of immunocompromised with multiple myeloma and ongoing chemotherapy with persistent left lower lung infiltrate    · In addition he has had persistent cough with sputum production  · Continue to treat as Hcap with IV cefepime  · Due to History of C diff in 2018,  added oral vancomycin for prophylaxis    Severe aortic stenosis  Assessment & Plan  · Patient is noted to have significant peripheral edema and hypoalbuminemia on top of severe AS  · Conitnue Lasix 40 mg IV b i d    · Consider adding  IV albumin if blood pressure is too low  · Cardiology consultation appreciated- echo ordered  · Given his comorbidities, he is not an ideal surgical candidate    Centrilobular emphysema ([facility] )  Assessment & Plan  · Continue updraft nebulizations  · Add oxygen as needed  · He appears to be chronically steroid dependent on prednisone 10 mg daily    Multiple myeloma ([facility] )  Assessment & Plan  · With recent chemotherapy on 05/26/2021  · The patient will be admitted for at least 2 midnights  · Anticipate that his chemotherapy will have to be held while in the hospital    Thrombocytopenia [facility])  Assessment & Plan  · Due to Myeloma and chemotherapy  · plt of 23 on admission with epistaxis and reported skin bleeding at home  · Patient s/p transfuse with platelets 6/1  · Platelets of 54 today, continue to monitor    Chronic anticoagulation  Assessment & Plan  · Hold Eliquis temporarily due to critically low platelet of 23 and bleeding (skin/epistaxis)  · Patient also now with worsening anemia with hgb of 9 1 -> 7 2 -> 7 9    Anemia  Assessment & Plan  · Hemoglobin of 9 1 on admission  · Decreased to 7 2 this am, repeat at 7 9  · Will transfuse if hemoglobin is less than 7 g/dL  · Check Iron panel, folate, Vitamin B12  · Heme test stools  · Monitor CBC      VTE Pharmacologic Prophylaxis:   Pharmacologic: Pharmacologic VTE Prophylaxis contraindicated due to anemia, thrombocytopenia   Mechanical VTE Prophylaxis in Place: Yes    Patient Centered Rounds: I have performed bedside rounds with nursing staff today  Discussions with Specialists or Other Care Team Provider: nursing    Education and Discussions with Family / Patient: wife updated at bedside    Time Spent for Care: 20 minutes  More than 50% of total time spent on counseling and coordination of care as described above  Current Length of Stay: 1 day(s)    Current Patient Status: Inpatient   Certification Statement: The patient will continue to require additional inpatient hospital stay due to continued need for IV antibiotics, IV lasix, and monitoring of labs    Discharge Plan: TBD    Code Status: Level 3 - DNAR and DNI      Subjective: The patient was seen and examined  The patient states his breathing is slightly improved  He denies any pain  Objective:     Vitals:   Temp (24hrs), Av 9 °F (36 6 °C), Min:97 8 °F (36 6 °C), Max:98 °F (36 7 °C)    Temp:  [97 8 °F (36 6 °C)-98 °F (36 7 °C)] 97 8 °F (36 6 °C)  HR:  [] 80  Resp:  [18-22] 18  BP: ()/(48-59) 94/55  SpO2:  [95 %-100 %] 100 %  Body mass index is 27 82 kg/m²  Input and Output Summary (last 24 hours): Intake/Output Summary (Last 24 hours) at 2021 1736  Last data filed at 2021 2137  Gross per 24 hour   Intake 327 ml   Output 200 ml   Net 127 ml       Physical Exam:     Physical Exam  Vitals signs and nursing note reviewed     Constitutional: General: He is awake  Appearance: He is ill-appearing (chronically)  Cardiovascular:      Rate and Rhythm: Normal rate and regular rhythm  Heart sounds: Murmur present  Pulmonary:      Effort: Pulmonary effort is normal       Breath sounds: Decreased air movement present  Wheezing present  Abdominal:      General: Bowel sounds are normal       Palpations: Abdomen is soft  Tenderness: There is no abdominal tenderness  Musculoskeletal:      Right lower leg: Edema present  Left lower leg: Edema present  Skin:     General: Skin is dry  Neurological:      General: No focal deficit present  Mental Status: He is alert and oriented to person, place, and time  Psychiatric:         Attention and Perception: Attention normal          Mood and Affect: Mood normal          Speech: Speech normal          Behavior: Behavior is cooperative  Additional Data:     Labs:    Results from last 7 days   Lab Units 06/02/21  1343 06/02/21  0531 06/01/21  1429   WBC Thousand/uL  --  3 41* 6 38   HEMOGLOBIN g/dL 7 9* 7 2* 9 1*   HEMATOCRIT % 25 5* 22 7* 29 1*   PLATELETS Thousands/uL  --  54* 23*   BANDS PCT %  --   --  6   LYMPHO PCT %  --   --  2*   MONO PCT %  --   --  0*   EOS PCT %  --   --  1     Results from last 7 days   Lab Units 06/02/21  0531 06/01/21  1429   SODIUM mmol/L 139 140   POTASSIUM mmol/L 4 1 4 2   CHLORIDE mmol/L 104 101   CO2 mmol/L 29 30   BUN mg/dL 24 26*   CREATININE mg/dL 1 15 1 41*   ANION GAP mmol/L 6 9   CALCIUM mg/dL 7 7* 8 2*   ALBUMIN g/dL  --  2 2*   TOTAL BILIRUBIN mg/dL  --  1 92*   ALK PHOS U/L  --  74   ALT U/L  --  49   AST U/L  --  28   GLUCOSE RANDOM mg/dL 92 109                 Results from last 7 days   Lab Units 06/02/21  0531 06/01/21  1630 06/01/21  1429   LACTIC ACID mmol/L  --  1 7 2 6*   PROCALCITONIN ng/ml 0 20  --   --            * I Have Reviewed All Lab Data Listed Above  * Additional Pertinent Lab Tests Reviewed:  All Labs Within Last 24 Hours Reviewed    Imaging:    Imaging Reports Reviewed Today Include: none  Imaging Personally Reviewed by Myself Includes:  none    Recent Cultures (last 7 days):     Results from last 7 days   Lab Units 06/01/21  1429   BLOOD CULTURE  Received in Microbiology Lab  Culture in Progress  Received in Microbiology Lab  Culture in Progress  Last 24 Hours Medication List:   Current Facility-Administered Medications   Medication Dose Route Frequency Provider Last Rate    albuterol  2 puff Inhalation Q6H PRN Smita Dumont MD      albuterol  2 5 mg Nebulization Q6H PRN Smita Dumont MD      cefepime  2,000 mg Intravenous Q12H Smita Dumont MD Stopped (06/02/21 5499)    famotidine  20 mg Oral Daily Smita Dumont MD      finasteride  5 mg Oral HS Smita Dumont MD      furosemide  40 mg Intravenous BID (diuretic) Heather Jennings PA-C      ipratropium  0 5 mg Nebulization 4x Daily Smita Dumont MD      potassium chloride  10 mEq Oral BID Smita Dumont MD      predniSONE  10 mg Oral Daily Smita Dumont MD      vancomycin  125 mg Oral Q12H [address] Smita Dumont MD          Today, Patient Was Seen By: Heather Jennings PA-C    ** Please Note: Dictation voice to text software may have been used in the creation of this document   **

## 2021-06-03 NOTE — PLAN OF CARE
Problem: PHYSICAL THERAPY ADULT  Goal: Performs mobility at highest level of function for planned discharge setting  See evaluation for individualized goals  Description: Treatment/Interventions: Functional transfer training, LE strengthening/ROM, Elevations, Therapeutic exercise, Endurance training, Patient/family training, Bed mobility, Gait training, Spoke to nursing  Equipment Recommended: Jake Jackman(trial next tx session)       See flowsheet documentation for full assessment, interventions and recommendations  Note: Prognosis: Good  Problem List: Decreased strength, Decreased endurance, Impaired balance, Decreased mobility, Decreased skin integrity  Assessment: Pt  85 y o male w/ multiple myeloma presented w/ progressive weakness since last week after his chemotherapy infusion  Pt admitted for HCAP (healthcare-associated pneumonia) w/ asthenia, thrombocytopenia, anemia & acute on chronic heart failure   Pt referred to PT for mobility assessment & D/C planning w/ orders of activity as tolerated  PTA, pt reports being I w/o AD household amb & w/ SPC for community distances  On eval, pt demonstrate dec mobility, balance, endurance & amb  Pt require minAx1 for most functional mobility + cues for techniques  Gait deviations as above, slow w/ dec foot clearance & strides but no gross LOB noted  (+) SOB & wheezing noted at rest & after amb  Noted SpO2 89% at RA hence requested RN to give pt O2  RN agreed & gave pt 2L O2 NC  SpO2 improved to 95-96% w/ 2L O2 w/ improvement of symptoms reported by pt  Nsg staff most recent vital signs as follows: BP 90/58 (BP Location: Right arm)   Pulse (!) 126   Temp 100 3 °F (37 9 °C) (Temporal)   Resp 22   Ht 5' 8" (1 727 m)   Wt 83 kg (182 lb 15 7 oz)   SpO2 93%   BMI 27 82 kg/m²   At end of session, pt OOB in chair in stable condition, call bell & phone in reach, all lines intact   Pt could benefit from chair alarm for safety however chair alarm pads unavailable in unit at this time  RN aware  Fall precautions reinforced w/ good understanding  Pt functioning below baseline hence will continue skilled PT to improve function & safety  The patient's AM-PAC Basic Mobility Inpatient Short Form Raw Score is 16, Standardized Score is 38 32  A standardized score less than 42 9 suggests the patient may benefit from discharge to post-acute rehabilitation services  From PT standpoint, pt could benefit from STR or HHPT pending progress  CM to follow  Nsg staff to continue to mobilized pt (OOB in chair for all meals & ambulate in room/unit) as tolerated to prevent further decline in function  Nsg notified  Barriers to Discharge: Inaccessible home environment  Barriers to Discharge Comments: (+) stairs     PT Discharge Recommendation: (HHPT vs STR pending progress)          See flowsheet documentation for full assessment

## 2021-06-03 NOTE — ASSESSMENT & PLAN NOTE
· Hemoglobin of 9 1 on admission  · 8 3 today  · Will transfuse if hemoglobin is less than 7 g/dL  · Monitor CBC daily

## 2021-06-03 NOTE — ASSESSMENT & PLAN NOTE
· Hold Eliquis temporarily due to critically low platelet of 23 and bleeding (skin/epistaxis)  · Monitor CBC

## 2021-06-03 NOTE — CONSULTS
Patient: Kelley Palacios  Patient MRN: [id number]  Service date: 6/3/2021  Attending Physician:       CHIEF COMPLAIN  Chief Complaint   Patient presents with    Weakness - Generalized     Pt c/o generalized weakness/fatigue and sob over past few weeks  Hx of metastatic CA  Heme / Oncology history:  Kelley Palacios is a 81 y o  male     1, , multiple myeloma free light chain  - diagnosed in 2018 heavily treated, received initially melphalan/Velcade/dex, progression 2020 received a Revlimid based chemotherapy regimen, progressed again in 03/2021 received Isatuximab/kyprolia/dex  Been on the treatment for about a month        HISTORY OF PRESENT ILLNESS:  Kelley Palacios is a 81 y o  male who has above summarized multiple myeloma heavily treated, AFib, CHF, presents with worsening of dyspnea and leg swelling, admitted for pneumonia  Patient reported symptom is slightly better since admission  Patient has been on the new anti cancer regimen for about a month, overall patient tolerated treatment okay    Former smoker ECOG 2          ASSESSMENT/PLAN:  Kelley Palacios is a 81 y o  male with:    1) uncontrolled for light chain myeloma on 3rd line of systemic treatment x1 month  2) thrombocytopenia - brandi platelet 23, increased to 50    3) anemia, moderate, hemoglobin 7 2, macrocytosis, receiving a transfusion hemoglobin today is 8 3        - cytopenia is likely due to a combination of underlying malignancy and anticancer treatment  Agree with transfusion if needed to keep hemoglobin above 8 and platelets above 20    - proceed with workup including B12 level DIC panel a basic anemia workup  Overall will limited workup given overall poor prognosis              minutes were spent face to face with patient with greater than 50% of the time spent in counseling or coordination of care including discussions of treatment instructions    All of the patient's questions were answered to their satisfactory during this discussion  Albert King MD PhD  Hematology / Oncology              PROBLEM LIST:  Patient Active Problem List   Diagnosis    Chronic rhinitis    Adenocarcinoma, lung ([facility] )    BPH (benign prostatic hyperplasia)    Multiple myeloma (HCC)    Chronic diarrhea    Centrilobular emphysema ([facility] )    Right bundle branch block    Enlarged prostate without lower urinary tract symptoms (luts)    Osteoporosis    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HCC)    Severe aortic stenosis    Peripheral edema    Chronic anticoagulation    Venous insufficiency (chronic) (peripheral)    Rectal cancer (HCC)    Elevated PSA    Gross hematuria    Hypogammaglobulinemia (HCC)    Skin lesion    Port-A-Cath in place    HCAP (healthcare-associated pneumonia)    Chronic diastolic congestive heart failure (HCC)    CKD (chronic kidney disease) stage 3, GFR 30-59 ml/min (HCC)    Right flank pain    Nocturnal hypoxia    Blood in stool    Chronic respiratory failure (HCC)    Pancytopenia (HCC)    Personal history of malignant neoplasm of breast    [facility]    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    Epistaxis    Thrombocytopenia (HCC)    Anemia                     PAST MEDICAL HISTORY:   has a past medical history of Cancer ([facility] ), Chronic pain disorder, Congestive heart failure (CHF) (HCC), COPD (chronic obstructive pulmonary disease) ([facility] ), Diarrhea, Diverticulitis, Enlarged prostate, Kidney stone, Multiple myeloma ([facility] [address] ), Pulmonary embolism ([facility] ) (12/24/2018), Rectal adenocarcinoma ([facility] ), and Sleep apnea  PAST SURGICAL HISTORY:   has a past surgical history that includes Cholecystectomy; Lung lobectomy (Right); Hernia repair; pr colonoscopy flx dx w/collj spec when pfrmd (N/A, 7/10/2017); Kidney stone surgery; Colonoscopy w/ endoscopic US (N/A, 7/21/2017); and pr rectal tumor excision, transanal endoscopic microsurgical, full thick (N/A, 11/2/2017)      CURRENT MEDICATIONS  Scheduled Meds:  Current Facility-Administered Medications   Medication Dose Route Frequency Provider Last Rate    albuterol  2 puff Inhalation Q6H PRN Lon Patel MD      albuterol  2 5 mg Nebulization Q6H PRN Lon Patel MD      cefepime  2,000 mg Intravenous Q12H Lon Patel MD 2,000 mg (06/03/21 0610)    famotidine  20 mg Oral Daily Lon Patel MD      finasteride  5 mg Oral HS Lon Patel MD      furosemide  5 mg/hr Intravenous Continuous Yvette Zhong MD 5 mg/hr (06/03/21 1039)    ipratropium  0 5 mg Nebulization 4x Daily Lon Patel MD      potassium chloride  20 mEq Oral BID Yvette Zhong MD      predniSONE  10 mg Oral Daily Lon Patel MD      vancomycin  125 mg Oral Q12H [facility] Lon Patel MD       Continuous Infusions:furosemide, 5 mg/hr, Last Rate: 5 mg/hr (06/03/21 1039)      PRN Meds:   albuterol    albuterol    SOCIAL HISTORY:   reports that he quit smoking about 10 years ago  His smoking use included cigarettes  He started smoking about 61 years ago  He has a 50 00 pack-year smoking history  He has never used smokeless tobacco  He reports current alcohol use of about 1 0 standard drinks of alcohol per week  He reports that he does not use drugs  FAMILY HISTORY:  family history includes Arthritis in his mother; Colon cancer in his father; Diabetes type I in his sister; Heart attack in his father  ALLERGIES:  is allergic to gabapentin; metaxalone; nisoldipine; sulfa antibiotics; aldactone [spironolactone]; and penicillin v     REVIEW OF SYSTEMS:  Please note that a 14-point review of systems was performed to include Constitutional, HEENT, Respiratory, CVS, GI, , Musculoskeletal, Integumentary, Neurologic, Rheumatologic, Endocrinologic, Psychiatric, Lymphatic, and Hematologic/Oncologic systems were reviewed and are negative unless otherwise stated in HPI  Positive and negative findings pertinent to this evaluation are incorporated into the history of present illness      PHYSICAL EXAMINATION:  Vital Signs: BP 95/53 (BP Location: Right arm)   Pulse 89   Temp 98 5 °F (36 9 °C) (Temporal)   Resp 16   Ht 5' 8" (1 727 m)   Wt 83 kg (182 lb 15 7 oz)   SpO2 96%   BMI 27 82 kg/m²   Body surface area is 1 97 meters squared  Ht Readings from Last 3 Encounters:   06/02/21 5' 8" (1 727 m)   05/26/21 5' 7 99" (1 727 m)   05/19/21 5' 7 99" (1 727 m)       Wt Readings from Last 3 Encounters:   06/02/21 83 kg (182 lb 15 7 oz)   05/26/21 83 3 kg (183 lb 12 1 oz)   05/21/21 81 7 kg (180 lb 3 2 oz)        Temp Readings from Last 3 Encounters:   06/03/21 98 5 °F (36 9 °C) (Temporal)   05/26/21 98 3 °F (36 8 °C) (Temporal)   05/19/21 97 9 °F (36 6 °C) (Temporal)        BP Readings from Last 3 Encounters:   06/03/21 95/53   05/26/21 101/61   05/21/21 102/65         Pulse Readings from Last 3 Encounters:   06/03/21 89   05/26/21 (!) 118   05/21/21 88       Appears pale, appears comfortable leg swelling 2+  Constitutional: Alert and oriented    HEENT: Anicteric, PERRLA  Chest: Decreased breathing sound bilaterally, No wheezes/rales/rhonchi  CVS: Regular rhythm  Normal rate  Abdomen: Soft, nontender, nondistended  No palpable organomegaly  Extremities: No cyanosis/clubbing/edema  Integumentary: No obvious rashes or bruises  Musculoskeletal: No obvious bony or joint deformities  Psychiatric: Appropriate affect and mood  Lymph Node Survey: No palpable preauricular, submandibular, cervical, supraclavicular, axillary, epitrochlear or inguinal lymphadenopathy      LABS:  Results from last 7 days   Lab Units 06/01/21  1429   TROPONIN I ng/mL <0 02     CBC with diff:   Results from last 7 days   Lab Units 06/03/21  0508 06/02/21  1343 06/02/21  0531 06/01/21  1429   WBC Thousand/uL 4 06*  --  3 41* 6 38   HEMOGLOBIN g/dL 8 3* 7 9* 7 2* 9 1*   HEMATOCRIT % 26 7* 25 5* 22 7* 29 1*   MCV fL 101*  --  102* 102*   PLATELETS Thousands/uL 56*  --  54* 23*   MCH pg 31 3  --  32 3 31 8   MCHC g/dL 31 1*  --  31 7 31 3*   RDW % 21 6*  --  21 7* 22 3*   MPV fL 11 4  --  10 5  --    NRBC AUTO /100 WBCs 1  --   --  0       CMP:  Results from last 7 days   Lab Units 06/03/21  0508 06/02/21  0531 06/01/21  1429 05/28/21  1140   POTASSIUM mmol/L 3 8 4 1 4 2 3 8   CHLORIDE mmol/L 103 104 101 105   CO2 mmol/L 32 29 30 29   BUN mg/dL 22 24 26* 30*   CREATININE mg/dL 1 22 1 15 1 41* 1 52*   CALCIUM mg/dL 8 8 7 7* 8 2* 8 9   AST U/L  --   --  28  --    ALT U/L  --   --  49  --    ALK PHOS U/L  --   --  74  --    EGFR ml/min/1 73sq m 54 58 45 41                 Invalid input(s): TNI,  PCT              IMAGING:  XR chest 1 view portable   ED Interpretation   Primary reviewed:No acute abnormality      Final Result      Stable volume loss on the right with radiation fibrotic changes at the right apex  Persistent groundglass opacity at the left base attributed to pneumonia                    Workstation performed: [id number]

## 2021-06-03 NOTE — PLAN OF CARE
Problem: OCCUPATIONAL THERAPY ADULT  Goal: Performs self-care activities at highest level of function for planned discharge setting  See evaluation for individualized goals  Description: Treatment Interventions: ADL retraining, Functional transfer training, UE strengthening/ROM, Endurance training, Patient/family training, Equipment evaluation/education, Compensatory technique education, Energy conservation, Activityengagement, Cognitive reorientation          See flowsheet documentation for full assessment, interventions and recommendations  Note: Limitation: Decreased ADL status, Decreased Safe judgement during ADL, Decreased UE strength, Decreased endurance, Decreased self-care trans, Decreased high-level ADLs, Decreased cognition  Prognosis: Good  Assessment: Pt is a 81 y o  male seen for OT evaluation, who was admitted to [address] [place] [address] on 6/1/2021 with generalized weakness and shortness of breath  Comorbidities impacting the pt include healthcare associated pneumonia, asthenia due to disease, severe aortic stenosis, centrilobular emphysema, anemia, thrombocytopenia, and chronic anticoagulation, past medical history chronic pain disorder, Congestive heart failure (CHF), COPD (chronic obstructive pulmonary disease), Enlarged prostate, Multiple myeloma, Pulmonary embolism (12/24/2018), Rectal adenocarcinoma, and Sleep apnea  Pt has active OT orders and activity as tolerated orders  Pt is currently on 2L O2 nasal canula  OT was consulted to assess pt's functional status and occupational performance in order to determine discharge needs  Contexts influencing pts occupational performance include living in a multilevel home with his wife who has recently experienced her own health impairments, with increased family support provided by three daughters  PTA, pt was independent in ADLs, IADLs, & functional mobility, and used [facility] for community mobility at baseline  Pt drives at baseline   At time of evaluation pt was MIN Ax1 for supine<>sit, MOD A for LB dressing, MIN Ax1 for functional transfers, and MIN A x1 for functional mobility  Deficits impacting pts occupational performance include : weakness, impaired balance, decreased endurance, increased fall risk, decreased ADLS, decreased IADLS, decreased activity tolerance, decreased safety awareness, SOB upon exertion, and decreased strength  Pt would benefit from continued OT tx 3-5x/wk to promote safety and address immediate occupational deficits in order to return to PLOF  When medically stable, OT recommended discharge to post acute rehabilitation services  The patient's raw score on the AM-PAC Daily Activity inpatient short form is 17, standardized score is 37 26, less than 39 4  Patients at this level are likely to benefit from discharge to post-acute rehabilitation services  Please refer to the recommendation of the Occupational Therapist for safe discharge planning    Recommendation: Geriatric Consult  OT Discharge Recommendation: Post acute rehabilitation services  OT - OK to Discharge: Yes(when medically stable )

## 2021-06-03 NOTE — PLAN OF CARE
Problem: Potential for Falls  Goal: Patient will remain free of falls  Description: INTERVENTIONS:  - Assess patient frequently for physical needs  -  Identify cognitive and physical deficits and behaviors that affect risk of falls    -  [place] fall precautions as indicated by assessment   - Educate patient/family on patient safety including physical limitations  - Instruct patient to call for assistance with activity based on assessment  - Modify environment to reduce risk of injury  - Consider OT/PT consult to assist with strengthening/mobility  Outcome: Progressing     Problem: Prexisting or High Potential for Compromised Skin Integrity  Goal: Skin integrity is maintained or improved  Description: INTERVENTIONS:  - Identify patients at risk for skin breakdown  - Assess and monitor skin integrity  - Assess and monitor nutrition and hydration status  - Monitor labs   - Assess for incontinence   - Turn and reposition patient  - Assist with mobility/ambulation  - Relieve pressure over bony prominences  - Avoid friction and shearing  - Provide appropriate hygiene as needed including keeping skin clean and dry  - Evaluate need for skin moisturizer/barrier cream  - Collaborate with interdisciplinary team   - Patient/family teaching  - Consider wound care consult   Outcome: Progressing     Problem: PAIN - ADULT  Goal: Verbalizes/displays adequate comfort level or baseline comfort level  Description: Interventions:  - Encourage patient to monitor pain and request assistance  - Assess pain using appropriate pain scale  - Administer analgesics based on type and severity of pain and evaluate response  - Implement non-pharmacological measures as appropriate and evaluate response  - Consider cultural and social influences on pain and pain management  - Notify physician/advanced practitioner if interventions unsuccessful or patient reports new pain  Outcome: Progressing     Problem: INFECTION - ADULT  Goal: Absence or prevention of progression during hospitalization  Description: INTERVENTIONS:  - Assess and monitor for signs and symptoms of infection  - Monitor lab/diagnostic results  - Monitor all insertion sites, i e  indwelling lines, tubes, and drains  - Monitor endotracheal if appropriate and nasal secretions for changes in amount and color  - [place] appropriate cooling/warming therapies per order  - Administer medications as ordered  - Instruct and encourage patient and family to use good hand hygiene technique  - Identify and instruct in appropriate isolation precautions for identified infection/condition  Outcome: Progressing  Goal: Absence of fever/infection during neutropenic period  Description: INTERVENTIONS:  - Monitor WBC    Outcome: Progressing     Problem: SAFETY ADULT  Goal: Patient will remain free of falls  Description: INTERVENTIONS:  - Assess patient frequently for physical needs  -  Identify cognitive and physical deficits and behaviors that affect risk of falls    -  [place] fall precautions as indicated by assessment   - Educate patient/family on patient safety including physical limitations  - Instruct patient to call for assistance with activity based on assessment  - Modify environment to reduce risk of injury  - Consider OT/PT consult to assist with strengthening/mobility  Outcome: Progressing  Goal: Maintain or return to baseline ADL function  Description: INTERVENTIONS:  -  Assess patient's ability to carry out ADLs; assess patient's baseline for ADL function and identify physical deficits which impact ability to perform ADLs (bathing, care of mouth/teeth, toileting, grooming, dressing, etc )  - Assess/evaluate cause of self-care deficits   - Assess range of motion  - Assess patient's mobility; develop plan if impaired  - Assess patient's need for assistive devices and provide as appropriate  - Encourage maximum independence but intervene and supervise when necessary  - Involve family in performance of ADLs  - Assess for home care needs following discharge   - Consider OT consult to assist with ADL evaluation and planning for discharge  - Provide patient education as appropriate  Outcome: Progressing  Goal: Maintain or return mobility status to optimal level  Description: INTERVENTIONS:  - Assess patient's baseline mobility status (ambulation, transfers, stairs, etc )    - Identify cognitive and physical deficits and behaviors that affect mobility  - Identify mobility aids required to assist with transfers and/or ambulation (gait belt, sit-to-stand, lift, walker, cane, etc )  - [place] fall precautions as indicated by assessment  - Record patient progress and toleration of activity level on Mobility SBAR; progress patient to next Phase/Stage  - Instruct patient to call for assistance with activity based on assessment  - Consider rehabilitation consult to assist with strengthening/weightbearing, etc   Outcome: Progressing     Problem: DISCHARGE PLANNING  Goal: Discharge to home or other facility with appropriate resources  Description: INTERVENTIONS:  - Identify barriers to discharge w/patient and caregiver  - Arrange for needed discharge resources and transportation as appropriate  - Identify discharge learning needs (meds, wound care, etc )  - Arrange for interpretive services to assist at discharge as needed  - Refer to Case Management Department for coordinating discharge planning if the patient needs post-hospital services based on physician/advanced practitioner order or complex needs related to functional status, cognitive ability, or social support system  Outcome: Progressing     Problem: Knowledge Deficit  Goal: Patient/family/caregiver demonstrates understanding of disease process, treatment plan, medications, and discharge instructions  Description: Complete learning assessment and assess knowledge base    Interventions:  - Provide teaching at level of understanding  - Provide teaching via preferred learning methods  Outcome: Progressing     Problem: CARDIOVASCULAR - ADULT  Goal: Maintains optimal cardiac output and hemodynamic stability  Description: INTERVENTIONS:  - Monitor I/O, vital signs and rhythm  - Monitor for S/S and trends of decreased cardiac output  - Administer and titrate ordered vasoactive medications to optimize hemodynamic stability  - Assess quality of pulses, skin color and temperature  - Assess for signs of decreased coronary artery perfusion  - Instruct patient to report change in severity of symptoms  Outcome: Progressing  Goal: Absence of cardiac dysrhythmias or at baseline rhythm  Description: INTERVENTIONS:  - Continuous cardiac monitoring, vital signs, obtain 12 lead EKG if ordered  - Administer antiarrhythmic and heart rate control medications as ordered  - Monitor electrolytes and administer replacement therapy as ordered  Outcome: Progressing     Problem: RESPIRATORY - ADULT  Goal: Achieves optimal ventilation and oxygenation  Description: INTERVENTIONS:  - Assess for changes in respiratory status  - Assess for changes in mentation and behavior  - Position to facilitate oxygenation and minimize respiratory effort  - Oxygen administered by appropriate delivery if ordered  - Initiate smoking cessation education as indicated  - Encourage broncho-pulmonary hygiene including cough, deep breathe, Incentive Spirometry  - Assess the need for suctioning and aspirate as needed  - Assess and instruct to report SOB or any respiratory difficulty  - Respiratory Therapy support as indicated  Outcome: Progressing

## 2021-06-03 NOTE — TELEPHONE ENCOUNTER
Patient's daughter Kajal Wilde is calling to report that patient is in the hospital  DX-LLE pneumonia, CHF  She would like to speak with Dr Chico Combs only about Hospice and plan of care  Will forward to Dr Jaspreet Cantu RNs to have MD contact Kajal Chani at [phone]

## 2021-06-03 NOTE — PROGRESS NOTES
Progress Note - Cardiology   Dyllan Daniels 81 y o  male MRN: [id number]  Unit/Bed#: E4 -01 Encounter: [id number]    Assessment:  1  Healthcare associated pneumonia  2  Severe aortic stenosis  3  Acute on chronic HFpEF  4  Multiple myeloma with relapse  5  Pancytopenia on chemotherapy  6  Lactic acidosis  7  Hypoalbuminemia  8  Centrilobular emphysema you  9  History of paroxysmal atrial fibrillation on Eliquis on hold  10  History of pulmonary embolism on Eliquis on hold       Plan:  1  Will discontinue Lasix bolus and begin Lasix intravenous drip continuously at 5 milligrams/hour with low threshold to increase  2  Increase KCl supplementation to 20 mEq b i d   3  Begin daily weight       Interval history:  Patient states that breathing is okay as long as he does not move around  He does appear slightly tachypneic at rest   Blood pressure 90/58 to 113/59 last 24 hours  Creatinine 1 22, GFR 54, potassium 3 8, hemoglobin 8 3 hematocrit 26 7      Vitals: BP 90/58 (BP Location: Right arm)   Pulse (!) 126   Temp 100 3 °F (37 9 °C) (Temporal)   Resp 22   Ht 5' 8" (1 727 m)   Wt 83 kg (182 lb 15 7 oz)   SpO2 98%   BMI 27 82 kg/m²   Vitals:    06/01/21 1411 06/02/21 0815   Weight: 83 kg (182 lb 15 7 oz) 83 kg (182 lb 15 7 oz)     Orthostatic Blood Pressures      Most Recent Value   Blood Pressure  90/58 filed at 06/03/2021 0700   Patient Position - Orthostatic VS  Lying filed at 06/03/2021 0700            Intake/Output Summary (Last 24 hours) at 6/3/2021 1065  Last data filed at 6/3/2021 0901  Gross per 24 hour   Intake --   Output 1225 ml   Net -1225 ml       Invasive Devices     Central Venous Catheter Line            Port A Cath 12/10/17 Right Subclavian 1270 days          Peripheral Intravenous Line            Peripheral IV 06/01/21 Left Antecubital 1 day                Review of Systems   Constitutional: Negative for activity change  Respiratory: Positive for shortness of breath   Negative for cough, chest tightness and wheezing  Cardiovascular: Positive for leg swelling  Negative for chest pain and palpitations  Musculoskeletal: Negative for gait problem  Skin: Negative for color change  Neurological: Negative for dizziness, tremors, syncope, weakness, light-headedness and headaches  Psychiatric/Behavioral: Negative for agitation and confusion  Physical Exam  Vitals signs reviewed  Constitutional:       General: He is not in acute distress  Appearance: He is well-developed  HENT:      Head: Normocephalic and atraumatic  Neck:      Musculoskeletal: Normal range of motion and neck supple  Thyroid: No thyromegaly  Vascular: No carotid bruit or JVD  Trachea: No tracheal deviation  Cardiovascular:      Rate and Rhythm: Normal rate  Rhythm irregular  Pulses: Normal pulses  Heart sounds: Murmur present  No friction rub  No gallop  Comments: Grade 1/6 late peaking systolic ejection murmur  Pulmonary:      Effort: Pulmonary effort is normal  No respiratory distress  Breath sounds: Wheezing, rhonchi and rales present  Comments: Course by basilar rales  Chest:      Chest wall: No tenderness  Abdominal:      General: Bowel sounds are normal  There is no distension  Palpations: Abdomen is soft  Tenderness: There is no abdominal tenderness  Musculoskeletal:      Right lower leg: Edema present  Left lower leg: Edema present  Comments: 4+ bilateral pretibial edema   Skin:     General: Skin is warm and dry  Neurological:      General: No focal deficit present  Mental Status: He is alert and oriented to person, place, and time  Gait: Gait normal    Psychiatric:         Mood and Affect: Mood normal          Behavior: Behavior normal          Thought Content:  Thought content normal          Judgment: Judgment normal          --------------------------------------------------------------------------------  ECHO:   Results for orders placed during the hospital encounter of 20   Echo complete with contrast if indicated    Narrative [address]  [address]  [place], [address]  [phone]    Transthoracic Echocardiogram  2D, M-mode, Doppler, and Color Doppler    Study date:  04-Sep-2020    Patient: Galina Rice  MR number: [id number]  Account number: [de-identified]  : 1935  Age: 81 years  Gender: Male  Status: Outpatient  Location: [place] [address]  Height: 67 in  Weight: 184 6 lb  BP: 110/ 72 mmHg    Indications: Aortic valve disease  Diagnoses: I35 9 - Nonrheumatic aortic valve disorder, unspecified    Sonographer:  Jessa Pierre RDCS  Primary Physician:  Arthur Daniels DO  Referring Physician:  Elton Sen DO  Group:  [id number]  Interpreting Physician:  Rico Winn MD    IMPRESSIONS:  Technical quality: Fair  Technically difficult study due to body habitus and poor echo penetration and lung interference  Cardiac rhythm: Sinus with bundle-branch block  1  Mild concentric left ventricular hypertrophy, normal left ventricular systolic function  EF around 55%  Suspected grade 1 diastolic dysfunction  2  Normal right ventricular size and systolic function  3  Normal left and right atrial cavity size  4  Heavily calcified aortic valve with moderate to severe aortic valve stenosis and mild aortic valve regurgitation  (Peak transaortic velocity is 3 23 m/sec, mean gradient is 28 mmHg and calculated aortic valve area is 0 9 cm2)  5  Mitral annular calcification, trace mitral valve regurgitation  Next 6  Trace tricuspid valve regurgitation  7  No obvious pulmonary hypertension  8  No pericardial effusion  9  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 up to 4 5 cm  Compared to report of previous echocardiogram from 2019 there appears to be some progression of aortic valve stenosis      SUMMARY    LEFT VENTRICLE:  Normal left ventricular cavity size, mild concentric left ventricular hypertrophy, normal left ventricular systolic function  Abnormal septal motion consistent with bundle branch block otherwise no regional wall motion abnormality noted  Ejection fraction is estimated as around 55%  Doppler evaluation suggests grade 1 diastolic dysfunction  Estimated left ventricular filling pressure is normal     RIGHT VENTRICLE:  Normal right ventricular size and systolic function  Right ventricular systolic pressure could not be estimated due to inadequate tricuspid regurgitation profile  LEFT ATRIUM:  Normal left atrial cavity size  Intact interatrial septum  RIGHT ATRIUM:  Normal right atrial cavity size  MITRAL VALVE:  Mitral annular calcification and sclerosis  Reduced mobility of posterior mitral valve leaflet  No mitral valve stenosis  Trace mitral valve regurgitation  AORTIC VALVE:  heavily calcified tricuspid aortic valve with a markedly reduced cuspal separation  There is moderate to severe aortic valve stenosis with mild aortic valve regurgitation  Peak transaortic velocity is 3 23 m/sec, mean gradient is 28 mmHg  and calculated aortic valve area is 0 9 cm2  Doppler velocity index is 0 26 suggesting significant aortic valve stenosis  TRICUSPID VALVE:  Trace tricuspid valve regurgitation  PULMONIC VALVE:  No pulmonic valve regurgitation  AORTA:  Dilated aortic root and proximal ascending aorta measuring up to 4 5 cm  Ascending thoracic aorta aneurysm  IVC, HEPATIC VEINS:  Inferior vena cava is not well visualized  PERICARDIUM:  No pericardial effusion  HISTORY: PRIOR HISTORY: COPD  Cancer  Right bundle branch block  Aortic stenosis  CHF  Pulmonary embolism  CKD 3  Former smoker  PROCEDURE: The study was performed in the [facility]  This was a routine study  The transthoracic approach was used  The study included complete 2D imaging, M-mode, complete spectral Doppler, and color Doppler   The heart rate was 89 bpm, at  the start of the study  Images were obtained from the parasternal, apical, subcostal, and suprasternal notch acoustic windows  Echocardiographic views were limited due to decreased penetration and lung interference  This was a technically  difficult study  LEFT VENTRICLE: Normal left ventricular cavity size, mild concentric left ventricular hypertrophy, normal left ventricular systolic function  Abnormal septal motion consistent with bundle branch block otherwise no regional wall motion  abnormality noted  Ejection fraction is estimated as around 55%  Doppler evaluation suggests grade 1 diastolic dysfunction  Estimated left ventricular filling pressure is normal     RIGHT VENTRICLE: Normal right ventricular size and systolic function  Right ventricular systolic pressure could not be estimated due to inadequate tricuspid regurgitation profile  LEFT ATRIUM: Normal left atrial cavity size  Intact interatrial septum  RIGHT ATRIUM: Normal right atrial cavity size  MITRAL VALVE: Mitral annular calcification and sclerosis  Reduced mobility of posterior mitral valve leaflet  No mitral valve stenosis  Trace mitral valve regurgitation  AORTIC VALVE: heavily calcified tricuspid aortic valve with a markedly reduced cuspal separation  There is moderate to severe aortic valve stenosis with mild aortic valve regurgitation  Peak transaortic velocity is 3 23 m/sec, mean  gradient is 28 mmHg and calculated aortic valve area is 0 9 cm2  Doppler velocity index is 0 26 suggesting significant aortic valve stenosis  TRICUSPID VALVE: Trace tricuspid valve regurgitation  PULMONIC VALVE: No pulmonic valve regurgitation  PERICARDIUM: No pericardial effusion  AORTA: Dilated aortic root and proximal ascending aorta measuring up to 4 5 cm  Ascending thoracic aorta aneurysm  SYSTEMIC VEINS: IVC: Inferior vena cava is not well visualized      SYSTEM MEASUREMENT TABLES    2D  %FS: 34 7 %  Ao Diam: 4 1 cm  EDV(Teich): 60 3 ml  EF(Teich): 64 7 %  ESV(Teich): 21 3 ml  IVSd: 1 2 cm  LA Area: 11 9 cm2  LA Diam: 3 7 cm  LVEDV MOD A4C: 53 5 ml  LVEF MOD A4C: 43 3 %  LVESV MOD A4C: 30 4 ml  LVIDd: 3 8 cm  LVIDs: 2 5 cm  LVLd A4C: 7 8 cm  LVLs A4C: 6 9 cm  LVOT Diam: 2 cm  LVPWd: 1 1 cm  RA Area: 12 1 cm2  RVIDd: 3 7 cm  SV MOD A4C: 23 2 ml  SV(Teich): 39 ml    CW  AR Dec Pender: 1 5 m/s2  AR Dec Time: 1820 1 ms  AR PHT: 527 8 ms  AR Vmax: 2 8 m/s  AR maxP 6 mmHg  AV Env  Ti: 256 4 ms  AV VTI: 65 2 cm  AV Vmax: 3 2 m/s  AV Vmean: 2 5 m/s  AV maxP 6 mmHg  AV meanP 6 mmHg    MM  TAPSE: 1 6 cm    PW  RADHAMES (VTI): 1 cm2  RADHAMES Vmax: 0 9 cm2  AVAI (VTI): 0 cm2/m2  AVAI Vmax: 0 cm2/m2  E': 0 1 m/s  E/E': 10 6  LVOT Env  Ti: 301 1 ms  LVOT VTI: 19 6 cm  LVOT Vmax: 0 9 m/s  LVOT Vmean: 0 7 m/s  LVOT maxP 9 mmHg  LVOT meanP 8 mmHg  LVSI Dopp: 32 7 ml/m2  LVSV Dopp: 64 1 ml  MV A Bertin: 0 9 m/s  MV Dec Pender: 5 m/s2  MV DecT: 169 9 ms  MV E Bertin: 0 8 m/s  MV E/A Ratio: 0 9  MV PHT: 49 3 ms  MVA By PHT: 4 5 cm2    [employer] Accredited [facility]    Prepared and electronically signed by    Penny Cuba MD  Signed 04-Sep-2020 18:44:49       ======================================================    Lab Results   Component Value Date    WBC 4 06 (L) 2021    HGB 8 3 (L) 2021    HCT 26 7 (L) 2021     (H) 2021    PLT 56 (L) 2021      Lab Results   Component Value Date    SODIUM 141 2021    K 3 8 2021     2021    CO2 32 2021    BUN 22 2021    CREATININE 1 22 2021    GLUC 84 2021    CALCIUM 8 8 2021      Lab Results   Component Value Date    HGBA1C 4 9 2020        Lab Results   Component Value Date    INR 1 13 2021    INR 0 98 2019    INR 0 89 2018    PROTIME 14 3 2021    PROTIME 13 1 2019    PROTIME 12 2 2018        Imaging:   I have personally reviewed pertinent reports

## 2021-06-03 NOTE — PHYSICAL THERAPY NOTE
PT EVALUATION    Pt  Name: Dilip Suh  Pt  Age: 81 y o    MRN: [id number]  LENGTH OF STAY: 2    Patient Active Problem List   Diagnosis    Chronic rhinitis    Adenocarcinoma, lung (HCC)    BPH (benign prostatic hyperplasia)    Multiple myeloma (HCC)    Chronic diarrhea    Centrilobular emphysema (HCC)    Right bundle branch block    Enlarged prostate without lower urinary tract symptoms (luts)    Osteoporosis    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HCC)    Severe aortic stenosis    Peripheral edema    Chronic anticoagulation    Venous insufficiency (chronic) (peripheral)    Rectal cancer (HCC)    Elevated PSA    Gross hematuria    Hypogammaglobulinemia (HCC)    Skin lesion    Port-A-Cath in place    HCAP (healthcare-associated pneumonia)    Chronic diastolic congestive heart failure (HCC)    CKD (chronic kidney disease) stage 3, GFR 30-59 ml/min (HCC)    Right flank pain    Nocturnal hypoxia    Blood in stool    Chronic respiratory failure (HCC)    Pancytopenia (HCC)    Personal history of malignant neoplasm of breast    Multiple myeloma in relapse (HCC)    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    Epistaxis    Thrombocytopenia (HCC)    Anemia       Admitting Diagnoses:   Dehydration [E86 0]  COPD (chronic obstructive pulmonary disease) (HCC) [J44 9]  Multiple myeloma (HCC) [C90 00]  Asthenia [R53 1]  Dyspnea [R06 00]  Anemia [D64 9]  Thrombocytopenia (HCC) [D69 6]  CKD (chronic kidney disease) [N18 9]  Severe aortic stenosis [I35 0]  Elevated lactic acid level [R79 89]  Weakness [R53 1]    Past Medical History:   Diagnosis Date    Cancer ([facility] )     Chronic pain disorder     3 fractured vertebrae    Congestive heart failure (CHF) (HCC)     COPD (chronic obstructive pulmonary disease) (HCC)     Diarrhea     Diverticulitis     Enlarged prostate     Kidney stone     [facility] )     [facility] 12/24/2018    Rectal adenocarcinoma (HCC)     Sleep apnea     no cpap Past Surgical History:   Procedure Laterality Date    CHOLECYSTECTOMY      COLONOSCOPY W/ ENDOSCOPIC US N/A 7/21/2017    Procedure: ANAL ENDOSCOPIC U/S;  Surgeon: Jesus Manuel Guerin MD;  [facility]; Service: Colorectal    HERNIA REPAIR      KIDNEY STONE SURGERY      LUNG LOBECTOMY Right     UT COLONOSCOPY FLX DX W/COLLJ SPEC WHEN PFRMD N/A 7/10/2017    Procedure: COLONOSCOPY;  Surgeon: Agatha Marshall MD;  [facility]: [facility]; Service: Gastroenterology    UT RECTAL TUMOR EXCISION, TRANSANAL ENDOSCOPIC MICROSURGICAL, FULL THICK N/A 11/2/2017    Procedure: TRANSANAL ENDOSCOPIC MICROSURGERY TEM;  Surgeon: Jesus Manuel Guerin MD;  [facility]: [facility];  Service: Colorectal       Imaging Studies:  XR chest 1 view portable   ED Interpretation by Jenifer Dunbar MD (06/01 7102)   Primary reviewed:No acute abnormality      Final Result by Allie Wong MD (06/01 5014)      Stable volume loss on the right with radiation fibrotic changes at the right apex  Persistent groundglass opacity at the left base attributed to pneumonia  Workstation performed: [id number]              06/03/21 1230   PT Last Visit   PT Visit Date 06/03/21   Note Type   Note type Evaluation   Pain Assessment   Pain Score No Pain   Home Living   Type of Home House   Home Layout Two level;Bed/bath upstairs;Stairs to enter with rails  (bilevel home; 3STE; 5steps in between levels)   Bathroom Shower/Tub Tub/shower unit   Bathroom Toilet Standard   Bathroom Equipment Grab bars in shower; Shower chair;Grab bars around toilet   Home Equipment Cane;Walker   Additional Comments O2 at night   Prior Function   Level of [place] Independent with ADLs and functional mobility  (w/o AD household amb; w/ SPC in the community)   Lives With [address] in the last 6 months 0   Comments (+)    Restrictions/Precautions   Weight Bearing Precautions Per Order No Other Precautions Multiple lines;Telemetry;O2;Fall Risk  (2L O2 NC)   General   Family/Caregiver Present No   Cognition   Overall Cognitive Status WFL   Arousal/Participation Alert   Orientation Level Oriented to person;Oriented to place;Oriented to time;Oriented to situation   Following Commands Follows one step commands without difficulty   RUE Assessment   RUE Assessment WFL  (4/5 grossly)   LUE Assessment   LUE Assessment WFL  (4/5 grossly)   RLE Assessment   RLE Assessment WFL  (4/5 grossly; (+) swelling)   LLE Assessment   LLE Assessment WFL  (4/5 grossly; (+) swelling + redness)   Coordination   Movements are Fluid and Coordinated 1   Sensation WFL   Bed Mobility   Supine to Sit 4  Minimal assistance   Additional items Assist x 1;HOB elevated; Bedrails; Increased time required;Verbal cues;LE management   Additional Comments cues for techniques & safety   Transfers   Sit to Stand 4  Minimal assistance   Additional items Assist x 1;HOB elevated; Bedrails; Increased time required;Verbal cues   Stand to Sit 4  Minimal assistance   Additional items Assist x 1; Armrests; Increased time required;Verbal cues   Additional Comments cues for techniques & safety   Ambulation/Elevation   Gait pattern Wide PORSHA; Decreased foot clearance; Forward Flexion; Short stride; Excessively slow   Gait Assistance 4  Minimal assist   Additional items Assist x 1;Verbal cues; Tactile cues   Assistive Device None   Distance 3'x1    Balance   Static Sitting Fair +   Dynamic Sitting Fair   Static Standing Fair -   Dynamic Standing Poor +   Ambulatory Poor +   Endurance Deficit   Endurance Deficit Yes   Endurance Deficit Description SOB & weakness   Activity Tolerance   Activity Tolerance Patient limited by fatigue;Treatment limited secondary to medical complications (Comment)   Nurse Made Aware NORMA Batista   Assessment   Prognosis Good   Problem List Decreased strength;Decreased endurance; Impaired balance;Decreased mobility; Decreased skin integrity Assessment Pt  85 y o male w/ multiple myeloma presented w/ progressive weakness since last week after his chemotherapy infusion  Pt admitted for HCAP (healthcare-associated pneumonia) w/ asthenia, thrombocytopenia, anemia & acute on chronic heart failure   Pt referred to PT for mobility assessment & D/C planning w/ orders of activity as tolerated  PTA, pt reports being I w/o AD household amb & w/ SPC for community distances  On eval, pt demonstrate dec mobility, balance, endurance & amb  Pt require minAx1 for most functional mobility + cues for techniques  Gait deviations as above, slow w/ dec foot clearance & strides but no gross LOB noted  (+) SOB & wheezing noted at rest & after amb  Noted SpO2 89% at RA hence requested RN to give pt O2  RN agreed & gave pt 2L O2 NC  SpO2 improved to 95-96% w/ 2L O2 w/ improvement of symptoms reported by pt  Nsg staff most recent vital signs as follows: BP 90/58 (BP Location: Right arm)   Pulse (!) 126   Temp 100 3 °F (37 9 °C) (Temporal)   Resp 22   Ht 5' 8" (1 727 m)   Wt 83 kg (182 lb 15 7 oz)   SpO2 93%   BMI 27 82 kg/m²   At end of session, pt OOB in chair in stable condition, call bell & phone in reach, all lines intact  Pt could benefit from chair alarm for safety however chair alarm pads unavailable in unit at this time  RN aware  Fall precautions reinforced w/ good understanding  Pt functioning below baseline hence will continue skilled PT to improve function & safety  The patient's AM-PAC Basic Mobility Inpatient Short Form Raw Score is 16, Standardized Score is 38 32  A standardized score less than 42 9 suggests the patient may benefit from discharge to post-acute rehabilitation services  From PT standpoint, pt could benefit from STR or HHPT pending progress  CM to follow  Nsg staff to continue to mobilized pt (OOB in chair for all meals & ambulate in room/unit) as tolerated to prevent further decline in function  Nsg notified      Barriers to Discharge Inaccessible home environment   Barriers to Discharge Comments (+) stairs   Goals   Patient Goals to get better   STG Expiration Date 06/13/21   Short Term Goal #1 Goals to be met in 10 days; pt will be able to: 1) inc strength & balance by 1/2 grade to improve overall functional mobility & dec fall risk; 2) inc bed mobility to modified I for pt to be able to get in/OOB safely w/ proper techniques 100% of the time, to dec caregiver burden & safely function at home; 3) inc transfers to S for pt to transition safely from one surface to another w/o % of the time, to dec caregiver burden & safely function at home; 4) inc amb w/ appropriate AD approx  >80' w/ S for pt to ambulate household distances w/o any % of the time, to dec caregiver burden & safely function at home; 5) negotiate stairs w/ S for inc safety during stair mgt inside/outside of home & dec caregiver burden; 6) pt/caregiver ed   PT Treatment Day 0   Plan   Treatment/Interventions Functional transfer training;LE strengthening/ROM; Elevations; Therapeutic exercise; Endurance training;Patient/family training;Bed mobility;Gait training;Spoke to nursing   PT Frequency Other (Comment)  (3-5x/wk)   Recommendation   PT Discharge Recommendation   (HHPT vs STR pending progress)   Equipment Recommended Walker;Cane  (trial next tx session)   Walker Package Recommended   (pt has DME at home)   [address]   Turning in Bed Without Bedrails 3   Lying on Back to Sitting on Edge of Flat Bed 3   Moving Bed to Chair 3   Standing Up From Chair 3   Walk in Room 2   Climb 3-5 Stairs 2   Basic Mobility Inpatient Raw Score 16   Basic Mobility Standardized Score 38 32   Hx/personal factors: co-morbidities, inaccessible home, advanced age, mutliple lines, telemetry, use of AD, fall risk, assist w/ ADL's and O2  Examination: dec mobility, dec balance, dec endurance, dec amb, risk for falls, AM-PAC score 16  Clinical: unpredictable (ongoing medical status, abnormal lab values and risk for falls)  Complexity: high     Gerard Peper, PT

## 2021-06-03 NOTE — PROGRESS NOTES
[address] [facility]  Progress Note Lenora Porras 1935, 81 y o  male MRN: [id number]  Unit/Bed#: E4 -01 Encounter: [id number]  Primary Care Provider: Suhas Robin DO   Date and time admitted to hospital: 6/1/2021  2:06 PM    * HCAP (healthcare-associated pneumonia)  Assessment & Plan  · Possible gram negative pneumonia in the setting of immunocompromised with multiple myeloma and ongoing chemotherapy with persistent left lower lung infiltrate    · In addition he has had persistent cough with sputum production  · Continue to treat as Hcap with IV cefepime  · Due to History of C diff in 2018,  added oral vancomycin for prophylaxis    Severe aortic stenosis  Assessment & Plan  · Patient is noted to have significant peripheral edema and hypoalbuminemia on top of severe AS  · Start IV Lasix infusion  · IV albumin x1 today to allow for diuresis  · Cardiology consultation appreciated  · Given his comorbidities, he is not an ideal surgical candidate  · Monitor I/O and daily weights    Anemia  Assessment & Plan  · Hemoglobin of 9 1 on admission  · 8 3 today  · Will transfuse if hemoglobin is less than 7 g/dL  · Monitor CBC daily     Thrombocytopenia (HCC)  Assessment & Plan  · Due to Myeloma and chemotherapy  · plt of 23 on admission with epistaxis and reported skin bleeding at home  · Patient s/p transfuse with platelets 6/1  · Platelets of 54 6/2, 56 6/3    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  Assessment & Plan  · Multifactorial due to multiple myeloma with ongoing chemotherapy, severe AS, peripheral edema, emphysema, advanced age and deconditioning    Chronic anticoagulation  Assessment & Plan  · Hold Eliquis temporarily due to critically low platelet of 23 and bleeding (skin/epistaxis)  · Monitor CBC    Centrilobular emphysema (HCC)  Assessment & Plan  · Continue updraft nebulizations  · Add oxygen as needed  · He appears to be chronically steroid dependent on prednisone 10 mg daily    Multiple myeloma [facility])  Assessment & Plan  · With recent chemotherapy on 2021  · Outpatient follow-up with oncology  VTE Pharmacologic Prophylaxis:   Pharmacologic: Pharmacologic VTE Prophylaxis contraindicated due to thrombocytopenia  Mechanical VTE Prophylaxis in Place: Yes    Patient Centered Rounds: I have performed bedside rounds with nursing staff today  Discussions with Specialists or Other Care Team Provider: CM, nursing, cardiology    Education and Discussions with Family / Patient: patient, called daughter Madeline Pierre    Time Spent for Care: 30 minutes  More than 50% of total time spent on counseling and coordination of care as described above  Current Length of Stay: 2 day(s)    Current Patient Status: Inpatient   Certification Statement: The patient will continue to require additional inpatient hospital stay due to IV Lasix infusion    Discharge Plan: pending    Code Status: Level 3 - DNAR and DNI      Subjective:   Patient examined at bedside reporting shortness of breath  Bilateral lower extremity edema reportedly has been going on for quite some time however acutely worsened  Questionable compliance with medications at home  Denies any chest pain, palpitations, lightheadedness, dizziness  Objective:     Vitals:   Temp (24hrs), Av 8 °F (37 1 °C), Min:97 9 °F (36 6 °C), Max:100 3 °F (37 9 °C)    Temp:  [97 9 °F (36 6 °C)-100 3 °F (37 9 °C)] 100 3 °F (37 9 °C)  HR:  [] 126  Resp:  [18-22] 22  BP: ()/(55-60) 90/58  SpO2:  [91 %-100 %] 98 %  Body mass index is 27 82 kg/m²  Input and Output Summary (last 24 hours): Intake/Output Summary (Last 24 hours) at 6/3/2021 1133  Last data filed at 6/3/2021 1039  Gross per 24 hour   Intake --   Output 1550 ml   Net -1550 ml       Physical Exam:     Physical Exam  Vitals signs and nursing note reviewed  Constitutional:       General: He is not in acute distress  Appearance: Normal appearance  He is ill-appearing     HENT: Head: Normocephalic  Mouth/Throat:      Mouth: Mucous membranes are moist    Eyes:      Pupils: Pupils are equal, round, and reactive to light  Neck:      Musculoskeletal: Normal range of motion  Cardiovascular:      Rate and Rhythm: Normal rate and regular rhythm  Heart sounds: Murmur present  Pulmonary:      Effort: Pulmonary effort is normal  No respiratory distress  Breath sounds: Rales present  No wheezing or rhonchi  Abdominal:      General: Bowel sounds are normal  There is no distension  Palpations: Abdomen is soft  Tenderness: There is no abdominal tenderness  There is no guarding  Musculoskeletal:         General: No deformity  Right lower leg: Edema (4+) present  Left lower leg: Edema (4+) present  Skin:     Capillary Refill: Capillary refill takes less than 2 seconds  Neurological:      General: No focal deficit present  Mental Status: He is alert and oriented to person, place, and time  Mental status is at baseline  Additional Data:     Labs:    Results from last 7 days   Lab Units 06/03/21  0508  06/01/21  1429   WBC Thousand/uL 4 06*   < > 6 38   HEMOGLOBIN g/dL 8 3*   < > 9 1*   HEMATOCRIT % 26 7*   < > 29 1*   PLATELETS Thousands/uL 56*   < > 23*   BANDS PCT %  --   --  6   NEUTROS PCT % 86*  --   --    LYMPHS PCT % 3*  --   --    LYMPHO PCT %  --   --  2*   MONOS PCT % 3*  --   --    MONO PCT %  --   --  0*   EOS PCT % 1  --  1    < > = values in this interval not displayed       Results from last 7 days   Lab Units 06/03/21  0508  06/01/21  1429   SODIUM mmol/L 141   < > 140   POTASSIUM mmol/L 3 8   < > 4 2   CHLORIDE mmol/L 103   < > 101   CO2 mmol/L 32   < > 30   BUN mg/dL 22   < > 26*   CREATININE mg/dL 1 22   < > 1 41*   ANION GAP mmol/L 6   < > 9   CALCIUM mg/dL 8 8   < > 8 2*   ALBUMIN g/dL  --   --  2 2*   TOTAL BILIRUBIN mg/dL  --   --  1 92*   ALK PHOS U/L  --   --  74   ALT U/L  --   --  49   AST U/L  --   --  28   GLUCOSE RANDOM mg/dL 84   < > 109    < > = values in this interval not displayed  Results from last 7 days   Lab Units 06/02/21  0531 06/01/21  1630 06/01/21  1429   LACTIC ACID mmol/L  --  1 7 2 6*   PROCALCITONIN ng/ml 0 20  --   --            * I Have Reviewed All Lab Data Listed Above  * Additional Pertinent Lab Tests Reviewed: All Labs Within Last 24 Hours Reviewed    Imaging:    Imaging Reports Reviewed Today Include: none  Imaging Personally Reviewed by Myself Includes:  none    Recent Cultures (last 7 days):     Results from last 7 days   Lab Units 06/01/21  1429   BLOOD CULTURE  No Growth at 24 hrs  No Growth at 24 hrs  Last 24 Hours Medication List:   Current Facility-Administered Medications   Medication Dose Route Frequency Provider Last Rate    albumin human  25 g Intravenous Once Tai Salazar PA-C      albuterol  2 puff Inhalation Q6H PRN Verena Treadwell MD      albuterol  2 5 mg Nebulization Q6H PRN Verena Treadwell MD      cefepime  2,000 mg Intravenous Q12H Verena Treadwell MD 2,000 mg (06/03/21 0610)    famotidine  20 mg Oral Daily Verena Treadwell MD      finasteride  5 mg Oral HS Verena Treadwell MD      furosemide  5 mg/hr Intravenous Continuous Zana Ahmadi MD 5 mg/hr (06/03/21 1039)    ipratropium  0 5 mg Nebulization 4x Daily Verena Treadwell MD      potassium chloride  20 mEq Oral BID aZna Ahmadi MD      predniSONE  10 mg Oral Daily Verena Treadwell MD      vancomycin  125 mg Oral Q12H [facility] Verean Treadwell MD          Today, Patient Was Seen By: Ivory Allison PA-C    ** Please Note: Dictation voice to text software may have been used in the creation of this document   **

## 2021-06-03 NOTE — ASSESSMENT & PLAN NOTE
· Patient is noted to have significant peripheral edema and hypoalbuminemia on top of severe AS  · Start IV Lasix infusion  · IV albumin x1 today to allow for diuresis  · Cardiology consultation appreciated  · Given his comorbidities, he is not an ideal surgical candidate  · Monitor I/O and daily weights

## 2021-06-03 NOTE — ASSESSMENT & PLAN NOTE
· Due to Myeloma and chemotherapy  · plt of 23 on admission with epistaxis and reported skin bleeding at home  · Patient s/p transfuse with platelets 6/1  · Platelets of 54 6/2, 56 6/3

## 2021-06-04 PROBLEM — I95.0 IDIOPATHIC HYPOTENSION: Status: ACTIVE | Noted: 2020-01-06

## 2021-06-04 NOTE — PLAN OF CARE
Problem: Potential for Falls  Goal: Patient will remain free of falls  Description: INTERVENTIONS:  - Assess patient frequently for physical needs  -  Identify cognitive and physical deficits and behaviors that affect risk of falls    -  [place] fall precautions as indicated by assessment   - Educate patient/family on patient safety including physical limitations  - Instruct patient to call for assistance with activity based on assessment  - Modify environment to reduce risk of injury  - Consider OT/PT consult to assist with strengthening/mobility  Outcome: Progressing     Problem: Prexisting or High Potential for Compromised Skin Integrity  Goal: Skin integrity is maintained or improved  Description: INTERVENTIONS:  - Identify patients at risk for skin breakdown  - Assess and monitor skin integrity  - Assess and monitor nutrition and hydration status  - Monitor labs   - Assess for incontinence   - Turn and reposition patient  - Assist with mobility/ambulation  - Relieve pressure over bony prominences  - Avoid friction and shearing  - Provide appropriate hygiene as needed including keeping skin clean and dry  - Evaluate need for skin moisturizer/barrier cream  - Collaborate with interdisciplinary team   - Patient/family teaching  - Consider wound care consult   Outcome: Progressing     Problem: PAIN - ADULT  Goal: Verbalizes/displays adequate comfort level or baseline comfort level  Description: Interventions:  - Encourage patient to monitor pain and request assistance  - Assess pain using appropriate pain scale  - Administer analgesics based on type and severity of pain and evaluate response  - Implement non-pharmacological measures as appropriate and evaluate response  - Consider cultural and social influences on pain and pain management  - Notify physician/advanced practitioner if interventions unsuccessful or patient reports new pain  Outcome: Progressing     Problem: INFECTION - ADULT  Goal: Absence or prevention of progression during hospitalization  Description: INTERVENTIONS:  - Assess and monitor for signs and symptoms of infection  - Monitor lab/diagnostic results  - Monitor all insertion sites, i e  indwelling lines, tubes, and drains  - Monitor endotracheal if appropriate and nasal secretions for changes in amount and color  - [place] appropriate cooling/warming therapies per order  - Administer medications as ordered  - Instruct and encourage patient and family to use good hand hygiene technique  - Identify and instruct in appropriate isolation precautions for identified infection/condition  Outcome: Progressing  Goal: Absence of fever/infection during neutropenic period  Description: INTERVENTIONS:  - Monitor WBC    Outcome: Progressing     Problem: SAFETY ADULT  Goal: Patient will remain free of falls  Description: INTERVENTIONS:  - Assess patient frequently for physical needs  -  Identify cognitive and physical deficits and behaviors that affect risk of falls    -  [place] fall precautions as indicated by assessment   - Educate patient/family on patient safety including physical limitations  - Instruct patient to call for assistance with activity based on assessment  - Modify environment to reduce risk of injury  - Consider OT/PT consult to assist with strengthening/mobility  Outcome: Progressing  Goal: Maintain or return to baseline ADL function  Description: INTERVENTIONS:  -  Assess patient's ability to carry out ADLs; assess patient's baseline for ADL function and identify physical deficits which impact ability to perform ADLs (bathing, care of mouth/teeth, toileting, grooming, dressing, etc )  - Assess/evaluate cause of self-care deficits   - Assess range of motion  - Assess patient's mobility; develop plan if impaired  - Assess patient's need for assistive devices and provide as appropriate  - Encourage maximum independence but intervene and supervise when necessary  - Involve family in performance of ADLs  - Assess for home care needs following discharge   - Consider OT consult to assist with ADL evaluation and planning for discharge  - Provide patient education as appropriate  Outcome: Progressing  Goal: Maintain or return mobility status to optimal level  Description: INTERVENTIONS:  - Assess patient's baseline mobility status (ambulation, transfers, stairs, etc )    - Identify cognitive and physical deficits and behaviors that affect mobility  - Identify mobility aids required to assist with transfers and/or ambulation (gait belt, sit-to-stand, lift, walker, cane, etc )  - [place] fall precautions as indicated by assessment  - Record patient progress and toleration of activity level on Mobility SBAR; progress patient to next Phase/Stage  - Instruct patient to call for assistance with activity based on assessment  - Consider rehabilitation consult to assist with strengthening/weightbearing, etc   Outcome: Progressing     Problem: DISCHARGE PLANNING  Goal: Discharge to home or other facility with appropriate resources  Description: INTERVENTIONS:  - Identify barriers to discharge w/patient and caregiver  - Arrange for needed discharge resources and transportation as appropriate  - Identify discharge learning needs (meds, wound care, etc )  - Arrange for interpretive services to assist at discharge as needed  - Refer to Case Management Department for coordinating discharge planning if the patient needs post-hospital services based on physician/advanced practitioner order or complex needs related to functional status, cognitive ability, or social support system  Outcome: Progressing     Problem: Knowledge Deficit  Goal: Patient/family/caregiver demonstrates understanding of disease process, treatment plan, medications, and discharge instructions  Description: Complete learning assessment and assess knowledge base    Interventions:  - Provide teaching at level of understanding  - Provide teaching via preferred learning methods  Outcome: Progressing     Problem: CARDIOVASCULAR - ADULT  Goal: Maintains optimal cardiac output and hemodynamic stability  Description: INTERVENTIONS:  - Monitor I/O, vital signs and rhythm  - Monitor for S/S and trends of decreased cardiac output  - Administer and titrate ordered vasoactive medications to optimize hemodynamic stability  - Assess quality of pulses, skin color and temperature  - Assess for signs of decreased coronary artery perfusion  - Instruct patient to report change in severity of symptoms  Outcome: Progressing  Goal: Absence of cardiac dysrhythmias or at baseline rhythm  Description: INTERVENTIONS:  - Continuous cardiac monitoring, vital signs, obtain 12 lead EKG if ordered  - Administer antiarrhythmic and heart rate control medications as ordered  - Monitor electrolytes and administer replacement therapy as ordered  Outcome: Progressing     Problem: RESPIRATORY - ADULT  Goal: Achieves optimal ventilation and oxygenation  Description: INTERVENTIONS:  - Assess for changes in respiratory status  - Assess for changes in mentation and behavior  - Position to facilitate oxygenation and minimize respiratory effort  - Oxygen administered by appropriate delivery if ordered  - Initiate smoking cessation education as indicated  - Encourage broncho-pulmonary hygiene including cough, deep breathe, Incentive Spirometry  - Assess the need for suctioning and aspirate as needed  - Assess and instruct to report SOB or any respiratory difficulty  - Respiratory Therapy support as indicated  Outcome: Progressing

## 2021-06-04 NOTE — ASSESSMENT & PLAN NOTE
· Updraft nebulizer treatments as needed  · Supplemental oxygen as needed  · Continuous cardiopulmonary monitoring  · Patient transferred to the ICU

## 2021-06-04 NOTE — CONSULTS
Consultation - Nephrology   Perri Jones 81 y o  male MRN: [id number]  Unit/Bed#: ICU 06 Encounter: [id number]    ASSESSMENT AND PLAN:  Patient is a 81-year-old male with significant medical issues of multiple myeloma on chemo, AFib, CHF, CKD, severe aortic stenosis, presented with generalized weakness, worsening shortness of breath, leg swelling  We are consulted for CKD management  CKD stage 3, baseline creatinine 1 1 to 1 2 with occasional creatinine up to 1 3  -creatinine slightly increased up to 1 4 with aggressive diuresis, severe hypotension now remains on Levophed, cardiorenal  -excellent urine output over last 24 hours with Lasix drip   -UA bland without hematuria or proteinuria this admission  -recent CT scan shows no hydronephrosis  -BMP in a m , avoid nephrotoxins or NSAIDs  CHF, echo this admission shows EF 03%, grade 1 diastolic dysfunction, severe aortic stenosis  -clinically seems mildly hypervolemic   -currently remains on 3 L O2 via nasal cannula, check daily weight, accurate intake and output  Remains negative balance over last 24 hours   -has been on Lasix drip although this was discontinued today a m  When patient had rapid response due to severe hypotension   -consider Lasix 80 mg IV one dose later today if urine output dropping  Severe aortic stenosis, further management as per Cardiology  Multiple myeloma, has been on chemo, Hematology follow-up  Hypokalemia, serum potassium 3 3 below goal in the setting of aggressive diuresis  Receiving total 40 mEq potassium chloride once today   -continue to closely monitor, BMP in a m  Suspected pneumonia, on antibiotic as per primary team     Severe hypotension/shock in the setting of suspected pneumonia, severe aortic stenosis  -currently remains on Levophed  -also on midodrine 5 mg p o  T i d  Started today  Anemia in the setting of CKD, multiple myeloma, recent iron saturation 17%, closely monitor hemoglobin, transfuse p r n   For hemoglobin less than seven  Discussed above plan in detail with ICU team     HISTORY OF PRESENT ILLNESS:  Requesting Physician: Kimmy Joseph, *  Reason for Consult:  CKD    Rajan Delgado is a 81y o  year old male who was admitted to [facility] after presenting with generalized weakness, worsening shortness of breath, leg edema  A renal consultation is requested today for assistance in the management of CKD  Old medical records including prior creatinine values were reviewed from [address] [place] [address]  Patient's baseline serum creatinine seems to be 1 1 to 1 2 with occasional creatinine has been up to 1 3  Presented with coughing, shortness of breath, leg edema, generalized weakness and was found to have suspected pneumonia  Currently remains on antibiotic as per primary team   Also was found to have hypervolemia and was started on Lasix drip with excellent urine output response  At the time of my encounter his leg edema seems to be improved  Still has coughing although shortness of breath seems to be slightly better  Currently remains on 3 L O2 via nasal cannula  No recent NSAID exposure  Denies any urinary complaint  PAST MEDICAL HISTORY:  Past Medical History:   Diagnosis Date    Cancer [facility])     Chronic pain disorder     3 fractured vertebrae    Congestive heart failure (CHF) (HCC)     COPD (chronic obstructive pulmonary disease) (HCC)     Diarrhea     Diverticulitis     Enlarged prostate     Kidney stone     [facility]     [facility] 12/24/2018    Rectal adenocarcinoma [facility] )     Sleep apnea     no cpap       PAST SURGICAL HISTORY:  Past Surgical History:   Procedure Laterality Date    CHOLECYSTECTOMY      COLONOSCOPY W/ ENDOSCOPIC US N/A 7/21/2017    Procedure: ANAL ENDOSCOPIC U/S;  Surgeon: Shlomo Garcia MD;  [facility];   Service: Colorectal    HERNIA REPAIR      KIDNEY STONE SURGERY      LUNG LOBECTOMY Right     IL COLONOSCOPY FLX DX W/COLLJ SPEC WHEN PFRMD N/A 7/10/2017    Procedure: COLONOSCOPY;  Surgeon: Shyla Saleh MD;  [facility];   Service: Gastroenterology    ID RECTAL TUMOR EXCISION, TRANSANAL ENDOSCOPIC MICROSURGICAL, FULL THICK N/A 11/2/2017    Procedure: TRANSANAL ENDOSCOPIC MICROSURGERY TEM;  Surgeon: Lilly Amador MD;  [facility];  Service: Colorectal       ALLERGIES:  Allergies   Allergen Reactions    Gabapentin      somnolence    Metaxalone     Nisoldipine     Sulfa Antibiotics Itching    Aldactone [Spironolactone] Other (See Comments)     Breast swelling and pain  Breast swelling and pain    Penicillin V Rash     AS CHILD       SOCIAL HISTORY:  Social History     Substance and Sexual Activity   Alcohol Use Yes    Alcohol/week: 1 0 standard drinks    Types: 1 Standard drinks or equivalent per week    Frequency: Monthly or less    Drinks per session: 1 or 2    Comment: rarely     Social History     Substance and Sexual Activity   Drug Use Never     Social History     Tobacco Use   Smoking Status Former Smoker    Packs/day: 1 00    Years: 50 00    Pack years: 50 00    Types: Cigarettes    Start date: 57    Quit date: 12/7/2010    Years since quitting: 10 4   Smokeless Tobacco Never Used   Tobacco Comment    quit in 1999       FAMILY HISTORY:  Family History   Problem Relation Age of Onset    Arthritis Mother     Colon cancer Father     Heart attack Father     Diabetes type I Sister        MEDICATIONS:    Current Facility-Administered Medications:     acetaminophen (TYLENOL) tablet 650 mg, 650 mg, Oral, Q6H PRN, Imagene Gemma, CRNP, 650 mg at 06/04/21 0657    albumin human (FLEXBUMIN) 5 % injection 50 g, 50 g, Intravenous, TID, Imagene Gemma, CRNP, 50 g at 06/04/21 1011    albuterol (PROVENTIL HFA,VENTOLIN HFA) inhaler 2 puff, 2 puff, Inhalation, Q6H PRN, ImageBEE RegaladoNP    albuterol inhalation solution 2 5 mg, 2 5 mg, Nebulization, Q6H PRN, JACK Marley   cefepime (MAXIPIME) 2 g/50 mL dextrose IVPB, 2,000 mg, Intravenous, Q12H, Duayne Olayinka, CRNP, Last Rate: 100 mL/hr at 06/04/21 0628, 2,000 mg at 06/04/21 4874    famotidine (PEPCID) tablet 20 mg, 20 mg, Oral, Daily, Duayne Olayinka, CRNP, 20 mg at 06/04/21 5979    finasteride (PROSCAR) tablet 5 mg, 5 mg, Oral, HS, Duayne Olayinka, CRNP, 5 mg at 06/03/21 2129    heparin (porcine) subcutaneous injection 5,000 Units, 5,000 Units, Subcutaneous, Q8H [facility] **AND** Platelet count, , , Once, Duayne Olayinka, CRNP    ipratropium (ATROVENT) 0 02 % inhalation solution 0 5 mg, 0 5 mg, Nebulization, 4x Daily, Duayne Olayinka, CRNP, 0 5 mg at 06/04/21 1139    midodrine (PROAMATINE) tablet 5 mg, 5 mg, Oral, TID AC, Duayne Olayinka, CRNP, 5 mg at 06/04/21 1013    NOREPINEPHRINE 4 MG  ML NSS (CMPD ORDER) infusion, 1-30 mcg/min, Intravenous, Titrated, Duayne Olayinka, CRNP, Last Rate: 3 8 mL/hr at 06/04/21 1116, 1 mcg/min at 06/04/21 1116    potassium chloride (K-DUR,KLOR-CON) CR tablet 20 mEq, 20 mEq, Oral, BID, Duayne Olayinka, CRNP, 20 mEq at 06/04/21 8317    predniSONE tablet 10 mg, 10 mg, Oral, Daily, Duayne Olayinka, CRNP, 10 mg at 06/04/21 0853    vancomycin (VANCOCIN) oral solution 125 mg, 125 mg, Oral, Q12H [facility], Duayne Olayinka, CRNP, 125 mg at 06/04/21 7348    REVIEW OF SYSTEMS:  Complete 10 point review of systems were obtained and discussed in length with the patient  Complete review of systems were negative / unremarkable except mentioned in the HPI section       PHYSICAL EXAM:  Current Weight: Weight - Scale: 83 kg (182 lb 15 7 oz)  First Weight: Weight - Scale: 83 kg (182 lb 15 7 oz)  Vitals:    06/04/21 1150   BP: 100/58   Pulse: 102   Resp: (!) 40   Temp:    SpO2: 99%       Intake/Output Summary (Last 24 hours) at 6/4/2021 1318  Last data filed at 6/4/2021 1201  Gross per 24 hour   Intake 2172 85 ml   Output 4854 ml   Net -2681 15 ml     Wt Readings from Last 3 Encounters:   06/02/21 83 kg (182 lb 15 7 oz) 05/26/21 83 3 kg (183 lb 12 1 oz)   05/21/21 81 7 kg (180 lb 3 2 oz)     Temp Readings from Last 3 Encounters:   06/04/21 99 °F (37 2 °C) (Temporal)   05/26/21 98 3 °F (36 8 °C) (Temporal)   05/19/21 97 9 °F (36 6 °C) (Temporal)     BP Readings from Last 3 Encounters:   06/04/21 100/58   05/26/21 101/61   05/21/21 102/65     Pulse Readings from Last 3 Encounters:   06/04/21 102   05/26/21 (!) 118   05/21/21 88        Physical Examination:  General:  Lying in bed, no acute distress  Eyes:  Mild conjunctival pallor present  ENT:  External examination of ears and nose unremarkable  Neck:  No obvious lymphadenopathy appreciated  Respiratory:  Bilateral coarse breath sound present, expiratory rhonchi present  CVS:  S1, S2 present  GI:  Soft, nondistended nontender  CNS:  Active alert oriented x3  Extremities:  2+ edema in both legs  Psych: , oriented, conscious, coherent  Skin:  No new rash in legs    Invasive Devices:      Lab Results:   Results from last 7 days   Lab Units 06/04/21  0758 06/03/21  0508 06/02/21  1343 06/02/21  0531 06/01/21  1429   WBC Thousand/uL 3 58* 4 06*  --  3 41* 6 38   HEMOGLOBIN g/dL 7 6* 8 3* 7 9* 7 2* 9 1*   HEMATOCRIT % 23 9* 26 7* 25 5* 22 7* 29 1*   PLATELETS Thousands/uL 51*  51* 56*  --  54* 23*   POTASSIUM mmol/L 3 3* 3 8  --  4 1 4 2   CHLORIDE mmol/L 99* 103  --  104 101   CO2 mmol/L 35* 32  --  29 30   BUN mg/dL 21 22  --  24 26*   CREATININE mg/dL 1 46* 1 22  --  1 15 1 41*   CALCIUM mg/dL 8 5 8 8  --  7 7* 8 2*       Other Studies:   XR chest 1 view portable   ED Interpretation by Ruiz Swanson MD (06/01 4214)   Primary reviewed:No acute abnormality      Final Result by Quincy Rincon MD (06/01 3459)      Stable volume loss on the right with radiation fibrotic changes at the right apex  Persistent groundglass opacity at the left base attributed to pneumonia                    Workstation performed: [id number]          chest x-ray images personally reviewed which shows ground-glass opacity at the left lung base  Portions of the record may have been created with voice recognition software  Occasional wrong word or "sound a like" substitutions may have occurred due to the inherent limitations of voice recognition software  Read the chart carefully and recognize, using context, where substitutions have occurred

## 2021-06-04 NOTE — ASSESSMENT & PLAN NOTE
· Treating now with IV cefepime  · Monitor vital signs  · Monitor CBC, white count  · Transferred to the intensive care unit

## 2021-06-04 NOTE — ASSESSMENT & PLAN NOTE
· Cardiology following  · Continuous cardiopulmonary monitoring  · Maintain systolic blood pressure greater than 130

## 2021-06-04 NOTE — PLAN OF CARE
Problem: Potential for Falls  Goal: Patient will remain free of falls  Description: INTERVENTIONS:  - Assess patient frequently for physical needs  -  Identify cognitive and physical deficits and behaviors that affect risk of falls    -  [place] fall precautions as indicated by assessment   - Educate patient/family on patient safety including physical limitations  - Instruct patient to call for assistance with activity based on assessment  - Modify environment to reduce risk of injury  - Consider OT/PT consult to assist with strengthening/mobility  Outcome: Progressing     Problem: Prexisting or High Potential for Compromised Skin Integrity  Goal: Skin integrity is maintained or improved  Description: INTERVENTIONS:  - Identify patients at risk for skin breakdown  - Assess and monitor skin integrity  - Assess and monitor nutrition and hydration status  - Monitor labs   - Assess for incontinence   - Turn and reposition patient  - Assist with mobility/ambulation  - Relieve pressure over bony prominences  - Avoid friction and shearing  - Provide appropriate hygiene as needed including keeping skin clean and dry  - Evaluate need for skin moisturizer/barrier cream  - Collaborate with interdisciplinary team   - Patient/family teaching  - Consider wound care consult   Outcome: Progressing     Problem: PAIN - ADULT  Goal: Verbalizes/displays adequate comfort level or baseline comfort level  Description: Interventions:  - Encourage patient to monitor pain and request assistance  - Assess pain using appropriate pain scale  - Administer analgesics based on type and severity of pain and evaluate response  - Implement non-pharmacological measures as appropriate and evaluate response  - Consider cultural and social influences on pain and pain management  - Notify physician/advanced practitioner if interventions unsuccessful or patient reports new pain  Outcome: Progressing     Problem: INFECTION - ADULT  Goal: Absence or prevention of progression during hospitalization  Description: INTERVENTIONS:  - Assess and monitor for signs and symptoms of infection  - Monitor lab/diagnostic results  - Monitor all insertion sites, i e  indwelling lines, tubes, and drains  - Monitor endotracheal if appropriate and nasal secretions for changes in amount and color  - [place] appropriate cooling/warming therapies per order  - Administer medications as ordered  - Instruct and encourage patient and family to use good hand hygiene technique  - Identify and instruct in appropriate isolation precautions for identified infection/condition  Outcome: Progressing  Goal: Absence of fever/infection during neutropenic period  Description: INTERVENTIONS:  - Monitor WBC    Outcome: Progressing     Problem: SAFETY ADULT  Goal: Patient will remain free of falls  Description: INTERVENTIONS:  - Assess patient frequently for physical needs  -  Identify cognitive and physical deficits and behaviors that affect risk of falls    -  [place] fall precautions as indicated by assessment   - Educate patient/family on patient safety including physical limitations  - Instruct patient to call for assistance with activity based on assessment  - Modify environment to reduce risk of injury  - Consider OT/PT consult to assist with strengthening/mobility  Outcome: Progressing  Goal: Maintain or return to baseline ADL function  Description: INTERVENTIONS:  -  Assess patient's ability to carry out ADLs; assess patient's baseline for ADL function and identify physical deficits which impact ability to perform ADLs (bathing, care of mouth/teeth, toileting, grooming, dressing, etc )  - Assess/evaluate cause of self-care deficits   - Assess range of motion  - Assess patient's mobility; develop plan if impaired  - Assess patient's need for assistive devices and provide as appropriate  - Encourage maximum independence but intervene and supervise when necessary  - Involve family in performance of ADLs  - Assess for home care needs following discharge   - Consider OT consult to assist with ADL evaluation and planning for discharge  - Provide patient education as appropriate  Outcome: Progressing  Goal: Maintain or return mobility status to optimal level  Description: INTERVENTIONS:  - Assess patient's baseline mobility status (ambulation, transfers, stairs, etc )    - Identify cognitive and physical deficits and behaviors that affect mobility  - Identify mobility aids required to assist with transfers and/or ambulation (gait belt, sit-to-stand, lift, walker, cane, etc )  - [place] fall precautions as indicated by assessment  - Record patient progress and toleration of activity level on Mobility SBAR; progress patient to next Phase/Stage  - Instruct patient to call for assistance with activity based on assessment  - Consider rehabilitation consult to assist with strengthening/weightbearing, etc   Outcome: Progressing     Problem: DISCHARGE PLANNING  Goal: Discharge to home or other facility with appropriate resources  Description: INTERVENTIONS:  - Identify barriers to discharge w/patient and caregiver  - Arrange for needed discharge resources and transportation as appropriate  - Identify discharge learning needs (meds, wound care, etc )  - Arrange for interpretive services to assist at discharge as needed  - Refer to Case Management Department for coordinating discharge planning if the patient needs post-hospital services based on physician/advanced practitioner order or complex needs related to functional status, cognitive ability, or social support system  Outcome: Progressing     Problem: Knowledge Deficit  Goal: Patient/family/caregiver demonstrates understanding of disease process, treatment plan, medications, and discharge instructions  Description: Complete learning assessment and assess knowledge base    Interventions:  - Provide teaching at level of understanding  - Provide teaching via preferred learning methods  Outcome: Progressing     Problem: CARDIOVASCULAR - ADULT  Goal: Maintains optimal cardiac output and hemodynamic stability  Description: INTERVENTIONS:  - Monitor I/O, vital signs and rhythm  - Monitor for S/S and trends of decreased cardiac output  - Administer and titrate ordered vasoactive medications to optimize hemodynamic stability  - Assess quality of pulses, skin color and temperature  - Assess for signs of decreased coronary artery perfusion  - Instruct patient to report change in severity of symptoms  Outcome: Progressing  Goal: Absence of cardiac dysrhythmias or at baseline rhythm  Description: INTERVENTIONS:  - Continuous cardiac monitoring, vital signs, obtain 12 lead EKG if ordered  - Administer antiarrhythmic and heart rate control medications as ordered  - Monitor electrolytes and administer replacement therapy as ordered  Outcome: Progressing     Problem: RESPIRATORY - ADULT  Goal: Achieves optimal ventilation and oxygenation  Description: INTERVENTIONS:  - Assess for changes in respiratory status  - Assess for changes in mentation and behavior  - Position to facilitate oxygenation and minimize respiratory effort  - Oxygen administered by appropriate delivery if ordered  - Initiate smoking cessation education as indicated  - Encourage broncho-pulmonary hygiene including cough, deep breathe, Incentive Spirometry  - Assess the need for suctioning and aspirate as needed  - Assess and instruct to report SOB or any respiratory difficulty  - Respiratory Therapy support as indicated  Outcome: Progressing

## 2021-06-04 NOTE — PROGRESS NOTES
[address]  Progress Note John Speaker 1935, 81 y o  male MRN: [id number]  Unit/Bed#: ICU 06 Encounter: [id number]  Primary Care Provider: Niall Andrade DO   Date and time admitted to hospital: 6/1/2021  2:06 PM    * Idiopathic hypotension  Assessment & Plan  · And start albumin infusion  · Restart midodrine  · Start IV Levophed  · Continuous cardiopulmonary monitoring    Severe aortic stenosis  Assessment & Plan  · Cardiology following  · Continuous cardiopulmonary monitoring  · Maintain systolic blood pressure greater than 130    HCAP (healthcare-associated pneumonia)  Assessment & Plan  · Treating now with IV cefepime  · Monitor vital signs  · Monitor CBC, white count  · Transferred to the intensive care unit    Multiple myeloma [facility])  Assessment & Plan  · Oncology following  · Patient receiving chemotherapy    Centrilobular emphysema ([address] )  Assessment & Plan  · [place] nebulizer treatments as needed  · Supplemental oxygen as needed  · Continuous cardiopulmonary monitoring  · Patient transferred to the ICU    Anemia  Assessment & Plan  · Monitor hemoglobin  · Transfuse as needed    Chronic anticoagulation  Assessment & Plan  · Continue current DVT prophylaxis    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  Assessment & Plan  · Consult physical therapy  · Patient is able to walk short distances      Progress Note - Rapid Response   [place] Knowles 81 y o  male MRN: [id number]  Unit/Bed#: ICU 06 Encounter: [id number]      Time Called: 0901  Arrival Time: 0902  Event End: 0945  Primary reason for call: Acute change in SBP        ASSESSMENT/PLAN (Recommendation):  Patient is hypotensive with Hcap, diuresis  He is awake and alert  Interventions:  Airway/Breathing:  No Intervention  Circulation: IV Fluid Bolus  Other Treatments: N/A    CHIEF COMPLAINT (Situation):  Patient offers no specific complaints he feels well    However, systolic blood pressure dropped to the 60s causing the nurse to activate a rapid response  Upon my arrival the patient is awake and alert with blood pressure systolic in the 13T  HPI: Cammie Paz is a 81y o  year old male who presents with multiple medical problems  He is an 67-year-old man who complains of progressive weakness since receiving chemotherapy  He has a history of multiple myeloma which is being treated every 28 days  He also carries diagnosis of severe aortic stenosis, emphysema, chronic anticoagulation due to paroxysmal atrial fibrillation and history of pulmonary embolus  Upon admission the patient did complain of progressive shortness or breath, productive cough and subjective fever  The patient complained of not being able to lay flat when sleeping  Historical Information   Past Medical History:   Diagnosis Date    Cancer [facility])     Chronic pain disorder     3 fractured vertebrae    Congestive heart failure (CHF) (HCC)     COPD (chronic obstructive pulmonary disease) (HCC)     Diarrhea     Diverticulitis     Enlarged prostate     Kidney stone     [facility] [address] [address]     Pulmonary embolism ([facility] [address] ) 12/24/2018    Rectal adenocarcinoma ([facility] [address] )     Sleep apnea     no cpap     Past Surgical History:   Procedure Laterality Date    CHOLECYSTECTOMY      COLONOSCOPY W/ ENDOSCOPIC US N/A 7/21/2017    Procedure: ANAL ENDOSCOPIC U/S;  Surgeon: Charles Cutler MD;  [facility]: [facility]; Service: Colorectal    HERNIA REPAIR      KIDNEY STONE SURGERY      LUNG LOBECTOMY Right     DE COLONOSCOPY FLX DX W/COLLJ SPEC WHEN PFRMD N/A 7/10/2017    Procedure: COLONOSCOPY;  Surgeon: Julio Bennett MD;  [facility]: [facility];   Service: Gastroenterology    DE RECTAL TUMOR EXCISION, TRANSANAL ENDOSCOPIC MICROSURGICAL, FULL THICK N/A 11/2/2017    Procedure: TRANSANAL ENDOSCOPIC MICROSURGERY TEM;  Surgeon: Charles Cutler MD;  Location: [facility];  Service: Colorectal     Social History   Social History     Substance and Sexual Activity   Alcohol Use Yes    Alcohol/week: 1 0 standard drinks    Types: 1 Standard drinks or equivalent per week    Frequency: Monthly or less    Drinks per session: 1 or 2    Comment: rarely     Social History     Substance and Sexual Activity   Drug Use Never     Social History     Tobacco Use   Smoking Status Former Smoker    Packs/day: 1 00    Years: 50 00    Pack years: 50 00    Types: Cigarettes    Start date: 57    Quit date: 12/7/2010    Years since quitting: 10 4   Smokeless Tobacco Never Used   Tobacco Comment    quit in 1999     Family History   Problem Relation Age of Onset    Arthritis Mother     Colon cancer Father     Heart attack Father     Diabetes type I Sister          Meds/Allergies   all current active meds have been reviewed  Allergies   Allergen Reactions    Gabapentin      somnolence    Metaxalone     Nisoldipine     Sulfa Antibiotics Itching    Aldactone [Spironolactone] Other (See Comments)     Breast swelling and pain  Breast swelling and pain    Penicillin V Rash     AS CHILD       ROS:  See HPI    PHYSICAL EXAM (Assessment): BP (!) 63/48   Pulse 103   Temp 99 1 °F (37 3 °C) (Temporal)   Resp (!) 24   Ht 5' 8" (1 727 m)   Wt 83 kg (182 lb 15 7 oz)   SpO2 98%   BMI 27 82 kg/m²     Hemodynamic Monitoring:  Continuous cardiopulmonary monitoring    General Appearance:    Alert, cooperative, no distress, appears stated age   Head:    Normocephalic, without obvious abnormality, atraumatic   Eyes:    PERRL, conjunctiva/corneas clear, EOM's intact, fundi     benign, both eyes        Ears:    Normal TM's and external ear canals, both ears   Nose:   Nares normal, septum midline, mucosa normal, no drainage    or sinus tenderness   Throat:   Lips, mucosa, and tongue normal; teeth and gums normal   Neck:   Supple, symmetrical, trachea midline, no adenopathy;        thyroid:  No enlargement/tenderness/nodules; no carotid    bruit or JVD   Back:     Symmetric, no curvature, ROM normal, no CVA tenderness   Lungs:     Clear to auscultation bilaterally, respirations unlabored   Chest wall:    No tenderness or deformity   Heart:    Regular rate and rhythm, S1 and S2 normal, no murmur, rub   or gallop   Abdomen:     Soft, non-tender, bowel sounds active all four quadrants,     no masses, no organomegaly   Genitalia:    Normal male without lesion, discharge or tenderness   Rectal:    Normal tone, normal prostate, no masses or tenderness;    guaiac negative stool   Extremities:   Extremities normal, atraumatic, no cyanosis or edema   Pulses:   2+ and symmetric all extremities   Skin:   Skin color, texture, turgor normal, no rashes or lesions   Lymph nodes:   Cervical, supraclavicular, and axillary nodes normal   Neurologic:   CNII-XII intact   Normal strength, sensation and reflexes       throughout         Intake/Output Summary (Last 24 hours) at 6/4/2021 1005  Last data filed at 6/4/2021 0925  Gross per 24 hour   Intake 1160 ml   Output 4929 ml   Net -3769 ml       Invasive Devices     Central Venous Catheter Line            Port A Cath 12/10/17 Right Subclavian 1271 days          Peripheral Intravenous Line            Peripheral IV 06/01/21 Left Antecubital 2 days                DIAGNOSTIC DATA:    Lab Results:   Results from last 7 days   Lab Units 06/04/21  0758 06/03/21  0508 06/02/21  1343 06/02/21  0531   WBC Thousand/uL 3 58* 4 06*  --  3 41*   HEMOGLOBIN g/dL 7 6* 8 3* 7 9* 7 2*   HEMATOCRIT % 23 9* 26 7* 25 5* 22 7*   PLATELETS Thousands/uL 51*  51* 56*  --  54*     Results from last 7 days   Lab Units 06/04/21  0758 06/03/21  0508 06/02/21  0531   POTASSIUM mmol/L 3 3* 3 8 4 1   CHLORIDE mmol/L 99* 103 104   CO2 mmol/L 35* 32 29   BUN mg/dL 21 22 24   CREATININE mg/dL 1 46* 1 22 1 15   CALCIUM mg/dL 8 5 8 8 7 7*     Results from last 7 days   Lab Units 06/04/21  0758 06/03/21  0508 06/02/21  0531 06/01/21  1429   POTASSIUM mmol/L 3 3* 3 8 4 1 4 2   CHLORIDE mmol/L 99* 103 104 101   CO2 mmol/L 35* 32 29 30   BUN mg/dL 21 22 24 26*   CREATININE mg/dL 1 46* 1 22 1 15 1 41*   CALCIUM mg/dL 8 5 8 8 7 7* 8 2*   ALK PHOS U/L  --   --   --  74   ALT U/L  --   --   --  49   AST U/L  --   --   --  28     Imaging Studies: No recent imaging    EKG, Pathology, and Other Studies: Atrial fibrillation with a rapid ventricular response        OUTCOME:   Transferred to Critical Care Unit  Code Status: Level 3 - DNAR and DNI  Advance Directive and Living Will:     Power of :    Counseling / Coordination of Care  Total Critical Care time spent 36 minutes excluding procedures, teaching and family updates

## 2021-06-04 NOTE — ASSESSMENT & PLAN NOTE
· And start albumin infusion  · Restart midodrine  · Start IV Levophed  · Continuous cardiopulmonary monitoring

## 2021-06-05 NOTE — ASSESSMENT & PLAN NOTE
· Maintained on Eliquis 2 5 mg BID for atrial fibrillation   · Continue to hold as patient is thrombocytopenic   · Continue current DVT prophylaxis

## 2021-06-05 NOTE — ASSESSMENT & PLAN NOTE
· Patient actively receiving chemotherapy; with last on 5/5  · Oncology consulted, appreciate further recommendations

## 2021-06-05 NOTE — PROGRESS NOTES
NEPHROLOGY PROGRESS NOTE   Neli Jin 81 y o  male MRN: [id number]  Unit/Bed#: ICU 06 Encounter: [id number]  Reason for Consult:  Chronic kidney disease    ASSESSMENT/PLAN:  1  Acute kidney injury with associated oliguria on chronic kidney disease, baseline creatinine 1 1-1 2  , significant improvement in urine output with Lasix infusion  Would continue with intermittent IV dosing given suspicion for overload  2   hypoxic respiratory failure, as per primary service   3  Severe aortic stenosis with ejection fraction 58%   4  Multiple myeloma, has been on chemotherapy previously   5  Hypotension/ shock likely secondary to pneumonia as well as aortic stenosis, wean hemodynamic support as tolerated, increase midodrine 15 mg t i d , would avoid albumin given risk for worsening pulmonary edema  6   acute on chronic anemia in the face of underlying multiple myeloma, transfuse as needed   7  Transaminitis, may be secondary to hepatic congestion/hypotension    PLAN:  ·  overall renal function remains stable creatinine 1 1   · Would continue with IV diuresis, patient appears overloaded   · Avoid albumin given risk for worsening pulmonary edema   · Increase midodrine to 15 mg t i d  for blood pressure support   · Given patient's cardiac status,  Suspected pneumonia, overall prognosis remains guarded    SUBJECTIVE:   seen and examined  Family at bedside  Patient appears short of breath  Confused at times      Review of Systems    OBJECTIVE:  Current Weight: Weight - Scale: 79 kg (174 lb 2 6 oz)  Vitals:    06/05/21 1300 06/05/21 1321 06/05/21 1338 06/05/21 1354   BP:   123/70 114/59   BP Location:       Pulse:   104 (!) 118   Resp:   (!) 37 (!) 41   Temp: 100 4 °F (38 °C)  99 9 °F (37 7 °C) 100 2 °F (37 9 °C)   TempSrc: Temporal  Tympanic Tympanic   SpO2:  100% 98% 99%   Weight:       Height:           Intake/Output Summary (Last 24 hours) at 6/5/2021 1445  Last data filed at 6/5/2021 1148  Gross per 24 hour   Intake 2418 32 ml   Output 1345 ml   Net 1073 32 ml       Physical Exam  Constitutional:       Appearance: He is ill-appearing  HENT:      Head: Normocephalic and atraumatic  Eyes:      General: No scleral icterus  Cardiovascular:      Rate and Rhythm: Normal rate and regular rhythm  Pulmonary:      Effort: Tachypnea and accessory muscle usage present  Breath sounds: Examination of the right-lower field reveals rhonchi and rales  Examination of the left-lower field reveals rhonchi and rales  Decreased breath sounds, rhonchi and rales present  Abdominal:      General: There is no distension  Palpations: Abdomen is soft  Tenderness: There is no abdominal tenderness  Musculoskeletal:      Right lower leg: Edema present  Left lower leg: Edema present  Skin:     General: Skin is warm and dry  Findings: No rash  Neurological:      Mental Status: He is alert  He is disoriented           Medications:    Current Facility-Administered Medications:     acetaminophen (TYLENOL) tablet 650 mg, 650 mg, Oral, Q6H PRN, Carol Alejandra, CRNP, 650 mg at 06/04/21 0657    cefepime (MAXIPIME) 2 g/50 mL dextrose IVPB, 2,000 mg, Intravenous, Q12H, BEE GonzalesNP, Last Rate: 100 mL/hr at 06/05/21 0800, 2,000 mg at 06/05/21 0800    famotidine (PEPCID) tablet 20 mg, 20 mg, Oral, Daily, Carol Alejandra, CRNP, 20 mg at 06/05/21 8573    furosemide (LASIX) injection 80 mg, 80 mg, Intravenous, Once, Lupe Koroma MD    ipratropium-albuterol (DUO-NEB) 0 5-2 5 mg/3 mL inhalation solution **ADS Override Pull**, , , ,     ipratropium-albuterol (DUO-NEB) 0 5-2 5 mg/3 mL inhalation solution 3 mL, 3 mL, Nebulization, TID, Valentín Land MD    midodrine (PROAMATINE) tablet 15 mg, 15 mg, Oral, TID AC, Lupe Koroma MD    NOREPINEPHRINE 4 MG  ML NSS (CMPD ORDER) infusion, 1-30 mcg/min, Intravenous, Titrated, JACK Gonzales, Last Rate: 37 5 mL/hr at 06/05/21 0939, 10 mcg/min at 06/05/21 3307    [START ON 6/6/2021] potassium chloride (K-DUR,KLOR-CON) CR tablet 20 mEq, 20 mEq, Oral, Daily, David Salazar, CRNP    predniSONE tablet 10 mg, 10 mg, Oral, Daily, Ettael Dynes, CRNP, 10 mg at 06/05/21 8719    vancomycin (VANCOCIN) oral solution 125 mg, 125 mg, Oral, Q12H [address], Ettael Mananes, CRNP, 125 mg at 06/05/21 0109    Laboratory Results:  Results from last 7 days   Lab Units 06/05/21  0402 06/05/21  0353 06/05/21  0310 06/04/21  0758 06/03/21  0508 06/02/21  1343 06/02/21  0531 06/01/21  1429   WBC Thousand/uL  --  2 44*  --  3 58* 4 06*  --  3 41* 6 38   HEMOGLOBIN g/dL  --  6 0*  --  7 6* 8 3* 7 9* 7 2* 9 1*   HEMATOCRIT %  --  19 4*  --  23 9* 26 7* 25 5* 22 7* 29 1*   PLATELETS Thousands/uL  --  34*  --  51*  51* 56*  --  54* 23*   POTASSIUM mmol/L 3 8  --   --  3 3* 3 8  --  4 1 4 2   CHLORIDE mmol/L 103  --   --  99* 103  --  104 101   CO2 mmol/L 32  --   --  35* 32  --  29 30   BUN mg/dL 18  --   --  21 22  --  24 26*   CREATININE mg/dL 1 11  --   --  1 46* 1 22  --  1 15 1 41*   CALCIUM mg/dL 8 1*  --   --  8 5 8 8  --  7 7* 8 2*   MAGNESIUM mg/dL  --   --  1 6  --   --   --   --   --

## 2021-06-05 NOTE — ASSESSMENT & PLAN NOTE
· Patient follows with cardiology out-patient and was deemed not a surgical candidate  · Continues with medical management with Dr Marrian Severe

## 2021-06-05 NOTE — ASSESSMENT & PLAN NOTE
· Patients platelets on admission were 23  · Most recent platelet count of 51  · Likely multifactorial due to MM and active chemotherapy   · Heme/onc following- transfuse for hemoglobin less than 8 and platelets less than 20

## 2021-06-05 NOTE — ASSESSMENT & PLAN NOTE
· SBP on 6/4 were in the 60-80s  · The patient was transferred to ICU and started on Albumin 50g TID and levophed  · Continue albumin x 3 days   · Continue levophed to maintain MAP's > 65  · Started on midodrine and increased to 10 mg TID

## 2021-06-05 NOTE — PROGRESS NOTES
[facility]  Progress Note - Barber Mcnulty 1935, 81 y o  male MRN: [id number]  Unit/Bed#: ICU 06 Encounter: [id number]  Primary Care Provider: Lulu Quiroz DO   Date and time admitted to hospital: 6/1/2021  2:06 PM    * Idiopathic hypotension  Assessment & Plan  · SBP on 6/4 were in the 60-80s  · The patient was transferred to ICU and started on Albumin 50g TID and levophed  · Continue albumin x 3 days   · Continue levophed to maintain MAP's > 65  · Started on midodrine and increased to 10 mg TID     HCAP (healthcare-associated pneumonia)  Assessment & Plan  · CXR on admission revealed ground glass opacities in the left base   · Presumed HCAP as patient is immunocompromised receiving treatment for MM  · COVID negative   · Blood cultures x2 reveal no growth at 72 hours   · Continue Cefepime, Day #5 and Vanco, Day #5    Centrilobular emphysema ([address] )  Assessment & Plan  · Patient follows with Dr Roxana Jimenez for out-patient pulmonary   · Baseline oxygen requirements of 2L NC at night  · Currently on 2L NC with oxygen saturations of 100%  · Continue atrovent QID  · Continue prednisone 10 mg daily     Anemia  Assessment & Plan  · Anemia likely multifactorial: chronic kidney disease, multiple myeloma, iron deficiency   · Iron saturation 17%; transfuse with venofer when acute infection resolves   · Most recent hemoglobin 7 6  · Follow up with repeat CBC  · Obtain occult stool   · transfuse for hemoglobin less than 7    Thrombocytopenia (HCC)  Assessment & Plan  · Patients platelets on admission were 23  · Most recent platelet count of 51  · Likely multifactorial due to MM and active chemotherapy   · Heme/onc following- transfuse for hemoglobin less than 8 and platelets less than 20    Severe aortic stenosis  Assessment & Plan  · Patient follows with cardiology out-patient and was deemed not a surgical candidate  · Continues with medical management with Dr Joanne Cook     Multiple myeloma 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  Assessment & Plan  · Patient is able to walk short distances  · Consult PT/OT      ----------------------------------------------------------------------------------------  HPI/24hr events: Levophed weaned off overnight  BP dropped this morning with hemoglobin resulted at 6  Will transfuse  Disposition: Continue Critical Care   Code Status: Level 1 - Full Code  ---------------------------------------------------------------------------------------  SUBJECTIVE  Anxious, offered support  Given xanax  Review of Systems   All other systems reviewed and are negative  Review of systems was reviewed and negative unless stated above in HPI/24-hour events   ---------------------------------------------------------------------------------------  OBJECTIVE    Vitals   Vitals:    21 0200 21 0300 21 0400 21 0500   BP: 95/52 (!) 82/50 (!) 94/47 (!) 81/42   BP Location: Right arm Right arm Right arm Right arm   Pulse: 88 88 100 (!) 112   Resp: (!) 33 (!) 26 (!) 36 (!) 41   Temp:   98 6 °F (37 °C)    TempSrc:   Temporal    SpO2: 98% 98% 97% 97%   Weight:       Height:         Temp (24hrs), Av 9 °F (37 2 °C), Min:98 °F (36 7 °C), Max:100 7 °F (38 2 °C)  Current: Temperature: 98 6 °F (37 °C)          Respiratory:  SpO2: SpO2: 97 %  Nasal Cannula O2 Flow Rate (L/min): 2 L/min    Invasive/non-invasive ventilation settings   Respiratory    Lab Data (Last 4 hours)    None         O2/Vent Data (Last 4 hours)    None                Physical Exam  Vitals signs reviewed  Constitutional:       Appearance: He is ill-appearing (chronic)  HENT:      Head: Normocephalic  Mouth/Throat:      Mouth: Mucous membranes are moist       Pharynx: Oropharynx is clear  Eyes:      Extraocular Movements: Extraocular movements intact  Conjunctiva/sclera: Conjunctivae normal       Pupils: Pupils are equal, round, and reactive to light  Neck:      Musculoskeletal: Normal range of motion  Cardiovascular:      Rate and Rhythm: Normal rate and regular rhythm  Pulses: Normal pulses  Heart sounds: Murmur present  Pulmonary:      Effort: Pulmonary effort is normal  No respiratory distress  Breath sounds: No wheezing or rales  Comments: On 2L NC  Abdominal:      General: Bowel sounds are normal       Palpations: Abdomen is soft  Musculoskeletal: Normal range of motion  Right lower leg: Edema present  Left lower leg: Edema present  Skin:     General: Skin is warm and dry  Coloration: Skin is pale  Neurological:      General: No focal deficit present  Mental Status: He is alert and oriented to person, place, and time     Psychiatric:         Mood and Affect: Mood normal          Behavior: Behavior normal          Laboratory and Diagnostics:  Results from last 7 days   Lab Units 06/05/21  0353 06/04/21  0758 06/03/21  0508 06/02/21  1343 06/02/21  0531 06/01/21  1429   WBC Thousand/uL 2 44* 3 58* 4 06*  --  3 41* 6 38   HEMOGLOBIN g/dL 6 0* 7 6* 8 3* 7 9* 7 2* 9 1*   HEMATOCRIT % 19 4* 23 9* 26 7* 25 5* 22 7* 29 1*   PLATELETS Thousands/uL 34* 51*  51* 56*  --  54* 23*   NEUTROS PCT %  --   --  86*  --   --   --    BANDS PCT % 7  --   --   --   --  6   MONOS PCT %  --   --  3*  --   --   --    MONO PCT % 1*  --   --   --   --  0*     Results from last 7 days   Lab Units 06/05/21  0402 06/04/21  0758 06/03/21  0508 06/02/21  0531 06/01/21  1429   SODIUM mmol/L 143 139 141 139 140   POTASSIUM mmol/L 3 8 3 3* 3 8 4 1 4 2   CHLORIDE mmol/L 103 99* 103 104 101   CO2 mmol/L 32 35* 32 29 30   ANION GAP mmol/L 8 5 6 6 9 BUN mg/dL 18 21 22 24 26*   CREATININE mg/dL 1 11 1 46* 1 22 1 15 1 41*   CALCIUM mg/dL 8 1* 8 5 8 8 7 7* 8 2*   GLUCOSE RANDOM mg/dL 81 109 84 92 109   ALT U/L 155*  --   --   --  49   AST U/L 130*  --   --   --  28   ALK PHOS U/L 137*  --   --   --  74   ALBUMIN g/dL 3 6  --   --   --  2 2*   TOTAL BILIRUBIN mg/dL 2 09*  --   --   --  1 92*     Results from last 7 days   Lab Units 06/05/21  0310   MAGNESIUM mg/dL 1 6      Results from last 7 days   Lab Units 06/04/21  0758   INR  1 01   PTT seconds 29      Results from last 7 days   Lab Units 06/01/21  1429   TROPONIN I ng/mL <0 02     Results from last 7 days   Lab Units 06/01/21  1630 06/01/21  1429   LACTIC ACID mmol/L 1 7 2 6*     ABG:    VBG:    Results from last 7 days   Lab Units 06/03/21  0536 06/02/21  0531   PROCALCITONIN ng/ml 0 26* 0 20       Micro  Results from last 7 days   Lab Units 06/01/21  1429   BLOOD CULTURE  No Growth at 72 hrs  No Growth at 72 hrs  EKG: sinus tachycardia with premature atrial complexes   Imaging: I have personally reviewed pertinent reports  CXR on 6/1 revealed stable volume loss on the right with radiation fibrotic changes at the right apex; persistent ground glass opacity at the left base attributed to pneumonia   Intake and Output  I/O       06/03 0701 - 06/04 0700 06/04 0701 - 06/05 0700    P  O  1220 120    I V  (mL/kg)  153 2 (1 8)    IV Piggyback  3040    Total Intake(mL/kg) 1220 (14 7) 3313 2 (39 9)    Urine (mL/kg/hr) 4629 (2 3) 1590 (0 8)    Stool 0 0    Total Output 4629 1590    Net -3409 +1723 2          Unmeasured Stool Occurrence 1 x 1 x          Height and Weights   Height: 5' 8" (172 7 cm)  IBW (Ideal Body Weight): 68 4 kg  Body mass index is 27 82 kg/m²    Weight (last 2 days)     None            Nutrition       Diet Orders   (From admission, onward)             Start     Ordered    06/04/21 4060  Diet Cardiovascular; Cardiac; Fluid Restriction 1800 ML  Diet effective now     Question Answer Comment   Diet Type Cardiovascular    Cardiac Cardiac    Other Restriction(s): Fluid Restriction 1800 ML    RD to adjust diet per protocol?  Yes        06/04/21 3931                  Active Medications  Scheduled Meds:  Current Facility-Administered Medications   Medication Dose Route Frequency Provider Last Rate    acetaminophen  650 mg Oral Q6H PRN JACK Rocha      albumin human  50 g Intravenous TID JACK Rocha      albuterol  2 puff Inhalation Q6H PRN JACK Rocha      albuterol  2 5 mg Nebulization Q6H PRN JACK Rocha      ALPRAZolam  0 25 mg Oral Once JACK Adkins      cefepime  2,000 mg Intravenous Q12H JACK Rocha Stopped (06/05/21 0019)    famotidine  20 mg Oral Daily JACK Rocha      finasteride  5 mg Oral HS JACK Rocha      heparin (porcine)  5,000 Units Subcutaneous [facility] JACK Adkins      ipratropium  0 5 mg Nebulization 4x Daily JACK Rocha      midodrine  10 mg Oral TID AC JACK Adkins      norepinephrine  1-30 mcg/min Intravenous Titrated BEE RochaNP 1 mcg/min (06/05/21 0500)    potassium chloride  20 mEq Oral BID JACK Rocha      predniSONE  10 mg Oral Daily JACK Rocha      vancomycin  125 mg Oral Q12H [address] JACK Rocha       Continuous Infusions:  norepinephrine, 1-30 mcg/min, Last Rate: 1 mcg/min (06/05/21 0500)      PRN Meds:   acetaminophen, 650 mg, Q6H PRN  albuterol, 2 puff, Q6H PRN  albuterol, 2 5 mg, Q6H PRN        Invasive Devices Review  Invasive Devices     Central Venous Catheter Line            Port A Cath 12/10/17 Right Subclavian 1272 days          Peripheral Intravenous Line            Peripheral IV 06/04/21 Left Antecubital less than 1 day                Rationale for remaining devices: Medically neccessary   ---------------------------------------------------------------------------------------  Advance Directive and Living Will:      Power of Attorney:    POLST:    ---------------------------------------------------------------------------------------  Care Time Delivered:   No Critical Care time spent       Orval Mouse, JACK      Portions of the record may have been created with voice recognition software  Occasional wrong word or "sound a like" substitutions may have occurred due to the inherent limitations of voice recognition software    Read the chart carefully and recognize, using context, where substitutions have occurred

## 2021-06-05 NOTE — ASSESSMENT & PLAN NOTE
· Anemia likely multifactorial: chronic kidney disease, multiple myeloma, iron deficiency   · Iron saturation 17%; transfuse with venofer when acute infection resolves   · Most recent hemoglobin 7 6  · Follow up with repeat CBC  · Obtain occult stool   · transfuse for hemoglobin less than 7

## 2021-06-05 NOTE — ASSESSMENT & PLAN NOTE
· Patient follows with Dr Jon Burkitt for out-patient pulmonary   · Baseline oxygen requirements of 2L NC at night  · Currently on 2L NC with oxygen saturations of 100%  · Continue atrovent QID  · Continue prednisone 10 mg daily

## 2021-06-06 NOTE — ASSESSMENT & PLAN NOTE
· Anemia likely multifactorial: chronic kidney disease, multiple myeloma, iron deficiency   · Iron saturation 17%; transfuse with venofer when acute infection resolves   · Follow up with repeat CBC  · hemoccult stool negative  · transfuse for hemoglobin less than 8  · S/p 2 units of pRBC on 6/5

## 2021-06-06 NOTE — ASSESSMENT & PLAN NOTE
· CXR on admission revealed ground glass opacities in the left base   · Presumed HCAP as patient is immunocompromised receiving treatment for MM  · COVID negative   · Blood cultures x2 reveal no growth at 72 hours   · Continue cefepime D6  · On Vancomycin for cdiff prophylaxis

## 2021-06-06 NOTE — PROGRESS NOTES
[address] [facility]  Progress Note Mika Wu 1935, 81 y o  male MRN: [id number]  Unit/Bed#: ICU 06 Encounter: [id number]  Primary Care Provider: Zulma Vasquez DO   Date and time admitted to hospital: 6/1/2021  2:06 PM    * Idiopathic hypotension  Assessment & Plan  · SBP on 6/4 were in the 60-80s;  The patient was transferred to ICU and started on Albumin 50g TID and levophed  · Continue albumin x 3 days   · Continue levophed to maintain MAP's > 65  · Continue midodrine 10 mg TID       HCAP (healthcare-associated pneumonia)  Assessment & Plan  · CXR on admission revealed ground glass opacities in the left base   · Presumed HCAP as patient is immunocompromised receiving treatment for MM  · COVID negative   · Blood cultures x2 reveal no growth at 72 hours   · Continue cefepime D6  · On Vancomycin for cdiff prophylaxis     Centrilobular emphysema (HCC)  Assessment & Plan  · Patient follows with Dr Amy Robin for out-patient pulmonary   · Baseline oxygen requirements of 2L NC at night  · Currently on 2L NC with oxygen saturations of 100%  · Continue atrovent QID  · Continue prednisone 10 mg daily       Anemia  Assessment & Plan  · Anemia likely multifactorial: chronic kidney disease, multiple myeloma, iron deficiency   · Iron saturation 17%; transfuse with venofer when acute infection resolves   · Follow up with repeat CBC  · hemoccult stool negative  · transfuse for hemoglobin less than 8  · S/p 2 units of pRBC on 6/5     Thrombocytopenia (HCC)  Assessment & Plan  · Patients platelets on admission were 23  · Most recent platelet count of 51  · Likely multifactorial due to MM and active chemotherapy   · Heme/onc following- transfuse for hemoglobin less than 8 and platelets less than 20      Severe aortic stenosis  Assessment & Plan  · Patient follows with cardiology out-patient and was deemed not a surgical candidate  · Continues with medical management with Dr Wallace Mai       Multiple myeloma [facility])  Assessment & Plan  · Patient actively receiving chemotherapy; with last on   · Oncology consulted, appreciate further recommendations       Chronic anticoagulation  Assessment & Plan  · Maintained on Eliquis 2 5 mg BID for atrial fibrillation   · Continue to hold as patient is thrombocytopenic   · Will discuss restarting DVT prophylaxis      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  Assessment & Plan  · Patient is able to walk short distances  · Consult PT/OT     ----------------------------------------------------------------------------------------  HPI/24hr events: No events overnight  New blood clots in urine  Disposition: Continue Critical Care   Code Status: Level 1 - Full Code  ---------------------------------------------------------------------------------------  SUBJECTIVE  Has no complaints  Review of Systems  Review of systems was reviewed and negative unless stated above in HPI/24-hour events   ---------------------------------------------------------------------------------------  OBJECTIVE    Vitals   Vitals:    21 0300 21 0400 21 0500 21 0600   BP: 124/68 118/57 152/76 139/62   Pulse: 94 82 100 96   Resp: (!) 39 (!) 28 (!) 35 (!) 29   Temp:  99 °F (37 2 °C)     TempSrc:       SpO2: 97% 98% 99% 99%   Weight:   76 9 kg (169 lb 8 5 oz)    Height:         Temp (24hrs), Av 5 °F (37 5 °C), Min:98 °F (36 7 °C), Max:100 4 °F (38 °C)  Current: Temperature: 99 °F (37 2 °C)          Respiratory:  SpO2: SpO2: 99 %  Nasal Cannula O2 Flow Rate (L/min): 3 L/min    Invasive/non-invasive ventilation settings   Respiratory    Lab Data (Last 4 hours)    None         O2/Vent Data (Last 4 hours)    None                Physical Exam  Vitals signs and nursing note reviewed  Constitutional:       General: He is not in acute distress  Appearance: Normal appearance  HENT:      Head: Normocephalic and atraumatic        Right Ear: External ear normal       Left Ear: External ear normal  Nose: Nose normal       Mouth/Throat:      Mouth: Mucous membranes are moist       Pharynx: Oropharynx is clear  Eyes:      Extraocular Movements: Extraocular movements intact  Conjunctiva/sclera: Conjunctivae normal       Pupils: Pupils are equal, round, and reactive to light  Neck:      Musculoskeletal: Normal range of motion and neck supple  Cardiovascular:      Rate and Rhythm: Normal rate and regular rhythm  Pulses: Normal pulses  Heart sounds: Normal heart sounds  Pulmonary:      Comments: Rales, rhonchi in lower lobes of bilateral lungs  Exertional dyspnea  Abdominal:      General: Bowel sounds are normal       Palpations: Abdomen is soft  Comments: LBM 6/5   Genitourinary:     Comments: Blood clots in urine  Musculoskeletal: Normal range of motion  Right lower leg: Edema present  Left lower leg: Edema present  Skin:     General: Skin is warm and dry  Capillary Refill: Capillary refill takes less than 2 seconds  Neurological:      General: No focal deficit present  Mental Status: He is alert and oriented to person, place, and time  Psychiatric:         Mood and Affect: Mood normal          Behavior: Behavior normal          Thought Content:  Thought content normal          Judgment: Judgment normal          Laboratory and Diagnostics:  Results from last 7 days   Lab Units 06/06/21  0503 06/05/21  0353 06/04/21  0758 06/03/21  0508 06/02/21  1343 06/02/21  0531 06/01/21  1429   WBC Thousand/uL 4 32 2 44* 3 58* 4 06*  --  3 41* 6 38   HEMOGLOBIN g/dL 9 5* 6 0* 7 6* 8 3* 7 9* 7 2* 9 1*   HEMATOCRIT % 29 7* 19 4* 23 9* 26 7* 25 5* 22 7* 29 1*   PLATELETS Thousands/uL 33* 34* 51*  51* 56*  --  54* 23*   NEUTROS PCT %  --   --   --  86*  --   --   --    BANDS PCT % 1 7  --   --   --   --  6   MONOS PCT %  --   --   --  3*  --   --   --    MONO PCT % 3* 1*  --   --   --   --  0*     Results from last 7 days   Lab Units 06/06/21  0503 06/05/21  0402 06/04/21  0325 06/03/21  0508 06/02/21  0531 06/01/21  1429   SODIUM mmol/L 143 143 139 141 139 140   POTASSIUM mmol/L 4 0 3 8 3 3* 3 8 4 1 4 2   CHLORIDE mmol/L 102 103 99* 103 104 101   CO2 mmol/L 30 32 35* 32 29 30   ANION GAP mmol/L 11 8 5 6 6 9   BUN mg/dL 16 18 21 22 24 26*   CREATININE mg/dL 1 07 1 11 1 46* 1 22 1 15 1 41*   CALCIUM mg/dL 9 0 8 1* 8 5 8 8 7 7* 8 2*   GLUCOSE RANDOM mg/dL 105 81 109 84 92 109   ALT U/L 115* 155*  --   --   --  49   AST U/L 42 130*  --   --   --  28   ALK PHOS U/L 123* 137*  --   --   --  74   ALBUMIN g/dL 3 3* 3 6  --   --   --  2 2*   TOTAL BILIRUBIN mg/dL 2 23* 2 09*  --   --   --  1 92*     Results from last 7 days   Lab Units 06/06/21  0503 06/05/21  0310   MAGNESIUM mg/dL 1 7 1 6      Results from last 7 days   Lab Units 06/04/21  0758   INR  1 01   PTT seconds 29      Results from last 7 days   Lab Units 06/01/21  1429   TROPONIN I ng/mL <0 02     Results from last 7 days   Lab Units 06/01/21  1630 06/01/21  1429   LACTIC ACID mmol/L 1 7 2 6*     ABG:    VBG:    Results from last 7 days   Lab Units 06/03/21  0536 06/02/21  0531   PROCALCITONIN ng/ml 0 26* 0 20       Micro  Results from last 7 days   Lab Units 06/05/21  1123 06/01/21  1429   BLOOD CULTURE  Received in Microbiology Lab  Culture in Progress  Received in Microbiology Lab  Culture in Progress  No Growth After 4 Days  GRAM STAIN RESULT   --  Gram positive cocci in clusters*       EKG: NSR  Imaging: I have personally reviewed pertinent reports  Intake and Output  I/O       06/04 0701 - 06/05 0700 06/05 0701 - 06/06 0700    P  O  120     I V  (mL/kg) 157 (2) 376 5 (4 8)    Blood  350    IV Piggyback 3540 50    Total Intake(mL/kg) 3817 (48 3) 776 5 (9 8)    Urine (mL/kg/hr) 1765 (0 9) 2970 (2 2)    Stool 0 0    Total Output 1765 2970    Net +2052 -2193 5          Unmeasured Urine Occurrence  1 x    Unmeasured Stool Occurrence 1 x 2 x          Height and Weights   Height: 5' 8" (172 7 cm)  IBW (Ideal Body Weight): 68 4 kg  Body mass index is 25 78 kg/m²  Weight (last 2 days)     Date/Time   Weight    06/06/21 0500   76 9 (169 53)    06/05/21 0600   79 (174 16)                Nutrition       Diet Orders   (From admission, onward)             Start     Ordered    06/04/21 2342  Diet Cardiovascular; Cardiac; Fluid Restriction 1800 ML  Diet effective now     Question Answer Comment   Diet Type Cardiovascular    Cardiac Cardiac    Other Restriction(s): Fluid Restriction 1800 ML    RD to adjust diet per protocol?  Yes        06/04/21 2341                  Active Medications  Scheduled Meds:  Current Facility-Administered Medications   Medication Dose Route Frequency Provider Last Rate    acetaminophen  650 mg Oral Q6H PRN Yee Alfredo, CRNP      cefepime  2,000 mg Intravenous Q12H Yee Alfredo, CRNP 2,000 mg (06/06/21 0602)    famotidine  20 mg Oral Daily Yee Alfredo, CRNP      furosemide  80 mg Intravenous Once Bobby Hayes MD      ipratropium-albuterol  3 mL Nebulization TID Lorene Hair MD      midodrine  15 mg Oral TID AC Bobby Hayes MD      norepinephrine  1-30 mcg/min Intravenous Titrated Yee Alfredo, CRNP 5 mcg/min (06/05/21 1801)    potassium chloride  20 mEq Oral Daily Yee Alfredo, CRNP      predniSONE  10 mg Oral Daily Yee Alfredo, CRNP      vancomycin  125 mg Oral Q12H [address] Yee Alfredo, CRNP       Continuous Infusions:  norepinephrine, 1-30 mcg/min, Last Rate: 5 mcg/min (06/05/21 1801)      PRN Meds:   acetaminophen, 650 mg, Q6H PRN        Invasive Devices Review  Invasive Devices     Central Venous Catheter Line            Port A Cath 12/10/17 Right Subclavian 1273 days          Peripheral Intravenous Line            Peripheral IV 06/04/21 Left Antecubital 1 day          Drain            Urethral Catheter less than 1 day                Rationale for remaining devices: medical necessity  ---------------------------------------------------------------------------------------  Advance Directive and Living Will:      Power of :    POLST:    ---------------------------------------------------------------------------------------  Care Time Delivered:   No Critical Care time spent       JACK Joy      Portions of the record may have been created with voice recognition software  Occasional wrong word or "sound a like" substitutions may have occurred due to the inherent limitations of voice recognition software    Read the chart carefully and recognize, using context, where substitutions have occurred

## 2021-06-06 NOTE — ASSESSMENT & PLAN NOTE
· Maintained on Eliquis 2 5 mg BID for atrial fibrillation   · Continue to hold as patient is thrombocytopenic   · Will discuss restarting DVT prophylaxis

## 2021-06-06 NOTE — PLAN OF CARE
Problem: Potential for Falls  Goal: Patient will remain free of falls  Description: INTERVENTIONS:  - Assess patient frequently for physical needs  -  Identify cognitive and physical deficits and behaviors that affect risk of falls    -  [place] fall precautions as indicated by assessment   - Educate patient/family on patient safety including physical limitations  - Instruct patient to call for assistance with activity based on assessment  - Modify environment to reduce risk of injury  - Consider OT/PT consult to assist with strengthening/mobility  Outcome: Progressing     Problem: Prexisting or High Potential for Compromised Skin Integrity  Goal: Skin integrity is maintained or improved  Description: INTERVENTIONS:  - Identify patients at risk for skin breakdown  - Assess and monitor skin integrity  - Assess and monitor nutrition and hydration status  - Monitor labs   - Assess for incontinence   - Turn and reposition patient  - Assist with mobility/ambulation  - Relieve pressure over bony prominences  - Avoid friction and shearing  - Provide appropriate hygiene as needed including keeping skin clean and dry  - Evaluate need for skin moisturizer/barrier cream  - Collaborate with interdisciplinary team   - Patient/family teaching  - Consider wound care consult   Outcome: Progressing     Problem: PAIN - ADULT  Goal: Verbalizes/displays adequate comfort level or baseline comfort level  Description: Interventions:  - Encourage patient to monitor pain and request assistance  - Assess pain using appropriate pain scale  - Administer analgesics based on type and severity of pain and evaluate response  - Implement non-pharmacological measures as appropriate and evaluate response  - Consider cultural and social influences on pain and pain management  - Notify physician/advanced practitioner if interventions unsuccessful or patient reports new pain  Outcome: Progressing     Problem: INFECTION - ADULT  Goal: Absence or prevention of progression during hospitalization  Description: INTERVENTIONS:  - Assess and monitor for signs and symptoms of infection  - Monitor lab/diagnostic results  - Monitor all insertion sites, i e  indwelling lines, tubes, and drains  - Monitor endotracheal if appropriate and nasal secretions for changes in amount and color  - [place] appropriate cooling/warming therapies per order  - Administer medications as ordered  - Instruct and encourage patient and family to use good hand hygiene technique  - Identify and instruct in appropriate isolation precautions for identified infection/condition  Outcome: Progressing  Goal: Absence of fever/infection during neutropenic period  Description: INTERVENTIONS:  - Monitor WBC    Outcome: Progressing     Problem: SAFETY ADULT  Goal: Patient will remain free of falls  Description: INTERVENTIONS:  - Assess patient frequently for physical needs  -  Identify cognitive and physical deficits and behaviors that affect risk of falls    -  [place] fall precautions as indicated by assessment   - Educate patient/family on patient safety including physical limitations  - Instruct patient to call for assistance with activity based on assessment  - Modify environment to reduce risk of injury  - Consider OT/PT consult to assist with strengthening/mobility  Outcome: Progressing  Goal: Maintain or return to baseline ADL function  Description: INTERVENTIONS:  -  Assess patient's ability to carry out ADLs; assess patient's baseline for ADL function and identify physical deficits which impact ability to perform ADLs (bathing, care of mouth/teeth, toileting, grooming, dressing, etc )  - Assess/evaluate cause of self-care deficits   - Assess range of motion  - Assess patient's mobility; develop plan if impaired  - Assess patient's need for assistive devices and provide as appropriate  - Encourage maximum independence but intervene and supervise when necessary  - Involve family in performance of ADLs  - Assess for home care needs following discharge   - Consider OT consult to assist with ADL evaluation and planning for discharge  - Provide patient education as appropriate  Outcome: Progressing  Goal: Maintain or return mobility status to optimal level  Description: INTERVENTIONS:  - Assess patient's baseline mobility status (ambulation, transfers, stairs, etc )    - Identify cognitive and physical deficits and behaviors that affect mobility  - Identify mobility aids required to assist with transfers and/or ambulation (gait belt, sit-to-stand, lift, walker, cane, etc )  - [place] fall precautions as indicated by assessment  - Record patient progress and toleration of activity level on Mobility SBAR; progress patient to next Phase/Stage  - Instruct patient to call for assistance with activity based on assessment  - Consider rehabilitation consult to assist with strengthening/weightbearing, etc   Outcome: Progressing     Problem: DISCHARGE PLANNING  Goal: Discharge to home or other facility with appropriate resources  Description: INTERVENTIONS:  - Identify barriers to discharge w/patient and caregiver  - Arrange for needed discharge resources and transportation as appropriate  - Identify discharge learning needs (meds, wound care, etc )  - Arrange for interpretive services to assist at discharge as needed  - Refer to Case Management Department for coordinating discharge planning if the patient needs post-hospital services based on physician/advanced practitioner order or complex needs related to functional status, cognitive ability, or social support system  Outcome: Progressing     Problem: Knowledge Deficit  Goal: Patient/family/caregiver demonstrates understanding of disease process, treatment plan, medications, and discharge instructions  Description: Complete learning assessment and assess knowledge base    Interventions:  - Provide teaching at level of understanding  - Provide teaching via preferred learning methods  Outcome: Progressing     Problem: CARDIOVASCULAR - ADULT  Goal: Maintains optimal cardiac output and hemodynamic stability  Description: INTERVENTIONS:  - Monitor I/O, vital signs and rhythm  - Monitor for S/S and trends of decreased cardiac output  - Administer and titrate ordered vasoactive medications to optimize hemodynamic stability  - Assess quality of pulses, skin color and temperature  - Assess for signs of decreased coronary artery perfusion  - Instruct patient to report change in severity of symptoms  Outcome: Progressing  Goal: Absence of cardiac dysrhythmias or at baseline rhythm  Description: INTERVENTIONS:  - Continuous cardiac monitoring, vital signs, obtain 12 lead EKG if ordered  - Administer antiarrhythmic and heart rate control medications as ordered  - Monitor electrolytes and administer replacement therapy as ordered  Outcome: Progressing     Problem: RESPIRATORY - ADULT  Goal: Achieves optimal ventilation and oxygenation  Description: INTERVENTIONS:  - Assess for changes in respiratory status  - Assess for changes in mentation and behavior  - Position to facilitate oxygenation and minimize respiratory effort  - Oxygen administered by appropriate delivery if ordered  - Initiate smoking cessation education as indicated  - Encourage broncho-pulmonary hygiene including cough, deep breathe, Incentive Spirometry  - Assess the need for suctioning and aspirate as needed  - Assess and instruct to report SOB or any respiratory difficulty  - Respiratory Therapy support as indicated  Outcome: Progressing

## 2021-06-06 NOTE — ASSESSMENT & PLAN NOTE
· Patient follows with cardiology out-patient and was deemed not a surgical candidate  · Continues with medical management with Dr Rocael Godinez

## 2021-06-06 NOTE — ASSESSMENT & PLAN NOTE
· SBP on 6/4 were in the 60-80s;  The patient was transferred to ICU and started on Albumin 50g TID and levophed  · Continue albumin x 3 days   · Continue levophed to maintain MAP's > 65  · Continue midodrine 10 mg TID

## 2021-06-06 NOTE — ASSESSMENT & PLAN NOTE
· Patient follows with Dr James Toro for out-patient pulmonary   · Baseline oxygen requirements of 2L NC at night  · Currently on 2L NC with oxygen saturations of 100%  · Continue atrovent QID  · Continue prednisone 10 mg daily

## 2021-06-06 NOTE — PROGRESS NOTES
Progress Note - Cardiology   Yovana Fernández 81 y o  male MRN: [id number]  Unit/Bed#: ICU 06 Encounter: [id number]    Assessment:  1  Severe aortic stenosis  2  Paroxysmal atrial fibrillation with Eliquis on hold  3  Chronic HFpEF  4  History of healthcare acquired pneumonia  5  Multiple myeloma in relapse   6  Pancytopenia with severe thrombocytopenia, platelets [id number]  7  Elevated ALT, alkaline phosphatase and total bilirubin   8  History of pulmonary embolism, Eliquis on hold  9  Central lobular emphysema   10  Marked improvement in peripheral edema compared to several days ago  11  On pressors for blood pressure support      Plan:  1  Defer to Nephrology for diuretic management  2  Expect significant difficulty weaning patient from pressors  3  Poor short and long-term prognosis       Interval history:  Patient awake and alert  He states that he feels terrible but is not sure what is bothering him  He does demonstrate a dramatic improvement in his edema compared to several days ago    Very concerned that patient will remain pressor dependent      Vitals: /69   Pulse (!) 110   Temp 99 7 °F (37 6 °C) (Temporal)   Resp (!) 38   Ht 5' 8" (1 727 m)   Wt 76 9 kg (169 lb 8 5 oz)   SpO2 94%   BMI 25 78 kg/m²   Vitals:    06/05/21 0600 06/06/21 0500   Weight: 79 kg (174 lb 2 6 oz) 76 9 kg (169 lb 8 5 oz)     Orthostatic Blood Pressures      Most Recent Value   Blood Pressure  107/69 filed at 06/06/2021 1115   Patient Position - Orthostatic VS  Lying filed at 06/05/2021 0600            Intake/Output Summary (Last 24 hours) at 6/6/2021 1203  Last data filed at 6/6/2021 0800  Gross per 24 hour   Intake 1464 45 ml   Output 3795 ml   Net -2330 55 ml       Invasive Devices     Central Venous Catheter Line            Port A Cath 12/10/17 Right Subclavian 1273 days          Peripheral Intravenous Line            Peripheral IV 06/04/21 Left Antecubital 2 days          Drain            Urethral Catheter less than 1 day Review of Systems   Constitutional: Negative for activity change  Respiratory: Negative for cough, chest tightness, shortness of breath and wheezing  Cardiovascular: Negative for chest pain, palpitations and leg swelling  Musculoskeletal: Positive for gait problem  Skin: Negative for color change  Neurological: Negative for dizziness, tremors, syncope, weakness, light-headedness and headaches  Psychiatric/Behavioral: Negative for agitation and confusion  Physical Exam  Vitals signs reviewed  Constitutional:       General: He is not in acute distress  Appearance: He is well-developed  HENT:      Head: Normocephalic and atraumatic  Neck:      Musculoskeletal: Normal range of motion and neck supple  Thyroid: No thyromegaly  Vascular: No carotid bruit or JVD  Trachea: No tracheal deviation  Cardiovascular:      Rate and Rhythm: Normal rate and regular rhythm  Pulses: Normal pulses  Heart sounds: Murmur present  No friction rub  No gallop  Comments: Grade 3/6 late peaking systolic ejection murmur  Pulmonary:      Effort: Pulmonary effort is normal  No respiratory distress  Breath sounds: Normal breath sounds  No wheezing, rhonchi or rales  Chest:      Chest wall: No tenderness  Abdominal:      General: Bowel sounds are normal  There is no distension  Palpations: Abdomen is soft  Tenderness: There is no abdominal tenderness  Musculoskeletal:      Comments: Trace bilateral pretibial edema   Skin:     General: Skin is warm and dry  Neurological:      General: No focal deficit present  Mental Status: He is alert and oriented to person, place, and time  Gait: Gait normal    Psychiatric:         Mood and Affect: Mood normal          Behavior: Behavior normal          Thought Content:  Thought content normal          Judgment: Judgment normal  --------------------------------------------------------------------------------  ECHO:   Results for orders placed during the hospital encounter of 20   Echo complete with contrast if indicated    Narrative [address] [place] [address]  [facility], [address] [facility]  [phone]    Transthoracic Echocardiogram  2D, M-mode, Doppler, and Color Doppler    Study date:  04-Sep-2020    Patient: Alejandro Awan  MR number: [id number]  Account number: [de-identified]  : 1935  Age: 81 years  Gender: Male  Status: Outpatient  Location: [place] [address]  Height: 67 in  Weight: 184 6 lb  BP: 110/ 72 mmHg    Indications: Aortic valve disease  Diagnoses: I35 9 - Nonrheumatic aortic valve disorder, unspecified    Sonographer:  Petra Luciano RDCS  Primary Physician:  Jaky Rivera DO  Referring Physician:  Emiliano Huang DO  Group:  [id number]  Interpreting Physician:  Birtha Skiff, MD    IMPRESSIONS:  Technical quality: Fair  Technically difficult study due to body habitus and poor echo penetration and lung interference  Cardiac rhythm: Sinus with bundle-branch block  1  Mild concentric left ventricular hypertrophy, normal left ventricular systolic function  EF around 55%  Suspected grade 1 diastolic dysfunction  2  Normal right ventricular size and systolic function  3  Normal left and right atrial cavity size  4  Heavily calcified aortic valve with moderate to severe aortic valve stenosis and mild aortic valve regurgitation  (Peak transaortic velocity is 3 23 m/sec, mean gradient is 28 mmHg and calculated aortic valve area is 0 9 cm2)  5  Mitral annular calcification, trace mitral valve regurgitation  Next 6  Trace tricuspid valve regurgitation  7  No obvious pulmonary hypertension  8  No pericardial effusion  9  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 up to 4 5 cm      Compared to report of previous echocardiogram from September 2019 there appears to be some progression of aortic valve stenosis  SUMMARY    LEFT VENTRICLE:  Normal left ventricular cavity size, mild concentric left ventricular hypertrophy, normal left ventricular systolic function  Abnormal septal motion consistent with bundle branch block otherwise no regional wall motion abnormality noted  Ejection fraction is estimated as around 55%  Doppler evaluation suggests grade 1 diastolic dysfunction  Estimated left ventricular filling pressure is normal     RIGHT VENTRICLE:  Normal right ventricular size and systolic function  Right ventricular systolic pressure could not be estimated due to inadequate tricuspid regurgitation profile  LEFT ATRIUM:  Normal left atrial cavity size  Intact interatrial septum  RIGHT ATRIUM:  Normal right atrial cavity size  MITRAL VALVE:  Mitral annular calcification and sclerosis  Reduced mobility of posterior mitral valve leaflet  No mitral valve stenosis  Trace mitral valve regurgitation  AORTIC VALVE:  heavily calcified tricuspid aortic valve with a markedly reduced cuspal separation  There is moderate to severe aortic valve stenosis with mild aortic valve regurgitation  Peak transaortic velocity is 3 23 m/sec, mean gradient is 28 mmHg  and calculated aortic valve area is 0 9 cm2  Doppler velocity index is 0 26 suggesting significant aortic valve stenosis  TRICUSPID VALVE:  Trace tricuspid valve regurgitation  PULMONIC VALVE:  No pulmonic valve regurgitation  AORTA:  Dilated aortic root and proximal ascending aorta measuring up to 4 5 cm  Ascending thoracic aorta aneurysm  IVC, HEPATIC VEINS:  Inferior vena cava is not well visualized  PERICARDIUM:  No pericardial effusion  HISTORY: PRIOR HISTORY: COPD  Cancer  Right bundle branch block  Aortic stenosis  CHF  Pulmonary embolism  CKD 3  Former smoker  PROCEDURE: The study was performed in the [facility]  This was a routine study   The transthoracic approach was used  The study included complete 2D imaging, M-mode, complete spectral Doppler, and color Doppler  The heart rate was 89 bpm, at  the start of the study  Images were obtained from the parasternal, apical, subcostal, and suprasternal notch acoustic windows  Echocardiographic views were limited due to decreased penetration and lung interference  This was a technically  difficult study  LEFT VENTRICLE: Normal left ventricular cavity size, mild concentric left ventricular hypertrophy, normal left ventricular systolic function  Abnormal septal motion consistent with bundle branch block otherwise no regional wall motion  abnormality noted  Ejection fraction is estimated as around 55%  Doppler evaluation suggests grade 1 diastolic dysfunction  Estimated left ventricular filling pressure is normal     RIGHT VENTRICLE: Normal right ventricular size and systolic function  Right ventricular systolic pressure could not be estimated due to inadequate tricuspid regurgitation profile  LEFT ATRIUM: Normal left atrial cavity size  Intact interatrial septum  RIGHT ATRIUM: Normal right atrial cavity size  MITRAL VALVE: Mitral annular calcification and sclerosis  Reduced mobility of posterior mitral valve leaflet  No mitral valve stenosis  Trace mitral valve regurgitation  AORTIC VALVE: heavily calcified tricuspid aortic valve with a markedly reduced cuspal separation  There is moderate to severe aortic valve stenosis with mild aortic valve regurgitation  Peak transaortic velocity is 3 23 m/sec, mean  gradient is 28 mmHg and calculated aortic valve area is 0 9 cm2  Doppler velocity index is 0 26 suggesting significant aortic valve stenosis  TRICUSPID VALVE: Trace tricuspid valve regurgitation  PULMONIC VALVE: No pulmonic valve regurgitation  PERICARDIUM: No pericardial effusion  AORTA: Dilated aortic root and proximal ascending aorta measuring up to 4 5 cm   Ascending thoracic aorta aneurysm  SYSTEMIC VEINS: IVC: Inferior vena cava is not well visualized  SYSTEM MEASUREMENT TABLES    2D  %FS: 34 7 %  Ao Diam: 4 1 cm  EDV(Teich): 60 3 ml  EF(Teich): 64 7 %  ESV(Teich): 21 3 ml  IVSd: 1 2 cm  LA Area: 11 9 cm2  LA Diam: 3 7 cm  LVEDV MOD A4C: 53 5 ml  LVEF MOD A4C: 43 3 %  LVESV MOD A4C: 30 4 ml  LVIDd: 3 8 cm  LVIDs: 2 5 cm  LVLd A4C: 7 8 cm  LVLs A4C: 6 9 cm  LVOT Diam: 2 cm  LVPWd: 1 1 cm  RA Area: 12 1 cm2  RVIDd: 3 7 cm  SV MOD A4C: 23 2 ml  SV(Teich): 39 ml    CW  AR Dec [place]: 1 5 m/s2  AR Dec Time: 1820 1 ms  AR PHT: 527 8 ms  AR Vmax: 2 8 m/s  AR maxP 6 mmHg  AV Env  Ti: 256 4 ms  AV VTI: 65 2 cm  AV Vmax: 3 2 m/s  AV Vmean: 2 5 m/s  AV maxP 6 mmHg  AV meanP 6 mmHg    MM  TAPSE: 1 6 cm    PW  RADHAMES (VTI): 1 cm2  RADHAMES Vmax: 0 9 cm2  AVAI (VTI): 0 cm2/m2  AVAI Vmax: 0 cm2/m2  E': 0 1 m/s  E/E': 10 6  LVOT Env  Ti: 301 1 ms  LVOT VTI: 19 6 cm  LVOT Vmax: 0 9 m/s  LVOT Vmean: 0 7 m/s  LVOT maxP 9 mmHg  LVOT meanP 8 mmHg  LVSI Dopp: 32 7 ml/m2  LVSV Dopp: 64 1 ml  MV A [place]: 0 9 m/s  MV Dec [place]: 5 m/s2  MV DecT: 169 9 ms  MV E Bertin: 0 8 m/s  MV E/A Ratio: 0 9  MV PHT: 49 3 ms  MVA By PHT: 4 5 cm2    [employer] Accredited [facility]ocardi[facility]    Prepared and electronically signed by    Penny Cuba MD  Signed 04-Sep-2020 18:44:49         ======================================================    Lab Results   Component Value Date    WBC 4 32 2021    HGB 9 5 (L) 2021    HCT 29 7 (L) 2021    MCV 96 2021    PLT 33 (LL) 2021      Lab Results   Component Value Date    SODIUM 143 2021    K 4 0 2021     2021    CO2 30 2021    BUN 16 2021    CREATININE 1 07 2021    GLUC 105 2021    CALCIUM 9 0 2021      Lab Results   Component Value Date    HGBA1C 4 9 2020        Lab Results   Component Value Date    INR 1 01 2021    INR 1 13 2021    INR 0 98 2019 PROTIME 13 1 06/04/2021    PROTIME 14 3 04/19/2021    PROTIME 13 1 03/03/2019        Imaging:   I have personally reviewed pertinent reports          EKG:  Atrial fibrillation with a tendency towards RVR

## 2021-06-06 NOTE — PROGRESS NOTES
[facility] PROGRESS NOTE   Jessenia Reed 81 y o  male MRN: [id number]  Unit/Bed#: ICU 06 Encounter: [id number]  Reason for Consult:  Chronic kidney disease, acute kidney injury    ASSESSMENT and PLAN:  1  Acute kidney injury:  Resolved  · Etiology:  Likely related to cardiorenal/hypotension and shock  · Significant improvement in creatinine with offloading/diuresis  · Creatinine down to 1 07  2  Chronic kidney disease, stage III:    · Baseline creatinine 1 11 2 with occasional creatinine up to 1 3  3  Congestive heart failure:    · Echo this admission ejection fraction 94%, grade 1 diastolic dysfunction, severe AS  · Excellent diuresis with IV Lasix  4  Severe aortic stenosis:  Cardiology following for management  Not a surgical candidate  5  Electrolytes:  6  Hypoxic respiratory failure:    · Pneumonia:  Management per primary team  · Emphysema/COPD on chronic prednisone  7  Hypotension/shock:    · Felt to be related to sepsis/pneumonia/bacteremia, aortic stenosis  · Levophed as needed to maintain MA P above 65    8  Anemia in the setting of CKD and multiple myeloma:  Recent iron saturation 17%  Peripheral smear negative for schistocytes  9  Transaminitis:  Likely secondary to hepatic congestion/hypotension  10  Paroxysmal atrial fibrillation    DISPOSITION:  No change to current plan of care  Continue midodrine  Limit use of albumin    SUBJECTIVE / 24H INTERVAL HISTORY:  Complains of being thirsty  He does not know whether he is feeling better or having less shortness of breath      OBJECTIVE:  Current Weight: Weight - Scale: 76 9 kg (169 lb 8 5 oz)  Vitals:    06/06/21 0755 06/06/21 0815 06/06/21 0915 06/06/21 1015   BP:  141/79 114/67 113/61   Pulse:  102 (!) 108 (!) 112   Resp:  (!) 28 (!) 24 (!) 35   Temp:       TempSrc:       SpO2: 97% 98% 97% 96%   Weight:       Height:           Intake/Output Summary (Last 24 hours) at 6/6/2021 1118  Last data filed at 6/6/2021 0800  Gross per 24 hour   Intake 1464 45 ml   Output 4165 ml   Net -2700 55 ml     General:  Acutely and chronically ill-appearing  Skin: no rash  Eyes: anicteric sclera  ENT: moist mucous membrane  Neck: supple  Chest: CTA b/l, no ronchii, no wheeze, no rubs, no rales  Normal effort at rest   On nasal cannula oxygen  CVS: L3L4, systolic murmur, no gallop, no rub  Slightly tachycardic  Abdomen: soft, nontender, nl sounds  Extremities: no edema LE b/l  :  ashraf  Neuro:  Alert    No acute deficits  Psych:  Slightly irritable  Medications:    Current Facility-Administered Medications:     acetaminophen (TYLENOL) tablet 650 mg, 650 mg, Oral, Q6H PRN, Duayne Olayinka, CRNP, 650 mg at 06/04/21 0657    cefepime (MAXIPIME) 2 g/50 mL dextrose IVPB, 2,000 mg, Intravenous, Q12H, Duayne Olayinka, CRNP, Last Rate: 100 mL/hr at 06/06/21 0602, 2,000 mg at 06/06/21 0602    famotidine (PEPCID) tablet 20 mg, 20 mg, Oral, Daily, Duayne Olayinka, CRNP, 20 mg at 06/06/21 0920    furosemide (LASIX) injection 80 mg, 80 mg, Intravenous, Once, Loi Weiss MD    ipratropium-albuterol (DUO-NEB) 0 5-2 5 mg/3 mL inhalation solution 3 mL, 3 mL, Nebulization, TID, Solomon Alcaraz MD, 3 mL at 06/06/21 0754    midodrine (PROAMATINE) tablet 15 mg, 15 mg, Oral, TID AC, Loi Weiss MD, 15 mg at 06/06/21 0602    NOREPINEPHRINE 4 MG  ML NSS (CMPD ORDER) infusion, 1-30 mcg/min, Intravenous, Titrated, Faby Ambrizs, CRNP, Last Rate: 18 8 mL/hr at 06/06/21 0735, 5 mcg/min at 06/06/21 0735    potassium chloride (K-DUR,KLOR-CON) CR tablet 20 mEq, 20 mEq, Oral, Daily, Duayne Olayinka, CRNP, 20 mEq at 06/06/21 5791    predniSONE tablet 10 mg, 10 mg, Oral, Daily, Duayne Olayinka, CRNP, 10 mg at 06/06/21 0920    vancomycin (VANCOCIN) oral solution 125 mg, 125 mg, Oral, Q12H [facility] & assisted, JACK Castillo, 125 mg at 06/06/21 6880    Laboratory Results:  Results from last 7 days   Lab Units 06/06/21  0503 06/05/21  0402 06/05/21  1878 06/05/21  0310 06/04/21  6418 06/03/21  0508 06/02/21  1343 06/02/21  0531 06/01/21  1429   WBC Thousand/uL 4 32  --  2 44*  --  3 58* 4 06*  --  3 41* 6 38   HEMOGLOBIN g/dL 9 5*  --  6 0*  --  7 6* 8 3* 7 9* 7 2* 9 1*   HEMATOCRIT % 29 7*  --  19 4*  --  23 9* 26 7* 25 5* 22 7* 29 1*   PLATELETS Thousands/uL 33*  --  34*  --  51*  51* 56*  --  54* 23*   POTASSIUM mmol/L 4 0 3 8  --   --  3 3* 3 8  --  4 1 4 2   CHLORIDE mmol/L 102 103  --   --  99* 103  --  104 101   CO2 mmol/L 30 32  --   --  35* 32  --  29 30   BUN mg/dL 16 18  --   --  21 22  --  24 26*   CREATININE mg/dL 1 07 1 11  --   --  1 46* 1 22  --  1 15 1 41*   CALCIUM mg/dL 9 0 8 1*  --   --  8 5 8 8  --  7 7* 8 2*   MAGNESIUM mg/dL 1 7  --   --  1 6  --   --   --   --   --

## 2021-06-07 PROBLEM — E83.39 HYPOPHOSPHATEMIA: Status: ACTIVE | Noted: 2021-01-01

## 2021-06-07 NOTE — PHYSICAL THERAPY NOTE
06/07/21 1322   PT Last Visit   PT Visit Date 06/07/21   Note Type   Note Type Treatment   Pain Assessment   Pain Assessment Tool Pain Assessment not indicated - pt denies pain   Restrictions/Precautions   Weight Bearing Precautions Per Order No   Other Precautions Multiple lines;O2;Fall Risk; Chair Alarm; Bed Alarm;Telemetry;Hard of hearing   General   Chart Reviewed Yes   Family/Caregiver Present Yes  (wife & daughter)   Cognition   Overall Cognitive Status WFL   Arousal/Participation Alert; Responsive   Attention Attends with cues to redirect   Orientation Level Oriented to person;Oriented to place   Memory Decreased short term memory   Following Commands Follows one step commands with increased time or repetition   Subjective   Subjective "I don't want to do anything now " Pt's family attempted to talk pt into ex but he did not want to perform, but family interested in LE ex to perform as pt tolerates   Bed Mobility   Additional Comments boosted pt in bed with RN Lawton Goldmann for comfort max x 2   Transfers   Sit to Stand   (pt deferred)   Ambulation/Elevation   Gait pattern   (pt deferred)   Endurance Deficit   Endurance Deficit Yes   Endurance Deficit Description SOB & weakness   Activity Tolerance   Activity Tolerance Patient limited by fatigue;Treatment limited secondary to medical complications (Comment)   Nurse Made Aware RN Lawton Goldmann   Exercises   [employer] Supine;5 reps;AROM; Bilateral  (instructed family & pt on performance)   Heelslides   (instructed family on performance)   Hip Flexion   (instructed pt on performance)   Hip Abduction   (instructed family on performance)   Ankle Pumps Supine;10 reps;AAROM; Bilateral  (instructed family on performance & daughter assisted pt)   Assessment   Prognosis Good   Problem List Decreased strength;Decreased endurance; Impaired balance;Decreased mobility; Decreased skin integrity   Assessment Pt seen for PT treatment session this date with interventions consisting of therapeutic activity consisting of training: bed mobility and pt/family education on LE ex to perform with assistance as tolerated x 10 reps each-pt performed QS & AP as noted  Pt agreeable to PT treatment session upon arrival, pt found supine in bed w/ HOB elevated, in no apparent distress  In comparison to previous session, pt with no improvements as evidenced by pt deferring transfer & amb attempts  Post session: pt returned BTB, bed alarm engaged, all needs in reach and RN notified of session findings/recommendations  Continue to recommend post acute rehabilitation services vs HHPT pending progress at time of d/c in order to maximize pt's functional independence and safety w/ mobility  Pt continues to be functioning below baseline level, and remains limited 2* factors listed above and including decreased strength, endurance & safe functional mobility  PT will continue to see pt during current hospitalization in order to address the deficits listed above and provide interventions consistent w/ POC in effort to achieve STGs  Barriers to Discharge Inaccessible home environment   Barriers to Discharge Comments stairs   Goals   Patient Goals to feel better   PT Treatment Day 1   Plan   Treatment/Interventions Functional transfer training;LE strengthening/ROM; Therapeutic exercise;Elevations; Endurance training;Patient/family training;Equipment eval/education; Bed mobility;Gait training;Spoke to nursing;Family   Progress No functional improvements   PT Frequency   (3-5 x week)   Recommendation   PT Discharge Recommendation Post acute rehabilitation services  (vs HHPT pending progress)   Equipment Recommended Walker;Cane  (trial next session)   Walker Package Recommended   (pt has DME at home)   PT - OK to Discharge No   AM-PAC Basic Mobility Inpatient   Turning in Bed Without Bedrails 3   Lying on Back to Sitting on Edge of Flat Bed 3   Moving Bed to Chair 3   Standing Up From Chair 3   Walk in Room 2   Climb 3-5 Stairs 2   Basic Mobility Inpatient Raw Score 16   Basic Mobility Standardized Score 38 32   The patient's AM-PAC Basic Mobility Inpatient Short Form Raw Score is 16, Standardized Score is 38 32  A standardized score less than 42 9 suggests the patient may benefit from discharge to post-acute rehabilitation services vs HHPT pending progress   Please also refer to the recommendation of the Physical Therapist for safe discharge planning as discussed with Physical Therapist   Faye Gonzalez PTA

## 2021-06-07 NOTE — CASE MANAGEMENT
Discharge planning:     LOS: 6  Bundle: Pt is not a bundle; Pt is not a readmission  Unplanned Readmission Score: 30       CM spoke with pt's Daughter Yue Rogel to complete pt's assessment  Explained the role of CM  Obtained the following information from pt's Daughter  Pt's Emergency contact is Casper Britoster Ph: [phone]  Pt lives with his wife in a split level home  Prior to admission to ICU pt was able to navigate steps on his own and was independent with ambulation  He use to drive as well  Pt ha no hx of STR  Pt was open with Clarissa DAVILA visiting nurse and PT  Pt uses [facility] for all rx prescriptions/medications  Pt has no hx of Mental health  His PCp is Dr Marissa Lerner is his wife Vamsi Curry  Discharge goals: After discussion with Pt's daughter Yue Rogel and family members, Pt's  family is requesting Home Hospice  Pt does not have any hospice medical equipment at home  CM sent referral through [address] [place] [address] regarding Home hospice for the pt, pending response  CM reviewed d/c planning process including the following: identifying help at home, patient preferences for d/c planning needs, [facility] to [facility] program, availability of treatment team to discuss questions or concerns patient and/or family may have regarding understanding medications and recognizing signs and symptoms once discharged       CM department will continue to follow through pt's D/C

## 2021-06-07 NOTE — ASSESSMENT & PLAN NOTE
· Patient follows with Dr Hilaria Fisher for out-patient pulmonary   · Baseline oxygen requirements of 2L NC at night  · Currently on 2L NC with oxygen saturations of 100%  · Continue atrovent QID  · Continue prednisone 10 mg daily

## 2021-06-07 NOTE — QUICK NOTE
Progress Note - Neurology   Mohr Tucker 69 y o  female MRN: [id number]  Unit/Bed#:  Encounter: [id number]      Subjective:   Patient intubated not responsive to verbal commands, spontaneously moves bilateral lower extremity left more than the right, not on any sedation, MRI was limited he without evidence of acute stroke  No clinical seizures noted  ROS:  Pertinent review of systems as per HPI, otherwise negative     Vitals:   Vitals:    05/22/18 0901   BP:    Pulse:    Resp:    Temp:    SpO2: 99%   ,Body mass index is 23 15 kg/m²  MEDS:    Current Facility-Administered Medications:  albumin human        acetaminophen 650 mg Oral Q6H PRN BLAISE VitalNP    bisacodyl 10 mg Rectal Daily Rowdy Raya PA-C    cefepime 1,000 mg Intravenous Q24H Velna Alpha, CRNP Last Rate: Stopped (05/22/18 0800)   chlorhexidine 15 mL Swish & Spit Q12H [address] Velna Alpha, CRNP    epoetin babak 10,000 Units Intravenous Once per day on Mon Wed Fri Bhavana Ness MD    fentanyl citrate (PF) 50 mcg Intravenous Q2H PRN [place], CRNP    pantoprazole 40 mg Intravenous Q24H [address] Perry PalmsBLAISENP    vancomycin 10 mg/kg Intravenous After Dialysis Velna Alpha, CRNP Last Rate: 500 mg (05/21/18 1532)   :    Physical Exam:  GENERAL:  Patient is status post intubation not responsive to verbal stimuli  HEENT/NECK: Head is atraumatic normocephalic, neck is supple  NEUROLOGIC:  Mental Status:  Intubated not responsive to verbal stimuli,  Cranial Nerves:  Pupils are 2 mm and reactive, she does have corneals and gag  Motor:  Spontaneously moves the right lower extremity and left lower extremity, left more than the right  Coordination could not be tested  Reflexes:  1+ and symmetrical with withdrawal plantar          Lab Results: I have personally reviewed pertinent reports    Imaging Studies: I have personally reviewed pertinent films in PACS      Assessment/plan,  Change in Updated family bedside mental status with questionable CVA on the CT scan, MRI of the brain was suboptimal also patient with GI bleeding and increased WBC count secondary to sepsis and End  stage renal disease which could be contributing to patient's change in mental status, would recommend repeating an MRI scan of the brain when patient is more stable and also an EEG, antibiotic management as per primary team, primary team to discuss with family and assess patient's baseline, currently patient not on any antiplatelets or anticoagulation secondary to GI bleed, would recommend cardiology evaluation to evaluate for her history of atrial fibrillation, prognosis is guarded  Case discussed with primary team         Maria Eugenia Rush MD  5/22/2018,11:12 AM    Dictation voice to text software has been used in the creation of this document

## 2021-06-07 NOTE — CONSULTS
Consultation - Palliative & Supportive Care   Lazarus Espino  81 y o   male  ICU 06/ICU 06   MRN: [id number]  Encounter: [id number]    ASSESSMENT:    Patient Active Problem List   Diagnosis    Chronic rhinitis    Adenocarcinoma, lung ([facility] [address] )    BPH (benign prostatic hyperplasia)    Multiple myeloma (HCC)    Chronic diarrhea    Centrilobular emphysema ([facility] [address] )    Right bundle branch block    Enlarged prostate without lower urinary tract symptoms (luts)    Osteoporosis    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HCC)    Severe aortic stenosis    Peripheral edema    Chronic anticoagulation    Venous insufficiency (chronic) (peripheral)    Rectal cancer (HCC)    Elevated PSA    Gross hematuria    Hypogammaglobulinemia (HCC)    Skin lesion    Port-A-Cath in place    HCAP (healthcare-associated pneumonia)    Chronic diastolic congestive heart failure (HCC)    Idiopathic hypotension    CKD (chronic kidney disease) stage 3, GFR 30-59 ml/min (HCC)    Right flank pain    Nocturnal hypoxia    Blood in stool    Chronic respiratory failure (HCC)    Pancytopenia (HCC)    Personal history of malignant neoplasm of breast    Multiple myeloma in relapse (HCC)    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    Epistaxis    Thrombocytopenia (HCC)    Anemia       Active problems addressed:    · Multiple myeloma, heavily pre-treated in the past, currently on chemo (last one 5/5)  · Centrilobular emphysema  · Severe aortic stenosis  · Cardiogenic shock requiring pressors  · Chronic HFpEF  · CKD3  · Goals of care    Consult is for goals of care    PALLIATIVE AND SUPPORTIVE CARE FAMILY CONFERENCE:    Time of Meeting: 10:30am    Participants: patient's wife/Abbey and daughter/Gia on the phone    Patient Participation: NA, delirious on exam today and slightly tachypneic    Patient Support System: family including 2 other daughters    Meeting Location: over the phone, not visitation times yet    Advanced Directive of POLST available: not applicable    A family meeting was held for goals of care  This meeting was necessary for determine the appropriate course of treatment  Topics of Discussion:  1  Introduced palliative care to family with emphasis on goals of care conversation  2  Almost immediately, daughter asked for a "straight up answer" as they feel like no one had talked to them about his overall condition  To this end, we discussed the following:     · Acknowledged patient's current decline which the daughter identified as accelerated after his last (new) chemo on 5/5  We spoke about multiple reasons for his decline including severe aortic stenosis which can push him to heart failure with minimal changes in his volume/fluid status, severe COPD as well as multiple myeloma that has failed multiple lines of treatments and progressed recently despite appropriate treatments  ·  Provided my recommendation of hospice/comfort cares as an option any time in his care now  However, also emphasized that hospice care is a personal choice - whether that of the patient's and/or family - as it entails accepting that the inevitable (death/end of life) is near despite disease-directed therapies and switching gears towards comfort-driven measures instead of recovery/restoration as the goal  Hospice/comfort measures would mean stopping therapies meant to prolong the inevitable, including chemotherapy, vasopressors, high oxygen supplementations (NRB/BiPAP), fluids, etc, and focusing on treatments meant to alleviate symptoms of underlying disease processes, like pain/shortness of breath/anxiety/agitation  These disease processes will no longer be fixed/are fixable  The expectation is that the patient will pass away once life-prolonging treatments are stopped       3  They confirmed to me that after speaking to the ICU team last night, they have decided to honor the patient's wish to forego CPR should he suffer a cardiorespiratory arrest  They believe that doing so will induce more suffering in the end and his overall prognosis will not change  It is unclear why his code status was not changed to level 3  With their permission, I will change it to level 3      4  At this time, they are absolutely determined to forego any more chemotherapy in which case I strongly recommend hospice  Whether hospice now or later at home will be decided in the next few hours/days depending on hospitalization  5  They are requesting to continue all current measures with continued cautious hope of some recovery/stabilization - enough that they can still transition him to home hospice if possible  They are aware that the prognosis is tenuous and he can still decline despite optimal supportive cares, including antibiotics, fluids and pressors  They voiced understanding  10  Daughter tearful towards the end of conversation and expressed request to continue cares in the hopes that the patient's sister from [place] makes it in time to see him  I provided assurance that everything will continue until we hear otherwise  I encouraged that the sister, or whoever else who wants to see him, comes sooner rather than later  7  Family grateful for call  All questions answered to their expressed satisfaction  They will come to visit later  Other Content of Meeting:  Provided emotional support and listening to family who are obviously greiving  PLAN:  1  Change code status to level 3 - DNAR/DNI  2  Continue current care with the hope that he stabilize some more for possible transition to hospice at home (if possible)  Aware that prognosis is very tenuous with possible decline despite full supportive cares  3  They are determined to stop chemotherapy for him  Therefore, eligible for hospice at dispo  4  Not ready for full comfort measures yet at this time  5  ICU to continue providing updates and recommendations to family as his disease process evolve   Please consider family's request to continue cares at least until after the patient's sister from [place] comes to see him  6  D/w ICU RN    Time Involved in Meetin, beginning at approximately 10:30 and ending at approximately 11:15  Code status: Level 3 - DNAR and DNI   Decisional apparatus:  Patient does not have capacity to make medical decisions on my exam today  If such capacity is lost, patient's substitute decision maker would default to wife by [place]  Advance Directive / Living Will / POLST:  NA    We appreciate the opportunity to participate in this patient's care  We will continue to follow  Please do not hesitate to contact our on-call provider through our clinic answering service at [phone] should you have acute symptom control concerns  IDENTIFICATION:  Inpatient consult to Palliative Care  Consult performed by: Huy Ga MD  Consult ordered by: Lorin Schwab, [address] [place] [address]        Reason for Consult / Principal Problem: goals of care    HISTORY OF PRESENT ILLNESS:    Jojo Gannon is a 81 y o  male with with COPD, chronic HFpEF, severe aortic stenosis, adenocarcinoma of the RUL s/p VATS, and multiple myeloma heavily pre-treated in the past with chemo, with disease progression on 3/2021 and so is switched to and currently on isatuximab and carfizomib, last dose   He was admitted on  from home because of progressive weakness, fever and cough  He was started on abx for HCAP  Also found to be volume overloaded with increased peripheral edema and received diuretics  On , an RRT was initiated for hypotension and was upgraded to CIU level of care on the same day for the initiation of pressor for septic+/-cardiogenic shock  He has been in the ICU since  [facility] for [address]  Of note, he saw Oncology/Dr Lindsay Roblero on   Dr Lindsay Roblero discussed with him, his wife and his daughter about transitioning to hospice but he was not ready and wants to continue with chemotherapy   He agreed to the next line of treatment proposed that was started on 5/5  Of note, he was evaluated by palliative care during his 4/2021 hospital admission for CAP  Per that consult - "At this time, he is not ready to stop anything yet and does not believe he is quite ready for hospice yet  He said that as long as Dr James Toro or Dr Myles Salmeron are "not giving up on me yet", then he will not give up either "    Met with patient in his room in the ICU today  He was awake but groggy  Appears in some mild-moderate respiratory distress with obviously tachypnea and using abdominal mm to breath  He kept asking me, "what do you want?" or "what do you want from me?"  He admitted to feeling "bad" but was not able to elaborate further  No other medical information can be gathered  I do not think he has the full capacity to make complex medical choices on exam today - whether from delirium/TME or acute respiratory distress  Called his wife and his daughter  Rest of conversation as above  Interview and exam limited by: none    Review of Systems   Unable to perform ROS: Acuity of condition ("I dont feel well")       Past Medical History:   Diagnosis Date    Cancer ([facility] [facility] )     Chronic pain disorder     3 fractured vertebrae    Congestive heart failure (CHF) (HCC)     COPD (chronic obstructive pulmonary disease) ([facility] )     Diarrhea     Diverticulitis     Enlarged prostate     Kidney stone     Multiple myeloma ([facility] )     Pulmonary embolism ([facility] ) 12/24/2018    Rectal adenocarcinoma ([facility] )     Sleep apnea     no cpap     Past Surgical History:   Procedure Laterality Date    CHOLECYSTECTOMY      COLONOSCOPY W/ ENDOSCOPIC US N/A 7/21/2017    Procedure: ANAL ENDOSCOPIC U/S;  Surgeon: Nelli Reaves MD;  Location: [facility];   Service: Colorectal    HERNIA REPAIR      KIDNEY STONE SURGERY      LUNG LOBECTOMY Right     HI COLONOSCOPY FLX DX W/COLLJ SPEC WHEN PFRMD N/A 7/10/2017    Procedure: COLONOSCOPY;  Surgeon: Joan Chicas MD;  Location: [facility]; Service: Gastroenterology    NE RECTAL TUMOR EXCISION, TRANSANAL ENDOSCOPIC MICROSURGICAL, FULL THICK N/A 11/2/2017    Procedure: TRANSANAL ENDOSCOPIC MICROSURGERY TEM;  Surgeon: Nelli Reaves MD;  Location: [facility];  Service: Colorectal     Social History     Socioeconomic History    Marital status: /Civil Union     Spouse name: Not on file    Number of children: Not on file    Years of education: Not on file    Highest education level: Not on file   Occupational History    Not on file   Social Needs    Financial resource strain: Not on file    Food insecurity     Worry: Not on file     Inability: Not on file    Transportation needs     Medical: Not on file     Non-medical: Not on file   Tobacco Use    Smoking status: Former Smoker     Packs/day: 1 00     Years: 50 00     Pack years: 50 00     Types: Cigarettes     Start date: 57     Quit date: 12/7/2010     Years since quitting: 10 5    Smokeless tobacco: Never Used    Tobacco comment: quit in 1999   Substance and Sexual Activity    Alcohol use:  Yes     Alcohol/week: 1 0 standard drinks     Types: 1 Standard drinks or equivalent per week     Frequency: Monthly or less     Drinks per session: 1 or 2     Comment: rarely    Drug use: Never    Sexual activity: Not on file   Lifestyle    Physical activity     Days per week: Not on file     Minutes per session: Not on file    Stress: Not on file   Relationships    Social connections     Talks on phone: Not on file     Gets together: Not on file     Attends Zoroastrian service: Not on file     Active member of club or organization: Not on file     Attends meetings of clubs or organizations: Not on file     Relationship status: Not on file    Intimate partner violence     Fear of current or ex partner: Not on file     Emotionally abused: Not on file     Physically abused: Not on file     Forced sexual activity: Not on file   Other Topics Concern    Not on file   Social History Narrative    Not on file     Family History   Problem Relation Age of Onset    Arthritis Mother     Colon cancer Father     Heart attack Father     Diabetes type I Sister        MEDICATIONS / ALLERGIES:  all current active meds have been reviewed    Allergies   Allergen Reactions    Gabapentin      somnolence    Metaxalone     Nisoldipine     Sulfa Antibiotics Itching    Aldactone [Spironolactone] Other (See Comments)     Breast swelling and pain  Breast swelling and pain    Penicillin V Rash     AS CHILD       OBJECTIVE:  /66   Pulse (!) 112   Temp 100 1 °F (37 8 °C)   Resp (!) 50   Ht 5' 8" (1 727 m)   Wt 74 6 kg (164 lb 7 4 oz)   SpO2 96%   BMI 25 01 kg/m²   Physical Exam:  Constitutional: Appears thin, frail, sickly, in some acute respiratory distress  Head: Normocephalic and atraumatic  Temporal mm wasting  Eyes: EOM are normal  No ocular discharge  No scleral icterus  Neck: No visible adenopathy or masses  Respiratory: On NC, tachypniec with abdominal mm usage, groggy   Gastrointestinal: No abdominal distension  Musculoskeletal: No edema  Neurological: Groggy, did not answer questions or followed commands appropriately  Skin: Dry, no diaphoresis  Pale  Psychiatric: Calm, slightly/weakly agitated    Lab Results: I have personally reviewed pertinent labs  Imaging Studies: I have personally reviewed pertinent reports  EKG, Pathology, and Other Studies: I have personally reviewed pertinent reports  Counseling / Coordination of Care:  Counseling / Coordination of Care  Total floor / unit time spent today 60+ minutes  Greater than 50% of total time was spent with the patient and / or family counseling and / or coordination of care   A description of the counseling / coordination of care: provided medical updates, discussed palliative care, discussed hospice care, determined competency, determined goals of care, determined POA, determined social/family support, discussed plans of care, discussed symptom management, provided psychosocial support       Driss Cervantes MD  [address] and Supportive Care  [phone]

## 2021-06-07 NOTE — PROGRESS NOTES
Progress Note - Nephrology   Cammie Paz 81 y o  male MRN: [id number]  Unit/Bed#: ICU 06 Encounter: [id number]      Assessment / Plan:  1  Acute kidney injury with associated oliguria on chronic kidney disease, baseline creatinine 1 1-1 2  , significant improvement in urine output with Lasix infusion  Has been receiving intermittent diuretic dosing  CXR supports volume overload  Will provide IV lasix 80mg intermittently BID to begin now  Monitor volume status  On 8L O2 via NC currently  Monitor BMP with diuresis  Has poor prognosis overall in setting of severe AS  2   hypoxic respiratory failure, as per primary service, will begin IV lasix as above  3  Severe aortic stenosis with ejection fraction 58% - cardio follows  4  Multiple myeloma, has been on chemotherapy previously   5  Hypotension/ shock likely secondary to pneumonia as well as aortic stenosis, wean hemodynamic support as tolerated, increase midodrine 15 mg t i d  Will hold on albumin in light of volume overload  6   acute on chronic anemia in the face of underlying multiple myeloma, transfuse as needed, Hgb in 9s, monitor CBC  Also with thrombocytopenia  hemolysis smear negative as of 21  7  Transaminitis, may be secondary to hepatic congestion/hypotension - improving  8  Hypophosphatemia - phos 1 7, monitor s/p repletion    Standing lasix 80mg IV BID ordered  Would hold diuresis for hypotension or UOP > 2L in 24 hr period  Guarded prognosis in setting of severe AS  Subjective: When I ask him how he is doing he says he has multiple myeloma  He does not stay awake for long volume of listing review of systems  He is on a L oxygen via nasal cannula  Objective:     Vitals: Blood pressure 101/66, pulse (!) 112, temperature 100 1 °F (37 8 °C), resp  rate (!) 50, height 5' 8" (1 727 m), weight 74 6 kg (164 lb 7 4 oz), SpO2 96 %  ,Body mass index is 25 01 kg/m²  Temp (24hrs), Av 3 °F (37 4 °C), Min:97 6 °F (36 4 °C), Max:101 2 °F (38 4 °C)      Weight (last 2 days)     Date/Time   Weight    06/07/21 0537   74 6 (164 46)    06/07/21 0529   74 6 (164 46)    06/06/21 0500   76 9 (169 53)    06/05/21 0600   79 (174 16)                Intake/Output Summary (Last 24 hours) at 6/7/2021 1353  Last data filed at 6/7/2021 1101  Gross per 24 hour   Intake 1018 18 ml   Output 1530 ml   Net -511 82 ml     I/O last 24 hours: In: 1130 4 [P O :480; I V :535 4; IV Piggyback:115]  Out: 9240 [[id number]]        Physical Exam:   Physical Exam  Vitals signs reviewed  Constitutional:       General: He is not in acute distress  Appearance: He is well-developed  He is ill-appearing (chronically)  He is not diaphoretic  HENT:      Head: Normocephalic and atraumatic  Nose: Nose normal       Mouth/Throat:      Mouth: Mucous membranes are moist       Pharynx: No oropharyngeal exudate  Eyes:      General: No scleral icterus  Right eye: No discharge  Left eye: No discharge  Neck:      Musculoskeletal: Neck supple  Thyroid: No thyromegaly  Cardiovascular:      Rate and Rhythm: Normal rate and regular rhythm  Heart sounds: Murmur present  Pulmonary:      Effort: Pulmonary effort is normal       Breath sounds: No wheezing or rales  Comments: Coarse BS b/l  Abdominal:      General: Bowel sounds are normal  There is no distension  Palpations: Abdomen is soft  Tenderness: There is no abdominal tenderness  Genitourinary:     Comments: +ashraf  Musculoskeletal:         General: Swelling (trace b/l LE) present  Lymphadenopathy:      Cervical: No cervical adenopathy  Skin:     General: Skin is warm and dry  Findings: No rash  Neurological:      Mental Status: He is alert        Comments: Drowsy, falls asleep quickly during examination   Psychiatric:         Mood and Affect: Mood normal          Behavior: Behavior normal          Invasive Devices     Central Venous Catheter Line            Port A Cath 12/10/17 Right Subclavian 1275 days          Peripheral Intravenous Line            Peripheral IV 06/04/21 Left Antecubital 3 days          Drain            Urethral Catheter 1 day                Medications:    Scheduled Meds:  Current Facility-Administered Medications   Medication Dose Route Frequency Provider Last Rate    acetaminophen  650 mg Oral Q6H PRN Peggy Fleischer, CRNP      cefepime  2,000 mg Intravenous Q12H Peggy Fleischer, CRNP Stopped (06/07/21 1295)    famotidine  20 mg Oral Daily Peggy Fleischer, CRNP      ipratropium-albuterol  3 mL Nebulization TID Kitty Costa MD      midodrine  15 mg Oral TID AC Ranulfo Richter MD      norepinephrine  1-30 mcg/min Intravenous Titrated [facility] 3 mcg/min (06/07/21 1110)    potassium chloride  20 mEq Oral Daily Peggy Fleischer, CRNP      potassium-sodium phosphateS  2 tablet Oral TID With Meals [facility]      predniSONE  10 mg Oral Daily Peggy Fleischer, CRNP      vancomycin  125 mg Oral Q12H [facility] Peggy Fleischer, CRNP         PRN Meds:   acetaminophen    Continuous Infusions:norepinephrine, 1-30 mcg/min, Last Rate: 3 mcg/min (06/07/21 1110)            LAB RESULTS:      Results from last 7 days   Lab Units 06/07/21  0528 06/06/21  0503 06/05/21  0402 06/05/21  0353 06/05/21  0310 06/04/21  0758 06/03/21  0508 06/02/21  1343 06/02/21  0531 06/01/21  1429   WBC Thousand/uL 5 10 4 32  --  2 44*  --  3 58* 4 06*  --  3 41* 6 38   HEMOGLOBIN g/dL 9 3* 9 5*  --  6 0*  --  7 6* 8 3* 7 9* 7 2* 9 1*   HEMATOCRIT % 29 6* 29 7*  --  19 4*  --  23 9* 26 7* 25 5* 22 7* 29 1*   PLATELETS Thousands/uL 29* 33*  --  34*  --  51*  51* 56*  --  54* 23*   NEUTROS PCT %  --   --   --   --   --   --  86*  --   --   --    LYMPHS PCT %  --   --   --   --   --   --  3*  --   --   --    LYMPHO PCT %  --  3*  --  7*  --   --   --   --   --  2*   MONOS PCT %  --   --   --   --   --   --  3*  --   --   --    MONO PCT %  --  3*  --  1*  --   --   --   -- --  0*   EOS PCT %  --  0  --  1  --   --  1  --   --  1   POTASSIUM mmol/L 4 2 4 0 3 8  --   --  3 3* 3 8  --  4 1 4 2   CHLORIDE mmol/L 105 102 103  --   --  99* 103  --  104 101   CO2 mmol/L 29 30 32  --   --  35* 32  --  29 30   BUN mg/dL 22 16 18  --   --  21 22  --  24 26*   CREATININE mg/dL 1 03 1 07 1 11  --   --  1 46* 1 22  --  1 15 1 41*   CALCIUM mg/dL 9 4 9 0 8 1*  --   --  8 5 8 8  --  7 7* 8 2*   ALK PHOS U/L  --  123* 137*  --   --   --   --   --   --  74   ALT U/L  --  115* 155*  --   --   --   --   --   --  49   AST U/L  --  42 130*  --   --   --   --   --   --  28   MAGNESIUM mg/dL 1 7 1 7  --   --  1 6  --   --   --   --   --    PHOSPHORUS mg/dL 1 7*  --   --   --   --   --   --   --   --   --        CUTURES:  Lab Results   Component Value Date    BLOODCX No Growth at 24 hrs  06/05/2021    BLOODCX No Growth at 24 hrs  06/05/2021    BLOODCX No Growth After 5 Days  06/01/2021    BLOODCX Staphylococcus coagulase negative (A) 06/01/2021    BLOODCX No Growth After 5 Days  12/02/2020    BLOODCX No Growth After 5 Days  12/02/2020    BLOODCX No Growth After 5 Days  01/05/2020    URINECX No Growth <1000 cfu/mL 05/06/2019                 Portions of the record may have been created with voice recognition software  Occasional wrong word or "sound a like" substitutions may have occurred due to the inherent limitations of voice recognition software  Read the chart carefully and recognize, using context, where substitutions have occurred  If you have any questions, please contact the dictating provider

## 2021-06-07 NOTE — PROGRESS NOTES
Progress Note - Cardiology   Gisell Marie 81 y o  male MRN: [id number]  Unit/Bed#: ICU 06 Encounter: [id number]    Assessment:  1  Severe aortic stenosis  2  Hypotension/cardiogenic shock requiring pressors presently on norepinephrine at 5 micrograms/minute  3  Paroxysmal atrial fibrillation with Eliquis on hold, presently in atrial fibrillation  4  Chronic HFpEF  5  Multiple myeloma in relapse  6  Pancytopenia with severe thrombocytopenia  7  History of healthcare acquired pneumonia  8  Elevated ALT, alkaline phosphatase and total bilirubin  9  History of pulmonary embolism, Eliquis on hold  10  Central lobular emphysema  11  Marked improvement in peripheral edema   12  Significant bronchospasm which may be can contributing to patient's hypoxia and respiratory rate     Plan:  1  Defer to Nephrology for diuretic management although do not feel patient needs any further diuresis  2  Expect difficulty in weaning pressors  3  Poor short and long-term prognosis  4  Consider pulmonary consult    Interval history:  Patient presently alert and response to commands  Continual moaning  Blood pressure acceptable on support  Afebrile  Heart rate approximately 100 average in atrial fibrillation  Respiratory rate 30-38 breaths per minute oxygen saturation 92%    Weight down 18 lb      Vitals: /64   Pulse 94   Temp 98 1 °F (36 7 °C) (Temporal)   Resp (!) 31   Ht 5' 8" (1 727 m)   Wt 74 6 kg (164 lb 7 4 oz)   SpO2 92%   BMI 25 01 kg/m²   Vitals:    06/07/21 0529 06/07/21 0537   Weight: 74 6 kg (164 lb 7 4 oz) 74 6 kg (164 lb 7 4 oz)     Orthostatic Blood Pressures      Most Recent Value   Blood Pressure  111/64 filed at 06/07/2021 0320   Patient Position - Orthostatic VS  Lying filed at 06/06/2021 2033            Intake/Output Summary (Last 24 hours) at 6/7/2021 0844  Last data filed at 6/7/2021 0634  Gross per 24 hour   Intake 894 88 ml   Output 1165 ml   Net -270 12 ml       Invasive Devices     Central Venous Catheter Line            Port A Cath 12/10/17 Right Subclavian 1274 days          Peripheral Intravenous Line            Peripheral IV 06/04/21 Left Antecubital 2 days          Drain            Urethral Catheter 1 day                Review of Systems   Unable to perform ROS: Acuity of condition       Physical Exam  Vitals signs reviewed  Constitutional:       General: He is not in acute distress  Appearance: He is well-developed  HENT:      Head: Normocephalic and atraumatic  Neck:      Musculoskeletal: Normal range of motion and neck supple  Thyroid: No thyromegaly  Vascular: No carotid bruit or JVD  Trachea: No tracheal deviation  Cardiovascular:      Rate and Rhythm: Normal rate and regular rhythm  Pulses: Normal pulses  Heart sounds: Murmur present  No friction rub  No gallop  Comments: Grade 3/6 late peaking systolic ejection murmur radiating to base of neck  Pulmonary:      Effort: Pulmonary effort is normal  No respiratory distress  Breath sounds: Wheezing and rhonchi present  No rales  Comments: Bilateral inspiratory and expiratory wheezes  Occasional rhonchi  No rales  Chest:      Chest wall: No tenderness  Abdominal:      General: Bowel sounds are normal  There is no distension  Palpations: Abdomen is soft  Tenderness: There is no abdominal tenderness  Musculoskeletal:      Comments: Trace bilateral pretibial edema   Skin:     General: Skin is warm and dry  Neurological:      General: No focal deficit present  Mental Status: He is alert and oriented to person, place, and time  Gait: Gait normal    Psychiatric:         Mood and Affect: Mood normal          Behavior: Behavior normal          Thought Content:  Thought content normal          Judgment: Judgment normal            --------------------------------------------------------------------------------  ECHO:   Results for orders placed during the hospital encounter of 20   Echo complete with contrast if indicated    Narrative [address]  [address]  [place], [address]  [phone]    Transthoracic Echocardiogram  2D, M-mode, Doppler, and Color Doppler    Study date:  04-Sep-2020    Patient: Franki Cross  MR number: [id number]  Account number: [de-identified]  : 1935  Age: 81 years  Gender: Male  Status: Outpatient  Location: [place] [address]  Height: 67 in  Weight: 184 6 lb  BP: 110/ 72 mmHg    Indications: Aortic valve disease  Diagnoses: I35 9 - Nonrheumatic aortic valve disorder, unspecified    Sonographer:  Carol Velasco RDCS  Primary Physician:  Aggie Park DO  Referring Physician:  Denise Fung DO  Group:  [id number]  Interpreting Physician:  Viviane Delvalle MD    IMPRESSIONS:  Technical quality: Fair  Technically difficult study due to body habitus and poor echo penetration and lung interference  Cardiac rhythm: Sinus with bundle-branch block  1  Mild concentric left ventricular hypertrophy, normal left ventricular systolic function  EF around 55%  Suspected grade 1 diastolic dysfunction  2  Normal right ventricular size and systolic function  3  Normal left and right atrial cavity size  4  Heavily calcified aortic valve with moderate to severe aortic valve stenosis and mild aortic valve regurgitation  (Peak transaortic velocity is 3 23 m/sec, mean gradient is 28 mmHg and calculated aortic valve area is 0 9 cm2)  5  Mitral annular calcification, trace mitral valve regurgitation  Next 6  Trace tricuspid valve regurgitation  7  No obvious pulmonary hypertension  8  No pericardial effusion  9  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 up to 4 5 cm  Compared to report of previous echocardiogram from 2019 there appears to be some progression of aortic valve stenosis      SUMMARY    LEFT VENTRICLE:  Normal left ventricular cavity size, mild concentric left ventricular hypertrophy, normal left ventricular systolic function  Abnormal septal motion consistent with bundle branch block otherwise no regional wall motion abnormality noted  Ejection fraction is estimated as around 55%  Doppler evaluation suggests grade 1 diastolic dysfunction  Estimated left ventricular filling pressure is normal     RIGHT VENTRICLE:  Normal right ventricular size and systolic function  Right ventricular systolic pressure could not be estimated due to inadequate tricuspid regurgitation profile  LEFT ATRIUM:  Normal left atrial cavity size  Intact interatrial septum  RIGHT ATRIUM:  Normal right atrial cavity size  MITRAL VALVE:  Mitral annular calcification and sclerosis  Reduced mobility of posterior mitral valve leaflet  No mitral valve stenosis  Trace mitral valve regurgitation  AORTIC VALVE:  heavily calcified tricuspid aortic valve with a markedly reduced cuspal separation  There is moderate to severe aortic valve stenosis with mild aortic valve regurgitation  Peak transaortic velocity is 3 23 m/sec, mean gradient is 28 mmHg  and calculated aortic valve area is 0 9 cm2  Doppler velocity index is 0 26 suggesting significant aortic valve stenosis  TRICUSPID VALVE:  Trace tricuspid valve regurgitation  PULMONIC VALVE:  No pulmonic valve regurgitation  AORTA:  Dilated aortic root and proximal ascending aorta measuring up to 4 5 cm  Ascending thoracic aorta aneurysm  IVC, HEPATIC VEINS:  Inferior vena cava is not well visualized  PERICARDIUM:  No pericardial effusion  HISTORY: PRIOR HISTORY: COPD  Cancer  Right bundle branch block  Aortic stenosis  CHF  Pulmonary embolism  CKD 3  Former smoker  PROCEDURE: The study was performed in the [facility]  This was a routine study  The transthoracic approach was used  The study included complete 2D imaging, M-mode, complete spectral Doppler, and color Doppler  The heart rate was 89 bpm, at  the start of the study  Images were obtained from the parasternal, apical, subcostal, and suprasternal notch acoustic windows  Echocardiographic views were limited due to decreased penetration and lung interference  This was a technically  difficult study  LEFT VENTRICLE: Normal left ventricular cavity size, mild concentric left ventricular hypertrophy, normal left ventricular systolic function  Abnormal septal motion consistent with bundle branch block otherwise no regional wall motion  abnormality noted  Ejection fraction is estimated as around 55%  Doppler evaluation suggests grade 1 diastolic dysfunction  Estimated left ventricular filling pressure is normal     RIGHT VENTRICLE: Normal right ventricular size and systolic function  Right ventricular systolic pressure could not be estimated due to inadequate tricuspid regurgitation profile  LEFT ATRIUM: Normal left atrial cavity size  Intact interatrial septum  RIGHT ATRIUM: Normal right atrial cavity size  MITRAL VALVE: Mitral annular calcification and sclerosis  Reduced mobility of posterior mitral valve leaflet  No mitral valve stenosis  Trace mitral valve regurgitation  AORTIC VALVE: heavily calcified tricuspid aortic valve with a markedly reduced cuspal separation  There is moderate to severe aortic valve stenosis with mild aortic valve regurgitation  Peak transaortic velocity is 3 23 m/sec, mean  gradient is 28 mmHg and calculated aortic valve area is 0 9 cm2  Doppler velocity index is 0 26 suggesting significant aortic valve stenosis  TRICUSPID VALVE: Trace tricuspid valve regurgitation  PULMONIC VALVE: No pulmonic valve regurgitation  PERICARDIUM: No pericardial effusion  AORTA: Dilated aortic root and proximal ascending aorta measuring up to 4 5 cm  Ascending thoracic aorta aneurysm  SYSTEMIC VEINS: IVC: Inferior vena cava is not well visualized      SYSTEM MEASUREMENT TABLES    2D  %FS: 34 7 %  Ao Diam: 4 1 cm  EDV(Teich): 60 3 ml  EF(Teich): 64 7 %  ESV(Teich): 21 3 ml  IVSd: 1 2 cm  LA Area: 11 9 cm2  LA Diam: 3 7 cm  LVEDV MOD A4C: 53 5 ml  LVEF MOD A4C: 43 3 %  LVESV MOD A4C: 30 4 ml  LVIDd: 3 8 cm  LVIDs: 2 5 cm  LVLd A4C: 7 8 cm  LVLs A4C: 6 9 cm  LVOT Diam: 2 cm  LVPWd: 1 1 cm  RA Area: 12 1 cm2  RVIDd: 3 7 cm  SV MOD A4C: 23 2 ml  SV(Teich): 39 ml    CW  AR Dec [place]: 1 5 m/s2  AR Dec Time: 1820 1 ms  AR PHT: 527 8 ms  AR Vmax: 2 8 m/s  AR maxP 6 mmHg  AV Env  Ti: 256 4 ms  AV VTI: 65 2 cm  AV Vmax: 3 2 m/s  AV Vmean: 2 5 m/s  AV maxP 6 mmHg  AV meanP 6 mmHg    MM  TAPSE: 1 6 cm    PW  RADHAMES (VTI): 1 cm2  RADHAMES Vmax: 0 9 cm2  AVAI (VTI): 0 cm2/m2  AVAI Vmax: 0 cm2/m2  E': 0 1 m/s  E/E': 10 6  LVOT Env  Ti: 301 1 ms  LVOT VTI: 19 6 cm  LVOT Vmax: 0 9 m/s  LVOT Vmean: 0 7 m/s  LVOT maxP 9 mmHg  LVOT meanP 8 mmHg  LVSI Dopp: 32 7 ml/m2  LVSV Dopp: 64 1 ml  MV A [place]: 0 9 m/s  MV Dec [place]: 5 m/s2  MV DecT: 169 9 ms  MV E Bertin: 0 8 m/s  MV E/A Ratio: 0 9  MV PHT: 49 3 ms  MVA By PHT: 4 5 cm2    [facility] Accredited Echocardiography Laboratory    Prepared and electronically signed by    Carlotta Upton MD  Signed 04-Sep-2020 18:44:49         ======================================================    Lab Results   Component Value Date    WBC 5 10 2021    HGB 9 3 (L) 2021    HCT 29 6 (L) 2021    MCV 98 2021    PLT 29 (LL) 2021      Lab Results   Component Value Date    SODIUM 142 2021    K 4 2 2021     2021    CO2 29 2021    BUN 22 2021    CREATININE 1 03 2021    GLUC 104 2021    CALCIUM 9 4 2021      Lab Results   Component Value Date    HGBA1C 4 9 2020      Lab Results   Component Value Date    INR 1 01 2021    INR 1 13 2021    INR 0 98 2019    PROTIME 13 1 2021    PROTIME 14 3 2021    PROTIME 13 1 2019        Imaging:   I have personally reviewed pertinent reports          EKG:  Atrial fibrillation with an average rate of approximately 100 bpm

## 2021-06-07 NOTE — PLAN OF CARE
Problem: PHYSICAL THERAPY ADULT  Goal: Performs mobility at highest level of function for planned discharge setting  See evaluation for individualized goals  Description: Treatment/Interventions: Functional transfer training, LE strengthening/ROM, Elevations, Therapeutic exercise, Endurance training, Patient/family training, Bed mobility, Gait training, Spoke to nursing  Equipment Recommended: Jake Kohli(trial next tx session)       See flowsheet documentation for full assessment, interventions and recommendations  Outcome: Not Progressing  Note: Prognosis: Good  Problem List: Decreased strength, Decreased endurance, Impaired balance, Decreased mobility, Decreased skin integrity  Assessment: Pt seen for PT treatment session this date with interventions consisting of therapeutic activity consisting of training: bed mobility and pt/family education on LE ex to perform with assistance as tolerated x 10 reps each-pt performed QS & AP as noted  Pt agreeable to PT treatment session upon arrival, pt found supine in bed w/ HOB elevated, in no apparent distress  In comparison to previous session, pt with no improvements as evidenced by pt deferring transfer & amb attempts  Post session: pt returned BTB, bed alarm engaged, all needs in reach and RN notified of session findings/recommendations  Continue to recommend post acute rehabilitation services vs HHPT pending progress at time of d/c in order to maximize pt's functional independence and safety w/ mobility  Pt continues to be functioning below baseline level, and remains limited 2* factors listed above and including decreased strength, endurance & safe functional mobility  PT will continue to see pt during current hospitalization in order to address the deficits listed above and provide interventions consistent w/ POC in effort to achieve STGs    Barriers to Discharge: Inaccessible home environment  Barriers to Discharge Comments: stairs     PT Discharge Recommendation: Post acute rehabilitation services(vs HHPT pending progress)     PT - OK to Discharge: No    See flowsheet documentation for full assessment

## 2021-06-07 NOTE — HOSPICE NOTE
Hospice referral received, chart reviewed with Dr Alyce Goodell and pt is approved at this time for routine level of care on hospice  Called wife, Berna Reeves and daughter, Jasmeet Sal and left voicemail requesting call back  Spoke with BLANCA Carrion and oliva texted update to Dr Myron Baumgarten with palliative care  Will continue to follow

## 2021-06-07 NOTE — PROGRESS NOTES
[facility]  Progress Note Colonel Agent 1935, 81 y o  male MRN: [id number]  Unit/Bed#: ICU 06 Encounter: [id number]  Primary Care Provider: Aggie Park DO   Date and time admitted to hospital: 6/1/2021  2:06 PM    * Idiopathic hypotension  Assessment & Plan  · SBP on 6/4 were in the 60-80s;  The patient was transferred to ICU and started on Albumin 50g TID and levophed  · Continue albumin x 3 days   · Continue levophed to maintain MAP's > 65  · Continue midodrine 10 mg TID       HCAP (healthcare-associated pneumonia)  Assessment & Plan  · CXR on admission revealed ground glass opacities in the left base   · Presumed HCAP as patient is immunocompromised receiving treatment for MM  · COVID negative   · Blood cultures x2 reveal no growth at 72 hours   · Continue cefepime D7  · On Vancomycin for cdiff prophylaxis     Centrilobular emphysema (HCC)  Assessment & Plan  · Patient follows with Dr Briseida Oleary for out-patient pulmonary   · Baseline oxygen requirements of 2L NC at night  · Currently on 2L NC with oxygen saturations of 100%  · Continue atrovent QID  · Continue prednisone 10 mg daily       Anemia  Assessment & Plan  · Anemia likely multifactorial: chronic kidney disease, multiple myeloma, iron deficiency   · Iron saturation 17%; transfuse with venofer when acute infection resolves   · Follow up with repeat CBC  · hemoccult stool negative  · transfuse for hemoglobin less than 8  · S/p 2 units of pRBC on 6/5     Thrombocytopenia (HCC)  Assessment & Plan  · Patients platelets on admission were 23  · Most recent platelet count of 51  · Likely multifactorial due to MM and active chemotherapy   · Heme/onc following- transfuse for hemoglobin less than 8 and platelets less than 20      Severe aortic stenosis  Assessment & Plan  · Patient follows with cardiology out-patient and was deemed not a surgical candidate  · Continues with medical management with Dr Beltran Cruz       Multiple myeloma 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  Assessment & Plan  · Patient is able to walk short distances  · Consult PT/OT     ----------------------------------------------------------------------------------------  HPI/24hr events: No events overnight, feels better    Disposition: Transfer to Med-Surg   Code Status: Level 1 - Full Code  ---------------------------------------------------------------------------------------  SUBJECTIVE  Denies SOB    Review of Systems  Review of systems was reviewed and negative unless stated above in HPI/24-hour events   ---------------------------------------------------------------------------------------  OBJECTIVE    Vitals   Vitals:    21 0320 21 0400 21 0529 21 0537   BP: 111/64      Pulse: 94      Resp: (!) 31      Temp:  98 1 °F (36 7 °C)     TempSrc:  Temporal     SpO2: 98%      Weight:   74 6 kg (164 lb 7 4 oz) 74 6 kg (164 lb 7 4 oz)   Height:         Temp (24hrs), Av 6 °F (37 °C), Min:97 6 °F (36 4 °C), Max:99 7 °F (37 6 °C)  Current: Temperature: 98 1 °F (36 7 °C)          Respiratory:  SpO2: SpO2: 98 %, SpO2 Activity: SpO2 Activity: At Rest, SpO2 Device: O2 Device: None (Room air)  Nasal Cannula O2 Flow Rate (L/min): 3 L/min    Invasive/non-invasive ventilation settings   Respiratory    Lab Data (Last 4 hours)    None         O2/Vent Data (Last 4 hours)    None                Physical Exam  HENT:      Head: Normocephalic  Mouth/Throat:      Mouth: Mucous membranes are moist    Eyes:      Pupils: Pupils are equal, round, and reactive to light  Neck:      Musculoskeletal: Neck supple     Cardiovascular: Rate and Rhythm: Normal rate  Pulmonary:      Breath sounds: Rales present  Abdominal:      General: There is distension  Tenderness: There is no abdominal tenderness  Musculoskeletal:         General: No swelling  Skin:     General: Skin is warm and dry  Neurological:      General: No focal deficit present  Mental Status: He is alert  Mental status is at baseline           Laboratory and Diagnostics:  Results from last 7 days   Lab Units 06/06/21  0503 06/05/21  0353 06/04/21  0758 06/03/21  0508 06/02/21  1343 06/02/21  0531 06/01/21  1429   WBC Thousand/uL 4 32 2 44* 3 58* 4 06*  --  3 41* 6 38   HEMOGLOBIN g/dL 9 5* 6 0* 7 6* 8 3* 7 9* 7 2* 9 1*   HEMATOCRIT % 29 7* 19 4* 23 9* 26 7* 25 5* 22 7* 29 1*   PLATELETS Thousands/uL 33* 34* 51*  51* 56*  --  54* 23*   NEUTROS PCT %  --   --   --  86*  --   --   --    BANDS PCT % 1 7  --   --   --   --  6   MONOS PCT %  --   --   --  3*  --   --   --    MONO PCT % 3* 1*  --   --   --   --  0*     Results from last 7 days   Lab Units 06/07/21  0528 06/06/21  0503 06/05/21  0402 06/04/21  0758 06/03/21  0508 06/02/21  0531 06/01/21  1429   SODIUM mmol/L 142 143 143 139 141 139 140   POTASSIUM mmol/L 4 2 4 0 3 8 3 3* 3 8 4 1 4 2   CHLORIDE mmol/L 105 102 103 99* 103 104 101   CO2 mmol/L 29 30 32 35* 32 29 30   ANION GAP mmol/L 8 11 8 5 6 6 9   BUN mg/dL 22 16 18 21 22 24 26*   CREATININE mg/dL 1 03 1 07 1 11 1 46* 1 22 1 15 1 41*   CALCIUM mg/dL 9 4 9 0 8 1* 8 5 8 8 7 7* 8 2*   GLUCOSE RANDOM mg/dL 104 105 81 109 84 92 109   ALT U/L  --  115* 155*  --   --   --  49   AST U/L  --  42 130*  --   --   --  28   ALK PHOS U/L  --  123* 137*  --   --   --  74   ALBUMIN g/dL  --  3 3* 3 6  --   --   --  2 2*   TOTAL BILIRUBIN mg/dL  --  2 23* 2 09*  --   --   --  1 92*     Results from last 7 days   Lab Units 06/07/21  0528 06/06/21  0503 06/05/21  0310   MAGNESIUM mg/dL 1 7 1 7 1 6   PHOSPHORUS mg/dL 1 7*  --   --       Results from last 7 days   Lab Units 06/04/21  0758   INR  1 01   PTT seconds 29      Results from last 7 days   Lab Units 06/01/21  1429   TROPONIN I ng/mL <0 02     Results from last 7 days   Lab Units 06/01/21  1630 06/01/21  1429   LACTIC ACID mmol/L 1 7 2 6*     ABG:    VBG:    Results from last 7 days   Lab Units 06/06/21  0503 06/03/21  0536 06/02/21  0531   PROCALCITONIN ng/ml 0 28* 0 26* 0 20       Micro  Results from last 7 days   Lab Units 06/05/21  1123 06/01/21  1429   BLOOD CULTURE  No Growth at 24 hrs  No Growth at 24 hrs  No Growth After 5 Days  Staphylococcus coagulase negative*   GRAM STAIN RESULT   --  Gram positive cocci in clusters*       EKG:   Imaging: I have personally reviewed pertinent reports  and I have personally reviewed pertinent films in PACS    Intake and Output  I/O       06/05 0701 - 06/06 0700 06/06 0701 - 06/07 0700    P  O  480 480    I V  (mL/kg) 547 5 (7 1) 225 (2 9)    Blood 350     IV Piggyback 50     Total Intake(mL/kg) 1427 5 (18 6) 705 (9 2)    Urine (mL/kg/hr) 4315 (2 3) 965 (0 8)    Stool 0     Total Output 4315 965    Net -2887 5 -260          Unmeasured Urine Occurrence 1 x     Unmeasured Stool Occurrence 2 x           Height and Weights   Height: 5' 8" (172 7 cm)  IBW (Ideal Body Weight): 68 4 kg  Body mass index is 25 01 kg/m²  Weight (last 2 days)     Date/Time   Weight    06/07/21 0537   74 6 (164 46)    06/07/21 0529   74 6 (164 46)    06/06/21 0500   76 9 (169 53)    06/05/21 0600   79 (174 16)                Nutrition       Diet Orders   (From admission, onward)             Start     Ordered    06/04/21 2342  Diet Cardiovascular; Cardiac; Fluid Restriction 1800 ML  Diet effective now     Question Answer Comment   Diet Type Cardiovascular    Cardiac Cardiac    Other Restriction(s): Fluid Restriction 1800 ML    RD to adjust diet per protocol?  Yes        06/04/21 2341                  Active Medications  Scheduled Meds:  Current Facility-Administered Medications   Medication Dose Route Frequency Provider Last Rate    acetaminophen  650 mg Oral Q6H PRN JACK Razo      cefepime  2,000 mg Intravenous Q12H JACK Razo 2,000 mg (06/06/21 2000)    famotidine  20 mg Oral Daily JACK Razo      furosemide  80 mg Intravenous Once Vivian Prakash MD      ipratropium-albuterol  3 mL Nebulization TID Evelio Weinstein MD      midodrine  15 mg Oral TID AC Vivian Prakash MD      norepinephrine  1-30 mcg/min Intravenous Titrated JACK Razo 5 mcg/min (06/06/21 2320)    potassium chloride  20 mEq Oral Daily JACK Razo      predniSONE  10 mg Oral Daily JACK Razo      vancomycin  125 mg Oral Q12H [address], CRNP       Continuous Infusions:  norepinephrine, 1-30 mcg/min, Last Rate: 5 mcg/min (06/06/21 2320)      PRN Meds:   acetaminophen, 650 mg, Q6H PRN        Invasive Devices Review  Invasive Devices     Central Venous Catheter Line            Port A Cath 12/10/17 Right Subclavian 1274 days          Peripheral Intravenous Line            Peripheral IV 06/04/21 Left Antecubital 2 days          Drain            Urethral Catheter 1 day                Rationale for remaining devices:   ---------------------------------------------------------------------------------------  Advance Directive and Living Will:      Power of :    POLST:    ---------------------------------------------------------------------------------------  Care Time Delivered:         JACK Clement      Portions of the record may have been created with voice recognition software  Occasional wrong word or "sound a like" substitutions may have occurred due to the inherent limitations of voice recognition software    Read the chart carefully and recognize, using context, where substitutions have occurred

## 2021-06-07 NOTE — ASSESSMENT & PLAN NOTE
· Patient follows with cardiology out-patient and was deemed not a surgical candidate  · Continues with medical management with Dr Cally Aargon

## 2021-06-07 NOTE — ASSESSMENT & PLAN NOTE
· CXR on admission revealed ground glass opacities in the left base   · Presumed HCAP as patient is immunocompromised receiving treatment for MM  · COVID negative   · Blood cultures x2 reveal no growth at 72 hours   · Continue cefepime D7  · On Vancomycin for cdiff prophylaxis

## 2021-06-08 PROBLEM — E87.6 HYPOKALEMIA: Status: ACTIVE | Noted: 2021-01-01

## 2021-06-08 PROBLEM — E83.39 HYPOPHOSPHATEMIA: Status: RESOLVED | Noted: 2021-01-01 | Resolved: 2021-01-01

## 2021-06-08 NOTE — PROGRESS NOTES
[address] [facility]  Progress Note Alfonso Max 1935, 81 y o  male MRN: [id number]  Unit/Bed#: ICU 06 Encounter: [id number]  Primary Care Provider: Bernardo Purcell DO   Date and time admitted to hospital: 6/1/2021  2:06 PM    * Idiopathic hypotension  Assessment & Plan  · SBP on 6/4 were in the 60-80s;  The patient was transferred to ICU and started on Albumin 50g TID and levophed  · Sepsis versus cardiogenic versus mixed picture  · Continue albumin x 3 days   · Continue levophed to maintain MAP's > 60  · Continue midodrine 10 mg TID       Pneumonia  Assessment & Plan  · CXR on admission revealed ground glass opacities in the left base  · Recurrent fever 6/7   · Presumed HCAP as patient is immunocompromised receiving treatment for MM  · COVID negative   · Blood cultures x2 reveal no growth to date   · Continue cefepime D8  · On Vancomycin for cdiff prophylaxis     Centrilobular emphysema (HCC)  Assessment & Plan  · Patient follows with Dr Herve Montes for out-patient pulmonary   · Baseline oxygen requirements of 2L NC at night  · Currently on 2L NC with oxygen saturations of 100%  · Continue atrovent QID  · Continue prednisone 10 mg daily       Anemia  Assessment & Plan  · Anemia likely multifactorial: chronic kidney disease, multiple myeloma, iron deficiency   · Iron saturation 17%; transfuse with venofer when acute infection resolves   · Follow up with repeat CBC  · hemoccult stool negative  · transfuse for hemoglobin less than 8  · S/p 2 units of pRBC on 6/5     Thrombocytopenia (HCC)  Assessment & Plan  · Patients platelets on admission were 23  · Most recent platelet count of 51  · Likely multifactorial due to MM and active chemotherapy   · Heme/onc following- transfuse for hemoglobin less than 8 and platelets less than 20      Severe aortic stenosis  Assessment & Plan  · Patient follows with cardiology out-patient and was deemed not a surgical candidate  · Continues with medical management with Dr Osiris Ponce     · Will continue with  BID diuresis as able    Multiple myeloma [facility])  Assessment & Plan  · Patient actively receiving chemotherapy; with last on   · Has been heavily treated previous and has declined significantly since ;ast Chemo in May      Chronic anticoagulation  Assessment & Plan  · Maintained on Eliquis 2 5 mg BID for atrial fibrillation   · Continue to hold as patient is thrombocytopenic   · Will discuss restarting DVT prophylaxis      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  Assessment & Plan  · Patient is able to walk short distances  · Consult PT/OT     ----------------------------------------------------------------------------------------  HPI/24hr events: Confused, some upper airway secretions    Disposition: Continue Critical Care   Code Status: Level 3 - DNAR and DNI  ---------------------------------------------------------------------------------------  SUBJECTIVE  Denies SOB at present    Review of Systems  Review of systems was reviewed and negative unless stated above in HPI/24-hour events   ---------------------------------------------------------------------------------------  OBJECTIVE    Vitals   Vitals:    21 2338 21 0038 21 0138 21 0238   BP: 90/51 106/55 (!) 83/49 113/55   BP Location:       Pulse: 98 96 102 92   Resp: (!) 35 (!) 25 (!) 32 (!) 31   Temp:       TempSrc:       SpO2: 96% 96% 93% 96%   Weight:       Height:         Temp (24hrs), Av 5 °F (37 5 °C), Min:97 7 °F (36 5 °C), Max:101 2 °F (38 4 °C)  Current: Temperature: 98 4 °F (36 9 °C)          Respiratory:  SpO2: SpO2: 96 %, SpO2 Activity: SpO2 Activity: At Rest, SpO2 Device: O2 Device: Nasal cannula  Nasal Cannula O2 Flow Rate (L/min): 3 L/min    Invasive/non-invasive ventilation settings   Respiratory    Lab Data (Last 4 hours)    None         O2/Vent Data (Last 4 hours)    None                Physical Exam  Constitutional:       Appearance: He is ill-appearing     HENT:      Head: Normocephalic  Mouth/Throat:      Mouth: Mucous membranes are moist    Eyes:      Pupils: Pupils are equal, round, and reactive to light  Neck:      Musculoskeletal: Neck supple  Pulmonary:      Effort: Pulmonary effort is normal       Breath sounds: Rales present  Abdominal:      Tenderness: There is no abdominal tenderness  Musculoskeletal:         General: Swelling present  Lymphadenopathy:      Cervical: No cervical adenopathy  Skin:     General: Skin is warm and dry  Neurological:      Mental Status: He is alert  He is disoriented        Comments: Confused, does follow simple commands         Laboratory and Diagnostics:  Results from last 7 days   Lab Units 06/07/21  0528 06/06/21  0503 06/05/21  0353 06/04/21  0758 06/03/21  0508 06/02/21  1343 06/02/21  0531 06/01/21  1429   WBC Thousand/uL 5 10 4 32 2 44* 3 58* 4 06*  --  3 41* 6 38   HEMOGLOBIN g/dL 9 3* 9 5* 6 0* 7 6* 8 3* 7 9* 7 2* 9 1*   HEMATOCRIT % 29 6* 29 7* 19 4* 23 9* 26 7* 25 5* 22 7* 29 1*   PLATELETS Thousands/uL 29* 33* 34* 51*  51* 56*  --  54* 23*   NEUTROS PCT %  --   --   --   --  86*  --   --   --    BANDS PCT %  --  1 7  --   --   --   --  6   MONOS PCT %  --   --   --   --  3*  --   --   --    MONO PCT %  --  3* 1*  --   --   --   --  0*     Results from last 7 days   Lab Units 06/07/21  0528 06/06/21  0503 06/05/21  0402 06/04/21  0758 06/03/21  0508 06/02/21  0531 06/01/21  1429   SODIUM mmol/L 142 143 143 139 141 139 140   POTASSIUM mmol/L 4 2 4 0 3 8 3 3* 3 8 4 1 4 2   CHLORIDE mmol/L 105 102 103 99* 103 104 101   CO2 mmol/L 29 30 32 35* 32 29 30   ANION GAP mmol/L 8 11 8 5 6 6 9   BUN mg/dL 22 16 18 21 22 24 26*   CREATININE mg/dL 1 03 1 07 1 11 1 46* 1 22 1 15 1 41*   CALCIUM mg/dL 9 4 9 0 8 1* 8 5 8 8 7 7* 8 2*   GLUCOSE RANDOM mg/dL 104 105 81 109 84 92 109   ALT U/L  --  115* 155*  --   --   --  49   AST U/L  --  42 130*  --   --   --  28   ALK PHOS U/L  --  123* 137*  --   --   --  74   ALBUMIN g/dL  --  3 3* 3 6  --   --   --  2 2*   TOTAL BILIRUBIN mg/dL  --  2 23* 2 09*  --   --   --  1 92*     Results from last 7 days   Lab Units 06/07/21  0528 06/06/21  0503 06/05/21  0310   MAGNESIUM mg/dL 1 7 1 7 1 6   PHOSPHORUS mg/dL 1 7*  --   --       Results from last 7 days   Lab Units 06/04/21  0758   INR  1 01   PTT seconds 29      Results from last 7 days   Lab Units 06/01/21  1429   TROPONIN I ng/mL <0 02     Results from last 7 days   Lab Units 06/01/21  1630 06/01/21  1429   LACTIC ACID mmol/L 1 7 2 6*     ABG:    VBG:    Results from last 7 days   Lab Units 06/07/21  0528 06/06/21  0503 06/03/21  0536 06/02/21  0531   PROCALCITONIN ng/ml 0 26* 0 28* 0 26* 0 20       Micro  Results from last 7 days   Lab Units 06/05/21  1123 06/01/21  1429   BLOOD CULTURE  No Growth at 48 hrs  No Growth at 48 hrs  Staphylococcus coagulase negative*  No Growth After 5 Days  GRAM STAIN RESULT   --  Gram positive cocci in clusters*       EKG:   Imaging: I have personally reviewed pertinent reports  and I have personally reviewed pertinent films in PACS    Intake and Output  I/O       06/06 0701 - 06/07 0700 06/07 0701 - 06/08 0700    P  O  480     I V  (mL/kg) 451 9 (6 1) 211 7 (2 8)    Blood      IV Piggyback  215    Total Intake(mL/kg) 931 9 (12 5) 426 7 (5 7)    Urine (mL/kg/hr) 1165 (0 7) 1850 (1 5)    Stool  0    Total Output 1165 1850    Net -233 2 -1423 3          Unmeasured Stool Occurrence  1 x          Height and Weights   Height: 5' 8" (172 7 cm)  IBW (Ideal Body Weight): 68 4 kg  Body mass index is 25 01 kg/m²    Weight (last 2 days)     Date/Time   Weight    06/07/21 0537   74 6 (164 46)    06/07/21 0529   74 6 (164 46)    06/06/21 0500   76 9 (169 53)                Nutrition       Diet Orders   (From admission, onward)             Start     Ordered    06/04/21 0289  Diet Cardiovascular; Cardiac; Fluid Restriction 1800 ML  Diet effective now     Question Answer Comment   Diet Type Cardiovascular    Cardiac Cardiac Other Restriction(s): Fluid Restriction 1800 ML    RD to adjust diet per protocol? Yes        06/04/21 7691                  Active Medications  Scheduled Meds:  Current Facility-Administered Medications   Medication Dose Route Frequency Provider Last Rate    acetaminophen  650 mg Oral Q6H PRN XanderJACK Lamar      cefepime  2,000 mg Intravenous Q12H Xander JACK Hill 2,000 mg (06/07/21 1847)    famotidine  20 mg Oral Daily Xander JACK Hill      furosemide  80 mg Intravenous BID (diuretic) Roseann Sosa DO      ipratropium-albuterol  3 mL Nebulization TID Emil Tijerina MD      midodrine  15 mg Oral TID AC Meño Sears MD      norepinephrine  1-30 mcg/min Intravenous Titrated JACK Blunt 7 mcg/min (06/08/21 0221)    potassium chloride  20 mEq Oral Daily Xander Deutsmadeleine, JACK      predniSONE  10 mg Oral Daily Xander Deutscher, JACK      vancomycin  125 mg Oral Q12H [address] Xander JACK Hill       Continuous Infusions:  norepinephrine, 1-30 mcg/min, Last Rate: 7 mcg/min (06/08/21 0221)      PRN Meds:   acetaminophen, 650 mg, Q6H PRN        Invasive Devices Review  Invasive Devices     Central Venous Catheter Line            Port A Cath 12/10/17 Right Subclavian 1275 days          Drain            Urethral Catheter 2 days                Rationale for remaining devices:   ---------------------------------------------------------------------------------------  Advance Directive and Living Will:      Power of :    POLST:    ---------------------------------------------------------------------------------------  Care Time Delivered:         JACK Dinh      Portions of the record may have been created with voice recognition software  Occasional wrong word or "sound a like" substitutions may have occurred due to the inherent limitations of voice recognition software    Read the chart carefully and recognize, using context, where substitutions have occurred

## 2021-06-08 NOTE — ASSESSMENT & PLAN NOTE
· Patient actively receiving chemotherapy; with last on 5/5  · Has been heavily treated previous and has declined significantly since ;ast Chemo in May

## 2021-06-08 NOTE — ASSESSMENT & PLAN NOTE
· Patients platelets on admission were 23  · Most recent platelet count of 51  · Likely multifactorial due to MM and active chemotherapy, sepsis  · Heme/onc following- transfuse for hemoglobin less than 8 and platelets less than 20

## 2021-06-08 NOTE — PROGRESS NOTES
Progress Note - Cardiology   West Knowles 81 y o  male MRN: [id number]  Unit/Bed#: ICU 06 Encounter: [id number]    Assessment:  1  Hypotension/cardiogenic shock requiring pressors  Norepinephrine increased from 5-8 mcg/minute  On midodrine 15 mg t i d  2  Severe aortic stenosis  3  Persistent atrial fibrillation not on anticoagulation  4  Chronic HFpEF  5  Rule out sepsis  6  Multiple myeloma in relapse  7  Pancytopenia with severe thrombocytopenia  8  Elevated liver function studies  9  History of pulmonary embolism not on anticoagulation, possible repeat pulmonary embolization  10  Emphysema  11  Marked improvement in peripheral edema  12  Bronchial spasm   13  Chest x-ray yesterday suggestive of pulmonary edema with bilateral pleural effusions  14  Respiratory failure  15  Hypokalemia    Plan:  1  Deferred to Nephrology on diuretic management  2  Complete echocardiogram to evaluate LV and RV function, LV diastolic function, pulmonary artery pressures  3  Expect difficulty in weaning pressures  4  Patient's short and long-term prognosis poor   5  Replace potassium        Interval history:  Patient appears very tachypneic  Respiratory rate 37  Norepinephrine needed to be increased to maintain blood pressure        Vitals: BP 97/54   Pulse (!) 114   Temp 98 9 °F (37 2 °C) (Temporal)   Resp (!) 37   Ht 5' 8" (1 727 m)   Wt 74 9 kg (165 lb 2 oz)   SpO2 95%   BMI 25 11 kg/m²   Vitals:    06/07/21 0537 06/08/21 0550   Weight: 74 6 kg (164 lb 7 4 oz) 74 9 kg (165 lb 2 oz)     Orthostatic Blood Pressures      Most Recent Value   Blood Pressure  97/54 filed at 06/08/2021 0845   Patient Position - Orthostatic VS  Lying - Orthostatic VS filed at 06/07/2021 2000            Intake/Output Summary (Last 24 hours) at 6/8/2021 0955  Last data filed at 6/8/2021 0800  Gross per 24 hour   Intake 793 42 ml   Output 2520 ml   Net -1726 58 ml       Invasive Devices     Central Venous Catheter Line            Port A Cath 12/10/17 Right Subclavian 1275 days          Drain            Urethral Catheter 2 days                Review of Systems   Unable to perform ROS: Acuity of condition       Physical Exam  Vitals signs reviewed  Constitutional:       General: He is in acute distress  Appearance: He is well-developed  He is ill-appearing  HENT:      Head: Normocephalic and atraumatic  Neck:      Musculoskeletal: Normal range of motion and neck supple  Thyroid: No thyromegaly  Vascular: No carotid bruit or JVD  Trachea: No tracheal deviation  Cardiovascular:      Rate and Rhythm: Normal rate  Rhythm irregular  Pulses: Normal pulses  Heart sounds: Murmur present  No friction rub  No gallop  Comments: Grade 3/6 late peaking systolic ejection murmur  Pulmonary:      Effort: Pulmonary effort is normal  No respiratory distress  Breath sounds: Wheezing present  No rhonchi or rales  Chest:      Chest wall: No tenderness  Abdominal:      General: Bowel sounds are normal  There is no distension  Palpations: Abdomen is soft  Tenderness: There is no abdominal tenderness  Musculoskeletal:      Right lower leg: No edema  Left lower leg: No edema  Skin:     General: Skin is warm and dry  Neurological:      General: No focal deficit present  Mental Status: He is oriented to person, place, and time  Psychiatric:         Mood and Affect: Mood normal          Behavior: Behavior normal          Thought Content: Thought content normal          Judgment: Judgment normal          --------------------------------------------------------------------------------  ECHO:   Results for orders placed during the hospital encounter of 09/04/20   Echo complete with contrast if indicated    Narrative [address] [place] [address]  [facility]    [place], [address] [facility]  [phone]    Transthoracic Echocardiogram  2D, M-mode, Doppler, and Color Doppler    Study date: 04-Sep-2020    Patient: Liss Young  MR number: [id number]  Account number: [de-identified]  : 1935  Age: 81 years  Gender: Male  Status: Outpatient  Location: [place] [address]  Height: 67 in  Weight: 184 6 lb  BP: 110/ 72 mmHg    Indications: Aortic valve disease  Diagnoses: I35 9 - Nonrheumatic aortic valve disorder, unspecified    Sonographer:  Lior Porras RDCS  Primary Physician:  Loly Jimenez DO  Referring Physician:  Fady Lazo DO  Group:  [id number]  Interpreting Physician:  Carlotta Upton MD    IMPRESSIONS:  Technical quality: Fair  Technically difficult study due to body habitus and poor echo penetration and lung interference  Cardiac rhythm: Sinus with bundle-branch block  1  Mild concentric left ventricular hypertrophy, normal left ventricular systolic function  EF around 55%  Suspected grade 1 diastolic dysfunction  2  Normal right ventricular size and systolic function  3  Normal left and right atrial cavity size  4  Heavily calcified aortic valve with moderate to severe aortic valve stenosis and mild aortic valve regurgitation  (Peak transaortic velocity is 3 23 m/sec, mean gradient is 28 mmHg and calculated aortic valve area is 0 9 cm2)  5  Mitral annular calcification, trace mitral valve regurgitation  Next 6  Trace tricuspid valve regurgitation  7  No obvious pulmonary hypertension  8  No pericardial effusion  9  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 up to 4 5 cm  Compared to report of previous echocardiogram from 2019 there appears to be some progression of aortic valve stenosis  SUMMARY    LEFT VENTRICLE:  Normal left ventricular cavity size, mild concentric left ventricular hypertrophy, normal left ventricular systolic function  Abnormal septal motion consistent with bundle branch block otherwise no regional wall motion abnormality noted  Ejection fraction is estimated as around 55%   Doppler evaluation suggests grade 1 diastolic dysfunction  Estimated left ventricular filling pressure is normal     RIGHT VENTRICLE:  Normal right ventricular size and systolic function  Right ventricular systolic pressure could not be estimated due to inadequate tricuspid regurgitation profile  LEFT ATRIUM:  Normal left atrial cavity size  Intact interatrial septum  RIGHT ATRIUM:  Normal right atrial cavity size  MITRAL VALVE:  Mitral annular calcification and sclerosis  Reduced mobility of posterior mitral valve leaflet  No mitral valve stenosis  Trace mitral valve regurgitation  AORTIC VALVE:  heavily calcified tricuspid aortic valve with a markedly reduced cuspal separation  There is moderate to severe aortic valve stenosis with mild aortic valve regurgitation  Peak transaortic velocity is 3 23 m/sec, mean gradient is 28 mmHg  and calculated aortic valve area is 0 9 cm2  Doppler velocity index is 0 26 suggesting significant aortic valve stenosis  TRICUSPID VALVE:  Trace tricuspid valve regurgitation  PULMONIC VALVE:  No pulmonic valve regurgitation  AORTA:  Dilated aortic root and proximal ascending aorta measuring up to 4 5 cm  Ascending thoracic aorta aneurysm  IVC, HEPATIC VEINS:  Inferior vena cava is not well visualized  PERICARDIUM:  No pericardial effusion  HISTORY: PRIOR HISTORY: COPD  Cancer  Right bundle branch block  Aortic stenosis  CHF  Pulmonary embolism  CKD 3  Former smoker  PROCEDURE: The study was performed in the [facility] 3  This was a routine study  The transthoracic approach was used  The study included complete 2D imaging, M-mode, complete spectral Doppler, and color Doppler  The heart rate was 89 bpm, at  the start of the study  Images were obtained from the parasternal, apical, subcostal, and suprasternal notch acoustic windows  Echocardiographic views were limited due to decreased penetration and lung interference  This was a technically  difficult study      LEFT VENTRICLE: Normal left ventricular cavity size, mild concentric left ventricular hypertrophy, normal left ventricular systolic function  Abnormal septal motion consistent with bundle branch block otherwise no regional wall motion  abnormality noted  Ejection fraction is estimated as around 55%  Doppler evaluation suggests grade 1 diastolic dysfunction  Estimated left ventricular filling pressure is normal     RIGHT VENTRICLE: Normal right ventricular size and systolic function  Right ventricular systolic pressure could not be estimated due to inadequate tricuspid regurgitation profile  LEFT ATRIUM: Normal left atrial cavity size  Intact interatrial septum  RIGHT ATRIUM: Normal right atrial cavity size  MITRAL VALVE: Mitral annular calcification and sclerosis  Reduced mobility of posterior mitral valve leaflet  No mitral valve stenosis  Trace mitral valve regurgitation  AORTIC VALVE: heavily calcified tricuspid aortic valve with a markedly reduced cuspal separation  There is moderate to severe aortic valve stenosis with mild aortic valve regurgitation  Peak transaortic velocity is 3 23 m/sec, mean  gradient is 28 mmHg and calculated aortic valve area is 0 9 cm2  Doppler velocity index is 0 26 suggesting significant aortic valve stenosis  TRICUSPID VALVE: Trace tricuspid valve regurgitation  PULMONIC VALVE: No pulmonic valve regurgitation  PERICARDIUM: No pericardial effusion  AORTA: Dilated aortic root and proximal ascending aorta measuring up to 4 5 cm  Ascending thoracic aorta aneurysm  SYSTEMIC VEINS: IVC: Inferior vena cava is not well visualized      SYSTEM MEASUREMENT TABLES    2D  %FS: 34 7 %  Ao Diam: 4 1 cm  EDV(Teich): 60 3 ml  EF(Teich): 64 7 %  ESV(Teich): 21 3 ml  IVSd: 1 2 cm  LA Area: 11 9 cm2  LA Diam: 3 7 cm  LVEDV MOD A4C: 53 5 ml  LVEF MOD A4C: 43 3 %  LVESV MOD A4C: 30 4 ml  LVIDd: 3 8 cm  LVIDs: 2 5 cm  LVLd A4C: 7 8 cm  LVLs A4C: 6 9 cm  LVOT Diam: 2 cm  LVPWd: 1 1 cm  RA Area: 12 1 cm2  RVIDd: 3 7 cm  SV MOD A4C: 23 2 ml  SV(Teich): 39 ml    CW  AR Dec Hunt: 1 5 m/s2  AR Dec Time: 1820 1 ms  AR PHT: 527 8 ms  AR Vmax: 2 8 m/s  AR maxP 6 mmHg  AV Env  Ti: 256 4 ms  AV VTI: 65 2 cm  AV Vmax: 3 2 m/s  AV Vmean: 2 5 m/s  AV maxP 6 mmHg  AV meanP 6 mmHg    MM  TAPSE: 1 6 cm    PW  RADHAMES (VTI): 1 cm2  RADHAMES Vmax: 0 9 cm2  AVAI (VTI): 0 cm2/m2  AVAI Vmax: 0 cm2/m2  E': 0 1 m/s  E/E': 10 6  LVOT Env  Ti: 301 1 ms  LVOT VTI: 19 6 cm  LVOT Vmax: 0 9 m/s  LVOT Vmean: 0 7 m/s  LVOT maxP 9 mmHg  LVOT meanP 8 mmHg  LVSI Dopp: 32 7 ml/m2  LVSV Dopp: 64 1 ml  MV A Bertin: 0 9 m/s  MV Dec Hunt: 5 m/s2  MV DecT: 169 9 ms  MV E Bertin: 0 8 m/s  MV E/A Ratio: 0 9  MV PHT: 49 3 ms  MVA By PHT: 4 5 cm2    [employer] Accredited [facility]ocardi[facility]    Prepared and electronically signed by    Janalyn Oppenheim, MD  Signed 04-Sep-2020 18:44:49         ======================================================    Lab Results   Component Value Date    WBC 4 40 2021    HGB 9 5 (L) 2021    HCT 30 3 (L) 2021    MCV 97 2021    PLT 28 (LL) 2021      Lab Results   Component Value Date    SODIUM 142 2021    K 3 2 (L) 2021     2021    CO2 30 2021    BUN 19 2021    CREATININE 1 20 2021    GLUC 114 2021    CALCIUM 9 0 2021      Lab Results   Component Value Date    HGBA1C 4 9 2020     Lab Results   Component Value Date    INR 1 01 2021    INR 1 13 2021    INR 0 98 2019    PROTIME 13 1 2021    PROTIME 14 3 2021    PROTIME 13 1 2019        Imaging:   I have personally reviewed pertinent reports          EKG:  Atrial fibrillation

## 2021-06-08 NOTE — PROGRESS NOTES
Progress Note - Nephrology   Alisha Campos 81 y o  male MRN: [id number]  Unit/Bed#: ICU 06 Encounter: [id number]      Assessment / Plan:  1  Acute kidney injury with associated oliguria on chronic kidney disease, baseline creatinine 1 1-1 2    - significant improvement in urine output with Lasix infusion  Has been receiving intermittent diuretic dosing  Clinically improved from respiratory standpoint after 80mg IV BID lasix initiated yesterday  Monitoring off diuretics now in light of hypotension and increasing pressor requirement  Poor prognosis overall in setting of severe AS    -f/u am BMP  -f/u goals of care discussion  2   hypoxic respiratory failure, as per primary service/cardiology, diuretics now on hold as above  3  Severe aortic stenosis with ejection fraction 58% - cardio follows  4  Multiple myeloma, has been on chemotherapy previously   5  Hypotension/ shock likely secondary to pneumonia as well as aortic stenosis, on levo gtt, on midodrine 15 mg t i d  Will hold on albumin in light of volume overload  6   acute on chronic anemia in the face of underlying multiple myeloma, transfuse as needed, Hgb in 9s and stable, monitor CBC  Also with thrombocytopenia  hemolysis smear negative as of 21  7  Transaminitis, may be secondary to hepatic congestion/hypotension - improving  8  Hypophosphatemia - resolved s/p repletion  9  Hypokalemia, diuretic induced - monitor s/p repletion, diuretics discontinued as above    Agree with d/c diuretics  Goals of care discussion pending  Subjective:   Denies chest pain but does feel short of breath  He is on Levophed at 8 an hour  He is a bit confused  Objective:     Vitals: Blood pressure (!) 82/54, pulse (!) 110, temperature 97 9 °F (36 6 °C), temperature source Temporal, resp  rate (!) 25, height 5' 8" (1 727 m), weight 74 9 kg (165 lb 2 oz), SpO2 95 %  ,Body mass index is 25 11 kg/m²  Temp (24hrs), Av 4 °F (36 9 °C), Min:97 5 °F (36 4 °C), Max:100 °F (37 8 °C)      Weight (last 2 days)     Date/Time   Weight    06/08/21 0550   74 9 (165 12)    06/07/21 0537   74 6 (164 46)    06/07/21 0529   74 6 (164 46)    06/06/21 0500   76 9 (169 53)                Intake/Output Summary (Last 24 hours) at 6/8/2021 1241  Last data filed at 6/8/2021 1201  Gross per 24 hour   Intake 1005 13 ml   Output 2540 ml   Net -1534 87 ml     I/O last 24 hours: In: 1203 6 [P O :240; I V :698 6; IV Piggyback:265]  Out: 0306 [Urine:2905]        Physical Exam:   Physical Exam  Vitals signs reviewed  Constitutional:       General: He is not in acute distress  Appearance: He is well-developed  He is ill-appearing  He is not diaphoretic  HENT:      Head: Normocephalic and atraumatic  Nose: Nose normal       Mouth/Throat:      Mouth: Mucous membranes are dry  Pharynx: No oropharyngeal exudate  Eyes:      General: No scleral icterus  Right eye: No discharge  Left eye: No discharge  Neck:      Musculoskeletal: Neck supple  Thyroid: No thyromegaly  Cardiovascular:      Rate and Rhythm: Regular rhythm  Tachycardia present  Heart sounds: Murmur present  Pulmonary:      Effort: Pulmonary effort is normal       Breath sounds: Normal breath sounds  No wheezing or rales  Abdominal:      General: Bowel sounds are normal  There is no distension  Palpations: Abdomen is soft  Tenderness: There is no abdominal tenderness  Genitourinary:     Comments: +ashraf  Musculoskeletal: Normal range of motion  General: No swelling  Lymphadenopathy:      Cervical: No cervical adenopathy  Skin:     General: Skin is warm and dry  Findings: No rash  Comments: Petechiae over LE   Neurological:      Mental Status: He is alert        Comments: Awake but confused   Psychiatric:         Mood and Affect: Mood normal          Behavior: Behavior normal          Invasive Devices     Central Venous Catheter Line            Port A Cath 12/10/17 Right Subclavian 1276 days          Peripheral Intravenous Line            Peripheral IV 06/04/21 Left Antecubital 4 days          Drain            Urethral Catheter 2 days                Medications:    Scheduled Meds:  Current Facility-Administered Medications   Medication Dose Route Frequency Provider Last Rate    acetaminophen  650 mg Oral Q6H PRN JACK Simon      famotidine  20 mg Oral Daily JACK Simon      ipratropium-albuterol  3 mL Nebulization TID Nikky Marina MD      midodrine  15 mg Oral TID AC Rekha Mcpherson MD      norepinephrine  1-30 mcg/min Intravenous Titrated JACK Nance 7 mcg/min (06/08/21 1149)    potassium chloride  40 mEq Oral BID JACK Nance      predniSONE  10 mg Oral Daily JACK Simon      vancomycin  125 mg Oral Q12H [address] JACK Nance         PRN Meds:   acetaminophen    Continuous Infusions:norepinephrine, 1-30 mcg/min, Last Rate: 7 mcg/min (06/08/21 1149)            LAB RESULTS:      Results from last 7 days   Lab Units 06/08/21  0547 06/07/21  0528 06/06/21  0503 06/05/21  0402 06/05/21  0353 06/05/21  0310 06/04/21  0758 06/03/21  0508 06/02/21  1343 06/02/21  0531 06/01/21  1429   WBC Thousand/uL 4 40 5 10 4 32  --  2 44*  --  3 58* 4 06*  --  3 41* 6 38   HEMOGLOBIN g/dL 9 5* 9 3* 9 5*  --  6 0*  --  7 6* 8 3* 7 9* 7 2* 9 1*   HEMATOCRIT % 30 3* 29 6* 29 7*  --  19 4*  --  23 9* 26 7* 25 5* 22 7* 29 1*   PLATELETS Thousands/uL 28* 29* 33*  --  34*  --  51*  51* 56*  --  54* 23*   NEUTROS PCT %  --   --   --   --   --   --   --  86*  --   --   --    LYMPHS PCT %  --   --   --   --   --   --   --  3*  --   --   --    LYMPHO PCT %  --   --  3*  --  7*  --   --   --   --   --  2*   MONOS PCT %  --   --   --   --   --   --   --  3*  --   --   --    MONO PCT %  --   --  3*  --  1*  --   --   --   --   --  0*   EOS PCT %  --   --  0  --  1  --   --  1  --   --  1   POTASSIUM mmol/L 3 2* 4 2 4 0 3 8  --   --  3 3* 3 8  --  4 1 4 2   CHLORIDE mmol/L 102 105 102 103  --   --  99* 103  --  104 101   CO2 mmol/L 30 29 30 32  --   --  35* 32  --  29 30   BUN mg/dL 19 22 16 18  --   --  21 22  --  24 26*   CREATININE mg/dL 1 20 1 03 1 07 1 11  --   --  1 46* 1 22  --  1 15 1 41*   CALCIUM mg/dL 9 0 9 4 9 0 8 1*  --   --  8 5 8 8  --  7 7* 8 2*   ALK PHOS U/L  --   --  123* 137*  --   --   --   --   --   --  74   ALT U/L  --   --  115* 155*  --   --   --   --   --   --  49   AST U/L  --   --  42 130*  --   --   --   --   --   --  28   MAGNESIUM mg/dL 1 9 1 7 1 7  --   --  1 6  --   --   --   --   --    PHOSPHORUS mg/dL 3 7 1 7*  --   --   --   --   --   --   --   --   --        CUTURES:  Lab Results   Component Value Date    BLOODCX No Growth at 48 hrs  06/05/2021    BLOODCX No Growth at 48 hrs  06/05/2021    BLOODCX No Growth After 5 Days  06/01/2021    BLOODCX Staphylococcus coagulase negative (A) 06/01/2021    BLOODCX No Growth After 5 Days  12/02/2020    BLOODCX No Growth After 5 Days  12/02/2020    BLOODCX No Growth After 5 Days  01/05/2020    URINECX No Growth <1000 cfu/mL 05/06/2019                 Portions of the record may have been created with voice recognition software  Occasional wrong word or "sound a like" substitutions may have occurred due to the inherent limitations of voice recognition software  Read the chart carefully and recognize, using context, where substitutions have occurred  If you have any questions, please contact the dictating provider

## 2021-06-08 NOTE — ASSESSMENT & PLAN NOTE
· CXR on admission revealed ground glass opacities in the left base  · Recurrent fever 6/7   · Presumed HCAP as patient is immunocompromised receiving treatment for MM  · COVID negative   · Blood cultures x2 reveal no growth to date   · Continue cefepime D8  · On Vancomycin for cdiff prophylaxis

## 2021-06-08 NOTE — ASSESSMENT & PLAN NOTE
· SBP on 6/4 were in the 60-80s;  The patient was transferred to ICU and started on Albumin 50g TID and levophed  · Sepsis versus cardiogenic versus mixed picture  · Continue levophed to maintain MAP's > 60  · Continue midodrine 15 mg TID     · Challenging situation given his co-existing sever AS

## 2021-06-08 NOTE — ASSESSMENT & PLAN NOTE
· Patient follows with Dr Tia Christianson for out-patient pulmonary   · Baseline oxygen requirements of 2L NC at night  · Currently on 2L NC with oxygen saturations of 100%  · Continue atrovent QID  · Continue prednisone 10 mg daily

## 2021-06-08 NOTE — ASSESSMENT & PLAN NOTE
· Patient follows with cardiology out-patient and was deemed not a surgical candidate  · Currently requiring vasopressors  · Difficult to manage his fluid volume status in the context of his sever AS  · Poor prognosis- this was discussed with the family

## 2021-06-08 NOTE — ASSESSMENT & PLAN NOTE
· Patient follows with Dr Vincenzo Duverney for out-patient pulmonary   · Baseline oxygen requirements of 2L NC at night  · Currently on 2L NC with oxygen saturations of 100%  · Continue atrovent QID  · Continue prednisone 10 mg daily

## 2021-06-08 NOTE — PROGRESS NOTES
06/08/21 1200   Clinical Encounter Type   Visited With Patient; Health care provider   Routine Visit Follow-up   Continue Visiting Yes

## 2021-06-08 NOTE — ASSESSMENT & PLAN NOTE
· SBP on 6/4 were in the 60-80s;  The patient was transferred to ICU and started on Albumin 50g TID and levophed  · Sepsis versus cardiogenic versus mixed picture  · Continue albumin x 3 days   · Continue levophed to maintain MAP's > 60  · Continue midodrine 10 mg TID

## 2021-06-08 NOTE — PROGRESS NOTES
[facility]  Progress Note Kory Gauthier 1935, 81 y o  male MRN: [id number]  Unit/Bed#: ICU 06 Encounter: [id number]  Primary Care Provider: Ramiro Honeycutt DO   Date and time admitted to hospital: 6/1/2021  2:06 PM    * Idiopathic hypotension  Assessment & Plan  · SBP on 6/4 were in the 60-80s;  The patient was transferred to ICU and started on Albumin 50g TID and levophed  · Sepsis versus cardiogenic versus mixed picture  · Continue levophed to maintain MAP's > 60  · Continue midodrine 15 mg TID     · Challenging situation given his co-existing sever AS    Pneumonia  Assessment & Plan  · CXR on admission revealed ground glass opacities in the left base  · Recurrent fever 6/7   · Presumed HCAP as patient is immunocompromised receiving treatment for MM  · COVID negative   · Blood cultures x2 reveal no growth to date   · Completed course of antibiotics  · On Vancomycin for cdiff prophylaxis     Centrilobular emphysema ([address] )  Assessment & Plan  · Patient follows with Dr Wilvre Arevalo for out-patient pulmonary   · Baseline oxygen requirements of 2L NC at night  · Currently on 2L NC with oxygen saturations of 100%  · Continue atrovent QID  · Continue prednisone 10 mg daily       Anemia  Assessment & Plan  · Anemia likely multifactorial: chronic kidney disease, multiple myeloma, iron deficiency   · Iron saturation 17%; transfuse with venofer when acute infection resolves   · Follow up with repeat CBC  · hemoccult stool negative  · transfuse for hemoglobin less than 8  · S/p 2 units of pRBC on 6/5     Thrombocytopenia (HCC)  Assessment & Plan  · Patients platelets on admission were 23  · Most recent platelet count of 51  · Likely multifactorial due to MM and active chemotherapy, sepsis  · Heme/onc following- transfuse for hemoglobin less than 8 and platelets less than 20      Severe aortic stenosis  Assessment & Plan  · Patient follows with cardiology out-patient and was deemed not a surgical candidate  · Currently requiring vasopressors  · Difficult to manage his fluid volume status in the context of his sever AS  · Poor prognosis- this was discussed with the family    Multiple myeloma ([address] )  Assessment & Plan  · Patient actively receiving chemotherapy; with last on   · Has been heavily treated previous and has declined significantly since ;ast Chemo in May      Chronic anticoagulation  Assessment & Plan  · Maintained on Eliquis 2 5 mg BID for atrial fibrillation   · Continue to hold as patient is thrombocytopenic   · Will discuss restarting DVT prophylaxis      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  Assessment & Plan  · Patient is able to walk short distances  · Consult PT/OT     ----------------------------------------------------------------------------------------  HPI/24hr events: No events overnight, confused    Disposition: Continue Critical Care   Code Status: Level 3 - DNAR and DNI  ---------------------------------------------------------------------------------------  SUBJECTIVE  Denies SOB at present    Review of Systems  Review of systems was reviewed and negative unless stated above in HPI/24-hour events   ---------------------------------------------------------------------------------------  OBJECTIVE    Vitals   Vitals:    21 0508 21 0531 21 0538 21 0548   BP: (!) 79/41  (!) 78/55 93/53   Pulse: (!) 122  (!) 122 (!) 118   Resp: (!) 37  (!) 35 (!) 34   Temp:       TempSrc:       SpO2: 98%  98% 98%   Weight:  78 2 kg (172 lb 6 4 oz)     Height:         Temp (24hrs), Av 2 °F (36 8 °C), Min:97 5 °F (36 4 °C), Max:99 3 °F (37 4 °C)  Current: Temperature: 99 3 °F (37 4 °C)          Respiratory:  SpO2: SpO2: 98 %, SpO2 Activity: SpO2 Activity: At Rest, SpO2 Device: O2 Device: Nasal cannula  Nasal Cannula O2 Flow Rate (L/min): 2 L/min    Invasive/non-invasive ventilation settings   Respiratory    Lab Data (Last 4 hours)    None         O2/Vent Data (Last 4 hours)       0436          Non-Invasive Ventilation Mode BiPAP                   Physical Exam  HENT:      Head: Normocephalic  Mouth/Throat:      Mouth: Mucous membranes are moist    Eyes:      Pupils: Pupils are equal, round, and reactive to light  Neck:      Musculoskeletal: Neck supple  Cardiovascular:      Rate and Rhythm: Normal rate  Pulmonary:      Effort: Pulmonary effort is normal       Breath sounds: Rales present  Abdominal:      Tenderness: There is no abdominal tenderness  Musculoskeletal:         General: Swelling present  Lymphadenopathy:      Cervical: No cervical adenopathy  Skin:     General: Skin is warm and dry  Neurological:      General: No focal deficit present  Mental Status: He is alert  Mental status is at baseline           Laboratory and Diagnostics:  Results from last 7 days   Lab Units 06/08/21  0547 06/07/21  0528 06/06/21  0503 06/05/21  0353 06/04/21  0758 06/03/21  0508 06/02/21  1343   WBC Thousand/uL 4 40 5 10 4 32 2 44* 3 58* 4 06*  --    HEMOGLOBIN g/dL 9 5* 9 3* 9 5* 6 0* 7 6* 8 3* 7 9*   HEMATOCRIT % 30 3* 29 6* 29 7* 19 4* 23 9* 26 7* 25 5*   PLATELETS Thousands/uL 28* 29* 33* 34* 51*  51* 56*  --    NEUTROS PCT %  --   --   --   --   --  86*  --    BANDS PCT %  --   --  1 7  --   --   --    MONOS PCT %  --   --   --   --   --  3*  --    MONO PCT %  --   --  3* 1*  --   --   --      Results from last 7 days   Lab Units 06/09/21  0530 06/08/21  0547 06/07/21  0528 06/06/21  0503 06/05/21  0402 06/04/21  0758 06/03/21  0508   SODIUM mmol/L 142 142 142 143 143 139 141   POTASSIUM mmol/L 4 6 3 2* 4 2 4 0 3 8 3 3* 3 8   CHLORIDE mmol/L 105 102 105 102 103 99* 103   CO2 mmol/L 30 30 29 30 32 35* 32   ANION GAP mmol/L 7 10 8 11 8 5 6   BUN mg/dL 21 19 22 16 18 21 22   CREATININE mg/dL 1 19 1 20 1 03 1 07 1 11 1 46* 1 22   CALCIUM mg/dL 8 9 9 0 9 4 9 0 8 1* 8 5 8 8   GLUCOSE RANDOM mg/dL 103 114 104 105 81 109 84   ALT U/L  --   --   --  115* 155*  --   --    AST U/L --   --   --  42 130*  --   --    ALK PHOS U/L  --   --   --  123* 137*  --   --    ALBUMIN g/dL  --   --   --  3 3* 3 6  --   --    TOTAL BILIRUBIN mg/dL  --   --   --  2 23* 2 09*  --   --      Results from last 7 days   Lab Units 06/08/21  0547 06/07/21  0528 06/06/21  0503 06/05/21  0310   MAGNESIUM mg/dL 1 9 1 7 1 7 1 6   PHOSPHORUS mg/dL 3 7 1 7*  --   --       Results from last 7 days   Lab Units 06/04/21  0758   INR  1 01   PTT seconds 29              ABG:    VBG:  Results from last 7 days   Lab Units 06/08/21  1023   PH TYRELL  7 462*   PCO2 TYRELL mm Hg 44 8   PO2 TYRELL mm Hg 101 8*   HCO3 TYRELL mmol/L 31 3*   BASE EXC TYRELL mmol/L 6 7     Results from last 7 days   Lab Units 06/08/21  0547 06/07/21  0528 06/06/21  0503 06/03/21  0536   PROCALCITONIN ng/ml 0 37* 0 26* 0 28* 0 26*       Micro  Results from last 7 days   Lab Units 06/05/21  1123   BLOOD CULTURE  No Growth at 72 hrs  No Growth at 72 hrs  EKG:   Imaging: I have personally reviewed pertinent reports  and I have personally reviewed pertinent films in PACS    Intake and Output  I/O       06/07 0701 - 06/08 0700 06/08 0701 - 06/09 0700    P  O   360    I V  (mL/kg) 526 8 (7) 338 8 (4 5)    IV Piggyback 265     Total Intake(mL/kg) 791 8 (10 6) 698 8 (9 3)    Urine (mL/kg/hr) 2700 (1 5) 520 (0 5)    Stool 0 0    Total Output 2700 520    Net -1908 2 +178 8          Unmeasured Stool Occurrence 1 x 2 x          Height and Weights   Height: 5' 8" (172 7 cm)  IBW (Ideal Body Weight): 68 4 kg  Body mass index is 26 21 kg/m²    Weight (last 2 days)     Date/Time   Weight    06/09/21 0531   78 2 (172 4)    06/08/21 0550   74 9 (165 12)    06/07/21 0537   74 6 (164 46)    06/07/21 0529   74 6 (164 46)                Nutrition       Diet Orders   (From admission, onward)             Start     Ordered    06/04/21 4912  Diet Cardiovascular; Cardiac; Fluid Restriction 1800 ML  Diet effective now     Question Answer Comment   Diet Type Cardiovascular    Cardiac Cardiac    Other Restriction(s): Fluid Restriction 1800 ML    RD to adjust diet per protocol? Yes        06/04/21 0951                  Active Medications  Scheduled Meds:  Current Facility-Administered Medications   Medication Dose Route Frequency Provider Last Rate    acetaminophen  650 mg Oral Q6H PRN JACK Castillo      famotidine  20 mg Oral Daily JACK Castillo      ipratropium-albuterol  3 mL Nebulization TID Solomon Alcaraz MD      midodrine  15 mg Oral TID AC Loi Weiss MD      norepinephrine  1-30 mcg/min Intravenous Titrated Casandra Hodgkins, CRNP 2 mcg/min (06/09/21 0438)    predniSONE  10 mg Oral Daily JACK Castillo      vancomycin  125 mg Oral Q12H [address] Casandra Hodgkins, CRNP       Continuous Infusions:  norepinephrine, 1-30 mcg/min, Last Rate: 2 mcg/min (06/09/21 0438)      PRN Meds:   acetaminophen, 650 mg, Q6H PRN        Invasive Devices Review  Invasive Devices     Central Venous Catheter Line            Port A Cath 12/10/17 Right Subclavian 1276 days          Drain            Urethral Catheter 3 days                Rationale for remaining devices:   ---------------------------------------------------------------------------------------  Advance Directive and Living Will:      Power of :    POLST:    ---------------------------------------------------------------------------------------  Care Time Delivered:         JACK Zepeda      Portions of the record may have been created with voice recognition software  Occasional wrong word or "sound a like" substitutions may have occurred due to the inherent limitations of voice recognition software    Read the chart carefully and recognize, using context, where substitutions have occurred

## 2021-06-08 NOTE — ASSESSMENT & PLAN NOTE
· CXR on admission revealed ground glass opacities in the left base  · Recurrent fever 6/7   · Presumed HCAP as patient is immunocompromised receiving treatment for MM  · COVID negative   · Blood cultures x2 reveal no growth to date   · Completed course of antibiotics  · On Vancomycin for cdiff prophylaxis

## 2021-06-08 NOTE — ASSESSMENT & PLAN NOTE
· Patient follows with cardiology out-patient and was deemed not a surgical candidate  · Continues with medical management with Dr Rita Casper     · Will continue with  BID diuresis as able

## 2021-06-09 PROBLEM — E87.6 HYPOKALEMIA: Status: RESOLVED | Noted: 2021-01-01 | Resolved: 2021-01-01

## 2021-06-09 NOTE — OCCUPATIONAL THERAPY NOTE
Occupational Therapy cancellation Note        Patient Name: Jessenia Reed  QIQ[facility]'S Date: 6/9/2021      Attempted to see patient for OT session and RN reports to hold pt at this time due to medical status  Pt is awaiting hospice consult as well  Will follow as appropriate      Muna Mejia MS, OTR/L

## 2021-06-09 NOTE — PROGRESS NOTES
Progress Note - Nephrology   Jessenia Reed 81 y o  male MRN: [id number]  Unit/Bed#: ICU 06 Encounter: [id number]      Assessment / Plan:  1  Acute kidney injury with associated oliguria on chronic kidney disease, baseline creatinine 1 1-1 2  -at baseline, ALLYSSA resolved  - significant improvement in urine output with Lasix infusion  Has been receiving intermittent diuretic dosing  Monitoring off diuretics now in light of hypotension and increasing pressor requirement  Poor prognosis overall in setting of severe AS    -monitorBMP  2   hypoxic respiratory failure, as per primary service/cardiology, diuretics now on hold as above  3  Severe aortic stenosis with ejection fraction 58% - cardio follows  4  Multiple myeloma, has been on chemotherapy previously   5  Hypotension/ shock likely secondary to pneumonia as well as aortic stenosis, on midodrine 15 mg t i d  Will hold on albumin in light of volume overload  Still on low dose levo gtt  6   acute on chronic anemia in the face of underlying multiple myeloma, transfuse as needed, Hgb in 8s-9s and stable, monitor CBC  Also with thrombocytopenia  hemolysis smear negative as of 21  7  Transaminitis, may be secondary to hepatic congestion/hypotension - improving  8  Severe AS - not surgical candidate, poor prognosis    Poor prognosis  Holding diuretics for now  Subjective:   He denies chest pain or shortness of breath but appears dyspneic  He is on 3 L oxygen via nasal cannula  No complaints except for feeling tired  Objective:     Vitals: Blood pressure (!) 79/58, pulse (!) 130, temperature 98 1 °F (36 7 °C), temperature source Temporal, resp  rate (!) 40, height 5' 8" (1 727 m), weight 78 2 kg (172 lb 6 4 oz), SpO2 92 %  ,Body mass index is 26 21 kg/m²  Temp (24hrs), Av 4 °F (36 9 °C), Min:97 9 °F (36 6 °C), Max:99 3 °F (37 4 °C)      Weight (last 2 days)     Date/Time   Weight    21 0531   78 2 (172 4)    21 0550   74 9 (165 12)    21 0537   74 6 (164 46)    06/07/21 0529   74 6 (164 46)                Intake/Output Summary (Last 24 hours) at 6/9/2021 1012  Last data filed at 6/9/2021 0801  Gross per 24 hour   Intake 588 92 ml   Output 1015 ml   Net -426 08 ml     I/O last 24 hours: In: 693 [P O :360; I V :461]  Out: 1145 [Urine:1145]        Physical Exam:   Physical Exam  Vitals signs reviewed  Constitutional:       General: He is not in acute distress  Appearance: He is well-developed  He is ill-appearing  He is not diaphoretic  HENT:      Head: Normocephalic and atraumatic  Nose: Nose normal       Mouth/Throat:      Mouth: Mucous membranes are moist       Pharynx: No oropharyngeal exudate  Eyes:      General: No scleral icterus  Right eye: No discharge  Left eye: No discharge  Neck:      Musculoskeletal: Neck supple  Thyroid: No thyromegaly  Cardiovascular:      Rate and Rhythm: Normal rate and regular rhythm  Heart sounds: Murmur present  Pulmonary:      Effort: Pulmonary effort is normal       Breath sounds: Wheezing present  No rales  Comments: Coarse BS b/l  Abdominal:      General: Bowel sounds are normal  There is no distension  Palpations: Abdomen is soft  Tenderness: There is no abdominal tenderness  Genitourinary:     Comments: +ashraf  Musculoskeletal: Normal range of motion  General: Swelling (trace b/l LE) present  Lymphadenopathy:      Cervical: No cervical adenopathy  Skin:     General: Skin is warm and dry  Findings: No rash  Neurological:      General: No focal deficit present  Mental Status: He is alert        Comments: awake   Psychiatric:      Comments: Flat affect         Invasive Devices     Central Venous Catheter Line            Port A Cath 12/10/17 Right Subclavian 1276 days          Drain            Urethral Catheter 3 days                Medications:    Scheduled Meds:  Current Facility-Administered Medications   Medication Dose Route Frequency Provider Last Rate    acetaminophen  650 mg Oral Q6H PRN Yomi ReyesJACK lantigua      famotidine  20 mg Oral Daily Yomi ReyesJACK lantigua      ipratropium-albuterol  3 mL Nebulization TID Yeny Ruiz MD      midodrine  15 mg Oral TID AC Kim Mahoney MD      norepinephrine  1-30 mcg/min Intravenous Titrated Edilma Pap, CRNP 3 mcg/min (06/09/21 0723)    predniSONE  10 mg Oral Daily Yomi ReyesJACK lantigua      vancomycin  125 mg Oral Q12H [address] Edilma Pap, CRNP         PRN Meds:   acetaminophen    Continuous Infusions:norepinephrine, 1-30 mcg/min, Last Rate: 3 mcg/min (06/09/21 0723)            LAB RESULTS:      Results from last 7 days   Lab Units 06/09/21  0530 06/08/21  0547 06/07/21  0528 06/06/21  0503 06/05/21  0402 06/05/21  0353 06/05/21  0310 06/04/21  0758 06/03/21  0508   WBC Thousand/uL 4 10* 4 40 5 10 4 32  --  2 44*  --  3 58* 4 06*   HEMOGLOBIN g/dL 8 9* 9 5* 9 3* 9 5*  --  6 0*  --  7 6* 8 3*   HEMATOCRIT % 28 7* 30 3* 29 6* 29 7*  --  19 4*  --  23 9* 26 7*   PLATELETS Thousands/uL 23* 28* 29* 33*  --  34*  --  51*  51* 56*   NEUTROS PCT %  --   --   --   --   --   --   --   --  86*   LYMPHS PCT %  --   --   --   --   --   --   --   --  3*   LYMPHO PCT % 9*  --   --  3*  --  7*  --   --   --    MONOS PCT %  --   --   --   --   --   --   --   --  3*   MONO PCT % 0*  --   --  3*  --  1*  --   --   --    EOS PCT % 1  --   --  0  --  1  --   --  1   POTASSIUM mmol/L 4 6 3 2* 4 2 4 0 3 8  --   --  3 3* 3 8   CHLORIDE mmol/L 105 102 105 102 103  --   --  99* 103   CO2 mmol/L 30 30 29 30 32  --   --  35* 32   BUN mg/dL 21 19 22 16 18  --   --  21 22   CREATININE mg/dL 1 19 1 20 1 03 1 07 1 11  --   --  1 46* 1 22   CALCIUM mg/dL 8 9 9 0 9 4 9 0 8 1*  --   --  8 5 8 8   ALK PHOS U/L  --   --   --  123* 137*  --   --   --   --    ALT U/L  --   --   --  115* 155*  --   --   --   --    AST U/L  --   --   --  42 130*  --   --   --   --    MAGNESIUM mg/dL  --  1 9 1 7 1 7  --   --  1 6  --   --    PHOSPHORUS mg/dL  --  3 7 1 7*  --   --   --   --   --   --        CUTURES:  Lab Results   Component Value Date    BLOODCX No Growth at 72 hrs  06/05/2021    BLOODCX No Growth at 72 hrs  06/05/2021    BLOODCX No Growth After 5 Days  06/01/2021    BLOODCX Staphylococcus coagulase negative (A) 06/01/2021    BLOODCX No Growth After 5 Days  12/02/2020    BLOODCX No Growth After 5 Days  12/02/2020    BLOODCX No Growth After 5 Days  01/05/2020    URINECX No Growth <1000 cfu/mL 05/06/2019                 Portions of the record may have been created with voice recognition software  Occasional wrong word or "sound a like" substitutions may have occurred due to the inherent limitations of voice recognition software  Read the chart carefully and recognize, using context, where substitutions have occurred  If you have any questions, please contact the dictating provider

## 2021-06-09 NOTE — PHYSICAL THERAPY NOTE
PHYSICAL THERAPY NOTE          Patient Name: Valeriy Lindsay  FVTCH'E Date: 6/9/2021     Attempted PT tx however RN reports family meeting & palliative consult pending  Advised to hold off PT tx for now until definite medical POC is in placed  Will follow as appropriate   Clare Jovel, PT

## 2021-06-09 NOTE — ACP (ADVANCE CARE PLANNING)
Extensive discussion with family including wife, sister, and daughters at bedside regarding Lv's goals of care  Malka King and his family confirmed that he no longer desires to pursue treating his multiple myeloma with chemotherapy  We also discussed his severe aortic stenosis and its unfortunate poor prognosis  We discussed that comfort care and hospice care will focus on relieving and treating comfort versus curative factors  Malka King and his family were tearful throughout this conversation and ultimately Malka King decided he would like to pursue hospice care hopefully at home  I discussed inpatient hospice vs  home hospice with Malka King and his family, they were hopeful to get Malka King home  I oliva texted the CM to inform her of this decision, she will follow up the hospice liaison tomorrow  I informed the family that hospice will contact them tomorrow  Time spent: 45 min

## 2021-06-09 NOTE — PROGRESS NOTES
Cardiology Progress Note   MD Cyndy Kapadia MD Azalia Burlington, DO, MD Dominic Jauregui DO, Muna Esquivel DO, [facility]  ----------------------------------------------------------------  [address] [address]    Eduardo Robison 81 y o  male MRN: [id number]  Unit/Bed#: ICU 06 Encounter: [id number]      ASSESSMENT:   · Healthcare associated pneumonia  · Severe aortic stenosis - not surgical candidate  · Hypotension  · Volume overload with possible acute heart failure - improved  · Acute kidney injury - resolved  · LVEF 48%, grade 1 diastolic dysfunction, mild LVH, severe AS w/ Vmax 3 23 m/s, mean PG 28 mmHg, RADHAMES 0 9 cm2, mild MAC, trace MR/TR, mild to moderate ascending aortic root dilatation 4 5 cm, September 2020   · Multiple myeloma  · Pancytopenia on chemotherapy for multiple myeloma  · Lactic acidosis  · Hypoalbuminemia at 2 2 g/dL, June 1, 2021 - improved  · RBBB  · Centrilobular emphysema  · History of pulmonary embolism on Eliquis as OP  · Paroxysmal atrial fibrillation on Eliquis as OP    PLAN:  Patient is clinically improved with regard to his swelling, but still requiring 2 L nasal cannula  Chest x-ray shows bilateral infiltrates  Diuretics are on hold as per Nephrology with improved swelling   Currently on midodrine 15 mg t i d  Antibiotics as per primary team  Titrate off of pressors as tolerated, but would likely be difficult  Keep potassium greater than 4 and magnesium greater than 2  As patient is possibly plan for hospice, would defer repeating echocardiogram for now; doubt echocardiogram will result in changed out come  Poor prognosis    Signed: Margarette Amanda DO, [facility], [facility]      History of Present Illness:  Patient seen and examined  Denies chest pain, pressure, tightness or squeezing  Admits to shortness of breath  Denies orthopnea or paroxysmal nocturnal dyspnea  Denies lightheadedness or dizziness        Review of Systems:  Review of Systems   Constitution: Negative for decreased appetite, fever, weight gain and weight loss  HENT: Negative for congestion and sore throat  Eyes: Negative for visual disturbance  Cardiovascular: Positive for dyspnea on exertion  Negative for chest pain, leg swelling, near-syncope and palpitations  Respiratory: Positive for shortness of breath  Negative for cough  Hematologic/Lymphatic: Negative for bleeding problem  Skin: Negative for rash  Musculoskeletal: Negative for myalgias and neck pain  Gastrointestinal: Negative for abdominal pain and nausea  Neurological: Negative for light-headedness and weakness  Psychiatric/Behavioral: Negative for depression  Allergies   Allergen Reactions    Gabapentin      somnolence    Metaxalone     Nisoldipine     Sulfa Antibiotics Itching    Aldactone [Spironolactone] Other (See Comments)     Breast swelling and pain  Breast swelling and pain    Penicillin V Rash     AS CHILD       No current facility-administered medications on file prior to encounter        Current Outpatient Medications on File Prior to Encounter   Medication Sig    albuterol (2 5 mg/3 mL) 0 083 % nebulizer solution Take 1 vial (2 5 mg total) by nebulization every 6 (six) hours as needed for wheezing    apixaban (ELIQUIS) 2 5 mg Take 1 tablet (2 5 mg total) by mouth 2 (two) times a day    diltiazem (CARDIZEM CD) 180 mg 24 hr capsule Take 1 capsule (180 mg total) by mouth daily    famotidine (Pepcid) 20 mg tablet Take 20 mg by mouth as needed     finasteride (PROSCAR) 5 mg tablet TAKE 1 TABLET BY MOUTH AT BEDTIME    furosemide (LASIX) 20 mg tablet Take 1 tablet (20 mg total) by mouth 2 (two) times a day    ipratropium (ATROVENT) 0 02 % nebulizer solution Take 1 vial (0 5 mg total) by nebulization 4 (four) times a day    lidocaine-prilocaine (EMLA) cream Apply topically as needed for mild pain    potassium chloride (K-DUR,KLOR-CON) 10 mEq tablet Take 1 tablet (10 mEq total) by mouth 2 (two) times a day Take only on days you take lasix    predniSONE 10 mg tablet Take 1 tablet (10 mg total) by mouth daily    predniSONE 20 mg tablet Take 20 mg by mouth daily    Silodosin 8 MG CAPS Take by mouth daily     albuterol (PROVENTIL HFA,VENTOLIN HFA) 90 mcg/act inhaler Inhale 2 puffs every 6 (six) hours as needed for wheezing        Current Facility-Administered Medications   Medication Dose Route Frequency Provider Last Rate    acetaminophen  650 mg Oral Q6H PRN JACK Ann      famotidine  20 mg Oral Daily JACK Ann      ipratropium-albuterol  3 mL Nebulization TID Patricio Nogueira MD      midodrine  15 mg Oral TID AC Karlie Waters MD      norepinephrine  1-30 mcg/min Intravenous Titrated JACK Huffman 3 mcg/min (06/09/21 1051)    predniSONE  10 mg Oral Daily JACK Ann      vancomycin  125 mg Oral Q12H [address], JACK         norepinephrine, 1-30 mcg/min, Last Rate: 3 mcg/min (06/09/21 1051)        Vitals:    06/09/21 0800 06/09/21 1000 06/09/21 1100 06/09/21 1130   BP: (!) 79/58 90/58 (!) 70/43 (!) 84/44   Pulse: (!) 130 (!) 118 (!) 116 (!) 114   Resp: (!) 40 (!) 38 (!) 38 (!) 42   Temp:   (!) 100 6 °F (38 1 °C)    TempSrc:   Temporal    SpO2: 92% 96% 95% 95%   Weight:       Height:             Intake/Output Summary (Last 24 hours) at 6/9/2021 1139  Last data filed at 6/9/2021 1023  Gross per 24 hour   Intake 588 92 ml   Output 1115 ml   Net -526 08 ml       Weight change: 3 3 kg (7 lb 4 4 oz)    PHYSICAL EXAMINATION:  Gen: ill appearing, Alert, NAD  Head/eyes: AT/NC, pupils equal and round, Anicteric  ENT: mmm  Neck: Supple, No elevated JVP, trachea midline  Resp: Decreased BS bilaterally  CV: irreg irreg +S1, S2, No m/r/g  Abd: Soft, NT/ND + BS  Ext: no LE edema bilaterally  Neuro:  Follows commands, moves all extermities  Psych: Appropriate affect, normal mood, pleasant attitude, non-combative  Skin: warm; no rash, erythema or venous stasis changes on exposed skin    Lab Results:  Results from last 7 days   Lab Units 06/09/21  0530   WBC Thousand/uL 4 10*   HEMOGLOBIN g/dL 8 9*   HEMATOCRIT % 28 7*   PLATELETS Thousands/uL 23*     Results from last 7 days   Lab Units 06/09/21  0530  06/06/21  0503   POTASSIUM mmol/L 4 6   < > 4 0   CHLORIDE mmol/L 105   < > 102   CO2 mmol/L 30   < > 30   BUN mg/dL 21   < > 16   CREATININE mg/dL 1 19   < > 1 07   CALCIUM mg/dL 8 9   < > 9 0   ALK PHOS U/L  --   --  123*   ALT U/L  --   --  115*   AST U/L  --   --  42    < > = values in this interval not displayed  No results found for: Louie Suh          Results from last 7 days   Lab Units 06/04/21  0758   INR  1 01       Tele: AF    This note was completed in part utilizing M-Modal Fluency Direct Software  Grammatical errors, random word insertions, spelling mistakes, and incomplete sentences may be an occasional consequence of this system secondary to software limitations, ambient noise, and hardware issues  If you have any questions or concerns about the content, text, or information contained within the body of this dictation, please contact the provider for clarification

## 2021-06-10 NOTE — CASE MANAGEMENT
Home Hospice evaluation was completed and Connie (hospice liaison) requested for comfort care at the same hospital, as pt is too critical to be transported by an ambulance  Freedom of choice (FOC) was discussed, Family members decided to keep pt at the ICU until pt passes away peacefully  They stated: Excellent care is provided by Drs,nurses and staff at the icu and they do not want pt to be transferred to different floor  CM agrees to pt's family [address]  CM reviewed d/c planning process  CM department will continue to follow through pt's D/C

## 2021-06-10 NOTE — QUICK NOTE
As renal function stable and appears to be transitioning towards hospice, nephrology will sign off  Call/text with questions if needed

## 2021-06-10 NOTE — PHYSICAL THERAPY NOTE
Physical Therapy Cancellation Note    Pt  Transitioned to hospice services, will d/c PT services at this time  Please re-consult if needed       Conner Lock PTA

## 2021-06-10 NOTE — ASSESSMENT & PLAN NOTE
· Patient actively receiving chemotherapy; with last on 5/5  · Has been heavily treated previous and has declined significantly since last Chemo in May

## 2021-06-10 NOTE — ASSESSMENT & PLAN NOTE
· SBP on 6/4 were in the 60-80s;  The patient was transferred to ICU and started on Albumin 50g TID and levophed  · Sepsis versus cardiogenic versus mixed picture  · Continue levophed to maintain MAP's > 60  · Continue midodrine 15 mg TID     · Challenging situation given his co-existing severe AS  · Off and on levophed for hypotension

## 2021-06-10 NOTE — QUICK NOTE
Patient is transitioning to hospice care  Cardiology will sign off the case  Please do not hesitate to contact with any questions  Thank you

## 2021-06-10 NOTE — PROGRESS NOTES
Progress Note - Palliative & Supportive Care  Dilip Suh  81 y o   male  ICU 06/ICU 06   MRN: [id number]  Encounter: [id number]     ASSESSMENT:    Patient Active Problem List   Diagnosis    Chronic rhinitis    Adenocarcinoma, lung ([facility] [address] )    BPH (benign prostatic hyperplasia)    Multiple myeloma (HCC)    Chronic diarrhea    Centrilobular emphysema ([facility] [address] )    Right bundle branch block    Enlarged prostate without lower urinary tract symptoms (luts)    Osteoporosis    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HCC)    Severe aortic stenosis    Peripheral edema    Chronic anticoagulation    Venous insufficiency (chronic) (peripheral)    Rectal cancer (HCC)    Elevated PSA    Gross hematuria    Hypogammaglobulinemia (HCC)    Skin lesion    Port-A-Cath in place    Pneumonia    Chronic diastolic congestive heart failure (HCC)    Idiopathic hypotension    CKD (chronic kidney disease) stage 3, GFR 30-59 ml/min (HCC)    Right flank pain    Nocturnal hypoxia    Blood in stool    Chronic respiratory failure (HCC)    Pancytopenia (HCC)    Personal history of malignant neoplasm of breast    Multiple myeloma in relapse (HCC)    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    Epistaxis    Thrombocytopenia (HCC)    Anemia       Active problems addressed:  · Multiple myeloma, heavily pre-treated in the past, currently on chemo (last one 5/5)  · Centrilobular emphysema  · Severe aortic stenosis  · Cardiogenic shock requiring pressors  · Chronic HFpEF  · CKD3  · Shortness of breath  · Anxiety  · Agitation  · Goals of care    Consult is for goals of care    PLAN:    1  Symptom management:   Start IV dilaudid 0 2mg q2H prn SOB/pain   Start ativan 0 5mg IV q2H prn Sob/anxeity   Start haloperidol 0 5mg IV q4H pnr agitation/N/V/hiccups    2  Goals:    Family now agreeable to starting full comfort measures in the hospital today given patient's continued decline and appearing to be worst today      Family made aware of the possible side effects of opioids/BZDs as above and the principle of double effect  At this time, they are agreeable to proceed in keeping with their wish now for him to be as comfortable as possible   Family aware of likelihood of patient passing away while in the hospital   Code status changed to level 4   Support provided to wife and 3 daughters  Wife very tearful but appears to be well-supported by family   Numerous family members present at bedside    Code status: Level 4 - Comfort Care   Decisional apparatus:  Patient does not have capacity to make medical decisions on my exam today  If such capacity is lost, patient's substitute decision maker would default to wife by [place]  Advance Directive / Living Will / POLST:  NA    3  Prognosis: PPS 20%, hours to days    We appreciate the opportunity to participate in this patient's care  We will continue to follow  Please do not hesitate to contact our on-call provider through our clinic answering service at [phone] should you have acute symptom control concerns  INTERVAL HISTORY:  Case d/w ICU and hospice liaison  Patient appears worse today with increased WOB  Saw patient in the room surrounded by numerous family members  Patient awake but disoriented  He appears slightly uncomfortable with increased WOB  Met with wife and 3 daughters outside with ICU team  Confirmed decision to forego home hospice and start comfort measures while here   Rest of conversation as above    Review of Systems   Unable to perform ROS: Mental status change       MEDICATIONS / ALLERGIES:  all current active meds have been reviewed    Allergies   Allergen Reactions    Gabapentin      somnolence    Metaxalone     Nisoldipine     Sulfa Antibiotics Itching    Aldactone [Spironolactone] Other (See Comments)     Breast swelling and pain  Breast swelling and pain    Penicillin V Rash     AS CHILD       OBJECTIVE:  BP (!) 70/58   Pulse (!) 122   Temp 99 8 °F (37 7 °C) (Temporal)   Resp (!) 39   Ht 5' 8" (1 727 m)   Wt 77 4 kg (170 lb 10 2 oz)   SpO2 97%   BMI 25 95 kg/m²   Physical Exam:  Constitutional: Appears thin, frail, sickly, appears somewhat toxic looking  In some acute respiratory distress  Head: Normocephalic and atraumatic  Eyes: EOM are normal  No ocular discharge  No scleral icterus  Neck: No visible adenopathy or masses  Respiratory: Tachypneic, using abdominal muscle to breath No stridor  Mild-mod respiratory distress  On NC  Gastrointestinal: No abdominal distension  Musculoskeletal: No edema  Neurological: awake, Alert, disoriented  Skin: Dry, no diaphoresis  Pale  Psychiatric: Not agitated, questionable insight and judgement to medical condition    Lab Results:   I have personally reviewed pertinent labs  Imaging Studies: I have personally reviewed pertinent reports  EKG, Pathology, and Other Studies: I have personally reviewed pertinent reports  Counseling / Coordination of Care  Total floor / unit time spent today 30 minutes  Greater than 50% of total time was spent with the patient and / or family counseling and / or coordination of care  A description of the counseling / coordination of care: provided medical updates, discussed palliative care, comfort cares, principle of double effect, comfort medications, determined competency, determined goals of care, determined POA, determined social/family support, discussed plans of care, discussed symptom management, provided psychosocial support      Laquiat Way MD  [address] and Supportive Care  [phone]

## 2021-06-10 NOTE — ASSESSMENT & PLAN NOTE
· Patient follows with cardiology out-patient and was deemed not a surgical candidate  · Currently requiring low dose levophed off and on  · Difficult to manage his fluid volume status in the context of his severe AS  · Poor prognosis- this was discussed with the family

## 2021-06-10 NOTE — ASSESSMENT & PLAN NOTE
· Patient follows with Dr Tasha Cain for out-patient pulmonary   · Baseline oxygen requirements of 2L NC at night  · Currently on 2L NC with oxygen saturations in the high 90s  · Continue atrovent QID  · Continue prednisone 10 mg daily

## 2021-06-10 NOTE — CASE MANAGEMENT
Discharge planning:     CM sent referral through [address] [place] [address] for hospice   [facility]  Hospice liabo Rosales ph: [phone] contacted CM stating that she will evaluate the pt and will call pt's dtr Connie Hoff for home hospice  Chi Hay will contact CM once home hospice evaluation is completed  CM department will continue to follow through pt's d/c

## 2021-06-10 NOTE — ASSESSMENT & PLAN NOTE
· Patients platelets on admission were 23  · Likely multifactorial due to MM and active chemotherapy, sepsis  · Heme/onc following- transfuse for hemoglobin less than 8 and platelets less than 20

## 2021-06-10 NOTE — PROGRESS NOTES
06/10/21 1200   Clinical Encounter Type   Visited With Patient and family together;Health care provider   Routine Visit Follow-up   Continue Visiting Yes   Referral From Nurse   Referral To    Sacramental Encounters   Communion Given Indicator Yes

## 2021-06-10 NOTE — ASSESSMENT & PLAN NOTE
· Maintained on Eliquis 2 5 mg BID for atrial fibrillation   · Continue to hold as patient is thrombocytopenic   · Will discuss restarting DVT prophylaxis when appropriate

## 2021-06-10 NOTE — PROGRESS NOTES
Asked by nursing staff to visit ICU 6 patient will be going on home hospice tomorrow  Family presence daughter asked if I could give patient Holy Communion he is [employer] but hasn't been to Religion for [employer]  Provided pastoral care support, and prayer

## 2021-06-10 NOTE — ASSESSMENT & PLAN NOTE
· CXR on admission revealed ground glass opacities in the left base  · Recurrent fever 6/7   · Presumed HCAP as patient is immunocompromised receiving treatment for MM  · COVID negative   · Blood cultures x2 reveal no growth to date   · Completed course of antibiotics  · On Vancomycin for cdiff prophylaxis until 6/11

## 2021-06-10 NOTE — OCCUPATIONAL THERAPY NOTE
Occupational Therapy Cancellation Note        Patient Name: Alisha Campos  FCTAV'N Date: 6/10/2021    Pt transitioned to hospice services, will d/c OT services at this time  Please re consult if changes occur       Nj Shelton MS, OTR/L

## 2021-06-10 NOTE — PROGRESS NOTES
[facility]  Progress Note Lizbeth Pan 1935, 81 y o  male MRN: [id number]  Unit/Bed#: ICU 06 Encounter: [id number]  Primary Care Provider: Darrelyn Canavan, DO   Date and time admitted to hospital: 6/1/2021  2:06 PM    * Idiopathic hypotension  Assessment & Plan  · SBP on 6/4 were in the 60-80s;  The patient was transferred to ICU and started on Albumin 50g TID and levophed  · Sepsis versus cardiogenic versus mixed picture  · Continue levophed to maintain MAP's > 60  · Continue midodrine 15 mg TID     · Challenging situation given his co-existing severe AS  · Off and on levophed for hypotension     Pneumonia  Assessment & Plan  · CXR on admission revealed ground glass opacities in the left base  · Recurrent fever 6/7   · Presumed HCAP as patient is immunocompromised receiving treatment for MM  · COVID negative   · Blood cultures x2 reveal no growth to date   · Completed course of antibiotics  · On Vancomycin for cdiff prophylaxis until 6/11     Centrilobular emphysema ([address] )  Assessment & Plan  · Patient follows with Dr Kailyn Sinha for out-patient pulmonary   · Baseline oxygen requirements of 2L NC at night  · Currently on 2L NC with oxygen saturations in the high 90s  · Continue atrovent QID  · Continue prednisone 10 mg daily        Anemia  Assessment & Plan  · Anemia likely multifactorial: chronic kidney disease, multiple myeloma, iron deficiency   · Iron saturation 17%; transfuse with venofer when acute infection resolves   · Follow up with repeat CBC  · hemoccult stool negative  · transfuse for hemoglobin less than 8  · S/p 2 units of pRBC on 6/5       Thrombocytopenia (HCC)  Assessment & Plan  · Patients platelets on admission were 23  · Likely multifactorial due to MM and active chemotherapy, sepsis  · Heme/onc following- transfuse for hemoglobin less than 8 and platelets less than 20       Severe aortic stenosis  Assessment & Plan  · Patient follows with cardiology out-patient and was deemed not a surgical candidate  · Currently requiring low dose levophed off and on  · Difficult to manage his fluid volume status in the context of his severe AS  · Poor prognosis- this was discussed with the family     Multiple myeloma [facility])  Assessment & Plan  · Patient actively receiving chemotherapy; with last on   · Has been heavily treated previous and has declined significantly since last Chemo in May       Chronic anticoagulation  Assessment & Plan  · Maintained on Eliquis 2 5 mg BID for atrial fibrillation   · Continue to hold as patient is thrombocytopenic   · Will discuss restarting DVT prophylaxis when appropriate     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  Assessment & Plan  · Patient is able to walk short distances  · Consult PT/OT       ----------------------------------------------------------------------------------------  HPI/24hr events: Patient confused overnight  No events  Disposition: Admit to Critical Care   Code Status: Level 3 - DNAR and DNI  ---------------------------------------------------------------------------------------  SUBJECTIVE  Family at bedside last evening, tearful with patient      Review of Systems  Review of systems was reviewed and negative unless stated above in HPI/24-hour events   ---------------------------------------------------------------------------------------  OBJECTIVE    Vitals   Vitals:    06/10/21 0200 06/10/21 0300 06/10/21 0347 06/10/21 0400   BP: 113/68 (!) 108/48  119/67   BP Location: Left arm Right arm  Left arm   Pulse: (!) 108 (!) 110  (!) 116   Resp: 19 (!) 34  (!) 35   Temp:   99 2 °F (37 3 °C)    TempSrc:   Temporal    SpO2: 96% 96%  95%   Weight:       Height:         Temp (24hrs), Av 9 °F (37 2 °C), Min:98 1 °F (36 7 °C), Max:100 6 °F (38 1 °C)  Current: Temperature: 99 2 °F (37 3 °C)          Respiratory:  SpO2: SpO2: 95 %  Nasal Cannula O2 Flow Rate (L/min): 3 L/min    Invasive/non-invasive ventilation settings   Respiratory    Lab Data (Last 4 hours)    None         O2/Vent Data (Last 4 hours)    None                Physical Exam  Vitals signs and nursing note reviewed  Constitutional:       General: He is not in acute distress  Appearance: Normal appearance  HENT:      Head: Normocephalic and atraumatic  Right Ear: External ear normal       Left Ear: External ear normal       Nose: Nose normal       Mouth/Throat:      Mouth: Mucous membranes are moist       Pharynx: Oropharynx is clear  Eyes:      Extraocular Movements: Extraocular movements intact  Conjunctiva/sclera: Conjunctivae normal       Pupils: Pupils are equal, round, and reactive to light  Neck:      Musculoskeletal: Normal range of motion and neck supple  Cardiovascular:      Rate and Rhythm: Tachycardia present  Rhythm irregular  Pulses: Normal pulses  Heart sounds: Normal heart sounds  Pulmonary:      Effort: Pulmonary effort is normal  No respiratory distress  Comments: rhonchitic breath sounds throughout lung fields  Abdominal:      General: Bowel sounds are normal       Palpations: Abdomen is soft  Musculoskeletal: Normal range of motion  Right lower leg: Edema present  Left lower leg: Edema present  Comments: Trace pedal edema   Skin:     General: Skin is warm and dry  Capillary Refill: Capillary refill takes 2 to 3 seconds  Coloration: Skin is pale  Neurological:      General: No focal deficit present  Mental Status: He is alert and oriented to person, place, and time  Psychiatric:         Mood and Affect: Mood normal          Behavior: Behavior normal          Thought Content:  Thought content normal          Judgment: Judgment normal          Laboratory and Diagnostics:  Results from last 7 days   Lab Units 06/10/21  0354 06/09/21  0530 06/08/21  0547 06/07/21  0528 06/06/21  0503 06/05/21  0353 06/04/21  0758 06/03/21  0508   WBC Thousand/uL 4 45 4 10* 4 40 5 10 4 32 2 44* 3 58* 4 06*   HEMOGLOBIN g/dL 8 6* 8 9* 9 5* 9 3* 9 5* 6 0* 7 6* 8 3*   HEMATOCRIT % 27 8* 28 7* 30 3* 29 6* 29 7* 19 4* 23 9* 26 7*   PLATELETS Thousands/uL 19* 23* 28* 29* 33* 34* 51*  51* 56*   NEUTROS PCT %  --   --   --   --   --   --   --  86*   BANDS PCT %  --  4  --   --  1 7  --   --    MONOS PCT %  --   --   --   --   --   --   --  3*   MONO PCT %  --  0*  --   --  3* 1*  --   --      Results from last 7 days   Lab Units 06/10/21  0318 06/09/21  0530 06/08/21  0547 06/07/21  0528 06/06/21  0503 06/05/21  0402 06/04/21  0758   SODIUM mmol/L 140 142 142 142 143 143 139   POTASSIUM mmol/L 4 5 4 6 3 2* 4 2 4 0 3 8 3 3*   CHLORIDE mmol/L 104 105 102 105 102 103 99*   CO2 mmol/L 33* 30 30 29 30 32 35*   ANION GAP mmol/L 3* 7 10 8 11 8 5   BUN mg/dL 27* 21 19 22 16 18 21   CREATININE mg/dL 1 15 1 19 1 20 1 03 1 07 1 11 1 46*   CALCIUM mg/dL 9 0 8 9 9 0 9 4 9 0 8 1* 8 5   GLUCOSE RANDOM mg/dL 96 103 114 104 105 81 109   ALT U/L  --   --   --   --  115* 155*  --    AST U/L  --   --   --   --  42 130*  --    ALK PHOS U/L  --   --   --   --  123* 137*  --    ALBUMIN g/dL  --   --   --   --  3 3* 3 6  --    TOTAL BILIRUBIN mg/dL  --   --   --   --  2 23* 2 09*  --      Results from last 7 days   Lab Units 06/08/21 0547 06/07/21  0528 06/06/21  0503 06/05/21  0310   MAGNESIUM mg/dL 1 9 1 7 1 7 1 6   PHOSPHORUS mg/dL 3 7 1 7*  --   --       Results from last 7 days   Lab Units 06/04/21  0758   INR  1 01   PTT seconds 29              ABG:    VBG:  Results from last 7 days   Lab Units 06/08/21  1023   PH TYRELL  7 462*   PCO2 TYRELL mm Hg 44 8   PO2 TYRELL mm Hg 101 8*   HCO3 TYRELL mmol/L 31 3*   BASE EXC TYRELL mmol/L 6 7     Results from last 7 days   Lab Units 06/09/21  0530 06/08/21  0547 06/07/21  0528 06/06/21  0503 06/03/21  0536   PROCALCITONIN ng/ml 0 47* 0 37* 0 26* 0 28* 0 26*       Micro  Results from last 7 days   Lab Units 06/05/21  1123   BLOOD CULTURE  No Growth After 4 Days  No Growth After 4 Days  EKG:  Afib with RVR  Imaging: I have personally reviewed pertinent reports  Intake and Output  I/O       06/08 0701 - 06/09 0700 06/09 0701 - 06/10 0700    P  O  360     I V  (mL/kg) 454 9 (5 8) 101 1 (1 3)    IV Piggyback      Total Intake(mL/kg) 814 9 (10 4) 101 1 (1 3)    Urine (mL/kg/hr) 1070 (0 6) 500 (0 5)    Stool 0 0    Total Output 1070 500    Net -255 1 -398 9          Unmeasured Stool Occurrence 2 x 1 x          Height and Weights   Height: 5' 8" (172 7 cm)  IBW (Ideal Body Weight): 68 4 kg  Body mass index is 26 21 kg/m²  Weight (last 2 days)     Date/Time   Weight    06/09/21 0531   78 2 (172 4)    06/08/21 0550   74 9 (165 12)                Nutrition       Diet Orders   (From admission, onward)             Start     Ordered    06/04/21 2342  Diet Cardiovascular; Cardiac; Fluid Restriction 1800 ML  Diet effective now     Question Answer Comment   Diet Type Cardiovascular    Cardiac Cardiac    Other Restriction(s): Fluid Restriction 1800 ML    RD to adjust diet per protocol?  Yes        06/04/21 2341                  Active Medications  Scheduled Meds:  Current Facility-Administered Medications   Medication Dose Route Frequency Provider Last Rate    acetaminophen  650 mg Oral Q6H PRN XanderJACK Lamar      famotidine  20 mg Oral Daily JACK Camp      ipratropium-albuterol  3 mL Nebulization TID Emil Tijerina MD      midodrine  15 mg Oral TID AC Meño Sears MD      norepinephrine  1-30 mcg/min Intravenous Titrated JACK Blunt 1 mcg/min (06/10/21 0253)    predniSONE  10 mg Oral Daily JACK Camp      vancomycin  125 mg Oral Q12H [facility] JACK Blunt       Continuous Infusions:  norepinephrine, 1-30 mcg/min, Last Rate: 1 mcg/min (06/10/21 0253)      PRN Meds:   acetaminophen, 650 mg, Q6H PRN        Invasive Devices Review  Invasive Devices     Central Venous Catheter Line            Port A Cath 12/10/17 Right Subclavian 1277 days          Drain            Urethral Catheter 4 days                Rationale for remaining devices: medical necessity  ---------------------------------------------------------------------------------------  Advance Directive and Living Will:      Power of :    POLST:    ---------------------------------------------------------------------------------------  Care Time Delivered:   No Critical Care time spent       JACK Boo      Portions of the record may have been created with voice recognition software  Occasional wrong word or "sound a like" substitutions may have occurred due to the inherent limitations of voice recognition software    Read the chart carefully and recognize, using context, where substitutions have occurred

## 2021-06-11 NOTE — PROGRESS NOTES
06/11/21 1300   Clinical Encounter Type   Visited With Family   Routine Visit Follow-up   Sacramental Encounters   Sacrament of Sick-Anointing Anointed

## 2021-06-11 NOTE — ASSESSMENT & PLAN NOTE
· Maintained on Eliquis 2 5 mg BID for atrial fibrillation   · Continue to hold as patient is thrombocytopenic   · Will discuss restarting DVT prophylaxis when appropriate   · No anticoagulation required    Patient is hospice

## 2021-06-11 NOTE — PROGRESS NOTES
[facility]  Progress Note - Lv Colon 1935, 81 y o  male MRN: [id number]  Unit/Bed#: ICU 06 Encounter: [id number]  Primary Care Provider: Radha Lay DO   Date and time admitted to hospital: 6/1/2021  2:06 PM    Pneumonia  Assessment & Plan  · CXR on admission revealed ground glass opacities in the left base  · Recurrent fever 6/7   · Presumed HCAP as patient is immunocompromised receiving treatment for MM  · COVID negative   · Blood cultures x2 reveal no growth to date   · Completed course of antibiotics  · On Vancomycin for cdiff prophylaxis until 6/11   · Antibiotics complete    Centrilobular emphysema ([facility] [address] )  Assessment & Plan  · Patient follows with Dr Satnam Salamanca for out-patient pulmonary   · Baseline oxygen requirements of 2L NC at night  · Currently on 2L NC with oxygen saturations in the high 90s  · Continue atrovent QID  · Continue prednisone 10 mg daily   · Now comfort care       Anemia  Assessment & Plan  · Anemia likely multifactorial: chronic kidney disease, multiple myeloma, iron deficiency   · Iron saturation 17%; transfuse with venofer when acute infection resolves   · Follow up with repeat CBC  · hemoccult stool negative  · transfuse for hemoglobin less than 8  · S/p 2 units of pRBC on 6/5       Thrombocytopenia ([facility] [address] )  Assessment & Plan  · Patients platelets on admission were 23  · Likely multifactorial due to MM and active chemotherapy, sepsis  · Heme/onc following- transfuse for hemoglobin less than 8 and platelets less than 20       Severe aortic stenosis  Assessment & Plan  · Patient follows with cardiology out-patient and was deemed not a surgical candidate  · Currently requiring low dose levophed off and on  · Difficult to manage his fluid volume status in the context of his severe AS  · Poor prognosis- this was discussed with the family     Multiple myeloma ([facility] [address] )  Assessment & Plan  · Patient actively receiving chemotherapy; with last on 5/5  · Has been heavily treated previous and has declined significantly since last Chemo in May     · Now comfort care    Chronic anticoagulation  Assessment & Plan  · Maintained on Eliquis 2 5 mg BID for atrial fibrillation   · Continue to hold as patient is thrombocytopenic   · Will discuss restarting DVT prophylaxis when appropriate   · No anticoagulation required  Patient is hospice    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  Assessment & Plan  · Patient is able to walk short distances  · Consult PT/OT   · DC PT/OT consult        HPI/24hr events:   No overnight events  The patient is comfort care  He is unresponsive this time  His breathing is labored  Will increase his opiates  Medications:  Current Facility-Administered Medications   Medication Dose Route Frequency Provider Last Rate    haloperidol lactate  0 5 mg Intravenous Q4H PRN Driss Cervantes MD      HYDROmorphone  0 1 mg Intravenous Q2H PRN BEE BucknerNP      LORazepam  0 5 mg Intravenous Q2H PRN Driss Cervantes MD                Physical exam:  Vitals: Body mass index is 25 95 kg/m²  Blood pressure 105/70, pulse (!) 132, temperature 99 8 °F (37 7 °C), temperature source Temporal, resp  rate (!) 38, height 5' 8" (1 727 m), weight 77 4 kg (170 lb 10 2 oz), SpO2 93 %  ,  Temp  Min: 97 5 °F (36 4 °C)  Max: 101 2 °F (38 4 °C)  IBW (Ideal Body Weight): 68 4 kg    SpO2: 93 %  SpO2 Activity: At Rest  O2 Device: Nasal cannula      Intake/Output Summary (Last 24 hours) at 6/11/2021 0820  Last data filed at 6/11/2021 0600  Gross per 24 hour   Intake 14 88 ml   Output 475 ml   Net -460 12 ml       Invasive/non-invasive ventilation settings:   Respiratory    Lab Data (Last 4 hours)    None         O2/Vent Data (Last 4 hours)    None              Invasive Devices     Central Venous Catheter Line            Port A Cath 12/10/17 Right Subclavian 1278 days          Drain            Urethral Catheter 5 days                  Physical Exam:  Gen:  Unresponsive  HEENT: Atraumatic normocephalic pupils equal round reactive  There is skin cancer at the right canthus of the eye  Neck:  Supple no JVD no lymphadenopathy  Chest:  Clear to auscultation bilaterally  Cor:  Tachycardic but regular  Abd:  Soft with positive bowel sounds  Ext:  2+ of bilateral lower extremity edema  Neuro:  Unresponsive  Skin:  Warm dry with areas of ecchymosis no rash      Diagnostic Data:  Lab: I have personally reviewed pertinent lab results  CBC:   Results from last 7 days   Lab Units 06/10/21  0354 06/09/21  0530 06/08/21  0547   WBC Thousand/uL 4 45 4 10* 4 40   HEMOGLOBIN g/dL 8 6* 8 9* 9 5*   HEMATOCRIT % 27 8* 28 7* 30 3*   PLATELETS Thousands/uL 19* 23* 28*       CMP:   Results from last 7 days   Lab Units 06/10/21  0318 06/09/21  0530 06/08/21  0547  06/06/21  0503 06/05/21  0402   SODIUM mmol/L 140 142 142   < > 143 143   POTASSIUM mmol/L 4 5 4 6 3 2*   < > 4 0 3 8   CHLORIDE mmol/L 104 105 102   < > 102 103   CO2 mmol/L 33* 30 30   < > 30 32   BUN mg/dL 27* 21 19   < > 16 18   CREATININE mg/dL 1 15 1 19 1 20   < > 1 07 1 11   CALCIUM mg/dL 9 0 8 9 9 0   < > 9 0 8 1*   ALK PHOS U/L  --   --   --   --  123* 137*   ALT U/L  --   --   --   --  115* 155*   AST U/L  --   --   --   --  42 130*    < > = values in this interval not displayed  PT/INR:   No results found for: PT, INR,   Magnesium:   Results from last 7 days   Lab Units 06/08/21  0547 06/07/21  0528 06/06/21  0503   MAGNESIUM mg/dL 1 9 1 7 1 7     Phosphorous:   Results from last 7 days   Lab Units 06/08/21  0547 06/07/21  0528   PHOSPHORUS mg/dL 3 7 1 7*       Microbiology:  Results from last 7 days   Lab Units 06/05/21  1123   BLOOD CULTURE  No Growth After 5 Days  No Growth After 5 Days         Imaging:  No recent imaging    Cardiac lab/EKG/telemetry/ECHO:   No telemetry monitoring patient's comfort care    VTE Prophylaxis:  No VT prophylaxis patient is comfort care    Code Status: Level 4 - Comfort Care    Susana Beltran, CRNP    Portions of the record may have been created with voice recognition software  Occasional wrong word or "sound a like" substitutions may have occurred due to the inherent limitations of voice recognition software  Read the chart carefully and recognize, using context, where substitutions have occurred

## 2021-06-11 NOTE — ASSESSMENT & PLAN NOTE
· Patient follows with Dr Satnam Salamanca for out-patient pulmonary   · Baseline oxygen requirements of 2L NC at night  · Currently on 2L NC with oxygen saturations in the high 90s  · Continue atrovent QID  · Continue prednisone 10 mg daily   · Now comfort care

## 2021-06-11 NOTE — PROGRESS NOTES
06/11/21 1300   Clinical Encounter Type   Visited With Family   Routine Visit Follow-up   Sacramental Encounters   Sacrament of Sick-Anointing Family requested anointing

## 2021-06-11 NOTE — PROGRESS NOTES
Family requested that a  could anoint patient called father no answer than called the [facility] for help  Will continue to follow

## 2021-06-11 NOTE — ASSESSMENT & PLAN NOTE
· Patient actively receiving chemotherapy; with last on 5/5  · Has been heavily treated previous and has declined significantly since last Chemo in May     · Now comfort care

## 2021-06-11 NOTE — ASSESSMENT & PLAN NOTE
· CXR on admission revealed ground glass opacities in the left base  · Recurrent fever 6/7   · Presumed HCAP as patient is immunocompromised receiving treatment for MM  · COVID negative   · Blood cultures x2 reveal no growth to date   · Completed course of antibiotics  · On Vancomycin for cdiff prophylaxis until 6/11   · Antibiotics complete

## 2021-06-12 NOTE — ASSESSMENT & PLAN NOTE
· Patient follows with Dr Vincenzo Duverney for out-patient pulmonary   · Baseline oxygen requirements of 2L NC at night  · Currently on 2L NC with oxygen saturations in the high 90s  · Continue atrovent QID  · Continue prednisone 10 mg daily   · Comfort care 6/11

## 2021-06-12 NOTE — ASSESSMENT & PLAN NOTE
· CXR on admission revealed ground glass opacities in the left base  · Recurrent fever 6/7   · Presumed HCAP as patient is immunocompromised receiving treatment for Multiple myeloma  · COVID negative   · Blood cultures x2 revealed no growth  · Completed course of antibiotics  · On Vancomycin for cdiff prophylaxis until 6/11   · Antibiotics complete  · 6/11 Family transitioned patient to comfort care

## 2021-06-12 NOTE — DEATH NOTE
INPATIENT DEATH NOTE  Valeriy Lindsay 81 y o  male MRN: [id number]  Unit/Bed#: ICU 06 Encounter: [id number]    Date, Time and Cause of Death    Date of Death: 21  Time of Death:  3:51 AM  Preliminary Cause of Death: Multiple myeloma ([facility] [address] )  Entered by: Nabor Colindres[KS1 1]     Attribution     KS1 1 Stephanie Galicia PA-C 21 04:03           Patient's Information  Pronounced by: Lottie Da Silva PA-C  Did the patient's death occur in the ED?: No  Did the patient's death occur in the OR?: No  Did the patient's death occur less than 10 days post-op?: No  Did the patient's death occur within 24 hours of admission?: No  Was code status DNR at the time of death?: Yes    PHYSICAL EXAM:  Unresponsive to noxious stimuli, Spontaneous respirations absent, Breath sounds absent, Carotid pulse absent, Heart sounds absent, Pupillary light reflex absent and Corneal blink reflex absent    Medical Examiner notification criteria:  NONE APPLICABLE   Medical Examiner's office notified?:  Yes   Medical Examiner accepted case?:  No  Name of Medical Examiner: [facility]    Family Notification  Was the family notified?: Yes  Date Notified: 21  Time Notified:   Notified by: Natalia Hills  Name of Family Notified of Death: Tio Sommer   Relationship to Patient: Spouse, Sister, Daughter  Family Notification Route:  At bedside  Was the family told to contact a  home?: Yes  Name of [facility][de[facility]identified] [facility]    Autopsy Options:  Post-mortem examination declined by next of kin    Primary Service Attending Physician notified?:  yes - Attending:  Dr Qamar Alicea    Physician/Resident responsible for completing Discharge Summary:  Lottie Da Silva PA-C

## 2021-06-12 NOTE — ASSESSMENT & PLAN NOTE
· Maintained on Eliquis 2 5 mg BID for atrial fibrillation   · Continue to hold as patient is thrombocytopenic   · Will discuss restarting DVT prophylaxis when appropriate   · No anticoagulation required    Patient comfort care

## 2021-06-12 NOTE — ASSESSMENT & PLAN NOTE
· Patient follows with cardiology out-patient and was deemed not a surgical candidate  · Currently requiring low dose levophed off and on  · Difficult to manage his fluid volume status in the context of his severe AS  · Poor prognosis- this was discussed with the family   · Family elected to transition to comfort care on 6/11 with patient passing early am on 6/12

## 2021-06-12 NOTE — DISCHARGE SUMMARY
[address] [facility]  Discharge- Ritchie Single 1935, 81 y o  male MRN: [id number]  Unit/Bed#: ICU 06 Encounter: [id number]  Primary Care Provider: Zakiya Martínez DO   Date and time admitted to hospital: 6/1/2021  2:06 PM    * Multiple myeloma [facility])  Assessment & Plan  · Multiple myeloma diagnosis actively receiving chemotherapy; with last on 5/5  · Has been heavily treated previous and has significantly declined since last Chemo treatment      · Transitioned to comfort care 6/11    Pneumonia  Assessment & Plan  · CXR on admission revealed ground glass opacities in the left base  · Recurrent fever 6/7   · Presumed HCAP as patient is immunocompromised receiving treatment for Multiple myeloma  · COVID negative   · Blood cultures x2 revealed no growth  · Completed course of antibiotics  · On Vancomycin for cdiff prophylaxis until 6/11   · Antibiotics complete  · 6/11 Family transitioned patient to comfort care    Centrilobular emphysema ([address] )  Assessment & Plan  · Patient follows with Dr Niko Mendiola for out-patient pulmonary   · Baseline oxygen requirements of 2L NC at night  · Currently on 2L NC with oxygen saturations in the high 90s  · Continue atrovent QID  · Continue prednisone 10 mg daily   · Comfort care 6/11    Anemia  Assessment & Plan  · Anemia likely multifactorial: chronic kidney disease, multiple myeloma, iron deficiency   · Iron saturation 17%; transfuse with venofer when acute infection resolves   · Follow up with repeat CBC  · hemoccult stool negative  · transfuse for hemoglobin less than 8  · S/p 2 units of pRBC on 6/5       Thrombocytopenia ([address] )  Assessment & Plan  · Patients platelets on admission were 23  · Multifactorial in setting of multiple myeloma on active chemotherapy, sepsis  · Heme/onc following- transfuse for hemoglobin less than 8 and platelets less than 20       Severe aortic stenosis  Assessment & Plan  · Patient follows with cardiology out-patient and was deemed not a surgical candidate  · Currently requiring low dose levophed off and on  · Difficult to manage his fluid volume status in the context of his severe AS  · Poor prognosis- this was discussed with the family   · Family elected to transition to comfort care on 6/11 with patient passing early am on 6/12    Chronic anticoagulation  Assessment & Plan  · Maintained on Eliquis 2 5 mg BID for atrial fibrillation   · Continue to hold as patient is thrombocytopenic   · Will discuss restarting DVT prophylaxis when appropriate   · No anticoagulation required  Patient comfort care    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  Assessment & Plan  · Patient is able to walk short distances  · Consult PT/OT   · DC PT/OT consult            Resolved Problems  Date Reviewed: 6/12/2021    None          Admission Date:   Admission Orders (From admission, onward)     Ordered        06/01/21 1752  Inpatient Admission  Once         06/01/21 1541  Place in Observation  Once,   Status:  Canceled                     Admitting Diagnosis: Dehydration [E86 0]  COPD (chronic obstructive pulmonary disease) ([facility] [address] ) [J44 9]  Multiple myeloma (HCC) [C90 00]  Asthenia [R53 1]  Dyspnea [R06 00]  Anemia [D64 9]  Thrombocytopenia (HCC) [D69 6]  CKD (chronic kidney disease) [N18 9]  Severe aortic stenosis [I35 0]  Elevated lactic acid level [R79 89]  Weakness [R53 1]    HPI: as obtained by Hospitalist on 6/1   Cinthya Lance is a 81 y o  male who presents with progressive weakness since last week after his chemotherapy infusion  He has known history of multiple myeloma currently being treated every 28 days with Sarclisa , Carfizomib and decadron  In addition he has severe AS, emphysema, chronic anticoagulation due to paroxysmal atrial fibrillation and history of PE  Patient presented to the hospital today due to worsening shortness of breath, associated with productive cough, subjective fever, and weakness    He would walk for few steps in the house and would be short of breath right away  He noticed that his legs are very swollen and he had to use more pillows to sleep  He had to use a wedge in order to sit lay a little upright while sleeping  He could not sleep while laying flat  He also admits having nose bleeding and bleeding in the skin of the legs  Last chemotherapy on 2021 and since then he has been getting worse  Procedures Performed:   Orders Placed This Encounter   Procedures    ED ECG Documentation Only       Summary of Hospital Course:   Patient was a rapid response on  secondary to hypotension in setting of HCAP  Subsequently transferred to the ICU, fluid resuscitated and pressor support initiated in setting of Shock state with also history of severe aortic stenosis  Nephrology following in consult as slight increase in Cr as baseline 1 1 to 1 2  Patient remained on vasopressor support as well as on Cefepime / vancomycin  Overall with patients PMH prognosis quite poor remaining critically ill in the ICU  Patient and family requested to initiate Hospice services on  ultimately with rapid decline and active dying state he was transitioned to comfort care measure in hospital on  and ultimately  on early a of   Significant Findings, Care, Treatment and Services Provided:    CXR: GGO Left base consistent with PNA   COVID Negative   BCx2 No growth   CXR: Picc placement noted increased bilateral infiltrates    Complications:    Rapid response secondary to shock state  Throughout transfer to ICU continued to decline despite aggressive treatment modalities    Condition at Time of Death: Terminally ill    Final Diagnosis:   1  Multiple Myeloma  2  Pneumonia - HCAP  3  Severe Aortic Stenosis  4    Thrombocytopenia    Date, Time and Cause of Death    Date of Death: 21  Time of Death:  3:51 AM  Preliminary Cause of Death: Multiple myeloma ([facility] [address] )  Entered by: Sahra Colindres[KS1 1]     Attribution     OL2 9 Sukhjinderemon Cousins Vent[place] Cortez 21 04:03          PCP: Darrelyn Canavan, DO    Disposition:

## 2021-06-12 NOTE — ASSESSMENT & PLAN NOTE
· Patients platelets on admission were 23  · Multifactorial in setting of multiple myeloma on active chemotherapy, sepsis  · Heme/onc following- transfuse for hemoglobin less than 8 and platelets less than 20

## 2021-06-12 NOTE — ASSESSMENT & PLAN NOTE
· Multiple myeloma diagnosis actively receiving chemotherapy; with last on 5/5  · Has been heavily treated previous and has significantly declined since last Chemo treatment      · Transitioned to comfort care 6/11

## 2021-07-03 ENCOUNTER — TELEPHONE (OUTPATIENT)
Dept: OTHER | Facility: OTHER | Age: 86
End: 2021-07-03

## 2021-07-03 NOTE — TELEPHONE ENCOUNTER
Patient's wife is requesting a call back from office for direction on what to do with nebulizer and oxygen machines that are no longer needed

## 2021-07-07 NOTE — TELEPHONE ENCOUNTER
Per mark they will contact daughter to  oxygen  Nebulizer per insurance is patient owned  Daughter is aware

## 2021-08-06 NOTE — TELEPHONE ENCOUNTER
Janeen Vail called stating that Lv's BP keeps dropping and his heart Rate is at 122  Tried to contact Tahir but she was not available at the time  Referred to Muriel Dye RN and she informed me that he needs to go to the ED ASAP  I informed Janeen Vail and she stated she figured that he did but he doesn't want to go  I informed her it would up to them not to go to the ED but that is our recommendation  She informed me that his BP: 90/61 and keeps dropping and his PULSE: 122  I informed her to call our office if she has any additional questions 
Patient/Caregiver provided printed discharge information.

## 2022-04-11 NOTE — PROGRESS NOTES
Detail Level: Simple Patient tolerated chemotherapy infusion well  Denies any discomfort  Pt and wife are aware of all future appointments  Additional Notes: Patient consent was obtained to proceed with the visit and recommended plan of care after discussion of all risks and benefits, including the risks of COVID-19 exposure. Render Risk Assessment In Note?: yes

## 2022-05-13 NOTE — ASSESSMENT & PLAN NOTE
· CXR on admission revealed ground glass opacities in the left base   · Presumed HCAP as patient is immunocompromised receiving treatment for MM  · COVID negative   · Blood cultures x2 reveal no growth at 72 hours   · Continue Cefepime, Day #5 and Vanco, Day #5 1 Principal Discharge DX:	Anxiety

## 2023-01-20 NOTE — DISCHARGE INSTRUCTIONS
Implanted Venous Access Port     WHAT YOU NEED TO KNOW:   An implanted venous access port is a device used to give treatments and take blood  It may also be called a central venous access device (CVAD)  The port is a small container that is placed under your skin, usually in your upper chest  The port is attached to a catheter that enters a large vein  DISCHARGE INSTRUCTIONS:   Resume your normal diet  Small sips of flat soda will help with mild nausea  Prevent an infection:   · Wash your hands often  Use soap and water  Clean your hands before and after you care for your port  Remind everyone who cares for your port to wash their hands  · Check your skin for infection every day  Look for redness, swelling, or fluid oozing from the port site  Care for your port:   1  You may shower beginning 48 hours after procedure  2  Change dressing if it becomes wet  3  Remove dressing after 24 hours  Leave glue in place  4  It is normal for some bruising to occur  5  Use Tylenol for pain  6  Limit use of arm on the side that your port was placed  Lift nothing heavier than 5 pounds for 1 week, and then gradually increase activity as tolerated  7  DO NOT apply ointment, lotion or cream to port site until incision is healed  Allow glue to fall off  DO NOT attempt to peel glue from skin even it it begins to flake  8, After the port incision is healed you may swim, bathe  Notify the Interventional Radiologist if you have any of the followin  Fever above 101 F    2  Increased redness or swelling after 1st day  3  Increased pain after 1st day  4  Any sign of infection (drainage from port site, skin separation, hot to touch)  5  Persistent nausea or vomiting  Contact Interventional Radiology at [phone] [facility] PATIENTS: Contact Interventional Radiology at [phone]) ([address] [facility] [address]: Contact Interventional Radiology at [phone])  Statement Selected

## 2023-04-27 NOTE — ED NOTES
Dr Pk Joseph at bedside for evaluation     Marjorie Wallace, RN  02/12/20 3471 [place] Counseling:  I discussed with the patient the risks of Wartpeel including but not limited to erythema, scaling, itching, weeping, crusting, and pain.

## 2023-06-07 NOTE — PROGRESS NOTES
Patient tolerated Darzalex infusion well with no adverse events or complications  Pt offers no complaints  Pt and wife are aware of all future appointments 
normal

## 2024-11-22 NOTE — OCCUPATIONAL THERAPY NOTE
Occupational Therapy Screen    Orders received  Chart review completed  Pt seen ambulating with mod I within room  Pt reports performing ADLs and toileting with mod I  Pt's wife present and both report no concerns for D/C home and pt's wife able to provide A PRN  No acute care OT needs at this time  Anticipate pt D/C home with family support when medically cleared  D/C OT         Adithya Daly MS, OTR/L unknown

## (undated) DEVICE — VIOLET BRAIDED (POLYGLACTIN 910), SYNTHETIC ABSORBABLE SUTURE: Brand: COATED VICRYL

## (undated) DEVICE — TRAY FOLEY 16FR URIMETER SURESTEP

## (undated) DEVICE — ANTI-FOG SOLUTION WITH FOAM PAD: Brand: DEVON

## (undated) DEVICE — DISPOSABLE BRIEF/UNDERWEAR

## (undated) DEVICE — THE LARIAT SNARE IS AN ELECTROSURGICAL DEVICE USED TO ENDOSCOPICALLY GRASP, DISSECT, AND TRANSECT TISSUE DURING GASTROINTESTINAL (GI) ENDOSCOPIC PROCEDURES.  THE SNARE CAN BE USED WITH OR WITHOUT THE USE OF MONOPOLAR DIATHERMIC ENERGY.: Brand: LARIAT

## (undated) DEVICE — TK® TI-KNOT® DEVICE: Brand: TK® TI-KNOT®

## (undated) DEVICE — MAYO STAND COVER: Brand: CONVERTORS

## (undated) DEVICE — INVIEW CLEAR LEGGINGS: Brand: CONVERTORS

## (undated) DEVICE — STRL UNIVERSAL MINOR GENERAL: Brand: CARDINAL HEALTH

## (undated) DEVICE — TK® QUICK LOAD® UNIT: Brand: TK® QUICK LOAD®

## (undated) DEVICE — SINGLE-USE POLYPECTOMY SNARE: Brand: SENSATION SHORT THROW

## (undated) DEVICE — TEM TUBE SET  INSUFFLATION TUBE, IRRIGATION TUBE, SUCTION TUBE, TUBE FOR PRESSURE MEASUREMENT, TUBE FOR ROLLER PUMPS, CONNECTION TUBE FOR IRRIGATION CANNULAS TO ROLLER PUMPS, PVC, FOR TEM PUMPS, STERILE, FOR SINGLE USE

## (undated) DEVICE — 1840 FOAM BLOCK NEEDLE COUNTER: Brand: DEVON

## (undated) DEVICE — REM POLYHESIVE ADULT PATIENT RETURN ELECTRODE: Brand: VALLEYLAB

## (undated) DEVICE — SPECIMEN CONTAINER STERILE PEEL PACK

## (undated) DEVICE — NEEDLE 25G X 5/8

## (undated) DEVICE — SUT PDS II 3-0 SH 27 IN Z316H

## (undated) DEVICE — SYRINGE 20ML LL

## (undated) DEVICE — BULB SYRINGE,IRRIGATION WITH PROTECTIVE CAP: Brand: DOVER

## (undated) DEVICE — LUBRICANT SURGILUBE TUBE 4 OZ  FLIP TOP

## (undated) DEVICE — CLIP SUTURE STRL 4840-766

## (undated) DEVICE — SINGLE-USE BIOPSY FORCEPS: Brand: RADIAL JAW 4

## (undated) DEVICE — GAUZE SPONGES,16 PLY: Brand: CURITY

## (undated) DEVICE — PUMP HAND BELLOWS DISPLAY DISP

## (undated) DEVICE — POOLE SUCTION HANDLE: Brand: CARDINAL HEALTH

## (undated) DEVICE — GLOVE SRG BIOGEL 8

## (undated) DEVICE — SIGMOIDOSCOPIC SUCTION INSTRUMENT 18 FR W/WINGED CAP CONTROL AND 6 FOOT (1.8M) TUBING: Brand: SIGMOIDOSCOPIC

## (undated) DEVICE — 3000CC GUARDIAN II: Brand: GUARDIAN

## (undated) DEVICE — GLOVE INDICATOR PI UNDERGLOVE SZ 8.5 BLUE